# Patient Record
Sex: FEMALE | Race: WHITE | NOT HISPANIC OR LATINO | Employment: FULL TIME | URBAN - METROPOLITAN AREA
[De-identification: names, ages, dates, MRNs, and addresses within clinical notes are randomized per-mention and may not be internally consistent; named-entity substitution may affect disease eponyms.]

---

## 2017-01-03 ENCOUNTER — OFFICE VISIT - HEALTHEAST (OUTPATIENT)
Dept: BEHAVIORAL HEALTH | Facility: CLINIC | Age: 52
End: 2017-01-03

## 2017-01-03 DIAGNOSIS — F41.0 PANIC DISORDER WITHOUT AGORAPHOBIA: ICD-10-CM

## 2017-01-03 DIAGNOSIS — F33.1 MAJOR DEPRESSIVE DISORDER, RECURRENT EPISODE, MODERATE (H): ICD-10-CM

## 2017-01-03 DIAGNOSIS — F43.10 PTSD (POST-TRAUMATIC STRESS DISORDER): ICD-10-CM

## 2017-01-09 ENCOUNTER — OFFICE VISIT - HEALTHEAST (OUTPATIENT)
Dept: BEHAVIORAL HEALTH | Facility: CLINIC | Age: 52
End: 2017-01-09

## 2017-01-09 DIAGNOSIS — F33.0 MILD EPISODE OF RECURRENT MAJOR DEPRESSIVE DISORDER (H): ICD-10-CM

## 2017-01-09 DIAGNOSIS — F41.9 ANXIETY: ICD-10-CM

## 2017-01-09 DIAGNOSIS — F33.1 MAJOR DEPRESSIVE DISORDER, RECURRENT EPISODE, MODERATE (H): ICD-10-CM

## 2017-01-09 DIAGNOSIS — F43.10 PTSD (POST-TRAUMATIC STRESS DISORDER): ICD-10-CM

## 2017-01-09 DIAGNOSIS — F41.0 PANIC DISORDER WITHOUT AGORAPHOBIA: ICD-10-CM

## 2017-01-09 DIAGNOSIS — G47.9 SLEEP DISORDER: ICD-10-CM

## 2017-01-09 ASSESSMENT — MIFFLIN-ST. JEOR: SCORE: 1840.33

## 2017-01-19 ENCOUNTER — HOSPITAL ENCOUNTER (OUTPATIENT)
Dept: MAMMOGRAPHY | Facility: HOSPITAL | Age: 52
Discharge: HOME | End: 2017-01-19
Attending: INTERNAL MEDICINE

## 2017-01-19 DIAGNOSIS — Z12.31 VISIT FOR SCREENING MAMMOGRAM: ICD-10-CM

## 2017-01-31 ENCOUNTER — OFFICE VISIT - HEALTHEAST (OUTPATIENT)
Dept: BEHAVIORAL HEALTH | Facility: CLINIC | Age: 52
End: 2017-01-31

## 2017-01-31 DIAGNOSIS — F33.1 MAJOR DEPRESSIVE DISORDER, RECURRENT EPISODE, MODERATE (H): ICD-10-CM

## 2017-01-31 DIAGNOSIS — F41.0 PANIC DISORDER WITHOUT AGORAPHOBIA: ICD-10-CM

## 2017-01-31 DIAGNOSIS — F43.10 PTSD (POST-TRAUMATIC STRESS DISORDER): ICD-10-CM

## 2017-02-14 ENCOUNTER — OFFICE VISIT - HEALTHEAST (OUTPATIENT)
Dept: BEHAVIORAL HEALTH | Facility: CLINIC | Age: 52
End: 2017-02-14

## 2017-02-14 DIAGNOSIS — F41.0 PANIC DISORDER WITHOUT AGORAPHOBIA: ICD-10-CM

## 2017-02-14 DIAGNOSIS — F33.1 MAJOR DEPRESSIVE DISORDER, RECURRENT EPISODE, MODERATE (H): ICD-10-CM

## 2017-02-14 DIAGNOSIS — F43.10 PTSD (POST-TRAUMATIC STRESS DISORDER): ICD-10-CM

## 2017-03-07 ENCOUNTER — OFFICE VISIT - HEALTHEAST (OUTPATIENT)
Dept: BEHAVIORAL HEALTH | Facility: CLINIC | Age: 52
End: 2017-03-07

## 2017-03-07 DIAGNOSIS — F33.1 MAJOR DEPRESSIVE DISORDER, RECURRENT EPISODE, MODERATE (H): ICD-10-CM

## 2017-03-07 DIAGNOSIS — F41.0 PANIC DISORDER WITHOUT AGORAPHOBIA: ICD-10-CM

## 2017-03-07 DIAGNOSIS — F43.10 PTSD (POST-TRAUMATIC STRESS DISORDER): ICD-10-CM

## 2017-03-21 ENCOUNTER — AMBULATORY - HEALTHEAST (OUTPATIENT)
Dept: BEHAVIORAL HEALTH | Facility: CLINIC | Age: 52
End: 2017-03-21

## 2017-03-21 ENCOUNTER — OFFICE VISIT - HEALTHEAST (OUTPATIENT)
Dept: BEHAVIORAL HEALTH | Facility: CLINIC | Age: 52
End: 2017-03-21

## 2017-03-21 DIAGNOSIS — F33.1 MAJOR DEPRESSIVE DISORDER, RECURRENT EPISODE, MODERATE (H): ICD-10-CM

## 2017-03-21 DIAGNOSIS — F41.0 PANIC DISORDER WITHOUT AGORAPHOBIA: ICD-10-CM

## 2017-03-21 DIAGNOSIS — F43.10 PTSD (POST-TRAUMATIC STRESS DISORDER): ICD-10-CM

## 2017-03-22 ENCOUNTER — AMBULATORY - HEALTHEAST (OUTPATIENT)
Dept: INTERNAL MEDICINE | Facility: CLINIC | Age: 52
End: 2017-03-22

## 2017-03-22 DIAGNOSIS — Z13.9 SCREENING: ICD-10-CM

## 2017-03-28 ENCOUNTER — COMMUNICATION - HEALTHEAST (OUTPATIENT)
Dept: RHEUMATOLOGY | Facility: CLINIC | Age: 52
End: 2017-03-28

## 2017-03-28 DIAGNOSIS — M06.9 RHEUMATOID ARTHRITIS (H): ICD-10-CM

## 2017-04-04 ENCOUNTER — OFFICE VISIT - HEALTHEAST (OUTPATIENT)
Dept: BEHAVIORAL HEALTH | Facility: CLINIC | Age: 52
End: 2017-04-04

## 2017-04-04 ENCOUNTER — COMMUNICATION - HEALTHEAST (OUTPATIENT)
Dept: INTERNAL MEDICINE | Facility: CLINIC | Age: 52
End: 2017-04-04

## 2017-04-04 DIAGNOSIS — F41.0 PANIC DISORDER WITHOUT AGORAPHOBIA: ICD-10-CM

## 2017-04-04 DIAGNOSIS — F43.10 PTSD (POST-TRAUMATIC STRESS DISORDER): ICD-10-CM

## 2017-04-04 DIAGNOSIS — F33.1 MAJOR DEPRESSIVE DISORDER, RECURRENT EPISODE, MODERATE (H): ICD-10-CM

## 2017-04-09 ENCOUNTER — COMMUNICATION - HEALTHEAST (OUTPATIENT)
Dept: INTERNAL MEDICINE | Facility: CLINIC | Age: 52
End: 2017-04-09

## 2017-04-10 ENCOUNTER — OFFICE VISIT - HEALTHEAST (OUTPATIENT)
Dept: BEHAVIORAL HEALTH | Facility: CLINIC | Age: 52
End: 2017-04-10

## 2017-04-10 DIAGNOSIS — F33.0 MILD EPISODE OF RECURRENT MAJOR DEPRESSIVE DISORDER (H): ICD-10-CM

## 2017-04-10 DIAGNOSIS — F41.9 ANXIETY: ICD-10-CM

## 2017-04-10 DIAGNOSIS — G89.29 OTHER CHRONIC PAIN: ICD-10-CM

## 2017-04-10 DIAGNOSIS — F43.10 PTSD (POST-TRAUMATIC STRESS DISORDER): ICD-10-CM

## 2017-04-10 ASSESSMENT — MIFFLIN-ST. JEOR: SCORE: 1831.26

## 2017-04-18 ENCOUNTER — OFFICE VISIT - HEALTHEAST (OUTPATIENT)
Dept: BEHAVIORAL HEALTH | Facility: CLINIC | Age: 52
End: 2017-04-18

## 2017-04-18 DIAGNOSIS — F33.1 MAJOR DEPRESSIVE DISORDER, RECURRENT EPISODE, MODERATE (H): ICD-10-CM

## 2017-04-18 DIAGNOSIS — F43.10 PTSD (POST-TRAUMATIC STRESS DISORDER): ICD-10-CM

## 2017-04-18 DIAGNOSIS — F41.9 ANXIETY: ICD-10-CM

## 2017-05-02 ENCOUNTER — OFFICE VISIT - HEALTHEAST (OUTPATIENT)
Dept: BEHAVIORAL HEALTH | Facility: CLINIC | Age: 52
End: 2017-05-02

## 2017-05-02 DIAGNOSIS — F33.1 MAJOR DEPRESSIVE DISORDER, RECURRENT EPISODE, MODERATE (H): ICD-10-CM

## 2017-05-02 DIAGNOSIS — F43.10 PTSD (POST-TRAUMATIC STRESS DISORDER): ICD-10-CM

## 2017-05-02 DIAGNOSIS — F41.9 ANXIETY: ICD-10-CM

## 2017-05-11 ENCOUNTER — COMMUNICATION - HEALTHEAST (OUTPATIENT)
Dept: ADMINISTRATIVE | Facility: CLINIC | Age: 52
End: 2017-05-11

## 2017-05-11 ENCOUNTER — AMBULATORY - HEALTHEAST (OUTPATIENT)
Dept: LAB | Facility: CLINIC | Age: 52
End: 2017-05-11

## 2017-05-11 DIAGNOSIS — M06.9 RHEUMATOID ARTHRITIS INVOLVING MULTIPLE SITES, UNSPECIFIED RHEUMATOID FACTOR PRESENCE: ICD-10-CM

## 2017-05-11 DIAGNOSIS — M06.9 RHEUMATOID ARTHRITIS (H): ICD-10-CM

## 2017-05-11 LAB
CREAT SERPL-MCNC: 0.72 MG/DL (ref 0.6–1.1)
GFR SERPL CREATININE-BSD FRML MDRD: >60 ML/MIN/1.73M2

## 2017-05-16 ENCOUNTER — OFFICE VISIT - HEALTHEAST (OUTPATIENT)
Dept: BEHAVIORAL HEALTH | Facility: CLINIC | Age: 52
End: 2017-05-16

## 2017-05-16 DIAGNOSIS — F43.10 PTSD (POST-TRAUMATIC STRESS DISORDER): ICD-10-CM

## 2017-05-16 DIAGNOSIS — F33.1 MAJOR DEPRESSIVE DISORDER, RECURRENT EPISODE, MODERATE (H): ICD-10-CM

## 2017-05-16 DIAGNOSIS — F41.9 ANXIETY: ICD-10-CM

## 2017-05-30 ENCOUNTER — OFFICE VISIT - HEALTHEAST (OUTPATIENT)
Dept: BEHAVIORAL HEALTH | Facility: CLINIC | Age: 52
End: 2017-05-30

## 2017-05-30 DIAGNOSIS — F33.1 MAJOR DEPRESSIVE DISORDER, RECURRENT EPISODE, MODERATE (H): ICD-10-CM

## 2017-05-30 DIAGNOSIS — F43.10 PTSD (POST-TRAUMATIC STRESS DISORDER): ICD-10-CM

## 2017-05-30 DIAGNOSIS — F41.0 PANIC DISORDER WITHOUT AGORAPHOBIA: ICD-10-CM

## 2017-06-09 ENCOUNTER — OFFICE VISIT - HEALTHEAST (OUTPATIENT)
Dept: RHEUMATOLOGY | Facility: CLINIC | Age: 52
End: 2017-06-09

## 2017-06-09 DIAGNOSIS — M05.79 SEROPOSITIVE RHEUMATOID ARTHRITIS OF MULTIPLE SITES (H): ICD-10-CM

## 2017-06-09 DIAGNOSIS — M06.9 RHEUMATOID ARTHRITIS INVOLVING MULTIPLE SITES, UNSPECIFIED RHEUMATOID FACTOR PRESENCE: ICD-10-CM

## 2017-06-09 DIAGNOSIS — M77.8 TENDONITIS OF BOTH SHOULDERS: ICD-10-CM

## 2017-06-09 LAB
ALT SERPL W P-5'-P-CCNC: 12 U/L (ref 0–45)
CREAT SERPL-MCNC: 0.69 MG/DL (ref 0.6–1.1)
GFR SERPL CREATININE-BSD FRML MDRD: >60 ML/MIN/1.73M2

## 2017-06-09 ASSESSMENT — MIFFLIN-ST. JEOR: SCORE: 1831.26

## 2017-06-13 ENCOUNTER — OFFICE VISIT - HEALTHEAST (OUTPATIENT)
Dept: BEHAVIORAL HEALTH | Facility: CLINIC | Age: 52
End: 2017-06-13

## 2017-06-13 DIAGNOSIS — F33.1 MAJOR DEPRESSIVE DISORDER, RECURRENT EPISODE, MODERATE (H): ICD-10-CM

## 2017-06-13 DIAGNOSIS — F41.0 PANIC DISORDER WITHOUT AGORAPHOBIA: ICD-10-CM

## 2017-06-13 DIAGNOSIS — F43.10 PTSD (POST-TRAUMATIC STRESS DISORDER): ICD-10-CM

## 2017-06-30 ENCOUNTER — AMBULATORY - HEALTHEAST (OUTPATIENT)
Dept: LAB | Facility: CLINIC | Age: 52
End: 2017-06-30

## 2017-06-30 DIAGNOSIS — M06.9 RHEUMATOID ARTHRITIS INVOLVING MULTIPLE SITES, UNSPECIFIED RHEUMATOID FACTOR PRESENCE: ICD-10-CM

## 2017-06-30 LAB
ALT SERPL W P-5'-P-CCNC: 13 U/L (ref 0–45)
CREAT SERPL-MCNC: 0.75 MG/DL (ref 0.6–1.1)
GFR SERPL CREATININE-BSD FRML MDRD: >60 ML/MIN/1.73M2

## 2017-07-11 ENCOUNTER — OFFICE VISIT - HEALTHEAST (OUTPATIENT)
Dept: BEHAVIORAL HEALTH | Facility: CLINIC | Age: 52
End: 2017-07-11

## 2017-07-11 DIAGNOSIS — F43.10 PTSD (POST-TRAUMATIC STRESS DISORDER): ICD-10-CM

## 2017-07-11 DIAGNOSIS — F33.1 MAJOR DEPRESSIVE DISORDER, RECURRENT EPISODE, MODERATE (H): ICD-10-CM

## 2017-07-11 DIAGNOSIS — F41.0 PANIC DISORDER WITHOUT AGORAPHOBIA: ICD-10-CM

## 2017-08-01 ENCOUNTER — OFFICE VISIT - HEALTHEAST (OUTPATIENT)
Dept: RHEUMATOLOGY | Facility: CLINIC | Age: 52
End: 2017-08-01

## 2017-08-01 DIAGNOSIS — Z79.899 HIGH RISK MEDICATION USE: ICD-10-CM

## 2017-08-01 DIAGNOSIS — M77.8 TENDONITIS OF BOTH SHOULDERS: ICD-10-CM

## 2017-08-01 DIAGNOSIS — M05.79 SEROPOSITIVE RHEUMATOID ARTHRITIS OF MULTIPLE SITES (H): ICD-10-CM

## 2017-08-01 LAB
ALT SERPL W P-5'-P-CCNC: 11 U/L (ref 0–45)
CREAT SERPL-MCNC: 0.7 MG/DL (ref 0.6–1.1)
GFR SERPL CREATININE-BSD FRML MDRD: >60 ML/MIN/1.73M2

## 2017-08-01 ASSESSMENT — MIFFLIN-ST. JEOR: SCORE: 1831.26

## 2017-08-10 ENCOUNTER — OFFICE VISIT - HEALTHEAST (OUTPATIENT)
Dept: BEHAVIORAL HEALTH | Facility: CLINIC | Age: 52
End: 2017-08-10

## 2017-08-10 DIAGNOSIS — F41.0 PANIC DISORDER WITHOUT AGORAPHOBIA: ICD-10-CM

## 2017-08-10 DIAGNOSIS — F33.1 MAJOR DEPRESSIVE DISORDER, RECURRENT EPISODE, MODERATE (H): ICD-10-CM

## 2017-08-10 DIAGNOSIS — F43.10 PTSD (POST-TRAUMATIC STRESS DISORDER): ICD-10-CM

## 2017-09-01 ENCOUNTER — AMBULATORY - HEALTHEAST (OUTPATIENT)
Dept: LAB | Facility: CLINIC | Age: 52
End: 2017-09-01

## 2017-09-01 DIAGNOSIS — M06.9 RHEUMATOID ARTHRITIS INVOLVING MULTIPLE SITES, UNSPECIFIED RHEUMATOID FACTOR PRESENCE: ICD-10-CM

## 2017-09-07 ENCOUNTER — OFFICE VISIT - HEALTHEAST (OUTPATIENT)
Dept: BEHAVIORAL HEALTH | Facility: CLINIC | Age: 52
End: 2017-09-07

## 2017-09-07 DIAGNOSIS — F43.10 PTSD (POST-TRAUMATIC STRESS DISORDER): ICD-10-CM

## 2017-09-07 DIAGNOSIS — F33.1 MAJOR DEPRESSIVE DISORDER, RECURRENT EPISODE, MODERATE (H): ICD-10-CM

## 2017-09-07 DIAGNOSIS — F41.0 PANIC DISORDER WITHOUT AGORAPHOBIA: ICD-10-CM

## 2017-10-02 ENCOUNTER — AMBULATORY - HEALTHEAST (OUTPATIENT)
Dept: LAB | Facility: CLINIC | Age: 52
End: 2017-10-02

## 2017-10-02 DIAGNOSIS — M06.9 RHEUMATOID ARTHRITIS INVOLVING MULTIPLE SITES, UNSPECIFIED RHEUMATOID FACTOR PRESENCE: ICD-10-CM

## 2017-10-03 ENCOUNTER — OFFICE VISIT - HEALTHEAST (OUTPATIENT)
Dept: RHEUMATOLOGY | Facility: CLINIC | Age: 52
End: 2017-10-03

## 2017-10-03 DIAGNOSIS — Z79.899 HIGH RISK MEDICATION USE: ICD-10-CM

## 2017-10-03 DIAGNOSIS — M05.79 SEROPOSITIVE RHEUMATOID ARTHRITIS OF MULTIPLE SITES (H): ICD-10-CM

## 2017-10-03 DIAGNOSIS — R76.8 CYCLIC CITRULLINATED PEPTIDE (CCP) ANTIBODY POSITIVE: ICD-10-CM

## 2017-10-05 ENCOUNTER — OFFICE VISIT - HEALTHEAST (OUTPATIENT)
Dept: BEHAVIORAL HEALTH | Facility: CLINIC | Age: 52
End: 2017-10-05

## 2017-10-05 DIAGNOSIS — F43.10 PTSD (POST-TRAUMATIC STRESS DISORDER): ICD-10-CM

## 2017-10-05 DIAGNOSIS — F33.1 MAJOR DEPRESSIVE DISORDER, RECURRENT EPISODE, MODERATE (H): ICD-10-CM

## 2017-10-05 DIAGNOSIS — F41.0 PANIC DISORDER WITHOUT AGORAPHOBIA: ICD-10-CM

## 2017-10-18 ENCOUNTER — OFFICE VISIT - HEALTHEAST (OUTPATIENT)
Dept: BEHAVIORAL HEALTH | Facility: CLINIC | Age: 52
End: 2017-10-18

## 2017-10-18 DIAGNOSIS — F33.1 MAJOR DEPRESSIVE DISORDER, RECURRENT EPISODE, MODERATE (H): ICD-10-CM

## 2017-10-18 DIAGNOSIS — F43.10 PTSD (POST-TRAUMATIC STRESS DISORDER): ICD-10-CM

## 2017-10-18 DIAGNOSIS — F41.0 PANIC DISORDER WITHOUT AGORAPHOBIA: ICD-10-CM

## 2017-10-26 ENCOUNTER — AMBULATORY - HEALTHEAST (OUTPATIENT)
Dept: BEHAVIORAL HEALTH | Facility: CLINIC | Age: 52
End: 2017-10-26

## 2017-11-01 ENCOUNTER — OFFICE VISIT - HEALTHEAST (OUTPATIENT)
Dept: BEHAVIORAL HEALTH | Facility: CLINIC | Age: 52
End: 2017-11-01

## 2017-11-01 DIAGNOSIS — F43.10 PTSD (POST-TRAUMATIC STRESS DISORDER): ICD-10-CM

## 2017-11-01 DIAGNOSIS — F33.1 MAJOR DEPRESSIVE DISORDER, RECURRENT EPISODE, MODERATE (H): ICD-10-CM

## 2017-11-01 DIAGNOSIS — F41.0 PANIC DISORDER WITHOUT AGORAPHOBIA: ICD-10-CM

## 2017-11-15 ENCOUNTER — OFFICE VISIT - HEALTHEAST (OUTPATIENT)
Dept: BEHAVIORAL HEALTH | Facility: CLINIC | Age: 52
End: 2017-11-15

## 2017-11-15 DIAGNOSIS — F43.10 PTSD (POST-TRAUMATIC STRESS DISORDER): ICD-10-CM

## 2017-11-15 DIAGNOSIS — F33.1 MAJOR DEPRESSIVE DISORDER, RECURRENT EPISODE, MODERATE (H): ICD-10-CM

## 2017-11-15 DIAGNOSIS — F41.0 PANIC DISORDER WITHOUT AGORAPHOBIA: ICD-10-CM

## 2017-11-29 ENCOUNTER — OFFICE VISIT - HEALTHEAST (OUTPATIENT)
Dept: BEHAVIORAL HEALTH | Facility: CLINIC | Age: 52
End: 2017-11-29

## 2017-11-29 DIAGNOSIS — F41.0 PANIC DISORDER WITHOUT AGORAPHOBIA: ICD-10-CM

## 2017-11-29 DIAGNOSIS — F33.1 MAJOR DEPRESSIVE DISORDER, RECURRENT EPISODE, MODERATE (H): ICD-10-CM

## 2017-11-29 DIAGNOSIS — F43.10 PTSD (POST-TRAUMATIC STRESS DISORDER): ICD-10-CM

## 2017-12-20 ENCOUNTER — OFFICE VISIT - HEALTHEAST (OUTPATIENT)
Dept: BEHAVIORAL HEALTH | Facility: CLINIC | Age: 52
End: 2017-12-20

## 2017-12-20 DIAGNOSIS — F43.10 PTSD (POST-TRAUMATIC STRESS DISORDER): ICD-10-CM

## 2017-12-20 DIAGNOSIS — F41.0 PANIC DISORDER WITHOUT AGORAPHOBIA: ICD-10-CM

## 2017-12-20 DIAGNOSIS — F33.1 MAJOR DEPRESSIVE DISORDER, RECURRENT EPISODE, MODERATE (H): ICD-10-CM

## 2018-01-04 ENCOUNTER — OFFICE VISIT - HEALTHEAST (OUTPATIENT)
Dept: BEHAVIORAL HEALTH | Facility: CLINIC | Age: 53
End: 2018-01-04

## 2018-01-04 DIAGNOSIS — F43.10 PTSD (POST-TRAUMATIC STRESS DISORDER): ICD-10-CM

## 2018-01-04 DIAGNOSIS — F41.0 PANIC DISORDER WITHOUT AGORAPHOBIA: ICD-10-CM

## 2018-01-04 DIAGNOSIS — F33.1 MAJOR DEPRESSIVE DISORDER, RECURRENT EPISODE, MODERATE (H): ICD-10-CM

## 2018-01-11 ENCOUNTER — COMMUNICATION - HEALTHEAST (OUTPATIENT)
Dept: RHEUMATOLOGY | Facility: CLINIC | Age: 53
End: 2018-01-11

## 2018-01-11 DIAGNOSIS — M05.79 SEROPOSITIVE RHEUMATOID ARTHRITIS OF MULTIPLE SITES (H): ICD-10-CM

## 2018-01-18 ENCOUNTER — OFFICE VISIT - HEALTHEAST (OUTPATIENT)
Dept: BEHAVIORAL HEALTH | Facility: CLINIC | Age: 53
End: 2018-01-18

## 2018-01-18 DIAGNOSIS — F43.10 PTSD (POST-TRAUMATIC STRESS DISORDER): ICD-10-CM

## 2018-01-18 DIAGNOSIS — F33.1 MAJOR DEPRESSIVE DISORDER, RECURRENT EPISODE, MODERATE (H): ICD-10-CM

## 2018-01-18 DIAGNOSIS — F41.0 PANIC DISORDER WITHOUT AGORAPHOBIA: ICD-10-CM

## 2018-01-19 ENCOUNTER — OFFICE VISIT - HEALTHEAST (OUTPATIENT)
Dept: FAMILY MEDICINE | Facility: CLINIC | Age: 53
End: 2018-01-19

## 2018-01-19 DIAGNOSIS — R06.09 OTHER FORMS OF DYSPNEA: ICD-10-CM

## 2018-01-19 DIAGNOSIS — R06.09 DOE (DYSPNEA ON EXERTION): ICD-10-CM

## 2018-01-19 DIAGNOSIS — J11.1 INFLUENZA: ICD-10-CM

## 2018-01-19 DIAGNOSIS — R05.9 COUGH: ICD-10-CM

## 2018-01-19 DIAGNOSIS — R68.89 FLU-LIKE SYMPTOMS: ICD-10-CM

## 2018-01-19 DIAGNOSIS — J02.9 SORE THROAT: ICD-10-CM

## 2018-01-19 LAB
DEPRECATED S PYO AG THROAT QL EIA: NORMAL
FLUAV AG SPEC QL IA: NORMAL
FLUBV AG SPEC QL IA: NORMAL

## 2018-01-20 LAB — GROUP A STREP BY PCR: NORMAL

## 2018-02-02 ENCOUNTER — AMBULATORY - HEALTHEAST (OUTPATIENT)
Dept: LAB | Facility: CLINIC | Age: 53
End: 2018-02-02

## 2018-02-02 DIAGNOSIS — M06.9 RHEUMATOID ARTHRITIS INVOLVING MULTIPLE SITES, UNSPECIFIED RHEUMATOID FACTOR PRESENCE: ICD-10-CM

## 2018-02-02 LAB
ALBUMIN SERPL-MCNC: 3.3 G/DL (ref 3.5–5)
ALT SERPL W P-5'-P-CCNC: 18 U/L (ref 0–45)
CREAT SERPL-MCNC: 0.68 MG/DL (ref 0.6–1.1)
ERYTHROCYTE [DISTWIDTH] IN BLOOD BY AUTOMATED COUNT: 13.9 % (ref 11–14.5)
GFR SERPL CREATININE-BSD FRML MDRD: >60 ML/MIN/1.73M2
HCT VFR BLD AUTO: 36.1 % (ref 35–47)
HGB BLD-MCNC: 11.7 G/DL (ref 12–16)
MCH RBC QN AUTO: 24.6 PG (ref 27–34)
MCHC RBC AUTO-ENTMCNC: 32.4 G/DL (ref 32–36)
MCV RBC AUTO: 76 FL (ref 80–100)
PLATELET # BLD AUTO: 355 THOU/UL (ref 140–440)
PMV BLD AUTO: 8.1 FL (ref 7–10)
RBC # BLD AUTO: 4.77 MILL/UL (ref 3.8–5.4)
WBC: 10.1 THOU/UL (ref 4–11)

## 2018-02-06 ENCOUNTER — OFFICE VISIT - HEALTHEAST (OUTPATIENT)
Dept: RHEUMATOLOGY | Facility: CLINIC | Age: 53
End: 2018-02-06

## 2018-02-06 DIAGNOSIS — M05.79 SEROPOSITIVE RHEUMATOID ARTHRITIS OF MULTIPLE SITES (H): ICD-10-CM

## 2018-02-06 DIAGNOSIS — Z79.899 HIGH RISK MEDICATION USE: ICD-10-CM

## 2018-02-06 DIAGNOSIS — R76.8 CYCLIC CITRULLINATED PEPTIDE (CCP) ANTIBODY POSITIVE: ICD-10-CM

## 2018-02-06 ASSESSMENT — MIFFLIN-ST. JEOR: SCORE: 1831.26

## 2018-02-07 ENCOUNTER — OFFICE VISIT - HEALTHEAST (OUTPATIENT)
Dept: BEHAVIORAL HEALTH | Facility: CLINIC | Age: 53
End: 2018-02-07

## 2018-02-07 DIAGNOSIS — F43.10 PTSD (POST-TRAUMATIC STRESS DISORDER): ICD-10-CM

## 2018-02-07 DIAGNOSIS — F33.1 MAJOR DEPRESSIVE DISORDER, RECURRENT EPISODE, MODERATE (H): ICD-10-CM

## 2018-02-07 DIAGNOSIS — F41.0 PANIC DISORDER WITHOUT AGORAPHOBIA: ICD-10-CM

## 2018-02-09 ENCOUNTER — AMBULATORY - HEALTHEAST (OUTPATIENT)
Dept: BEHAVIORAL HEALTH | Facility: CLINIC | Age: 53
End: 2018-02-09

## 2018-03-01 ENCOUNTER — OFFICE VISIT - HEALTHEAST (OUTPATIENT)
Dept: BEHAVIORAL HEALTH | Facility: CLINIC | Age: 53
End: 2018-03-01

## 2018-03-01 DIAGNOSIS — F33.1 MAJOR DEPRESSIVE DISORDER, RECURRENT EPISODE, MODERATE (H): ICD-10-CM

## 2018-03-01 DIAGNOSIS — F43.10 PTSD (POST-TRAUMATIC STRESS DISORDER): ICD-10-CM

## 2018-03-01 DIAGNOSIS — F41.0 PANIC DISORDER WITHOUT AGORAPHOBIA: ICD-10-CM

## 2018-03-13 ENCOUNTER — COMMUNICATION - HEALTHEAST (OUTPATIENT)
Dept: BEHAVIORAL HEALTH | Facility: CLINIC | Age: 53
End: 2018-03-13

## 2018-03-14 ENCOUNTER — OFFICE VISIT - HEALTHEAST (OUTPATIENT)
Dept: BEHAVIORAL HEALTH | Facility: CLINIC | Age: 53
End: 2018-03-14

## 2018-03-14 DIAGNOSIS — F41.0 PANIC DISORDER WITHOUT AGORAPHOBIA: ICD-10-CM

## 2018-03-14 DIAGNOSIS — F33.1 MAJOR DEPRESSIVE DISORDER, RECURRENT EPISODE, MODERATE (H): ICD-10-CM

## 2018-03-14 DIAGNOSIS — F43.10 PTSD (POST-TRAUMATIC STRESS DISORDER): ICD-10-CM

## 2018-03-21 ENCOUNTER — OFFICE VISIT - HEALTHEAST (OUTPATIENT)
Dept: BEHAVIORAL HEALTH | Facility: CLINIC | Age: 53
End: 2018-03-21

## 2018-03-21 DIAGNOSIS — F41.0 PANIC DISORDER WITHOUT AGORAPHOBIA: ICD-10-CM

## 2018-03-21 DIAGNOSIS — F43.10 PTSD (POST-TRAUMATIC STRESS DISORDER): ICD-10-CM

## 2018-03-21 DIAGNOSIS — F33.1 MAJOR DEPRESSIVE DISORDER, RECURRENT EPISODE, MODERATE (H): ICD-10-CM

## 2018-03-29 ENCOUNTER — OFFICE VISIT - HEALTHEAST (OUTPATIENT)
Dept: BEHAVIORAL HEALTH | Facility: CLINIC | Age: 53
End: 2018-03-29

## 2018-03-29 DIAGNOSIS — F41.0 PANIC DISORDER WITHOUT AGORAPHOBIA: ICD-10-CM

## 2018-03-29 DIAGNOSIS — F43.10 PTSD (POST-TRAUMATIC STRESS DISORDER): ICD-10-CM

## 2018-03-29 DIAGNOSIS — F33.1 MAJOR DEPRESSIVE DISORDER, RECURRENT EPISODE, MODERATE (H): ICD-10-CM

## 2018-04-05 ENCOUNTER — OFFICE VISIT - HEALTHEAST (OUTPATIENT)
Dept: BEHAVIORAL HEALTH | Facility: CLINIC | Age: 53
End: 2018-04-05

## 2018-04-05 DIAGNOSIS — F41.0 PANIC DISORDER WITHOUT AGORAPHOBIA: ICD-10-CM

## 2018-04-05 DIAGNOSIS — F33.1 MAJOR DEPRESSIVE DISORDER, RECURRENT EPISODE, MODERATE (H): ICD-10-CM

## 2018-04-05 DIAGNOSIS — F43.10 PTSD (POST-TRAUMATIC STRESS DISORDER): ICD-10-CM

## 2018-04-11 ENCOUNTER — OFFICE VISIT - HEALTHEAST (OUTPATIENT)
Dept: BEHAVIORAL HEALTH | Facility: CLINIC | Age: 53
End: 2018-04-11

## 2018-04-11 DIAGNOSIS — F43.10 PTSD (POST-TRAUMATIC STRESS DISORDER): ICD-10-CM

## 2018-04-11 DIAGNOSIS — F33.1 MAJOR DEPRESSIVE DISORDER, RECURRENT EPISODE, MODERATE (H): ICD-10-CM

## 2018-04-11 DIAGNOSIS — F41.0 PANIC DISORDER WITHOUT AGORAPHOBIA: ICD-10-CM

## 2018-04-19 ENCOUNTER — OFFICE VISIT - HEALTHEAST (OUTPATIENT)
Dept: BEHAVIORAL HEALTH | Facility: CLINIC | Age: 53
End: 2018-04-19

## 2018-04-19 DIAGNOSIS — F43.10 PTSD (POST-TRAUMATIC STRESS DISORDER): ICD-10-CM

## 2018-04-19 DIAGNOSIS — F33.1 MAJOR DEPRESSIVE DISORDER, RECURRENT EPISODE, MODERATE (H): ICD-10-CM

## 2018-04-19 DIAGNOSIS — F41.0 PANIC DISORDER WITHOUT AGORAPHOBIA: ICD-10-CM

## 2018-04-26 ENCOUNTER — OFFICE VISIT - HEALTHEAST (OUTPATIENT)
Dept: BEHAVIORAL HEALTH | Facility: CLINIC | Age: 53
End: 2018-04-26

## 2018-04-26 DIAGNOSIS — F41.0 PANIC DISORDER WITHOUT AGORAPHOBIA: ICD-10-CM

## 2018-04-26 DIAGNOSIS — F43.10 PTSD (POST-TRAUMATIC STRESS DISORDER): ICD-10-CM

## 2018-04-26 DIAGNOSIS — F33.1 MAJOR DEPRESSIVE DISORDER, RECURRENT EPISODE, MODERATE (H): ICD-10-CM

## 2018-05-02 ENCOUNTER — OFFICE VISIT - HEALTHEAST (OUTPATIENT)
Dept: BEHAVIORAL HEALTH | Facility: CLINIC | Age: 53
End: 2018-05-02

## 2018-05-02 DIAGNOSIS — F41.0 PANIC DISORDER WITHOUT AGORAPHOBIA: ICD-10-CM

## 2018-05-02 DIAGNOSIS — F43.10 PTSD (POST-TRAUMATIC STRESS DISORDER): ICD-10-CM

## 2018-05-02 DIAGNOSIS — F33.1 MAJOR DEPRESSIVE DISORDER, RECURRENT EPISODE, MODERATE (H): ICD-10-CM

## 2018-05-03 ENCOUNTER — AMBULATORY - HEALTHEAST (OUTPATIENT)
Dept: BEHAVIORAL HEALTH | Facility: CLINIC | Age: 53
End: 2018-05-03

## 2018-05-16 ENCOUNTER — OFFICE VISIT - HEALTHEAST (OUTPATIENT)
Dept: BEHAVIORAL HEALTH | Facility: CLINIC | Age: 53
End: 2018-05-16

## 2018-05-16 DIAGNOSIS — F41.0 PANIC DISORDER WITHOUT AGORAPHOBIA: ICD-10-CM

## 2018-05-16 DIAGNOSIS — F33.1 MAJOR DEPRESSIVE DISORDER, RECURRENT EPISODE, MODERATE (H): ICD-10-CM

## 2018-05-16 DIAGNOSIS — F43.10 PTSD (POST-TRAUMATIC STRESS DISORDER): ICD-10-CM

## 2018-05-21 ENCOUNTER — OFFICE VISIT - HEALTHEAST (OUTPATIENT)
Dept: FAMILY MEDICINE | Facility: CLINIC | Age: 53
End: 2018-05-21

## 2018-05-21 DIAGNOSIS — L98.9 SKIN LESION: ICD-10-CM

## 2018-05-21 DIAGNOSIS — Z00.00 ROUTINE GENERAL MEDICAL EXAMINATION AT A HEALTH CARE FACILITY: ICD-10-CM

## 2018-05-21 DIAGNOSIS — Z12.11 SCREEN FOR COLON CANCER: ICD-10-CM

## 2018-05-21 DIAGNOSIS — Z12.31 VISIT FOR SCREENING MAMMOGRAM: ICD-10-CM

## 2018-05-21 LAB
CHOLEST SERPL-MCNC: 166 MG/DL
FASTING STATUS PATIENT QL REPORTED: YES
HDLC SERPL-MCNC: 41 MG/DL
LDLC SERPL CALC-MCNC: 112 MG/DL
TRIGL SERPL-MCNC: 66 MG/DL

## 2018-05-21 ASSESSMENT — MIFFLIN-ST. JEOR: SCORE: 1813.11

## 2018-05-22 ENCOUNTER — COMMUNICATION - HEALTHEAST (OUTPATIENT)
Dept: FAMILY MEDICINE | Facility: CLINIC | Age: 53
End: 2018-05-22

## 2018-05-22 ENCOUNTER — OFFICE VISIT - HEALTHEAST (OUTPATIENT)
Dept: BEHAVIORAL HEALTH | Facility: HOSPITAL | Age: 53
End: 2018-05-22

## 2018-05-22 DIAGNOSIS — F33.1 MAJOR DEPRESSIVE DISORDER, RECURRENT EPISODE, MODERATE (H): ICD-10-CM

## 2018-05-22 DIAGNOSIS — F43.10 PTSD (POST-TRAUMATIC STRESS DISORDER): ICD-10-CM

## 2018-05-22 DIAGNOSIS — F41.0 PANIC DISORDER WITHOUT AGORAPHOBIA: ICD-10-CM

## 2018-05-22 LAB
C TRACH DNA SPEC QL PROBE+SIG AMP: NEGATIVE
HPV SOURCE: NORMAL
HUMAN PAPILLOMA VIRUS 16 DNA: NEGATIVE
HUMAN PAPILLOMA VIRUS 18 DNA: NEGATIVE
HUMAN PAPILLOMA VIRUS FINAL DIAGNOSIS: NORMAL
HUMAN PAPILLOMA VIRUS OTHER HR: NEGATIVE
N GONORRHOEA DNA SPEC QL NAA+PROBE: NEGATIVE
SPECIMEN DESCRIPTION: NORMAL

## 2018-05-29 ENCOUNTER — COMMUNICATION - HEALTHEAST (OUTPATIENT)
Dept: FAMILY MEDICINE | Facility: CLINIC | Age: 53
End: 2018-05-29

## 2018-05-29 LAB
BKR LAB AP ABNORMAL BLEEDING: NO
BKR LAB AP BIRTH CONTROL/HORMONES: NORMAL
BKR LAB AP CERVICAL APPEARANCE: NORMAL
BKR LAB AP GYN ADEQUACY: NORMAL
BKR LAB AP GYN INTERPRETATION: NORMAL
BKR LAB AP HPV REFLEX: NORMAL
BKR LAB AP LMP: NORMAL
BKR LAB AP PATIENT STATUS: NORMAL
BKR LAB AP PREVIOUS ABNORMAL: NORMAL
BKR LAB AP PREVIOUS NORMAL: 2013
HIGH RISK?: NO
PATH REPORT.COMMENTS IMP SPEC: NORMAL
RESULT FLAG (HE HISTORICAL CONVERSION): NORMAL

## 2018-05-30 ENCOUNTER — RECORDS - HEALTHEAST (OUTPATIENT)
Dept: ADMINISTRATIVE | Facility: OTHER | Age: 53
End: 2018-05-30

## 2018-06-01 ENCOUNTER — OFFICE VISIT - HEALTHEAST (OUTPATIENT)
Dept: BEHAVIORAL HEALTH | Facility: CLINIC | Age: 53
End: 2018-06-01

## 2018-06-01 DIAGNOSIS — F43.10 PTSD (POST-TRAUMATIC STRESS DISORDER): ICD-10-CM

## 2018-06-01 DIAGNOSIS — F33.1 MAJOR DEPRESSIVE DISORDER, RECURRENT EPISODE, MODERATE (H): ICD-10-CM

## 2018-06-01 DIAGNOSIS — F41.0 PANIC DISORDER WITHOUT AGORAPHOBIA: ICD-10-CM

## 2018-06-07 ENCOUNTER — OFFICE VISIT - HEALTHEAST (OUTPATIENT)
Dept: BEHAVIORAL HEALTH | Facility: CLINIC | Age: 53
End: 2018-06-07

## 2018-06-07 DIAGNOSIS — F33.1 MAJOR DEPRESSIVE DISORDER, RECURRENT EPISODE, MODERATE (H): ICD-10-CM

## 2018-06-07 DIAGNOSIS — F41.0 PANIC DISORDER WITHOUT AGORAPHOBIA: ICD-10-CM

## 2018-06-07 DIAGNOSIS — F43.10 PTSD (POST-TRAUMATIC STRESS DISORDER): ICD-10-CM

## 2018-06-13 ENCOUNTER — OFFICE VISIT - HEALTHEAST (OUTPATIENT)
Dept: BEHAVIORAL HEALTH | Facility: CLINIC | Age: 53
End: 2018-06-13

## 2018-06-13 DIAGNOSIS — F33.1 MAJOR DEPRESSIVE DISORDER, RECURRENT EPISODE, MODERATE (H): ICD-10-CM

## 2018-06-13 DIAGNOSIS — F41.0 PANIC DISORDER WITHOUT AGORAPHOBIA: ICD-10-CM

## 2018-06-13 DIAGNOSIS — F43.10 PTSD (POST-TRAUMATIC STRESS DISORDER): ICD-10-CM

## 2018-06-20 ENCOUNTER — OFFICE VISIT - HEALTHEAST (OUTPATIENT)
Dept: BEHAVIORAL HEALTH | Facility: CLINIC | Age: 53
End: 2018-06-20

## 2018-06-20 DIAGNOSIS — F43.10 PTSD (POST-TRAUMATIC STRESS DISORDER): ICD-10-CM

## 2018-06-20 DIAGNOSIS — F41.0 PANIC DISORDER WITHOUT AGORAPHOBIA: ICD-10-CM

## 2018-06-20 DIAGNOSIS — F33.1 MAJOR DEPRESSIVE DISORDER, RECURRENT EPISODE, MODERATE (H): ICD-10-CM

## 2018-06-27 ENCOUNTER — OFFICE VISIT - HEALTHEAST (OUTPATIENT)
Dept: BEHAVIORAL HEALTH | Facility: CLINIC | Age: 53
End: 2018-06-27

## 2018-06-27 DIAGNOSIS — F33.1 MAJOR DEPRESSIVE DISORDER, RECURRENT EPISODE, MODERATE (H): ICD-10-CM

## 2018-06-27 DIAGNOSIS — F41.0 PANIC DISORDER WITHOUT AGORAPHOBIA: ICD-10-CM

## 2018-06-27 DIAGNOSIS — F43.10 PTSD (POST-TRAUMATIC STRESS DISORDER): ICD-10-CM

## 2018-06-28 ENCOUNTER — COMMUNICATION - HEALTHEAST (OUTPATIENT)
Dept: FAMILY MEDICINE | Facility: CLINIC | Age: 53
End: 2018-06-28

## 2018-06-28 ENCOUNTER — AMBULATORY - HEALTHEAST (OUTPATIENT)
Dept: RHEUMATOLOGY | Facility: CLINIC | Age: 53
End: 2018-06-28

## 2018-06-28 ENCOUNTER — COMMUNICATION - HEALTHEAST (OUTPATIENT)
Dept: RHEUMATOLOGY | Facility: CLINIC | Age: 53
End: 2018-06-28

## 2018-06-28 DIAGNOSIS — M05.79 SEROPOSITIVE RHEUMATOID ARTHRITIS OF MULTIPLE SITES (H): ICD-10-CM

## 2018-06-28 DIAGNOSIS — G89.29 OTHER CHRONIC PAIN: ICD-10-CM

## 2018-06-28 DIAGNOSIS — F43.10 PTSD (POST-TRAUMATIC STRESS DISORDER): ICD-10-CM

## 2018-06-28 DIAGNOSIS — G47.9 SLEEP DISORDER: ICD-10-CM

## 2018-06-28 DIAGNOSIS — F41.9 ANXIETY: ICD-10-CM

## 2018-07-11 ENCOUNTER — OFFICE VISIT - HEALTHEAST (OUTPATIENT)
Dept: BEHAVIORAL HEALTH | Facility: CLINIC | Age: 53
End: 2018-07-11

## 2018-07-11 DIAGNOSIS — F43.10 PTSD (POST-TRAUMATIC STRESS DISORDER): ICD-10-CM

## 2018-07-11 DIAGNOSIS — F41.0 PANIC DISORDER WITHOUT AGORAPHOBIA: ICD-10-CM

## 2018-07-11 DIAGNOSIS — F33.1 MAJOR DEPRESSIVE DISORDER, RECURRENT EPISODE, MODERATE (H): ICD-10-CM

## 2018-07-18 ENCOUNTER — OFFICE VISIT - HEALTHEAST (OUTPATIENT)
Dept: BEHAVIORAL HEALTH | Facility: CLINIC | Age: 53
End: 2018-07-18

## 2018-07-18 DIAGNOSIS — F41.0 PANIC DISORDER WITHOUT AGORAPHOBIA: ICD-10-CM

## 2018-07-18 DIAGNOSIS — F33.1 MAJOR DEPRESSIVE DISORDER, RECURRENT EPISODE, MODERATE (H): ICD-10-CM

## 2018-07-18 DIAGNOSIS — F43.10 PTSD (POST-TRAUMATIC STRESS DISORDER): ICD-10-CM

## 2018-07-25 ENCOUNTER — OFFICE VISIT - HEALTHEAST (OUTPATIENT)
Dept: BEHAVIORAL HEALTH | Facility: CLINIC | Age: 53
End: 2018-07-25

## 2018-07-25 DIAGNOSIS — F43.10 PTSD (POST-TRAUMATIC STRESS DISORDER): ICD-10-CM

## 2018-07-25 DIAGNOSIS — F33.1 MAJOR DEPRESSIVE DISORDER, RECURRENT EPISODE, MODERATE (H): ICD-10-CM

## 2018-07-25 DIAGNOSIS — F41.0 PANIC DISORDER WITHOUT AGORAPHOBIA: ICD-10-CM

## 2018-08-08 ENCOUNTER — AMBULATORY - HEALTHEAST (OUTPATIENT)
Dept: BEHAVIORAL HEALTH | Facility: CLINIC | Age: 53
End: 2018-08-08

## 2018-08-08 ENCOUNTER — OFFICE VISIT - HEALTHEAST (OUTPATIENT)
Dept: BEHAVIORAL HEALTH | Facility: CLINIC | Age: 53
End: 2018-08-08

## 2018-08-08 DIAGNOSIS — F43.10 PTSD (POST-TRAUMATIC STRESS DISORDER): ICD-10-CM

## 2018-08-08 DIAGNOSIS — F33.1 MAJOR DEPRESSIVE DISORDER, RECURRENT EPISODE, MODERATE (H): ICD-10-CM

## 2018-08-08 DIAGNOSIS — F41.0 PANIC DISORDER WITHOUT AGORAPHOBIA: ICD-10-CM

## 2018-08-15 ENCOUNTER — OFFICE VISIT - HEALTHEAST (OUTPATIENT)
Dept: BEHAVIORAL HEALTH | Facility: CLINIC | Age: 53
End: 2018-08-15

## 2018-08-15 DIAGNOSIS — F33.1 MAJOR DEPRESSIVE DISORDER, RECURRENT EPISODE, MODERATE (H): ICD-10-CM

## 2018-08-15 DIAGNOSIS — F41.0 PANIC DISORDER WITHOUT AGORAPHOBIA: ICD-10-CM

## 2018-08-15 DIAGNOSIS — F43.10 PTSD (POST-TRAUMATIC STRESS DISORDER): ICD-10-CM

## 2018-08-21 ENCOUNTER — COMMUNICATION - HEALTHEAST (OUTPATIENT)
Dept: INTERNAL MEDICINE | Facility: CLINIC | Age: 53
End: 2018-08-21

## 2018-08-21 ENCOUNTER — HOSPITAL ENCOUNTER (OUTPATIENT)
Dept: MAMMOGRAPHY | Facility: CLINIC | Age: 53
Discharge: HOME | End: 2018-08-21
Attending: FAMILY MEDICINE

## 2018-08-21 DIAGNOSIS — Z12.31 VISIT FOR SCREENING MAMMOGRAM: ICD-10-CM

## 2018-08-21 DIAGNOSIS — R59.9 ENLARGED LYMPH NODES: ICD-10-CM

## 2018-08-22 ENCOUNTER — OFFICE VISIT - HEALTHEAST (OUTPATIENT)
Dept: BEHAVIORAL HEALTH | Facility: CLINIC | Age: 53
End: 2018-08-22

## 2018-08-22 DIAGNOSIS — F43.10 PTSD (POST-TRAUMATIC STRESS DISORDER): ICD-10-CM

## 2018-08-22 DIAGNOSIS — F41.0 PANIC DISORDER WITHOUT AGORAPHOBIA: ICD-10-CM

## 2018-08-22 DIAGNOSIS — F33.1 MAJOR DEPRESSIVE DISORDER, RECURRENT EPISODE, MODERATE (H): ICD-10-CM

## 2018-08-24 ENCOUNTER — HOSPITAL ENCOUNTER (OUTPATIENT)
Dept: ULTRASOUND IMAGING | Facility: HOSPITAL | Age: 53
Discharge: HOME | End: 2018-08-24
Attending: FAMILY MEDICINE

## 2018-08-24 ENCOUNTER — AMBULATORY - HEALTHEAST (OUTPATIENT)
Dept: LAB | Facility: CLINIC | Age: 53
End: 2018-08-24

## 2018-08-24 DIAGNOSIS — M05.79 SEROPOSITIVE RHEUMATOID ARTHRITIS OF MULTIPLE SITES (H): ICD-10-CM

## 2018-08-24 LAB
ALBUMIN SERPL-MCNC: 3.4 G/DL (ref 3.5–5)
ALT SERPL W P-5'-P-CCNC: 10 U/L (ref 0–45)
CREAT SERPL-MCNC: 0.68 MG/DL (ref 0.6–1.1)
ERYTHROCYTE [DISTWIDTH] IN BLOOD BY AUTOMATED COUNT: 14.1 % (ref 11–14.5)
GFR SERPL CREATININE-BSD FRML MDRD: >60 ML/MIN/1.73M2
HCT VFR BLD AUTO: 34.9 % (ref 35–47)
HGB BLD-MCNC: 11.6 G/DL (ref 12–16)
MCH RBC QN AUTO: 25.4 PG (ref 27–34)
MCHC RBC AUTO-ENTMCNC: 33.2 G/DL (ref 32–36)
MCV RBC AUTO: 77 FL (ref 80–100)
PLATELET # BLD AUTO: 308 THOU/UL (ref 140–440)
PMV BLD AUTO: 8.1 FL (ref 7–10)
RBC # BLD AUTO: 4.55 MILL/UL (ref 3.8–5.4)
WBC: 5.6 THOU/UL (ref 4–11)

## 2018-08-27 ENCOUNTER — COMMUNICATION - HEALTHEAST (OUTPATIENT)
Dept: SCHEDULING | Facility: CLINIC | Age: 53
End: 2018-08-27

## 2018-08-27 ENCOUNTER — COMMUNICATION - HEALTHEAST (OUTPATIENT)
Dept: FAMILY MEDICINE | Facility: CLINIC | Age: 53
End: 2018-08-27

## 2018-08-27 DIAGNOSIS — R59.9 ENLARGED LYMPH NODES: ICD-10-CM

## 2018-08-28 ENCOUNTER — HOSPITAL ENCOUNTER (OUTPATIENT)
Dept: CT IMAGING | Facility: HOSPITAL | Age: 53
Discharge: HOME | End: 2018-08-28
Attending: FAMILY MEDICINE

## 2018-08-28 DIAGNOSIS — R59.9 ENLARGED LYMPH NODES: ICD-10-CM

## 2018-08-29 ENCOUNTER — COMMUNICATION - HEALTHEAST (OUTPATIENT)
Dept: FAMILY MEDICINE | Facility: CLINIC | Age: 53
End: 2018-08-29

## 2018-08-29 ENCOUNTER — COMMUNICATION - HEALTHEAST (OUTPATIENT)
Dept: PHARMACY | Facility: HOSPITAL | Age: 53
End: 2018-08-29

## 2018-08-29 ENCOUNTER — OFFICE VISIT - HEALTHEAST (OUTPATIENT)
Dept: BEHAVIORAL HEALTH | Facility: CLINIC | Age: 53
End: 2018-08-29

## 2018-08-29 DIAGNOSIS — M05.79 SEROPOSITIVE RHEUMATOID ARTHRITIS OF MULTIPLE SITES (H): ICD-10-CM

## 2018-08-29 DIAGNOSIS — F43.10 PTSD (POST-TRAUMATIC STRESS DISORDER): ICD-10-CM

## 2018-08-29 DIAGNOSIS — F33.1 MAJOR DEPRESSIVE DISORDER, RECURRENT EPISODE, MODERATE (H): ICD-10-CM

## 2018-08-29 DIAGNOSIS — F41.0 PANIC DISORDER WITHOUT AGORAPHOBIA: ICD-10-CM

## 2018-09-05 ENCOUNTER — OFFICE VISIT - HEALTHEAST (OUTPATIENT)
Dept: BEHAVIORAL HEALTH | Facility: CLINIC | Age: 53
End: 2018-09-05

## 2018-09-05 DIAGNOSIS — F43.10 PTSD (POST-TRAUMATIC STRESS DISORDER): ICD-10-CM

## 2018-09-05 DIAGNOSIS — F41.0 PANIC DISORDER WITHOUT AGORAPHOBIA: ICD-10-CM

## 2018-09-05 DIAGNOSIS — F33.1 MAJOR DEPRESSIVE DISORDER, RECURRENT EPISODE, MODERATE (H): ICD-10-CM

## 2018-09-12 ENCOUNTER — OFFICE VISIT - HEALTHEAST (OUTPATIENT)
Dept: BEHAVIORAL HEALTH | Facility: CLINIC | Age: 53
End: 2018-09-12

## 2018-09-12 DIAGNOSIS — F43.10 PTSD (POST-TRAUMATIC STRESS DISORDER): ICD-10-CM

## 2018-09-12 DIAGNOSIS — F33.1 MAJOR DEPRESSIVE DISORDER, RECURRENT EPISODE, MODERATE (H): ICD-10-CM

## 2018-09-12 DIAGNOSIS — F41.0 PANIC DISORDER WITHOUT AGORAPHOBIA: ICD-10-CM

## 2018-09-19 ENCOUNTER — OFFICE VISIT - HEALTHEAST (OUTPATIENT)
Dept: BEHAVIORAL HEALTH | Facility: CLINIC | Age: 53
End: 2018-09-19

## 2018-09-19 DIAGNOSIS — F33.1 MAJOR DEPRESSIVE DISORDER, RECURRENT EPISODE, MODERATE (H): ICD-10-CM

## 2018-09-19 DIAGNOSIS — F43.10 PTSD (POST-TRAUMATIC STRESS DISORDER): ICD-10-CM

## 2018-09-19 DIAGNOSIS — F41.0 PANIC DISORDER WITHOUT AGORAPHOBIA: ICD-10-CM

## 2018-09-26 ENCOUNTER — OFFICE VISIT - HEALTHEAST (OUTPATIENT)
Dept: BEHAVIORAL HEALTH | Facility: CLINIC | Age: 53
End: 2018-09-26

## 2018-09-26 DIAGNOSIS — F41.0 PANIC DISORDER WITHOUT AGORAPHOBIA: ICD-10-CM

## 2018-09-26 DIAGNOSIS — F43.10 PTSD (POST-TRAUMATIC STRESS DISORDER): ICD-10-CM

## 2018-09-26 DIAGNOSIS — F33.1 MAJOR DEPRESSIVE DISORDER, RECURRENT EPISODE, MODERATE (H): ICD-10-CM

## 2018-10-03 ENCOUNTER — OFFICE VISIT - HEALTHEAST (OUTPATIENT)
Dept: BEHAVIORAL HEALTH | Facility: CLINIC | Age: 53
End: 2018-10-03

## 2018-10-03 DIAGNOSIS — F33.1 MAJOR DEPRESSIVE DISORDER, RECURRENT EPISODE, MODERATE (H): ICD-10-CM

## 2018-10-03 DIAGNOSIS — F41.0 PANIC DISORDER WITHOUT AGORAPHOBIA: ICD-10-CM

## 2018-10-03 DIAGNOSIS — F43.10 PTSD (POST-TRAUMATIC STRESS DISORDER): ICD-10-CM

## 2018-10-10 ENCOUNTER — OFFICE VISIT - HEALTHEAST (OUTPATIENT)
Dept: BEHAVIORAL HEALTH | Facility: CLINIC | Age: 53
End: 2018-10-10

## 2018-10-10 DIAGNOSIS — F43.10 PTSD (POST-TRAUMATIC STRESS DISORDER): ICD-10-CM

## 2018-10-10 DIAGNOSIS — F33.1 MAJOR DEPRESSIVE DISORDER, RECURRENT EPISODE, MODERATE (H): ICD-10-CM

## 2018-10-10 DIAGNOSIS — F41.0 PANIC DISORDER WITHOUT AGORAPHOBIA: ICD-10-CM

## 2018-10-12 ENCOUNTER — COMMUNICATION - HEALTHEAST (OUTPATIENT)
Dept: FAMILY MEDICINE | Facility: CLINIC | Age: 53
End: 2018-10-12

## 2018-10-17 ENCOUNTER — OFFICE VISIT - HEALTHEAST (OUTPATIENT)
Dept: BEHAVIORAL HEALTH | Facility: CLINIC | Age: 53
End: 2018-10-17

## 2018-10-17 DIAGNOSIS — F43.10 PTSD (POST-TRAUMATIC STRESS DISORDER): ICD-10-CM

## 2018-10-17 DIAGNOSIS — F33.1 MAJOR DEPRESSIVE DISORDER, RECURRENT EPISODE, MODERATE (H): ICD-10-CM

## 2018-10-17 DIAGNOSIS — F41.0 PANIC DISORDER WITHOUT AGORAPHOBIA: ICD-10-CM

## 2018-10-24 ENCOUNTER — OFFICE VISIT - HEALTHEAST (OUTPATIENT)
Dept: BEHAVIORAL HEALTH | Facility: CLINIC | Age: 53
End: 2018-10-24

## 2018-10-24 DIAGNOSIS — F43.10 PTSD (POST-TRAUMATIC STRESS DISORDER): ICD-10-CM

## 2018-10-24 DIAGNOSIS — F33.1 MAJOR DEPRESSIVE DISORDER, RECURRENT EPISODE, MODERATE (H): ICD-10-CM

## 2018-10-24 DIAGNOSIS — F41.0 PANIC DISORDER WITHOUT AGORAPHOBIA: ICD-10-CM

## 2018-11-01 ENCOUNTER — OFFICE VISIT - HEALTHEAST (OUTPATIENT)
Dept: BEHAVIORAL HEALTH | Facility: CLINIC | Age: 53
End: 2018-11-01

## 2018-11-01 DIAGNOSIS — F41.0 PANIC DISORDER WITHOUT AGORAPHOBIA: ICD-10-CM

## 2018-11-01 DIAGNOSIS — F43.10 PTSD (POST-TRAUMATIC STRESS DISORDER): ICD-10-CM

## 2018-11-01 DIAGNOSIS — F33.1 MAJOR DEPRESSIVE DISORDER, RECURRENT EPISODE, MODERATE (H): ICD-10-CM

## 2018-11-07 ENCOUNTER — OFFICE VISIT - HEALTHEAST (OUTPATIENT)
Dept: BEHAVIORAL HEALTH | Facility: CLINIC | Age: 53
End: 2018-11-07

## 2018-11-07 DIAGNOSIS — F43.10 PTSD (POST-TRAUMATIC STRESS DISORDER): ICD-10-CM

## 2018-11-07 DIAGNOSIS — F33.1 MAJOR DEPRESSIVE DISORDER, RECURRENT EPISODE, MODERATE (H): ICD-10-CM

## 2018-11-07 DIAGNOSIS — F41.0 PANIC DISORDER WITHOUT AGORAPHOBIA: ICD-10-CM

## 2018-11-08 ENCOUNTER — COMMUNICATION - HEALTHEAST (OUTPATIENT)
Dept: BEHAVIORAL HEALTH | Facility: CLINIC | Age: 53
End: 2018-11-08

## 2018-11-13 ENCOUNTER — HOSPITAL ENCOUNTER (EMERGENCY)
Facility: CLINIC | Age: 53
Discharge: STILL ADMITTED OR ADMINISTRATIVE | DRG: 882 | End: 2018-11-14
Attending: FAMILY MEDICINE | Admitting: FAMILY MEDICINE
Payer: COMMERCIAL

## 2018-11-13 DIAGNOSIS — R45.851 SUICIDAL IDEATION: ICD-10-CM

## 2018-11-13 LAB
ALBUMIN UR-MCNC: 30 MG/DL
AMPHETAMINES UR QL SCN: NEGATIVE
APPEARANCE UR: CLEAR
BARBITURATES UR QL: NEGATIVE
BENZODIAZ UR QL: NEGATIVE
BILIRUB UR QL STRIP: NEGATIVE
CANNABINOIDS UR QL SCN: POSITIVE
COCAINE UR QL: NEGATIVE
COLOR UR AUTO: YELLOW
ETHANOL UR QL SCN: NEGATIVE
GLUCOSE UR STRIP-MCNC: NEGATIVE MG/DL
HGB UR QL STRIP: ABNORMAL
KETONES UR STRIP-MCNC: 5 MG/DL
LEUKOCYTE ESTERASE UR QL STRIP: NEGATIVE
MUCOUS THREADS #/AREA URNS LPF: PRESENT /LPF
NITRATE UR QL: NEGATIVE
OPIATES UR QL SCN: NEGATIVE
PH UR STRIP: 6.5 PH (ref 5–7)
RBC #/AREA URNS AUTO: 10 /HPF (ref 0–2)
SOURCE: ABNORMAL
SP GR UR STRIP: 1.03 (ref 1–1.03)
SQUAMOUS #/AREA URNS AUTO: 1 /HPF (ref 0–1)
TRANS CELLS #/AREA URNS HPF: <1 /HPF (ref 0–1)
UROBILINOGEN UR STRIP-MCNC: 2 MG/DL (ref 0–2)
WBC #/AREA URNS AUTO: <1 /HPF (ref 0–5)

## 2018-11-13 PROCEDURE — 90791 PSYCH DIAGNOSTIC EVALUATION: CPT

## 2018-11-13 PROCEDURE — 80307 DRUG TEST PRSMV CHEM ANLYZR: CPT | Performed by: FAMILY MEDICINE

## 2018-11-13 PROCEDURE — 81001 URINALYSIS AUTO W/SCOPE: CPT | Performed by: FAMILY MEDICINE

## 2018-11-13 PROCEDURE — 80320 DRUG SCREEN QUANTALCOHOLS: CPT | Performed by: FAMILY MEDICINE

## 2018-11-13 PROCEDURE — 99285 EMERGENCY DEPT VISIT HI MDM: CPT | Mod: 25 | Performed by: FAMILY MEDICINE

## 2018-11-13 PROCEDURE — 99285 EMERGENCY DEPT VISIT HI MDM: CPT | Mod: Z6 | Performed by: FAMILY MEDICINE

## 2018-11-13 RX ORDER — HYDROXYCHLOROQUINE SULFATE 200 MG/1
200 TABLET, FILM COATED ORAL DAILY
COMMUNITY
End: 2021-07-29

## 2018-11-13 ASSESSMENT — ENCOUNTER SYMPTOMS
ABDOMINAL PAIN: 0
HALLUCINATIONS: 0
SHORTNESS OF BREATH: 0
NECK STIFFNESS: 0
FEVER: 0
CONFUSION: 0
COLOR CHANGE: 0
ARTHRALGIAS: 0
HEADACHES: 0
DIFFICULTY URINATING: 0
EYE REDNESS: 0

## 2018-11-14 ENCOUNTER — HOSPITAL ENCOUNTER (INPATIENT)
Facility: CLINIC | Age: 53
LOS: 7 days | Discharge: HOME | DRG: 882 | End: 2018-11-21
Attending: PSYCHIATRY & NEUROLOGY | Admitting: PSYCHIATRY & NEUROLOGY
Payer: COMMERCIAL

## 2018-11-14 VITALS
HEART RATE: 92 BPM | TEMPERATURE: 97.8 F | HEIGHT: 68 IN | OXYGEN SATURATION: 97 % | WEIGHT: 273.5 LBS | BODY MASS INDEX: 41.45 KG/M2 | SYSTOLIC BLOOD PRESSURE: 158 MMHG | DIASTOLIC BLOOD PRESSURE: 113 MMHG | RESPIRATION RATE: 18 BRPM

## 2018-11-14 PROBLEM — F32.A DEPRESSED: Status: ACTIVE | Noted: 2018-11-14

## 2018-11-14 LAB
ALBUMIN SERPL-MCNC: 2.8 G/DL (ref 3.4–5)
ALP SERPL-CCNC: 83 U/L (ref 40–150)
ALT SERPL W P-5'-P-CCNC: 13 U/L (ref 0–50)
ANION GAP SERPL CALCULATED.3IONS-SCNC: 6 MMOL/L (ref 3–14)
AST SERPL W P-5'-P-CCNC: 12 U/L (ref 0–45)
BASOPHILS # BLD AUTO: 0 10E9/L (ref 0–0.2)
BASOPHILS NFR BLD AUTO: 0.2 %
BILIRUB SERPL-MCNC: 0.3 MG/DL (ref 0.2–1.3)
BUN SERPL-MCNC: 12 MG/DL (ref 7–30)
CALCIUM SERPL-MCNC: 8.9 MG/DL (ref 8.5–10.1)
CHLORIDE SERPL-SCNC: 106 MMOL/L (ref 94–109)
CHOLEST SERPL-MCNC: 138 MG/DL
CO2 SERPL-SCNC: 30 MMOL/L (ref 20–32)
CREAT SERPL-MCNC: 0.62 MG/DL (ref 0.52–1.04)
DIFFERENTIAL METHOD BLD: ABNORMAL
EOSINOPHIL # BLD AUTO: 0.1 10E9/L (ref 0–0.7)
EOSINOPHIL NFR BLD AUTO: 2 %
ERYTHROCYTE [DISTWIDTH] IN BLOOD BY AUTOMATED COUNT: 14.4 % (ref 10–15)
GFR SERPL CREATININE-BSD FRML MDRD: >90 ML/MIN/1.7M2
GLUCOSE SERPL-MCNC: 91 MG/DL (ref 70–99)
HCT VFR BLD AUTO: 35.6 % (ref 35–47)
HDLC SERPL-MCNC: 49 MG/DL
HGB BLD-MCNC: 10.8 G/DL (ref 11.7–15.7)
IMM GRANULOCYTES # BLD: 0 10E9/L (ref 0–0.4)
IMM GRANULOCYTES NFR BLD: 0.2 %
LDLC SERPL CALC-MCNC: 78 MG/DL
LYMPHOCYTES # BLD AUTO: 1.6 10E9/L (ref 0.8–5.3)
LYMPHOCYTES NFR BLD AUTO: 24.6 %
MCH RBC QN AUTO: 25.1 PG (ref 26.5–33)
MCHC RBC AUTO-ENTMCNC: 30.3 G/DL (ref 31.5–36.5)
MCV RBC AUTO: 83 FL (ref 78–100)
MONOCYTES # BLD AUTO: 0.4 10E9/L (ref 0–1.3)
MONOCYTES NFR BLD AUTO: 6.2 %
NEUTROPHILS # BLD AUTO: 4.3 10E9/L (ref 1.6–8.3)
NEUTROPHILS NFR BLD AUTO: 66.8 %
NONHDLC SERPL-MCNC: 89 MG/DL
NRBC # BLD AUTO: 0 10*3/UL
NRBC BLD AUTO-RTO: 0 /100
PLATELET # BLD AUTO: 286 10E9/L (ref 150–450)
POTASSIUM SERPL-SCNC: 4 MMOL/L (ref 3.4–5.3)
PROT SERPL-MCNC: 7 G/DL (ref 6.8–8.8)
RBC # BLD AUTO: 4.31 10E12/L (ref 3.8–5.2)
SODIUM SERPL-SCNC: 142 MMOL/L (ref 133–144)
TRIGL SERPL-MCNC: 54 MG/DL
TSH SERPL DL<=0.005 MIU/L-ACNC: 2.66 MU/L (ref 0.4–4)
WBC # BLD AUTO: 6.5 10E9/L (ref 4–11)

## 2018-11-14 PROCEDURE — 80061 LIPID PANEL: CPT | Performed by: PSYCHIATRY & NEUROLOGY

## 2018-11-14 PROCEDURE — 85025 COMPLETE CBC W/AUTO DIFF WBC: CPT | Performed by: PSYCHIATRY & NEUROLOGY

## 2018-11-14 PROCEDURE — 99223 1ST HOSP IP/OBS HIGH 75: CPT | Mod: AI | Performed by: PSYCHIATRY & NEUROLOGY

## 2018-11-14 PROCEDURE — 84443 ASSAY THYROID STIM HORMONE: CPT | Performed by: PSYCHIATRY & NEUROLOGY

## 2018-11-14 PROCEDURE — 80053 COMPREHEN METABOLIC PANEL: CPT | Performed by: PSYCHIATRY & NEUROLOGY

## 2018-11-14 PROCEDURE — 36415 COLL VENOUS BLD VENIPUNCTURE: CPT | Performed by: PSYCHIATRY & NEUROLOGY

## 2018-11-14 PROCEDURE — G0177 OPPS/PHP; TRAIN & EDUC SERV: HCPCS

## 2018-11-14 PROCEDURE — 25000132 ZZH RX MED GY IP 250 OP 250 PS 637: Performed by: PSYCHIATRY & NEUROLOGY

## 2018-11-14 PROCEDURE — 12400001 ZZH R&B MH UMMC

## 2018-11-14 RX ORDER — HYDROXYCHLOROQUINE SULFATE 200 MG/1
200 TABLET, FILM COATED ORAL 2 TIMES DAILY
Status: DISCONTINUED | OUTPATIENT
Start: 2018-11-14 | End: 2018-11-21 | Stop reason: HOSPADM

## 2018-11-14 RX ORDER — HYDROXYZINE HYDROCHLORIDE 25 MG/1
25 TABLET, FILM COATED ORAL EVERY 4 HOURS PRN
Status: DISCONTINUED | OUTPATIENT
Start: 2018-11-14 | End: 2018-11-14

## 2018-11-14 RX ORDER — OLANZAPINE 10 MG/2ML
10 INJECTION, POWDER, FOR SOLUTION INTRAMUSCULAR
Status: DISCONTINUED | OUTPATIENT
Start: 2018-11-14 | End: 2018-11-21 | Stop reason: HOSPADM

## 2018-11-14 RX ORDER — PAROXETINE 20 MG/1
20 TABLET, FILM COATED ORAL DAILY
Status: DISCONTINUED | OUTPATIENT
Start: 2018-11-14 | End: 2018-11-16

## 2018-11-14 RX ORDER — BISACODYL 10 MG
10 SUPPOSITORY, RECTAL RECTAL DAILY PRN
Status: DISCONTINUED | OUTPATIENT
Start: 2018-11-14 | End: 2018-11-21 | Stop reason: HOSPADM

## 2018-11-14 RX ORDER — ALUMINA, MAGNESIA, AND SIMETHICONE 2400; 2400; 240 MG/30ML; MG/30ML; MG/30ML
30 SUSPENSION ORAL EVERY 4 HOURS PRN
Status: CANCELLED | OUTPATIENT
Start: 2018-11-14

## 2018-11-14 RX ORDER — EPINEPHRINE 0.3 MG/.3ML
0.3 INJECTION SUBCUTANEOUS ONCE
Status: DISCONTINUED | OUTPATIENT
Start: 2018-11-14 | End: 2018-11-14

## 2018-11-14 RX ORDER — ALUMINA, MAGNESIA, AND SIMETHICONE 2400; 2400; 240 MG/30ML; MG/30ML; MG/30ML
30 SUSPENSION ORAL EVERY 4 HOURS PRN
Status: DISCONTINUED | OUTPATIENT
Start: 2018-11-14 | End: 2018-11-21 | Stop reason: HOSPADM

## 2018-11-14 RX ORDER — IBUPROFEN 600 MG/1
600 TABLET, FILM COATED ORAL EVERY 6 HOURS PRN
Status: DISCONTINUED | OUTPATIENT
Start: 2018-11-14 | End: 2018-11-21 | Stop reason: HOSPADM

## 2018-11-14 RX ORDER — GABAPENTIN 300 MG/1
300 CAPSULE ORAL DAILY PRN
Status: DISCONTINUED | OUTPATIENT
Start: 2018-11-14 | End: 2018-11-21 | Stop reason: HOSPADM

## 2018-11-14 RX ORDER — HYDROXYZINE HYDROCHLORIDE 25 MG/1
25 TABLET, FILM COATED ORAL 3 TIMES DAILY PRN
Status: DISCONTINUED | OUTPATIENT
Start: 2018-11-14 | End: 2018-11-21 | Stop reason: HOSPADM

## 2018-11-14 RX ORDER — TRAZODONE HYDROCHLORIDE 50 MG/1
50 TABLET, FILM COATED ORAL
Status: DISCONTINUED | OUTPATIENT
Start: 2018-11-14 | End: 2018-11-21 | Stop reason: HOSPADM

## 2018-11-14 RX ORDER — ACETAMINOPHEN 325 MG/1
650 TABLET ORAL EVERY 4 HOURS PRN
Status: DISCONTINUED | OUTPATIENT
Start: 2018-11-14 | End: 2018-11-21 | Stop reason: HOSPADM

## 2018-11-14 RX ORDER — OLANZAPINE 10 MG/1
10 TABLET ORAL
Status: DISCONTINUED | OUTPATIENT
Start: 2018-11-14 | End: 2018-11-21 | Stop reason: HOSPADM

## 2018-11-14 RX ORDER — PRAZOSIN HYDROCHLORIDE 1 MG/1
1 CAPSULE ORAL AT BEDTIME
Status: DISCONTINUED | OUTPATIENT
Start: 2018-11-14 | End: 2018-11-21 | Stop reason: HOSPADM

## 2018-11-14 RX ORDER — HYDROXYZINE HYDROCHLORIDE 25 MG/1
25 TABLET, FILM COATED ORAL EVERY 4 HOURS PRN
Status: CANCELLED | OUTPATIENT
Start: 2018-11-14

## 2018-11-14 RX ORDER — ACETAMINOPHEN 325 MG/1
650 TABLET ORAL EVERY 4 HOURS PRN
Status: CANCELLED | OUTPATIENT
Start: 2018-11-14

## 2018-11-14 RX ADMIN — HYDROXYCHLOROQUINE SULFATE 200 MG: 200 TABLET, FILM COATED ENTERAL at 22:31

## 2018-11-14 RX ADMIN — HYDROXYCHLOROQUINE SULFATE 200 MG: 200 TABLET, FILM COATED ENTERAL at 08:57

## 2018-11-14 RX ADMIN — HYDROXYZINE HYDROCHLORIDE 25 MG: 25 TABLET ORAL at 14:17

## 2018-11-14 RX ADMIN — PAROXETINE 20 MG: 20 TABLET, FILM COATED ORAL at 14:14

## 2018-11-14 RX ADMIN — ACETAMINOPHEN 650 MG: 325 TABLET, FILM COATED ORAL at 17:12

## 2018-11-14 RX ADMIN — PRAZOSIN HYDROCHLORIDE 1 MG: 1 CAPSULE ORAL at 22:31

## 2018-11-14 RX ADMIN — HYDROXYZINE HYDROCHLORIDE 25 MG: 25 TABLET ORAL at 03:29

## 2018-11-14 ASSESSMENT — ENCOUNTER SYMPTOMS
HYPERACTIVE: 0
APPETITE CHANGE: 0
NERVOUS/ANXIOUS: 1
BRUISES/BLEEDS EASILY: 0
SLEEP DISTURBANCE: 1
DYSPHORIC MOOD: 1
DECREASED CONCENTRATION: 1
AGITATION: 0
ACTIVITY CHANGE: 0
WEAKNESS: 0
SEIZURES: 0

## 2018-11-14 ASSESSMENT — ACTIVITIES OF DAILY LIVING (ADL)
COMMUNICATION: 0 - UNDERSTANDS/COMMUNICATES WITHOUT DIFFICULTY
LAUNDRY: WITH SUPERVISION
RETIRED_EATING: 0-->INDEPENDENT
ORAL_HYGIENE: INDEPENDENT
DRESS: INDEPENDENT
CHANGE_IN_FUNCTIONAL_STATUS_SINCE_ONSET_OF_CURRENT_ILLNESS/INJURY: NO
LAUNDRY: WITH SUPERVISION
TRANSFERRING: 0-->INDEPENDENT
TOILETING: 0 - INDEPENDENT
EATING: 0 - INDEPENDENT
DRESS: INDEPENDENT
COGNITION: 0 - NO COGNITION ISSUES REPORTED
TOILETING: 0-->INDEPENDENT
DRESS: 0-->INDEPENDENT
RETIRED_COMMUNICATION: 0-->UNDERSTANDS/COMMUNICATES WITHOUT DIFFICULTY
SWALLOWING: 0-->SWALLOWS FOODS/LIQUIDS WITHOUT DIFFICULTY
SWALLOWING: 0 - SWALLOWS FOODS/LIQUIDS WITHOUT DIFFICULTY
AMBULATION: 0-->INDEPENDENT
DRESS: 0 - INDEPENDENT
CURRENT_FUNCTIONAL_LEVEL_COMMENT: INDEPENDENT
BATHING: 0-->INDEPENDENT
AMBULATION: 0 - INDEPENDENT
GROOMING: INDEPENDENT
BATHING: 0 - INDEPENDENT
ORAL_HYGIENE: INDEPENDENT
FALL_HISTORY_WITHIN_LAST_SIX_MONTHS: NO
TRANSFERRING: 0 - INDEPENDENT
GROOMING: INDEPENDENT
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
## 2018-11-14 NOTE — IP AVS SNAPSHOT
"          [facility]: [phone]                                              INTERAGENCY TRANSFER FORM - PHYSICIAN ORDERS   2018                    Hospital Admission Date: 2018  DAVID CARREON   : 1965  Sex: Female        Attending Provider: (none)    Allergies:  Shellfish-derived Products, Sulfa Drugs    Infection:  None   Service:  MENTAL HEALT    Ht:  1.727 m (5' 8\")   Wt:  119.7 kg (264 lb)   Admission Wt:  99.8 kg (220 lb)    BMI:  40.14 kg/m 2   BSA:  2.4 m 2            Patient PCP Information     Provider PCP Type    Estelle Bansal MD General      Hospital Problems as of 2018              Priority Class Noted POA    Depressed Medium  2018 Yes      Non-Hospital Problems as of 2018     None      Code Status History     Date Active Date Inactive Code Status Order ID Comments User Context    2018  2:40 AM 2018  2:04 PM Full Code During Procedure [id number]  Gaby Reyes RN Inpatient         Medication Review      START taking        Dose / Directions Comments    hydrALAZINE 10 MG tablet   Commonly known as:  APRESOLINE        Dose:  10 mg   Take 1 tablet (10 mg) by mouth every 6 hours as needed (HTN)   Quantity:  60 tablet   Refills:  1        PARoxetine 40 MG tablet   Commonly known as:  PAXIL        Dose:  40 mg   Take 1 tablet (40 mg) by mouth daily   Quantity:  30 tablet   Refills:  1        prazosin 1 MG capsule   Commonly known as:  MINIPRESS        Dose:  1 mg   Take 1 capsule (1 mg) by mouth At Bedtime   Quantity:  30 capsule   Refills:  1        traZODone 50 MG tablet   Commonly known as:  DESYREL        Dose:  50 mg   Take 1 tablet (50 mg) by mouth nightly as needed for sleep   Quantity:  90 tablet   Refills:  1          CONTINUE these medications which have NOT CHANGED        Dose / Directions Comments    GABAPENTIN PO        Dose:  300 mg   Take 300 mg by mouth as needed   Refills:  0        hydroxychloroquine 200 MG tablet "   Commonly known as:  PLAQUENIL        Dose:  200 mg   Take 200 mg by mouth daily   Refills:  0        LEFLUNOMIDE PO        Refills:  0        VISTARIL PO        Refills:  0                  Further instructions from your care team        Behavioral Discharge Planning and Instructions      Summary:  You were admitted on 11/14/2018  due to Suicidal Ideation and Worsening Depressive Symptoms. You were treated by Dr. Navdeep Navas MD and discharged on 11/21/2018 from [facility] to Home.    Principal Diagnosis:   Posttraumatic Stress Disorder  Possible Major Depressive Disorder, recurrent, moderate severity    Health Care Follow-up Appointments:   Medication Management   Wednesday. December 5th at 10:00am with Estelle Bansal  [address] [id number]  Phone: [phone]    Monday, February 11th  at 11:40am with Kiet Lei, CNP  [address] [id number]   Phone: [phone]    Outpatient Therapy  Wednesday, November 21st  at 3:00pm with Carmen Backer   [address] [id number]  Phone: [phone]    Attend all scheduled appointments with your outpatient providers. Call at least 24 hours in advance if you need to reschedule an appointment to ensure continued access to your outpatient providers.   Major Treatments, Procedures and Findings:  You were provided with: a psychiatric assessment, medication evaluation and/or management, group therapy and milieu management    Symptoms to Report: feeling more aggressive, increased confusion, losing more sleep, mood getting worse or thoughts of suicide    Early warning signs can include: increased depression or anxiety sleep disturbances increased thoughts or behaviors of suicide or self-harm  increased unusual thinking, such as paranoia or hearing voices    Safety and Wellness: Take all medicines as directed.  Make no changes unless your doctor suggests them. Follow treatment recommendations. Refrain from  "alcohol and non-prescribed drugs. Items could include:    - Firearms  - Medicines (both prescribed and over-the-counter)  - Knives and other sharp objects  - Ropes and like materials  - Car keys  If there is a concern for safety, call 911. If there is a concern for safety, call 911.    Resources:   [facility] Crisis Response - Adult 539-006-1953  Crisis Intervention: 945.119.7981 or 795-464-3305 (TTY: 208.671.9364).  Call anytime for help.  National [place] on Mental Illness (www.[place].tamiko.org): 606.863.8834 or 525-793-6842.  Suicide Awareness Voices of Education (SAVE) (www.save.org): 139-744-PJHC (4387)  National Suicide Prevention Line (www.mentalhealthmn.org): 761-495-IBZR (7435)  Mental Health Consumer/Survivor Network of [place] (www.mhcsn.net): 550.399.4854 or 316-129-9643  Mental Health Association of [place] (www.mentalhealth.org): 233.962.9951 or 645-549-9607  Self- Management and Recovery Training., [employer]IP-- Toll free: 756.704.8194  www.Ch[employer].LiquidTalk  Text 4 Life: txt \"LIFE\" to 17466 for immediate support and crisis intervention  Crisis text line: Text \"[place]\" to 776471. Free, confidential, 24/7.  Crisis Intervention: 192.521.6724 or 022-648-3042. Call anytime for help.     The treatment team has appreciated the opportunity to work with you.     If you have any questions or concerns our unit number is [phone].   You may be receiving a follow-up phone call within the next three days from a representative from behavioral health.     Statement of Approval     Ordered          11/21/18 1016  I have reviewed and agree with all the recommendations and orders detailed in this document.  EFFECTIVE NOW     Approved and electronically signed by:  Navdeep Navas MD               "

## 2018-11-14 NOTE — PROGRESS NOTES
Pt was in the milieu social with peers, went to all groups and participated. Pt reported that she is feeling anxious and very depressed. Anxiety was rated 8/10 and depression 8/10. Denies SI and SIB.        11/14/18 1457   Behavioral Health   Hallucinations denies / not responding to hallucinations   Thinking poor concentration   Orientation person: oriented;place: oriented;date: oriented;time: oriented   Memory baseline memory   Insight poor   Judgement impaired   Eye Contact at examiner   Affect full range affect   Mood mood is calm   Physical Appearance/Attire neat   Hygiene well groomed   1. Wish to be Dead No   2. Non-Specific Active Suicidal Thoughts  No   Self Injury (denies)   Elopement (none stated)   Speech clear;coherent   Psychomotor / Gait balanced;steady   Activities of Daily Living   Hygiene/Grooming independent   Oral Hygiene independent   Dress independent   Laundry with supervision   Room Organization independent

## 2018-11-14 NOTE — PLAN OF CARE
Problem: General Plan of Care (Inpatient Behavioral)  Goal: Team Discussion  Team Plan:   BEHAVIORAL TEAM DISCUSSION    Participants: Chris Turcios (CTC), Margi (RN), Gini (OT)  Progress: Continuing to assess.   Continued Stay Criteria/Rationale: Assessment, evaluation, and recommendations.   Medical/Physical: Please see medical notes.   Precautions:   Behavioral Orders   Procedures     Code 1 - Restrict to Unit     Routine Programming     As clinically indicated     Status 15     Every 15 minutes.     Suicide precautions     Patients on Suicide Precautions should have a Combination Diet ordered that includes a Diet selection(s) AND a Behavioral Tray selection for Safe Tray - with utensils, or Safe Tray - NO utensils       Plan: CTC will complete the initial psychosocial assessment. Upon stabilization, the patient will be assessed for discharge.   Rationale for change in precautions or plan: None.

## 2018-11-14 NOTE — IP AVS SNAPSHOT
"   STOP!!! DO NOT PRINT OR REFERENCE THIS AVS!!!  AVS displayed here is NOT the version that was given to the patient at discharge.  GO TO CHART REVIEW to print or review a copy of the AVS that was frozen/printed at time of discharge.                           MRN:[id number]                      After Visit Summary   11/14/2018    Alina Martell    MRN: [id number]           Thank you!     Thank you for choosing [facility] for your care. Our goal is always to provide you with excellent care.        Patient Information     Date Of Birth          1965        Designated Caregiver       Most Recent Value    Caregiver    Will someone help with your care after discharge? yes    Name of designated caregiver \"I could probably ask someone\"    Phone number of caregiver N/A    Caregiver address N/A      About your hospital stay     You were admitted on:  November 14, 2018 You last received care in the:  [facility]    You were discharged on:  November 21, 2018       Who to Call     For medical emergencies, please call 911.  For non-urgent questions about your medical care, please call your primary care provider or clinic, [phone]          Attending Provider     Provider Specialty    Navdeep Navas MD Psychiatry       Primary Care Provider Office Phone # Fax #    Estelle Bansal [phone]      Further instructions from your care team        Behavioral Discharge Planning and Instructions      Summary:  You were admitted on 11/14/2018  due to Suicidal Ideation and Worsening Depressive Symptoms. You were treated by Dr. Navdeep Navas MD and discharged on 11/21/2018 from [facility].    Principal Diagnosis:   Posttraumatic Stress Disorder  Possible Major Depressive Disorder, recurrent, moderate severity    Health Care Follow-up Appointments:   Medication Management   Wednesday. December 5th at 10:00am with Estelle Bansal  [address] [place] [address]  Phone: " [phone]    Monday, February 11th  at 11:40am with Kiet Lei CNP  [address]   [place] [address]   Phone: [phone]    Outpatient Therapy  Wednesday, November 21st  at 3:00pm with Carmen Backer   [address]   [place] [address]  Phone: [phone]    Attend all scheduled appointments with your outpatient providers. Call at least 24 hours in advance if you need to reschedule an appointment to ensure continued access to your outpatient providers.   Major Treatments, Procedures and Findings:  You were provided with: a psychiatric assessment, medication evaluation and/or management, group therapy and milieu management    Symptoms to Report: feeling more aggressive, increased confusion, losing more sleep, mood getting worse or thoughts of suicide    Early warning signs can include: increased depression or anxiety sleep disturbances increased thoughts or behaviors of suicide or self-harm  increased unusual thinking, such as paranoia or hearing voices    Safety and Wellness: Take all medicines as directed.  Make no changes unless your doctor suggests them. Follow treatment recommendations. Refrain from alcohol and non-prescribed drugs. Items could include:    - Firearms  - Medicines (both prescribed and over-the-counter)  - Knives and other sharp objects  - Ropes and like materials  - Car keys  If there is a concern for safety, call 911. If there is a concern for safety, call 911.    Resources:   [facility] Crisis Response - Adult [phone]  Crisis Intervention: 200.530.4637 or 745-428-5867 (TTY: 228.530.5683).  Call anytime for help.  National [place] on Mental Illness (www.[place].tamiko.org): 484.188.8759 or 204-663-2047.  Suicide Awareness Voices of Education (SAVE) (www.save.org): 240-490-BAKI (1437)  National Suicide Prevention Line (www.mentalhealthmn.org): 244-585-FPVJ (4600)  Mental Health Consumer/Survivor Network of [place] (www.mhcsn.net): 240.830.4231 or  "596-727-2668  Mental Health Association of [place] (www.mentalhealth.org): 746.129.3265 or 270-311-1064  Self- Management and Recovery Training., SMART-- Toll free: 202.729.8210  [employer]BIG Launcher  Text 4 Life: txt \"LIFE\" to 90795 for immediate support and crisis intervention  Crisis text line: Text \"[place]\" to 354067. Free, confidential, 24/7.  Crisis Intervention: 610.995.2395 or 503-153-9021. Call anytime for help.     The treatment team has appreciated the opportunity to work with you.     If you have any questions or concerns our unit number is [phone].   You may be receiving a follow-up phone call within the next three days from a representative from behavioral [employer].     Pending Results     No orders found from 11/12/2018 to 11/15/2018.            Statement of Approval     Ordered          11/21/18 1016  I have reviewed and agree with all the recommendations and orders detailed in this document.  EFFECTIVE NOW     Approved and electronically signed by:  Navdeep Navas MD             Admission Information     Date & Time Department Dept. Phone    11/14/2018 [facility] [phone]      Your Vitals Were     Blood Pressure Pulse Temperature Respirations Height Weight    163/83 (BP Location: Left arm) 95 98.9  F (37.2  C) (Oral) 16 1.727 m (5' 8\") 119.7 kg (264 lb)    Last Period Pulse Oximetry BMI (Body Mass Index)             11/03/2018 (Exact Date) 94% 40.14 kg/m2         Eight19 Information     Eight19 gives you secure access to your electronic health record. If you see a primary care provider, you can also send messages to your care team and make appointments. If you have questions, please call your primary care clinic.  If you do not have a primary care provider, please call [phone] and they will assist you.        Care EveryWhere ID     This is your Care EveryWhere ID. This could be used by other organizations to access your [facility] medical records  [id number]        Equal Access to Services  "    [facility]: Hadii aad ku nenita Sopiperali, waaxda luqadaha, qaybta kaalmada adeegyada, waxbeba jyoti arashlindsay colbert katiewilda solomon . So [facility] [phone].    ATENCIÓN: Si habla reinaldo, tiene a khanna disposición servicios gratuitos de asistencia lingüística. Llame al [phone].    We comply with applicable federal civil rights laws and [place] laws. We do not discriminate on the basis of race, color, national origin, age, disability, sex, sexual orientation, or gender identity.               Review of your medicines      START taking        Dose / Directions    hydrALAZINE 10 MG tablet   Commonly known as:  APRESOLINE        Dose:  10 mg   Take 1 tablet (10 mg) by mouth every 6 hours as needed (HTN)   Quantity:  60 tablet   Refills:  1       PARoxetine 40 MG tablet   Commonly known as:  PAXIL        Dose:  40 mg   Take 1 tablet (40 mg) by mouth daily   Quantity:  30 tablet   Refills:  1       prazosin 1 MG capsule   Commonly known as:  MINIPRESS        Dose:  1 mg   Take 1 capsule (1 mg) by mouth At Bedtime   Quantity:  30 capsule   Refills:  1       traZODone 50 MG tablet   Commonly known as:  DESYREL        Dose:  50 mg   Take 1 tablet (50 mg) by mouth nightly as needed for sleep   Quantity:  90 tablet   Refills:  1         CONTINUE these medicines which have NOT CHANGED        Dose / Directions    GABAPENTIN PO        Dose:  300 mg   Take 300 mg by mouth as needed   Refills:  0       hydroxychloroquine 200 MG tablet   Commonly known as:  PLAQUENIL        Dose:  200 mg   Take 200 mg by mouth daily   Refills:  0       LEFLUNOMIDE PO        Refills:  0       VISTARIL PO        Refills:  0            Where to get your medicines      These medications were sent to [facility] [place], [place] - [address]  [address] [address]     Phone:  [phone]     hydrALAZINE 10 MG tablet    PARoxetine 40 MG tablet    prazosin 1 MG capsule    traZODone 50 MG tablet                 Protect others around you: Learn how to safely use, store and throw away your medicines at www.disposemymeds.org.             Medication List: This is a list of all your medications and when to take them. Check marks below indicate your daily home schedule. Keep this list as a reference.      Medications           Morning Afternoon Evening Bedtime As Needed    GABAPENTIN PO   Take 300 mg by mouth as needed                                   hydrALAZINE 10 MG tablet   Commonly known as:  APRESOLINE   Take 1 tablet (10 mg) by mouth every 6 hours as needed (HTN)   Last time this was given:  10 mg on 11/16/2018  8:27 PM                                   hydroxychloroquine 200 MG tablet   Commonly known as:  PLAQUENIL   Take 200 mg by mouth daily   Last time this was given:  200 mg on 11/21/2018  9:09 AM                                   LEFLUNOMIDE PO   Last time this was given:  20 mg on 11/21/2018  9:09 AM                                   PARoxetine 40 MG tablet   Commonly known as:  PAXIL   Take 1 tablet (40 mg) by mouth daily   Last time this was given:  40 mg on 11/21/2018  9:09 AM                                   prazosin 1 MG capsule   Commonly known as:  MINIPRESS   Take 1 capsule (1 mg) by mouth At Bedtime   Last time this was given:  1 mg on 11/20/2018  9:54 PM                                   traZODone 50 MG tablet   Commonly known as:  DESYREL   Take 1 tablet (50 mg) by mouth nightly as needed for sleep   Last time this was given:  50 mg on 11/20/2018  9:54 PM                                   VISTARIL PO

## 2018-11-14 NOTE — ED TRIAGE NOTES
Pt comes in with suicidal ideation and plan x 1 week. She states she came in today because she got scared she would act on it. She had 1 previous suicide attempt about 10 years ago, overdosing on pills. She would use pills this time as well. Pt has hx of PTSD and states she had significant life changes this past weekend that caused her ideation to increase.

## 2018-11-14 NOTE — PROGRESS NOTES
11/14/18 0320   Patient Belongings   Did you bring any home meds/supplements to the hospital?  Yes   Disposition of meds  Sent to security/pharmacy per site process   Patient Belongings cell phone/electronics;clothing;contacts;earings;keys;money (see comment);plastic bag;shoes;wallet   Disposition of Belongings Kept with patient;Locker;Sent to security per site process   Belongings Search Yes   Clothing Search Yes   Second Staff Gaby SCHMITT       Belongings sent to safe:  - 6 bank deposit tickets  - $ 2 dollars  - 1 canidce millions ticket  - 2 powerball tickets  - [employer] Bank Visa 5383  - Netspend  Debit 3688  - CBIZ Visa 2533  - [employer]  5471  - [employer] Gift Card      Belongings in pt's locked locker:    - 1 Messenger bag  - 1 Black winter parka  - 1 Samsung tablet  - 2 LG tablet  - 1 black Donovan Kors wallet  - 1 device charging cord  - 1 pair slide on brown shoes  - 1 pair silver loop style earrings  - 1 aqua sweater  - 1 bra  - 1 pair brown socks  - 1 pair blue jeans  - 5 packets of drink mix  - 1 plastic food storage bag with two pieces of food  - 1 HID Prox Card II  - Tea packets  - 1 computer mouse  - 1 pair earbuds  - 2 hannah  - 1 brush  - 1 bottle of perfume  - 1 bottle lotion  - 6 packages of AAA batteries (four batteries in each package)  - 1 bottle of eye Yeny  - 1 wall   - 1 car   - 4 keys  - 1 car FOB  - 1 bottle   - 1 plastic peace necklace with string  - 1 round chapstick  - 1 portable   - Miscellaneous individual pieces of candy  - [employer] medical insurance card  - [employer] dental insurance cards (2)  - [place] drivers license  - Several rewards cards       Belongings with pt:    - Pt is wearing contacts          A               Admission:  I am responsible for any personal items that are not sent to the safe or pharmacy.  [facility] is not responsible for loss, theft or damage of any property in my possession.    Signature:   _________________________________ Date: _______  Time: _____                                              Staff Signature:  ____________________________ Date: ________  Time: _____      2nd Staff person, if patient is unable/unwilling to sign:    Signature: ________________________________ Date: ________  Time: _____     Discharge:  [facility] has returned all of my personal belongings:    Signature: _________________________________ Date: ________  Time: _____                                          Staff Signature:  ____________________________ Date: ________  Time: _____

## 2018-11-14 NOTE — IP AVS SNAPSHOT
[address]    [facility] [address]    Phone:  [phone]                                       After Visit Summary   11/14/2018    Alina Martell    MRN: [id number]           After Visit Summary Signature Page     I have received my discharge instructions, and my questions have been answered. I have discussed any challenges I see with this plan with the nurse or doctor.    ..........................................................................................................................................  Patient/Patient Representative Signature      ..........................................................................................................................................  Patient Representative Print Name and Relationship to Patient    ..................................................               ................................................  Date                                   Time    ..........................................................................................................................................  Reviewed by Signature/Title    ...................................................              ..............................................  Date                                               Time          22EPIC Rev 08/18

## 2018-11-14 NOTE — PROGRESS NOTES
spoke with the patient who requested that writer call her employer and inform them that she has been hospitalized.     Sandrar called the patient's employer and left a voicemail informing them that the patient is currently hospitalized. Sandrar provided her contact information for a return call if necessary. At the patient's request, sandrar did not indicate reasoning for the hospitalization.      received a voicemail from Linda Tang, the lead  at the patient's employer, who stated that she received 's voicemail and requested a fax number for the patient to complete FMLA forms. Linda stated that she does not need details about the patient's hospitalization but stated that if the patient will be out of work for more than three days, she will need to complete the forms.  will follow up with Linda tomorrow.

## 2018-11-15 ENCOUNTER — RECORDS - HEALTHEAST (OUTPATIENT)
Dept: ADMINISTRATIVE | Facility: OTHER | Age: 53
End: 2018-11-15

## 2018-11-15 ENCOUNTER — AMBULATORY - HEALTHEAST (OUTPATIENT)
Dept: BEHAVIORAL HEALTH | Facility: CLINIC | Age: 53
End: 2018-11-15

## 2018-11-15 PROCEDURE — 12400007 ZZH R&B MH INTERMEDIATE UMMC

## 2018-11-15 PROCEDURE — 99222 1ST HOSP IP/OBS MODERATE 55: CPT | Performed by: PHYSICIAN ASSISTANT

## 2018-11-15 PROCEDURE — 25000132 ZZH RX MED GY IP 250 OP 250 PS 637: Performed by: PSYCHIATRY & NEUROLOGY

## 2018-11-15 PROCEDURE — G0177 OPPS/PHP; TRAIN & EDUC SERV: HCPCS

## 2018-11-15 PROCEDURE — 25000132 ZZH RX MED GY IP 250 OP 250 PS 637: Performed by: PHYSICIAN ASSISTANT

## 2018-11-15 PROCEDURE — 99232 SBSQ HOSP IP/OBS MODERATE 35: CPT | Performed by: PSYCHIATRY & NEUROLOGY

## 2018-11-15 PROCEDURE — 99207 ZZC CONSULT E&M CHANGED TO INITIAL LEVEL: CPT | Performed by: PHYSICIAN ASSISTANT

## 2018-11-15 RX ORDER — LEFLUNOMIDE 20 MG/1
20 TABLET ORAL DAILY
Status: DISCONTINUED | OUTPATIENT
Start: 2018-11-15 | End: 2018-11-21 | Stop reason: HOSPADM

## 2018-11-15 RX ORDER — HYDRALAZINE HYDROCHLORIDE 10 MG/1
10 TABLET, FILM COATED ORAL EVERY 6 HOURS PRN
Status: DISCONTINUED | OUTPATIENT
Start: 2018-11-15 | End: 2018-11-21 | Stop reason: HOSPADM

## 2018-11-15 RX ADMIN — HYDROXYZINE HYDROCHLORIDE 25 MG: 25 TABLET ORAL at 12:23

## 2018-11-15 RX ADMIN — LEFLUNOMIDE 20 MG: 20 TABLET ORAL at 13:20

## 2018-11-15 RX ADMIN — PRAZOSIN HYDROCHLORIDE 1 MG: 1 CAPSULE ORAL at 21:29

## 2018-11-15 RX ADMIN — TRAZODONE HYDROCHLORIDE 50 MG: 50 TABLET ORAL at 21:30

## 2018-11-15 RX ADMIN — HYDROXYCHLOROQUINE SULFATE 200 MG: 200 TABLET, FILM COATED ENTERAL at 08:24

## 2018-11-15 RX ADMIN — HYDRALAZINE HYDROCHLORIDE 10 MG: 10 TABLET, FILM COATED ORAL at 13:21

## 2018-11-15 RX ADMIN — HYDROXYCHLOROQUINE SULFATE 200 MG: 200 TABLET, FILM COATED ENTERAL at 21:29

## 2018-11-15 RX ADMIN — IBUPROFEN 600 MG: 600 TABLET ORAL at 12:23

## 2018-11-15 RX ADMIN — PAROXETINE 20 MG: 20 TABLET, FILM COATED ORAL at 08:24

## 2018-11-15 ASSESSMENT — ACTIVITIES OF DAILY LIVING (ADL)
GROOMING: INDEPENDENT
DRESS: INDEPENDENT
ORAL_HYGIENE: INDEPENDENT
LAUNDRY: WITH SUPERVISION

## 2018-11-15 NOTE — PROGRESS NOTES
returned a call to the patient's lead administarter, Linda Tang [phone].  provided the fax number for Linda to send the necessary paperwork for the patient. Linda also requested a call from the patient. Sandrar stated that she will let the patient know.      provided the patient with the contact information for Linda Tang.
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
## 2018-11-15 NOTE — PROGRESS NOTES
11/15/18 1527   Behavioral Health   Hallucinations denies / not responding to hallucinations   Thinking intact   Orientation person: oriented;place: oriented;date: oriented;time: oriented   Memory baseline memory   Insight admits / accepts   Judgement impaired   Eye Contact at examiner   Affect full range affect   Mood mood is calm   Physical Appearance/Attire neat   Hygiene well groomed   Suicidality other (see comments)  (Denied)   1. Wish to be Dead No   Self Injury other (see comment)  (Denied)   Elopement (None observed)   Activity other (see comment)  (Social and active in the milieu)   Speech clear;coherent   Medication Sensitivity no stated side effects   Psychomotor / Gait balanced;steady   Psycho Education   Type of Intervention 1:1 intervention   Response participates, initiates socially appropriate   Hours 0.5   Treatment Detail Check-In   Activities of Daily Living   Hygiene/Grooming independent   Oral Hygiene independent   Dress independent   Laundry with supervision   Room Organization independent   Behavioral Health Interventions   Depression assist with developing and utilizing healthy coping strategies;build upon strengths;establish therapeutic relationship;provide emotional support;maintain safe secure environment   Social and Therapeutic Interventions (Depression) encourage participation in therapeutic groups and milieu activities;encourage socialization with peers     Pt was visible in the milieu for the majority of the shift. While in the milieu, pt was very social and active. Pt attended and engaged in all therapeutic groups held today. Pt stated that she feels stressed about events happening in her life, but overall feels good.

## 2018-11-15 NOTE — PROGRESS NOTES
"[facility], [place]   Psychiatric Progress Note        Interim History:   The patient's care was discussed with the treatment team during the daily team meeting and/or staff's chart notes were reviewed.  Staff report patient spent more time outside of her room, socialized with some peers and went to groups. She was seen by IM due to elevated blood pressure. IM recommended to use Hydralazine prn and consider using long acting antihypertensive agent if BP remains elevated with improvement of anxiety and depression. For more details, please, see Internal Medicine note. Appreciate medical team's input. She reported during today's visit with me still experiencing Suicidal ideation, said she felt safe here, but would not be safe outside of this hospital, agreed to stay here for few more days and be referred to PHP after discharge. She was upset about elevated blood pressure, said that she had never had this problem before.          Medications:       hydroxychloroquine  200 mg Oral BID     leflunomide  20 mg Oral Daily     PARoxetine  20 mg Oral Daily     prazosin  1 mg Oral At Bedtime          Allergies:     Allergies   Allergen Reactions     Shellfish-Derived Products Anaphylaxis     Sulfa Drugs Hives          Labs:   No results found for this or any previous visit (from the past 24 hour(s)).       Psychiatric Examination:     BP (!) 186/98  Pulse (P) 82  Temp 98.5  F (36.9  C) (Oral)  Resp 16  Ht 1.727 m (5' 8\")  Wt 122 kg (269 lb)  LMP 11/03/2018 (Exact Date)  SpO2 98%  BMI 40.9 kg/m2  Weight is 269 lbs 0 oz  Body mass index is 40.9 kg/(m^2).  Orthostatic Vitals       Most Recent      Sitting Orthostatic /97 11/15 0901    Sitting Orthostatic Pulse (bpm) 90 11/15 0901    Standing Orthostatic /96 11/15 0901    Standing Orthostatic Pulse (bpm) 95 11/15 0901            Appearance: awake, alert and dressed in hospital scrubs  Attitude:  cooperative  Eye Contact:  good  Mood: "  anxious and sad   Affect:  constricted mobility  Speech:  clear, coherent  Psychomotor Behavior:  no evidence of tardive dyskinesia, dystonia, or tics and intact station, gait and muscle tone  Throught Process:  logical and linear  Associations:  no loose associations  Thought Content:  passive suicidal ideation present  Insight:  fair  Judgement:  fair  Oriented to:  time, person, and place  Attention Span and Concentration:  fair  Recent and Remote Memory:  intact    Clinical Global Impressions  First:  Considering your total clinical experience with this particular patient population, how severe are the patient's symptoms at this time?: 6 (11/14/18 1650)  Compared to the patient's condition at the START of treatment, this patient's condition is:: 4 (11/14/18 1650)  Most recent:  Considering your total clinical experience with this particular patient population, how severe are the patient's symptoms at this time?: 6 (11/14/18 1650)  Compared to the patient's condition at the START of treatment, this patient's condition is:: 4 (11/14/18 1650)         Precautions:     Behavioral Orders   Procedures     Code 1 - Restrict to Unit     Routine Programming     As clinically indicated     Status 15     Every 15 minutes.     Suicide precautions     Patients on Suicide Precautions should have a Combination Diet ordered that includes a Diet selection(s) AND a Behavioral Tray selection for Safe Tray - with utensils, or Safe Tray - NO utensils            DIagnoses:     Posttraumatic stress disorder, possible major depressive disorder, recurrent, moderate severity. Based on the patient's description, she does not meet criteria for cannabis use disorder.        Plan:     No medication changes today. Will continue to provide support and structure, will refer to PHP after discharge.

## 2018-11-15 NOTE — PLAN OF CARE
Problem: Occupational Therapy Goals (Adult)  Goal: Occupational Therapy Goals  Pt will practice using >2 coping strategies to manage stress and reduce symptoms to demonstrate increased readiness for discharge.      Alina attended a poetry group this afternoon. Discussed select poetry with the group. Talkative, engaged. Very thoughtful.

## 2018-11-15 NOTE — CONSULTS
Internal Medicine Initial Visit    Alina Martell MRN# [id number]   Age: 53 year old YOB: 1965   Date of Admission: 11/14/2018     Reason for consult: Elevated BP's, RA       Requesting physician Dr. Navas      SUBJECTIVE   HPI:   Alina Martell is a 53 year old female with history of RA, carpal tunnel syndrome, PTSD, and anxiety admitted to [facility] for suicidal ideation. Medicine was consulted to co-manage patient's elevated BP's.    Patient initially presented to the ED on 11/13 with suicidal ideation due to life stressors. Not on psychiatric medications PTA, although has been on anti-anxiety medications in the past. Occasionally smokes marijuana, no other drug use.    Currently, patient is feeling okay. Concerned about her BP and notes she has never had elevated BP's in the past. Endorses significantly increased anxiety which is partly what prompted her admission to psychiatry. Denies headaches, vision changes. Endorses several-year history of RA for which she takes hydroxychloroquine and leflunomide. Complains of some worsened left hand stiffness/pain and mild lower extremity swelling. Was not restarted on her leflunomide on admission which could be contributing. Typically takes ibuprofen when having worsened joint pain, does not like the side effects of prednisone. Follows with rheumatology through [facility]. Reports long-standing history of anemia dating back to the age of 17 with Hgb previously in the 8-9 range. Denies acute s/s of bleeding. No other medical concerns at this time.   Past Medical History:     Past Medical History:   Diagnosis Date     Carpal tunnel syndrome      PTSD (post-traumatic stress disorder)      RA (rheumatoid arthritis) (H)       Reviewed & updated in [facility].     Past Surgical History:      Past Surgical History:   Procedure Laterality Date     CHOLECYSTECTOMY       LAPAROSCOPIC TUBAL LIGATION       RELEASE CARPAL TUNNEL BILATERAL       "  Reviewed & updated in Epic.     Medications prior to admission:     Prior to Admission Medications   Prescriptions Last Dose Informant Patient Reported? Taking?   GABAPENTIN PO Unknown at Unknown time  Yes No   Sig: Take 300 mg by mouth as needed   HydrOXYzine Pamoate (VISTARIL PO) Unknown at Unknown time  Yes No   LEFLUNOMIDE PO 11/13/2018 at Unknown time  Yes Yes   hydroxychloroquine (PLAQUENIL) 200 MG tablet 11/13/2018 at Unknown time  Yes Yes   Sig: Take 200 mg by mouth daily      Facility-Administered Medications: None         Allergies:     Allergies   Allergen Reactions     Shellfish-Derived Products Anaphylaxis     Sulfa Drugs Hives         Social History:   Tobacco Use: Denies  Alcohol Use: Rare  Illicit Drug Use: Denies     Family History:     Family History   Problem Relation Age of Onset     Crohn Disease Mother      Psoriasis Brother       Reviewed & updated in Epic.     Review of Systems:   Ten point review of systems negative except as stated above in History of Present Illness.    OBJECTIVE   Physical Exam:   Blood pressure (!) 161/95, pulse 92, temperature 98.5  F (36.9  C), temperature source Oral, resp. rate 16, height 1.727 m (5' 8\"), weight 122 kg (269 lb), last menstrual period 11/03/2018, SpO2 98 %, not currently breastfeeding.  General: Well-appearing female sitting upright in chair, NAD.  HEENT: NC/AT. Anicteric sclera. Mucous membranes moist.  Neck: Supple  Cardiovascular: RRR. S1/S2. No murmurs appreciated.  Lungs: Normal respiratory effort on RA. Lungs CTAB without wheezes, rales, or rhonchi.  Abdomen: Soft, non-tender, and nondistended with normoactive bowel sounds.  Extremities: Warm & well perfused. Trace peripheral edema.  Skin: No rashes, jaundice, or lesions on exposed areas of skin.  Neurologic: A&O X 3. Answers questions appropriately. Moves all extremities symmetrically.     Laboratory Data:   CMP    Recent Labs  Lab 11/14/18  0810      POTASSIUM 4.0   CHLORIDE 106   CO2 " "30   ANIONGAP 6   GLC 91   BUN 12   CR 0.62   GFRESTIMATED >90   GFRESTBLACK >90   JOSSELIN 8.9   PROTTOTAL 7.0   ALBUMIN 2.8*   BILITOTAL 0.3   ALKPHOS 83   AST 12   ALT 13       CBC    Recent Labs  Lab 11/14/18  0810   WBC 6.5   RBC 4.31   HGB 10.8*   HCT 35.6   MCV 83   MCH 25.1*   MCHC 30.3*   RDW 14.4          TSH    Recent Labs  Lab 11/14/18  0810   TSH 2.66          Assessment and Plan/Recommendations:   Alina Martell is a 53 year old female with history of RA, carpal tunnel syndrome, PTSD, and anxiety admitted to [facility] for suicidal ideation.    Anxiety, PTSD, SI. In setting of increased life stressors. U tox + for cannabinoids. CBC, CMP overall unremarkable.  - Management per Psychiatry    RA. Maintained on hydroxychloroquine and leflunomide PTA. Disease typically well-controlled, experiences flares during periods of stress. Reports some left hand stiffness/pain and lower extremity swelling. Does not have PTA leflunomide ordered, will restart this. Patient does not like taking prednisone for flares as it makes her \"cranky and hungry\". Typically treats with ibuprofen.  - Continue hydroxychloroquine 200 mg BID  - Restart leflunomide 20 mg daily  - PRN tylenol, ibuprofen    Normocytic Anemia. Hgb 10.8 on admission, MCV 83. Recent BL ~ 11-12. Reports ongoing anemia since the age of 17. No active s/s of bleeding on exam.  - Follow up with PCP IN 1-2 weeks for repeat CBC    Elevated BP's. BP's consistently elevated since admission, ranging 150-170's/'s. No personal or family history of HTN. Typically very well-controlled at PCP visits. Likely component of acute anxiety contributing, hesitant to start long-acting antihypertensive at this time as baseline BP's appear normotensive.  - PRN hydralazine  - Monitor VS q4h over next 24 hours (order placed)  - May consider starting a long-acting if continues to be hypertensive despite psychiatric improvement    Medicine will continue to follow for " BP management. Please page the Internal Medicine job code pager for any intercurrent medical issues which arise. Thank you for the opportunity to be a part of this patient's care.    Geraldine Richey PA-C  Hospitalist Service  [phone]

## 2018-11-15 NOTE — PROGRESS NOTES
Behavioral Health  Note   Behavioral Health  Spirituality Group Note     Unit 20    Name: Alina Martell    YOB: 1965   MRN: [id number]    Age: 53 year old     Patient attended -led group, which included discussion of spirituality, coping with illness and building resilience.   Patient attended group for 1.0 hrs.   The patient actively participated in group discussion     [facility]  Staff    Page [phone]

## 2018-11-15 NOTE — PLAN OF CARE
Problem: Occupational Therapy Goals (Adult)  Goal: Occupational Therapy Goals  Pt will practice using >2 coping strategies to manage stress and reduce symptoms to demonstrate increased readiness for discharge.     Initially attended OT this date.     Subjective  Overall was pleasant, distractible, and talkative.     Objective  Attended 3 hour(s) of occupational therapy this date.     Leisure exploration and participation group for increased intrinsic motivation to engage in social, non-obligatory occupations: [employer] Game. Structured group was used to promote sharing and openness with peers. Full participation noted.      Pt was engaged in a journaling group for coping and processing emotions for healthy recovery. Education was provided on various uses of a journal, journaling tools, and exploration of personal reasons to journal. Pt participated throughout education and created personal contract to practice 1 journaling tool in the next week. Following education, Pt demonstrated good focus while engaged in 10 minute journal practice.    Pt participated in a multi-step hands-on meal preparation group: baking cookies and leisure game. Education was provided on cooking/baking as a significant occupation for role and routine fulfillment, delayed gratification, socialization, and nutrition for increased mental health. Demonstrated fair process and performance skill as Pt was significantly distractible by conversation.     Assessment  Pt would benefit from continued engagement in OT groups that support healthy recovery, specifically exploration of positive coping skills for symptom management/relapse prevention.     Plan  Provide personally meaningful graded occupation-based activities and ongoing assessment.         Comments: As group attendance is established, Pt will be given self-assessment form and OT will explain the purpose of including them in their treatment plan and offer options for meeting their  needs.

## 2018-11-15 NOTE — PROGRESS NOTES
11/15/18 0901   Sitting Orthostatic BP   Sitting Orthostatic /97   Sitting Orthostatic Pulse 90 bpm   Standing Orthostatic BP   Standing Orthostatic /96   Standing Orthostatic Pulse 95 bpm     Pt's blood pressure elevated.  Paged provider.

## 2018-11-16 PROCEDURE — 12400007 ZZH R&B MH INTERMEDIATE UMMC

## 2018-11-16 PROCEDURE — 25000132 ZZH RX MED GY IP 250 OP 250 PS 637: Performed by: PHYSICIAN ASSISTANT

## 2018-11-16 PROCEDURE — 25000132 ZZH RX MED GY IP 250 OP 250 PS 637: Performed by: PSYCHIATRY & NEUROLOGY

## 2018-11-16 PROCEDURE — 99232 SBSQ HOSP IP/OBS MODERATE 35: CPT | Performed by: PSYCHIATRY & NEUROLOGY

## 2018-11-16 PROCEDURE — G0177 OPPS/PHP; TRAIN & EDUC SERV: HCPCS

## 2018-11-16 RX ORDER — PAROXETINE 30 MG/1
30 TABLET, FILM COATED ORAL DAILY
Status: DISCONTINUED | OUTPATIENT
Start: 2018-11-17 | End: 2018-11-19

## 2018-11-16 RX ADMIN — LEFLUNOMIDE 20 MG: 20 TABLET ORAL at 09:22

## 2018-11-16 RX ADMIN — HYDRALAZINE HYDROCHLORIDE 10 MG: 10 TABLET, FILM COATED ORAL at 20:27

## 2018-11-16 RX ADMIN — TRAZODONE HYDROCHLORIDE 50 MG: 50 TABLET ORAL at 01:10

## 2018-11-16 RX ADMIN — PAROXETINE 20 MG: 20 TABLET, FILM COATED ORAL at 09:22

## 2018-11-16 RX ADMIN — HYDROXYCHLOROQUINE SULFATE 200 MG: 200 TABLET, FILM COATED ENTERAL at 09:21

## 2018-11-16 RX ADMIN — HYDROXYZINE HYDROCHLORIDE 25 MG: 25 TABLET ORAL at 13:46

## 2018-11-16 RX ADMIN — TRAZODONE HYDROCHLORIDE 50 MG: 50 TABLET ORAL at 21:59

## 2018-11-16 RX ADMIN — PRAZOSIN HYDROCHLORIDE 1 MG: 1 CAPSULE ORAL at 21:59

## 2018-11-16 RX ADMIN — HYDROXYCHLOROQUINE SULFATE 200 MG: 200 TABLET, FILM COATED ENTERAL at 20:26

## 2018-11-16 ASSESSMENT — ACTIVITIES OF DAILY LIVING (ADL)
DRESS: SCRUBS (BEHAVIORAL HEALTH);INDEPENDENT
DRESS: INDEPENDENT
GROOMING: INDEPENDENT
LAUNDRY: WITH SUPERVISION
ORAL_HYGIENE: INDEPENDENT
ORAL_HYGIENE: INDEPENDENT
GROOMING: INDEPENDENT
LAUNDRY: WITH SUPERVISION

## 2018-11-16 NOTE — PROGRESS NOTES
Writer called the patient's therapist, Carmen Backer [phone]. Writer was informed that she is out of the office today but will return on Monday.

## 2018-11-16 NOTE — PLAN OF CARE
Problem: Occupational Therapy Goals (Adult)  Goal: Occupational Therapy Goals  Pt will practice using >2 coping strategies to manage stress and reduce symptoms to demonstrate increased readiness for discharge.      Subjective  Overall was pleasant, talkative and engaged.     Objective  Attended 2 hour(s) of occupational therapy this date.   Leisure exploration and participation group for increased intrinsic motivation to engage in social, non-obligatory occupations. Structured group was used to promote sharing and openness with peers. Full participation noted.     Pt was cooperative throughout mental health management group focusing on coping skill exploration. Pt identified several daily/weekly coping skills utilized to reduce stress and manage symptoms: grounding techniques, nature, and driving. Shared with peers, elaborated on application of skills to daily life, and accepted additional suggestions within conversation with peers and staff.     Assessment  Pt would benefit from continued engagement in OT groups that support healthy recovery, specifically exploration of positive coping skills for symptom management/relapse prevention.     Plan  Provide personally meaningful graded occupation-based activities and ongoing assessment.

## 2018-11-16 NOTE — PROGRESS NOTES
"[facility], [place]   Psychiatric Progress Note        Interim History:   The patient's care was discussed with the treatment team during the daily team meeting and/or staff's chart notes were reviewed.  Staff report patient spent more time outside of her room, socialized with some peers and went to groups. She reported poor sleep last night, had to take Trazodone and felt tired today am. Stated that her mood was slightly better, but she was still feeling stressed out and had Suicidal ideation off and on. Stated that she could contract for safety at this hospital , but not in community and asked for few more days of hospitalization. She gave somewhat mixed messages about willing to go to [facility], initially stating that she would go there, then, saying she was not sure: \"I need to talk about this to my son\".          Medications:       hydroxychloroquine  200 mg Oral BID     leflunomide  20 mg Oral Daily     [START ON 11/17/2018] PARoxetine  30 mg Oral Daily     prazosin  1 mg Oral At Bedtime          Allergies:     Allergies   Allergen Reactions     Shellfish-Derived Products Anaphylaxis     Sulfa Drugs Hives          Labs:   No results found for this or any previous visit (from the past 24 hour(s)).       Psychiatric Examination:     /71  Pulse 91  Temp 98.6  F (37  C) (Oral)  Resp 16  Ht 1.727 m (5' 8\")  Wt 122 kg (269 lb)  LMP 11/03/2018 (Exact Date)  SpO2 96%  BMI 40.9 kg/m2  Weight is 269 lbs 0 oz  Body mass index is 40.9 kg/(m^2).    Orthostatic Vitals       Most Recent      Lying Orthostatic /71 11/16 0400    Lying Orthostatic Pulse (bpm) 91 11/16 0400    Sitting Orthostatic /92 11/16 0846    Sitting Orthostatic Pulse (bpm) 96 11/16 0846    Standing Orthostatic /101 11/16 0846    Standing Orthostatic Pulse (bpm) 115 11/16 0846          Appearance: awake, alert and dressed in hospital scrubs  Attitude:  cooperative  Eye Contact:  good  Mood: less anxious " and sad   Affect:  constricted mobility  Speech:  clear, coherent  Psychomotor Behavior:  no evidence of tardive dyskinesia, dystonia, or tics and intact station, gait and muscle tone  Throught Process:  logical and linear  Associations:  no loose associations  Thought Content:  passive suicidal ideation present  Insight:  fair  Judgement:  fair  Oriented to:  time, person, and place  Attention Span and Concentration:  fair  Recent and Remote Memory:  intact    Clinical Global Impressions  First:  Considering your total clinical experience with this particular patient population, how severe are the patient's symptoms at this time?: 6 (11/14/18 1650)  Compared to the patient's condition at the START of treatment, this patient's condition is:: 4 (11/14/18 1650)  Most recent:  Considering your total clinical experience with this particular patient population, how severe are the patient's symptoms at this time?: 6 (11/14/18 1650)  Compared to the patient's condition at the START of treatment, this patient's condition is:: 4 (11/14/18 1650)         Precautions:     Behavioral Orders   Procedures     Code 1 - Restrict to Unit     Routine Programming     As clinically indicated     Status 15     Every 15 minutes.     Suicide precautions     Patients on Suicide Precautions should have a Combination Diet ordered that includes a Diet selection(s) AND a Behavioral Tray selection for Safe Tray - with utensils, or Safe Tray - NO utensils            DIagnoses:     Posttraumatic stress disorder, possible major depressive disorder, recurrent, moderate severity. Based on the patient's description, she does not meet criteria for cannabis use disorder.        Plan:     Will increase Paxil to 30 mg daily. Will continue to provide support and structure, will refer to PHP after discharge if patient makes decision to go there.

## 2018-11-17 PROCEDURE — 25000132 ZZH RX MED GY IP 250 OP 250 PS 637: Performed by: PHYSICIAN ASSISTANT

## 2018-11-17 PROCEDURE — 25000132 ZZH RX MED GY IP 250 OP 250 PS 637: Performed by: PSYCHIATRY & NEUROLOGY

## 2018-11-17 PROCEDURE — 12400007 ZZH R&B MH INTERMEDIATE UMMC

## 2018-11-17 RX ADMIN — PRAZOSIN HYDROCHLORIDE 1 MG: 1 CAPSULE ORAL at 21:48

## 2018-11-17 RX ADMIN — LEFLUNOMIDE 20 MG: 20 TABLET ORAL at 09:04

## 2018-11-17 RX ADMIN — PAROXETINE HYDROCHLORIDE 30 MG: 30 TABLET, FILM COATED ORAL at 08:44

## 2018-11-17 RX ADMIN — TRAZODONE HYDROCHLORIDE 50 MG: 50 TABLET ORAL at 21:48

## 2018-11-17 RX ADMIN — HYDROXYCHLOROQUINE SULFATE 200 MG: 200 TABLET, FILM COATED ENTERAL at 08:44

## 2018-11-17 RX ADMIN — HYDROXYZINE HYDROCHLORIDE 25 MG: 25 TABLET ORAL at 20:00

## 2018-11-17 RX ADMIN — HYDROXYZINE HYDROCHLORIDE 25 MG: 25 TABLET ORAL at 14:06

## 2018-11-17 RX ADMIN — HYDROXYCHLOROQUINE SULFATE 200 MG: 200 TABLET, FILM COATED ENTERAL at 19:58

## 2018-11-17 ASSESSMENT — ACTIVITIES OF DAILY LIVING (ADL)
DRESS: SCRUBS (BEHAVIORAL HEALTH);INDEPENDENT
GROOMING: INDEPENDENT
ORAL_HYGIENE: INDEPENDENT
DRESS: INDEPENDENT
ORAL_HYGIENE: INDEPENDENT
LAUNDRY: WITH SUPERVISION
GROOMING: INDEPENDENT
LAUNDRY: WITH SUPERVISION

## 2018-11-17 NOTE — CONSULTS
Brief Medicine Note    Medicine consult order acknowledged, already following patient for elevated BP's.    Reviewed vitals flowsheet over the past 48 hours. Patient has intermittent periods of high BP's but overall trend is improving. Pressures are normotensive early in the morning and tend to increase throughout the day. At this time, recommend continuing wiht PRN hydralazine. Will refrain from long-acting antihypertensive given fluctuating BP and likely large component of anxiety driving periods of HTN. If BP's remain consistently elevated over the next several days, will likely need to start a scheduled antihypertensive.    Medicine will continue to follow along peripherally for BP management.    Geraldine Richey PA-C  Hospitalist Service  [phone]

## 2018-11-17 NOTE — PROGRESS NOTES
Pt BP elevated this evenin/88 at  (prn hydralazine given). Retaken at 2200: 167/90 on left arm, 182/97 on right arm. Scheduled minipress given. On-call psychiatrist paged, and advised to place order for internal medicine consult for 18, and to re-check BP in 30 minutes. Will re-check BP. All other vitals WNL (respirations: 20; temp: 98.7).    Pt denies all other symptoms, including shortness of breath and chest pain. No perspiration present.     Addendum: BP re-checked at 10:40 PM (with pt lying in bed sleeping): 135/57; pulse: 87. Will continue to monitor.

## 2018-11-17 NOTE — PROGRESS NOTES
Pt denies SI/SIB and hallucinations. Pt reports feeling calm.. Pt was visible in the milieu throughout the night socializing with peers and staff. Pt states she feels hopeful for the future of her family.       11/16/18 2202   Behavioral Health   Hallucinations denies / not responding to hallucinations   Thinking intact   Orientation person: oriented;place: oriented;date: oriented;time: oriented   Memory baseline memory   Insight insight appropriate to situation;insight appropriate to events   Judgement impaired   Eye Contact at examiner   Affect full range affect   Mood mood is calm   Physical Appearance/Attire attire appropriate to age and situation   Hygiene well groomed   Suicidality other (see comments)  (denies)   1. Wish to be Dead No   2. Non-Specific Active Suicidal Thoughts  No   Self Injury other (see comment)  (denies)   Elopement (none observed)   Activity other (see comment)  (visible in milieu)   Speech clear;coherent   Medication Sensitivity no stated side effects   Psychomotor / Gait balanced;steady   Activities of Daily Living   Hygiene/Grooming independent   Oral Hygiene independent   Dress scrubs (behavioral health);independent   Laundry with supervision   Room Organization independent

## 2018-11-17 NOTE — PROGRESS NOTES
"Pt was out in the milieu playing and card and board games with peers. Pt shared that she has been doing her best to keep a positive mindset. Pt denies SIB, but stated the she has suicidal thoughts only. \"I won't lie but I really feel suicidal but I have no plan to act on it. I am trying my best to not feel that way.\" pt rated anxiety a 8 or 9 and depression 3 or 4. Over al pt appears stable and well regulated.        11/17/18 1343   Behavioral Health   Hallucinations denies / not responding to hallucinations   Thinking poor concentration   Orientation person: oriented;place: oriented;date: oriented;time: oriented   Memory baseline memory   Insight poor   Judgement impaired   Eye Contact at examiner   Affect full range affect   Mood mood is calm   Physical Appearance/Attire neat   Hygiene well groomed   1. Wish to be Dead No   2. Non-Specific Active Suicidal Thoughts  No   Self Injury (denies)   Elopement (none observed)   Speech clear;coherent   Psychomotor / Gait balanced;steady   Activities of Daily Living   Hygiene/Grooming independent   Oral Hygiene independent   Dress independent   Laundry with supervision   Room Organization independent     "

## 2018-11-17 NOTE — PROGRESS NOTES
Alina has some suicidal thoughts but no intent and no plan.  She says she can be safe and can come to staff if things change.

## 2018-11-17 NOTE — PROGRESS NOTES
Pt slept well this shift. BP at 2.40am was 124/66 to the Rt arm and 124/64 to the Lt arm while lying down; pulse 96. Pt denies shortness of breath. Other vital signs stable. Currently sleeping, will continue to monitor.

## 2018-11-17 NOTE — PROGRESS NOTES
Alina's morning BP of 144/89, and pulse of 94 sitting and 163/95 and pulse of 105 standing does not meed the parameters for prn hydralazine.

## 2018-11-18 PROCEDURE — G0177 OPPS/PHP; TRAIN & EDUC SERV: HCPCS

## 2018-11-18 PROCEDURE — 12400007 ZZH R&B MH INTERMEDIATE UMMC

## 2018-11-18 PROCEDURE — 25000132 ZZH RX MED GY IP 250 OP 250 PS 637: Performed by: PSYCHIATRY & NEUROLOGY

## 2018-11-18 PROCEDURE — 25000132 ZZH RX MED GY IP 250 OP 250 PS 637: Performed by: PHYSICIAN ASSISTANT

## 2018-11-18 RX ADMIN — HYDROXYZINE HYDROCHLORIDE 25 MG: 25 TABLET ORAL at 16:41

## 2018-11-18 RX ADMIN — TRAZODONE HYDROCHLORIDE 50 MG: 50 TABLET ORAL at 21:48

## 2018-11-18 RX ADMIN — PAROXETINE HYDROCHLORIDE 30 MG: 30 TABLET, FILM COATED ORAL at 08:55

## 2018-11-18 RX ADMIN — PRAZOSIN HYDROCHLORIDE 1 MG: 1 CAPSULE ORAL at 21:48

## 2018-11-18 RX ADMIN — HYDROXYCHLOROQUINE SULFATE 200 MG: 200 TABLET, FILM COATED ENTERAL at 20:42

## 2018-11-18 RX ADMIN — HYDROXYZINE HYDROCHLORIDE 25 MG: 25 TABLET ORAL at 16:32

## 2018-11-18 RX ADMIN — LEFLUNOMIDE 20 MG: 20 TABLET ORAL at 08:55

## 2018-11-18 RX ADMIN — HYDROXYCHLOROQUINE SULFATE 200 MG: 200 TABLET, FILM COATED ENTERAL at 08:55

## 2018-11-18 ASSESSMENT — ACTIVITIES OF DAILY LIVING (ADL)
LAUNDRY: WITH SUPERVISION
LAUNDRY: WITH SUPERVISION
GROOMING: INDEPENDENT
DRESS: SCRUBS (BEHAVIORAL HEALTH)
ORAL_HYGIENE: INDEPENDENT
HYGIENE/GROOMING: INDEPENDENT
DRESS: SCRUBS (BEHAVIORAL HEALTH);INDEPENDENT
ORAL_HYGIENE: INDEPENDENT

## 2018-11-18 NOTE — PROGRESS NOTES
Brief Medicine Note    Medicine following for elevated BP's.    Overall BP's have improved since initial presentation. Patient has not required any doses of PRN hydralazine. Most recent /84 this AM. Suspect transient hypertension driven by acute anxiety.    Please contact medicine if SBP consistently >160 or DBP >100 (patient care order placed). Medicine will sign off at this time.     Geraldine Richey PA-C  Hospitalist Service  [phone]

## 2018-11-18 NOTE — PLAN OF CARE
"Problem: Occupational Therapy Goals (Adult)  Goal: Occupational Therapy Goals  Pt will practice using >2 coping strategies to manage stress and reduce symptoms to demonstrate increased readiness for discharge.      Pt participated in mental health focused discussion via group [employer] game. Overall was congruent and engaged. Able to apply prompted affirmations to personal life. Reported having supportive friends who she referred to as her \"ride of dies\".         "
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
## 2018-11-19 PROCEDURE — 12400007 ZZH R&B MH INTERMEDIATE UMMC

## 2018-11-19 PROCEDURE — 25000132 ZZH RX MED GY IP 250 OP 250 PS 637: Performed by: PHYSICIAN ASSISTANT

## 2018-11-19 PROCEDURE — G0177 OPPS/PHP; TRAIN & EDUC SERV: HCPCS

## 2018-11-19 PROCEDURE — 99232 SBSQ HOSP IP/OBS MODERATE 35: CPT | Performed by: PSYCHIATRY & NEUROLOGY

## 2018-11-19 PROCEDURE — 25000132 ZZH RX MED GY IP 250 OP 250 PS 637: Performed by: PSYCHIATRY & NEUROLOGY

## 2018-11-19 RX ORDER — PAROXETINE 20 MG/1
40 TABLET, FILM COATED ORAL DAILY
Status: DISCONTINUED | OUTPATIENT
Start: 2018-11-20 | End: 2018-11-21 | Stop reason: HOSPADM

## 2018-11-19 RX ADMIN — TRAZODONE HYDROCHLORIDE 50 MG: 50 TABLET ORAL at 22:20

## 2018-11-19 RX ADMIN — HYDROXYZINE HYDROCHLORIDE 25 MG: 25 TABLET ORAL at 12:08

## 2018-11-19 RX ADMIN — HYDROXYCHLOROQUINE SULFATE 200 MG: 200 TABLET, FILM COATED ENTERAL at 20:04

## 2018-11-19 RX ADMIN — PAROXETINE HYDROCHLORIDE 30 MG: 30 TABLET, FILM COATED ORAL at 08:35

## 2018-11-19 RX ADMIN — PRAZOSIN HYDROCHLORIDE 1 MG: 1 CAPSULE ORAL at 22:20

## 2018-11-19 RX ADMIN — HYDROXYZINE HYDROCHLORIDE 25 MG: 25 TABLET ORAL at 20:55

## 2018-11-19 RX ADMIN — HYDROXYCHLOROQUINE SULFATE 200 MG: 200 TABLET, FILM COATED ENTERAL at 08:35

## 2018-11-19 RX ADMIN — LEFLUNOMIDE 20 MG: 20 TABLET ORAL at 08:35

## 2018-11-19 ASSESSMENT — ACTIVITIES OF DAILY LIVING (ADL)
LAUNDRY: WITH SUPERVISION
GROOMING: INDEPENDENT
ORAL_HYGIENE: INDEPENDENT
DRESS: INDEPENDENT
DRESS: INDEPENDENT
GROOMING: INDEPENDENT
LAUNDRY: WITH SUPERVISION
ORAL_HYGIENE: INDEPENDENT

## 2018-11-19 NOTE — PROGRESS NOTES
Pt attended and participated in community meeting and was actively social in the milieu. Pt had a visit from their son this evening, which went well. Pt denied SI/SIB and AH/VH, however they are experiencing anxiety (rated 6/10). Pt states that a recent trauma may have induced their anxiety, however they did not elaborate on the event. Pt reported no other concerns.        11/18/18 2200   Behavioral Health   Hallucinations denies / not responding to hallucinations   Thinking intact   Orientation person: oriented;place: oriented;time: oriented;date: oriented   Memory baseline memory   Insight admits / accepts   Judgement (SULY)   Eye Contact at examiner   Affect full range affect   Mood mood is calm   Physical Appearance/Attire neat   Hygiene well groomed   Suicidality (denies)   1. Wish to be Dead No   2. Non-Specific Active Suicidal Thoughts  No   Activities of Daily Living   Hygiene/Grooming independent   Oral Hygiene independent   Dress scrubs (behavioral health)   Laundry with supervision   Room Organization independent

## 2018-11-19 NOTE — PLAN OF CARE
Problem: Occupational Therapy Goals (Adult)  Goal: Occupational Therapy Goals  Pt will practice using >2 coping strategies to manage stress and reduce symptoms to demonstrate increased readiness for discharge.      Subjective  Overall was pleasant and engaged.     Objective  Attended 3 hour(s) of occupational therapy this date.     Pt actively participated in occupational therapy clinic this afternoon. Able to independently gather materials, initiate, sequence and adjust to workspace demands as needed. Demonstrated fair focus, planning, and problem solving. Able to ask for assistance as needed and appeared comfortable interacting with peers and staff.       Assessment  Pt would benefit from continued engagement in OT groups that support healthy recovery, specifically exploration of positive coping skills for symptom management/relapse prevention.     Plan  Provide personally meaningful graded occupation-based activities and ongoing assessment.

## 2018-11-19 NOTE — PROGRESS NOTES
Pt BP elevated at 1616: 176/92. Prn hydralazine given.     BP at 2013: 164/81    Will continue to monitor.

## 2018-11-19 NOTE — PLAN OF CARE
Problem: Occupational Therapy Goals (Adult)  Goal: Occupational Therapy Goals  Pt will practice using >2 coping strategies to manage stress and reduce symptoms to demonstrate increased readiness for discharge.      This morning Pt attended physical wellness sampler for mental health management and coping. Stated previously engaging in weight lifting, however is unable to participate in this d/t RA. However, has initiated routine walking and hopes to start yoga to stay active.    Education was provided on benefits of movement for depression and anxiety, and low-tech materials for use post d/c. Facilitated resistance bands and bodyweight exercises, stretching, and self-massage. Pt reported positive response to intervention.

## 2018-11-19 NOTE — PROGRESS NOTES
"[facility], [place]   Psychiatric Progress Note        Interim History:     The patient's care was discussed with the treatment team during the daily team meeting and/or staff's chart notes were reviewed.  Staff report patient spent more time outside of her room, socialized with some peers and went to groups. She reports, overall, feeling better, although, still having Suicidal ideation off and on. Openly told me that she didn't feel comfortable with discharge today and would not be able to contract for safety. Agreed to set discharge day this Wednesday. Said that after discharge she will, probably, move in with her son who is a . Denied med side effects.         Medications:       hydroxychloroquine  200 mg Oral BID     leflunomide  20 mg Oral Daily     [START ON 11/20/2018] PARoxetine  40 mg Oral Daily     prazosin  1 mg Oral At Bedtime          Allergies:     Allergies   Allergen Reactions     Shellfish-Derived Products Anaphylaxis     Sulfa Drugs Hives          Labs:   No results found for this or any previous visit (from the past 24 hour(s)).       Psychiatric Examination:     /81  Pulse 86  Temp 98.8  F (37.1  C) (Oral)  Resp 16  Ht 1.727 m (5' 8\")  Wt 119.3 kg (263 lb)  LMP 11/03/2018 (Exact Date)  SpO2 94%  BMI 39.99 kg/m2  Weight is 263 lbs 0 oz  Body mass index is 39.99 kg/(m^2).    Orthostatic Vitals       Most Recent      Sitting Orthostatic /79 11/19 0838    Sitting Orthostatic Pulse (bpm) 89 11/19 0838    Standing Orthostatic /70 11/19 0838    Standing Orthostatic Pulse (bpm) 103 11/19 0838          Appearance: awake, alert and dressed in hospital scrubs  Attitude:  cooperative  Eye Contact:  good  Mood: less anxious and sad   Affect:  constricted mobility  Speech:  clear, coherent  Psychomotor Behavior:  no evidence of tardive dyskinesia, dystonia, or tics and intact station, gait and muscle tone  Throught Process:  logical and " linear  Associations:  no loose associations  Thought Content:  passive suicidal ideation present  Insight:  fair  Judgement:  fair  Oriented to:  time, person, and place  Attention Span and Concentration:  fair  Recent and Remote Memory:  intact    Clinical Global Impressions  First:  Considering your total clinical experience with this particular patient population, how severe are the patient's symptoms at this time?: 6 (11/14/18 1650)  Compared to the patient's condition at the START of treatment, this patient's condition is:: 4 (11/14/18 1650)  Most recent:  Considering your total clinical experience with this particular patient population, how severe are the patient's symptoms at this time?: 6 (11/14/18 1650)  Compared to the patient's condition at the START of treatment, this patient's condition is:: 4 (11/14/18 1650)         Precautions:     Behavioral Orders   Procedures     Code 1 - Restrict to Unit     Routine Programming     As clinically indicated     Status 15     Every 15 minutes.     Suicide precautions     Patients on Suicide Precautions should have a Combination Diet ordered that includes a Diet selection(s) AND a Behavioral Tray selection for Safe Tray - with utensils, or Safe Tray - NO utensils            DIagnoses:     Posttraumatic stress disorder, possible major depressive disorder, recurrent, moderate severity. Based on the patient's description, she does not meet criteria for cannabis use disorder.        Plan:     Will increase Paxil to 40 mg daily. Will continue to provide support and structure, will plan to discharge on Wednesday.

## 2018-11-19 NOTE — PROGRESS NOTES
Writer spoke with the patient about her thoughts on going to the [facility]. The patient stated that she spoke with her son over the weekend and has decided to move in with him and increase her weekly therapy sessions. The patient also stated that she is still currently having suicidal thoughts without a plan.

## 2018-11-20 ENCOUNTER — AMBULATORY - HEALTHEAST (OUTPATIENT)
Dept: BEHAVIORAL HEALTH | Facility: CLINIC | Age: 53
End: 2018-11-20

## 2018-11-20 PROCEDURE — 25000132 ZZH RX MED GY IP 250 OP 250 PS 637: Performed by: PSYCHIATRY & NEUROLOGY

## 2018-11-20 PROCEDURE — G0177 OPPS/PHP; TRAIN & EDUC SERV: HCPCS

## 2018-11-20 PROCEDURE — 12400007 ZZH R&B MH INTERMEDIATE UMMC

## 2018-11-20 PROCEDURE — 25000132 ZZH RX MED GY IP 250 OP 250 PS 637: Performed by: PHYSICIAN ASSISTANT

## 2018-11-20 RX ADMIN — HYDROXYCHLOROQUINE SULFATE 200 MG: 200 TABLET, FILM COATED ENTERAL at 21:55

## 2018-11-20 RX ADMIN — LEFLUNOMIDE 20 MG: 20 TABLET ORAL at 09:19

## 2018-11-20 RX ADMIN — TRAZODONE HYDROCHLORIDE 50 MG: 50 TABLET ORAL at 21:54

## 2018-11-20 RX ADMIN — HYDROXYCHLOROQUINE SULFATE 200 MG: 200 TABLET, FILM COATED ENTERAL at 09:19

## 2018-11-20 RX ADMIN — HYDROXYZINE HYDROCHLORIDE 25 MG: 25 TABLET ORAL at 13:17

## 2018-11-20 RX ADMIN — HYDROXYZINE HYDROCHLORIDE 25 MG: 25 TABLET ORAL at 17:39

## 2018-11-20 RX ADMIN — PAROXETINE 40 MG: 20 TABLET, FILM COATED ORAL at 09:19

## 2018-11-20 RX ADMIN — PRAZOSIN HYDROCHLORIDE 1 MG: 1 CAPSULE ORAL at 21:54

## 2018-11-20 ASSESSMENT — ACTIVITIES OF DAILY LIVING (ADL)
ORAL_HYGIENE: INDEPENDENT
DRESS: INDEPENDENT
ORAL_HYGIENE: INDEPENDENT
GROOMING: INDEPENDENT
DRESS: INDEPENDENT
GROOMING: INDEPENDENT

## 2018-11-20 NOTE — PLAN OF CARE
"Problem: Depressive Symptoms  Goal: Depressive Symptoms  Signs and symptoms of listed problems will be absent or manageable.   Outcome: Improving  48 Hour Assessment:  Alert and orientated. Up in milieu all morning.  Social with peers. Verbalized excitement regarding being discharged tomorrow.  Reports will be staying with son for about 1 month.  States her mood is \"better that it has been for for some time\"  States has some passive thoughts of not wanting to live but they also are better and less than she has had for a long time.  Denies being suicidal and denies plan.  Feels she can maintain safety outside of the hospital.       "

## 2018-11-20 NOTE — PROGRESS NOTES
Pt slept well this shift. /56 Pulse 76 - to the Rt arm while  Laying down. Denies SOB; other vital signs stable. Currently sleeping . Will continue to monitor.

## 2018-11-20 NOTE — PROGRESS NOTES
11/19/18 2300   Behavioral Health   Hallucinations denies / not responding to hallucinations   Thinking intact   Orientation person: oriented;place: oriented   Memory baseline memory   Insight insight appropriate to situation   Judgement impaired   Eye Contact at examiner   Affect full range affect   Mood mood is calm   Physical Appearance/Attire neat   Hygiene well groomed   Suicidality other (see comments)  (denies )   1. Wish to be Dead No   2. Non-Specific Active Suicidal Thoughts  No   Self Injury other (see comment)  (denies )   Activity other (see comment)  (visible in the milieu and socialized with others )   Speech clear;coherent   Activities of Daily Living   Hygiene/Grooming independent   Oral Hygiene independent   Dress independent   Laundry with supervision   Room Organization independent       Pt denied SI and SIB.  Pt seems calm, ate supper, visible in the milieu and socialized with others.

## 2018-11-21 VITALS
SYSTOLIC BLOOD PRESSURE: 163 MMHG | OXYGEN SATURATION: 94 % | BODY MASS INDEX: 40.01 KG/M2 | DIASTOLIC BLOOD PRESSURE: 83 MMHG | WEIGHT: 264 LBS | TEMPERATURE: 98.9 F | HEART RATE: 95 BPM | RESPIRATION RATE: 16 BRPM | HEIGHT: 68 IN

## 2018-11-21 PROCEDURE — 99207 ZZC CDG-CODE CATEGORY CHANGED: CPT | Performed by: PSYCHIATRY & NEUROLOGY

## 2018-11-21 PROCEDURE — 99239 HOSP IP/OBS DSCHRG MGMT >30: CPT | Performed by: PSYCHIATRY & NEUROLOGY

## 2018-11-21 PROCEDURE — 25000132 ZZH RX MED GY IP 250 OP 250 PS 637: Performed by: PSYCHIATRY & NEUROLOGY

## 2018-11-21 PROCEDURE — 25000132 ZZH RX MED GY IP 250 OP 250 PS 637: Performed by: PHYSICIAN ASSISTANT

## 2018-11-21 PROCEDURE — G0177 OPPS/PHP; TRAIN & EDUC SERV: HCPCS

## 2018-11-21 RX ORDER — TRAZODONE HYDROCHLORIDE 50 MG/1
50 TABLET, FILM COATED ORAL
Qty: 90 TABLET | Refills: 1 | Status: SHIPPED | OUTPATIENT
Start: 2018-11-21 | End: 2021-02-09

## 2018-11-21 RX ORDER — PRAZOSIN HYDROCHLORIDE 1 MG/1
1 CAPSULE ORAL AT BEDTIME
Qty: 30 CAPSULE | Refills: 1 | Status: SHIPPED | OUTPATIENT
Start: 2018-11-21 | End: 2020-10-19

## 2018-11-21 RX ORDER — PAROXETINE 40 MG/1
40 TABLET, FILM COATED ORAL DAILY
Qty: 30 TABLET | Refills: 1 | Status: SHIPPED | OUTPATIENT
Start: 2018-11-22 | End: 2021-02-09

## 2018-11-21 RX ORDER — HYDRALAZINE HYDROCHLORIDE 10 MG/1
10 TABLET, FILM COATED ORAL EVERY 6 HOURS PRN
Qty: 60 TABLET | Refills: 1 | Status: SHIPPED | OUTPATIENT
Start: 2018-11-21 | End: 2020-10-19

## 2018-11-21 RX ADMIN — LEFLUNOMIDE 20 MG: 20 TABLET ORAL at 09:09

## 2018-11-21 RX ADMIN — HYDROXYCHLOROQUINE SULFATE 200 MG: 200 TABLET, FILM COATED ENTERAL at 09:09

## 2018-11-21 RX ADMIN — PAROXETINE 40 MG: 20 TABLET, FILM COATED ORAL at 09:09

## 2018-11-21 NOTE — PROGRESS NOTES
Writer spoke with the patient who stated that she would like to be discharged by noon today. The patient stated that her son will be picking her up. The patient also stated that her medications can be went to the [facility] in [place] at [address] (Phone:[phone] & Fax: [phone]). The patient's AVS is complete.

## 2018-11-21 NOTE — PLAN OF CARE
"Pt has been discharged from [facility]: [facility] to go back home.  Pt's son picked her up.  Pt reports good mood and denies having thoughts to hurt herself.  Pt has received all her belongings.  Pt states \"Am looking forward to thanksgiving dinner with family.\"    "

## 2018-11-21 NOTE — PLAN OF CARE
"Problem: Depressive Symptoms  Goal: Depressive Symptoms  Signs and symptoms of listed problems will be absent or manageable.     Pt was visible in the lounge watching TV with her peers for most of the shift. Pt reports coming to the hospital due to \" suicidal thoughts.\"   Pt denies SI/ SIB and AH. Her goal is \" to get my anxiety down.\"  Attended and participated in the unit activities. Her affect is full range and mood \" chill.\". Pt rates her depression 5/10 and anxiety 5/10. Reports feeling safe and hopeful. Feels that her medications are somewhat working. Reports her concentration as \" ok.\"  Reports her appetite and sleep as adequate. Reports having a \" slight pain from rheumatoid arthritis.\"  Declined prn analgesics. Pt reports doing yoga as her coping skill. No medication side effects.   Plan: Status 15s; Build trust with pt. Continue to build on strengths. Encourage healthy coping.                   "

## 2018-11-21 NOTE — DISCHARGE INSTRUCTIONS
Behavioral Discharge Planning and Instructions      Summary:  You were admitted on 11/14/2018  due to Suicidal Ideation and Worsening Depressive Symptoms. You were treated by Dr. Navdeep Navas MD and discharged on 11/21/2018 from [facility] Home.    Principal Diagnosis:   Posttraumatic Stress Disorder  Possible Major Depressive Disorder, recurrent, moderate severity    Health Care Follow-up Appointments:   Medication Management   Wednesday. December 5th at 10:00am with Estelle Bansal  [address] [id number]  Phone: [phone]    Monday, February 11th  at 11:40am with Kiet Lei, CNP  [address] [id number]   Phone: [phone]    Outpatient Therapy  Wednesday, November 21st  at 3:00pm with Carmen Backer   [address] [id number]  Phone: [phone]    Attend all scheduled appointments with your outpatient providers. Call at least 24 hours in advance if you need to reschedule an appointment to ensure continued access to your outpatient providers.   Major Treatments, Procedures and Findings:  You were provided with: a psychiatric assessment, medication evaluation and/or management, group therapy and milieu management    Symptoms to Report: feeling more aggressive, increased confusion, losing more sleep, mood getting worse or thoughts of suicide    Early warning signs can include: increased depression or anxiety sleep disturbances increased thoughts or behaviors of suicide or self-harm  increased unusual thinking, such as paranoia or hearing voices    Safety and Wellness: Take all medicines as directed.  Make no changes unless your doctor suggests them. Follow treatment recommendations. Refrain from alcohol and non-prescribed drugs. Items could include:    - Firearms  - Medicines (both prescribed and over-the-counter)  - Knives and other sharp objects  - Ropes and like materials  - Car keys  If there is a concern for safety, call  "911. If there is a concern for safety, call 911.    Resources:   [facility] Crisis Response - Adult 526-664-0509  Crisis Intervention: 529.831.3677 or 130-434-4311 (TTY: 537.410.8044).  Call anytime for help.  National [place] on Mental Illness (www.[place].tamiko.org): 275.223.2373 or 392-090-9732.  Suicide Awareness Voices of Education (SAVE) (www.save.org): 074-358-NUKO (8003)  National Suicide Prevention Line (www.mentalhealthmn.org): 485-009-OTYI (9408)  Mental Health Consumer/Survivor Network of [place] (www.mhcsn.net): 650.873.3483 or 792-594-3237  Mental Health Association of [place] (www.mentalhealth.org): 815.960.1503 or 089-753-2518  Self- Management and Recovery Training., [employer]-- Toll free: 781.487.3140  www.AllClear ID  Text 4 Life: txt \"LIFE\" to 63472 for immediate support and crisis intervention  Crisis text line: Text \"[place]\" to 619036. Free, confidential, 24/7.  Crisis Intervention: 727.622.3539 or 454-846-7655. Call anytime for help.     The treatment team has appreciated the opportunity to work with you.     If you have any questions or concerns our unit number is [phone].   You may be receiving a follow-up phone call within the next three days from a representative from behavioral health.   "

## 2018-11-21 NOTE — PLAN OF CARE
Problem: General Plan of Care (Inpatient Behavioral)  Goal: Team Discussion  Team Plan:    BEHAVIORAL TEAM DISCUSSION    Participants: Chris Turcios (NAWAF), Michelle (RN), Gini (OT)  Progress: Progressing; the patient reports an improved mood and stated that she is ready for discharge.   Continued Stay Criteria/Rationale: N/A  Medical/Physical: Please see medical notes.   Precautions:   Behavioral Orders   Procedures     Code 1 - Restrict to Unit     Routine Programming     As clinically indicated     Status 15     Every 15 minutes.     Suicide precautions     Patients on Suicide Precautions should have a Combination Diet ordered that includes a Diet selection(s) AND a Behavioral Tray selection for Safe Tray - with utensils, or Safe Tray - NO utensils       Plan: The patient will be discharged today (11/21) at 12pm/noon.   Rationale for change in precautions or plan: None.       Problem: Patient Care Overview  Goal: Team Discussion  Team Plan:    BEHAVIORAL TEAM DISCUSSION    Participants: Chris Turcios (NAWAF), Michelle (RN), Gini (OT)  Progress: Progressing; the patient reports an improved mood and stated that she is ready for discharge.   Continued Stay Criteria/Rationale: N/A  Medical/Physical: Please see medical notes.   Precautions:   Behavioral Orders   Procedures     Code 1 - Restrict to Unit     Routine Programming     As clinically indicated     Status 15     Every 15 minutes.     Suicide precautions     Patients on Suicide Precautions should have a Combination Diet ordered that includes a Diet selection(s) AND a Behavioral Tray selection for Safe Tray - with utensils, or Safe Tray - NO utensils       Plan: The patient will be discharged today (11/21) at 12pm/noon.   Rationale for change in precautions or plan: None.
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
## 2018-11-27 ENCOUNTER — COMMUNICATION - HEALTHEAST (OUTPATIENT)
Dept: BEHAVIORAL HEALTH | Facility: CLINIC | Age: 53
End: 2018-11-27

## 2018-11-27 ENCOUNTER — AMBULATORY - HEALTHEAST (OUTPATIENT)
Dept: BEHAVIORAL HEALTH | Facility: CLINIC | Age: 53
End: 2018-11-27

## 2018-11-27 ENCOUNTER — OFFICE VISIT - HEALTHEAST (OUTPATIENT)
Dept: BEHAVIORAL HEALTH | Facility: HOSPITAL | Age: 53
End: 2018-11-27

## 2018-11-27 DIAGNOSIS — F33.1 MAJOR DEPRESSIVE DISORDER, RECURRENT EPISODE, MODERATE (H): ICD-10-CM

## 2018-11-27 DIAGNOSIS — F41.0 PANIC DISORDER WITHOUT AGORAPHOBIA: ICD-10-CM

## 2018-11-27 DIAGNOSIS — F43.10 PTSD (POST-TRAUMATIC STRESS DISORDER): ICD-10-CM

## 2018-11-28 ENCOUNTER — HEALTH MAINTENANCE LETTER (OUTPATIENT)
Age: 53
End: 2018-11-28

## 2018-11-29 ENCOUNTER — OFFICE VISIT - HEALTHEAST (OUTPATIENT)
Dept: BEHAVIORAL HEALTH | Facility: CLINIC | Age: 53
End: 2018-11-29

## 2018-11-29 DIAGNOSIS — F33.1 MAJOR DEPRESSIVE DISORDER, RECURRENT EPISODE, MODERATE (H): ICD-10-CM

## 2018-11-29 DIAGNOSIS — F43.10 PTSD (POST-TRAUMATIC STRESS DISORDER): ICD-10-CM

## 2018-11-29 DIAGNOSIS — F41.0 PANIC DISORDER WITHOUT AGORAPHOBIA: ICD-10-CM

## 2018-12-04 ENCOUNTER — OFFICE VISIT - HEALTHEAST (OUTPATIENT)
Dept: BEHAVIORAL HEALTH | Facility: HOSPITAL | Age: 53
End: 2018-12-04

## 2018-12-04 DIAGNOSIS — F33.1 MAJOR DEPRESSIVE DISORDER, RECURRENT EPISODE, MODERATE (H): ICD-10-CM

## 2018-12-04 DIAGNOSIS — F41.0 PANIC DISORDER WITHOUT AGORAPHOBIA: ICD-10-CM

## 2018-12-04 DIAGNOSIS — F43.10 PTSD (POST-TRAUMATIC STRESS DISORDER): ICD-10-CM

## 2018-12-06 ENCOUNTER — OFFICE VISIT - HEALTHEAST (OUTPATIENT)
Dept: BEHAVIORAL HEALTH | Facility: CLINIC | Age: 53
End: 2018-12-06

## 2018-12-06 DIAGNOSIS — F43.10 PTSD (POST-TRAUMATIC STRESS DISORDER): ICD-10-CM

## 2018-12-06 DIAGNOSIS — F41.0 PANIC DISORDER WITHOUT AGORAPHOBIA: ICD-10-CM

## 2018-12-06 DIAGNOSIS — F33.1 MAJOR DEPRESSIVE DISORDER, RECURRENT EPISODE, MODERATE (H): ICD-10-CM

## 2018-12-11 ENCOUNTER — OFFICE VISIT - HEALTHEAST (OUTPATIENT)
Dept: FAMILY MEDICINE | Facility: CLINIC | Age: 53
End: 2018-12-11

## 2018-12-11 DIAGNOSIS — R21 RASH AND NONSPECIFIC SKIN ERUPTION: ICD-10-CM

## 2018-12-12 ENCOUNTER — OFFICE VISIT - HEALTHEAST (OUTPATIENT)
Dept: BEHAVIORAL HEALTH | Facility: CLINIC | Age: 53
End: 2018-12-12

## 2018-12-12 DIAGNOSIS — F33.1 MAJOR DEPRESSIVE DISORDER, RECURRENT EPISODE, MODERATE (H): ICD-10-CM

## 2018-12-12 DIAGNOSIS — F43.10 PTSD (POST-TRAUMATIC STRESS DISORDER): ICD-10-CM

## 2018-12-12 DIAGNOSIS — F41.0 PANIC DISORDER WITHOUT AGORAPHOBIA: ICD-10-CM

## 2018-12-19 ENCOUNTER — OFFICE VISIT - HEALTHEAST (OUTPATIENT)
Dept: BEHAVIORAL HEALTH | Facility: CLINIC | Age: 53
End: 2018-12-19

## 2018-12-19 DIAGNOSIS — F33.1 MAJOR DEPRESSIVE DISORDER, RECURRENT EPISODE, MODERATE (H): ICD-10-CM

## 2018-12-19 DIAGNOSIS — F43.10 PTSD (POST-TRAUMATIC STRESS DISORDER): ICD-10-CM

## 2018-12-19 DIAGNOSIS — F41.0 PANIC DISORDER WITHOUT AGORAPHOBIA: ICD-10-CM

## 2018-12-21 ENCOUNTER — OFFICE VISIT - HEALTHEAST (OUTPATIENT)
Dept: BEHAVIORAL HEALTH | Facility: CLINIC | Age: 53
End: 2018-12-21

## 2018-12-21 DIAGNOSIS — F41.0 PANIC DISORDER WITHOUT AGORAPHOBIA: ICD-10-CM

## 2018-12-21 DIAGNOSIS — F43.10 PTSD (POST-TRAUMATIC STRESS DISORDER): ICD-10-CM

## 2018-12-21 DIAGNOSIS — F33.1 MAJOR DEPRESSIVE DISORDER, RECURRENT EPISODE, MODERATE (H): ICD-10-CM

## 2019-01-03 ENCOUNTER — OFFICE VISIT - HEALTHEAST (OUTPATIENT)
Dept: BEHAVIORAL HEALTH | Facility: CLINIC | Age: 54
End: 2019-01-03

## 2019-01-03 DIAGNOSIS — F41.0 PANIC DISORDER WITHOUT AGORAPHOBIA: ICD-10-CM

## 2019-01-03 DIAGNOSIS — F43.10 PTSD (POST-TRAUMATIC STRESS DISORDER): ICD-10-CM

## 2019-01-03 DIAGNOSIS — F33.1 MAJOR DEPRESSIVE DISORDER, RECURRENT EPISODE, MODERATE (H): ICD-10-CM

## 2019-01-07 ENCOUNTER — OFFICE VISIT - HEALTHEAST (OUTPATIENT)
Dept: BEHAVIORAL HEALTH | Facility: CLINIC | Age: 54
End: 2019-01-07

## 2019-01-07 DIAGNOSIS — F33.0 MILD EPISODE OF RECURRENT MAJOR DEPRESSIVE DISORDER (H): ICD-10-CM

## 2019-01-07 DIAGNOSIS — F43.10 PTSD (POST-TRAUMATIC STRESS DISORDER): ICD-10-CM

## 2019-01-07 ASSESSMENT — MIFFLIN-ST. JEOR: SCORE: 1858.47

## 2019-01-10 ENCOUNTER — OFFICE VISIT - HEALTHEAST (OUTPATIENT)
Dept: BEHAVIORAL HEALTH | Facility: CLINIC | Age: 54
End: 2019-01-10

## 2019-01-10 DIAGNOSIS — F43.10 PTSD (POST-TRAUMATIC STRESS DISORDER): ICD-10-CM

## 2019-01-10 DIAGNOSIS — F33.1 MAJOR DEPRESSIVE DISORDER, RECURRENT EPISODE, MODERATE (H): ICD-10-CM

## 2019-01-10 DIAGNOSIS — F41.0 PANIC DISORDER WITHOUT AGORAPHOBIA: ICD-10-CM

## 2019-01-17 ENCOUNTER — OFFICE VISIT - HEALTHEAST (OUTPATIENT)
Dept: BEHAVIORAL HEALTH | Facility: CLINIC | Age: 54
End: 2019-01-17

## 2019-01-17 DIAGNOSIS — F33.1 MAJOR DEPRESSIVE DISORDER, RECURRENT EPISODE, MODERATE (H): ICD-10-CM

## 2019-01-17 DIAGNOSIS — F43.10 PTSD (POST-TRAUMATIC STRESS DISORDER): ICD-10-CM

## 2019-01-17 DIAGNOSIS — F41.0 PANIC DISORDER WITHOUT AGORAPHOBIA: ICD-10-CM

## 2019-02-21 ENCOUNTER — OFFICE VISIT - HEALTHEAST (OUTPATIENT)
Dept: BEHAVIORAL HEALTH | Facility: CLINIC | Age: 54
End: 2019-02-21

## 2019-02-21 DIAGNOSIS — F33.1 MAJOR DEPRESSIVE DISORDER, RECURRENT EPISODE, MODERATE (H): ICD-10-CM

## 2019-02-21 DIAGNOSIS — F43.10 PTSD (POST-TRAUMATIC STRESS DISORDER): ICD-10-CM

## 2019-02-21 DIAGNOSIS — F41.0 PANIC DISORDER WITHOUT AGORAPHOBIA: ICD-10-CM

## 2019-02-25 ENCOUNTER — COMMUNICATION - HEALTHEAST (OUTPATIENT)
Dept: SCHEDULING | Facility: CLINIC | Age: 54
End: 2019-02-25

## 2019-02-28 ENCOUNTER — OFFICE VISIT - HEALTHEAST (OUTPATIENT)
Dept: BEHAVIORAL HEALTH | Facility: CLINIC | Age: 54
End: 2019-02-28

## 2019-02-28 ENCOUNTER — AMBULATORY - HEALTHEAST (OUTPATIENT)
Dept: BEHAVIORAL HEALTH | Facility: CLINIC | Age: 54
End: 2019-02-28

## 2019-02-28 DIAGNOSIS — F43.10 PTSD (POST-TRAUMATIC STRESS DISORDER): ICD-10-CM

## 2019-02-28 DIAGNOSIS — F41.0 PANIC DISORDER WITHOUT AGORAPHOBIA: ICD-10-CM

## 2019-02-28 DIAGNOSIS — F33.1 MAJOR DEPRESSIVE DISORDER, RECURRENT EPISODE, MODERATE (H): ICD-10-CM

## 2019-03-07 ENCOUNTER — OFFICE VISIT - HEALTHEAST (OUTPATIENT)
Dept: BEHAVIORAL HEALTH | Facility: CLINIC | Age: 54
End: 2019-03-07

## 2019-03-07 DIAGNOSIS — F33.1 MAJOR DEPRESSIVE DISORDER, RECURRENT EPISODE, MODERATE (H): ICD-10-CM

## 2019-03-07 DIAGNOSIS — F43.10 PTSD (POST-TRAUMATIC STRESS DISORDER): ICD-10-CM

## 2019-03-07 DIAGNOSIS — F41.0 PANIC DISORDER WITHOUT AGORAPHOBIA: ICD-10-CM

## 2019-03-22 ENCOUNTER — OFFICE VISIT - HEALTHEAST (OUTPATIENT)
Dept: BEHAVIORAL HEALTH | Facility: CLINIC | Age: 54
End: 2019-03-22

## 2019-03-22 DIAGNOSIS — F33.1 MAJOR DEPRESSIVE DISORDER, RECURRENT EPISODE, MODERATE (H): ICD-10-CM

## 2019-03-22 DIAGNOSIS — F41.0 PANIC DISORDER WITHOUT AGORAPHOBIA: ICD-10-CM

## 2019-03-22 DIAGNOSIS — F43.10 PTSD (POST-TRAUMATIC STRESS DISORDER): ICD-10-CM

## 2019-03-29 ENCOUNTER — OFFICE VISIT - HEALTHEAST (OUTPATIENT)
Dept: BEHAVIORAL HEALTH | Facility: CLINIC | Age: 54
End: 2019-03-29

## 2019-03-29 DIAGNOSIS — F33.1 MAJOR DEPRESSIVE DISORDER, RECURRENT EPISODE, MODERATE (H): ICD-10-CM

## 2019-03-29 DIAGNOSIS — F43.10 PTSD (POST-TRAUMATIC STRESS DISORDER): ICD-10-CM

## 2019-03-29 DIAGNOSIS — F41.0 PANIC DISORDER WITHOUT AGORAPHOBIA: ICD-10-CM

## 2019-04-03 ENCOUNTER — OFFICE VISIT - HEALTHEAST (OUTPATIENT)
Dept: BEHAVIORAL HEALTH | Facility: CLINIC | Age: 54
End: 2019-04-03

## 2019-04-03 DIAGNOSIS — F33.1 MAJOR DEPRESSIVE DISORDER, RECURRENT EPISODE, MODERATE (H): ICD-10-CM

## 2019-04-03 DIAGNOSIS — F41.0 PANIC DISORDER WITHOUT AGORAPHOBIA: ICD-10-CM

## 2019-04-03 DIAGNOSIS — F43.10 PTSD (POST-TRAUMATIC STRESS DISORDER): ICD-10-CM

## 2019-04-07 ENCOUNTER — OFFICE VISIT - HEALTHEAST (OUTPATIENT)
Dept: FAMILY MEDICINE | Facility: CLINIC | Age: 54
End: 2019-04-07

## 2019-04-07 DIAGNOSIS — M06.9 RHEUMATOID ARTHRITIS INVOLVING MULTIPLE SITES, UNSPECIFIED RHEUMATOID FACTOR PRESENCE: ICD-10-CM

## 2019-04-07 DIAGNOSIS — M25.561 ACUTE PAIN OF RIGHT KNEE: ICD-10-CM

## 2019-04-07 DIAGNOSIS — R03.0 ELEVATED BLOOD PRESSURE READING WITHOUT DIAGNOSIS OF HYPERTENSION: ICD-10-CM

## 2019-04-10 ENCOUNTER — OFFICE VISIT - HEALTHEAST (OUTPATIENT)
Dept: BEHAVIORAL HEALTH | Facility: CLINIC | Age: 54
End: 2019-04-10

## 2019-04-10 DIAGNOSIS — F43.10 PTSD (POST-TRAUMATIC STRESS DISORDER): ICD-10-CM

## 2019-04-10 DIAGNOSIS — F41.0 PANIC DISORDER WITHOUT AGORAPHOBIA: ICD-10-CM

## 2019-04-10 DIAGNOSIS — F33.1 MAJOR DEPRESSIVE DISORDER, RECURRENT EPISODE, MODERATE (H): ICD-10-CM

## 2019-04-17 ENCOUNTER — OFFICE VISIT - HEALTHEAST (OUTPATIENT)
Dept: BEHAVIORAL HEALTH | Facility: CLINIC | Age: 54
End: 2019-04-17

## 2019-04-17 DIAGNOSIS — F41.0 PANIC DISORDER WITHOUT AGORAPHOBIA: ICD-10-CM

## 2019-04-17 DIAGNOSIS — F43.10 PTSD (POST-TRAUMATIC STRESS DISORDER): ICD-10-CM

## 2019-04-17 DIAGNOSIS — F33.1 MAJOR DEPRESSIVE DISORDER, RECURRENT EPISODE, MODERATE (H): ICD-10-CM

## 2019-04-24 ENCOUNTER — OFFICE VISIT - HEALTHEAST (OUTPATIENT)
Dept: BEHAVIORAL HEALTH | Facility: CLINIC | Age: 54
End: 2019-04-24

## 2019-04-24 DIAGNOSIS — F41.0 PANIC DISORDER WITHOUT AGORAPHOBIA: ICD-10-CM

## 2019-04-24 DIAGNOSIS — F43.10 PTSD (POST-TRAUMATIC STRESS DISORDER): ICD-10-CM

## 2019-04-24 DIAGNOSIS — F33.1 MAJOR DEPRESSIVE DISORDER, RECURRENT EPISODE, MODERATE (H): ICD-10-CM

## 2019-05-06 ENCOUNTER — OFFICE VISIT - HEALTHEAST (OUTPATIENT)
Dept: BEHAVIORAL HEALTH | Facility: CLINIC | Age: 54
End: 2019-05-06

## 2019-05-06 DIAGNOSIS — F43.10 PTSD (POST-TRAUMATIC STRESS DISORDER): ICD-10-CM

## 2019-05-06 DIAGNOSIS — F33.0 MILD EPISODE OF RECURRENT MAJOR DEPRESSIVE DISORDER (H): ICD-10-CM

## 2019-05-06 DIAGNOSIS — F90.2 ATTENTION DEFICIT HYPERACTIVITY DISORDER (ADHD), COMBINED TYPE: ICD-10-CM

## 2019-05-06 ASSESSMENT — MIFFLIN-ST. JEOR: SCORE: 1846

## 2019-05-08 ENCOUNTER — OFFICE VISIT - HEALTHEAST (OUTPATIENT)
Dept: BEHAVIORAL HEALTH | Facility: CLINIC | Age: 54
End: 2019-05-08

## 2019-05-08 DIAGNOSIS — F33.0 MILD EPISODE OF RECURRENT MAJOR DEPRESSIVE DISORDER (H): ICD-10-CM

## 2019-05-08 DIAGNOSIS — F90.2 ATTENTION DEFICIT HYPERACTIVITY DISORDER (ADHD), COMBINED TYPE: ICD-10-CM

## 2019-05-08 DIAGNOSIS — F43.10 PTSD (POST-TRAUMATIC STRESS DISORDER): ICD-10-CM

## 2019-05-22 ENCOUNTER — OFFICE VISIT - HEALTHEAST (OUTPATIENT)
Dept: BEHAVIORAL HEALTH | Facility: CLINIC | Age: 54
End: 2019-05-22

## 2019-05-22 ENCOUNTER — AMBULATORY - HEALTHEAST (OUTPATIENT)
Dept: BEHAVIORAL HEALTH | Facility: CLINIC | Age: 54
End: 2019-05-22

## 2019-05-22 DIAGNOSIS — F33.0 MILD EPISODE OF RECURRENT MAJOR DEPRESSIVE DISORDER (H): ICD-10-CM

## 2019-05-22 DIAGNOSIS — F90.2 ATTENTION DEFICIT HYPERACTIVITY DISORDER (ADHD), COMBINED TYPE: ICD-10-CM

## 2019-05-22 DIAGNOSIS — F43.10 PTSD (POST-TRAUMATIC STRESS DISORDER): ICD-10-CM

## 2019-06-02 ENCOUNTER — COMMUNICATION - HEALTHEAST (OUTPATIENT)
Dept: RHEUMATOLOGY | Facility: CLINIC | Age: 54
End: 2019-06-02

## 2019-06-02 DIAGNOSIS — M05.79 SEROPOSITIVE RHEUMATOID ARTHRITIS OF MULTIPLE SITES (H): ICD-10-CM

## 2019-06-05 ENCOUNTER — COMMUNICATION - HEALTHEAST (OUTPATIENT)
Dept: BEHAVIORAL HEALTH | Facility: CLINIC | Age: 54
End: 2019-06-05

## 2019-06-19 ENCOUNTER — OFFICE VISIT - HEALTHEAST (OUTPATIENT)
Dept: BEHAVIORAL HEALTH | Facility: CLINIC | Age: 54
End: 2019-06-19

## 2019-06-19 DIAGNOSIS — F33.0 MILD EPISODE OF RECURRENT MAJOR DEPRESSIVE DISORDER (H): ICD-10-CM

## 2019-06-19 DIAGNOSIS — F90.2 ATTENTION DEFICIT HYPERACTIVITY DISORDER (ADHD), COMBINED TYPE: ICD-10-CM

## 2019-06-19 DIAGNOSIS — F43.10 PTSD (POST-TRAUMATIC STRESS DISORDER): ICD-10-CM

## 2019-07-03 ENCOUNTER — OFFICE VISIT - HEALTHEAST (OUTPATIENT)
Dept: BEHAVIORAL HEALTH | Facility: CLINIC | Age: 54
End: 2019-07-03

## 2019-07-03 DIAGNOSIS — F33.0 MILD EPISODE OF RECURRENT MAJOR DEPRESSIVE DISORDER (H): ICD-10-CM

## 2019-07-03 DIAGNOSIS — F43.10 PTSD (POST-TRAUMATIC STRESS DISORDER): ICD-10-CM

## 2019-07-03 DIAGNOSIS — F90.2 ATTENTION DEFICIT HYPERACTIVITY DISORDER (ADHD), COMBINED TYPE: ICD-10-CM

## 2019-07-09 ENCOUNTER — COMMUNICATION - HEALTHEAST (OUTPATIENT)
Dept: RHEUMATOLOGY | Facility: CLINIC | Age: 54
End: 2019-07-09

## 2019-07-09 DIAGNOSIS — M05.79 SEROPOSITIVE RHEUMATOID ARTHRITIS OF MULTIPLE SITES (H): ICD-10-CM

## 2019-07-17 ENCOUNTER — OFFICE VISIT - HEALTHEAST (OUTPATIENT)
Dept: BEHAVIORAL HEALTH | Facility: CLINIC | Age: 54
End: 2019-07-17

## 2019-07-17 DIAGNOSIS — F43.10 PTSD (POST-TRAUMATIC STRESS DISORDER): ICD-10-CM

## 2019-07-17 DIAGNOSIS — F90.2 ATTENTION DEFICIT HYPERACTIVITY DISORDER (ADHD), COMBINED TYPE: ICD-10-CM

## 2019-07-17 DIAGNOSIS — F33.0 MILD EPISODE OF RECURRENT MAJOR DEPRESSIVE DISORDER (H): ICD-10-CM

## 2019-07-25 ENCOUNTER — OFFICE VISIT - HEALTHEAST (OUTPATIENT)
Dept: BEHAVIORAL HEALTH | Facility: CLINIC | Age: 54
End: 2019-07-25

## 2019-07-25 DIAGNOSIS — F43.10 PTSD (POST-TRAUMATIC STRESS DISORDER): ICD-10-CM

## 2019-07-25 DIAGNOSIS — F33.0 MILD EPISODE OF RECURRENT MAJOR DEPRESSIVE DISORDER (H): ICD-10-CM

## 2019-07-25 DIAGNOSIS — F90.2 ATTENTION DEFICIT HYPERACTIVITY DISORDER (ADHD), COMBINED TYPE: ICD-10-CM

## 2019-08-09 ENCOUNTER — OFFICE VISIT - HEALTHEAST (OUTPATIENT)
Dept: BEHAVIORAL HEALTH | Facility: CLINIC | Age: 54
End: 2019-08-09

## 2019-08-09 DIAGNOSIS — F33.0 MILD EPISODE OF RECURRENT MAJOR DEPRESSIVE DISORDER (H): ICD-10-CM

## 2019-08-09 DIAGNOSIS — F43.10 PTSD (POST-TRAUMATIC STRESS DISORDER): ICD-10-CM

## 2019-08-09 DIAGNOSIS — F90.2 ATTENTION DEFICIT HYPERACTIVITY DISORDER (ADHD), COMBINED TYPE: ICD-10-CM

## 2019-08-23 ENCOUNTER — AMBULATORY - HEALTHEAST (OUTPATIENT)
Dept: BEHAVIORAL HEALTH | Facility: CLINIC | Age: 54
End: 2019-08-23

## 2019-08-23 ENCOUNTER — OFFICE VISIT - HEALTHEAST (OUTPATIENT)
Dept: BEHAVIORAL HEALTH | Facility: CLINIC | Age: 54
End: 2019-08-23

## 2019-08-23 DIAGNOSIS — F43.10 PTSD (POST-TRAUMATIC STRESS DISORDER): ICD-10-CM

## 2019-08-23 DIAGNOSIS — F33.0 MILD EPISODE OF RECURRENT MAJOR DEPRESSIVE DISORDER (H): ICD-10-CM

## 2019-08-23 DIAGNOSIS — F90.2 ATTENTION DEFICIT HYPERACTIVITY DISORDER (ADHD), COMBINED TYPE: ICD-10-CM

## 2019-09-04 ENCOUNTER — OFFICE VISIT - HEALTHEAST (OUTPATIENT)
Dept: BEHAVIORAL HEALTH | Facility: CLINIC | Age: 54
End: 2019-09-04

## 2019-09-04 DIAGNOSIS — F90.2 ATTENTION DEFICIT HYPERACTIVITY DISORDER (ADHD), COMBINED TYPE: ICD-10-CM

## 2019-09-04 DIAGNOSIS — F43.10 PTSD (POST-TRAUMATIC STRESS DISORDER): ICD-10-CM

## 2019-09-04 DIAGNOSIS — F33.0 MILD EPISODE OF RECURRENT MAJOR DEPRESSIVE DISORDER (H): ICD-10-CM

## 2019-09-16 ENCOUNTER — OFFICE VISIT - HEALTHEAST (OUTPATIENT)
Dept: FAMILY MEDICINE | Facility: CLINIC | Age: 54
End: 2019-09-16

## 2019-09-16 DIAGNOSIS — L50.9 HIVES: ICD-10-CM

## 2019-09-16 DIAGNOSIS — Z12.11 SCREEN FOR COLON CANCER: ICD-10-CM

## 2019-09-16 DIAGNOSIS — E66.01 CLASS 2 SEVERE OBESITY DUE TO EXCESS CALORIES WITH SERIOUS COMORBIDITY AND BODY MASS INDEX (BMI) OF 39.0 TO 39.9 IN ADULT (H): ICD-10-CM

## 2019-09-16 DIAGNOSIS — Z12.31 VISIT FOR SCREENING MAMMOGRAM: ICD-10-CM

## 2019-09-16 DIAGNOSIS — Z00.00 ENCOUNTER FOR GENERAL ADULT MEDICAL EXAMINATION WITHOUT ABNORMAL FINDINGS: ICD-10-CM

## 2019-09-16 DIAGNOSIS — M05.79 SEROPOSITIVE RHEUMATOID ARTHRITIS OF MULTIPLE SITES (H): ICD-10-CM

## 2019-09-16 DIAGNOSIS — Z13.220 ENCOUNTER FOR SCREENING FOR LIPOID DISORDERS: ICD-10-CM

## 2019-09-16 DIAGNOSIS — E66.812 CLASS 2 SEVERE OBESITY DUE TO EXCESS CALORIES WITH SERIOUS COMORBIDITY AND BODY MASS INDEX (BMI) OF 39.0 TO 39.9 IN ADULT (H): ICD-10-CM

## 2019-09-16 LAB
ALBUMIN SERPL-MCNC: 3.5 G/DL (ref 3.5–5)
ALP SERPL-CCNC: 104 U/L (ref 45–120)
ALT SERPL W P-5'-P-CCNC: 10 U/L (ref 0–45)
ANION GAP SERPL CALCULATED.3IONS-SCNC: 8 MMOL/L (ref 5–18)
AST SERPL W P-5'-P-CCNC: 13 U/L (ref 0–40)
BASOPHILS # BLD AUTO: 0 THOU/UL (ref 0–0.2)
BASOPHILS NFR BLD AUTO: 0 % (ref 0–2)
BILIRUB SERPL-MCNC: 0.3 MG/DL (ref 0–1)
BUN SERPL-MCNC: 12 MG/DL (ref 8–22)
CALCIUM SERPL-MCNC: 9.8 MG/DL (ref 8.5–10.5)
CHLORIDE BLD-SCNC: 105 MMOL/L (ref 98–107)
CHOLEST SERPL-MCNC: 175 MG/DL
CO2 SERPL-SCNC: 28 MMOL/L (ref 22–31)
CREAT SERPL-MCNC: 0.67 MG/DL (ref 0.6–1.1)
EOSINOPHIL # BLD AUTO: 0.1 THOU/UL (ref 0–0.4)
EOSINOPHIL NFR BLD AUTO: 1 % (ref 0–6)
ERYTHROCYTE [DISTWIDTH] IN BLOOD BY AUTOMATED COUNT: 14.6 % (ref 11–14.5)
FASTING STATUS PATIENT QL REPORTED: YES
GFR SERPL CREATININE-BSD FRML MDRD: >60 ML/MIN/1.73M2
GLUCOSE BLD-MCNC: 100 MG/DL (ref 70–125)
HCT VFR BLD AUTO: 40.7 % (ref 35–47)
HDLC SERPL-MCNC: 41 MG/DL
HGB BLD-MCNC: 12.1 G/DL (ref 12–16)
LDLC SERPL CALC-MCNC: 115 MG/DL
LYMPHOCYTES # BLD AUTO: 2 THOU/UL (ref 0.8–4.4)
LYMPHOCYTES NFR BLD AUTO: 22 % (ref 20–40)
MCH RBC QN AUTO: 25.7 PG (ref 27–34)
MCHC RBC AUTO-ENTMCNC: 29.7 G/DL (ref 32–36)
MCV RBC AUTO: 86 FL (ref 80–100)
MONOCYTES # BLD AUTO: 0.6 THOU/UL (ref 0–0.9)
MONOCYTES NFR BLD AUTO: 7 % (ref 2–10)
NEUTROPHILS # BLD AUTO: 6.5 THOU/UL (ref 2–7.7)
NEUTROPHILS NFR BLD AUTO: 70 % (ref 50–70)
PLATELET # BLD AUTO: 327 THOU/UL (ref 140–440)
PMV BLD AUTO: 11.6 FL (ref 8.5–12.5)
POTASSIUM BLD-SCNC: 4.7 MMOL/L (ref 3.5–5)
PROT SERPL-MCNC: 7.2 G/DL (ref 6–8)
RBC # BLD AUTO: 4.71 MILL/UL (ref 3.8–5.4)
SODIUM SERPL-SCNC: 141 MMOL/L (ref 136–145)
TRIGL SERPL-MCNC: 94 MG/DL
WBC: 9.3 THOU/UL (ref 4–11)

## 2019-09-16 ASSESSMENT — PATIENT HEALTH QUESTIONNAIRE - PHQ9: SUM OF ALL RESPONSES TO PHQ QUESTIONS 1-9: 5

## 2019-09-16 ASSESSMENT — MIFFLIN-ST. JEOR: SCORE: 1852.01

## 2019-09-17 LAB
25(OH)D3 SERPL-MCNC: 24.2 NG/ML (ref 30–80)
25(OH)D3 SERPL-MCNC: 24.2 NG/ML (ref 30–80)

## 2019-09-18 ENCOUNTER — AMBULATORY - HEALTHEAST (OUTPATIENT)
Dept: FAMILY MEDICINE | Facility: CLINIC | Age: 54
End: 2019-09-18

## 2019-09-18 ENCOUNTER — OFFICE VISIT - HEALTHEAST (OUTPATIENT)
Dept: BEHAVIORAL HEALTH | Facility: CLINIC | Age: 54
End: 2019-09-18

## 2019-09-18 DIAGNOSIS — F90.2 ATTENTION DEFICIT HYPERACTIVITY DISORDER (ADHD), COMBINED TYPE: ICD-10-CM

## 2019-09-18 DIAGNOSIS — E55.9 VITAMIN D DEFICIENCY: ICD-10-CM

## 2019-09-18 DIAGNOSIS — F33.0 MILD EPISODE OF RECURRENT MAJOR DEPRESSIVE DISORDER (H): ICD-10-CM

## 2019-09-18 DIAGNOSIS — F43.10 PTSD (POST-TRAUMATIC STRESS DISORDER): ICD-10-CM

## 2019-09-19 ENCOUNTER — COMMUNICATION - HEALTHEAST (OUTPATIENT)
Dept: FAMILY MEDICINE | Facility: CLINIC | Age: 54
End: 2019-09-19

## 2019-09-23 ENCOUNTER — HOSPITAL ENCOUNTER (OUTPATIENT)
Dept: MAMMOGRAPHY | Facility: CLINIC | Age: 54
Discharge: HOME | End: 2019-09-23
Attending: FAMILY MEDICINE

## 2019-09-23 DIAGNOSIS — Z12.31 VISIT FOR SCREENING MAMMOGRAM: ICD-10-CM

## 2019-10-02 ENCOUNTER — OFFICE VISIT - HEALTHEAST (OUTPATIENT)
Dept: BEHAVIORAL HEALTH | Facility: CLINIC | Age: 54
End: 2019-10-02

## 2019-10-02 DIAGNOSIS — F90.2 ATTENTION DEFICIT HYPERACTIVITY DISORDER (ADHD), COMBINED TYPE: ICD-10-CM

## 2019-10-02 DIAGNOSIS — F33.0 MILD EPISODE OF RECURRENT MAJOR DEPRESSIVE DISORDER (H): ICD-10-CM

## 2019-10-02 DIAGNOSIS — F43.10 PTSD (POST-TRAUMATIC STRESS DISORDER): ICD-10-CM

## 2019-10-16 ENCOUNTER — AMBULATORY - HEALTHEAST (OUTPATIENT)
Dept: BEHAVIORAL HEALTH | Facility: CLINIC | Age: 54
End: 2019-10-16

## 2019-10-16 ENCOUNTER — OFFICE VISIT - HEALTHEAST (OUTPATIENT)
Dept: FAMILY MEDICINE | Facility: CLINIC | Age: 54
End: 2019-10-16

## 2019-10-16 ENCOUNTER — OFFICE VISIT - HEALTHEAST (OUTPATIENT)
Dept: BEHAVIORAL HEALTH | Facility: CLINIC | Age: 54
End: 2019-10-16

## 2019-10-16 DIAGNOSIS — F90.2 ATTENTION DEFICIT HYPERACTIVITY DISORDER (ADHD), COMBINED TYPE: ICD-10-CM

## 2019-10-16 DIAGNOSIS — M54.42 ACUTE LEFT-SIDED LOW BACK PAIN WITH LEFT-SIDED SCIATICA: ICD-10-CM

## 2019-10-16 DIAGNOSIS — M25.462 EFFUSION OF LEFT KNEE: ICD-10-CM

## 2019-10-16 DIAGNOSIS — M25.562 KNEE MENISCUS PAIN, LEFT: ICD-10-CM

## 2019-10-16 DIAGNOSIS — M05.79 SEROPOSITIVE RHEUMATOID ARTHRITIS OF MULTIPLE SITES (H): ICD-10-CM

## 2019-10-16 DIAGNOSIS — F33.0 MILD EPISODE OF RECURRENT MAJOR DEPRESSIVE DISORDER (H): ICD-10-CM

## 2019-10-16 DIAGNOSIS — F43.10 PTSD (POST-TRAUMATIC STRESS DISORDER): ICD-10-CM

## 2019-10-22 ENCOUNTER — DOCUMENTATION ONLY (OUTPATIENT)
Dept: CARE COORDINATION | Facility: CLINIC | Age: 54
End: 2019-10-22

## 2019-10-30 ENCOUNTER — OFFICE VISIT - HEALTHEAST (OUTPATIENT)
Dept: BEHAVIORAL HEALTH | Facility: CLINIC | Age: 54
End: 2019-10-30

## 2019-10-30 DIAGNOSIS — F33.0 MILD EPISODE OF RECURRENT MAJOR DEPRESSIVE DISORDER (H): ICD-10-CM

## 2019-10-30 DIAGNOSIS — F90.2 ATTENTION DEFICIT HYPERACTIVITY DISORDER (ADHD), COMBINED TYPE: ICD-10-CM

## 2019-10-30 DIAGNOSIS — F43.10 PTSD (POST-TRAUMATIC STRESS DISORDER): ICD-10-CM

## 2019-10-31 ENCOUNTER — OFFICE VISIT (OUTPATIENT)
Dept: ORTHOPEDICS | Facility: CLINIC | Age: 54
End: 2019-10-31
Payer: COMMERCIAL

## 2019-10-31 ENCOUNTER — RECORDS - HEALTHEAST (OUTPATIENT)
Dept: ADMINISTRATIVE | Facility: OTHER | Age: 54
End: 2019-10-31

## 2019-10-31 ENCOUNTER — PRE VISIT (OUTPATIENT)
Dept: ORTHOPEDICS | Facility: CLINIC | Age: 54
End: 2019-10-31

## 2019-10-31 VITALS
HEIGHT: 68 IN | WEIGHT: 263.3 LBS | BODY MASS INDEX: 39.9 KG/M2 | HEART RATE: 81 BPM | DIASTOLIC BLOOD PRESSURE: 109 MMHG | SYSTOLIC BLOOD PRESSURE: 155 MMHG

## 2019-10-31 DIAGNOSIS — M05.762 RHEUMATOID ARTHRITIS INVOLVING LEFT KNEE WITH POSITIVE RHEUMATOID FACTOR (H): Primary | ICD-10-CM

## 2019-10-31 RX ORDER — NAPROXEN 500 MG/1
TABLET ORAL
Refills: 0 | COMMUNITY
Start: 2019-10-16 | End: 2021-07-29

## 2019-10-31 RX ORDER — PREDNISONE 20 MG/1
TABLET ORAL
Qty: 14 TABLET | Refills: 0 | Status: SHIPPED | OUTPATIENT
Start: 2019-10-31 | End: 2021-08-11

## 2019-10-31 RX ORDER — GUANFACINE 1 MG/1
TABLET, EXTENDED RELEASE ORAL
Refills: 0 | COMMUNITY
Start: 2019-05-06 | End: 2021-02-09

## 2019-10-31 ASSESSMENT — MIFFLIN-ST. JEOR: SCORE: 1852.82

## 2019-10-31 NOTE — PROGRESS NOTES
"Sports Medicine Clinic Visit    PCP: Estelle Bansal Belinda Martell is a 54 year old female who is seen  in consultation at the request of Ken Hitchcock PA-C presenting with left knee pain and swelling.    Injury: None    Location of Pain: left posterior knee  Duration of Pain: 3 week(s)  Rating of Pain: 9/10. 6/10 recently.  Pain is better with: Aspiration and tramadol.  Pain is worse with: Sitting down and lying down  Additional Features: Playing tennis and walking for activity. Swelling. Clicking.   Treatment so far consists of: Aspiration and tramadol at night.  Prior History of related problems: RA, left knee ACLR, and partial tear of ACL second time.    BP (!) 155/109 (BP Location: Right arm, Patient Position: Sitting, Cuff Size: Adult Large)   Pulse 81   Ht 1.727 m (5' 8\")   Wt 119.4 kg (263 lb 4.8 oz)   BMI 40.03 kg/m           PMH:  Past Medical History:   Diagnosis Date     Carpal tunnel syndrome      PTSD (post-traumatic stress disorder)      RA (rheumatoid arthritis) (H)        Active problem list:  Patient Active Problem List   Diagnosis     Depressed       FH:  Family History   Problem Relation Age of Onset     Crohn's Disease Mother      Psoriasis Brother        SH:  Social History     Socioeconomic History     Marital status: Single     Spouse name: Not on file     Number of children: Not on file     Years of education: Not on file     Highest education level: Not on file   Occupational History     Not on file   Social Needs     Financial resource strain: Not on file     Food insecurity:     Worry: Not on file     Inability: Not on file     Transportation needs:     Medical: Not on file     Non-medical: Not on file   Tobacco Use     Smoking status: Never Smoker     Smokeless tobacco: Never Used   Substance and Sexual Activity     Alcohol use: Yes     Comment: rare/once a month     Drug use: Yes     Types: Marijuana     Comment: Oils     Sexual activity: Not on file   Lifestyle     " Physical activity:     Days per week: Not on file     Minutes per session: Not on file     Stress: Not on file   Relationships     Social connections:     Talks on phone: Not on file     Gets together: Not on file     Attends Holiness service: Not on file     Active member of club or organization: Not on file     Attends meetings of clubs or organizations: Not on file     Relationship status: Not on file     Intimate partner violence:     Fear of current or ex partner: Not on file     Emotionally abused: Not on file     Physically abused: Not on file     Forced sexual activity: Not on file   Other Topics Concern     Not on file   Social History Narrative     Not on file       MEDS:  See EMR, reviewed  ALL:  See EMR, reviewed    REVIEW OF SYSTEMS:  CONSTITUTIONAL:NEGATIVE for fever, chills, change in weight  INTEGUMENTARY/SKIN: NEGATIVE for worrisome rashes, moles or lesions  EYES: NEGATIVE for vision changes or irritation  ENT/MOUTH: NEGATIVE for ear, mouth and throat problems  RESP:NEGATIVE for significant cough or SOB  BREAST: NEGATIVE for masses, tenderness or discharge  CV: NEGATIVE for chest pain, palpitations or peripheral edema  GI: NEGATIVE for nausea, abdominal pain, heartburn, or change in bowel habits  :NEGATIVE for frequency, dysuria, or hematuria  :NEGATIVE for frequency, dysuria, or hematuria  NEURO: NEGATIVE for weakness, dizziness or paresthesias  ENDOCRINE: NEGATIVE for temperature intolerance, skin/hair changes  HEME/ALLERGY/IMMUNE: NEGATIVE for bleeding problems  PSYCHIATRIC: NEGATIVE for changes in mood or affect      Subjective: This 54-year-old female has history of rheumatoid arthritis and sees a rheumatologist in [facility].  She has sero positive rheumatoid arthritis with a history of significant episodes of peripheral joint synovitis according to the rheumatologist notes.  Her last visit with the rheumatologist was January 2018.  At that time she was on leflunomide and  "hydroxychloroquine.  It was more effective for her peripheral episodes of arthritis flare than methotrexate was.  She has not been on these medicines in the last year.  Her last flareup of arthritis involving her elbows and shoulders.  They were bilaterally involved.  It was difficult to move her shoulders.  That was about a year ago.  In the last month she is had an effusion involving the left knee for which she is seen emergency room and an urgent care.  I do not have records available but she did have an aspiration of the knee at one point.  She was told she needed to return to see a rheumatologist.  She denies a history that would seem consistent with  Meniscal tear involving the knee currently.  She can think of no recent injury and she denies locking or catching episodes.  She simply \"woke up\" one morning in the last month and the knee was swollen.  In addition to aspirating the knee she was given some naproxen but is on no other anterior arthritis medicines at this time.  She denies fevers or chills.  She has been very involved athletically in her past.  It sounds like she played a number of college sports.  These include things like basketball and track and other sports.  She was told at one point that she had a partial ACL tear although she is not aware of any MRI being done of her knee or being told that she had issues with her meniscus.  She had an x-ray recently that showed no significant degenerative change to the knee.    Past medical history includes posttraumatic stress disorder, panic disorder, allergies include intolerance to sulfa, Effexor, Abilify and methotrexate     Objective the left knee does reveal a mild effusion.  It is not warm to the touch.  She seems to also have an effusion involving her right knee although she does deny discomfort involving the right knee.  She can do an active straight leg raise on the left.  I can flex the knee to 90 degrees.  She seems nontender over the medial " lateral joint line.  Anterior posterior drawer is negative.  Nontender over the patellar tendon or pes anserine bursa.  No tenderness in the calf no palpable cords.  Appropriate in conversation affect.    Assessment seropositive rheumatoid arthritis    Plan: I agree with her previous providers that she is seen within the last 3 weeks she needs to see her rheumatologist again.  She agrees to set at the visit.  In the meantime prednisone 40 mg once daily for the next 7 days.  She is tolerated the prednisone in the past and is helped with episodes of joint flareup.  I do not see an indication for an MRI to look for meniscal tear based on her current clinical presentation.  She will follow-up as needed.    This note was created with the use of Dragon software and unintentional spelling or errors may have occurred.

## 2019-10-31 NOTE — LETTER
"  10/31/2019      RE: Alina Martell  [address] [facility] [address]       Sports Medicine Clinic Visit    PCP: Estelle Bansal    Alina Martell is a 54 year old female who is seen  in consultation at the request of Ken Hitchcock PA-C presenting with left knee pain and swelling.    Injury: None    Location of Pain: left posterior knee  Duration of Pain: 3 week(s)  Rating of Pain: 9/10. 6/10 recently.  Pain is better with: Aspiration and tramadol.  Pain is worse with: Sitting down and lying down  Additional Features: Playing tennis and walking for activity. Swelling. Clicking.   Treatment so far consists of: Aspiration and tramadol at night.  Prior History of related problems: RA, left knee ACLR, and partial tear of ACL second time.    BP (!) 155/109 (BP Location: Right arm, Patient Position: Sitting, Cuff Size: Adult Large)   Pulse 81   Ht 1.727 m (5' 8\")   Wt 119.4 kg (263 lb 4.8 oz)   BMI 40.03 kg/m            PMH:  Past Medical History:   Diagnosis Date     Carpal tunnel syndrome      PTSD (post-traumatic stress disorder)      RA (rheumatoid arthritis) (H)        Active problem list:  Patient Active Problem List   Diagnosis     Depressed       FH:  Family History   Problem Relation Age of Onset     Crohn's Disease Mother      Psoriasis Brother        SH:  Social History     Socioeconomic History     Marital status: Single     Spouse name: Not on file     Number of children: Not on file     Years of education: Not on file     Highest education level: Not on file   Occupational History     Not on file   Social Needs     Financial resource strain: Not on file     Food insecurity:     Worry: Not on file     Inability: Not on file     Transportation needs:     Medical: Not on file     Non-medical: Not on file   Tobacco Use     Smoking status: Never Smoker     Smokeless tobacco: Never Used   Substance and Sexual Activity     Alcohol use: Yes     Comment: rare/once a month     Drug use: Yes    "  Types: Marijuana     Comment: Oils     Sexual activity: Not on file   Lifestyle     Physical activity:     Days per week: Not on file     Minutes per session: Not on file     Stress: Not on file   Relationships     Social connections:     Talks on phone: Not on file     Gets together: Not on file     Attends Jehovah's witness service: Not on file     Active member of club or organization: Not on file     Attends meetings of clubs or organizations: Not on file     Relationship status: Not on file     Intimate partner violence:     Fear of current or ex partner: Not on file     Emotionally abused: Not on file     Physically abused: Not on file     Forced sexual activity: Not on file   Other Topics Concern     Not on file   Social History Narrative     Not on file       MEDS:  See EMR, reviewed  ALL:  See EMR, reviewed    REVIEW OF SYSTEMS:  CONSTITUTIONAL:NEGATIVE for fever, chills, change in weight  INTEGUMENTARY/SKIN: NEGATIVE for worrisome rashes, moles or lesions  EYES: NEGATIVE for vision changes or irritation  ENT/MOUTH: NEGATIVE for ear, mouth and throat problems  RESP:NEGATIVE for significant cough or SOB  BREAST: NEGATIVE for masses, tenderness or discharge  CV: NEGATIVE for chest pain, palpitations or peripheral edema  GI: NEGATIVE for nausea, abdominal pain, heartburn, or change in bowel habits  :NEGATIVE for frequency, dysuria, or hematuria  :NEGATIVE for frequency, dysuria, or hematuria  NEURO: NEGATIVE for weakness, dizziness or paresthesias  ENDOCRINE: NEGATIVE for temperature intolerance, skin/hair changes  HEME/ALLERGY/IMMUNE: NEGATIVE for bleeding problems  PSYCHIATRIC: NEGATIVE for changes in mood or affect      Subjective: This 54-year-old female has history of rheumatoid arthritis and sees a rheumatologist in [facility].  She has sero positive rheumatoid arthritis with a history of significant episodes of peripheral joint synovitis according to the rheumatologist notes.  Her last visit with  "the rheumatologist was January 2018.  At that time she was on leflunomide and hydroxychloroquine.  It was more effective for her peripheral episodes of arthritis flare than methotrexate was.  She has not been on these medicines in the last year.  Her last flareup of arthritis involving her elbows and shoulders.  They were bilaterally involved.  It was difficult to move her shoulders.  That was about a year ago.  In the last month she is had an effusion involving the left knee for which she is seen emergency room and an urgent care.  I do not have records available but she did have an aspiration of the knee at one point.  She was told she needed to return to see a rheumatologist.  She denies a history that would seem consistent with  Meniscal tear involving the knee currently.  She can think of no recent injury and she denies locking or catching episodes.  She simply \"woke up\" one morning in the last month and the knee was swollen.  In addition to aspirating the knee she was given some naproxen but is on no other anterior arthritis medicines at this time.  She denies fevers or chills.  She has been very involved athletically in her past.  It sounds like she played a number of college sports.  These include things like basketball and track and other sports.  She was told at one point that she had a partial ACL tear although she is not aware of any MRI being done of her knee or being told that she had issues with her meniscus.  She had an x-ray recently that showed no significant degenerative change to the knee.    Past medical history includes posttraumatic stress disorder, panic disorder, allergies include intolerance to sulfa, Effexor, Abilify and methotrexate     Objective the left knee does reveal a mild effusion.  It is not warm to the touch.  She seems to also have an effusion involving her right knee although she does deny discomfort involving the right knee.  She can do an active straight leg raise on the " left.  I can flex the knee to 90 degrees.  She seems nontender over the medial lateral joint line.  Anterior posterior drawer is negative.  Nontender over the patellar tendon or pes anserine bursa.  No tenderness in the calf no palpable cords.  Appropriate in conversation affect.    Assessment seropositive rheumatoid arthritis    Plan: I agree with her previous providers that she is seen within the last 3 weeks she needs to see her rheumatologist again.  She agrees to set at the visit.  In the meantime prednisone 40 mg once daily for the next 7 days.  She is tolerated the prednisone in the past and is helped with episodes of joint flareup.  I do not see an indication for an MRI to look for meniscal tear based on her current clinical presentation.  She will follow-up as needed.    This note was created with the use of Dragon software and unintentional spelling or errors may have occurred.                      Heath Marcano MD

## 2019-11-07 ENCOUNTER — COMMUNICATION - HEALTHEAST (OUTPATIENT)
Dept: BEHAVIORAL HEALTH | Facility: CLINIC | Age: 54
End: 2019-11-07

## 2019-11-07 DIAGNOSIS — F90.2 ATTENTION DEFICIT HYPERACTIVITY DISORDER (ADHD), COMBINED TYPE: ICD-10-CM

## 2019-11-07 DIAGNOSIS — F43.10 PTSD (POST-TRAUMATIC STRESS DISORDER): ICD-10-CM

## 2019-11-12 ENCOUNTER — AMBULATORY - HEALTHEAST (OUTPATIENT)
Dept: BEHAVIORAL HEALTH | Facility: CLINIC | Age: 54
End: 2019-11-12

## 2019-11-15 ENCOUNTER — OFFICE VISIT - HEALTHEAST (OUTPATIENT)
Dept: BEHAVIORAL HEALTH | Facility: CLINIC | Age: 54
End: 2019-11-15

## 2019-11-15 DIAGNOSIS — F90.2 ATTENTION DEFICIT HYPERACTIVITY DISORDER (ADHD), COMBINED TYPE: ICD-10-CM

## 2019-11-15 DIAGNOSIS — F33.0 MILD EPISODE OF RECURRENT MAJOR DEPRESSIVE DISORDER (H): ICD-10-CM

## 2019-11-15 DIAGNOSIS — F43.10 PTSD (POST-TRAUMATIC STRESS DISORDER): ICD-10-CM

## 2019-12-06 ENCOUNTER — COMMUNICATION - HEALTHEAST (OUTPATIENT)
Dept: FAMILY MEDICINE | Facility: CLINIC | Age: 54
End: 2019-12-06

## 2019-12-06 DIAGNOSIS — M06.9 RHEUMATOID ARTHRITIS, RHEUMATOID FACTOR STATUS UNKNOWN (H): ICD-10-CM

## 2019-12-11 ENCOUNTER — OFFICE VISIT - HEALTHEAST (OUTPATIENT)
Dept: RHEUMATOLOGY | Facility: CLINIC | Age: 54
End: 2019-12-11

## 2019-12-11 DIAGNOSIS — R76.8 CYCLIC CITRULLINATED PEPTIDE (CCP) ANTIBODY POSITIVE: ICD-10-CM

## 2019-12-11 DIAGNOSIS — M05.79 SEROPOSITIVE RHEUMATOID ARTHRITIS OF MULTIPLE SITES (H): ICD-10-CM

## 2019-12-11 DIAGNOSIS — Z79.899 HIGH RISK MEDICATION USE: ICD-10-CM

## 2019-12-11 DIAGNOSIS — M25.50 POLYARTHRALGIA: ICD-10-CM

## 2019-12-11 LAB
ALBUMIN SERPL-MCNC: 3.5 G/DL (ref 3.5–5)
ALT SERPL W P-5'-P-CCNC: 10 U/L (ref 0–45)
CREAT SERPL-MCNC: 0.71 MG/DL (ref 0.6–1.1)
ERYTHROCYTE [DISTWIDTH] IN BLOOD BY AUTOMATED COUNT: 13.1 % (ref 11–14.5)
GFR SERPL CREATININE-BSD FRML MDRD: >60 ML/MIN/1.73M2
HCT VFR BLD AUTO: 40.4 % (ref 35–47)
HGB BLD-MCNC: 12.9 G/DL (ref 12–16)
MCH RBC QN AUTO: 25.6 PG (ref 27–34)
MCHC RBC AUTO-ENTMCNC: 32.1 G/DL (ref 32–36)
MCV RBC AUTO: 80 FL (ref 80–100)
PLATELET # BLD AUTO: 414 THOU/UL (ref 140–440)
PMV BLD AUTO: 8.2 FL (ref 7–10)
RBC # BLD AUTO: 5.06 MILL/UL (ref 3.8–5.4)
WBC: 13.7 THOU/UL (ref 4–11)

## 2020-02-25 ENCOUNTER — COMMUNICATION - HEALTHEAST (OUTPATIENT)
Dept: FAMILY MEDICINE | Facility: CLINIC | Age: 55
End: 2020-02-25

## 2020-02-25 DIAGNOSIS — E55.9 VITAMIN D DEFICIENCY: ICD-10-CM

## 2020-03-06 ENCOUNTER — OFFICE VISIT - HEALTHEAST (OUTPATIENT)
Dept: BEHAVIORAL HEALTH | Facility: CLINIC | Age: 55
End: 2020-03-06

## 2020-03-06 ENCOUNTER — AMBULATORY - HEALTHEAST (OUTPATIENT)
Dept: LAB | Facility: CLINIC | Age: 55
End: 2020-03-06

## 2020-03-06 DIAGNOSIS — F90.2 ATTENTION DEFICIT HYPERACTIVITY DISORDER (ADHD), COMBINED TYPE: ICD-10-CM

## 2020-03-06 DIAGNOSIS — Z79.899 HIGH RISK MEDICATION USE: ICD-10-CM

## 2020-03-06 DIAGNOSIS — M05.79 SEROPOSITIVE RHEUMATOID ARTHRITIS OF MULTIPLE SITES (H): ICD-10-CM

## 2020-03-06 DIAGNOSIS — F33.0 MILD EPISODE OF RECURRENT MAJOR DEPRESSIVE DISORDER (H): ICD-10-CM

## 2020-03-06 DIAGNOSIS — M25.50 POLYARTHRALGIA: ICD-10-CM

## 2020-03-06 DIAGNOSIS — F43.10 PTSD (POST-TRAUMATIC STRESS DISORDER): ICD-10-CM

## 2020-03-06 LAB
ALBUMIN SERPL-MCNC: 3.4 G/DL (ref 3.5–5)
ALT SERPL W P-5'-P-CCNC: 9 U/L (ref 0–45)
CREAT SERPL-MCNC: 0.69 MG/DL (ref 0.6–1.1)
ERYTHROCYTE [DISTWIDTH] IN BLOOD BY AUTOMATED COUNT: 13 % (ref 11–14.5)
GFR SERPL CREATININE-BSD FRML MDRD: >60 ML/MIN/1.73M2
HCT VFR BLD AUTO: 36.9 % (ref 35–47)
HGB BLD-MCNC: 12.2 G/DL (ref 12–16)
MCH RBC QN AUTO: 26.5 PG (ref 27–34)
MCHC RBC AUTO-ENTMCNC: 33 G/DL (ref 32–36)
MCV RBC AUTO: 80 FL (ref 80–100)
PLATELET # BLD AUTO: 321 THOU/UL (ref 140–440)
PMV BLD AUTO: 8.3 FL (ref 7–10)
RBC # BLD AUTO: 4.59 MILL/UL (ref 3.8–5.4)
WBC: 7.8 THOU/UL (ref 4–11)

## 2020-03-11 ENCOUNTER — OFFICE VISIT - HEALTHEAST (OUTPATIENT)
Dept: RHEUMATOLOGY | Facility: CLINIC | Age: 55
End: 2020-03-11

## 2020-03-11 ENCOUNTER — HEALTH MAINTENANCE LETTER (OUTPATIENT)
Age: 55
End: 2020-03-11

## 2020-03-11 DIAGNOSIS — M05.79 SEROPOSITIVE RHEUMATOID ARTHRITIS OF MULTIPLE SITES (H): ICD-10-CM

## 2020-03-11 DIAGNOSIS — M25.461 EFFUSION OF RIGHT KNEE: ICD-10-CM

## 2020-03-11 DIAGNOSIS — R76.8 CYCLIC CITRULLINATED PEPTIDE (CCP) ANTIBODY POSITIVE: ICD-10-CM

## 2020-03-11 LAB
APPEARANCE FLD: ABNORMAL
C REACTIVE PROTEIN LHE: 3.3 MG/DL (ref 0–0.8)
COLOR FLD: YELLOW
CRYSTALS SNV MICRO: NORMAL
EOSINOPHIL NFR FLD MANUAL: ABNORMAL %
ERYTHROCYTE [SEDIMENTATION RATE] IN BLOOD BY WESTERGREN METHOD: 64 MM/HR (ref 0–20)
LYMPHOCYTES NFR FLD MANUAL: 14 %
MACROPHAGE % - HISTORICAL: ABNORMAL
MESOTHELIALS, FLUID: ABNORMAL
MONOCYTE % - HISTORICAL: 5 %
NEUTS BAND NFR FLD MANUAL: 82 %
OTHER CELLS FLD MANUAL: ABNORMAL
RBC FLUID - HISTORICAL: ABNORMAL /UL
WBC # FLD AUTO: ABNORMAL /UL (ref 0–99)

## 2020-03-11 ASSESSMENT — MIFFLIN-ST. JEOR: SCORE: 1848.27

## 2020-03-12 ENCOUNTER — COMMUNICATION - HEALTHEAST (OUTPATIENT)
Dept: RHEUMATOLOGY | Facility: CLINIC | Age: 55
End: 2020-03-12

## 2020-03-12 LAB
HBV SURFACE AG SERPL QL IA: NEGATIVE
HCV AB SERPL QL IA: NEGATIVE

## 2020-03-13 LAB
GAMMA INTERFERON BACKGROUND BLD IA-ACNC: 0.03 IU/ML
M TB IFN-G BLD-IMP: ABNORMAL
MITOGEN IGNF BCKGRD COR BLD-ACNC: -0.01 IU/ML
MITOGEN IGNF BCKGRD COR BLD-ACNC: 0 IU/ML
QTF INTERPRETATION: ABNORMAL
QTF MITOGEN - NIL: 0.09 IU/ML

## 2020-03-14 LAB
BACTERIA SPEC CULT: NO GROWTH
GRAM STAIN RESULT: NORMAL
GRAM STAIN RESULT: NORMAL

## 2020-03-16 ENCOUNTER — COMMUNICATION - HEALTHEAST (OUTPATIENT)
Dept: ENDOCRINOLOGY | Facility: CLINIC | Age: 55
End: 2020-03-16

## 2020-03-17 ENCOUNTER — AMBULATORY - HEALTHEAST (OUTPATIENT)
Dept: BEHAVIORAL HEALTH | Facility: CLINIC | Age: 55
End: 2020-03-17

## 2020-03-20 ENCOUNTER — OFFICE VISIT - HEALTHEAST (OUTPATIENT)
Dept: BEHAVIORAL HEALTH | Facility: CLINIC | Age: 55
End: 2020-03-20

## 2020-03-20 DIAGNOSIS — F33.1 MAJOR DEPRESSIVE DISORDER, RECURRENT EPISODE, MODERATE (H): ICD-10-CM

## 2020-03-20 DIAGNOSIS — F43.10 PTSD (POST-TRAUMATIC STRESS DISORDER): ICD-10-CM

## 2020-03-26 ENCOUNTER — OFFICE VISIT - HEALTHEAST (OUTPATIENT)
Dept: BEHAVIORAL HEALTH | Facility: CLINIC | Age: 55
End: 2020-03-26

## 2020-03-26 DIAGNOSIS — F43.10 POSTTRAUMATIC STRESS DISORDER: ICD-10-CM

## 2020-03-26 DIAGNOSIS — F41.1 GAD (GENERALIZED ANXIETY DISORDER): ICD-10-CM

## 2020-03-26 DIAGNOSIS — F33.1 MAJOR DEPRESSIVE DISORDER, RECURRENT EPISODE, MODERATE (H): ICD-10-CM

## 2020-04-03 ENCOUNTER — OFFICE VISIT - HEALTHEAST (OUTPATIENT)
Dept: BEHAVIORAL HEALTH | Facility: CLINIC | Age: 55
End: 2020-04-03

## 2020-04-03 DIAGNOSIS — F41.1 GAD (GENERALIZED ANXIETY DISORDER): ICD-10-CM

## 2020-04-03 DIAGNOSIS — F43.10 POSTTRAUMATIC STRESS DISORDER: ICD-10-CM

## 2020-04-03 DIAGNOSIS — F33.1 MAJOR DEPRESSIVE DISORDER, RECURRENT EPISODE, MODERATE (H): ICD-10-CM

## 2020-04-06 ENCOUNTER — RECORDS - HEALTHEAST (OUTPATIENT)
Dept: ADMINISTRATIVE | Facility: OTHER | Age: 55
End: 2020-04-06

## 2020-04-10 ENCOUNTER — OFFICE VISIT - HEALTHEAST (OUTPATIENT)
Dept: BEHAVIORAL HEALTH | Facility: CLINIC | Age: 55
End: 2020-04-10

## 2020-04-10 ENCOUNTER — AMBULATORY - HEALTHEAST (OUTPATIENT)
Dept: BEHAVIORAL HEALTH | Facility: CLINIC | Age: 55
End: 2020-04-10

## 2020-04-10 DIAGNOSIS — F43.10 PTSD (POST-TRAUMATIC STRESS DISORDER): ICD-10-CM

## 2020-04-10 DIAGNOSIS — F33.1 MAJOR DEPRESSIVE DISORDER, RECURRENT EPISODE, MODERATE (H): ICD-10-CM

## 2020-04-10 DIAGNOSIS — F41.1 GAD (GENERALIZED ANXIETY DISORDER): ICD-10-CM

## 2020-04-17 ENCOUNTER — OFFICE VISIT - HEALTHEAST (OUTPATIENT)
Dept: BEHAVIORAL HEALTH | Facility: CLINIC | Age: 55
End: 2020-04-17

## 2020-04-17 DIAGNOSIS — F33.1 MAJOR DEPRESSIVE DISORDER, RECURRENT EPISODE, MODERATE (H): ICD-10-CM

## 2020-04-17 DIAGNOSIS — F43.10 PTSD (POST-TRAUMATIC STRESS DISORDER): ICD-10-CM

## 2020-04-17 DIAGNOSIS — F41.1 GAD (GENERALIZED ANXIETY DISORDER): ICD-10-CM

## 2020-04-21 ENCOUNTER — COMMUNICATION - HEALTHEAST (OUTPATIENT)
Dept: FAMILY MEDICINE | Facility: CLINIC | Age: 55
End: 2020-04-21

## 2020-04-21 DIAGNOSIS — L50.9 HIVES: ICD-10-CM

## 2020-04-22 ENCOUNTER — COMMUNICATION - HEALTHEAST (OUTPATIENT)
Dept: SCHEDULING | Facility: CLINIC | Age: 55
End: 2020-04-22

## 2020-04-22 ENCOUNTER — COMMUNICATION - HEALTHEAST (OUTPATIENT)
Dept: RHEUMATOLOGY | Facility: CLINIC | Age: 55
End: 2020-04-22

## 2020-04-22 DIAGNOSIS — M05.79 SEROPOSITIVE RHEUMATOID ARTHRITIS OF MULTIPLE SITES (H): ICD-10-CM

## 2020-04-24 ENCOUNTER — OFFICE VISIT - HEALTHEAST (OUTPATIENT)
Dept: BEHAVIORAL HEALTH | Facility: CLINIC | Age: 55
End: 2020-04-24

## 2020-04-24 ENCOUNTER — AMBULATORY - HEALTHEAST (OUTPATIENT)
Dept: BEHAVIORAL HEALTH | Facility: CLINIC | Age: 55
End: 2020-04-24

## 2020-04-24 DIAGNOSIS — F41.1 GAD (GENERALIZED ANXIETY DISORDER): ICD-10-CM

## 2020-04-24 DIAGNOSIS — F33.1 MAJOR DEPRESSIVE DISORDER, RECURRENT EPISODE, MODERATE (H): ICD-10-CM

## 2020-04-24 DIAGNOSIS — F43.10 PTSD (POST-TRAUMATIC STRESS DISORDER): ICD-10-CM

## 2020-04-30 ENCOUNTER — OFFICE VISIT - HEALTHEAST (OUTPATIENT)
Dept: BEHAVIORAL HEALTH | Facility: CLINIC | Age: 55
End: 2020-04-30

## 2020-04-30 DIAGNOSIS — F43.10 PTSD (POST-TRAUMATIC STRESS DISORDER): ICD-10-CM

## 2020-04-30 DIAGNOSIS — F33.1 MAJOR DEPRESSIVE DISORDER, RECURRENT EPISODE, MODERATE (H): ICD-10-CM

## 2020-04-30 DIAGNOSIS — F41.1 GAD (GENERALIZED ANXIETY DISORDER): ICD-10-CM

## 2020-05-08 ENCOUNTER — AMBULATORY - HEALTHEAST (OUTPATIENT)
Dept: BEHAVIORAL HEALTH | Facility: CLINIC | Age: 55
End: 2020-05-08

## 2020-05-27 ENCOUNTER — COMMUNICATION - HEALTHEAST (OUTPATIENT)
Dept: BEHAVIORAL HEALTH | Facility: CLINIC | Age: 55
End: 2020-05-27

## 2020-06-02 ENCOUNTER — COMMUNICATION - HEALTHEAST (OUTPATIENT)
Dept: RHEUMATOLOGY | Facility: CLINIC | Age: 55
End: 2020-06-02

## 2020-06-11 ENCOUNTER — OFFICE VISIT - HEALTHEAST (OUTPATIENT)
Dept: BEHAVIORAL HEALTH | Facility: CLINIC | Age: 55
End: 2020-06-11

## 2020-06-11 DIAGNOSIS — F43.10 PTSD (POST-TRAUMATIC STRESS DISORDER): ICD-10-CM

## 2020-06-11 DIAGNOSIS — F33.1 MAJOR DEPRESSIVE DISORDER, RECURRENT EPISODE, MODERATE (H): ICD-10-CM

## 2020-06-11 DIAGNOSIS — F41.1 GAD (GENERALIZED ANXIETY DISORDER): ICD-10-CM

## 2020-06-18 ENCOUNTER — OFFICE VISIT - HEALTHEAST (OUTPATIENT)
Dept: BEHAVIORAL HEALTH | Facility: CLINIC | Age: 55
End: 2020-06-18

## 2020-06-18 DIAGNOSIS — F41.1 GAD (GENERALIZED ANXIETY DISORDER): ICD-10-CM

## 2020-06-18 DIAGNOSIS — F43.10 PTSD (POST-TRAUMATIC STRESS DISORDER): ICD-10-CM

## 2020-06-18 DIAGNOSIS — F33.1 MAJOR DEPRESSIVE DISORDER, RECURRENT EPISODE, MODERATE (H): ICD-10-CM

## 2020-06-25 ENCOUNTER — AMBULATORY - HEALTHEAST (OUTPATIENT)
Dept: LAB | Facility: CLINIC | Age: 55
End: 2020-06-25

## 2020-06-25 ENCOUNTER — OFFICE VISIT - HEALTHEAST (OUTPATIENT)
Dept: BEHAVIORAL HEALTH | Facility: CLINIC | Age: 55
End: 2020-06-25

## 2020-06-25 DIAGNOSIS — F33.1 MAJOR DEPRESSIVE DISORDER, RECURRENT EPISODE, MODERATE (H): ICD-10-CM

## 2020-06-25 DIAGNOSIS — M05.79 SEROPOSITIVE RHEUMATOID ARTHRITIS OF MULTIPLE SITES (H): ICD-10-CM

## 2020-06-25 DIAGNOSIS — M25.50 POLYARTHRALGIA: ICD-10-CM

## 2020-06-25 DIAGNOSIS — Z79.899 HIGH RISK MEDICATION USE: ICD-10-CM

## 2020-06-25 DIAGNOSIS — F43.10 PTSD (POST-TRAUMATIC STRESS DISORDER): ICD-10-CM

## 2020-06-25 DIAGNOSIS — F41.1 GAD (GENERALIZED ANXIETY DISORDER): ICD-10-CM

## 2020-06-25 LAB
ALBUMIN SERPL-MCNC: 3.5 G/DL (ref 3.5–5)
ALT SERPL W P-5'-P-CCNC: 20 U/L (ref 0–45)
CREAT SERPL-MCNC: 0.76 MG/DL (ref 0.6–1.1)
ERYTHROCYTE [DISTWIDTH] IN BLOOD BY AUTOMATED COUNT: 13.8 % (ref 11–14.5)
GFR SERPL CREATININE-BSD FRML MDRD: >60 ML/MIN/1.73M2
HCT VFR BLD AUTO: 39.9 % (ref 35–47)
HGB BLD-MCNC: 13.4 G/DL (ref 12–16)
MCH RBC QN AUTO: 27.7 PG (ref 27–34)
MCHC RBC AUTO-ENTMCNC: 33.6 G/DL (ref 32–36)
MCV RBC AUTO: 82 FL (ref 80–100)
PLATELET # BLD AUTO: 312 THOU/UL (ref 140–440)
PMV BLD AUTO: 8.3 FL (ref 7–10)
RBC # BLD AUTO: 4.84 MILL/UL (ref 3.8–5.4)
WBC: 8.8 THOU/UL (ref 4–11)

## 2020-07-01 ENCOUNTER — OFFICE VISIT - HEALTHEAST (OUTPATIENT)
Dept: BEHAVIORAL HEALTH | Facility: CLINIC | Age: 55
End: 2020-07-01

## 2020-07-01 DIAGNOSIS — F33.1 MAJOR DEPRESSIVE DISORDER, RECURRENT EPISODE, MODERATE (H): ICD-10-CM

## 2020-07-02 ENCOUNTER — OFFICE VISIT - HEALTHEAST (OUTPATIENT)
Dept: RHEUMATOLOGY | Facility: CLINIC | Age: 55
End: 2020-07-02

## 2020-07-02 DIAGNOSIS — R76.8 CYCLIC CITRULLINATED PEPTIDE (CCP) ANTIBODY POSITIVE: ICD-10-CM

## 2020-07-02 DIAGNOSIS — M05.79 SEROPOSITIVE RHEUMATOID ARTHRITIS OF MULTIPLE SITES (H): ICD-10-CM

## 2020-07-08 ENCOUNTER — COMMUNICATION - HEALTHEAST (OUTPATIENT)
Dept: FAMILY MEDICINE | Facility: CLINIC | Age: 55
End: 2020-07-08

## 2020-07-08 DIAGNOSIS — E55.9 VITAMIN D DEFICIENCY: ICD-10-CM

## 2020-07-16 ENCOUNTER — OFFICE VISIT - HEALTHEAST (OUTPATIENT)
Dept: BEHAVIORAL HEALTH | Facility: CLINIC | Age: 55
End: 2020-07-16

## 2020-07-16 ENCOUNTER — COMMUNICATION - HEALTHEAST (OUTPATIENT)
Dept: BEHAVIORAL HEALTH | Facility: HOSPITAL | Age: 55
End: 2020-07-16

## 2020-07-16 ENCOUNTER — AMBULATORY - HEALTHEAST (OUTPATIENT)
Dept: BEHAVIORAL HEALTH | Facility: CLINIC | Age: 55
End: 2020-07-16

## 2020-07-16 DIAGNOSIS — F43.10 PTSD (POST-TRAUMATIC STRESS DISORDER): ICD-10-CM

## 2020-07-16 DIAGNOSIS — F41.1 GAD (GENERALIZED ANXIETY DISORDER): ICD-10-CM

## 2020-07-16 DIAGNOSIS — F33.1 MAJOR DEPRESSIVE DISORDER, RECURRENT EPISODE, MODERATE (H): ICD-10-CM

## 2020-07-23 ENCOUNTER — OFFICE VISIT - HEALTHEAST (OUTPATIENT)
Dept: BEHAVIORAL HEALTH | Facility: CLINIC | Age: 55
End: 2020-07-23

## 2020-07-23 DIAGNOSIS — F43.10 PTSD (POST-TRAUMATIC STRESS DISORDER): ICD-10-CM

## 2020-07-23 DIAGNOSIS — F33.1 MAJOR DEPRESSIVE DISORDER, RECURRENT EPISODE, MODERATE (H): ICD-10-CM

## 2020-07-23 DIAGNOSIS — F41.1 GAD (GENERALIZED ANXIETY DISORDER): ICD-10-CM

## 2020-08-04 ENCOUNTER — OFFICE VISIT - HEALTHEAST (OUTPATIENT)
Dept: BEHAVIORAL HEALTH | Facility: CLINIC | Age: 55
End: 2020-08-04

## 2020-08-04 DIAGNOSIS — F41.1 GAD (GENERALIZED ANXIETY DISORDER): ICD-10-CM

## 2020-08-04 DIAGNOSIS — F43.10 PTSD (POST-TRAUMATIC STRESS DISORDER): ICD-10-CM

## 2020-08-04 DIAGNOSIS — F33.1 MAJOR DEPRESSIVE DISORDER, RECURRENT EPISODE, MODERATE (H): ICD-10-CM

## 2020-08-14 ENCOUNTER — OFFICE VISIT - HEALTHEAST (OUTPATIENT)
Dept: BEHAVIORAL HEALTH | Facility: CLINIC | Age: 55
End: 2020-08-14

## 2020-08-14 DIAGNOSIS — F41.1 GAD (GENERALIZED ANXIETY DISORDER): ICD-10-CM

## 2020-08-14 DIAGNOSIS — F43.10 PTSD (POST-TRAUMATIC STRESS DISORDER): ICD-10-CM

## 2020-08-14 DIAGNOSIS — F33.1 MAJOR DEPRESSIVE DISORDER, RECURRENT EPISODE, MODERATE (H): ICD-10-CM

## 2020-08-21 ENCOUNTER — OFFICE VISIT - HEALTHEAST (OUTPATIENT)
Dept: BEHAVIORAL HEALTH | Facility: CLINIC | Age: 55
End: 2020-08-21

## 2020-08-21 DIAGNOSIS — F33.1 MAJOR DEPRESSIVE DISORDER, RECURRENT EPISODE, MODERATE (H): ICD-10-CM

## 2020-08-21 DIAGNOSIS — F43.10 PTSD (POST-TRAUMATIC STRESS DISORDER): ICD-10-CM

## 2020-08-21 DIAGNOSIS — F41.1 GAD (GENERALIZED ANXIETY DISORDER): ICD-10-CM

## 2020-08-28 ENCOUNTER — OFFICE VISIT - HEALTHEAST (OUTPATIENT)
Dept: BEHAVIORAL HEALTH | Facility: CLINIC | Age: 55
End: 2020-08-28

## 2020-08-28 DIAGNOSIS — F33.1 MAJOR DEPRESSIVE DISORDER, RECURRENT EPISODE, MODERATE (H): ICD-10-CM

## 2020-08-28 DIAGNOSIS — F43.10 PTSD (POST-TRAUMATIC STRESS DISORDER): ICD-10-CM

## 2020-08-28 DIAGNOSIS — F41.1 GAD (GENERALIZED ANXIETY DISORDER): ICD-10-CM

## 2020-09-04 ENCOUNTER — OFFICE VISIT - HEALTHEAST (OUTPATIENT)
Dept: BEHAVIORAL HEALTH | Facility: CLINIC | Age: 55
End: 2020-09-04

## 2020-09-04 DIAGNOSIS — F33.1 MAJOR DEPRESSIVE DISORDER, RECURRENT EPISODE, MODERATE (H): ICD-10-CM

## 2020-09-04 DIAGNOSIS — F41.1 GAD (GENERALIZED ANXIETY DISORDER): ICD-10-CM

## 2020-09-04 DIAGNOSIS — F43.10 PTSD (POST-TRAUMATIC STRESS DISORDER): ICD-10-CM

## 2020-09-11 ENCOUNTER — COMMUNICATION - HEALTHEAST (OUTPATIENT)
Dept: BEHAVIORAL HEALTH | Facility: CLINIC | Age: 55
End: 2020-09-11

## 2020-09-11 ENCOUNTER — OFFICE VISIT - HEALTHEAST (OUTPATIENT)
Dept: BEHAVIORAL HEALTH | Facility: CLINIC | Age: 55
End: 2020-09-11

## 2020-09-11 DIAGNOSIS — F43.10 PTSD (POST-TRAUMATIC STRESS DISORDER): ICD-10-CM

## 2020-09-11 DIAGNOSIS — F33.1 MAJOR DEPRESSIVE DISORDER, RECURRENT EPISODE, MODERATE (H): ICD-10-CM

## 2020-09-11 DIAGNOSIS — F41.1 GAD (GENERALIZED ANXIETY DISORDER): ICD-10-CM

## 2020-09-15 ENCOUNTER — COMMUNICATION - HEALTHEAST (OUTPATIENT)
Dept: CARDIOLOGY | Facility: CLINIC | Age: 55
End: 2020-09-15

## 2020-09-16 ENCOUNTER — OFFICE VISIT - HEALTHEAST (OUTPATIENT)
Dept: CARDIOLOGY | Facility: CLINIC | Age: 55
End: 2020-09-16

## 2020-09-16 DIAGNOSIS — G47.9 SLEEP DISORDER: ICD-10-CM

## 2020-09-16 DIAGNOSIS — I48.0 PAROXYSMAL ATRIAL FIBRILLATION (H): ICD-10-CM

## 2020-09-16 DIAGNOSIS — E66.9 OBESITY: ICD-10-CM

## 2020-09-16 ASSESSMENT — MIFFLIN-ST. JEOR: SCORE: 1912.9

## 2020-09-18 ENCOUNTER — OFFICE VISIT - HEALTHEAST (OUTPATIENT)
Dept: BEHAVIORAL HEALTH | Facility: CLINIC | Age: 55
End: 2020-09-18

## 2020-09-18 ENCOUNTER — COMMUNICATION - HEALTHEAST (OUTPATIENT)
Dept: BEHAVIORAL HEALTH | Facility: CLINIC | Age: 55
End: 2020-09-18

## 2020-09-18 DIAGNOSIS — F33.1 MAJOR DEPRESSIVE DISORDER, RECURRENT EPISODE, MODERATE (H): ICD-10-CM

## 2020-09-18 DIAGNOSIS — F43.10 PTSD (POST-TRAUMATIC STRESS DISORDER): ICD-10-CM

## 2020-09-18 DIAGNOSIS — F41.1 GAD (GENERALIZED ANXIETY DISORDER): ICD-10-CM

## 2020-09-25 ENCOUNTER — OFFICE VISIT - HEALTHEAST (OUTPATIENT)
Dept: BEHAVIORAL HEALTH | Facility: CLINIC | Age: 55
End: 2020-09-25

## 2020-09-25 DIAGNOSIS — F41.1 GAD (GENERALIZED ANXIETY DISORDER): ICD-10-CM

## 2020-09-25 DIAGNOSIS — F43.10 PTSD (POST-TRAUMATIC STRESS DISORDER): ICD-10-CM

## 2020-09-25 DIAGNOSIS — F33.1 MAJOR DEPRESSIVE DISORDER, RECURRENT EPISODE, MODERATE (H): ICD-10-CM

## 2020-10-02 ENCOUNTER — OFFICE VISIT - HEALTHEAST (OUTPATIENT)
Dept: BEHAVIORAL HEALTH | Facility: CLINIC | Age: 55
End: 2020-10-02

## 2020-10-02 DIAGNOSIS — F33.1 MAJOR DEPRESSIVE DISORDER, RECURRENT EPISODE, MODERATE (H): ICD-10-CM

## 2020-10-02 DIAGNOSIS — F43.10 PTSD (POST-TRAUMATIC STRESS DISORDER): ICD-10-CM

## 2020-10-02 DIAGNOSIS — F41.1 GAD (GENERALIZED ANXIETY DISORDER): ICD-10-CM

## 2020-10-05 ENCOUNTER — OFFICE VISIT - HEALTHEAST (OUTPATIENT)
Dept: RHEUMATOLOGY | Facility: CLINIC | Age: 55
End: 2020-10-05

## 2020-10-05 DIAGNOSIS — R76.8 CYCLIC CITRULLINATED PEPTIDE (CCP) ANTIBODY POSITIVE: ICD-10-CM

## 2020-10-05 DIAGNOSIS — M05.79 SEROPOSITIVE RHEUMATOID ARTHRITIS OF MULTIPLE SITES (H): ICD-10-CM

## 2020-10-05 DIAGNOSIS — E66.01 MORBID OBESITY (H): ICD-10-CM

## 2020-10-05 DIAGNOSIS — M35.00 SICCA SYNDROME (H): ICD-10-CM

## 2020-10-07 ENCOUNTER — HOSPITAL ENCOUNTER (OUTPATIENT)
Dept: CARDIOLOGY | Facility: HOSPITAL | Age: 55
Discharge: HOME | End: 2020-10-07
Attending: INTERNAL MEDICINE

## 2020-10-07 DIAGNOSIS — I48.0 PAROXYSMAL ATRIAL FIBRILLATION (H): ICD-10-CM

## 2020-10-07 LAB
AORTIC ROOT: 3.3 CM
AORTIC VALVE MEAN VELOCITY: 121 CM/S
AR DECEL SLOPE: 483 MM/S2
AR PEAK VELOCITY: 304 CM/S
ASCENDING AORTA: 3.2 CM
AV DIMENSIONLESS INDEX VTI: 0.8
AV MEAN GRADIENT: 7 MMHG
AV PEAK GRADIENT: 12.4 MMHG
AV REGURGITANT PEAK GRADIENT: 37 MMHG
AV REGURGITATION PRESSURE HALF TIME: 1845 MS
AV VALVE AREA: 2.3 CM2
BSA FOR ECHO PROCEDURE: 2.46 M2
CV BLOOD PRESSURE: ABNORMAL MMHG
CV ECHO HEIGHT: 68.5 IN
CV ECHO WEIGHT: 277 LBS
DOP CALC AO PEAK VEL: 176 CM/S
DOP CALC AO VTI: 28.5 CM
DOP CALC LVOT AREA: 2.83 CM2
DOP CALC LVOT DIAMETER: 1.9 CM
DOP CALC LVOT STROKE VOLUME: 64.6 CM3
DOP CALCLVOT PEAK VEL VTI: 22.8 CM
EJECTION FRACTION: 67 % (ref 55–75)
FRACTIONAL SHORTENING: 55.3 % (ref 28–44)
INTERVENTRICULAR SEPTUM IN END DIASTOLE: 1.1 CM (ref 0.6–0.9)
IVS/PW RATIO: 1
LA AREA 1: 21.2 CM2
LA AREA 2: 20.7 CM2
LEFT ATRIUM LENGTH: 6.12 CM
LEFT ATRIUM SIZE: 4 CM
LEFT ATRIUM VOLUME INDEX: 24.8 ML/M2
LEFT ATRIUM VOLUME: 61 ML
LEFT VENTRICLE CARDIAC INDEX: 2.2 L/MIN/M2
LEFT VENTRICLE CARDIAC OUTPUT: 5.4 L/MIN
LEFT VENTRICLE DIASTOLIC VOLUME INDEX: 57.3 CM3/M2 (ref 29–61)
LEFT VENTRICLE DIASTOLIC VOLUME: 141 CM3 (ref 46–106)
LEFT VENTRICLE HEART RATE: 83 BPM
LEFT VENTRICLE MASS INDEX: 76.2 G/M2
LEFT VENTRICLE SYSTOLIC VOLUME INDEX: 19.1 CM3/M2 (ref 8–24)
LEFT VENTRICLE SYSTOLIC VOLUME: 47 CM3 (ref 14–42)
LEFT VENTRICULAR INTERNAL DIMENSION IN DIASTOLE: 4.7 CM (ref 3.8–5.2)
LEFT VENTRICULAR INTERNAL DIMENSION IN SYSTOLE: 2.1 CM (ref 2.2–3.5)
LEFT VENTRICULAR MASS: 187.5 G
LEFT VENTRICULAR OUTFLOW TRACT MEAN GRADIENT: 4 MMHG
LEFT VENTRICULAR OUTFLOW TRACT MEAN VELOCITY: 90.2 CM/S
LEFT VENTRICULAR POSTERIOR WALL IN END DIASTOLE: 1.1 CM (ref 0.6–0.9)
LV STROKE VOLUME INDEX: 26.3 ML/M2
MITRAL VALVE E/A RATIO: 0.8
MV AVERAGE E/E' RATIO: 5.7 CM/S
MV DECELERATION TIME: 236 MS
MV E'TISSUE VEL-LAT: 11.1 CM/S
MV E'TISSUE VEL-MED: 7.12 CM/S
MV LATERAL E/E' RATIO: 4.7
MV MEDIAL E/E' RATIO: 7.3
MV PEAK A VELOCITY: 65.8 CM/S
MV PEAK E VELOCITY: 51.8 CM/S
NUC REST DIASTOLIC VOLUME INDEX: 4432 LBS
NUC REST SYSTOLIC VOLUME INDEX: 68.5 IN
TRICUSPID REGURGITATION PEAK PRESSURE GRADIENT: 36.2 MMHG
TRICUSPID VALVE PEAK REGURGITANT VELOCITY: 301 CM/S

## 2020-10-07 ASSESSMENT — MIFFLIN-ST. JEOR: SCORE: 1912.9

## 2020-10-09 ENCOUNTER — COMMUNICATION - HEALTHEAST (OUTPATIENT)
Dept: RHEUMATOLOGY | Facility: CLINIC | Age: 55
End: 2020-10-09

## 2020-10-09 ENCOUNTER — COMMUNICATION - HEALTHEAST (OUTPATIENT)
Dept: FAMILY MEDICINE | Facility: CLINIC | Age: 55
End: 2020-10-09

## 2020-10-09 ENCOUNTER — OFFICE VISIT - HEALTHEAST (OUTPATIENT)
Dept: BEHAVIORAL HEALTH | Facility: CLINIC | Age: 55
End: 2020-10-09

## 2020-10-09 DIAGNOSIS — F43.10 PTSD (POST-TRAUMATIC STRESS DISORDER): ICD-10-CM

## 2020-10-09 DIAGNOSIS — F33.1 MAJOR DEPRESSIVE DISORDER, RECURRENT EPISODE, MODERATE (H): ICD-10-CM

## 2020-10-09 DIAGNOSIS — L50.9 HIVES: ICD-10-CM

## 2020-10-09 DIAGNOSIS — F41.1 GAD (GENERALIZED ANXIETY DISORDER): ICD-10-CM

## 2020-10-14 ENCOUNTER — COMMUNICATION - HEALTHEAST (OUTPATIENT)
Dept: FAMILY MEDICINE | Facility: CLINIC | Age: 55
End: 2020-10-14

## 2020-10-14 DIAGNOSIS — Z91.030 BEE ALLERGY STATUS: ICD-10-CM

## 2020-10-16 ENCOUNTER — OFFICE VISIT - HEALTHEAST (OUTPATIENT)
Dept: BEHAVIORAL HEALTH | Facility: CLINIC | Age: 55
End: 2020-10-16

## 2020-10-16 ENCOUNTER — AMBULATORY - HEALTHEAST (OUTPATIENT)
Dept: BEHAVIORAL HEALTH | Facility: CLINIC | Age: 55
End: 2020-10-16

## 2020-10-16 DIAGNOSIS — F33.1 MAJOR DEPRESSIVE DISORDER, RECURRENT EPISODE, MODERATE (H): ICD-10-CM

## 2020-10-16 DIAGNOSIS — F43.10 PTSD (POST-TRAUMATIC STRESS DISORDER): ICD-10-CM

## 2020-10-16 DIAGNOSIS — F41.1 GAD (GENERALIZED ANXIETY DISORDER): ICD-10-CM

## 2020-10-19 RX ORDER — DILTIAZEM HYDROCHLORIDE 120 MG/1
240 TABLET, FILM COATED ORAL DAILY
COMMUNITY
End: 2022-01-20

## 2020-10-19 RX ORDER — LORATADINE 10 MG/1
10 TABLET ORAL DAILY
COMMUNITY
End: 2022-08-23

## 2020-10-19 RX ORDER — EPINEPHRINE 0.3 MG/.3ML
0.3 INJECTION SUBCUTANEOUS PRN
COMMUNITY
End: 2024-07-24

## 2020-10-21 ENCOUNTER — VIRTUAL VISIT (OUTPATIENT)
Dept: SLEEP MEDICINE | Facility: CLINIC | Age: 55
End: 2020-10-21
Payer: COMMERCIAL

## 2020-10-21 VITALS — BODY MASS INDEX: 35.55 KG/M2 | WEIGHT: 240 LBS | HEIGHT: 69 IN

## 2020-10-21 DIAGNOSIS — I48.0 PAROXYSMAL ATRIAL FIBRILLATION (H): ICD-10-CM

## 2020-10-21 DIAGNOSIS — G47.00 FREQUENT NOCTURNAL AWAKENING: ICD-10-CM

## 2020-10-21 DIAGNOSIS — R06.83 SNORING: ICD-10-CM

## 2020-10-21 DIAGNOSIS — G47.10 HYPERSOMNIA: Primary | ICD-10-CM

## 2020-10-21 PROCEDURE — 99202 OFFICE O/P NEW SF 15 MIN: CPT | Mod: 95 | Performed by: INTERNAL MEDICINE

## 2020-10-21 ASSESSMENT — MIFFLIN-ST. JEOR: SCORE: 1758.01

## 2020-10-21 NOTE — PATIENT INSTRUCTIONS
Your BMI is Body mass index is 35.44 kg/m .  Weight management is a personal decision.  If you are interested in exploring weight loss strategies, the following discussion covers the approaches that may be successful. Body mass index (BMI) is one way to tell whether you are at a healthy weight, overweight, or obese. It measures your weight in relation to your height.  A BMI of 18.5 to 24.9 is in the healthy range. A person with a BMI of 25 to 29.9 is considered overweight, and someone with a BMI of 30 or greater is considered obese. More than two-thirds of American adults are considered overweight or obese.  Being overweight or obese increases the risk for further weight gain. Excess weight may lead to heart disease and diabetes.  Creating and following plans for healthy eating and physical activity may help you improve your health.  Weight control is part of healthy lifestyle and includes exercise, emotional health, and healthy eating habits. Careful eating habits lifelong are the mainstay of weight control. Though there are significant health benefits from weight loss, long-term weight loss with diet alone may be very difficult to achieve- studies show long-term success with dietary management in less than 10% of people. Attaining a healthy weight may be especially difficult to achieve in those with severe obesity. In some cases, medications, devices and surgical management might be considered.  What can you do?  If you are overweight or obese and are interested in methods for weight loss, you should discuss this with your provider.     Consider reducing daily calorie intake by 500 calories.     Keep a food journal.     Avoiding skipping meals, consider cutting portions instead.    Diet combined with exercise helps maintain muscle while optimizing fat loss. Strength training is particularly important for building and maintaining muscle mass. Exercise helps reduce stress, increase energy, and improves fitness.  Increasing exercise without diet control, however, may not burn enough calories to loose weight.       Start walking three days a week 10-20 minutes at a time    Work towards walking thirty minutes five days a week     Eventually, increase the speed of your walking for 1-2 minutes at time    In addition, we recommend that you review healthy lifestyles and methods for weight loss available through the National Institutes of Health patient information sites:  http://win.niddk.nih.gov/publications/index.htm    And look into health and wellness programs that may be available through your health insurance provider, employer, local community center, or [employer] club.    Weight management plan: Patient was referred to their PCP to discuss a diet and exercise plan.    Patient education: What is a sleep study?     What is a sleep study? -- A sleep study is a test that measures how well you sleep and checks for sleep problems. For some sleep studies, you stay overnight in a sleep lab at a hospital or sleep center.     What happens during a sleep study? -- Before you go to sleep, a technician attaches small, sticky patches called  electrodes  to your head, chest, and legs. He or she will also place a small tube beneath your nose and might wrap 1 or 2 belts around your chest.   Each of these items has wires that connect to monitors. The monitors record your movement, brain activity, breathing, and other body functions while you sleep.  If you have a history of trouble falling asleep, your doctor might prescribe a medicine to help you fall asleep in the lab. If you have never taken the medicine before, your doctor might ask you take it on a night before your sleep study to see how it affects you.   Why might my doctor order a sleep study? -- Your doctor will order a sleep study if he or she thinks you have sleep apnea or a different condition that makes you:   ?Have sudden jerking leg movements while you sleep, called  periodic  limb movements.    ?Feel very sleepy during the day and fall asleep all of a sudden, called  narcolepsy.    ?Have trouble falling asleep or staying asleep over a long period of time, called  chronic insomnia.    ?Do odd things while you sleep, such as walking.  How should I prepare for a sleep study? -- On the day of your sleep study, you should:   ?Avoid alcohol   ?Avoid drinking coffee, tea, sodas, and other drinks that have caffeine in the afternoon and evening   ?Take all of your regular medicines     The cost of care estimate line is [phone]. They are able to give the patient an estimate of the charges and also an estimate of their insurance coverage/patient responsibility.   After your sleep study is performed, please call us at [phone] or [phone]  to schedule for a follow up to review the results of the sleep study.    Please bring one tab of low dose melatonin 3 mg or less to the night of the study.    Melatonin intake is completely voluntary.    You may take own melatonin after arrival to sleep center. Do not drive or operate machinery after intake of melatonin.

## 2020-10-21 NOTE — PROGRESS NOTES
"Alina Martell is a 55 year old female who is being evaluated via a billable video visit.      The patient has been notified of following:     \"This video visit will be conducted via a call between you and your physician/provider. We have found that certain health care needs can be provided without the need for an in-person physical exam.  This service lets us provide the care you need with a video conversation.  If a prescription is necessary we can send it directly to your pharmacy.  If lab work is needed we can place an order for that and you can then stop by our lab to have the test done at a later time.    Video visits are billed at different rates depending on your insurance coverage.  Please reach out to your insurance provider with any questions.    If during the course of the call the physician/provider feels a video visit is not appropriate, you will not be charged for this service.\"    Patient has given verbal consent for Video visit? Yes  How would you like to obtain your AVS? MyChart  If you are dropped from the video visit, the video invite should be resent to: Text to cell phone: [phone]  Will anyone else be joining your video visit? No      Video-Visit Details    Type of service:  Video Visit    Originating Location (pt. Location): Home    Distant Location (provider location):  [facility]     Platform used for Video Visit: Deena    I spent a total of 26 minutes of face-to-face encounter and in preparation for this clinic visit.    Thank you for the opportunity to participate in the care of  Alina Martell.    Assessment and Plan:    In summary Alina Martell is a 55 year old year old female here for sleep disturbance.  1. Hypersomnia/Snoring/Frequent nocturnal awakening/History of atrial fibrillation   Alina Martell has high risk for obstructive sleep apnea based on the history of hypersomnia, snoring and a crowded airway. I " educated the patient on the underlying pathophysiology of obstructive sleep apnea. We reviewed the risks associated with sleep apnea, including increased cardiovascular risk and overall death. We talked about treatments briefly. I recommend getting  an baseline nocturnal polysomnography. The patient should return to the clinic to discuss results and treatment option in a patient-centered approach. Will also add TCM.    History of present illness:    She is a 55 year old female who comes to the [facility] clinic with a chief complaint of excessive daytime sleepiness that has been going on for approximately 10 years.  While the patient denies any episodes of witnessed apnea, she has been told that she does have loud snoring during sleep.  She also complains of frequent nocturnal awakening and sometimes violent awakening if aroused from sleep.  In September of this year, she was evaluated by cardiology secondary to paroxysmal atrial fibrillation with rapid ventricular response.  She had to undergo cardioversion.  She was strongly advised by cardiology to get ruled out for obstructive sleep apnea.  The patient's review of systems is otherwise negative.     Ideal Sleep-Wake Cycle(devoid of societal pressure):    Patient would try to initiate sleep at around 11 PM with a sleep latency of 15 minutes. The patient would have 3-5 awakenings. Final wake up time is around 7 AM.    Patient told to return in one week after the sleep study is interpreted.    Past Medical History  Past Medical History:   Diagnosis Date     Carpal tunnel syndrome      Paroxysmal atrial fibrillation (H)      PTSD (post-traumatic stress disorder)      RA (rheumatoid arthritis) (H)         Past Surgical History  Past Surgical History:   Procedure Laterality Date     CHOLECYSTECTOMY       LAPAROSCOPIC TUBAL LIGATION       RELEASE CARPAL TUNNEL BILATERAL          Meds  Current Outpatient Medications   Medication Sig Dispense Refill     adalimumab (HUMIRA)  40 MG/0.8ML prefilled syringe kit Inject 40 mg Subcutaneous every 14 days       diltiazem (CARDIZEM) 120 MG tablet Take 120 mg by mouth 4 times daily       EPINEPHrine (ANY BX GENERIC EQUIV) 0.3 MG/0.3ML injection 2-pack Inject 0.3 mg into the muscle as needed for anaphylaxis       loratadine (CLARITIN) 10 MG tablet Take 10 mg by mouth daily       guanFACINE (INTUNIV) 1 MG TB24 24 hr tablet TAKE 1 TABLET (1 MG TOTAL) BY MOUTH EVERY MORNING.  0     hydroxychloroquine (PLAQUENIL) 200 MG tablet Take 200 mg by mouth daily       HydrOXYzine Pamoate (VISTARIL PO)        LEFLUNOMIDE PO        naproxen (NAPROSYN) 500 MG tablet TAKE 1 TABLET BY MOUTH TWICE A DAY WITH MEALS  0     PARoxetine (PAXIL) 40 MG tablet Take 1 tablet (40 mg) by mouth daily (Patient not taking: Reported on 10/31/2019) 30 tablet 1     predniSONE (DELTASONE) 20 MG tablet 20 mg, two pills orally every AM for seven days (Patient not taking: Reported on 10/19/2020) 14 tablet 0     traZODone (DESYREL) 50 MG tablet Take 1 tablet (50 mg) by mouth nightly as needed for sleep (Patient not taking: Reported on 10/31/2019) 90 tablet 1        Allergies  Shellfish-derived products and Sulfa drugs     Social History  Social History     Socioeconomic History     Marital status: Single     Spouse name: Not on file     Number of children: Not on file     Years of education: Not on file     Highest education level: Not on file   Occupational History     Not on file   Social Needs     Financial resource strain: Not on file     Food insecurity     Worry: Not on file     Inability: Not on file     Transportation needs     Medical: Not on file     Non-medical: Not on file   Tobacco Use     Smoking status: Never Smoker     Smokeless tobacco: Never Used   Substance and Sexual Activity     Alcohol use: Yes     Comment: rare/once a month     Drug use: Yes     Types: Marijuana     Comment: Oils     Sexual activity: Not on file   Lifestyle     Physical activity     Days per week:  Not on file     Minutes per session: Not on file     Stress: Not on file   Relationships     Social connections     Talks on phone: Not on file     Gets together: Not on file     Attends [employer] service: Not on file     Active member of club or organization: Not on file     Attends meetings of clubs or organizations: Not on file     Relationship status: Not on file     Intimate partner violence     Fear of current or ex partner: Not on file     Emotionally abused: Not on file     Physically abused: Not on file     Forced sexual activity: Not on file   Other Topics Concern     Parent/sibling w/ CABG, MI or angioplasty before 65F 55M? Not Asked   Social History Narrative     Not on file        Family History  Family History   Problem Relation Age of Onset     Crohn's Disease Mother      Psoriasis Brother      Snoring Brother      Snoring Father        Review of Systems:  Constitutional: Negative except as noted in HPI.   Eyes: Negative except as noted in HPI.   ENT: Negative except as noted in HPI.   Cardiovascular: Negative except as noted in HPI.   Respiratory: Negative except as noted in HPI.   Gastrointestinal: Negative except as noted in HPI.   Genitourinary: Negative except as noted in HPI.   Musculoskeletal: Negative except as noted in HPI.   Integumentary: Negative except as noted in HPI.   Neurological: Negative except as noted in HPI.   Psychiatric: Negative except as noted in HPI.   Endocrine: Negative except as noted in HPI.   Hematologic/Lymphatic: Negative except as noted in HPI.      Electrophysiology Latest Ref Rng & Units 11/14/2018   Sodium 133 - 144 mmol/L 142   Potassium 3.4 - 5.3 mmol/L 4.0   Chloride 94 - 109 mmol/L 106   CO2 20 - 32 mmol/L 30   Anion Gap 3 - 14 mmol/L 6   Glucose 70 - 99 mg/dL 91   BUN 7 - 30 mg/dL 12   Creatinine 0.52 - 1.04 mg/dL 0.62   Calcium 8.5 - 10.1 mg/dL 8.9   Cholesterol <200 mg/dL 138   LDL <100 mg/dL 78   HDL >49 mg/dL 49(L)   NHDL <130 mg/dL 89   Triglycerides  "<150 mg/dL 54   WBC 4.0 - 11.0 10e9/L 6.5   RBC 3.8 - 5.2 10e12/L 4.31   Hemoglobin 11.7 - 15.7 g/dL 10.8(L)   Hematocrit 35.0 - 47.0 % 35.6   MCV 78 - 100 fl 83   MCHC 31.5 - 36.5 g/dL 30.3(L)   RDW 10.0 - 15.0 % 14.4   Platelets 150 - 450 10e9/L 286   Albumin 3.4 - 5.0 g/dL 2.8(L)   ALT 0 - 50 U/L 13   AST 0 - 45 U/L 12   Total Bilirubin 0.2 - 1.3 mg/dL 0.3   Alkaline Phosphatase 40 - 150 U/L 83   Total Protein 6.8 - 8.8 g/dL 7.0   TSH 0.40 - 4.00 mU/L 2.66     No flowsheet data found.  No flowsheet data found.    Physical Exam:  Ht 1.753 m (5' 9\")   Wt 108.9 kg (240 lb)   BMI 35.44 kg/m    BMI:Body mass index is 35.44 kg/m .   GEN: NAD,   Head: Normocephalic.  EYES: EOMI  ENT: Oropharynx is clear, Krishnan class 4+ airway.   Neurological: Alert, oriented to time, place, and person.  Psych: normal mood, normal affect     Labs/Studies:     No results found for: PH, PHARTERIAL, PO2, [id number], SAT, PCO2, HCO3, BASEEXCESS, BRONSON, BEB  Lab Results   Component Value Date    TSH 2.66 11/14/2018     Lab Results   Component Value Date    GLC 91 11/14/2018     Lab Results   Component Value Date    HGB 10.8 (L) 11/14/2018     Lab Results   Component Value Date    BUN 12 11/14/2018    CR 0.62 11/14/2018     Lab Results   Component Value Date    AST 12 11/14/2018    ALT 13 11/14/2018    ALKPHOS 83 11/14/2018    BILITOTAL 0.3 11/14/2018     Lab Results   Component Value Date    UAMP Negative 11/13/2018    UBARB Negative 11/13/2018    BENZODIAZEUR Negative 11/13/2018    UCANN Positive 11/13/2018    UCOC Negative 11/13/2018    OPIT Negative 11/13/2018       No components found for: FERRITIN      Patient verbalized understanding of these issues, agrees with the plan and all questions were answered today. Patient was given an opportuntity to voice any other symptoms or concerns not listed above. Patient did not have any other symptoms or concerns.        Gucci Swann DO  Board Certified in Internal Medicine and Sleep " Medicine    (Note created with Dragon voice recognition and unintended spelling errors and word substitutions may occur)

## 2020-10-23 ENCOUNTER — OFFICE VISIT - HEALTHEAST (OUTPATIENT)
Dept: BEHAVIORAL HEALTH | Facility: CLINIC | Age: 55
End: 2020-10-23

## 2020-10-23 DIAGNOSIS — F33.1 MAJOR DEPRESSIVE DISORDER, RECURRENT EPISODE, MODERATE (H): ICD-10-CM

## 2020-10-23 DIAGNOSIS — F41.1 GAD (GENERALIZED ANXIETY DISORDER): ICD-10-CM

## 2020-10-23 DIAGNOSIS — F43.10 PTSD (POST-TRAUMATIC STRESS DISORDER): ICD-10-CM

## 2020-10-30 ENCOUNTER — OFFICE VISIT - HEALTHEAST (OUTPATIENT)
Dept: BEHAVIORAL HEALTH | Facility: CLINIC | Age: 55
End: 2020-10-30

## 2020-10-30 DIAGNOSIS — F41.1 GAD (GENERALIZED ANXIETY DISORDER): ICD-10-CM

## 2020-10-30 DIAGNOSIS — F33.1 MAJOR DEPRESSIVE DISORDER, RECURRENT EPISODE, MODERATE (H): ICD-10-CM

## 2020-10-30 DIAGNOSIS — F43.10 PTSD (POST-TRAUMATIC STRESS DISORDER): ICD-10-CM

## 2020-11-05 ENCOUNTER — TELEPHONE (OUTPATIENT)
Dept: SLEEP MEDICINE | Facility: CLINIC | Age: 55
End: 2020-11-05

## 2020-11-05 ENCOUNTER — THERAPY VISIT (OUTPATIENT)
Dept: SLEEP MEDICINE | Facility: CLINIC | Age: 55
End: 2020-11-05
Payer: COMMERCIAL

## 2020-11-05 DIAGNOSIS — I48.0 PAROXYSMAL ATRIAL FIBRILLATION (H): ICD-10-CM

## 2020-11-05 DIAGNOSIS — G47.10 HYPERSOMNIA: ICD-10-CM

## 2020-11-05 DIAGNOSIS — G47.00 FREQUENT NOCTURNAL AWAKENING: ICD-10-CM

## 2020-11-05 DIAGNOSIS — R06.83 SNORING: ICD-10-CM

## 2020-11-05 PROCEDURE — 95810 POLYSOM 6/> YRS 4/> PARAM: CPT | Performed by: INTERNAL MEDICINE

## 2020-11-05 NOTE — TELEPHONE ENCOUNTER
Pt is scheduled for PSG on 11/8/2020.  PSG is ordered with TCM.  Voicemail was left for pt to let her know that we do not have that equipment available for that night and offer to reschedule her to our [place] location for 11/5/2020.

## 2020-11-06 ENCOUNTER — TELEPHONE (OUTPATIENT)
Dept: SLEEP MEDICINE | Facility: CLINIC | Age: 55
End: 2020-11-06

## 2020-11-06 ENCOUNTER — OFFICE VISIT - HEALTHEAST (OUTPATIENT)
Dept: BEHAVIORAL HEALTH | Facility: CLINIC | Age: 55
End: 2020-11-06

## 2020-11-06 DIAGNOSIS — F41.1 GAD (GENERALIZED ANXIETY DISORDER): ICD-10-CM

## 2020-11-06 DIAGNOSIS — F43.10 PTSD (POST-TRAUMATIC STRESS DISORDER): ICD-10-CM

## 2020-11-06 DIAGNOSIS — G47.33 OBSTRUCTIVE SLEEP APNEA: Primary | ICD-10-CM

## 2020-11-06 DIAGNOSIS — G47.34 SLEEP RELATED HYPOXIA: ICD-10-CM

## 2020-11-06 DIAGNOSIS — F33.1 MAJOR DEPRESSIVE DISORDER, RECURRENT EPISODE, MODERATE (H): ICD-10-CM

## 2020-11-06 LAB — SLPCOMP: NORMAL

## 2020-11-06 NOTE — TELEPHONE ENCOUNTER
The overnight polysomnography was reviewed.   The patient had severe obstructive sleep apnea.    I have called the patient and informed her of the results. I offered the patient the option of getting and in lab titration study or getting started on pressure therapy and the patient wanted to proceed with the latter. The patient would like to use the DME below.    The prescription is in the chart.    Contact information for [employer] company:    [employer] Tel: [phone]      Thank you.

## 2020-11-13 ENCOUNTER — OFFICE VISIT - HEALTHEAST (OUTPATIENT)
Dept: BEHAVIORAL HEALTH | Facility: CLINIC | Age: 55
End: 2020-11-13

## 2020-11-13 DIAGNOSIS — F43.10 PTSD (POST-TRAUMATIC STRESS DISORDER): ICD-10-CM

## 2020-11-13 DIAGNOSIS — F41.1 GAD (GENERALIZED ANXIETY DISORDER): ICD-10-CM

## 2020-11-13 DIAGNOSIS — F33.1 MAJOR DEPRESSIVE DISORDER, RECURRENT EPISODE, MODERATE (H): ICD-10-CM

## 2020-11-17 ENCOUNTER — VIRTUAL VISIT (OUTPATIENT)
Dept: FAMILY MEDICINE | Facility: OTHER | Age: 55
End: 2020-11-17

## 2020-11-17 NOTE — PROGRESS NOTES
"Date: 2020 14:35:54  Clinician: Debbie King  Clinician NPI: [id number]  Patient: Alina Martell  Patient : 1965  Patient Address: [address] [place] [address]  Patient Phone: [phone]  Visit Protocol: URI  Patient Summary:  Alina is a 55 year old ( : 1965 ) female who initiated a [facility] Visit for COVID-19 (Coronavirus) evaluation and screening. When asked the question \"Please sign me up to receive news, health information and promotions. \", Alina responded \"No\".    Alina states her symptoms started 1-2 days ago.   Her symptoms consist of facial pain or pressure, myalgia, malaise, a sore throat, tooth pain, diarrhea, ear pain, a headache, wheezing, a cough, and nausea. She is experiencing mild difficulty breathing with activities but can speak normally in full sentences.   Symptom details     Cough: Alina coughs a few times an hour and her cough is not more bothersome at night. Phlegm comes into her throat when she coughs. She does not believe her cough is caused by post-nasal drip. The color of the phlegm is yellow.     Sore throat: Alina reports having mild throat pain (1-3 on a 10 point pain scale), does not have exudate on her tonsils, and can swallow liquids. The lymph nodes in her neck are not enlarged. A rash has not appeared on the skin since the sore throat started.     Wheezing: Alina has not ever been diagnosed with asthma. Additional wheezing details as reported by the patient (free text): When I breathe I have been wheezing even when I sit down.  When I cough it stops       Facial pain or pressure: The facial pain or pressure feels worse when bending over or leaning forward.     Headache: She states the headache is moderate (4-6 on a 10 point pain scale).     Tooth pain: The tooth pain is not caused by a cavity, recent dental work, or other mouth problems.      Alina denies having vomiting, rhinitis, chills, ageusia, fever, enlarged lymph nodes, " nasal congestion, and anosmia. She also denies taking antibiotic medication in the past month and having recent facial or sinus surgery in the past 60 days.   Precipitating events  Within the past week, Alina has not been exposed to someone with strep throat. She has not recently been exposed to someone with influenza. Alina has been in close contact with the following high risk individuals: adults 65 or older.   Pertinent COVID-19 (Coronavirus) information  Alina does not work or volunteer as healthcare worker or a . In the past 14 days, Alina has not worked or volunteered at a healthcare facility or group living setting.   In the past 14 days, she also has not lived in a congregate living setting.   Alina has not had a close contact with a laboratory-confirmed COVID-19 patient within 14 days of symptom onset.    Since December 2019, Alina has been tested for COVID-19 and has not had upper respiratory infection or influenza-like illness.      Result of COVID-19 test: Negative     Date of her COVID-19 test: 06/06/2020      Pertinent medical history  lAina does not get yeast infections when she takes antibiotics.   Alina does not need a return to work/school note.   Weight: 250 lbs   Alina does not smoke or use smokeless tobacco.   Weight: 250 lbs    MEDICATIONS: Humira Pen subcutaneous, diltiazem oral, ALLERGIES: Humira  Clinician Response:  Dear Alina,   Your symptoms show that you may have coronavirus (COVID-19). This illness can cause fever, cough and trouble breathing. Many people get a mild case and get better on their own. Some people can get very sick.  Based on the symptoms you have shared, I would like you to be re-checked in 2 to 3 days. Please call your family clinic to set up a video or phone visit.  Will I be tested for COVID-19?  We would like to test you for this virus.   Please call [phone] to schedule your visit. Explain that you were referred by  "[facility] to have a COVID-19 test. Be ready to share your [facility] visit ID number.   * If you need to schedule in [place] or [facility] please call [phone] or for [employer] employees please call [phone].    The following will serve as your written order for this COVID Test, ordered by me, for the indication of suspected COVID [Z20.828]: The test will be ordered in [employer], our electronic health record, after you are scheduled. It will show as ordered and authorized by Vernon Ramirez MD.  Order: COVID-19 (Coronavirus) PCR for SYMPTOMATIC testing from [facility].   1.When it's time for your COVID test:   Stay at least 6 feet away from others. (If someone will drive you to your test, stay in the backseat, as far away from the  as you can.)   Cover your mouth and nose with a mask, tissue or washcloth.  Go straight to the testing site. Don't make any stops on the way there or back.      2.Starting now: Stay home and away from others (self-isolate) until:   You've had no fever---and no medicine that reduces fever---for one full day (24 hours). And...   Your other symptoms have gotten better. For example, your cough or breathing has improved. And...   At least 10 days have passed since your symptoms started.       During this time, don't leave the house except for testing or medical care.   Stay in your own room, even for meals. Use your own bathroom if you can.   Stay away from others in your home. No hugging, kissing or shaking hands. No visitors.  Don't go to work, school or anywhere else.    Clean \"high touch\" surfaces often (doorknobs, counters, handles, etc.). Use a household cleaning spray or wipes. You'll find a full list of  on the EPA website: www.epa.gov/pesticide-registration/list-n-disinfectants-use-against-sars-cov-2.   Cover your mouth and nose with a mask, tissue or washcloth to avoid spreading germs.  Wash your hands and face often. Use soap and water.  Caregivers in these groups are at risk " for severe illness due to COVID-19:  o People 65 years and older  o People who live in a nursing home or long-term care facility  o People with chronic disease (lung, heart, cancer, diabetes, kidney, liver, immunologic)   o People who have a weakened immune system, including those who:   Are in cancer treatment  Take medicine that weakens the immune system, such as corticosteroids  Had a bone marrow or organ transplant  Have an immune deficiency  Have poorly controlled HIV or AIDS  Are obese (body mass index of 40 or higher)  Smoke regularly   o Caregivers should wear gloves while washing dishes, handling laundry and cleaning bedrooms and bathrooms.  o Use caution when washing and drying laundry: Don't shake dirty laundry, and use the warmest water setting that you can.  o For more tips, go to www.cdc.gov/coronavirus/2019-ncov/downloads/10Things.pdf.      How can I take care of myself?   Get lots of rest. Drink extra fluids (unless a doctor has told you not to)   Take Tylenol (acetaminophen) for fever or pain. If you have liver or kidney problems, ask your family doctor if it's okay to take Tylenol.   Adults can take either:    650 mg (two 325 mg pills) every 4 to 6 hours, or...   1,000 mg (two 500 mg pills) every 8 hours as needed.    Note: Don't take more than 3,000 mg in one day. Acetaminophen is found in many medicines (both prescribed and over-the-counter medicines). Read all labels to be sure you don't take too much.   For children, check the Tylenol bottle for the right dose. The dose is based on the child's age or weight.    If you have other health problems (like cancer, heart failure, an organ transplant or severe kidney disease): Call your specialty clinic if you don't feel better in the next 2 days.       Know when to call 911. Emergency warning signs include:    Trouble breathing or shortness of breath Pain or pressure in the chest that doesn't go away Feeling confused like you haven't felt before, or  not being able to wake up Bluish-colored lips or face  Where can I get more information?   [facility] -- About COVID-19: [email or web address]   CDC -- What to Do If You're Sick: www.cdc.gov/coronavirus/2019-ncov/about/steps-when-sick.html   [facility] -- Ending Home Isolation: www.cdc.gov/coronavirus/2019-ncov/hcp/disposition-in-home-patients.html   [facility] -- Caring for Someone: www.cdc.gov/coronavirus/2019-ncov/if-you-are-sick/care-for-someone.html   [facility] -- Interim Guidance for Hospital Discharge to Home: www.[facility].[place]./diseases/coronavirus/hcp/hospdischarge.pdf   [facility] clinical trials (COVID-19 research studies): clinicalaffairs.[facility]-clinical-trials    Below are the COVID-19 hotlines at the [place] Department of Health ([facility]). Interpreters are available.    For health questions: Call 636-043-8557 or 1-641.425.9155 (7 a.m. to 7 p.m.) For questions about schools and childcare: Call 260-819-9519 or 1-775.813.3230 (7 a.m. to 7 p.m.)       Diagnosis: Contact with and (suspected) exposure to other viral communicable diseases  Diagnosis ICD: Z20.828

## 2020-11-18 ENCOUNTER — TELEPHONE (OUTPATIENT)
Dept: SLEEP MEDICINE | Facility: CLINIC | Age: 55
End: 2020-11-18

## 2020-11-18 ENCOUNTER — AMBULATORY - HEALTHEAST (OUTPATIENT)
Dept: FAMILY MEDICINE | Facility: CLINIC | Age: 55
End: 2020-11-18

## 2020-11-18 DIAGNOSIS — Z20.822 SUSPECTED COVID-19 VIRUS INFECTION: ICD-10-CM

## 2020-11-18 NOTE — TELEPHONE ENCOUNTER
The patient was found to have severe obstructive sleep apnea with borderline hypoventilation/hypercarbia.    I was recently informed that we would not be able to get in lab titration study due to worsening pandemic. I suspect that she may do better with BPAP due to borderline hypercarbia found on the night of the sleep study.

## 2020-11-19 ENCOUNTER — AMBULATORY - HEALTHEAST (OUTPATIENT)
Dept: FAMILY MEDICINE | Facility: CLINIC | Age: 55
End: 2020-11-19

## 2020-11-19 DIAGNOSIS — Z20.822 SUSPECTED COVID-19 VIRUS INFECTION: ICD-10-CM

## 2020-11-20 ENCOUNTER — OFFICE VISIT - HEALTHEAST (OUTPATIENT)
Dept: BEHAVIORAL HEALTH | Facility: CLINIC | Age: 55
End: 2020-11-20

## 2020-11-20 ENCOUNTER — COMMUNICATION - HEALTHEAST (OUTPATIENT)
Dept: SCHEDULING | Facility: CLINIC | Age: 55
End: 2020-11-20

## 2020-11-20 DIAGNOSIS — F43.10 PTSD (POST-TRAUMATIC STRESS DISORDER): ICD-10-CM

## 2020-11-20 DIAGNOSIS — F33.1 MAJOR DEPRESSIVE DISORDER, RECURRENT EPISODE, MODERATE (H): ICD-10-CM

## 2020-11-20 DIAGNOSIS — F41.1 GAD (GENERALIZED ANXIETY DISORDER): ICD-10-CM

## 2020-11-27 ENCOUNTER — OFFICE VISIT - HEALTHEAST (OUTPATIENT)
Dept: BEHAVIORAL HEALTH | Facility: CLINIC | Age: 55
End: 2020-11-27

## 2020-11-27 DIAGNOSIS — F43.10 PTSD (POST-TRAUMATIC STRESS DISORDER): ICD-10-CM

## 2020-11-27 DIAGNOSIS — F41.1 GAD (GENERALIZED ANXIETY DISORDER): ICD-10-CM

## 2020-11-27 DIAGNOSIS — F33.1 MAJOR DEPRESSIVE DISORDER, RECURRENT EPISODE, MODERATE (H): ICD-10-CM

## 2020-12-03 ENCOUNTER — TELEPHONE (OUTPATIENT)
Dept: SLEEP MEDICINE | Facility: CLINIC | Age: 55
End: 2020-12-03

## 2020-12-03 NOTE — TELEPHONE ENCOUNTER
CALLED PT TO HAVE A VI INSTRUCT APPT OVER THE PHONE REGARDING THE BI-PAP MACHINE. REQUESTED THAT THE PT CALL [facility] TO COMPLETE [phone]

## 2020-12-04 ENCOUNTER — DOCUMENTATION ONLY (OUTPATIENT)
Dept: SLEEP MEDICINE | Facility: CLINIC | Age: 55
End: 2020-12-04
Payer: COMMERCIAL

## 2020-12-04 ENCOUNTER — OFFICE VISIT - HEALTHEAST (OUTPATIENT)
Dept: BEHAVIORAL HEALTH | Facility: CLINIC | Age: 55
End: 2020-12-04

## 2020-12-04 DIAGNOSIS — F43.10 PTSD (POST-TRAUMATIC STRESS DISORDER): ICD-10-CM

## 2020-12-04 DIAGNOSIS — F41.1 GAD (GENERALIZED ANXIETY DISORDER): ICD-10-CM

## 2020-12-04 DIAGNOSIS — F33.1 MAJOR DEPRESSIVE DISORDER, RECURRENT EPISODE, MODERATE (H): ICD-10-CM

## 2020-12-04 NOTE — PROGRESS NOTES
Patient was offered choice of vendor and chose [facility].  Patient Alina Martell was set up at virtual set up via telephone visit on December 4, 2020. Patient received a [employer]. Pressures were set at EPAP:5 IPAP:20 PS:MIN 4 PS MAX: 6 cm H2O.   Patient s ramp is 5 cm H2O for 45 min and FLEX/EPR is BI- Flex, 2.  Patient received a Resmed Mask name: [id number]  Nasal mask size Small, heated tubing and heated humidifier.  Patient does need to meet compliance. Patient has a follow up on tbd with Dr. Swann.    Dimple Ma

## 2020-12-07 ENCOUNTER — DOCUMENTATION ONLY (OUTPATIENT)
Dept: SLEEP MEDICINE | Facility: CLINIC | Age: 55
End: 2020-12-07
Payer: COMMERCIAL

## 2020-12-07 NOTE — PROGRESS NOTES
3 DAY STM VISIT    Diagnostic AHI: 102.4  PSG    Patient contacted for 3 day STM visit.    Confirmed with patient at time of call- No Patient is still interested in STM service     Message left for patient to call back.     Replacement device: No  STM ordered by provider: Yes     Device type: Bilevel  PAP settings from order::   EPAP:5 IPAP:20 PS:MIN 4 PS MAX: 6 cm H2O  Mask type:    Nasal Mask     Device settings from machine        Assessment: Nightly usage over four hours.  Action plan: Patient to have 14 day STM visit. Patient has a follow up visit scheduled:   no.     Total time spent on accessing and  interpreting remote patient PAP therapy data  10 minutes  Total time spent counseling, coaching  and reviewing PAP therapy data with patient  1 minutes  [id number] no

## 2020-12-17 ENCOUNTER — OFFICE VISIT - HEALTHEAST (OUTPATIENT)
Dept: BEHAVIORAL HEALTH | Facility: CLINIC | Age: 55
End: 2020-12-17

## 2020-12-17 DIAGNOSIS — F33.1 MAJOR DEPRESSIVE DISORDER, RECURRENT EPISODE, MODERATE (H): ICD-10-CM

## 2020-12-17 DIAGNOSIS — F41.1 GAD (GENERALIZED ANXIETY DISORDER): ICD-10-CM

## 2020-12-17 DIAGNOSIS — F43.10 PTSD (POST-TRAUMATIC STRESS DISORDER): ICD-10-CM

## 2020-12-22 ENCOUNTER — DOCUMENTATION ONLY (OUTPATIENT)
Dept: SLEEP MEDICINE | Facility: CLINIC | Age: 55
End: 2020-12-22
Payer: COMMERCIAL

## 2020-12-22 NOTE — PROGRESS NOTES
14 DAY STM VISIT    Diagnostic AHI: 102.4  PSG      Message left for patient to return call.    Assessment: Pt meeting objective benchmarks.       Action plan: waiting for patient to return call.  and pt to have 30 day STM visit.    Device type: Bilevel    PAP settings: EPAP:5 IPAP:20 PS:MIN 4 PS MAX: 6 cm H2O    Mask type:  Nasal Mask    Objective measures: 14 day rolling measures         Objective measure goal  Compliance   Goal >70%  Leak   Goal < 10%  AHI  Goal < 5  Usage  Goal >240      Total time spent on accessing and interpreting remote patient PAP therapy data  10 minutes      Total time spent counseling, coaching  and reviewing PAP therapy data with patient  1 minutes      [id number]  [id number] no (3 day STM)

## 2020-12-29 ENCOUNTER — OFFICE VISIT - HEALTHEAST (OUTPATIENT)
Dept: BEHAVIORAL HEALTH | Facility: CLINIC | Age: 55
End: 2020-12-29

## 2020-12-29 DIAGNOSIS — F33.1 MAJOR DEPRESSIVE DISORDER, RECURRENT EPISODE, MODERATE (H): ICD-10-CM

## 2020-12-29 DIAGNOSIS — F41.1 GAD (GENERALIZED ANXIETY DISORDER): ICD-10-CM

## 2020-12-29 DIAGNOSIS — F43.10 PTSD (POST-TRAUMATIC STRESS DISORDER): ICD-10-CM

## 2021-01-03 ENCOUNTER — HEALTH MAINTENANCE LETTER (OUTPATIENT)
Age: 56
End: 2021-01-03

## 2021-01-06 ENCOUNTER — OFFICE VISIT - HEALTHEAST (OUTPATIENT)
Dept: BEHAVIORAL HEALTH | Facility: CLINIC | Age: 56
End: 2021-01-06

## 2021-01-06 ENCOUNTER — DOCUMENTATION ONLY (OUTPATIENT)
Dept: SLEEP MEDICINE | Facility: CLINIC | Age: 56
End: 2021-01-06

## 2021-01-06 DIAGNOSIS — F90.9 ATTENTION DEFICIT HYPERACTIVITY DISORDER (ADHD), UNSPECIFIED ADHD TYPE: ICD-10-CM

## 2021-01-06 DIAGNOSIS — F41.1 GAD (GENERALIZED ANXIETY DISORDER): ICD-10-CM

## 2021-01-06 ASSESSMENT — ANXIETY QUESTIONNAIRES
2. NOT BEING ABLE TO STOP OR CONTROL WORRYING: SEVERAL DAYS
4. TROUBLE RELAXING: SEVERAL DAYS
GAD7 TOTAL SCORE: 5
7. FEELING AFRAID AS IF SOMETHING AWFUL MIGHT HAPPEN: NOT AT ALL
5. BEING SO RESTLESS THAT IT IS HARD TO SIT STILL: SEVERAL DAYS
6. BECOMING EASILY ANNOYED OR IRRITABLE: NOT AT ALL
1. FEELING NERVOUS, ANXIOUS, OR ON EDGE: SEVERAL DAYS
3. WORRYING TOO MUCH ABOUT DIFFERENT THINGS: SEVERAL DAYS
IF YOU CHECKED OFF ANY PROBLEMS ON THIS QUESTIONNAIRE, HOW DIFFICULT HAVE THESE PROBLEMS MADE IT FOR YOU TO DO YOUR WORK, TAKE CARE OF THINGS AT HOME, OR GET ALONG WITH OTHER PEOPLE: NOT DIFFICULT AT ALL

## 2021-01-06 ASSESSMENT — PATIENT HEALTH QUESTIONNAIRE - PHQ9: SUM OF ALL RESPONSES TO PHQ QUESTIONS 1-9: 6

## 2021-01-06 NOTE — PROGRESS NOTES
30 DAY STM VISIT    Diagnostic AHI: 102.4    PSG    Subjective measures:   Pt states things are going well and has no issues or complaints.  Pt is benefiting from therapy.      Assessment: Pt meeting objective benchmarks.  Patient meeting subjective benchmarks.   Action plan:   Patient has scheduled a follow up visit with Dr. Swann Device type: Bilevel  PAP settings: EPAP:5 IPAP:20 PS:MIN 4 PS MAX: 6 cm H2O     Mask type:  Nasal Mask  Objective measures: 14 day rolling measures      Compliance  85 %      Leak  6.0 lpm  last  upload      AHI 2.1   last  upload           Objective measure goal  Compliance   Goal >70%  Leak   Goal < 24 lpm  AHI  Goal < 5  Usage  Goal >240        Total time spent on accessing and interpreting remote patient PAP therapy data  10 minutes    Total time spent counseling, coaching  and reviewing PAP therapy data with patient  3 minutes     [id number] this call  [id number] no  at 3 or 14 day [facility]

## 2021-01-12 ENCOUNTER — OFFICE VISIT - HEALTHEAST (OUTPATIENT)
Dept: BEHAVIORAL HEALTH | Facility: CLINIC | Age: 56
End: 2021-01-12

## 2021-01-12 DIAGNOSIS — F41.1 GAD (GENERALIZED ANXIETY DISORDER): ICD-10-CM

## 2021-01-12 DIAGNOSIS — F33.1 MAJOR DEPRESSIVE DISORDER, RECURRENT EPISODE, MODERATE (H): ICD-10-CM

## 2021-01-12 DIAGNOSIS — F43.10 PTSD (POST-TRAUMATIC STRESS DISORDER): ICD-10-CM

## 2021-01-21 ENCOUNTER — OFFICE VISIT - HEALTHEAST (OUTPATIENT)
Dept: FAMILY MEDICINE | Facility: CLINIC | Age: 56
End: 2021-01-21

## 2021-01-21 DIAGNOSIS — E66.01 MORBID OBESITY (H): ICD-10-CM

## 2021-01-21 DIAGNOSIS — Z12.31 VISIT FOR SCREENING MAMMOGRAM: ICD-10-CM

## 2021-01-21 DIAGNOSIS — I48.0 PAROXYSMAL ATRIAL FIBRILLATION (H): ICD-10-CM

## 2021-01-21 DIAGNOSIS — M05.79 SEROPOSITIVE RHEUMATOID ARTHRITIS OF MULTIPLE SITES (H): ICD-10-CM

## 2021-01-21 DIAGNOSIS — Z00.00 ROUTINE GENERAL MEDICAL EXAMINATION AT A HEALTH CARE FACILITY: ICD-10-CM

## 2021-01-21 DIAGNOSIS — F90.9 ATTENTION DEFICIT HYPERACTIVITY DISORDER (ADHD), UNSPECIFIED ADHD TYPE: ICD-10-CM

## 2021-01-21 DIAGNOSIS — Z12.11 SCREEN FOR COLON CANCER: ICD-10-CM

## 2021-01-21 DIAGNOSIS — L50.9 HIVES: ICD-10-CM

## 2021-01-21 LAB
ALBUMIN SERPL-MCNC: 3.7 G/DL (ref 3.5–5)
ALP SERPL-CCNC: 93 U/L (ref 45–120)
ALT SERPL W P-5'-P-CCNC: 19 U/L (ref 0–45)
ANION GAP SERPL CALCULATED.3IONS-SCNC: 10 MMOL/L (ref 5–18)
AST SERPL W P-5'-P-CCNC: 18 U/L (ref 0–40)
BASOPHILS # BLD AUTO: 0 THOU/UL (ref 0–0.2)
BASOPHILS NFR BLD AUTO: 1 % (ref 0–2)
BILIRUB SERPL-MCNC: 0.4 MG/DL (ref 0–1)
BUN SERPL-MCNC: 14 MG/DL (ref 8–22)
CALCIUM SERPL-MCNC: 9.1 MG/DL (ref 8.5–10.5)
CHLORIDE BLD-SCNC: 103 MMOL/L (ref 98–107)
CHOLEST SERPL-MCNC: 169 MG/DL
CO2 SERPL-SCNC: 26 MMOL/L (ref 22–31)
CREAT SERPL-MCNC: 0.66 MG/DL (ref 0.6–1.1)
EOSINOPHIL # BLD AUTO: 0.2 THOU/UL (ref 0–0.4)
EOSINOPHIL NFR BLD AUTO: 2 % (ref 0–6)
ERYTHROCYTE [DISTWIDTH] IN BLOOD BY AUTOMATED COUNT: 13.3 % (ref 11–14.5)
FASTING STATUS PATIENT QL REPORTED: YES
GFR SERPL CREATININE-BSD FRML MDRD: >60 ML/MIN/1.73M2
GLUCOSE BLD-MCNC: 111 MG/DL (ref 70–125)
HCT VFR BLD AUTO: 40.1 % (ref 35–47)
HDLC SERPL-MCNC: 46 MG/DL
HGB BLD-MCNC: 12.7 G/DL (ref 12–16)
IMM GRANULOCYTES # BLD: 0 THOU/UL
IMM GRANULOCYTES NFR BLD: 0 %
LDLC SERPL CALC-MCNC: 107 MG/DL
LYMPHOCYTES # BLD AUTO: 2.3 THOU/UL (ref 0.8–4.4)
LYMPHOCYTES NFR BLD AUTO: 27 % (ref 20–40)
MCH RBC QN AUTO: 27.5 PG (ref 27–34)
MCHC RBC AUTO-ENTMCNC: 31.7 G/DL (ref 32–36)
MCV RBC AUTO: 87 FL (ref 80–100)
MONOCYTES # BLD AUTO: 0.6 THOU/UL (ref 0–0.9)
MONOCYTES NFR BLD AUTO: 7 % (ref 2–10)
NEUTROPHILS # BLD AUTO: 5.4 THOU/UL (ref 2–7.7)
NEUTROPHILS NFR BLD AUTO: 63 % (ref 50–70)
PLATELET # BLD AUTO: 340 THOU/UL (ref 140–440)
PMV BLD AUTO: 10.8 FL (ref 8.5–12.5)
POTASSIUM BLD-SCNC: 4.7 MMOL/L (ref 3.5–5)
PROT SERPL-MCNC: 7.6 G/DL (ref 6–8)
RBC # BLD AUTO: 4.61 MILL/UL (ref 3.8–5.4)
SODIUM SERPL-SCNC: 139 MMOL/L (ref 136–145)
TRIGL SERPL-MCNC: 79 MG/DL
WBC: 8.5 THOU/UL (ref 4–11)

## 2021-01-21 ASSESSMENT — MIFFLIN-ST. JEOR: SCORE: 1938.99

## 2021-01-22 ENCOUNTER — COMMUNICATION - HEALTHEAST (OUTPATIENT)
Dept: FAMILY MEDICINE | Facility: CLINIC | Age: 56
End: 2021-01-22

## 2021-01-22 LAB — 25(OH)D3 SERPL-MCNC: 48 NG/ML (ref 30–80)

## 2021-01-26 ENCOUNTER — OFFICE VISIT - HEALTHEAST (OUTPATIENT)
Dept: BEHAVIORAL HEALTH | Facility: CLINIC | Age: 56
End: 2021-01-26

## 2021-01-26 DIAGNOSIS — F41.1 GAD (GENERALIZED ANXIETY DISORDER): ICD-10-CM

## 2021-01-26 DIAGNOSIS — F33.1 MAJOR DEPRESSIVE DISORDER, RECURRENT EPISODE, MODERATE (H): ICD-10-CM

## 2021-01-26 DIAGNOSIS — F43.10 PTSD (POST-TRAUMATIC STRESS DISORDER): ICD-10-CM

## 2021-02-03 ENCOUNTER — OFFICE VISIT - HEALTHEAST (OUTPATIENT)
Dept: BEHAVIORAL HEALTH | Facility: CLINIC | Age: 56
End: 2021-02-03

## 2021-02-03 DIAGNOSIS — F90.9 ATTENTION DEFICIT HYPERACTIVITY DISORDER (ADHD), UNSPECIFIED ADHD TYPE: ICD-10-CM

## 2021-02-03 DIAGNOSIS — F51.5 NIGHTMARES ASSOCIATED WITH CHRONIC POST-TRAUMATIC STRESS DISORDER: ICD-10-CM

## 2021-02-03 DIAGNOSIS — F43.10 PTSD (POST-TRAUMATIC STRESS DISORDER): ICD-10-CM

## 2021-02-03 DIAGNOSIS — F41.1 GAD (GENERALIZED ANXIETY DISORDER): ICD-10-CM

## 2021-02-03 DIAGNOSIS — F33.42 RECURRENT MAJOR DEPRESSIVE DISORDER, IN FULL REMISSION (H): ICD-10-CM

## 2021-02-03 DIAGNOSIS — F43.12 NIGHTMARES ASSOCIATED WITH CHRONIC POST-TRAUMATIC STRESS DISORDER: ICD-10-CM

## 2021-02-03 ASSESSMENT — ANXIETY QUESTIONNAIRES
5. BEING SO RESTLESS THAT IT IS HARD TO SIT STILL: NOT AT ALL
3. WORRYING TOO MUCH ABOUT DIFFERENT THINGS: NOT AT ALL
4. TROUBLE RELAXING: SEVERAL DAYS
7. FEELING AFRAID AS IF SOMETHING AWFUL MIGHT HAPPEN: NOT AT ALL
1. FEELING NERVOUS, ANXIOUS, OR ON EDGE: SEVERAL DAYS
2. NOT BEING ABLE TO STOP OR CONTROL WORRYING: NOT AT ALL
6. BECOMING EASILY ANNOYED OR IRRITABLE: SEVERAL DAYS
GAD7 TOTAL SCORE: 3
IF YOU CHECKED OFF ANY PROBLEMS ON THIS QUESTIONNAIRE, HOW DIFFICULT HAVE THESE PROBLEMS MADE IT FOR YOU TO DO YOUR WORK, TAKE CARE OF THINGS AT HOME, OR GET ALONG WITH OTHER PEOPLE: NOT DIFFICULT AT ALL

## 2021-02-03 ASSESSMENT — PATIENT HEALTH QUESTIONNAIRE - PHQ9: SUM OF ALL RESPONSES TO PHQ QUESTIONS 1-9: 5

## 2021-02-08 ENCOUNTER — RECORDS - HEALTHEAST (OUTPATIENT)
Dept: ADMINISTRATIVE | Facility: OTHER | Age: 56
End: 2021-02-08

## 2021-02-08 ENCOUNTER — TRANSFERRED RECORDS (OUTPATIENT)
Dept: HEALTH INFORMATION MANAGEMENT | Facility: CLINIC | Age: 56
End: 2021-02-08
Payer: COMMERCIAL

## 2021-02-09 ENCOUNTER — VIRTUAL VISIT (OUTPATIENT)
Dept: SLEEP MEDICINE | Facility: CLINIC | Age: 56
End: 2021-02-09
Payer: COMMERCIAL

## 2021-02-09 ENCOUNTER — OFFICE VISIT - HEALTHEAST (OUTPATIENT)
Dept: BEHAVIORAL HEALTH | Facility: CLINIC | Age: 56
End: 2021-02-09

## 2021-02-09 VITALS — HEIGHT: 69 IN | BODY MASS INDEX: 35.55 KG/M2 | WEIGHT: 240 LBS

## 2021-02-09 DIAGNOSIS — F33.1 MAJOR DEPRESSIVE DISORDER, RECURRENT EPISODE, MODERATE (H): ICD-10-CM

## 2021-02-09 DIAGNOSIS — F43.10 PTSD (POST-TRAUMATIC STRESS DISORDER): ICD-10-CM

## 2021-02-09 DIAGNOSIS — G47.33 OBSTRUCTIVE SLEEP APNEA: Primary | ICD-10-CM

## 2021-02-09 DIAGNOSIS — G47.10 HYPERSOMNIA: ICD-10-CM

## 2021-02-09 PROCEDURE — 99213 OFFICE O/P EST LOW 20 MIN: CPT | Mod: 95 | Performed by: INTERNAL MEDICINE

## 2021-02-09 ASSESSMENT — MIFFLIN-ST. JEOR: SCORE: 1758.01

## 2021-02-09 NOTE — PROGRESS NOTES
Alina is a 55 year old who is being evaluated via a billable video visit.      How would you like to obtain your AVS? MyChart  If the video visit is dropped, the invitation should be resent by: Send to e-mail at: [email or web address]  Will anyone else be joining your video visit? No    Video-Visit Details    Type of service:  Video Visit    Originating Location (pt. Location): Home    Distant Location (provider location):  [facility]     Platform used for Video Visit: Rhonda ANTONY CMA, [facility], 2/9/2021 10:31 AM    Thank you for the opportunity to participate in the care of Alina Martell.     She is a 55 year old y/o female patient who comes to the sleep medicine clinic for follow up.  The patient was diagnosed with severe obstructive sleep apnea 11/5/2020 with an apnea-hypopnea index of 102.4 events per hour with the lowest O2 sat of 59%.  The patient was also found to have sleep related hypoxia.  The patient was informed of the results and offered the option to get an in lab titration study versus initiating therapy.  The patient preferred the latter.  Since starting BPAP therapy the patient states that her sleep quality and energy level have improved.  She is able to get continuous 6 hours of sleep on PAP therapy compared to before where she was only able to get sporadic 3 hours of sleep.  She grateful to have started this therapy option.     Assessment and Plan:  In summary Alina Martell is a 55 year old year old female who is here for follow-up.    1. Obstructive sleep apnea/Sleep related Hypoxia  I congratulations on her excellent usage of pressure therapy.  I propose that we narrow pressure range to a level of 13/9 CWP.  I also recommend that we get a nocturnal oximetry and possible capnography/transcutaneous CO2 monitoring while on therapy.  I may need to speak to the DME to see if the CO2 monitoring is possible.  - Overnight oximetry  study; Future  - COMPREHENSIVE DME    2. Hypersomnia  Improving     Compliance Download data for 30 Days:  Pressure setting:Auto BPAP 5-20 cwp with PS of 4-6 cwp  95% pressure: IPAP: 12.5 cwp, CPAP 9 cwp  Residual AHI:4.2 events per hour  Leak:Minimal  Compliance:83%  Mask Tolerance:Good  Skin irritation:None  DME:[facility]    Patient Active Problem List   Diagnosis     Depressed       Past Medical History:   Diagnosis Date     Carpal tunnel syndrome      Paroxysmal atrial fibrillation (H)      PTSD (post-traumatic stress disorder)      RA (rheumatoid arthritis) (H)        Past Surgical History:   Procedure Laterality Date     CHOLECYSTECTOMY       LAPAROSCOPIC TUBAL LIGATION       RELEASE CARPAL TUNNEL BILATERAL         Social History     Socioeconomic History     Marital status: Single     Spouse name: Not on file     Number of children: Not on file     Years of education: Not on file     Highest education level: Not on file   Occupational History     Not on file   Social Needs     Financial resource strain: Not on file     Food insecurity     Worry: Not on file     Inability: Not on file     Transportation needs     Medical: Not on file     Non-medical: Not on file   Tobacco Use     Smoking status: Never Smoker     Smokeless tobacco: Never Used   Substance and Sexual Activity     Alcohol use: Yes     Comment: rare/once a month     Drug use: Yes     Types: Marijuana     Comment: Oils     Sexual activity: Not on file   Lifestyle     Physical activity     Days per week: Not on file     Minutes per session: Not on file     Stress: Not on file   Relationships     Social connections     Talks on phone: Not on file     Gets together: Not on file     Attends Hindu service: Not on file     Active member of club or organization: Not on file     Attends meetings of clubs or organizations: Not on file     Relationship status: Not on file     Intimate partner violence     Fear of current or ex partner: Not on file      Emotionally abused: Not on file     Physically abused: Not on file     Forced sexual activity: Not on file   Other Topics Concern     Parent/sibling w/ CABG, MI or angioplasty before 65F 55M? Not Asked   Social History Narrative     Not on file       Current Outpatient Medications   Medication Sig Dispense Refill     adalimumab (HUMIRA) 40 MG/0.8ML prefilled syringe kit Inject 40 mg Subcutaneous every 14 days       diltiazem (CARDIZEM) 120 MG tablet Take 120 mg by mouth 4 times daily       EPINEPHrine (ANY BX GENERIC EQUIV) 0.3 MG/0.3ML injection 2-pack Inject 0.3 mg into the muscle as needed for anaphylaxis       hydroxychloroquine (PLAQUENIL) 200 MG tablet Take 200 mg by mouth daily       loratadine (CLARITIN) 10 MG tablet Take 10 mg by mouth daily       naproxen (NAPROSYN) 500 MG tablet TAKE 1 TABLET BY MOUTH TWICE A DAY WITH MEALS  0     predniSONE (DELTASONE) 20 MG tablet 20 mg, two pills orally every AM for seven days 14 tablet 0       Allergies   Allergen Reactions     Shellfish-Derived Products Anaphylaxis     Sulfa Drugs Hives       Physical Exam:  GEN: NAD,  Psych: normal mood, normal affect    Labs/Studies:    I reviewed the efficacy and compliance report from her device. Data summarized on the HPI and the PAP compliance flow sheet.     Patient verbalized understanding of these issues, agrees with the plan and all questions were answered today. Patient was given an opportuntity to voice any other symptoms or concerns not listed above. Patient did not have any other symptoms or concerns.      Gucci Swann DO  Board Certified in Internal Medicine and Sleep Medicine    (Note created with Dragon voice recognition and unintended spelling errors and word substitutions may occur)     I spent a total of 20 minutes of face-to-face encounter and in preparation for this clinic visit.    Audio and visual devices were used for this virtual clinic visit with permission from patient.

## 2021-02-09 NOTE — PATIENT INSTRUCTIONS
Your BMI is Body mass index is 35.44 kg/m .  Weight management is a personal decision.  If you are interested in exploring weight loss strategies, the following discussion covers the approaches that may be successful. Body mass index (BMI) is one way to tell whether you are at a healthy weight, overweight, or obese. It measures your weight in relation to your height.  A BMI of 18.5 to 24.9 is in the healthy range. A person with a BMI of 25 to 29.9 is considered overweight, and someone with a BMI of 30 or greater is considered obese. More than two-thirds of American adults are considered overweight or obese.  Being overweight or obese increases the risk for further weight gain. Excess weight may lead to heart disease and diabetes.  Creating and following plans for healthy eating and physical activity may help you improve your health.  Weight control is part of healthy lifestyle and includes exercise, emotional health, and healthy eating habits. Careful eating habits lifelong are the mainstay of weight control. Though there are significant health benefits from weight loss, long-term weight loss with diet alone may be very difficult to achieve- studies show long-term success with dietary management in less than 10% of people. Attaining a healthy weight may be especially difficult to achieve in those with severe obesity. In some cases, medications, devices and surgical management might be considered.  What can you do?  If you are overweight or obese and are interested in methods for weight loss, you should discuss this with your provider.     Consider reducing daily calorie intake by 500 calories.     Keep a food journal.     Avoiding skipping meals, consider cutting portions instead.    Diet combined with exercise helps maintain muscle while optimizing fat loss. Strength training is particularly important for building and maintaining muscle mass. Exercise helps reduce stress, increase energy, and improves fitness.  Increasing exercise without diet control, however, may not burn enough calories to loose weight.       Start walking three days a week 10-20 minutes at a time    Work towards walking thirty minutes five days a week     Eventually, increase the speed of your walking for 1-2 minutes at time    In addition, we recommend that you review healthy lifestyles and methods for weight loss available through the National Institutes of Health patient information sites:  http://win.niddk.nih.gov/publications/index.htm    And look into health and wellness programs that may be available through your health insurance provider, employer, local community center, or [employer]ores[employer] club.    Weight management plan: Discussed healthy diet and exercise guidelines    Equipment Instructions    We will process your PAP order and send it to a Durable Medical Equipment (DME) provider.    The medical equipment company should call you within 7 days.  If you have not heard from the company, please contact them to see if they received your order and are planning to call you.    Please call us at [phone] if you are unable to contact the medical equipment company or if they do not have the order.    If you are starting a new PAP machine, please call us after you use it the first night to let us know how it went. This call also helps us know that you received your equipment and that everything is ready. Please use our central phone number [phone]    Contact information for [employer] company:    [employer] Tel: [phone]

## 2021-02-23 ENCOUNTER — OFFICE VISIT - HEALTHEAST (OUTPATIENT)
Dept: BEHAVIORAL HEALTH | Facility: CLINIC | Age: 56
End: 2021-02-23

## 2021-02-23 DIAGNOSIS — F41.1 GAD (GENERALIZED ANXIETY DISORDER): ICD-10-CM

## 2021-02-23 DIAGNOSIS — F33.1 MAJOR DEPRESSIVE DISORDER, RECURRENT EPISODE, MODERATE (H): ICD-10-CM

## 2021-02-24 ENCOUNTER — OFFICE VISIT - HEALTHEAST (OUTPATIENT)
Dept: CARDIOLOGY | Facility: CLINIC | Age: 56
End: 2021-02-24

## 2021-02-24 DIAGNOSIS — I48.0 PAROXYSMAL ATRIAL FIBRILLATION (H): ICD-10-CM

## 2021-03-03 ENCOUNTER — COMMUNICATION - HEALTHEAST (OUTPATIENT)
Dept: BEHAVIORAL HEALTH | Facility: CLINIC | Age: 56
End: 2021-03-03

## 2021-03-03 ENCOUNTER — HOSPITAL ENCOUNTER (OUTPATIENT)
Dept: MAMMOGRAPHY | Facility: CLINIC | Age: 56
Discharge: HOME | End: 2021-03-03
Attending: FAMILY MEDICINE

## 2021-03-03 DIAGNOSIS — F43.12 NIGHTMARES ASSOCIATED WITH CHRONIC POST-TRAUMATIC STRESS DISORDER: ICD-10-CM

## 2021-03-03 DIAGNOSIS — F43.10 PTSD (POST-TRAUMATIC STRESS DISORDER): ICD-10-CM

## 2021-03-03 DIAGNOSIS — Z12.31 VISIT FOR SCREENING MAMMOGRAM: ICD-10-CM

## 2021-03-03 DIAGNOSIS — F51.5 NIGHTMARES ASSOCIATED WITH CHRONIC POST-TRAUMATIC STRESS DISORDER: ICD-10-CM

## 2021-03-04 ENCOUNTER — OFFICE VISIT - HEALTHEAST (OUTPATIENT)
Dept: CARDIOLOGY | Facility: CLINIC | Age: 56
End: 2021-03-04

## 2021-03-04 ENCOUNTER — COMMUNICATION - HEALTHEAST (OUTPATIENT)
Dept: PEDIATRICS | Facility: CLINIC | Age: 56
End: 2021-03-04

## 2021-03-04 DIAGNOSIS — I48.0 PAROXYSMAL ATRIAL FIBRILLATION (H): ICD-10-CM

## 2021-03-07 ENCOUNTER — HEALTH MAINTENANCE LETTER (OUTPATIENT)
Age: 56
End: 2021-03-07

## 2021-03-08 ENCOUNTER — VIRTUAL VISIT (OUTPATIENT)
Dept: PSYCHOLOGY | Facility: CLINIC | Age: 56
End: 2021-03-08
Payer: COMMERCIAL

## 2021-03-08 DIAGNOSIS — F32.A DEPRESSION, UNSPECIFIED DEPRESSION TYPE: ICD-10-CM

## 2021-03-08 DIAGNOSIS — F43.10 POSTTRAUMATIC STRESS DISORDER: Primary | ICD-10-CM

## 2021-03-08 DIAGNOSIS — Z13.39 ATTENTION DEFICIT HYPERACTIVITY DISORDER (ADHD) EVALUATION: ICD-10-CM

## 2021-03-08 PROCEDURE — 90837 PSYTX W PT 60 MINUTES: CPT | Mod: 95 | Performed by: PSYCHOLOGIST

## 2021-03-08 NOTE — PROGRESS NOTES
Progress Note - Initial Visit    Client Name:  Alina Martell Date: 2021         Service Type: Individual     Visit Start Time: 1203  Visit End Time: 1300    Visit #: 1    Attendees: Client    Service Modality:  Video Visit:      Provider verified identity through the following two step process.  Patient provided:  Patient     Telemedicine Visit: The patient's condition can be safely assessed and treated via synchronous audio and visual telemedicine encounter.      Reason for Telemedicine Visit: Services only offered telehealth    Originating Site (Patient Location): Patient's home    Distant Site (Provider Location): Provider Remote Setting    Consent:  The patient/guardian has verbally consented to: the potential risks and benefits of telemedicine (video visit) versus in person care; bill my insurance or make self-payment for services provided; and responsibility for payment of non-covered services.     Patient would like the video invitation sent by:  My Chart    Mode of Communication:  Video Conference via [employer]    As the provider I attest to compliance with applicable laws and regulations related to telemedicine.       DATA:   Interactive Complexity: No   Crisis: No     Presenting Concerns/  Current Stressors:   ADHD Evaluation    ASSESSMENT:  Mental Status Assessment:  Appearance:   Appropriate   Eye Contact:   Good   Psychomotor Behavior: Normal   Attitude:   Cooperative   Orientation:   All  Speech   Rate / Production: Normal/ Responsive   Volume:  Normal   Mood:    Anxious   Affect:    Appropriate   Thought Content:  Clear   Thought Form:  Goal Directed   Insight:    Fair       Safety Issues and Plan for Safety and Risk Management:     [place] Suicide Severity Rating Scale (Short Version)  [place] Suicide Severity Rating (Short Version) 3/8/2021   Over the past 2 weeks have you felt down, depressed, or hopeless? no   Over the past 2 weeks have you had thoughts of  killing yourself? no   Have you ever attempted to kill yourself? yes   When did this last happen? more than 6 months ago     Patient denies current fears or concerns for personal safety.  Patient denies current or recent suicidal ideation or behaviors.  Patient denies current or recent homicidal ideation or behaviors.  Patient denies current or recent self injurious behavior or ideation.  Patient denies other safety concerns.  Recommended that patient call 911 or go to the local ED should there be a change in any of these risk factors.     Diagnostic Criteria:  PTDS  Hx of depression  Gambling  Rule Out ADHD    WHODAS 2.0 (12 item):   WHODAS 2.0 Total Score 3/7/2021   Total Score MyChart 21   Some encounter information is confidential and restricted. Go to Review Flowsheets activity to see all data.     Intervention:   During today's session, this writer outlined the purpose and expectations for the ADHD Evaluation including Notice of Billing. Ms. Martell dicussed her reason for referral and symptoms. Her PHQ-9, ADA-7 and WHODAS scores were reviewed as well as assessed her risk. This writer used the Adult ADHD Evaluation Intake Form to guide the clinical interview; the form was completed. Ms. Martell was scheduled for a 2nd session, a 3rd may be needed.   Collateral Reports Completed:  Routed note to PCP      PLAN: (Homework, other):  1. Provider will continue Diagnostic Assessment.    2. Attend therapy sessions.  3. Take medications      Florence Richardson, PhD LP  March 8, 2021

## 2021-03-09 ENCOUNTER — OFFICE VISIT - HEALTHEAST (OUTPATIENT)
Dept: BEHAVIORAL HEALTH | Facility: CLINIC | Age: 56
End: 2021-03-09

## 2021-03-09 DIAGNOSIS — F43.10 PTSD (POST-TRAUMATIC STRESS DISORDER): ICD-10-CM

## 2021-03-09 DIAGNOSIS — F41.1 GAD (GENERALIZED ANXIETY DISORDER): ICD-10-CM

## 2021-03-09 DIAGNOSIS — F33.1 MAJOR DEPRESSIVE DISORDER, RECURRENT EPISODE, MODERATE (H): ICD-10-CM

## 2021-03-10 ENCOUNTER — OFFICE VISIT - HEALTHEAST (OUTPATIENT)
Dept: CARDIOLOGY | Facility: CLINIC | Age: 56
End: 2021-03-10

## 2021-03-10 DIAGNOSIS — I48.0 PAROXYSMAL ATRIAL FIBRILLATION (H): ICD-10-CM

## 2021-03-18 ENCOUNTER — OFFICE VISIT - HEALTHEAST (OUTPATIENT)
Dept: CARDIOLOGY | Facility: CLINIC | Age: 56
End: 2021-03-18

## 2021-03-18 DIAGNOSIS — I48.0 PAROXYSMAL ATRIAL FIBRILLATION (H): ICD-10-CM

## 2021-03-19 ENCOUNTER — IMMUNIZATION (OUTPATIENT)
Dept: NURSING | Facility: CLINIC | Age: 56
End: 2021-03-19
Payer: COMMERCIAL

## 2021-03-19 PROCEDURE — 0001A PR COVID VAC PFIZER DIL RECON 30 MCG/0.3 ML IM: CPT

## 2021-03-19 PROCEDURE — 91300 PR COVID VAC PFIZER DIL RECON 30 MCG/0.3 ML IM: CPT

## 2021-03-23 ENCOUNTER — OFFICE VISIT - HEALTHEAST (OUTPATIENT)
Dept: BEHAVIORAL HEALTH | Facility: CLINIC | Age: 56
End: 2021-03-23

## 2021-03-23 DIAGNOSIS — F33.1 MAJOR DEPRESSIVE DISORDER, RECURRENT EPISODE, MODERATE (H): ICD-10-CM

## 2021-03-23 DIAGNOSIS — F43.10 PTSD (POST-TRAUMATIC STRESS DISORDER): ICD-10-CM

## 2021-03-31 ENCOUNTER — OFFICE VISIT - HEALTHEAST (OUTPATIENT)
Dept: BEHAVIORAL HEALTH | Facility: CLINIC | Age: 56
End: 2021-03-31

## 2021-03-31 DIAGNOSIS — F43.12 NIGHTMARES ASSOCIATED WITH CHRONIC POST-TRAUMATIC STRESS DISORDER: ICD-10-CM

## 2021-03-31 DIAGNOSIS — F63.0 COMPULSIVE GAMBLING IN ADULT: ICD-10-CM

## 2021-03-31 DIAGNOSIS — F90.9 ATTENTION DEFICIT HYPERACTIVITY DISORDER (ADHD), UNSPECIFIED ADHD TYPE: ICD-10-CM

## 2021-03-31 DIAGNOSIS — F43.10 PTSD (POST-TRAUMATIC STRESS DISORDER): ICD-10-CM

## 2021-03-31 DIAGNOSIS — F41.1 GAD (GENERALIZED ANXIETY DISORDER): ICD-10-CM

## 2021-03-31 DIAGNOSIS — F51.5 NIGHTMARES ASSOCIATED WITH CHRONIC POST-TRAUMATIC STRESS DISORDER: ICD-10-CM

## 2021-03-31 ASSESSMENT — ANXIETY QUESTIONNAIRES
1. FEELING NERVOUS, ANXIOUS, OR ON EDGE: SEVERAL DAYS
3. WORRYING TOO MUCH ABOUT DIFFERENT THINGS: SEVERAL DAYS
GAD7 TOTAL SCORE: 6
5. BEING SO RESTLESS THAT IT IS HARD TO SIT STILL: SEVERAL DAYS
7. FEELING AFRAID AS IF SOMETHING AWFUL MIGHT HAPPEN: NOT AT ALL
6. BECOMING EASILY ANNOYED OR IRRITABLE: SEVERAL DAYS
IF YOU CHECKED OFF ANY PROBLEMS ON THIS QUESTIONNAIRE, HOW DIFFICULT HAVE THESE PROBLEMS MADE IT FOR YOU TO DO YOUR WORK, TAKE CARE OF THINGS AT HOME, OR GET ALONG WITH OTHER PEOPLE: SOMEWHAT DIFFICULT
2. NOT BEING ABLE TO STOP OR CONTROL WORRYING: SEVERAL DAYS
4. TROUBLE RELAXING: SEVERAL DAYS

## 2021-03-31 ASSESSMENT — PATIENT HEALTH QUESTIONNAIRE - PHQ9: SUM OF ALL RESPONSES TO PHQ QUESTIONS 1-9: 5

## 2021-04-06 ENCOUNTER — OFFICE VISIT - HEALTHEAST (OUTPATIENT)
Dept: BEHAVIORAL HEALTH | Facility: CLINIC | Age: 56
End: 2021-04-06

## 2021-04-06 DIAGNOSIS — F41.0 PANIC DISORDER WITHOUT AGORAPHOBIA: ICD-10-CM

## 2021-04-06 DIAGNOSIS — F43.10 PTSD (POST-TRAUMATIC STRESS DISORDER): ICD-10-CM

## 2021-04-06 DIAGNOSIS — F33.1 MAJOR DEPRESSIVE DISORDER, RECURRENT EPISODE, MODERATE (H): ICD-10-CM

## 2021-04-07 ENCOUNTER — AMBULATORY - HEALTHEAST (OUTPATIENT)
Dept: BEHAVIORAL HEALTH | Facility: CLINIC | Age: 56
End: 2021-04-07

## 2021-04-07 ENCOUNTER — COMMUNICATION - HEALTHEAST (OUTPATIENT)
Dept: BEHAVIORAL HEALTH | Facility: CLINIC | Age: 56
End: 2021-04-07

## 2021-04-07 ASSESSMENT — ANXIETY QUESTIONNAIRES
1. FEELING NERVOUS, ANXIOUS, OR ON EDGE: SEVERAL DAYS
4. TROUBLE RELAXING: SEVERAL DAYS
7. FEELING AFRAID AS IF SOMETHING AWFUL MIGHT HAPPEN: NOT AT ALL
3. WORRYING TOO MUCH ABOUT DIFFERENT THINGS: SEVERAL DAYS
GAD7 TOTAL SCORE: 5
5. BEING SO RESTLESS THAT IT IS HARD TO SIT STILL: SEVERAL DAYS
6. BECOMING EASILY ANNOYED OR IRRITABLE: SEVERAL DAYS
2. NOT BEING ABLE TO STOP OR CONTROL WORRYING: NOT AT ALL

## 2021-04-07 ASSESSMENT — PATIENT HEALTH QUESTIONNAIRE - PHQ9: SUM OF ALL RESPONSES TO PHQ QUESTIONS 1-9: 4

## 2021-04-09 ENCOUNTER — IMMUNIZATION (OUTPATIENT)
Dept: NURSING | Facility: CLINIC | Age: 56
End: 2021-04-09
Attending: INTERNAL MEDICINE
Payer: COMMERCIAL

## 2021-04-09 PROCEDURE — 0002A PR COVID VAC PFIZER DIL RECON 30 MCG/0.3 ML IM: CPT

## 2021-04-09 PROCEDURE — 91300 PR COVID VAC PFIZER DIL RECON 30 MCG/0.3 ML IM: CPT

## 2021-04-10 ENCOUNTER — COMMUNICATION - HEALTHEAST (OUTPATIENT)
Dept: RHEUMATOLOGY | Facility: CLINIC | Age: 56
End: 2021-04-10

## 2021-04-10 DIAGNOSIS — R76.8 CYCLIC CITRULLINATED PEPTIDE (CCP) ANTIBODY POSITIVE: ICD-10-CM

## 2021-04-10 DIAGNOSIS — M05.79 SEROPOSITIVE RHEUMATOID ARTHRITIS OF MULTIPLE SITES (H): ICD-10-CM

## 2021-04-12 ENCOUNTER — COMMUNICATION - HEALTHEAST (OUTPATIENT)
Dept: ADMINISTRATIVE | Facility: CLINIC | Age: 56
End: 2021-04-12

## 2021-04-12 ENCOUNTER — COMMUNICATION - HEALTHEAST (OUTPATIENT)
Dept: RHEUMATOLOGY | Facility: CLINIC | Age: 56
End: 2021-04-12

## 2021-04-13 ENCOUNTER — COMMUNICATION - HEALTHEAST (OUTPATIENT)
Dept: RHEUMATOLOGY | Facility: CLINIC | Age: 56
End: 2021-04-13

## 2021-04-14 ENCOUNTER — COMMUNICATION - HEALTHEAST (OUTPATIENT)
Dept: ENDOCRINOLOGY | Facility: CLINIC | Age: 56
End: 2021-04-14

## 2021-04-20 ENCOUNTER — OFFICE VISIT - HEALTHEAST (OUTPATIENT)
Dept: BEHAVIORAL HEALTH | Facility: CLINIC | Age: 56
End: 2021-04-20

## 2021-04-20 DIAGNOSIS — F33.1 MAJOR DEPRESSIVE DISORDER, RECURRENT EPISODE, MODERATE (H): ICD-10-CM

## 2021-04-20 DIAGNOSIS — F43.10 PTSD (POST-TRAUMATIC STRESS DISORDER): ICD-10-CM

## 2021-04-25 ENCOUNTER — HEALTH MAINTENANCE LETTER (OUTPATIENT)
Age: 56
End: 2021-04-25

## 2021-04-28 ENCOUNTER — OFFICE VISIT - HEALTHEAST (OUTPATIENT)
Dept: BEHAVIORAL HEALTH | Facility: CLINIC | Age: 56
End: 2021-04-28

## 2021-04-28 DIAGNOSIS — F43.10 PTSD (POST-TRAUMATIC STRESS DISORDER): ICD-10-CM

## 2021-04-28 DIAGNOSIS — F41.1 GAD (GENERALIZED ANXIETY DISORDER): ICD-10-CM

## 2021-04-28 DIAGNOSIS — F90.9 ATTENTION DEFICIT HYPERACTIVITY DISORDER (ADHD), UNSPECIFIED ADHD TYPE: ICD-10-CM

## 2021-04-28 ASSESSMENT — ANXIETY QUESTIONNAIRES
2. NOT BEING ABLE TO STOP OR CONTROL WORRYING: NOT AT ALL
1. FEELING NERVOUS, ANXIOUS, OR ON EDGE: SEVERAL DAYS
IF YOU CHECKED OFF ANY PROBLEMS ON THIS QUESTIONNAIRE, HOW DIFFICULT HAVE THESE PROBLEMS MADE IT FOR YOU TO DO YOUR WORK, TAKE CARE OF THINGS AT HOME, OR GET ALONG WITH OTHER PEOPLE: SOMEWHAT DIFFICULT
6. BECOMING EASILY ANNOYED OR IRRITABLE: SEVERAL DAYS
3. WORRYING TOO MUCH ABOUT DIFFERENT THINGS: SEVERAL DAYS
7. FEELING AFRAID AS IF SOMETHING AWFUL MIGHT HAPPEN: NOT AT ALL
5. BEING SO RESTLESS THAT IT IS HARD TO SIT STILL: NOT AT ALL
GAD7 TOTAL SCORE: 4
4. TROUBLE RELAXING: SEVERAL DAYS

## 2021-04-28 ASSESSMENT — PATIENT HEALTH QUESTIONNAIRE - PHQ9: SUM OF ALL RESPONSES TO PHQ QUESTIONS 1-9: 4

## 2021-05-04 ENCOUNTER — OFFICE VISIT - HEALTHEAST (OUTPATIENT)
Dept: BEHAVIORAL HEALTH | Facility: CLINIC | Age: 56
End: 2021-05-04

## 2021-05-04 DIAGNOSIS — F41.1 GAD (GENERALIZED ANXIETY DISORDER): ICD-10-CM

## 2021-05-04 DIAGNOSIS — F43.10 PTSD (POST-TRAUMATIC STRESS DISORDER): ICD-10-CM

## 2021-05-18 ENCOUNTER — OFFICE VISIT - HEALTHEAST (OUTPATIENT)
Dept: BEHAVIORAL HEALTH | Facility: CLINIC | Age: 56
End: 2021-05-18

## 2021-05-18 DIAGNOSIS — F43.10 PTSD (POST-TRAUMATIC STRESS DISORDER): ICD-10-CM

## 2021-05-18 DIAGNOSIS — F33.1 MAJOR DEPRESSIVE DISORDER, RECURRENT EPISODE, MODERATE (H): ICD-10-CM

## 2021-05-24 ENCOUNTER — RECORDS - HEALTHEAST (OUTPATIENT)
Dept: ADMINISTRATIVE | Facility: CLINIC | Age: 56
End: 2021-05-24

## 2021-05-26 ASSESSMENT — PATIENT HEALTH QUESTIONNAIRE - PHQ9: SUM OF ALL RESPONSES TO PHQ QUESTIONS 1-9: 5

## 2021-05-27 ASSESSMENT — PATIENT HEALTH QUESTIONNAIRE - PHQ9
SUM OF ALL RESPONSES TO PHQ QUESTIONS 1-9: 5
SUM OF ALL RESPONSES TO PHQ QUESTIONS 1-9: 6
SUM OF ALL RESPONSES TO PHQ QUESTIONS 1-9: 5
SUM OF ALL RESPONSES TO PHQ QUESTIONS 1-9: 4
SUM OF ALL RESPONSES TO PHQ QUESTIONS 1-9: 4

## 2021-05-27 NOTE — PROGRESS NOTES
Mental Health Visit Note    03/29/2019   Start time: 659    Stop Time: 753 Session # 7    Session Type: Patient is presenting for an Individual session.    Alina Martell is a 54 y.o. female is being seen today for    Chief Complaint   Patient presents with      Follow Up     Sleep Problems     New symptoms or complaints: None    Functional Impairment:   Personal: 3  Family: 3  Work: 1  Social:2    Clinical assessment of mental status: Alina Martell was on time for scheduled session today. She was open and cooperative, and dressed appropriately for this session and weather. Her memory was good for short and long term.  Her speech and language was soft and monotone and appropriate for session.  Concentration and focus is fair.  Psychosis is not noted or reported. She reports her mood is tired.  Affect is congruent with speech.  Fund of knowledge is adequate. Insight is adequate for therapy. Changes made as needed.     Suicidal/Homicidal Ideation present: None Reported This Session    Patient's impression of their current status: Patient reported feeling tired. Patient indicated she is not sleeping well. Patient reported she is struggling with nightmares related [place]. Patient indicated she is trying to not wake herself but and allow the memories to occur. Patient reported knowing her mind needs to process what happened. Patient indicated work continues to be stressful for her. Patient reported she has expressed her concerns and continues to look for a new job.    Therapist impression of patients current state: Patient appears to have fair insight into his mental health. This therapist processed with patient sleep hygiene and going over the PTSD and nightmares handout. 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 This therapist processed with patient the importance of continuing to stand up for herself at her job.      Type of  psychotherapeutic technique provided: Insight oriented, Client centered and CBT    Progress toward short term goals:Progress as expected with patient coming to scheduled session, using coping skills, and continuing medication management.     Review of long term goals: Treatment Plan updated on 02/28/2019    Diagnosis:   1. PTSD (post-traumatic stress disorder)    2. Panic disorder without agoraphobia    3. Major depressive disorder, recurrent episode, moderate (H)      Plan and Follow up: Patient will return in one week for scheduled session.  Patient should continue to work on her own self-care.  Patient would benefit form starting to identify ways she can forgive herself. Patient should work on slowly starting to confront her anxiety. Patient should identify her fears relate to returning [place] the [place] to [place] what happened. Patient would benefit from continuing to talk about her struggles with impulse. Patient should continue to process her emotions related to losing her car. Patient should work on sleep hygiene.     Discharge Criteria/Planning: Patient will continue with follow-up until therapy can be discontinued without return of signs and symptoms.    Mariana Love

## 2021-05-27 NOTE — PROGRESS NOTES
Mental Health Visit Note    04/10/2019   Start time: 656    Stop Time: 754 Session # 9    Session Type: Patient is presenting for an Individual session.    Alina Martell is a 54 y.o. female is being seen today for    Chief Complaint   Patient presents with     MH Follow Up     Anxiety     New symptoms or complaints: None    Functional Impairment:   Personal: 3  Family: 3  Work: 1  Social:2    Clinical assessment of mental status: Alina Martell was on time for scheduled session today. She was open and cooperative, and dressed appropriately for this session and weather. Her memory was good for short and long term.  Her speech and language was soft and monotone and appropriate for session.  Concentration and focus is fair.  Psychosis is not noted or reported. She reports her mood is anxious.  Affect is congruent with speech.  Fund of knowledge is adequate. Insight is adequate for therapy. Reviewed and changes made as needed.     Suicidal/Homicidal Ideation present: None Reported This Session    Patient's impression of their current status: Patient reported feeling anxious. Patient indicated she is not proud of her decisions she made this weekend. Patient reported it did make her realize that she is lonely intimately. Patient indicated she feels ready to take the next step with her boyfriend. Patient reported there is a lot of fears at this time. Patient indicated she tends to always go to the negative. Patient reported she needs to focus on the positive of their relationship. Patient indicated knowing she needs to start discussing this with her boyfriend.      Therapist impression of patients current state: Patient appears to have fair insight into his mental health. This therapist processed with patient what she wants in her relationship. This therapist challenged patient on ways she tries to avoid her emotions specifically in her relationship. This therapist processed with patient ways she can focus on  the positive.     Type of psychotherapeutic technique provided: Insight oriented, Client centered and CBT    Progress toward short term goals:Progress as expected with patient coming to scheduled session, using coping skills, and continuing medication management.     Review of long term goals: Treatment Plan updated on 02/28/2019    Diagnosis:   1. PTSD (post-traumatic stress disorder)    2. Panic disorder without agoraphobia    3. Major depressive disorder, recurrent episode, moderate (H)      Plan and Follow up: Patient will return in one week for scheduled session.  Patient should continue to work on her own self-care.  Patient would benefit form starting to identify ways she can forgive herself. Patient should work on slowly starting to confront her anxiety. Patient should identify her fears relate to returning [place] the [place] to [place] what happened. Patient would benefit from continuing to talk about her struggles with impulse. Patient should continue to process her emotions related to losing her car. Patient should work on sleep hygiene.     Discharge Criteria/Planning: Patient will continue with follow-up until therapy can be discontinued without return of signs and symptoms.    Mariana Love

## 2021-05-27 NOTE — PATIENT INSTRUCTIONS - HE
Please see Rheumatology as soon as possible.    Prednisone taper, start today.  I did prescribe pain meds to help with breakthrough pain.    Narcotics Discharge Instructions: (Roxicodone, Dilaudid, Ultram)  You have been prescribed a narcotic pain medication that has risk for addiction with prolonged use, so please use sparingly.  Additionally, narcotics are medications that are sedating (will make you sleepy), so do not drive or operate machinery while taking this medication.  Avoid alcohol or other sedating medicines such as benzodiazepines while taking narcotics due to risk for increased sedation and difficulty breathing.      Narcotics will cause constipation.  If you need to take this medication please consider taking an over-the-counter stool softener and laxative, such as Senna Plus, to prevent constipation from developing.  Nausea is a side effect of narcotic use.  Possible additional side effects include vomiting, itching, and dizziness/lightheadedness.    Patient to follow up with Primary Care provider regarding elevated blood pressure.

## 2021-05-27 NOTE — PROGRESS NOTES
Mental Health Visit Note    04/17/2019   Start time: 701   Stop Time: 756 Session # 10    Session Type: Patient is presenting for an Individual session.    Alina Martell is a 54 y.o. female is being seen today for    Chief Complaint   Patient presents with      Follow Up     Depression     New symptoms or complaints: None    Functional Impairment:   Personal: 3  Family: 3  Work: 1  Social:2    Clinical assessment of mental status: Alina Martell was on time for scheduled session today. She was open and cooperative, and dressed appropriately for this session and weather. Her memory was good for short and long term.  Her speech and language was soft, concise and appropriate for session.  Concentration and focus is fair.  Psychosis is not noted or reported. She reports her mood is depressed.  Affect is congruent with speech.  Fund of knowledge is adequate. Insight is adequate for therapy. Changes made as needed.     Suicidal/Homicidal Ideation present: None Reported This Session    Patient's impression of their current status: Patient indicated struggling with some depression. Patient reported it is not severe and she is recognizing it. Patient indicated she feels she is better in tune with her emotions and ways to work on changing them. Patient reported she has started to allow herself to cry which is a challenge for her. Patient indicated she knows it is good for her and she does feel better. Patient reported she continues to work on her relationship. Patient indicated she is continuing to give while setting healthy boundaries. Patient reported being intimate continues to be the worry for her.     Therapist impression of patients current state: Patient appears to have fair insight into his mental health. This therapist challenged patient on ways she is able to better understand her depression and from it becoming severe. This therapist processed with patient the importance of not avoiding her feelings.  This therapist challenged patient on what she wants from the relationship.     Type of psychotherapeutic technique provided: Insight oriented, Client centered and CBT    Progress toward short term goals:Progress as expected with patient coming to scheduled session, using coping skills, and continuing medication management.     Review of long term goals: Treatment Plan updated on 02/28/2019    Diagnosis:   1. PTSD (post-traumatic stress disorder)    2. Panic disorder without agoraphobia    3. Major depressive disorder, recurrent episode, moderate (H)      Plan and Follow up: Patient will return in one week for scheduled session.  Patient should continue to work on her own self-care.  Patient would benefit form starting to identify ways she can forgive herself. Patient should work on slowly starting to confront her anxiety. Patient should identify her fears relate to [place]. Patient would benefit from continuing to talk about her struggles with impulse. Patient should continue to process her emotions related to losing her car. Patient should work on sleep hygiene.     Discharge Criteria/Planning: Patient will continue with follow-up until therapy can be discontinued without return of signs and symptoms.    Mariana Love

## 2021-05-27 NOTE — PROGRESS NOTES
Mental Health Visit Note    03/22/2019   Start time: 703    Stop Time: 756 Session # 6    Session Type: Patient is presenting for an Individual session.    Alina Martell is a 54 y.o. female is being seen today for    Chief Complaint   Patient presents with     MH Follow Up     Anxious     New symptoms or complaints: None    Functional Impairment:   Personal: 3  Family: 3  Work: 1  Social:2    Clinical assessment of mental status: Alina Martell was on time for scheduled session today. She was open and cooperative, and dressed appropriately for this session and weather. Her memory was good for short and long term.  Her speech and language was soft and monotone and appropriate for session.  Concentration and focus is fair.  Psychosis is not noted or reported. She reports her mood is anxious.  Affect is congruent with speech.  Fund of knowledge is adequate. Insight is adequate for therapy. Reviewed and changes made as needed.     Suicidal/Homicidal Ideation present: None Reported This Session    Patient's impression of their current status: Patient indicated feeling anxious. Patient reported that she has been noticing more anxiety then depression lately. Patient indicated that she had a tough situation with her boyfriend. Patient reported she has not seen him since and needs to decide what steps she wants to take in order to move forward. Patient indicated living with her son is continuing to go well. Patient reported she does a lot for them and she knows they are thankful. Patient indicated when she thinks of Easter it causes her a little anxiety. Patient reported for the most part she is getting along with the family and needs to remind herself that she can leave at anytime.     Therapist impression of patients current state: Patient appears to have fair insight into his mental health. This therapist processed with patient different situations that she has noticed her anxiety. This therapist challenged  patient on ways she continues to work on confronting her anxiety and moving forward.     Type of psychotherapeutic technique provided: Insight oriented, Client centered and CBT    Progress toward short term goals:Progress as expected with patient coming to scheduled session, using coping skills, and continuing medication management.     Review of long term goals: Treatment Plan updated on 02/28/2019    Diagnosis:   1. PTSD (post-traumatic stress disorder)    2. Panic disorder without agoraphobia    3. Major depressive disorder, recurrent episode, moderate (H)      Plan and Follow up: Patient will return in one week for scheduled session.  Patient should continue to work on her own self-care.  Patient would benefit form starting to identify ways she can forgive herself. Patient should work on slowly starting to confront her anxiety. Patient should identify her fears relate [place]. Patient would benefit from continuing to talk about her struggles with impulse. Patient should continue to process her emotions related to losing her car.     Discharge Criteria/Planning: Patient will continue with follow-up until therapy can be discontinued without return of signs and symptoms.    Mariana Love

## 2021-05-27 NOTE — PROGRESS NOTES
ASSESSMENT:   1. Acute pain of right knee  oxyCODONE (ROXICODONE) 5 MG immediate release tablet    predniSONE (DELTASONE) 10 mg tablet   2. Rheumatoid arthritis involving multiple sites, unspecified rheumatoid factor presence (H)  Ambulatory referral to Rheumatology   3. Elevated blood pressure reading without diagnosis of hypertension         PLAN:  Symptoms consistent with previous RA flares.  Patient has not seen her rheumatologist in quite some time, states that she cannot get into see him.  Patient referred to [facility] rheumatology for further evaluation.  Placed on a prednisone taper, will follow up with primary care should further pain management be necessitated.  Patient with elevated blood pressure readings while in clinic today, encouraged her to monitor, if no improvement over the course of the next 2 weeks will follow up with primary care to discuss management.    I discussed red flag symptoms, return precautions to clinic/ER and follow up care with patient/parent.  Expected clinical course, symptomatic cares advised. Questions answered. Patient/parent amenable with plan.    Patient Instructions:  Patient Instructions   Please see Rheumatology as soon as possible.    Prednisone taper, start today.  I did prescribe pain meds to help with breakthrough pain.    Narcotics Discharge Instructions: (Roxicodone, Dilaudid, Ultram)  You have been prescribed a narcotic pain medication that has risk for addiction with prolonged use, so please use sparingly.  Additionally, narcotics are medications that are sedating (will make you sleepy), so do not drive or operate machinery while taking this medication.  Avoid alcohol or other sedating medicines such as benzodiazepines while taking narcotics due to risk for increased sedation and difficulty breathing.      Narcotics will cause constipation.  If you need to take this medication please consider taking an over-the-counter stool softener and laxative, such as Senna  Plus, to prevent constipation from developing.  Nausea is a side effect of narcotic use.  Possible additional side effects include vomiting, itching, and dizziness/lightheadedness.    Patient to follow up with Primary Care provider regarding elevated blood pressure.        SUBJECTIVE:   Alina Martell is a 54 y.o. female who presents today with increasing left knee pain. She has a history of RA, and this pain is very consistent with her previous RA flares.  It is interfering with her ADLs and sleep.  Was seen in ED on 3/31, given a 5 day course of prednisone which helped while she was on it but now pain has returned.  Has not been able to get in to see her rheumatologist.  No fevers, injury to explain pain, paresthesias, weakness.          ROS:  Comprehensive 12 pt ROS completed, positives noted in HPI, otherwise negative.      Past Medical History:  Patient Active Problem List   Diagnosis     PTSD (post-traumatic stress disorder)     Sleep disorder     Anxiety     Panic attack     Major depressive disorder, recurrent episode, unspecified     Anemia - microcytic anemia     Obesity     Sicca syndrome (H)     Grief     Olecranon bursitis, right - bland, non-inflamed, diagnosed on 7/8/16     Carpal tunnel syndrome     Chronic pain     Seropositive rheumatoid arthritis of multiple sites (H)     Tendonitis of both shoulders     High risk medication use     Cyclic citrullinated peptide (CCP) antibody positive       Surgical History:  Past Surgical History:   Procedure Laterality Date     OK REMOVAL GALLBLADDER      Description: Cholecystectomy;  Recorded: 10/15/2013;     TUBAL LIGATION  1995           Family History:  Family History   Problem Relation Age of Onset     Diabetes Father      Prostate cancer Father      Chronic Kidney Disease Father         Chose against dialysis.     Drug abuse Brother      Depression Brother      Crohn's disease Mother         Total colectomy with ileostomy.     No Medical Problems  Daughter      No Medical Problems Son      No Medical Problems Brother      Lupus Cousin      Breast cancer Neg Hx        Reviewed; Non-contributory    Social History     Tobacco Use   Smoking Status Never Smoker   Smokeless Tobacco Never Used   Tobacco Comment    smokes marijuana     Current Medications:  Current Outpatient Medications on File Prior to Visit   Medication Sig Dispense Refill     EPINEPHrine (EPIPEN 2-RADHA) 0.3 mg/0.3 mL injection Inject 0.3 mL (0.3 mg total) into the shoulder, thigh, or buttocks as needed. 2 Pre-filled Pen Syringe 0     gabapentin (NEURONTIN) 300 MG capsule 1-2 up to 4 times daily 240 capsule 5     gabapentin, bulk, 100 % Powd Take 300 mg by mouth.       hydroxychloroquine (PLAQUENIL) 200 mg tablet Take by mouth 2 (two) times a day.       hydrOXYzine pamoate (VISTARIL) 25 MG capsule 1 up to four times daily prn anxiety, 1-4 at first wakening prn 150 capsule 5     ibuprofen (ADVIL,MOTRIN) 600 MG tablet Take 600 mg by mouth.       leflunomide (ARAVA) 20 MG tablet Take 1 tablet (20 mg total) by mouth daily. 30 tablet 1     leflunomide (ARAVA) 20 MG tablet Take by mouth.       multivitamin therapeutic (THERAGRAN) tablet Take 1 tablet by mouth daily.       oxyCODONE (ROXICODONE) 5 MG immediate release tablet Take 1 tablet (5 mg total) by mouth every 8 (eight) hours as needed for pain. 5 tablet 0     PARoxetine (PAXIL) 40 MG tablet Take 40 mg by mouth.       prazosin (MINIPRESS) 1 MG capsule Take 1 capsule (1 mg total) by mouth at bedtime. 30 capsule 5     prazosin (MINIPRESS) 2 MG capsule Take 1 capsule (2 mg total) by mouth at bedtime. 30 capsule 5     predniSONE (DELTASONE) 10 mg tablet Take 10 mg by mouth.       traZODone (DESYREL) 50 MG tablet Take 50 mg by mouth.       DULoxetine (CYMBALTA) 30 MG capsule Take 1 capsule (30 mg total) by mouth daily. 7 capsule 0     DULoxetine (CYMBALTA) 60 MG capsule Take 1 capsule (60 mg total) by mouth daily. 30 capsule 0     No current  facility-administered medications on file prior to visit.        Allergies:   Allergies   Allergen Reactions     Glucosamine Anaphylaxis     Shellfish Containing Products Anaphylaxis     Sulfa (Sulfonamide Antibiotics) Itching     Rapid heart rate, itching, shortness of breath     Effexor [Venlafaxine] Palpitations     Pt B/P was elevated      Abilify [Aripiprazole] Hives     Propoxyphene N-Acetaminophen      Sulfasalazine Hives       OBJECTIVE:   Vitals:    04/07/19 1108 04/07/19 1139   BP: (!) 174/98 (!) 136/98   Patient Site: Left Arm Right Arm   Patient Position: Sitting Sitting   Cuff Size: Adult Regular Adult Large   Pulse: 89    Resp: 18    Temp: 98.2  F (36.8  C)    TempSrc: Oral    SpO2: 96%    Weight: (!) 262 lb 9 oz (119.1 kg)      Physical exam reveals a pleasant 54 y.o. female.   General: Appears healthy, alert and cooperative. Non-toxic appearance.  Pulmonary/Chest: even, unlabored resp.  Heart: regular rate  Abdomen: soft, nontender. No masses or organomegaly  Left Leg: No swelling, erythema. CMS intact.  Neuro: Alert, oriented. Non-focal.  Skin: pink, warm, dry, and without lesions on limited skin exam. No rashes.  Psychiatric: Normal mood and affect.  Normal judgement and thought content. Normal behavior.       RADIOLOGY    none  LABORATORY STUDIES    none      Lidia Molina, ERAN

## 2021-05-27 NOTE — PROGRESS NOTES
Mental Health Visit Note    04/03/2019   Start time: 658    Stop Time: 755 Session # 8    Session Type: Patient is presenting for an Individual session.    Alina Martell is a 54 y.o. female is being seen today for    Chief Complaint   Patient presents with      Follow Up     Depression     New symptoms or complaints: None    Functional Impairment:   Personal: 3  Family: 3  Work: 1  Social:2    Clinical assessment of mental status: Alina Martell was on time for scheduled session today. She was open and cooperative, and dressed appropriately for this session and weather. Her memory was good for short and long term.  Her speech and language was soft and monotone and appropriate for session.  Concentration and focus is fair.  Psychosis is not noted or reported. She reports her mood is down.  Affect is congruent with speech.  Fund of knowledge is adequate. Insight is adequate for therapy. Mental status reviewed and changes made as needed.     Suicidal/Homicidal Ideation present: None Reported This Session    Patient's impression of their current status: Patient indicated feeling down. Patient reported she has had a lot on mind lately. Patient indicated she continues to think about the trauma that occurred at the [facility]. Patient reported at this point she feels that 50% is her fault and 50% the guys fault. Patient indicated if the roles were reversed she would give herself 100% of the responsibility. Patient reported knowing this is not fair or right but how she is feeling emotionally at this time.     Therapist impression of patients current state: Patient appears to have fair insight into his mental health. This therapist challenged patient on ways she has taken on more then her responsibility when something goes wrong. This therapist processed with patient ways her expectations for herself are much different then other people.        Type of psychotherapeutic technique provided: Insight oriented, Client  centered and CBT    Progress toward short term goals:Progress as expected with patient coming to scheduled session, using coping skills, and continuing medication management.     Review of long term goals: Treatment Plan updated on 02/28/2019    Diagnosis:   1. PTSD (post-traumatic stress disorder)    2. Panic disorder without agoraphobia    3. Major depressive disorder, recurrent episode, moderate (H)      Plan and Follow up: Patient will return in one week for scheduled session.  Patient should continue to work on her own self-care.  Patient would benefit form starting to identify ways she can forgive herself. Patient should work on slowly starting to confront her anxiety. Patient should identify her fears relate [place]. Patient would benefit from continuing to talk about her struggles with impulse. Patient should continue to process her emotions related to losing her car. Patient should work on sleep hygiene.     Discharge Criteria/Planning: Patient will continue with follow-up until therapy can be discontinued without return of signs and symptoms.    Mariana Love

## 2021-05-28 ENCOUNTER — COMMUNICATION - HEALTHEAST (OUTPATIENT)
Dept: FAMILY MEDICINE | Facility: CLINIC | Age: 56
End: 2021-05-28

## 2021-05-28 DIAGNOSIS — L50.9 HIVES: ICD-10-CM

## 2021-05-28 ASSESSMENT — ANXIETY QUESTIONNAIRES
GAD7 TOTAL SCORE: 3
GAD7 TOTAL SCORE: 5
GAD7 TOTAL SCORE: 4
GAD7 TOTAL SCORE: 5
GAD7 TOTAL SCORE: 6

## 2021-05-28 NOTE — PROGRESS NOTES
Mental Health Visit Note    04/24/2019   Start time: 655   Stop Time: 752 Session # 11    Session Type: Patient is presenting for an Individual session.    Alina Martell is a 54 y.o. female is being seen today for    Chief Complaint   Patient presents with      Follow Up     Anxiety     New symptoms or complaints: None    Functional Impairment:   Personal: 3  Family: 3  Work: 1  Social:2    Clinical assessment of mental status: Alina Martell was on time for scheduled session today. She was open and cooperative, and dressed appropriately for this session and weather. Her memory was good for short and long term.  Her speech and language was soft, concise and appropriate for session.  Concentration and focus is fair.  Psychosis is not noted or reported. She reports her mood is anxious.  Affect is congruent with speech.  Fund of knowledge is adequate. Insight is adequate for therapy. Reviewed and changes made as needed.     Suicidal/Homicidal Ideation present: None Reported This Session    Patient's impression of their current status: Patient indicated feeling anxious. Patient reported she has a lot of new things that she is trying including volunteering at new places. Patient indicated volunteering always makes her feel better and knowing this is good for her. Patient reported she is doing better with accepting that she does not have a car anymore. Patient indicated she has been saving and will have one by fall. Patient reported she is able to be around her family and not feel the shame. Patient indicated she is going away with her boyfriend next week which causes anxiety but she is not going to back out.     Therapist impression of patients current state: Patient appears to have fair insight into his mental health. This therapist processed with patient ways she has continued to work to reduce her depression symptoms. This therapist processed with patient her anxiety and skills to reduce her anxiety. This  therapist processed with patient the importance of trying new things even if they cause some anxiety and staying there until the anxiety decreases.     Type of psychotherapeutic technique provided: Insight oriented, Client centered and CBT    Progress toward short term goals:Progress as expected with patient coming to scheduled session, using coping skills, and continuing medication management.     Review of long term goals: Treatment Plan updated on 02/28/2019    Diagnosis:   1. PTSD (post-traumatic stress disorder)    2. Panic disorder without agoraphobia    3. Major depressive disorder, recurrent episode, moderate (H)      Plan and Follow up: Patient will return in one week for scheduled session.  Patient should continue to work on her own self-care.  Patient would benefit form starting to identify ways she can forgive herself. Patient should work on slowly starting to confront her anxiety. Patient should identify her fears relate [place]. Patient would benefit from continuing to talk about her struggles with impulse. Patient should continue to process her emotions related to losing her car. Patient should work on sleep hygiene.     Discharge Criteria/Planning: Patient will continue with follow-up until therapy can be discontinued without return of signs and symptoms.    Mariana Love

## 2021-05-28 NOTE — PROGRESS NOTES
Mental Health Visit Note    05/08/2019   Start time: 655   Stop Time: 754 Session # 12    Session Type: Patient is presenting for an Individual session.    Alina Martell is a 54 y.o. female is being seen today for    Chief Complaint   Patient presents with      Follow Up     Trauma     New symptoms or complaints: None    Present for session: Patient and therapist     Functional Impairment:   Personal: 3  Family: 3  Work: 2  Social:2    Clinical assessment of mental status: Alina Martell was on time for scheduled session today. She was open and cooperative, and dressed appropriately for this session and weather. Her memory was good for short and long term.  Her speech and language was soft, concise and appropriate for session.  Concentration and focus is fair.  Psychosis is not noted or reported. She reports her mood is anxious.  Affect is congruent with speech.  Fund of knowledge is adequate. Insight is adequate for therapy. No changes for this session.     Suicidal/Homicidal Ideation present: None Reported This Session    Patient's impression of their current status: Patient reported having some anxiety. Patient indicated it is related to her trauma's. Patient reported she will wake up at night in a panic thinking her grandpa is still alive. Patient indicated she tends to have thoughts about the different trauma's throughout the day as well. Patient reported she has been working hard to not allow her trauma's to affect her relationship. Patient indicated she has been working to remind herself that she is safe and that her boyfriend will not hurt her. Patient reported knowing she needs to continue to use a lot of self-talk. Patient indicated she continues to struggle with her self-esteem.     Therapist impression of patients current state: Patient appears to have fair insight into his mental health. This therapist challenged patient on ways the trauma's continue to affect her life in different way.  This therapist encouraged patient to go back over the PTSD and Nightmare training worksheet. This therapist processed with patient ways to work on being in the moment. This therapist processed with patient being able to recognize the compliments she receives and saying thank you.     Type of psychotherapeutic technique provided: Insight oriented, Client centered and CBT    Progress toward short term goals:Progress as expected with patient coming to scheduled session, using coping skills,noticing symptoms and continuing medication management.     Review of long term goals: Treatment Plan updated on 02/28/2019    Diagnosis:   1. PTSD (post-traumatic stress disorder)    2. Mild episode of recurrent major depressive disorder (H)    3. Attention deficit hyperactivity disorder (ADHD), combined type      Plan and Follow up: Patient will return in two weeks for scheduled session.  Patient should continue to work on her own self-care. Patient should work on sleep hygiene. Patient would benefit from using the PTSD and nightmare worksheet to help with nightmares. Patient should continue to work on accepting compliments from people.     Discharge Criteria/Planning: Patient will continue with follow-up until therapy can be discontinued without return of signs and symptoms.    Mariana Love

## 2021-05-28 NOTE — PROGRESS NOTES
Correct pharmacy verified with patient and confirmed in snapshot? [x] yes []no    Charge captured ? [x] yes  [] no    Medications Phoned  to Pharmacy [] yes [x]no  Name of Pharmacist:  List Medications, including dose, quantity and instructions      Medication Prescriptions given to patient   [] yes  [x] no   List the name of the drug the prescription was written for.       Medications ordered this visit were e-scribed.  Verified by order class [x] yes  [] no  Guanfacine 1 mg and 2 mg    Medication changes or discontinuations were communicated to patient's pharmacy: [x] yes  [] no  Minipress 1 mg and 2 mg  Paxil 40 mg  Cymbalta 30 mg    UA collected [] yes  [x] no    [place] Prescription Monitoring Program Reviewed? [x] yes  [] no    Referrals were made to: none     Future appointment was made: [x] yes  [] no    Dictation completed at time of chart check: [x] yes  [] no    I have checked the documentation for today s encounters and the above information has been reviewed and completed.

## 2021-05-29 NOTE — PROGRESS NOTES
Outpatient Mental Health Treatment Plan  Name: Alina Martell  : 1965  MRN: [id number]    Treatment Plan: Updated Treatment Plan    Benefit and risks and alternatives have been discussed: Yes  Is this treatment appropriate with minimal intrusion/restrictions: Yes  Estimated duration of treatment: Ongoing therapy at this time  Anticipated frequency of services: Weekly  Necessity for frequency: This frequency is needed to establish therapeutic goals and for continuity of care in order to monitor progress.  Necessity for treatment: To address cognitive, behavioral, and/or emotional barriers in order to work toward goals and to improve quality of life.    Plan:   ? Depression    Goal:  Decrease average depression level from 4 to 1.  Strategies:   ?[x] Decrease social isolation  [x] Increase involvement in meaningful activities  ?[x] Discuss sleep hygiene  ?[x] Explore thoughts and expectations about self and others  ?[x] Assess for suicide risk  ?[x] Implement physical activity routine (with physician approval)  [x] Consider introduction of bibliotherapy and/or videos  [x] Continue compliance with medical treatment plan (or explore  barriers)  ?   Degree to which this is a problem (1-4): 4  Degree to which goal is met (1-4): 3    Date of next review: 2019     ? Anxiety    Goal:  Decrease average anxiety level from 4 to 1.  Strategies:   ? [x]Learn and practice relaxation techniques and other coping strategies    ? [x] Increase involvement in meaningful activities  ? [x] Discuss sleep hygiene  ? [x] Explore thoughts and expectations about self and others  ? [x] Identify and monitor triggers for panic/anxiety symptoms  ? [x] Implement physical activity routine (with physician approval)  ? [x] Consider introduction of bibliotherapy and/or videos  ? [x] Continue compliance with medical treatment plan    Degree to which this is a problem (1-4): 4  Degree to which goal is met (1-4): 2    Date of next review:  "08/22/2019    PTSD    Goal: Identify triggers, separate the traumatic memory from the debilitating emotion associated with it.    Strategies:    [X]Learn and practice relaxation techniques and other coping strategies (e.g., thought stopping, reframing, meditation)    ? [X] Cognitive restructuring    ? [X] Discuss sleep hygiene    ? [X] Identify and change maladaptive coping behaviors    ? [X] Prolonged exposure therapy    ? [X] Identify cues and symptoms of PTSD      Degree to which this is a problem (1-4): 4  Degree to which goal is met (1-4): 2    Date of next review: 08/22/2019    Functional Impairment: 1=Not at all/Rarely 2=Some days 3=Most Days 4=Every Day    Personal: 4  Family: 4  Social: 3  Work: 3    Diagnosis:  Major depressive disorder, recurrent, severe without psychotic behaviors   Panic disorder  PTSD    Clinical assessments completed: ADA-7, PHQ-9, Mood Questionnaire current, CAGE-AID, PANSI.    STRENGTHS: Hard worker, dedication and support network    LIMITATIONS: Avoidance behaviors    Cultural Identification: Patient reported:    Race: Bi-racial    Experience of cultural bias: Patient reported experiencing cultural bias. Patient gave an example of when she was living in [place] and being told \"we don't serve Nig[place].\" Patient reported being called the inward on multiple occasions.    Immigration/history of status: Born [place] raised in [place].   Level of acculturation: acculturated   Time orientation: middle    Social orientation/class: middle    Verbal Communication Style/languages: English    Locus of Control: inward     Persons responsible for this plan:  ? [x] Patient ? [x] Provider ?     Provider:Performed and documented by SHERRIE Pantoja    Patient Signature:____________________________________ Date: ______________       Therapist Signature: __________________________________ Date: ______________  "

## 2021-05-29 NOTE — PROGRESS NOTES
Mental Health Visit Note    05/22/2019   Start time: 700   Stop Time: 755 Session # 13    Session Type: Patient is presenting for an Individual session.    Alina Martell is a 54 y.o. female is being seen today for    Chief Complaint   Patient presents with      Follow Up     Self-esteem     New symptoms or complaints: None    Present for session: Patient and therapist     Functional Impairment:   Personal: 3  Family: 3  Work: 2  Social:2    Clinical assessment of mental status: Alina Martell was on time for scheduled session today. She was open and cooperative, and dressed appropriately for this session and weather. Her memory was good for short and long term.  Her speech and language was soft, concise and appropriate for session.  Concentration and focus is fair.  Psychosis is not noted or reported. She reports her mood is a little down.  Affect is congruent with speech.  Fund of knowledge is adequate. Insight is adequate for therapy. No changes for this session.     Suicidal/Homicidal Ideation present: None Reported This Session    Patient's impression of their current status: Patient indicated feeling a little down. Patient reported she is continuing to struggle with self-esteem. Patient indicated part of her is confused as to where she belongs. Patient reported being bi-sexual has been very challenging for her. Patient indicated not feeling understood by a lot of people. Patient reported she also is unsure what she wants at this point in terms of relationship. Patient indicated she is trying to look at what is best for her and not pressure from other people.     Therapist impression of patients current state: Patient appears to have fair insight into his mental health. This therapist processed with patient her challenges of being comfortable with herself. This therapist challenged patient on what parts of her self-esteem she feels are going well and what parts she continues to struggle with  ongoing.     Type of psychotherapeutic technique provided: Insight oriented, Client centered and CBT    Progress toward short term goals:Progress as expected with patient coming to scheduled session, using coping skills,noticing symptoms and continuing medication management.     Review of long term goals: Treatment Plan updated on 02/28/2019    Diagnosis:   1. PTSD (post-traumatic stress disorder)    2. Mild episode of recurrent major depressive disorder (H)    3. Attention deficit hyperactivity disorder (ADHD), combined type      Plan and Follow up: Patient will return in two weeks for scheduled session.  Patient should continue to work on her own self-care. Patient should work on sleep hygiene. Patient would benefit from using the PTSD and nightmare worksheet to help with nightmares. Patient should continue to work on accepting compliments from people.     Discharge Criteria/Planning: Patient will continue with follow-up until therapy can be discontinued without return of signs and symptoms.    Mariana Love

## 2021-05-29 NOTE — PROGRESS NOTES
Mental Health Visit Note    06/19/2019   Start time: 655   Stop Time: 755 Session # 14    Session Type: Patient is presenting for an Individual session.    Alina Martell is a 54 y.o. female is being seen today for    Chief Complaint   Patient presents with      Follow Up     Relationship Stress     New symptoms or complaints: None    Present for session: Patient and therapist     Functional Impairment:   Personal: 3  Family: 3  Work: 2  Social:2    Clinical assessment of mental status: Alina Martell was on time for scheduled session today. She was open and cooperative, and dressed appropriately for this session and weather. Her memory was good for short and long term.  Her speech and language was soft, concise and appropriate for session.  Concentration and focus is fair.  Psychosis is not noted or reported. She reports her mood is stressed.  Affect is congruent with speech.  Fund of knowledge is adequate. Insight is adequate for therapy. Reviewed and changes made as needed.     Suicidal/Homicidal Ideation present: None Reported This Session    Patient's impression of their current status: Patient reported feeling stressed. Patient indicated her significant other is wanting her to commit. Patient reported this means him spending more time with her family. Patient indicated it also means spending more time together. Patient reported she is worried about having her heart broken. Patient indicated it would hurt if they ended what they had now but it would be worse if she committed herself to the relationship. Patient reported she has emotional scares from two relationships and being unsure she wants to risk that again.     Therapist impression of patients current state: Patient appears to have fair insight into his mental health. This therapist challenged patient on what she does want from a relationship. This therapist processed with patient ways to look at this relationship different and ways that she  is different from the past. This therapist challenged patient on ways she is not allowing herself to move forward.    Type of psychotherapeutic technique provided: Insight oriented, Client centered and CBT    Progress toward short term goals:Progress as expected with patient coming to scheduled session, using coping skills,noticing symptoms and continuing medication management.     Review of long term goals: Treatment Plan updated on 05/22/2019    Diagnosis:   1. PTSD (post-traumatic stress disorder)    2. Mild episode of recurrent major depressive disorder (H)    3. Attention deficit hyperactivity disorder (ADHD), combined type      Plan and Follow up: Patient will return in two weeks for scheduled session.  Patient should continue to work on her own self-care. Patient would benefit from using the PTSD and nightmare worksheet to help with nightmares. Patient should identify her fears of commitment.     Discharge Criteria/Planning: Patient will continue with follow-up until therapy can be discontinued without return of signs and symptoms.    Mariana Love

## 2021-05-30 VITALS — WEIGHT: 259 LBS | HEIGHT: 69 IN | BODY MASS INDEX: 38.36 KG/M2

## 2021-05-30 VITALS — BODY MASS INDEX: 38.66 KG/M2 | WEIGHT: 261 LBS | HEIGHT: 69 IN

## 2021-05-30 NOTE — PROGRESS NOTES
Mental Health Visit Note    07/25/2019   Start time: 704  Stop Time: 750 Session # 16    Session Type: Patient is presenting for an Individual session.    Alina Martell is a 54 y.o. female is being seen today for    Chief Complaint   Patient presents with      Follow Up     Relationship and sleep problems     New symptoms or complaints: None    Present for session: Patient and therapist     Functional Impairment:   Personal: 3  Family: 3  Work: 2  Social:2    Clinical assessment of mental status: Alina Martell was on time for scheduled session today. She was open and cooperative, and dressed appropriately for this session and weather. Her memory was good for short and long term.  Her speech and language was soft, concise and appropriate for session.  Concentration and focus is fair.  Psychosis is not noted or reported. She reports her mood is anxious and tired.  Affect is congruent with speech.  Fund of knowledge is adequate. Insight is adequate for therapy. Changes made as needed.     Suicidal/Homicidal Ideation present: None Reported This Session    Patient's impression of their current status: Patient indicated feeling anxious and tired. Patient reported she has not been sleeping well. Patient indicated waking up but not being able to remember her dreams/nightmare. Patient reported part of her being relieved but another part knowing it is a sign that she needs to process something. Patient indicated she has been working on getting back to sleep in a healthy way. Patient reported she continues to try and not admit she is in a relationship. Patient indicated the thought of saying she has a boyfriend scared her. Patient reported worried that he will then want to move in together.     Therapist impression of patients current state: Patient appears to have fair insight into his mental health. This therapist went over coping skills to use that can help her to get back to sleep. This therapist processed  with patient the importance of not playing games on her phone of other things that would stimulate her mind. This therapist challenged patient on her fears of being a relationship. This therapist processed with patient ways to be in a relationship but set boundaires.     Type of psychotherapeutic technique provided: Insight oriented, Client centered and CBT    Progress toward short term goals:Progress as expected with patient coming to scheduled session, using coping skills,noticing symptoms and continuing medication management.     Review of long term goals: Treatment Plan updated on 05/22/2019    Diagnosis:   1. PTSD (post-traumatic stress disorder)    2. Mild episode of recurrent major depressive disorder (H)    3. Attention deficit hyperactivity disorder (ADHD), combined type      Plan and Follow up: Patient will return in two weeks for scheduled session.  Patient should continue to work on her own self-care. Patient would benefit from using the PTSD and nightmare worksheet to help with nightmares. Patient should identify her fears of commitment. Patient should work on identifying what she wants for her friendship.     Discharge Criteria/Planning: Patient will continue with follow-up until therapy can be discontinued without return of signs and symptoms.    Mariana Love

## 2021-05-30 NOTE — PROGRESS NOTES
Mental Health Visit Note    07/17/2019   Start time: 705  Stop Time: 750 Session # 15    Session Type: Patient is presenting for an Individual session.    Alina Martell is a 54 y.o. female is being seen today for    Chief Complaint   Patient presents with      Follow Up     Relationship Stress     New symptoms or complaints: None    Present for session: Patient and therapist     Functional Impairment:   Personal: 3  Family: 3  Work: 2  Social:2    Clinical assessment of mental status: Alina Martell was on time for scheduled session today. She was open and cooperative, and dressed appropriately for this session and weather. Her memory was good for short and long term.  Her speech and language was soft, concise and appropriate for session.  Concentration and focus is fair.  Psychosis is not noted or reported. She reports her mood is stressed.  Affect is congruent with speech.  Fund of knowledge is adequate. Insight is adequate for therapy. Reviewed and changes made as needed.     Suicidal/Homicidal Ideation present: None Reported This Session    Patient's impression of their current status: Patient reported feeling stressed. Patient indicated most of her stress is related to her relationship and a friendship. Patient reported with her relationship she continues to have times when she feels uncomfortable. Patient indicated knowing she has came a long ways but feeling at times it is not fast enough for him. Patient reported she still notices triggers to her past trauma which causes her to take a step back. Patient indicated an old friend reaching out to her which she knows is unhealthy. Patient reported knowing she should not communicate with her but it not being that easy. Patient indicated needing to decide what type of friendship she wants with her and setting boundaries.     Therapist impression of patients current state: Patient appears to have fair insight into his mental health. This therapist  processed with patient the steps forward she has taken in the relationship. This therapist processed with patient skills to continue to use to help with her trauma. This therapist challenged patient on what she wants from her friendship and being able to set healthy boundaries.     Type of psychotherapeutic technique provided: Insight oriented, Client centered and CBT    Progress toward short term goals:Progress as expected with patient coming to scheduled session, using coping skills,noticing symptoms and continuing medication management.     Review of long term goals: Treatment Plan updated on 05/22/2019    Diagnosis:   1. PTSD (post-traumatic stress disorder)    2. Mild episode of recurrent major depressive disorder (H)    3. Attention deficit hyperactivity disorder (ADHD), combined type      Plan and Follow up: Patient will return in two weeks for scheduled session.  Patient should continue to work on her own self-care. Patient would benefit from using the PTSD and nightmare worksheet to help with nightmares. Patient should identify her fears of commitment. Patient should work on identifying what she wants for her friendship.     Discharge Criteria/Planning: Patient will continue with follow-up until therapy can be discontinued without return of signs and symptoms.    Mariana Love

## 2021-05-30 NOTE — PROGRESS NOTES
Mental Health Visit Note    07/03/2019   Start time: 655   Stop Time: 755 Session # 14    Session Type: Patient is presenting for an Individual session.    Alina Martell is a 54 y.o. female is being seen today for    Chief Complaint   Patient presents with      Follow Up     Social Anxiety     New symptoms or complaints: None    Present for session: Patient and therapist     Functional Impairment:   Personal: 3  Family: 3  Work: 2  Social:2    Clinical assessment of mental status: Alina Martell was on time for scheduled session today. She was open and cooperative, and dressed appropriately for this session and weather. Her memory was good for short and long term.  Her speech and language was soft, concise and appropriate for session.  Concentration and focus is fair.  Psychosis is not noted or reported. She reports her mood is anxious.  Affect is congruent with speech.  Fund of knowledge is adequate. Insight is adequate for therapy. Changes made as needed.     Suicidal/Homicidal Ideation present: None Reported This Session    Patient's impression of their current status: Patient indicated feeling anxious. Patient reported her anxiety is more social anxiety. Patient indicated she has a wedding this week that is causing her social anxiety. Patient reported it is a fancy wedding so she is wearing a dress which is very uncomfortable for her. Patient indicated she does know a lot of people which will help. Patient reported she is continuing to struggle with her relationship. Patient indicated he is wanting more and she is unsure what she wants at this time.     Therapist impression of patients current state: Patient appears to have fair insight into his mental health. This therapist processed with patient skills to work on reducing her anxiety. This therapist processed with patient who she feels comfortable with and being around them at the wedding. This therapist challenged patient on her fears of the  relationship and taking a step forward.     Type of psychotherapeutic technique provided: Insight oriented, Client centered and CBT    Progress toward short term goals:Progress as expected with patient coming to scheduled session, using coping skills,noticing symptoms and continuing medication management.     Review of long term goals: Treatment Plan updated on 05/22/2019    Diagnosis:   1. PTSD (post-traumatic stress disorder)    2. Mild episode of recurrent major depressive disorder (H)    3. Attention deficit hyperactivity disorder (ADHD), combined type      Plan and Follow up: Patient will return in two weeks for scheduled session.  Patient should continue to work on her own self-care. Patient would benefit from using the PTSD and nightmare worksheet to help with nightmares. Patient should identify her fears of commitment.     Discharge Criteria/Planning: Patient will continue with follow-up until therapy can be discontinued without return of signs and symptoms.    Mariana Love

## 2021-05-31 VITALS — WEIGHT: 259 LBS | HEIGHT: 69 IN | BODY MASS INDEX: 38.36 KG/M2

## 2021-05-31 VITALS — WEIGHT: 259 LBS | BODY MASS INDEX: 38.36 KG/M2 | HEIGHT: 69 IN

## 2021-05-31 VITALS — BODY MASS INDEX: 38.36 KG/M2 | WEIGHT: 259 LBS | HEIGHT: 69 IN

## 2021-05-31 VITALS — BODY MASS INDEX: 38.81 KG/M2 | WEIGHT: 259 LBS
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
## 2021-05-31 NOTE — PROGRESS NOTES
Mental Health Visit Note    08/09/2019   Start time: 701  Stop Time: 749 Session # 17    Session Type: Patient is presenting for an Individual session.    Alina Martell is a 54 y.o. female is being seen today for    Chief Complaint   Patient presents with      Follow Up     Anxiety and nightmares     New symptoms or complaints: None    Present for session: Patient and therapist     Functional Impairment:   Personal: 3  Family: 3  Work: 2  Social:2    Clinical assessment of mental status: Alina Martell was on time for scheduled session today. She was open and cooperative, and dressed appropriately for this session and weather. Her memory was good for short and long term.  Her speech and language was soft, concise and appropriate for session.  Concentration and focus is fair.  Psychosis is not noted or reported. She reports her mood is anxious.  Affect is congruent with speech.  Fund of knowledge is adequate. Insight is adequate for therapy. Reviewed and changes made as needed.     Suicidal/Homicidal Ideation present: None Reported This Session    Patient's impression of their current status: Patient reported feeling anxious. Patient indicated she has noticed an increase in her anxiety. Patient reported she also has been struggling with nightmares. Patient indicated the nightmares go between the trauma's that she has experienced. Patient reported she has been able to use mindfulness to help her get back to sleep. Patient indicated still having fear of her daughter's father. Patient reported with her grandfather feeling like she lost her childhood.     Therapist impression of patients current state: Patient appears to have fair insight into his mental health. This therapist processed with patient skills to continue working on decreasing her anxiety. This therapist processed with patient nightmare retraining and using this to help reduce nightmares.     Type of psychotherapeutic technique provided: Insight  oriented, Client centered and CBT    Progress toward short term goals:Progress as expected with patient coming to scheduled session, using coping skills,noticing symptoms and continuing medication management.     Review of long term goals: Treatment Plan updated on 05/22/2019    Diagnosis:   1. PTSD (post-traumatic stress disorder)    2. Mild episode of recurrent major depressive disorder (H)    3. Attention deficit hyperactivity disorder (ADHD), combined type      Plan and Follow up: Patient will return in two weeks for scheduled session.  Patient should continue to work on her own self-care. Patient would benefit from using the PTSD and nightmare worksheet to help with nightmares. Patient should identify her fears of commitment. Patient should work on identifying what she wants for her friendship.     Discharge Criteria/Planning: Patient will continue with follow-up until therapy can be discontinued without return of signs and symptoms.    Mariana Love

## 2021-05-31 NOTE — PROGRESS NOTES
Outpatient Mental Health Treatment Plan  Name: Alina Martell  : 1965  MRN: [id number]    Treatment Plan: Updated Treatment Plan    Benefit and risks and alternatives have been discussed: Yes  Is this treatment appropriate with minimal intrusion/restrictions: Yes  Estimated duration of treatment: Ongoing therapy at this time  Anticipated frequency of services: Bi-Weekly  Necessity for frequency: This frequency is needed to establish therapeutic goals and for continuity of care in order to monitor progress.  Necessity for treatment: To address cognitive, behavioral, and/or emotional barriers in order to work toward goals and to improve quality of life.    Plan:   ? Depression    Goal:  Decrease average depression level from 4 to 1.  Strategies:   ?[x] Decrease social isolation  [x] Increase involvement in meaningful activities  ?[x] Discuss sleep hygiene  ?[x] Explore thoughts and expectations about self and others  ?[x] Assess for suicide risk  ?[x] Implement physical activity routine (with physician approval)  [x] Consider introduction of bibliotherapy and/or videos  [x] Continue compliance with medical treatment plan (or explore  barriers)  ?   Degree to which this is a problem (1-4): 4  Degree to which goal is met (1-4): 3    Date of next review: 2019     ? Anxiety    Goal:  Decrease average anxiety level from 4 to 1.  Strategies:   ? [x]Learn and practice relaxation techniques and other coping strategies    ? [x] Increase involvement in meaningful activities  ? [x] Discuss sleep hygiene  ? [x] Explore thoughts and expectations about self and others  ? [x] Identify and monitor triggers for panic/anxiety symptoms  ? [x] Implement physical activity routine (with physician approval)  ? [x] Consider introduction of bibliotherapy and/or videos  ? [x] Continue compliance with medical treatment plan    Degree to which this is a problem (1-4): 4  Degree to which goal is met (1-4): 2    Date of next  "review: 11/22/2019    PTSD    Goal: Identify triggers, separate the traumatic memory from the debilitating emotion associated with it.    Strategies:    [X]Learn and practice relaxation techniques and other coping strategies (e.g., thought stopping, reframing, meditation)    ? [X] Cognitive restructuring    ? [X] Discuss sleep hygiene    ? [X] Identify and change maladaptive coping behaviors    ? [X] Prolonged exposure therapy    ? [X] Identify cues and symptoms of PTSD      Degree to which this is a problem (1-4): 4  Degree to which goal is met (1-4): 2    Date of next review: 11/22/2019    Functional Impairment: 1=Not at all/Rarely 2=Some days 3=Most Days 4=Every Day    Personal: 3  Family: 3  Social: 2  Work: 3    Diagnosis:  Major depressive disorder, recurrent, severe without psychotic behaviors   Panic disorder  PTSD    Clinical assessments completed: ADA-7, PHQ-9, Mood Questionnaire current, CAGE-AID, PANSI.    STRENGTHS: Hard worker, dedication and support network    LIMITATIONS: Avoidance behaviors    Cultural Identification: Patient reported:    Race: Bi-racial    Experience of cultural bias: Patient reported experiencing cultural bias. Patient gave an example of when she was living in [place] and being told \"we don't serve Nig[place].\" Patient reported being called the inward on multiple occasions.    Immigration/history of status: Born [place] raised in [place].   Level of acculturation: acculturated   Time orientation: middle    Social orientation/class: middle    Verbal Communication Style/languages: English    Locus of Control: inward     Persons responsible for this plan:  ? [x] Patient ? [x] Provider ?     Provider:Performed and documented by SHERRIE Pantoja    Patient Signature:____________________________________ Date: ______________       Therapist Signature: __________________________________ Date: ______________  "

## 2021-06-01 ENCOUNTER — COMMUNICATION - HEALTHEAST (OUTPATIENT)
Dept: BEHAVIORAL HEALTH | Facility: CLINIC | Age: 56
End: 2021-06-01

## 2021-06-01 ENCOUNTER — OFFICE VISIT - HEALTHEAST (OUTPATIENT)
Dept: BEHAVIORAL HEALTH | Facility: CLINIC | Age: 56
End: 2021-06-01

## 2021-06-01 VITALS — HEIGHT: 69 IN | BODY MASS INDEX: 37.77 KG/M2 | WEIGHT: 255 LBS

## 2021-06-01 DIAGNOSIS — F43.10 PTSD (POST-TRAUMATIC STRESS DISORDER): ICD-10-CM

## 2021-06-01 DIAGNOSIS — F41.1 GAD (GENERALIZED ANXIETY DISORDER): ICD-10-CM

## 2021-06-01 DIAGNOSIS — F33.1 MAJOR DEPRESSIVE DISORDER, RECURRENT EPISODE, MODERATE (H): ICD-10-CM

## 2021-06-01 DIAGNOSIS — F90.9 ATTENTION DEFICIT HYPERACTIVITY DISORDER (ADHD), UNSPECIFIED ADHD TYPE: ICD-10-CM

## 2021-06-01 NOTE — PROGRESS NOTES
Mental Health Visit Note    9/18/2019   Start time: 658  Stop Time: 748 Session # 20    Session Type: Patient is presenting for an Individual session.    Alina Martell is a 54 y.o. female is being seen today for    Chief Complaint   Patient presents with     MH Follow Up     Anxiety     New symptoms or complaints: None    Present for session: Patient and therapist     Functional Impairment:   Personal: 3  Family: 3  Work: 2  Social:2    Clinical assessment of mental status: Alina Martell presented on time for scheduled session today. She was open and cooperative, and dressed appropriately for this session and weather. Her memory was good for short and long term.  Her speech and language was soft, concise and appropriate for session.  Concentration and focus is fair.  Psychosis is not noted or reported. She reports her mood is anxious.  Affect is congruent with speech.  Fund of knowledge is adequate. Insight is adequate for therapy. Changes made as needed.     Suicidal/Homicidal Ideation present: None Reported This Session    Patient's impression of their current status: Patient indicated feeling anxious. Patient reported sometimes she is able to tell what causes her anxiety to rise and other time it comes out of no where. Patient indicated she is continuing to struggle with sleep and getting only 4 hours. Patient reported she is waking up startled but not remembering anything. Patient indicated once she wakes up she has a lot of negative thoughts. Patient reported a lot of the thoughts are related to her grandfather. Patient indicated she continues to struggle with how someone can do what he did to family. Patient reported this is something she will never understand and in a lot of ways keeps her stuck.     Therapist impression of patients current state: Patient appears to have fair insight into his mental health. This therapist challenged patient on ways she continues to hold onto emotions related to  her grandfather and is not talking about them. This therapist processed with patient ways that avoidance works in the short run but not the long run. This therapist processed with patient steps to take to start moving forward.     Type of psychotherapeutic technique provided: Insight oriented, Client centered and CBT    Progress toward short term goals:Progress as expected with patient coming to scheduled session, using coping skills,noticing symptoms and continuing medication management.     Review of long term goals: Treatment Plan updated on 08/23/2019    Diagnosis:   1. PTSD (post-traumatic stress disorder)    2. Mild episode of recurrent major depressive disorder (H)    3. Attention deficit hyperactivity disorder (ADHD), combined type      Plan and Follow up: Patient will return in two weeks for scheduled session.  Patient should continue to work on her own self-care. Patient would benefit from using the PTSD and nightmare worksheet to help with nightmares. Patient should identify her fears of commitment. Patient should work on identifying what she wants for her friendship. Patient would benefit from talking about her avoiding thoughts at this time.     Discharge Criteria/Planning: Patient will continue with follow-up until therapy can be discontinued without return of signs and symptoms.    Mariana Love

## 2021-06-01 NOTE — PROGRESS NOTES
Mental Health Visit Note    9/4/2019   Start time: 705  Stop Time: 750 Session # 19    Session Type: Patient is presenting for an Individual session.    Alina Martell is a 54 y.o. female is being seen today for    Chief Complaint   Patient presents with      Follow Up     Sleep Problems     New symptoms or complaints: None    Present for session: Patient and therapist     Functional Impairment:   Personal: 3  Family: 3  Work: 2  Social:2    Clinical assessment of mental status: Alina Martell presented a few minutes late  for scheduled session today. She was open and cooperative, and dressed appropriately for this session and weather. Her memory was good for short and long term.  Her speech and language was soft, concise and appropriate for session.  Concentration and focus is fair.  Psychosis is not noted or reported. She reports her mood is irritable.  Affect is congruent with speech.  Fund of knowledge is adequate. Insight is adequate for therapy. Reviewed and changes made as needed.     Suicidal/Homicidal Ideation present: None Reported This Session    Patient's impression of their current status: Patient reported continuing to feel irritable. Patient indicated over the last two weeks her sleep has not been any better. Patient reported she does not feel stress is causing her to wake up at night. Patient indicated she continues to struggle with the trauma related to her grandfather. Patient reported viewing herself differently due to incest. Patient indicated feeling that her family views her as different as well. Patient reported noticing there are times she avoids her family due to these thoughts.     Therapist impression of patients current state: Patient appears to have fair insight into his mental health. This therapist processed with patient her continued struggles with sleep. This therapist challenged patient on how she views herself. This therapist processed with patient ways to work on  challenging her own negative thoughts. This therapist went over sleep techniques to help when she wakes up in the night.     Type of psychotherapeutic technique provided: Insight oriented, Client centered and CBT    Progress toward short term goals:Progress as expected with patient coming to scheduled session, using coping skills,noticing symptoms and continuing medication management.     Review of long term goals: Treatment Plan updated on 08/23/2019    Diagnosis:   1. PTSD (post-traumatic stress disorder)    2. Mild episode of recurrent major depressive disorder (H)    3. Attention deficit hyperactivity disorder (ADHD), combined type      Plan and Follow up: Patient will return in two weeks for scheduled session.  Patient should continue to work on her own self-care. Patient would benefit from using the PTSD and nightmare worksheet to help with nightmares. Patient should identify her fears of commitment. Patient should work on identifying what she wants for her friendship. Patient would benefit from talking about her avoiding thoughts at this time.     Discharge Criteria/Planning: Patient will continue with follow-up until therapy can be discontinued without return of signs and symptoms.    Mariana Love

## 2021-06-01 NOTE — PROGRESS NOTES
Mental Health Visit Note    10/2/2019   Start time: 702  Stop Time: 750 Session # 21    Session Type: Patient is presenting for an Individual session.    Alina Martell is a 54 y.o. female is being seen today for    Chief Complaint   Patient presents with     MH Follow Up     Anxiety     New symptoms or complaints: None    Present for session: Patient and therapist     Functional Impairment:   Personal: 3  Family: 3  Work: 2  Social:2    Clinical assessment of mental status: Alina Martell presented on time for scheduled session today. She was open and cooperative, and dressed appropriately for this session and weather. Her memory was good for short and long term.  Her speech and language was soft, concise and appropriate for session.  Concentration and focus is fair.  Psychosis is not noted or reported. She reports her mood is anxious.  Affect is congruent with speech.  Fund of knowledge is adequate. Insight is adequate for therapy. Reviewed but no changes for this session.      Suicidal/Homicidal Ideation present: None Reported This Session    Patient's impression of their current status: Patient reported feeling anxious. Patient indicated she does not want to be at her house right now. Patient reported being unsure what it is about her house due to feeling safe there. Patient indicated she has been spending a lot of time trying to find things to do. Patient reported she also has been busy with school which has helped. Patient reported sleep continues to be an ongoing challenge and now struggling with pain as well. Patient indicated she is waking up anxious.     Therapist impression of patients current state: Patient appears to have fair insight into his mental health. This therapist processed with patient coping skills to use right away when she wakes up in the morning to help relax her. This therapist processed with patient looking at any patterns she notices with her anxiety. This therapist went over  coping skills she can use throughout the day to help reduce anxiety.     Type of psychotherapeutic technique provided: Insight oriented, Client centered and CBT    Progress toward short term goals:Progress as expected with patient coming to scheduled session, using coping skills,noticing symptoms and continuing medication management.     Review of long term goals: Treatment Plan updated on 08/23/2019    Diagnosis:   1. PTSD (post-traumatic stress disorder)    2. Mild episode of recurrent major depressive disorder (H)    3. Attention deficit hyperactivity disorder (ADHD), combined type      Plan and Follow up: Patient will return in two weeks for scheduled session.  Patient should continue to work on her own self-care. Patient would benefit from using the PTSD and nightmare worksheet to help with nightmares. Patient should identify her fears of commitment. Patient should work on identifying what she wants for her friendship. Patient would benefit from talking about her avoiding thoughts at this time. Patient should use coping skills taught in session to work on reducing anxiety.     Discharge Criteria/Planning: Patient will continue with follow-up until therapy can be discontinued without return of signs and symptoms.    Mariana Love

## 2021-06-02 VITALS — HEIGHT: 69 IN | BODY MASS INDEX: 39.25 KG/M2 | WEIGHT: 265 LBS

## 2021-06-02 VITALS — BODY MASS INDEX: 39.92 KG/M2 | WEIGHT: 262.56 LBS

## 2021-06-02 VITALS — WEIGHT: 269 LBS | BODY MASS INDEX: 40.9 KG/M2

## 2021-06-02 NOTE — PROGRESS NOTES
This therapist left a message asking patient to call the clinic back after she no showed for her appointment. This is patient's first no show.

## 2021-06-02 NOTE — PROGRESS NOTES
Mental Health Visit Note    10/30/2019   Start time: 702  Stop Time: 748 Session # 23    Session Type: Patient is presenting for an Individual session.    Alina Martell is a 54 y.o. female is being seen today for    Chief Complaint   Patient presents with      Follow Up     Avoidance of emotions     New symptoms or complaints: None    Present for session: Patient and therapist     Functional Impairment:   Personal: 3  Family: 3  Work: 2  Social:2    Clinical assessment of mental status: Alina Martell presented on time for scheduled session today. She was open and cooperative, and dressed appropriately for this session and weather. Her memory was good for short and long term.  Her speech and language was soft, concise and appropriate for session.  Concentration and focus is fair.  Psychosis is not noted or reported. She reports her mood is numb.  Affect is congruent with speech.  Fund of knowledge is adequate. Insight is adequate for therapy. Reviewed and changes made as needed.     Suicidal/Homicidal Ideation present: None Reported This Session    Patient's impression of their current status: Patient reported feeling numb. Patient indicated she has noticed a lot of avoidance with her emotions. Patient reported this has then resulted in not feeling good or bad emotions. Patient indicated she feels she has shut them on and off for many years. Patient indicated she has been working hard to get out and not avoiding being around people. Patient reported knowing it is good for her to be active and get out of the house. Patient indicated she continues to struggle with forgiveness for herself.     Therapist impression of patients current state: Patient appears to have fair insight into his mental health. This therapist processed with patient ways avoiding her negative emotions has resulted in not experiencing positive emotions. This therapist processed with patient her fear of her emotions at this time. This  therapist processed with patient ways she continues to struggle with forgiveness and how this is keeping her stuck.      Type of psychotherapeutic technique provided: Insight oriented, Client centered and CBT    Progress toward short term goals:Progress as expected with patient coming to scheduled session, using coping skills,noticing symptoms and continuing medication management.     Review of long term goals: Treatment Plan updated on 08/23/2019    Diagnosis:   1. PTSD (post-traumatic stress disorder)    2. Mild episode of recurrent major depressive disorder (H)    3. Attention deficit hyperactivity disorder (ADHD), combined type      Plan and Follow up: Patient will return in two weeks for scheduled session.  Patient should continue to work on her own self-care. Patient would benefit from using the PTSD and nightmare worksheet to help with nightmares. Patient should identify her fears of commitment. Patient should work on identifying what she wants for her friendship. Patient would benefit from talking about her avoiding thoughts at this time. Patient should use coping skills taught in session to work on reducing anxiety. Patient would benefit from looking at ways not forgiving herself is keeping her stuck.     Discharge Criteria/Planning: Patient will continue with follow-up until therapy can be discontinued without return of signs and symptoms.    Mariana Love

## 2021-06-02 NOTE — PROGRESS NOTES
Mental Health Visit Note    10/16/2019   Start time: 720  Stop Time: 800 Session # 22    Session Type: Patient is presenting for an Individual session.    Alina Martell is a 54 y.o. female is being seen today for    Chief Complaint   Patient presents with      Follow Up     Holiday Stress     New symptoms or complaints: None    Present for session: Patient and therapist     Functional Impairment:   Personal: 3  Family: 3  Work: 2  Social:2    Clinical assessment of mental status: Alina Martell presented late for scheduled session today. She was open and cooperative, and dressed appropriately for this session and weather. Her memory was good for short and long term.  Her speech and language was soft, concise and appropriate for session.  Concentration and focus is fair.  Psychosis is not noted or reported. She reports her mood is stressed.  Affect is congruent with speech.  Fund of knowledge is adequate. Insight is adequate for therapy. Changes made as needed.     Suicidal/Homicidal Ideation present: None Reported This Session    Patient's impression of their current status: Patient indicated feeling stressed. Patient reported she is already starting to think about the holiday's. Patient talked about the negative image she has of the holiday's related to her childhood. Patient indicated when her children were younger she viewed the holiday's as positive but not has a hard time again. Patient reported she is planning to volunteer as a way to stay busy. Patient indicated being unsure now what makes the holiday's so tough. Patient reported she spent a week at her boyfriend's house and it went really well. Patient indicated she is not ready to move in but this being a big step forward for her.     Therapist impression of patients current state: Patient appears to have fair insight into his mental health. This therapist challenged patient on what she thinks and feels when she thinks of the holiday's. This  therapist processed with patient ways she can work to make the holiday's special to her and different from the past. This therapist processed with patient continuing to work on her anxiety with the relationship and moving forward.     Type of psychotherapeutic technique provided: Insight oriented, Client centered and CBT    Progress toward short term goals:Progress as expected with patient coming to scheduled session, using coping skills,noticing symptoms and continuing medication management.     Review of long term goals: Treatment Plan updated on 08/23/2019    Diagnosis:   1. PTSD (post-traumatic stress disorder)    2. Mild episode of recurrent major depressive disorder (H)    3. Attention deficit hyperactivity disorder (ADHD), combined type      Plan and Follow up: Patient will return in two weeks for scheduled session.  Patient should continue to work on her own self-care. Patient would benefit from using the PTSD and nightmare worksheet to help with nightmares. Patient should identify her fears of commitment. Patient should work on identifying what she wants for her friendship. Patient would benefit from talking about her avoiding thoughts at this time. Patient should use coping skills taught in session to work on reducing anxiety.     Discharge Criteria/Planning: Patient will continue with follow-up until therapy can be discontinued without return of signs and symptoms.    Mariana Love

## 2021-06-03 ENCOUNTER — COMMUNICATION - HEALTHEAST (OUTPATIENT)
Dept: RHEUMATOLOGY | Facility: CLINIC | Age: 56
End: 2021-06-03

## 2021-06-03 VITALS
TEMPERATURE: 98.2 F | SYSTOLIC BLOOD PRESSURE: 120 MMHG | HEART RATE: 80 BPM | BODY MASS INDEX: 38.91 KG/M2 | HEIGHT: 69 IN | DIASTOLIC BLOOD PRESSURE: 72 MMHG | WEIGHT: 262.7 LBS | OXYGEN SATURATION: 96 %

## 2021-06-03 VITALS
HEART RATE: 85 BPM | SYSTOLIC BLOOD PRESSURE: 139 MMHG | OXYGEN SATURATION: 91 % | WEIGHT: 265.8 LBS | TEMPERATURE: 98 F | DIASTOLIC BLOOD PRESSURE: 89 MMHG | RESPIRATION RATE: 16 BRPM | BODY MASS INDEX: 39.25 KG/M2

## 2021-06-03 VITALS — BODY MASS INDEX: 40.01 KG/M2 | WEIGHT: 264 LBS | HEIGHT: 68 IN

## 2021-06-03 DIAGNOSIS — M05.79 SEROPOSITIVE RHEUMATOID ARTHRITIS OF MULTIPLE SITES (H): ICD-10-CM

## 2021-06-03 DIAGNOSIS — R76.8 CYCLIC CITRULLINATED PEPTIDE (CCP) ANTIBODY POSITIVE: ICD-10-CM

## 2021-06-03 NOTE — TELEPHONE ENCOUNTER
Scheduling: Please contact patient to schedule an follow up with Kiet Lei CNP     Date of Last Office Visit: 5/06/2019  Date of Next Office Visit: None Scheduled   No shows since last visit: 0   Cancellations since last visit: 1 7/01/2019  ED visits since last visit:  None  Medication guanfacine 2 mg Tb24 date last ordered: 5/13/2019 Qty: 30  Refills: 3  Lapse in therapy greater than 7 days: Yes  Medication refill request verified as identical to current order: Yes  Result of Last DAM, VPA, Li+ Level, CBC, or Carbamazepine Level (at or since last visit): NA     [] Medication refilled per [facility] M-1.   [] Medication unable to be refilled by RN due to criteria not met as indicated below:     []Eligibility - not seen in last year    [x]Supervision - no future appointment    [x]Compliance     []Verification - order discrepancy    []Controlled Medication    []Medication not included in RN Protocol    []90 - day supply request    [x]Other CMA pending medication refills     Current Medication list:    cholecalciferol, vitamin D3, 50,000 unit capsule Take 50,000 Units by mouth once a week.   DULoxetine (CYMBALTA) 60 MG capsule Take 1 capsule (60 mg total) by mouth daily.   EPINEPHrine (EPIPEN 2-RADHA) 0.3 mg/0.3 mL injection Inject 0.3 mL (0.3 mg total) into the shoulder, thigh, or buttocks as needed.   gabapentin (NEURONTIN) 300 MG capsule 1-2 up to 4 times daily   guanFACINE 2 mg Tb24 Take 2 mg by mouth every morning.   hydrOXYzine pamoate (VISTARIL) 25 MG capsule 1 up to four times daily prn anxiety, 1-4 at first wakening prn   ibuprofen (ADVIL,MOTRIN) 600 MG tablet Take 600 mg by mouth.   loratadine (CLARITIN) 10 mg tablet Take 1 tablet (10 mg total) by mouth daily.   multivitamin therapeutic (THERAGRAN) tablet Take 1 tablet by mouth daily.   naproxen (NAPROSYN) 500 MG tablet Take 1 tablet (500 mg total) by mouth 2 (two) times a day with meals.   traZODone (DESYREL) 50 MG tablet Take 50 mg by mouth at bedtime as  needed.        Medication Plan of Care at last office visit with MD/CNP:   Assessment / Impression     1. Chronic PTSD:  Improved.    Continue duloxetine for anxiety.      Stop prazosin - she says she is no longer having nightmares and she does not want to continue it as she thinks the nightmare won't come back.     Continue individual therapy.  2. Major Depressive disorder: continue individual therapy. Continue duloxetine. Doing well at this time  3. ADHD combined type: Trial guanfacine er and titrate to 2mg. Follow up in two months.

## 2021-06-03 NOTE — PROGRESS NOTES
Mental Health Visit Note    11/15/2019   Start time: 659  Stop Time: 747 Session # 24    Session Type: Patient is presenting for an Individual session.    Alina Martell is a 54 y.o. female is being seen today for    Chief Complaint   Patient presents with      Follow Up     Hurt     New symptoms or complaints: None    Present for session: Patient and therapist     Functional Impairment:   Personal: 3  Family: 3  Work: 2  Social:2    Clinical assessment of mental status: Alina Martell presented on time for scheduled session today. She was open and cooperative, and dressed appropriately for this session and weather. Her memory was good for short and long term.  Her speech and language was soft, concise and appropriate for session.  Concentration and focus is fair.  Psychosis is not noted or reported. She reports her mood is hurt.  Affect is congruent with speech.  Fund of knowledge is adequate. Insight is adequate for therapy. Changes made as needed.     Suicidal/Homicidal Ideation present: None Reported This Session    Patient's impression of their current status: Patient indicated feeling hurt. Patient reported her ex-girlfriend who she has had on and off again relationships for many years texting her that she was . Patient indicated this brought back a lot of emotions of their relationship. Patient reported knowing they were not meant to be together but still hurt. Patient indicated she did not tell her she was serious or that she was thinking of getting . Patient reported knowing this is her personality and why they would not have ever worked out.     Therapist impression of patients current state: Patient appears to have fair insight into his mental health. This therapist challenged patient on ways she can work on moving forward due to her ex-girlfriend now being . This therapist processed with patient the pain she is experiencing at this time. This therapist processed with  patient the importance of getting distance from her ex-girlfriend and allowing herself to feel her emotions.       Type of psychotherapeutic technique provided: Insight oriented, Client centered and CBT    Progress toward short term goals:Progress as expected with patient coming to scheduled session, using coping skills,noticing symptoms and continuing medication management.     Review of long term goals: Treatment Plan updated on 08/23/2019    Diagnosis:   1. PTSD (post-traumatic stress disorder)    2. Mild episode of recurrent major depressive disorder (H)    3. Attention deficit hyperactivity disorder (ADHD), combined type      Plan and Follow up: Patient will return in two weeks for scheduled session.  Patient should continue to work on her own self-care. Patient would benefit from using the PTSD and nightmare worksheet to help with nightmares. Patient should identify her fears of commitment. Patient should work on identifying what she wants for her friendship. Patient would benefit from talking about her avoiding thoughts at this time. Patient should use coping skills taught in session to work on reducing anxiety. Patient would benefit from looking at ways not forgiving herself is keeping her stuck.     Discharge Criteria/Planning: Patient will continue with follow-up until therapy can be discontinued without return of signs and symptoms.    Mariana Love

## 2021-06-03 NOTE — PROGRESS NOTES
Patient indicated not wanting to see a different therapist while this therapist is on leave. Patient was informed to call mid-February to schedule again with this therapist when she returns. Patient was informed if deciding to want to see a therapist during leave to contact central scheduling and was given phone number.

## 2021-06-04 VITALS
BODY MASS INDEX: 38.66 KG/M2 | DIASTOLIC BLOOD PRESSURE: 82 MMHG | SYSTOLIC BLOOD PRESSURE: 138 MMHG | HEIGHT: 69 IN | WEIGHT: 261 LBS

## 2021-06-04 VITALS
WEIGHT: 266 LBS | BODY MASS INDEX: 40.45 KG/M2 | SYSTOLIC BLOOD PRESSURE: 140 MMHG | HEART RATE: 92 BPM | DIASTOLIC BLOOD PRESSURE: 98 MMHG

## 2021-06-04 NOTE — PROGRESS NOTES
ASSESSMENT AND PLAN:  Alina Martell 54 y.o. female is seen here on 12/11/19 for reestablishment of care for seropositive rheumatoid arthritis, most recently seen here nearly 2 years ago, subsequent to that she had remained remarkably asymptomatic without any treatment for RA, only during the past few weeks she has flared up again and has responded nicely to prednisone.  She was prescribed 40 mg of prednisone from her family physician.  She is still taking that.  That may be reason why she does not have overt synovitis on today's examination.  We will get back on hydroxychloroquine to begin with.  May not be a suitable candidate to go back on leflunomide at this point, given absence of reliable contraception.  Check labs as noted.  We will meet here in 3 months or sooner.  Diagnoses and all orders for this visit:    Seropositive rheumatoid arthritis of multiple sites (H)  -     Albumin; Standing  -     ALT (SGPT); Standing  -     Creatinine; Standing  -     HM2(CBC w/o Differential); Standing  -     hydroxychloroquine (PLAQUENIL) 200 mg tablet; Take 1 tablet (200 mg total) by mouth 2 (two) times a day.  Dispense: 60 tablet; Refill: 6  -     Albumin  -     ALT (SGPT)  -     Creatinine  -     HM2(CBC w/o Differential)    Cyclic citrullinated peptide (CCP) antibody positive    Polyarthralgia  -     Albumin; Standing  -     ALT (SGPT); Standing  -     Creatinine; Standing  -     HM2(CBC w/o Differential); Standing  -     Albumin  -     ALT (SGPT)  -     Creatinine  -     HM2(CBC w/o Differential)    High risk medication use  -     Albumin; Standing  -     ALT (SGPT); Standing  -     Creatinine; Standing  -     HM2(CBC w/o Differential); Standing  -     Albumin  -     ALT (SGPT)  -     Creatinine  -     HM2(CBC w/o Differential)      HISTORY OF PRESENTING ILLNESS:  Alina Martell, 54 y.o., female is here for reestablishment of care.  She has seropositive rheumatoid arthritis.  This is double positive for high  titers of anti-CCP and rheumatoid factor.  She used to be on hydroxychloroquine and leflunomide and at that well controlled RA.  Then she stopped taking her medication and did well until recently.  Few weeks ago she started experiencing flareups.  Her knee swelled up at one point, then her wrist and then elbows.  Her stiffness was prolonged.  She was seen at her primary physician's office.  She was started on prednisone 40 mg daily.  As before it worked very quickly and has brought her symptoms down quite significantly.  She noted the pain level recently to moderately severe in her wrists and elbows.  Difficulty performing a variety of day-to-day activities.  Her stiffness in the morning used to be more than 2 hours now down to 30 minutes.  Fortunately prior to this past few weeks and nearly 2 years prior to that she had not been any DMARDs and continue to do well.  She wonders if some of her stressors may have triggered a relapse of he  Mother has Crohn's disease her brother psoriasis.  She recalls her dad used to have autoimmune condition.  She works in [employer] in [employer].  She is not a smoker.  She is not on birth control still has her periods.  FUrther historical information and ADL limitations as noted in the multidimensional health assessment questionnaire attached in the EMR. Rest of the 13 system ROS is negative.     ALLERGIES:Glucosamine; Shellfish containing products; Sulfa (sulfonamide antibiotics); Effexor [venlafaxine]; Abilify [aripiprazole]; Propoxyphene n-acetaminophen; and Sulfasalazine    PAST MEDICAL/ACTIVE PROBLEMS/MEDICATION/ FAMILY HISTORY/SOCIAL DATA:  The patient has a family history of  Past Medical History:   Diagnosis Date     Cannabis use without complication 12/8/2014     Obesity      Rheumatoid arthritis (H) 7/8/2016     Sicca syndrome (H)      Social History     Tobacco Use   Smoking Status Never Smoker   Smokeless Tobacco Never Used   Tobacco Comment    smokes marijuana      Patient Active Problem List   Diagnosis     PTSD (post-traumatic stress disorder)     Sleep disorder     Anxiety     Panic attack     Anemia - microcytic anemia     Obesity     Sicca syndrome (H)     Grief     Olecranon bursitis, right - bland, non-inflamed, diagnosed on 7/8/16     Chronic pain     Seropositive rheumatoid arthritis of multiple sites (H)     Tendonitis of both shoulders     Cyclic citrullinated peptide (CCP) antibody positive     Current Outpatient Medications   Medication Sig Dispense Refill     cholecalciferol, vitamin D3, 50,000 unit capsule Take 50,000 Units by mouth once a week. 12 capsule 1     EPINEPHrine (EPIPEN 2-RADHA) 0.3 mg/0.3 mL injection Inject 0.3 mL (0.3 mg total) into the shoulder, thigh, or buttocks as needed. 2 Pre-filled Pen Syringe 0     guanFACINE 2 mg Tb24 TAKE 1 TAB (2 MG) BY MOUTH EVERY MORNING. 30 tablet 3     hydrOXYzine pamoate (VISTARIL) 25 MG capsule 1 up to four times daily prn anxiety, 1-4 at first wakening prn 150 capsule 5     ibuprofen (ADVIL,MOTRIN) 600 MG tablet Take 600 mg by mouth.       loratadine (CLARITIN) 10 mg tablet Take 1 tablet (10 mg total) by mouth daily. 30 tablet 2     multivitamin therapeutic (THERAGRAN) tablet Take 1 tablet by mouth daily.       predniSONE (DELTASONE) 20 MG tablet 2 tablets daily for the next 7 days. 14 tablet 0     hydroxychloroquine (PLAQUENIL) 200 mg tablet Take 1 tablet (200 mg total) by mouth 2 (two) times a day. 60 tablet 6     traZODone (DESYREL) 50 MG tablet Take 50 mg by mouth at bedtime as needed.              No current facility-administered medications for this visit.        COMPREHENSIVE EXAMINATION:  Vitals:    12/11/19 1208   BP: (!) 142/100   Patient Site: Right Arm   Patient Position: Sitting   Cuff Size: Adult Large   Pulse: 92   Weight: (!) 266 lb (120.7 kg)     A well appearing alert oriented female. Vital data as noted above. Her eyes without inflammation/scleromalacia. ENTwithout oral mucositis, thrush,  nasal deformity, external ear redness, deformity. Her neck is without lymphadenopathy and supple. Lungs normal sounds, no pleural rub. Heart auscultation normal rate, rhythm; no pericardial rub and murmurs. Abdomen soft, non tender, no organomegaly. Skin examined for heliotrope, malar area eruption, lupus pernio, periungual erythema, sclerodactyly, papules, erythema nodosum, purpura, nail pitting, onycholysis, and obvious psoriasis lesion. Neurological examination shows normal alertness, speech, facial symmetry, tone and power in upper and lower extremities. The joint examination is performed for swelling, tenderness, warmth, erythema, and range of motion in the following joints: DIPs, PIPs, MCPs, wrists, first CMC's, elbows, shoulders, hips, knees, ankles, feet; spine for range of motion and paraspinal muscles for tenderness. The salient  findings are: She does not have synovitis in any of the palpable joints of upper and lower extremities, this is very likely thanks to 40 mg of prednisone daily from her family physician.  She does not have dactylitis of digits.     LAB / IMAGING DATA:  ALT   Date Value Ref Range Status   09/16/2019 10 0 - 45 U/L Final   03/31/2019 <9 0 - 45 U/L Final   08/24/2018 10 0 - 45 U/L Final     Albumin   Date Value Ref Range Status   09/16/2019 3.5 3.5 - 5.0 g/dL Final   03/31/2019 3.2 (L) 3.5 - 5.0 g/dL Final   08/24/2018 3.4 (L) 3.5 - 5.0 g/dL Final     Creatinine   Date Value Ref Range Status   09/16/2019 0.67 0.60 - 1.10 mg/dL Final   03/31/2019 0.68 0.60 - 1.10 mg/dL Final   08/24/2018 0.68 0.60 - 1.10 mg/dL Final       WBC   Date Value Ref Range Status   09/16/2019 9.3 4.0 - 11.0 thou/uL Final   08/24/2018 5.6 4.0 - 11.0 thou/uL Final     Hemoglobin   Date Value Ref Range Status   09/16/2019 12.1 12.0 - 16.0 g/dL Final   08/24/2018 11.6 (L) 12.0 - 16.0 g/dL Final   02/02/2018 11.7 (L) 12.0 - 16.0 g/dL Final     Platelets   Date Value Ref Range Status   09/16/2019 443 545 - 288  thou/uL Final   08/24/2018 308 140 - 440 thou/uL Final   02/02/2018 355 140 - 440 thou/uL Final       Lab Results   Component Value Date    .0 (H) 10/20/2015    SEDRATE 60 (H) 05/11/2017

## 2021-06-04 NOTE — TELEPHONE ENCOUNTER
Last office visit: 9/16/19    FEMALE PREVENTATIVE EXAM     Assessment and Plan:         1. Encounter for general adult medical examination without abnormal findings  Routine labs and will call with the results  - Comprehensive Metabolic Panel  - HM1(CBC and Differential)  - Vitamin D, Total (25-Hydroxy)  - HM1 (CBC with Diff)     2. Visit for screening mammogram  Routine screening  - Mammo Screening Bilateral; Future     3. Screen for colon cancer  - Cologuard     4. Encounter for screening for lipoid disorders  Discussed diet and exercise.  - Lipid Cascade- FASTING     5. Hives  Trial of claritin and offered referral to dermatology or allergy and pt would like to wait at this time.  - loratadine (CLARITIN) 10 mg tablet; Take 1 tablet (10 mg total) by mouth daily.  Dispense: 30 tablet; Refill: 2     7.  Obesity  Discussed diet and exercise     8. RA of multiple sites  Seeing rheumatology and doing well currently wihtout flair ups.  Follow up as directed.        Next follow up:  Return in 1 year (on 9/16/2020).     Immunization Review  Adult Imm Review: Declines immunizations today    I discussed the following with the patient:   Adult Healthy Living: Importance of regular exercise  Healthy nutrition  Weight loss referral options  Getting adequate sleep  Stress management  Use of seat belts  Distracted driving     I have had an Advance Directives discussion with the patient.      Last refill: 4/7/19    predniSONE (DELTASONE) 10 mg tablet (Discontinued) 63 tablet 0 4/7/2019 9/16/2019 --   Sig - Route: Take 10 mg by mouth daily. - Oral   Sent to pharmacy as: predniSONE (DELTASONE) 10 mg tablet   Notes to Pharmacy: Take 6 tabs for 3 days, 5 tabs for 3  Days, 4 tabs for 3 days, 3 tabs for 3 days, 2 tabs for 3 days, 1 tab for 3 days then stop   E-Prescribing Status: Receipt confirmed by pharmacy (4/7/2019 11:33 AM CDT)

## 2021-06-05 VITALS — BODY MASS INDEX: 41.03 KG/M2 | HEIGHT: 69 IN | WEIGHT: 277 LBS

## 2021-06-05 VITALS
DIASTOLIC BLOOD PRESSURE: 102 MMHG | HEIGHT: 69 IN | SYSTOLIC BLOOD PRESSURE: 160 MMHG | BODY MASS INDEX: 41.03 KG/M2 | HEART RATE: 68 BPM | RESPIRATION RATE: 14 BRPM | WEIGHT: 277 LBS

## 2021-06-05 VITALS
HEART RATE: 82 BPM | WEIGHT: 281 LBS | HEIGHT: 69 IN | DIASTOLIC BLOOD PRESSURE: 80 MMHG | OXYGEN SATURATION: 96 % | BODY MASS INDEX: 41.62 KG/M2 | SYSTOLIC BLOOD PRESSURE: 140 MMHG

## 2021-06-06 NOTE — TELEPHONE ENCOUNTER
RN cannot approve Refill Request    RN can NOT refill this medication med is not covered by policy/route to provider. Last office visit: Visit date not found Last Physical: 9/16/2019 Last MTM visit: Visit date not found Last visit same specialty: Visit date not found.  Next visit within 3 mo: Visit date not found  Next physical within 3 mo: Visit date not found      Sonia Holder, [facility] Triage/Med Refill 2/25/2020    Requested Prescriptions   Pending Prescriptions Disp Refills     cholecalciferol, vitamin D3, 1,250 mcg (50,000 unit) capsule [Pharmacy Med Name: VITAMIN D3-50 50,000 UNIT CAP] 12 capsule 1     Sig: TAKE 1 CAPSULE BY MOUTH ONCE A WEEK       There is no refill protocol information for this order

## 2021-06-06 NOTE — PROGRESS NOTES
ASSESSMENT AND PLAN:  Alina Martell 55 y.o. female is seen here on 03/11/20 for follow-up of seropositive rheumatoid arthritis, family history of brother has psoriasis mom's Crohn's disease, on hydroxychloroquine,, she has acute exacerbation pain in her joints especially the right knee, which she has warmth and effusion, after informed consent was obtained this was aspirated under ultrasound guidance, 80 mL of inflamed appearing effusion was obtained, 40 mg of Kenalog injected through the same portal.  I have asked him to take 15 mg of prednisone daily.  She is allergic to sulfa, she has still has her menstrual cycle going, they use condoms therefore methotrexate leflunomide not an option.  Recommended tumor necrosis factor inhibitors.  Literature on this is provided no contraindications.  Labs as noted.  We will meet here in a month.        Diagnoses and all orders for this visit:    Seropositive rheumatoid arthritis of multiple sites (H)  -     predniSONE (DELTASONE) 10 mg tablet; Take 15 mg by mouth daily.  Dispense: 45 tablet; Refill: 0  -     Hepatitis B Surface Antigen (HBsAG)  -     Hepatitis C Antibody (Anti-HCV)  -     C-Reactive Protein  -     Erythrocyte Sedimentation Rate  -     QTF-Mycobacterium tuberculosis by QuantiFERON-TB Gold Plus  -     triamcinolone acetonide 40 mg/mL injection 40 mg (KENALOG-40)    Cyclic citrullinated peptide (CCP) antibody positive    Effusion of right knee  -     triamcinolone acetonide 40 mg/mL injection 40 mg (KENALOG-40)  -     Cell Ct/Diff, Body Fluid  -     Culture/Gram Stain: Body Fluid  -     Crystals, Body Fluid      HISTORY OF PRESENTING ILLNESS:  Alina Martell, 55 y.o., female is here for follow-up of seropositive rheumatoid arthritis.  She is complaining of increasing pain.  She is on hydroxychloroquine.  She was seen here recently for reestablishment of care.  She has seropositive rheumatoid arthritis.  This is double positive for high titers of  anti-CCP and rheumatoid factor.  She used to be on hydroxychloroquine and leflunomide and at that well controlled RA.  Then she stopped taking her medication and did well until just prior to her previous visit here.  She has noted especially pain is in her knees especially on the right side, and the right wrist.  She rates the pain as moderately severe.  There is swelling.  She feels tight.  Unable to do some of the day-to-day activities.  Stiffness in the morning for about an hour..        Few weeks ago she started experiencing flareups.  Her knee swelled up at one point, then her wrist and then elbows.  Her stiffness was prolonged.  She was seen at her primary physician's office.  She was started on prednisone 40 mg daily.  As before it worked very quickly and has brought her symptoms down quite significantly.  She noted the pain level recently to moderately severe in her wrists and elbows.  Difficulty performing a variety of day-to-day activities.  Her stiffness in the morning used to be more than 2 hours now down to 30 minutes.  Fortunately prior to this past few weeks and nearly 2 years prior to that she had not been any DMARDs and continue to do well.  She wonders if some of her stressors may have triggered a relapse of he  Mother has Crohn's disease her brother psoriasis.  She recalls her dad used to have autoimmune condition.  She works in [employer] company in [place].  She is not a smoker.  She is not on birth control still has her periods.  FUrther historical information and ADL limitations as noted in the multidimensional health assessment questionnaire attached in the EMR.               ALLERGIES:Glucosamine; Shellfish containing products; Sulfa (sulfonamide antibiotics); Effexor [venlafaxine]; Abilify [aripiprazole]; Propoxyphene n-acetaminophen; and Sulfasalazine    PAST MEDICAL/ACTIVE PROBLEMS/MEDICATION/ FAMILY HISTORY/SOCIAL DATA:  The patient has a family history of  Past Medical History:    Diagnosis Date     Cannabis use without complication 12/8/2014     Obesity      Rheumatoid arthritis (H) 7/8/2016     Sicca syndrome (H)      Social History     Tobacco Use   Smoking Status Never Smoker   Smokeless Tobacco Never Used   Tobacco Comment    smokes marijuana     Patient Active Problem List   Diagnosis     PTSD (post-traumatic stress disorder)     Sleep disorder     Anxiety     Panic attack     Anemia - microcytic anemia     Obesity     Sicca syndrome (H)     Grief     Olecranon bursitis, right - bland, non-inflamed, diagnosed on 7/8/16     Chronic pain     Seropositive rheumatoid arthritis of multiple sites (H)     Tendonitis of both shoulders     Cyclic citrullinated peptide (CCP) antibody positive     Current Outpatient Medications   Medication Sig Dispense Refill     cholecalciferol, vitamin D3, 1,250 mcg (50,000 unit) capsule TAKE 1 CAPSULE BY MOUTH ONCE A WEEK 12 capsule 1     EPINEPHrine (EPIPEN 2-RADHA) 0.3 mg/0.3 mL injection Inject 0.3 mL (0.3 mg total) into the shoulder, thigh, or buttocks as needed. 2 Pre-filled Pen Syringe 0     guanFACINE 2 mg Tb24 TAKE 1 TAB (2 MG) BY MOUTH EVERY MORNING. 30 tablet 3     hydrOXYzine pamoate (VISTARIL) 25 MG capsule 1 up to four times daily prn anxiety, 1-4 at first wakening prn 150 capsule 5     ibuprofen (ADVIL,MOTRIN) 600 MG tablet Take 600 mg by mouth.       loratadine (CLARITIN) 10 mg tablet Take 1 tablet (10 mg total) by mouth daily. 30 tablet 2     multivitamin therapeutic (THERAGRAN) tablet Take 1 tablet by mouth daily.       predniSONE (DELTASONE) 20 MG tablet 2 tablets daily for the next 7 days. 14 tablet 0     traZODone (DESYREL) 50 MG tablet Take 50 mg by mouth at bedtime as needed.              No current facility-administered medications for this visit.      DETAILED EXAMINATION  03/11/20  :  Vitals:    03/11/20 0733   BP: 138/82   Patient Site: Right Arm   Patient Position: Sitting   Cuff Size: Adult Large   Weight: (!) 261 lb (118.4 kg)  "  Height: 5' 9\" (1.753 m)     Alert oriented. Head including the face is examined for malar rash, heliotropes, scarring, lupus pernio. Eyes examined for redness such as in episcleritis/scleritis, periorbital lesions.   Neck/ Face examined for parotid gland swelling, range of motion of neck.  Left upper and lower and right upper and lower extremities examined for tenderness, swelling, warmth of the appendicular joints, range of motion, edema, rash.  Some of the important findings included: she does not have evidence of synovitis in the palpable joints of the upper extremities.  No significant deformities of the digits.  NO Heberden nodes.  Range of motion of the shoulders show full abduction.  No JLT effusion or warmth of the knees.  She has warmth swelling and effusion of the right knee.  Right wrist is tender.  Scattered PIP tenderness.        LAB / IMAGING DATA:  ALT   Date Value Ref Range Status   03/06/2020 9 0 - 45 U/L Final   12/11/2019 10 0 - 45 U/L Final   09/16/2019 10 0 - 45 U/L Final     Albumin   Date Value Ref Range Status   03/06/2020 3.4 (L) 3.5 - 5.0 g/dL Final   12/11/2019 3.5 3.5 - 5.0 g/dL Final   09/16/2019 3.5 3.5 - 5.0 g/dL Final     Creatinine   Date Value Ref Range Status   03/06/2020 0.69 0.60 - 1.10 mg/dL Final   12/11/2019 0.71 0.60 - 1.10 mg/dL Final   09/16/2019 0.67 0.60 - 1.10 mg/dL Final       WBC   Date Value Ref Range Status   03/06/2020 7.8 4.0 - 11.0 thou/uL Final   12/11/2019 13.7 (H) 4.0 - 11.0 thou/uL Final     Hemoglobin   Date Value Ref Range Status   03/06/2020 12.2 12.0 - 16.0 g/dL Final   12/11/2019 12.9 12.0 - 16.0 g/dL Final   09/16/2019 12.1 12.0 - 16.0 g/dL Final     Platelets   Date Value Ref Range Status   03/06/2020 321 140 - 440 thou/uL Final   12/11/2019 414 140 - 440 thou/uL Final   09/16/2019 327 140 - 440 thou/uL Final       Lab Results   Component Value Date    .0 (H) 10/20/2015    SEDRATE 60 (H) 05/11/2017          "

## 2021-06-06 NOTE — PROGRESS NOTES
This therapist spoke with patient and informed patient that teletherapy will be used for next session.

## 2021-06-06 NOTE — PROGRESS NOTES
Mental Health Visit Note    3/6/2020  Start time: 701  Stop Time: 751 Session # 1    Session Type: Patient is presenting for an Individual session.    Alina Martell is a 55 y.o. female is being seen today for    Chief Complaint   Patient presents with     MH Follow Up     Anxiety     New symptoms or complaints: None    Present for session: Patient and therapist     Functional Impairment:   Personal: 3  Family: 3  Work: 2  Social:2    Clinical assessment of mental status: Alina Martell presented on time for scheduled session today. She was open and cooperative, and dressed appropriately for this session and weather. Her memory was good for short and long term.  Her speech and language was soft, concise and appropriate for session.  Concentration and focus is fair.  Psychosis is not noted or reported. She reports her mood is anxious.  Affect is congruent with speech.  Fund of knowledge is adequate. Insight is adequate for therapy. Reviewed and changes made as needed.     Suicidal/Homicidal Ideation present: None Reported This Session    Patient's impression of their current status: Patient reported feeling anxious. Patient indicated her relationship is on a break right now due to him going through some stuff right now. Patient reported he was starting not to trust her and she knew this was a sign they needed a break. Patient indicated she has been working to stay busy and not isolate which has been challenging at times. Patient reported trying to not take on too much but keep her mind busy.     Therapist impression of patients current state: Patient appears to have fair insight into his mental health. This therapist processed with patient the importance of continuing to identify her needs and getting her needs met. This therapist praised patient for using skills taught during session. This therapist processed with patient the importance of continuing to set boundaries.     Type of psychotherapeutic  technique provided: Insight oriented, Client centered and CBT    Progress toward short term goals:Progress as expected with patient coming to scheduled session    Review of long term goals: Will be reviewed at next session    Diagnosis:   1. PTSD (post-traumatic stress disorder)    2. Mild episode of recurrent major depressive disorder (H)    3. Attention deficit hyperactivity disorder (ADHD), combined type      Plan and Follow up: Patient will return in two weeks for scheduled session.  Patient would benefit from continuing to work on setting boundaries and using self-care. Patient should continue to use skills taught in session.     Discharge Criteria/Planning: Patient will continue with follow-up until therapy can be discontinued without return of signs and symptoms.    Mariana Love

## 2021-06-07 NOTE — PROGRESS NOTES
This therapist returned patient phone call. Patient indicated she was laid off today due to COVID-19. Patient reported her insurance will be done on April 30th so wanting to switch her appointment from Friday to Thursday. Patient indicated she did apply for insurance.

## 2021-06-07 NOTE — PROGRESS NOTES
"Telephone Visit Note    Patient Name: Alina Martell                   Date:   3/20/2020                                          Service Type:            Telephone Visit                The patient has been notified of following:     \"This telephone visit will be conducted via a call between you and your provider. We have found that certain health care needs can be provided without the need for an office visit.  This service lets us provide the care you need with a short phone conversation.    If during the course of the call the provider feels a telephone visit is not appropriate, you will not be charged for this service.\"                 Session Start Time:  704                    Session End Time:   734                Session Length:  30 minutes    Session #:      2    Attendees:  Patient and therapist                 DATA                            Progress Since Last Session (Related to Symptoms / Goals / Homework):              Symptoms: Patient indicated struggling with anxiety related to the pandemic that is occurring at this time. Patient reported she did complete her homework assignment of working on getting out of the house and socializing with friends.                             Episode of Care Goals: Treatment Plan Updated on 03/20/2020                Current / Ongoing Stressors and Concerns:              Patient reported feeling anxious. Patient indicated she is worried about having to be stuck at home. Patient reported in the past there were times she just wanted to stay home and never leave but now worried about it. Patient indicated her she is worried about her negative thoughts causing her to go into a deep depression. Patient reported at this time she has plans to be around people but worried the govern will put us on shut down. Patient indicated over the holiday's she started gambling again and it getting worse. Patient reported now that the casino's are shut down that has helped but " worried when they reopen.     This therapist processed with patient ways she can ensure she is in contact with people even if she has to be on shut down. This therapist challenged patient on ways she is able to work on activities at home that will help to reduce depression symptoms. This therapist processed with patient behaviors and patterns related to her gambling.                 Intervention:              CBT and MI                              ASSESSMENT: Current Emotional / Mental Status (status of significant symptoms):              Patient denied personal safety concerns.               Patient denies current or recent suicidal ideation or behaviors.              Patient denies current or recent homicidal ideation or behaviors.              Patient denies current or recent self injurious behavior or ideation.              Patient denies other safety concerns.              Patient denied changes in risk factors              Patient denied changes in protective factors              Recommended that patient call 911 or go to the local ED should there be a change in any of these risk factors.                Attitude:                                   Cooperative               Orientation:                             Person, place, time, situation              Speech                          Rate / Production:       Normal                           Volume:                       Normal               Mood:                                      Anxious               Thought Content:                    Clear               Thought Form:                        Coherent  Logical               Insight:                                     Good                 Medication Compliance:              Patient indicated taking medications as prescribed.                 Changes in Health Issues:             Patient did not identify any changes in her health                Chemical Use Review:              Substance Use: No                 Tobacco Use: No    Diagnosis:  1. PTSD (post-traumatic stress disorder)    2. Major depressive disorder, recurrent episode, moderate (H)          PLAN: (Patient Tasks / Therapist Tasks / Other)  Patient will return continue with bi-weekly therapy. Patient should work on identifying ways daily that she can connect with people if she has to stay home. Patient would benefit from starting to identify patterns related to her gambling.       I have reviewed the note as documented above.  This accurately captures the substance of my conversation with the patient.  Mariana Love [facility]

## 2021-06-07 NOTE — PROGRESS NOTES
Mental Health Visit Note    04/24/2020   Start time: 701    Stop Time: 753   Session # 6    Session Type: Patient is presenting for an Individual session.    Alina Martell is a 55 y.o. female is being seen today for    Chief Complaint   Patient presents with     MH Follow Up     Video Visit Mental Health     Telemedicine Visit: The patient's condition can be safely assessed and treated via synchronous audio and visual telemedicine encounter.      Reason for Telemedicine Visit: Services only offered telehealth    Originating Site (Patient Location): Patient's home    Distant Site (Provider Location): Provider Remote Setting    Consent:  The patient/guardian has verbally consented to: the potential risks and benefits of telemedicine (video visit) versus in person care; bill my insurance or make self-payment for services provided; and responsibility for payment of non-covered services.     Mode of Communication:  Video Conference via [employer]    As the provider I attest to compliance with applicable laws and regulations related to telemedicine.    Those present for this visit patient and therapist     Follow up in regards to ongoing symptom management of depression and anxiety.    New symptoms or complaints: None    Functional Impairment:   Personal: 3  Family: 2  Social: 3  Work: 3    Clinical assessment of mental status:   Alina Martell presented on time.   She was oriented x3, open and cooperative, and dressed appropriately for this session and weather. Her memory was Normal cognitive functioning .  Her speech was  Within normal.  Language was intact.  Concentration and focus is Within normal. Psychosis is not noted or reported. She reports her mood is Anxious.  Affect is congruent with speech and is Congruent w/content of speech.  Fund of knowledge is adequate. Insight is adequate for therapy. Reviewed and changes made as needed for this session.     Suicidal/Homicidal Ideation present: Patient  denies suicidal and homicidal ideations/means or plans.     Patient's impression of their current status: Patient reported still feeling some anxiety. Patient indicated more people have been laid off at her work and she is having less work to do. Patient reported she is starting to accept that it is out of her control and that she has no way of changing the outcome. Patient indicated she has been reaching out to her support network when she does feel the anxiety start to appear. Patient reported she has to remind herself that this is happening to a lot of people and it has nothing to do with her work performance.     Therapist impression of patients current state: This 55 y.o. Black or  female presents with anxious. This therapist processed with patient ways this situation is out of her control. This therapist went over coping skills to help work on looking at the facts and not jumping to conclusions.     Assessment tools used today include: None    Type of psychotherapeutic technique provided: Insight oriented, Client centered and CBT    Progress toward short term goals: Progress as expected with patient continuing therapy, noticing mental health symptoms and reaching out to her support network.    Review of long term goals: Treatment Plan updated on 4/10/2020    Diagnosis:   1. Major depressive disorder, recurrent episode, moderate (H)    2. ADA (generalized anxiety disorder)    3. PTSD (post-traumatic stress disorder)    no change    Plan and Follow-up: Patient will continue with weekly therapy at this time. Patient would benefit from working on getting into a schedule that includes self-care. Patient should work on reaching out to family and friends during this stay in order. Patient should work on CBT skills with identifying what is facts and what is out of her control.     Discharge Criteria/Planning: Patient will continue with follow-up until therapy can be discontinued without return of signs  and symptoms.    Performed and documented by SHERRIE Pantoja

## 2021-06-07 NOTE — PROGRESS NOTES
Mental Health Visit Note    04/30/2020   Start time: 302    Stop Time: 352   Session # 7    Session Type: Patient is presenting for an Individual session.    Alina Martell is a 55 y.o. female is being seen today for    Chief Complaint   Patient presents with     MH Follow Up     Video Visit Mental Health     Telemedicine Visit: The patient's condition can be safely assessed and treated via synchronous audio and visual telemedicine encounter.      Reason for Telemedicine Visit: Services only offered telehealth    Originating Site (Patient Location): Patient's home    Distant Site (Provider Location): Provider Remote Setting    Consent:  The patient/guardian has verbally consented to: the potential risks and benefits of telemedicine (video visit) versus in person care; bill my insurance or make self-payment for services provided; and responsibility for payment of non-covered services.     Mode of Communication:  Video Conference via [employer]    As the provider I attest to compliance with applicable laws and regulations related to telemedicine.    Those present for this visit patient and therapist     Follow up in regards to ongoing symptom management of depression and anxiety.    New symptoms or complaints: None    Functional Impairment:   Personal: 3  Family: 2  Social: 3  Work: 3    Clinical assessment of mental status:   Alina Martell presented on time.   She was oriented x3, open and cooperative, and dressed appropriately for this session and weather. Her memory was Normal cognitive functioning .  Her speech was  Within normal.  Language was intact.  Concentration and focus is Within normal. Psychosis is not noted or reported. She reports her mood is Anxious.  Affect is congruent with speech and is Congruent w/content of speech.  Fund of knowledge is adequate. Insight is adequate for therapy.Changes made as needed.     Suicidal/Homicidal Ideation present: Patient denies suicidal and homicidal  ideations/means or plans.     Patient's impression of their current status: Patient indicated feeling anxious. Patient reported that she was laid off last Friday from work. Patient indicated she is concerned over her mental and physical health. Patient reported there is certain medication for her RA that she can only get two at a time and unsure when she will get insurance. Patient indicated she also worries about not being able to do therapy until she is able to get insurance. Patient reported she is working to find activities to keep her busy knowing a schedule is important while she is not working.     Therapist impression of patients current state: This 55 y.o. Black or  female presents with anxious. This therapist challenged patient on ways she has been able to manage her mental health in the past without therapy specifically while this therapist was on leave. This therapist provided patient with resources she can use that provide free therapy until she is able to get insurance. This therapist processed with patient her coping skills that she can use to help with her mental health and went over her support network to reach out to during this time.     Assessment tools used today include: None    Type of psychotherapeutic technique provided: Insight oriented, Client centered and CBT    Progress toward short term goals: Progress as expected with patient continuing therapy, noticing mental health symptoms and reaching out to her support network.    Review of long term goals: Treatment Plan updated on 4/10/2020    Diagnosis:   1. Major depressive disorder, recurrent episode, moderate (H)    2. ADA (generalized anxiety disorder)    3. PTSD (post-traumatic stress disorder)    no change    Plan and Follow-up: Patient will restart therapy once she is able to get insurance. Patient can use resources provided if she feeling she needs therapy during this time. Patient should reach out to her support  network for help. Patient would benefit from using skills taught to her throughout therapy to help her during this time.     Discharge Criteria/Planning: Patient will continue with follow-up until therapy can be discontinued without return of signs and symptoms.    Performed and documented by SHERRIE Pantoja

## 2021-06-07 NOTE — PROGRESS NOTES
"Mental Health Visit Note    3/26/2020  Start time: 701  Stop Time: 753 Session # 3    Session Type: Patient is presenting for an Individual session.    The patient has been notified of the following:   \"We have found that certain health care needs can be provided without the need for a face to face visit. This service lets us provide the care you need with a phone conversation.   I will have full access to your [facility] medical record ([employer]) during this entire phone call. I will be taking notes for your medical record.   Since this is like an office visit, we will bill your insurance company for this service.   There are potential benefits and risks of telephone visits (e.g. limits to patient confidentiality) that differ from in-person visits.? Confidentiality still applies for telephone services, and nobody will record the visit. It is important to be in a quiet, private space that is free of distractions (including cell phone or other devices) during the visit.??   If during the course of the call I believe a telephone visit is not appropriate, you will not be charged for this service\"   Consent has been obtained for this service by care team member: Yes     Alina Martell is a 55 y.o. female is being seen today for    Chief Complaint   Patient presents with     Telephone Visit     MH F/U; Anxiety     New symptoms or complaints: None    Present for session: Patient and therapist     Functional Impairment:   Personal: 3  Family: 3  Work: 2  Social:2    Clinical assessment of mental status: Alina Martell  was open and cooperative, and dressed appropriately for this session and weather. Her memory was good for short and long term.  Her speech and language was soft, concise and appropriate for session.  Concentration and focus is fair.  Psychosis is not noted or reported. She reports her mood is anxious.  Affect is congruent with speech.  Fund of knowledge is adequate. Insight is adequate for therapy. " Changes made for session on 3/26/2020    Suicidal/Homicidal Ideation present: None Reported This Session    Patient's impression of their current status: Patient indicated feeling anxious. Patient reported believing a lot of her anxiety is related to the pandemic that is occurring. Patient indicated she is worried about being alone and her thoughts going negative. Patient reported in the past when she has self-isolated she has had negative thoughts occur and she knew she needed to reach out to her support network. Patient indicated knowing she can still reach out to her support network but worried about physically not being able to see them.     Therapist impression of patients current state: Patient appears to have fair insight into his mental health. This therapist challenged patient on ways she can stayed connected with her support network including walking to her son's house. This therapist processed with patient the importance of checking in with her support network daily and using social media as ways to connect.    Type of psychotherapeutic technique provided: Insight oriented, Client centered and CBT    Progress toward short term goals: Progress as expected with patient continuing therapy, recognizing mental health symptoms and asking for help.     Review of long term goals: Treatment plan not updated due to not being able to complete in phone visit    Diagnosis:   1. Major depressive disorder, recurrent episode, moderate (H)    2. ADA (generalized anxiety disorder)    3. Posttraumatic stress disorder      Plan and Follow up: Patient will continue with weekly therapy. Patient will work on continuing to reach out to her support network daily. Patient should spend time with family as able.     Discharge Criteria/Planning: Patient will continue with follow-up until therapy can be discontinued without return of signs and symptoms.    Mariana Love

## 2021-06-07 NOTE — TELEPHONE ENCOUNTER
Patient calling, states she received a call from [employer] but when she answered it was disconnected.  Pt was unsure who had called or the reason.      RN did not see anywhere in chart that patient had been called.  RN advised it may have been Rheumatology regarding a prednisone prescription but RN does not see where patient was contacted.     Pt stated understanding, had no further questions and was advised if call was disconnected they will most likely try her back again. Pt stated understanding.    Judy Zelaya, RN   [facility] RN Triage    Reason for Disposition    [1] Follow-up call to recent contact AND [2] information only call, no triage required    Protocols used: INFORMATION ONLY CALL-A-

## 2021-06-07 NOTE — PROGRESS NOTES
Mental Health Visit Note  04/10/2020   Start time: 701    Stop Time: 751   Session # 5    Session Type: Patient is presenting for an Individual session.    Alina Martell is a 55 y.o. female is being seen today for    Chief Complaint   Patient presents with     MH Follow Up     Video Visit Mental Health     Telemedicine Visit: The patient's condition can be safely assessed and treated via synchronous audio and visual telemedicine encounter.      Reason for Telemedicine Visit: Services only offered telehealth    Originating Site (Patient Location): Patient's home    Distant Site (Provider Location): Provider Remote Setting    Consent:  The patient/guardian has verbally consented to: the potential risks and benefits of telemedicine (video visit) versus in person care; bill my insurance or make self-payment for services provided; and responsibility for payment of non-covered services.     Mode of Communication:  Video Conference via [employer]    As the provider I attest to compliance with applicable laws and regulations related to telemedicine.    Those present for this visit patient and therapist     Follow up in regards to ongoing symptom management of depression and anxiety    New symptoms or complaints: None    Functional Impairment:   Personal: 3  Family: 2  Social: 3  Work: 3    Clinical assessment of mental status:   Alina Martell presented on time.   She was oriented x3, open and cooperative, and dressed appropriately for this session and weather. Her memory was Normal cognitive functioning .  Her speech was  Within normal.  Language was intact.  Concentration and focus is Within normal. Psychosis is not noted or reported. She reports her mood is Anxious.  Affect is congruent with speech and is Congruent w/content of speech.  Fund of knowledge is adequate. Insight is adequate for therapy.    Suicidal/Homicidal Ideation present: Patient denies suicidal and homicidal ideations/means or plans.      Patient's impression of their current status: Patient indicated feeling anxious. Patient reported her work has caused her a lot of stress. Patient indicated her job not being a meant as a work from home job so a lot of challenges have occurred. Patient reported she has not been has not been connecting socially as much as she was last week. Patient indicated knowing she needs to get back into a routine of reaching out to family and friends.     Therapist impression of patients current state: This 55 y.o. Black or  female presents with anxious. This therapist challenged patient on ways she is struggling with isolated and loneliness at this time. This therapist processed with patient ways she can work on a healthy schedule that would include reaching out to family and friends to help her feel connected during the stay at home order.     Assessment tools used today include: CGI    Type of psychotherapeutic technique provided: Insight oriented, Client centered and CBT    Progress toward short term goals: Progress as expected with patient continuing therapy, noticing mental health symptoms and working to reduce symptoms.     Review of long term goals: Treatment Plan updated .     Diagnosis:   1. Major depressive disorder, recurrent episode, moderate (H)    2. ADA (generalized anxiety disorder)    3. PTSD (post-traumatic stress disorder)    no change    Plan and Follow-up: Patient will continue with weekly therapy at this time. Patient would benefit from working on getting into a schedule that includes self-care. Patient should work on reaching out to family and friends during this stay in order.     Discharge Criteria/Planning: Patient will continue with follow-up until therapy can be discontinued without return of signs and symptoms.    Performed and documented by 4/10/2020

## 2021-06-07 NOTE — PROGRESS NOTES
"Mental Health tele Visit Note    Patient: Alina Martell    : 1965 MRN: [id number]    Date: 2020   Start time: 703   Stop Time: 753   Session # 5    [employer] john was not working for patient so telephone had to be used for this session    The patient has been notified of the following:   \"We have found that certain health care needs can be provided without the need for a face to face visit.  This service lets us provide the care you need with a phone conversation.  I will have full access to your [facility] medical record during this entire phone call.   I will be taking notes for your medical record. Since this is like an office visit, we will bill your insurance company for this service.  There are potential benefits and risks of telephone visits (e.g. limits to patient confidentiality) that differ from in-person visits.?  Confidentiality still applies for telephone services, and nobody will record the visit.  It is important to be in a quiet, private space that is free of distractions (including cell phone or other devices) during the visit.?? If during the course of the call I believe a telephone visit is not appropriate, you will not be charged for this service\"  Consent has been obtained for this service by care team member: Yes, per verbal agreement     Session Type: Patient is participating in a telephone visit    Chief Complaint   Patient presents with     MH Follow Up     Telephone Visit Mental Health       New symptoms or complaints: None    Functional Impairment:   Personal: 3  Family: 3  Work: 3  Social: 2          ASSESSMENT: Current Emotional / Mental Status (status of significant symptoms):               Patient denies personal safety concerns.               Patient denies current or recent suicidal ideation or behaviors.              Patient denies current or recent homicidal ideation or behaviors.              Patient denies current or recent self injurious behavior or ideation.         "      Patient denies other safety concerns.              Patient denies change in risk factors.               Patient denies change in protective factors.              Recommended that patient call 911 or go to the local ED should there be a change in any of these risk factors.                Attitude:                                   Cooperative               Orientation:                             Person, place, time, situation               Speech                          Rate / Production:       Normal                           Volume:                       Normal               Mood:                                      Anxious               Thought Content:                    Clear               Thought Form:                        Coherent  Goal Directed  Logical               Insight:                                     Good       Patient's impression of their current status: Patient reported continuing to feeling anxious. Patient indicated her anxiety now is related to work. Patient reported a lot of people have been laid off and worried that she is going to be next. Patient indicated the people that they have laid off have not made sense to her. Patient reported she believes she would be able to get a job. Patient indicated she is more worried about the insurance. Patient reported she has been doing good with reaching out to family and friends for support.     Therapist impression of patients current state: Patient appears to have good insight into her mental health. This therapist challenged patient on what is in her control at this time. This therapist processed with patient the importance of using skills to reduce anxiety. This therapist processed with patient the importance of continuing to work on reaching out to her support network.     Type of psychotherapeutic technique provided: Insight oriented, Client centered and CBT    Progress toward short term goals: Progress as expected with patient  continuing therapy, noticing triggers to her mental health and reaching out to her support network.     Review of long term goals: Treatment plan updated on 04/10/2020       Diagnosis:  1. Major depressive disorder, recurrent episode, moderate (H)    2. ADA (generalized anxiety disorder)    3. PTSD (post-traumatic stress disorder)      Plan and Follow up: Patient will continue with weekly therapy  video next week. Patient would benefit from identifying what is in her control. Patient should continue to use skills taught in session to work on reducing anxiety symptoms.     Discharge Criteria/Planning: Patient will continue with follow-up until therapy can be discontinued without return of signs and symptoms.    I have reviewed the note as documented above.  This accurately captures the substance of my conversation with the patient.  Mariana Love LPCC

## 2021-06-07 NOTE — TELEPHONE ENCOUNTER
Refill Approved    Rx renewed per Medication Renewal Policy. Medication was last renewed on 9/16/19.    Ayesha Oseguera, [facility] Triage/Med Refill 4/22/2020     Requested Prescriptions   Pending Prescriptions Disp Refills     loratadine (CLARITIN) 10 mg tablet [Pharmacy Med Name: LORATADINE 10 MG TABLET] 30 tablet 2     Sig: TAKE 1 TABLET BY MOUTH EVERY DAY       Antihistamine Refill Protocol Passed - 4/21/2020  2:34 PM        Passed - Patient has had office visit/physical in last year     Last office visit with prescriber/PCP: Visit date not found OR same dept: Visit date not found OR same specialty: Visit date not found  Last physical: 9/16/2019 Last MTM visit: Visit date not found   Next visit within 3 mo: Visit date not found  Next physical within 3 mo: Visit date not found  Prescriber OR PCP: Esetlle Bansal MD  Last diagnosis associated with med order: 1. Hives  - loratadine (CLARITIN) 10 mg tablet [Pharmacy Med Name: LORATADINE 10 MG TABLET]; TAKE 1 TABLET BY MOUTH EVERY DAY  Dispense: 30 tablet; Refill: 2    If protocol passes may refill for 12 months if within 3 months of last provider visit (or a total of 15 months).

## 2021-06-07 NOTE — PROGRESS NOTES
"Mental Health tele Visit Note    Patient: Alina Martell    : 1965 MRN: [id number]    Date: 4/3/2020   Start time: 704   Stop Time: 754   Session # 4    The patient has been notified of the following:   \"We have found that certain health care needs can be provided without the need for a face to face visit.  This service lets us provide the care you need with a phone conversation.  I will have full access to your [facility] medical record during this entire phone call.   I will be taking notes for your medical record. Since this is like an office visit, we will bill your insurance company for this service.  There are potential benefits and risks of telephone visits (e.g. limits to patient confidentiality) that differ from in-person visits.?  Confidentiality still applies for telephone services, and nobody will record the visit.  It is important to be in a quiet, private space that is free of distractions (including cell phone or other devices) during the visit.?? If during the course of the call I believe a telephone visit is not appropriate, you will not be charged for this service\"  Consent has been obtained for this service by care team member: Yes, per verbal agreement     Session Type: Patient is participating in a telephone visit    Chief Complaint   Patient presents with     MH Follow Up     Telephone Visit Mental Health       New symptoms or complaints: None    Functional Impairment:   Personal: 3  Family: 3  Work: 3  Social: 2          ASSESSMENT: Current Emotional / Mental Status (status of significant symptoms):               Patient denies personal safety concerns.               Patient denies current or recent suicidal ideation or behaviors.              Patient denies current or recent homicidal ideation or behaviors.              Patient denies current or recent self injurious behavior or ideation.              Patient denies other safety concerns.              Patient denies change in risk " factors.               Patient denies change in protective factors.              Recommended that patient call 911 or go to the local ED should there be a change in any of these risk factors.                Attitude:                                   Cooperative               Orientation:                             Person, place, time, situation               Speech                          Rate / Production:       Normal                           Volume:                       Normal               Mood:                                      Anxious               Thought Content:                    Clear               Thought Form:                        Coherent  Goal Directed  Logical               Insight:                                     Good       Patient's impression of their current status: Patient indicated having a little anxiety. Patient reported her mother informing her that she has been talking to her daughter's father. Patient indicated her mother knowing what happened and not being able to understand why she would talk to him. Patient reported setting boundaries with her mom that she does not want to know anything about him. Patient indicated since [place] in  [place] she is not noticing a rise in her PTSD symptoms. Patient reported she feels she has been doing well with the stay at home order by spending time with her family and talking to her friends through zoom.     Therapist impression of patients current state: Patient appears to have good insight into her mental health. This therapist processed with patient her emotions related to her mom talking to her daughter's dad. This therapist processed with patient the importance of setting boundaries with her mom and distancing herself from her mom at this time. This therapist processed with patient ways to work on continuing to reduce her anxiety.     Type of psychotherapeutic technique provided: Insight oriented, Client centered and  CBT    Progress toward short term goals: Progress as expected with patient continuing therapy, patient discussing concerns and coping strategies during tele-sessions and patient working on homework assignments and skills learned in therapy between sessions    Review of long term goals: Treatment plan will be done at next visit which will be tele-health via video       Diagnosis:  1. Major depressive disorder, recurrent episode, moderate (H)    2. ADA (generalized anxiety disorder)    3. Posttraumatic stress disorder      Plan and Follow up: Patient will continue with weekly therapy starting with video next week. Patient would benefit from working on setting boundaries with her mom. Patient should continue to use skills taught in session to work on reducing anxiety symptoms.     Discharge Criteria/Planning: Patient will continue with follow-up until therapy can be discontinued without return of signs and symptoms.    I have reviewed the note as documented above.  This accurately captures the substance of my conversation with the patient.  Mariana Love [facility]

## 2021-06-07 NOTE — PROGRESS NOTES
Outpatient Mental Health Treatment Plan  Name: Alina Martell  : 1965  MRN: [id number]    Treatment Plan: Updated Treatment Plan    Benefit and risks and alternatives have been discussed: Yes  Is this treatment appropriate with minimal intrusion/restrictions: Yes  Estimated duration of treatment: Ongoing therapy at this time  Anticipated frequency of services: Weekly  Necessity for frequency: This frequency is needed to establish therapeutic goals and for continuity of care in order to monitor progress.  Necessity for treatment: To address cognitive, behavioral, and/or emotional barriers in order to work toward goals and to improve quality of life.    Plan:   ? Depression    Goal:  Decrease average depression level from 4 to 1.  Strategies:   ?[x] Decrease social isolation  [x] Increase involvement in meaningful activities  ?[x] Discuss sleep hygiene  ?[x] Explore thoughts and expectations about self and others  ?[x] Assess for suicide risk  ?[x] Implement physical activity routine (with physician approval)  [x] Consider introduction of bibliotherapy and/or videos  [x] Continue compliance with medical treatment plan (or explore  barriers)  ?   Degree to which this is a problem (1-4): 4  Degree to which goal is met (1-4): 3    Date of next review: 7/10/2020     ? Anxiety    Goal:  Decrease average anxiety level from 4 to 1.  Strategies:   ? [x]Learn and practice relaxation techniques and other coping strategies    ? [x] Increase involvement in meaningful activities  ? [x] Discuss sleep hygiene  ? [x] Explore thoughts and expectations about self and others  ? [x] Identify and monitor triggers for panic/anxiety symptoms  ? [x] Implement physical activity routine (with physician approval)  ? [x] Consider introduction of bibliotherapy and/or videos  ? [x] Continue compliance with medical treatment plan    Degree to which this is a problem (1-4): 4  Degree to which goal is met (1-4): 2    Date of next review:  "7/10/2020    PTSD    Goal: Identify triggers, separate the traumatic memory from the debilitating emotion associated with it.    Strategies:    [X]Learn and practice relaxation techniques and other coping strategies (e.g., thought stopping, reframing, meditation)    ? [X] Cognitive restructuring    ? [X] Discuss sleep hygiene    ? [X] Identify and change maladaptive coping behaviors    ? [X] Prolonged exposure therapy    ? [X] Identify cues and symptoms of PTSD      Degree to which this is a problem (1-4): 4  Degree to which goal is met (1-4): 2    Date of next review: 7/10/2020    Functional Impairment: 1=Not at all/Rarely 2=Some days 3=Most Days 4=Every Day    Personal: 3  Family: 3  Social: 2  Work: 3    Diagnosis:  Major depressive disorder, recurrent, moderate  Generalized anxiety disorder  PTSD    Clinical assessments completed: ADA-7, PHQ-9, Mood Questionnaire current, CAGE-AID, PANSI.    STRENGTHS: Hard worker, dedication and support network    LIMITATIONS: Avoidance behaviors    Cultural Identification: Patient reported:    Race: Bi-racial    Experience of cultural bias: Patient reported experiencing cultural bias. Patient gave an example of when she was living in [place] and being told \"we don't serve [place].\" Patient reported being called the inward on multiple occasions.    Immigration/history of status: Born [place] raised in [place].   Level of acculturation: acculturated   Time orientation: middle    Social orientation/class: middle    Verbal Communication Style/languages: English    Locus of Control: inward     Persons responsible for this plan:  ? [x] Patient ? [x] Provider ?     Provider:Performed and documented by SHERRIE Pantoja    Patient Signature:____________________________________ Date: ______________       Therapist Signature: __________________________________ Date: ______________  "

## 2021-06-08 NOTE — PROGRESS NOTES
Mental Health and Addiction Medicine  Outpatient Subsequent Visit  By Yasmin Mclean M.D.    1/9/2017    Alina Martell, 1965.    Chief Complaint:  Patient was seen today in follow-up for  Chief Complaint   Patient presents with     PTSD     Depression     Anxiety and rumination, insomnia     Medication Management       Impression/Medical Decision-Making:  Alina Martell Presents for her second visit with me. Alina has worked with Carmen Bentley previously Dr. Mclain for prolonged exposure related to PTSD. She continues to work with Carmen often focused on tools to manage anxiety and possibly panic. These episodes happen less often and she's less frightened by them. At our first visit we focused on her insomnia I gave her amitriptyline to use 1 to 510 mg doses. Expected her to see if she could taper her trazodone if it with useful. May not have understood the instructions. Is using 30 mg along with trazodone and sleeping much better. I've asked her to gradually increase the amitriptyline to 50 mg to get the most documented benefit for pain and to attempt to taper off the trazodone. She prefer to be on fewer medications. Unclear how much the amitriptyline is contributing to the improvement in her pain. Certainly a warm dry climate tends to be better for any type of arthritis. At the end of our visit he mentioned that she wishes she could find the right combination of medications to really treat her issues of anxiety and rumination with depression being secondary. We had a significant discussion about the use of beta blockers for anxiety how they work in decreasing the cardiovascular systems ability to escalate quickly when adrenaline among other things pours out as one begins an anxiety attack. It's also used for performance anxiety. Explained that if it's used only a couple times a week one feels a fairly immediate effect. Some find it useful to take daily but then an added dose will not make  a noticeable difference. Prescribed 20 mg of Inderal up to three times daily for anxiety and she can experiment with how she'd like to dose it. Her blood pressure should tolerate this without difficulty. Rumination continues particularly at night she's used an SSRI and an S and RI. She's never used an atypical antipsychotic. She would night not like anything that leads to weight gain. Decided to use Abilify half of the to milligram dose with a possible increase to 2 mg. I did review the potential side effects of antipsychotic medications.  I  described general issues of metabolic dysfunction with risk for increased blood sugars, weight gain and diabetes, increased cholesterol and the movement disorder, tardive dyskinesia, which can be permanent.  The side effects are more common when the medications are used in large doses or long periods, usually for persistent psychotic disorders. Each member of this medication group can cause these effects , though amount of risk varies by the individual drug. Yearly monitoring of fasting blood, sugar fasting lipid panel and screening for movement disorder should be completed by your medication prescriber and/ or your primary care provider and you should keep track of the results. Any questions from the patient were answered and they were able to express understanding and agreed trial of this medication.  Has seen the ads on TV.  Abilify is less likely to cause weight gain.  One can ususally tell if there will be a benefit quickly. Can be useful for anxiety and depression as well as rumination.  Dicussed akithesia as a side effect.  Follow-up in three months. Can call anytime sooner for problems. Can contact me through my chart or through her therapist Carmen. If she's unable to tolerate the Abilify had like her to let me know      Diagnoses/Plan:  Alina was seen today for ptsd, depression, mh follow up and medication management.    Diagnoses and all orders for this  visit:    Mild episode of recurrent major depressive disorder  -     ARIPiprazole (ABILIFY) 2 MG tablet; Start with 1/2, 1mg, can increase    Sleep disorder  -     amitriptyline (ELAVIL) 10 MG tablet; 1-5 at bedtime  -     hydrOXYzine (VISTARIL) 25 MG capsule; 1 up to four times daily prn anxiety, 1-4 at first wakening prn  -     traZODone (DESYREL) 100 MG tablet; Take 1 tablet (100 mg total) by mouth bedtime. PRN    PTSD (post-traumatic stress disorder)  -     hydrOXYzine (VISTARIL) 25 MG capsule; 1 up to four times daily prn anxiety, 1-4 at first wakening prn    Anxiety  -     hydrOXYzine (VISTARIL) 25 MG capsule; 1 up to four times daily prn anxiety, 1-4 at first wakening prn  -     propranolol (INDERAL) 20 MG tablet; One up to three times a day    Significant Interval History:   Alina Martell Presenter follow-up. She has been using amitriptyline 30 mg along with trazodone and sleeping much better getting at least five hours rather than the to she experienced before adding that medication. She continues to work closely with Carmen Bentley and learned skills for managing anxiety. She also describes episode of panic that sounds actually like triggered anxiety. She tried someone else's Valium once and really liked it so touched it againRumination particularly at night is one of the things that keeps her from sleeping. She is trying some distraction techniques playing game on her phone or listening to relaxation CD.  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 Surprised at how expensive it is. She's given some thought to living in the dryer warmer climate [facility]. Her joint pain has improved significantly combination of methotrexate Plaquenil and perhaps amitriptyline. She had x-rays done of her hands and feet which did not show arthritic changes. Ligia had mentioned at the end of our visit that she was looking for  some really effective combination of medications for her anxiety more so than depression. Failed Effexor and sertraline. She is using one or two doses of hydroxyzine when needed during the day for anxiety and finds it helpful without making her to tired. Her daughter still living with her son. She's been walking away when they are about to get into some kind of dispute and that the new behavior for her.    Current Stressors: economic problems and other psychosocial or environmental problems    Addiction History:denies    Active Problem List:  Patient Active Problem List   Diagnosis     PTSD (post-traumatic stress disorder)     Sleep disorder     Anxiety     Panic attack     Major depressive disorder, recurrent episode, unspecified     Anemia - microcytic anemia     Obesity     Sicca syndrome     Grief     Olecranon bursitis, right - bland, non-inflamed, diagnosed on 7/8/16     Rheumatoid arthritis     Carpal tunnel syndrome     Chronic pain       Allergies as of 01/09/2017 - Minh as Reviewed 01/09/2017   Allergen Reaction Noted     Oxycodone-acetaminophen Itching and Other (See Comments) 09/25/2013     Shellfish containing products Anaphylaxis 09/25/2013     Sulfa (sulfonamide antibiotics) Itching 09/25/2013     Effexor [venlafaxine] Palpitations 04/08/2016     Propoxyphene n-acetaminophen  09/25/2013       Current Outpatient Prescriptions   Medication Sig Dispense Refill     amitriptyline (ELAVIL) 10 MG tablet 1-5 at bedtime 150 tablet 5     EPIPEN 2-RADHA 0.3 mg/0.3 mL (1:1,000) atIn Inject 0.3 mg into the shoulder, thigh, or buttocks as needed.        ferrous sulfate 325 (65 FE) MG tablet Take 1 tablet by mouth daily. 30 tablet 3     folic acid (FOLVITE) 1 MG tablet Take 1 tablet (1 mg total) by mouth daily. 90 tablet 1     HYDROcodone-acetaminophen 5-325 mg per tablet Take 1 at hs prn carpal tunnel pain. 30 tablet 0     hydroxychloroquine (PLAQUENIL) 200 mg tablet Take 2 tablets (400 mg total) by mouth daily. 60  "tablet 2     hydrOXYzine (VISTARIL) 25 MG capsule 1 up to four times daily prn anxiety, 1-4 at first wakening prn 150 capsule 5     ibuprofen (ADVIL,MOTRIN) 600 MG tablet TK 1 T PO  QID PRN. MAXIMUM OF 3200 MG IN 24 HOURS. 120 tablet 1     methotrexate 2.5 MG tablet Take 6 tablets (15 mg total) by mouth once a week. 24 tablet 2     multivitamin therapeutic (THERAGRAN) tablet Take 1 tablet by mouth daily.       traZODone (DESYREL) 100 MG tablet Take 1 tablet (100 mg total) by mouth bedtime. PRN 30 tablet 5     ARIPiprazole (ABILIFY) 2 MG tablet Start with 1/2, 1mg, can increase 30 tablet 5     propranolol (INDERAL) 20 MG tablet One up to three times a day 90 tablet 5     No current facility-administered medications for this visit.          Medication Side Effects:  None    Clinincal Outcome Measures:  PHQ-9   10  ADA-7   7  Both a bit reduced    Review of Systems: Positive findings are noted below or elsewhere in this record, otherwise a 10 point review of systems is negative.  This is substantially documented elsewhere.       [place] Prescription Monitoring Program:  No worrisome pharmacy activity.  Recorded in the chart    Objective:     Vitals:    01/09/17 1708   BP: 141/85   Pulse: 91   Weight: (!) 261 lb (118.4 kg)   Height: 5' 8.5\" (1.74 m)   PainSc:   2   PainLoc: Generalized     Body mass index is 39.11 kg/(m^2).    General appearance: normal   Level of consciousness: alert   Gait/Station: Normal   Muscle Strength/Tone: No gross abnormalities   Postural tremor in the upper extremities: None   Cogwheel rigidity at the elbow or wrists:absent   Point-to-point maneuvers: normal   Abnormal motor movements:None noted       Psychiatric Mental Status Examination   Appearance/Grooming, dress and hygiene:Normal   Consciousness/Orientation: Normal, A+O to name, date, place and situation   Behavior/Attitude:Cooperative   Mood: Euthymic:  Friendly and Pleasant, Dysphoric:  Overwhelmed and Sad and Apprehensive:   " Anxious, Fearful and Tense  Affect:   Range -> Full   Intensity -> normal   Eye contact: within normal limits   Speech: Normal   Thought processes: Normal  Thought content:Normal, Preoccupations and Ruminations, denies suicidal homicidal ideation, denies auditory and visual hallucinations  Perceptions:Normal  Insight and judgement: Recognition of illness, Taking steps to manage illness and Readiness to change, slow but present   Memory-Recent: Normal   Memory-Remote: Normal   Attention/Concentration: Normal   Language: Normal   Fund of Knowledge: average for current situation    Summary of Diagnostic Studies:    Review indicates:  Urine toxicology is not indicated for this patient.    Discharge Planning:    Reviewed risks/benefits of medication and Patient able to verbalize understanding of side effects   Consider increasing the amitryptiline to 50mg and taper the Trazodone  Helps with sleep and chronic pain    Propranolol 20 mg use for panic or escalating anxiety  If you use every day then it will  Not give additional benefit when anxious    Trial of Abilify for racing mind also helps for anxiety, and depression    Total Time:    30  minutes                 Coordination of Care:     30   minutes spent on this visit with more than 50% time spent face to face in education about diagnosis and treatment options, counseling and support as well as coordination of care with staff.  Time also spent on entering orders and preparing documentation for the visit.       Yasmin Mclean M.D.  Certified in Addiction and Family Medicine  [facility]

## 2021-06-08 NOTE — PROGRESS NOTES
MENTAL HEALTH VISIT NOTE    01/31/2017 Start time: 400   Stop Time: 445   Session # 3    Alina Martell is a 51 y.o. female is being seen today for follow up.    New symptoms or complaints: None    Functional Impairment:   Personal: 3  Family: 4  Work: 2  Social:2    Clinical assessment of mental status: Alina Martell was late for session due to weather. She was open and cooperative, and dressed appropriately for this session and weather. Her memory was intact .  Her speech was  intact.  Language was intact.  Concentration and focus is adequate. Psychosis is not noted or reported. She reports her mood as sad.  Affect is congruent with speech.  Fund of knowledge is adequate. Insight is adequate for therapy.     Suicidal/Homicidal Ideation present: None Reported This Session    Patient's impression of their current status: Patient indicated doing okay. Patient reported having a bust couple of weeks. Patient reported the anniversary of her father's death and his birthday occurring. Patient indicated this bringing up unexpected emotions. Patient reported getting through these emotions by staying busy at times and isolating at other times.     Therapist impression of patients current state: Patient continues to have good insight into her mental health. This therapist processed with patient ways grief can occur on special dates. This therapist challenged patient on the importance of talking about her emotions and not avoiding them.     Type of psychotherapeutic technique provided: Insight oriented, Client centered and CBT    Progress toward short term goals: Medication management with Dr. Wei, using coping skills taught in session,  reaching out to people when needed and returning to scheduled session.     Review of long term goals: Treatment plan updated on 12/27/2016    Diagnosis:   1. PTSD (post-traumatic stress disorder)    2. Major depressive disorder, recurrent episode, moderate    3. Panic disorder  without agoraphobia      Plan and Follow up: Patient will return in 2 week for scheduled session to continue working the grief related to the loss of her father. Patient will work on confronting her feeling instead of avoiding them. Patient should work on caring for herself and putting her needs first.  Patient should use coping skills taught throughout sessions to help reduce PTSD symptoms.  Patient will continue to take medication as prescribed. Patient should work on forgiving herself and talk with her brother about the guilt she is experiencing.  Patient would benefit from continuing to identify ways she has been a good mom.    Discharge Criteria/Planning: Patient will continue with follow-up until therapy can be discontinued without return of signs and symptoms.    Encounter performed and documented by SHERRIE Pantoja

## 2021-06-08 NOTE — PROGRESS NOTES
This therapist talked with patient via telephone call due to patient's increase anxiety about being laid off. This therapist went over coping skills with patient and patient indicating feeling better.

## 2021-06-08 NOTE — PROGRESS NOTES
Patient is here for follow up and medication management.    Patient states she has no new concerns today.    [place]  was reviewed prior to seeing patient today. See note below for embedded report.  [employer] [place] Date: 17  [employer] Query Report Page#: 1  Patient Rx History Report  LAURI HRENANDEZ  Search Criteria: Last Name 'lauri' and First Name 'jenn' and  =  and Request Period =   to ' - 2 out of 2 Recipients Selected.  Fill Date Product, Str, Form Qty Days Pt ID Prescriber Written RX# N/R* Pharm MED+  ---------- -------------------------------- ------ ---- --------- ---------- ---------- ------------ ----- --------- ------  2016 HYDROCODON-ACETAMINOPHEN 5-325 15.00 2 [id number] 2016 [id number] N [id number] 37.5  2016 HYDROCODON-ACETAMINOPHEN 5-325 10.00 1 [id number] 2016 [id number] N [id number] 50.0  *N/R N=New R=Refill  +MED Daily  Prescribers for prescriptions listed  ----------------------------------------------------------------------------------------------------------------------------------  [id number] TACHO JIMENEZ DO; [facility], [address] [place] [address]  [id number] VINICIO JIMENEZ MD; [facility], [address], [place] [facility] [address]  Pharmacies that dispensed prescriptions listed  ----------------------------------------------------------------------------------------------------------------------------------  [id number] [employer]; : [id number], [address],[facility] [address],    Correct pharmacy verified with patient and confirmed in snapshot? [x] yes []no    Medications Phoned  to Pharmacy [] yes [x]no  Name of Pharmacist:  List Medications, including dose, quantity and instructions      Medication Prescriptions given to patient   [] yes  [x] no   List the name of the drug the prescription was written for.        Medications ordered this visit were e-scribed.  Verified by order class [x] yes  [] no    Medication changes or discontinuations were communicated to patient's pharmacy: [] yes  [x] no    UA collected [] yes    [x] no    [place] Prescription Monitoring Program Reviewed? [x] yes  [] no    Referrals were made to:  none    Future appointment was made: [x] yes  [] no    Dictation completed at time of chart check: [] yes  [x] no    I have checked the documentation for today s encounters and the above information has been reviewed and completed.

## 2021-06-08 NOTE — PROGRESS NOTES
MENTAL HEALTH VISIT NOTE    01/9/2017 Start time: 425   Stop Time: 505   Session # 2    Alina Martell is a 51 y.o. female is being seen today for follow up.    New symptoms or complaints: Patient indicated working to find her purpose.     Functional Impairment:   Personal: 3  Family: 4  Work: 2  Social:2    Clinical assessment of mental status: Alina Martell was late for session due to weather. She was open and cooperative, and dressed appropriately for this session and weather. Her memory was intact .  Her speech was  intact.  Language was intact.  Concentration and focus is adequate. Psychosis is not noted or reported. She reports her mood as depressed.  Affect is congruent with speech.  Fund of knowledge is adequate. Insight is adequate for therapy.     Suicidal/Homicidal Ideation present: None Reported This Session    Patient's impression of their current status: Patient reported doing okay. Patient indicated she has been working non stop. Patient talked about needing a break to do self-care. Patient indicated she continues to look at different areas of her relationships and what she wants in each of them.     Therapist impression of patients current state: Patient continues to have good insight into her mental health. This therapist processed with patient the importance of self-care. This therapist challenged patient on ways she can continue to work on self-care despite working extra hours. This therapist processed with patient the importance of setting healthy boundaries.     Type of psychotherapeutic technique provided: Insight oriented, Client centered and CBT    Progress toward short term goals: Medication management, using coping skills taught in session,  reaching out to people when needed and returning to scheduled session.     Review of long term goals: Treatment plan updated on 12/27/2016    Diagnosis:   1. PTSD (post-traumatic stress disorder)    2. Major depressive disorder, recurrent  episode, moderate    3. Panic disorder without agoraphobia      Plan and Follow up: Patient will return in 1 week for scheduled session to continue working the grief related to the loss of her father. Patient will work on confronting her feeling instead of avoiding them. Patient should work on caring for herself and putting her needs first.  Patient should use coping skills taught throughout sessions to help reduce PTSD symptoms.  Patient will continue to take medication as prescribed. Patient should work on forgiving herself and talk with her brother about the guilt she is experiencing.  Patient would benefit from continuing to identify ways she has been a good mom.    Discharge Criteria/Planning: Patient will continue with follow-up until therapy can be discontinued without return of signs and symptoms.    Encounter performed and documented by SHERRIE Pantoja

## 2021-06-08 NOTE — PROGRESS NOTES
"Mental Health tele Visit Note    Patient: Alina Martell    : 1965 MRN: [id number]    Date: 2020   Start time: 1205   Stop Time: 1250   Session # 8    AmLiveData jonh was not working for patient so telephone had to be used for this session    The patient has been notified of the following:   \"We have found that certain health care needs can be provided without the need for a face to face visit.  This service lets us provide the care you need with a phone conversation.  I will have full access to your [facility] medical record during this entire phone call.   I will be taking notes for your medical record. Since this is like an office visit, we will bill your insurance company for this service.  There are potential benefits and risks of telephone visits (e.g. limits to patient confidentiality) that differ from in-person visits.?  Confidentiality still applies for telephone services, and nobody will record the visit.  It is important to be in a quiet, private space that is free of distractions (including cell phone or other devices) during the visit.?? If during the course of the call I believe a telephone visit is not appropriate, you will not be charged for this service\"  Consent has been obtained for this service by care team member: Yes, per verbal agreement     Session Type: Patient is participating in a telephone visit    Chief Complaint   Patient presents with     MH Follow Up     Telephone Visit Mental Health       New symptoms or complaints: None    Functional Impairment:   Personal: 3  Family: 3  Work: 3  Social: 2          ASSESSMENT: Current Emotional / Mental Status (status of significant symptoms):               Patient denies personal safety concerns.               Patient denies current or recent suicidal ideation or behaviors.              Patient denies current or recent homicidal ideation or behaviors.              Patient denies current or recent self injurious behavior or ideation.       "        Patient denies other safety concerns.              Patient denies change in risk factors.               Patient denies change in protective factors.              Recommended that patient call 911 or go to the local ED should there be a change in any of these risk factors.                Attitude:                                   Cooperative               Orientation:                             Person, place, time, situation               Speech                          Rate / Production:       Normal                           Volume:                       Normal               Mood:                                      Anxious               Thought Content:                    Clear               Thought Form:                        Coherent  Goal Directed  Logical               Insight:                                     Good       Patient's impression of their current status: Patient reported continuing to feeling anxious. Patient indicated going to visit a friend in [place] due to needing to get away from [place]. Patient reported shew as doing good being active and helping out during the day but at night noticing changes in her mood. Patient indicated she was starting to notice a lot of racing thoughts and depression. Patient reported knowing she needed to reach out to her support network. Patient indicated she continues to think about the different trauma happening in [place] and how it will affect everyone. Patient reported trying to focus on one day at a time.     Therapist impression of patients current state: Patient appears to have good insight into her mental health. This therapist processed with patient her struggles with what is happening in [place]. This therapist processed with patient the importance of continuing to reach out to her support network.      Type of psychotherapeutic technique provided: Insight oriented, Client centered and CBT    Progress toward short term goals: Progress as  expected with patient continuing therapy, noticing triggers to her mental health and reaching out to her support network and getting out of the state.     Review of long term goals: Treatment plan updated on 04/10/2020       Diagnosis:  1. Major depressive disorder, recurrent episode, moderate (H)    2. ADA (generalized anxiety disorder)    3. PTSD (post-traumatic stress disorder)      Plan and Follow up: Patient will continue with weekly therapy video next week. Patient would benefit from identifying what is in her control. Patient should continue to use skills taught in session to work on reducing anxiety symptoms. Patient should continue to reach out to her support network.    Discharge Criteria/Planning: Patient will continue with follow-up until therapy can be discontinued without return of signs and symptoms.    I have reviewed the note as documented above.  This accurately captures the substance of my conversation with the patient.  Mariana Love [facility]

## 2021-06-08 NOTE — PROGRESS NOTES
MENTAL HEALTH VISIT NOTE    01/03/2017 Start time: 400    Stop Time: 452   Session # 1    Alina Martell is a 51 y.o. female is being seen today for follow up.    New symptoms or complaints: Patient indicated working to find her purpose.     Functional Impairment:   Personal: 3  Family: 4  Work: 2  Social:2    Clinical assessment of mental status: Alina Martell was on time her session today.  She was open and cooperative, and dressed appropriately for this session and weather. Her memory was intact .  Her speech was  intact.  Language was intact.  Concentration and focus is adequate. Psychosis is not noted or reported. She reports her mood as depressed.  Affect is congruent with speech.  Fund of knowledge is adequate. Insight is adequate for therapy.     Suicidal/Homicidal Ideation present: None Reported This Session    Patient's impression of their current status: Patient indicated being confused. Patient talked about trying to figure out her own needs. Patient indicated always putting other people's needs first. Patient talked about ways it is challenging to think of herself and what is best for herself. Patient indicated believing this has played a role in her mental health by not caring for herself.     Therapist impression of patients current state: Patient continues to have good insight into her mental health. Patient recognizes ways she neglects herself. This therapist challenged patient on reasons she avoids her emotions. This therapist processed with patient ways to start self-care.      Type of psychotherapeutic technique provided: Insight oriented, Client centered and CBT    Progress toward short term goals: Medication management, using coping skills taught in session,  reaching out to people when needed and returning to scheduled session.     Review of long term goals: Treatment plan updated on 12/27/2016    Diagnosis:   1. PTSD (post-traumatic stress disorder)    2. Major depressive disorder,  recurrent episode, moderate    3. Panic disorder without agoraphobia      Plan and Follow up: Patient will return in 1 week for scheduled session to continue working the grief related to the loss of her father. Patient will work on confronting her feeling instead of avoiding them. Patient should work on caring for herself and putting her needs first.  Patient should use coping skills taught throughout sessions to help reduce PTSD symptoms.  Patient will continue to take medication as prescribed. Patient should work on forgiving herself and talk with her brother about the guilt she is experiencing.  Patient would benefit from continuing to identify ways she has been a good mom.    Discharge Criteria/Planning: Patient will continue with follow-up until therapy can be discontinued without return of signs and symptoms.    Encounter performed and documented by SHERRIE Pantoja

## 2021-06-09 ENCOUNTER — OFFICE VISIT - HEALTHEAST (OUTPATIENT)
Dept: BEHAVIORAL HEALTH | Facility: CLINIC | Age: 56
End: 2021-06-09

## 2021-06-09 DIAGNOSIS — F43.12 NIGHTMARES ASSOCIATED WITH CHRONIC POST-TRAUMATIC STRESS DISORDER: ICD-10-CM

## 2021-06-09 DIAGNOSIS — F43.10 PTSD (POST-TRAUMATIC STRESS DISORDER): ICD-10-CM

## 2021-06-09 DIAGNOSIS — F90.9 ATTENTION DEFICIT HYPERACTIVITY DISORDER (ADHD), UNSPECIFIED ADHD TYPE: ICD-10-CM

## 2021-06-09 DIAGNOSIS — F51.5 NIGHTMARES ASSOCIATED WITH CHRONIC POST-TRAUMATIC STRESS DISORDER: ICD-10-CM

## 2021-06-09 DIAGNOSIS — F41.1 GAD (GENERALIZED ANXIETY DISORDER): ICD-10-CM

## 2021-06-09 NOTE — PROGRESS NOTES
MENTAL HEALTH VISIT NOTE    03/07/2017 Start time: 400   Stop Time: 445   Session # 5    Alina Martell is a 51 y.o. female is being seen today for follow up.    New symptoms or complaints: None    Functional Impairment:   Personal: 3  Family: 4  Work: 2  Social:2    Clinical assessment of mental status: Alina Martell was late for session due to weather. She was open and cooperative, and dressed appropriately for this session and weather. Her memory was intact .  Her speech was  intact.  Language was intact.  Concentration and focus is adequate. Psychosis is not noted or reported. She reports her mood as content  Affect is congruent with speech.  Fund of knowledge is adequate. Insight is adequate for therapy.     Suicidal/Homicidal Ideation present: None Reported This Session    Patient's impression of their current status: Patient indicated doing well. Patient reported overall her vacation being fun. Patient talked about times when she had to confront certain situations. Patient indicated turning to her support network to help her through the tough situations. Patient talked about continuing to struggle with low self-esteem.     Therapist impression of patients current state: Patient continues to have good insight into her mental health. This therapist processed with patient ways she struggles with self-worth This therapist challenged patient to acknowledge things she likes about herself.     Type of psychotherapeutic technique provided: Insight oriented, Client centered and CBT    Progress toward short term goals: Medication management with Dr. Wei, using coping skills taught in session,  reaching out to people when needed and returning to scheduled session.     Review of long term goals: Treatment plan updated on 12/27/2016    Diagnosis:   1. PTSD (post-traumatic stress disorder)    2. Major depressive disorder, recurrent episode, moderate    3. Panic disorder without agoraphobia      Plan and  Follow up: Patient will return in 2 week for scheduled session to continue working the grief related to the loss of her father. Patient will work on confronting her feeling instead of avoiding them. Patient should work on caring for herself and putting her needs first.  Patient should use coping skills taught throughout sessions to help reduce PTSD symptoms.  Patient will continue to take medication as prescribed. Patient should work on forgiving herself and talk with her brother about the guilt she is experiencing.  Patient would benefit from continuing to identify ways she has been a good mom.    Discharge Criteria/Planning: Patient will continue with follow-up until therapy can be discontinued without return of signs and symptoms.    Encounter performed and documented by SHERRIE Pantoja

## 2021-06-09 NOTE — PROGRESS NOTES
This therapist connected with patient for scheduled session. Patient indicated she was in [place]. This therapist informed patient due not having a professional license in [place] unable to do the session today. Patient reported understanding, that she is doing and and confirmed next scheduled appoitment.

## 2021-06-09 NOTE — PROGRESS NOTES
"Mental Health tele Visit Note    Patient: Alina Martell    : 1965 MRN: [id number]    Date: 2020   Start time: 905   Stop Time: 930   Session # 9    AmPulmOne john was not working for patient so telephone had to be used for this session    The patient has been notified of the following:   \"We have found that certain health care needs can be provided without the need for a face to face visit.  This service lets us provide the care you need with a phone conversation.  I will have full access to your [facility] medical record during this entire phone call.   I will be taking notes for your medical record. Since this is like an office visit, we will bill your insurance company for this service.  There are potential benefits and risks of telephone visits (e.g. limits to patient confidentiality) that differ from in-person visits.?  Confidentiality still applies for telephone services, and nobody will record the visit.  It is important to be in a quiet, private space that is free of distractions (including cell phone or other devices) during the visit.?? If during the course of the call I believe a telephone visit is not appropriate, you will not be charged for this service\"  Consent has been obtained for this service by care team member: Yes, per verbal agreement     Session Type: Patient is participating in a telephone visit    Chief Complaint   Patient presents with     MH Follow Up     Video Visit Mental Health       New symptoms or complaints: None    Functional Impairment:   Personal: 3  Family: 3  Work: 3  Social: 2          ASSESSMENT: Current Emotional / Mental Status (status of significant symptoms):               Patient denies personal safety concerns.               Patient denies current or recent suicidal ideation or behaviors.              Patient denies current or recent homicidal ideation or behaviors.              Patient denies current or recent self injurious behavior or ideation.             "  Patient denies other safety concerns.              Patient denies change in risk factors.               Patient denies change in protective factors.              Recommended that patient call 911 or go to the local ED should there be a change in any of these risk factors.                Attitude:                                   Cooperative               Orientation:                             Person, place, time, situation               Speech                          Rate / Production:       Normal                           Volume:                       Normal               Mood:                                      Anxious               Thought Content:                    Clear               Thought Form:                        Coherent  Goal Directed  Logical               Insight:                                     Good       Patient's impression of their current status: Patient indicated struggling with anxiety. Patient reported she forgot about the appointment  today and needs to end early. Patient indicated she has been offering a lot of people support related to what is happening in the world. Patient reported she knows this is a way that she is allowing herself to avoid. Patient indicated knowing she has a lot of emotions that she is trying to avoid at this time.     Therapist impression of patients current state: Patient appears to have good insight into her mental health. This therapist challenged patient on the importance of taking care of herself and ways avoiding has led to negative things for her.     Type of psychotherapeutic technique provided: Insight oriented, Client centered and CBT    Progress toward short term goals: Progress as expected with patient continuing therapy, getting out of the house and connecting with support groups.     Review of long term goals: Treatment plan updated on 04/10/2020       Diagnosis:  1. Major depressive disorder, recurrent episode, moderate (H)    2. ADA  (generalized anxiety disorder)    3. PTSD (post-traumatic stress disorder)      Plan and Follow up: Patient will continue with weekly therapy video next week. Patient would benefit from identifying what is in her control and ways she is avoiding her emotions. Patient should continue to use skills taught in session to work on reducing anxiety symptoms. Patient should continue to reach out to her support network.    Discharge Criteria/Planning: Patient will continue with follow-up until therapy can be discontinued without return of signs and symptoms.    I have reviewed the note as documented above.  This accurately captures the substance of my conversation with the patient.  Mariana Love [facility]

## 2021-06-09 NOTE — TELEPHONE ENCOUNTER
RN cannot approve Refill Request    RN can NOT refill this medication med is not covered by policy/route to provider. Last office visit: Visit date not found Last Physical: 9/16/2019 Last MTM visit: Visit date not found Last visit same specialty: Visit date not found.  Next visit within 3 mo: Visit date not found  Next physical within 3 mo: Visit date not found      Sonia Holder, [facility] Triage/Med Refill 7/8/2020    Requested Prescriptions   Pending Prescriptions Disp Refills     cholecalciferol, vitamin D3, 1,250 mcg (50,000 unit) capsule [Pharmacy Med Name: VITAMIN D3 50,000 IU (ENID) CAP] 12 capsule 1     Sig: TAKE 1 CAPSULE BY MOUTH WEEKLY       There is no refill protocol information for this order

## 2021-06-09 NOTE — PROGRESS NOTES
Mental Health Visit Note    06/25/2020   Start time: 1002    Stop Time: 1052   Session # 8    Session Type: Patient is presenting for an Individual session.    Alina Martell is a 55 y.o. female is being seen today for    Chief Complaint   Patient presents with     MH Follow Up     Video Visit Mental Health     Telemedicine Visit: The patient's condition can be safely assessed and treated via synchronous audio and visual telemedicine encounter.      Reason for Telemedicine Visit: Services only offered telehealth    Originating Site (Patient Location): Patient's home    Distant Site (Provider Location): Provider Remote Setting    Consent:  The patient/guardian has verbally consented to: the potential risks and benefits of telemedicine (video visit) versus in person care; bill my insurance or make self-payment for services provided; and responsibility for payment of non-covered services.     Mode of Communication:  Video Conference via MD[employer]ME    As the provider I attest to compliance with applicable laws and regulations related to telemedicine.    Those present for this visit patient and therapist     Follow up in regards to ongoing symptom management of depression and anxiety.    New symptoms or complaints: None    Functional Impairment:   Personal: 3  Family: 2  Social: 3  Work: 3    Clinical assessment of mental status:   Alina Martell presented on time.   She was oriented x3, open and cooperative, and dressed appropriately for this session and weather. Her memory was Normal cognitive functioning .  Her speech was  Within normal.  Language was intact.  Concentration and focus is Within normal. Psychosis is not noted or reported. She reports her mood is depressed.  Affect is congruent with speech and is Congruent w/content of speech.  Fund of knowledge is adequate. Insight is adequate for therapy.Changes made as needed for session on 6/25/2020.    Suicidal/Homicidal Ideation present: Patient denies  suicidal and homicidal ideations/means or plans.     Patient's impression of their current status: Patient reported feeling depressed. Patient indicated she has struggled with isolation the last few days. Patient reported knowing this is causing her depression as well as what is happening in the world. Patient indicated she is trying to not watch the news but feels it is important for her to be educated. Patient reported she was keeping herself busy and then out of town so all the emotions are finally hitting her at once.     Therapist impression of patients current state: This 55 y.o. Black or  female presents with depression. This therapist processed with patient her emotions related to everything happening in the last month. This therapist processed with patient the importance of talking with her support network. This therapist processed with patient the importance of a schedule.     Assessment tools used today include: None    Type of psychotherapeutic technique provided: Insight oriented, Client centered and CBT    Progress toward short term goals: Progress as expected with patient continuing therapy, noticing mental health symptoms and reaching out to her support network.    Review of long term goals: Treatment Plan updated on 4/10/2020    Diagnosis:   1. Major depressive disorder, recurrent episode, moderate (H)    2. ADA (generalized anxiety disorder)    3. PTSD (post-traumatic stress disorder)    no change    Plan and Follow-up: Patient will restart therapy once she is able to get insurance. Patient can use resources provided if she feeling she needs therapy during this time. Patient should reach out to her support network for help. Patient would benefit from using skills taught to her throughout therapy to help her during this time.     Discharge Criteria/Planning: Patient will continue with follow-up until therapy can be discontinued without return of signs and symptoms.    Performed and  documented by Mariana Love [facility]

## 2021-06-09 NOTE — PROGRESS NOTES
MENTAL HEALTH VISIT NOTE    03/21/2017 Start time: 400   Stop Time: 500   Session # 6    Alina Martell is a 51 y.o. female is being seen today for follow up.    New symptoms or complaints: None    Functional Impairment:   Personal: 3  Family: 4  Work: 2  Social:2    Clinical assessment of mental status: Alina Martell was late for session due to weather. She was open and cooperative, and dressed appropriately for this session and weather. Her memory was intact .  Her speech was  intact.  Language was intact.  Concentration and focus is adequate. Psychosis is not noted or reported. She reports her mood as anxious. Affect is congruent with speech.  Fund of knowledge is adequate. Insight is adequate for therapy.     Suicidal/Homicidal Ideation present: None Reported This Session    Patient's impression of their current status: Patient reported feeling okay. Patient indicated she continues to avoid talking about tough topics. Patient reported she plans to journal about the topics to being in at the next session. Patient indicated she continues to have her ups and downs with her relationship. Patient reported she is currently working on impulsivity within herself.     Therapist impression of patients current state: Patient continues to have good insight into her mental health. This therapist challenged patient on ways avoidance can keep her stuck. This therapist processed with patient ways to continue to work on noticing and reducing impulsivity.     Type of psychotherapeutic technique provided: Insight oriented, Client centered and CBT    Progress toward short term goals: Medication management with Dr. Wei, using coping skills taught in session,  reaching out to people when needed and returning to scheduled session.     Review of long term goals: Treatment plan updated on 03/21/2017    Diagnosis:   1. PTSD (post-traumatic stress disorder)    2. Major depressive disorder, recurrent episode, moderate    3.  Panic disorder without agoraphobia      Plan and Follow up: Patient will return in 2 week for scheduled session to continue working the grief related to the loss of her father. Patient will work on confronting her feeling instead of avoiding them. Patient should work on caring for herself and putting her needs first.  Patient should use coping skills taught throughout sessions to help reduce PTSD symptoms.  Patient will continue to take medication as prescribed. Patient should work on forgiving herself and talk with her brother about the guilt she is experiencing.  Patient would benefit from continuing to identify ways she has been a good mom.    Discharge Criteria/Planning: Patient will continue with follow-up until therapy can be discontinued without return of signs and symptoms.    Encounter performed and documented by SHERRIE Pantoja

## 2021-06-09 NOTE — PROGRESS NOTES
MENTAL HEALTH VISIT NOTE    02/14/2017 Start time: 400   Stop Time: 445   Session # 4    Alina Martell is a 51 y.o. female is being seen today for follow up.    New symptoms or complaints: None    Functional Impairment:   Personal: 3  Family: 4  Work: 2  Social:2    Clinical assessment of mental status: Alina Martell was late for session due to weather. She was open and cooperative, and dressed appropriately for this session and weather. Her memory was intact .  Her speech was  intact.  Language was intact.  Concentration and focus is adequate. Psychosis is not noted or reported. She reports her mood as content  Affect is congruent with speech.  Fund of knowledge is adequate. Insight is adequate for therapy.     Suicidal/Homicidal Ideation present: None Reported This Session    Patient's impression of their current status: Patient reported doing good. Patient indicated having a lot of positive occur in her life. Patient reported she is continuing to monitor her emotions and notice when she is getting to happy. Patient indicated continuing to work on finding dariusz in her life and putting her needs first at this time.     Therapist impression of patients current state: Patient continues to have good insight into her mental health. This therapist challenged patient on being able to accept happiness and dariusz. This therapist processed with patient the importance of self-care and putting her own needs before others.     Type of psychotherapeutic technique provided: Insight oriented, Client centered and CBT    Progress toward short term goals: Medication management with Dr. Wei, using coping skills taught in session,  reaching out to people when needed and returning to scheduled session.     Review of long term goals: Treatment plan updated on 12/27/2016    Diagnosis:   1. PTSD (post-traumatic stress disorder)    2. Major depressive disorder, recurrent episode, moderate    3. Panic disorder without  agoraphobia      Plan and Follow up: Patient will return in 2 week for scheduled session to continue working the grief related to the loss of her father. Patient will work on confronting her feeling instead of avoiding them. Patient should work on caring for herself and putting her needs first.  Patient should use coping skills taught throughout sessions to help reduce PTSD symptoms.  Patient will continue to take medication as prescribed. Patient should work on forgiving herself and talk with her brother about the guilt she is experiencing.  Patient would benefit from continuing to identify ways she has been a good mom.    Discharge Criteria/Planning: Patient will continue with follow-up until therapy can be discontinued without return of signs and symptoms.    Encounter performed and documented by SHERRIE Pantoja

## 2021-06-09 NOTE — PROGRESS NOTES
Mental Health Visit Note    7/16/2020   Start time: 1004    Stop Time: 1054   Session # 9    Session Type: Patient is presenting for an Individual session.    Alina Martell is a 55 y.o. female is being seen today for    Chief Complaint   Patient presents with     MH Follow Up     Video Visit Mental HEalth     Telemedicine Visit: The patient's condition can be safely assessed and treated via synchronous audio and visual telemedicine encounter.      Reason for Telemedicine Visit: Services only offered telehealth    Originating Site (Patient Location): Patient's vehicle in [place]    Distant Site (Provider Location): Provider Remote Setting    Consent:  The patient/guardian has verbally consented to: the potential risks and benefits of telemedicine (video visit) versus in person care; bill my insurance or make self-payment for services provided; and responsibility for payment of non-covered services.     Mode of Communication:  Video Conference via Daj[employer]    As the provider I attest to compliance with applicable laws and regulations related to telemedicine.    Those present for this visit patient and therapist     Follow up in regards to ongoing symptom management of depression and anxiety.    New symptoms or complaints: None    Functional Impairment:   Personal: 3  Family: 2  Social: 3  Work: 3    Clinical assessment of mental status:   Alina Martell presented on time.   She was oriented x3, open and cooperative, and dressed appropriately for this session and weather. Her memory was Normal cognitive functioning .  Her speech was  Within normal.  Language was intact.  Concentration and focus is Within normal. Psychosis is not noted or reported. She reports her mood is anxious.  Affect is congruent with speech and is Congruent w/content of speech.  Fund of knowledge is adequate. Insight is adequate for therapy.Changes made as needed for session on 7/16/2020.    Suicidal/Homicidal Ideation present:  Patient denies suicidal and homicidal ideations/means or plans.     Patient's impression of their current status: Patient indicated feeling anxious. Patient reported she is continuing to struggle with everything going on in the world. Patient indicated part of her feeling she needs to educate people about race and another part being exhausted. Patient reported feeling that she is always having to explain thing to people including her family. Patient indicated she has recognized the need to be around people and not be alone. Patient reported being alone leads to depression especially with not working.     Therapist impression of patients current state: This 55 y.o. Black or  female presents with anxiety. This therapist challenged patient on ways it would be beneficial to work on setting boundaries with people. This therapist processed with patient warning signs that she is starting to feel burnt out and needing to take breaks. This therapist processed with patient her positive support network and spending time with them.     Assessment tools used today include: CGI and CSSRS    Type of psychotherapeutic technique provided: Insight oriented, Client centered and CBT    Progress toward short term goals: Progress as expected with patient continuing therapy, spending time with support network and identifying needed change.     Review of long term goals: Treatment Plan updated on 7/16/2020    Diagnosis:   1. Major depressive disorder, recurrent episode, moderate (H)    2. ADA (generalized anxiety disorder)    3. PTSD (post-traumatic stress disorder)    no change    Plan and Follow-up: Patient will continue with weekly session. Patient would benefit from continuing to work on setting boundaries with people in her life. Patient should continue to spend time with her support network and identify her needs.     Discharge Criteria/Planning: Patient will continue with follow-up until therapy can be discontinued  without return of signs and symptoms.    Performed and documented by SHERRIE Pantoja

## 2021-06-09 NOTE — PROGRESS NOTES
Outpatient Mental Health Treatment Plan  Name: Alina Martell  : 1965  MRN: [id number]    Treatment Plan: Updated Treatment Plan    Benefit and risks and alternatives have been discussed: Yes  Is this treatment appropriate with minimal intrusion/restrictions: Yes  Estimated duration of treatment: Approximately 52 sessions.  Anticipated frequency of services: Every  week  Necessity for frequency: This frequency is needed to establish therapeutic goals and for continuity of care in order to monitor progress.  Necessity for treatment: To address cognitive, behavioral, and/or emotional barriers in order to work toward goals and to improve quality of life.    Plan:   ? Depression    Goal:  Decrease average depression level from 4 to 2.  Strategies:   ?[x] Decrease social isolation  [x] Increase involvement in meaningful activities  ?[x] Discuss sleep hygiene  ?[x] Explore thoughts and expectations about self and others  ?[x] Assess for suicide risk  ?[x] Implement physical activity routine (with physician approval)  [x] Consider introduction of bibliotherapy and/or videos  [x] Continue compliance with medical treatment plan (or explore  barriers)  ?   Degree to which this is a problem (1-4): 4  Degree to which goal is met (1-4): 3    Date of Review: Date of review: 2017     ? Anxiety    Goal:  Decrease average anxiety level from 4 to 2.  Strategies:   ? [x]Learn and practice relaxation techniques and other coping strategies    ? [x] Increase involvement in meaningful activities  ? [x] Discuss sleep hygiene  ? [x] Explore thoughts and expectations about self and others  ? [x] Identify and monitor triggers for panic/anxiety symptoms  ? [x] Implement physical activity routine (with physician approval)  ? [x] Consider introduction of bibliotherapy and/or videos  ? [x] Continue compliance with medical treatment plan    Degree to which this is a problem (1-4): 4  Degree to which goal is met (1-4): 2    Date  "of Review: Date of review: 03/21/2017    PTSD    Goal: Identify triggers, separate the traumatic memory from the debilitating emotion associated with it.    Strategies:    [X]Learn and practice relaxation techniques and other coping strategies (e.g., thought stopping, reframing, meditation)    ? [X] Cognitive restructuring    ? [X] Discuss sleep hygiene    ? [X] Identify and change maladaptive coping behaviors    ? [X] Prolonged exposure therapy    ? [X] Identify cues and symptoms of PTSD      Degree to which this is a problem (1-4): 4  Degree to which goal is met (1-4): 2    Date of review: 03/21/2017    Functional Impairment: 1=Not at all/Rarely 2=Some days 3=Most Days 4=Every Day    Personal: 3  Family: 3  Social: 3  Work: 3    Diagnosis:  Major depressive disorder, recurrent, moderate  Panic disorder  PTSD    Clinical assessments completed: ADA-7, PHQ-9, Mood Questionnaire current, CAGE-AID, PANSI.    STRENGTHS: Hard worker, dedication and support network    LIMITATIONS: Avoidance behaviors    Cultural Identification: Patient reported:    Race: Bi-racial    Experience of cultural bias: Patient reported experiencing cultural bias. Patient gave an example of when she was living in [place] and being told \"we don't serve [place].\" Patient reported being called the inward on multiple occasions.    Immigration/history of status: [place] raised in [place].   Level of acculturation: acculturated   Time orientation: middle    Social orientation/class: middle    Verbal Communication Style/languages: English    Locus of Control: inward     Persons responsible for this plan:  ? [x] Patient ? [x] Provider ?     Provider:Performed and documented by SHERRIE Pantoja    Patient Signature:____________________________________ Date: ______________       Therapist Signature: __________________________________ Date: ______________  "

## 2021-06-09 NOTE — PROGRESS NOTES
Outpatient Mental Health Treatment Plan  Name: Alina Martell  : 1965  MRN: [id number]    Treatment Plan: Updated Treatment Plan    Benefit and risks and alternatives have been discussed: Yes  Is this treatment appropriate with minimal intrusion/restrictions: Yes  Estimated duration of treatment: Ongoing therapy at this time  Anticipated frequency of services: Weekly  Necessity for frequency: This frequency is needed to establish therapeutic goals and for continuity of care in order to monitor progress.  Necessity for treatment: To address cognitive, behavioral, and/or emotional barriers in order to work toward goals and to improve quality of life.    Plan:   ? Depression    Goal:  Decrease average depression level from 4 to 1.  Strategies:   ?[x] Decrease social isolation  [x] Increase involvement in meaningful activities  ?[x] Discuss sleep hygiene  ?[x] Explore thoughts and expectations about self and others  ?[x] Assess for suicide risk  ?[x] Implement physical activity routine (with physician approval)  [x] Consider introduction of bibliotherapy and/or videos  [x] Continue compliance with medical treatment plan (or explore  barriers)  ?   Degree to which this is a problem (1-4): 4  Degree to which goal is met (1-4): 3    Date of next review: 10/16/2020     ? Anxiety    Goal:  Decrease average anxiety level from 4 to 1.  Strategies:   ? [x]Learn and practice relaxation techniques and other coping strategies    ? [x] Increase involvement in meaningful activities  ? [x] Discuss sleep hygiene  ? [x] Explore thoughts and expectations about self and others  ? [x] Identify and monitor triggers for panic/anxiety symptoms  ? [x] Implement physical activity routine (with physician approval)  ? [x] Consider introduction of bibliotherapy and/or videos  ? [x] Continue compliance with medical treatment plan    Degree to which this is a problem (1-4): 4  Degree to which goal is met (1-4): 2    Date of next review:  "10/16/2020    PTSD    Goal: Identify triggers, separate the traumatic memory from the debilitating emotion associated with it.    Strategies:    [X]Learn and practice relaxation techniques and other coping strategies (e.g., thought stopping, reframing, meditation)    ? [X] Cognitive restructuring    ? [X] Discuss sleep hygiene    ? [X] Identify and change maladaptive coping behaviors    ? [X] Prolonged exposure therapy    ? [X] Identify cues and symptoms of PTSD      Degree to which this is a problem (1-4): 4  Degree to which goal is met (1-4): 3    Date of next review: 10/16/2020    Functional Impairment: 1=Not at all/Rarely 2=Some days 3=Most Days 4=Every Day    Personal: 3  Family: 3  Social: 2  Work: 3    Diagnosis:  Major depressive disorder, recurrent, moderate  Generalized anxiety disorder  PTSD    Clinical assessments completed: ADA-7, PHQ-9, Mood Questionnaire current, CAGE-AID, PANSI.    STRENGTHS: Hard worker, dedication and support network    LIMITATIONS: Avoidance behaviors    Cultural Identification: Patient reported:    Race: Bi-racial    Experience of cultural bias: Patient reported experiencing cultural bias. Patient gave an example of when she was living in [place] and being told \"we don't serve Nig[place].\" Patient reported being called the inward on multiple occasions.    Immigration/history of status: [place] raised in [place].   Level of acculturation: acculturated   Time orientation: middle    Social orientation/class: middle    Verbal Communication Style/languages: English    Locus of Control: inward     Persons responsible for this plan:  ? [x] Patient ? [x] Provider ?     Provider:Performed and documented by SHERRIE Pantoja    Patient Signature:____________________________________ Date: ______________       Therapist Signature: __________________________________ Date: ______________  "

## 2021-06-10 NOTE — PROGRESS NOTES
Mental Health Visit Note    8/21/2020   Start time: 702    Stop Time: 752   Session # 13    Session Type: Patient is presenting for an Individual session.    Alina Martell is a 55 y.o. female is being seen today for    Chief Complaint   Patient presents with     MH Follow Up     Video Visit Mental Health     Telemedicine Visit: The patient's condition can be safely assessed and treated via synchronous audio and visual telemedicine encounter.      Reason for Telemedicine Visit: Services only offered telehealth    Originating Site (Patient Location): Patient's vehicle     Distant Site (Provider Location): Provider Remote Setting    Consent:  The patient/guardian has verbally consented to: the potential risks and benefits of telemedicine (video visit) versus in person care; bill my insurance or make self-payment for services provided; and responsibility for payment of non-covered services.     Mode of Communication:  Video Conference via Proenza Schouer    As the provider I attest to compliance with applicable laws and regulations related to telemedicine.    Those present for this visit patient and therapist     Follow up in regards to ongoing symptom management of depression and anxiety.    New symptoms or complaints: None    Functional Impairment:   Personal: 3  Family: 2  Social: 3  Work: 3    Clinical assessment of mental status:   Alina Martell presented on time.   She was oriented x3, open and cooperative, and dressed appropriately for this session and weather. Her memory was Normal cognitive functioning .  Her speech was  Within normal.  Language was intact.  Concentration and focus is Within normal. Psychosis is not noted or reported. She reports her mood is anxious.  Affect is congruent with speech and is Congruent w/content of speech.  Fund of knowledge is adequate. Insight is adequate for therapy.Changes made as needed for session on 8/21/2020.    Suicidal/Homicidal Ideation present: Patient denies  suicidal and homicidal ideations/means or plans.     Patient's impression of their current status: Patient indicated feeling anxious. Patient reported feeling she is still trying to find herself. Patient indicated so many years feeling that she was hiding out of fear and now wanting to be herself. Patient reported she has a hard time talking with people about it due to feeling they do not understand. Patient indicated she also has struggled with feeling lonely. Patient reported not wanting to tell people that due to not wanting them to try and take on her burden. Patient indicated knowing this is her way of trying to protect other people and not put her needs first.     Therapist impression of patients current state: This 55 y.o. Black or  female presents with anxious. This therapist challenged patient on ways she has tried to hide who she is in the past. This therapist processed with patient the importance of talking with her support network over her struggles. This therapist processed with patient the importance of having a schedule.     Assessment tools used today include: None    Type of psychotherapeutic technique provided: Insight oriented, Client centered and CBT    Progress toward short term goals: Progress as expected with patient continuing therapy, getting out of the house and spending time with support network    Review of long term goals: Treatment Plan updated on 7/16/2020    Diagnosis:   1. Major depressive disorder, recurrent episode, moderate (H)    2. ADA (generalized anxiety disorder)    3. PTSD (post-traumatic stress disorder)    no change    Plan and Follow-up: Patient will continue with weekly session. Patient should continue to evaluate what she wants for herself in her life. Patient would benefit from reaching out to her support network. Patient should continue to work on a schedule.     Discharge Criteria/Planning: Patient will continue with follow-up until therapy can be  discontinued without return of signs and symptoms.    Performed and documented by SHERRIE Pantoja

## 2021-06-10 NOTE — PROGRESS NOTES
MENTAL HEALTH VISIT NOTE    05/02/2017 Start time: 400   Stop Time: 500   Session # 8    Alina Martell is a 51 y.o. female is being seen today for follow up.    New symptoms or complaints: None    Functional Impairment:   Personal: 3  Family: 4  Work: 2  Social:2    Clinical assessment of mental status: Alina Martell was on time for her scheduled session. She was open and cooperative, and dressed appropriately for this session and weather. Her memory was intact .  Her speech was  intact.  Language was intact.  Concentration and focus is adequate. Psychosis is not noted or reported. She reports her mood as depressed.  Affect is congruent with speech.  Fund of knowledge is adequate. Insight is adequate for therapy.     Suicidal/Homicidal Ideation present: None Reported This Session    Patient's impression of their current status: Patient reported continuing to struggle with on and off depression. Patient indicated right now nightmares being her biggest struggle. Patient talked about the nightmares that are occurring and how they are affecting her. Patient indicated not using the coping skills taught that she knows will help reduce her nightmares and thoughts about the trauma.     Therapist impression of patients current state: Patient continues to have good insight into her mental health. This therapist processed with patient reasons why nightmares occur. This therapist challenged patient on ways avoidance has not worked in her life.     Type of psychotherapeutic technique provided: Insight oriented, Client centered and CBT    Progress toward short term goals: Medication management with Dr. Wei, using coping skills taught in session,  reaching out to people when needed and returning to scheduled session.     Review of long term goals: Treatment plan updated on 03/21/2017  Diagnosis:   1. PTSD (post-traumatic stress disorder)    2. Major depressive disorder, recurrent episode, moderate    3. Panic  disorder without agoraphobia      Plan and Follow up: Patient will return in 2 week for scheduled session to continue working the grief related to the loss of her father. Patient will work on confronting her feeling instead of avoiding them. Patient should work on caring for herself and putting her needs first.  Patient should use coping skills taught throughout sessions to help reduce PTSD symptoms.  Patient will continue to take medication as prescribed. Patient should work on forgiving herself and talk with her brother about the guilt she is experiencing.  Patient would benefit from continuing to identify ways she has been a good mom.    Discharge Criteria/Planning: Patient will continue with follow-up until therapy can be discontinued without return of signs and symptoms.    Encounter performed and documented by SHERRIE Pantoja

## 2021-06-10 NOTE — PROGRESS NOTES
MENTAL HEALTH VISIT NOTE    05/16/2017 Start time: 400   Stop Time: 500   Session # 9    Alina Martell is a 51 y.o. female is being seen today for follow up.    New symptoms or complaints: None    Functional Impairment:   Personal: 3  Family: 4  Work: 2  Social:2    Clinical assessment of mental status: Alina Martell was on time for her scheduled session. She was open and cooperative, and dressed appropriately for this session and weather. Her memory was intact .  Her speech was  intact.  Language was intact.  Concentration and focus is adequate. Psychosis is not noted or reported. She reports her mood as tired.  Affect is congruent with speech.  Fund of knowledge is adequate. Insight is adequate for therapy.     Suicidal/Homicidal Ideation present: None Reported This Session    Patient's impression of their current status: Patient indicated overall being tired. Patient talked about her physical health not good at this time. Patient reported noticing that it is taking a toll on her mental health. Patient indicated feeling more down and not having energy. Patient talked about how this results in isolation which is not good for her. Patient indicated she does have an appointment to continue to work on her physical pain.     Therapist impression of patients current state: Patient continues to have good insight into her mental health. This therapist challenged patient on ways she is neglecting self-care at this time. This therapist processed with patient ways to work on reducing depression symptoms despite having physical pain.     Type of psychotherapeutic technique provided: Insight oriented, Client centered and CBT    Progress toward short term goals: Medication management with Dr. Wei, using coping skills taught in session,  reaching out to people when needed and returning to scheduled session.     Review of long term goals: Treatment plan updated on 03/21/2017  Diagnosis:   1. PTSD (post-traumatic  stress disorder)    2. Major depressive disorder, recurrent episode, moderate    3. Panic disorder without agoraphobia      Plan and Follow up: Patient will return in 2 week for scheduled session to continue working the grief related to the loss of her father. Patient will work on confronting her feeling instead of avoiding them. Patient should work on caring for herself and putting her needs first.  Patient should use coping skills taught throughout sessions to help reduce PTSD symptoms.  Patient will continue to take medication as prescribed. Patient should work on forgiving herself and talk with her brother about the guilt she is experiencing.  Patient would benefit from continuing to identify ways she has been a good mom.    Discharge Criteria/Planning: Patient will continue with follow-up until therapy can be discontinued without return of signs and symptoms.    Encounter performed and documented by SHERRIE Pantoja
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
## 2021-06-10 NOTE — PROGRESS NOTES
Mental Health Visit Note    8/28/2020   Start time: 702    Stop Time: 750   Session # 14    Session Type: Patient is presenting for an Individual session.    Alina Martell is a 55 y.o. female is being seen today for    Chief Complaint   Patient presents with     MH Follow Up     Video Visit Mental Health     Telemedicine Visit: The patient's condition can be safely assessed and treated via synchronous audio and visual telemedicine encounter.      Reason for Telemedicine Visit: Services only offered telehealth    Originating Site (Patient Location): Patient's home    Distant Site (Provider Location): Provider Remote Setting    Consent:  The patient/guardian has verbally consented to: the potential risks and benefits of telemedicine (video visit) versus in person care; bill my insurance or make self-payment for services provided; and responsibility for payment of non-covered services.     Mode of Communication:  Video Conference via [employer]    As the provider I attest to compliance with applicable laws and regulations related to telemedicine.    Those present for this visit patient and therapist     Follow up in regards to ongoing symptom management of depression and anxiety.    New symptoms or complaints: None    Functional Impairment:   Personal: 3  Family: 2  Social: 3  Work: 3    Clinical assessment of mental status:   Alina Martell presented on time.   She was oriented x3, open and cooperative, and dressed appropriately for this session and weather. Her memory was Normal cognitive functioning .  Her speech was  Within normal.  Language was intact.  Concentration and focus is Within normal. Psychosis is not noted or reported. She reports her mood is depressed.  Affect is congruent with speech and is Congruent w/content of speech.  Fund of knowledge is adequate. Insight is adequate for therapy.Changes made as needed for session on 8/28/2020.    Suicidal/Homicidal Ideation present: Patient denies  suicidal and homicidal ideations/means or plans.     Patient's impression of their current status: Patient reported feeling depressed. Patient indicated she has been home a lot and knowing that is playing into her mood. Patient reported she finds when she is alone she has a lot of negative thoughts. Patient indicated she starts to think about the way she views herself. Patient reported realizing the trauma continues to impact her view in many ways. Patient indicated she also realizes she needs to reach out to more people and get onto a schedule.    Therapist impression of patients current state: This 55 y.o. Black or  female presents with depression. This therapist challenged patient on ways she can work to take back her power over her trauma. This therapist processed with patient the importance of having a schedule daily. This therapist challenged patient on ways she is struggling with avoidance.     Assessment tools used today include: None    Type of psychotherapeutic technique provided: Insight oriented, Client centered and CBT    Progress toward short term goals: Progress as expected with patient continuing therapy, identifying negative thoughts and working to reduce symptoms.     Review of long term goals: Treatment Plan updated on 7/16/2020    Diagnosis:   1. Major depressive disorder, recurrent episode, moderate (H)    2. ADA (generalized anxiety disorder)    3. PTSD (post-traumatic stress disorder)    no change    Plan and Follow-up: Patient will continue with weekly session. Patient would benefit from continuing to work on challenging her negative thoughts. Patient should identify what she is proud of about herself. Patient would benefit from working on getting a daily schedule that involved getting out of the house and being around people.     Discharge Criteria/Planning: Patient will continue with follow-up until therapy can be discontinued without return of signs and  symptoms.    Performed and documented by Mariana Love LPC
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
## 2021-06-10 NOTE — PROGRESS NOTES
Mental Health Visit Note    8/14/2020   Start time: 705    Stop Time: 755   Session # 12    Session Type: Patient is presenting for an Individual session.    Alina Martell is a 55 y.o. female is being seen today for    Chief Complaint   Patient presents with     MH Follow Up     Video Visit Mental Health     Telemedicine Visit: The patient's condition can be safely assessed and treated via synchronous audio and visual telemedicine encounter.      Reason for Telemedicine Visit: Services only offered telehealth    Originating Site (Patient Location): Patient's home    Distant Site (Provider Location): Provider Remote Setting    Consent:  The patient/guardian has verbally consented to: the potential risks and benefits of telemedicine (video visit) versus in person care; bill my insurance or make self-payment for services provided; and responsibility for payment of non-covered services.     Mode of Communication:  Video Conference via [employer]    As the provider I attest to compliance with applicable laws and regulations related to telemedicine.    Those present for this visit patient and therapist     Follow up in regards to ongoing symptom management of depression and anxiety.    New symptoms or complaints: None    Functional Impairment:   Personal: 3  Family: 2  Social: 3  Work: 3    Clinical assessment of mental status:   Alina Martell presented on time.   She was oriented x3, open and cooperative, and dressed appropriately for this session and weather. Her memory was Normal cognitive functioning .  Her speech was  Within normal.  Language was intact.  Concentration and focus is Within normal. Psychosis is not noted or reported. She reports her mood is down.  Affect is congruent with speech and is Congruent w/content of speech.  Fund of knowledge is adequate. Insight is adequate for therapy.Changes made as needed for session on 8/14/2020.    Suicidal/Homicidal Ideation present: Patient denies  suicidal and homicidal ideations/means or plans.     Patient's impression of their current status: Patient reported feeling down. Patient indicated she has been home now and it has been challenging for her. Patient reported she is having a lot of negative thoughts right now. Patient indicated she is having a lot of thoughts about wanting to go to the casino. Patient reported she knows this has a lot to do with having too much time alone. Patient indicated she has also been thinking a lot about what she wants in a relationship. Patient reported she also had to go to the ER due to chest pain. Patient indicated now needing to see a cardiologist.     Therapist impression of patients current state: This 55 y.o. Black or  female presents with down. This therapist processed with patient the importance of a schedule that involved being around healthy people. This therapist challenged patient on what she needs from her support network at this time. This therapist processed with patient what brings her happiness.     Assessment tools used today include: None    Type of psychotherapeutic technique provided: Insight oriented, Client centered and CBT    Progress toward short term goals: Progress as expected with patient continuing therapy, noticing struggles and spending time with grandchildren.     Review of long term goals: Treatment Plan updated on 7/16/2020    Diagnosis:   1. Major depressive disorder, recurrent episode, moderate (H)    2. ADA (generalized anxiety disorder)    3. PTSD (post-traumatic stress disorder)    no change    Plan and Follow-up: Patient will continue with weekly session. Patient would benefit from making a schedule that involves being around people. Patient should continue to work on reaching out to her support network. Patient would benefit from identifying what she wants from people to work on her own happiness.     Discharge Criteria/Planning: Patient will continue with follow-up  until therapy can be discontinued without return of signs and symptoms.    Performed and documented by SHERRIE Pantoja

## 2021-06-10 NOTE — PROGRESS NOTES
Patient is here for follow up and medication management.    Patient stopped the Abilify because she heard about the side effects.    Correct pharmacy verified with patient and confirmed in snapshot? [x] yes []no    Medications Phoned  to Pharmacy [] yes [x]no  Name of Pharmacist:  List Medications, including dose, quantity and instructions      Medication Prescriptions given to patient   [] yes  [x] no   List the name of the drug the prescription was written for.       Medications ordered this visit were e-scribed.  Verified by order class [x] yes  [] no    Medication changes or discontinuations were communicated to patient's pharmacy: [] yes  [x] no    UA collected [] yes    [x] no    [place] Prescription Monitoring Program Reviewed?   No activity     Referrals were made to:  none    Future appointment was made: [] yes  [x] no    Dictation completed at time of chart check: [] yes  [x] no    I have checked the documentation for today s encounters and the above information has been reviewed and completed.

## 2021-06-10 NOTE — PROGRESS NOTES
Mental Health Visit Note    8/4/2020   Start time: 801    Stop Time: 851   Session # 11    Session Type: Patient is presenting for an Individual session.    Alina Martell is a 55 y.o. female is being seen today for    Chief Complaint   Patient presents with     MH Follow Up     Video Visit Mental Health     Telemedicine Visit: The patient's condition can be safely assessed and treated via synchronous audio and visual telemedicine encounter.      Reason for Telemedicine Visit: Services only offered telehealth    Originating Site (Patient Location): Patient's vehicle in [place]    Distant Site (Provider Location): Provider Remote Setting    Consent:  The patient/guardian has verbally consented to: the potential risks and benefits of telemedicine (video visit) versus in person care; bill my insurance or make self-payment for services provided; and responsibility for payment of non-covered services.     Mode of Communication:  Video Conference via [employer]    As the provider I attest to compliance with applicable laws and regulations related to telemedicine.    Those present for this visit patient and therapist     Follow up in regards to ongoing symptom management of depression and anxiety.    New symptoms or complaints: None    Functional Impairment:   Personal: 3  Family: 2  Social: 3  Work: 3    Clinical assessment of mental status:   Alina Martell presented on time.   She was oriented x3, open and cooperative, and dressed appropriately for this session and weather. Her memory was Normal cognitive functioning .  Her speech was  Within normal.  Language was intact.  Concentration and focus is Within normal. Psychosis is not noted or reported. She reports her mood is anxious.  Affect is congruent with speech and is Congruent w/content of speech.  Fund of knowledge is adequate. Insight is adequate for therapy.Changes made as needed for session on 8/4/2020.    Suicidal/Homicidal Ideation present:  Patient denies suicidal and homicidal ideations/means or plans.     Patient's impression of their current status: Patient reported continuing to feel anxious. Patient indicated she has spent the last 3 weeks at a cabin with her friend and it has been great. Patient reported she has drank too much and knowing she needs to stop drinking. Patient indicated drinking has never been a problem for her but not wanting it to ever get to that point. Patient reported she is concerned about going back to her house and being alone. Patient indicated knowing part of her has been avoiding going home. Patient reported knowing how fast isolation can lead to depression and needing to be aware of the warning signs.     Therapist impression of patients current state: This 55 y.o. Black or  female presents with anxiety. This therapist processed with patient the importance of having a schedule that includes being around people. This therapist processed with patient the importance of self-care. This therapist challenged patient on what she is trying to avoid at this time. This therapist processed with patient the importance of not drinking and spending time sober.     Assessment tools used today include: None    Type of psychotherapeutic technique provided: Insight oriented, Client centered and CBT    Progress toward short term goals: Progress as expected with patient continuing therapy, spending time with friend and being honest about worries.     Review of long term goals: Treatment Plan updated on 7/16/2020    Diagnosis:   1. Major depressive disorder, recurrent episode, moderate (H)    2. ADA (generalized anxiety disorder)    3. PTSD (post-traumatic stress disorder)    no change    Plan and Follow-up: Patient will continue with weekly session. Patient should continue to work on identifying ways she is avoiding. Patient would benefit from getting into a healthy routine. Patient should continue to work on reaching out to  her support network.     Discharge Criteria/Planning: Patient will continue with follow-up until therapy can be discontinued without return of signs and symptoms.    Performed and documented by SHERRIE Pantoja

## 2021-06-10 NOTE — PROGRESS NOTES
Mental Health and Addiction Medicine  Outpatient Subsequent Visit  By Yasmin Mclean M.D.    4/10/2017    Alina Martell, 1965.    This note was created by undersigned using a dictation system. All typing errors or contextual distortion is unintentional and software inherent.    Chief Complaint:  Patient was seen today in follow-up for  Chief Complaint   Patient presents with     Depression     Medication Management     Follow-up     As well as anxiety, chronic pain from RA, PTSD    Impression/Medical Decision-Making:  Alina Martell has chronic anxiety related to PTSD from childhood trauma.  Depression is not so prominent.  She has found some success with propranolol and hydroxyzine.  Will add 1-2 300 mg capsules up to 4 times daily.  Quantity at each dose can be varied but that would be a total for the day m ay find that if she uses more at night she wakes with less pain.  I did bring up the possibility of looking into an anti inflammatory diet, states  she knows about this as her mother is on a gluten free no nightshade diet.  Thinks her's is not too bad seems to think it would make no difference.  Has regular follow-up follow-up with her rheumatologist brings up the fact that her markers of inflammation have stayed the same even after treatment with methotrexate and Plaquenil and she worries a bit about apparently congestive heart failure as a likely complication.  Does wonder about moving to a warm dry climate.  Follow up in 3 months can call anytime for problems can certainly come in sooner.      Diagnoses/Plan:  Alina was seen today for depression, medication management and follow-up.    Diagnoses and all orders for this visit:    PTSD (post-traumatic stress disorder)    Mild episode of recurrent major depressive disorder    Anxiety  -     gabapentin (NEURONTIN) 300 MG capsule; 1-2 up to 4 times daily    Other chronic pain  -     gabapentin (NEURONTIN) 300 MG capsule; 1-2 up to 4 times  daily    Significant Interval History:   Alina Martell presents for follow-up.  I did review notes from Carmen Bentley who she sees for therapy before the visit.  Alina stopped  never started Abilify because she saw things on television that frightened her.  Has intermittent anxiety but has found propranolol and hydroxyzine somewhat helpful.  Reminded her her of the fact that working on childhood trauma takes more than 6 months of therapy.  She does sleep with amitriptyline and is comfortable with discontinuing the trazodone.  Admits that she has a hard time letting down her walls in therapy and elsewhere.  She is seeing a friend in a romantic way after a year of platonic friendship, sometimes it is difficult and she tries to explain to him how the past has affected her.  He travels a lot which is helpful.  Feels that her arthritis is not particularly well controlled but is afraid of immune modulators or biologic as they are called.  I brought up the use of gabapentin as apparently she has used in the past and would be interested it may be helpful for pain as well as anxiety or insomnia.    Current Stressors: other psychosocial or environmental problems and problems related to social environment    Addiction History:denies      Active Problem List:  Patient Active Problem List   Diagnosis     PTSD (post-traumatic stress disorder)     Sleep disorder     Anxiety     Panic attack     Major depressive disorder, recurrent episode, unspecified     Anemia - microcytic anemia     Obesity     Sicca syndrome     Grief     Olecranon bursitis, right - bland, non-inflamed, diagnosed on 7/8/16     Rheumatoid arthritis     Carpal tunnel syndrome     Chronic pain       Allergies as of 04/10/2017 - Minh as Reviewed 04/10/2017   Allergen Reaction Noted     Oxycodone-acetaminophen Itching and Other (See Comments) 09/25/2013     Shellfish containing products Anaphylaxis 09/25/2013     Sulfa (sulfonamide antibiotics) Itching  "09/25/2013     Effexor [venlafaxine] Palpitations 04/08/2016     Propoxyphene n-acetaminophen  09/25/2013       Current Outpatient Prescriptions   Medication Sig Dispense Refill     amitriptyline (ELAVIL) 10 MG tablet 1-5 at bedtime 150 tablet 5     EPIPEN 2-RADHA 0.3 mg/0.3 mL (1:1,000) atIn Inject 0.3 mg into the shoulder, thigh, or buttocks as needed.        ferrous sulfate 325 (65 FE) MG tablet Take 1 tablet by mouth daily. 30 tablet 3     folic acid (FOLVITE) 1 MG tablet Take 1 tablet (1 mg total) by mouth daily. 90 tablet 1     hydroxychloroquine (PLAQUENIL) 200 mg tablet Take 2 tablets (400 mg total) by mouth daily. 60 tablet 2     hydrOXYzine (VISTARIL) 25 MG capsule 1 up to four times daily prn anxiety, 1-4 at first wakening prn 150 capsule 5     ibuprofen (ADVIL,MOTRIN) 600 MG tablet TK 1 T PO  QID PRN. MAXIMUM OF 3200 MG IN 24 HOURS. 120 tablet 1     methotrexate 2.5 MG tablet Take 6 tablets (15 mg total) by mouth once a week. 24 tablet 2     multivitamin therapeutic (THERAGRAN) tablet Take 1 tablet by mouth daily.       propranolol (INDERAL) 20 MG tablet One up to three times a day 90 tablet 5     gabapentin (NEURONTIN) 300 MG capsule 1-2 up to 4 times daily 240 capsule 5     No current facility-administered medications for this visit.          Medication Side Effects:  Dry mouth    Clinincal Outcome Measures:  PHQ-9   5  ADA-7   4  Both improved    [place] Prescription Monitoring Program:  No worrisome pharmacy activity.    Objective:     Vitals:    04/10/17 1725   BP: 141/88   Pulse: 87   Weight: (!) 259 lb (117.5 kg)   Height: 5' 8.5\" (1.74 m)   PainSc:   6   PainLoc: Generalized     Body mass index is 38.81 kg/(m^2).    General appearance: normal, increased BMI   Level of consciousness: alert   Gait/Station: Normal   Muscle Strength/Tone: No gross abnormalities   Postural tremor in the upper extremities: None   Cogwheel rigidity at the elbow or wrists:absent   Point-to-point maneuvers: normal "   Abnormal motor movements:None noted       Psychiatric Mental Status Examination   Appearance/Grooming, dress and hygiene:Normal   Consciousness/Orientation: Normal, A+O to name, date, place and situation   Behavior/Attitude:Cooperative   Mood: Euthymic:  Friendly and Pleasant, Dysphoric:  Overwhelmed and Sad, Apathetic:   motivation difficult at times and Apprehensive:   Anxious, Fearful and Nervous  Affect:   Range -> Full   Intensity -> normal   Eye contact: within normal limits   Speech: Normal   Thought processes: Normal, ruminates some  Thought content:Normal, denies suicidal homicidal ideation, denies auditory and visual hallucinations  Perceptions:Normal  Insight and judgement: Recognition of illness, Taking steps to manage illness and Readiness to change mixed  Memory-Recent: Normal   Memory-Remote: Normal   Attention/Concentration: Normal   Language: Normal   Fund of Knowledge: average for current situation    Summary of Diagnostic Studies:    Review indicates:  Urine toxicology is not indicated for this patient.    Discharge Planning:    Reviewed risks/benefits of medication and Patient able to verbalize understanding of side effects   Trial of gabapentin    D/c trazodone    Look at anti-inflammation diet    Total Time:    25  minutes                 Coordination of Care:     25   minutes spent on this visit with more than 50% time spent face to face in education about diagnosis and treatment options, counseling and support as well as coordination of care with staff.    Time also spent on entering orders and preparing documentation for the visit.       Yasmin Mclean M.D.  Certified in Addiction and Family Medicine  [facility]

## 2021-06-11 NOTE — PROGRESS NOTES
MENTAL HEALTH VISIT NOTE    06/13/2017 Start time: 400   Stop Time: 500   Session # 11    Alina Martell is a 51 y.o. female is being seen today for follow up.    New symptoms or complaints: None    Functional Impairment:   Personal: 3  Family: 4  Work: 2  Social:2    Clinical assessment of mental status: Alina Martell was on time for her scheduled session. She was open and cooperative, and dressed appropriately for this session and weather. Her memory was intact .  Her speech was  intact.  Language was intact.  Concentration and focus is adequate. Psychosis is not noted or reported. She reports her mood as anxious.  Affect is congruent with speech.  Fund of knowledge is adequate. Insight is adequate for therapy.     Suicidal/Homicidal Ideation present: None Reported This Session    Patient's impression of their current status: Patient indicated not sleeping the last few days. Patient reported she received new medication that has been keeping her awake. Patient indicated knowing that she will crash soon yet struggling now. Patient talked about how she feels she is just in a fast pace mode at this time. Patient indicated trying to slow herself down and get some sleep.     Therapist impression of patients current state: Patient continues to have good insight into her mental health. This therapist challenged patient on ways she can work on slowing down doing certain activities. This therapist processed with patient sleep hygiene and trying to use these techniques to help her sleep.      Type of psychotherapeutic technique provided: Insight oriented, Client centered and CBT    Progress toward short term goals: Medication management with Dr. Wei, using coping skills taught in session,  reaching out to people when needed and returning to scheduled session.     Review of long term goals: Treatment plan updated on 03/21/2017  Diagnosis:   1. PTSD (post-traumatic stress disorder)    2. Major depressive  disorder, recurrent episode, moderate    3. Panic disorder without agoraphobia      Plan and Follow up: Patient will return in 2 week for scheduled session to continue working the grief related to the loss of her father. Patient will work on confronting her feeling instead of avoiding them. Patient should work on caring for herself and putting her needs first.  Patient should use coping skills taught throughout sessions to help reduce PTSD symptoms.  Patient will continue to take medication as prescribed. Patient should work on forgiving herself and talk with her brother about the guilt she is experiencing.  Patient would benefit from continuing to identify ways she has been a good mom.    Discharge Criteria/Planning: Patient will continue with follow-up until therapy can be discontinued without return of signs and symptoms.    Encounter performed and documented by SHERRIE Pantoja

## 2021-06-11 NOTE — PROGRESS NOTES
ASSESSMENT AND PLAN:  Alina Martell 52 y.o. female is seen here on 06/09/17 for establishment of care for rheumatoid arthritis, seropositive.  In addition she has marked tenderness of her shoulders in keeping with the tendinopathy of rotator cuff, she has exuberant synovitis of her ankles and small joints of the hands.  I have asked her to start taking prednisone 15 mg daily for 2 weeks and then down to 10 mg per day.  Given the severity of her pain in the shoulder was offered her local injection she wants to proceed 40 mg of methylprednisolone injected subacromially on each side she had a brisk Marcaine effect.  I have asked her to return for follow-up here in 4  weeks.  Diagnoses and all orders for this visit:    Seropositive rheumatoid arthritis of multiple sites  -     predniSONE (DELTASONE) 10 mg tablet; Take 1.5 tablets (15 mg total) by mouth daily for 15 days.  Dispense: 23 tablet; Refill: 0  -     predniSONE (DELTASONE) 10 mg tablet; Take 1 tablet (10 mg total) by mouth daily.  Dispense: 30 tablet; Refill: 0  -     leflunomide (ARAVA) 10 MG tablet; Take 1 tablet (10 mg total) by mouth daily.  Dispense: 30 tablet; Refill: 1  -     methylPREDNISolone acetate injection 40 mg (DEPO-MEDROL); Inject 1 mL (40 mg total) into the joint once.  -     methylPREDNISolone acetate injection 40 mg (DEPO-MEDROL); Inject 1 mL (40 mg total) into the joint once.    Tendonitis of both shoulders  -     methylPREDNISolone acetate injection 40 mg (DEPO-MEDROL); Inject 1 mL (40 mg total) into the joint once.  -     methylPREDNISolone acetate injection 40 mg (DEPO-MEDROL); Inject 1 mL (40 mg total) into the joint once.      HISTORY OF PRESENTING ILLNESS:  Alina Martell, 52 y.o., female is here for establishment of care for seropositive severe rheumatoid arthritis.  She is of methotrexate and hydroxychloroquine for the past several weeks.  She also had persistent diarrhea while she was on methotrexate.  She started hurting  in her joints 18 months or so ago starting off in the shoulders first.  She rates the pain is severe such as in her ankles, and shoulders.  She has moderately severe pain in her hands and wrists the worst of the pains are in the shoulders and ankles.  She has difficult time walking she has difficult time sleeping. .  She has difficulty performing a variety of day-to-day activities.  She has more than 1 hour of stiffness in the morning.  In the past she has been documented to have anti-CCP antibody as well as rheumatoid factor positivity.  She has family history of psoriasis and Crohn's brothers and respectively.  Further historical information and ADL limitations as noted in the multidimensional health assessment questionnaire attached in the EMR. Rest of the 13 system ROS is negative.     ALLERGIES:Oxycodone-acetaminophen; Shellfish containing products; Sulfa (sulfonamide antibiotics); Effexor [venlafaxine]; and Propoxyphene n-acetaminophen    PAST MEDICAL/ACTIVE PROBLEMS/MEDICATION/ FAMILY HISTORY/SOCIAL DATA:  The patient has a family history of  Past Medical History:   Diagnosis Date     Cannabis use without complication 12/8/2014     Obesity      Rheumatoid arthritis 7/8/2016     Sicca syndrome      History   Smoking Status     Never Smoker   Smokeless Tobacco     Never Used     Comment: smokes marijuana     Patient Active Problem List   Diagnosis     PTSD (post-traumatic stress disorder)     Sleep disorder     Anxiety     Panic attack     Major depressive disorder, recurrent episode, unspecified     Anemia - microcytic anemia     Obesity     Sicca syndrome     Grief     Olecranon bursitis, right - bland, non-inflamed, diagnosed on 7/8/16     Carpal tunnel syndrome     Chronic pain     Seropositive rheumatoid arthritis of multiple sites     Current Outpatient Prescriptions   Medication Sig Dispense Refill     amitriptyline (ELAVIL) 10 MG tablet 1-5 at bedtime 150 tablet 5     EPIPEN 2-RADHA 0.3 mg/0.3 mL  "(1:1,000) atIn Inject 0.3 mg into the shoulder, thigh, or buttocks as needed.        folic acid (FOLVITE) 1 MG tablet Take 1 tablet (1 mg total) by mouth daily. 90 tablet 1     gabapentin (NEURONTIN) 300 MG capsule 1-2 up to 4 times daily 240 capsule 5     hydrOXYzine (VISTARIL) 25 MG capsule 1 up to four times daily prn anxiety, 1-4 at first wakening prn 150 capsule 5     ibuprofen (ADVIL,MOTRIN) 600 MG tablet TK 1 T PO  QID PRN. MAXIMUM OF 3200 MG IN 24 HOURS. 120 tablet 1     multivitamin therapeutic (THERAGRAN) tablet Take 1 tablet by mouth daily.       ferrous sulfate 325 (65 FE) MG tablet Take 1 tablet by mouth daily. 30 tablet 3     predniSONE (DELTASONE) 10 mg tablet Take 1.5 tablets (15 mg total) by mouth daily for 15 days. 23 tablet 0     No current facility-administered medications for this visit.        COMPREHENSIVE EXAMINATION:  Vitals:    06/09/17 1233   BP: 134/82   Patient Site: Left Arm   Patient Position: Sitting   Cuff Size: Adult Large   Pulse: 80   Weight: (!) 259 lb (117.5 kg)   Height: 5' 8.5\" (1.74 m)     A well appearing alert oriented female. Vital data as noted above. Her eyes without inflammation/scleromalacia. ENTwithout oral mucositis, thrush, nasal deformity, external ear redness, deformity. Her neck is without lymphadenopathy and supple. Lungs normal sounds, no pleural rub. Heart auscultation normal rate, rhythm; no pericardial rub and murmurs. Abdomen soft, non tender, no organomegaly. Skin examined for heliotrope, malar area eruption, lupus pernio, periungual erythema, sclerodactyly, papules, erythema nodosum, purpura, nail pitting, onycholysis, and obvious psoriasis lesion. Neurological examination shows normal alertness, speech, facial symmetry, tone and power in upper and lower extremities, Tinel's and Phalen's at wrist and gait. The joint examination is performed for swelling, tenderness, warmth, erythema, and range of motion in the following joints: DIPs, PIPs, MCPs, wrists, " first CMC's, elbows, shoulders, hips, knees, ankles, feet; spine for range of motion and paraspinal muscles for tenderness. The salient normal / abnormal findings are appended.  She has synovitis in various areas including her PIPs MCPs of both ankles.  She has impingement in both her shoulders only able to go to about 90  actively.  Passively she could go all the way 180 .    LAB / IMAGING DATA:  ALT   Date Value Ref Range Status   05/11/2017 12 0 - 45 U/L Final   11/21/2016 12 0 - 45 U/L Final   07/08/2016 12 0 - 45 U/L Final     Albumin   Date Value Ref Range Status   05/11/2017 3.3 (L) 3.5 - 5.0 g/dL Final   11/21/2016 3.3 (L) 3.5 - 5.0 g/dL Final   07/08/2016 3.3 (L) 3.5 - 5.0 g/dL Final     Creatinine   Date Value Ref Range Status   05/11/2017 0.72 0.60 - 1.10 mg/dL Final   11/21/2016 0.66 0.60 - 1.10 mg/dL Final   07/08/2016 0.67 0.60 - 1.10 mg/dL Final       WBC   Date Value Ref Range Status   11/21/2016 10.8 4.0 - 11.0 thou/uL Final   07/08/2016 10.3 4.0 - 11.0 thou/uL Final     Hemoglobin   Date Value Ref Range Status   11/21/2016 12.1 12.0 - 16.0 g/dL Final   07/08/2016 11.1 (L) 12.0 - 16.0 g/dL Final   04/01/2016 10.6 (L) 12.0 - 16.0 g/dL Final     Platelets   Date Value Ref Range Status   11/21/2016 397 140 - 440 thou/uL Final   07/08/2016 393 140 - 440 thou/uL Final   04/01/2016 405 140 - 440 thou/uL Final       Lab Results   Component Value Date    .0 (H) 10/20/2015    SEDRATE 60 (H) 05/11/2017

## 2021-06-11 NOTE — TELEPHONE ENCOUNTER
Wellness Screening Tool  Symptom Screening:  Do you have one of the following NEW symptoms:    Fever (subjective or >100.0)?  No    A new cough?  No    Shortness of breath?  No     Chills? No     New loss of taste or smell? No     Generalized body aches? No     New persistent headache? No     New sore throat? No     Nausea, vomiting, or diarrhea?  No    Within the past 2 weeks, have you been exposed to someone with a known positive illness below:    COVID-19 (known or suspected)?  No    Chicken pox?  No    Mealses?  No    Pertussis?  No    Patient notified of visitor policy- They may have one person accompany them to their appointment, but they will need to wear a mask and will be screened upon arrival for symptoms: Yes  Pt informed to wear a mask: Yes  Pt notified if they develop any symptoms listed above, prior to their appointment, they are to call the clinic directly at [phone] for further instructions.  Yes  Patient's appointment status: Patient will be seen in clinic as scheduled on 9/16

## 2021-06-11 NOTE — PROGRESS NOTES
Mental Health Visit Note    9/25/2020   Start time: 702    Stop Time: 752   Session # 18    Session Type: Patient is presenting for an Individual session.    Alina Martell is a 55 y.o. female is being seen today for    Chief Complaint   Patient presents with     MH Follow Up     Video Visit Mental Health     Telemedicine Visit: The patient's condition can be safely assessed and treated via synchronous audio and visual telemedicine encounter.      Reason for Telemedicine Visit: Services only offered telehealth    Originating Site (Patient Location): Patient's home    Distant Site (Provider Location): Provider Remote Setting    Consent:  The patient/guardian has verbally consented to: the potential risks and benefits of telemedicine (video visit) versus in person care; bill my insurance or make self-payment for services provided; and responsibility for payment of non-covered services.     Mode of Communication:  Video Conference via [facility]    As the provider I attest to compliance with applicable laws and regulations related to telemedicine.    Those present for this visit patient and therapist     Follow up in regards to ongoing symptom management of depression and anxiety.    New symptoms or complaints: None    Functional Impairment:   Personal: 3  Family: 2  Social: 3  Work: 3    Clinical assessment of mental status:   Alina Martell presented on time.   She was oriented x3, open and cooperative, and dressed appropriately for this session and weather. Her memory was Normal cognitive functioning .  Her speech was  Within normal.  Language was intact.  Concentration and focus is Within normal. Psychosis is not noted or reported. She reports her mood is down.  Affect is congruent with speech and is Congruent w/content of speech.  Fund of knowledge is adequate. Insight is adequate for therapy.Changes made as needed for session on 9/25/2020.    Suicidal/Homicidal Ideation present: Patient denies  suicidal and homicidal ideations/means or plans.     Patient's impression of their current status: Patient indicated feeling down. Patient reported having a hard week. Patient indicated she has being trying to work on her shame. Patient reported seeing her shame as holding her back in life related to her mental health. Patient indicated she continues to struggle with forgiveness as well. Patient reported feeling she needs to forgive her daughter's dad but realizing instead she needs to forgive herself.     Therapist impression of patients current state: This 55 y.o. Black or  female presents with feeling down. This therapist processed with patient ways to work on forgiveness of self. This therapist challenged patient on ways her shame and forgiveness are related.    Assessment tools used today include: None    Type of psychotherapeutic technique provided: Insight oriented, Client centered and CBT    Progress toward short term goals: Progress as expected with patient continuing therapy, identifying struggles with emotions and looking at ways she continues to struggle with the past    Review of long term goals: Treatment Plan updated on 7/16/2020    Diagnosis:   1. Major depressive disorder, recurrent episode, moderate (H)    2. ADA (generalized anxiety disorder)    3. PTSD (post-traumatic stress disorder)    no change    Plan and Follow-up: Patient will continue with weekly session. Patient would benefit from working on forgiveness of self. Patient should continue to work on shame.    Discharge Criteria/Planning: Patient will continue with follow-up until therapy can be discontinued without return of signs and symptoms.    Performed and documented by SHERRIE Pantoja

## 2021-06-11 NOTE — PATIENT INSTRUCTIONS - HE
1. Get a blood pressure cuff.  Keep a record of your blood pressures checked at various times and bring that into your primary care provider for follow-up  2. We will start you on diltiazem 120 mg daily to help with blood pressure control.  This may also help you keep your rate slow if you develop recurrent atrial fibrillation.  3. We will also check an echocardiogram to look for evidence of structural heart abnormalities that may bring on atrial fibrillation  4. Continue her regular exercise regimen.  There is no substitute!  5. Losing weight with diet restrictions may help with blood pressure control  6. We will schedule you for a sleep consultation and sleep study to look for evidence of sleep apnea with that may be precipitating your hypertension, headaches, daytime sleepiness, and even atrial fibrillation.

## 2021-06-11 NOTE — PROGRESS NOTES
MENTAL HEALTH VISIT NOTE    07/11/2017 Start time: 400   Stop Time: 500   Session # 12    Alina Martell is a 51 y.o. female is being seen today for follow up.    New symptoms or complaints: None    Functional Impairment:   Personal: 3  Family: 4  Work: 2  Social:2    Clinical assessment of mental status: Alina Martell was on time for her scheduled session. She was open and cooperative, and dressed appropriately for this session and weather. Her memory was intact .  Her speech was  intact.  Language was intact.  Concentration and focus is adequate. Psychosis is not noted or reported. She reports her mood as depressed at times. Affect is congruent with speech.  Fund of knowledge is adequate. Insight is adequate for therapy.     Suicidal/Homicidal Ideation present: None Reported This Session    Patient's impression of their current status: Patient reported overall doing well. Patient indicated she still struggles with depression at times. Patient indicated there have been times where her depression has become severe and she knows the importance of using coping skills. Patient talked about ways she has worked hard to confront her depression and not allow it to take over her life. Patient indicated knowing it will come back at times. Patient reported she has been working to confront her anxiety as well. Patient indicated she has been in large crowds and stayed which has helped her to be more comfortable.     Therapist impression of patients current state: Patient continues to have good insight into her mental health. This therapist challenged patient on ways she can work on slowing down doing certain activities. This therapist processed with patient sleep hygiene and trying to use these techniques to help her sleep.      Type of psychotherapeutic technique provided: Insight oriented, Client centered and CBT    Progress toward short term goals: Medication management with Dr. Wei, using coping skills  taught in session,  reaching out to people when needed, noticing mental health symptoms, and returning to scheduled session.     Review of long term goals: Treatment plan updated on 03/21/2017  Diagnosis:   1. PTSD (post-traumatic stress disorder)    2. Major depressive disorder, recurrent episode, moderate    3. Panic disorder without agoraphobia      Plan and Follow up: Patient will return in 4 weeks for scheduled session to continue working the grief related to the loss of her father. Patient will work on confronting her feeling instead of avoiding them. Patient should work on caring for herself and putting her needs first.  Patient should use coping skills taught throughout sessions to help reduce PTSD symptoms.  Patient will continue to take medication as prescribed. Patient should work on forgiving herself and talk with her brother about the guilt she is experiencing.  Patient would benefit from continuing to identify ways she has been a good mom.    Discharge Criteria/Planning: Patient will continue with follow-up until therapy can be discontinued without return of signs and symptoms.    Encounter performed and documented by SHERRIE Pantoja

## 2021-06-11 NOTE — PROGRESS NOTES
MENTAL HEALTH VISIT NOTE    05/30/2017 Start time: 400   Stop Time: 500   Session # 10    Alina Martell is a 51 y.o. female is being seen today for follow up.    New symptoms or complaints: None    Functional Impairment:   Personal: 3  Family: 4  Work: 2  Social:2    Clinical assessment of mental status: Alina Martell was on time for her scheduled session. She was open and cooperative, and dressed appropriately for this session and weather. Her memory was intact .  Her speech was  intact.  Language was intact.  Concentration and focus is adequate. Psychosis is not noted or reported. She reports her mood as tired.  Affect is congruent with speech.  Fund of knowledge is adequate. Insight is adequate for therapy.     Suicidal/Homicidal Ideation present: None Reported This Session    Patient's impression of their current status: Patient reported continuing to be tired and in pain. Patient indicated the pain is taking over her life. Patient talked about how she is taking the medication and doing what has been recommended by the doctors. Patient indicated her whole life being affected by the pain and this causing her to feel down. Patient reported summer being the time when she usually feels the best yet not feeling that way at this time.     Therapist impression of patients current state: Patient continues to have good insight into her mental health. This therapist processed with patient coping skills for chronic pain. This therapist challenged patient on ways she can work on getting out and being active in ways that would not increase her pain levels.     Type of psychotherapeutic technique provided: Insight oriented, Client centered and CBT    Progress toward short term goals: Medication management with Dr. Wei, using coping skills taught in session,  reaching out to people when needed and returning to scheduled session.     Review of long term goals: Treatment plan updated on 03/21/2017  Diagnosis:    1. PTSD (post-traumatic stress disorder)    2. Major depressive disorder, recurrent episode, moderate    3. Panic disorder without agoraphobia      Plan and Follow up: Patient will return in 2 week for scheduled session to continue working the grief related to the loss of her father. Patient will work on confronting her feeling instead of avoiding them. Patient should work on caring for herself and putting her needs first.  Patient should use coping skills taught throughout sessions to help reduce PTSD symptoms.  Patient will continue to take medication as prescribed. Patient should work on forgiving herself and talk with her brother about the guilt she is experiencing.  Patient would benefit from continuing to identify ways she has been a good mom.    Discharge Criteria/Planning: Patient will continue with follow-up until therapy can be discontinued without return of signs and symptoms.    Encounter performed and documented by SHERRIE Pantoja

## 2021-06-11 NOTE — PROGRESS NOTES
Mental Health Visit Note    9/18/2020   Start time: 701    Stop Time: 751   Session # 17    Session Type: Patient is presenting for an Individual session.    Alina Martell is a 55 y.o. female is being seen today for    Chief Complaint   Patient presents with     MH Follow Up     Video Visit Mental Health     Telemedicine Visit: The patient's condition can be safely assessed and treated via synchronous audio and visual telemedicine encounter.      Reason for Telemedicine Visit: Services only offered telehealth    Originating Site (Patient Location): Patient's home    Distant Site (Provider Location): Provider Remote Setting    Consent:  The patient/guardian has verbally consented to: the potential risks and benefits of telemedicine (video visit) versus in person care; bill my insurance or make self-payment for services provided; and responsibility for payment of non-covered services.     Mode of Communication:  Video Conference via [employer]    As the provider I attest to compliance with applicable laws and regulations related to telemedicine.    Those present for this visit patient and therapist     Follow up in regards to ongoing symptom management of depression and anxiety.    New symptoms or complaints: None    Functional Impairment:   Personal: 3  Family: 2  Social: 3  Work: 3    Clinical assessment of mental status:   Alina Martell presented on time.   She was oriented x3, open and cooperative, and dressed appropriately for this session and weather. Her memory was Normal cognitive functioning .  Her speech was  Within normal.  Language was intact.  Concentration and focus is Within normal. Psychosis is not noted or reported. She reports her mood is anxious and worried.  Affect is congruent with speech and is Congruent w/content of speech.  Fund of knowledge is adequate. Insight is adequate for therapy.Changes made as needed for session on 9/18/2020.    Suicidal/Homicidal Ideation present: Patient  denies suicidal and homicidal ideations/means or plans.     Patient's impression of their current status: Patient indicated feeling anxious and worried. Patient reported she is worried about the future and money. Patient indicated wanting to start looking for jobs but unsure exactly what she wants to do. Patient reported she was doing well financially until her car started to have problems. Patient indicated her depression has been a lot better this week with crying a lot less.     Therapist impression of patients current state: This 55 y.o. Black or  female presents with anxiety and worry. This therapist processed with patient starting to look at jobs and seeing what interests her. This therapist processed with patient continuing to work on a schedule and having time around people.     Assessment tools used today include: None    Type of psychotherapeutic technique provided: Insight oriented, Client centered and CBT    Progress toward short term goals: Progress as expected with patient continuing therapy, using skills to reduce depression and reaching out to her support network.      Review of long term goals: Treatment Plan updated on 7/16/2020    Diagnosis:   1. Major depressive disorder, recurrent episode, moderate (H)    2. ADA (generalized anxiety disorder)    3. PTSD (post-traumatic stress disorder)    no change    Plan and Follow-up: Patient will continue with weekly session. Patient should continue to work on reaching out to her support network and staying active. Patient would benefit from looking at areas of interest for jobs.     Discharge Criteria/Planning: Patient will continue with follow-up until therapy can be discontinued without return of signs and symptoms.    Performed and documented by SHERRIE Pantoja

## 2021-06-11 NOTE — PROGRESS NOTES
Mental Health Visit Note    9/4/2020   Start time: 710    Stop Time: 751   Session # 15    Session Type: Patient is presenting for an Individual session.    Alina Martell is a 55 y.o. female is being seen today for    Chief Complaint   Patient presents with     MH Follow Up     Video Visit Mental Health     Telemedicine Visit: The patient's condition can be safely assessed and treated via synchronous audio and visual telemedicine encounter.      Reason for Telemedicine Visit: Services only offered telehealth    Originating Site (Patient Location): Patient's home    Distant Site (Provider Location): Provider Remote Setting    Consent:  The patient/guardian has verbally consented to: the potential risks and benefits of telemedicine (video visit) versus in person care; bill my insurance or make self-payment for services provided; and responsibility for payment of non-covered services.     Mode of Communication:  Video Conference via [employer]    As the provider I attest to compliance with applicable laws and regulations related to telemedicine.    Those present for this visit patient and therapist     Follow up in regards to ongoing symptom management of depression and anxiety.    New symptoms or complaints: None    Functional Impairment:   Personal: 3  Family: 2  Social: 3  Work: 3    Clinical assessment of mental status:   Alina Martell presented on time.   She was oriented x3, open and cooperative, and dressed appropriately for this session and weather. Her memory was Normal cognitive functioning .  Her speech was  Within normal.  Language was intact.  Concentration and focus is Within normal. Psychosis is not noted or reported. She reports her mood is depressed.  Affect is congruent with speech and is Congruent w/content of speech.  Fund of knowledge is adequate. Insight is adequate for therapy.Changes made as needed for session on 9/4/2020.    Suicidal/Homicidal Ideation present: Patient denies  suicidal and homicidal ideations/means or plans.     Patient's impression of their current status: Patient indicated struggling with depression. Patient reported she has been thinking a lot about her past and herself. Patient indicated when she thinks of her past she feels gross. Patient reported feeling that is how other people view her. Patient indicated when she truly thinks of the people important in her life she knows none of them view her as gross. Patient reported realizing that she continues to beat herself up for situations that were out of her control. Patient indicated now that she has been less busy reflecting on her emotions related to herself.     Therapist impression of patients current state: This 55 y.o. Black or  female presents with depression. This therapist challenged patient on how her support network would describe her. This therapist processed with patient ways she is being unfair to herself for situations out of her control. This therapist processed with patient starting to challenge negative thoughts.     Assessment tools used today include: None    Type of psychotherapeutic technique provided: Insight oriented, Client centered and CBT    Progress toward short term goals: Progress as expected with patient continuing therapy, identifying her struggles and working on change.     Review of long term goals: Treatment Plan updated on 7/16/2020    Diagnosis:   1. Major depressive disorder, recurrent episode, moderate (H)    2. ADA (generalized anxiety disorder)    3. PTSD (post-traumatic stress disorder)    no change    Plan and Follow-up: Patient will continue with weekly session. Patient should continue to work on identifying negative thoughts and challenging the negative thoughts. Patient would benefit from reaching out to her support network and identifying how they view her. Patient should continue to work on self-care.      Discharge Criteria/Planning: Patient will continue  with follow-up until therapy can be discontinued without return of signs and symptoms.    Performed and documented by SHERRIE Pantoja

## 2021-06-11 NOTE — PROGRESS NOTES
Mental Health Visit Note    9/11/2020   Start time: 704    Stop Time: 754   Session # 16    Session Type: Patient is presenting for an Individual session.    Alina Martell is a 55 y.o. female is being seen today for    Chief Complaint   Patient presents with     MH Follow Up     Video Visit Mental Health     Telemedicine Visit: The patient's condition can be safely assessed and treated via synchronous audio and visual telemedicine encounter.      Reason for Telemedicine Visit: Services only offered telehealth    Originating Site (Patient Location): Patient's home    Distant Site (Provider Location): Provider Remote Setting    Consent:  The patient/guardian has verbally consented to: the potential risks and benefits of telemedicine (video visit) versus in person care; bill my insurance or make self-payment for services provided; and responsibility for payment of non-covered services.     Mode of Communication:  Video Conference via [employer]    As the provider I attest to compliance with applicable laws and regulations related to telemedicine.    Those present for this visit patient and therapist     Follow up in regards to ongoing symptom management of depression and anxiety.    New symptoms or complaints: None    Functional Impairment:   Personal: 3  Family: 2  Social: 3  Work: 3    Clinical assessment of mental status:   Alina Martell presented on time.   She was oriented x3, open and cooperative, and dressed appropriately for this session and weather. Her memory was Normal cognitive functioning .  Her speech was  Within normal.  Language was intact.  Concentration and focus is Within normal. Psychosis is not noted or reported. She reports her mood is depressed.  Affect is congruent with speech and is Congruent w/content of speech.  Fund of knowledge is adequate. Insight is adequate for therapy.Changes made as needed for session on 9/11/2020.    Suicidal/Homicidal Ideation present: Patient denies  suicidal and homicidal ideations/means or plans.     Patient's impression of their current status: Patient reported continuing to feel depressed. Patient indicated she continues to reflect on her life and what she has been through. Patient reported at times like now she is very hard on herself. Patient indicated believing this has to do with isolation and being in her head. Patient reported she misses work and knowing this is playing into her mood as well. Patient indicated feeling she has been doing good self-care just struggling with things she has avoided.     Therapist impression of patients current state: This 55 y.o. Black or  female presents with depression. This therapist processed with patient what she is proud of in her life. This therapist challenged patient on ways she is only focusing on the negative and not the positive at this time. This therapist processed with patient ways she is allowing unhealthy people to affect her.     Assessment tools used today include: None    Type of psychotherapeutic technique provided: Insight oriented, Client centered and CBT    Progress toward short term goals: Progress as expected with patient continuing therapy, recognizing depression and using skills to reduce depression symptoms.      Review of long term goals: Treatment Plan updated on 7/16/2020    Diagnosis:   1. Major depressive disorder, recurrent episode, moderate (H)    2. ADA (generalized anxiety disorder)    3. PTSD (post-traumatic stress disorder)    no change    Plan and Follow-up: Patient will continue with weekly session. Patient would benefit from continuing to identify patterns with negative thoughts and use skills to challenge these thoughts. Patient should continue to work on self-care and having a daily routine.     Discharge Criteria/Planning: Patient will continue with follow-up until therapy can be discontinued without return of signs and symptoms.    Performed and documented by  Mariana Love, [facility]

## 2021-06-12 NOTE — PROGRESS NOTES
Mental Health Visit Note    10/23/2020   Start time: 702    Stop Time: 752   Session # 22    Two point identification confirmed     Session Type: Patient is presenting for an Individual session.    Alina Martell is a 55 y.o. female is being seen today for    Chief Complaint   Patient presents with     MH Follow Up     Video Visit Mental Health     Telemedicine Visit: The patient's condition can be safely assessed and treated via synchronous audio and visual telemedicine encounter.      Reason for Telemedicine Visit: Services only offered telehealth    Originating Site (Patient Location): Patient's home    Distant Site (Provider Location): Provider Remote Setting    Consent:  The patient/guardian has verbally consented to: the potential risks and benefits of telemedicine (video visit) versus in person care; bill my insurance or make self-payment for services provided; and responsibility for payment of non-covered services.     Mode of Communication:  Video Conference via [employer]    As the provider I attest to compliance with applicable laws and regulations related to telemedicine.    Those present for this visit patient and therapist     Follow up in regards to ongoing symptom management of depression and anxiety.    New symptoms or complaints: None    Functional Impairment:   Personal: 3  Family: 2  Social: 3  Work: 3    Clinical assessment of mental status:   Alina Martell presented on time.   She was oriented x3, open and cooperative, and dressed appropriately for this session and weather. Her memory was Normal cognitive functioning .  Her speech was  Within normal.  Language was intact.  Concentration and focus is Within normal. Psychosis is not noted or reported. She reports her mood is depressed.  Affect is congruent with speech and is Congruent w/content of speech.  Fund of knowledge is adequate. Insight is adequate for therapy.Changes made as needed for session on  10/23/2020.    Suicidal/Homicidal Ideation present: Patient denies suicidal and homicidal ideations/means or plans.     Patient's impression of their current status: Patient indicated continuing to feel down. Patient reported she has not heard from her daughter this week. Patient indicated believing she will not hear from her for a while. Patient reported she is sad when she thinks of the holiday's and not being there. Patient indicated she has volunteered on Thanksgiving. Patient reported continuing to be in a hard spot due to her daughter not telling the family she does not want her mom around.     Therapist impression of patients current state: This 55 y.o. Black or  female presents with feeling depressed. This therapist processed with patient people she can spend the holiday's with that will bring her dariusz. This therapist processed with patient the importance of continuing her relationship with her family members and not isolating.     Assessment tools used today include: None    Type of psychotherapeutic technique provided: Insight oriented, Client centered and CBT    Progress toward short term goals: Progress as expected with patient continuing therapy, getting out of the house and identifying emotions.     Review of long term goals: Treatment Plan updated on 10/16/2020    Diagnosis:   1. Major depressive disorder, recurrent episode, moderate (H)    2. ADA (generalized anxiety disorder)    3. PTSD (post-traumatic stress disorder)        Plan and Follow-up: Patient will continue with weekly session. Patient should continue to work on getting out of the house. Patient would benefit from continuing to spend time with her family and her support network.     Discharge Criteria/Planning: Patient will continue with follow-up until therapy can be discontinued without return of signs and symptoms.    Performed and documented by SHERRIE Pnatoja

## 2021-06-12 NOTE — PROGRESS NOTES
Mental HealthmPsychotherapy Note     11/6/2020   Start time: 703    Stop Time: 753   Session # 24    Two point identification confirmed     Session Type: Patient is presenting for an Individual session.    Alina Martell is a 55 y.o. female is being seen today for    Chief Complaint   Patient presents with     MH Follow Up     Video Visit Mental Health     Telemedicine Visit: The patient's condition can be safely assessed and treated via synchronous audio and visual telemedicine encounter.      Reason for Telemedicine Visit: Services only offered telehealth    Originating Site (Patient Location): Patient's home    Distant Site (Provider Location): Provider Remote Setting    Consent:  The patient/guardian has verbally consented to: the potential risks and benefits of telemedicine (video visit) versus in person care; bill my insurance or make self-payment for services provided; and responsibility for payment of non-covered services.     Mode of Communication:  Video Conference via [employer]    As the provider I attest to compliance with applicable laws and regulations related to telemedicine.    Those present for this visit patient and therapist     Follow up in regards to ongoing symptom management of depression and anxiety.    New symptoms or complaints: None    Functional Impairment:   Personal: 3  Family: 2  Social: 3  Work: 3    Clinical assessment of mental status:   Alina Martell presented on time.   She was oriented x3, open and cooperative, and dressed appropriately for this session and weather. Her memory was Normal cognitive functioning .  Her speech was  Within normal.  Language was intact.  Concentration and focus is Within normal. Psychosis is not noted or reported. She reports her mood is anxious.  Affect is congruent with speech and is Congruent w/content of speech.  Fund of knowledge is adequate. Insight is adequate for therapy.Changes made as needed for session on  11/6/2020.    Suicidal/Homicidal Ideation present: Patient denies suicidal and homicidal ideations/means or plans.     Patient's impression of their current status: Patient indicated feeling anxious. Patient reported waiting for the results of the presidential elections. Patient indicated overall she is handling it well. Patient reported realizing how raciest this [place] still is and having a hard time accepting that information. Patient indicated her daughter reached out to her after she broke her ankle. Patient reported continuing to find her balance.     Therapist impression of patients current state: This 55 y.o. Black or  female presents with feeling anxious. This therapist processed with patient her emotions related to what is happening in the world. This therapist processed with patient her relationship with her daughter.     Assessment tools used today include: None    Type of psychotherapeutic technique provided: Insight oriented, Client centered and CBT    Progress toward short term goals: Progress as expected with patient continuing therapy, identifying stressors and working on self-care    Review of long term goals: Treatment Plan updated on 10/16/2020    Diagnosis:   1. Major depressive disorder, recurrent episode, moderate (H)    2. ADA (generalized anxiety disorder)    3. PTSD (post-traumatic stress disorder)        Plan and Follow-up: Patient will continue with weekly session. Patient should continue to talk about her emotions related to the election. Patient would benefit from continuing to work on self-care and getting on a schedule.     Discharge Criteria/Planning: Patient will continue with follow-up until therapy can be discontinued without return of signs and symptoms.    Performed and documented by Mariana Love LPC

## 2021-06-12 NOTE — PROGRESS NOTES
Mental Health Visit Note    10/2/2020   Start time: 708    Stop Time: 758   Session # 19    Session Type: Patient is presenting for an Individual session.    Alina Martell is a 55 y.o. female is being seen today for    Chief Complaint   Patient presents with     MH Follow Up     Telephone Visit Mental Health     Telemedicine Visit: The patient's condition can be safely assessed and treated via synchronous audio and visual telemedicine encounter.      Reason for Telemedicine Visit: Services only offered telehealth    Originating Site (Patient Location): Patient's home    Distant Site (Provider Location): Provider Remote Setting    Consent:  The patient/guardian has verbally consented to: the potential risks and benefits of telemedicine (video visit) versus in person care; bill my insurance or make self-payment for services provided; and responsibility for payment of non-covered services.     Mode of Communication:  Video Conference via [employer]    As the provider I attest to compliance with applicable laws and regulations related to telemedicine.    Those present for this visit patient and therapist     Follow up in regards to ongoing symptom management of depression and anxiety.    New symptoms or complaints: None    Functional Impairment:   Personal: 3  Family: 2  Social: 3  Work: 3    Clinical assessment of mental status:   Alina Martell presented on time.   She was oriented x3, open and cooperative, and dressed appropriately for this session and weather. Her memory was Normal cognitive functioning .  Her speech was  Within normal.  Language was intact.  Concentration and focus is Within normal. Psychosis is not noted or reported. She reports her mood is anxious.  Affect is congruent with speech and is Congruent w/content of speech.  Fund of knowledge is adequate. Insight is adequate for therapy.Changes made as needed for session on 10/2/2020.    Suicidal/Homicidal Ideation present: Patient denies  suicidal and homicidal ideations/means or plans.     Patient's impression of their current status: Patient reported feeling anxious. Patient indicated she has noticed an increase in her anxiety symptoms. Patient reported being unsure specifically what is causing her anxiety to increase but feels it is everything going on in the world. Patient indicated believing a lot of it may have to do with having so much free time.     Therapist impression of patients current state: This 55 y.o. Black or  female presents with feeling down. This therapist challenged patient on ways she can work on confronting her anxiety. This therapist processed with patient being being in the moment and not jumping ton conclusions.      Assessment tools used today include: None    Type of psychotherapeutic technique provided: Insight oriented, Client centered and CBT    Progress toward short term goals: Progress as expected with patient continuing therapy, identifying struggles with anxiety and reaching out to support network.    Review of long term goals: Treatment Plan updated on 7/16/2020    Diagnosis:   1. Major depressive disorder, recurrent episode, moderate (H)    2. ADA (generalized anxiety disorder)    3. PTSD (post-traumatic stress disorder)    improving     Plan and Follow-up: Patient will continue with weekly session. Patient would benefit from continuing to use skills to work on reducing anxiety. Patient should work on staying in the moment and identifying what is in her control.     Discharge Criteria/Planning: Patient will continue with follow-up until therapy can be discontinued without return of signs and symptoms.    Performed and documented by SHERRIE Pantoja

## 2021-06-12 NOTE — PROGRESS NOTES
MENTAL HEALTH VISIT NOTE    08/10/2017 Start time: 700   Stop Time: 755   Session # 13    Alina Martell is a 51 y.o. female is being seen today for follow up.    New symptoms or complaints: None    Functional Impairment:   Personal: 3  Family: 4  Work: 2  Social:2    Clinical assessment of mental status: Alina Martell was on time for her scheduled session. She was open and cooperative, and dressed appropriately for this session and weather. Her memory was intact .  Her speech was  intact.  Language was intact.  Concentration and focus is adequate. Psychosis is not noted or reported. She reports her mood as frustrated. Affect is congruent with speech.  Fund of knowledge is adequate. Insight is adequate for therapy.     Suicidal/Homicidal Ideation present: None Reported This Session    Patient's impression of their current status: Patient indicated being frustrated with her brother. Patient talked about getting home from [place] and her house being a mess. Patient reported not knowing how she wants to deal with this situation. Patient talked about having a cousin pass away which brought up emotions. Patient indicated her depression continues to come and go. Patient talked about having better coping skills to help manage her depression.    Therapist impression of patients current state: Patient continues to have good insight into her mental health. This therapist processed with patient assertive communication and ways to work on getting her needs met. This therapist challenged patient on ways she she is able to handle her mental health yet not giving herself credit.      Type of psychotherapeutic technique provided: Insight oriented, Client centered and CBT    Progress toward short term goals: Medication management with Dr. Wei, using coping skills taught in session,  reaching out to people when needed, noticing mental health symptoms, and returning to scheduled session.     Review of long term goals:  Treatment plan updated on 03/21/2017  Diagnosis:   1. PTSD (post-traumatic stress disorder)    2. Major depressive disorder, recurrent episode, moderate    3. Panic disorder without agoraphobia      Plan and Follow up: Patient will return in 4 weeks for scheduled session to continue working the grief related to the loss of her father. Patient will work on confronting her feeling instead of avoiding them. Patient should work on caring for herself and putting her needs first.  Patient should use coping skills taught throughout sessions to help reduce PTSD symptoms.  Patient will continue to take medication as prescribed. Patient should work on forgiving herself and talk with her brother about the guilt she is experiencing.  Patient would benefit from continuing to identify ways she has been a good mom.    Discharge Criteria/Planning: Patient will continue with follow-up until therapy can be discontinued without return of signs and symptoms.    Encounter performed and documented by SHERRIE Pantoja

## 2021-06-12 NOTE — PROGRESS NOTES
Outpatient Mental Health Treatment Plan  Name: Alina Martell  : 1965  MRN: [id number]    Treatment Plan: Updated Treatment Plan    Benefit and risks and alternatives have been discussed: Yes  Is this treatment appropriate with minimal intrusion/restrictions: Yes  Estimated duration of treatment: Ongoing therapy at this time  Anticipated frequency of services: Weekly  Necessity for frequency: This frequency is needed to establish therapeutic goals and for continuity of care in order to monitor progress.  Necessity for treatment: To address cognitive, behavioral, and/or emotional barriers in order to work toward goals and to improve quality of life.    Plan:   ? Depression    Goal:  Decrease average depression level from 4 to 1.  Strategies:   ?[x] Decrease social isolation  [x] Increase involvement in meaningful activities  ?[x] Discuss sleep hygiene  ?[x] Explore thoughts and expectations about self and others  ?[x] Assess for suicide risk  ?[x] Implement physical activity routine (with physician approval)  [x] Consider introduction of bibliotherapy and/or videos  [x] Continue compliance with medical treatment plan (or explore  barriers)  ?   Degree to which this is a problem (1-4): 4  Degree to which goal is met (1-4): 3    Date of next review: 2021     ? Anxiety    Goal:  Decrease average anxiety level from 4 to 1.  Strategies:   ? [x]Learn and practice relaxation techniques and other coping strategies    ? [x] Increase involvement in meaningful activities  ? [x] Discuss sleep hygiene  ? [x] Explore thoughts and expectations about self and others  ? [x] Identify and monitor triggers for panic/anxiety symptoms  ? [x] Implement physical activity routine (with physician approval)  ? [x] Consider introduction of bibliotherapy and/or videos  ? [x] Continue compliance with medical treatment plan    Degree to which this is a problem (1-4): 4  Degree to which goal is met (1-4): 2    Date of next review:  "01/16/2021    PTSD    Goal: Identify triggers, separate the traumatic memory from the debilitating emotion associated with it.    Strategies:    [X]Learn and practice relaxation techniques and other coping strategies (e.g., thought stopping, reframing, meditation)    ? [X] Cognitive restructuring    ? [X] Discuss sleep hygiene    ? [X] Identify and change maladaptive coping behaviors    ? [X] Prolonged exposure therapy    ? [X] Identify cues and symptoms of PTSD      Degree to which this is a problem (1-4): 4  Degree to which goal is met (1-4): 3    Date of next review: 01/16/2021    Functional Impairment: 1=Not at all/Rarely 2=Some days 3=Most Days 4=Every Day    Personal: 3  Family: 3  Social: 2  Work: 3    Diagnosis:  Major depressive disorder, recurrent, moderate  Generalized anxiety disorder  PTSD    Clinical assessments completed: ADA-7, PHQ-9, Mood Questionnaire current, CAGE-AID, PANSI.    STRENGTHS: Hard worker, dedication and support network    LIMITATIONS: Avoidance behaviors    Cultural Identification: Patient reported:    Race: Bi-racial    Experience of cultural bias: Patient reported experiencing cultural bias. Patient gave an example of when she was living in [place] and being told \"we don't serve [place].\" Patient reported being called the inward on multiple occasions.    Immigration/history of status: Born [place] raised in [place].   Level of acculturation: acculturated   Time orientation: middle    Social orientation/class: middle    Verbal Communication Style/languages: English    Locus of Control: inward     Persons responsible for this plan:  ? [x] Patient ? [x] Provider ?     Provider:Performed and documented by SHERRIE Pantoja    Patient Signature:____________________________________ Date: ______________       Therapist Signature: __________________________________ Date: ______________  "

## 2021-06-12 NOTE — PROGRESS NOTES
Mental Health Visit Note    10/16/2020   Start time: 701    Stop Time: 751   Session # 21    Session Type: Patient is presenting for an Individual session.    Alina Martell is a 55 y.o. female is being seen today for    Chief Complaint   Patient presents with     MH Follow Up     Video Visit Mental Health     Telemedicine Visit: The patient's condition can be safely assessed and treated via synchronous audio and visual telemedicine encounter.      Reason for Telemedicine Visit: Services only offered telehealth    Originating Site (Patient Location): Patient's home    Distant Site (Provider Location): Provider Remote Setting    Consent:  The patient/guardian has verbally consented to: the potential risks and benefits of telemedicine (video visit) versus in person care; bill my insurance or make self-payment for services provided; and responsibility for payment of non-covered services.     Mode of Communication:  Video Conference via [employer]    As the provider I attest to compliance with applicable laws and regulations related to telemedicine.    Those present for this visit patient and therapist     Follow up in regards to ongoing symptom management of depression and anxiety.    New symptoms or complaints: None    Functional Impairment:   Personal: 3  Family: 2  Social: 3  Work: 3    Clinical assessment of mental status:   Alina Martell presented on time.   She was oriented x3, open and cooperative, and dressed appropriately for this session and weather. Her memory was Normal cognitive functioning .  Her speech was  Within normal.  Language was intact.  Concentration and focus is Within normal. Psychosis is not noted or reported. She reports her mood is depressed.  Affect is congruent with speech and is Congruent w/content of speech.  Fund of knowledge is adequate. Insight is adequate for therapy.Changes made as needed for session on 10/16/2020.    Suicidal/Homicidal Ideation present: Patient denies  suicidal and homicidal ideations/means or plans.     Patient's impression of their current status: Patient reported feeling depressed. Patient indicated meeting with her daughter and her daughter informing her she was not there for her. Patient reported her daughter telling her a lot of information that was really hard to hear. Patient indicated at this point her daughter not wanting patient to be around. Patient reported at this point she is planning to not attend holiday's and take a step back with family events.     Therapist impression of patients current state: This 55 y.o. Black or  female presents with feeling depressed. This therapist processed with patient ways she has been a good mom. This therapist processed with patient the importance of self-care right now and not making any big decisions.     Assessment tools used today include: None    Type of psychotherapeutic technique provided: Insight oriented, Client centered and CBT    Progress toward short term goals: Progress as expected with patient continuing therapy, talking about hurt she is experiencing.     Review of long term goals: Treatment Plan updated on 10/16/2020    Diagnosis:   1. Major depressive disorder, recurrent episode, moderate (H)    2. ADA (generalized anxiety disorder)    3. PTSD (post-traumatic stress disorder)        Plan and Follow-up: Patient will continue with weekly session. Patient would benefit from working on self-care. Patient should work on spending time with her support network.     Discharge Criteria/Planning: Patient will continue with follow-up until therapy can be discontinued without return of signs and symptoms.    Performed and documented by SHERRIE Pantoja

## 2021-06-12 NOTE — PROGRESS NOTES
Mental HealthmPsychotherapy Note     10/30/2020   Start time: 702    Stop Time: 750   Session # 23    Two point identification confirmed     Session Type: Patient is presenting for an Individual session.    Alina Martell is a 55 y.o. female is being seen today for    Chief Complaint   Patient presents with     MH Follow Up     Video Visit Mental Health     Telemedicine Visit: The patient's condition can be safely assessed and treated via synchronous audio and visual telemedicine encounter.      Reason for Telemedicine Visit: Services only offered telehealth    Originating Site (Patient Location): Patient's home    Distant Site (Provider Location): Provider Remote Setting    Consent:  The patient/guardian has verbally consented to: the potential risks and benefits of telemedicine (video visit) versus in person care; bill my insurance or make self-payment for services provided; and responsibility for payment of non-covered services.     Mode of Communication:  Video Conference via [employer]    As the provider I attest to compliance with applicable laws and regulations related to telemedicine.    Those present for this visit patient and therapist     Follow up in regards to ongoing symptom management of depression and anxiety.    New symptoms or complaints: None    Functional Impairment:   Personal: 3  Family: 2  Social: 3  Work: 3    Clinical assessment of mental status:   Alina Martell presented on time.   She was oriented x3, open and cooperative, and dressed appropriately for this session and weather. Her memory was Normal cognitive functioning .  Her speech was  Within normal.  Language was intact.  Concentration and focus is Within normal. Psychosis is not noted or reported. She reports her mood is depressed.  Affect is congruent with speech and is Congruent w/content of speech.  Fund of knowledge is adequate. Insight is adequate for therapy.Changes made as needed for session on  10/30/2020.    Suicidal/Homicidal Ideation present: Patient denies suicidal and homicidal ideations/means or plans.     Patient's impression of their current status: Patient reported continuing to feeling depressed. Patient indicated she has not been sleeping well. Patient reported knowing she has a lot on her mind right now. Patient indicated continuing to worry about her relationship with her daughter. Patient reported she did see her daughter and felt that it went okay but tense. Patient indicated she is continuing to worry about work and what are the next steps for her.     Therapist impression of patients current state: This 55 y.o. Black or  female presents with feeling depressed. This therapist processed with patient the importance of a schedule. This therapist processed with patient the importance of working on self-care.     Assessment tools used today include: None    Type of psychotherapeutic technique provided: Insight oriented, Client centered and CBT    Progress toward short term goals: Progress as expected with patient continuing therapy, spending time with family    Review of long term goals: Treatment Plan updated on 10/16/2020    Diagnosis:   1. Major depressive disorder, recurrent episode, moderate (H)    2. ADA (generalized anxiety disorder)    3. PTSD (post-traumatic stress disorder)        Plan and Follow-up: Patient will continue with weekly session. Patient would benefit from working on getting into a healthy schedule that includes self-care. Patient should continue to work on identifying next steps for her.     Discharge Criteria/Planning: Patient will continue with follow-up until therapy can be discontinued without return of signs and symptoms.    Performed and documented by SHERRIE Pantoja

## 2021-06-12 NOTE — PROGRESS NOTES
ASSESSMENT AND PLAN:  Alina Martell 52 y.o. female is here for follow-up of severe seropositive rheumatoid arthritis, shoulder tendinopathy, inability to tolerate methotrexate.  She has made some progress with leflunomide.  The short course of prednisone and steroid injections into her shoulders were very helpful.  She has noted residual pain moderately severe in her hands, elbows.  Check labs today, increase leflunomide to 20 mg a day, and hydroxychloroquine.  I have reiterated to her that we need to keep following up at shorter interval still her rheumatoid arthritis is at or close to remission.  If with the current approach she is not controlled that she need to consider going on [facility].  We will meet here in 2 months.  I will ask her to check her labs every 4 weeks from now.     Diagnoses and all orders for this visit:    Seropositive rheumatoid arthritis of multiple sites  -     leflunomide (ARAVA) 20 MG tablet; Take 1 tablet (20 mg total) by mouth daily.  Dispense: 30 tablet; Refill: 1  -     hydroxychloroquine (PLAQUENIL) 200 mg tablet; Take 1 tablet (200 mg total) by mouth 2 (two) times a day.  Dispense: 60 tablet; Refill: 6  -     ALT (SGPT)  -     Albumin  -     Creatinine  -     HM2(CBC w/o Differential)    Tendonitis of both shoulders    High risk medication use  -     ALT (SGPT)  -     Albumin  -     Creatinine  -     HM2(CBC w/o Differential)      HISTORY OF PRESENTING ILLNESS:  Alina Martell, 52 y.o., female is here for follow-up of seropositive severe rheumatoid arthritis.  On her previous visit she has severe pain in her shoulders, small joints of the hands and ankles where she had exuberant synovitis.  Prednisone and corticosteroid injections had helped.  She has tolerated leflunomide much better than she could methotrexate which can cause significant diarrhea.  Her most recent labs reviewed within acceptable range.  Time to check them again today.  She noted residual pain in her  elbows.  She also has painful hands.  She gets stiff in the morning for 2 hours.  She wondered if that is because of her work as an .  She has difficulty performing some of the day-to-day activities.  There is no residual pain in the shoulders however.  She noted fatigue, weakness, dryness of mouth, chronic anemia.  She does not smoke she takes only a few drinks of alcohol per month.  She is status post tubal ligation in 1997.   In the past she has been documented to have anti-CCP antibody as well as rheumatoid factor positivity.  She has family history of psoriasis and Crohn's brothers and respectively.  Further historical information and ADL limitations as noted in the multidimensional health assessment questionnaire attached in the EMR.     ALLERGIES:Oxycodone-acetaminophen; Shellfish containing products; Sulfa (sulfonamide antibiotics); Effexor [venlafaxine]; and Propoxyphene n-acetaminophen    PAST MEDICAL/ACTIVE PROBLEMS/MEDICATION/ FAMILY HISTORY/SOCIAL DATA:  The patient has a family history of  Past Medical History:   Diagnosis Date     Cannabis use without complication 12/8/2014     Obesity      Rheumatoid arthritis 7/8/2016     Sicca syndrome      History   Smoking Status     Never Smoker   Smokeless Tobacco     Never Used     Comment: smokes marijuana     Patient Active Problem List   Diagnosis     PTSD (post-traumatic stress disorder)     Sleep disorder     Anxiety     Panic attack     Major depressive disorder, recurrent episode, unspecified     Anemia - microcytic anemia     Obesity     Sicca syndrome     Grief     Olecranon bursitis, right - bland, non-inflamed, diagnosed on 7/8/16     Carpal tunnel syndrome     Chronic pain     Seropositive rheumatoid arthritis of multiple sites     Tendonitis of both shoulders     Current Outpatient Prescriptions   Medication Sig Dispense Refill     amitriptyline (ELAVIL) 10 MG tablet 1-5 at bedtime 150 tablet 5     EPIPEN 2-RADHA 0.3 mg/0.3 mL (1:1,000)  "atIn Inject 0.3 mg into the shoulder, thigh, or buttocks as needed.        folic acid (FOLVITE) 1 MG tablet Take 1 tablet (1 mg total) by mouth daily. 90 tablet 1     gabapentin (NEURONTIN) 300 MG capsule 1-2 up to 4 times daily 240 capsule 5     hydrOXYzine (VISTARIL) 25 MG capsule 1 up to four times daily prn anxiety, 1-4 at first wakening prn 150 capsule 5     ibuprofen (ADVIL,MOTRIN) 600 MG tablet TK 1 T PO  QID PRN. MAXIMUM OF 3200 MG IN 24 HOURS. 120 tablet 1     multivitamin therapeutic (THERAGRAN) tablet Take 1 tablet by mouth daily.       No current facility-administered medications for this visit.      DETAILED EXAMINATION  08/01/17  :  Vitals:    08/01/17 0911   BP: 136/78   Patient Site: Right Arm   Patient Position: Sitting   Cuff Size: Adult Regular   Pulse: 72   Weight: (!) 259 lb (117.5 kg)   Height: 5' 8.5\" (1.74 m)     Alert oriented. Head including the face is examined for malar rash, heliotropes, scarring, lupus pernio. Eyes examined for redness such as in episcleritis/scleritis, periorbital lesions.   Neck examined  for lymph nodes, range of motion Both upper and lower extremities (all four) examined for swollen, warm &/or  tender joints, range of motion, rash, muscle weakness, edema. The salient normal / abnormal findings are appended.  She has residual synovitis in her MCPs, elbows flexion deformity around 10  bilaterally however full range of motion the shoulders now, ankles knees without synovitis.  LAB / IMAGING DATA:  ALT   Date Value Ref Range Status   06/30/2017 13 0 - 45 U/L Final   06/09/2017 12 0 - 45 U/L Final   05/11/2017 12 0 - 45 U/L Final     Albumin   Date Value Ref Range Status   06/30/2017 3.2 (L) 3.5 - 5.0 g/dL Final   06/09/2017 3.4 (L) 3.5 - 5.0 g/dL Final   05/11/2017 3.3 (L) 3.5 - 5.0 g/dL Final     Creatinine   Date Value Ref Range Status   06/30/2017 0.75 0.60 - 1.10 mg/dL Final   06/09/2017 0.69 0.60 - 1.10 mg/dL Final   05/11/2017 0.72 0.60 - 1.10 mg/dL Final "       WBC   Date Value Ref Range Status   06/30/2017 12.7 (H) 4.0 - 11.0 thou/uL Final   06/09/2017 8.8 4.0 - 11.0 thou/uL Final     Hemoglobin   Date Value Ref Range Status   06/30/2017 12.1 12.0 - 16.0 g/dL Final   06/09/2017 11.8 (L) 12.0 - 16.0 g/dL Final   11/21/2016 12.1 12.0 - 16.0 g/dL Final     Platelets   Date Value Ref Range Status   06/30/2017 351 140 - 440 thou/uL Final   06/09/2017 387 140 - 440 thou/uL Final   11/21/2016 397 140 - 440 thou/uL Final       Lab Results   Component Value Date    .0 (H) 10/20/2015    SEDRATE 60 (H) 05/11/2017

## 2021-06-12 NOTE — PROGRESS NOTES
Mental Health Visit Note    10/9/2020   Start time: 803    Stop Time: 853   Session # 20    Session Type: Patient is presenting for an Individual session.    Alina Martell is a 55 y.o. female is being seen today for    Chief Complaint   Patient presents with     MH Follow Up     Video Visit Mental Health     Telemedicine Visit: The patient's condition can be safely assessed and treated via synchronous audio and visual telemedicine encounter.      Reason for Telemedicine Visit: Services only offered telehealth    Originating Site (Patient Location): Patient's home    Distant Site (Provider Location): Provider Remote Setting    Consent:  The patient/guardian has verbally consented to: the potential risks and benefits of telemedicine (video visit) versus in person care; bill my insurance or make self-payment for services provided; and responsibility for payment of non-covered services.     Mode of Communication:  Video Conference via [employer]    As the provider I attest to compliance with applicable laws and regulations related to telemedicine.    Those present for this visit patient and therapist     Follow up in regards to ongoing symptom management of depression and anxiety.    New symptoms or complaints: None    Functional Impairment:   Personal: 3  Family: 2  Social: 3  Work: 3    Clinical assessment of mental status:   Alina Martell presented on time.   She was oriented x3, open and cooperative, and dressed appropriately for this session and weather. Her memory was Normal cognitive functioning .  Her speech was  Within normal.  Language was intact.  Concentration and focus is Within normal. Psychosis is not noted or reported. She reports her mood is anxious.  Affect is congruent with speech and is Congruent w/content of speech.  Fund of knowledge is adequate. Insight is adequate for therapy.Changes made as needed for session on 10/9/2020.    Suicidal/Homicidal Ideation present: Patient denies  suicidal and homicidal ideations/means or plans.     Patient's impression of their current status: Patient indicated feeling anxious. Patient reported knowing her routine is not healthy right now. Patient indicated she will go to bed late and then stay in bed all day. Patient reported she is not doing self-care such as eating 3 meals. Patient indicated she is very hard on herself for not working. Patient reported knowing it is a pandemic but still being hard on herself.     Therapist impression of patients current state: This 55 y.o. Black or  female presents with feeling anxious. This therapist processed with patient the importance of self-care. This therapist processed with patient having a schedule that includes getting out of the house and being around people.     Assessment tools used today include: None    Type of psychotherapeutic technique provided: Insight oriented, Client centered and CBT    Progress toward short term goals: Progress as expected with patient continuing therapy, identifying concerns with anxiety and attending internship.    Review of long term goals: Treatment Plan updated on 7/16/2020    Diagnosis:   1. Major depressive disorder, recurrent episode, moderate (H)    2. ADA (generalized anxiety disorder)    3. PTSD (post-traumatic stress disorder)    improving     Plan and Follow-up: Patient will continue with weekly session. Patient should continue to work on self-care including eating. Patient would benefit from having a daily schedule that helps her to get out of the house.     Discharge Criteria/Planning: Patient will continue with follow-up until therapy can be discontinued without return of signs and symptoms.    Performed and documented by SHERRIE Pantoja

## 2021-06-12 NOTE — PROGRESS NOTES
"Alina Martell is a 55 y.o. female who is being evaluated via a billable video visit.      The patient has been notified of following:     \"This video visit will be conducted via a call between you and your physician/provider. We have found that certain health care needs can be provided without the need for an in-person physical exam.  This service lets us provide the care you need with a video conversation.  If a prescription is necessary we can send it directly to your pharmacy.  If lab work is needed we can place an order for that and you can then stop by our lab to have the test done at a later time.    Video visits are billed at different rates depending on your insurance coverage. Please reach out to your insurance provider with any questions.    If during the course of the call the physician/provider feels a video visit is not appropriate, you will not be charged for this service.\"    Patient has given verbal consent to a Video visit? Yes  How would you like to obtain your AVS? AVS Preference: MyChart.  If dropped by the video visit, the video invitation should be sent to: Text to cell phone: [phone]  Will anyone else be joining your video visit? No          Video-Visit Details    Type of service:  Video Visit    Originating Location (pt. Location): Home    Distant Location (provider location):  [facility]     Platform used for Video Visit: [employer]        ASSESSMENT AND PLAN:    Diagnoses and all orders for this visit:    Seropositive rheumatoid arthritis of multiple sites (H)  -     adalimumab (HUMIRA,CF, PEN) 40 mg/0.4 mL PnKt; Inject 40 mg under the skin every 14 (fourteen) days.  Dispense: 1 kit; Refill: 5    Morbid obesity (H)    Sicca syndrome (H)    Cyclic citrullinated peptide (CCP) antibody positive  -     adalimumab (HUMIRA,CF, PEN) 40 mg/0.4 mL PnKt; Inject 40 mg under the skin every 14 (fourteen) days.  Dispense: 1 kit; Refill: 5          HISTORY OF PRESENTING " ILLNESS:  Alina Martell 55 y.o. is evaluated here via video link.  She is here for follow-up.  This is for rheumatoid arthritis.  This is severe.  Seropositive.  Family history of psoriasis and brother, mom's history of Crohn's.  She has done great.  She is on Humira that she inject every 2 weeks.  She is also on hydroxychloroquine.  She is no longer on prednisone.  Given how well she has been doing and some of the side effects becoming more apparent recently for hydroxychloroquine I have asked her to discontinue that.  And stay just on the Humira.  Will meet here in 3 months sooner if needed.  She is not on prednisone.  Her most recent labs are from June for Humira purpose.   She is allergic to sulfa, she has still has her menstrual cycle going, they use condoms therefore methotrexate leflunomide not an option.   ROS enquiry held for fever, ocular symptoms, rash, headache,  GI issues.  Today we also discussed the issues related to the current pandemic, the pros and cons of the current treatment plan, the CDC guidelines such as social distancing washing the hands covering the cough.  ALLERGIES:Glucosamine, Shellfish containing products, Sulfa (sulfonamide antibiotics), Effexor [venlafaxine], Abilify [aripiprazole], Propoxyphene n-acetaminophen, and Sulfasalazine    PAST MEDICAL/ACTIVE PROBLEMS/MEDICATION/SOCIAL DATA  Past Medical History:   Diagnosis Date     Cannabis use without complication 12/8/2014     Obesity      Rheumatoid arthritis (H) 7/8/2016     Sicca syndrome (H)      Social History     Tobacco Use   Smoking Status Never Smoker   Smokeless Tobacco Never Used   Tobacco Comment    smokes marijuana     Patient Active Problem List   Diagnosis     PTSD (post-traumatic stress disorder)     Sleep disorder     Anxiety     Panic attack     Anemia - microcytic anemia     Morbid obesity (H)     Sicca syndrome (H)     Grief     Olecranon bursitis, right - bland, non-inflamed, diagnosed on 7/8/16     Chronic  pain     Seropositive rheumatoid arthritis of multiple sites (H)     Tendonitis of both shoulders     Cyclic citrullinated peptide (CCP) antibody positive     Paroxysmal atrial fibrillation (H)     Current Outpatient Medications   Medication Sig Dispense Refill     adalimumab (HUMIRA,CF, PEN) 40 mg/0.4 mL PnKt Inject 40 mg under the skin every 14 (fourteen) days. 1 kit 5     cholecalciferol, vitamin D3, 1,250 mcg (50,000 unit) capsule TAKE 1 CAPSULE BY MOUTH WEEKLY 12 capsule 1     diltiazem (CARDIZEM CD) 120 MG 24 hr capsule Take 1 capsule (120 mg total) by mouth daily. 90 capsule 3     EPINEPHrine (EPIPEN 2-RADHA) 0.3 mg/0.3 mL injection Inject 0.3 mL (0.3 mg total) into the shoulder, thigh, or buttocks as needed. 2 Pre-filled Pen Syringe 0     guanFACINE 2 mg Tb24 TAKE 1 TAB (2 MG) BY MOUTH EVERY MORNING. 30 tablet 3     hydrOXYzine pamoate (VISTARIL) 25 MG capsule 1 up to four times daily prn anxiety, 1-4 at first wakening prn 150 capsule 5     ibuprofen (ADVIL,MOTRIN) 600 MG tablet Take 600 mg by mouth.       loratadine (CLARITIN) 10 mg tablet TAKE 1 TABLET BY MOUTH EVERY DAY 30 tablet 3     multivitamin therapeutic (THERAGRAN) tablet Take 1 tablet by mouth daily.       hydrOXYchloroQUINE (PLAQUENIL) 200 mg tablet Take 1 tablet (200 mg total) by mouth 2 (two) times a day. 180 tablet 0     predniSONE (DELTASONE) 20 MG tablet 2 tablets daily for the next 7 days. 14 tablet 0     traZODone (DESYREL) 50 MG tablet Take 50 mg by mouth at bedtime as needed.              No current facility-administered medications for this visit.          EXAMINATION:    Using the audio and video link as best as possible the constitutional, neck, neurologic, psych, skin, both upper extremities areas/organ system were evaluated during this assessment.  Some of the important findings: And hand examination without swelling, she is able to make a full fist bilaterally, she is able to abduct at the shoulders without difficulty.      LAB /  IMAGING DATA:  ALT   Date Value Ref Range Status   06/25/2020 20 0 - 45 U/L Final   03/06/2020 9 0 - 45 U/L Final   12/11/2019 10 0 - 45 U/L Final     Albumin   Date Value Ref Range Status   06/25/2020 3.5 3.5 - 5.0 g/dL Final   03/06/2020 3.4 (L) 3.5 - 5.0 g/dL Final   12/11/2019 3.5 3.5 - 5.0 g/dL Final     Creatinine   Date Value Ref Range Status   08/12/2020 0.73 0.60 - 1.10 mg/dL Final   06/25/2020 0.76 0.60 - 1.10 mg/dL Final   03/06/2020 0.69 0.60 - 1.10 mg/dL Final       WBC   Date Value Ref Range Status   08/12/2020 9.6 4.0 - 11.0 thou/uL Final   06/25/2020 8.8 4.0 - 11.0 thou/uL Final     Hemoglobin   Date Value Ref Range Status   08/12/2020 13.5 12.0 - 16.0 g/dL Final   06/25/2020 13.4 12.0 - 16.0 g/dL Final   03/06/2020 12.2 12.0 - 16.0 g/dL Final     Platelets   Date Value Ref Range Status   08/12/2020 342 140 - 440 thou/uL Final   06/25/2020 312 140 - 440 thou/uL Final   03/06/2020 321 140 - 440 thou/uL Final       Lab Results   Component Value Date    .0 (H) 10/20/2015    SEDRATE 64 (H) 03/11/2020     Duration of the call:6  Minutes  Start: 10/05/2020 03:33 pm   Stop: 10/05/2020 03:39 pm

## 2021-06-12 NOTE — TELEPHONE ENCOUNTER
RN cannot approve Refill Request    RN can NOT refill this medication Protocol failed and NO refill given. Last office visit: Visit date not found Last Physical: 9/16/2019 Last MTM visit: Visit date not found Last visit same specialty: Visit date not found.  Next visit within 3 mo: Visit date not found  Next physical within 3 mo: Visit date not found      Ayesha Oseguera, [facility] Triage/Med Refill 10/10/2020    Requested Prescriptions   Pending Prescriptions Disp Refills     loratadine (CLARITIN) 10 mg tablet [Pharmacy Med Name: LORATADINE 10MG TABLETS] 30 tablet 3     Sig: TAKE 1 TABLET BY MOUTH DAILY       Antihistamine Refill Protocol Failed - 10/9/2020  3:51 PM        Failed - Patient has had office visit/physical in last year     Last office visit with prescriber/PCP: Visit date not found OR same dept: Visit date not found OR same specialty: Visit date not found  Last physical: 9/16/2019 Last MTM visit: Visit date not found   Next visit within 3 mo: Visit date not found  Next physical within 3 mo: Visit date not found  Prescriber OR PCP: Estelle Bansal MD  Last diagnosis associated with med order: 1. Hives  - loratadine (CLARITIN) 10 mg tablet [Pharmacy Med Name: LORATADINE 10MG TABLETS]; TAKE 1 TABLET BY MOUTH DAILY  Dispense: 30 tablet; Refill: 3    If protocol passes may refill for 12 months if within 3 months of last provider visit (or a total of 15 months).

## 2021-06-12 NOTE — PROGRESS NOTES
MENTAL HEALTH VISIT NOTE    09/07/2017 Start time: 700   Stop Time: 755   Session # 14    Alina Martell is a 51 y.o. female is being seen today for follow up.    New symptoms or complaints: None    Functional Impairment:   Personal: 3  Family: 4  Work: 2  Social:2    Clinical assessment of mental status: Alina Martell was on time for her scheduled session. She was open and cooperative, and dressed appropriately for this session and weather. Her memory was intact .  Her speech was  intact.  Language was intact.  Concentration and focus is adequate. Psychosis is not noted or reported. She reports her mood as anxious. Affect is congruent with speech.  Fund of knowledge is adequate. Insight is adequate for therapy.     Suicidal/Homicidal Ideation present: None Reported This Session    Patient's impression of their current status: Patient reported feeling anxious. Patient indicated having to kick her nephew out of the house and now him breaking in. Patient reported due to this she has not been staying at her house. Patient indicated knowing she needs to go back but her anxiety being high at night. Patient reported she continues to struggle on and off with depression. Patient indicated she is able to notice now and knows the coping skills that will be beneficial to her.     Therapist impression of patients current state: Patient continues to have good insight into her mental health. This therapist challenged patient on steps she can take to feel comfortable in her home. This therapist processed with patient the longer she stays away from her home the harder it will be to return. This therapist processed with patient her depressions symptoms and coping skills that can help reduce the symptoms as they occur.    Type of psychotherapeutic technique provided: Insight oriented, Client centered and CBT    Progress toward short term goals: Medication management with Dr. Wei, using coping skills taught in session,   reaching out to people when needed, noticing mental health symptoms, and returning to scheduled session.     Review of long term goals: Treatment plan updated on 03/21/2017  Diagnosis:   1. PTSD (post-traumatic stress disorder)    2. Major depressive disorder, recurrent episode, moderate    3. Panic disorder without agoraphobia      Plan and Follow up: Patient will return in 4 weeks for scheduled session to continue working the grief related to the loss of her father. Patient will work on confronting her feeling instead of avoiding them. Patient should work on caring for herself and putting her needs first.  Patient should use coping skills taught throughout sessions to help reduce PTSD symptoms.  Patient will continue to take medication as prescribed. Patient should work on forgiving herself and talk with her brother about the guilt she is experiencing.  Patient would benefit from continuing to identify ways she has been a good mom.    Discharge Criteria/Planning: Patient will continue with follow-up until therapy can be discontinued without return of signs and symptoms.    Encounter performed and documented by SHERRIE Pantoja

## 2021-06-12 NOTE — TELEPHONE ENCOUNTER
RN cannot approve Refill Request    RN can NOT refill this medication med is not covered by policy/route to provider. Last office visit: Visit date not found Last Physical: Visit date not found Last MTM visit: Visit date not found Last visit same specialty: Visit date not found.  Next visit within 3 mo: Visit date not found  Next physical within 3 mo: Visit date not found      Radha Ruano, [facility] Triage/Med Refill 10/17/2020    Requested Prescriptions   Pending Prescriptions Disp Refills     EPINEPHrine (EPIPEN 2-RADHA) 0.3 mg/0.3 mL injection 2 Pre-filled Pen Syringe 0     Sig: Inject 0.3 mL (0.3 mg total) into the shoulder, thigh, or buttocks as needed.       Epinephrine (Bee Sting) Kit Refill Protocol Failed - 10/14/2020  4:00 PM        Failed - Patient has had office visit/physical in last 1 year     Last office visit with prescriber/PCP: Visit date not found OR same dept: Visit date not found OR same specialty: Visit date not found  Last physical: Visit date not found Last MTM visit: Visit date not found   Next visit within 3 mo: Visit date not found  Next physical within 3 mo: Visit date not found  Prescriber OR PCP: Anish Mo MD  Last diagnosis associated with med order: There are no diagnoses linked to this encounter.  If protocol passes may refill for 12 months if within 3 months of last provider visit (or a total of 15 months).             Passed - Patient to get 0.3mg dose (adult kit)

## 2021-06-13 NOTE — PROGRESS NOTES
ASSESSMENT AND PLAN:  Alina Martell 52 y.o. female is here for follow-up of severe seropositive rheumatoid arthritis.  She has seems is finally turned the corner.  She is done so much better with 20 mg of leflunomide and hydroxychloroquine.  She has no residual synovitis.  I have asked her to continue the current plan of action.  With regards to shoulder tendinitis that has been nearly totally subsided as well.  Recent labs are within normal range.  She has residual deformities at the elbows bilaterally to about 10  of flexion. We will meet here now in 4 months with labs every 2 months.    Diagnoses and all orders for this visit:    Seropositive rheumatoid arthritis of multiple sites  -     leflunomide (ARAVA) 20 MG tablet; Take 1 tablet (20 mg total) by mouth daily.  Dispense: 30 tablet; Refill: 1  -     hydroxychloroquine (PLAQUENIL) 200 mg tablet; Take 1 tablet (200 mg total) by mouth 2 (two) times a day.  Dispense: 60 tablet; Refill: 6    High risk medication use    Cyclic citrullinated peptide (CCP) antibody positive      HISTORY OF PRESENTING ILLNESS:  Alina Martell, 52 y.o., female is here for follow-up of rheumatoid arthritis.  This is severe.  This is seropositive.  She has high titers of rheumatoid factor and anti-CCP antibody associated with it.  Finally she seems to have turned the corner.  As she is doing so much better so much happier.  She feels as if life is important back to nearly normal although she still noted moderately severe pain in her hands but when we talked about it this is more than she experiences.  She has tolerated the higher dose of leflunomide.  Recent labs are reviewed within normal range.    On her previous visit she has severe pain in her shoulders, small joints of the hands and ankles where she had exuberant synovitis.  Prednisone and corticosteroid injections had helped.  She has tolerated leflunomide much better than she could methotrexate which can cause  significant diarrhea.  Her most recent labs reviewed within acceptable range.  Time to check them again today.  She noted residual pain in her elbows.  She also has painful hands.  She gets stiff in the morning for 1hours.  She wondered if that is because of her work as an .  She has difficulty performing some of the day-to-day activities.  There is no residual pain in the shoulders however.  She noted fatigue, weakness, dryness of mouth, chronic anemia.  She does not smoke she takes only a few drinks of alcohol per month.  She is status post tubal ligation in 1997.  She has family history of psoriasis and Crohn's brothers and respectively.  Further historical information and ADL limitations as noted in the multidimensional health assessment questionnaire attached in the EMR.       ALLERGIES:Oxycodone-acetaminophen; Shellfish containing products; Sulfa (sulfonamide antibiotics); Effexor [venlafaxine]; and Propoxyphene n-acetaminophen    PAST MEDICAL/ACTIVE PROBLEMS/MEDICATION/ FAMILY HISTORY/SOCIAL DATA:  The patient has a family history of  Past Medical History:   Diagnosis Date     Cannabis use without complication 12/8/2014     Obesity      Rheumatoid arthritis 7/8/2016     Sicca syndrome      History   Smoking Status     Never Smoker   Smokeless Tobacco     Never Used     Comment: smokes marijuana     Patient Active Problem List   Diagnosis     PTSD (post-traumatic stress disorder)     Sleep disorder     Anxiety     Panic attack     Major depressive disorder, recurrent episode, unspecified     Anemia - microcytic anemia     Obesity     Sicca syndrome     Grief     Olecranon bursitis, right - bland, non-inflamed, diagnosed on 7/8/16     Carpal tunnel syndrome     Chronic pain     Seropositive rheumatoid arthritis of multiple sites     Tendonitis of both shoulders     High risk medication use     Current Outpatient Prescriptions   Medication Sig Dispense Refill     amitriptyline (ELAVIL) 10 MG tablet 1-5  at bedtime 150 tablet 5     EPIPEN 2-RADHA 0.3 mg/0.3 mL (1:1,000) atIn Inject 0.3 mg into the shoulder, thigh, or buttocks as needed.        gabapentin (NEURONTIN) 300 MG capsule 1-2 up to 4 times daily 240 capsule 5     hydrOXYzine (VISTARIL) 25 MG capsule 1 up to four times daily prn anxiety, 1-4 at first wakening prn 150 capsule 5     ibuprofen (ADVIL,MOTRIN) 600 MG tablet TK 1 T PO  QID PRN. MAXIMUM OF 3200 MG IN 24 HOURS. 120 tablet 1     leflunomide (ARAVA) 20 MG tablet Take 1 tablet (20 mg total) by mouth daily. 30 tablet 1     multivitamin therapeutic (THERAGRAN) tablet Take 1 tablet by mouth daily.       No current facility-administered medications for this visit.      DETAILED EXAMINATION  10/03/17  :  Vitals:    10/03/17 1624   BP: 138/84   Resp: 16   Weight: (!) 259 lb (117.5 kg)     Alert oriented. Head including the face is examined for malar rash, heliotropes, scarring, lupus pernio. Eyes examined for redness such as in episcleritis/scleritis, periorbital lesions.   Neck examined  for range of motion Both upper and lower extremities (all four) examined for swollen, warm &/or  tender joints, range of motion, rash, muscle weakness, edema. The salient normal / abnormal findings are appended.  No appreciable synovitis in any of the palpable appendicular joints.  Joint line tenderness, bilaterally, knees.   LAB / IMAGING DATA:  ALT   Date Value Ref Range Status   10/02/2017 12 0 - 45 U/L Final   09/01/2017 13 0 - 45 U/L Final   08/01/2017 11 0 - 45 U/L Final     Albumin   Date Value Ref Range Status   10/02/2017 3.1 (L) 3.5 - 5.0 g/dL Final   09/01/2017 3.2 (L) 3.5 - 5.0 g/dL Final   08/01/2017 3.1 (L) 3.5 - 5.0 g/dL Final     Creatinine   Date Value Ref Range Status   10/02/2017 0.69 0.60 - 1.10 mg/dL Final   09/01/2017 0.75 0.60 - 1.10 mg/dL Final   08/01/2017 0.70 0.60 - 1.10 mg/dL Final       WBC   Date Value Ref Range Status   10/02/2017 7.4 4.0 - 11.0 thou/uL Final   09/01/2017 8.8 4.0 - 11.0 thou/uL  Final     Hemoglobin   Date Value Ref Range Status   10/02/2017 11.6 (L) 12.0 - 16.0 g/dL Final   09/01/2017 11.6 (L) 12.0 - 16.0 g/dL Final   08/01/2017 11.9 (L) 12.0 - 16.0 g/dL Final     Platelets   Date Value Ref Range Status   10/02/2017 343 140 - 440 thou/uL Final   09/01/2017 363 140 - 440 thou/uL Final   08/01/2017 334 140 - 440 thou/uL Final       Lab Results   Component Value Date    .0 (H) 10/20/2015    SEDRATE 60 (H) 05/11/2017

## 2021-06-13 NOTE — PROGRESS NOTES
Mental Health Psychotherapy Note     11/13/2020   Start time: 802    Stop Time: 850   Session # 25    Two point identification confirmed     Session Type: Patient is presenting for an Individual session.    Alina Martell is a 55 y.o. female is being seen today for    Chief Complaint   Patient presents with     MH Follow Up     Video Visit Mental Health     Telemedicine Visit: The patient's condition can be safely assessed and treated via synchronous audio and visual telemedicine encounter.      Reason for Telemedicine Visit: Services only offered telehealth    Originating Site (Patient Location): Patient's home    Distant Site (Provider Location): Provider Remote Setting    Consent:  The patient/guardian has verbally consented to: the potential risks and benefits of telemedicine (video visit) versus in person care; bill my insurance or make self-payment for services provided; and responsibility for payment of non-covered services.     Mode of Communication:  Video Conference via [employer]    As the provider I attest to compliance with applicable laws and regulations related to telemedicine.    Those present for this visit patient and therapist     Follow up in regards to ongoing symptom management of depression and anxiety.    New symptoms or complaints: None    Functional Impairment:   Personal: 3  Family: 2  Social: 3  Work: 3    Clinical assessment of mental status:   Alina Martell presented on time.   She was oriented x3, open and cooperative, and dressed appropriately for this session and weather. Her memory was Normal cognitive functioning .  Her speech was  Within normal.  Language was intact.  Concentration and focus is Within normal. Psychosis is not noted or reported. She reports her mood is anxious.  Affect is congruent with speech and is Congruent w/content of speech.  Fund of knowledge is adequate. Insight is adequate for therapy.Changes made as needed for session on  11/13/2020.    Suicidal/Homicidal Ideation present: Patient denies suicidal and homicidal ideations/means or plans.     Patient's impression of their current status: Patient reported feeling some anxiety. Patient indicated she has done a lot of self-reflecting this week. Patient reported realizing she has been staying in the victim role and survivor. Patient indicated feeling she is ready to take that next step into survivor. Patient reported feeling groups and becoming a  would be huge steps forward for her.     Therapist impression of patients current state: This 55 y.o. presents with feeling anxious. This therapist challenged patient on ways she feels she is stayed as a victim. This therapist processed with the importance of steps to take in order to become a survivor.     Assessment tools used today include: None    Type of psychotherapeutic technique provided: Insight oriented, Client centered and CBT    Progress toward short term goals: Progress as expected with patient continuing therapy, working towards change and reaching out for help    Review of long term goals: Treatment Plan updated on 10/16/2020    Diagnosis:   1. Major depressive disorder, recurrent episode, moderate (H)    2. ADA (generalized anxiety disorder)    3. PTSD (post-traumatic stress disorder)        Plan and Follow-up: Patient will continue with weekly session. Patient would benefit from continuing to work on identifying what it will look like as a survivor. Patient should continue to work on her self-care and getting out of her house.     Discharge Criteria/Planning: Patient will continue with follow-up until therapy can be discontinued without return of signs and symptoms.    Performed and documented by SHERRIE Pantoja

## 2021-06-13 NOTE — PROGRESS NOTES
Mental Health Psychotherapy Note     11/20/2020   Start time: 805    Stop Time: 850   Session # 26    Two point identification confirmed     Session Type: Patient is presenting for an Individual session.    Alina Martell is a 55 y.o. female is being seen today for    Chief Complaint   Patient presents with     MH Follow Up     Video Visit Mental Health     Telemedicine Visit: The patient's condition can be safely assessed and treated via synchronous audio and visual telemedicine encounter.      Reason for Telemedicine Visit: Services only offered telehealth    Originating Site (Patient Location): Patient's home    Distant Site (Provider Location): Provider Remote Setting    Consent:  The patient/guardian has verbally consented to: the potential risks and benefits of telemedicine (video visit) versus in person care; bill my insurance or make self-payment for services provided; and responsibility for payment of non-covered services.     Mode of Communication:  Video Conference via [employer]    As the provider I attest to compliance with applicable laws and regulations related to telemedicine.    Those present for this visit patient and therapist     Follow up in regards to ongoing symptom management of depression and anxiety.    New symptoms or complaints: None    Functional Impairment:   Personal: 3  Family: 2  Social: 3  Work: 3    Clinical assessment of mental status:   Alina Martell presented on time.   She was oriented x3, open and cooperative, and dressed appropriately for this session and weather. Her memory was Normal cognitive functioning .  Her speech was  Within normal.  Language was intact.  Concentration and focus is Within normal. Psychosis is not noted or reported. She reports her mood is content.  Affect is congruent with speech and is Congruent w/content of speech.  Fund of knowledge is adequate. Insight is adequate for therapy.Changes made as needed for session on  11/20/2020.    Suicidal/Homicidal Ideation present: Patient denies suicidal and homicidal ideations/means or plans.     Patient's impression of their current status: Patient indicated feeling content. Patient reported she continues to spend a lot of her time thinking and processing her emotions. Patient indicated she is in a place where she is ready to deal with her emotions at this time. Patient reported she has a job interview today which she is excited about. Patient indicated her plans have changed a little for Thanksgiving with now spending it with her mom.     Therapist impression of patients current state: This 55 y.o. presents with feeling content. This therapist processed with patient continuing to identify ways she avoids. This therapist processed with patient the importance of continuing to identify her emotions.      Assessment tools used today include: None    Type of psychotherapeutic technique provided: Insight oriented, Client centered and CBT    Progress toward short term goals: Progress as expected with patient continuing therapy, working on identifying emotions and working on self-care    Review of long term goals: Treatment Plan updated on 10/16/2020    Diagnosis:   1. Major depressive disorder, recurrent episode, moderate (H)    2. ADA (generalized anxiety disorder)    3. PTSD (post-traumatic stress disorder)        Plan and Follow-up: Patient will continue with weekly session. Patient should continue to work on self-care. Patient would benefit from continuing to work on identifying her emotions.     Discharge Criteria/Planning: Patient will continue with follow-up until therapy can be discontinued without return of signs and symptoms.    Performed and documented by SHERRIE Pantoja

## 2021-06-13 NOTE — PROGRESS NOTES
Mental Health Psychotherapy Note     2020   Start time: 103    Stop Time: 153   Session # 29    Two point identification confirmed with full name and     Session Type: Patient is presenting for an Individual session.    Alina Martell is a 55 y.o. female is being seen today for    Chief Complaint   Patient presents with     MH Follow Up     Telephone Visit Mental Health     Telemedicine Visit: The patient's condition can be safely assessed and treated via synchronous audio and visual telemedicine encounter.      Reason for Telemedicine Visit: Services only offered telehealth    Originating Site (Patient Location): Patient's home    Distant Site (Provider Location): Provider Remote Setting    Consent:  The patient/guardian has verbally consented to: the potential risks and benefits of telemedicine (video visit) versus in person care; bill my insurance or make self-payment for services provided; and responsibility for payment of non-covered services.     As the provider I attest to compliance with applicable laws and regulations related to telemedicine.    Those present for this visit patient and therapist     Follow up in regards to ongoing symptom management of depression and anxiety.    New symptoms or complaints: None    Functional Impairment:   Personal: 3  Family: 2  Social: 3  Work: 3    Clinical assessment of mental status:   Alina Martell presented on time.   She was oriented x3, open and cooperative, and dressed appropriately for this session and weather. Her memory was Normal cognitive functioning .  Her speech was  Within normal.  Language was intact.  Concentration and focus is Within normal. Psychosis is not noted or reported. She reports her mood is content.  Affect is congruent with speech and is Congruent w/content of speech.  Fund of knowledge is adequate. Insight is adequate for therapy.Changes made as needed for session on 2020.    Suicidal/Homicidal Ideation present:  Patient denies suicidal and homicidal ideations/means or plans.     Patient's impression of their current status: Patient indicated feeling content. Patient reported starting her new job and it is going well. Patient indicated she is learning it fast and does not see it as a stressor. Patient reported she is worried that she will get bored at the job. Patient indicated she has been experiencing frustration with the  with her car. Patient reported they were not getting her car done and she needed it to get to work. Patient indicated she is continuing to give her daughter space and accepting this is out of her control.     Therapist impression of patients current state: This 55 y.o. presents with feeling calm. This therapist challenged patient on ways she is using skills to help with her emotions. This therapist processed with patient the importance of continuing to work on finding activities that bring her dariusz. This therapist processed with patient the importance of self-care.     Assessment tools used today include: None    Type of psychotherapeutic technique provided: Insight oriented, Client centered and CBT    Progress toward short term goals: Progress as expected with patient continuing therapy, starting job and identifying emotions    Review of long term goals: Treatment Plan updated on 10/16/2020    Diagnosis:   1. Major depressive disorder, recurrent episode, moderate (H)    2. ADA (generalized anxiety disorder)    3. PTSD (post-traumatic stress disorder)        Plan and Follow-up: Patient will continue with weekly session. Patient would benefit from continuing to identify her needs. Patient should continue to work on identifying the relationship she wants with her daughter and what is in her control.     Discharge Criteria/Planning: Patient will continue with follow-up until therapy can be discontinued without return of signs and symptoms.    Performed and documented by SHERRIE Pantoja

## 2021-06-13 NOTE — PROGRESS NOTES
Outpatient Mental Health Treatment Plan  Name: Alina Martell  : 1965  MRN: [id number]    Treatment Plan: Updated Treatment Plan    Benefit and risks and alternatives have been discussed: Yes  Is this treatment appropriate with minimal intrusion/restrictions: Yes  Estimated duration of treatment: Ongoing therapy at this time  Anticipated frequency of services: Every 2 weeks  Necessity for frequency: This frequency is needed to establish therapeutic goals and for continuity of care in order to monitor progress.  Necessity for treatment: To address cognitive, behavioral, and/or emotional barriers in order to work toward goals and to improve quality of life.    Plan:   ? Depression    Goal:  Decrease average depression level from 4 to 2.  Strategies:   ?[x] Decrease social isolation  [x] Increase involvement in meaningful activities  ?[x] Discuss sleep hygiene  ?[x] Explore thoughts and expectations about self and others  ?[x] Assess for suicide risk  ?[x] Implement physical activity routine (with physician approval)  [x] Consider introduction of bibliotherapy and/or videos  [x] Continue compliance with medical treatment plan (or explore  barriers)  ?   Degree to which this is a problem (1-4): 4  Degree to which goal is met (1-4): 3    Date of next review:  2018     ? Anxiety    Goal:  Decrease average anxiety level from 4 to 2.  Strategies:   ? [x]Learn and practice relaxation techniques and other coping strategies    ? [x] Increase involvement in meaningful activities  ? [x] Discuss sleep hygiene  ? [x] Explore thoughts and expectations about self and others  ? [x] Identify and monitor triggers for panic/anxiety symptoms  ? [x] Implement physical activity routine (with physician approval)  ? [x] Consider introduction of bibliotherapy and/or videos  ? [x] Continue compliance with medical treatment plan    Degree to which this is a problem (1-4): 4  Degree to which goal is met (1-4): 2    Date of next  "review:  01/28/2018    PTSD    Goal: Identify triggers, separate the traumatic memory from the debilitating emotion associated with it.    Strategies:    [X]Learn and practice relaxation techniques and other coping strategies (e.g., thought stopping, reframing, meditation)    ? [X] Cognitive restructuring    ? [X] Discuss sleep hygiene    ? [X] Identify and change maladaptive coping behaviors    ? [X] Prolonged exposure therapy    ? [X] Identify cues and symptoms of PTSD      Degree to which this is a problem (1-4): 4  Degree to which goal is met (1-4): 2      Date of next review:  01/28/2018    Functional Impairment: 1=Not at all/Rarely 2=Some days 3=Most Days 4=Every Day    Personal: 3  Family: 3  Social: 3  Work: 3    Diagnosis:  Major depressive disorder, recurrent, moderate  Panic disorder  PTSD    Clinical assessments completed: ADA-7, PHQ-9, Mood Questionnaire current, CAGE-AID, PANSI.    STRENGTHS: Hard worker, dedication and support network    LIMITATIONS: Avoidance behaviors    Cultural Identification: Patient reported:    Race: Bi-racial    Experience of cultural bias: Patient reported experiencing cultural bias. Patient gave an example of when she was living in [place] and being told \"we don't serve [place].\" Patient reported being called the inward on multiple occasions.    Immigration/history of status: Born [place] raised in [place].   Level of acculturation: acculturated   Time orientation: middle    Social orientation/class: middle    Verbal Communication Style/languages: English    Locus of Control: inward     Persons responsible for this plan:  ? [x] Patient ? [x] Provider ?     Provider:Performed and documented by SHERRIE Pantoja    Patient Signature:____________________________________ Date: ______________       Therapist Signature: __________________________________ Date: ______________  "

## 2021-06-13 NOTE — PROGRESS NOTES
Mental Health Psychotherapy Note     12/4/2020   Start time: 802    Stop Time: 852   Session # 28    Two point identification confirmed     Session Type: Patient is presenting for an Individual session.    Alina Martell is a 55 y.o. female is being seen today for    Chief Complaint   Patient presents with     MH Follow Up     Video Visit Mental Health     Telemedicine Visit: The patient's condition can be safely assessed and treated via synchronous audio and visual telemedicine encounter.      Reason for Telemedicine Visit: Services only offered telehealth    Originating Site (Patient Location): Patient's home    Distant Site (Provider Location): Provider Remote Setting    Consent:  The patient/guardian has verbally consented to: the potential risks and benefits of telemedicine (video visit) versus in person care; bill my insurance or make self-payment for services provided; and responsibility for payment of non-covered services.     Mode of Communication:  Video Conference via [employer]    As the provider I attest to compliance with applicable laws and regulations related to telemedicine.    Those present for this visit patient and therapist     Follow up in regards to ongoing symptom management of depression and anxiety.    New symptoms or complaints: None    Functional Impairment:   Personal: 3  Family: 2  Social: 3  Work: 3    Clinical assessment of mental status:   Alina Martell presented on time.   She was oriented x3, open and cooperative, and dressed appropriately for this session and weather. Her memory was Normal cognitive functioning .  Her speech was  Within normal.  Language was intact.  Concentration and focus is Within normal. Psychosis is not noted or reported. She reports her mood is excited.  Affect is congruent with speech and is Congruent w/content of speech.  Fund of knowledge is adequate. Insight is adequate for therapy.Changes made as needed for session on  12/4/2020.    Suicidal/Homicidal Ideation present: Patient denies suicidal and homicidal ideations/means or plans.     Patient's impression of their current status: Patient indicated feeling excited. Patient reported she is starting a new job on Monday. Patient indicated she feeling good about the job and that it will be good for her. Patient reported knowing that she needs to start having a schedule. Patient indicated she has been spending too much time at home. Patient reported knowing this has directly affected her mental health.     Therapist impression of patients current state: This 55 y.o. presents with feeling excited. This therapist challenged patient on ways she can work on getting into a healthy routine with her new job. This therapist processed with patient the importance of continuing to spend time with her support network.     Assessment tools used today include: None    Type of psychotherapeutic technique provided: Insight oriented, Client centered and CBT    Progress toward short term goals: Progress as expected with patient continuing therapy, getting a job and getting out of the house    Review of long term goals: Treatment Plan updated on 10/16/2020    Diagnosis:   1. Major depressive disorder, recurrent episode, moderate (H)    2. ADA (generalized anxiety disorder)    3. PTSD (post-traumatic stress disorder)        Plan and Follow-up: Patient will continue with weekly session. Patient should continue to work on having a health schedule. Patient would benefit from continuing to work on self-care and spending time with her support network.     Discharge Criteria/Planning: Patient will continue with follow-up until therapy can be discontinued without return of signs and symptoms.    Performed and documented by Mariana Love LPC

## 2021-06-13 NOTE — PROGRESS NOTES
MENTAL HEALTH VISIT NOTE    11/1/2017 Start time: 700   Stop Time: 755   Session # 1    Alina Martell is a 51 y.o. female is being seen today for follow up.    New symptoms or complaints: None    Functional Impairment:   Personal: 3  Family: 4  Work: 2  Social:2    Clinical assessment of mental status: Alina Martell was on time for her scheduled session. She was open and cooperative, and dressed appropriately for this session and weather. Her memory was intact .  Her speech was  intact.  Language was intact.  Concentration and focus is adequate. Psychosis is not noted or reported. She reports her mood as sad. Affect is congruent with speech.  Fund of knowledge is adequate. Insight is adequate for therapy.     Suicidal/Homicidal Ideation present: None Reported This Session    Patient's impression of their current status: Patient indicated feeling sad at times. Patient reported she continues to struggle with her self-esteem. Patient talked about there being parts of the trauma that she still has not forgave herself. Patient indicated knowing the holidays are a hard time and starting to isolate. Patient reported wanting to have a plan to get through the holiday's without going into a depressed state.     Therapist impression of patients current state: Patient continues to have good insight into her mental health. This therapist processed with patient ways she has tried to avoid certain emotions related to her trauma. This therapist challenged patient on ways she can enjoy the holiday's.    Type of psychotherapeutic technique provided: Insight oriented, Client centered and CBT    Progress toward short term goals: Medication management with Dr. Wei, using coping skills taught in session,  reaching out to people when needed, noticing mental health symptoms, and returning to scheduled session.     Review of long term goals: Treatment plan updated on 11/1/2017   Diagnosis:   1. PTSD (post-traumatic stress  disorder)    2. Major depressive disorder, recurrent episode, moderate    3. Panic disorder without agoraphobia      Plan and Follow up: Patient will return in 4 weeks for scheduled session to continue working the grief related to the loss of her father. Patient will work on confronting her feeling instead of avoiding them. Patient should work on caring for herself and putting her needs first.  Patient should use coping skills taught throughout sessions to help reduce PTSD symptoms.  Patient will continue to take medication as prescribed. Patient should work on forgiving herself and talk with her brother about the guilt she is experiencing.  Patient would benefit from continuing to identify ways she has been a good mom.    Discharge Criteria/Planning: Patient will continue with follow-up until therapy can be discontinued without return of signs and symptoms.    Encounter performed and documented by SHERRIE Pantoja

## 2021-06-13 NOTE — PROGRESS NOTES
MENTAL HEALTH VISIT NOTE    10/5/2017 Start time: 700   Stop Time: 755   Session # 15    Alina Martell is a 51 y.o. female is being seen today for follow up.    New symptoms or complaints: None    Functional Impairment:   Personal: 3  Family: 4  Work: 2  Social:2    Clinical assessment of mental status: Alina Martell was on time for her scheduled session. She was open and cooperative, and dressed appropriately for this session and weather. Her memory was intact .  Her speech was  intact.  Language was intact.  Concentration and focus is adequate. Psychosis is not noted or reported. She reports her mood as sad. Affect is congruent with speech.  Fund of knowledge is adequate. Insight is adequate for therapy.     Suicidal/Homicidal Ideation present: None Reported This Session    Patient's impression of their current status: Patient indicated feeling sad. Patient reported she continues to have struggles with her mood. Patient talked about there not being a specific event that occurred just life in general. Patient indicated knowing that trauma is a huge factor in why she continues to feel this way. Patient reported being worried that her daughter's father will be released and that she will not be safe. Patient indicated this can cause continued anxiety in her life.      Therapist impression of patients current state: Patient continues to have good insight into her mental health. This therapist processed with patient coping skills that can be beneficial to patient. This therapist challenged patient on where she finds her dariusz and making sure she does self-care.     Type of psychotherapeutic technique provided: Insight oriented, Client centered and CBT    Progress toward short term goals: Medication management with Dr. Wei, using coping skills taught in session,  reaching out to people when needed, noticing mental health symptoms, and returning to scheduled session.     Review of long term goals: Treatment  plan updated on 03/21/2017  Diagnosis:   1. PTSD (post-traumatic stress disorder)    2. Major depressive disorder, recurrent episode, moderate    3. Panic disorder without agoraphobia      Plan and Follow up: Patient will return in 4 weeks for scheduled session to continue working the grief related to the loss of her father. Patient will work on confronting her feeling instead of avoiding them. Patient should work on caring for herself and putting her needs first.  Patient should use coping skills taught throughout sessions to help reduce PTSD symptoms.  Patient will continue to take medication as prescribed. Patient should work on forgiving herself and talk with her brother about the guilt she is experiencing.  Patient would benefit from continuing to identify ways she has been a good mom.    Discharge Criteria/Planning: Patient will continue with follow-up until therapy can be discontinued without return of signs and symptoms.    Encounter performed and documented by SHERRIE Pantoja

## 2021-06-13 NOTE — PROGRESS NOTES
MENTAL HEALTH VISIT NOTE    10/18/2017 Start time: 700   Stop Time: 755   Session # 16    Alina Martell is a 51 y.o. female is being seen today for follow up.    New symptoms or complaints: None    Functional Impairment:   Personal: 3  Family: 4  Work: 2  Social:2    Clinical assessment of mental status: Alina Martell was on time for her scheduled session. She was open and cooperative, and dressed appropriately for this session and weather. Her memory was intact .  Her speech was  intact.  Language was intact.  Concentration and focus is adequate. Psychosis is not noted or reported. She reports her mood as sad. Affect is congruent with speech.  Fund of knowledge is adequate. Insight is adequate for therapy.     Suicidal/Homicidal Ideation present: None Reported This Session    Patient's impression of their current status: Patient reported having moments of feeling sad. Patient indicated she struggles a lot with self-esteem. Patient talked about this being something she has struggled with throughout her life. Patient indicated having a hard time seeing the good in herself. Patient talked about how she is able to see it in everyone else and give them compliments but struggles it within.     Therapist impression of patients current state: Patient continues to have good insight into her mental health. This therapist challenged patient on when she started to struggle with her mental health. This therapist processed with patient coping skills to help with her mental health.     Type of psychotherapeutic technique provided: Insight oriented, Client centered and CBT    Progress toward short term goals: Medication management with Dr. Wei, using coping skills taught in session,  reaching out to people when needed, noticing mental health symptoms, and returning to scheduled session.     Review of long term goals: Treatment plan updated on 03/21/2017  Diagnosis:   1. PTSD (post-traumatic stress disorder)    2.  Major depressive disorder, recurrent episode, moderate    3. Panic disorder without agoraphobia      Plan and Follow up: Patient will return in 4 weeks for scheduled session to continue working the grief related to the loss of her father. Patient will work on confronting her feeling instead of avoiding them. Patient should work on caring for herself and putting her needs first.  Patient should use coping skills taught throughout sessions to help reduce PTSD symptoms.  Patient will continue to take medication as prescribed. Patient should work on forgiving herself and talk with her brother about the guilt she is experiencing.  Patient would benefit from continuing to identify ways she has been a good mom.    Discharge Criteria/Planning: Patient will continue with follow-up until therapy can be discontinued without return of signs and symptoms.    Encounter performed and documented by SHERRIE Pantoja

## 2021-06-13 NOTE — PROGRESS NOTES
Mental Health Psychotherapy Note     11/27/2020   Start time: 1103    Stop Time: 1153   Session # 27    Two point identification confirmed     Session Type: Patient is presenting for an Individual session.    Alina Martell is a 55 y.o. female is being seen today for    Chief Complaint   Patient presents with     MH Follow Up     Video Visit Mental Health     Telemedicine Visit: The patient's condition can be safely assessed and treated via synchronous audio and visual telemedicine encounter.      Reason for Telemedicine Visit: Services only offered telehealth    Originating Site (Patient Location): Patient's home    Distant Site (Provider Location): Provider Remote Setting    Consent:  The patient/guardian has verbally consented to: the potential risks and benefits of telemedicine (video visit) versus in person care; bill my insurance or make self-payment for services provided; and responsibility for payment of non-covered services.     Mode of Communication:  Video Conference via [employer]    As the provider I attest to compliance with applicable laws and regulations related to telemedicine.    Those present for this visit patient and therapist     Follow up in regards to ongoing symptom management of depression and anxiety.    New symptoms or complaints: None    Functional Impairment:   Personal: 3  Family: 2  Social: 3  Work: 3    Clinical assessment of mental status:   Alina Martell presented on time.   She was oriented x3, open and cooperative, and dressed appropriately for this session and weather. Her memory was Normal cognitive functioning .  Her speech was  Within normal.  Language was intact.  Concentration and focus is Within normal. Psychosis is not noted or reported. She reports her mood is content.  Affect is congruent with speech and is Congruent w/content of speech.  Fund of knowledge is adequate. Insight is adequate for therapy.Changes made as needed for session on  11/27/2020.    Suicidal/Homicidal Ideation present: Patient denies suicidal and homicidal ideations/means or plans.     Patient's impression of their current status: Patient reported feeling content. 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 Patient reported on Wednesday volunteering and that felt good as well. Patient indicated realizing that it is important around the holiday's to stay busy. Patient reported being able to see everyone and enjoying the holiday. Patient indicated continuing to work on self-care daily.    Therapist impression of patients current state: This 55 y.o. presents with feeling content. This therapist challenged patient on ways she is able to find dariusz in little things in life. This therapist processed with patient the importance of continuing to have a schedule and stay busy.    Assessment tools used today include: None    Type of psychotherapeutic technique provided: Insight oriented, Client centered and CBT    Progress toward short term goals: Progress as expected with patient continuing therapy, getting out of the house and seeing family    Review of long term goals: Treatment Plan updated on 10/16/2020    Diagnosis:   1. Major depressive disorder, recurrent episode, moderate (H)    2. ADA (generalized anxiety disorder)    3. PTSD (post-traumatic stress disorder)        Plan and Follow-up: Patient will continue with weekly session. Patient would benefit from continuing to have a schedule. Patient should continue to work on spending time with her support network.     Discharge Criteria/Planning: Patient will continue with follow-up until therapy can be discontinued without return of signs and symptoms.    Performed and documented by SHERRIE Pantoja

## 2021-06-14 NOTE — PATIENT INSTRUCTIONS - HE
"1. START taking prazosin 1mg at bedtime for nightmares  2. Continue medications as prescribed  3. Have your pharmacy contact us for a refill if you are running low on medications (We may ask you to come into clinic to get a refill from the nurse)  4. No alcohol or drug use  5. No driving if sedated  6. Contact the clinic with any questions or concerns   a. Phone: [phone]  b. Fax: [phone]  7. Reach out for help if you feel like hurting yourself or others:   a. [facility]: [address] [place] [address], [address] (phone: [phone])  b. [facility] Suicide Hotline: [phone]   c. Crisis Texting Line: Text \"[place]\" to 920446  d. Call 911 or go to nearest Emergency room   8. Follow up as directed in 6-8 weeks by video for your appointment    "

## 2021-06-14 NOTE — PATIENT INSTRUCTIONS - HE
"1. Scheduled ADHD testing appointment. You will receive call separate from this clinic for scheduling.  2. START Straterra 40mg daily for inattention and anxiety  3. Continue medications as prescribed  4. Have your pharmacy contact us for a refill if you are running low on medications (We may ask you to come into clinic to get a refill from the nurse)  5. No alcohol or drug use  6. No driving if sedated  7. Contact the clinic with any questions or concerns   a. Phone: [phone]  b. Fax: [phone]  8. Reach out for help if you feel like hurting yourself or others:   a. [facility]: [address] [place] [address], [address] (phone: [phone])  b. [facility] Suicide Hotline: 283.532.5199   c. Crisis Texting Line: Text \"[place]\" to 586002  d. Call 911 or go to nearest Emergency room   9. Follow up as directed in 4-6 weeks by video for your appointment    "

## 2021-06-14 NOTE — PROGRESS NOTES
MENTAL HEALTH VISIT NOTE    12/20/2017 Start time: 700   Stop Time: 755   Session # 4    Alina Martell is a 51 y.o. female is being seen today for follow up.    New symptoms or complaints: None    Functional Impairment:   Personal: 3  Family: 4  Work: 2  Social:2    Clinical assessment of mental status: Alina Martell was on time for her scheduled session. She was open and cooperative, and dressed appropriately for this session and weather. Her memory was intact .  Her speech was  intact.  Language was intact.  Concentration and focus is adequate. Psychosis is not noted or reported. She reports her mood as low. Affect is congruent with speech.  Fund of knowledge is adequate. Insight is adequate for therapy.     Suicidal/Homicidal Ideation present: None Reported This Session    Patient's impression of their current status: Patient reported feeling low. Patient talked about the holiday's never being easy for her. Patient indicated she is planning to spend some time with her family and some alone. Patient reported being around too many people at the holiday's is not healthy. Patient talked about her continued struggles with lwo self-esteem. Patient indicated it is challenging for her to forgive herself for never saying anything to help prevent trauma occurring to other people.     Therapist impression of patients current state: Patient continues to have good insight into her mental health. This therapist challenged patient on ways she is able to recognize when she needs a break from people and when she needs to be around people. This therapist processed with patient ways holding onto shame is causing her low self-esteem.     Type of psychotherapeutic technique provided: Insight oriented, Client centered and CBT    Progress toward short term goals: Medication management with Dr. Wei, using coping skills taught in session,  reaching out to people when needed, noticing mental health symptoms, and returning  to scheduled session.     Review of long term goals: Treatment plan updated on 11/1/2017   Diagnosis:   1. PTSD (post-traumatic stress disorder)    2. Major depressive disorder, recurrent episode, moderate    3. Panic disorder without agoraphobia      Plan and Follow up: Patient will return in 4 weeks for scheduled session to continue working the grief related to the loss of her father. Patient will work on confronting her feeling instead of avoiding them. Patient should work on caring for herself and putting her needs first.  Patient should use coping skills taught throughout sessions to help reduce PTSD symptoms.  Patient will continue to take medication as prescribed. Patient should work on forgiving herself and talk with her brother about the guilt she is experiencing.  Patient would benefit from continuing to identify ways she has been a good mom.    Discharge Criteria/Planning: Patient will continue with follow-up until therapy can be discontinued without return of signs and symptoms.    Encounter performed and documented by SHERRIE Pantoja

## 2021-06-14 NOTE — PROGRESS NOTES
This video/telephone visit will be conducted via a call between you and your physician/provider. We have found that certain health care needs can be provided without the need for an in-person physical exam. This service lets us provide the care you need with a video /telephone conversation. If a prescription is necessary we can send it directly to your pharmacy. If lab work is needed we can place an order for that and you can then stop by our lab to have the test done at a later time.    Just as we bill insurance for in-person visits, we also bill insurance for video/telephone visits. If you have questions about your insurance coverage, we recommend that you speak with your insurance company.    Patient has given verbal consent for video/Telephone visit? Yes   Patient would like the video visit invitation sent by: Kavitha if dropped  [phone]  ANDREW/REHANA MCKNIGHT [facility]       Patient verified allergies, medications and pharmacy via phone. PHQ: 5 and ADA: 3 done verbally with writer. Patient states she is ready for visit.

## 2021-06-14 NOTE — PROGRESS NOTES
"Telemedicine Visit: The patient's condition can be safely assessed and treated via synchronous audio and visual telemedicine encounter.      Reason for Telemedicine Visit: Patient unable to travel    Originating Site (Patient Location): Patient's home    Distant Site (Provider Location): Provider Remote Setting- Home Office    Consent:  The patient/guardian has verbally consented to: the potential risks and benefits of telemedicine (video visit) versus in person care; bill my insurance or make self-payment for services provided; and responsibility for payment of non-covered services.     Mode of Communication:  Video Conference via gDine    As the provider I attest to compliance with applicable laws and regulations related to telemedicine    Outpatient Psychiatric Follow Up    Date of Service: 2/3/2021    --  Chief Complaint: \"it's interesting, not in a bad way\"     --  History of Present Illness/Client Impression of Mental Health Consult:    Alina Martell is a 55 y.o. female who presents for follow up appointment. Last visit occurred 1/6/21. At that time initiated Strattera and referred for ADHD testing.     Reports progressively improving drowsiness and lack of appetite from medication. Recently nociced increased \"edgyness feeling\". However, noticing improvements in ability to concentrate and that \"my mind is more alert\". Started working temporary job and offered position permanently. Upcoming coloscopy on Monday in which she is planned to be under general anesthesia. Has some trepidation regarding anesthesia since historically catalyst for increased nightmare frequency. Ongoing nightmares on sporadic basis. Use prazosin in past with positive effect. Daughter in process of being tested for ADHD. She has upcoming testing in March.    States mood is \"pretty good\". Rates depression and anxiety improving. No sleep or energy maintenance concerns. No appetite or weight concerns. No suicidal and homicidal " ideation. No overt psychosis. Denies all other psychiatric symptoms. No new physical concerns.     Medication adherence: Reviewed risk/benefits of medication , Patient able to verbalize understanding of side effects  and Patient verbally consents to taking medications  Medication side effects: tiredness and appetite reduction  The patient was given information on medications: currently prescribed    --  [place] Prescription Monitoring Program  No worrisome pharmacy activity.     --  Clinical Outcomes Measures:  PHQ-9 Total Score: 5  ADA-7: 3    --  Current Medications:  Current Outpatient Medications   Medication Sig Dispense Refill     adalimumab (HUMIRA,CF, PEN) 40 mg/0.4 mL PnKt Inject 40 mg under the skin every 14 (fourteen) days. 1 kit 5     atomoxetine (STRATTERA) 40 MG capsule Take 1 capsule (40 mg total) by mouth daily. 30 capsule 2     cetirizine (ZYRTEC) 10 MG tablet Take 1 tablet (10 mg total) by mouth daily. 30 tablet 2     cholecalciferol, vitamin D3, 1,250 mcg (50,000 unit) capsule TAKE 1 CAPSULE BY MOUTH WEEKLY 12 capsule 1     diltiazem (CARDIZEM CD) 120 MG 24 hr capsule Take 1 capsule (120 mg total) by mouth daily. 90 capsule 3     EPINEPHrine (EPIPEN 2-RADHA) 0.3 mg/0.3 mL injection Inject 0.3 mL (0.3 mg total) into the shoulder, thigh, or buttocks as needed. 2 Pre-filled Pen Syringe 0     ibuprofen (ADVIL,MOTRIN) 600 MG tablet Take 600 mg by mouth.       No current facility-administered medications for this visit.        --  Allergies  Allergies   Allergen Reactions     Glucosamine Anaphylaxis     Shellfish Containing Products Anaphylaxis     Sulfa (Sulfonamide Antibiotics) Itching     Rapid heart rate, itching, shortness of breath     Effexor [Venlafaxine] Palpitations     Pt B/P was elevated      Abilify [Aripiprazole] Hives     Propoxyphene N-Acetaminophen      Sulfasalazine Hives     --  Summary of Diagnostic Studies  Physical on 01/21/2021   Component Date Value Ref Range Status      Cholesterol 01/21/2021 169  <=199 mg/dL Final     Triglycerides 01/21/2021 79  <=149 mg/dL Final     HDL Cholesterol 01/21/2021 46* >=50 mg/dL Final     LDL Calculated 01/21/2021 107  <=129 mg/dL Final     Patient Fasting > 8hrs? 01/21/2021 Yes   Final     Sodium 01/21/2021 139  136 - 145 mmol/L Final     Potassium 01/21/2021 4.7  3.5 - 5.0 mmol/L Final     Chloride 01/21/2021 103  98 - 107 mmol/L Final     CO2 01/21/2021 26  22 - 31 mmol/L Final     Anion Gap, Calculation 01/21/2021 10  5 - 18 mmol/L Final     Glucose 01/21/2021 111  70 - 125 mg/dL Final     BUN 01/21/2021 14  8 - 22 mg/dL Final     Creatinine 01/21/2021 0.66  0.60 - 1.10 mg/dL Final     GFR MDRD Af Amer 01/21/2021 >60  >60 mL/min/1.73m2 Final     GFR MDRD Non Af Amer 01/21/2021 >60  >60 mL/min/1.73m2 Final     Bilirubin, Total 01/21/2021 0.4  0.0 - 1.0 mg/dL Final     Calcium 01/21/2021 9.1  8.5 - 10.5 mg/dL Final     Protein, Total 01/21/2021 7.6  6.0 - 8.0 g/dL Final     Albumin 01/21/2021 3.7  3.5 - 5.0 g/dL Final     Alkaline Phosphatase 01/21/2021 93  45 - 120 U/L Final     AST 01/21/2021 18  0 - 40 U/L Final     ALT 01/21/2021 19  0 - 45 U/L Final     Vitamin D, Total (25-Hydroxy) 01/21/2021 48.0  30.0 - 80.0 ng/mL Final     WBC 01/21/2021 8.5  4.0 - 11.0 thou/uL Final     RBC 01/21/2021 4.61  3.80 - 5.40 mill/uL Final     Hemoglobin 01/21/2021 12.7  12.0 - 16.0 g/dL Final     Hematocrit 01/21/2021 40.1  35.0 - 47.0 % Final     MCV 01/21/2021 87  80 - 100 fL Final     MCH 01/21/2021 27.5  27.0 - 34.0 pg Final     MCHC 01/21/2021 31.7* 32.0 - 36.0 g/dL Final     RDW 01/21/2021 13.3  11.0 - 14.5 % Final     Platelets 01/21/2021 340  140 - 440 thou/uL Final     MPV 01/21/2021 10.8  8.5 - 12.5 fL Final     Neutrophils % 01/21/2021 63  50 - 70 % Final     Lymphocytes % 01/21/2021 27  20 - 40 % Final     Monocytes % 01/21/2021 7  2 - 10 % Final     Eosinophils % 01/21/2021 2  0 - 6 % Final     Basophils % 01/21/2021 1  0 - 2 % Final     Immature  Granulocyte % 01/21/2021 0  <=0 % Final     Neutrophils Absolute 01/21/2021 5.4  2.0 - 7.7 thou/uL Final     Lymphocytes Absolute 01/21/2021 2.3  0.8 - 4.4 thou/uL Final     Monocytes Absolute 01/21/2021 0.6  0.0 - 0.9 thou/uL Final     Eosinophils Absolute 01/21/2021 0.2  0.0 - 0.4 thou/uL Final     Basophils Absolute 01/21/2021 0.0  0.0 - 0.2 thou/uL Final     Immature Granulocyte Absolute 01/21/2021 0.0  <=0.0 thou/uL Final       --  Review of Systems  Wt Readings from Last 3 Encounters:   01/21/21 (!) 281 lb (127.5 kg)   10/07/20 (!) 277 lb (125.6 kg)   09/16/20 (!) 277 lb (125.6 kg)     Temp Readings from Last 3 Encounters:   08/12/20 98.4  F (36.9  C) (Oral)   10/16/19 98  F (36.7  C) (Oral)   10/14/19 98.5  F (36.9  C) (Oral)     BP Readings from Last 3 Encounters:   01/21/21 140/80   10/07/20 142/90   09/16/20 (!) 160/102     Pulse Readings from Last 3 Encounters:   01/21/21 82   10/07/20 83   09/16/20 68      LMP 06/01/2019  unable to assess today Pain Score: n/a  Pain Location: n/a     As noted in the subjective section above, otherwise a 10 point review of systems is negative. Limited ability to assess given virtual nature of visit. Review of symptoms based entirely on patient's verbal report and what writer is able to assess via camera if used during appointment.    --  Mental Status Examination    Appearance: appears stated age, clean, well groomed, casually dressed appropriate for weather   Orientation: Patient alert and oriented to person, place, time, and situation  Reliability:  Patient appears to be an adequate historian.   Behavior: Patient makes good eye contact and engages with normal rapport in the interview.   There is no evidence of responding to hallucinations or flashbacks.  Speech: Speech is spontaneous and coherent, with a normal rate, rhythm, and tone.    Language: There are no difficulties with expressive or receptive language as observed throughout the interview.    Mood: Described  "as \"pretty good\".    Affect: Congruent and shows a normal range and level of reactivity.  Judgement: Able to make basic decision regarding safety.  Insight: Good awareness of physical and mental health conditions and aware of needs around care for these.  Gait and station: unable to assess   Thought process: Logical   Thought content: No evidence of delusions or paranoia.  No thoughts of self-harm or suicide. No thoughts of harming others.  Associations: Connected  Fund of knowledge: Average  Attention / Concentration: Able to remain focused during the interview with minimal distractibility or need for redirection.  Short Term Memory: Grossly intact as evidence by client recalling themes and ideas discussed.  Long Term Memory: Intact  Motor Status: No recent apparent change.  No current tremor.    --  Diagnostic Impression:  1. Attention deficit hyperactivity disorder (ADHD), unspecified ADHD type    2. ADA (generalized anxiety disorder)    3. Recurrent major depressive disorder, in full remission (H)    4. PTSD (post-traumatic stress disorder)  - prazosin (MINIPRESS) 1 MG capsule; Take 1 capsule (1 mg total) by mouth at bedtime.  Dispense: 30 capsule; Refill: 0    5. Nightmares associated with chronic post-traumatic stress disorder  - prazosin (MINIPRESS) 1 MG capsule; Take 1 capsule (1 mg total) by mouth at bedtime.  Dispense: 30 capsule; Refill: 0    --  Medical Decision-Making   Alina Martell is a 55 y.o. female who presents for ongoing outpatient psychiatric care. Information today obtained from patient's verbal report and review of records in EHR. Transferring from previous psychiatric nurse practitioner, Kiet Lei, to writer as he is no longer working in this clinic. Established with [facility] for outpatient individual psychotherapy visits. Established care with  on 1/6/21. Medically complex with current diagnoses of: has PTSD (post-traumatic stress disorder); Sleep disorder; Anxiety; " Panic attack; Anemia - microcytic anemia; Morbid obesity (H); Sicca syndrome (H); Grief; Olecranon bursitis, right - bland, non-inflamed, diagnosed on 7/8/16; Chronic pain; Seropositive rheumatoid arthritis of multiple sites (H); Tendonitis of both shoulders; Cyclic citrullinated peptide (CCP) antibody positive; and Paroxysmal atrial fibrillation (H) on their problem list.. Past medication trials include, but are not limited to:  Alprazolam, hydroxyzine, trazodone, Ambien, Lunesta, Guanfacine, prazosin, and Prozac.    Discussed diagnostics, symptoms, and indicated courses of treatment. Pending ADHD testing in March as no history of formalized testing and question of causation of inattention. Positive response to atomoxetine for concentration/focus but side effects that are gradually improving. Initiate prazosin for nightmares related to past trauma. No other medication changes this visit. Reviewed recent lab work. No new labs indicated today.     Moderate Risk. Patient denies suicidal and homicidal ideation. Has history of suicide attempt. Not at imminent risk this visit. Educated on need to seek emergent services should they become a risk to themselves or others. Alina Martell verbalized understanding and agreement with this safety plan.    --  Plan  1. Continue to monitor for safety  2. Current Medications  1. Continue Strattera 40mg daily for inattention and anxiety  2. Re-INITIATE prazosin 1mg at HS for nightmares  3. Non-Opioid Contract Agreement Form Signed defer unless indicated in future   4. REFILLS: n/a  1. Labs ordered this visit: n/a   2. INITIATE ADHD testing referral. Continue individual psychotherapy appointments for mood stabilization and nonpharmacologic coping. Collaborate with interdisciplinary care team as needed.  3. Patient will continue abstinence from drugs and alcohol  4. Patient to return to clinic in 6-8 weeks for evaluation of medication trials and continued assessment. Ongoing  patient psychoeducation regarding chronic illness and treatment.   1. Patient educated that they may schedule sooner appointment or contact writer for any worsening or lack of improvement in symptoms.   5. I reviewed the potential risks, side effects, and benefits of all medications with the patient. Patient verbalized understanding and was encouraged to call clinic with further questions or concerns.    --  START TIME: 1:00 PM  END TIME: 1:40 PM    Appointment duration: 40 minutes with > 75% spent on coordination of care and psycho-education regarding illness, symptoms, neurobiological basis of disease, substance use, alternative interventions, sleep hygiene, safety planning, care planning, and pharmacology.    Dr. Lisa iMranda, DNP, APRN, PMHNP-BC  Nurse Practitioner - Psychiatry    This medical report was made using Dragon Dictation. Spelling and grammatical errors with Dragon exist and are not intentional.

## 2021-06-14 NOTE — PROGRESS NOTES
MENTAL HEALTH VISIT NOTE    11/29/2017 Start time: 700   Stop Time: 755   Session # 3    Alina Martell is a 51 y.o. female is being seen today for follow up.    New symptoms or complaints: None    Functional Impairment:   Personal: 3  Family: 4  Work: 2  Social:2    Clinical assessment of mental status: Alina Martell was on time for her scheduled session. She was open and cooperative, and dressed appropriately for this session and weather. Her memory was intact .  Her speech was  intact.  Language was intact.  Concentration and focus is adequate. Psychosis is not noted or reported. She reports her mood as down. Affect is congruent with speech.  Fund of knowledge is adequate. Insight is adequate for therapy.     Suicidal/Homicidal Ideation present: None Reported This Session    Patient's impression of their current status: Patient indicated feeling down. Patient reported she continues to struggle with self-esteem. Patient talked about doing research and trying to look at ways to improve herself-esteem. Patient indicated knowing she is having a hard time letting go of her trauma. Patient talked about ways she is working to stay busy to help reduce depression symptoms.     Therapist impression of patients current state: Patient continues to have good insight into her mental health. This therapist processed with patient her fears of letting go of her trauma. This therapist challenged patient on what she would say to other people whop are struggling.     Type of psychotherapeutic technique provided: Insight oriented, Client centered and CBT    Progress toward short term goals: Medication management with Dr. Wei, using coping skills taught in session,  reaching out to people when needed, noticing mental health symptoms, and returning to scheduled session.     Review of long term goals: Treatment plan updated on 11/1/2017   Diagnosis:   1. PTSD (post-traumatic stress disorder)    2. Major depressive  disorder, recurrent episode, moderate    3. Panic disorder without agoraphobia      Plan and Follow up: Patient will return in 4 weeks for scheduled session to continue working the grief related to the loss of her father. Patient will work on confronting her feeling instead of avoiding them. Patient should work on caring for herself and putting her needs first.  Patient should use coping skills taught throughout sessions to help reduce PTSD symptoms.  Patient will continue to take medication as prescribed. Patient should work on forgiving herself and talk with her brother about the guilt she is experiencing.  Patient would benefit from continuing to identify ways she has been a good mom.    Discharge Criteria/Planning: Patient will continue with follow-up until therapy can be discontinued without return of signs and symptoms.    Encounter performed and documented by SHERRIE Pantoja

## 2021-06-14 NOTE — PROGRESS NOTES
This video/telephone visit will be conducted via a call between you and your physician/provider. We have found that certain health care needs can be provided without the need for an in-person physical exam. This service lets us provide the care you need with a video /telephone conversation. If a prescription is necessary we can send it directly to your pharmacy. If lab work is needed we can place an order for that and you can then stop by our lab to have the test done at a later time.    Just as we bill insurance for in-person visits, we also bill insurance for video/telephone visits. If you have questions about your insurance coverage, we recommend that you speak with your insurance company.    Patient has given verbal consent for video/Telephone visit? yes  Patient would like the video visit invitation sent by: Text to cell phone: FRANSISCA Send to email: [email or web address] or SIP CALL to:  FRANSISCA MORALES/REHANA : Rolo OLEARY LPN     Patient verified allergies, medications and pharmacy via phone. PHQ : 6 and ADA: 5 done verbally with writer. Patient states she is ready for visit.    :  No prescriptions found for this pt    ______________________________________  Medications Phoned  to Pharmacy [] yes [x]no  Name of Pharmacist:  List Medications, including dose, quantity and instructions    Medications ordered this visit were e-scribed.  Verified by order class [x] yes  [] no  Strattera  Medication changes or discontinuations were communicated to patient's pharmacy: [] yes  [x] no    Dictation completed at time of chart check: [x] yes  [] no    I have checked the documentation for today s encounters and the above information has been reviewed and completed.

## 2021-06-14 NOTE — PROGRESS NOTES
Prerna Martell,    Your result is/are normal.  Please continue your current treatment plan.  Continue current medications if on any.  Call if any questions or concerns.    Estelle Bansal MD

## 2021-06-14 NOTE — PROGRESS NOTES
Mental Health Psychotherapy Note     2021   Start time: 1203    Stop Time: 1253   Session # 2    Two point identification confirmed with full name and     Session Type: Patient is presenting for an Individual session.    Alina Martell is a 55 y.o. female is being seen today for    Chief Complaint   Patient presents with     MH Follow Up     Video Visit Mental Health     Telemedicine Visit: The patient's condition can be safely assessed and treated via synchronous audio and visual telemedicine encounter.      Reason for Telemedicine Visit: Services only offered telehealth    Originating Site (Patient Location): Patient's work    Distant Site (Provider Location): Provider Remote Setting    Consent:  The patient/guardian has verbally consented to: the potential risks and benefits of telemedicine (video visit) versus in person care; bill my insurance or make self-payment for services provided; and responsibility for payment of non-covered services.     As the provider I attest to compliance with applicable laws and regulations related to telemedicine.    Those present for this visit patient and therapist     Follow up in regards to ongoing symptom management of depression and anxiety.    New symptoms or complaints: None    Functional Impairment:   Personal: 3  Family: 2  Social: 3  Work: 3    Clinical assessment of mental status:   Alina Martell presented on time.   She was oriented x3, open and cooperative, and dressed appropriately for this session and weather. Her memory was Normal cognitive functioning .  Her speech was  Within normal.  Language was intact.  Concentration and focus is Within normal. Psychosis is not noted or reported. She reports her mood is worried.  Affect is congruent with speech and is Congruent w/content of speech.  Fund of knowledge is adequate. Insight is adequate for therapy.Changes made as needed for session on 2021.    Suicidal/Homicidal Ideation present: Patient  denies suicidal and homicidal ideations/means or plans.     Patient's impression of their current status: Patient reported feeling worried. Patient indicated she has to be out of her house by April. Patient reported the people who own the house have decided to sell it. Patient indicated she has known for a while but has avoided thinking about it. 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 Patient indicated knowing she is doing a lot f avoiding at this time.     Therapist impression of patients current state: This 55 y.o. presents with feeling some anxiety. This therapist processed with patient the importance of identifying the challenges she is facing. This therapist challenged patient on ways she is trying to avoid which makes it harder in the long run.    Assessment tools used today include: None    Type of psychotherapeutic technique provided: Insight oriented, Client centered and CBT    Progress toward short term goals: Progress as expected with patient continuing therapy, talking about avoidances and spending time with family     Review of long term goals: Treatment Plan updated on 1/12/2021    Diagnosis:   1. ADA (generalized anxiety disorder)    2. Major depressive disorder, recurrent episode, moderate (H)    3. PTSD (post-traumatic stress disorder)        Plan and Follow-up: Patient will continue with twice a month therapy. Patient would benefit from talking with her support network about her housing and her job. Patient should start to look at options for housing and what feels realistic to her.     Discharge Criteria/Planning: Patient will continue with follow-up until therapy can be discontinued without return of signs and symptoms.    Performed and documented by SHERRIE Pantoja

## 2021-06-14 NOTE — PROGRESS NOTES
Mental Health Psychotherapy Note     2020   Start time: 1203    Stop Time: 1252   Session # 30    Two point identification confirmed with full name and     Session Type: Patient is presenting for an Individual session.    Alina Martell is a 55 y.o. female is being seen today for    Chief Complaint   Patient presents with     MH Follow Up     Video Visit Mental Health     Telemedicine Visit: The patient's condition can be safely assessed and treated via synchronous audio and visual telemedicine encounter.      Reason for Telemedicine Visit: Services only offered telehealth    Originating Site (Patient Location): Patient's home    Distant Site (Provider Location): Provider Remote Setting    Consent:  The patient/guardian has verbally consented to: the potential risks and benefits of telemedicine (video visit) versus in person care; bill my insurance or make self-payment for services provided; and responsibility for payment of non-covered services.     As the provider I attest to compliance with applicable laws and regulations related to telemedicine.    Those present for this visit patient and therapist     Follow up in regards to ongoing symptom management of depression and anxiety.    New symptoms or complaints: None    Functional Impairment:   Personal: 3  Family: 2  Social: 3  Work: 3    Clinical assessment of mental status:   Alina Martell presented on time.   She was oriented x3, open and cooperative, and dressed appropriately for this session and weather. Her memory was Normal cognitive functioning .  Her speech was  Within normal.  Language was intact.  Concentration and focus is Within normal. Psychosis is not noted or reported. She reports her mood is anxious  Affect is congruent with speech and is Congruent w/content of speech.  Fund of knowledge is adequate. Insight is adequate for therapy.Changes made as needed for session .    Suicidal/Homicidal Ideation present: Patient  denies suicidal and homicidal ideations/means or plans.     Patient's impression of their current status: Patient reported feeling anxious. Patient indicated she is continuing to learn a lot from her new job. Patient reported feeling it has been good for her to have a job and get her out of the house. Patient indicated the schedule has helped her mood as well. Patient reported she continues to work on her relationship with her daughter. Patient indicated knowing this is something that is out of her control. Patient reported she continues to be a support to her daughter when her daughter allows her.     Therapist impression of patients current state: This 55 y.o. presents with feeling some anxiety. This therapist processed with patient ways her relationship with her daughter continues to be out of her control. This therapist challenged patient on ways she continues to struggles with her negative thoughts about being a mom. This therapist processed with patient the importance of continuing to work on her self-care.    Assessment tools used today include: None    Type of psychotherapeutic technique provided: Insight oriented, Client centered and CBT    Progress toward short term goals: Progress as expected with patient continuing therapy, identifying hurt with her daughter and working on a schedule    Review of long term goals: Treatment Plan updated on 10/16/2020    Diagnosis:   1. Major depressive disorder, recurrent episode, moderate (H)    2. ADA (generalized anxiety disorder)    3. PTSD (post-traumatic stress disorder)        Plan and Follow-up: Patient will continue with twice a month therapy. Patient would benefit from continuing to work on challenging her negative thought and identifying ways she has been a good mom. Patient should continue to work on identifying goals for herself and changes she wants to make in her life.     Discharge Criteria/Planning: Patient will continue with follow-up until therapy can  be discontinued without return of signs and symptoms.    Performed and documented by SHERRIE Pantoja
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
## 2021-06-14 NOTE — PROGRESS NOTES
Mental Health Psychotherapy Note     2021   Start time: 1105    Stop Time: 1150   Session # 1    Two point identification confirmed with full name and     Session Type: Patient is presenting for an Individual session.    Alina Martell is a 55 y.o. female is being seen today for    Chief Complaint   Patient presents with     MH Follow Up     Video Visit Mental Health     Telemedicine Visit: The patient's condition can be safely assessed and treated via synchronous audio and visual telemedicine encounter.      Reason for Telemedicine Visit: Services only offered telehealth    Originating Site (Patient Location): Patient's work    Distant Site (Provider Location): Provider Remote Setting    Consent:  The patient/guardian has verbally consented to: the potential risks and benefits of telemedicine (video visit) versus in person care; bill my insurance or make self-payment for services provided; and responsibility for payment of non-covered services.     As the provider I attest to compliance with applicable laws and regulations related to telemedicine.    Those present for this visit patient and therapist     Follow up in regards to ongoing symptom management of depression and anxiety.    New symptoms or complaints: None    Functional Impairment:   Personal: 3  Family: 2  Social: 3  Work: 3    Clinical assessment of mental status:   Alina Martell presented on time.   She was oriented x3, open and cooperative, and dressed appropriately for this session and weather. Her memory was Normal cognitive functioning .  Her speech was  Within normal.  Language was intact.  Concentration and focus is Within normal. Psychosis is not noted or reported. She reports her mood is anxious.  Affect is congruent with speech and is Congruent w/content of speech.  Fund of knowledge is adequate. Insight is adequate for therapy.Changes made as needed for session on 2021.    Suicidal/Homicidal Ideation present: Patient  denies suicidal and homicidal ideations/means or plans.     Patient's impression of their current status: Patient indicated feeling anxious. Patient reported she is upset by everything that is happening in [place]. Patient indicated at times she wonders if it is safe for her to go places. Patient reported this has made her realize she is ready to start helping people. 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    Therapist impression of patients current state: This 55 y.o. presents with feeling some anxiety. This therapist processed with patient the emotions she is experiencing. This therapist processed with patient what is in her control and what is out of her control.     Assessment tools used today include: None    Type of psychotherapeutic technique provided: Insight oriented, Client centered and CBT    Progress toward short term goals: Progress as expected with patient continuing therapy,talking about the hurt she is experiencing and reaching out to support network    Review of long term goals: Treatment Plan updated on 1/12/2021    Diagnosis:   1. ADA (generalized anxiety disorder)    2. Major depressive disorder, recurrent episode, moderate (H)    3. PTSD (post-traumatic stress disorder)        Plan and Follow-up: Patient will continue with twice a month therapy. Patient should continue to talk about her emotions related to what is happening in [place] and how they are affecting her. Patient would benefit from continuing to work on spending time with ehr support network.    Discharge Criteria/Planning: Patient will continue with follow-up until therapy can be discontinued without return of signs and symptoms.    Performed and documented by SHERRIE Pantoja
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
## 2021-06-14 NOTE — PROGRESS NOTES
________________________________________  Medications Phoned  to Pharmacy [] yes [x]no  Name of Pharmacist:  List Medications, including dose, quantity and instructions    Medications ordered this visit were e-scribed.  Verified by order class [x] yes  [] no  Prazosin 1 mg  Medication changes or discontinuations were communicated to patient's pharmacy: [] yes  [x] no    Dictation completed at time of chart check: [x] yes  [] no    I have checked the documentation for today s encounters and the above information has been reviewed and completed.

## 2021-06-14 NOTE — PATIENT INSTRUCTIONS - HE
ALLERGY  ? [facility] Allergy & Asthma [phone]  ? [facility] [phone]  ?Allergy & Asthma Specialist PA                [phone] [place]       [phone] [place]       [phone] [place]       [phone] [place]  ? [facility] [phone]  ?Allergy and Asthma Center [facility] [phone]  ? [place] [facility] [phone]  ? [place] [facility] [phone] ??  ? [facility] Allergy [phone]    AUDIOLOGY  ? [facility] Audiology [phone]  ? [facility]        [phone] [place]       [phone] [place]       [phone] [place]       [phone] [place]       [phone] [place]  ? [facility] Audiology       [phone] [place]        [phone] [place]   ? [place] -[phone]    BARIATRIC/WEIGHT LOSS  ?[facility] Bariatric [phone]  ? [facility] [phone]  ? [facility] Weight Loss       [phone]    BRAIN INJURY & CONCUSSION   ? [facility] ([place]) [phone]  ? [facility] (Brain Injury)       [phone]  ? [facility] )           (concussion care due to sports injury)      [phone] [place]       [phone] [place]     CARDIOLOGY/ CARDIAC SURGERY  ? [facility] Heart 3267  ? [facility] [phone]  ? [facility] [place]        [phone]  ? [facility] [place]        [phone]  ? [facility] [place] [phone]      ?[facility]      ? [facility]      ? [facility]    CHIROPRACTIC   ? [facility]                NEUROSURGERY  ?[facility] Neurosurgery [phone]  ? [place] [phone]  ? [facility] Neuro[facility] [phone]      OB/GYN  ? [facility]/-5327  ? [facility] [phone]  ? [facility]  [phone]   ? Mya & Champ [phone]  ? Shannon Blair [phone]      (Contraceptive and Procedures/No OB Care)  ? [facility]  Ob/GYN [phone]  ? [facility] [phone] [place]  ? U of  Ob/Gyn       [phone] [facility]       [phone]  ?[facility] PA  ?Clinic Pam [phone]--[phone]  ? Malinda [facility] PA                             [phone]  ? [facility] OB/GYN -[phone]            ?Obstetrics & Gynecology [place] PA                   [phone]  ?Obstetrics Gynecology and Infertility PA           [phone] [place]        [phone] [place]     ? Obstetrics/ Gynecology Specialist PA        [phone] [place]        [phone] [place]  ?[facility] OB/GYN  Consultants PA                [phone]  ? Women and Adolescents Gynecology [place]         [phone]  ?[place] Women's [facility] [phone]    ONCOLOGY/HEMATOLOGY  ? [facility] Cancer  Care  [phone]  ? U of  Cancer Care [phone]   ? U of  Cancer Care [facility]        [phone]  ? [place] Oncology & Hematology         [phone] [facility]        [phone] [place]             [phone] [place]                        PODIATRY  ? [facility] Podiatry [phone]  ? [facility] [phone]  ? [facility]       [phone] [place]      [phone] [place]  ? U of  Swivl [phone]  ? [facility] Ortho [phone]      POOL THERAPY  ?[place]      [facility]/[place] [phone]       [place] [facility] or [facility]      PULMONARY  r [facility] Allergy & Asthma [phone]  r [facility] Lung/ Pulmonary [phone]  r U of  Pulmonary & Lung       [phone] [facility]       [phone] [place]  ?[place] Lung [phone]      RADIATION THERAPY  ? [facility] Cancer [phone]  ? U of  Health        [phone] [place]       [phone] [place]  ? [place] Oncology        [phone] [facility] [phone] [place]    RETINA  ? U of  Eye Clinic [phone]  ?Vitreoretinal Surgery [phone]??  ? Retina Center [phone]  ? [place] Retina [phone] ??    RHEUMATOLOGY  ? [facility] Rheumatology  [phone]  ? [facility] Rheumat[facility]       [phone] [facility]       [phone] [place]  ? [facility] Rheumatology        [phone]  ? Arthritis & Rheumatology Consultants ??       [phone]   ? [facility]         [phone]              COLON RECTAL SURGERY  ? [facility] [phone]  ? [facility] Colon & Rectal Surgeons         [phone]      COLONOCSCOPY  ? [place] [facility] Surgery      [phone]  ? [facility] Endoscopy [facility] [phone]         ([phone] fax)          DERMATOLOGY  ? [facility] Dermatology        [phone]  ? [facility] Dermatology       [phone] [facility]        [phone] [place]  ? Advanced Dermatology [phone] ??  r [facility] D[facility]at[facility] [phone]  r Dermatology Consultants [phone]  r  Dermatology [phone]  ? Academic Dermatology [phone]  ? Dermatology Specialist -[phone]  ? [facility] Dermatology -[phone]  ? [facility] Dermatology       [phone] [place]      [phone] [place]   ? [place] Dermatology -[phone]  ? [facility] Dermatology [phone]  ? [facility] Dermatology [phone]  ? [facility] Dermatology [phone]    ENDOCRINOLOGY  ? [facility] Endocrine [phone]  ? [facility] Endocrine       [phone] [facility]      [phone] [place]  ? [facility] End[facility] ([phone])      [place] [phone]      [place] [phone]      [place] [phone]      [place] [phone]  ? Endocrine Clinic [facility]                  [phone]    GASTROENTEROLOGY  ? [place] Gastroenterology [phone]  ? [facility] [phone]  ? [facility] Gastroenterology Consultants           [phone]    GENETICS  ?[facility] [phone]  ? [facility] Genetic Counseling       [phone]          OPHTHALMOLOGY  ? [facility] [phone]  ? [facility] Ophthalmology       [phone] [facility]       [phone] [place]  ? [facility] [phone]  ? [place]  Eye Physicians and Surgeons PA        [phone]  ? [facility] Eye Specialist       [phone]  ? [facility] [phone]  ?[facility] [phone]  ? [facility]ecare [facility] [phone]    OPHTHALMOLOGY PLASTIC  ? [facility] [phone]  ? [facility] Ophthal[facility] Plastic Surgery       [phone]  ? [place] [facility]hthal[facility] Plastic Surgery       [phone]  ? [facility] Physicians and Surgeons PA        [phone]  ? [facility] [facility]ists [phone]    ORTHOPAEDICS (General)  ? [place] [facility] [phone]  ? [facility] Ortho [phone]  ? [facility]        [phone] [place]       [phone] [place]  ? [facility] Ortho Surgery        [phone]  ? [facility] Sports & Ortho Care        [phone]  ? [facility] Specialty Care        [phone]    ORTHOTICS  ? [place] [facility] [phone]  ? [facility] Orthotics [phone]    OSTEOPOROSIS  r [facility] Osteoporosis [phone]    OTOLARYNGOLOGY  ? [facility] ENT  [phone]     ? ENT Specialty Care [phone]  ? [facility] ENT [phone]  ? [facility] E[facility] [phone]  ? [facility] ENT [place] [phone]  ? [place] Otolaryngology PA                                  [phone]  ? [facility]ck [facility]         [phone]  ?[place] -7010  ?[facility]ose [facility] and Hearing         [phone]  ? [place]  ENT & Sleep Center [phone]          SLEEP CLINIC  ? [facility] Sleep [phone]  ? [place] [facility] [phone]  ? [place] Lung [phone]  ? [facility] Neurology [facility]       [phone] [place]       [phone] [place]  ? [facility] Neurological Clinic  [phone]      SPINE/NECK & BACK  ?[facility] Neurosurgery [phone]  ?[facility] Spine [phone]  ? [facility] Ortho Clinic        [phone] [place]       [phone] [place]  ? [place] [facility] [phone]  ? [facility] Ortho [phone]    SPORTS MEDICINE  ? Dr Ian Tang ([place])  [phone]  ? [place] [facility]o Care        [phone] [place]       [phone] [place]  ? [facility]       [phone] [place] [facility]      [phone] [place]  ? [place] Ortho [phone]  ? [facility] Ortho [phone]      SURGERY, GENERAL  ? [facility] Surgery  [phone]  ? [facility] General Surgery         [phone] [place]       [phone] [place]  ? [facility] Surgery        [phone]  ? [facility]  [phone]  ? Specialist In General Surgery [phone]  ? Surgical Consultants        [phone] [place]       [phone] [place]  ? [place] Surgical Assoc [phone]?    TRAVEL MEDICINE  ? [facility]        [phone] or [phone]  ? [facility] [phone]                  IMMUNOLOGY  ? [place] Immunology [facility] [phone]    INFECTIOUS DISEASE  ? [facility] Infectious Disease [phone]  ? [place] Infectious Disease       [phone]  ? [facility] Infectious Disease [phone]  ? [facility] Consultants [phone]    INFERTILITY  ? [place] Ob/GYN [phone]  ? [facility] Women's Health [phone]  ? [facility] Ob[facility]Gyn Infertility [phone]             ?Obstetrics Gynecology and Infertility PA           [phone] [place]              [phone] [place]       LACTATION   ? [facility] Lactation [phone]    LYMPHEDEMA  ? [facility] [place] [phone]   ? Temitope Portillo MD    MENTAL HEALTH & CHEMICAL DEP  ? [place] [facility] [phone]  ? [facility] Mental Health      Addiction Clinic [phone]  ? [facility]       [phone] [place]       [phone] [place]        MIDWIVES  ? [facility] Midwives [phone]  ? [facility] [phone]  ? [place] [facility] [place]       [phone]    NEPHROLOGY  ? [facility] [phone]    ? Kidney Specialist [phone]  ? [place] [facility] [phone]  ? [facility]ph[facility] [phone]  ? [facility]ed Consultants [phone]    NEUROLOGY  ? [facility]  Neurological Associates         [phone]          ? [facility] Neurological Clinic  [phone]  ? [facility] [place] [phone]  ? [facility] Neurology        [phone] [facility]       [phone] [place]  ? [facility] Neurology [facility]       [phone] [place]       [phone] [place]       [phone] [place]       [phone] [place]       [phone] [place]         PAIN MANAGEMENT  ? [facility] [place] [facility] ([place]) 2325358  ? [facility] Pain [facility]mt. [phone]  ? [place] [facility] [phone]  ? Ramirez [facility] ( [facility]         [phone]          ? [facility] Pain [phone]  ? Interventional Spine & Pain Physicians       [phone]  ? [place] Weinert for Pain [facility] [phone]  ? [place] [facility]        [phone]  ? [place] [facility] [phone]?      PALLIATIVE CARE/HOSPICE  ? [facility] Palliative Care  [phone]       [place]  ? [facility] Hospice [phone]  ? [place] [facility] [phone]    PARKINSON'S DISEASE  ? [facility] Parkinson's [place] [phone]      ? [facility] Maternal Fetal Medicine        [place]  [phone]  ([phone] fax)       [place] [phone]       [place] [phone]       [place] [phone]  PLASTICS  ? [facility] Plastic         [phone] [facility]        [phone] [place]  ? [place] Plastic  [place] [phone]??  ? [place] Facial Plastic [phone]  ? [facility] Plastic Surgery [phone] ??  ? [place] Plastic Surgery [phone]??  ? SP Plastic Surgery -[phone]?  ? TK Plastic Surgery -[phone]?    PMR  ? [facility] [phone]  ? [place] Weinert for Pain [facility] [phone]  ? Physicians Diagnostic Rehab [phone]  ? [place] [facility] Well[facility] [phone]  ? Sports & Ortho Advanced Rehab        [phone]    PT/OT  ? [facility] [place]  [facility] [phone]  ? [place] Rehabilitation [phone]  ? [facility] Physicians [phone]  ? We[facility] Athletic Medicine   [phone]  ? JESSICA- OSI  [place] or [facility]      [phone]          TMJ  ? [place] [facility]        [phone] ?  ? Facial Pain Center [phone] ?         for [facility] only    UROLOGY  ? [facility] Kidney [place]-  [phone]   ? [place] Urology ( [place])  [phone]  ? [facility]       [phone] [facility]      [phone] [place]      [phone] [place]      [phone] [place]  ? [facility] Urology [phone]      VASCULAR  ? [facility] Vascular/ Wound Center         [phone]   ? [place] Wound [facility] [phone]  ? [facility] V[facility] [phone]  ? [facility] Vascular      [phone] [place]      [phone] [place]  ? Surgical Consultants [facility]       [phone] [place]       [phone] [place]      ? [facility] [phone]      VESTIBULAR REHAB  ? [facility] [phone]  ? [facility]        [phone]  ? National Dizzy & Balance        [phone]            WOMEN'S CARE  ? [facility] Women's Care [phone]  ? [facility] Specialties Clinic         [phone]  ? [facility] [place]       [phone]

## 2021-06-15 ENCOUNTER — DOCUMENTATION ONLY (OUTPATIENT)
Dept: SLEEP MEDICINE | Facility: CLINIC | Age: 56
End: 2021-06-15

## 2021-06-15 ENCOUNTER — OFFICE VISIT - HEALTHEAST (OUTPATIENT)
Dept: BEHAVIORAL HEALTH | Facility: CLINIC | Age: 56
End: 2021-06-15

## 2021-06-15 DIAGNOSIS — F41.1 GAD (GENERALIZED ANXIETY DISORDER): ICD-10-CM

## 2021-06-15 DIAGNOSIS — F43.10 PTSD (POST-TRAUMATIC STRESS DISORDER): ICD-10-CM

## 2021-06-15 DIAGNOSIS — F33.1 MAJOR DEPRESSIVE DISORDER, RECURRENT EPISODE, MODERATE (H): ICD-10-CM

## 2021-06-15 NOTE — PROGRESS NOTES
Mental Health Psychotherapy Note     2021   Start time: 1201    Stop Time: 1251   Session # 3    Two point identification confirmed with full name and     Session Type: Patient is presenting for an Individual session.    Alina Martell is a 55 y.o. female is being seen today for    Chief Complaint   Patient presents with     MH Follow Up     Video Visit Mental Health     Telemedicine Visit: The patient's condition can be safely assessed and treated via synchronous audio and visual telemedicine encounter.      Reason for Telemedicine Visit: Services only offered telehealth    Originating Site (Patient Location): Patient's work    Distant Site (Provider Location): Provider Remote Setting    Consent:  The patient/guardian has verbally consented to: the potential risks and benefits of telemedicine (video visit) versus in person care; bill my insurance or make self-payment for services provided; and responsibility for payment of non-covered services.     As the provider I attest to compliance with applicable laws and regulations related to telemedicine.    Those present for this visit patient and therapist     Follow up in regards to ongoing symptom management of depression and anxiety.    New symptoms or complaints: None    Functional Impairment:   Personal: 3  Family: 2  Social: 3  Work: 3    Clinical assessment of mental status:   Alina Martell presented on time.   She was oriented x3, open and cooperative, and dressed appropriately for this session and weather. Her memory was Normal cognitive functioning .  Her speech was  Within normal.  Language was intact.  Concentration and focus is Within normal. Psychosis is not noted or reported. She reports her mood is content.  Affect is congruent with speech and is Congruent w/content of speech.  Fund of knowledge is adequate. Insight is adequate for therapy.Changes made as needed for session on 2021.    Suicidal/Homicidal Ideation present: Patient  denies suicidal and homicidal ideations/means or plans.     Patient's impression of their current status: Patient indicated feeling content. Patient reported she is planning to talk with management about the plans to hire her. Patient indicated deciding at this time it feels like the right move. Patient reported she is still going to try and get a job with the [employer]. Patient indicated she is not stressing too much about her living situation. Patient reported her son has said that he can live there and she is okay with that idea if needed,     Therapist impression of patients current state: This 55 y.o. presents with feeling content. This therapist processed with patient the many changes that she has made in her life. This therapist challenged patient on what changes she feels she needs to continue to make in her life. This therapist processed with patient goals to continue to work on moving forward in her life.     Assessment tools used today include: None    Type of psychotherapeutic technique provided: Insight oriented, Client centered and CBT    Progress toward short term goals: Progress as expected with patient continuing therapy, working on change and making a decision with her job    Review of long term goals: Treatment Plan updated on 1/12/2021    Diagnosis:   1. PTSD (post-traumatic stress disorder)    2. Major depressive disorder, recurrent episode, moderate (H)        Plan and Follow-up: Patient will continue with twice a month therapy. Patient should continue to work on challenging her negative thoughts through CBT. Patient would benefit from continuing to identify one positive thing about herself daily. Patient should continue to explore housing options.     Discharge Criteria/Planning: Patient will continue with follow-up until therapy can be discontinued without return of signs and symptoms.    Performed and documented by Mariana Love LPC

## 2021-06-15 NOTE — PROGRESS NOTES
MENTAL HEALTH VISIT NOTE    01/18/2018 Start time: 705   Stop Time: 800   Session # 2    Alina Martell is a 51 y.o. female is being seen today for follow up.    New symptoms or complaints: None    Functional Impairment:   Personal: 3  Family: 4  Work: 2  Social:2    Clinical assessment of mental status: Alina Martell was on time for her scheduled session. She was open and cooperative, and dressed appropriately for this session and weather. Her memory was good for short and long term.  Her speech and language was soft and concise. Concentration and focus is adequate. Psychosis is not noted or reported. She reports her mood as sad. Affect is congruent with speech.  Fund of knowledge is adequate. Insight is adequate for therapy.     Suicidal/Homicidal Ideation present: None Reported This Session    Patient's impression of their current status: Patient reported feeling sad. Patient indicated that she continues to struggle with isolation. Patient reported knowing she used the kittens as an excuse and now having to start making changes. Patient talked about how tomorrow would be her father's birthday and not looking forward to the day. Patient indicated she plans to work and then may to go a friends. Patient reported hoping to start feeling better once she gets through this month.     Therapist impression of patients current state: Patient continues to have good insight into her mental health. This therapist challenged patient on ways isolation makes her depression worse. This therapist processed with patient ways to grief the lose of her dad on her birthday.     Type of psychotherapeutic technique provided: Insight oriented, Client centered and CBT    Progress toward short term goals: Medication management with Dr. Wei, using coping skills taught in session,  reaching out to people when needed, noticing mental health symptoms, and returning to scheduled session.     Review of long term goals: Treatment  plan updated on 11/1/2017   Diagnosis:   1. PTSD (post-traumatic stress disorder)    2. Major depressive disorder, recurrent episode, moderate    3. Panic disorder without agoraphobia      Plan and Follow up: Patient will return in 4 weeks for scheduled session to continue working the grief related to the loss of her father. Patient will work on confronting her feeling instead of avoiding them. Patient should work on caring for herself and putting her needs first.  Patient should use coping skills taught throughout sessions to help reduce PTSD symptoms.  Patient will continue to take medication as prescribed. Patient should work on forgiving herself and talk with her brother about the guilt she is experiencing.  Patient would benefit from continuing to identify ways she has been a good mom.    Discharge Criteria/Planning: Patient will continue with follow-up until therapy can be discontinued without return of signs and symptoms.    Encounter performed and documented by SHERRIE Pantoja

## 2021-06-15 NOTE — PROGRESS NOTES
Mental Health Psychotherapy Note     2021   Start time: 1202    Stop Time: 1248   Session # 4    Two point identification confirmed with full name and     Session Type: Patient is presenting for an Individual session.    Alina Martell is a 55 y.o. female is being seen today for    Chief Complaint   Patient presents with     MH Follow Up     Video Visit Mental Health     Telemedicine Visit: The patient's condition can be safely assessed and treated via synchronous audio and visual telemedicine encounter.      Reason for Telemedicine Visit: Services only offered telehealth    Originating Site (Patient Location): Patient's work    Distant Site (Provider Location): Provider Remote Setting    Consent:  The patient/guardian has verbally consented to: the potential risks and benefits of telemedicine (video visit) versus in person care; bill my insurance or make self-payment for services provided; and responsibility for payment of non-covered services.     As the provider I attest to compliance with applicable laws and regulations related to telemedicine.    Those present for this visit patient and therapist     Follow up in regards to ongoing symptom management of depression and anxiety.    New symptoms or complaints: None    Functional Impairment:   Personal: 3  Family: 2  Social: 3  Work: 3    Clinical assessment of mental status:   Alina Martell presented on time.   She was oriented x3, open and cooperative, and dressed appropriately for this session and weather. Her memory was Normal cognitive functioning .  Her speech was  Within normal.  Language was intact.  Concentration and focus is Within normal. Psychosis is not noted or reported. She reports her mood is calm. Affect is congruent with speech and is Congruent w/content of speech.  Fund of knowledge is adequate. Insight is adequate for therapy.Changes made as needed for session on 2021.    Suicidal/Homicidal Ideation present: Patient  denies suicidal and homicidal ideations/means or plans.     Patient's impression of their current status: Patient indicated overall feeling calm.Patient reported working again has really helped her mood. Patient indicated not realizing how much being unemployed had affected her self-esteem. Patient reported she does know that she needs to continue to work on connecting with friends and seeing them when possible.     Therapist impression of patients current state: This 55 y.o. presents with feeling calm. This therapist challenged patient on ways she tried to hide her emotions when unemployed. This therapist processed with patient her fears of the way people view her. This therapist processed with patient the importance of self-love.     Assessment tools used today include: None    Type of psychotherapeutic technique provided: Insight oriented, Client centered and CBT    Progress toward short term goals: Progress as expected with patient continuing therapy, going to work and starting to reconnect with friends.     Review of long term goals: Treatment Plan updated on 1/12/2021    Diagnosis:   1. Major depressive disorder, recurrent episode, moderate (H)    2. ADA (generalized anxiety disorder)        Plan and Follow-up: Patient will continue with twice a month therapy. Patient would benefit from continuing to identify her needs specifically with her emotions. Patient should continue to work on doing a self-care activity. Patient would benefit from continuing to work on spending time with friends.     Discharge Criteria/Planning: Patient will continue with follow-up until therapy can be discontinued without return of signs and symptoms.    Performed and documented by SHERRIE Pantoja

## 2021-06-15 NOTE — PROGRESS NOTES
Mental Health Psychotherapy Note     3/9/2021   Start time: 1204    Stop Time: 1250   Session # 5    Two point identification confirmed with full name and     Session Type: Patient is presenting for an Individual session.    Alina Martell is a 56 y.o. female is being seen today for    Chief Complaint   Patient presents with     MH Follow Up     Video Visit Mental Health     Telemedicine Visit: The patient's condition can be safely assessed and treated via synchronous audio and visual telemedicine encounter.      Reason for Telemedicine Visit: Services only offered telehealth    Originating Site (Patient Location): Patient's work    Distant Site (Provider Location): Provider Remote Setting    Consent:  The patient/guardian has verbally consented to: the potential risks and benefits of telemedicine (video visit) versus in person care; bill my insurance or make self-payment for services provided; and responsibility for payment of non-covered services.     As the provider I attest to compliance with applicable laws and regulations related to telemedicine.    Those present for this visit patient and therapist     Follow up in regards to ongoing symptom management of depression and anxiety.    New symptoms or complaints: None    Functional Impairment:   Personal: 3  Family: 2  Social: 3  Work: 3    Clinical assessment of mental status:   Alina Martell presented on time.   She was oriented x3, open and cooperative, and dressed appropriately for this session and weather. Her memory was Normal cognitive functioning .  Her speech was  Within normal.  Language was intact.  Concentration and focus is Within normal. Psychosis is not noted or reported. She reports her mood is anxious . Affect is congruent with speech and is Congruent w/content of speech.  Fund of knowledge is adequate. Insight is adequate for therapy.Changes made as needed for session on 3/9/2021    Suicidal/Homicidal Ideation present: Patient  denies suicidal and homicidal ideations/means or plans.     Patient's impression of their current status: Patient reported feeling anxious. Patient indicated she has had struggles with some nightmares. Patient reported this may be due to sleeping better. Patient indicated she continues to notice irritability since starting the new medication. Patient reported this irritability is new to her. Patient indicated she is taking a lot of time to reflect on herself and self-improvement. Patient reported knowing she has come a long way and still working on change.     Therapist impression of patients current state: This 56 y.o. presents with feeling anxious. This therapist processed with patient the importance of continuing to reach out to her medication prescriber about medication concerns. This therapist processed with patient ways she may be avoiding in certain ways. This therapist processed with patient the importance of self-care.    Assessment tools used today include: None    Type of psychotherapeutic technique provided: Insight oriented, Client centered and CBT    Progress toward short term goals: Progress as expected with patient continuing therapy, medication management and working on self-care    Review of long term goals: Treatment Plan updated on 1/12/2021    Diagnosis:   1. Major depressive disorder, recurrent episode, moderate (H)    2. ADA (generalized anxiety disorder)    3. PTSD (post-traumatic stress disorder)        Plan and Follow-up: Patient will continue with twice a month therapy. Patient should continue to work with prescriber for medication concerns. Patient would benefit from identifying ways ways she wants to work on herself. Patient would benefit from reaching out to her support network and spending time with them.    Discharge Criteria/Planning: Patient will continue with follow-up until therapy can be discontinued without return of signs and symptoms.    Performed and documented by Mariana  Backer, [facility]

## 2021-06-15 NOTE — PROGRESS NOTES
6 Month STM visit    Diagnostic AHI: 102.4    PSG    Data only recheck     Assessment: Pt meeting objective benchmarks.     Action plan:   pt to follow up per provider request    Device type: Bilevel       Objective measures: 14 day rolling measures         Compliance  71 %     % of night spent in large leak  3 % last  upload      AHI 1.1   last  upload      Average number of minutes 247           Objective measure goal  Compliance   Goal >70%  Leak   Goal < 10%  AHI  Goal < 5  Usage  Goal >240    Total time spent on accessing, reviewing and interpreting remote patient PAP therapy data:   10 minutes    Total time spent with direct patient communication :   0 minutes

## 2021-06-15 NOTE — TELEPHONE ENCOUNTER
Date of Last Office Visit: 2/3/21  Date of Next Office Visit: 3/31/21  No shows since last visit: none  Cancellations since last visit: none    Medication requested: prazosin 1mg Date last ordered: 2/3/21 Qty: 30 Refills: 0       Lapse in medication adherence greater than 5 days?: no  If yes, call patient and gather details: N/A  Medication refill request verified as identical to current order?: yes  Result of Last DAM, VPA, Li+ Level, CBC, or Carbamazepine Level (at or since last visit): N/A    [x]Medication refilled per  Medication Refill in Ambulatory Care  policy.  []Medication unable to be refilled by RN due to criteria not met as indicated below:    []Eligibility - not seen in the last year   []Supervision - no future appointment   []Compliance - no shows, cancellations or lapse in therapy   []Verification - order discrepancy   []Controlled medication   []Medication not included in policy   []90-day supply request   []Other

## 2021-06-15 NOTE — PROGRESS NOTES
Outpatient Mental Health Treatment Plan  Name: Alina Martell  : 1965  MRN: [id number]    Treatment Plan: Updated Treatment Plan    Benefit and risks and alternatives have been discussed: Yes  Is this treatment appropriate with minimal intrusion/restrictions: Yes  Estimated duration of treatment: Ongoing therapy at this time  Anticipated frequency of services: Every 3 weeks  Necessity for frequency: This frequency is needed to establish therapeutic goals and for continuity of care in order to monitor progress.  Necessity for treatment: To address cognitive, behavioral, and/or emotional barriers in order to work toward goals and to improve quality of life.    Plan:   ? Depression    Goal:  Decrease average depression level from 4 to 2.  Strategies:   ?[x] Decrease social isolation  [x] Increase involvement in meaningful activities  ?[x] Discuss sleep hygiene  ?[x] Explore thoughts and expectations about self and others  ?[x] Assess for suicide risk  ?[x] Implement physical activity routine (with physician approval)  [x] Consider introduction of bibliotherapy and/or videos  [x] Continue compliance with medical treatment plan (or explore  barriers)  ?   Degree to which this is a problem (1-4): 4  Degree to which goal is met (1-4): 3    Date of next review:  2018     ? Anxiety    Goal:  Decrease average anxiety level from 4 to 2.  Strategies:   ? [x]Learn and practice relaxation techniques and other coping strategies    ? [x] Increase involvement in meaningful activities  ? [x] Discuss sleep hygiene  ? [x] Explore thoughts and expectations about self and others  ? [x] Identify and monitor triggers for panic/anxiety symptoms  ? [x] Implement physical activity routine (with physician approval)  ? [x] Consider introduction of bibliotherapy and/or videos  ? [x] Continue compliance with medical treatment plan    Degree to which this is a problem (1-4): 4  Degree to which goal is met (1-4): 3    Date of next  "review:  05/09/2018    PTSD    Goal: Identify triggers, separate the traumatic memory from the debilitating emotion associated with it.    Strategies:    [X]Learn and practice relaxation techniques and other coping strategies (e.g., thought stopping, reframing, meditation)    ? [X] Cognitive restructuring    ? [X] Discuss sleep hygiene    ? [X] Identify and change maladaptive coping behaviors    ? [X] Prolonged exposure therapy    ? [X] Identify cues and symptoms of PTSD      Degree to which this is a problem (1-4): 4  Degree to which goal is met (1-4): 3    Date of next review:  05/09/2018    Functional Impairment: 1=Not at all/Rarely 2=Some days 3=Most Days 4=Every Day    Personal: 3  Family: 3  Social: 3  Work: 3    Diagnosis:  Major depressive disorder, recurrent, moderate  Panic disorder  PTSD    Clinical assessments completed: ADA-7, PHQ-9, Mood Questionnaire current, CAGE-AID, PANSI.    STRENGTHS: Hard worker, dedication and support network    LIMITATIONS: Avoidance behaviors    Cultural Identification: Patient reported:    Race: Bi-racial    Experience of cultural bias: Patient reported experiencing cultural bias. Patient gave an example of when she was living in [place] and being told \"we don't serve Nig[place].\" Patient reported being called the inward on multiple occasions.    Immigration/history of status: Born [place] raised in [place].   Level of acculturation: acculturated   Time orientation: middle    Social orientation/class: middle    Verbal Communication Style/languages: English    Locus of Control: inward     Persons responsible for this plan:  ? [x] Patient ? [x] Provider ?     Provider:Performed and documented by SHERRIE Pantoja    Patient Signature:____________________________________ Date: ______________       Therapist Signature: __________________________________ Date: ______________  "

## 2021-06-15 NOTE — PROGRESS NOTES
MENTAL HEALTH VISIT NOTE    02/07/2018 Start time: 700   Stop Time: 800   Session # 3    Alina Martell is a 51 y.o. female is being seen today for follow up.    New symptoms or complaints: Patient indicated feeling pressured.     Functional Impairment:   Personal: 3  Family: 4  Work: 2  Social:2    Clinical assessment of mental status: Alina Martell was on time for her scheduled session. She was open and cooperative, and dressed appropriately for this session and weather. Her memory was good for short and long term.  Her speech and language was soft and concise. Concentration and focus is adequate. Psychosis is not noted or reported. She reports her mood as anxious Affect is congruent with speech.  Fund of knowledge is adequate. Insight is adequate for therapy.     Suicidal/Homicidal Ideation present: None Reported This Session    Patient's impression of their current status: Patient indicated feeling pressured at this time. Patient reported her family and friends wanting her to hurry to complete her degree so that she can begin as a . 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 Patient reported knowing people are excited for her to move forward in life but needing them to understand the pressure it puts on her. Patient indicated she has been sick over the last few weeks so she has not noticed any significant changes in her mental health.     Therapist impression of patients current state: Patient continues to have good insight into her mental health. This therapist processed with patient ways to use assertive communication to help her family understand her own needs. This therapist processed with patient her fears of letting people down.     Type of psychotherapeutic technique provided: Insight oriented, Client centered and CBT    Progress toward short term goals: Medication management with Dr. Wei, using coping skills taught  in session,  reaching out to people when needed, noticing mental health symptoms, and returning to scheduled session.     Review of long term goals: Treatment plan updated on 11/1/2017   Diagnosis:   1. PTSD (post-traumatic stress disorder)    2. Major depressive disorder, recurrent episode, moderate    3. Panic disorder without agoraphobia      Plan and Follow up: Patient will return in 3 weeks for scheduled session.  Patient will work on confronting her feeling instead of avoiding them. Patient should work on caring for herself and putting her needs first.  Patient should use coping skills taught throughout sessions to help reduce PTSD symptoms.  Patient will continue to take medication as prescribed. Patient would benefit from continuing to identify ways she has been a good mom. Patient should start identifying her fears.     Discharge Criteria/Planning: Patient will continue with follow-up until therapy can be discontinued without return of signs and symptoms.    Encounter performed and documented by SHERRIE Pantoja

## 2021-06-15 NOTE — PROGRESS NOTES
ASSESSMENT AND PLAN:  Alina Martell 52 y.o. female is here for follow-up.  She has severe seropositive rheumatoid arthritis.  She is doing great with leflunomide and hydroxychloroquine.  She has no residual synovitis.  She had flulike illness and was off work for several days.  She has recovered.  Recent labs reviewed within acceptable range.  Return for follow-up.  4 months with labs every 2 months.     Diagnoses and all orders for this visit:    Seropositive rheumatoid arthritis of multiple sites  -     hydroxychloroquine (PLAQUENIL) 200 mg tablet; Take 1 tablet (200 mg total) by mouth 2 (two) times a day.  Dispense: 60 tablet; Refill: 6    High risk medication use    Cyclic citrullinated peptide (CCP) antibody positive    HISTORY OF PRESENTING ILLNESS:  Alina Martell, 52 y.o., female is here for follow-up.  She has rheumatoid arthritis.  This is severe.  This is seropositive.  She has high titers of rheumatoid factor and anti-CCP antibody associated with it.  She seems to have turned the corner.  She noted episode of influenza recently was ill for 2 weeks.  She could not take leflunomide.  She has noted some discomfort since then.  2.0/10.  Most joints are hurting mildly.  She noted stiffness in the morning limited to 10 minutes only.  There is no more fever, weight loss she is complaining of no blurry vision or redness of the eyes mouth also she had a cough which is settled no rash.  As she is doing so much better so much happier.  She feels as if life is important back to nearly normal although she still noted moderately severe pain in her hands but when we talked about it this is more than she experiences.  She has tolerated the higher dose of leflunomide.  Recent labs are reviewed within normal range.    On her previous visit she has severe pain in her shoulders, small joints of the hands and ankles where she had exuberant synovitis.  Prednisone and corticosteroid injections had helped.  She has  tolerated leflunomide much better than she could methotrexate which can cause significant diarrhea.  Her most recent labs reviewed within acceptable range.  Time to check them again today.  She noted residual pain in her elbows.  She also has painful hands.  She gets stiff in the morning for 1hours.  She wondered if that is because of her work as an .  She has difficulty performing some of the day-to-day activities.  There is no residual pain in the shoulders however.  She noted fatigue, weakness, dryness of mouth, chronic anemia.  She does not smoke she takes only a few drinks of alcohol per month.  She is status post tubal ligation in 1997.  She has family history of psoriasis and Crohn's brothers and respectively.  Further historical information and ADL limitations as noted in the multidimensional health assessment questionnaire attached in the EMR.       ALLERGIES:Shellfish containing products; Sulfa (sulfonamide antibiotics); Effexor [venlafaxine]; and Propoxyphene n-acetaminophen    PAST MEDICAL/ACTIVE PROBLEMS/MEDICATION/ FAMILY HISTORY/SOCIAL DATA:  The patient has a family history of  Past Medical History:   Diagnosis Date     Cannabis use without complication 12/8/2014     Obesity      Rheumatoid arthritis 7/8/2016     Sicca syndrome      History   Smoking Status     Never Smoker   Smokeless Tobacco     Never Used     Comment: smokes marijuana     Patient Active Problem List   Diagnosis     PTSD (post-traumatic stress disorder)     Sleep disorder     Anxiety     Panic attack     Major depressive disorder, recurrent episode, unspecified     Anemia - microcytic anemia     Obesity     Sicca syndrome     Grief     Olecranon bursitis, right - bland, non-inflamed, diagnosed on 7/8/16     Carpal tunnel syndrome     Chronic pain     Seropositive rheumatoid arthritis of multiple sites     Tendonitis of both shoulders     High risk medication use     Cyclic citrullinated peptide (CCP) antibody positive  "    Current Outpatient Prescriptions   Medication Sig Dispense Refill     EPIPEN 2-RADHA 0.3 mg/0.3 mL (1:1,000) atIn Inject 0.3 mg into the shoulder, thigh, or buttocks as needed.        gabapentin (NEURONTIN) 300 MG capsule 1-2 up to 4 times daily 240 capsule 5     hydrOXYzine (VISTARIL) 25 MG capsule 1 up to four times daily prn anxiety, 1-4 at first wakening prn 150 capsule 5     ibuprofen (ADVIL,MOTRIN) 600 MG tablet TK 1 T PO  QID PRN. MAXIMUM OF 3200 MG IN 24 HOURS. 120 tablet 1     leflunomide (ARAVA) 20 MG tablet Take 20 mg by mouth daily.  1     multivitamin therapeutic (THERAGRAN) tablet Take 1 tablet by mouth daily.       No current facility-administered medications for this visit.      DETAILED EXAMINATION  02/06/18  :  Vitals:    02/06/18 1551   BP: 126/84   Weight: (!) 259 lb (117.5 kg)   Height: 5' 8.5\" (1.74 m)     Alert oriented. Head including the face is examined for malar rash, heliotropes, scarring, lupus pernio. Eyes examined for redness such as in episcleritis/scleritis, periorbital lesions.   Neck/ Face examined for parotid gland swelling, range of motion of neck.  Left upper and lower and right upper and lower extremities examined for tenderness, swelling, warmth of the appendicular joints, range of motion, edema, rash.  Some of the important findings included: No appreciable synovitis in any of the palpable appendicular joints.  No JLT of the knees bilaterally.  She has 10  of flexion contracture on the right side and 5  on the left.  LAB / IMAGING DATA:  ALT   Date Value Ref Range Status   02/02/2018 18 0 - 45 U/L Final   10/02/2017 12 0 - 45 U/L Final   09/01/2017 13 0 - 45 U/L Final     Albumin   Date Value Ref Range Status   02/02/2018 3.3 (L) 3.5 - 5.0 g/dL Final   10/02/2017 3.1 (L) 3.5 - 5.0 g/dL Final   09/01/2017 3.2 (L) 3.5 - 5.0 g/dL Final     Creatinine   Date Value Ref Range Status   02/02/2018 0.68 0.60 - 1.10 mg/dL Final   10/02/2017 0.69 0.60 - 1.10 mg/dL Final   09/01/2017 " 0.75 0.60 - 1.10 mg/dL Final       WBC   Date Value Ref Range Status   02/02/2018 10.1 4.0 - 11.0 thou/uL Final   10/02/2017 7.4 4.0 - 11.0 thou/uL Final     Hemoglobin   Date Value Ref Range Status   02/02/2018 11.7 (L) 12.0 - 16.0 g/dL Final   10/02/2017 11.6 (L) 12.0 - 16.0 g/dL Final   09/01/2017 11.6 (L) 12.0 - 16.0 g/dL Final     Platelets   Date Value Ref Range Status   02/02/2018 355 140 - 440 thou/uL Final   10/02/2017 343 140 - 440 thou/uL Final   09/01/2017 363 140 - 440 thou/uL Final       Lab Results   Component Value Date    .0 (H) 10/20/2015    SEDRATE 60 (H) 05/11/2017

## 2021-06-15 NOTE — PROGRESS NOTES
MENTAL HEALTH VISIT NOTE    01/04/2018 Start time: 700   Stop Time: 755   Session # 1    Alina Martell is a 51 y.o. female is being seen today for follow up.    New symptoms or complaints: None    Functional Impairment:   Personal: 3  Family: 4  Work: 2  Social:2    Clinical assessment of mental status: Alina Martell was on time for her scheduled session. She was open and cooperative, and dressed appropriately for this session and weather. Her memory was intact .  Her speech was  intact.  Language was intact.  Concentration and focus is adequate. Psychosis is not noted or reported. She reports her mood as down. Affect is congruent with speech.  Fund of knowledge is adequate. Insight is adequate for therapy.     Suicidal/Homicidal Ideation present: None Reported This Session    Patient's impression of their current status: Patient indicated feeling down. Patient reported she was able to get through the holiday's which is always tough for her. Patient talked about this month being tough due to it being the anniversary of her father passing and his birthday. Patient reported she still struggles with self-esteem and is unsure how to change the way she see's herself.     Therapist impression of patients current state: Patient continues to have good insight into her mental health. This therapist processed with patient coping skills she used to help her through the holiday season. This therapist processed with patient grief and how this can appear on anniversaries.      Type of psychotherapeutic technique provided: Insight oriented, Client centered and CBT    Progress toward short term goals: Medication management with Dr. Wei, using coping skills taught in session,  reaching out to people when needed, noticing mental health symptoms, and returning to scheduled session.     Review of long term goals: Treatment plan updated on 11/1/2017   Diagnosis:   1. PTSD (post-traumatic stress disorder)    2. Major  depressive disorder, recurrent episode, moderate    3. Panic disorder without agoraphobia      Plan and Follow up: Patient will return in 4 weeks for scheduled session to continue working the grief related to the loss of her father. Patient will work on confronting her feeling instead of avoiding them. Patient should work on caring for herself and putting her needs first.  Patient should use coping skills taught throughout sessions to help reduce PTSD symptoms.  Patient will continue to take medication as prescribed. Patient should work on forgiving herself and talk with her brother about the guilt she is experiencing.  Patient would benefit from continuing to identify ways she has been a good mom.    Discharge Criteria/Planning: Patient will continue with follow-up until therapy can be discontinued without return of signs and symptoms.    Encounter performed and documented by SHERRIE Pantoja

## 2021-06-15 NOTE — PROGRESS NOTES
Chief Complaint   Patient presents with     Cough     3xdays poss fever,headache,chill         HPI     Alina Martell is a 52 y.o. female seen today for 3 days of flulike symptoms including fevers/chills, myalgias, headache, nausea with occasional vomiting, cough productive of greenish sputum, some mild JEAN BAPTISTE.  Denies diarrhea, runny nose, ear pain, sinus pain.  She acknowledges multiple sick contacts, stating that most of her coworkers have been sick over the last few weeks, at least one with flu and another with pneumonia.     Current Outpatient Prescriptions   Medication Sig Dispense Refill     amitriptyline (ELAVIL) 10 MG tablet 1-5 at bedtime 150 tablet 5     benzonatate (TESSALON) 200 MG capsule Take 1 capsule (200 mg total) by mouth 3 (three) times a day as needed for cough. 21 capsule 0     EPIPEN 2-RADHA 0.3 mg/0.3 mL (1:1,000) atIn Inject 0.3 mg into the shoulder, thigh, or buttocks as needed.        gabapentin (NEURONTIN) 300 MG capsule 1-2 up to 4 times daily 240 capsule 5     hydrOXYzine (VISTARIL) 25 MG capsule 1 up to four times daily prn anxiety, 1-4 at first wakening prn 150 capsule 5     ibuprofen (ADVIL,MOTRIN) 600 MG tablet TK 1 T PO  QID PRN. MAXIMUM OF 3200 MG IN 24 HOURS. 120 tablet 1     leflunomide (ARAVA) 20 MG tablet Take 1 tablet (20 mg total) by mouth daily. 30 tablet 1     multivitamin therapeutic (THERAGRAN) tablet Take 1 tablet by mouth daily.       oseltamivir (TAMIFLU) 75 MG capsule Take 1 capsule (75 mg total) by mouth 2 (two) times a day for 5 days. 10 capsule 0     No current facility-administered medications for this visit.         Reviewed and updated: medical history, medications and allergies.     Review of Systems     General: Positive for fevers, chills, fatigue as noted in HPI.    Cardiovascular: Denies chest pain, palpitations.  Respiratory: Denies dyspnea, cough, wheezing.  Acknowledges mild JEAN BAPTISTE.  GI: Denies nausea, vomiting, diarrhea, constipation.  : Denies  dysuria, polyuria.     Objective     Vitals:    01/19/18 1026   BP: 132/60   Pulse: (!) 113   Temp: 99  F (37.2  C)   TempSrc: Oral   SpO2: 94%        Reviewed vital signs.  General: Appears calm, comfortable. Answers questions quickly and appropriately with clear speech. No apparent distress.  Skin: Pink, warm, dry.  HENT: Normocephalic, atraumatic. TMs clear bilaterally. No lymphadenopathy.  Neck: Supple, without lymphadenopathy or thyromegaly.  Heart: Regular rate and rhythm, clear S1/S2 without murmur, rub, or gallop.  Lungs: Clear and equal bilaterally. Normal respiratory effort.  Neuro: Memory and cognition appear normal. Normal gait.  Psych: Mood and affect appear normal.    CXR: No infiltrates noted. Cardiac silhouette normal. Personally read/reviewed by me.    Radiologist's read:  Xr Chest 2 Views    Result Date: 1/19/2018  EXAM DATE:         01/19/2018 [facility] X-RAY CHEST, 2 VIEWS, FRONTAL AND LATERAL 1/19/2018 12:15 PM INDICATION: Cough, JEAN BAPTISTE COMPARISON: None. FINDINGS: Negative chest.       Results for orders placed or performed in visit on 01/19/18   Influenza A/B Rapid Test   Result Value Ref Range    Influenza  A, Rapid Antigen No Influenza A antigen detected No Influenza A antigen detected    Influenza B, Rapid Antigen No Influenza B antigen detected No Influenza B antigen detected   Rapid Strep A Screen-Throat   Result Value Ref Range    Rapid Strep A Antigen No Group A Strep detected, presumptive negative No Group A Strep detected, presumptive negative          Medical Decision-Making     Alina is a 52-year-old female with a history of anemia and RA presents with 3 days of flulike symptoms including fevers, chills, myalgias, headache, cough.  Clinically her presentation is consistent with influenza.  Rapid influenza testing was negative, however given the poor sensitivity of this test I am comfortable with a diagnosis of influenza based on my clinical exam and history.  Given  the anemia is on the CBC list of high risk comorbidities, we will proceed with oseltamivir treatment starting today.  With her mild JEAN BAPTISTE symptoms, a CXR was also ordered which was negative.  Discussed symptomatic management of flulike symptoms, including acetaminophen and NSAIDs for fever and pain, benzonatate for cough, maintaining good hydration.  Reviewed red flags that would trigger return to the clinic or ED as noted below under patient instructions.  She expressed understanding of these directions and is in agreement with the plan.     Assessment and Plan     Alina was seen today for cough.    Diagnoses and all orders for this visit:    Influenza  -     oseltamivir (TAMIFLU) 75 MG capsule; Take 1 capsule (75 mg total) by mouth 2 (two) times a day for 5 days.  -     benzonatate (TESSALON) 200 MG capsule; Take 1 capsule (200 mg total) by mouth 3 (three) times a day as needed for cough.    Flu-like symptoms  -     Influenza A/B Rapid Test    Sore throat  -     Rapid Strep A Screen-Throat  -     Group A Strep, RNA Direct Detection, Throat    JEAN BAPTISTE (dyspnea on exertion)  -     XR Chest 2 Views    Cough        Patient Instructions   Please return to the clinic if you notice any of the following:    Difficulty breathing, either at rest or with exertion.    Unable to keep down liquids for more than 24 hours.    Symptoms are getting worse instead of better.    You are not feeling better in 7 days.          Discussed benefit vs risk of medications, dosing, side effects.  Patient was able to verbalize understanding.  After visit summary was provided for patient.     Estrada Tucker PA-C

## 2021-06-16 NOTE — PROGRESS NOTES
MENTAL HEALTH VISIT NOTE    03/14/2018 Start time: 800   Stop Time: 855   Session # 5    Alina Martell is a 53 y.o. female is being seen today for follow up.    New symptoms or complaints: Patient indicated struggling with depression.    Functional Impairment:   Personal: 3  Family: 4  Work: 2  Social:2    Clinical assessment of mental status: Alina Martell was on time for her scheduled session. She was open and cooperative, and dressed appropriately for this session and weather. Her memory was good for short and long term.  Her speech and language was soft and concise. Concentration and focus is adequate. Psychosis is not noted or reported. She reports her mood as depressed. Affect is congruent with speech.  Fund of knowledge is adequate. Insight is adequate for therapy.     Suicidal/Homicidal Ideation present: None Reported This Session    Patient's impression of their current status: Patient indicated feeling depressed. Patient reported she cannot let go of the fact that she feels damaged. Patient indicated believing this is something that she will always feel. Patient talked about spending more time lately isolating. Patient indicated her family and friends being busy which she understands. Patient reported she knows she is continuing to avoid.     Therapist impression of patients current state: Patient continues to have good insight into her mental health. This therapist challenged patient on ways she is avoiding her emotions. This therapist processed with patient writing a letter to her grandfather expressing her anger towards him. This therapist challenged patient on ways her life would be different if she did not view herself as damaged.     Type of psychotherapeutic technique provided: Insight oriented, Client centered and CBT    Progress toward short term goals: Medication management with Dr. Wei, using coping skills taught in session,  reaching out to people when needed, noticing mental  health symptoms, and returning to scheduled session.     Review of long term goals: Treatment plan updated on 02/07/2018  Diagnosis:   1. PTSD (post-traumatic stress disorder)    2. Major depressive disorder, recurrent episode, moderate    3. Panic disorder without agoraphobia      Plan and Follow up: Patient will return in 3 weeks for scheduled session.  Patient will work on confronting her feeling instead of avoiding them. Patient should work on caring for herself and putting her needs first.  Patient should use coping skills taught throughout sessions to help reduce PTSD symptoms.  Patient will continue to take medication as prescribed. Patient would benefit from continuing to identify ways she has been a good mom. Patient should start identifying her fears. Patient should write a letter to her grandfather expressing her hurt.     Discharge Criteria/Planning: Patient will continue with follow-up until therapy can be discontinued without return of signs and symptoms.    Encounter performed and documented by SHERRIE Pantoja

## 2021-06-16 NOTE — PROGRESS NOTES
MENTAL HEALTH VISIT NOTE    03/21/2018 Start time: 705   Stop Time: 800   Session # 6    Alina Martell is a 53 y.o. female is being seen today for follow up.    New symptoms or complaints: None    Functional Impairment:   Personal: 3  Family: 4  Work: 2  Social:2    Clinical assessment of mental status: Alina Martell was on time for her scheduled session. She was open and cooperative, and dressed appropriately for this session and weather. Her memory was good for short and long term.  Her speech and language was soft and concise. Concentration and focus is adequate. Psychosis is not noted or reported. She reports her mood as depressed. Affect is congruent with speech.  Fund of knowledge is adequate. Insight is adequate for therapy.     Suicidal/Homicidal Ideation present: None Reported This Session    Patient's impression of their current status: Patient reported continuing to feel depressed. Patient indicated when she thinks of writing a letter to her grandfather she feels very anxious. Patient talked about people at work noticing a change in her mood. Patient reported knowing this has been an avoidance for a long time and it affecting her in many way. Patient indicated all aspects of her life are affected by this avoidance.     Therapist impression of patients current state: Patient continues to have good insight into her mental health. This therapist challenged patient on her fears related to writing the letter. This therapist processed with patient ways she will stay stuck if she does not start confronting tough situations.     Type of psychotherapeutic technique provided: Insight oriented, Client centered and CBT    Progress toward short term goals: Medication management with Dr. Wei, using coping skills taught in session,  reaching out to people when needed, noticing mental health symptoms, and returning to scheduled session.     Review of long term goals: Treatment plan updated on  02/07/2018  Diagnosis:   1. PTSD (post-traumatic stress disorder)    2. Major depressive disorder, recurrent episode, moderate    3. Panic disorder without agoraphobia      Plan and Follow up: Patient will return in 3 weeks for scheduled session.  Patient will work on confronting her feeling instead of avoiding them. Patient should work on caring for herself and putting her needs first.  Patient should use coping skills taught throughout sessions to help reduce PTSD symptoms.  Patient will continue to take medication as prescribed. Patient would benefit from continuing to identify ways she has been a good mom. Patient should start identifying her fears. Patient should write a letter to her grandfather expressing her hurt.     Discharge Criteria/Planning: Patient will continue with follow-up until therapy can be discontinued without return of signs and symptoms.    Encounter performed and documented by SHERRIE Pantoja

## 2021-06-16 NOTE — TELEPHONE ENCOUNTER
Called [facility] back to let them know of the below and that I will call them back once the ID# has been updated.

## 2021-06-16 NOTE — TELEPHONE ENCOUNTER
Called and spoke with agent from [employer] and they are still getting a rejection that PA required. They said they will reach out to insurance and see what is going on. They are aware that pt is 2 weeks late and flaring up. While I was holding I was looking over everything trying to see figure out why this was going on. I saw on the newest PA approval letter that they had pt's old plan ID# and not the new commercial plan. I checked the PA form that I filled out and submitted and I did have her new ID# on the form. So I called [employer] and had to argue with them for a little bit that I DID submit the PA with her new ID# and they came back approving it with her old medicaid plan. After demanding them to look at the form I submitted they realized they made a mistake and they will have it updated within 24 hours. I made them aware that pt is late on her injections and flaring right now.

## 2021-06-16 NOTE — TELEPHONE ENCOUNTER
Called [facility] pharmacy to clarify and verify Humira rx with pharmacist as they received rx from 3rd party

## 2021-06-16 NOTE — TELEPHONE ENCOUNTER
Telephone Encounter by Melany Mayfield at 3/12/2020 11:44 AM     Author: Melany Mayfield Service: -- Author Type: Financial Resource Guide    Filed: 3/16/2020 11:46 AM Encounter Date: 3/12/2020 Status: Addendum    : Melany Mayfield (Financial Resource Guide)    Related Notes: Original Note by Melany Mayfield (Financial Resource Guide) filed at 3/16/2020 11:45 AM       Medication: Humira CF 40mg/0.4ml  Qty: 2 pens for 28 days  Effective Dates: 03/12/2020 - 03/12/2021  Pharmacy: [facility]  Copay Amount: $200.00  Copay Assistance: patient enrolled herself already  Patient Notified? Yes, mychart message

## 2021-06-16 NOTE — PROGRESS NOTES
MENTAL HEALTH VISIT NOTE    03/1/2018 Start time: 700   Stop Time: 800   Session # 4    Alina Martell is a 53 y.o. female is being seen today for follow up.    New symptoms or complaints: None    Functional Impairment:   Personal: 3  Family: 4  Work: 2  Social:2    Clinical assessment of mental status: Alina Martell was on time for her scheduled session. She was open and cooperative, and dressed appropriately for this session and weather. Her memory was good for short and long term.  Her speech and language was soft and concise. Concentration and focus is adequate. Psychosis is not noted or reported. She reports her mood as depressed. Affect is congruent with speech.  Fund of knowledge is adequate. Insight is adequate for therapy.     Suicidal/Homicidal Ideation present: None Reported This Session    Patient's impression of their current status: Patient reported feeling depressed. Patient indicated this is something she continues to struggle with in her life. Patient reported people close to her have a hard time understanding her mental health. Patient talked about the struggles she has with wanting to have more time with her children yet understanding they are busy. Patient indicated knowing it is important that she does not isolate. Patient reported her birthday being yesterday and it being great. Patient indicated today would be her grandfather's birthday which makes her not like this day and the reminders it brings of him.     Therapist impression of patients current state: Patient continues to have good insight into her mental health. This therapist challenged patient on ways she is hiding her emotions from family and friends. This therapist processed with patient ways she has not dealt with her emotions related to her grandfather. This therapist processed with patient the importance of getting out and not isolating.     Type of psychotherapeutic technique provided: Insight oriented, Client centered  and CBT    Progress toward short term goals: Medication management with Dr. Wei, using coping skills taught in session,  reaching out to people when needed, noticing mental health symptoms, and returning to scheduled session.     Review of long term goals: Treatment plan updated on 02/07/2018  Diagnosis:   1. PTSD (post-traumatic stress disorder)    2. Major depressive disorder, recurrent episode, moderate    3. Panic disorder without agoraphobia      Plan and Follow up: Patient will return in 3 weeks for scheduled session.  Patient will work on confronting her feeling instead of avoiding them. Patient should work on caring for herself and putting her needs first.  Patient should use coping skills taught throughout sessions to help reduce PTSD symptoms.  Patient will continue to take medication as prescribed. Patient would benefit from continuing to identify ways she has been a good mom. Patient should start identifying her fears.     Discharge Criteria/Planning: Patient will continue with follow-up until therapy can be discontinued without return of signs and symptoms.    Encounter performed and documented by SHERRIE Pantoja

## 2021-06-16 NOTE — PROGRESS NOTES
Mental Health Psychotherapy Note     2021   Start time: 1202    Stop Time: 1252   Session # 7    Two point identification confirmed with full name and     Session Type: Patient is presenting for an Individual session.    Alina Martell is a 56 y.o. female is being seen today for    Chief Complaint   Patient presents with     MH Follow Up     Video Visit Mental Health     Telemedicine Visit: The patient's condition can be safely assessed and treated via synchronous audio and visual telemedicine encounter.      Reason for Telemedicine Visit: Services only offered telehealth    Originating Site (Patient Location): Patient's work    Distant Site (Provider Location): Provider Remote Setting    Consent:  The patient/guardian has verbally consented to: the potential risks and benefits of telemedicine (video visit) versus in person care; bill my insurance or make self-payment for services provided; and responsibility for payment of non-covered services.     As the provider I attest to compliance with applicable laws and regulations related to telemedicine.    Those present for this visit patient and therapist     Follow up in regards to ongoing symptom management of depression and anxiety.    New symptoms or complaints: None    Functional Impairment:   Personal: 3  Family: 2  Social: 2  Work: 2    Clinical assessment of mental status:   Alina Martell presented on time.   She was oriented x3, open and cooperative, and dressed appropriately for this session and weather. Her memory was Normal cognitive functioning .  Her speech was  Within normal.  Language was intact.  Concentration and focus is Within normal. Psychosis is not noted or reported. She reports her mood is stressed. Affect is congruent with speech and is Congruent w/content of speech.  Fund of knowledge is adequate. Insight is adequate for therapy.Changes made as needed for session on 2021.    Suicidal/Homicidal Ideation present: Patient  denies suicidal and homicidal ideations/means or plans.     Patient's impression of their current status: Patient reported feeling stressed. Patient indicated making the decision to file for bankrupty. Patient reported this being a very hard decision for her. Patient indicated she meets with the  today which is causing her a lot of anxiety. Patient reported this is not something that she has ever done before and unsure what to expect. Patient indicated she plans to move in with her son at the end of the month. Patient reported believing that it will go well and not a lot of worry about the move.    Therapist impression of patients current state: This 56 y.o. presents with feeling stressed. This therapist processed with patient her anxiety related to filling bankrupty. This therapist challenged patient on ways she continues to struggle with self-forgiveness. This therapist processed with patient the importance of setting boundaries before moving in with her son and his family.     Assessment tools used today include: C-SSRS, CGI, PHQ-9 and ADA-7    Type of psychotherapeutic technique provided: Insight oriented, Client centered and CBT    Progress toward short term goals: Progress as expected with patient continuing therapy, identifying fears at this time and finding a place to live.     Review of long term goals: Treatment Plan updated on 4/6/2021    Diagnosis:   1. PTSD (post-traumatic stress disorder)    2. Major depressive disorder, recurrent episode, moderate (H)    3. Panic disorder without agoraphobia        Plan and Follow-up: Patient will continue with twice a month therapy. Patient should write down her questions for the bankrupty  before meeting with them. Patient should identify boundaries that need to be set with her son and his family before moving in. Patient would benefit from continuing to work on self-care.     Discharge Criteria/Planning: Patient will continue with follow-up until  therapy can be discontinued without return of signs and symptoms.    Performed and documented by SHERRIE Pantoja

## 2021-06-16 NOTE — PROGRESS NOTES
Mental Health Psychotherapy Note     2021   Start time: 1203    Stop Time: 1253   Session # 8    Two point identification confirmed with full name and     Session Type: Patient is presenting for an Individual session.    Alina Martell is a 56 y.o. female is being seen today for    Chief Complaint   Patient presents with     MH Follow Up     Video Visit Mental Health     Telemedicine Visit: The patient's condition can be safely assessed and treated via synchronous audio and visual telemedicine encounter.      Reason for Telemedicine Visit: Services only offered telehealth    Originating Site (Patient Location): Patient's home    Distant Site (Provider Location): Provider Remote Setting    Consent:  The patient/guardian has verbally consented to: the potential risks and benefits of telemedicine (video visit) versus in person care; bill my insurance or make self-payment for services provided; and responsibility for payment of non-covered services.     As the provider I attest to compliance with applicable laws and regulations related to telemedicine.    Those present for this visit patient and therapist     Follow up in regards to ongoing symptom management of depression and anxiety.    New symptoms or complaints: No new symptoms or complaints reported this session.     Functional Impairment:   Personal: 3  Family: 2  Social: 2  Work: 2    Clinical assessment of mental status:   Alina Martell presented on time.   She was oriented x3, open and cooperative, and dressed appropriately for this session and weather. Her memory was Normal cognitive functioning .  Her speech was  Within normal.  Language was intact.  Concentration and focus is Within normal. Psychosis is not noted or reported. She reports her mood is anxious. Affect is congruent with speech and is Congruent w/content of speech.  Fund of knowledge is adequate. Insight is adequate for therapy.Changes made as needed for session on  4/20/2021.    Suicidal/Homicidal Ideation present: Patient denies suicidal and homicidal ideations/means or plans.     Patient's impression of their current status: Patient indicated feeling anxious. Patient reported the trail going on has affected her a lot. Patient indicated trying to talk with her support network. Patient reported work is busy but continues to go well. Patient indicated she is continuing to build a positive relationship with her mom. Patient reported taking a lot of time to educate her mom and help her understand. Patient indicated continuing to try and reach out and not isolate.     Therapist impression of patients current state: This 56 y.o. presents with feeling anxious. This therapist processed with patient her emotions related to the trial. This therapist processed with patient the importance of talking about her personal emotions and reaching out to her support network. This therapist processed with patient the importance of not isolating.     Assessment tools used today include: None    Type of psychotherapeutic technique provided: Insight oriented, Client centered and CBT    Progress toward short term goals: Progress as expected with patient continuing therapy, talking about uncomfortable emotions and reaching out to her support network.     Review of long term goals: Treatment Plan updated on 4/6/2021    Diagnosis:   1. PTSD (post-traumatic stress disorder)    2. Major depressive disorder, recurrent episode, moderate (H)        Plan and Follow-up: Patient will continue with twice a month therapy. Patient would benefit from continuing to talk about her uncomfortable feelings and struggles with these emotions. Patient should continue to have a schedule that includes seeing her support network. Patient would benefit from reaching out to support network when feeling down.     Discharge Criteria/Planning: Patient will continue with follow-up until therapy can be discontinued without return of  signs and symptoms.    Performed and documented by SHERRIE Pantoja

## 2021-06-16 NOTE — TELEPHONE ENCOUNTER
Called and informed [facility] that PA has been corrected with correct ID#. They got a paid claim. I reiterated to expedite since pt has been without med for 2 weeks and flaring.

## 2021-06-16 NOTE — PROGRESS NOTES
This video/telephone visit will be conducted via a call between you and your physician/provider. We have found that certain health care needs can be provided without the need for an in-person physical exam. This service lets us provide the care you need with a video /telephone conversation. If a prescription is necessary we can send it directly to your pharmacy. If lab work is needed we can place an order for that and you can then stop by our lab to have the test done at a later time.    Just as we bill insurance for in-person visits, we also bill insurance for video/telephone visits. If you have questions about your insurance coverage, we recommend that you speak with your insurance company.    Patient has given verbal consent for video/Telephone visit? yes  Patient would like the video visit invitation sent by: Text to cell phone: FRANSISCA Send to email: [employer] or OrderUp CALL to:  FRANSISCA MORALES/REHANA: Rolo OLEARY LPN    Patient verified allergies, medications and pharmacy via phone. PHQ : 5 and ADA: 6  done verbally with writer. Patient states she  is ready for visit.    ________________________________________  Medications Phoned  to Pharmacy [] yes [x]no  Name of Pharmacist:  List Medications, including dose, quantity and instructions    Medications ordered this visit were e-scribed.  Verified by order class [x] yes  [] no  Atomoxetine. Buspirone, and Prazosin  Medication changes or discontinuations were communicated to patient's pharmacy: [] yes  [x] no    Dictation completed at time of chart check: [x] yes  [] no    I have checked the documentation for today s encounters and the above information has been reviewed and completed.

## 2021-06-16 NOTE — TELEPHONE ENCOUNTER
[facility] called stating they need patient's Humira prescription validated or,  if it's easier, send a new prescription for Humira so they can process the prescription.   Contact [phone]    Thank you

## 2021-06-16 NOTE — PROGRESS NOTES
Outpatient Mental Health Treatment Plan  Name: Alina Martell  : 1965  MRN: [id number]    Treatment Plan: Updated Treatment Plan    Benefit and risks and alternatives have been discussed: Yes  Is this treatment appropriate with minimal intrusion/restrictions: Yes  Estimated duration of treatment: Ongoing therapy at this time  Anticipated frequency of services: Bi-Weekly  Necessity for frequency: This frequency is needed to establish therapeutic goals and for continuity of care in order to monitor progress.  Necessity for treatment: To address cognitive, behavioral, and/or emotional barriers in order to work toward goals and to improve quality of life.    Plan:   ? Depression    Goal:  Decrease average depression level from 4 to 1.  Strategies:   ?[x] Decrease social isolation  [x] Increase involvement in meaningful activities  ?[x] Discuss sleep hygiene  ?[x] Explore thoughts and expectations about self and others  ?[x] Assess for suicide risk  ?[x] Implement physical activity routine (with physician approval)  [x] Consider introduction of bibliotherapy and/or videos  [x] Continue compliance with medical treatment plan (or explore  barriers)  ?   Degree to which this is a problem (1-4): 4  Degree to which goal is met (1-4): 3    Date of next review: 2021     ? Anxiety    Goal:  Decrease average anxiety level from 4 to 1.  Strategies:   ? [x]Learn and practice relaxation techniques and other coping strategies    ? [x] Increase involvement in meaningful activities  ? [x] Discuss sleep hygiene  ? [x] Explore thoughts and expectations about self and others  ? [x] Identify and monitor triggers for panic/anxiety symptoms  ? [x] Implement physical activity routine (with physician approval)  ? [x] Consider introduction of bibliotherapy and/or videos  ? [x] Continue compliance with medical treatment plan    Degree to which this is a problem (1-4): 4  Degree to which goal is met (1-4): 2    Date of next  "review: 7/6/2021    PTSD    Goal: Identify triggers, separate the traumatic memory from the debilitating emotion associated with it.    Strategies:    [X]Learn and practice relaxation techniques and other coping strategies (e.g., thought stopping, reframing, meditation)    ? [X] Cognitive restructuring    ? [X] Discuss sleep hygiene    ? [X] Identify and change maladaptive coping behaviors    ? [X] Prolonged exposure therapy    ? [X] Identify cues and symptoms of PTSD      Degree to which this is a problem (1-4): 4  Degree to which goal is met (1-4): 3    Date of next review: 7/6/2021    Functional Impairment: 1=Not at all/Rarely 2=Some days 3=Most Days 4=Every Day    Personal: 3  Family: 2  Social: 2  Work: 2    Diagnosis:  Major depressive disorder, recurrent, moderate  Generalized anxiety disorder  PTSD    Clinical assessments completed: ADA-7, PHQ-9,C-SSRS,     STRENGTHS: Hard worker, dedication and support network    LIMITATIONS: Avoidance behaviors    Cultural Identification: Patient reported:    Race: Bi-racial    Experience of cultural bias: Patient reported experiencing cultural bias. Patient gave an example of when she was living in [place] and being told \"we don't serve Nig[place].\" Patient reported being called the inward on multiple occasions.    Immigration/history of status: Born [place] raised in [place].   Level of acculturation: acculturated   Time orientation: middle    Social orientation/class: middle    Verbal Communication Style/languages: English    Locus of Control: inward     Persons responsible for this plan:  ? [x] Patient ? [x] Provider ?     Provider:Performed and documented by SHERRIE Pantoja    Patient Signature:____________________________________ Date: ______________       Therapist Signature: __________________________________ Date: ______________  "

## 2021-06-16 NOTE — PATIENT INSTRUCTIONS - HE
"1. START Buspar 10mg two times a day for anxiety  2. Continue other medications as prescribed  3. Have your pharmacy contact us for a refill if you are running low on medications (We may ask you to come into clinic to get a refill from the nurse)  4. No alcohol or drug use  5. No driving if sedated  6. Contact the clinic with any questions or concerns   a. Phone: [phone]  b. Fax: [phone]  7. Reach out for help if you feel like hurting yourself or others:   a. [facility]: [address] [place] [address], [address] (phone: [phone])  b. [facility] Suicide Hotline: [phone]   c. Crisis Texting Line: Text \"[place]\" to 361438  d. Call 911 or go to nearest Emergency room   8. Follow up as directed in 4-6 weeks by video for your appointment    "

## 2021-06-16 NOTE — TELEPHONE ENCOUNTER
Telephone Encounter by Melany Mayfield at 3/12/2020 11:12 AM     Author: Melany Mayfield Service: -- Author Type: Financial Resource Guide    Filed: 3/12/2020 11:25 AM Encounter Date: 3/12/2020 Status: Signed    : Melany Mayfield (Financial Resource Guide)       Medication: Humira CF 40mg/0.4ml  QTY: 2 pens for 28 days  Insurance: [employer] [place] [employer]  Additional Information: TB pending

## 2021-06-16 NOTE — PROGRESS NOTES
Mental Health Psychotherapy Note     3/23/2021   Start time: 1203    Stop Time: 1249   Session # 6    Two point identification confirmed with full name and     Session Type: Patient is presenting for an Individual session.    Alina Martell is a 56 y.o. female is being seen today for    Chief Complaint   Patient presents with     MH Follow Up     Video Visit Mental Health     Telemedicine Visit: The patient's condition can be safely assessed and treated via synchronous audio and visual telemedicine encounter.      Reason for Telemedicine Visit: Services only offered telehealth    Originating Site (Patient Location): Patient's work    Distant Site (Provider Location): Provider Remote Setting    Consent:  The patient/guardian has verbally consented to: the potential risks and benefits of telemedicine (video visit) versus in person care; bill my insurance or make self-payment for services provided; and responsibility for payment of non-covered services.     As the provider I attest to compliance with applicable laws and regulations related to telemedicine.    Those present for this visit patient and therapist     Follow up in regards to ongoing symptom management of depression and anxiety.    New symptoms or complaints: None    Functional Impairment:   Personal: 3  Family: 2  Social: 3  Work: 3    Clinical assessment of mental status:   Alina Martell presented on time.   She was oriented x3, open and cooperative, and dressed appropriately for this session and weather. Her memory was Normal cognitive functioning .  Her speech was  Within normal.  Language was intact.  Concentration and focus is Within normal. Psychosis is not noted or reported. She reports her mood is some anxiety . Affect is congruent with speech and is Congruent w/content of speech.  Fund of knowledge is adequate. Insight is adequate for therapy.Changes made as needed for session on 3/23/2021.    Suicidal/Homicidal Ideation present:  Patient denies suicidal and homicidal ideations/means or plans.     Patient's impression of their current status: Patient indicated having some anxiety. Patient reported making a decision about her financial struggles. Patient indicated knowing she is being harder on herself then she would be on anyone else. Patient reported she knows this pandemic has been hard for her and looking forward to getting out of the house more often. Patient indicated she is planning to move in with her son which will go well. Patient reported then saving to get a place of her own. Patient indicated overall feeling things are going well for her.     Therapist impression of patients current state: This 56 y.o. presents with feeling some anxiety. This therapist processed with patient ways she is being harder on herself then she would be on other people in her life. This therapist processed with patient next steps in her life to help her move forward. This therapist processed with patient the importance of continuing to have plans and connect with friends.     Assessment tools used today include: None    Type of psychotherapeutic technique provided: Insight oriented, Client centered and CBT    Progress toward short term goals: Progress as expected with patient continuing therapy, identifying struggles she is experiencing and working on connecting with her support network.     Review of long term goals: Treatment Plan updated on 1/12/2021    Diagnosis:   1. PTSD (post-traumatic stress disorder)    2. Major depressive disorder, recurrent episode, moderate (H)        Plan and Follow-up: Patient will continue with twice a month therapy. Patient would benefit from continuing to work on self-compassion. Patient should continue to work on challenging her negative thoughts. Patient would benefit from working on having plans to help reduce isolation.     Discharge Criteria/Planning: Patient will continue with follow-up until therapy can be  discontinued without return of signs and symptoms.    Performed and documented by SHERRIE Pantoja

## 2021-06-16 NOTE — PROGRESS NOTES
"Telemedicine Visit: The patient's condition can be safely assessed and treated via synchronous audio and visual telemedicine encounter.      Reason for Telemedicine Visit: Patient unable to travel    Originating Site (Patient Location): Patient's home    Distant Site (Provider Location): Provider Remote Setting- Home Office    Consent:  The patient/guardian has verbally consented to: the potential risks and benefits of telemedicine (video visit) versus in person care; bill my insurance or make self-payment for services provided; and responsibility for payment of non-covered services.     Mode of Communication:  Video Conference via [employer]    As the provider I attest to compliance with applicable laws and regulations related to telemedicine    Outpatient Psychiatric Follow Up    Date of Service: 3/31/2021    --  Chief Complaint: \"feeling a little more jumpy\"     --  History of Present Illness/Client Impression of Mental Health Consult:    Alina Martell is a 56 y.o. female who presents for follow up appointment. Last visit occurred 2/3/2021. At that time initiated prazosin for nightmares.     Reports positive effect from prazosin at bedtime for sleep. Feeling like she may be feeling more \"jumpy\" and more \"tempermental\"/\"on edge\" since last visit which she feels is related to Strattera. However experiencing decreased anxiety since starting Strattera and wonders if she may long term internalization of anger is coming out more now that her anxiety is improving. Completed first ADHD testing visit but now having to postpone scheduling of second visit due to insurance changes. States \"I need to focus on my anxiety and had a couple of slip ups this month\" in reference to gambling. Endorses worsening stress/anxiety acting as trigger for spending behaviors. Has taken sertraline and duloxetine in past but thinks she may have prematurely stopped medication trials at that time.    States mood is \"anxious\". Rates " depression nonexistent and anxiety improved. No sleep or energy maintenance concerns. No appetite or weight concerns. No suicidal and homicidal ideation. No overt psychosis. Denies all other psychiatric symptoms. No new physical concerns.     Medication adherence: Reviewed risk/benefits of medication , Patient able to verbalize understanding of side effects  and Patient verbally consents to taking medications  Medication side effects: anxiety  The patient was given information on medications: currently prescribed    --  [place] Prescription Monitoring Program  No worrisome pharmacy activity.     --  Clinical Outcomes Measures:  PHQ-9 Total Score: 5  ADA-7: 6    --  Current Medications:  Current Outpatient Medications   Medication Sig Dispense Refill     adalimumab (HUMIRA,CF, PEN) 40 mg/0.4 mL PnKt Inject 40 mg under the skin every 14 (fourteen) days. 1 kit 5     atomoxetine (STRATTERA) 40 MG capsule Take 1 capsule (40 mg total) by mouth daily. 30 capsule 2     cetirizine (ZYRTEC) 10 MG tablet Take 1 tablet (10 mg total) by mouth daily. 30 tablet 2     cholecalciferol, vitamin D3, 1,250 mcg (50,000 unit) capsule TAKE 1 CAPSULE BY MOUTH WEEKLY 12 capsule 1     diltiazem (CARDIZEM CD) 120 MG 24 hr capsule Take 1 capsule (120 mg total) by mouth daily. 90 capsule 3     EPINEPHrine (EPIPEN 2-RADHA) 0.3 mg/0.3 mL injection Inject 0.3 mL (0.3 mg total) into the shoulder, thigh, or buttocks as needed. 2 Pre-filled Pen Syringe 0     prazosin (MINIPRESS) 1 MG capsule Take 1 capsule (1 mg total) by mouth at bedtime. 30 capsule 0     No current facility-administered medications for this visit.        --  Allergies  Allergies   Allergen Reactions     Glucosamine Anaphylaxis     Shellfish Containing Products Anaphylaxis     Sulfa (Sulfonamide Antibiotics) Itching     Rapid heart rate, itching, shortness of breath     Effexor [Venlafaxine] Palpitations     Pt B/P was elevated      Abilify [Aripiprazole] Hives     Propoxyphene  N-Acetaminophen      Sulfasalazine Hives     --  Summary of Diagnostic Studies  No visits with results within 30 Day(s) from this visit.   Latest known visit with results is:   Physical on 01/21/2021   Component Date Value Ref Range Status     Cholesterol 01/21/2021 169  <=199 mg/dL Final     Triglycerides 01/21/2021 79  <=149 mg/dL Final     HDL Cholesterol 01/21/2021 46* >=50 mg/dL Final     LDL Calculated 01/21/2021 107  <=129 mg/dL Final     Patient Fasting > 8hrs? 01/21/2021 Yes   Final     Sodium 01/21/2021 139  136 - 145 mmol/L Final     Potassium 01/21/2021 4.7  3.5 - 5.0 mmol/L Final     Chloride 01/21/2021 103  98 - 107 mmol/L Final     CO2 01/21/2021 26  22 - 31 mmol/L Final     Anion Gap, Calculation 01/21/2021 10  5 - 18 mmol/L Final     Glucose 01/21/2021 111  70 - 125 mg/dL Final     BUN 01/21/2021 14  8 - 22 mg/dL Final     Creatinine 01/21/2021 0.66  0.60 - 1.10 mg/dL Final     GFR MDRD Af Amer 01/21/2021 >60  >60 mL/min/1.73m2 Final     GFR MDRD Non Af Amer 01/21/2021 >60  >60 mL/min/1.73m2 Final     Bilirubin, Total 01/21/2021 0.4  0.0 - 1.0 mg/dL Final     Calcium 01/21/2021 9.1  8.5 - 10.5 mg/dL Final     Protein, Total 01/21/2021 7.6  6.0 - 8.0 g/dL Final     Albumin 01/21/2021 3.7  3.5 - 5.0 g/dL Final     Alkaline Phosphatase 01/21/2021 93  45 - 120 U/L Final     AST 01/21/2021 18  0 - 40 U/L Final     ALT 01/21/2021 19  0 - 45 U/L Final     Vitamin D, Total (25-Hydroxy) 01/21/2021 48.0  30.0 - 80.0 ng/mL Final     WBC 01/21/2021 8.5  4.0 - 11.0 thou/uL Final     RBC 01/21/2021 4.61  3.80 - 5.40 mill/uL Final     Hemoglobin 01/21/2021 12.7  12.0 - 16.0 g/dL Final     Hematocrit 01/21/2021 40.1  35.0 - 47.0 % Final     MCV 01/21/2021 87  80 - 100 fL Final     MCH 01/21/2021 27.5  27.0 - 34.0 pg Final     MCHC 01/21/2021 31.7* 32.0 - 36.0 g/dL Final     RDW 01/21/2021 13.3  11.0 - 14.5 % Final     Platelets 01/21/2021 340  140 - 440 thou/uL Final     MPV 01/21/2021 10.8  8.5 - 12.5 fL Final      Neutrophils % 01/21/2021 63  50 - 70 % Final     Lymphocytes % 01/21/2021 27  20 - 40 % Final     Monocytes % 01/21/2021 7  2 - 10 % Final     Eosinophils % 01/21/2021 2  0 - 6 % Final     Basophils % 01/21/2021 1  0 - 2 % Final     Immature Granulocyte % 01/21/2021 0  <=0 % Final     Neutrophils Absolute 01/21/2021 5.4  2.0 - 7.7 thou/uL Final     Lymphocytes Absolute 01/21/2021 2.3  0.8 - 4.4 thou/uL Final     Monocytes Absolute 01/21/2021 0.6  0.0 - 0.9 thou/uL Final     Eosinophils Absolute 01/21/2021 0.2  0.0 - 0.4 thou/uL Final     Basophils Absolute 01/21/2021 0.0  0.0 - 0.2 thou/uL Final     Immature Granulocyte Absolute 01/21/2021 0.0  <=0.0 thou/uL Final       --  Review of Systems  Wt Readings from Last 3 Encounters:   01/21/21 (!) 281 lb (127.5 kg)   10/07/20 (!) 277 lb (125.6 kg)   09/16/20 (!) 277 lb (125.6 kg)     Temp Readings from Last 3 Encounters:   08/12/20 98.4  F (36.9  C) (Oral)   10/16/19 98  F (36.7  C) (Oral)   10/14/19 98.5  F (36.9  C) (Oral)     BP Readings from Last 3 Encounters:   01/21/21 140/80   10/07/20 142/90   09/16/20 (!) 160/102     Pulse Readings from Last 3 Encounters:   01/21/21 82   10/07/20 83   09/16/20 68      LMP 06/01/2019  unable to assess today Pain Score: n/a  Pain Location: n/a     As noted in the subjective section above, otherwise a 10 point review of systems is negative. Limited ability to assess given virtual nature of visit. Review of symptoms based entirely on patient's verbal report and what writer is able to assess via camera if used during appointment.    --  Mental Status Examination    Appearance: appears stated age, clean, well groomed, casually dressed appropriate for weather   Orientation: Patient alert and oriented to person, place, time, and situation  Reliability:  Patient appears to be an adequate historian.   Behavior: Patient makes good eye contact and engages with normal rapport in the interview.   There is no evidence of responding to  "hallucinations or flashbacks.  Speech: Speech is spontaneous and coherent, with a normal rate, rhythm, and tone.    Language: There are no difficulties with expressive or receptive language as observed throughout the interview.    Mood: Described as \"anxious\".    Affect: Congruent and shows a normal range and level of reactivity.  Judgement: Able to make basic decision regarding safety.  Insight: Good awareness of physical and mental health conditions and aware of needs around care for these.  Gait and station: unable to assess   Thought process: Logical   Thought content: No evidence of delusions or paranoia.  No thoughts of self-harm or suicide. No thoughts of harming others.  Associations: Connected  Fund of knowledge: Average  Attention / Concentration: Able to remain focused during the interview with minimal distractibility or need for redirection.  Short Term Memory: Grossly intact as evidence by client recalling themes and ideas discussed.  Long Term Memory: Intact  Motor Status: No recent apparent change.  No current tremor.    --  Diagnostic Impression:  1. PTSD (post-traumatic stress disorder)  - prazosin (MINIPRESS) 1 MG capsule; Take 1 capsule (1 mg total) by mouth at bedtime.  Dispense: 30 capsule; Refill: 2  - busPIRone (BUSPAR) 10 MG tablet; Take 1 tablet (10 mg total) by mouth 2 (two) times a day.  Dispense: 60 tablet; Refill: 0    2. Nightmares associated with chronic post-traumatic stress disorder  - prazosin (MINIPRESS) 1 MG capsule; Take 1 capsule (1 mg total) by mouth at bedtime.  Dispense: 30 capsule; Refill: 2    3. ADA (generalized anxiety disorder)  - atomoxetine (STRATTERA) 40 MG capsule; Take 1 capsule (40 mg total) by mouth daily.  Dispense: 30 capsule; Refill: 2  - busPIRone (BUSPAR) 10 MG tablet; Take 1 tablet (10 mg total) by mouth 2 (two) times a day.  Dispense: 60 tablet; Refill: 0    4. Attention deficit hyperactivity disorder (ADHD), unspecified ADHD type  - atomoxetine (STRATTERA) " 40 MG capsule; Take 1 capsule (40 mg total) by mouth daily.  Dispense: 30 capsule; Refill: 2    5. Compulsive gambling in adult    --  Medical Decision-Making   Alnia Martell is a 56 y.o. female who presents for ongoing outpatient psychiatric care. Information today obtained from patient's verbal report and review of records in EHR. Transferring from previous psychiatric nurse practitioner, Kiet Lei, to writer as he is no longer working in this clinic. Established with Mariana Love for outpatient individual psychotherapy visits. Established care with writer on 1/6/21. Medically complex with current diagnoses of: has PTSD (post-traumatic stress disorder); Sleep disorder; Anxiety; Panic attack; Anemia - microcytic anemia; Morbid obesity (H); Sicca syndrome (H); Grief; Olecranon bursitis, right - bland, non-inflamed, diagnosed on 7/8/16; Chronic pain; Seropositive rheumatoid arthritis of multiple sites (H); Tendonitis of both shoulders; Cyclic citrullinated peptide (CCP) antibody positive; and Paroxysmal atrial fibrillation (H) on their problem list. Past medication trials include, but are not limited to:  Alprazolam, hydroxyzine, trazodone, Ambien, Lunesta, Guanfacine, prazosin, sertraline, duloxetine, and Prozac.    Some improvements in mood and anxiety since last visit. Positive response to prazosin for nightmares. Increased stressors and residual anxiety symptoms problematic. Reported two occurences of gambling since last visit triggered by symptoms. Discussed diagnostics, symptoms, and indicated courses of treatment. ADHD testing in progress to rule out causation of inattention. Positive response to atomoxetine for concentration/focus. Initiate buspirone to target anxiety symptoms. No other medication changes this visit. Reviewed recent lab work. No new labs indicated today.     Moderate Risk. Patient denies suicidal and homicidal ideation. Has history of suicide attempt. Not at imminent risk this  visit. Educated on need to seek emergent services should they become a risk to themselves or others. Alina Martell verbalized understanding and agreement with this safety plan.    --  Plan  1. Continue to monitor for safety  2. Current Medications  1. Continue Strattera 40mg daily for inattention and anxiety  2. Continue prazosin 1mg at HS for nightmares  3. INITIATE Buspar 10mg two times a day for anxiety  4. Non-Opioid Contract Agreement Form Signed defer unless indicated in future   5. REFILLS: n/a  1. Labs ordered this visit: n/a   2. PENDING ADHD testing completion to rule out ADHD. Continue individual psychotherapy appointments for mood stabilization and nonpharmacologic coping. Collaborate with interdisciplinary care team as needed.  3. Patient will continue abstinence from drugs and alcohol  4. Patient to return to clinic in 4-6 weeks for evaluation of medication trials and continued assessment. Ongoing patient psychoeducation regarding chronic illness and treatment.   1. Patient educated that they may schedule sooner appointment or contact writer for any worsening or lack of improvement in symptoms.   5. I reviewed the potential risks, side effects, and benefits of all medications with the patient. Patient verbalized understanding and was encouraged to call clinic with further questions or concerns.    --  START TIME: 1:00 PM  END TIME: 1:40 PM    Appointment duration: 40 minutes with > 75% spent on coordination of care and psycho-education regarding illness, symptoms, neurobiological basis of disease, gambling, alternative interventions, sleep hygiene, safety planning, care planning, and pharmacology.    Lisa Rojas, DNP, APRN, PMHNP-BC  Nurse Practitioner - Psychiatry    This medical report was made using Dragon Dictation. Spelling and grammatical errors with Dragon exist and are not intentional.

## 2021-06-17 NOTE — PROGRESS NOTES
Mental Health Psychotherapy Note     2021   Start time: 1202    Stop Time: 1252  Session # 10    Two point identification confirmed with full name and     Session Type: Patient is presenting for an Individual session.    Alina Martell is a 56 y.o. female is being seen today for    Chief Complaint   Patient presents with     MH Follow Up     Video Visit Mental Health     Telemedicine Visit: The patient's condition can be safely assessed and treated via synchronous audio and visual telemedicine encounter.      Reason for Telemedicine Visit: Services only offered telehealth    Originating Site (Patient Location): Patient's work    Distant Site (Provider Location): Provider Remote Setting    Consent:  The patient/guardian has verbally consented to: the potential risks and benefits of telemedicine (video visit) versus in person care; bill my insurance or make self-payment for services provided; and responsibility for payment of non-covered services.     As the provider I attest to compliance with applicable laws and regulations related to telemedicine.    Those present for this visit patient and therapist     Follow up in regards to ongoing symptom management of depression and anxiety.    New symptoms or complaints: None    Functional Impairment:   Personal: 3  Family: 2  Social: 2  Work: 2    Clinical assessment of mental status:   Alina Martell presented on time.   She was oriented x3, open and cooperative, and dressed appropriately for this session and weather. Her memory was Normal cognitive functioning .  Her speech was  Within normal.  Language was intact.  Concentration and focus is Within normal. Psychosis is not noted or reported. She reports her mood is continued to be worried.  Affect is congruent with speech and is Congruent w/content of speech.  Fund of knowledge is adequate. Insight is adequate for therapy.Changes made as needed for session on 2021.    Suicidal/Homicidal Ideation  present: Patient denies suicidal and homicidal ideations/means or plans.     Patient's impression of their current status: Patient indicated continuing to be worried. Patient reported a lot of her worry is related to her trauma and sex. Patient indicated she continues to go back and forth on what she is comfortable with and what is uncomfortable. Patient reported she has a hard time explaining to other people what she wants when she is unsure. Patient indicated she continues to look for housing but will live with her son's family until then.     Therapist impression of patients current state: This 56 y.o. presents with feeling worried. This therapist challenged patient on ways sex related to trauma for her. This therapist processed with patient ways she tries to have control.     Assessment tools used today include: None    Type of psychotherapeutic technique provided: Insight oriented, Client centered and CBT    Progress toward short term goals: Progress as expected with patient continuing therapy, talking about triggers to trauma and starting to confront her trauma.     Review of long term goals: Treatment Plan updated on 4/6/2021    Diagnosis:   1. PTSD (post-traumatic stress disorder)    2. Major depressive disorder, recurrent episode, moderate (H)        Plan and Follow-up: Patient will continue with twice a month therapy. Patient would benefit from continuing to work on identifying ways sex trigger's her trauma. Patient should continue to work on identifying what she feels comfortable with in a relationship.     Discharge Criteria/Planning: Patient will continue with follow-up until therapy can be discontinued without return of signs and symptoms.    Performed and documented by Mariana Love [facility]

## 2021-06-17 NOTE — TELEPHONE ENCOUNTER
Telephone Encounter by Melany Mayfield at 4/13/2021  3:02 PM     Author: Melany Mayfield Service: -- Author Type: Financial Resource Guide    Filed: 4/13/2021  3:05 PM Encounter Date: 4/13/2021 Status: Signed    : Melany Mayfield (Financial Resource Guide)       PA Initiation  Medication: Humira 40mg/0.4ml  QTY: 2 pens for 28 days  Insurance Company: [facility] [employer] ([employer])  Pharmacy Filing Rx: this plan is restricted to [facility] (I verified with PFA sup. BONG)  Filling Pharmacy Phone: NA  Filling Pharmacy Fax: NA  Start Date: 3/12/21  Additional Information: marked urgent

## 2021-06-17 NOTE — PROGRESS NOTES
MENTAL HEALTH VISIT NOTE    04/11/2018 Start time: 700  Stop Time: 755   Session # 9    Alina Martell is a 53 y.o. female is being seen today for follow up.    New symptoms or complaints: None    Functional Impairment:   Personal: 3  Family: 4  Work: 2  Social:2    Clinical assessment of mental status: Alina Martell was on time for her scheduled session. She was open and cooperative, and dressed appropriately for this session and weather. Her memory was good for short and long term.  Her speech and language was soft and concise. Concentration and focus is adequate. Psychosis is not noted or reported. She reports her mood as depressed. Affect is congruent with speech.  Fund of knowledge is adequate. Insight is adequate for therapy.     Suicidal/Homicidal Ideation present: None Reported This Session    Patient's impression of their current status: Patient indicated feeling depressed. Patient reported she feels stuck with her emotions. Patient indicated one part of her wants to let them all out and another part of her being scared. Patient reported she does not know how she will respond. Patient indicated she isolated this weekend and did not go to work Monday. Patient reported knowing she continues to hold onto a lot of shame and anger.     Therapist impression of patients current state: Patient continues to have good insight into her mental health. This therapist introduced patient to mindfulness meditation. This therapist challenged patient on what her fears are with her emotions. This therapist processed with patient ways that avoidance is not working for her and needing to allow herself to process and feel her emotions.     Type of psychotherapeutic technique provided: Insight oriented, Client centered and CBT    Progress toward short term goals: Medication management with Dr. Wei, using coping skills taught in session,  noticing mental health symptoms, and returning to scheduled session.      Review of long term goals: Treatment plan updated on 02/07/2018  Diagnosis:   1. PTSD (post-traumatic stress disorder)    2. Major depressive disorder, recurrent episode, moderate    3. Panic disorder without agoraphobia      Plan and Follow up: Patient will return in 1 week for scheduled session.  Patient will work on confronting her feeling instead of avoiding them. Patient should work on caring for herself and putting her needs first.  Patient should use coping skills taught throughout sessions to help reduce PTSD symptoms.  Patient will continue to take medication as prescribed. Patient should start identifying her fears. Patient should write a letter to her grandfather expressing her hurt. Patient should start to look at ways anger affects many aspects of her life.     Discharge Criteria/Planning: Patient will continue with follow-up until therapy can be discontinued without return of signs and symptoms.    Encounter performed and documented by SHERRIE Pantoja

## 2021-06-17 NOTE — PATIENT INSTRUCTIONS - HE
"1. START taking Strattera 80mg daily   2. Continue other medications as prescribed  3. Have your pharmacy contact us for a refill if you are running low on medications (We may ask you to come into clinic to get a refill from the nurse)  4. No alcohol or drug use  5. No driving if sedated  6. Contact the clinic with any questions or concerns   a. Phone: [phone]  b. Fax: [phone]  7. Reach out for help if you feel like hurting yourself or others:   a. [facility]: [address] [place] [address], [address] (phone: [phone])  b. [facility] Suicide Hotline: [phone]   c. Crisis Texting Line: Text \"[place]\" to [phone]  d. Call 911 or go to nearest Emergency room   8. Follow up as directed in 5-6 weeks in clinic with provider for your appointment    "

## 2021-06-17 NOTE — PROGRESS NOTES
"Telemedicine Visit: The patient's condition can be safely assessed and treated via synchronous audio and visual telemedicine encounter.      Reason for Telemedicine Visit: Patient unable to travel    Originating Site (Patient Location): Patient's place of employment    Distant Site (Provider Location): Provider Remote Setting- Home Office    Consent:  The patient/guardian has verbally consented to: the potential risks and benefits of telemedicine (video visit) versus in person care; bill my insurance or make self-payment for services provided; and responsibility for payment of non-covered services.     Mode of Communication:  Video Conference via [employer]    As the provider I attest to compliance with applicable laws and regulations related to telemedicine    Outpatient Psychiatric Follow Up    Date of Service: 4/27/2021    --  Chief Complaint: \"it's been good but busy\"     --  History of Present Illness/Client Impression of Mental Health Consult:    Alina Martell is a 56 y.o. female who presents for follow up appointment. Last visit occurred 3/31/2021. At that time initiated Buspar for anxiety and pending ADHD testing completion.     Tells writer about stress with insurance updates and medications approval. Tells writer anxiety improved with Buspar but that she feels tired taking it at current dosing. No further improvements in inattention and focus. Planning to scheduled second ADHD testing appointment. No reported gambling. Also feeling improvements in symptoms also secondary to staying busy at work and volunteering. Describes volunteering (homeless shelter, [employer] [facility], building playground, with [employer], and others) 100 hours or more annually which she is looking forward to resume now that COVID is wrapping up. Hoping to also explore peer counseling in future which could also turn in to paid position.    States mood is \"good\". Rates depression nonexistent and anxiety improved. No " sleep or energy maintenance concerns. No appetite or weight concerns. No suicidal and homicidal ideation. No overt psychosis. Denies all other psychiatric symptoms. No new physical concerns.     Medication adherence: Reviewed risk/benefits of medication , Patient able to verbalize understanding of side effects  and Patient verbally consents to taking medications  Medication side effects: fatigue/weakness  The patient was given information on medications: currently prescribed    --  [place] Prescription Monitoring Program  No worrisome pharmacy activity.     --  Clinical Outcomes Measures:  PHQ-9 Total Score: 4  ADA-7: 4    --  Current Medications:  Current Outpatient Medications   Medication Sig Dispense Refill     adalimumab (HUMIRA,CF, PEN) 40 mg/0.4 mL PnKt Inject 40 mg under the skin every 14 (fourteen) days. 1 kit 1     atomoxetine (STRATTERA) 40 MG capsule Take 1 capsule (40 mg total) by mouth daily. 30 capsule 2     busPIRone (BUSPAR) 10 MG tablet Take 1 tablet (10 mg total) by mouth 2 (two) times a day. 60 tablet 0     cetirizine (ZYRTEC) 10 MG tablet Take 1 tablet (10 mg total) by mouth daily. 30 tablet 2     cholecalciferol, vitamin D3, 1,250 mcg (50,000 unit) capsule TAKE 1 CAPSULE BY MOUTH WEEKLY 12 capsule 1     diltiazem (CARDIZEM CD) 120 MG 24 hr capsule Take 1 capsule (120 mg total) by mouth daily. 90 capsule 3     EPINEPHrine (EPIPEN 2-RADHA) 0.3 mg/0.3 mL injection Inject 0.3 mL (0.3 mg total) into the shoulder, thigh, or buttocks as needed. 2 Pre-filled Pen Syringe 0     prazosin (MINIPRESS) 1 MG capsule Take 1 capsule (1 mg total) by mouth at bedtime. 30 capsule 2     No current facility-administered medications for this visit.        --  Allergies  Allergies   Allergen Reactions     Glucosamine Anaphylaxis     Shellfish Containing Products Anaphylaxis     Sulfa (Sulfonamide Antibiotics) Itching     Rapid heart rate, itching, shortness of breath     Effexor [Venlafaxine] Palpitations     Pt B/P  was elevated      Abilify [Aripiprazole] Hives     Propoxyphene N-Acetaminophen      Sulfasalazine Hives     --  Summary of Diagnostic Studies  No visits with results within 30 Day(s) from this visit.   Latest known visit with results is:   Physical on 01/21/2021   Component Date Value Ref Range Status     Cholesterol 01/21/2021 169  <=199 mg/dL Final     Triglycerides 01/21/2021 79  <=149 mg/dL Final     HDL Cholesterol 01/21/2021 46* >=50 mg/dL Final     LDL Calculated 01/21/2021 107  <=129 mg/dL Final     Patient Fasting > 8hrs? 01/21/2021 Yes   Final     Sodium 01/21/2021 139  136 - 145 mmol/L Final     Potassium 01/21/2021 4.7  3.5 - 5.0 mmol/L Final     Chloride 01/21/2021 103  98 - 107 mmol/L Final     CO2 01/21/2021 26  22 - 31 mmol/L Final     Anion Gap, Calculation 01/21/2021 10  5 - 18 mmol/L Final     Glucose 01/21/2021 111  70 - 125 mg/dL Final     BUN 01/21/2021 14  8 - 22 mg/dL Final     Creatinine 01/21/2021 0.66  0.60 - 1.10 mg/dL Final     GFR MDRD Af Amer 01/21/2021 >60  >60 mL/min/1.73m2 Final     GFR MDRD Non Af Amer 01/21/2021 >60  >60 mL/min/1.73m2 Final     Bilirubin, Total 01/21/2021 0.4  0.0 - 1.0 mg/dL Final     Calcium 01/21/2021 9.1  8.5 - 10.5 mg/dL Final     Protein, Total 01/21/2021 7.6  6.0 - 8.0 g/dL Final     Albumin 01/21/2021 3.7  3.5 - 5.0 g/dL Final     Alkaline Phosphatase 01/21/2021 93  45 - 120 U/L Final     AST 01/21/2021 18  0 - 40 U/L Final     ALT 01/21/2021 19  0 - 45 U/L Final     Vitamin D, Total (25-Hydroxy) 01/21/2021 48.0  30.0 - 80.0 ng/mL Final     WBC 01/21/2021 8.5  4.0 - 11.0 thou/uL Final     RBC 01/21/2021 4.61  3.80 - 5.40 mill/uL Final     Hemoglobin 01/21/2021 12.7  12.0 - 16.0 g/dL Final     Hematocrit 01/21/2021 40.1  35.0 - 47.0 % Final     MCV 01/21/2021 87  80 - 100 fL Final     MCH 01/21/2021 27.5  27.0 - 34.0 pg Final     MCHC 01/21/2021 31.7* 32.0 - 36.0 g/dL Final     RDW 01/21/2021 13.3  11.0 - 14.5 % Final     Platelets 01/21/2021 340  140 -  440 thou/uL Final     MPV 01/21/2021 10.8  8.5 - 12.5 fL Final     Neutrophils % 01/21/2021 63  50 - 70 % Final     Lymphocytes % 01/21/2021 27  20 - 40 % Final     Monocytes % 01/21/2021 7  2 - 10 % Final     Eosinophils % 01/21/2021 2  0 - 6 % Final     Basophils % 01/21/2021 1  0 - 2 % Final     Immature Granulocyte % 01/21/2021 0  <=0 % Final     Neutrophils Absolute 01/21/2021 5.4  2.0 - 7.7 thou/uL Final     Lymphocytes Absolute 01/21/2021 2.3  0.8 - 4.4 thou/uL Final     Monocytes Absolute 01/21/2021 0.6  0.0 - 0.9 thou/uL Final     Eosinophils Absolute 01/21/2021 0.2  0.0 - 0.4 thou/uL Final     Basophils Absolute 01/21/2021 0.0  0.0 - 0.2 thou/uL Final     Immature Granulocyte Absolute 01/21/2021 0.0  <=0.0 thou/uL Final       --  Review of Systems  Wt Readings from Last 3 Encounters:   01/21/21 (!) 281 lb (127.5 kg)   10/07/20 (!) 277 lb (125.6 kg)   09/16/20 (!) 277 lb (125.6 kg)     Temp Readings from Last 3 Encounters:   08/12/20 98.4  F (36.9  C) (Oral)   10/16/19 98  F (36.7  C) (Oral)   10/14/19 98.5  F (36.9  C) (Oral)     BP Readings from Last 3 Encounters:   01/21/21 140/80   10/07/20 142/90   09/16/20 (!) 160/102     Pulse Readings from Last 3 Encounters:   01/21/21 82   10/07/20 83   09/16/20 68      LMP 06/01/2019  unable to assess today Pain Score: n/a  Pain Location: n/a     As noted in the subjective section above, otherwise a 10 point review of systems is negative. Limited ability to assess given virtual nature of visit. Review of symptoms based entirely on patient's verbal report and what writer is able to assess via camera if used during appointment.    --  Mental Status Examination    Appearance: appears stated age, clean, well groomed, casually dressed appropriate for weather   Orientation: Patient alert and oriented to person, place, time, and situation  Reliability:  Patient appears to be an adequate historian.   Behavior: Patient makes good eye contact and engages with normal  "rapport in the interview.   There is no evidence of responding to hallucinations or flashbacks.  Speech: Speech is spontaneous and coherent, with a normal rate, rhythm, and tone.    Language: There are no difficulties with expressive or receptive language as observed throughout the interview.    Mood: Described as \"good\".    Affect: Congruent and shows a normal range and level of reactivity.  Judgement: Able to make basic decision regarding safety.  Insight: Good awareness of physical and mental health conditions and aware of needs around care for these.  Gait and station: unable to assess   Thought process: Logical   Thought content: No evidence of delusions or paranoia.  No thoughts of self-harm or suicide. No thoughts of harming others.  Associations: Connected  Fund of knowledge: Average  Attention / Concentration: Able to remain focused during the interview with minimal distractibility or need for redirection.  Short Term Memory: Grossly intact as evidence by client recalling themes and ideas discussed.  Long Term Memory: Intact  Motor Status: No recent apparent change.  No current tremor.    --  Diagnostic Impression:  1. PTSD (post-traumatic stress disorder)  - busPIRone (BUSPAR) 10 MG tablet; Take 1 tablet (10 mg total) by mouth 2 (two) times a day.  Dispense: 60 tablet; Refill: 2    2. ADA (generalized anxiety disorder)  - busPIRone (BUSPAR) 10 MG tablet; Take 1 tablet (10 mg total) by mouth 2 (two) times a day.  Dispense: 60 tablet; Refill: 2  - atomoxetine (STRATTERA) 80 MG capsule; Take 1 capsule (80 mg total) by mouth daily.  Dispense: 30 capsule; Refill: 0    3. Attention deficit hyperactivity disorder (ADHD), unspecified ADHD type  - atomoxetine (STRATTERA) 80 MG capsule; Take 1 capsule (80 mg total) by mouth daily.  Dispense: 30 capsule; Refill: 0    --  Medical Decision-Making   Alina Martell is a 56 y.o. female who presents for ongoing outpatient psychiatric care. Information today obtained " from patient's verbal report and review of records in EHR. Transferring from previous psychiatric nurse practitioner, Kiet Lei, to writer as he is no longer working in this clinic. Established with Mariana Love for outpatient individual psychotherapy visits. Established care with writer on 1/6/21. Medically complex with current diagnoses of: has PTSD (post-traumatic stress disorder); Sleep disorder; Anxiety; Panic attack; Anemia - microcytic anemia; Morbid obesity (H); Sicca syndrome (H); Grief; Olecranon bursitis, right - bland, non-inflamed, diagnosed on 7/8/16; Chronic pain; Seropositive rheumatoid arthritis of multiple sites (H); Tendonitis of both shoulders; Cyclic citrullinated peptide (CCP) antibody positive; Paroxysmal atrial fibrillation (H); and ADA (generalized anxiety disorder) on their problem list. Past medication trials include, but are not limited to:  Alprazolam, hydroxyzine, trazodone, Ambien, Lunesta, Guanfacine, prazosin, sertraline, duloxetine, and Prozac.    Some improvements in mood and anxiety since last visit. Positive response to prazosin for nightmares. Improvement in anxiety with Buspar but side effect of tiredness. Discussed diagnostics, symptoms, and indicated courses of treatment. ADHD testing in progress to rule out causation of inattention. Positive initial response to atomoxetine for concentration/focus but no further improvements so will titrate again today. No other medication changes this visit. Reviewed recent lab work. No new labs indicated today. Discussed need for psychiatric care transition planning as writer leaving this location on 6/17/21. Recommended patient continue with ongoing psychiatric care. May consider third party referral at patient request.    Moderate Risk. Patient denies suicidal and homicidal ideation. Has history of suicide attempt. Not at imminent risk this visit. Educated on need to seek emergent services should they become a risk to themselves  or others. Alina Martell verbalized understanding and agreement with this safety plan.    --  Plan  1. Continue to monitor for safety  2. Current Medications  1. TITRATE Strattera 40mg to 80mg daily for inattention and anxiety  2. Continue prazosin 1mg at HS for nightmares  3. Continue Buspar 10mg two times a day for anxiety  4. Non-Opioid Contract Agreement Form Signed defer unless indicated in future   5. REFILLS: see above  1. Labs ordered this visit: see above   2. PENDING ADHD testing completion to rule out ADHD. Continue individual psychotherapy appointments for mood stabilization and nonpharmacologic coping. Collaborate with interdisciplinary care team as needed.  3. Patient will continue abstinence from drugs and alcohol  4. Patient to return to clinic in 5-6 weeks for evaluation of medication trials and continued assessment. Ongoing patient psychoeducation regarding chronic illness and treatment.   1. Patient educated that they may schedule sooner appointment or contact writer for any worsening or lack of improvement in symptoms.   5. I reviewed the potential risks, side effects, and benefits of all medications with the patient. Patient verbalized understanding and was encouraged to call clinic with further questions or concerns.    --  START TIME: 1:00 PM  END TIME: 1:30 PM    Appointment duration: 30 minutes with > 75% spent on coordination of care and psycho-education regarding illness, symptoms, neurobiological basis of disease, gambling, alternative interventions, sleep hygiene, safety planning, care planning, and pharmacology.    Lisa Rojas, DNP, APRN, PMHNP-BC  Nurse Practitioner - Psychiatry    This medical report was made using Dragon Dictation. Spelling and grammatical errors with Dragon exist and are not intentional.

## 2021-06-17 NOTE — PROGRESS NOTES
Outpatient Mental Health Treatment Plan  Name: Alina Martell  : 1965  MRN: [id number]    Treatment Plan: Updated Treatment Plan    Benefit and risks and alternatives have been discussed: Yes  Is this treatment appropriate with minimal intrusion/restrictions: Yes  Estimated duration of treatment: Ongoing therapy at this time  Anticipated frequency of services: Weekly  Necessity for frequency: This frequency is needed to establish therapeutic goals and for continuity of care in order to monitor progress.  Necessity for treatment: To address cognitive, behavioral, and/or emotional barriers in order to work toward goals and to improve quality of life.    Plan:   ? Depression    Goal:  Decrease average depression level from 4 to 1.  Strategies:   ?[x] Decrease social isolation  [x] Increase involvement in meaningful activities  ?[x] Discuss sleep hygiene  ?[x] Explore thoughts and expectations about self and others  ?[x] Assess for suicide risk  ?[x] Implement physical activity routine (with physician approval)  [x] Consider introduction of bibliotherapy and/or videos  [x] Continue compliance with medical treatment plan (or explore  barriers)  ?   Degree to which this is a problem (1-4): 4  Degree to which goal is met (1-4): 1    Date of next review:  2018     ? Anxiety    Goal:  Decrease average anxiety level from 4 to 1.  Strategies:   ? [x]Learn and practice relaxation techniques and other coping strategies    ? [x] Increase involvement in meaningful activities  ? [x] Discuss sleep hygiene  ? [x] Explore thoughts and expectations about self and others  ? [x] Identify and monitor triggers for panic/anxiety symptoms  ? [x] Implement physical activity routine (with physician approval)  ? [x] Consider introduction of bibliotherapy and/or videos  ? [x] Continue compliance with medical treatment plan    Degree to which this is a problem (1-4): 4  Degree to which goal is met (1-4): 2    Date of next  "review:  08/09/2018    PTSD    Goal: Identify triggers, separate the traumatic memory from the debilitating emotion associated with it.    Strategies:    [X]Learn and practice relaxation techniques and other coping strategies (e.g., thought stopping, reframing, meditation)    ? [X] Cognitive restructuring    ? [X] Discuss sleep hygiene    ? [X] Identify and change maladaptive coping behaviors    ? [X] Prolonged exposure therapy    ? [X] Identify cues and symptoms of PTSD      Degree to which this is a problem (1-4): 4  Degree to which goal is met (1-4): 3    Date of next review:  08/09/2018    Functional Impairment: 1=Not at all/Rarely 2=Some days 3=Most Days 4=Every Day    Personal: 3  Family: 3  Social: 3  Work: 3    Diagnosis:  Major depressive disorder, recurrent, moderate  Panic disorder  PTSD    Clinical assessments completed: ADA-7, PHQ-9, Mood Questionnaire current, CAGE-AID, PANSI.    STRENGTHS: Hard worker, dedication and support network    LIMITATIONS: Avoidance behaviors    Cultural Identification: Patient reported:    Race: Bi-racial    Experience of cultural bias: Patient reported experiencing cultural bias. Patient gave an example of when she was living in [place] and being told \"we don't serve [place].\" Patient reported being called the inward on multiple occasions.    Immigration/history of status: Born [place] raised in [place].   Level of acculturation: acculturated   Time orientation: middle    Social orientation/class: middle    Verbal Communication Style/languages: English    Locus of Control: inward     Persons responsible for this plan:  ? [x] Patient ? [x] Provider ?     Provider:Performed and documented by SHERRIE Pantoja    Patient Signature:____________________________________ Date: ______________       Therapist Signature: __________________________________ Date: ______________  "

## 2021-06-17 NOTE — PATIENT INSTRUCTIONS - HE
"Patient Instructions by Ken Hitchcock PA-C at 10/16/2019  9:40 AM     Author: Ken Hitchcock PA-C Service: -- Author Type: Physician Assistant    Filed: 10/16/2019 11:02 AM Encounter Date: 10/16/2019 Status: Addendum    : Ken Hitchcock PA-C (Physician Assistant)    Related Notes: Original Note by Ken Hitchcock PA-C (Physician Assistant) filed at 10/16/2019 11:01 AM       Protected weightbearing on the left knee as much as possible.  You may use crutch ambulation if you have them at home.  Continue taking the Aleve for anti-inflammatory effect and pain relief.  Ice the knee 20 minutes on 10 minutes off to avoid damage the skin each hour while awake for the first 2 days.  Elevate the left leg above level of the heart is much as possible.  Put your \"toes above the nose\".    I do think that your back is hurting from your altered gait.  We will treat you for a sciatica of the left sciatic nerve.  You may place ice topically on the area of the sciatic nerve XX minutes on each hour while awake.  Take precautions to avoid damage the skin.  Follow-up with orthopedics for reevaluation of your knee which in turn will help with your low back pain to restore your gait to normal.      Patient Education     Water on the Knee    Water on the knee is also known as knee effusion. The knee joint normally has less than 1 ounce of fluid. Injury or inflammation of the knee joint causes extra fluid to collect there. When this happens, the knee joint looks swollen and is usually painful. It may be hard to fully bend the knee.  The most common cause of water on the knee is osteoarthritis due to wear and tear on the joint cartilage. Other causes include injury to the cartilage, inflammatory arthritis such as gout or rheumatoid arthritis, and infection of the joint.  You may need a needle aspiration, if the cause of your water on the knee is not certain. This procedure removes a sample of joint fluid from the knee for testing. This is " done with a local anesthetic. Removing excess fluid may also relieve swelling and pain.  Home care    Limit your activities. Stay off the injured leg as much as possible until pain improves.    Keep your leg elevated to reduce pain and swelling. When sleeping, place a pillow under the injured leg. When sitting, support the injured leg so it is level with your waist. This is very important during the first 48 hours.    Apply an ice pack over the injured area for 15 to 20 minutes every 3 to 6 hours. You should do this for the first 24 to 48 hours. You can make an ice pack by filling a plastic bag that seals at the top with ice cubes and then wrapping it with a thin towel. Continue to use ice packs for relief of pain and swelling as needed. As the ice melts, be careful to avoid getting your wrap, splint, or cast wet. After 48 hours, apply heat(warm shower or warm bath) for 15 to 20 minutes several times a day, or alternate ice and heat. If you have to wear a hook-and-loop knee brace, you can open it to apply the ice pack, or heat, directly to the knee. Never put ice directly on the skin. Always wrap the ice in a towel or other type of cloth.    You may use over-the-counter pain medicine to control pain, unless another pain medicine was prescribed. If you have chronic liver or kidney disease or have ever had a stomach ulcer or GI bleeding, talk with your healthcare provider before using these medicines.    If crutches or a walker have been recommended, do not put weight on the injured leg until you can do so without pain. Check with your healthcare provider before returning to sports or full work duties.    If you have a hook-and-loop knee brace, you can remove it to bathe and sleep, unless told otherwise.  Follow-up care  Follow up with your healthcare provider as advised.  If you are overweight, talk to your healthcare provider about a weight loss program. The excess weight puts extra strain on your knees.  When to  seek medical advice  Call your healthcare provider right away if any of these occur:    Increasing pain, redness, or swelling of the knee    Fever of 100.4 F (38 C) or above lasting for 24 to 48 hours  Date Last Reviewed: 11/23/2015 2000-2017 [employer]. [address] [place] [address]. All rights reserved. This information is not intended as a substitute for professional medical care. Always follow your healthcare professional's instructions.           Patient Education     Sciatica    Sciatica is a condition that causes pain in the lower back that spreads down into the buttock, hip, and leg. Sometimes the leg pain can happen without any back pain. Sciatica happens when a spinal nerve is irritated or has pressure put on it as comes out of the spinal canal in the lower back. This most often happens when a bulge or rupture of a nearby spinal disk presses on the nerve. Sciatica can also be caused by a narrowing of the spinal canal (spinal stenosis) or spasm of the muscle in the buttocks that the sciatic nerve passes through (pyriform muscle). Sciatica is also called lumbar radiculopathy.  Sciatica may begin after a sudden twisting or bending force, such as in a car accident. Or it can happen after a simple awkward movement. In either case, muscle spasm often also happens. Muscle spasm makes the pain worse.  A healthcare provider makes a diagnosis of sciatica from your symptoms and a physical exam. Unless you had an injury from a car accident or fall, you usually wont have X-rays taken at this time. This is because the nerves and disks in your back cant be seen on an X-ray. If the provider sees signs of a compressed nerve, you will need to schedule an MRI scan as an outpatient. Signs of a compressed nerve include loss of strength in a leg.  Most sciatica gets better with medicine, exercise, and physical therapy. If your symptoms continue after at least 3 months of medical treatment, you may  need surgery or injections to your lower back.  Home care  Follow these tips when caring for yourself at home:    You may need to stay in bed the first few days. But as soon as possible, begin sitting up or walking. This will help you avoid problems that come from staying in bed for long periods.    When in bed, try to find a position that is comfortable. A firm mattress is best. Try lying flat on your back with pillows under your knees. You can also try lying on your side with your knees bent up toward your chest and a pillow between your knees.    Avoid sitting for long periods. This puts more stress on your lower back than standing or walking.    Use heat from a hot shower, hot bath, or heating pad to help ease pain. Massage can also help. You can also try using an ice pack. You can make your own ice pack by putting ice cubes in a plastic bag. Wrap the bag in a thin towel. Try both heat and cold to see which works best. Use the method that feels best for 20 minutes several times a day.    You may use acetaminophen or ibuprofen to ease pain, unless another pain medicine was prescribed. Note: If you have chronic liver or kidney disease, talk with your healthcare provider before taking these medicines. Also talk with your provider if youve had a stomach ulcer or gastrointestinal bleeding.    Use safe lifting methods. Dont lift anything heavier than 15 pounds until all of the pain is gone.  Follow-up care  Follow up with your healthcare provider, or as advised. You may need physical therapy or additional tests.  If X-rays were taken, a radiologist will look at them. You will be told of any new findings that may affect your care.  When to seek medical advice  Call your healthcare provider right away if any of these occur:    Pain gets worse even after taking prescribed medicine    Weakness or numbness in 1 or both legs or hips    Numbness in your groin or genital area    You cant control your bowel or  bladder    Fever    Redness or swelling over your back or spine   Date Last Reviewed: 8/1/2016 2000-2017 [employer]. [address] [place] [address], [place] [address]. All rights reserved. This information is not intended as a substitute for professional medical care. Always follow your healthcare professional's instructions.

## 2021-06-17 NOTE — PROGRESS NOTES
________________________________________  Medications Phoned  to Pharmacy [] yes [x]no  Name of Pharmacist:  List Medications, including dose, quantity and instructions    Medications ordered this visit were e-scribed.  Verified by order class [x] yes  [] no  Buspar 10 mg  Strattera 80 mg    Medication changes or discontinuations were communicated to patient's pharmacy: [] yes  [x] no    Dictation completed at time of chart check: [x] yes  [] no    I have checked the documentation for today s encounters and the above information has been reviewed and completed.

## 2021-06-17 NOTE — TELEPHONE ENCOUNTER
Telephone Encounter by Melany Mayfield at 6/2/2020  2:59 PM     Author: Melany Mayfield Service: -- Author Type: Financial Resource Guide    Filed: 6/2/2020  3:03 PM Encounter Date: 6/2/2020 Status: Signed    : Melany Mayfield (Financial Resource Guide)       Medication: Humira CF 40mg/0.4ml  QTY: 2 pens for 28 days  [facility]  Additional Information: previously had a commercial plan with [facility]

## 2021-06-17 NOTE — PROGRESS NOTES
MENTAL HEALTH VISIT NOTE    04/05/2018 Start time: 405   Stop Time: 500   Session # 8    Alina Martell is a 53 y.o. female is being seen today for follow up.    New symptoms or complaints: None    Functional Impairment:   Personal: 3  Family: 4  Work: 2  Social:2    Clinical assessment of mental status: Alina Martell was on time for her scheduled session. She was open and cooperative, and dressed appropriately for this session and weather. Her memory was good for short and long term.  Her speech and language was soft and concise. Concentration and focus is adequate. Psychosis is not noted or reported. She reports her mood as depressed. Affect is congruent with speech.  Fund of knowledge is adequate. Insight is adequate for therapy.     Suicidal/Homicidal Ideation present: None Reported This Session    Patient's impression of their current status: Patient reported continuing to feel depressed. Patient indicated realizing this time of year is hard but being unsure why specifically. Patient indicated she has thought about writing the letter to her grandfather but crying every time. Patient reported she has so much anger towards him. Patient indicated being scared that if she lets her anger out she does not know what will happen. Patient talked about ways she has noticed anger turning into depression.     Therapist impression of patients current state: Patient continues to have good insight into her mental health. This therapist challenged patient on ways she tried to avoid any negative emotions. This therapist processed with patient ways that anger can be beneficial and harmful when avoided. This therapist provided patient with a template to help her start to goodbye letter to her grandfather.     Type of psychotherapeutic technique provided: Insight oriented, Client centered and CBT    Progress toward short term goals: Medication management with Dr. Wei, using coping skills taught in session,   noticing mental health symptoms, and returning to scheduled session.     Review of long term goals: Treatment plan updated on 02/07/2018  Diagnosis:   1. PTSD (post-traumatic stress disorder)    2. Major depressive disorder, recurrent episode, moderate    3. Panic disorder without agoraphobia      Plan and Follow up: Patient will return in 3 weeks for scheduled session.  Patient will work on confronting her feeling instead of avoiding them. Patient should work on caring for herself and putting her needs first.  Patient should use coping skills taught throughout sessions to help reduce PTSD symptoms.  Patient will continue to take medication as prescribed. Patient should start identifying her fears. Patient should write a letter to her grandfather expressing her hurt. Patient should start to look at ways anger affects many aspects of her life.     Discharge Criteria/Planning: Patient will continue with follow-up until therapy can be discontinued without return of signs and symptoms.    Encounter performed and documented by SHERRIE Pantoja

## 2021-06-17 NOTE — TELEPHONE ENCOUNTER
Telephone Encounter by Melany Mayfield at 4/19/2021 11:00 AM     Author: Melany Mayfield Service: -- Author Type: Financial Resource Guide    Filed: 4/19/2021 11:00 AM Encounter Date: 4/13/2021 Status: Signed    : Melany Mayfield (Financial Resource Guide)       CORRECTED PA APPROVAL LETTER RECEIVED

## 2021-06-17 NOTE — PROGRESS NOTES
This video/telephone visit will be conducted via a call between you and your physician/provider. We have found that certain health care needs can be provided without the need for an in-person physical exam. This service lets us provide the care you need with a video /telephone conversation. If a prescription is necessary we can send it directly to your pharmacy. If lab work is needed we can place an order for that and you can then stop by our lab to have the test done at a later time.    Just as we bill insurance for in-person visits, we also bill insurance for video/telephone visits. If you have questions about your insurance coverage, we recommend that you speak with your insurance company.    Patient has given verbal consent for video/Telephone visit? Yes  Patient would like the video visit invitation sent by: [facility], if connection issues, please call:  [phone]   ANDREW/REHANA STALLINGS CMA    Patient verified allergies, medications and pharmacy via phone. PHQ: 4 and ADA: 4 done verbally with writer. Patient states she is ready for visit.

## 2021-06-17 NOTE — PROGRESS NOTES
MENTAL HEALTH VISIT NOTE    04/19/2018 Start time: 340  Stop Time: 440   Session # 10    Alina Martell is a 53 y.o. female is being seen today for follow up.    New symptoms or complaints: None    Functional Impairment:   Personal: 3  Family: 4  Work: 2  Social:2    Clinical assessment of mental status: Alina Martell was on time for her scheduled session. She was open and cooperative, and dressed appropriately for this session and weather. Her memory was good for short and long term.  Her speech and language was soft and concise. Concentration and focus is adequate. Psychosis is not noted or reported. She reports her mood as depressed. Affect is congruent with speech.  Fund of knowledge is adequate. Insight is adequate for therapy.     Suicidal/Homicidal Ideation present: None Reported This Session    Patient's impression of their current status: Patient reported continuing to feel depressed. Patient indicated that she spent the weekend isolated. Patient talked about how she used the snow storm as an excuse to not go anywhere. Patient reported knowing she needs to be around people but not making the effort. Patient indicated she is coming up with a lot of excuses as to why she can't do things or go places. Patient talked about not being sure what is causing her to feel so down. Patient indicated believing not being in school is affecting her and her identify at this time.     Therapist impression of patients current state: Patient continues to have good insight into her mental health. This therapist processed with patient needing to start making plans to get out of the house. This therapist challenged patient on ways she feels lost at this time. This therapist processed with patient ways she is struggling with where to go in life and what path she feels is best for her. This therapist processed with patient ways avoidance works in the short run but not in the long run.     Type of psychotherapeutic  technique provided: Insight oriented, Client centered and CBT    Progress toward short term goals: Medication management with Dr. Wei, using coping skills taught in session,  noticing mental health symptoms, and returning to scheduled session.     Review of long term goals: Treatment plan updated on 02/07/2018  Diagnosis:   1. PTSD (post-traumatic stress disorder)    2. Major depressive disorder, recurrent episode, moderate    3. Panic disorder without agoraphobia      Plan and Follow up: Patient will return in 1 week for scheduled session.  Patient will work on confronting her feeling instead of avoiding them. Patient should work on caring for herself and putting her needs first.  Patient should use coping skills taught throughout sessions to help reduce PTSD symptoms.  Patient will continue to take medication as prescribed. Patient should start identifying her fears. Patient should write a letter to her grandfather expressing her hurt. Patient should start to look at ways anger affects many aspects of her life.     Discharge Criteria/Planning: Patient will continue with follow-up until therapy can be discontinued without return of signs and symptoms.    Encounter performed and documented by SHERRIE Pantoja

## 2021-06-17 NOTE — TELEPHONE ENCOUNTER
Telephone Encounter by Melany Mayfield at 6/3/2020 11:53 AM     Author: Melany Mayfield Service: -- Author Type: Financial Resource Guide    Filed: 6/3/2020 12:00 PM Encounter Date: 6/2/2020 Status: Signed    : Melany Mayfield (Financial Resource Guide)       Medication: Hummira CF 40mg/0.4ml  Qty: 2 pens for 28 days  Effective Dates: 06/01/2020 - 06/03/2021  Pharmacy: [facility]   Copay Amount: $25.00  Copay Assistance: not eligible due to state insurance plan  Patient Notified? Yes, pharmacy to notify

## 2021-06-17 NOTE — PROGRESS NOTES
Mental Health Psychotherapy Note     2021   Start time: 1204    Stop Time: 1254  Session # 9    Two point identification confirmed with full name and     Session Type: Patient is presenting for an Individual session.    Alina Martell is a 56 y.o. female is being seen today for    Chief Complaint   Patient presents with     MH Follow Up     Video Visit Mental Health     Telemedicine Visit: The patient's condition can be safely assessed and treated via synchronous audio and visual telemedicine encounter.      Reason for Telemedicine Visit: Services only offered telehealth    Originating Site (Patient Location): Patient's work    Distant Site (Provider Location): Provider Remote Setting    Consent:  The patient/guardian has verbally consented to: the potential risks and benefits of telemedicine (video visit) versus in person care; bill my insurance or make self-payment for services provided; and responsibility for payment of non-covered services.     As the provider I attest to compliance with applicable laws and regulations related to telemedicine.    Those present for this visit patient and therapist     Follow up in regards to ongoing symptom management of depression and anxiety.    New symptoms or complaints: Patient indicated struggling with relationship's.     Functional Impairment:   Personal: 3  Family: 2  Social: 2  Work: 2    Clinical assessment of mental status:   Alina Martell presented on time.   She was oriented x3, open and cooperative, and dressed appropriately for this session and weather. Her memory was Normal cognitive functioning .  Her speech was  Within normal.  Language was intact.  Concentration and focus is Within normal. Psychosis is not noted or reported. She reports her mood is concerned and worried. Affect is congruent with speech and is Congruent w/content of speech.  Fund of knowledge is adequate. Insight is adequate for therapy.Changes made as needed for session on  5/4/2021.    Suicidal/Homicidal Ideation present: Patient denies suicidal and homicidal ideations/means or plans.     Patient's impression of their current status: Patient reported feeling concerned and worried. Patient talked about connecting again with her ex. Patient indicated she has been spending a lot of time with him. Patient reported believing this may be due to talking to her other ex in [place]. Patient talked about the emotions to are occurring with both people. Patient indicated trying to understand why she is reaching out to these two specific people. Patient indicated knowing that part of it is due to loneliness. Patient reported she is continuing to work on moving in with her son.     Therapist impression of patients current state: This 56 y.o. presents with feeling concerned and worried. This therapist processed with patient ways PTSD symptoms can appear. This therapist challenged patient on ways needs may not be met at this time for her. This therapist processed with patient her support network.     Assessment tools used today include: None    Type of psychotherapeutic technique provided: Insight oriented, Client centered and CBT    Progress toward short term goals: Progress as expected with patient continuing therapy, identifying connecting with her ex and concerns about behaviors.     Review of long term goals: Treatment Plan updated on 4/6/2021    Diagnosis:   1. PTSD (post-traumatic stress disorder)    2. ADA (generalized anxiety disorder)        Plan and Follow-up: Patient will continue with twice a month therapy. Patient should start to journal again. Patient would benefit from identifying needs that are not being met for her at this time.     Discharge Criteria/Planning: Patient will continue with follow-up until therapy can be discontinued without return of signs and symptoms.    Performed and documented by SHERRIE Pantoja

## 2021-06-17 NOTE — TELEPHONE ENCOUNTER
Telephone Encounter by Melany Mayfield at 4/14/2021  2:18 PM     Author: Melany Mayfield Service: -- Author Type: Financial Resource Guide    Filed: 4/14/2021  2:25 PM Encounter Date: 4/13/2021 Status: Signed    : Melany Mayfield (Financial Resource Guide)       Prior Authorization Approval  Medication: Humira 40mg/0.4ml  Qty: 2 pens for 28 days  Effective Dates: 06/04/2021 - 06/04/2022 (which means that they are still honoring the PA that was approved with her previous [employer] plan)  Reference Number: [id number]  Insurance Company: [facility] Commercial  Pharmacy: verified restricted to [facility] with DAISY Oliveira Supervisor  Expected Copay: unknown - not covered at this location  Copay Card Available: Yes  Foundation Assistance Needed/Available: no  Patient Assistance Program Needed: no  Patient Notified? Yes  Pharmacy Notified? Yes

## 2021-06-17 NOTE — TELEPHONE ENCOUNTER
Telephone Encounter by Melany Mayfield at 4/12/2021 12:22 PM     Author: Melany Mayfield Service: -- Author Type: Financial Resource Guide    Filed: 4/12/2021 12:23 PM Encounter Date: 4/12/2021 Status: Signed    : Melany Mayfield (Financial Resource Guide)       PA not required

## 2021-06-17 NOTE — TELEPHONE ENCOUNTER
Telephone Encounter by Melany Mayfield at 4/13/2021 10:24 AM     Author: Melany Mayfield Service: -- Author Type: Financial Resource Guide    Filed: 4/13/2021 10:35 AM Encounter Date: 4/13/2021 Status: Signed    : Melany Mayfield (Financial Resource Guide)       Called [employer] to initiated PA because it was stating on CMM that eligibility was not found. They will be faxing me the PA form.
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
## 2021-06-17 NOTE — PATIENT INSTRUCTIONS - HE
Patient Instructions by Estelle Bansal MD at 9/16/2019  7:00 AM     Author: Estelle Bansal MD Service: -- Author Type: Physician    Filed: 9/16/2019  7:27 AM Encounter Date: 9/16/2019 Status: Addendum    : Estelle Bansal MD (Physician)    Related Notes: Original Note by Estelle Bansal MD (Physician) filed at 9/16/2019  7:15 AM           Preventing Skin Cancer     Use sunscreen of SPF 30 or greater. Apply liberally.   Relaxing in the sun may feel good. But it isnt good for your skin. In fact, the suns harmful rays are the major cause of skin cancer. This is a serious disease that can be life-threatening. People of all ages, races, and backgrounds are at risk.  Skin cancer is the most common cancer in the [place] But in most cases, it can be prevented.  Your role in prevention  You can act today to help prevent skin cancer. Start by avoiding the suns UV (ultraviolet) rays. And dont use tanning beds or lamps. They are no safer than the sun. Taking these steps can help keep you from getting skin cancer. It can also help prevent wrinkles and other aging effects caused by the sun. Make sure your children also follow these safeguards. Now is the time to start taking steps to prevent skin cancer.  When you are outdoors  Protect your skin when you go out during the day. Take safety steps whenever you go out to eat, run errands by car or on foot, or do any outdoor activity. There isnt just one easy way to protect your skin. Its best to follow all of these steps:    Wear tightly woven clothing that covers your skin. Put on a wide-brimmed hat to protect your face, ears, and scalp.    Watch the clock. Try to stay out of the sun between 10 a.m. and 4 p.m. That's when the sun's rays are strongest.    Head for the shade or create your own. Use an umbrella when sitting or strolling.    Know that the suns rays can reflect off sand, water, and snow. This can harm your skin. Take extra care when you are near reflective  "surfaces.    Keep in mind that even when the weather is hazy or cloudy, your skin can be exposed to strong UV rays.    Shield your skin with sunscreen. Also use sunscreen on your childrens skin. Keep babies younger than 6 months old out of the sun.  Tips for using sunscreen  To help prevent skin cancer, choose the right sunscreen and use it correctly. Try these tips:    Choose a sunscreen that has an SPF (sun protection factor) of at least 30. Also choose a sunscreen labeled \"broad spectrum. This will protect you from both UVA and UVB (ultraviolet A and B) rays.    If one brand irritates your skin, try another, such as one without fragrance.    Use a water-resistant sunscreen if you swim or sweat.    Use at least 1 ounce of sunscreen to cover exposed areas. This is enough to fill a shot glass. You might need to adjust the amount depending on your body size.    Put the sunscreen on dry skin about 15 minutes before going outdoors. This gives it time to soak in.    Reapply sunscreen every 2 hours. If youre active, do this more often.    Cover any sun-exposed skin, from your face to your feet. Dont forget your scalp, ears, and lips.    Know that while sunscreen helps protect you, it isnt enough. Sunscreens extend the length of time you can be outdoors before your skin starts to get red. But they don't give you total protection. Using sunscreen doesn't mean you can stay out in the sun for an unlimited time. Your skin cells are still being damaged. You should also wear protective clothing. And try to stay out of the sun as much as you can, especially from 10 a.m. to 4 p.m.  Date Last Reviewed: 7/1/2019 2000-2019 [employer]. [address] [place] [address], [place] [address]. All rights reserved. This information is not intended as a substitute for professional medical care. Always follow your healthcare professional's instructions.      Patient Education     Prevention Guidelines, Women Ages 50 to 64  Screening " tests and vaccines are an important part of managing your health. A screening test is done to find possible disorders or diseases in people who don't have any symptoms. The goal is to find a disease early so lifestyle changes can be made and you can be watched more closely to reduce the risk of disease, or to detect it early enough to treat it most effectively. Screening tests are not considered diagnostic, but are used to determine if more testing is needed. Health counseling is essential, too. Below are guidelines for these, for women ages 50 to 64. Talk with your healthcare provider to make sure youre up to date on what you need.  Screening Who needs it How often   Type 2 diabetes or prediabetes All women beginning at age 45 and women without symptoms at any age who are overweight or obese and have 1 or more additional risk factors for diabetes. At  least every 3 years   Type 2 diabetes or prediabetes All women diagnosed with gestational diabetes Lifelong testing every 3 years   Type 2 diabetes All women with prediabetes Every year   Alcohol misuse All women in this age group At routine exams   Blood pressure All women in this age group Yearly checkup if your blood pressure is normal  Normal blood pressure is less than 120/80 mm Hg  If your blood pressure reading is higher than normal, follow the advice of your healthcare provider   Breast cancer All women at average risk in this age group Yearly mammogram should be done until age 54. At age 55, you can switch to every other year or choose to continue yearly.  All women should know the possible benefits and risks of breast cancer screening with mammograms.   Cervical cancer All women in this age group, except women who have had a complete hysterectomy Pap test every 3 years or Pap test with human papillomavirus (HPV) test every 5 years   Chlamydia Women at increased risk for infection At routine exams   Colorectal cancer All women at average risk in this age  group Multiple tests are available and are used at different times. Possible tests include:    Flexible sigmoidoscopy every 5 years, or    Colonoscopy every 10 years, or    CT colonography (virtual colonoscopy) every 5 years, or    Yearly fecal occult blood test, or    Yearly fecal immunochemical test every year, or    Stool DNA test, every 3 years  If you choose a test other than a colonoscopy and have an abnormal test result, you will need to follow up with a colonoscopy. Screening advice varies among expert groups. Talk with your healthcare provider about which tests are best for you.  Some people should be screened using a different schedule because of their personal or family health history. Talk with your healthcare provider about your health history.   Depression All women in this age group At routine exams   Gonorrhea Sexually active women at increased risk for infection At routine exams   Hepatitis C Anyone at increased risk; 1 time for those born between 1945 and 1965 At routine exams   High cholesterol or triglycerides All women in this age group who are at risk for coronary artery disease At least every 5 years   HIV All women At routine exams   Lung cancer Adults age 55 to 80 who have smoked Yearly screening in smokers with 30 pack-year history of smoking or who quit within 15 years   Obesity All women in this age group At routine exams   Osteoporosis Women who are postmenopausal Ask your healthcare provider   Syphilis Women at increased risk for infection - talk with your healthcare provider At routine exams   Tuberculosis Women at increased risk for infection - talk with your healthcare provider Ask your healthcare provider   Vision All women in this age group Ask your healthcare provider   Vaccine Who needs it How often   Chickenpox (varicella) All women in this age group who have no record of this infection or vaccine 2 doses; the second dose should be given at least 4 weeks after the first dose    Hepatitis A Women at increased risk for infection - talk with your healthcare provider 2 doses given at least 6 months apart   Hepatitis B Women at increased risk for infection - talk with your healthcare provider 3 doses over 6 months; second dose should be given 1 month after the first dose; the third dose should be given at least 2 months after the second dose and at least 4 months after the first dose   Haemophilus influenzae Type B (HIB) Women at increased risk for infection - talk with your healthcare provider 1 to 3 doses   Influenza (flu) All women in this age group Once a year   Measles, mumps, rubella (MMR) Women in this age group through their late 50s who have no record of these infections or vaccines 1 dose   Meningococcal Women at increased risk for infection - talk with your healthcare provider 1 or more doses   Pneumococcal conjugate vaccine (PCV13) and pneumococcal polysaccharide vaccine (PPSV23) Women at increased risk for infection - talk with your healthcare provider PCV13: 1 dose ages 19 to 65 (protects against 13 types of pneumococcal bacteria)  PPSV23: 1 to 2 doses through age 64, or 1 dose at 65 or older (protects against 23 types of pneumococcal bacteria)   Tetanus/diphtheria/pertussis (Td/Tdap) booster All women in this age group Td every 10 years, or a 1-time dose of Tdap instead of a Td booster after age 18, then Td every 10 years   Zoster All women ages 60 and older 1 dose   Counseling Who needs it How often   BRCA gene mutation testing for breast and ovarian cancer susceptibility Women with increased risk for having gene mutation When your risk is known   Breast cancer and chemoprevention Women at high risk for breast cancer When your risk is known   Diet and exercise Women who are overweight or obese When diagnosed, and then at routine exams   Sexually transmitted infection prevention Women at increased risk for infection - talk with your healthcare provider At routine exams   Use of  daily aspirin Women ages 55 and up in this age group who are at risk for cardiovascular health problems such as stroke When your risk is known   Use of tobacco and the health effects it can cause All women in this age group Every exam   1 American [employer] Society  Date Last Reviewed: 1/26/2016 2000-2019 [employer]. [address] [place] [address], [place] [address]. All rights reserved. This information is not intended as a substitute for professional medical care. Always follow your healthcare professional's instructions.

## 2021-06-17 NOTE — PROGRESS NOTES
MENTAL HEALTH VISIT NOTE    05/02/2018 Start time: 400  Stop Time: 500 Session # 12    Alina Martell is a 53 y.o. female is being seen today for follow up.    New symptoms or complaints: None    Functional Impairment:   Personal: 3  Family: 4  Work: 2  Social:2    Clinical assessment of mental status: Alina Martell was on time for her scheduled session. She was open and cooperative, and dressed appropriately for this session and weather. Her memory was good for short and long term.  Her speech and language was soft and concise. Concentration and focus is fair. Psychosis is not noted or reported. She reports her mood as depressed. Affect is congruent with speech.  Fund of knowledge is adequate. Insight is adequate for therapy.     Suicidal/Homicidal Ideation present: None Reported This Session    Patient's impression of their current status: Patient reported continuing to feel depressed. Patient indicated she is not sure why the depression is so severe this year. Patient reported her children coming over on Sunday and not wanting them to stay long. Patient indicated with her depression she is experiencing a lot of anger. Patient reported knowing she is trying to avoid her emotions which is not helping.     Therapist impression of patients current state: Patient continues to have good insight into her mental health. This therapist processed with patient ways that her depression is starting to take over her life. This therapist challenged patient on ways she is struggling with negative thoughts that are leading to depression.     Type of psychotherapeutic technique provided: Insight oriented, Client centered and CBT    Progress toward short term goals: Medication management with Dr. Wei, using coping skills taught in session,  noticing mental health symptoms, and returning to scheduled session.     Review of long term goals: Treatment plan updated on 02/07/2018  Diagnosis:   1. PTSD (post-traumatic  stress disorder)    2. Major depressive disorder, recurrent episode, moderate    3. Panic disorder without agoraphobia      Plan and Follow up: Patient will return in 1 week for scheduled session.  Patient will work on confronting her feeling instead of avoiding them. Patient should work on caring for herself and putting her needs first.  Patient should use coping skills taught throughout sessions to help reduce PTSD symptoms.  Patient will continue to take medication as prescribed. Patient should start identifying her fears. Patient should write a letter to her grandfather expressing her hurt. Patient should start to look at ways anger affects many aspects of her life.     Discharge Criteria/Planning: Patient will continue with follow-up until therapy can be discontinued without return of signs and symptoms.    Encounter performed and documented by SHERRIE Pantoja

## 2021-06-17 NOTE — TELEPHONE ENCOUNTER
Telephone Encounter by Melany Mayfield at 10/9/2020  8:22 AM     Author: Melany Mayfield Service: -- Author Type: Financial Resource Guide    Filed: 10/9/2020  9:01 AM Encounter Date: 10/9/2020 Status: Signed    : Melany Mayfield (Financial Resource Guide)       Medication: Humira CF 40mg/0.4ml  QTY: 2 pens for 28 days  Insurance: BCBS [place] PM[place]  Expected Copay: $25.00  Copay Card Needed? Not eligible  John Needed/Available? Not needed  Pharmacy: [facility]  Additional Information: NA

## 2021-06-18 NOTE — PROGRESS NOTES
Assessment:      Healthy female exam.    Skin lesion  Visit for screening mammogram  Visit for screening colon cancer     Plan:       Await pap smear results.  Blood tests: Total cholesterol.  Mammogram.    cologuard ordered  Referral to dermatology    Subjective:      Alina Martell is a 53 y.o. female who presents for an annual exam. The patient is sexually active. The patient participates in regular exercise: no. The patient reports that there is not domestic violence in her life.     Healthy Habits:   Regular Exercise: No  Sunscreen Use: Yes  Healthy Diet: Yes  Dental Visits Regularly: Yes  Seat Belt: Yes  Sexually active: Yes  Self Breast Exam Monthly:Yes  Hemoccults: No  Flex Sig: No  Colonoscopy: No  Lipid Profile: No  Glucose Screen: No  Prevention of Osteoporosis: No  Last Dexa: No  Guns at Home:  No      Immunization History   Administered Date(s) Administered     Influenza, seasonal,quad inj 36+ mos 10/20/2015     Tdap 1996, 10/20/2015     Immunization status: up to date and documented.    No exam data present    Gynecologic History  No LMP recorded. Patient is not currently having periods (Reason: Perimenopausal).  Contraception: none  Last Pap: . Results were: normal  Last mammogram: n/a. Results were: n/a      OB History    Para Term  AB Living   2 2 2      SAB TAB Ectopic Multiple Live Births             # Outcome Date GA Lbr Mic/2nd Weight Sex Delivery Anes PTL Lv   2 Term            1 Term                   Current Outpatient Prescriptions   Medication Sig Dispense Refill     EPIPEN 2-RADHA 0.3 mg/0.3 mL (1:1,000) atIn Inject 0.3 mg into the shoulder, thigh, or buttocks as needed.        gabapentin (NEURONTIN) 300 MG capsule 1-2 up to 4 times daily 240 capsule 5     hydroxychloroquine (PLAQUENIL) 200 mg tablet Take by mouth 2 (two) times a day.       hydrOXYzine (VISTARIL) 25 MG capsule 1 up to four times daily prn anxiety, 1-4 at first wakening prn 150 capsule 5      "leflunomide (ARAVA) 20 MG tablet Take 20 mg by mouth daily.  1     multivitamin therapeutic (THERAGRAN) tablet Take 1 tablet by mouth daily.       No current facility-administered medications for this visit.      Past Medical History:   Diagnosis Date     Cannabis use without complication 12/8/2014     Obesity      Rheumatoid arthritis 7/8/2016     Sicca syndrome      Past Surgical History:   Procedure Laterality Date     ME REMOVAL GALLBLADDER      Description: Cholecystectomy;  Recorded: 10/15/2013;     TUBAL LIGATION  1995     Shellfish containing products; Sulfa (sulfonamide antibiotics); Effexor [venlafaxine]; and Propoxyphene n-acetaminophen  Family History   Problem Relation Age of Onset     Diabetes Father      Prostate cancer Father      Chronic Kidney Disease Father      Chose against dialysis.     Drug abuse Brother      Depression Brother      Crohn's disease Mother      Total colectomy with ileostomy.     No Medical Problems Daughter      No Medical Problems Son      No Medical Problems Brother      Lupus Cousin      Social History     Social History     Marital status: Single     Spouse name: N/A     Number of children: 2     Years of education: 4     Occupational History     grad student (major: SW)      Social History Main Topics     Smoking status: Never Smoker     Smokeless tobacco: Never Used      Comment: smokes marijuana     Alcohol use No      Comment: Never an issue, she states.  7/8/16     Drug use: No      Comment: Former marijuana use, not current. 7/8/16     Sexual activity: No      Comment: tubal ligation     Other Topics Concern     Not on file     Social History Narrative       Review of Systems  Review of Systems      A complete 10 point review of systems was obtained and is negative other than what is stated in the HPI.     Objective:         Vitals:    05/21/18 0906   BP: 132/84   Pulse: 86   Weight: (!) 255 lb (115.7 kg)   Height: 5' 8.5\" (1.74 m)     Body mass index is 38.21 " kg/(m^2).    Physical  Physical Exam     Gen:  Alert, oriented  Head:  Normocephalic  Eyes:  PERRLA, EOMI  ENT:  TM normal, no erythema, no occlusion  Neck: supple, nontender, no CAD  CV:  RRR, S1+S2, no murmurs, rubs or gallops  Lung:  CTAB, good chest expansion  Abdomen:  Soft, NT, ND, + BS  Ext:  No edema, no cyanosis, strength 5/5  Int:  Warm, dry, lesion on the back, dark coloration, normal borders  Neuro:  No sensory deficits.  :  No CMT, normal vaginal area, no ulcerations, no ovarian masses noted

## 2021-06-18 NOTE — PROGRESS NOTES
MENTAL HEALTH VISIT NOTE    05/16/2018 Start time: 400  Stop Time: 500 Session # 13    Alina Martell is a 53 y.o. female is being seen today for follow up.    New symptoms or complaints: Patient reported Mother's Day going horrible.     Functional Impairment:   Personal: 3  Family: 4  Work: 2  Social:2    Clinical assessment of mental status: Alina Martell was on time for her scheduled session today. She was open and cooperative, and dressed appropriately for this session and weather. Her memory was good for short and long term.  Her speech and language was soft and concise. Concentration and focus is fair. Psychosis is not noted or reported. She reports her mood as depressed. Affect is congruent with speech.  Fund of knowledge is adequate. Insight is adequate for therapy.     Suicidal/Homicidal Ideation present: None Reported This Session    Patient's impression of their current status: Patient indicated feeling depressed. Patient reported not hearing from either of her children on Mother's Day. Patient indicated this not being a surprise from her daughter but a big surprise from her son. Patient reported continuing to be upset about the topic. Patient talked about feeling she is getting pushed aside due to not being demanding enough. Patient reported it is not who she is to demand time with her children. Patient indicated she has thought more about moving out of state for a little while. Patient talked about how this was beneficial to her in the past.     Therapist impression of patients current state: Patient continues to have good insight into her mental health. This therapist challenged patient on the emotions she felt related to her children forgetting Mother's Day. This therapist processed with patient ways to use assertive communication to get her needs met without having to be demanding. This therapist processed with patient the pros and cons of moving out of town for a period.     Type of  psychotherapeutic technique provided: Insight oriented, Client centered and CBT    Progress toward short term goals: Medication management with Dr. Wei,   noticing mental health symptoms and ways she felt hurt, and returning to scheduled session.     Review of long term goals: Treatment plan updated on 05/03/2018    Diagnosis:   1. PTSD (post-traumatic stress disorder)    2. Major depressive disorder, recurrent episode, moderate    3. Panic disorder without agoraphobia      Plan and Follow up: Patient will return in 1 week for scheduled session.  Patient will work on confronting her feeling instead of avoiding them. Patient should work on caring for herself and putting her needs first.  Patient would benefit from using assertive communication. Patient should continue to look at the pros and cons of moving out of state.     Discharge Criteria/Planning: Patient will continue with follow-up until therapy can be discontinued without return of signs and symptoms.    Encounter performed and documented by SHERRIE Pantoja

## 2021-06-18 NOTE — PROGRESS NOTES
MENTAL HEALTH VISIT NOTE    2018 Start time: 400  Stop Time: 500 Session # 15    Alina Martell is a 53 y.o. female is being seen today for follow up.    New symptoms or complaints: None    Functional Impairment:   Personal: 3  Family: 4  Work: 2  Social:2    Clinical assessment of mental status: Alina Martell was on time.. She was open and cooperative, and dressed appropriately for this session and weather. Her memory was good for short and long term.  Her speech and language was soft and monotone.  Concentration and focus is fair. Psychosis is not noted or reported. She reports her mood as depressed. Affect is congruent with speech.  Fund of knowledge is adequate. Insight is adequate for therapy.     Suicidal/Homicidal Ideation present: None Reported This Session    Patient's impression of their current status: Patient reported feeling down. Patient indicated the  for her friends daughter being this last weekend and patient did not go. Patient reported spending money instead. Patient indicated she goes back and forth as to if she should have taken time off work and went. Patient reported she has continued to feel down. Patient indicated not being sure why it has been so hard for her mood to change. Patient reported usually summer is the time when she notices an improvement in her mood.     Therapist impression of patients current state: Patient continues to have good insight into her mental health. This therapist processed with patient her emotions related to not attending the . This therapist challenged patient on ways she is struggling to make changes that would help to improve her mood. This therapist processed with patient needing to start being around positive people and getting out of her house.     Type of psychotherapeutic technique provided: Insight oriented, Client centered and CBT    Progress toward short term goals: Progress as expected noticing mental health symptoms and  returning to scheduled session. Poor progress as evidenced by continuing to isolate.     Review of long term goals: Treatment plan updated on 05/03/2018    Diagnosis:   1. PTSD (post-traumatic stress disorder)    2. Major depressive disorder, recurrent episode, moderate    3. Panic disorder without agoraphobia      Plan and Follow up: Patient will return in 1 week for scheduled session.   Patient should work on caring for herself and putting her needs first.  Patient would benefit from using assertive communication. Patient should work on getting out to be around positive people and not isolating.     Discharge Criteria/Planning: Patient will continue with follow-up until therapy can be discontinued without return of signs and symptoms.    Encounter performed and documented by SHERRIE Pantoja

## 2021-06-18 NOTE — PROGRESS NOTES
MENTAL HEALTH VISIT NOTE    2018 Start time: 400  Stop Time: 500 Session # 13    Alina Martell is a 53 y.o. female is being seen today for follow up.    New symptoms or complaints: Patient reported Mother's Day going horrible.     Functional Impairment:   Personal: 3  Family: 4  Work: 2  Social:2    Clinical assessment of mental status: Alina Martell was on time for her scheduled session today. She was open and cooperative, and dressed appropriately for this session and weather. Her memory was good for short and long term.  Her speech and language was soft and concise. Concentration and focus is fair. Psychosis is not noted or reported. She reports her mood as depressed. Affect is congruent with speech.  Fund of knowledge is adequate. Insight is adequate for therapy.     Suicidal/Homicidal Ideation present: None Reported This Session    Patient's impression of their current status: Patient reported feeling sad. Patient indicated her daughter friend passed away. Patient reported she was very close with the girl. Patient indicated knowing she was going to pass away due to her health but it still being hard. Patient reported being unsure if she will go to the  or send money. Patient talked about the memories she has with the girl. Patient indicated knowing she is not in pain yet still finding it hard to let go.    Therapist impression of patients current state: Patient continues to have good insight into her mental health. This therapist processed with patient grief and loss. This therapist processed with patient ways her emotions are normal and what to expect in the upcoming weeks.      Type of psychotherapeutic technique provided: Insight oriented, Client centered and CBT    Progress toward short term goals: Medication management with Dr. Wei, noticing mental health symptoms, and returning to scheduled session.     Review of long term goals: Treatment plan updated on  05/03/2018    Diagnosis:   1. PTSD (post-traumatic stress disorder)    2. Major depressive disorder, recurrent episode, moderate    3. Panic disorder without agoraphobia      Plan and Follow up: Patient will return in 1 week for scheduled session.  Patient will work on confronting her feeling instead of avoiding them. Patient should work on caring for herself and putting her needs first.  Patient would benefit from using assertive communication. Patient should continue to grief the loss of her friend's daughter.     Discharge Criteria/Planning: Patient will continue with follow-up until therapy can be discontinued without return of signs and symptoms.    Encounter performed and documented by SHERRIE Pantoja

## 2021-06-18 NOTE — PROGRESS NOTES
MENTAL HEALTH VISIT NOTE    06/07/2018 Start time: 400  Stop Time: 500 Session # 16    Alina Martell is a 53 y.o. female is being seen today for follow up.    New symptoms or complaints: None    Functional Impairment:   Personal: 3  Family: 4  Work: 2  Social:2    Clinical assessment of mental status: Alina Martell was on time for scheduled session. She was open and cooperative, and dressed appropriately for this session and weather. Her memory was good for short and long term.  Her speech and language was soft and monotone.  Concentration and focus is fair. Psychosis is not noted or reported. She reports her mood as down.  Affect is congruent with speech.  Fund of knowledge is adequate. Insight is adequate for therapy.     Suicidal/Homicidal Ideation present: None Reported This Session    Patient's impression of their current status: Patient indicated continuing to feel down. Patient reported needing to get out of town and contacting a friend. Patient indicated having some impulses that occurred while with the friend. Patient talked about her struggles with sex. Patient indicated feeling that sex is unhealthy in her life and not knowing how to find that balance. Patient reported this week she has been pushing stressors aside and not allowing them to bring her down. Patient indicated she is thinking about getting involved at work with finding places for them to volunteer.     Therapist impression of patients current state: Patient continues to have good insight into her mental health. This therapist challenged patient on ways she has struggled with healthy intimacy throughout her life. This therapist processed with patient ways she view intamacy in different ways. This therapist processed with patient the importance of continuing to be involved in programs to help improve her mood and decrease isolation.     Type of psychotherapeutic technique provided: Insight oriented, Client centered and  CBT    Progress toward short term goals: Progress as expected noticing mental health symptoms, getting out of the house and returning to scheduled session.    Review of long term goals: Treatment plan updated on 05/03/2018    Diagnosis:   1. PTSD (post-traumatic stress disorder)    2. Major depressive disorder, recurrent episode, moderate    3. Panic disorder without agoraphobia      Plan and Follow up: Patient will return in 1 week for scheduled session.   Patient should work on caring for herself and putting her needs first.  Patient would benefit from using assertive communication. Patient should work on getting out to be around positive people and not isolating. Patient would benefit from starting to identify her struggles with intimacy.     Discharge Criteria/Planning: Patient will continue with follow-up until therapy can be discontinued without return of signs and symptoms.    Encounter performed and documented by SHERRIE Pantoja

## 2021-06-18 NOTE — PROGRESS NOTES
MENTAL HEALTH VISIT NOTE    06/27/2018 Start time: 400  Stop Time: 500 Session # 19    Alina Martell is a 53 y.o. female is being seen today for follow up.    New symptoms or complaints: None    Functional Impairment:   Personal: 3  Family: 4  Work: 2  Social:2    Clinical assessment of mental status: Alina Martell was on time for scheduled session. She was open and cooperative, and dressed appropriately for this session and weather. Her memory was good for short and long term.  Her speech and language was soft and monotone.  Concentration and focus is poor to fair Psychosis is not noted or reported. She reports her mood as depressed.  Affect is congruent with speech.  Fund of knowledge is adequate. Insight is adequate for therapy.     Suicidal/Homicidal Ideation present: None Reported This Session    Patient's impression of their current status: Patient reported feeling depressed. Patient indicated she did go out this weekend and noticed that helped her mood. Patient reported she knows that going out is beneficial for her and she is trying to push herself more. 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 Patient reported this means stepping away from her core group of friends. Patient indicated she continues to struggle with her mom not noticing signs when she was younger that something was wrong.     Therapist impression of patients current state: Patient continues to have good insight into her mental health. This therapist processed with patient ways pushing herself to be more active is healthy. This therapist challenged patient to start meeting new people who have similar interests as here. This therapist processed with patient way to work on finding dariusz.     Type of psychotherapeutic technique provided: Insight oriented, Client centered and CBT    Progress toward short term goals: Progress as expected noticing mental health symptoms and  returning to scheduled session. Poor progress as evidenced by patient isolating and avoiding tough topics.     Review of long term goals: Treatment plan updated on 05/03/2018    Diagnosis:   1. PTSD (post-traumatic stress disorder)    2. Major depressive disorder, recurrent episode, moderate    3. Panic disorder without agoraphobia      Plan and Follow up: Patient will return in 1 week for scheduled session.   Patient should work on caring for herself and putting her needs first.  Patient would benefit from using assertive communication. Patient should work on getting out to be around positive people and not isolating. Patient would benefit from starting to identify ways she feels stuck due to not wanting to confront tough topics. Patient benefit from starting to work on meeting new people.     Discharge Criteria/Planning: Patient will continue with follow-up until therapy can be discontinued without return of signs and symptoms.    Encounter performed and documented by SHERRIE Pantoja

## 2021-06-18 NOTE — PROGRESS NOTES
"MENTAL HEALTH VISIT NOTE    06/13/2018 Start time: 400  Stop Time: 500 Session # 17    Alina Martell is a 53 y.o. female is being seen today for follow up.    New symptoms or complaints: None    Functional Impairment:   Personal: 3  Family: 4  Work: 2  Social:2    Clinical assessment of mental status: Alina Martell was on time for scheduled session today. She was open and cooperative, and dressed appropriately for this session and weather. Her memory was good for short and long term.  Her speech and language was soft and monotone.  Concentration and focus is poor to fair Psychosis is not noted or reported. She reports her mood as depressed.  Affect is congruent with speech.  Fund of knowledge is adequate. Insight is adequate for therapy.     Suicidal/Homicidal Ideation present: None Reported This Session    Patient's impression of their current status: Patient reported feeling depressed. Patient indicated this has been another hard week for her. Patient reported she spent most of the weekend isolating and thinking. Patient talked about wanting to be \"normal\". Patient reported she views that as not having depressing thoughts all day long, being able to go out and be social and letting go of shame. Patient talked about ways she puts herself into a category where she does not feel she is good enough. Patient reported due to this she pushes people away specifically in relationships.     Therapist impression of patients current state: Patient continues to have good insight into her mental health. This therapist processed with patient ways she is struggling with her negative thoughts. This therapist challenged patient on all or none thinking. This therapist recommended the book \"The power of imperfection\". This therapist challenged patient on ways she notices isolation causing her to feel more depressed.     Type of psychotherapeutic technique provided: Insight oriented, Client centered and CBT    Progress " toward short term goals: Progress as expected noticing mental health symptoms and returning to scheduled session. Poor progress as evidenced by patient isolating.     Review of long term goals: Treatment plan updated on 05/03/2018    Diagnosis:   1. PTSD (post-traumatic stress disorder)    2. Major depressive disorder, recurrent episode, moderate    3. Panic disorder without agoraphobia      Plan and Follow up: Patient will return in 1 week for scheduled session.   Patient should work on caring for herself and putting her needs first.  Patient would benefit from using assertive communication. Patient should work on getting out to be around positive people and not isolating. Patient would benefit from starting to identify her struggles with intimacy.     Discharge Criteria/Planning: Patient will continue with follow-up until therapy can be discontinued without return of signs and symptoms.    Encounter performed and documented by SHERRIE Pantoja

## 2021-06-19 NOTE — PROGRESS NOTES
MENTAL HEALTH VISIT NOTE    07/11/2018 Start time: 400  Stop Time: 500 Session # 20    Alina Martell is a 53 y.o. female is being seen today for follow up.    New symptoms or complaints: None    Functional Impairment:   Personal: 3  Family: 4  Work: 2  Social:2    Clinical assessment of mental status: Alina Martell was on time for scheduled session today. She was open and cooperative, and dressed appropriately for this session and weather. Her memory was good for short and long term.  Her speech and language was soft and monotone.  Concentration and focus is poor to fair Psychosis is not noted or reported. She reports her mood as depressed.  Affect is congruent with speech.  Fund of knowledge is adequate. Insight is adequate for therapy.     Suicidal/Homicidal Ideation present: None Reported This Session    Patient's impression of their current status: Patient indicated feeling depressed. Patient reported noticing a slight improvement in her depression. Patient indicated the last two weeks she has been working hard to be active and realizes this is playing a role in helping her mood. Patient reported knowing deep down that she needs to stay active but tends to get stuck. Patient indicated not wanting to get back into the deep depression she has been experiencing in the [place]. Patient reported knowing shame and guilt play into her depression.     Therapist impression of patients current state: Patient continues to have good insight into her mental health. This therapist challenged patient on ways she tries to ignore signs that her depression is a problem. This therapist processed with patient the struggles of getting out and being active. This therapist challenged patient on ways she feeling shame over still being in therapy and not moving forward faster.     Type of psychotherapeutic technique provided: Insight oriented, Client centered and CBT    Progress toward short term goals: Progress as expected  noticing mental health symptoms and returning to scheduled session. Poor progress as evidenced by patient isolating and avoiding tough topics.     Review of long term goals: Treatment plan updated on 05/03/2018    Diagnosis:   1. PTSD (post-traumatic stress disorder)    2. Major depressive disorder, recurrent episode, moderate    3. Panic disorder without agoraphobia      Plan and Follow up: Patient will return in 1 week for scheduled session.   Patient should work on caring for herself and putting her needs first.  Patient would benefit from using assertive communication. Patient should work on getting out to be around positive people and not isolating. Patient would benefit from starting to identify ways she feels stuck due to not wanting to confront tough topics. Patient benefit from starting to work on meeting new people.     Discharge Criteria/Planning: Patient will continue with follow-up until therapy can be discontinued without return of signs and symptoms.    Encounter performed and documented by SHERRIE Pantoja

## 2021-06-19 NOTE — PROGRESS NOTES
Mental Health Visit Note    08/15/2018    Start time: 400    Stop Time: 500   Session # 24    Session Type: Patient is presenting for an Individual session.    Alina Martell is a 53 y.o. female is being seen today for    Chief Complaint   Patient presents with      Follow Up     New symptoms or complaints: None    Functional Impairment:   Personal: 3  Family: 3  Work: 1  Social:2    Clinical assessment of mental status: Alina Martell was on time for scheduled session today. She was open and cooperative, and dressed appropriately for this session and weather. Her memory was good for short and long term.  Her speech and language was soft and monotone.  Concentration and focus is fair.  Psychosis is not noted or reported. She reports her mood as anxious.  Affect is congruent with speech.  Fund of knowledge is adequate. Insight is adequate for therapy.     Suicidal/Homicidal Ideation present: None Reported This Session    Patient's impression of their current status: Patient reported continuing to feel anxious. Patient indicated there are things that have occurred in her life that she has never talked about with this therapist. Patient reported one of them includes self-harm. Patient indicated this was something that she did after the trauma occurred with her grandfather. Patient reported she is able to see now it was a way to not feel the pain from her grandfather. Patient indicated she was able to stop the self-harm. Patient reported she believes eventually it turned into a eating disorder.     Therapist impression of patients current state: Patient appears to have good insight into her mental health. This therapist processed with patient the self-harm and ways this continues to effect her today. This therapist challenged patient on ways this has affected her relationships due to feeling insecure.     Type of psychotherapeutic technique provided: Insight oriented, Client centered and CBT    Progress  toward short term goals:Progress as expected with patient coming to scheduled session, using coping skills and continuing medication management.     Review of long term goals: Treatment Plan updated on 08/08/2018    Diagnosis:   1. PTSD (post-traumatic stress disorder)    2. Major depressive disorder, recurrent episode, moderate (H)    3. Panic disorder without agoraphobia      Plan and Follow up: Patient will return in one week for scheduled session. Patient would benefit from starting to identify ways she can help her family and ways she needs to let go. Patient should continue to work on her own self-care. Patient would benefit from identifying ways she is uncomfortable in her own body.     Discharge Criteria/Planning: Patient will continue with follow-up until therapy can be discontinued without return of signs and symptoms.    Mariana Love

## 2021-06-19 NOTE — PROGRESS NOTES
Mental Health Visit Note    08/08/2018    Start time: 400    Stop Time: 500   Session # 23    Session Type: Patient is presenting for an Individual session.    Alina Martell is a 53 y.o. female is being seen today for    Chief Complaint   Patient presents with      Follow Up     New symptoms or complaints: Patient indicated having multiple stressors occur recently.     Functional Impairment:   Personal: 3  Family: 3  Work: 1  Social:2    Clinical assessment of mental status: Alina Martell was on time for scheduled session today. She was open and cooperative, and dressed appropriately for this session and weather. Her memory was good for short and long term.  Her speech and language was soft and monotone.  Concentration and focus is fair.  Psychosis is not noted or reported. She reports her mood as anxious.  Affect is congruent with speech.  Fund of knowledge is adequate. Insight is adequate for therapy.     Suicidal/Homicidal Ideation present: None Reported This Session    Patient's impression of their current status: Patient indicated feeling anxious. Patient reported having multiple stressors happening recently. Patient indicated her brother is continuing to use drugs which is now affecting her. Patient talked about living together and his use being a trigger to her PTSD. Patient reported her mother's  struggling with addiction as well. Patient indicated being worried about her mom and now checking in on her daily. Patient reported being very upset by her mom's  and his actions. Patient indicated feeling she is now in the middle of many situations when she has tried to remove herself.    Therapist impression of patients current state: Patient appears to have good insight into her mental health. This therapist processed with patient ways her brother's use can be triggering to her trauma and her past. This therapist challenged patient on setting boundaries with her brother. This therapist  processed with patient ways she can be there for her mom while still taking care of herself.     Type of psychotherapeutic technique provided: Insight oriented, Client centered and CBT    Progress toward short term goals:Progress as expected with patient coming to scheduled session, using coping skills and continuing medication management.     Review of long term goals: Treatment Plan updated on 08/08/2018    Diagnosis:   1. PTSD (post-traumatic stress disorder)    2. Major depressive disorder, recurrent episode, moderate (H)    3. Panic disorder without agoraphobia        Plan and Follow up: Patient will return in one week for scheduled session. Patient would benefit from starting to identify ways she can help her family and ways she needs to let go. Patient should continue to work on her own self-care.     Discharge Criteria/Planning: Patient will continue with follow-up until therapy can be discontinued without return of signs and symptoms.    Mariana Love

## 2021-06-19 NOTE — PROGRESS NOTES
MENTAL HEALTH VISIT NOTE    07/25/2018 Start time: 400  Stop Time: 500 Session # 22    Alina Martell is a 53 y.o. female is being seen today for follow up.    New symptoms or complaints: None    Functional Impairment:   Personal: 3  Family: 4  Work: 2  Social:2    Clinical assessment of mental status: Alina Martell was on time for scheduled session today. She was open and cooperative, and dressed appropriately for this session and weather. Her memory was good for short and long term.  Her speech and language was soft and monotone.  Concentration and focus is fair.  Psychosis is not noted or reported. She reports her mood as down.  Affect is congruent with speech.  Fund of knowledge is adequate. Insight is adequate for therapy.     Suicidal/Homicidal Ideation present: None Reported This Session    Patient's impression of their current status: Patient indicated feeling a little down. Patient reported she has worked on processing the forgiveness she has not allowed herself. Patient indicated this being a big step forward for her. Patient reported she has thought more about ways she struggles to let go. Patient indicated she is starting to talk more about things that she has kept personal. Patient reported she is in a good place at the moment but knowing that can change fast and needing to continue working on improving coping skills.     Therapist impression of patients current state: Patient continues to have good insight into her mental health. This therapist challenged patient on ways she has not allowed herself forgiveness due to her shame and guilt. This therapist processed what it would look like in her life to forgive herself. This therapist processed with patient moving forward.     Type of psychotherapeutic technique provided: Insight oriented, Client centered and CBT    Progress toward short term goals: Progress as expected noticing mental health symptoms, completing homework and returning to  scheduled session.     Review of long term goals: Treatment plan updated on 05/03/2018    Diagnosis:   1. PTSD (post-traumatic stress disorder)    2. Major depressive disorder, recurrent episode, moderate    3. Panic disorder without agoraphobia      Plan and Follow up: Patient will return in 1 week for scheduled session.   Patient should work on caring for herself and putting her needs first.  Patient would benefit from using assertive communication. Patient should work on getting out to be around positive people and not isolating. Patient would benefit from starting to identify ways she feels stuck due to not wanting to confront tough topics. Patient benefit from starting to work on meeting new people. Patient should continue to work on forgiveness.     Discharge Criteria/Planning: Patient will continue with follow-up until therapy can be discontinued without return of signs and symptoms.    Encounter performed and documented by SHERRIE Pantoja

## 2021-06-19 NOTE — PROGRESS NOTES
Outpatient Mental Health Treatment Plan  Name: Alina Martell  : 1965  MRN: [id number]    Treatment Plan: Updated Treatment Plan    Benefit and risks and alternatives have been discussed: Yes  Is this treatment appropriate with minimal intrusion/restrictions: Yes  Estimated duration of treatment: Ongoing therapy at this time  Anticipated frequency of services: Weekly  Necessity for frequency: This frequency is needed to establish therapeutic goals and for continuity of care in order to monitor progress.  Necessity for treatment: To address cognitive, behavioral, and/or emotional barriers in order to work toward goals and to improve quality of life.    Plan:   ? Depression    Goal:  Decrease average depression level from 4 to 1.  Strategies:   ?[x] Decrease social isolation  [x] Increase involvement in meaningful activities  ?[x] Discuss sleep hygiene  ?[x] Explore thoughts and expectations about self and others  ?[x] Assess for suicide risk  ?[x] Implement physical activity routine (with physician approval)  [x] Consider introduction of bibliotherapy and/or videos  [x] Continue compliance with medical treatment plan (or explore  barriers)  ?   Degree to which this is a problem (1-4): 4  Degree to which goal is met (1-4): 2    Date of next review:  2018     ? Anxiety    Goal:  Decrease average anxiety level from 4 to 1.  Strategies:   ? [x]Learn and practice relaxation techniques and other coping strategies    ? [x] Increase involvement in meaningful activities  ? [x] Discuss sleep hygiene  ? [x] Explore thoughts and expectations about self and others  ? [x] Identify and monitor triggers for panic/anxiety symptoms  ? [x] Implement physical activity routine (with physician approval)  ? [x] Consider introduction of bibliotherapy and/or videos  ? [x] Continue compliance with medical treatment plan    Degree to which this is a problem (1-4): 4  Degree to which goal is met (1-4): 2    Date of next  "review:  11/09/2018    PTSD    Goal: Identify triggers, separate the traumatic memory from the debilitating emotion associated with it.    Strategies:    [X]Learn and practice relaxation techniques and other coping strategies (e.g., thought stopping, reframing, meditation)    ? [X] Cognitive restructuring    ? [X] Discuss sleep hygiene    ? [X] Identify and change maladaptive coping behaviors    ? [X] Prolonged exposure therapy    ? [X] Identify cues and symptoms of PTSD      Degree to which this is a problem (1-4): 4  Degree to which goal is met (1-4): 3    Date of next review:  11/09/2018    Functional Impairment: 1=Not at all/Rarely 2=Some days 3=Most Days 4=Every Day    Personal: 3  Family: 3  Social: 3  Work: 3    Diagnosis:  Major depressive disorder, recurrent, moderate  Panic disorder  PTSD    Clinical assessments completed: ADA-7, PHQ-9, Mood Questionnaire current, CAGE-AID, PANSI.    STRENGTHS: Hard worker, dedication and support network    LIMITATIONS: Avoidance behaviors    Cultural Identification: Patient reported:    Race: Bi-racial    Experience of cultural bias: Patient reported experiencing cultural bias. Patient gave an example of when she was living in [place] and being told \"we don't serve Nig[place].\" Patient reported being called the inward on multiple occasions.    Immigration/history of status: Born [place] raised in [place].   Level of acculturation: acculturated   Time orientation: middle    Social orientation/class: middle    Verbal Communication Style/languages: English    Locus of Control: inward     Persons responsible for this plan:  ? [x] Patient ? [x] Provider ?     Provider:Performed and documented by SHERRIE Pantoja    Patient Signature:____________________________________ Date: ______________       Therapist Signature: __________________________________ Date: ______________  "

## 2021-06-19 NOTE — LETTER
Letter by Estelle Bansal MD at      Author: Estelle Bansal MD Service: -- Author Type: --    Filed:  Encounter Date: 9/19/2019 Status: (Other)         Alina Martell  [address] [place] [address]             September 19, 2019         Dear Ms. Martell,    Below are the results from your recent visit:    Resulted Orders   Lipid Cascade- FASTING   Result Value Ref Range    Cholesterol 175 <=199 mg/dL    Triglycerides 94 <=149 mg/dL    HDL Cholesterol 41 (L) >=50 mg/dL    LDL Calculated 115 <=129 mg/dL    Patient Fasting > 8hrs? Yes    Comprehensive Metabolic Panel   Result Value Ref Range    Sodium 141 136 - 145 mmol/L    Potassium 4.7 3.5 - 5.0 mmol/L    Chloride 105 98 - 107 mmol/L    CO2 28 22 - 31 mmol/L    Anion Gap, Calculation 8 5 - 18 mmol/L    Glucose 100 70 - 125 mg/dL    BUN 12 8 - 22 mg/dL    Creatinine 0.67 0.60 - 1.10 mg/dL    GFR MDRD Af Amer >60 >60 mL/min/1.73m2    GFR MDRD Non Af Amer >60 >60 mL/min/1.73m2    Bilirubin, Total 0.3 0.0 - 1.0 mg/dL    Calcium 9.8 8.5 - 10.5 mg/dL    Protein, Total 7.2 6.0 - 8.0 g/dL    Albumin 3.5 3.5 - 5.0 g/dL    Alkaline Phosphatase 104 45 - 120 U/L    AST 13 0 - 40 U/L    ALT 10 0 - 45 U/L    Narrative    Fasting Glucose reference range is 70-99 mg/dL per  American Diabetes Association (ADA) guidelines.   Vitamin D, Total (25-Hydroxy)   Result Value Ref Range    Vitamin D, Total (25-Hydroxy) 24.2 (L) 30.0 - 80.0 ng/mL    Narrative    Deficiency <10.0 ng/mL  Insufficiency 10.0-29.9 ng/mL  Sufficiency 30.0-80.0 ng/mL  Toxicity (possible) >100.0 ng/mL   HM1 (CBC with Diff)   Result Value Ref Range    WBC 9.3 4.0 - 11.0 thou/uL    RBC 4.71 3.80 - 5.40 mill/uL    Hemoglobin 12.1 12.0 - 16.0 g/dL    Hematocrit 40.7 35.0 - 47.0 %    MCV 86 80 - 100 fL    MCH 25.7 (L) 27.0 - 34.0 pg    MCHC 29.7 (L) 32.0 - 36.0 g/dL    RDW 14.6 (H) 11.0 - 14.5 %    Platelets 327 140 - 440 thou/uL    MPV 11.6 8.5 - 12.5 fL    Neutrophils % 70 50 - 70 %    Lymphocytes % 22 20  - 40 %    Monocytes % 7 2 - 10 %    Eosinophils % 1 0 - 6 %    Basophils % 0 0 - 2 %    Neutrophils Absolute 6.5 2.0 - 7.7 thou/uL    Lymphocytes Absolute 2.0 0.8 - 4.4 thou/uL    Monocytes Absolute 0.6 0.0 - 0.9 thou/uL    Eosinophils Absolute 0.1 0.0 - 0.4 thou/uL    Basophils Absolute 0.0 0.0 - 0.2 thou/uL        Vitamin d level still low and recommend supplement - will call in.  Other labs stable    Please call with questions or contact us using [email or web address].    Sincerely,        Electronically signed by Estelle Bansal MD

## 2021-06-19 NOTE — PROGRESS NOTES
MENTAL HEALTH VISIT NOTE    07/18/2018 Start time: 400  Stop Time: 500 Session # 21    Alina Martell is a 53 y.o. female is being seen today for follow up.    New symptoms or complaints: None    Functional Impairment:   Personal: 3  Family: 4  Work: 2  Social:2    Clinical assessment of mental status: Alina Martell was on time for scheduled session. She was open and cooperative, and dressed appropriately for this session and weather. Her memory was good for short and long term.  Her speech and language was soft and monotone.  Concentration and focus is poor to fair Psychosis is not noted or reported. She reports her mood as down.  Affect is congruent with speech.  Fund of knowledge is adequate. Insight is adequate for therapy.     Suicidal/Homicidal Ideation present: None Reported This Session    Patient's impression of their current status: Patient reported feeling down. Patient indicated she has continued to work on being active and getting out. Patient reported she knows one of her struggles is her self-worth. Patient indicated when is around her family noticing her self-worth is not good. Patient reported wanting them to admit that their grandpa was not a good person. Patient indicated she is also struggling with forgiving herself for the trauma that happened with her daughter's dad. Patient reported part of her believing she is waiting for her daughter to forgive her.     Therapist impression of patients current state: Patient continues to have good insight into her mental health. This therapist processed with patient ways she views her self-worth. This therapist challenged patient on situations where she does not struggle with her self-worth and places where she does. This therapist challenged patient on how she can move forward without her daughter forgiving her or her cousins admitting their grandpa was not a nice person.     Type of psychotherapeutic technique provided: Insight oriented, Client  centered and CBT    Progress toward short term goals: Progress as expected noticing mental health symptoms, completing homework and returning to scheduled session.     Review of long term goals: Treatment plan updated on 05/03/2018    Diagnosis:   1. PTSD (post-traumatic stress disorder)    2. Major depressive disorder, recurrent episode, moderate    3. Panic disorder without agoraphobia      Plan and Follow up: Patient will return in 1 week for scheduled session.   Patient should work on caring for herself and putting her needs first.  Patient would benefit from using assertive communication. Patient should work on getting out to be around positive people and not isolating. Patient would benefit from starting to identify ways she feels stuck due to not wanting to confront tough topics. Patient benefit from starting to work on meeting new people.     Discharge Criteria/Planning: Patient will continue with follow-up until therapy can be discontinued without return of signs and symptoms.    Encounter performed and documented by SHERRIE Pantoja

## 2021-06-20 NOTE — PROGRESS NOTES
Mental Health Visit Note    08/29/2018    Start time: 400    Stop Time: 500   Session # 26    Session Type: Patient is presenting for an Individual session.    Alina Martell is a 53 y.o. female is being seen today for    Chief Complaint   Patient presents with     MH Follow Up     New symptoms or complaints: None    Functional Impairment:   Personal: 3  Family: 3  Work: 1  Social:2    Clinical assessment of mental status: Alina Martell was on time for scheduled session today. She was open and cooperative, and dressed appropriately for this session and weather. Her memory was good for short and long term.  Her speech and language was soft and monotone.  Concentration and focus is fair.  Psychosis is not noted or reported. She reports her mood as down.  Affect is congruent with speech.  Fund of knowledge is adequate. Insight is adequate for therapy.     Suicidal/Homicidal Ideation present: None Reported This Session    Patient's impression of their current status: Patient reported feeling down. Patient indicated getting all the testing done for her lymph nods and them not having a clear answer as to why they are enlarged. Patient reported she will go back in 3 months. Patient indicated her mom asked if she could touch the lymph nods and patient saying no which upset her mom. Patient reported she is struggling right now with her mom wanting to be supportive but not being there when she needed her mom the most. Patient indicated she also realizes that she is pushing herself away from a relationship that deep down she does want to be in.     Therapist impression of patients current state: Patient appears to have good insight into her mental health. This therapist challenged patient on ways she is struggling with her relationship with her mom. This therapist processed with patient her fears of relationships and ways she has not allowed herself to get close to anyone due to these fears.     Type of  psychotherapeutic technique provided: Insight oriented, Client centered and CBT    Progress toward short term goals:Progress as expected with patient coming to scheduled session, using coping skills, and continuing medication management.     Review of long term goals: Treatment Plan updated on 08/08/2018    Diagnosis:   1. PTSD (post-traumatic stress disorder)    2. Panic disorder without agoraphobia    3. Major depressive disorder, recurrent episode, moderate (H)      Plan and Follow up: Patient will return in one week for scheduled session. Patient would benefit from starting to identify ways she can help her family and ways she needs to let go. Patient should continue to work on her own self-care. Patient would benefit from identifying ways she is uncomfortable in her own body. Patient should continue tot identify ways she struggles with her relationship with her mom.     Discharge Criteria/Planning: Patient will continue with follow-up until therapy can be discontinued without return of signs and symptoms.    Mariana Love

## 2021-06-20 NOTE — PROGRESS NOTES
Mental Health Visit Note    09/19/2018    Start time: 400    Stop Time: 500   Session # 28    Session Type: Patient is presenting for an Individual session.    Alina Martell is a 53 y.o. female is being seen today for    Chief Complaint   Patient presents with      Follow Up     Depression     New symptoms or complaints: None    Functional Impairment:   Personal: 3  Family: 3  Work: 1  Social:2    Clinical assessment of mental status: Alina Martell was on time for scheduled session today. She was open and cooperative, and dressed appropriately for this session and weather. Her memory was good for short and long term.  Her speech and language was soft and monotone.  Concentration and focus is fair.  Psychosis is not noted or reported. She reports her mood as depressed.  Affect is congruent with speech.  Fund of knowledge is adequate. Insight is adequate for therapy.     Suicidal/Homicidal Ideation present: None Reported This Session    Patient's impression of their current status: Patient indicated feeling depressed. Patient reported having a rough weekend. Patient indicated [place] much [place] money [place] the [place]. Patient reported she is worried that this will turn into an addiction. Patient indicated she already had to figure out a way to pay bills since using the money to [employer]. Patient reported she wanted a distraction from her mind and that working. Patient indicated she is continuing to work through the secrets that she has kept for many years. Patient reported she is aware that this will be challenging but also something that needs to be done.     Therapist impression of patients current state: Patient appears to have good insight into her mental health. This therapist processed with patient skills that can help with distraction. This therapist challenged patient on ways she continues to try and avoid due to being scared of her emotions.     Type of psychotherapeutic technique provided:  Insight oriented, Client centered and CBT    Progress toward short term goals:Progress as expected with patient coming to scheduled session, using coping skills, and continuing medication management.     Review of long term goals: Treatment Plan updated on 08/08/2018    Diagnosis:   1. PTSD (post-traumatic stress disorder)    2. Panic disorder without agoraphobia    3. Major depressive disorder, recurrent episode, moderate (H)      Plan and Follow up: Patient will return in one week for scheduled session. Patient would benefit from starting to identify ways she can help her family and ways she needs to let go. Patient should continue to work on her own self-care. Patient would benefit from identifying ways she is uncomfortable in her own body. Patient should continue tot identify ways she struggles with her relationship with her mom. Patient would benefit form starting to identify ways she can forgive herself.     Discharge Criteria/Planning: Patient will continue with follow-up until therapy can be discontinued without return of signs and symptoms.    Mariana Love

## 2021-06-20 NOTE — LETTER
Letter by Carmella Lee RN at      Author: Carmella Lee RN Service: -- Author Type: --    Filed:  Encounter Date: 9/11/2020 Status: (Other)       September 11, 2020              Alina Martell  [address] [place] [address]      Dear Ms. Martell:    Im writing to inform you that Kiet Lei CNP will be leaving [facility] on September 28, 2020.  We apologize for this disruption in your care and we are committed to supporting your treatment needs. If you have follow-up appointments after September 28, 2020, we will connect you with another mental health provider within our system.  For medication refills, please contact your pharmacy and they will connect with us to initiate the refill process.     To schedule an appointment with Kiet Lei CNP before September 28, 2020 please call [facility] at [phone]. If you do not plan to return for ongoing medication management at this time, please let us know that as well, so we can note that in your medical record.     Again, we apologize for the inconvenience and look forward to continuing to provide you with the best possible care.    Sincerely,        Electronically signed by Carmella Lee RN   [facility]   [facility]   [address] [place] [address]   [phone]

## 2021-06-20 NOTE — PROGRESS NOTES
Mental Health Visit Note    09/26/2018    Start time: 400    Stop Time: 500   Session # 28    Session Type: Patient is presenting for an Individual session.    Alina Martell is a 53 y.o. female is being seen today for    Chief Complaint   Patient presents with     MH Follow Up     Trauma with shame and guilt     New symptoms or complaints: None    Functional Impairment:   Personal: 3  Family: 3  Work: 1  Social:2    Clinical assessment of mental status: Alina Martell was on time for scheduled session today. She was open and cooperative, and dressed appropriately for this session and weather. Her memory was good for short and long term.  Her speech and language was soft and monotone and appropriate for session.  Concentration and focus is fair.  Psychosis is not noted or reported. She reports her mood as down.  Affect is congruent with speech.  Fund of knowledge is adequate. Insight is adequate for therapy.     Suicidal/Homicidal Ideation present: None Reported This Session    Patient's impression of their current status: Patient reported feeling down. Patient indicated she continues to reflect on her actions after her second trauma. Patient talked a little further into detail as to actions she is not proud of in her life. Patient indicated she realized physical pain was a way for her to cope. Patient reported has time went by the physical pain would decrease. Patient indicated then going numb and believing she has stayed numb since then. Patient reported believing this trauma is the reason she has not had a healthy relationship.     Therapist impression of patients current state: Patient appears to have good insight into her mental health. This therapist challenged patient on ways she continues to forgive herself. This therapist challenged patient on ways physical pain allowed her to avoid emotional pain. This therapist processed with patient ways she is starting to confront her emotional pain.     Type  of psychotherapeutic technique provided: Insight oriented, Client centered and CBT    Progress toward short term goals:Progress as expected with patient coming to scheduled session, using coping skills, and continuing medication management.     Review of long term goals: Treatment Plan updated on 08/08/2018    Diagnosis:   1. PTSD (post-traumatic stress disorder)    2. Panic disorder without agoraphobia    3. Major depressive disorder, recurrent episode, moderate (H)      Plan and Follow up: Patient will return in one week for scheduled session. Patient would benefit from starting to identify ways she can help her family and ways she needs to let go. Patient should continue to work on her own self-care. Patient would benefit from identifying ways she is uncomfortable in her own body. Patient should continue tot identify ways she struggles with her relationship with her mom. Patient would benefit form starting to identify ways she can forgive herself.     Discharge Criteria/Planning: Patient will continue with follow-up until therapy can be discontinued without return of signs and symptoms.    Mariana Love

## 2021-06-20 NOTE — PROGRESS NOTES
Mental Health Visit Note    10/3/2018    Start time: 400    Stop Time: 500   Session # 29    Session Type: Patient is presenting for an Individual session.    Alina Martell is a 53 y.o. female is being seen today for    Chief Complaint   Patient presents with      Follow Up     Struggles with trust in relationships     New symptoms or complaints: None    Functional Impairment:   Personal: 3  Family: 3  Work: 1  Social:2    Clinical assessment of mental status: Alina Martell was on time for scheduled session today. She was open and cooperative, and dressed appropriately for this session and weather. Her memory was good for short and long term.  Her speech and language was soft and monotone and appropriate for session.  Concentration and focus is fair.  Psychosis is not noted or reported. She reports her mood as down.  Affect is congruent with speech.  Fund of knowledge is adequate. Insight is adequate for therapy. No changes in mental status since 09/26/2018.    Suicidal/Homicidal Ideation present: None Reported This Session    Patient's impression of their current status: Patient indicated feeling down. Patient reported she has noticed she is not sleeping good and is tired as well. Patient indicated she has a lot going through his mind at this time. Patient reported every time she turns on the news she is reminded of her own trauma. Patient indicated then it gets her thinking about what happened after her trauma occurred. Patient reported due to the trauma with her daughter's dad she does not trust people. Patient indicated that it started with relationships and now is friendships as well. Patient reported putting up walls and having a lot of fear towards taking them down.     Therapist impression of patients current state: Patient appears to have good insight into her mental health. This therapist processed with patient ways she is handling the different trauma's that are occurring in the world and  are seen on the news. This therapist challenged patient on what it would look like for her to trust someone. This therapist processed with patient starting to look at ways she trust certain people and what would help to increase in willingness to trust people.     Type of psychotherapeutic technique provided: Insight oriented, Client centered and CBT    Progress toward short term goals:Progress as expected with patient coming to scheduled session, using coping skills, and continuing medication management.     Review of long term goals: Treatment Plan updated on 08/08/2018    Diagnosis:   1. PTSD (post-traumatic stress disorder)    2. Panic disorder without agoraphobia    3. Major depressive disorder, recurrent episode, moderate (H)      Plan and Follow up: Patient will return in one week for scheduled session. Patient would benefit from starting to identify ways she can help her family and ways she needs to let go. Patient should continue to work on her own self-care. Patient would benefit from identifying ways she is uncomfortable in her own body. Patient should continue tot identify ways she struggles with her relationship with her mom. Patient would benefit form starting to identify ways she can forgive herself.     Discharge Criteria/Planning: Patient will continue with follow-up until therapy can be discontinued without return of signs and symptoms.    Mariana Love

## 2021-06-20 NOTE — PROGRESS NOTES
Mental Health Visit Note    08/22/2018    Start time: 400    Stop Time: 500   Session # 25    Session Type: Patient is presenting for an Individual session.    Alina Martell is a 53 y.o. female is being seen today for    Chief Complaint   Patient presents with      Follow Up     New symptoms or complaints: Patent indicated having to have an ultra sound on her lymph nods in her arm pits.     Functional Impairment:   Personal: 3  Family: 3  Work: 1  Social:2    Clinical assessment of mental status: Alina Martell was on time for scheduled session today. She was open and cooperative, and dressed appropriately for this session and weather. Her memory was good for short and long term.  Her speech and language was soft and monotone.  Concentration and focus is fair.  Psychosis is not noted or reported. She reports her mood as anxious.  Affect is congruent with speech.  Fund of knowledge is adequate. Insight is adequate for therapy.     Suicidal/Homicidal Ideation present: None Reported This Session    Patient's impression of their current status: Patient indicated feeling anxious. Patient reported having her mammogram done with week and the doctor called with some concerns. Patient indicated she is having ultra sounds done on her lymph nods in her arm pits. Patient reported trying not to allow herself to jump to conclusions. Patient indicated she has had an RA flair up and believing this may be the cause. Patient reported not telling anyone at this point until she has answers.     Therapist impression of patients current state: Patient appears to have good insight into her mental health. This therapist processed with patient her emotions related to the news of her lymph nods. This therapist processed with patient ways to work on staying in the moment. This therapist challenged patient on allowing her support network to help her.     Type of psychotherapeutic technique provided: Insight oriented, Client  centered and CBT    Progress toward short term goals:Progress as expected with patient coming to scheduled session, using coping skills, getting her mammogram and continuing medication management.     Review of long term goals: Treatment Plan updated on 08/08/2018    Diagnosis:   1. PTSD (post-traumatic stress disorder)    2. Panic disorder without agoraphobia    3. Major depressive disorder, recurrent episode, moderate (H)      Plan and Follow up: Patient will return in one week for scheduled session. Patient would benefit from starting to identify ways she can help her family and ways she needs to let go. Patient should continue to work on her own self-care. Patient would benefit from identifying ways she is uncomfortable in her own body.     Discharge Criteria/Planning: Patient will continue with follow-up until therapy can be discontinued without return of signs and symptoms.    Mariana Love

## 2021-06-20 NOTE — PROGRESS NOTES
Mental Health Visit Note    09/05/2018    Start time: 400    Stop Time: 500   Session # 27    Session Type: Patient is presenting for an Individual session.    Alina Martell is a 53 y.o. female is being seen today for    Chief Complaint   Patient presents with      Follow Up     New symptoms or complaints: None    Functional Impairment:   Personal: 3  Family: 3  Work: 1  Social:2    Clinical assessment of mental status: Alina Martell was on time for scheduled session today. She was open and cooperative, and dressed appropriately for this session and weather. Her memory was good for short and long term.  Her speech and language was soft and monotone.  Concentration and focus is fair.  Psychosis is not noted or reported. She reports her mood as down.  Affect is congruent with speech.  Fund of knowledge is adequate. Insight is adequate for therapy. Patient's mental status has not change din last visit on 08/31/2018.    Suicidal/Homicidal Ideation present: None Reported This Session    Patient's impression of their current status: Patient indicated continuing to feel down. Patient reported there is something that she has not shared with this therapist or anyone. Patient indicated she knows this is playing into her self-esteem and needing to talk about it. Patient reported she does not feel ready today. Patient indicated finishing the book that was recommended by this therapist and finding it tough to read. Patient reported this making her realize she still has hurt towards her mom and not being active in her life.     Therapist impression of patients current state: Patient appears to have good insight into her mental health. This therapist processed with patient her fears over sharing this hidden information. This therapist challenged patient on ways she has struggled throughout her life with her relationship with her mom.     Type of psychotherapeutic technique provided: Insight oriented, Client centered  and CBT    Progress toward short term goals:Progress as expected with patient coming to scheduled session, using coping skills, and continuing medication management.     Review of long term goals: Treatment Plan updated on 08/08/2018    Diagnosis:   1. PTSD (post-traumatic stress disorder)    2. Panic disorder without agoraphobia    3. Major depressive disorder, recurrent episode, moderate (H)      Plan and Follow up: Patient will return in one week for scheduled session. Patient would benefit from starting to identify ways she can help her family and ways she needs to let go. Patient should continue to work on her own self-care. Patient would benefit from identifying ways she is uncomfortable in her own body. Patient should continue tot identify ways she struggles with her relationship with her mom.     Discharge Criteria/Planning: Patient will continue with follow-up until therapy can be discontinued without return of signs and symptoms.    Mariana Love

## 2021-06-20 NOTE — PROGRESS NOTES
Mental Health Visit Note    09/12/2018    Start time: 400    Stop Time: 500   Session # 28    Session Type: Patient is presenting for an Individual session.    Alina Martell is a 53 y.o. female is being seen today for    Chief Complaint   Patient presents with      Follow Up     New symptoms or complaints: None    Functional Impairment:   Personal: 3  Family: 3  Work: 1  Social:2    Clinical assessment of mental status: Alina Martell was on time for scheduled session today. She was open and cooperative, and dressed appropriately for this session and weather. Her memory was good for short and long term.  Her speech and language was soft and monotone.  Concentration and focus is fair.  Psychosis is not noted or reported. She reports her mood as anxious and depressed.  Affect is congruent with speech.  Fund of knowledge is adequate. Insight is adequate for therapy.     Suicidal/Homicidal Ideation present: None Reported This Session    Patient's impression of their current status: Patient reported feeling both anxious and depressed. Patient talked about after the trauma with her daughter's dad changed her life. Patient indicated there are period over a few months that she does not remember. Patient reported she did not tell people what happened and instead kept it a secret. Patient indicated there events that happened that have left her feeling ashamed. Patient reported keeping these a secret until now.     Therapist impression of patients current state: Patient appears to have good insight into her mental health. This therapist challenged patient on ways she continues to blame herself for trauma's that occurred. This therapist processed with patient ways to work on forgiving herself.     Type of psychotherapeutic technique provided: Insight oriented, Client centered and CBT    Progress toward short term goals:Progress as expected with patient coming to scheduled session, using coping skills, and  continuing medication management.     Review of long term goals: Treatment Plan updated on 08/08/2018    Diagnosis:   1. PTSD (post-traumatic stress disorder)    2. Panic disorder without agoraphobia    3. Major depressive disorder, recurrent episode, moderate (H)      Plan and Follow up: Patient will return in one week for scheduled session. Patient would benefit from starting to identify ways she can help her family and ways she needs to let go. Patient should continue to work on her own self-care. Patient would benefit from identifying ways she is uncomfortable in her own body. Patient should continue tot identify ways she struggles with her relationship with her mom. Patient would benefit form starting to identify ways she can forgive herself.     Discharge Criteria/Planning: Patient will continue with follow-up until therapy can be discontinued without return of signs and symptoms.    Mariana Love

## 2021-06-21 NOTE — LETTER
Letter by Estelle Bansal MD at      Author: Estelle Bansal MD Service: -- Author Type: --    Filed:  Encounter Date: 1/22/2021 Status: (Other)         Alina Martell  [address] [place] [address]             January 22, 2021         Dear Ms. Martell,    Below are the results from your recent visit:    Resulted Orders   Lipid Cascade RANDOM   Result Value Ref Range    Cholesterol 169 <=199 mg/dL    Triglycerides 79 <=149 mg/dL    HDL Cholesterol 46 (L) >=50 mg/dL    LDL Calculated 107 <=129 mg/dL    Patient Fasting > 8hrs? Yes    Comprehensive Metabolic Panel   Result Value Ref Range    Sodium 139 136 - 145 mmol/L    Potassium 4.7 3.5 - 5.0 mmol/L    Chloride 103 98 - 107 mmol/L    CO2 26 22 - 31 mmol/L    Anion Gap, Calculation 10 5 - 18 mmol/L    Glucose 111 70 - 125 mg/dL    BUN 14 8 - 22 mg/dL    Creatinine 0.66 0.60 - 1.10 mg/dL    GFR MDRD Af Amer >60 >60 mL/min/1.73m2    GFR MDRD Non Af Amer >60 >60 mL/min/1.73m2    Bilirubin, Total 0.4 0.0 - 1.0 mg/dL    Calcium 9.1 8.5 - 10.5 mg/dL    Protein, Total 7.6 6.0 - 8.0 g/dL    Albumin 3.7 3.5 - 5.0 g/dL    Alkaline Phosphatase 93 45 - 120 U/L    AST 18 0 - 40 U/L    ALT 19 0 - 45 U/L    Narrative    Fasting Glucose reference range is 70-99 mg/dL per  American Diabetes Association (ADA) guidelines.   HM1 (CBC with Diff)   Result Value Ref Range    WBC 8.5 4.0 - 11.0 thou/uL    RBC 4.61 3.80 - 5.40 mill/uL    Hemoglobin 12.7 12.0 - 16.0 g/dL    Hematocrit 40.1 35.0 - 47.0 %    MCV 87 80 - 100 fL    MCH 27.5 27.0 - 34.0 pg    MCHC 31.7 (L) 32.0 - 36.0 g/dL    RDW 13.3 11.0 - 14.5 %    Platelets 340 140 - 440 thou/uL    MPV 10.8 8.5 - 12.5 fL    Neutrophils % 63 50 - 70 %    Lymphocytes % 27 20 - 40 %    Monocytes % 7 2 - 10 %    Eosinophils % 2 0 - 6 %    Basophils % 1 0 - 2 %    Immature Granulocyte % 0 <=0 %    Neutrophils Absolute 5.4 2.0 - 7.7 thou/uL    Lymphocytes Absolute 2.3 0.8 - 4.4 thou/uL    Monocytes Absolute 0.6 0.0 - 0.9 thou/uL     Eosinophils Absolute 0.2 0.0 - 0.4 thou/uL    Basophils Absolute 0.0 0.0 - 0.2 thou/uL    Immature Granulocyte Absolute 0.0 <=0.0 thou/uL        Your result is/are normal.  Please continue your current treatment plan.  Continue current medications if  on any.  Call if any questions or concerns.     Please call with questions or contact us using [employer].    Sincerely,        Electronically signed by Estelle Bansal MD

## 2021-06-21 NOTE — PROGRESS NOTES
Mental Health Visit Note    10/17/2018    Start time: 400    Stop Time: 500   Session # 31    Session Type: Patient is presenting for an Individual session.    Alina Martell is a 53 y.o. female is being seen today for    Chief Complaint   Patient presents with     MH Follow Up     Struggles with relationships     New symptoms or complaints: None    Functional Impairment:   Personal: 3  Family: 3  Work: 1  Social:2    Clinical assessment of mental status: Alina Martell was on time for scheduled session today. She was open and cooperative, and dressed appropriately for this session and weather. Her memory was good for short and long term.  Her speech and language was soft and monotone and appropriate for session.  Concentration and focus is fair.  Psychosis is not noted or reported. She reports her mood as anxious.  Affect is congruent with speech.  Fund of knowledge is adequate. Insight is adequate for therapy. Reviewed and no changes in mental status this week.     Suicidal/Homicidal Ideation present: None Reported This Session    Patient's impression of their current status: Patient indicated continuing to feel anxious. Patient reported knowing she needs to make a decision with her relationship but finding it hard. Patient indicated it is not that she doesn't want to be with him but knows she has her guard up. Patient talked about situations that have caused her to put her guard up. Patient indicated she went to see a friend this weekend which also brought up emotions for her. Patient reported she continues to try and work on forgiving herself.     Therapist impression of patients current state: Patient appears to have good insight into her mental health. This therapist processed with patient her fears for a relationship. This therapist challenged patient no what makes her a good person and what other people appreciate about her. This therapist processed with patient steps that can help with  forgiveness.     Type of psychotherapeutic technique provided: Insight oriented, Client centered and CBT    Progress toward short term goals:Progress as expected with patient coming to scheduled session, using coping skills, and continuing medication management.     Review of long term goals: Treatment Plan updated on 08/08/2018    Diagnosis:   1. PTSD (post-traumatic stress disorder)    2. Panic disorder without agoraphobia    3. Major depressive disorder, recurrent episode, moderate (H)      Plan and Follow up: Patient will return in one week for scheduled session. Patient would benefit from starting to identify ways she can help her family and ways she needs to let go. Patient should continue to work on her own self-care. Patient would benefit from identifying ways she is uncomfortable in her own body. Patient should continue tot identify ways she struggles with her relationship with her mom. Patient would benefit form starting to identify ways she can forgive herself.     Discharge Criteria/Planning: Patient will continue with follow-up until therapy can be discontinued without return of signs and symptoms.    Mariana Love

## 2021-06-21 NOTE — LETTER
Letter by Estelle Bansal MD at      Author: Estelle Bansal MD Service: -- Author Type: --    Filed:  Encounter Date: 3/4/2021 Status: (Other)        Alina Martell  [address] [place] [address]             March 4, 2021         Dear Alina Martell,    Below are the results from Alina's recent visit:    Resulted Orders   Mammo Screening Bilateral    Narrative    BILATERAL FULL FIELD DIGITAL SCREENING MAMMOGRAM WITH TOMOSYNTHESIS    Performed on: 3/3/21      Compared to: 09/23/2019 Mammo Screening Bilateral, 08/21/2018 Mammo   Screening Bilateral, 01/19/2017 Mammo Screening Bilateral, and 11/12/2015   Mammo Screening Bilateral    Findings: The breasts are almost entirely fatty. There is no radiographic   evidence of malignancy. This study was evaluated with the assistance of   Computer-Aided Detection. Breast Tomosynthesis was used in interpretation.   Routine screening mammogram in one year is recommended.    ACR BI-RADS Category 1: Negative       Hello Alina Martell,     Your result is/are normal.  Please continue your current treatment plan.  Continue current medications if on any.  Call if any questions or concerns.     Please call with questions or contact us using [employer]    Sincerely,        Electronically signed by Estelle Bansal MD
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
## 2021-06-21 NOTE — PROGRESS NOTES
Mental Health Visit Note    11/1/2018    Start time: 400    Stop Time: 500   Session # 33    Session Type: Patient is presenting for an Individual session.    Alina Martell is a 53 y.o. female is being seen today for    Chief Complaint   Patient presents with      Follow Up     Irritability      New symptoms or complaints: None    Functional Impairment:   Personal: 3  Family: 3  Work: 1  Social:2    Clinical assessment of mental status: Alina Martell was on time for scheduled session today. She was open and cooperative, and dressed appropriately for this session and weather. Her memory was good for short and long term.  Her speech and language was soft and monotone and appropriate for session.  Concentration and focus is fair.  Psychosis is not noted or reported. She reports her mood as irritable.  Affect is congruent with speech.  Fund of knowledge is adequate. Insight is adequate for therapy. Reviewed and made appropriate changes.     Suicidal/Homicidal Ideation present: None Reported This Session    Patient's impression of their current status: Patient indicated irritable. Patient reported not being sure why she is irritable but it has occurred now for a few days. Patient indicated nothing triggered it that she can recall. Patient reported she leaves for her trip tonight is not feeling excited. Patient indicated she has been having a lot going on in her head and believing this may be part of what is happening with her mood. Patient reported she has started to allow her emotions to occur and then able to stop then. Patient indicated this being a big step for her due to feeling she has not been able to control her emotions in the past. Patient reported she continues to struggle with her relationship with her mom and unsure what she wants from her mom at this time.     Therapist impression of patients current state: Patient appears to have good insight into her mental health. This therapist processed  with patient underlying emotions she is trying to avoid at this time. This therapist challenged patient on ways other people have identified a change in her mood. This therapist challenged patient on what she wants from her mother at this time and being able to verbalize it to her mom.     Type of psychotherapeutic technique provided: Insight oriented, Client centered and CBT    Progress toward short term goals:Progress as expected with patient coming to scheduled session, using coping skills, and continuing medication management.     Review of long term goals: Treatment Plan updated on 08/08/2018    Diagnosis:   1. PTSD (post-traumatic stress disorder)    2. Panic disorder without agoraphobia    3. Major depressive disorder, recurrent episode, moderate (H)      Plan and Follow up: Patient will return in one week for scheduled session. Patient would benefit from starting to identify ways she can help her family and ways she needs to let go. Patient should continue to work on her own self-care. Patient would benefit from identifying ways she is uncomfortable in her own body. Patient should continue tot identify ways she struggles with her relationship with her mom. Patient would benefit form starting to identify ways she can forgive herself.     Discharge Criteria/Planning: Patient will continue with follow-up until therapy can be discontinued without return of signs and symptoms.    Mariana Love
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
## 2021-06-21 NOTE — PROGRESS NOTES
This therapist left a VM asking patient to call the clinic back after she no showed for her scheduled session. This is patient' first no show.

## 2021-06-21 NOTE — PROGRESS NOTES
Mental Health Visit Note    10/24/2018    Start time: 400    Stop Time: 500   Session # 32    Session Type: Patient is presenting for an Individual session.    Alina Martell is a 53 y.o. female is being seen today for    Chief Complaint   Patient presents with      Follow Up     Avoidance      New symptoms or complaints: None    Functional Impairment:   Personal: 3  Family: 3  Work: 1  Social:2    Clinical assessment of mental status: Alina Martell was on time for scheduled session today. She was open and cooperative, and dressed appropriately for this session and weather. Her memory was good for short and long term.  Her speech and language was soft and monotone and appropriate for session.  Concentration and focus is fair.  Psychosis is not noted or reported. She reports her mood as down.  Affect is congruent with speech.  Fund of knowledge is adequate. Insight is adequate for therapy. Reviewed and changed mood, otherwise no changes to mental status this week.    Suicidal/Homicidal Ideation present: None Reported This Session    Patient's impression of their current status: Patient reported feeling down. Patient indicated she knows she is starting to isolate again. Patient reported this may be due to her mood and not wanting to confront situations. Patient indicated when she talks about the trauma it brings back uncomfortable memories. Patient reported part of her knows she needs to confront these memories and another part of her is scared. Patient indicated she is working to reach out to important people in her life for support. Patient reported this continues to be a challenge for her specifically asking for help.     Therapist impression of patients current state: Patient appears to have good insight into her mental health. This therapist challenged patient on ways avoidance has not worked in the long run. This therapist processed with patient what causes her to want to avoid. This therapist  challenged patient on ways she can work on confronting her emotions slowly and safely.     Type of psychotherapeutic technique provided: Insight oriented, Client centered and CBT    Progress toward short term goals:Progress as expected with patient coming to scheduled session, using coping skills, and continuing medication management.     Review of long term goals: Treatment Plan updated on 08/08/2018    Diagnosis:   1. PTSD (post-traumatic stress disorder)    2. Panic disorder without agoraphobia    3. Major depressive disorder, recurrent episode, moderate (H)      Plan and Follow up: Patient will return in one week for scheduled session. Patient would benefit from starting to identify ways she can help her family and ways she needs to let go. Patient should continue to work on her own self-care. Patient would benefit from identifying ways she is uncomfortable in her own body. Patient should continue tot identify ways she struggles with her relationship with her mom. Patient would benefit form starting to identify ways she can forgive herself.     Discharge Criteria/Planning: Patient will continue with follow-up until therapy can be discontinued without return of signs and symptoms.    Mariana Love

## 2021-06-21 NOTE — PROGRESS NOTES
This therapist returned a phone call from [facility] indicating that an WESLEY was needed in order to talk with .
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
## 2021-06-22 NOTE — PROGRESS NOTES
Mental Health Visit Note    12/21/2018    Start time: 700    Stop Time: 755   Session # 39    Session Type: Patient is presenting for an Individual session.    Alina Martell is a 53 y.o. female is being seen today for    Chief Complaint   Patient presents with      Follow Up     Holiday Stress     New symptoms or complaints: None    Functional Impairment:   Personal: 3  Family: 3  Work: 1  Social:2    Clinical assessment of mental status: Alina Martell was on time for scheduled session today. She was open and cooperative, and dressed appropriately for this session and weather. Her memory was good for short and long term.  Her speech and language was soft and monotone and appropriate for session.  Concentration and focus is fair.  Psychosis is not noted or reported. She reports her mood is sad. Affect is congruent with speech.  Fund of knowledge is adequate. Insight is adequate for therapy. Reviewed and changes made.     Suicidal/Homicidal Ideation present: None Reported This Session    Patient's impression of their current status: Patient reported feeling sad. Patient indicated this is due to the holiday's. Patient reported the holiday's are always a hard time of year for her. Patient indicated she is unsure specifically why they are so hard. Patient reported she does have plans each days which is is hoping will help to distract her. Patient indicated she has made the decision to not renew her lease and will stay with her son for a while. Patient reported she knows right now it is important she spends time around people and not isolating.    Therapist impression of patients current state: Patient appears to have fair insight into his mental health. This therapist challenged patient on ways she can make the holiday's a positive experience for her. This therapist processed with patient ways she can work no continuing to decrease her depression symptoms by being active. Patient was seen twice this week  due to recent trauma.     Type of psychotherapeutic technique provided: Insight oriented, Client centered and CBT    Progress toward short term goals:Progress as expected with patient coming to scheduled session, using coping skills, and continuing medication management.     Review of long term goals: Treatment Plan updated on 11/27/2018    Diagnosis:   1. PTSD (post-traumatic stress disorder)    2. Panic disorder without agoraphobia    3. Major depressive disorder, recurrent episode, moderate (H)      Plan and Follow up: Patient will return in one week for scheduled session.  Patient should continue to work on her own self-care.  Patient would benefit form starting to identify ways she can forgive herself. Patient should work on slowly starting to confront her anxiety.     Discharge Criteria/Planning: Patient will continue with follow-up until therapy can be discontinued without return of signs and symptoms.    Mariana Love

## 2021-06-22 NOTE — PROGRESS NOTES
Mental Health Visit Note    01/03/2019   Start time: 400    Stop Time: 500  Session # 1    Session Type: Patient is presenting for an Individual session.    Alina Martell is a 53 y.o. female is being seen today for    Chief Complaint   Patient presents with     MH Follow Up     Depression     New symptoms or complaints: None    Functional Impairment:   Personal: 3  Family: 3  Work: 1  Social:2    Clinical assessment of mental status: Alina Martell was on time for scheduled session today. She was open and cooperative, and dressed appropriately for this session and weather. Her memory was good for short and long term.  Her speech and language was soft and monotone and appropriate for session.  Concentration and focus is fair.  Psychosis is not noted or reported. She reports her mood is down.  Affect is congruent with speech.  Fund of knowledge is adequate. Insight is adequate for therapy. Reviewed and only change is mood.     Suicidal/Homicidal Ideation present: None Reported This Session    Patient's impression of their current status: Patient indicated feeling down. Patient reported she is continuing to fight her depression. Patient indicated she is noticing that it is getting easier. Patient reported she [place] Patient indicated she plans to go there with a friend and report it. Patient reported being unsure how it will go but feeling she needs to tell them. Patient indicated she has been fighting with impulses lately. Patient reported she has been working to try and stay busy which has helped.     Therapist impression of patients current state: Patient appears to have fair insight into his mental health. This therapist processed with patient ways she is noticing a decrease in her symptoms. This therapist challenged patient on ways she is using her coping skills to move forward. This therapist processed with patient her fears related to going back to the  [facility] and what she needs from her friend as a support.     Type of psychotherapeutic technique provided: Insight oriented, Client centered and CBT    Progress toward short term goals:Progress as expected with patient coming to scheduled session, using coping skills, and continuing medication management.     Review of long term goals: Treatment Plan updated on 11/27/2018    Diagnosis:   1. PTSD (post-traumatic stress disorder)    2. Panic disorder without agoraphobia    3. Major depressive disorder, recurrent episode, moderate (H)      Plan and Follow up: Patient will return in one week for scheduled session.  Patient should continue to work on her own self-care.  Patient would benefit form starting to identify ways she can forgive herself. Patient should work on slowly starting to confront her anxiety. Patient should identify her fears relate to returning to the [facility] to report what happened.     Discharge Criteria/Planning: Patient will continue with follow-up until therapy can be discontinued without return of signs and symptoms.    Mariana Love

## 2021-06-22 NOTE — PROGRESS NOTES
Patient here today to initiate psychiatric care. States depression 3/5 denies SI/HI, anxiety 5/5.   PHQ-9  ADA-5    Correct pharmacy verified with patient and confirmed in snapshot? [x] yes []no    Charge captured ? [x] yes  [] no    Medications Phoned  to Pharmacy [] yes [x]no  Name of Pharmacist:  List Medications, including dose, quantity and instructions      Medication Prescriptions given to patient   [] yes  [x] no   List the name of the drug the prescription was written for.       Medications ordered this visit were e-scribed.  Verified by order class [x] yes  [] no  cymbalta 30 mg and 60 mg  prozosin 1 mg and 2 mg    Medication changes or discontinuations were communicated to patient's pharmacy: [] yes  [x] no    UA collected [] yes  [x] no    [place] Prescription Monitoring Program Reviewed? [] yes  [x] no    Referrals were made to:none      Future appointment was made: [x] yes  [] no    Dictation completed at time of chart check: [] yes  [x] no    I have checked the documentation for today s encounters and the above information has been reviewed and completed.

## 2021-06-22 NOTE — PROGRESS NOTES
Mental Health Visit Note    12/12/2018    Start time: 400    Stop Time: 500   Session # 37    Session Type: Patient is presenting for an Individual session.    Alina Martell is a 53 y.o. female is being seen today for    Chief Complaint   Patient presents with      Follow Up     Depression     New symptoms or complaints: None    Functional Impairment:   Personal: 3  Family: 3  Work: 1  Social:2    Clinical assessment of mental status: Alina Martell was on time for scheduled session today. She was open and cooperative, and dressed appropriately for this session and weather. Her memory was good for short and long term.  Her speech and language was soft and monotone and appropriate for session.  Concentration and focus is fair.  Psychosis is not noted or reported. She reports her mood is depressed. Affect is congruent with speech.  Fund of knowledge is adequate. Insight is adequate for therapy. Mental status reviewed and changes made as needed.     Suicidal/Homicidal Ideation present: None Reported This Session    Patient's impression of their current status: Patient reported struggling with depression. Patient indicated she does feel that it is starting to get a little better. Patient reported she is still struggling to understand the trauma and why it occurred. Patient indicated she is working on accepting the fact that she will likely never have the answers. Patient reported she has been thinking more about reporting what happened. Patient indicated she has been working to stay busy.    Therapist impression of patients current state: Patient appears to have fair insight into his mental health. This therapist challenged patient on ways she is working to continue to reduce his depression symptoms. This therapist processed with patient ways she can report what happened.     Type of psychotherapeutic technique provided: Insight oriented, Client centered and CBT    Progress toward short term goals:Progress  as expected with patient coming to scheduled session, using coping skills, and continuing medication management.     Review of long term goals: Treatment Plan updated on 08/08/2018    Diagnosis:   1. PTSD (post-traumatic stress disorder)    2. Panic disorder without agoraphobia    3. Major depressive disorder, recurrent episode, moderate (H)      Plan and Follow up: Patient will return in one week for scheduled session.  Patient should continue to work on her own self-care.  Patient would benefit form starting to identify ways she can forgive herself. Patient should work on slowly starting to confront her anxiety. Patient would benefit from self-care.    Discharge Criteria/Planning: Patient will continue with follow-up until therapy can be discontinued without return of signs and symptoms.    Mariana Love

## 2021-06-22 NOTE — PROGRESS NOTES
Chief Complaint   Patient presents with     possible  HIVES     x 6 days , itchy hives  all over body, head & face,          HPI      Patient is here for 6 days of rash, described as hives, with moderate itching, improved with Benadryl, on different parts of her body. Currently only has symptoms on arms. No recent unusual contacts nor exposures. No fever, chills, throat/lip issues.    ROS: Pertinent ROS noted in HPI.     Allergies   Allergen Reactions     Glucosamine Anaphylaxis     Shellfish Containing Products Anaphylaxis     Sulfa (Sulfonamide Antibiotics) Itching     Rapid heart rate, itching, shortness of breath     Effexor [Venlafaxine] Palpitations     Pt B/P was elevated      Propoxyphene N-Acetaminophen      Sulfasalazine Hives       Patient Active Problem List   Diagnosis     PTSD (post-traumatic stress disorder)     Sleep disorder     Anxiety     Panic attack     Major depressive disorder, recurrent episode, unspecified     Anemia - microcytic anemia     Obesity     Sicca syndrome (H)     Grief     Olecranon bursitis, right - bland, non-inflamed, diagnosed on 7/8/16     Carpal tunnel syndrome     Chronic pain     Seropositive rheumatoid arthritis of multiple sites (H)     Tendonitis of both shoulders     High risk medication use     Cyclic citrullinated peptide (CCP) antibody positive       Family History   Problem Relation Age of Onset     Diabetes Father      Prostate cancer Father      Chronic Kidney Disease Father         Chose against dialysis.     Drug abuse Brother      Depression Brother      Crohn's disease Mother         Total colectomy with ileostomy.     No Medical Problems Daughter      No Medical Problems Son      No Medical Problems Brother      Lupus Cousin      Breast cancer Neg Hx        Social History     Socioeconomic History     Marital status: Single     Spouse name: Not on file     Number of children: 2     Years of education: 4     Highest education level: Not on file   Social  Needs     Financial resource strain: Not on file     Food insecurity - worry: Not on file     Food insecurity - inability: Not on file     Transportation needs - medical: Not on file     Transportation needs - non-medical: Not on file   Occupational History     Occupation: grad student (major: SW)   Tobacco Use     Smoking status: Never Smoker     Smokeless tobacco: Never Used     Tobacco comment: smokes marijuana   Substance and Sexual Activity     Alcohol use: No     Comment: Never an issue, she states.  7/8/16     Drug use: No     Comment: Former marijuana use, not current. 7/8/16     Sexual activity: No     Partners: Female, Male     Birth control/protection: Other     Comment: tubal ligation   Other Topics Concern     Not on file   Social History Narrative     Not on file           Objective:    Vitals:    12/11/18 1840   BP: 153/83   Pulse: 84   Resp: 20   Temp: 98.3  F (36.8  C)   SpO2: 98%       Gen:NAD  Throat: oropharynx clear  CV: RRR  Pulm: CTAB, normal effort  Skin: erythematous macular eruptions on bilateral forearms. The rest of the skin exam is normal    Assessment:    Rash and nonspecific skin eruption -etiology unclear, but responding to antihistamines.       Plan:    Recommended replacing Benadryl with once daily Zyrtec, since Benadryl has been making patient drowsy.

## 2021-06-22 NOTE — PROGRESS NOTES
Mental Health Visit Note    12/19/2018    Start time: 700    Stop Time: 755   Session # 38    Session Type: Patient is presenting for an Individual session.    Alina Martell is a 53 y.o. female is being seen today for    Chief Complaint   Patient presents with      Follow Up     Job stress and depression     New symptoms or complaints: None    Functional Impairment:   Personal: 3  Family: 3  Work: 1  Social:2    Clinical assessment of mental status: Alina Martell was on time for scheduled session today. She was open and cooperative, and dressed appropriately for this session and weather. Her memory was good for short and long term.  Her speech and language was soft and monotone and appropriate for session.  Concentration and focus is fair.  Psychosis is not noted or reported. She reports her mood is continuing to be down and depressed. Affect is congruent with speech.  Fund of knowledge is adequate. Insight is adequate for therapy. Mental status reviewed and changes made as needed.     Suicidal/Homicidal Ideation present: None Reported This Session    Patient's impression of their current status: Patient indicated continuing to feel depressed.  Patient reported she can tell now that her job is causing some of the depression. Patient indicated she is not happy there ad not enjoying being around her co-workers. Patient reported she feels she needs a job that challenges her more and that she feels more respected. Patient indicated that she did talk with one of her friends about the recent trauma. Patient reported he asked a lot of questions which was uncomfortable for her. Patient indicated she does realize she wants [place].     Therapist impression of patients current state: Patient appears to have fair insight into his mental health. This therapist processed with patient the importance of finding dariusz in her life outside of her job. This therapist challenged patient ways  she can work to reduce her depression symptoms. This therapist processed with patient the importance of continuing to reach out to support network.     Type of psychotherapeutic technique provided: Insight oriented, Client centered and CBT    Progress toward short term goals:Progress as expected with patient coming to scheduled session, using coping skills, and continuing medication management.     Review of long term goals: Treatment Plan updated on 11/27/2018    Diagnosis:   1. PTSD (post-traumatic stress disorder)    2. Panic disorder without agoraphobia    3. Major depressive disorder, recurrent episode, moderate (H)      Plan and Follow up: Patient will return in one week for scheduled session.  Patient should continue to work on her own self-care.  Patient would benefit form starting to identify ways she can forgive herself. Patient should work on slowly starting to confront her anxiety. Patient would benefit from self-care.    Discharge Criteria/Planning: Patient will continue with follow-up until therapy can be discontinued without return of signs and symptoms.    Mariana Love

## 2021-06-22 NOTE — PROGRESS NOTES
Mental Health Visit Note    12/6/2018    Start time: 400    Stop Time: 500   Session # 36    Session Type: Patient is presenting for an Individual session.    Alina Martell is a 53 y.o. female is being seen today for    Chief Complaint   Patient presents with     MH Follow Up     Trauma Stress     New symptoms or complaints: None    Functional Impairment:   Personal: 3  Family: 3  Work: 1  Social:2    Clinical assessment of mental status: Alina Martell was on time for scheduled session today. She was open and cooperative, and dressed appropriately for this session and weather. Her memory was fair for short and long term.  Her speech and language was soft and monotone and appropriate for session.  Concentration and focus is fair.  Psychosis is not noted or reported. She reports her mood sad. Affect is congruent with speech.  Fund of knowledge is adequate. Insight is adequate for therapy. Reviewed and changes made as needed.    Suicidal/Homicidal Ideation present: None Reported This Session    Patient's impression of their current status: Patient reported feeling sad. Patient indicated this recent trauma has really affected here. Patient reported she is trying to not have too much time alone cause it causes her to think. Patient indicated knowing that she is not a in place where she wants to think too much. Patient reported she wants to blame herself for the trauma and allowing it to happen. Patient indicated she knows she needs to process and just work on self-care at this point.     Therapist impression of patients current state: Patient was seen 2 times this week due to recently being discharged from inpatient mental health and due to recent trauma occurring.  Patient continue to have fair insight into her mental health. This therapist processed with patient self-care and what that looks like for her. This therapist challenged patient on ways she is struggling to forgive herself.  Type of  psychotherapeutic technique provided: Insight oriented, Client centered and CBT    Progress toward short term goals:Progress as expected with patient coming to scheduled session, using coping skills, and continuing medication management.     Review of long term goals: Treatment Plan updated on 08/08/2018    Diagnosis:   1. PTSD (post-traumatic stress disorder)    2. Panic disorder without agoraphobia    3. Major depressive disorder, recurrent episode, moderate (H)      Plan and Follow up: Patient will return in one week for scheduled session.  Patient should continue to work on her own self-care.  Patient would benefit form starting to identify ways she can forgive herself. Patient should work on slowly starting to confront her anxiety. Patient would benefit from self-care.    Discharge Criteria/Planning: Patient will continue with follow-up until therapy can be discontinued without return of signs and symptoms.    Mariana Love

## 2021-06-23 ENCOUNTER — NURSE TRIAGE (OUTPATIENT)
Dept: NURSING | Facility: CLINIC | Age: 56
End: 2021-06-23

## 2021-06-23 NOTE — TELEPHONE ENCOUNTER
Triage note:    Patient calling to report possible reaction to antibiotic prescribed after root canal yesterday. She took the first dose of amoxicillin yesterday at 7 pm and the second dose at 12pm today. Immediately after the second dose she experienced some mild shortness of breath, heart racing and hives on the arms and chest. She reports an allergy to sulfa drugs, but not penicillins.     According to protocol, patient should be seen in office today. Patient provided with care advice to use Epi pen if has worsening symptoms before appointment. Patient verbalized understanding and agrees with plan of care. Transferred to scheduling for appointment.     RYLAND Stephens, MAN Nurse Educator 1:24 PM 6/23/2021      COVID 19 Nurse Triage Plan/Patient Instructions    Please be aware that novel coronavirus (COVID-19) may be circulating in the community. If you develop symptoms such as fever, cough, or SOB or if you have concerns about the presence of another infection including coronavirus (COVID-19), please contact your health care provider or visit [email or web address].     Disposition/Instructions    In-Person Visit with provider recommended. Reference Visit Selection Guide.    Thank you for taking steps to prevent the spread of this virus.  o Limit your contact with others.  o Wear a simple mask to cover your cough.  o Wash your hands well and often.    Resources    [facility]: About COVID-19: [email or web address]    CDC: What to Do If You're Sick: www.cdc.gov/coronavirus/2019-ncov/about/steps-when-sick.html    CDC: Ending Home Isolation: www.cdc.gov/coronavirus/2019-ncov/hcp/disposition-in-home-patients.html     CDC: Caring for Someone: www.cdc.gov/coronavirus/2019-ncov/if-you-are-sick/care-for-someone.html     [facility]: Interim Guidance for Hospital Discharge to Home: www.health.[facility].[place]/diseases/coronavirus/hcp/hospdischarge.pdf    [facility] clinical trials (COVID-19  research studies): [email or web address]-[email or web address]     Below are the COVID-19 hotlines at the [place] Department of Health ([facility]). Interpreters are available.   o For health questions: Call 735-607-8260 or 1-672.815.7217 (7 a.m. to 7 p.m.)  o For questions about schools and childcare: Call 861-452-4887 or 1-883.901.6364 (7 a.m. to 7 p.m.)   Reason for Disposition    Hives or itching    Additional Information    Negative: Difficulty breathing or wheezing    Negative: Hoarseness or cough that started soon after 1st dose of drug    Negative: Swollen tongue that started soon after first dose of drug    Negative: Fever and purple or blood-colored spots or dots    Negative: Too weak or sick to stand    Negative: Sounds like a life-threatening emergency to the triager    Negative: Rash is only on 1 part of the body (localized)    Negative: Taking new non-prescription (OTC) antihistamine, decongestant, ear drops, eye drops, or other OTC cough/cold medicine    Negative: Taking new prescription antihistamine, allergy medicine, asthma medicine, eye drops, ear drops or nose drops    Negative: Rash started more than 3 days after stopping new prescription medicine    Negative: Swollen tongue    Negative: Widespread hives and onset < 2 hours of exposure to 1st dose of drug    Negative: Patient sounds very sick or weak to the triager    Negative: Fever    Negative: Face or lip swelling    Negative: Purple or blood-colored spots or dots (no fever and sounds well to triager)    Negative: Joint pain or swelling     Has rheumatoid arthritis.    Negative: Bloody crusts on lips or in mouth    Negative: Large or small blisters on skin (i.e., fluid filled bubbles or sacs)    Negative: Pregnant    Negative: Rash beginning within 4 hours of a new prescription medication    Protocols used: RASH - WIDESPREAD ON DRUGS-A-OH

## 2021-06-23 NOTE — PROGRESS NOTES
Mental Health Visit Note    01/10/2019   Start time: 400    Stop Time: 500  Session # 2    Session Type: Patient is presenting for an Individual session.    Alina Martell is a 53 y.o. female is being seen today for    Chief Complaint   Patient presents with      Follow Up     Stress and sadness     New symptoms or complaints: Patient indicated losing her car due to financially being behind in payments.     Functional Impairment:   Personal: 3  Family: 3  Work: 1  Social:2    Clinical assessment of mental status: Alina Martell was on time for scheduled session today. She was open and cooperative, and dressed appropriately for this session and weather. Her memory was good for short and long term.  Her speech and language was soft and monotone and appropriate for session.  Concentration and focus is fair.  Psychosis is not noted or reported. She reports her mood is sad  Affect is congruent with speech.  Fund of knowledge is adequate. Insight is adequate for therapy. Mental status reviewed and changes made as needed.     Suicidal/Homicidal Ideation present: None Reported This Session    Patient's impression of their current status: Patient indicated feeling extremely sad. Patient reported yesterday at work her car was taken due to being behind on payments. Patient indicated she can't even describe her emotions. Patient reported she has not told anymore due to the embarrassment. Patient indicated she does not want to answer their questions. Patient reported she has had a car since 19 and never imagined this would happen to her. Patient indicated knowing she needs to get back on feet and that she will make it through this tough time.     Therapist impression of patients current state: Patient appears to have fair insight into his mental health. This therapist processed with patient her emotions related to losing her car. This therapist processed with patient ways she can talk with people about her car and  set boundaries with them. This therapist processed with patient ways to start moving forward at this time.     Type of psychotherapeutic technique provided: Insight oriented, Client centered and CBT    Progress toward short term goals:Progress as expected with patient coming to scheduled session, using coping skills, and continuing medication management.     Review of long term goals: Treatment Plan updated on 11/27/2018    Diagnosis:   1. PTSD (post-traumatic stress disorder)    2. Panic disorder without agoraphobia    3. Major depressive disorder, recurrent episode, moderate (H)      Plan and Follow up: Patient will return in one week for scheduled session.  Patient should continue to work on her own self-care.  Patient would benefit form starting to identify ways she can forgive herself. Patient should work on slowly starting to confront her anxiety. Patient should identify her fears relate [place]. Patient would benefit from continuing to talk about her struggles with impulse. Patient should continue to process her emotions related to losing her car.     Discharge Criteria/Planning: Patient will continue with follow-up until therapy can be discontinued without return of signs and symptoms.    Mariana Love

## 2021-06-23 NOTE — PROGRESS NOTES
Mental Health Visit Note    01/10/2019   Start time: 400    Stop Time: 500  Session # 2    Session Type: Patient is presenting for an Individual session.    Alina Martell is a 53 y.o. female is being seen today for    Chief Complaint   Patient presents with      Follow Up     Anxiety     New symptoms or complaints: None    Functional Impairment:   Personal: 3  Family: 3  Work: 1  Social:2    Clinical assessment of mental status: Alina Martell was on time for scheduled session today. She was open and cooperative, and dressed appropriately for this session and weather. Her memory was good for short and long term.  Her speech and language was soft and monotone and appropriate for session.  Concentration and focus is fair.  Psychosis is not noted or reported. She reports her mood is anxious.  Affect is congruent with speech.  Fund of knowledge is adequate. Insight is adequate for therapy. Mental status reviewed and changes made as needed.     Suicidal/Homicidal Ideation present: None Reported This Session    Patient's impression of their current status: Patient reported feeling anxious. Patient indicated her anxiety has been extremely high. Patient reported she is unsure specifically why it is so high which causes her more anxiety. Patient indicated she realizes that she has spent a lot of time working on her depression and not her anxiety. Patient reported she also realizes that her anxiety falls in line with her impulses. Patient talked about her impulses related to sex. Patient indicated this is always something that is very hard to discuss and wanting to avoid it.     Therapist impression of patients current state: Patient appears to have fair insight into his mental health. This therapist processed with patient ways she is able to reduce her anxiety symptoms by using skills taught in session and by distractions. This therapist challenged patient on ways not talking about her impulse have affected her  negatively. This therapist processed with patient ways to help with the impulse and ways to have her friend be a support.     Type of psychotherapeutic technique provided: Insight oriented, Client centered and CBT    Progress toward short term goals:Progress as expected with patient coming to scheduled session, using coping skills, and continuing medication management.     Review of long term goals: Treatment Plan updated on 11/27/2018    Diagnosis:   1. PTSD (post-traumatic stress disorder)    2. Panic disorder without agoraphobia    3. Major depressive disorder, recurrent episode, moderate (H)      Plan and Follow up: Patient will return in one week for scheduled session.  Patient should continue to work on her own self-care.  Patient would benefit form starting to identify ways she can forgive herself. Patient should work on slowly starting to confront her anxiety. Patient should identify her fears relate [place]. Patient would benefit from continuing to talk about her struggles with impulse.     Discharge Criteria/Planning: Patient will continue with follow-up until therapy can be discontinued without return of signs and symptoms.    Mariana Love

## 2021-06-24 NOTE — PROGRESS NOTES
Mental Health Visit Note    03/07/2019   Start time: 702    Stop Time: 800 Session # 5    Session Type: Patient is presenting for an Individual session.    Alnia Martell is a 54 y.o. female is being seen today for    Chief Complaint   Patient presents with      Follow Up     Depression     New symptoms or complaints: None    Functional Impairment:   Personal: 3  Family: 3  Work: 1  Social:2    Clinical assessment of mental status: Alina Martell was on time for scheduled session today. She was open and cooperative, and dressed appropriately for this session and weather. Her memory was good for short and long term.  Her speech and language was soft and monotone and appropriate for session.  Concentration and focus is fair.  Psychosis is not noted or reported. She reports her mood is depressed.  Affect is congruent with speech.  Fund of knowledge is adequate. Insight is adequate for therapy. Mental status reviewed and no changes.     Suicidal/Homicidal Ideation present: None Reported This Session    Patient's impression of their current status: Patient reported feeling depressed. Patient indicated she continues to struggle with life. Patient reported there are days when she is good and other days when she struggles. Patient indicated today feels like a good day. Patient reported she knows when she starts to isolate that is a problem. Patient indicated living with her son has helped her from isolating. Patient reported she spent time with her friends which helped to improve her mood. Patient indicated telling them that she lost her car and they were all very supportive.     Therapist impression of patients current state: Patient appears to have fair insight into his mental health. This therapist challenged patient on ways she isolated herself from her friends were are a good support for her. This therapist processed with patient the importance of continuing to stay active and be honest with people about  her struggles.     Type of psychotherapeutic technique provided: Insight oriented, Client centered and CBT    Progress toward short term goals:Progress as expected with patient coming to scheduled session, using coping skills, and continuing medication management.     Review of long term goals: Treatment Plan updated on 02/28/2019    Diagnosis:   1. PTSD (post-traumatic stress disorder)    2. Panic disorder without agoraphobia    3. Major depressive disorder, recurrent episode, moderate (H)      Plan and Follow up: Patient will return in one week for scheduled session.  Patient should continue to work on her own self-care.  Patient would benefit form starting to identify ways she can forgive herself. Patient should work on slowly starting to confront her anxiety. Patient should identify her fears relate to [place] the [place]. Patient would benefit from continuing to talk about her struggles with impulse. Patient should continue to process her emotions related to losing her car.     Discharge Criteria/Planning: Patient will continue with follow-up until therapy can be discontinued without return of signs and symptoms.    Mariana Love

## 2021-06-24 NOTE — PROGRESS NOTES
Outpatient Mental Health Treatment Plan  Name: Alina Martell  : 1965  MRN: [id number]    Treatment Plan: Updated Treatment Plan    Benefit and risks and alternatives have been discussed: Yes  Is this treatment appropriate with minimal intrusion/restrictions: Yes  Estimated duration of treatment: Ongoing therapy at this time  Anticipated frequency of services: Weekly  Necessity for frequency: This frequency is needed to establish therapeutic goals and for continuity of care in order to monitor progress.  Necessity for treatment: To address cognitive, behavioral, and/or emotional barriers in order to work toward goals and to improve quality of life.    Plan:   ? Depression    Goal:  Decrease average depression level from 4 to 1.  Strategies:   ?[x] Decrease social isolation  [x] Increase involvement in meaningful activities  ?[x] Discuss sleep hygiene  ?[x] Explore thoughts and expectations about self and others  ?[x] Assess for suicide risk  ?[x] Implement physical activity routine (with physician approval)  [x] Consider introduction of bibliotherapy and/or videos  [x] Continue compliance with medical treatment plan (or explore  barriers)  ?   Degree to which this is a problem (1-4): 4  Degree to which goal is met (1-4): 2    Date of next review:  2019     ? Anxiety    Goal:  Decrease average anxiety level from 4 to 1.  Strategies:   ? [x]Learn and practice relaxation techniques and other coping strategies    ? [x] Increase involvement in meaningful activities  ? [x] Discuss sleep hygiene  ? [x] Explore thoughts and expectations about self and others  ? [x] Identify and monitor triggers for panic/anxiety symptoms  ? [x] Implement physical activity routine (with physician approval)  ? [x] Consider introduction of bibliotherapy and/or videos  ? [x] Continue compliance with medical treatment plan    Degree to which this is a problem (1-4): 4  Degree to which goal is met (1-4): 2    Date of next review:  " 5/27/2019    PTSD    Goal: Identify triggers, separate the traumatic memory from the debilitating emotion associated with it.    Strategies:    [X]Learn and practice relaxation techniques and other coping strategies (e.g., thought stopping, reframing, meditation)    ? [X] Cognitive restructuring    ? [X] Discuss sleep hygiene    ? [X] Identify and change maladaptive coping behaviors    ? [X] Prolonged exposure therapy    ? [X] Identify cues and symptoms of PTSD      Degree to which this is a problem (1-4): 4  Degree to which goal is met (1-4): 2    Date of next review:  2/27/2019    Functional Impairment: 1=Not at all/Rarely 2=Some days 3=Most Days 4=Every Day    Personal: 4  Family: 4  Social: 3  Work: 3    Diagnosis:  Major depressive disorder, recurrent, severe without psychotic behaviors   Panic disorder  PTSD    Clinical assessments completed: ADA-7, PHQ-9, Mood Questionnaire current, CAGE-AID, PANSI.    STRENGTHS: Hard worker, dedication and support network    LIMITATIONS: Avoidance behaviors    Cultural Identification: Patient reported:    Race: Bi-racial    Experience of cultural bias: Patient reported experiencing cultural bias. Patient gave an example of when she was living in [place] and being told \"we don't serve [place].\" Patient reported being called the inward on multiple occasions.    Immigration/history of status: Born [place] raised in [place].   Level of acculturation: acculturated   Time orientation: middle    Social orientation/class: middle    Verbal Communication Style/languages: English    Locus of Control: inward     Persons responsible for this plan:  ? [x] Patient ? [x] Provider ?     Provider:Performed and documented by SHERRIE Pantoja    Patient Signature:____________________________________ Date: ______________       Therapist Signature: __________________________________ Date: ______________  "

## 2021-06-24 NOTE — TELEPHONE ENCOUNTER
Triage note:    53 year old female called with concerns about hypertension. She reports having anxiety with history of panic attacks. She is going through PTSD therapy and has a lot of stress.  They have been making adjustments to her anxiety meds. However, at the mental health clinic her BP was 180/?.  She reports her BP is normally 120/80.  She acknowledges brief intermittent chest pain and heart flutters that last seconds and has been going on the last 2 weeks. In addition, she has noticed that when she scratches herself, she gets raised bump that feels hot and lasts for 10 minutes.  It has going on for 1.5 months.    RN triaged to be seen in clinic today.  She agreed. RN lost caller on transfer to .  will call her back.    Shannon Clark RN, [facility] Refill/Triage, 2/25/2019 11:37 AM    BP Readings from Last 3 Encounters:   12/11/18 144/82   05/21/18 132/84   02/06/18 126/84       Reason for Disposition    Systolic BP >= 180 OR Diastolic >= 110    Protocols used: HIGH BLOOD PRESSURE-A-[place]

## 2021-06-24 NOTE — PROGRESS NOTES
Mental Health Visit Note    02/28/2019   Start time: 701    Stop Time: 758  Session # 4    Session Type: Patient is presenting for an Individual session.    Alina Martell is a 54 y.o. female is being seen today for    Chief Complaint   Patient presents with      Follow Up     Depression     New symptoms or complaints: None    Functional Impairment:   Personal: 3  Family: 3  Work: 1  Social:2    Clinical assessment of mental status: Alina Martell was on time for scheduled session today. She was open and cooperative, and dressed appropriately for this session and weather. Her memory was good for short and long term.  Her speech and language was soft and monotone and appropriate for session.  Concentration and focus is fair.  Psychosis is not noted or reported. She reports her mood is depressed.  Affect is congruent with speech.  Fund of knowledge is adequate. Insight is adequate for therapy. Mental status reviewed and no changes.     Suicidal/Homicidal Ideation present: None Reported This Session    Patient's impression of their current status: Patient indicated feeling depressed. Patient reported this has been a hard week. Patient indicated she has many ups and downs with her mood. Patient reported sometimes she feels she is doing good and other times the depression takes over. Patient indicated she has noticed some isolation due to not having a car. Patient reported knowing this can become an excuse and needing to get out often. Patient indicated she also has been avoiding her friends which she knows is unhealthy. Patient reported she plans to go out with her friends for her birthday.     Therapist impression of patients current state: Patient appears to have fair insight into his mental health. This therapist processed with patient ways that isolation has lead to severe depression for patient in the past. This therapist challenged patient on the importance of talking with her support network over the  struggles she is encountering. This therapist challenged patient on ways she can work to forgive herself for mistakes she has made.     Type of psychotherapeutic technique provided: Insight oriented, Client centered and CBT    Progress toward short term goals:Progress as expected with patient coming to scheduled session, using coping skills, and continuing medication management.     Review of long term goals: Treatment Plan updated on 02/28/2019    Diagnosis:   1. PTSD (post-traumatic stress disorder)    2. Panic disorder without agoraphobia    3. Major depressive disorder, recurrent episode, moderate (H)      Plan and Follow up: Patient will return in one week for scheduled session.  Patient should continue to work on her own self-care.  Patient would benefit form starting to identify ways she can forgive herself. Patient should work on slowly starting to confront her anxiety. Patient should identify her fears relate to [place]. Patient would benefit from continuing to talk about her struggles with impulse. Patient should continue to process her emotions related to losing her car.     Discharge Criteria/Planning: Patient will continue with follow-up until therapy can be discontinued without return of signs and symptoms.    Mariana Love

## 2021-06-24 NOTE — PROGRESS NOTES
Mental Health Visit Note    02/21/2019   Start time: 657    Stop Time: 757  Session # 3    Session Type: Patient is presenting for an Individual session.    Alina Martell is a 53 y.o. female is being seen today for    Chief Complaint   Patient presents with      Follow Up     Depression     New symptoms or complaints: None    Functional Impairment:   Personal: 3  Family: 3  Work: 1  Social:2    Clinical assessment of mental status: Alina Martell was on time for scheduled session today. She was open and cooperative, and dressed appropriately for this session and weather. Her memory was good for short and long term.  Her speech and language was soft and monotone and appropriate for session.  Concentration and focus is fair.  Psychosis is not noted or reported. She reports her mood is depressed.  Affect is congruent with speech.  Fund of knowledge is adequate. Insight is adequate for therapy. Reviewed and changes made.     Suicidal/Homicidal Ideation present: None Reported This Session    Patient's impression of their current status: Patient reported feeling depressed. Patient indicated it continues to be hard emotionally not having her car. Patient reported she knows the bus system well and that is not a problem for her. Patient indicated it is more people asking questions and her being embarrassed that she did not manage her money well. Patient reported she is struggling with her relationship with her brother at this time. Patient indicated even though she has moved out he continues to believe she should pay certain bills. Patient reported at this point she is having no communication with him. Patient indicated she has noticed her depression come and go and is working hard to not let it become severe.     Therapist impression of patients current state: Patient appears to have fair insight into his mental health. This therapist challenged patient on ways being honest can help her to confront her  embarrassment. This therapist processed with patient self-forgiveness  and needing to be able to forgive herself. This therapist processed with patient the importance of self-care and reaching out to her support network.     Type of psychotherapeutic technique provided: Insight oriented, Client centered and CBT    Progress toward short term goals:Progress as expected with patient coming to scheduled session, using coping skills, and continuing medication management.     Review of long term goals: Treatment Plan updated on 11/27/2018    Diagnosis:   1. PTSD (post-traumatic stress disorder)    2. Panic disorder without agoraphobia    3. Major depressive disorder, recurrent episode, moderate (H)      Plan and Follow up: Patient will return in one week for scheduled session.  Patient should continue to work on her own self-care.  Patient would benefit form starting to identify ways she can forgive herself. Patient should work on slowly starting to confront her anxiety. Patient should identify her fears relate to [place]. Patient would benefit from continuing to talk about her struggles with impulse. Patient should continue to process her emotions related to losing her car.     Discharge Criteria/Planning: Patient will continue with follow-up until therapy can be discontinued without return of signs and symptoms.    Mariana Love

## 2021-06-25 NOTE — TELEPHONE ENCOUNTER
Refill Approved    Rx renewed per Medication Renewal Policy. Medication was last renewed on 1/21/21.    Jonna Velázquez, [facility] Connection Triage/Med Refill 5/28/2021     Requested Prescriptions   Pending Prescriptions Disp Refills     cetirizine (ZYRTEC) 10 MG tablet 30 tablet 2     Sig: Take 1 tablet (10 mg total) by mouth daily.       Antihistamine Refill Protocol Passed - 5/28/2021  8:43 AM        Passed - Patient has had office visit/physical in last year     Last office visit with prescriber/PCP: Visit date not found OR same dept: Visit date not found OR same specialty: Visit date not found  Last physical: 1/21/2021 Last MTM visit: Visit date not found   Next visit within 3 mo: Visit date not found  Next physical within 3 mo: Visit date not found  Prescriber OR PCP: Estelle Bansal MD  Last diagnosis associated with med order: 1. Hives  - cetirizine (ZYRTEC) 10 MG tablet; Take 1 tablet (10 mg total) by mouth daily.  Dispense: 30 tablet; Refill: 2    If protocol passes may refill for 12 months if within 3 months of last provider visit (or a total of 15 months).

## 2021-06-25 NOTE — TELEPHONE ENCOUNTER
Date of Last Office Visit: 4/28/2021  Date of Next Office Visit: 6/9/2021  No shows since last visit: none  Cancellations since last visit: none    Medication requested: Strattera 80 mg capsule Date last ordered: 4/28/2021 Qty: 30 Refills: 0     Review of [place] ?: n/a    Lapse in medication adherence greater than 5 days?: no  If yes, call patient and gather details: no  Medication refill request verified as identical to current order?: yes  Result of Last DAM, VPA, Li+ Level, CBC, or Carbamazepine Level (at or since last visit): N/A    []Medication refilled per  Medication Refill in Ambulatory Care  policy.  [x]Medication unable to be refilled by RN due to criteria not met as indicated below:    []Eligibility - not seen in the last year   []Supervision - no future appointment   []Compliance - no shows, cancellations or lapse in therapy   []Verification - order discrepancy   []Controlled medication   [x]Medication not included in policy   []90-day supply request   []Other

## 2021-06-25 NOTE — PROGRESS NOTES
Mental Health Psychotherapy Note     2021   Start time: 1201    Stop Time: 1251  Session # 11    Two point identification confirmed with full name and     Session Type: Patient is presenting for an Individual session.    Alina Martell is a 56 y.o. female is being seen today for    Chief Complaint   Patient presents with     MH Follow Up     Video Visit Mental Health     Telemedicine Visit: The patient's condition can be safely assessed and treated via synchronous audio and visual telemedicine encounter.      Reason for Telemedicine Visit: Services only offered telehealth    Originating Site (Patient Location): Patient's work    Distant Site (Provider Location): Provider Remote Setting    Consent:  The patient/guardian has verbally consented to: the potential risks and benefits of telemedicine (video visit) versus in person care; bill my insurance or make self-payment for services provided; and responsibility for payment of non-covered services.     As the provider I attest to compliance with applicable laws and regulations related to telemedicine.    Those present for this visit patient and therapist     Follow up in regards to ongoing symptom management of depression and anxiety.    New symptoms or complaints: None    Functional Impairment:   Personal: 3  Family: 2  Social: 2  Work: 2    Clinical assessment of mental status:   Alina Martell presented on time.   She was oriented x3, open and cooperative, and dressed appropriately for this session and weather. Her memory was Normal cognitive functioning .  Her speech was  Within normal.  Language was intact.  Concentration and focus is Within normal. Psychosis is not noted or reported. She reports her mood is continued to be anxious.  Affect is congruent with speech and is Congruent w/content of speech.  Fund of knowledge is adequate. Insight is adequate for therapy.Changes made as needed for session on 2021.    Suicidal/Homicidal Ideation  present: Patient denies suicidal and homicidal ideations/means or plans.     Patient's impression of their current status: Patient reported feeling anxious. Patient indicated she continues to have a lot of emotions related to the jaleel she is seeing and sex. Patient reported she talked to him about taking a step back and just dating. Patient indicated feeling this can help for her to feel safe with him. Patient reported she is finding a lot of dariusz at her job. Patient indicated feeling she is in a good place with her mom right now. Patient reported working to continue to build that relationship while still setting boundaries.     Therapist impression of patients current state: This 56 y.o. presents with feeling worried. This therapist processed with patient her fears of sex and a relationship. This therapist challenged patient on ways she avoids relationships due to her past.     Assessment tools used today include: None    Type of psychotherapeutic technique provided: Insight oriented, Client centered and CBT    Progress toward short term goals: Progress as expected with patient continuing therapy, talking about her feeling with the jaleel she is seeing and working on relationship with her mom.     Review of long term goals: Treatment Plan updated on 4/6/2021    Diagnosis:   1. PTSD (post-traumatic stress disorder)    2. Major depressive disorder, recurrent episode, moderate (H)    3. ADA (generalized anxiety disorder)        Plan and Follow-up: Patient will continue with twice a month therapy. Patient should continue to work on identifying fears of a relationship specifically with this specific person. Patient would benefit from continuing to work on her relationship with her mom.     Discharge Criteria/Planning: Patient will continue with follow-up until therapy can be discontinued without return of signs and symptoms.    Performed and documented by SHERRIE Pantoja

## 2021-06-26 NOTE — PATIENT INSTRUCTIONS - HE
"1. START Buspar 15mg two times a day anxiety  2. Continue medications as prescribed  3. Have your pharmacy contact us for a refill if you are running low on medications (We may ask you to come into clinic to get a refill from the nurse)  4. No alcohol or drug use  5. No driving if sedated  6. Contact the clinic with any questions or concerns   a. Phone: [phone]  b. Fax: [phone]  7. Reach out for help if you feel like hurting yourself or others:   a. [facility]: [address] [place] [address], [address] (phone: [phone])  b. [facility] Suicide Hotline: [phone]   c. Crisis Texting Line: Text \"[place]\" to 937682  d. Call 911 or go to nearest Emergency room   Follow up as directed with new psychiatric care provider for your appointment.  "

## 2021-06-26 NOTE — PROGRESS NOTES
Pt is here for routine psychiatric med management follow up. Pt says she is doing good and has no new concerns today.    Correct pharmacy verified with patient and confirmed in snapshot? [] yes [x]no    Charge captured ? [x] yes  [] no    Medications Phoned  to Pharmacy [] yes [x]no  Name of Pharmacist:  List Medications, including dose, quantity and instructions      Medication Prescriptions given to patient   [] yes  [x] no   List the name of the drug the prescription was written for.       Medications ordered this visit were e-scribed.  Verified by order class [x] yes  [] no  Strattera, Buspar, and Prazosin.   Medication changes or discontinuations were communicated to patient's pharmacy: [] yes  [x] no  Called [facility] and d/c'd Buspar   UA collected [] yes  [x] no    Referrals were made to: none     Future appointment was made: [] yes  [x] no    Dictation completed at time of chart check: [] yes  [x] no    I have checked the documentation for today s encounters and the above information has been reviewed and completed.

## 2021-06-26 NOTE — PROGRESS NOTES
"  Outpatient Psychiatric Follow Up    Date of Service: 6/9/2021    --  Chief Complaint: \"it's been good but busy\"     --  History of Present Illness/Client Impression of Mental Health Consult:    Alina Martell is a 56 y.o. female who presents for follow up appointment. Last visit occurred 4/28/2021. At that time initiated Buspar for anxiety and pending ADHD testing completion.     Feeling increased anxiety and being \"on edge\" since starting higher dosing of straterra. Worries about blood pressure and pulse if considering alternative stimulant medication. Denies cardiac symptoms since last year outside of small increases with anxiety or exertion. ADHD testing not yet completed due to insurance coverage barriers. 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    States mood is \"anxious\". Rates depression nonexistent and anxiety improved. No sleep or energy maintenance concerns. No appetite or weight concerns. No suicidal and homicidal ideation. No overt psychosis. Denies all other psychiatric symptoms. No new physical concerns.     Medication adherence: Reviewed risk/benefits of medication , Patient able to verbalize understanding of side effects  and Patient verbally consents to taking medications  Medication side effects: fatigue/weakness  The patient was given information on medications: currently prescribed    --  [place] Prescription Monitoring Program  No worrisome pharmacy activity.     --  Current Medications:  Current Outpatient Medications   Medication Sig Dispense Refill     adalimumab (HUMIRA,CF, PEN) 40 mg/0.4 mL PnKt Inject 40 mg under the skin every 14 (fourteen) days. 1 kit 0     atomoxetine (STRATTERA) 80 MG capsule Take 1 capsule (80 mg total) by mouth daily. 30 capsule 0     busPIRone (BUSPAR) 10 MG tablet Take 1 tablet (10 mg total) by mouth 2 (two) times a day. 60 tablet 2     cetirizine (ZYRTEC) 10 MG tablet Take 1 tablet (10 mg total) by mouth daily. 90 tablet 1     " cholecalciferol, vitamin D3, 1,250 mcg (50,000 unit) capsule TAKE 1 CAPSULE BY MOUTH WEEKLY 12 capsule 1     diltiazem (CARDIZEM CD) 120 MG 24 hr capsule Take 1 capsule (120 mg total) by mouth daily. 90 capsule 3     EPINEPHrine (EPIPEN 2-RADHA) 0.3 mg/0.3 mL injection Inject 0.3 mL (0.3 mg total) into the shoulder, thigh, or buttocks as needed. 2 Pre-filled Pen Syringe 0     prazosin (MINIPRESS) 1 MG capsule Take 1 capsule (1 mg total) by mouth at bedtime. 30 capsule 2     No current facility-administered medications for this visit.        --  Allergies  Allergies   Allergen Reactions     Glucosamine Anaphylaxis     Shellfish Containing Products Anaphylaxis     Sulfa (Sulfonamide Antibiotics) Itching     Rapid heart rate, itching, shortness of breath     Effexor [Venlafaxine] Palpitations     Pt B/P was elevated      Abilify [Aripiprazole] Hives     Propoxyphene N-Acetaminophen      Sulfasalazine Hives     --  Summary of Diagnostic Studies  No visits with results within 30 Day(s) from this visit.   Latest known visit with results is:   Physical on 01/21/2021   Component Date Value Ref Range Status     Cholesterol 01/21/2021 169  <=199 mg/dL Final     Triglycerides 01/21/2021 79  <=149 mg/dL Final     HDL Cholesterol 01/21/2021 46* >=50 mg/dL Final     LDL Calculated 01/21/2021 107  <=129 mg/dL Final     Patient Fasting > 8hrs? 01/21/2021 Yes   Final     Sodium 01/21/2021 139  136 - 145 mmol/L Final     Potassium 01/21/2021 4.7  3.5 - 5.0 mmol/L Final     Chloride 01/21/2021 103  98 - 107 mmol/L Final     CO2 01/21/2021 26  22 - 31 mmol/L Final     Anion Gap, Calculation 01/21/2021 10  5 - 18 mmol/L Final     Glucose 01/21/2021 111  70 - 125 mg/dL Final     BUN 01/21/2021 14  8 - 22 mg/dL Final     Creatinine 01/21/2021 0.66  0.60 - 1.10 mg/dL Final     GFR MDRD Af Amer 01/21/2021 >60  >60 mL/min/1.73m2 Final     GFR MDRD Non Af Amer 01/21/2021 >60  >60 mL/min/1.73m2 Final     Bilirubin, Total 01/21/2021 0.4  0.0 -  1.0 mg/dL Final     Calcium 01/21/2021 9.1  8.5 - 10.5 mg/dL Final     Protein, Total 01/21/2021 7.6  6.0 - 8.0 g/dL Final     Albumin 01/21/2021 3.7  3.5 - 5.0 g/dL Final     Alkaline Phosphatase 01/21/2021 93  45 - 120 U/L Final     AST 01/21/2021 18  0 - 40 U/L Final     ALT 01/21/2021 19  0 - 45 U/L Final     Vitamin D, Total (25-Hydroxy) 01/21/2021 48.0  30.0 - 80.0 ng/mL Final     WBC 01/21/2021 8.5  4.0 - 11.0 thou/uL Final     RBC 01/21/2021 4.61  3.80 - 5.40 mill/uL Final     Hemoglobin 01/21/2021 12.7  12.0 - 16.0 g/dL Final     Hematocrit 01/21/2021 40.1  35.0 - 47.0 % Final     MCV 01/21/2021 87  80 - 100 fL Final     MCH 01/21/2021 27.5  27.0 - 34.0 pg Final     MCHC 01/21/2021 31.7* 32.0 - 36.0 g/dL Final     RDW 01/21/2021 13.3  11.0 - 14.5 % Final     Platelets 01/21/2021 340  140 - 440 thou/uL Final     MPV 01/21/2021 10.8  8.5 - 12.5 fL Final     Neutrophils % 01/21/2021 63  50 - 70 % Final     Lymphocytes % 01/21/2021 27  20 - 40 % Final     Monocytes % 01/21/2021 7  2 - 10 % Final     Eosinophils % 01/21/2021 2  0 - 6 % Final     Basophils % 01/21/2021 1  0 - 2 % Final     Immature Granulocyte % 01/21/2021 0  <=0 % Final     Neutrophils Absolute 01/21/2021 5.4  2.0 - 7.7 thou/uL Final     Lymphocytes Absolute 01/21/2021 2.3  0.8 - 4.4 thou/uL Final     Monocytes Absolute 01/21/2021 0.6  0.0 - 0.9 thou/uL Final     Eosinophils Absolute 01/21/2021 0.2  0.0 - 0.4 thou/uL Final     Basophils Absolute 01/21/2021 0.0  0.0 - 0.2 thou/uL Final     Immature Granulocyte Absolute 01/21/2021 0.0  <=0.0 thou/uL Final       --  Review of Systems  Wt Readings from Last 3 Encounters:   06/09/21 (!) 279 lb (126.6 kg)   01/21/21 (!) 281 lb (127.5 kg)   10/07/20 (!) 277 lb (125.6 kg)     Temp Readings from Last 3 Encounters:   08/12/20 98.4  F (36.9  C) (Oral)   10/16/19 98  F (36.7  C) (Oral)   10/14/19 98.5  F (36.9  C) (Oral)     BP Readings from Last 3 Encounters:   06/09/21 140/88   01/21/21 140/80   10/07/20  "142/90     Pulse Readings from Last 3 Encounters:   06/09/21 83   01/21/21 82   10/07/20 83      /88 (Patient Site: Right Arm, Patient Position: Sitting, Cuff Size: Adult Large)   Pulse 83   Wt (!) 279 lb (126.6 kg)   LMP 06/01/2019   BMI 41.20 kg/m    Pain Score: n/a  Pain Location: n/a     As noted in the subjective section above, otherwise a 10 point review of systems is negative.  --  Mental Status Examination    Appearance: appears stated age, clean, well groomed, casually dressed appropriate for weather   Orientation: Patient alert and oriented to person, place, time, and situation  Reliability:  Patient appears to be an adequate historian.   Behavior: Patient makes good eye contact and engages with normal rapport in the interview.   There is no evidence of responding to hallucinations or flashbacks.  Speech: Speech is spontaneous and coherent, with a normal rate, rhythm, and tone.    Language: There are no difficulties with expressive or receptive language as observed throughout the interview.    Mood: Described as \"good\".    Affect: Congruent and shows a normal range and level of reactivity.  Judgement: Able to make basic decision regarding safety.  Insight: Good awareness of physical and mental health conditions and aware of needs around care for these.  Gait and station: steady, even gait  Thought process: Logical   Thought content: No evidence of delusions or paranoia.  No thoughts of self-harm or suicide. No thoughts of harming others.  Associations: Connected  Fund of knowledge: Average  Attention / Concentration: Able to remain focused during the interview with minimal distractibility or need for redirection.  Short Term Memory: Grossly intact as evidence by client recalling themes and ideas discussed.  Long Term Memory: Intact  Motor Status: No recent apparent change.  No current tremor.    --  Diagnostic Impression:  1. PTSD (post-traumatic stress disorder)  - prazosin (MINIPRESS) 1 MG " capsule; Take 1 capsule (1 mg total) by mouth at bedtime.  Dispense: 30 capsule; Refill: 2  - busPIRone (BUSPAR) 15 MG tablet; Take 1 tablet (15 mg total) by mouth 2 (two) times a day.  Dispense: 60 tablet; Refill: 2    2. Nightmares associated with chronic post-traumatic stress disorder  - prazosin (MINIPRESS) 1 MG capsule; Take 1 capsule (1 mg total) by mouth at bedtime.  Dispense: 30 capsule; Refill: 2    3. ADA (generalized anxiety disorder)  - atomoxetine (STRATTERA) 80 MG capsule; Take 1 capsule (80 mg total) by mouth daily.  Dispense: 30 capsule; Refill: 2  - busPIRone (BUSPAR) 15 MG tablet; Take 1 tablet (15 mg total) by mouth 2 (two) times a day.  Dispense: 60 tablet; Refill: 2    4. Attention deficit hyperactivity disorder (ADHD), unspecified ADHD type  - atomoxetine (STRATTERA) 80 MG capsule; Take 1 capsule (80 mg total) by mouth daily.  Dispense: 30 capsule; Refill: 2    --  Medical Decision-Making   Alina Martell is a 56 y.o. female who presents for ongoing outpatient psychiatric care. Information today obtained from patient's verbal report and review of records in EHR. Transferring from previous psychiatric nurse practitioner, Kiet Lei, to writer as he is no longer working in this clinic. Established with Mariana Love for outpatient individual psychotherapy visits. Established care with  on 1/6/21. Medically complex with current diagnoses of: has PTSD (post-traumatic stress disorder); Sleep disorder; Anxiety; Panic attack; Anemia - microcytic anemia; Morbid obesity (H); Sicca syndrome (H); Grief; Olecranon bursitis, right - bland, non-inflamed, diagnosed on 7/8/16; Chronic pain; Seropositive rheumatoid arthritis of multiple sites (H); Tendonitis of both shoulders; Cyclic citrullinated peptide (CCP) antibody positive; Paroxysmal atrial fibrillation (H); and ADA (generalized anxiety disorder) on their problem list. Past medication trials include, but are not limited to:  Alprazolam,  hydroxyzine, trazodone, Ambien, Lunesta, Guanfacine, prazosin, sertraline, duloxetine, and Prozac.    Some improvements in mood and anxiety since last visit. Positive response to prazosin for nightmares. Improvement in anxiety with Buspar so will titrate again today. Discussed diagnostics, symptoms, and indicated courses of treatment. ADHD testing in progress to rule out causation of inattention. Positive initial response to atomoxetine for concentration/focus but no further improvements so will titrate again today. No other medication changes this visit. Reviewed recent lab work. No new labs indicated today. Discussed need for psychiatric care transition planning as writer leaving this location on 6/17/21. Recommended patient continue with ongoing psychiatric care. May consider third party referral at patient request.    Moderate Risk. Patient denies suicidal and homicidal ideation. Has history of suicide attempt. Not at imminent risk this visit. Educated on need to seek emergent services should they become a risk to themselves or others. Alina Martell verbalized understanding and agreement with this safety plan.    --  Plan  1. Continue to monitor for safety  2. Current Medications  1. Continue Strattera 80mg daily for inattention and anxiety  2. Continue prazosin 1mg at HS for nightmares  3. TITRATE Buspar 10mg to 15mg two times a day for anxiety  4. Non-Opioid Contract Agreement Form Signed defer unless indicated in future   5. REFILLS: see above  1. Labs ordered this visit: see above   2. PENDING ADHD testing completion to rule out ADHD. Continue individual psychotherapy appointments for mood stabilization and nonpharmacologic coping. Collaborate with interdisciplinary care team as needed.  3. Patient will continue abstinence from drugs and alcohol  4. Patient to return to clinic with future psychiatric care provider for evaluation of medication trials and continued assessment. Ongoing patient  psychoeducation regarding chronic illness and treatment.   1. Patient educated that they may schedule sooner appointment or contact writer for any worsening or lack of improvement in symptoms.   5. I reviewed the potential risks, side effects, and benefits of all medications with the patient. Patient verbalized understanding and was encouraged to call clinic with further questions or concerns.    --    Appointment duration: 30 minutes with > 75% spent on coordination of care and psycho-education regarding illness, symptoms, neurobiological basis of disease, gambling, alternative interventions, sleep hygiene, safety planning, care planning, and pharmacology.    Lisa Rojas, DNP, APRN, PMHNP-BC  Nurse Practitioner - Psychiatry    This medical report was made using Dragon Dictation. Spelling and grammatical errors with Dragon exist and are not intentional.

## 2021-06-26 NOTE — PROGRESS NOTES
Mental Health Psychotherapy Note     6/15/2021   Start time: 1200    Stop Time: 1252  Session # 12    Two point identification confirmed with full name and     Session Type: Patient is presenting for an Individual session.    Alina Martell is a 56 y.o. female is being seen today for    Chief Complaint   Patient presents with     MH Follow Up     Video Visit Mental Health     Telemedicine Visit: The patient's condition can be safely assessed and treated via synchronous audio and visual telemedicine encounter.      Reason for Telemedicine Visit: Services only offered telehealth    Originating Site (Patient Location): Patient's work    Distant Site (Provider Location): Provider Remote Setting    Consent:  The patient/guardian has verbally consented to: the potential risks and benefits of telemedicine (video visit) versus in person care; bill my insurance or make self-payment for services provided; and responsibility for payment of non-covered services.     As the provider I attest to compliance with applicable laws and regulations related to telemedicine.    Those present for this visit patient and therapist     Follow up in regards to ongoing symptom management of depression and anxiety.    New symptoms or complaints: None    Functional Impairment:   Personal: 3  Family: 2  Social: 2  Work: 2    Clinical assessment of mental status:   Alina Martell presented on time.   She was oriented x3, open and cooperative, and dressed appropriately for this session and weather. Her memory was Normal cognitive functioning .  Her speech was  Within normal.  Language was intact.  Concentration and focus is Within normal. Psychosis is not noted or reported. She reports her mood is continued to be anxious.  Affect is congruent with speech and is Congruent w/content of speech.  Fund of knowledge is adequate. Insight is adequate for therapy.Changes made as needed for session on 6/15/2021.    Suicidal/Homicidal Ideation  present: Patient denies suicidal and homicidal ideations/means or plans.     Patient's impression of their current status: Patient indicated continuing to feel anxious. Patient reported she notices it a lot when going place's and in larger groups. Patient indicated having her glamping this weekend and some anxiety even thought she would know everyone. Patient reported believing this may be due to the isolation during COVID. Patient indicated she also has thought a lot about relationships and ways she is continuing to avoid.     Therapist impression of patients current state: This 56 y.o. presents with feeling anxious. This therapist processed with patient ways to work on in-vivo's to start putting herself in place's again. This therapist went over coping skills to help with the anxiety that occurs. This therapist challenged patient on her fears and avoidance of a relationship.     Assessment tools used today include: None    Type of psychotherapeutic technique provided: Insight oriented, Client centered and CBT    Progress toward short term goals: Progress as expected with patient continuing therapy, spending time with a group of people and identifying fears of relationship.     Review of long term goals: Treatment Plan updated on 4/6/2021    Diagnosis:   1. PTSD (post-traumatic stress disorder)    2. ADA (generalized anxiety disorder)    3. Major depressive disorder, recurrent episode, moderate (H)        Plan and Follow-up: Patient will continue with twice a month therapy. Patient will work on continuing to get out of the house and go to place's she has been avoiding. Patient will continue to work on identifying ways that she is avoiding relationships.   Discharge Criteria/Planning: Patient will continue with follow-up until therapy can be discontinued without return of signs and symptoms.    Performed and documented by SHERRIE Pantoja

## 2021-06-27 NOTE — PROGRESS NOTES
Progress Notes by Idania Gonzalez CMA at 5/6/2019  9:00 AM     Author: Idania Gonzalez CMA Service: Addiction Care Author Type: Certified Medical Assistant    Filed: 5/6/2019  1:41 PM Encounter Date: 5/6/2019 Status: Signed    : Idania Gonzalez CMA (Certified Medical Assistant)       Patient here today for follow up of medication management. States depression 2/5 denies SI/HI, anxiety 4/5.   ADA-11  PHQ-12

## 2021-06-28 NOTE — PROGRESS NOTES
Progress Notes by Ken Hitchcock PA-C at 10/16/2019  9:40 AM     Author: Ken Hitchcock PA-C Service: -- Author Type: Physician Assistant    Filed: 11/4/2019  1:37 PM Encounter Date: 10/16/2019 Status: Signed    : Ken Hitchcock PA-C (Physician Assistant)       Subjective:      Patient ID: Alina Martell is a 54 y.o. female.    Chief Complaint:    HPI    Alina Martell is a 54 y.o. female with a past medical history of seropositive rheumatoid arthritis who presents today complaining of left-sided leg pain.  Patient was seen at 10/14/2019 at the ER and had a work-up for left-sided knee pain to include left knee x-rays and ultrasound to rule out DVT.  She was given pain medications to control her pain.  Subsequently, the patient has developed swelling  And pain on the right knee.  She has difficulty with radiation of the pain from the right lower back into the buttock and down the leg into the knee.  She does not report locking or catching however does feel that there is some giving way of the knee. No symptoms below the knee.    No reported injury or involvement of the the right lower leg.    Patient has had difficulty with sitting standing in line causing lower back pain and pain with walking.  She also has difficulty with stairs and uneven ground.  Her back pain is a 5 out of 10 and is made worse with activity and increased duration of her activity.  No noted fever chills or night sweats fecal or urinary incontinence.    Past Medical History:   Diagnosis Date   ? Cannabis use without complication 12/8/2014   ? Obesity    ? Rheumatoid arthritis (H) 7/8/2016   ? Sicca syndrome (H)        Past Surgical History:   Procedure Laterality Date   ? AK REMOVAL GALLBLADDER      Description: Cholecystectomy;  Recorded: 10/15/2013;   ? TUBAL LIGATION  1995       Family History   Problem Relation Age of Onset   ? Diabetes Father    ? Prostate cancer Father    ? Chronic Kidney Disease Father         Chose against  dialysis.   ? Drug abuse Brother    ? Depression Brother    ? Crohn's disease Mother         Total colectomy with ileostomy.   ? No Medical Problems Daughter    ? No Medical Problems Son    ? No Medical Problems Brother    ? Lupus Cousin    ? Breast cancer Neg Hx        Social History     Tobacco Use   ? Smoking status: Never Smoker   ? Smokeless tobacco: Never Used   ? Tobacco comment: smokes marijuana   Substance Use Topics   ? Alcohol use: Not Currently     Comment: Never an issue, she states.  7/8/16   ? Drug use: Not Currently     Types: Marijuana     Comment: Former marijuana use, not current. 7/8/16       Review of Systems  As above in HPI, otherwise balance of Review of Systems are negative.    Objective:     /89   Pulse 85   Temp 98  F (36.7  C) (Oral)   Resp 16   Wt (!) 265 lb 12.8 oz (120.6 kg)   LMP 06/01/2019   SpO2 91%   Breastfeeding? No   BMI 39.25 kg/m      Physical Exam  General: Patient is uncomfortable, but not in acute distress  HEENT: Head is normocephalic atraumatic   eyes are PERRL EOMI sclera anicteric   TMs are clear bilaterally  Throat is clear  Skin: Without rash non-diaphoretic  Musculoskeletal: The left knee shows that there is a effusion.  Also testing shows she has pain over the medial aspect of the knee at the joint line.  No noted locking or catching.  Shawn's testing is positive for pain but no clicking or locking.  Negative anterior drawer sign.  Medial lateral collateral ligament are intact to valgus and varus stressing respectively.  She does have pain with palpation in the left SI joint that does reproduce pain radiating into the buttock and to the lower extremity to the knee.  She is able to get from a sitting position to a standing position and get onto the examination table and ambulate.    She has had difficulty with the knee pain and has been altering her gait with antalgic gait and causing most likely the left sided back alignment and pain  "problems.      Assessment:     Procedures    The primary encounter diagnosis was Effusion of left knee. Diagnoses of Knee meniscus pain, left, Seropositive rheumatoid arthritis of multiple sites (H), and Acute left-sided low back pain with left-sided sciatica were also pertinent to this visit.    Plan:     1. Effusion of left knee  Ambulatory referral to [facility]    traMADol (ULTRAM) 50 mg tablet   2. Knee meniscus pain, left  Ambulatory referral to [facility]    traMADol (ULTRAM) 50 mg tablet   3. Seropositive rheumatoid arthritis of multiple sites (H)     4. Acute left-sided low back pain with left-sided sciatica  traMADol (ULTRAM) 50 mg tablet         Patient Instructions   Protected weightbearing on the left knee as much as possible.  You may use crutch ambulation if you have them at home.  Continue taking the Aleve for anti-inflammatory effect and pain relief.  Ice the knee 20 minutes on 10 minutes off to avoid damage the skin each hour while awake for the first 2 days.  Elevate the left leg above level of the heart is much as possible.  Put your \"toes above the nose\".    I do think that your back is hurting from your altered gait.  We will treat you for a sciatica of the left sciatic nerve.  You may place ice topically on the area of the sciatic nerve XX minutes on each hour while awake.  Take precautions to avoid damage the skin.  Follow-up with orthopedics for reevaluation of your knee which in turn will help with your low back pain to restore your gait to normal.      Patient Education     Water on the Knee    Water on the knee is also known as knee effusion. The knee joint normally has less than 1 ounce of fluid. Injury or inflammation of the knee joint causes extra fluid to collect there. When this happens, the knee joint looks swollen and is usually painful. It may be hard to fully bend the knee.  The most common cause of water on the knee is osteoarthritis due to wear and tear on the joint " cartilage. Other causes include injury to the cartilage, inflammatory arthritis such as gout or rheumatoid arthritis, and infection of the joint.  You may need a needle aspiration, if the cause of your water on the knee is not certain. This procedure removes a sample of joint fluid from the knee for testing. This is done with a local anesthetic. Removing excess fluid may also relieve swelling and pain.  Home care    Limit your activities. Stay off the injured leg as much as possible until pain improves.    Keep your leg elevated to reduce pain and swelling. When sleeping, place a pillow under the injured leg. When sitting, support the injured leg so it is level with your waist. This is very important during the first 48 hours.    Apply an ice pack over the injured area for 15 to 20 minutes every 3 to 6 hours. You should do this for the first 24 to 48 hours. You can make an ice pack by filling a plastic bag that seals at the top with ice cubes and then wrapping it with a thin towel. Continue to use ice packs for relief of pain and swelling as needed. As the ice melts, be careful to avoid getting your wrap, splint, or cast wet. After 48 hours, apply heat(warm shower or warm bath) for 15 to 20 minutes several times a day, or alternate ice and heat. If you have to wear a hook-and-loop knee brace, you can open it to apply the ice pack, or heat, directly to the knee. Never put ice directly on the skin. Always wrap the ice in a towel or other type of cloth.    You may use over-the-counter pain medicine to control pain, unless another pain medicine was prescribed. If you have chronic liver or kidney disease or have ever had a stomach ulcer or GI bleeding, talk with your healthcare provider before using these medicines.    If crutches or a walker have been recommended, do not put weight on the injured leg until you can do so without pain. Check with your healthcare provider before returning to sports or full work  duties.    If you have a hook-and-loop knee brace, you can remove it to bathe and sleep, unless told otherwise.  Follow-up care  Follow up with your healthcare provider as advised.  If you are overweight, talk to your healthcare provider about a weight loss program. The excess weight puts extra strain on your knees.  When to seek medical advice  Call your healthcare provider right away if any of these occur:    Increasing pain, redness, or swelling of the knee    Fever of 100.4 F (38 C) or above lasting for 24 to 48 hours  Date Last Reviewed: 11/23/2015 2000-2017 [employer]. [address] [place] [id number]. All rights reserved. This information is not intended as a substitute for professional medical care. Always follow your healthcare professional's instructions.           Patient Education     Sciatica    Sciatica is a condition that causes pain in the lower back that spreads down into the buttock, hip, and leg. Sometimes the leg pain can happen without any back pain. Sciatica happens when a spinal nerve is irritated or has pressure put on it as comes out of the spinal canal in the lower back. This most often happens when a bulge or rupture of a nearby spinal disk presses on the nerve. Sciatica can also be caused by a narrowing of the spinal canal (spinal stenosis) or spasm of the muscle in the buttocks that the sciatic nerve passes through (pyriform muscle). Sciatica is also called lumbar radiculopathy.  Sciatica may begin after a sudden twisting or bending force, such as in a car accident. Or it can happen after a simple awkward movement. In either case, muscle spasm often also happens. Muscle spasm makes the pain worse.  A healthcare provider makes a diagnosis of sciatica from your symptoms and a physical exam. Unless you had an injury from a car accident or fall, you usually wont have X-rays taken at this time. This is because the nerves and disks in your back cant be seen on an  X-ray. If the provider sees signs of a compressed nerve, you will need to schedule an MRI scan as an outpatient. Signs of a compressed nerve include loss of strength in a leg.  Most sciatica gets better with medicine, exercise, and physical therapy. If your symptoms continue after at least 3 months of medical treatment, you may need surgery or injections to your lower back.  Home care  Follow these tips when caring for yourself at home:    You may need to stay in bed the first few days. But as soon as possible, begin sitting up or walking. This will help you avoid problems that come from staying in bed for long periods.    When in bed, try to find a position that is comfortable. A firm mattress is best. Try lying flat on your back with pillows under your knees. You can also try lying on your side with your knees bent up toward your chest and a pillow between your knees.    Avoid sitting for long periods. This puts more stress on your lower back than standing or walking.    Use heat from a hot shower, hot bath, or heating pad to help ease pain. Massage can also help. You can also try using an ice pack. You can make your own ice pack by putting ice cubes in a plastic bag. Wrap the bag in a thin towel. Try both heat and cold to see which works best. Use the method that feels best for 20 minutes several times a day.    You may use acetaminophen or ibuprofen to ease pain, unless another pain medicine was prescribed. Note: If you have chronic liver or kidney disease, talk with your healthcare provider before taking these medicines. Also talk with your provider if youve had a stomach ulcer or gastrointestinal bleeding.    Use safe lifting methods. Dont lift anything heavier than 15 pounds until all of the pain is gone.  Follow-up care  Follow up with your healthcare provider, or as advised. You may need physical therapy or additional tests.  If X-rays were taken, a radiologist will look at them. You will be told of any new  findings that may affect your care.  When to seek medical advice  Call your healthcare provider right away if any of these occur:    Pain gets worse even after taking prescribed medicine    Weakness or numbness in 1 or both legs or hips    Numbness in your groin or genital area    You cant control your bowel or bladder    Fever    Redness or swelling over your back or spine   Date Last Reviewed: 8/1/2016 2000-2017 [employer]. [address] [place] [address], [place] [address]. All rights reserved. This information is not intended as a substitute for professional medical care. Always follow your healthcare professional's instructions.

## 2021-06-28 NOTE — PROGRESS NOTES
Progress Notes by Estelle Bansal MD at 9/16/2019  7:00 AM     Author: Estelel Bansal MD Service: -- Author Type: Physician    Filed: 9/16/2019  8:03 AM Encounter Date: 9/16/2019 Status: Signed    : Estelle Bansal MD (Physician)       FEMALE PREVENTATIVE EXAM    Assessment and Plan:       1. Encounter for general adult medical examination without abnormal findings  Routine labs and will call with the results  - Comprehensive Metabolic Panel  - HM1(CBC and Differential)  - Vitamin D, Total (25-Hydroxy)  - HM1 (CBC with Diff)    2. Visit for screening mammogram  Routine screening  - Mammo Screening Bilateral; Future    3. Screen for colon cancer  - Cologuard    4. Encounter for screening for lipoid disorders  Discussed diet and exercise.  - Lipid Cascade- FASTING    5. Hives  Trial of claritin and offered referral to dermatology or allergy and pt would like to wait at this time.  - loratadine (CLARITIN) 10 mg tablet; Take 1 tablet (10 mg total) by mouth daily.  Dispense: 30 tablet; Refill: 2    7.  Obesity  Discussed diet and exercise    8. RA of multiple sites  Seeing rheumatology and doing well currently wihtout flair ups.  Follow up as directed.      Next follow up:  Return in 1 year (on 9/16/2020).    Immunization Review  Adult Imm Review: Declines immunizations today  .    I discussed the following with the patient:   Adult Healthy Living: Importance of regular exercise  Healthy nutrition  Weight loss referral options  Getting adequate sleep  Stress management  Use of seat belts  Distracted driving    I have had an Advance Directives discussion with the patient.    Subjective:   Chief Complaint: Alina Martell is an 54 y.o. female here for a preventative health visit.     HPI:  No concerns.  Hx of RA.  Going through menopause.  LMP 6/19.  Normally very regular.  Itching and then scratching at night but occasionally at nights and having some welts and raised areas.  On prednisone for a few flair  "ups recently.  Hx of allergies.  When happening then will take benadryl but then will become drowsy.    Healthy Habits  Are you taking a daily aspirin? No  Do you typically exercising at least 40 min, 3-4 times per week?  Yes  Do you usually eat at least 4 servings of fruit and vegetables a day, include whole grains and fiber and avoid regularly eating high fat foods? Yes  Have you had an eye exam in the past two years? Yes  Do you see a dentist twice per year? NO  Do you have any concerns regarding sleep? YES    Safety Screen  If you own firearms, are they secured in a locked gun cabinet or with trigger locks? Yes  Do you feel you are safe where you are living?: Yes (4/7/2019 11:08 AM)  Do you feel you are safe in your relationship(s)?: Yes (4/7/2019 11:08 AM)      Review of Systems:  Please see above.  The rest of the review of systems are negative for all systems.     Pap History:   Last 3 PAP results:  No results found for: PAP  Cancer Screening       Status Date      COLOGUARD Overdue 2/28/2017     MAMMOGRAM Next Due 8/21/2019      Done 8/21/2018 MAMMO SCREENING BILATERAL     Patient has more history with this topic...    PAP SMEAR Next Due 5/21/2023      Done 5/21/2018 GYNECOLOGIC CYTOLOGY (PAP SMEAR)     Patient has more history with this topic...          Patient Care Team:  Estelle Bansal MD as PCP - General (Family Medicine)  Estelle Bansal MD as Assigned PCP        History     Reviewed By Date/Time Sections Reviewed    Marifer Goncalves CMA 9/16/2019  7:03 AM Tobacco    Marifer Goncalves CMA 9/16/2019  7:00 AM Medical, Surgical, Tobacco, Alcohol, Drug Use, Sexual Activity, Family            Objective:   Vital Signs:   Visit Vitals  /72 (Patient Site: Right Arm, Patient Position: Sitting, Cuff Size: Adult Regular)   Pulse 80   Temp 98.2  F (36.8  C) (Oral)   Ht 5' 8.75\" (1.746 m)   Wt (!) 262 lb 11.2 oz (119.2 kg)   LMP 06/01/2019   SpO2 96%   BMI 39.08 kg/m           PHYSICAL " EXAM    Physical Exam:  General Appearance: Alert, cooperative, no distress, appears stated age   Head: Normocephalic, without obvious abnormality, atraumatic  Eyes: PERRL, conjunctiva/corneas clear, EOM's intact   Ears: Normal TM's and external ear canals, both ears  Nose:Nares normal, septum midline,mucosa normal, no drainage    Throat:Lips, mucosa, and tongue normal; teeth and gums normal  Neck: Supple, symmetrical, trachea midline, no adenopathy;  thyroid: not enlarged, symmetric, no tenderness/mass/nodules  Back: Symmetric, no curvature, ROM normal,  Lungs: Clear to auscultation bilaterally, respirations unlabored  Heart: Regular rate and rhythm, S1 and S2 normal, no murmur, rub, or gallop  Abdomen: Soft, non-tender, bowel sounds active all four quadrants,  no masses, no organomegaly  Extremities: Extremities normal, atraumatic, no cyanosis or edema  Skin: Skin color, texture, turgor normal, no rashes or lesions  Lymph nodes: Cervical, supraclavicular, and axillary nodes normal  Neurologic: Normal  Breast:  No tenderness, firm area on the right medial aspect 4 oclock positioning, no skin changes.            The 10-year ASCVD risk score (Stephanie DC Jr., et al., 2013) is: 3.8%    Values used to calculate the score:      Age: 54 years      Sex: Female      Is Non- : Yes      Diabetic: No      Tobacco smoker: No      Systolic Blood Pressure: 120 mmHg      Is BP treated: Yes      HDL Cholesterol: 41 mg/dL      Total Cholesterol: 166 mg/dL         Medication List           Accurate as of 9/16/19  7:28 AM. If you have any questions, ask your nurse or doctor.               START taking these medications    loratadine 10 mg tablet  Also known as:  CLARITIN  INSTRUCTIONS:  Take 1 tablet (10 mg total) by mouth daily.  Started by:  Estelle Bansal MD           CHANGE how you take these medications    guanFACINE 2 mg Tb24  INSTRUCTIONS:  Take 2 mg by mouth every morning.  Doctor's comments:  Start this  after 1mg tabs complete  What changed:  Another medication with the same name was removed. Continue taking this medication, and follow the directions you see here.  Changed by:  Estelle Banasl MD           CONTINUE taking these medications    DULoxetine 60 MG capsule  Also known as:  CYMBALTA  INSTRUCTIONS:  Take 1 capsule (60 mg total) by mouth daily.        EPINEPHrine 0.3 mg/0.3 mL injection  Also known as:  EPIPEN 2-RADHA  INSTRUCTIONS:  Inject 0.3 mL (0.3 mg total) into the shoulder, thigh, or buttocks as needed.        gabapentin 300 MG capsule  Also known as:  NEURONTIN  INSTRUCTIONS:  1-2 up to 4 times daily        hydrOXYzine pamoate 25 MG capsule  Also known as:  VISTARIL  INSTRUCTIONS:  1 up to four times daily prn anxiety, 1-4 at first wakening prn        ibuprofen 600 MG tablet  Also known as:  ADVIL,MOTRIN  INSTRUCTIONS:  Take 600 mg by mouth.        multivitamin therapeutic tablet  Also known as:  THERAGRAN  INSTRUCTIONS:  Take 1 tablet by mouth daily.        traZODone 50 MG tablet  Also known as:  DESYREL  INSTRUCTIONS:  Take 50 mg by mouth at bedtime as needed.            STOP taking these medications    hydroxychloroquine 200 mg tablet  Also known as:  PLAQUENIL  Stopped by:  Estelle Bansal MD     leflunomide 20 MG tablet  Also known as:  ARAVA  Stopped by:  Estelle Bansal MD     oxyCODONE 5 MG immediate release tablet  Also known as:  ROXICODONE  Stopped by:  Estelle Bansal MD     predniSONE 10 mg tablet  Also known as:  DELTASONE  Stopped by:  Estelle Bansal MD           Where to Get Your Medications      These medications were sent to [facility]/pharmacy #[id number] - [place], [place] - [address] AT [facility] ([facility]  [address] [place] [address]    Phone:  [phone]     loratadine 10 mg tablet         Additional Screenings Completed Today:

## 2021-06-29 NOTE — PROGRESS NOTES
Progress Notes by Gil Richter MD at 9/16/2020  7:50 AM     Author: Gil Richter MD Service: -- Author Type: Physician    Filed: 9/16/2020  8:54 AM Encounter Date: 9/16/2020 Status: Signed    : Gil Richter MD (Physician)         CARDIOLOGY CLINIC CONSULT NOTE     Assessment/Plan:   1.  Paroxysmal atrial fibrillation with rapid ventricular response.  Underwent D C cardioversion in the emergency room in August for this condition with no apparent recurrence.  We discussed that her paroxysmal atrial fibrillation, morning headaches, daytime sleepiness may all relate to untreated obstructive sleep apnea.  She has a [id number] score of 1, for her sex.  At this point I would not recommend long-term anticoagulant therapy.  Given the complexity of other medications have not started her on aspirin therapy at this time either.  We will also check an echocardiogram to look for evidence of structural heart disease.  2.  Hypertension.  New diagnosis.  Will treat with diltiazem extended release 120 mg daily.  Advised to check blood pressure at home, bring readings into primary care provider  3.  Probable obstructive sleep apnea based on symptoms.  We will schedule split-night sleep study and sleep consultation    Follow-up as needed     History of Present Illness:     It is my pleasure to see Alina Martell at the [facility] for evaluation of paroxysmal atrial fibrillation.    Alina Martell is a 55 y.o. female with a past medical history of sicca syndrome with rheumatoid arthritis, morbid obesity, anxiety.  She has been laid off for several months and during this time she is begun walking regularly with stable activity tolerance.  Her joints do limit her activities, with occasional swelling.  She estimates that she has gained 25 pounds during the last few months.    1 month ago, she awakened noticing some irregularities in her pulse.  When it persisted through the course  of the day she presented to the emergency room for further evaluation.  She was found to have atrial fibrillation 157 beats a minute.  She underwent DC cardioversion and has felt well since that time.  She has noticed more frequent headaches, particularly in the mornings, since that time.  She has not had any recurrent awareness of palpitations.  She has noticed on occasion her blood pressure is elevated, though it is generally well controlled when she sees her primary care provider.  She has never been on antihypertensive treatment.  She has no history of diabetes mellitus.  She does not drink alcohol or smoke.  Does chronically sleep poorly, with daytime sleepiness and snoring.  She is never had a sleep apnea evaluation.  She has had no bruising or bleeding problems.    Past Medical History:     Patient Active Problem List   Diagnosis   ? PTSD (post-traumatic stress disorder)   ? Sleep disorder   ? Anxiety   ? Panic attack   ? Anemia - microcytic anemia   ? Obesity   ? Sicca syndrome (H)   ? Grief   ? Olecranon bursitis, right - bland, non-inflamed, diagnosed on 7/8/16   ? Chronic pain   ? Seropositive rheumatoid arthritis of multiple sites (H)   ? Tendonitis of both shoulders   ? Cyclic citrullinated peptide (CCP) antibody positive       Past Surgical History:     Past Surgical History:   Procedure Laterality Date   ? OK REMOVAL GALLBLADDER      Description: Cholecystectomy;  Recorded: 10/15/2013;   ? TUBAL LIGATION  1995       Family History:     Family History   Problem Relation Age of Onset   ? Diabetes Father    ? Prostate cancer Father    ? Chronic Kidney Disease Father         Chose against dialysis.   ? Drug abuse Brother    ? Depression Brother    ? Crohn's disease Mother         Total colectomy with ileostomy.   ? No Medical Problems Daughter    ? No Medical Problems Son    ? No Medical Problems Brother    ? Lupus Cousin    ? Breast cancer Neg Hx      Family history reviewed and is not pertinent to the  "chief complaint or presenting problem    Social History:    reports that she has never smoked. She has never used smokeless tobacco. She reports previous alcohol use. She reports previous drug use. Drug: Marijuana.    Exercise: Walking daily.  Plays tennis as her joints allow.    Sleep: \"Bad\" poorly restorative, chronically with daytime sleepiness.  Snores.  No previous apnea evaluation.    Meds:     Current Outpatient Medications   Medication Sig Dispense Refill   ? adalimumab (HUMIRA,CF, PEN) 40 mg/0.4 mL PnKt Inject 40 mg under the skin every 14 (fourteen) days. 1 kit 5   ? cholecalciferol, vitamin D3, 1,250 mcg (50,000 unit) capsule TAKE 1 CAPSULE BY MOUTH WEEKLY 12 capsule 1   ? EPINEPHrine (EPIPEN 2-RADHA) 0.3 mg/0.3 mL injection Inject 0.3 mL (0.3 mg total) into the shoulder, thigh, or buttocks as needed. 2 Pre-filled Pen Syringe 0   ? guanFACINE 2 mg Tb24 TAKE 1 TAB (2 MG) BY MOUTH EVERY MORNING. 30 tablet 3   ? hydrOXYchloroQUINE (PLAQUENIL) 200 mg tablet Take 1 tablet (200 mg total) by mouth 2 (two) times a day. 180 tablet 0   ? hydrOXYzine pamoate (VISTARIL) 25 MG capsule 1 up to four times daily prn anxiety, 1-4 at first wakening prn 150 capsule 5   ? ibuprofen (ADVIL,MOTRIN) 600 MG tablet Take 600 mg by mouth.     ? loratadine (CLARITIN) 10 mg tablet TAKE 1 TABLET BY MOUTH EVERY DAY 30 tablet 3   ? multivitamin therapeutic (THERAGRAN) tablet Take 1 tablet by mouth daily.     ? predniSONE (DELTASONE) 20 MG tablet 2 tablets daily for the next 7 days. 14 tablet 0   ? traZODone (DESYREL) 50 MG tablet Take 50 mg by mouth at bedtime as needed.              No current facility-administered medications for this visit.        Allergies:   Glucosamine; Shellfish containing products; Sulfa (sulfonamide antibiotics); Effexor [venlafaxine]; Abilify [aripiprazole]; Propoxyphene n-acetaminophen; and Sulfasalazine    Review of Systems:     General: WNL  Eyes: WNL  Ears/Nose/Throat: WNL  Lungs: Snoring, Wheezing  Heart: " "Arm Pain, Shortness of Breath with activity  Stomach: WNL  Bladder: Frequent Urination at Night  Muscle/Joints: Joint Pain, Muscle Pain  Skin: WNL  Nervous System: WNL  Mental Health: Anxiety     Blood: WNL        Objective:      Physical Exam  (!) 277 lb (125.6 kg)  5' 8.5\" (1.74 m)  Body mass index is 41.51 kg/m .  BP (!) 160/102 (Patient Site: Left Arm, Patient Position: Sitting, Cuff Size: Adult Large)   Pulse 68   Resp 14   Ht 5' 8.5\" (1.74 m)   Wt (!) 277 lb (125.6 kg)   LMP 06/01/2019   BMI 41.51 kg/m    Pressure recheck 150/96      General Appearance : Awake, Alert, No acute distress.  Pleasant, talkative  HEENT: No Scleral icterus; the mucous membranes were pink and moist.  Conjunctivae not injected  Neck:  No cervical bruits, jugular venous distention, or thyromegaly   Chest: The spine was straight. Chest wall symmetric  Lungs: Respirations unlabored; the lungs are clear to auscultation.  No wheezing   Cardiovascular:   Normal point of maximal impulse.  Auscultation reveals normal first and second heart sounds with no murmurs, rubs, or gallops.  Carotid, radial, and dorsalis pedal pulses are intact and symmetric.  Abdomen: Obese.  No organomegaly, masses, bruits, or tenderness. Bowels sounds are present  Extremities: No edema  Skin: No xanthelasma. Warm, Dry.  Musculoskeletal: No tenderness.  Neurologic: Alert and oriented ×3. Speech is fluent.      EKG (personally reviewed):  8/12/2020: Atrial fibrillation 157 bpm.  Follow-up ECG following cardioversion showed sinus rhythm at 85 bpm    Cardiac Imaging Studies:      Lab Review   Lab Results   Component Value Date     08/12/2020    K 4.1 08/12/2020     08/12/2020    CO2 26 08/12/2020    BUN 11 08/12/2020    CREATININE 0.73 08/12/2020    CALCIUM 10.2 08/12/2020     Lab Results   Component Value Date    WBC 9.6 08/12/2020    HGB 13.5 08/12/2020    HCT 42.6 08/12/2020    MCV 85 08/12/2020     08/12/2020     Lab Results   Component " Value Date    CHOL 175 09/16/2019    TRIG 94 09/16/2019    HDL 41 (L) 09/16/2019    LDLCALC 115 09/16/2019     Lab Results   Component Value Date    TROPONINI 0.01 08/12/2020     No results found for: BNP  Lab Results   Component Value Date    TSH 3.49 08/12/2020       Gil Richter MD [facility]      This note created using Dragon voice recognition software. Sound alike errors may have escaped editing.

## 2021-06-30 NOTE — PROGRESS NOTES
Progress Notes by Estelle Bansal MD at 1/21/2021  7:15 AM     Author: Estelle Bansal MD Service: -- Author Type: Physician    Filed: 1/21/2021  8:17 AM Encounter Date: 1/21/2021 Status: Signed    : Estelle Bansal MD (Physician)       FEMALE PREVENTATIVE EXAM    Assessment and Plan:     Patient has been advised of split billing requirements and indicates understanding: Yes    1. Visit for screening mammogram  Routine mammogram ordered  - Mammo Screening Bilateral; Future    2. Screen for colon cancer  Given phone number for colonoscopy  - Ambulatory referral for Colonoscopy    3. Paroxysmal atrial fibrillation (H)  Stable.  Reviewed cardiology note    4. Morbid obesity (H)  Recommend diet and exercise.  Discussed recent diet changes    5. Seropositive rheumatoid arthritis of multiple sites (H)  Stable.  Seeing rheumatology    6. Hives  Will trial zyrtec now instead of claritin and see if improvement  - cetirizine (ZYRTEC) 10 MG tablet; Take 1 tablet (10 mg total) by mouth daily.  Dispense: 30 tablet; Refill: 2    7. Routine general medical examination at a health care facility  Routine labs recommended  - Lipid Cascade RANDOM  - Comprehensive Metabolic Panel  - HM1(CBC and Differential)  - Vitamin D, Total (25-Hydroxy)    8. Attention deficit hyperactivity disorder (ADHD), unspecified ADHD type  Seen by psychiatry and on strattera.  Stable and continue to follow up as directed.        Next follow up:  Return in about 1 year (around 1/21/2022) for Annual physical.    Immunization Review  Adult Imm Review: Due today, orders placed  .    I discussed the following with the patient:   Adult Healthy Living: Importance of regular exercise  Healthy nutrition  Getting adequate sleep  Stress management  Use of seat belts    I have had an Advance Directives discussion with the patient.    Subjective:   Chief Complaint: Alina Martell is an 55 y.o. female here for a preventative health visit.    Patient has been  advised of split billing requirements and indicates understanding: Yes  HPI:    Due for mammogram  Due for colonoscopy    No period for a year.  Then now 3 in a row.  Every 30 days.  Stopped for the year - no spotting or anything.      Arrhythmia and started on the cardizem.  Seeing cardiology.  Breathing issues - feels like suddenly it will be more labored.  Occasional with high activity.  Not noticed if improvement with the claritin.  Only started in the fall.    On humira and seeing rheumatology  Healthy Habits  Are you taking a daily aspirin? No  Do you typically exercising at least 40 min, 3-4 times per week?  NO  Do you usually eat at least 4 servings of fruit and vegetables a day, include whole grains and fiber and avoid regularly eating high fat foods? Yes  Have you had an eye exam in the past two years? Yes  Do you see a dentist twice per year? NO  Do you have any concerns regarding sleep? YES    Safety Screen  If you own firearms, are they secured in a locked gun cabinet or with trigger locks? The patient does not own any firearms  Do you feel you are safe where you are living?: Yes (1/21/2021  7:07 AM)  Do you feel you are safe in your relationship(s)?: Yes (1/21/2021  7:07 AM)      Review of Systems:  Please see above.  The rest of the review of systems are negative for all systems.     Pap History:   Last 3 PAP results:  No results found for: PAP  Cancer Screening       Status Date      MAMMOGRAM Overdue 9/23/2020      Done 9/23/2019 MAMMO SCREENING BILATERAL     Patient has more history with this topic...    PAP SMEAR Next Due 5/21/2023      Done 5/21/2018 GYNECOLOGIC CYTOLOGY (PAP SMEAR)     Patient has more history with this topic...          Patient Care Team:  Estelle Bansal MD as PCP - General (Family Medicine)  Estelle Bansal MD as Assigned PCP  Gil Richter MD as Assigned Heart and Vascular Provider        History     Reviewed By Date/Time Sections Reviewed    Lizzy Rodriguez CMA  "1/21/2021  7:11 AM Tobacco            Objective:   Vital Signs:   Visit Vitals  /80 (Patient Site: Right Arm, Patient Position: Sitting, Cuff Size: Adult Large)   Pulse 82   Ht 5' 9\" (1.753 m)   Wt (!) 281 lb (127.5 kg)   LMP 06/01/2019   SpO2 96%   BMI 41.50 kg/m           PHYSICAL EXAM    Physical Exam:  General Appearance: Alert, cooperative, no distress, appears stated age   Head: Normocephalic, without obvious abnormality, atraumatic  Eyes: PERRL, conjunctiva/corneas clear, EOM's intact   Ears: Normal TM's and external ear canals, both ears  Nose:Nares normal, septum midline,mucosa normal, no drainage    Throat:Lips, mucosa, and tongue normal; teeth and gums normal  Neck: Supple, symmetrical, trachea midline, no adenopathy;  thyroid: not enlarged, symmetric, no tenderness/mass/nodules  Back: Symmetric, no curvature, ROM normal,  Lungs: Clear to auscultation bilaterally, respirations unlabored  Heart: Regular rate and rhythm, S1 and S2 normal, no murmur, rub, or gallop  Abdomen: Soft, non-tender, bowel sounds active all four quadrants,  no masses, no organomegaly  Extremities: Extremities normal, atraumatic, no cyanosis or edema  Skin: Skin color, texture, turgor normal, no rashes or lesions  Lymph nodes: Cervical, supraclavicular, and axillary nodes normal  Neurologic: Normal            The 10-year ASCVD risk score ([place] CAROLINE Jr., et al., 2013) is: 2.3%    Values used to calculate the score:      Age: 55 years      Sex: Female      Is Non- : No      Diabetic: No      Tobacco smoker: No      Systolic Blood Pressure: 140 mmHg      Is BP treated: No      HDL Cholesterol: 41 mg/dL      Total Cholesterol: 175 mg/dL         Medication List          Accurate as of January 21, 2021  8:15 AM. If you have any questions, ask your nurse or doctor.            START taking these medications    cetirizine 10 MG tablet  Also known as: ZyrTEC  INSTRUCTIONS: Take 1 tablet (10 mg total) by mouth " daily.  Started by: Estelle Bansal MD           CONTINUE taking these medications    atomoxetine 40 MG capsule  Also known as: STRATTERA  INSTRUCTIONS: Take 1 capsule (40 mg total) by mouth daily.        cholecalciferol (vitamin D3) 1,250 mcg (50,000 unit) capsule  INSTRUCTIONS: TAKE 1 CAPSULE BY MOUTH WEEKLY        diltiazem 120 MG 24 hr capsule  Also known as: CARDIZEM CD  INSTRUCTIONS: Take 1 capsule (120 mg total) by mouth daily.        EPINEPHrine 0.3 mg/0.3 mL injection  Also known as: EpiPen 2-Srinath  INSTRUCTIONS: Inject 0.3 mL (0.3 mg total) into the shoulder, thigh, or buttocks as needed.  Doctor's comments: Please dispense product with lowest cost to patient        Humira(CF) Pen 40 mg/0.4 mL Pnkt  INSTRUCTIONS: Inject 40 mg under the skin every 14 (fourteen) days.  Generic drug: adalimumab        ibuprofen 600 MG tablet  Also known as: ADVIL,MOTRIN  INSTRUCTIONS: Take 600 mg by mouth.           STOP taking these medications    loratadine 10 mg tablet  Also known as: CLARITIN  Stopped by: Estelle Bansal MD           Where to Get Your Medications      These medications were sent to [id number] - [place], [place] - [address] AT [facility] [address]  [facility] [address] [place] [address]    Phone: [phone]     cetirizine 10 MG tablet         Additional Screenings Completed Today:

## 2021-06-30 NOTE — PROGRESS NOTES
Progress Notes by Shilpa Mcclure PA-C at 2/24/2021 12:30 PM     Author: Shilpa Mcclure PA-C Service: -- Author Type: Physician Assistant    Filed: 2/24/2021  1:28 PM Encounter Date: 2/24/2021 Status: Signed    : Shilpa Mcclure PA-C (Physician Assistant)           Study Purpose: Atrial Fibrillation Algorithm Clinical Validation Study, prospective, multi-center, non significant risk       Physical Examination performed via live video encounter   General Appearance:   Alert, well appearing,no distress, normal body habitus, upright.   HENT/Mouth: Atraumatic normocephalic membranes moist, mucous membranes pink and moist  No nasal discharge or   bleeding gums.    EYES:  No scleral icterus, normal conjunctivae   Neck: No evidence of thyromegaly.  Supple.    Chest/Lungs:   No audible wheezing equal chest wall expansion. Non labored breathing.  No cough.   Cardiovascular:   No evidence of elevated jugular venous pressure.      Abdomen:  No evidence of abdominal distention. No observed jaundice.     Extremities: No cyanosis or clubbing noted.   No visible edema bilaterally   Skin:   Skin dry, no visible ecchymosis  No markings of the bilateral wrists.   Neurologic: Mood affect appropriate to place time and person   Normal arm motion bilateral, no tremors.  No evidence of focal defect.              COVID: No symptoms, chills, shortness of breath, or difficulty breathing, muscle or body aches, headache, loss of taste or smell, sore throat, runny nose, congestion, nausea, vomiting or diarrhea.according to the [place] Department of Health and Human Services based on the CARES Act.       Shilpa Mcclure PA-C

## 2021-06-30 NOTE — PROGRESS NOTES
Progress Notes by Bryan Lamas at 3/10/2021  1:00 PM     Author: Bryan Lamas Service: -- Author Type: Patient Access    Filed: 3/10/2021 12:41 PM Encounter Date: 3/10/2021 Status: Signed    : Bryan Lamas (Patient Access)       Parrot Study - Day 5 Call      Removing Patch 1    Study Purpose:   Atrial Fibrillation Algorithm Clinical Validation Study, prospective, multi-center, non significant risk     Subject was called on 3/10/21 for their Day 5 phone call to instructed subjects on removing device patch 1 and ship it back to sponsor and instruction on putting on device 2 patch.     Did the subject have a new reportable adverse events since last visit: No  If yes, please generate adverse event report for PI to cosign    Plan: Subject is schedule for a Day 13 phone call on 3/18/21 at 1PM.     Bryan Lamas

## 2021-06-30 NOTE — PROGRESS NOTES
Progress Notes by Emily Flores at 3/18/2021  1:00 PM     Author: Emily Flores Service: -- Author Type: Patient Access    Filed: 3/18/2021  1:08 PM Encounter Date: 3/18/2021 Status: Signed    : Emily Flores (Patient Access)       Parrot Study - Day 13 Call     Removing Patch 2    Study Purpose:   Atrial Fibrillation Algorithm Clinical Validation Study, prospective, multi-center, non significant risk     Subject was called on 03/18/21 for their Day 13 phone call to instructed subjects on removing device patch 2 and packing and ship ping the box back to sponsor.     Did the subject have a new reportable adverse events since last visit: No  If yes, please generate adverse event report for PI to cosign    Plan: Subject is schedule to return devices today and this will complete this study for the subject.    Emily Flores

## 2021-06-30 NOTE — PROGRESS NOTES
Progress Notes by Meaghan Fonseca at 3/4/2021  1:00 PM     Author: Meaghan Fonseca Service: -- Author Type: Patient Access    Filed: 3/4/2021  1:41 PM Encounter Date: 3/4/2021 Status: Signed    : Meaghan Fonseca (Patient Access)       Parrot Study - Day 1 Call     Putting Devices On    Study Purpose:   Atrial Fibrillation Algorithm Clinical Validation Study, prospective, multi-center, non significant risk     Subject was called on 03/04/2021 for their Day 1 phone call to instructed subjects on putting on device.     Did the subject have a new reportable adverse events since last visit: No  If yes, please generate adverse event report for PI to cosign    Plan: Subject is schedule for a Day 5 phone call on 03/10/2021 at 1:00pm.     Meaghan Fonseca

## 2021-06-30 NOTE — PROGRESS NOTES
Progress Notes by Bryan Lamas at 2/24/2021 12:30 PM     Author: Bryan Lamas Service: -- Author Type: Patient Access    Filed: 2/26/2021  7:14 AM Encounter Date: 2/24/2021 Status: Signed    : Mendez Merrill MD (Physician)    Related Notes: Original Note by Bryan Lamas (Patient Access) filed at 2/24/2021  1:28 PM         Parrot Study Inclusion / Exclusion Criteria    Study Purpose: Atrial Fibrillation Algorithm Clinical Validation Study, prospective, multi-center, non significant risk     Protocol Version V 3.0 - 10 December 2020    Subject Cohort: 3    Inclusion Criteria-all must be Yes  Yes/No Cohort Subject must meet all inclusion criteria:    Yes   All 1. Able to read, understand, and provide written informed consent.   Yes All 2. Willing and able to participate in the study procedures as described in the consent form.    Yes All 3. Be 22 years of age and older   Yes    All 4. Able to communicate effectively with and follow instructions from study staff   Yes All 5. Able to wear the wrist device for duration of study participation     NA Cohort 1 6. Have no known medical history of AF    N/A Cohort 2 7. have no known medical history of AF and active diagnosis of at least one of the following arrhythmias within the past 2 years:   A. Frequent PACs, defined as at least 1% of total beats of atrial ectopic beats by 24-48 hour Holter, ambulatory ECG monitor, or implantable loop recorder)   B. Frequent PVCs, defined as at least 1% of total beats of ventricular ectopic beats by 24-48 hour Holter, ambulatory ECG monitor, or implantable loop recorder   C. SVT, which will include atrial tachycardia, atrioventricular guzman re-entrant tachycardia, atrioventricular re-entrant tachycardia by 12-lead ECG or 24-48 hour Holter, ambulatory ECG monitor, or implantable loop recorder   D. NSVT, defined as three or more consecutive ventricular beats at a rate of at least 100 beats per minute and lasting no more  than 30 seconds, by 12-lead ECG or 24-48 hour Holter, ambulatory ECG monitor, or implantable loop recorder     Yes Cohort 3 and 4 8. have a known diagnosis of AF at the time of screening (confirmed by electronic medical record (EMR) or self-report) and have had a recent episode of AF, or confirmed AF on ECG, in the past 12 months     NA Cohort 4 9. have a known diagnosis of permanent AF at the time of screening (confirmed by EMR or self-report) and have had a recent episode of AF, or confirmed AF on ECG, in the past 12 months    Yes NA 10. Meet additional binning based on demographics             Exclusion Criteria-all must be no  Yes/No Criteria # Subject must not meet any exclusion criteria:    No All 1. Physical disability that prevents safe and adequate testing.   No All 2. Mental impairment resulting in limited ability to cooperate.   No All 3. Known uncontrolled medical conditions, Such as (but not limited to) significant anemia, important electrolyte imbalance and untreated or uncontrolled thyroid disease   No All 4. Open wound(s) on the wrist and / or forearm   No All 5. Tattoos, large moles, or scars on the wrist at the wrist device location    No All 6. Skin conditions on either wrist that would preclude subject from wearing a wristband on either wrist    No All 7. Known allergy or sensitivity to medical adhesives, isopropyl alcohol, wristbands, or ECG patch.   No All 8. Medical history or physical assessment finding that makes the subjects inappropriate for participation according to investigator(s)   No All 9. Participation in a previous study that used a wrist-worn sensor device with a simultaneous ECG reference patch    No All 10. Implantable cardiac devices such as a Pacemaker or Implantable Cardioverter defibrillator    No All 11. Clinically significant hand tremors, as judged by the investigator    No All 12. Acute illness including COVID and other respiratory illnesses    No Cohort 3 and 4 13.  Subject with known history of AF on a rhythm control medications with history of complete AF rhythm control (I.e history of zero AF burden) will be excluded from Cohort 3 and 4.  (Subjects enrolled into Cohort 2 can be on rate control medications)      Subject has  met all inclusion criteria and no exclusion criteria have been met.     Subject is ready to fully enrolled in the Parrot study.    Bryan Lamas

## 2021-06-30 NOTE — PROGRESS NOTES
Progress Notes by Bryan Lamas at 2/24/2021 12:30 PM     Author: Bryan Lamas Service: -- Author Type: Patient Access    Filed: 2/24/2021  1:28 PM Encounter Date: 2/24/2021 Status: Signed    : Bryan Lamas (Patient Access)       Parrot Study Consent Note    Study Purpose: Atrial Fibrillation Algorithm Clinical Validation Study, prospective, multi-center, non-significant risk     Alina Martell was called today, 02/24/21, to discuss participation in the Parrot Study. The consent form version 3 was reviewed with subject. Subject was provided with a virtual copy of the consent form via [employer] e-consenting software.    The consent discuss included:    Study description and purpose     Qualifications for participation    Screening visit    Study procedures and follow up visits    Participant responsibilities     Study restrictions    Length of study    Study data    Potential risks and discomforts    New information    Potential benefits of participation    Incidental findings    Alternate therapies    Compensation for participation    Cost/Compensation for research related injury    Study Funding    Withdrawal participation    Confidentiality     Study contacts    Legal right    The subject was provided time to review the consent form and consider participation her questions were answered to her satisfaction. The patient has voluntarily agreed to participate in the above noted study.     The consent form version 3 and HIPPA form version 3 was signed 02/24/21 via [employer] e-consenting software. A copy of the signed consent form is delivered to patient via email from [employer] e-consent software. A copy will be placed in subject's medical record.     Past Medical History:   Diagnosis Date   ? PTSD 2014   ? Sleep Disorder 2014   ? Rheumatoid arthritis (H) 2013    Anxiety 2014    Panic Attacks 2014    anemia 2015    Sicca Syndrome 2015    Chronic Pain 2016     [unfilled]      Current  Outpatient Medications:   ?  adalimumab (HUMIRA,CF, PEN) 40 mg/0.4 mL PnKt, Inject 40 mg under the skin every 14 (fourteen) days., Disp: 1 kit, Rfl: 5  ?  atomoxetine (STRATTERA) 40 MG capsule, Take 1 capsule (40 mg total) by mouth daily., Disp: 30 capsule, Rfl: 2  ?  diltiazem (CARDIZEM CD) 120 MG 24 hr capsule, Take 1 capsule (120 mg total) by mouth daily., Disp: 90 capsule, Rfl: 3  ?  EPINEPHrine (EPIPEN 2-RADHA) 0.3 mg/0.3 mL injection, Inject 0.3 mL (0.3 mg total) into the shoulder, thigh, or buttocks as needed., Disp: 2 Pre-filled Pen Syringe, Rfl: 0      Allergies   Allergen Reactions   ? Glucosamine Anaphylaxis   ? Shellfish Containing Products Anaphylaxis   ? Sulfa (Sulfonamide Antibiotics) Itching     Rapid heart rate, itching, shortness of breath   ? Effexor [Venlafaxine] Palpitations     Pt B/P was elevated    ? Abilify [Aripiprazole] Hives   ? Propoxyphene N-Acetaminophen    ? Sulfasalazine Hives       Bryan Lamas

## 2021-07-03 NOTE — ADDENDUM NOTE
Addendum Note by Theodora Singh RN at 3/11/2020  7:40 AM     Author: Theodora Singh RN Service: -- Author Type: Registered Nurse    Filed: 3/12/2020 10:24 AM Encounter Date: 3/11/2020 Status: Signed    : Theodora Singh RN (Registered Nurse)    Addended by: THEODORA SINGH on: 3/12/2020 10:24 AM        Modules accepted: Orders

## 2021-07-05 ENCOUNTER — COMMUNICATION - HEALTHEAST (OUTPATIENT)
Dept: RHEUMATOLOGY | Facility: CLINIC | Age: 56
End: 2021-07-05

## 2021-07-05 DIAGNOSIS — M05.79 SEROPOSITIVE RHEUMATOID ARTHRITIS OF MULTIPLE SITES (H): ICD-10-CM

## 2021-07-05 DIAGNOSIS — R76.8 CYCLIC CITRULLINATED PEPTIDE (CCP) ANTIBODY POSITIVE: ICD-10-CM

## 2021-07-05 NOTE — TELEPHONE ENCOUNTER
Telephone Encounter by Gerhardt, Judy at 7/5/2021  7:31 AM     Author: Gerhardt, Judy Service: -- Author Type: --    Filed: 7/5/2021  7:32 AM Encounter Date: 7/5/2021 Status: Signed    : Gerhardt, Judy       Refill [place] from [place]    Medication: Humira 40mg/0.4ML  Last Refill: 6/14/21  Last OV: 10/5/20  Last lab: 6/25/20  Future OV: 7/26/21  Future lab: 7/23/21

## 2021-07-06 VITALS
HEART RATE: 83 BPM | BODY MASS INDEX: 41.2 KG/M2 | DIASTOLIC BLOOD PRESSURE: 88 MMHG | WEIGHT: 279 LBS | SYSTOLIC BLOOD PRESSURE: 140 MMHG

## 2021-07-07 ENCOUNTER — COMMUNICATION - HEALTHEAST (OUTPATIENT)
Dept: FAMILY MEDICINE | Facility: CLINIC | Age: 56
End: 2021-07-07

## 2021-07-07 DIAGNOSIS — Z91.030 BEE ALLERGY STATUS: ICD-10-CM

## 2021-07-07 NOTE — TELEPHONE ENCOUNTER
Telephone Encounter by Minh Mullen, RN at 7/7/2021 12:46 PM     Author: Minh Mullen, RN Service: -- Author Type: Registered Nurse    Filed: 7/7/2021 12:47 PM Encounter Date: 7/7/2021 Status: Signed    : Minh Mullen, RN (Registered Nurse)       Refill Approved    Rx renewed per Medication Renewal Policy. Medication was last renewed on 10/19/20.    Minh Mullen, [facility] Connection Triage/Med Refill 7/7/2021     Requested Prescriptions   Pending Prescriptions Disp Refills   ? EPINEPHrine (EPIPEN 2-RADHA) 0.3 mg/0.3 mL injection 2 Pre-filled Pen Syringe 0     Sig: Inject 0.3 mL (0.3 mg total) into the shoulder, thigh, or buttocks as needed.       Epinephrine (Bee Sting) Kit Refill Protocol Passed - 7/7/2021 12:25 PM        Passed - Patient to get 0.3mg dose (adult kit)          Passed - Patient has had office visit/physical in last 1 year     Last office visit with prescriber/PCP: Visit date not found OR same dept: 6/23/2021 Anish Mo MD OR same specialty: 6/23/2021 Anish Mo MD  Last physical: 1/21/2021 Last MTM visit: Visit date not found   Next visit within 3 mo: Visit date not found  Next physical within 3 mo: Visit date not found  Prescriber OR PCP: Estelle Bansal MD  Last diagnosis associated with med order: 1. Bee allergy status  - EPINEPHrine (EPIPEN 2-RADHA) 0.3 mg/0.3 mL injection; Inject 0.3 mL (0.3 mg total) into the shoulder, thigh, or buttocks as needed.  Dispense: 2 Pre-filled Pen Syringe; Refill: 0    If protocol passes may refill for 12 months if within 3 months of last provider visit (or a total of 15 months).

## 2021-07-07 NOTE — TELEPHONE ENCOUNTER
Telephone Encounter by Marifer Goncalves CMA at 7/7/2021 12:24 PM     Author: Marifer Goncalves CMA Service: -- Author Type: Certified Medical Assistant    Filed: 7/7/2021 12:25 PM Encounter Date: 7/7/2021 Status: Signed    : Marifer Goncalves CMA (Certified Medical Assistant)       Refill Request: Epipen

## 2021-07-16 ENCOUNTER — VIRTUAL VISIT (OUTPATIENT)
Dept: BEHAVIORAL HEALTH | Facility: CLINIC | Age: 56
End: 2021-07-16
Payer: COMMERCIAL

## 2021-07-16 DIAGNOSIS — F33.1 MAJOR DEPRESSIVE DISORDER, RECURRENT EPISODE, MODERATE (H): ICD-10-CM

## 2021-07-16 DIAGNOSIS — F43.10 PTSD (POST-TRAUMATIC STRESS DISORDER): Primary | ICD-10-CM

## 2021-07-16 DIAGNOSIS — F41.1 GAD (GENERALIZED ANXIETY DISORDER): ICD-10-CM

## 2021-07-16 PROCEDURE — 90834 PSYTX W PT 45 MINUTES: CPT | Mod: GT | Performed by: COUNSELOR

## 2021-07-21 NOTE — PROGRESS NOTES
Progress Note    Patient Name: Alina Martell  Date: 7/16/2021         Service Type: Individual      Session Start Time: 1203  Session End Time: 1253     Session Length: 50 minutes    Session #: 14    Attendees: Client attended alone    Service Modality:  Video Visit:      Provider verified identity through the following two step process.  Patient provided:  Patient is known previously to provider    Telemedicine Visit: The patient's condition can be safely assessed and treated via synchronous audio and visual telemedicine encounter.      Reason for Telemedicine Visit: Services only offered telehealth    Originating Site (Patient Location): Patient's home    Distant Site (Provider Location): Provider Remote Setting- Home Office    Consent:  The patient/guardian has verbally consented to: the potential risks and benefits of telemedicine (video visit) versus in person care; bill my insurance or make self-payment for services provided; and responsibility for payment of non-covered services.     Patient would like the video invitation sent by:  My Chart    Mode of Communication:  Video Conference via [employer]    As the provider I attest to compliance with applicable laws and regulations related to telemedicine.     Treatment Plan Last Reviewed: 7/16/2021  PHQ-9 / ADA-7 :   PHQ-9 score:    PHQ 4/28/2021   PHQ-9 Total Score 4   Q9: Thoughts of better off dead/self-harm past 2 weeks Not at all     ADA-7 SCORE 3/31/2021 4/7/2021 4/28/2021   Total Score - - -   Total Score 6 5 4       DATA  Interactive Complexity: No  Crisis: No       Progress Since Last Session (Related to Symptoms / Goals / Homework):   Symptoms: No change patient continues to experience symptoms of depression and anxiety    Homework: Partially completed      Episode of Care Goals: Satisfactory progress - ACTION (Actively working towards change); Intervened by reinforcing change plan / affirming steps  taken     Current / Ongoing Stressors and Concerns:  Patient indicated feeling exhausted. Patient reported she went last week to visit her friend in [place]. Patient indicated the day she was suppose to fly back she felt dizzy and went to urgent care. Patient reported she then ended up in the hospital for multiple days. Patient indicated then having to drive back due to her medical condition. Patient reported just getting back today and being exhausted. Patient talked about the different emotions that occurred throughout this experience.      Treatment Objective(s) Addressed in This Session:   use distraction each time intrusive worry surfaces  Decrease frequency and intensity of feeling down, depressed, hopeless  Continue to work on reducing intrusive thoughts       Intervention:   Motivational Interviewing    MI Intervention: Reflections: simple and complex     Change Talk Expressed by the Patient: Desire to change    Provider Response to Change Talk: A - Affirmed patient's thoughts, decisions, or attempts at behavior change          ASSESSMENT: Current Emotional / Mental Status (status of significant symptoms):   Risk status (Self / Other harm or suicidal ideation)   Patient denies current fears or concerns for personal safety.   Patient denies current or recent suicidal ideation or behaviors.   Patient denies current or recent homicidal ideation or behaviors.   Patient denies current or recent self injurious behavior or ideation.   Patient denies other safety concerns.   Patient reports there has been no change in risk factors since their last session.     Patient reports there has been no change in protective factors since their last session.     Recommended that patient call 911 or go to the local ED should there be a change in any of these risk factors.     Appearance:   Appropriate    Eye Contact:   Good    Psychomotor Behavior: Normal    Attitude:   Cooperative    Orientation:   All   Speech    Rate /  Production: Normal/ Responsive Normal     Volume:  Normal    Mood:    Tired   Affect:    Appropriate    Thought Content:  Clear    Thought Form:  Coherent  Logical    Insight:    Good      Medication Review:   No changes to current psychiatric medication(s)     Medication Compliance:   Yes     Changes in Health Issues:   Yes: hospitalized     Chemical Use Review:   Substance Use: Chemical use reviewed, no active concerns identified      Tobacco Use: No current tobacco use.      Diagnosis:  1. PTSD (post-traumatic stress disorder)    2. ADA (generalized anxiety disorder)    3. Major depressive disorder, recurrent episode, moderate (H)        Collateral Reports Completed:   Not Applicable    PLAN: (Patient Tasks / Therapist Tasks / Other)  Patient will continue with twice a month therapy. Patient will continue to get out of the house 3 times a work besides for work. Patient will identify what she wants in a relationship.     Mariana Loev [facility]                                                         ______________________________________________________________________    Treatment Plan    Patient's Name: Alina Martell  YOB: 1965    Date: 7/16/2021    DSM5 Diagnoses: 296.32 (F33.1) Major Depressive Disorder, Recurrent Episode, Moderate _, 300.02 (F41.1) Generalized Anxiety Disorder or 309.81 (F43.10) Posttraumatic Stress Disorder (includes Posttraumatic Stress Disorder for Children 6 Years and Younger)  Without dissociative symptoms  Psychosocial / Contextual Factors: Family and health  WHODAS: Will be completed at next session    Referral / Collaboration:  Referral to another professional/service is not indicated at this time..    Anticipated number of session or this episode of care: Ongoing twice a juana therapy      MeasurableTreatment Goal(s) related to diagnosis / functional impairment(s)  Goal 1: Patient will work on reducing overall anxiety.     I will know I've met my goal when  reporting minimal anxiety symptoms.      Objective #A (Patient Action)    Patient will use distraction each time intrusive worry surfaces.  Status: Continued - Date(s): 7/16/2021    Intervention(s)  Therapist will teach emotional regulation skills. ..    Objective #B  Patient will Decrease frequency and intensity of feeling down, depressed, hopeless.  Status: Continued - Date(s):7/16/2021     Intervention(s)  Therapist will teach emotional recognition/identification. ..    Objective #C  Patient will Identify negative self-talk and behaviors: challenge core beliefs, myths, and actions.  Status: Continued - Date(s): 7/16/2021    Intervention(s)  Therapist will teach the client how to perform a behavioral chain analysis. ..    Patient has reviewed and agreed to the above plan.      Mariana Love, [facility]  July 16, 2021

## 2021-07-23 ENCOUNTER — LAB (OUTPATIENT)
Dept: LAB | Facility: CLINIC | Age: 56
End: 2021-07-23
Payer: COMMERCIAL

## 2021-07-23 DIAGNOSIS — M05.79 SEROPOSITIVE RHEUMATOID ARTHRITIS OF MULTIPLE SITES (H): ICD-10-CM

## 2021-07-23 LAB
ALBUMIN SERPL-MCNC: 3.5 G/DL (ref 3.5–5)
ALT SERPL W P-5'-P-CCNC: 21 U/L (ref 0–45)
CREAT SERPL-MCNC: 0.71 MG/DL (ref 0.6–1.1)
ERYTHROCYTE [DISTWIDTH] IN BLOOD BY AUTOMATED COUNT: 13.6 % (ref 10–15)
GFR SERPL CREATININE-BSD FRML MDRD: >90 ML/MIN/1.73M2
HCT VFR BLD AUTO: 39 % (ref 35–47)
HGB BLD-MCNC: 12.6 G/DL (ref 11.7–15.7)
MCH RBC QN AUTO: 27.3 PG (ref 26.5–33)
MCHC RBC AUTO-ENTMCNC: 32.3 G/DL (ref 31.5–36.5)
MCV RBC AUTO: 85 FL (ref 78–100)
PLATELET # BLD AUTO: 332 10E3/UL (ref 150–450)
RBC # BLD AUTO: 4.61 10E6/UL (ref 3.8–5.2)
WBC # BLD AUTO: 9.9 10E3/UL (ref 4–11)

## 2021-07-23 PROCEDURE — 85027 COMPLETE CBC AUTOMATED: CPT

## 2021-07-23 PROCEDURE — 82565 ASSAY OF CREATININE: CPT

## 2021-07-23 PROCEDURE — 84460 ALANINE AMINO (ALT) (SGPT): CPT

## 2021-07-23 PROCEDURE — 36415 COLL VENOUS BLD VENIPUNCTURE: CPT

## 2021-07-23 PROCEDURE — 82040 ASSAY OF SERUM ALBUMIN: CPT

## 2021-07-25 ENCOUNTER — MYC MEDICAL ADVICE (OUTPATIENT)
Dept: BEHAVIORAL HEALTH | Facility: CLINIC | Age: 56
End: 2021-07-25

## 2021-07-27 ENCOUNTER — MYC MEDICAL ADVICE (OUTPATIENT)
Dept: BEHAVIORAL HEALTH | Facility: CLINIC | Age: 56
End: 2021-07-27

## 2021-07-29 ENCOUNTER — OFFICE VISIT (OUTPATIENT)
Dept: ALLERGY | Facility: CLINIC | Age: 56
End: 2021-07-29
Payer: COMMERCIAL

## 2021-07-29 VITALS — WEIGHT: 287 LBS | HEART RATE: 81 BPM | HEIGHT: 69 IN | OXYGEN SATURATION: 96 % | BODY MASS INDEX: 42.51 KG/M2

## 2021-07-29 DIAGNOSIS — Z91.013 SHELLFISH ALLERGY: ICD-10-CM

## 2021-07-29 DIAGNOSIS — Z88.0 PENICILLIN ALLERGY: Primary | ICD-10-CM

## 2021-07-29 PROCEDURE — 99243 OFF/OP CNSLTJ NEW/EST LOW 30: CPT | Performed by: ALLERGY & IMMUNOLOGY

## 2021-07-29 RX ORDER — MULTIVIT WITH MINERALS/LUTEIN
1000 TABLET ORAL DAILY
COMMUNITY
Start: 2021-07-13 | End: 2023-05-02

## 2021-07-29 RX ORDER — BUSPIRONE HYDROCHLORIDE 15 MG/1
TABLET ORAL
COMMUNITY
Start: 2021-07-09 | End: 2021-11-15

## 2021-07-29 RX ORDER — ATOMOXETINE 40 MG/1
CAPSULE ORAL
COMMUNITY
Start: 2021-04-04 | End: 2021-11-15

## 2021-07-29 RX ORDER — PRAZOSIN HYDROCHLORIDE 1 MG/1
1 CAPSULE ORAL
COMMUNITY
Start: 2021-06-09 | End: 2023-02-05

## 2021-07-29 RX ORDER — MULTIVITAMIN/IRON/FOLIC ACID 18MG-0.4MG
1 TABLET ORAL DAILY
COMMUNITY
Start: 2021-07-13

## 2021-07-29 RX ORDER — ZINC SULFATE 50(220)MG
CAPSULE ORAL
COMMUNITY
Start: 2021-07-13 | End: 2022-03-16

## 2021-07-29 ASSESSMENT — MIFFLIN-ST. JEOR: SCORE: 1966.2

## 2021-07-29 NOTE — PROGRESS NOTES
"      Subjective       HPI chief complaint: Penicillin allergy    History of present illness: This is a pleasant 56-year-old woman I was asked to see for evaluation by Dr. Mo in regards to penicillin allergy.  Patient states that recently she was having some dental work done.  She took amoxicillin.  She has not had antibiotics in many years.  She states she thinks she had penicillin previously.  She states she took the first pill and did okay but in the second pill she immediately developed hives and shortness of breath.  She took some Benadryl and went to the walk-in care.  There, her symptoms have resolved.  She not require epinephrine.  She does have an epinephrine device to carry as she does have a known shellfish allergy.  She is never been tested for this but states that anytime she is exposed to shellfish if it is being cooked she will have hives, swelling or shortness of breath.  She does have a history of a sulfa antibiotic allergy as well.  No history of environmental allergies.  She does have a history of eczema for which she uses Claritin.  In talking with her, she states that her skin will become red and itchy if it is worse to begin something.  Symptoms sound more consistent with dermatographia.    Past medical history: Paroxysmal atrial fibrillation, rheumatoid arthritis, cholecystectomy    Social history: She is an , non-smoker    Family history: Noncontributory        Review of Systems   Review of Systems performed as above and the remainder is negative.      Objective    Pulse 81   Ht 1.753 m (5' 9\")   Wt 130.2 kg (287 lb)   SpO2 96%   BMI 42.38 kg/m    Body mass index is 42.38 kg/m .  Physical Exam      Gen: Pleasant female not in acute distress  HEENT: Eyes no erythema of the bulbar or palpebral conjunctiva, no edema. Ears: No deformities or lesions. Nose: No congestion, Mouth: Throat clear,   Neck: No masses lesions or swelling  Respiratory: No coughing with breathing, no " retractions  Lymph: No visible supraclavicular or cervical lymphadenopathy  Skin: No rashes or lesions  Psych: Alert and oriented times 3    Impression report and plan:  1.  Penicillin allergy    I did offer the patient testing, however, the patient had a recent reaction that does sound IgE mediated.  For this reason, she elected to wait for the next 10 years.  Stated that many patients outgrow drug allergy after 10 years.  I did annotate her chart stating that she should have testing in the year 2031 unless recommendations of change.  If she requires a penicillin antibiotic before that time, could consider drug desensitization.    2.  Shellfish allergy    She has a current epinephrine device and I went over when she would need to use this.  She declined testing today.  It sounds like she has had repeated exposures with consistent response so I am not sure if testing is necessary.

## 2021-07-29 NOTE — PATIENT INSTRUCTIONS
Retest penicillin in 10 years     Notify if needing earlier --- drug desensitization    Continue to carry epi

## 2021-07-29 NOTE — LETTER
"    7/29/2021         RE: Alina Martell  [address] [place] [address]        Dear Colleague,    Thank you for referring your patient, Alina Martell, to the [facility]. Please see a copy of my visit note below.          Subjective       HPI chief complaint: Penicillin allergy    History of present illness: This is a pleasant 56-year-old woman I was asked to see for evaluation by Dr. Mo in regards to penicillin allergy.  Patient states that recently she was having some dental work done.  She took amoxicillin.  She has not had antibiotics in many years.  She states she thinks she had penicillin previously.  She states she took the first pill and did okay but in the second pill she immediately developed hives and shortness of breath.  She took some Benadryl and went to the walk-in care.  There, her symptoms have resolved.  She not require epinephrine.  She does have an epinephrine device to carry as she does have a known shellfish allergy.  She is never been tested for this but states that anytime she is exposed to shellfish if it is being cooked she will have hives, swelling or shortness of breath.  She does have a history of a sulfa antibiotic allergy as well.  No history of environmental allergies.  She does have a history of eczema for which she uses Claritin.  In talking with her, she states that her skin will become red and itchy if it is worse to begin something.  Symptoms sound more consistent with dermatographia.    Past medical history: Paroxysmal atrial fibrillation, rheumatoid arthritis, cholecystectomy    Social history: She is an , non-smoker    Family history: Noncontributory        Review of Systems   Review of Systems performed as above and the remainder is negative.      Objective    Pulse 81   Ht 1.753 m (5' 9\")   Wt 130.2 kg (287 lb)   SpO2 96%   BMI 42.38 kg/m    Body mass index is 42.38 kg/m .  Physical Exam      Gen: Pleasant female " not in acute distress  HEENT: Eyes no erythema of the bulbar or palpebral conjunctiva, no edema. Ears: No deformities or lesions. Nose: No congestion, Mouth: Throat clear,   Neck: No masses lesions or swelling  Respiratory: No coughing with breathing, no retractions  Lymph: No visible supraclavicular or cervical lymphadenopathy  Skin: No rashes or lesions  Psych: Alert and oriented times 3    Impression report and plan:  1.  Penicillin allergy    I did offer the patient testing, however, the patient had a recent reaction that does sound IgE mediated.  For this reason, she elected to wait for the next 10 years.  Stated that many patients outgrow drug allergy after 10 years.  I did annotate her chart stating that she should have testing in the year 2031 unless recommendations of change.  If she requires a penicillin antibiotic before that time, could consider drug desensitization.    2.  Shellfish allergy    She has a current epinephrine device and I went over when she would need to use this.  She declined testing today.  It sounds like she has had repeated exposures with consistent response so I am not sure if testing is necessary.        Again, thank you for allowing me to participate in the care of your patient.        Sincerely,        Idania SEGOVIA MD

## 2021-08-04 DIAGNOSIS — M05.79 SEROPOSITIVE RHEUMATOID ARTHRITIS OF MULTIPLE SITES (H): Primary | ICD-10-CM

## 2021-08-04 NOTE — TELEPHONE ENCOUNTER
Refill request from [facility]    Medication: Humira 40mg/0.4ml  Last Refill 7/14/21  Last OV: 10/5/20  Last lab: 7/23/21

## 2021-08-10 ENCOUNTER — VIRTUAL VISIT (OUTPATIENT)
Dept: BEHAVIORAL HEALTH | Facility: CLINIC | Age: 56
End: 2021-08-10
Payer: COMMERCIAL

## 2021-08-10 DIAGNOSIS — F43.10 PTSD (POST-TRAUMATIC STRESS DISORDER): Primary | ICD-10-CM

## 2021-08-10 DIAGNOSIS — F33.1 MAJOR DEPRESSIVE DISORDER, RECURRENT EPISODE, MODERATE (H): ICD-10-CM

## 2021-08-10 DIAGNOSIS — F41.1 GAD (GENERALIZED ANXIETY DISORDER): ICD-10-CM

## 2021-08-10 PROCEDURE — 90834 PSYTX W PT 45 MINUTES: CPT | Mod: GT | Performed by: COUNSELOR

## 2021-08-10 NOTE — PROGRESS NOTES
Progress Note    Patient Name: Alina Martell  Date: 8/10/2021         Service Type: Individual      Session Start Time: 1201  Session End Time: 1251     Session Length: 50 minutes    Session #: 15    Attendees: Client attended alone    Service Modality:  Video Visit:      Provider verified identity through the following two step process.  Patient provided:  Patient is known previously to provider    Telemedicine Visit: The patient's condition can be safely assessed and treated via synchronous audio and visual telemedicine encounter.      Reason for Telemedicine Visit: Services only offered telehealth    Originating Site (Patient Location): Patient's home    Distant Site (Provider Location): Provider Remote Setting- Home Office    Consent:  The patient/guardian has verbally consented to: the potential risks and benefits of telemedicine (video visit) versus in person care; bill my insurance or make self-payment for services provided; and responsibility for payment of non-covered services.     Patient would like the video invitation sent by:  My Chart    Mode of Communication:  Video Conference via Amwell    As the provider I attest to compliance with applicable laws and regulations related to telemedicine.     Treatment Plan Last Reviewed: 7/16/2021  PHQ-9 / ADA-7 :   PHQ-9 score:    PHQ 4/28/2021   PHQ-9 Total Score 4   Q9: Thoughts of better off dead/self-harm past 2 weeks Not at all     ADA-7 SCORE 3/31/2021 4/7/2021 4/28/2021   Total Score - - -   Total Score 6 5 4       DATA  Interactive Complexity: No  Crisis: No       Progress Since Last Session (Related to Symptoms / Goals / Homework):   Symptoms: Worsening patient reporting extreme anxiety and being on edge.     Homework: Partially completed      Episode of Care Goals: Satisfactory progress - ACTION (Actively working towards change); Intervened by reinforcing change plan / affirming steps  taken     Current / Ongoing Stressors and Concerns:  Patient reported feeling extreme anxiety and being more on edge. Patient indicated believing a lot of this has to do with her family. Patient reported she continues to be there for them all the time and knowing it is too much. Patient indicated informing her family she needs a break. Patient reported despite this they continue to contact her and want to talk about their problems. Patient indicated she realizes she has not been doing a lot of self-care. Patient reported every other summer she [place] many weekends [place] cab[place] and has not gone to any yet this summer. Patient indicated this showing how much time is is spending caring for others needs.      Treatment Objective(s) Addressed in This Session:   use thought-stopping strategy daily to reduce intrusive thoughts  Increase interest, engagement, and pleasure in doing things  Decrease frequency and intensity of feeling down, depressed, hopeless     Intervention:   Motivational Interviewing    MI Intervention: Reflections: simple and complex     Change Talk Expressed by the Patient: Desire to change    Provider Response to Change Talk: A - Affirmed patient's thoughts, decisions, or attempts at behavior change          ASSESSMENT: Current Emotional / Mental Status (status of significant symptoms):   Risk status (Self / Other harm or suicidal ideation)   Patient denies current fears or concerns for personal safety.   Patient denies current or recent suicidal ideation or behaviors.   Patient denies current or recent homicidal ideation or behaviors.   Patient denies current or recent self injurious behavior or ideation.   Patient denies other safety concerns.   Patient reports there has been no change in risk factors since their last session.     Patient reports there has been no change in protective factors since their last session.     Recommended that patient call 911 or go to the local ED should there be a change  in any of these risk factors.     Appearance:   Appropriate    Eye Contact:   Good    Psychomotor Behavior: Normal    Attitude:   Cooperative    Orientation:   All   Speech    Rate / Production: Normal/ Responsive    Volume:  Normal    Mood:    Anxious    Affect:    Appropriate    Thought Content:  Clear    Thought Form:  Coherent  Logical    Insight:    Good      Medication Review:   No changes to current psychiatric medication(s)     Medication Compliance:   Yes     Changes in Health Issues:   None reported since last session.      Chemical Use Review:   Substance Use: Chemical use reviewed, no active concerns identified      Tobacco Use: No current tobacco use.      Diagnosis:  1. PTSD (post-traumatic stress disorder)    2. ADA (generalized anxiety disorder)    3. Major depressive disorder, recurrent episode, moderate (H)        Collateral Reports Completed:   Not Applicable    PLAN: (Patient Tasks / Therapist Tasks / Other)  Patient will continue with twice a month therapy. Patient will work on doing one self-care activity a day. Patient will spend time away from her phone. Patient will continue to work on saying no and setting boundaries with her family.    Mariana Love, [facility]                                                         ______________________________________________________________________    Treatment Plan    Patient's Name: Alina Martell  YOB: 1965    Date: 7/16/2021    DSM5 Diagnoses: 296.32 (F33.1) Major Depressive Disorder, Recurrent Episode, Moderate _, 300.02 (F41.1) Generalized Anxiety Disorder or 309.81 (F43.10) Posttraumatic Stress Disorder (includes Posttraumatic Stress Disorder for Children 6 Years and Younger)  Without dissociative symptoms  Psychosocial / Contextual Factors: Family and health  WHODAS: Will be completed at next session    Referral / Collaboration:  Referral to another professional/service is not indicated at this time..    Anticipated number  of session or this episode of care: Ongoing twice a juana therapy      MeasurableTreatment Goal(s) related to diagnosis / functional impairment(s)  Goal 1: Patient will work on reducing overall anxiety.     I will know I've met my goal when reporting minimal anxiety symptoms.      Objective #A (Patient Action)    Patient will use distraction each time intrusive worry surfaces.  Status: Continued - Date(s): 7/16/2021    Intervention(s)  Therapist will teach emotional regulation skills. ..    Objective #B  Patient will Decrease frequency and intensity of feeling down, depressed, hopeless.  Status: Continued - Date(s):7/16/2021     Intervention(s)  Therapist will teach emotional recognition/identification. ..    Objective #C  Patient will Identify negative self-talk and behaviors: challenge core beliefs, myths, and actions.  Status: Continued - Date(s): 7/16/2021    Intervention(s)  Therapist will teach the client how to perform a behavioral chain analysis. ..    Patient has reviewed and agreed to the above plan.      Mariana Love, [facility]  July 16, 2021

## 2021-08-11 ENCOUNTER — OFFICE VISIT (OUTPATIENT)
Dept: RHEUMATOLOGY | Facility: CLINIC | Age: 56
End: 2021-08-11
Payer: COMMERCIAL

## 2021-08-11 VITALS
HEART RATE: 78 BPM | SYSTOLIC BLOOD PRESSURE: 124 MMHG | DIASTOLIC BLOOD PRESSURE: 80 MMHG | BODY MASS INDEX: 41.16 KG/M2 | WEIGHT: 278.7 LBS

## 2021-08-11 DIAGNOSIS — M25.50 POLYARTHRALGIA: ICD-10-CM

## 2021-08-11 DIAGNOSIS — Z79.899 HIGH RISK MEDICATION USE: ICD-10-CM

## 2021-08-11 DIAGNOSIS — M05.79 SEROPOSITIVE RHEUMATOID ARTHRITIS OF MULTIPLE SITES (H): Primary | ICD-10-CM

## 2021-08-11 DIAGNOSIS — R76.8 CYCLIC CITRULLINATED PEPTIDE (CCP) ANTIBODY POSITIVE: ICD-10-CM

## 2021-08-11 PROCEDURE — 99214 OFFICE O/P EST MOD 30 MIN: CPT | Performed by: INTERNAL MEDICINE

## 2021-08-11 NOTE — PROGRESS NOTES
"      Rheumatology follow-up visit note     Alina is a 56 year old female presents today for follow-up.    Alina was seen today for consult.    Diagnoses and all orders for this visit:    Seropositive rheumatoid arthritis of multiple sites (H)  -     adalimumab (HUMIRA) 40 MG/0.8ML prefilled syringe kit; Inject 0.8 mLs (40 mg) Subcutaneous every 14 days    Cyclic citrullinated peptide (CCP) antibody positive    Polyarthralgia    High risk medication use        Well-controlled rheumatoid arthritis, seropositive, has tolerated Humira nicely, continue is no follow-up in 6 months.    Follow up in 6 months.    HPI    Alina Martell is a 56 year old female is here for follow-up of  She is here for follow-up.  This is for rheumatoid arthritis.  This is severe.  Seropositive. This is polyarticular. Family history of psoriasis and brother, mom's history of Crohn's.  She has done great.  She is on Humira that she inject every 2 weeks. No longer on hydroxychloroquine. Recently had short course of prednisone because she was out of town taking care of her brother who had fire on his refrigerated [employer]. She missed a dose of Humira and had joint symptoms then. She is back to her normal self.  She is allergic to sulfa, she has still has her menstrual cycle going, they use condoms therefore methotrexate leflunomide not an option.   She is starting to play tennis now. She walks around her leg 3 times week. She considers herself a\" flexaterian\", and is trying to add more vegetarian meals.      DETAILED EXAMINATION  08/11/21  :    Vitals:    08/11/21 1219   BP: 124/80   Pulse: 78   Weight: 126.4 kg (278 lb 11.2 oz)     Alert oriented. Head including the face is examined for malar rash, heliotropes, scarring, lupus pernio. Eyes examined for redness such as in episcleritis/scleritis, periorbital lesions.   Neck/ Face examined for parotid gland swelling, range of motion of neck.  Left upper and lower and right upper " and lower extremities examined for tenderness, swelling, warmth of the appendicular joints, range of motion, edema, rash.  Some of the important findings included: she does not have evidence of synovitis in the palpable joints of the upper extremities.  No significant deformities of the digits.  no Heberden nodes.  Range of motion of the shoulders  show full abduction.  No JLT effusion or warmth of the knees.  she does not have dactylitis of the digits.     Patient Active Problem List    Diagnosis Date Noted     Depressed 11/14/2018     Priority: Medium     Past Surgical History:   Procedure Laterality Date     CHOLECYSTECTOMY       HC REMOVAL GALLBLADDER      Description: Cholecystectomy;  Recorded: 10/15/2013;     LAPAROSCOPIC TUBAL LIGATION       RELEASE CARPAL TUNNEL BILATERAL       TUBAL LIGATION  1995      Past Medical History:   Diagnosis Date     Cannabis use without complication 12/8/2014     Carpal tunnel syndrome      Obesity      Paroxysmal atrial fibrillation (H)      PTSD (post-traumatic stress disorder)      RA (rheumatoid arthritis) (H)      Rheumatoid arthritis (H) 7/8/2016     Sicca syndrome (H)      Allergies   Allergen Reactions     Penicillins Shortness Of Breath, Palpitations, Dizziness, Anaphylaxis, Hives, Itching and Rash     Test in 2031, see allergy note on 7/29/21     Shellfish-Derived Products Anaphylaxis     Sulfa Drugs Hives and Anaphylaxis     Current Outpatient Medications   Medication Sig Dispense Refill     adalimumab (HUMIRA) 40 MG/0.8ML prefilled syringe kit Inject 0.8 mLs (40 mg) Subcutaneous every 14 days 1.6 mL 0     aspirin (ASA) 325 MG EC tablet        atomoxetine (STRATTERA) 40 MG capsule        busPIRone (BUSPAR) 15 MG tablet        diltiazem (CARDIZEM) 120 MG tablet Take 120 mg by mouth 4 times daily       EPINEPHrine (ANY BX GENERIC EQUIV) 0.3 MG/0.3ML injection 2-pack Inject 0.3 mg into the muscle as needed for anaphylaxis        loratadine (CLARITIN) 10 MG tablet Take  10 mg by mouth daily        Multiple Vitamins-Minerals (CENTROVITE) TABS TAKE 1 TABLET BY MOUTH EVERY DAY FOR 30 DAYS       prazosin (MINIPRESS) 1 MG capsule        vitamin C (ASCORBIC ACID) 1000 MG TABS        zinc sulfate (ZINCATE) 220 (50 Zn) MG capsule        predniSONE (DELTASONE) 20 MG tablet 20 mg, two pills orally every AM for seven days (Patient not taking: Reported on 7/29/2021) 14 tablet 0     family history includes Alcoholism in her brother, brother, and maternal grandfather; Attention Deficit Disorder in her brother; Chronic Kidney Disease in her father; Crohn's Disease in her mother; Depression in her brother; Diabetes in her father; Lupus in her cousin; No Known Problems in her daughter and son; Prostate Cancer in her father; Psoriasis in her brother; Snoring in her brother and father; Substance Abuse in her brother and brother.     Social Connections:      Frequency of Communication with Friends and Family:      Frequency of Social Gatherings with Friends and Family:      Attends Voodoo Services:      Active Member of Clubs or Organizations:      Attends Club or Organization Meetings:      Marital Status:           WBC   Date Value Ref Range Status   11/14/2018 6.5 4.0 - 11.0 10e9/L Final     WBC Count   Date Value Ref Range Status   07/23/2021 9.9 4.0 - 11.0 10e3/uL Final     RBC Count   Date Value Ref Range Status   07/23/2021 4.61 3.80 - 5.20 10e6/uL Final   11/14/2018 4.31 3.8 - 5.2 10e12/L Final     Hemoglobin   Date Value Ref Range Status   07/23/2021 12.6 11.7 - 15.7 g/dL Final   11/14/2018 10.8 (L) 11.7 - 15.7 g/dL Final     Hematocrit   Date Value Ref Range Status   07/23/2021 39.0 35.0 - 47.0 % Final   11/14/2018 35.6 35.0 - 47.0 % Final     MCV   Date Value Ref Range Status   07/23/2021 85 78 - 100 fL Final   11/14/2018 83 78 - 100 fl Final     MCH   Date Value Ref Range Status   07/23/2021 27.3 26.5 - 33.0 pg Final   11/14/2018 25.1 (L) 26.5 - 33.0 pg Final     Platelet Count   Date  Value Ref Range Status   07/23/2021 332 150 - 450 10e3/uL Final   11/14/2018 286 150 - 450 10e9/L Final     % Lymphocytes   Date Value Ref Range Status   01/21/2021 27 20 - 40 % Final   11/14/2018 24.6 % Final     AST   Date Value Ref Range Status   01/21/2021 18 0 - 40 U/L Final   11/14/2018 12 0 - 45 U/L Final     ALT   Date Value Ref Range Status   07/23/2021 21 0 - 45 U/L Final     Comment:     Standing Interval: 2-3 monthsStanding Interval: 2-   11/14/2018 13 0 - 50 U/L Final     Albumin   Date Value Ref Range Status   07/23/2021 3.5 3.5 - 5.0 g/dL Final     Comment:     Standing Interval: 2-3 monthsStanding Interval: 2-   11/14/2018 2.8 (L) 3.4 - 5.0 g/dL Final     Alkaline Phosphatase   Date Value Ref Range Status   01/21/2021 93 45 - 120 U/L Final   11/14/2018 83 40 - 150 U/L Final     Creatinine   Date Value Ref Range Status   07/23/2021 0.71 0.60 - 1.10 mg/dL Final     Comment:     Standing Interval: 2-3 monthsStanding Interval: 2-   11/14/2018 0.62 0.52 - 1.04 mg/dL Final     GFR Estimate   Date Value Ref Range Status   07/23/2021 >90 >60 mL/min/1.73m2 Final     Comment:     As of July 11, 2021, eGFR is calculated by the CKD-EPI creatinine equation, without race adjustment. eGFR can be influenced by muscle mass, exercise, and diet. The reported eGFR is an estimation only and is only applicable if the renal function is stable.   01/21/2021 >60 >60 mL/min/1.73m2 Final   11/14/2018 >90 >60 mL/min/1.7m2 Final     Comment:     Non  GFR Calc     GFR Estimate If Black   Date Value Ref Range Status   01/21/2021 >60 >60 mL/min/1.73m2 Final   11/14/2018 >90 >60 mL/min/1.7m2 Final     Comment:      GFR Calc     Erythrocyte Sedimentation Rate   Date Value Ref Range Status   03/11/2020 64 (H) 0 - 20 mm/hr Final     CRP   Date Value Ref Range Status   03/11/2020 3.3 (H) 0.0 - 0.8 mg/dL Final

## 2021-08-12 DIAGNOSIS — M05.79 SEROPOSITIVE RHEUMATOID ARTHRITIS OF MULTIPLE SITES (H): Primary | ICD-10-CM

## 2021-08-24 ENCOUNTER — VIRTUAL VISIT (OUTPATIENT)
Dept: BEHAVIORAL HEALTH | Facility: CLINIC | Age: 56
End: 2021-08-24
Payer: COMMERCIAL

## 2021-08-24 DIAGNOSIS — F43.10 PTSD (POST-TRAUMATIC STRESS DISORDER): Primary | ICD-10-CM

## 2021-08-24 DIAGNOSIS — F33.1 MAJOR DEPRESSIVE DISORDER, RECURRENT EPISODE, MODERATE (H): ICD-10-CM

## 2021-08-24 DIAGNOSIS — F41.1 GAD (GENERALIZED ANXIETY DISORDER): ICD-10-CM

## 2021-08-24 PROCEDURE — 90834 PSYTX W PT 45 MINUTES: CPT | Mod: 95 | Performed by: COUNSELOR

## 2021-08-24 NOTE — PROGRESS NOTES
Progress Note    Patient Name: Alina Martell  Date: 8/24/2021         Service Type: Individual      Session Start Time: 1205  Session End Time: 1255     Session Length: 55 minutes    Session #: 16    Attendees: Client attended alone    Service Modality:  Video Visit:      Provider verified identity through the following two step process.  Patient provided:  Patient is known previously to provider    Telemedicine Visit: The patient's condition can be safely assessed and treated via synchronous audio and visual telemedicine encounter.      Reason for Telemedicine Visit: Services only offered telehealth    Originating Site (Patient Location): Patient's home    Distant Site (Provider Location): Provider Remote Setting- Home Office    Consent:  The patient/guardian has verbally consented to: the potential risks and benefits of telemedicine (video visit) versus in person care; bill my insurance or make self-payment for services provided; and responsibility for payment of non-covered services.     Patient would like the video invitation sent by:  My Chart    Mode of Communication:  Video Conference via Amwell    As the provider I attest to compliance with applicable laws and regulations related to telemedicine.     Treatment Plan Last Reviewed: 7/16/2021  PHQ-9 / ADA-7 :   PHQ-9 score:    PHQ 4/28/2021   PHQ-9 Total Score 4   Q9: Thoughts of better off dead/self-harm past 2 weeks Not at all     ADA-7 SCORE 3/31/2021 4/7/2021 4/28/2021   Total Score - - -   Total Score 6 5 4       DATA  Interactive Complexity: No  Crisis: No       Progress Since Last Session (Related to Symptoms / Goals / Homework):   Symptoms: No change reported depression symptoms    Homework: Partially completed      Episode of Care Goals: Satisfactory progress - ACTION (Actively working towards change); Intervened by reinforcing change plan / affirming steps taken     Current / Ongoing Stressors and  Concerns:  Patient indicated feeling depressed. Patient reported realizing she has not been doing a lot of self-care. Patient indicated she went on a vacation and got sick and then had to take care of brother. Patient reported she has not been spending as much time as she should with her friends. Patient indicated this may be part of her avoidance. Patient reported knowing she needs to make changes to avoid going into a deep depression. Patient indicated planning to reach out to her friend in [place] and try and visit her next weekend.      Treatment Objective(s) Addressed in This Session:   identify three distraction and diversion activities and use those activities to decrease level of anxiety    Increase interest, engagement, and pleasure in doing things  Decrease frequency and intensity of feeling down, depressed, hopeless     Intervention:   Motivational Interviewing    MI Intervention: Reflections: simple and complex     Change Talk Expressed by the Patient: Desire to change    Provider Response to Change Talk: A - Affirmed patient's thoughts, decisions, or attempts at behavior change          ASSESSMENT: Current Emotional / Mental Status (status of significant symptoms):   Risk status (Self / Other harm or suicidal ideation)   Patient denies current fears or concerns for personal safety.   Patient denies current or recent suicidal ideation or behaviors.   Patient denies current or recent homicidal ideation or behaviors.   Patient denies current or recent self injurious behavior or ideation.   Patient denies other safety concerns.   Patient reports there has been no change in risk factors since their last session.     Patient reports there has been no change in protective factors since their last session.     Recommended that patient call 911 or go to the local ED should there be a change in any of these risk factors.     Appearance:   Appropriate    Eye Contact:   Good    Psychomotor Behavior: Normal     Attitude:   Cooperative    Orientation:   All   Speech    Rate / Production: Normal/ Responsive    Volume:  Normal    Mood:    Depressed    Affect:    Appropriate    Thought Content:  Clear    Thought Form:  Coherent  Logical    Insight:    Good      Medication Review:   No changes to current psychiatric medication(s)     Medication Compliance:   Yes     Changes in Health Issues:   None reported this session     Chemical Use Review:   Substance Use: Chemical use reviewed, no active concerns identified      Tobacco Use: No current tobacco use.      Diagnosis:  1. PTSD (post-traumatic stress disorder)    2. ADA (generalized anxiety disorder)    3. Major depressive disorder, recurrent episode, moderate (H)        Collateral Reports Completed:   Not Applicable    PLAN: (Patient Tasks / Therapist Tasks / Other)  Patient will continue with twice a month therapy. Patient will reach out to friend in [place] to visit next weekend. Patient will reach out to friends to spend time with this week and next week. Patient will work on sleep hygiene.     Mariana Love [facility]                                                         ______________________________________________________________________    Treatment Plan    Patient's Name: Alina Martell  YOB: 1965    Date: 7/16/2021    DSM5 Diagnoses: 296.32 (F33.1) Major Depressive Disorder, Recurrent Episode, Moderate _, 300.02 (F41.1) Generalized Anxiety Disorder or 309.81 (F43.10) Posttraumatic Stress Disorder (includes Posttraumatic Stress Disorder for Children 6 Years and Younger)  Without dissociative symptoms  Psychosocial / Contextual Factors: Family and health  WHODAS: Will be completed at next session    Referral / Collaboration:  Referral to another professional/service is not indicated at this time..    Anticipated number of session or this episode of care: Ongoing twice a juana therapy      MeasurableTreatment Goal(s) related to diagnosis /  functional impairment(s)  Goal 1: Patient will work on reducing overall anxiety.     I will know I've met my goal when reporting minimal anxiety symptoms.      Objective #A (Patient Action)    Patient will use distraction each time intrusive worry surfaces.  Status: Continued - Date(s): 7/16/2021    Intervention(s)  Therapist will teach emotional regulation skills. ..    Objective #B  Patient will Decrease frequency and intensity of feeling down, depressed, hopeless.  Status: Continued - Date(s):7/16/2021     Intervention(s)  Therapist will teach emotional recognition/identification. ..    Objective #C  Patient will Identify negative self-talk and behaviors: challenge core beliefs, myths, and actions.  Status: Continued - Date(s): 7/16/2021    Intervention(s)  Therapist will teach the client how to perform a behavioral chain analysis. ..    Patient has reviewed and agreed to the above plan.      Mariana Love, [facility]  July 16, 2021

## 2021-09-07 ENCOUNTER — VIRTUAL VISIT (OUTPATIENT)
Dept: BEHAVIORAL HEALTH | Facility: CLINIC | Age: 56
End: 2021-09-07
Payer: COMMERCIAL

## 2021-09-07 DIAGNOSIS — F33.1 MAJOR DEPRESSIVE DISORDER, RECURRENT EPISODE, MODERATE (H): ICD-10-CM

## 2021-09-07 DIAGNOSIS — F41.1 GAD (GENERALIZED ANXIETY DISORDER): ICD-10-CM

## 2021-09-07 DIAGNOSIS — F43.10 PTSD (POST-TRAUMATIC STRESS DISORDER): Primary | ICD-10-CM

## 2021-09-07 PROCEDURE — 90834 PSYTX W PT 45 MINUTES: CPT | Mod: GT | Performed by: COUNSELOR

## 2021-09-07 NOTE — PROGRESS NOTES
Progress Note    Patient Name: Alina Martell  Date: 9/7/2021         Service Type: Individual      Session Start Time: 1202  Session End Time: 1252     Session Length: 50 minutes    Session #: 17    Attendees: Client attended alone    Service Modality:  Video Visit:      Provider verified identity through the following two step process.  Patient provided:  Patient is known previously to provider    Telemedicine Visit: The patient's condition can be safely assessed and treated via synchronous audio and visual telemedicine encounter.      Reason for Telemedicine Visit: Services only offered telehealth    Originating Site (Patient Location): Patient's home    Distant Site (Provider Location): Provider Remote Setting- Home Office    Consent:  The patient/guardian has verbally consented to: the potential risks and benefits of telemedicine (video visit) versus in person care; bill my insurance or make self-payment for services provided; and responsibility for payment of non-covered services.     Patient would like the video invitation sent by:  My Chart    Mode of Communication:  Video Conference via Amwell    As the provider I attest to compliance with applicable laws and regulations related to telemedicine.     Treatment Plan Last Reviewed: 7/16/2021  PHQ-9 / ADA-7 :   PHQ-9 score:    PHQ 4/28/2021   PHQ-9 Total Score 4   Q9: Thoughts of better off dead/self-harm past 2 weeks Not at all     ADA-7 SCORE 3/31/2021 4/7/2021 4/28/2021   Total Score - - -   Total Score 6 5 4       DATA  Interactive Complexity: No  Crisis: No       Progress Since Last Session (Related to Symptoms / Goals / Homework):   Symptoms: No change anxiety and depression    Homework: Partially completed      Episode of Care Goals: Satisfactory progress - ACTION (Actively working towards change); Intervened by reinforcing change plan / affirming steps taken     Current / Ongoing Stressors and  Concerns:  Patient reported continuing to feel some depression and anxiety. Patient indicated the last two weeks she has been exteremly busy with friends. Patient reported she has started to see someone casually but is feeling guilty. Patient talked about the jaleel who is her friend and needing to talk to him. Patient indicated she knows that she tends to do a lot and then isolate so working to changing those behaviors.      Treatment Objective(s) Addressed in This Session:   use thought-stopping strategy daily to reduce intrusive thoughts  Increase interest, engagement, and pleasure in doing things  Decrease frequency and intensity of feeling down, depressed, hopeless     Intervention:   Motivational Interviewing    MI Intervention: Open-ended questions and Reflections: simple and complex     Change Talk Expressed by the Patient: Ability to change    Provider Response to Change Talk: A - Affirmed patient's thoughts, decisions, or attempts at behavior change          ASSESSMENT: Current Emotional / Mental Status (status of significant symptoms):   Risk status (Self / Other harm or suicidal ideation)   Patient denies current fears or concerns for personal safety.   Patient denies current or recent suicidal ideation or behaviors.   Patient denies current or recent homicidal ideation or behaviors.   Patient denies current or recent self injurious behavior or ideation.   Patient denies other safety concerns.   Patient reports there has been no change in risk factors since their last session.     Patient reports there has been no change in protective factors since their last session.     Recommended that patient call 911 or go to the local ED should there be a change in any of these risk factors.     Appearance:   Appropriate    Eye Contact:   Good    Psychomotor Behavior: Normal    Attitude:   Cooperative    Orientation:   All   Speech    Rate / Production: Normal/ Responsive    Volume:  Normal    Mood:    Anxious   Depressed    Affect:    Appropriate    Thought Content:  Clear    Thought Form:  Coherent  Logical    Insight:    Good      Medication Review:   No changes to current psychiatric medication(s)     Medication Compliance:   Yes     Changes in Health Issues:   None reported this session     Chemical Use Review:   Substance Use: Chemical use reviewed, no active concerns identified      Tobacco Use: No current tobacco use.      Diagnosis:  1. PTSD (post-traumatic stress disorder)    2. ADA (generalized anxiety disorder)    3. Major depressive disorder, recurrent episode, moderate (H)        Collateral Reports Completed:   Not Applicable    PLAN: (Patient Tasks / Therapist Tasks / Other)  Patient will continue with twice a month therapy. Patient should continue to work on identifying what she wants in a relationship and if she wants a relationship. Patient would benefit from identifying her guilt at this time.     Mariana Love, [facility]                                                         ______________________________________________________________________    Treatment Plan    Patient's Name: Alina Martell  YOB: 1965    Date: 7/16/2021    DSM5 Diagnoses: 296.32 (F33.1) Major Depressive Disorder, Recurrent Episode, Moderate _, 300.02 (F41.1) Generalized Anxiety Disorder or 309.81 (F43.10) Posttraumatic Stress Disorder (includes Posttraumatic Stress Disorder for Children 6 Years and Younger)  Without dissociative symptoms  Psychosocial / Contextual Factors: Family and health  WHODAS: Will be completed at next session    Referral / Collaboration:  Referral to another professional/service is not indicated at this time..    Anticipated number of session or this episode of care: Ongoing twice a juana therapy      MeasurableTreatment Goal(s) related to diagnosis / functional impairment(s)  Goal 1: Patient will work on reducing overall anxiety.     I will know I've met my goal when reporting minimal  anxiety symptoms.      Objective #A (Patient Action)    Patient will use distraction each time intrusive worry surfaces.  Status: Continued - Date(s): 7/16/2021    Intervention(s)  Therapist will teach emotional regulation skills. ..    Objective #B  Patient will Decrease frequency and intensity of feeling down, depressed, hopeless.  Status: Continued - Date(s):7/16/2021     Intervention(s)  Therapist will teach emotional recognition/identification. ..    Objective #C  Patient will Identify negative self-talk and behaviors: challenge core beliefs, myths, and actions.  Status: Continued - Date(s): 7/16/2021    Intervention(s)  Therapist will teach the client how to perform a behavioral chain analysis. ..    Patient has reviewed and agreed to the above plan.      Mariana Love, [facility]  July 16, 2021

## 2021-09-13 ENCOUNTER — IMMUNIZATION (OUTPATIENT)
Dept: NURSING | Facility: CLINIC | Age: 56
End: 2021-09-13
Payer: COMMERCIAL

## 2021-09-13 PROCEDURE — 0003A PR COVID VAC PFIZER DIL RECON 30 MCG/0.3 ML IM: CPT | Performed by: FAMILY MEDICINE

## 2021-09-13 PROCEDURE — 91300 PR COVID VAC PFIZER DIL RECON 30 MCG/0.3 ML IM: CPT | Performed by: FAMILY MEDICINE

## 2021-09-21 ENCOUNTER — VIRTUAL VISIT (OUTPATIENT)
Dept: BEHAVIORAL HEALTH | Facility: CLINIC | Age: 56
End: 2021-09-21
Payer: COMMERCIAL

## 2021-09-21 DIAGNOSIS — F33.1 MAJOR DEPRESSIVE DISORDER, RECURRENT EPISODE, MODERATE (H): ICD-10-CM

## 2021-09-21 DIAGNOSIS — F41.1 GAD (GENERALIZED ANXIETY DISORDER): ICD-10-CM

## 2021-09-21 DIAGNOSIS — F43.10 PTSD (POST-TRAUMATIC STRESS DISORDER): Primary | ICD-10-CM

## 2021-09-21 PROCEDURE — 90834 PSYTX W PT 45 MINUTES: CPT | Mod: GT | Performed by: COUNSELOR

## 2021-09-22 NOTE — PROGRESS NOTES
Progress Note    Patient Name: Alina Martell  Date: 9/21/2021         Service Type: Individual      Session Start Time: 1203  Session End Time: 1253     Session Length: 50 minutes    Session #: 18    Attendees: Client attended alone    Service Modality:  Video Visit:      Provider verified identity through the following two step process.  Patient provided:  Patient is known previously to provider    Telemedicine Visit: The patient's condition can be safely assessed and treated via synchronous audio and visual telemedicine encounter.      Reason for Telemedicine Visit: Services only offered telehealth    Originating Site (Patient Location): Patient's home    Distant Site (Provider Location): Provider Remote Setting- Home Office    Consent:  The patient/guardian has verbally consented to: the potential risks and benefits of telemedicine (video visit) versus in person care; bill my insurance or make self-payment for services provided; and responsibility for payment of non-covered services.     Patient would like the video invitation sent by:  My Chart    Mode of Communication:  Video Conference via Amwell    As the provider I attest to compliance with applicable laws and regulations related to telemedicine.     Treatment Plan Last Reviewed: 7/16/2021  PHQ-9 / ADA-7 :   PHQ-9 score:    PHQ 4/28/2021   PHQ-9 Total Score 4   Q9: Thoughts of better off dead/self-harm past 2 weeks Not at all     AAD-7 SCORE 3/31/2021 4/7/2021 4/28/2021   Total Score - - -   Total Score 6 5 4       DATA  Interactive Complexity: No  Crisis: No       Progress Since Last Session (Related to Symptoms / Goals / Homework):   Symptoms: No change anxiety     Homework: Partially completed      Episode of Care Goals: Satisfactory progress - ACTION (Actively working towards change); Intervened by reinforcing change plan / affirming steps taken     Current / Ongoing Stressors and Concerns:  Patient  indicated feeling a lot of emotions. Patient reported she is feeling anxious, stress, happy, excited and worried. Patient reported her many emotions are related to the women that she has been seeing. Patient indicated being unsure what exactly she wants at this time. Patient reported knowing that she needs to be honest with herself. Patient reported needing to talk with the jaleel that she hangs out with and be honest with him as well. Patient indicated needing to take time to process.      Treatment Objective(s) Addressed in This Session:   use distraction each time intrusive worry surfaces  Decrease frequency and intensity of feeling down, depressed, hopeless     Intervention:   Motivational Interviewing    MI Intervention: Permission to raise concern or advise     Change Talk Expressed by the Patient: Ability to change Reasons to change    Provider Response to Change Talk: E - Evoked more info from patient about behavior change          ASSESSMENT: Current Emotional / Mental Status (status of significant symptoms):   Risk status (Self / Other harm or suicidal ideation)   Patient denies current fears or concerns for personal safety.   Patient denies current or recent suicidal ideation or behaviors.   Patient denies current or recent homicidal ideation or behaviors.   Patient denies current or recent self injurious behavior or ideation.   Patient denies other safety concerns.   Patient reports there has been no change in risk factors since their last session.     Patient reports there has been no change in protective factors since their last session.     Recommended that patient call 911 or go to the local ED should there be a change in any of these risk factors.     Appearance:   Appropriate    Eye Contact:   Good    Psychomotor Behavior: Normal    Attitude:   Cooperative    Orientation:   All   Speech    Rate / Production: Normal/ Responsive    Volume:  Normal    Mood:    Anxious    Affect:    Appropriate    Thought  Content:  Clear    Thought Form:  Coherent  Goal Directed    Insight:    Good      Medication Review:   No changes to current psychiatric medication(s)     Medication Compliance:   Yes     Changes in Health Issues:   None reported     Chemical Use Review:   Substance Use: Chemical use reviewed, no active concerns identified      Tobacco Use: No current tobacco use.      Diagnosis:  1. PTSD (post-traumatic stress disorder)    2. ADA (generalized anxiety disorder)    3. Major depressive disorder, recurrent episode, moderate (H)        Collateral Reports Completed:   Not Applicable    PLAN: (Patient Tasks / Therapist Tasks / Other)  Patient will continue with twice a month therapy. Patient needs to take time to herself to identify if she is wanting a relationship. Patient should work on identifying fears related to changes occurring and her worry of what other people will think.     Mariana Love, [facility]                                                         ______________________________________________________________________    Treatment Plan    Patient's Name: Alina Martell  YOB: 1965    Date: 7/16/2021    DSM5 Diagnoses: 296.32 (F33.1) Major Depressive Disorder, Recurrent Episode, Moderate _, 300.02 (F41.1) Generalized Anxiety Disorder or 309.81 (F43.10) Posttraumatic Stress Disorder (includes Posttraumatic Stress Disorder for Children 6 Years and Younger)  Without dissociative symptoms  Psychosocial / Contextual Factors: Family and health  WHODAS: Will be completed at next session    Referral / Collaboration:  Referral to another professional/service is not indicated at this time..    Anticipated number of session or this episode of care: Ongoing twice a juana therapy      MeasurableTreatment Goal(s) related to diagnosis / functional impairment(s)  Goal 1: Patient will work on reducing overall anxiety.     I will know I've met my goal when reporting minimal anxiety symptoms.       Objective #A (Patient Action)    Patient will use distraction each time intrusive worry surfaces.  Status: Continued - Date(s): 7/16/2021    Intervention(s)  Therapist will teach emotional regulation skills. ..    Objective #B  Patient will Decrease frequency and intensity of feeling down, depressed, hopeless.  Status: Continued - Date(s):7/16/2021     Intervention(s)  Therapist will teach emotional recognition/identification. ..    Objective #C  Patient will Identify negative self-talk and behaviors: challenge core beliefs, myths, and actions.  Status: Continued - Date(s): 7/16/2021    Intervention(s)  Therapist will teach the client how to perform a behavioral chain analysis. ..    Patient has reviewed and agreed to the above plan.      Mariana Love, [facility]  July 16, 2021

## 2021-10-05 ENCOUNTER — VIRTUAL VISIT (OUTPATIENT)
Dept: BEHAVIORAL HEALTH | Facility: CLINIC | Age: 56
End: 2021-10-05
Payer: COMMERCIAL

## 2021-10-05 DIAGNOSIS — F41.1 GAD (GENERALIZED ANXIETY DISORDER): ICD-10-CM

## 2021-10-05 DIAGNOSIS — F33.1 MAJOR DEPRESSIVE DISORDER, RECURRENT EPISODE, MODERATE (H): ICD-10-CM

## 2021-10-05 DIAGNOSIS — F43.10 PTSD (POST-TRAUMATIC STRESS DISORDER): Primary | ICD-10-CM

## 2021-10-05 PROCEDURE — 90834 PSYTX W PT 45 MINUTES: CPT | Mod: GT | Performed by: COUNSELOR

## 2021-10-05 ASSESSMENT — ANXIETY QUESTIONNAIRES
GAD7 TOTAL SCORE: 3
4. TROUBLE RELAXING: SEVERAL DAYS
5. BEING SO RESTLESS THAT IT IS HARD TO SIT STILL: SEVERAL DAYS
GAD7 TOTAL SCORE: 3
7. FEELING AFRAID AS IF SOMETHING AWFUL MIGHT HAPPEN: NOT AT ALL
3. WORRYING TOO MUCH ABOUT DIFFERENT THINGS: NOT AT ALL
1. FEELING NERVOUS, ANXIOUS, OR ON EDGE: SEVERAL DAYS
2. NOT BEING ABLE TO STOP OR CONTROL WORRYING: NOT AT ALL
GAD7 TOTAL SCORE: 3
7. FEELING AFRAID AS IF SOMETHING AWFUL MIGHT HAPPEN: NOT AT ALL
8. IF YOU CHECKED OFF ANY PROBLEMS, HOW DIFFICULT HAVE THESE MADE IT FOR YOU TO DO YOUR WORK, TAKE CARE OF THINGS AT HOME, OR GET ALONG WITH OTHER PEOPLE?: SOMEWHAT DIFFICULT
6. BECOMING EASILY ANNOYED OR IRRITABLE: NOT AT ALL

## 2021-10-05 ASSESSMENT — PATIENT HEALTH QUESTIONNAIRE - PHQ9
10. IF YOU CHECKED OFF ANY PROBLEMS, HOW DIFFICULT HAVE THESE PROBLEMS MADE IT FOR YOU TO DO YOUR WORK, TAKE CARE OF THINGS AT HOME, OR GET ALONG WITH OTHER PEOPLE: SOMEWHAT DIFFICULT
SUM OF ALL RESPONSES TO PHQ QUESTIONS 1-9: 2
7. TROUBLE CONCENTRATING ON THINGS, SUCH AS READING THE NEWSPAPER OR WATCHING TELEVISION: SEVERAL DAYS
5. POOR APPETITE OR OVEREATING: NOT AT ALL
SUM OF ALL RESPONSES TO PHQ QUESTIONS 1-9: 2
2. FEELING DOWN, DEPRESSED, IRRITABLE, OR HOPELESS: NOT AT ALL
3. TROUBLE FALLING OR STAYING ASLEEP OR SLEEPING TOO MUCH: SEVERAL DAYS
9. THOUGHTS THAT YOU WOULD BE BETTER OFF DEAD, OR OF HURTING YOURSELF: NOT AT ALL
SUM OF ALL RESPONSES TO PHQ QUESTIONS 1-9: 2
4. FEELING TIRED OR HAVING LITTLE ENERGY: NOT AT ALL
1. LITTLE INTEREST OR PLEASURE IN DOING THINGS: NOT AT ALL
10. IF YOU CHECKED OFF ANY PROBLEMS, HOW DIFFICULT HAVE THESE PROBLEMS MADE IT FOR YOU TO DO YOUR WORK, TAKE CARE OF THINGS AT HOME, OR GET ALONG WITH OTHER PEOPLE: SOMEWHAT DIFFICULT

## 2021-10-05 NOTE — PROGRESS NOTES
.                                                   Progress Note    Patient Name: Alina Martell  Date: 10/5//2021         Service Type: Individual      Session Start Time: 1203  Session End Time: 1254     Session Length: 51 minutes    Session #: 19    Attendees: Client attended alone    Service Modality:  Video Visit:      Provider verified identity through the following two step process.  Patient provided:  Patient is known previously to provider    Telemedicine Visit: The patient's condition can be safely assessed and treated via synchronous audio and visual telemedicine encounter.      Reason for Telemedicine Visit: Services only offered telehealth    Originating Site (Patient Location): Patient's home    Distant Site (Provider Location): Provider Remote Setting- Home Office    Consent:  The patient/guardian has verbally consented to: the potential risks and benefits of telemedicine (video visit) versus in person care; bill my insurance or make self-payment for services provided; and responsibility for payment of non-covered services.     Patient would like the video invitation sent by:  My Chart    Mode of Communication:  Video Conference via Amwell    As the provider I attest to compliance with applicable laws and regulations related to telemedicine.     Treatment Plan Last Reviewed: 7/16/2021  PHQ-9 / ADA-7 :   PHQ-9 score:    PHQ 10/5/2021   PHQ-9 Total Score 2   Q9: Thoughts of better off dead/self-harm past 2 weeks Not at all     ADA-7 SCORE 4/7/2021 4/28/2021 10/5/2021   Total Score - - 3 (minimal anxiety)   Total Score 5 4 3       DATA  Interactive Complexity: No  Crisis: No       Progress Since Last Session (Related to Symptoms / Goals / Homework):   Symptoms: No change depression and compulsive behaviors    Homework: Partially completed      Episode of Care Goals: Satisfactory progress - ACTION (Actively working towards change); Intervened by reinforcing change plan / affirming steps  taken     Current / Ongoing Stressors and Concerns:  Patient indicated feeling some depression. Patient reported her compulsions are continuing to be a concern. Patient indicated she cut back on being intimate with the girl she is seeing but then started gambling. Patient reported the gambling is not as bad as it has been in the past but she knows she should not cruz at all. Patient indicated she is still working on a lot of self-reflection. Patient reported she has a lot of hard questions she needs to ask herself.      Treatment Objective(s) Addressed in This Session:   identify three distraction and diversion activities and use those activities to decrease level of anxiety    Decrease frequency and intensity of feeling down, depressed, hopeless     Intervention:   Motivational Interviewing    MI Intervention: Expressed Empathy/Understanding     Change Talk Expressed by the Patient: Ability to change Reasons to change Need to change    Provider Response to Change Talk: E - Evoked more info from patient about behavior change          ASSESSMENT: Current Emotional / Mental Status (status of significant symptoms):   Risk status (Self / Other harm or suicidal ideation)   Patient denies current fears or concerns for personal safety.   Patient denies current or recent suicidal ideation or behaviors.   Patient denies current or recent homicidal ideation or behaviors.   Patient denies current or recent self injurious behavior or ideation.   Patient denies other safety concerns.   Patient reports there has been no change in risk factors since their last session.     Patient reports there has been no change in protective factors since their last session.     Recommended that patient call 911 or go to the local ED should there be a change in any of these risk factors.     Appearance:   Appropriate    Eye Contact:   Good    Psychomotor Behavior: Normal    Attitude:   Cooperative    Orientation:   All   Speech    Rate /  Production: Normal/ Responsive    Volume:  Normal    Mood:    Depressed    Affect:    Appropriate    Thought Content:  Clear    Thought Form:  Coherent  Goal Directed    Insight:    Good      Medication Review:   No changes to current psychiatric medication(s)     Medication Compliance:   Yes     Changes in Health Issues:   None reported     Chemical Use Review:   Substance Use: Chemical use reviewed, no active concerns identified      Tobacco Use: No current tobacco use.      Diagnosis:  1. PTSD (post-traumatic stress disorder)    2. ADA (generalized anxiety disorder)    3. Major depressive disorder, recurrent episode, moderate (H)        Collateral Reports Completed:   Not Applicable    PLAN: (Patient Tasks / Therapist Tasks / Other)  Patient will continue with twice a month therapy. Patient will reach out to her friends when having compulsive thoughts. Patient will write down challenging questions she has been avoiding and bring to next therapy session.     Mariana Love [facility]                                                         ______________________________________________________________________    Treatment Plan    Patient's Name: Alina Martell  YOB: 1965    Date: 7/16/2021    DSM5 Diagnoses: 296.32 (F33.1) Major Depressive Disorder, Recurrent Episode, Moderate _, 300.02 (F41.1) Generalized Anxiety Disorder or 309.81 (F43.10) Posttraumatic Stress Disorder (includes Posttraumatic Stress Disorder for Children 6 Years and Younger)  Without dissociative symptoms  Psychosocial / Contextual Factors: Family and health  WHODAS: Will be completed at next session    Referral / Collaboration:  Referral to another professional/service is not indicated at this time..    Anticipated number of session or this episode of care: Ongoing twice a juana therapy      MeasurableTreatment Goal(s) related to diagnosis / functional impairment(s)  Goal 1: Patient will work on reducing overall anxiety.      I will know I've met my goal when reporting minimal anxiety symptoms.      Objective #A (Patient Action)    Patient will use distraction each time intrusive worry surfaces.  Status: Continued - Date(s): 7/16/2021    Intervention(s)  Therapist will teach emotional regulation skills. ..    Objective #B  Patient will Decrease frequency and intensity of feeling down, depressed, hopeless.  Status: Continued - Date(s):7/16/2021     Intervention(s)  Therapist will teach emotional recognition/identification. ..    Objective #C  Patient will Identify negative self-talk and behaviors: challenge core beliefs, myths, and actions.  Status: Continued - Date(s): 7/16/2021    Intervention(s)  Therapist will teach the client how to perform a behavioral chain analysis. ..    Patient has reviewed and agreed to the above plan.      Mariana Love, [facility]  July 16, 2021

## 2021-10-06 ASSESSMENT — PATIENT HEALTH QUESTIONNAIRE - PHQ9: SUM OF ALL RESPONSES TO PHQ QUESTIONS 1-9: 2

## 2021-10-06 ASSESSMENT — ANXIETY QUESTIONNAIRES: GAD7 TOTAL SCORE: 3

## 2021-10-10 ENCOUNTER — HEALTH MAINTENANCE LETTER (OUTPATIENT)
Age: 56
End: 2021-10-10

## 2021-10-13 ENCOUNTER — MYC MEDICAL ADVICE (OUTPATIENT)
Dept: BEHAVIORAL HEALTH | Facility: CLINIC | Age: 56
End: 2021-10-13

## 2021-10-19 ENCOUNTER — OFFICE VISIT (OUTPATIENT)
Dept: BEHAVIORAL HEALTH | Facility: CLINIC | Age: 56
End: 2021-10-19
Payer: COMMERCIAL

## 2021-10-19 DIAGNOSIS — F41.1 GAD (GENERALIZED ANXIETY DISORDER): ICD-10-CM

## 2021-10-19 DIAGNOSIS — F43.10 PTSD (POST-TRAUMATIC STRESS DISORDER): Primary | ICD-10-CM

## 2021-10-19 DIAGNOSIS — F33.1 MAJOR DEPRESSIVE DISORDER, RECURRENT EPISODE, MODERATE (H): ICD-10-CM

## 2021-10-19 PROCEDURE — 90834 PSYTX W PT 45 MINUTES: CPT | Performed by: COUNSELOR

## 2021-10-19 ASSESSMENT — COLUMBIA-SUICIDE SEVERITY RATING SCALE - C-SSRS
5. HAVE YOU STARTED TO WORK OUT OR WORKED OUT THE DETAILS OF HOW TO KILL YOURSELF? DO YOU INTEND TO CARRY OUT THIS PLAN?: NO
4. HAVE YOU HAD THESE THOUGHTS AND HAD SOME INTENTION OF ACTING ON THEM?: NO
2. HAVE YOU ACTUALLY HAD ANY THOUGHTS OF KILLING YOURSELF LIFETIME?: NO
2. HAVE YOU ACTUALLY HAD ANY THOUGHTS OF KILLING YOURSELF?: NO
1. IN THE PAST MONTH, HAVE YOU WISHED YOU WERE DEAD OR WISHED YOU COULD GO TO SLEEP AND NOT WAKE UP?: NO
3. HAVE YOU BEEN THINKING ABOUT HOW YOU MIGHT KILL YOURSELF?: NO
1. IN THE PAST MONTH, HAVE YOU WISHED YOU WERE DEAD OR WISHED YOU COULD GO TO SLEEP AND NOT WAKE UP?: NO
5. HAVE YOU STARTED TO WORK OUT OR WORKED OUT THE DETAILS OF HOW TO KILL YOURSELF? DO YOU INTEND TO CARRY OUT THIS PLAN?: NO

## 2021-10-19 NOTE — PROGRESS NOTES
.                                                   Progress Note    Patient Name: Alina Martell  Date: 10/19/2021         Service Type: Individual      Session Start Time: 810  Session End Time: 858     Session Length: 48 minutes    Session #: 20    Attendees: Client attended alone    Service Modality:  In Person     Treatment Plan Last Reviewed: 10/19/2021  PHQ-9 / ADA-7 :   PHQ-9 score:    PHQ 10/5/2021   PHQ-9 Total Score 2   Q9: Thoughts of better off dead/self-harm past 2 weeks Not at all     ADA-7 SCORE 4/7/2021 4/28/2021 10/5/2021   Total Score - - 3 (minimal anxiety)   Total Score 5 4 3       DATA  Interactive Complexity: No  Crisis: No       Progress Since Last Session (Related to Symptoms / Goals / Homework):   Symptoms: No change depression and anxiety     Homework: Partially completed      Episode of Care Goals: Satisfactory progress - ACTION (Actively working towards change); Intervened by reinforcing change plan / affirming steps taken     Current / Ongoing Stressors and Concerns:  Patient reported feeling depression and anxiety. Patient indicated the symptoms are managable at this time. Patient reported her car broke down on the way to [place] which is stressful. Patient indicated she is still waiting to hear how much it will cost which is stressful.  Patient reported she is continuing to identify what she wants from the person she is seeing. Patient indicated feeling that she is asking a lot of questions and a little concerned by this behavior. Patient reported she is continuing to work on her self-care and knowing she needs to take on less.      Treatment Objective(s) Addressed in This Session:   use thought-stopping strategy daily to reduce intrusive thoughts  Increase interest, engagement, and pleasure in doing things  Decrease frequency and intensity of feeling down, depressed, hopeless     Intervention:   Motivational Interviewing    MI Intervention: Open-ended questions     Change  Talk Expressed by the Patient: Ability to change Reasons to change    Provider Response to Change Talk: A - Affirmed patient's thoughts, decisions, or attempts at behavior change          ASSESSMENT: Current Emotional / Mental Status (status of significant symptoms):   Risk status (Self / Other harm or suicidal ideation)   Patient denies current fears or concerns for personal safety.   Patient denies current or recent suicidal ideation or behaviors.   Patient denies current or recent homicidal ideation or behaviors.   Patient denies current or recent self injurious behavior or ideation.   Patient denies other safety concerns.   Patient reports there has been no change in risk factors since their last session.     Patient reports there has been no change in protective factors since their last session.     Recommended that patient call 911 or go to the local ED should there be a change in any of these risk factors.     Appearance:   Appropriate    Eye Contact:   Good    Psychomotor Behavior: Normal    Attitude:   Cooperative    Orientation:   All   Speech    Rate / Production: Normal/ Responsive    Volume:  Normal    Mood:    Anxious  Depressed    Affect:    Appropriate    Thought Content:  Clear    Thought Form:  Coherent  Goal Directed    Insight:    Good      Medication Review:   No changes to current psychiatric medication(s)     Medication Compliance:   Yes     Changes in Health Issues:   None reported     Chemical Use Review:   Substance Use: Chemical use reviewed, no active concerns identified      Tobacco Use: No current tobacco use.      Diagnosis:  1. PTSD (post-traumatic stress disorder)    2. ADA (generalized anxiety disorder)    3. Major depressive disorder, recurrent episode, moderate (H)        Collateral Reports Completed:   Not Applicable    PLAN: (Patient Tasks / Therapist Tasks / Other)  Patient will continue with twice a month therapy. Patient will do one self-care activity a day. Patient will  identify if there are any red flags in her relationship. Patient will spend time with one friend this weekend.     Mariana Love, [facility]                                                         ______________________________________________________________________    Treatment Plan    Patient's Name: Alina Martell  YOB: 1965    Date: 10/19/2021    DSM5 Diagnoses: 296.32 (F33.1) Major Depressive Disorder, Recurrent Episode, Moderate _, 300.02 (F41.1) Generalized Anxiety Disorder or 309.81 (F43.10) Posttraumatic Stress Disorder (includes Posttraumatic Stress Disorder for Children 6 Years and Younger)  Without dissociative symptoms  Psychosocial / Contextual Factors: Family, financial and health  WHODAS:   WHODAS 2.0 Total Score 3/7/2021   Total Score 21   Total Score MyChart 21     Referral / Collaboration:  Referral to another professional/service is not indicated at this time..    Anticipated number of session or this episode of care: Ongoing twice a juana therapy      MeasurableTreatment Goal(s) related to diagnosis / functional impairment(s)  Goal 1: Patient will work on reducing overall anxiety.     I will know I've met my goal when reporting minimal anxiety symptoms.      Objective #A (Patient Action)    Patient will use distraction each time intrusive worry surfaces.  Status: Continued - Date(s): 10/19/2021    Intervention(s)  Therapist will teach emotional regulation skills. ..    Objective #B  Patient will Decrease frequency and intensity of feeling down, depressed, hopeless.  Status: Continued - Date(s):  10/19/2021     Intervention(s)  Therapist will teach emotional recognition/identification. ..    Objective #C  Patient will Identify negative self-talk and behaviors: challenge core beliefs, myths, and actions.  Status: Continued - Date(s):  10/19/2021    Intervention(s)  Therapist will teach the client how to perform a behavioral chain analysis. ..    Goal 2: Patient will continue  to work on reducing depression symptoms    I will know I've met my goal then reporting minimal depression symptoms     Objective #A (Patient Action)                          Patient will Increase interest, engagement, and pleasure in doing things.  Status: Completed - Date:  10/19/2021     Intervention(s)  Therapist will assign homework ..     Objective #B  Patient will Decrease frequency and intensity of feeling down, depressed, hopeless.  Status: Continued - Date(s):  10/19/2021     Intervention(s)  Therapist will assign homework at every session.       Patient has reviewed and agreed to the above plan.      Mariana oLve, [facility]   10/19/2021

## 2021-11-04 ENCOUNTER — VIRTUAL VISIT (OUTPATIENT)
Dept: BEHAVIORAL HEALTH | Facility: CLINIC | Age: 56
End: 2021-11-04
Payer: COMMERCIAL

## 2021-11-04 DIAGNOSIS — F33.1 MAJOR DEPRESSIVE DISORDER, RECURRENT EPISODE, MODERATE (H): ICD-10-CM

## 2021-11-04 DIAGNOSIS — F41.1 GAD (GENERALIZED ANXIETY DISORDER): ICD-10-CM

## 2021-11-04 DIAGNOSIS — F43.10 PTSD (POST-TRAUMATIC STRESS DISORDER): Primary | ICD-10-CM

## 2021-11-04 PROCEDURE — 90834 PSYTX W PT 45 MINUTES: CPT | Mod: GT,95 | Performed by: COUNSELOR

## 2021-11-05 NOTE — PROGRESS NOTES
Progress Note    Patient Name: Alina Martell  Date: 11/4/2021         Service Type: Individual      Session Start Time: 1205  Session End Time: 1255     Session Length: 50 minutes    Session #: 21    Attendees: Client attended alone    Service Modality:  Video Visit:      Provider verified identity through the following two step process.  Patient provided:  Patient is known previously to provider     Telemedicine Visit: The patient's condition can be safely assessed and treated via synchronous audio and visual telemedicine encounter.       Reason for Telemedicine Visit: Services only offered telehealth     Originating Site (Patient Location): Patient's home    Distant Site (Provider Location): Provider Remote Setting- Home Office     Consent:  The patient/guardian has verbally consented to: the potential risks and benefits of telemedicine (video visit) versus in person care; bill my insurance or make self-payment for services provided; and responsibility for payment of non-covered services.      Patient would like the video invitation sent by:  My Chart     Mode of Communication:  Video Conference via [employer]     As the provider I attest to compliance with applicable laws and regulations      Treatment Plan Last Reviewed: 10/19/2021  PHQ-9 / ADA-7 :   PHQ-9 score:    PHQ 10/5/2021   PHQ-9 Total Score 2   Q9: Thoughts of better off dead/self-harm past 2 weeks Not at all     ADA-7 SCORE 4/7/2021 4/28/2021 10/5/2021   Total Score - - 3 (minimal anxiety)   Total Score 5 4 3       DATA  Interactive Complexity: No  Crisis: No       Progress Since Last Session (Related to Symptoms / Goals / Homework):   Symptoms: No change stress causing increase in symptoms    Homework: Partially completed      Episode of Care Goals: Satisfactory progress - ACTION (Actively working towards change); Intervened by reinforcing change plan / affirming steps taken     Current / Ongoing  "Stressors and Concerns:  Patient indicated feeling stressed. Patient reported she is continuing to try and determine what she wants for a relationship. Patient indicated feeling that she is becoming needy which is not something she wants in a relationship. Patient reported at the same time there is a lot of positive in the relationship. Patient indicated she continues to want to [place] but has been able to not go to the casino.      Treatment Objective(s) Addressed in This Session:   use \"worry time\" each day for 15 minutes of scheduled worry and then defer obsessive or anxious thinking until the next structured worry time  Increase interest, engagement, and pleasure in doing things  Decrease frequency and intensity of feeling down, depressed, hopeless  Identify negative self-talk and behaviors: challenge core beliefs, myths, and actions     Intervention:   Motivational Interviewing    MI Intervention: Permission to raise concern or advise and Open-ended questions     Change Talk Expressed by the Patient: Desire to change Reasons to change    Provider Response to Change Talk: A - Affirmed patient's thoughts, decisions, or attempts at behavior change and R - Reflected patient's change talk          ASSESSMENT: Current Emotional / Mental Status (status of significant symptoms):   Risk status (Self / Other harm or suicidal ideation)   Patient denies current fears or concerns for personal safety.   Patient denies current or recent suicidal ideation or behaviors.   Patient denies current or recent homicidal ideation or behaviors.   Patient denies current or recent self injurious behavior or ideation.   Patient denies other safety concerns.   Patient reports there has been no change in risk factors since their last session.     Patient reports there has been no change in protective factors since their last session.     Recommended that patient call 911 or go to the local ED should there be a change in any of these risk " factors.     Appearance:   Appropriate    Eye Contact:   Good    Psychomotor Behavior: Normal    Attitude:   Cooperative    Orientation:   All   Speech    Rate / Production: Normal/ Responsive    Volume:  Normal    Mood:    Anxious  Depressed    Affect:    Appropriate    Thought Content:  Clear    Thought Form:  Coherent  Goal Directed    Insight:    Good      Medication Review:   No changes to current psychiatric medication(s)     Medication Compliance:   Yes     Changes in Health Issues:   None reported     Chemical Use Review:   Substance Use: Chemical use reviewed, no active concerns identified      Tobacco Use: No current tobacco use.      Diagnosis:  1. PTSD (post-traumatic stress disorder)    2. ADA (generalized anxiety disorder)    3. Major depressive disorder, recurrent episode, moderate (H)        Collateral Reports Completed:   Not Applicable    PLAN: (Patient Tasks / Therapist Tasks / Other)  Patient will continue with twice a month therapy. Patient will reach out to friends or family if having thoughts of wanting to cruz. Patient will identify what she needs in a relationship. Patient will continue to work on self-care.     Mariana Love MA, [facility]                                                         ______________________________________________________________________    Treatment Plan    Patient's Name: Alina Martell  YOB: 1965    Date: 10/19/2021    DSM5 Diagnoses: 296.32 (F33.1) Major Depressive Disorder, Recurrent Episode, Moderate _, 300.02 (F41.1) Generalized Anxiety Disorder or 309.81 (F43.10) Posttraumatic Stress Disorder (includes Posttraumatic Stress Disorder for Children 6 Years and Younger)  Without dissociative symptoms  Psychosocial / Contextual Factors: Family, financial and health  WHODAS:   WHODAS 2.0 Total Score 3/7/2021   Total Score 21   Total Score MyChart 21     Referral / Collaboration:  Referral to another professional/service is not indicated at  this time..    Anticipated number of session or this episode of care: Ongoing twice a juana therapy      MeasurableTreatment Goal(s) related to diagnosis / functional impairment(s)  Goal 1: Patient will work on reducing overall anxiety.     I will know I've met my goal when reporting minimal anxiety symptoms.      Objective #A (Patient Action)    Patient will use distraction each time intrusive worry surfaces.  Status: Continued - Date(s): 10/19/2021    Intervention(s)  Therapist will teach emotional regulation skills. ..    Objective #B  Patient will Decrease frequency and intensity of feeling down, depressed, hopeless.  Status: Continued - Date(s):  10/19/2021     Intervention(s)  Therapist will teach emotional recognition/identification. ..    Objective #C  Patient will Identify negative self-talk and behaviors: challenge core beliefs, myths, and actions.  Status: Continued - Date(s):  10/19/2021    Intervention(s)  Therapist will teach the client how to perform a behavioral chain analysis. ..    Goal 2: Patient will continue to work on reducing depression symptoms    I will know I've met my goal then reporting minimal depression symptoms     Objective #A (Patient Action)                          Patient will Increase interest, engagement, and pleasure in doing things.  Status: Completed - Date:  10/19/2021     Intervention(s)  Therapist will assign homework ..     Objective #B  Patient will Decrease frequency and intensity of feeling down, depressed, hopeless.  Status: Continued - Date(s):  10/19/2021     Intervention(s)  Therapist will assign homework at every session.       Patient has reviewed and agreed to the above plan.      Mariana Love, [facility]   10/19/2021

## 2021-11-15 ENCOUNTER — VIRTUAL VISIT (OUTPATIENT)
Dept: FAMILY MEDICINE | Facility: CLINIC | Age: 56
End: 2021-11-15
Payer: COMMERCIAL

## 2021-11-15 DIAGNOSIS — F41.9 ANXIETY: Primary | ICD-10-CM

## 2021-11-15 PROCEDURE — 99213 OFFICE O/P EST LOW 20 MIN: CPT | Mod: 95 | Performed by: FAMILY MEDICINE

## 2021-11-15 RX ORDER — HYDROXYZINE HYDROCHLORIDE 25 MG/1
25 TABLET, FILM COATED ORAL 3 TIMES DAILY PRN
Qty: 90 TABLET | Refills: 1 | Status: SHIPPED | OUTPATIENT
Start: 2021-11-15 | End: 2022-03-22

## 2021-11-15 RX ORDER — SERTRALINE HYDROCHLORIDE 25 MG/1
25 TABLET, FILM COATED ORAL DAILY
Qty: 90 TABLET | Refills: 0 | Status: SHIPPED | OUTPATIENT
Start: 2021-11-15 | End: 2022-06-23

## 2021-11-15 ASSESSMENT — ANXIETY QUESTIONNAIRES
GAD7 TOTAL SCORE: 6
1. FEELING NERVOUS, ANXIOUS, OR ON EDGE: MORE THAN HALF THE DAYS
7. FEELING AFRAID AS IF SOMETHING AWFUL MIGHT HAPPEN: NOT AT ALL
5. BEING SO RESTLESS THAT IT IS HARD TO SIT STILL: MORE THAN HALF THE DAYS
8. IF YOU CHECKED OFF ANY PROBLEMS, HOW DIFFICULT HAVE THESE MADE IT FOR YOU TO DO YOUR WORK, TAKE CARE OF THINGS AT HOME, OR GET ALONG WITH OTHER PEOPLE?: SOMEWHAT DIFFICULT
7. FEELING AFRAID AS IF SOMETHING AWFUL MIGHT HAPPEN: NOT AT ALL
3. WORRYING TOO MUCH ABOUT DIFFERENT THINGS: NOT AT ALL
GAD7 TOTAL SCORE: 6
2. NOT BEING ABLE TO STOP OR CONTROL WORRYING: NOT AT ALL
GAD7 TOTAL SCORE: 6
4. TROUBLE RELAXING: MORE THAN HALF THE DAYS
6. BECOMING EASILY ANNOYED OR IRRITABLE: NOT AT ALL

## 2021-11-15 NOTE — PROGRESS NOTES
"Answers for HPI/ROS submitted by the patient on 11/15/2021  ADA 7 TOTAL SCORE: 6    Maritza is a 56 year old who is being evaluated via a billable video visit.      How would you like to obtain your AVS? MyChart  If the video visit is dropped, the invitation should be resent by: Text to cell phone: [phone]  Will anyone else be joining your video visit? No      Video Start Time: 9:15 AM    Assessment & Plan     Anxiety  Will do a trial of zoloft and hydroxyzine.  Continue with therapy.  Follow up in 4 weeks and my chart if any issues  - sertraline (ZOLOFT) 25 MG tablet  Dispense: 90 tablet; Refill: 0  - hydrOXYzine (ATARAX) 25 MG tablet  Dispense: 90 tablet; Refill: 1    56}     BMI:   Estimated body mass index is 41.16 kg/m  as calculated from the following:    Height as of 7/29/21: 1.753 m (5' 9\").    Weight as of 8/11/21: 126.4 kg (278 lb 11.2 oz).           Return in about 4 weeks (around 12/13/2021) for using a video visit, using a phone visit.    Estelle Bansal MD  [facility]   Maritza is a 56 year old who presents for the following health issues     History of Present Illness       Mental Health Follow-up:  Patient presents to follow-up on Anxiety.    Patient's anxiety since last visit has been:  Medium  The patient is not having other symptoms associated with anxiety.  Any significant life events: No  Patient is feeling anxious or having panic attacks.  Patient has no concerns about alcohol or drug use.     Social History  Tobacco Use    Smoking status: Never Smoker    Smokeless tobacco: Never Used    Tobacco comment: smokes marijuana  Alcohol use: Not Currently    Comment: Alcoholic Drinks/day: Never an issue, she states.  7/8/16  Drug use: Not Currently    Types: Marijuana    Comment: Drug use: Former marijuana use, not current. 7/8/16      Today's PHQ-9         PHQ-9 Total Score:         PHQ-9 Q9 Thoughts of better off dead/self-harm past 2 weeks :       Thoughts of " suicide or self harm:      Self-harm Plan:        Self-harm Action:          Safety concerns for self or others:           She eats 2-3 servings of fruits and vegetables daily.She consumes 1 sweetened beverage(s) daily.She exercises with enough effort to increase her heart rate 20 to 29 minutes per day.  She exercises with enough effort to increase her heart rate 3 or less days per week.   She is taking medications regularly.   seeing therapy every 2 weeks. Previously seeing psychiatry and given medication.  Feels like anxiety is overwhelming  Depression doing well.  Problems with sleeping.  Panic attacks.  Hx of arrhythmia and feels like the medications make her heart race.  Hx of being on the buspar, being on paxil.  Having panic attacks about once a week.  trying to do interventions to try and not let them escalate.  Sleeping about 4-5 hours a night on a good night.  Difficulty shutting mind down.    See's therapy every 2 weeks at [facility]--was advised to see  for anxiety medications.    Anxiety Follow-Up    How are you doing with your anxiety since your last visit? Has been awhile since seen , sees a therapist- needs medications for anxiety    Are you having other symptoms that might be associated with anxiety? Yes:  sleeping, arrythmia    Have you had a significant life event? No     Are you feeling depressed? No    Do you have any concerns with your use of alcohol or other drugs? No    Social History     Tobacco Use     Smoking status: Never Smoker     Smokeless tobacco: Never Used     Tobacco comment: smokes marijuana   Substance Use Topics     Alcohol use: Not Currently     Comment: Alcoholic Drinks/day: Never an issue, she states.  7/8/16     Drug use: Not Currently     Types: Marijuana     Comment: Drug use: Former marijuana use, not current. 7/8/16     ADA-7 SCORE 4/28/2021 10/5/2021 11/15/2021   Total Score - 3 (minimal anxiety) 6 (mild anxiety)   Total Score 4 3 6      PHQ 4/7/2021 4/28/2021 10/5/2021   PHQ-9 Total Score 4 4 2   Q9: Thoughts of better off dead/self-harm past 2 weeks Not at all Not at all Not at all             Review of Systems   Constitutional, HEENT, cardiovascular, pulmonary, gi and gu systems are negative, except as otherwise noted.      Objective           Vitals:  No vitals were obtained today due to virtual visit.    Physical Exam   GENERAL: Healthy, alert and no distress  EYES: Eyes grossly normal to inspection.  No discharge or erythema, or obvious scleral/conjunctival abnormalities.  RESP: No audible wheeze, cough, or visible cyanosis.  No visible retractions or increased work of breathing.    SKIN: Visible skin clear. No significant rash, abnormal pigmentation or lesions.  NEURO: Cranial nerves grossly intact.  Mentation and speech appropriate for age.  PSYCH: Mentation appears normal, affect normal/bright, judgement and insight intact, normal speech and appearance well-groomed.                Video-Visit Details    Type of service:  Video Visit    Video End Time:9:26 AM    Originating Location (pt. Location): Home    Distant Location (provider location):  [facility]     Platform used for Video Visit: [employer]   Abdominal Pain, N/V/D

## 2021-11-16 ENCOUNTER — VIRTUAL VISIT (OUTPATIENT)
Dept: BEHAVIORAL HEALTH | Facility: CLINIC | Age: 56
End: 2021-11-16
Payer: COMMERCIAL

## 2021-11-16 DIAGNOSIS — F33.1 MAJOR DEPRESSIVE DISORDER, RECURRENT EPISODE, MODERATE (H): ICD-10-CM

## 2021-11-16 DIAGNOSIS — F43.10 PTSD (POST-TRAUMATIC STRESS DISORDER): Primary | ICD-10-CM

## 2021-11-16 DIAGNOSIS — F41.1 GAD (GENERALIZED ANXIETY DISORDER): ICD-10-CM

## 2021-11-16 PROCEDURE — 90834 PSYTX W PT 45 MINUTES: CPT | Mod: GT,95 | Performed by: COUNSELOR

## 2021-11-16 ASSESSMENT — ANXIETY QUESTIONNAIRES: GAD7 TOTAL SCORE: 6

## 2021-11-17 ENCOUNTER — IMMUNIZATION (OUTPATIENT)
Dept: FAMILY MEDICINE | Facility: CLINIC | Age: 56
End: 2021-11-17
Payer: COMMERCIAL

## 2021-11-17 PROCEDURE — 90471 IMMUNIZATION ADMIN: CPT

## 2021-11-17 PROCEDURE — 90682 RIV4 VACC RECOMBINANT DNA IM: CPT

## 2021-11-17 NOTE — PROGRESS NOTES
Progress Note    Patient Name: Alina Martell  Date: 11/16/2021         Service Type: Individual      Session Start Time: 1203  Session End Time: 1250     Session Length: 47 minutes    Session #: 22    Attendees: Client attended alone    Service Modality:  Video Visit:      Provider verified identity through the following two step process.  Patient provided:  Patient is known previously to provider     Telemedicine Visit: The patient's condition can be safely assessed and treated via synchronous audio and visual telemedicine encounter.       Reason for Telemedicine Visit: Services only offered telehealth     Originating Site (Patient Location): Patient's home    Distant Site (Provider Location): Provider Remote Setting- Home Office     Consent:  The patient/guardian has verbally consented to: the potential risks and benefits of telemedicine (video visit) versus in person care; bill my insurance or make self-payment for services provided; and responsibility for payment of non-covered services.      Patient would like the video invitation sent by:  My Chart     Mode of Communication:  Video Conference via [employer]     As the provider I attest to compliance with applicable laws and regulations      Treatment Plan Last Reviewed: 10/19/2021  PHQ-9 / ADA-7 :   PHQ-9 score:    PHQ 10/5/2021   PHQ-9 Total Score 2   Q9: Thoughts of better off dead/self-harm past 2 weeks Not at all     ADA-7 SCORE 4/28/2021 10/5/2021 11/15/2021   Total Score - 3 (minimal anxiety) 6 (mild anxiety)   Total Score 4 3 6       DATA  Interactive Complexity: No  Crisis: No       Progress Since Last Session (Related to Symptoms / Goals / Homework):   Symptoms: Improving less symptoms reported this session.    Homework: Partially completed      Episode of Care Goals: Satisfactory progress - ACTION (Actively working towards change); Intervened by reinforcing change plan / affirming steps  "taken     Current / Ongoing Stressors and Concerns:  Patient reported feeling some depression and anxiety. Patient indicated knowing she needs to make some decision's in her life. Patient reported knowing that is she avoiding. Patient indicated right now she is having a hard time saying no and taking on too much. Patient reported this being a sign that she is avoiding being alone due to her thoughts. Patient indicated knowing that she is avoiding trying to make a decision related to her relationships.      Treatment Objective(s) Addressed in This Session:   use \"worry time\" each day for 15 minutes of scheduled worry and then defer obsessive or anxious thinking until the next structured worry time  Increase interest, engagement, and pleasure in doing things  Decrease frequency and intensity of feeling down, depressed, hopeless  Identify negative self-talk and behaviors: challenge core beliefs, myths, and actions     Intervention:   Motivational Interviewing    MI Intervention: Permission to raise concern or advise and Open-ended questions     Change Talk Expressed by the Patient: Desire to change Reasons to change    Provider Response to Change Talk: A - Affirmed patient's thoughts, decisions, or attempts at behavior change          ASSESSMENT: Current Emotional / Mental Status (status of significant symptoms):   Risk status (Self / Other harm or suicidal ideation)   Patient denies current fears or concerns for personal safety.   Patient denies current or recent suicidal ideation or behaviors.   Patient denies current or recent homicidal ideation or behaviors.   Patient denies current or recent self injurious behavior or ideation.   Patient denies other safety concerns.   Patient reports there has been no change in risk factors since their last session.     Patient reports there has been no change in protective factors since their last session.     Recommended that patient call 911 or go to the local ED should there " be a change in any of these risk factors.     Appearance:   Appropriate    Eye Contact:   Good    Psychomotor Behavior: Normal    Attitude:   Cooperative    Orientation:   All   Speech    Rate / Production: Normal/ Responsive    Volume:  Normal    Mood:    Anxious  Depressed    Affect:    Appropriate    Thought Content:  Clear    Thought Form:  Coherent  Goal Directed    Insight:    Good      Medication Review:   Changes to psychiatric medications, see updated Medication List in EPIC.      Medication Compliance:   Yes     Changes in Health Issues:   None reported     Chemical Use Review:   Substance Use: Chemical use reviewed, no active concerns identified      Tobacco Use: No current tobacco use.      Diagnosis:  1. PTSD (post-traumatic stress disorder)    2. ADA (generalized anxiety disorder)    3. Major depressive disorder, recurrent episode, moderate (H)        Collateral Reports Completed:   Not Applicable    PLAN: (Patient Tasks / Therapist Tasks / Other)  Patient will continue with twice a month therapy. Patient will start taking time to journal her thoughts again. Patient will identify what she needs in a relationship. Patient will work on allowing her partner to take control.     Mariana Love MA, [facility]                                                         ______________________________________________________________________    Treatment Plan    Patient's Name: Alina Martell  YOB: 1965    Date: 10/19/2021    DSM5 Diagnoses: 296.32 (F33.1) Major Depressive Disorder, Recurrent Episode, Moderate _, 300.02 (F41.1) Generalized Anxiety Disorder or 309.81 (F43.10) Posttraumatic Stress Disorder (includes Posttraumatic Stress Disorder for Children 6 Years and Younger)  Without dissociative symptoms  Psychosocial / Contextual Factors: Family, financial and health  WHODAS:   WHODAS 2.0 Total Score 3/7/2021   Total Score 21   Total Score MyChart 21     Referral / Collaboration:  Referral  to another professional/service is not indicated at this time..    Anticipated number of session or this episode of care: Ongoing twice a juana therapy      MeasurableTreatment Goal(s) related to diagnosis / functional impairment(s)  Goal 1: Patient will work on reducing overall anxiety.     I will know I've met my goal when reporting minimal anxiety symptoms.      Objective #A (Patient Action)    Patient will use distraction each time intrusive worry surfaces.  Status: Continued - Date(s): 10/19/2021    Intervention(s)  Therapist will teach emotional regulation skills. ..    Objective #B  Patient will Decrease frequency and intensity of feeling down, depressed, hopeless.  Status: Continued - Date(s):  10/19/2021     Intervention(s)  Therapist will teach emotional recognition/identification. ..    Objective #C  Patient will Identify negative self-talk and behaviors: challenge core beliefs, myths, and actions.  Status: Continued - Date(s):  10/19/2021    Intervention(s)  Therapist will teach the client how to perform a behavioral chain analysis. ..    Goal 2: Patient will continue to work on reducing depression symptoms    I will know I've met my goal then reporting minimal depression symptoms     Objective #A (Patient Action)                          Patient will Increase interest, engagement, and pleasure in doing things.  Status: Completed - Date:  10/19/2021     Intervention(s)  Therapist will assign homework ..     Objective #B  Patient will Decrease frequency and intensity of feeling down, depressed, hopeless.  Status: Continued - Date(s):  10/19/2021     Intervention(s)  Therapist will assign homework at every session.       Patient has reviewed and agreed to the above plan.      Mariana Love, [facility]   10/19/2021

## 2021-11-30 ENCOUNTER — OFFICE VISIT (OUTPATIENT)
Dept: BEHAVIORAL HEALTH | Facility: CLINIC | Age: 56
End: 2021-11-30
Payer: COMMERCIAL

## 2021-11-30 DIAGNOSIS — F43.10 PTSD (POST-TRAUMATIC STRESS DISORDER): Primary | ICD-10-CM

## 2021-11-30 DIAGNOSIS — F33.1 MAJOR DEPRESSIVE DISORDER, RECURRENT EPISODE, MODERATE (H): ICD-10-CM

## 2021-11-30 DIAGNOSIS — F41.1 GAD (GENERALIZED ANXIETY DISORDER): ICD-10-CM

## 2021-11-30 PROCEDURE — 90834 PSYTX W PT 45 MINUTES: CPT | Performed by: COUNSELOR

## 2021-12-02 NOTE — PROGRESS NOTES
Progress Note    Patient Name: Alina Martell  Date: 11/30/2021         Service Type: Individual      Session Start Time: 801  Session End Time: 851     Session Length: 50 minutes    Session #: 23    Attendees: Client attended alone    Service Modality:  In-Person     Treatment Plan Last Reviewed: 10/19/2021  PHQ-9 / ADA-7 :   PHQ-9 score:    PHQ 10/5/2021   PHQ-9 Total Score 2   Q9: Thoughts of better off dead/self-harm past 2 weeks Not at all     ADA-7 SCORE 4/28/2021 10/5/2021 11/15/2021   Total Score - 3 (minimal anxiety) 6 (mild anxiety)   Total Score 4 3 6       DATA  Interactive Complexity: No  Crisis: No       Progress Since Last Session (Related to Symptoms / Goals / Homework):   Symptoms: No change continuing to notice symptoms    Homework: Partially completed      Episode of Care Goals: Satisfactory progress - ACTION (Actively working towards change); Intervened by reinforcing change plan / affirming steps taken     Current / Ongoing Stressors and Concerns:  Patient reported feeling mixed emotions. Patient indicated she planned to spend Thanksgiving with her family but plans changed. Patient reported at first being upset but it did not last long. Patient indicated instead she went and saw her mom, volunteered and then went to a movie. Patient reported it ended up being a great Thanksgiving. Patient indicated she is continuing to identify what she wants from the women she is seeing. Patient reported having a hard time telling if the relationship is unhealthy or it is her fear of getting close.      Treatment Objective(s) Addressed in This Session:   identify three distraction and diversion activities and use those activities to decrease level of anxiety    Increase interest, engagement, and pleasure in doing things  Decrease frequency and intensity of feeling down, depressed, hopeless  Identify negative self-talk and behaviors: challenge core beliefs,  myths, and actions     Intervention:   Motivational Interviewing    MI Intervention: Permission to raise concern or advise and Open-ended questions     Change Talk Expressed by the Patient: Desire to change Reasons to change    Provider Response to Change Talk: A - Affirmed patient's thoughts, decisions, or attempts at behavior change and R - Reflected patient's change talk          ASSESSMENT: Current Emotional / Mental Status (status of significant symptoms):   Risk status (Self / Other harm or suicidal ideation)   Patient denies current fears or concerns for personal safety.   Patient denies current or recent suicidal ideation or behaviors.   Patient denies current or recent homicidal ideation or behaviors.   Patient denies current or recent self injurious behavior or ideation.   Patient denies other safety concerns.   Patient reports there has been no change in risk factors since their last session.     Patient reports there has been no change in protective factors since their last session.     Recommended that patient call 911 or go to the local ED should there be a change in any of these risk factors.     Appearance:   Appropriate    Eye Contact:   Good    Psychomotor Behavior: Normal    Attitude:   Cooperative    Orientation:   All   Speech    Rate / Production: Normal/ Responsive    Volume:  Normal    Mood:    Anxious  Depressed    Affect:    Appropriate    Thought Content:  Clear    Thought Form:  Coherent  Goal Directed    Insight:    Good      Medication Review:   No changes to current psychiatric medication(s)     Medication Compliance:   Yes     Changes in Health Issues:   None reported     Chemical Use Review:   Substance Use: Chemical use reviewed, no active concerns identified      Tobacco Use: No current tobacco use.      Diagnosis:  1. PTSD (post-traumatic stress disorder)    2. ADA (generalized anxiety disorder)    3. Major depressive disorder, recurrent episode, moderate (H)        Collateral  Reports Completed:   Not Applicable    PLAN: (Patient Tasks / Therapist Tasks / Other)  Patient will continue with twice a month therapy. Patient will identify pros and cons of the women she is seeing. Patient will continue to work on finding balance of time for herself and time with support network.     Mariana Love MA, [facility]                                                         ______________________________________________________________________    Treatment Plan    Patient's Name: Alina Martell  YOB: 1965    Date: 10/19/2021    DSM5 Diagnoses: 296.32 (F33.1) Major Depressive Disorder, Recurrent Episode, Moderate _, 300.02 (F41.1) Generalized Anxiety Disorder or 309.81 (F43.10) Posttraumatic Stress Disorder (includes Posttraumatic Stress Disorder for Children 6 Years and Younger)  Without dissociative symptoms  Psychosocial / Contextual Factors: Family, financial and health  WHODAS:   WHODAS 2.0 Total Score 3/7/2021   Total Score 21   Total Score MyChart 21     Referral / Collaboration:  Referral to another professional/service is not indicated at this time..    Anticipated number of session or this episode of care: Ongoing twice a juana therapy      MeasurableTreatment Goal(s) related to diagnosis / functional impairment(s)  Goal 1: Patient will work on reducing overall anxiety.     I will know I've met my goal when reporting minimal anxiety symptoms.      Objective #A (Patient Action)    Patient will use distraction each time intrusive worry surfaces.  Status: Continued - Date(s): 10/19/2021    Intervention(s)  Therapist will teach emotional regulation skills. ..    Objective #B  Patient will Decrease frequency and intensity of feeling down, depressed, hopeless.  Status: Continued - Date(s):  10/19/2021     Intervention(s)  Therapist will teach emotional recognition/identification. ..    Objective #C  Patient will Identify negative self-talk and behaviors: challenge core beliefs,  myths, and actions.  Status: Continued - Date(s):  10/19/2021    Intervention(s)  Therapist will teach the client how to perform a behavioral chain analysis. ..    Goal 2: Patient will continue to work on reducing depression symptoms    I will know I've met my goal then reporting minimal depression symptoms     Objective #A (Patient Action)                          Patient will Increase interest, engagement, and pleasure in doing things.  Status: Completed - Date:  10/19/2021     Intervention(s)  Therapist will assign homework ..     Objective #B  Patient will Decrease frequency and intensity of feeling down, depressed, hopeless.  Status: Continued - Date(s):  10/19/2021     Intervention(s)  Therapist will assign homework at every session.       Patient has reviewed and agreed to the above plan.      Mariana Love, [facility]   10/19/2021

## 2021-12-13 ENCOUNTER — VIRTUAL VISIT (OUTPATIENT)
Dept: FAMILY MEDICINE | Facility: CLINIC | Age: 56
End: 2021-12-13
Payer: COMMERCIAL

## 2021-12-13 DIAGNOSIS — F51.01 PRIMARY INSOMNIA: Primary | ICD-10-CM

## 2021-12-13 DIAGNOSIS — F41.9 ANXIETY: ICD-10-CM

## 2021-12-13 PROCEDURE — 99213 OFFICE O/P EST LOW 20 MIN: CPT | Mod: 95 | Performed by: FAMILY MEDICINE

## 2021-12-13 RX ORDER — TRAZODONE HYDROCHLORIDE 50 MG/1
25 TABLET, FILM COATED ORAL AT BEDTIME
Qty: 90 TABLET | Refills: 0 | Status: SHIPPED | OUTPATIENT
Start: 2021-12-13 | End: 2023-01-25

## 2021-12-13 ASSESSMENT — ANXIETY QUESTIONNAIRES
5. BEING SO RESTLESS THAT IT IS HARD TO SIT STILL: SEVERAL DAYS
6. BECOMING EASILY ANNOYED OR IRRITABLE: NOT AT ALL
3. WORRYING TOO MUCH ABOUT DIFFERENT THINGS: SEVERAL DAYS
7. FEELING AFRAID AS IF SOMETHING AWFUL MIGHT HAPPEN: NOT AT ALL
2. NOT BEING ABLE TO STOP OR CONTROL WORRYING: NOT AT ALL
1. FEELING NERVOUS, ANXIOUS, OR ON EDGE: SEVERAL DAYS
GAD7 TOTAL SCORE: 4
IF YOU CHECKED OFF ANY PROBLEMS ON THIS QUESTIONNAIRE, HOW DIFFICULT HAVE THESE PROBLEMS MADE IT FOR YOU TO DO YOUR WORK, TAKE CARE OF THINGS AT HOME, OR GET ALONG WITH OTHER PEOPLE: SOMEWHAT DIFFICULT

## 2021-12-13 ASSESSMENT — PATIENT HEALTH QUESTIONNAIRE - PHQ9
5. POOR APPETITE OR OVEREATING: SEVERAL DAYS
SUM OF ALL RESPONSES TO PHQ QUESTIONS 1-9: 6

## 2021-12-13 NOTE — PROGRESS NOTES
"Maritza is a 56 year old who is being evaluated via a billable video visit.      How would you like to obtain your AVS? MyChart  If the video visit is dropped, the invitation should be resent by: Text to cell phone: [phone]  Will anyone else be joining your video visit? No      Video Start Time: 9:07 AM    Assessment & Plan     Primary insomnia  Will do a trial of trazodone 25mg and see if improvement in sleep  - traZODone (DESYREL) 50 MG tablet  Dispense: 90 tablet; Refill: 0    Anxiety  Much improved on the medication.  Stable.         BMI:   Estimated body mass index is 41.16 kg/m  as calculated from the following:    Height as of 7/29/21: 1.753 m (5' 9\").    Weight as of 8/11/21: 126.4 kg (278 lb 11.2 oz).           Return in about 6 months (around 6/13/2022) for Follow up.    Estelle Bansal MD  [facility]    Markus Salas is a 56 year old who presents for the following health issues     HPI     Anxiety Follow-Up    How are you doing with your anxiety since your last visit? Improved Patient states the medication has helped     Are you having other symptoms that might be associated with anxiety? Yes:  Panic attackes (less than previously)    Have you had a significant life event? No     Are you feeling depressed? No    Do you have any concerns with your use of alcohol or other drugs? No      Feels like medicine is working.  No side effects.  Panic attacks now down and not daily.  Still some but rarely-  Noticed increased stress at work will affect them.  Social anxiety with family and feels like that is better than normal.  Still problem with sleeping.  Tried ambien and lunesta due to sleep walking.  On RANDALL.  Hx of being on trazodone and felt like too drowsy in the am.  Social History     Tobacco Use     Smoking status: Never Smoker     Smokeless tobacco: Never Used     Tobacco comment: smokes marijuana   Substance Use Topics     Alcohol use: Not Currently     Comment: Alcoholic " Drinks/day: Never an issue, she states.  7/8/16     Drug use: Not Currently     Types: Marijuana     Comment: Drug use: Former marijuana use, not current. 7/8/16     ADA-7 SCORE 10/5/2021 11/15/2021 12/13/2021   Total Score 3 (minimal anxiety) 6 (mild anxiety) -   Total Score 3 6 4     PHQ 4/28/2021 10/5/2021 12/13/2021   PHQ-9 Total Score 4 2 6   Q9: Thoughts of better off dead/self-harm past 2 weeks Not at all Not at all Not at all     Last PHQ-9 12/13/2021   1.  Little interest or pleasure in doing things 0   2.  Feeling down, depressed, or hopeless 0   3.  Trouble falling or staying asleep, or sleeping too much 3   4.  Feeling tired or having little energy 0   5.  Poor appetite or overeating 0   6.  Feeling bad about yourself 1   7.  Trouble concentrating 1   8.  Moving slowly or restless 1   Q9: Thoughts of better off dead/self-harm past 2 weeks 0   PHQ-9 Total Score 6   Difficulty at work, home, or with people Somewhat difficult     ADA-7  12/13/2021   1. Feeling nervous, anxious, or on edge 1   2. Not being able to stop or control worrying 0   3. Worrying too much about different things 1   4. Trouble relaxing 1   5. Being so restless that it is hard to sit still 1   6. Becoming easily annoyed or irritable 0   7. Feeling afraid, as if something awful might happen 0   ADA-7 Total Score 4   If you checked any problems, how difficult have they made it for you to do your work, take care of things at home, or get along with other people? Somewhat difficult         How many servings of fruits and vegetables do you eat daily?  2-3    On average, how many sweetened beverages do you drink each day (Examples: soda, juice, sweet tea, etc.  Do NOT count diet or artificially sweetened beverages)?   1    How many days per week do you exercise enough to make your heart beat faster? 4    How many minutes a day do you exercise enough to make your heart beat faster? 60 or more    How many days per week do you miss taking your  medication? 0        Review of Systems   Constitutional, HEENT, cardiovascular, pulmonary, gi and gu systems are negative, except as otherwise noted.      Objective           Vitals:  No vitals were obtained today due to virtual visit.    Physical Exam   GENERAL: Healthy, alert and no distress  EYES: Eyes grossly normal to inspection.  No discharge or erythema, or obvious scleral/conjunctival abnormalities.  RESP: No audible wheeze, cough, or visible cyanosis.  No visible retractions or increased work of breathing.    SKIN: Visible skin clear. No significant rash, abnormal pigmentation or lesions.  NEURO: Cranial nerves grossly intact.  Mentation and speech appropriate for age.  PSYCH: Mentation appears normal, affect normal/bright, judgement and insight intact, normal speech and appearance well-groomed.                Video-Visit Details    Type of service:  Video Visit    Video End Time:9:13 AM    Originating Location (pt. Location): Home    Distant Location (provider location):  [facility]     Platform used for Video Visit: [employer]

## 2021-12-14 ASSESSMENT — ANXIETY QUESTIONNAIRES: GAD7 TOTAL SCORE: 4

## 2021-12-16 ENCOUNTER — VIRTUAL VISIT (OUTPATIENT)
Dept: BEHAVIORAL HEALTH | Facility: CLINIC | Age: 56
End: 2021-12-16
Payer: COMMERCIAL

## 2021-12-16 DIAGNOSIS — F43.10 PTSD (POST-TRAUMATIC STRESS DISORDER): Primary | ICD-10-CM

## 2021-12-16 DIAGNOSIS — F33.1 MAJOR DEPRESSIVE DISORDER, RECURRENT EPISODE, MODERATE (H): ICD-10-CM

## 2021-12-16 DIAGNOSIS — F41.1 GAD (GENERALIZED ANXIETY DISORDER): ICD-10-CM

## 2021-12-16 PROCEDURE — 90834 PSYTX W PT 45 MINUTES: CPT | Mod: GT,95 | Performed by: COUNSELOR

## 2021-12-17 NOTE — PROGRESS NOTES
Progress Note    Patient Name: Alina Martell  Date: 12/16/2021         Service Type: Individual      Session Start Time: 1204  Session End Time: 1255     Session Length: 51 minutes    Session #: 24    Attendees: Client attended alone    Service Modality:  Video Visit:      Provider verified identity through the following two step process.  Patient provided:  Patient is known previously to provider     Telemedicine Visit: The patient's condition can be safely assessed and treated via synchronous audio and visual telemedicine encounter.       Reason for Telemedicine Visit: Services only offered telehealth     Originating Site (Patient Location): Patient's other vehicle     Distant Site (Provider Location): Provider Remote Setting- Home Office     Consent:  The patient/guardian has verbally consented to: the potential risks and benefits of telemedicine (video visit) versus in person care; bill my insurance or make self-payment for services provided; and responsibility for payment of non-covered services.      Patient would like the video invitation sent by:  My Chart     Mode of Communication:  Video Conference via [employer]     As the provider I attest to compliance with applicable laws and regulations related to telemedicine.     Treatment Plan Last Reviewed: 10/19/2021  PHQ-9 / ADA-7 :   PHQ-9 score:    PHQ 12/13/2021   PHQ-9 Total Score 6   Q9: Thoughts of better off dead/self-harm past 2 weeks Not at all     ADA-7 SCORE 10/5/2021 11/15/2021 12/13/2021   Total Score 3 (minimal anxiety) 6 (mild anxiety) -   Total Score 3 6 4       DATA  Interactive Complexity: No  Crisis: No       Progress Since Last Session (Related to Symptoms / Goals / Homework):   Symptoms: No change panic attack's occuring    Homework: Partially completed      Episode of Care Goals: Satisfactory progress - ACTION (Actively working towards change); Intervened by reinforcing change plan /  "affirming steps taken     Current / Ongoing Stressors and Concerns:  Patient indicated feeling anxiety. Patient reported she has been having more panic attacks which is rare for her to be having so much. Patient indicated she is continuing to do too much. Patient reported she is also feeling a significant amount of stress at work. Patient indicated her family also has stress happening. Patient reported knowing stress in all area's of her life is affecting her. This therapist challenged patient on ways she needs to start setting boundaries. This therapist processed with patient the importance of self-care.      Treatment Objective(s) Addressed in This Session:   use \"worry time\" each day for 15 minutes of scheduled worry and then defer obsessive or anxious thinking until the next structured worry time  Increase interest, engagement, and pleasure in doing things  Decrease frequency and intensity of feeling down, depressed, hopeless  Identify negative self-talk and behaviors: challenge core beliefs, myths, and actions     Intervention:   Motivational Interviewing    MI Intervention: Reflections: simple and complex     Change Talk Expressed by the Patient: Committment to change    Provider Response to Change Talk: S - Summarized patient's change talk statements          ASSESSMENT: Current Emotional / Mental Status (status of significant symptoms):   Risk status (Self / Other harm or suicidal ideation)   Patient denies current fears or concerns for personal safety.   Patient denies current or recent suicidal ideation or behaviors.   Patient denies current or recent homicidal ideation or behaviors.   Patient denies current or recent self injurious behavior or ideation.   Patient denies other safety concerns.   Patient reports there has been no change in risk factors since their last session.     Patient reports there has been no change in protective factors since their last session.     Recommended that patient call 911 " or go to the local ED should there be a change in any of these risk factors.     Appearance:   Appropriate    Eye Contact:   Good    Psychomotor Behavior: Normal    Attitude:   Cooperative    Orientation:   All   Speech    Rate / Production: Normal/ Responsive    Volume:  Normal    Mood:    Anxious  Depressed    Affect:    Appropriate    Thought Content:  Clear    Thought Form:  Coherent    Insight:    Good      Medication Review:   No changes to current psychiatric medication(s)     Medication Compliance:   Yes     Changes in Health Issues:   None reported     Chemical Use Review:   Substance Use: Chemical use reviewed, no active concerns identified      Tobacco Use: No current tobacco use.      Diagnosis:  1. PTSD (post-traumatic stress disorder)    2. ADA (generalized anxiety disorder)    3. Major depressive disorder, recurrent episode, moderate (H)        Collateral Reports Completed:   Not Applicable    PLAN: (Patient Tasks / Therapist Tasks / Other)  Patient will continue with twice a month therapy. Patient will work on saying no and getting time to herself. Patient will work on self-care daily. Patient will use sleep hygiene taught in session.      Mariana Love MA, [facility]                                                         ______________________________________________________________________    Treatment Plan    Patient's Name: Alina Martell  YOB: 1965    Date: 10/19/2021    DSM5 Diagnoses: 296.32 (F33.1) Major Depressive Disorder, Recurrent Episode, Moderate _, 300.02 (F41.1) Generalized Anxiety Disorder or 309.81 (F43.10) Posttraumatic Stress Disorder (includes Posttraumatic Stress Disorder for Children 6 Years and Younger)  Without dissociative symptoms  Psychosocial / Contextual Factors: Family, financial and health  WHODAS:   WHODAS 2.0 Total Score 3/7/2021   Total Score 21   Total Score MyChart 21     Referral / Collaboration:  Referral to another professional/service is  not indicated at this time..    Anticipated number of session or this episode of care: Ongoing twice a juana therapy      MeasurableTreatment Goal(s) related to diagnosis / functional impairment(s)  Goal 1: Patient will work on reducing overall anxiety.     I will know I've met my goal when reporting minimal anxiety symptoms.      Objective #A (Patient Action)    Patient will use distraction each time intrusive worry surfaces.  Status: Continued - Date(s): 10/19/2021    Intervention(s)  Therapist will teach emotional regulation skills. ..    Objective #B  Patient will Decrease frequency and intensity of feeling down, depressed, hopeless.  Status: Continued - Date(s):  10/19/2021     Intervention(s)  Therapist will teach emotional recognition/identification. ..    Objective #C  Patient will Identify negative self-talk and behaviors: challenge core beliefs, myths, and actions.  Status: Continued - Date(s):  10/19/2021    Intervention(s)  Therapist will teach the client how to perform a behavioral chain analysis. ..    Goal 2: Patient will continue to work on reducing depression symptoms    I will know I've met my goal then reporting minimal depression symptoms     Objective #A (Patient Action)                          Patient will Increase interest, engagement, and pleasure in doing things.  Status: Completed - Date:  10/19/2021     Intervention(s)  Therapist will assign homework ..     Objective #B  Patient will Decrease frequency and intensity of feeling down, depressed, hopeless.  Status: Continued - Date(s):  10/19/2021     Intervention(s)  Therapist will assign homework at every session.       Patient has reviewed and agreed to the above plan.      Mariana Love [facility]   10/19/2021

## 2022-01-01 ENCOUNTER — HOSPITAL ENCOUNTER (EMERGENCY)
Facility: CLINIC | Age: 57
Discharge: HOME | End: 2022-01-01
Attending: EMERGENCY MEDICINE | Admitting: EMERGENCY MEDICINE
Payer: COMMERCIAL

## 2022-01-01 VITALS
HEART RATE: 74 BPM | SYSTOLIC BLOOD PRESSURE: 148 MMHG | DIASTOLIC BLOOD PRESSURE: 80 MMHG | WEIGHT: 250 LBS | TEMPERATURE: 98 F | RESPIRATION RATE: 12 BRPM | BODY MASS INDEX: 37.03 KG/M2 | OXYGEN SATURATION: 99 % | HEIGHT: 69 IN

## 2022-01-01 DIAGNOSIS — R00.2 PALPITATIONS: ICD-10-CM

## 2022-01-01 DIAGNOSIS — I48.0 PAROXYSMAL ATRIAL FIBRILLATION WITH RAPID VENTRICULAR RESPONSE (H): ICD-10-CM

## 2022-01-01 LAB
ANION GAP SERPL CALCULATED.3IONS-SCNC: 8 MMOL/L (ref 3–14)
BASOPHILS # BLD AUTO: 0.1 10E3/UL (ref 0–0.2)
BASOPHILS NFR BLD AUTO: 0 %
BUN SERPL-MCNC: 16 MG/DL (ref 7–30)
CALCIUM SERPL-MCNC: 9.2 MG/DL (ref 8.5–10.1)
CHLORIDE BLD-SCNC: 106 MMOL/L (ref 94–109)
CO2 SERPL-SCNC: 27 MMOL/L (ref 20–32)
CREAT SERPL-MCNC: 0.68 MG/DL (ref 0.52–1.04)
EOSINOPHIL # BLD AUTO: 0.2 10E3/UL (ref 0–0.7)
EOSINOPHIL NFR BLD AUTO: 2 %
ERYTHROCYTE [DISTWIDTH] IN BLOOD BY AUTOMATED COUNT: 13.8 % (ref 10–15)
GFR SERPL CREATININE-BSD FRML MDRD: >90 ML/MIN/1.73M2
GLUCOSE BLD-MCNC: 116 MG/DL (ref 70–99)
HCO3 BLDV-SCNC: 29 MMOL/L (ref 21–28)
HCT VFR BLD AUTO: 41.4 % (ref 35–47)
HGB BLD-MCNC: 13.1 G/DL (ref 11.7–15.7)
HOLD SPECIMEN: NORMAL
IMM GRANULOCYTES # BLD: 0.1 10E3/UL
IMM GRANULOCYTES NFR BLD: 0 %
LACTATE BLD-SCNC: 1.1 MMOL/L
LYMPHOCYTES # BLD AUTO: 4.3 10E3/UL (ref 0.8–5.3)
LYMPHOCYTES NFR BLD AUTO: 36 %
MCH RBC QN AUTO: 27.6 PG (ref 26.5–33)
MCHC RBC AUTO-ENTMCNC: 31.6 G/DL (ref 31.5–36.5)
MCV RBC AUTO: 87 FL (ref 78–100)
MONOCYTES # BLD AUTO: 1 10E3/UL (ref 0–1.3)
MONOCYTES NFR BLD AUTO: 9 %
NEUTROPHILS # BLD AUTO: 6.4 10E3/UL (ref 1.6–8.3)
NEUTROPHILS NFR BLD AUTO: 53 %
NRBC # BLD AUTO: 0 10E3/UL
NRBC BLD AUTO-RTO: 0 /100
PCO2 BLDV: 46 MM HG (ref 40–50)
PH BLDV: 7.4 [PH] (ref 7.32–7.43)
PLATELET # BLD AUTO: 346 10E3/UL (ref 150–450)
PO2 BLDV: 67 MM HG (ref 25–47)
POTASSIUM BLD-SCNC: 3.7 MMOL/L (ref 3.4–5.3)
RBC # BLD AUTO: 4.75 10E6/UL (ref 3.8–5.2)
SAO2 % BLDV: 93 % (ref 94–100)
SODIUM SERPL-SCNC: 141 MMOL/L (ref 133–144)
T4 FREE SERPL-MCNC: 0.94 NG/DL (ref 0.76–1.46)
TROPONIN I SERPL HS-MCNC: 8 NG/L
TSH SERPL DL<=0.005 MIU/L-ACNC: 6.71 MU/L (ref 0.4–4)
WBC # BLD AUTO: 12.1 10E3/UL (ref 4–11)

## 2022-01-01 PROCEDURE — 93308 TTE F-UP OR LMTD: CPT | Performed by: EMERGENCY MEDICINE

## 2022-01-01 PROCEDURE — 99156 MOD SED OTH PHYS/QHP 5/>YRS: CPT | Mod: 59 | Performed by: EMERGENCY MEDICINE

## 2022-01-01 PROCEDURE — 84443 ASSAY THYROID STIM HORMONE: CPT | Performed by: EMERGENCY MEDICINE

## 2022-01-01 PROCEDURE — 93005 ELECTROCARDIOGRAM TRACING: CPT | Mod: 59 | Performed by: EMERGENCY MEDICINE

## 2022-01-01 PROCEDURE — 84439 ASSAY OF FREE THYROXINE: CPT | Performed by: EMERGENCY MEDICINE

## 2022-01-01 PROCEDURE — 36415 COLL VENOUS BLD VENIPUNCTURE: CPT | Performed by: EMERGENCY MEDICINE

## 2022-01-01 PROCEDURE — 99291 CRITICAL CARE FIRST HOUR: CPT | Mod: 25 | Performed by: EMERGENCY MEDICINE

## 2022-01-01 PROCEDURE — 85025 COMPLETE CBC W/AUTO DIFF WBC: CPT | Performed by: EMERGENCY MEDICINE

## 2022-01-01 PROCEDURE — 99156 MOD SED OTH PHYS/QHP 5/>YRS: CPT | Performed by: EMERGENCY MEDICINE

## 2022-01-01 PROCEDURE — 250N000009 HC RX 250: Performed by: EMERGENCY MEDICINE

## 2022-01-01 PROCEDURE — 84484 ASSAY OF TROPONIN QUANT: CPT | Performed by: EMERGENCY MEDICINE

## 2022-01-01 PROCEDURE — 93308 TTE F-UP OR LMTD: CPT | Mod: 26 | Performed by: EMERGENCY MEDICINE

## 2022-01-01 PROCEDURE — 92960 CARDIOVERSION ELECTRIC EXT: CPT | Performed by: EMERGENCY MEDICINE

## 2022-01-01 PROCEDURE — 82803 BLOOD GASES ANY COMBINATION: CPT

## 2022-01-01 PROCEDURE — 99285 EMERGENCY DEPT VISIT HI MDM: CPT | Mod: 25 | Performed by: EMERGENCY MEDICINE

## 2022-01-01 PROCEDURE — 80048 BASIC METABOLIC PNL TOTAL CA: CPT | Performed by: EMERGENCY MEDICINE

## 2022-01-01 RX ORDER — FLUMAZENIL 0.1 MG/ML
0.2 INJECTION, SOLUTION INTRAVENOUS
Status: DISCONTINUED | OUTPATIENT
Start: 2022-01-01 | End: 2022-01-01 | Stop reason: HOSPADM

## 2022-01-01 RX ORDER — NALOXONE HYDROCHLORIDE 0.4 MG/ML
0.2 INJECTION, SOLUTION INTRAMUSCULAR; INTRAVENOUS; SUBCUTANEOUS
Status: DISCONTINUED | OUTPATIENT
Start: 2022-01-01 | End: 2022-01-01 | Stop reason: HOSPADM

## 2022-01-01 RX ORDER — NALOXONE HYDROCHLORIDE 0.4 MG/ML
0.4 INJECTION, SOLUTION INTRAMUSCULAR; INTRAVENOUS; SUBCUTANEOUS
Status: DISCONTINUED | OUTPATIENT
Start: 2022-01-01 | End: 2022-01-01 | Stop reason: HOSPADM

## 2022-01-01 RX ORDER — ETOMIDATE 2 MG/ML
10 INJECTION INTRAVENOUS ONCE
Status: COMPLETED | OUTPATIENT
Start: 2022-01-01 | End: 2022-01-01

## 2022-01-01 RX ADMIN — ETOMIDATE 10 MG: 40 INJECTION, SOLUTION INTRAVENOUS at 02:15

## 2022-01-01 ASSESSMENT — MIFFLIN-ST. JEOR: SCORE: 1798.37

## 2022-01-01 NOTE — ED NOTES
Patient was consciously sedated for cardioversion, she was given 10ng if etomidate at 0215 she was shocked with sync one time at 200J which converted her into NSR she woke up 0223 VSS, is taking a nap son at bedside

## 2022-01-01 NOTE — ED PROVIDER NOTES
History     Chief Complaint   Patient presents with     Tachycardia     HPI  Alina Martell is a 56 year old female with PMH notable for rheumatoid arthritis, paroxysmal A. fib, syndrome, PTSD, hx of cholecystectomy who presents to the ED via EMS for palpitations.  Patient reports that she felt normal when she went to bed around 10 PM, woke a little after midnight feeling palpitations and mildly short of breath.  Patient reports symptoms are very similar to a past episode of atrial fibrillation.  She denies any chest pain stating that she has a little bit of SOB.  No leg pain or swelling, no history of DVT/PE, no recent immobilization.  No recent cough or fever.  Reports that she takes diltiazem and aspirin daily. EMS notes irregular rhythm with rate in the 140s.     Past Medical History  Past Medical History:   Diagnosis Date     Cannabis use without complication 12/8/2014     Carpal tunnel syndrome      Obesity      Paroxysmal atrial fibrillation (H)      PTSD (post-traumatic stress disorder)      RA (rheumatoid arthritis) (H)      Rheumatoid arthritis (H) 7/8/2016     Sicca syndrome (H)      Past Surgical History:   Procedure Laterality Date     CHOLECYSTECTOMY       HC REMOVAL GALLBLADDER      Description: Cholecystectomy;  Recorded: 10/15/2013;     LAPAROSCOPIC TUBAL LIGATION       RELEASE CARPAL TUNNEL BILATERAL       TUBAL LIGATION  1995     adalimumab (HUMIRA *CF*) 40 MG/0.4ML pen kit  aspirin (ASA) 325 MG EC tablet  diltiazem (CARDIZEM) 120 MG tablet  EPINEPHrine (ANY BX GENERIC EQUIV) 0.3 MG/0.3ML injection 2-pack  hydrOXYzine (ATARAX) 25 MG tablet  loratadine (CLARITIN) 10 MG tablet  Multiple Vitamins-Minerals (CENTROVITE) TABS  prazosin (MINIPRESS) 1 MG capsule  sertraline (ZOLOFT) 25 MG tablet  traZODone (DESYREL) 50 MG tablet  vitamin C (ASCORBIC ACID) 1000 MG TABS  zinc sulfate (ZINCATE) 220 (50 Zn) MG capsule      Allergies   Allergen Reactions     Penicillins Shortness Of Breath,  "Palpitations, Dizziness, Anaphylaxis, Hives, Itching and Rash     Test in 2031, see allergy note on 7/29/21     Shellfish-Derived Products Anaphylaxis     Sulfa Drugs Hives and Anaphylaxis     Social History   Social History     Tobacco Use     Smoking status: Never Smoker     Smokeless tobacco: Never Used     Tobacco comment: smokes marijuana   Substance Use Topics     Alcohol use: Not Currently     Comment: Alcoholic Drinks/day: Never an issue, she states.  7/8/16     Drug use: Not Currently     Types: Marijuana     Comment: Drug use: Former marijuana use, not current. 7/8/16      Past medical history and social history were reviewed with the patient. Additional pertinent items: None     Review of Systems  A complete review of systems was performed with pertinent positives and negatives noted in the HPI, and all other systems negative.    Physical Exam   BP: (!) 127/101  Pulse: (!) 147  Temp: 98  F (36.7  C)  Resp: 16  Height: 175.3 cm (5' 9\")  Weight: 113.4 kg (250 lb)  SpO2: 93 %    Physical Exam  General: no acute distress. Appears stated age.   HENT: MMM, no oropharyngeal lesions  Eyes: PERRL, normal sclerae  Cardio: tachycardic rate. irregular rhythm. Extremities well perfused  Resp: Normal work of breathing, normal respiratory rate.  Abdomen: no tenderness, non-distended, no rebound, no guarding  Neuro: alert and fully oriented. CN II-XII grossly intact. Grossly normal strength and sensation in all extremities.   MSK: no deformities. Grossly normal ROM in extremities.   Integumentary/Skin: no rash visualized, normal color  Psych: normal affect, normal behavior    ED Course      [facility]    -Cardioversion External    Date/Time: 1/1/2022 2:25 AM  Performed by: Edward Batres MD  Authorized by: Edward Batres MD     Risks, benefits and alternatives discussed.      PRE-PROCEDURE DETAILS:     Cardioversion basis:  Elective    Rhythm:  Atrial " fibrillation    Electrode placement:  Anterior-posterior  Attempt one:     Cardioversion mode:  Synchronous    Waveform:  Biphasic    Shock (Joules):  200    Shock outcome:  Conversion to normal sinus rhythm  Post-procedure details:     Patient status:  Awake      PROCEDURE    Patient Tolerance:  Patient tolerated the procedure well with no immediate complications    Results for orders placed during the hospital encounter of 01/01/22    POC US ECHO LIMITED    Impression  Limited Bedside ED Cardiac Ultrasound  PROCEDURE: PERFORMED BY: Dr. Edward Batres MD  INDICATIONS/SYMPTOM:  Abnormal EKG  PROBE: Cardiac phased array probe  BODY LOCATION: Chest (cardiac)  FINDINGS: The ultrasound was performed utilizing the subcostal, parasternal long axis, parasternal short axis and apical 4 chamber views.  Rapid rate with irregularly irregular rhythm. Grossly mildly decreased LV contractility. No RV dilation visualized. No pericardial effusion visualized.  INTERPRETATION:   Atrial fibrillation with rapid ventricular response. Grossly mildly decreased LV contractility. No pericardial effusion. No RV dilation.  IMAGE DOCUMENTATION: Images were archived to PACs system.            EKG Interpretation:    Interpreted by Edward Batres MD  Time reviewed: 0125  Symptoms at time of EKG: palpitations   Rhythm: atrial fibrillation - rapid  Rate: 140-150  Axis: Normal  Ectopy: none  Conduction: normal  ST Segments/ T Waves: No ST-T wave changes and No acute ischemic changes  Q Waves: none  Comparison to prior: similar to 8/12/2020 at 1436, now in rapid Afib compared to 8/12/2020 at 1650    Clinical Impression: atrial fibrillation with rapid ventricular response         EKG Interpretation:    Interpreted by Edward Batres MD  Time reviewed:0224   Symptoms at time of EKG: none, post-cardioversion   Rhythm: Normal sinus   Rate: Normal  Axis: Normal  Ectopy: None  Conduction: Normal  ST Segments/ T Waves: No ST-T wave changes and No  acute ischemic changes  Q Waves: None  Comparison to prior: compared to 1/1/2022 resolved Afib now in NSR; similar morphology compared to 8/12/2020 at 1650     Clinical Impression: normal EKG        Critical Care Addendum  My initial assessment, based on my review of prehospital provider report, review of vital signs, focused history, physical exam and review of cardiac rhythm monitor, established that Alina Martell has a critical arrhythmia, which requires immediate intervention, and therefore she is critically ill.     After the initial assessment, the care team initiated multiple lab tests, initiated IV fluid administration and performed electrical cardioversion to provide stabilization care. Due to the critical nature of this patient, I reassessed nursing observations, vital signs, physical exam, review of cardiac rhythm monitor, 12 lead ECG analysis and interpretation of cardiac ultrasound and laboratory studies multiple times prior to her disposition.     Time also spent performing documentation, reviewing test results and coordination of care.     Critical care time (excluding teaching time and procedures): 44 minutes.        Labs Ordered and Resulted from Time of ED Arrival to Time of ED Departure   BASIC METABOLIC PANEL - Abnormal       Result Value    Sodium 141      Potassium 3.7      Chloride 106      Carbon Dioxide (CO2) 27      Anion Gap 8      Urea Nitrogen 16      Creatinine 0.68      Calcium 9.2      Glucose 116 (*)     GFR Estimate >90     TSH WITH FREE T4 REFLEX - Abnormal    TSH 6.71 (*)    CBC WITH PLATELETS AND DIFFERENTIAL - Abnormal    WBC Count 12.1 (*)     RBC Count 4.75      Hemoglobin 13.1      Hematocrit 41.4      MCV 87      MCH 27.6      MCHC 31.6      RDW 13.8      Platelet Count 346      % Neutrophils 53      % Lymphocytes 36      % Monocytes 9      % Eosinophils 2      % Basophils 0      % Immature Granulocytes 0      NRBCs per 100 WBC 0      Absolute Neutrophils 6.4       Absolute Lymphocytes 4.3      Absolute Monocytes 1.0      Absolute Eosinophils 0.2      Absolute Basophils 0.1      Absolute Immature Granulocytes 0.1      Absolute NRBCs 0.0     ISTAT GASES LACTATE VENOUS POCT - Abnormal    Lactic Acid POCT 1.1      Bicarbonate Venous POCT 29 (*)     O2 Sat, Venous POCT 93 (*)     pCO2V Venous POCT 46      pH Venous POCT 7.40      pO2 Venous POCT 67 (*)    TROPONIN I - Normal    Troponin I High Sensitivity 8     T4 FREE - Normal    Free T4 0.94       POC US ECHO LIMITED   Final Result   Limited Bedside ED Cardiac Ultrasound   PROCEDURE: PERFORMED BY: Dr. Edward Batres MD   INDICATIONS/SYMPTOM:  Abnormal EKG   PROBE: Cardiac phased array probe   BODY LOCATION: Chest (cardiac)   FINDINGS: The ultrasound was performed utilizing the subcostal, parasternal long axis, parasternal short axis and apical 4 chamber views.   Rapid rate with irregularly irregular rhythm. Grossly mildly decreased LV contractility. No RV dilation visualized. No pericardial effusion visualized.    INTERPRETATION:   Atrial fibrillation with rapid ventricular response. Grossly mildly decreased LV contractility. No pericardial effusion. No RV dilation.    IMAGE DOCUMENTATION: Images were archived to PACs system.         -Cardioversion External    (Results Pending)          Assessments & Plan (with Medical Decision Making)   Patient presenting with palpitations. Vitals in the ED heart rate in the 140s. Nursing notes reviewed. Initial differential diagnosis includes but not limited to A. fib with RVR, pericardial effusion, hypothyroidism, SVT.     EKG consistent with atrial fibrillation with rapid ventricular response.  Bedside cardiac ultrasound demonstrated mildly decreased LV contractility, no RV dilation, no significant effusion.  Labs with mild leukocytosis, normal high-sensitivity troponin, mildly elevated TSH but normal free T4, unremarkable electrolytes.    Patient reported prior success with a  cardioversion, has clear onset of symptoms within the last few hours, prefers cardioversion again.  Cardioversion performed as detailed above without complication, and with successful conversion to normal sinus rhythm.  Patient monitored in the ED for more than an hour after the procedure with no recurrence of atrial fibrillation.  Mental status had normalized fully.    The complete clinical picture is most consistent with paroxysmal atrial fibrillation. After counseling on the diagnosis, work-up, and treatment plan, the patient was discharged to home. The patient was advised to follow-up with cardiology in few days. The patient was advised to return to the ED if recurrent symptoms, chest pain, or if there are any urgent/life-threatening concerns.     Final diagnoses:   Paroxysmal atrial fibrillation with rapid ventricular response (H)   Palpitations     Discharge Medication List as of 1/1/2022  4:24 AM          --  Edward Batres MD   Emergency Medicine   [facility] EMERGENCY DEPARTMENT  1/1/2022     Edward Batres MD  01/01/22 0734

## 2022-01-01 NOTE — ED NOTES
[facility]    Procedure: Procedural sedation    Date/Time: 1/1/2022 2:25 AM  Performed by: Bess Arrieta MD  Authorized by: Bess Arrieta MD     Risks, benefits and alternatives discussed.    ED EVALUATION:      I have performed an Emergency Department Evaluation including taking a history and physical examination, this evaluation will be documented in the electronic medical record for this ED encounter.     Indication: Afib w/ RVR (clear onset of symptoms this evening). R/B/A discussed w/ patient by Dr. Batres for Cardioversion and sedation. Sedation discussion reiterated by myself w/ patient & son.    ASA Class: Class 1- healthy patient    Mallampati: Grade 1- soft palate, uvula, tonsillar pillars, and posterior pharyngeal wall visible    NPO Status: not NPO, emergent situation    UNIVERSAL PROTOCOL   Site Marked: NA  Prior Images Obtained and Reviewed:  NA  Required items: Required blood products, implants, devices and special equipment available    Patient identity confirmed:  Verbally with patient, arm band and hospital-assigned identification number  Patient was reevaluated immediately before administering moderate or deep sedation or anesthesia  Confirmation Checklist:  Patient's identity using two indicators, relevant allergies, procedure was appropriate and matched the consent or emergent situation and correct equipment/implants were available  Time out: Immediately prior to the procedure a time out was called    Universal Protocol: the Joint Commission Universal Protocol was followed    Preparation: Patient was prepped and draped in usual sterile fashion      SEDATION  Patient Sedated: Yes    Sedation Type:  Moderate (conscious) sedation  Sedation:  See MAR for details and etomidate  Vital signs: Vital signs monitored during sedation      PROCEDURE  Describe Procedure: Sedation was maintained until the procedure was complete. The patient was monitored  by staff until recovered.  Converted to sinus after cardioversion attempt x1 (see that procedure note by Dr. Batres for details). Patient awake, alert after procedure. Feeling well. No apparent new symptoms, no neuro symptoms. No apparent immediate complications. Pt tolerated well.   Patient Tolerance:  Patient tolerated the procedure well with no immediate complications  Length of time physician/provider present for 1:1 monitoring during sedation: 10         Bess Arrieta MD  01/01/22 0227       Bess Arrieta MD  01/01/22 [id number]

## 2022-01-01 NOTE — ED TRIAGE NOTES
BIBA SPF 23  Patient takes dilt on a daily bases 104-184 heart rate not first round of afib  Allergies to shellfish  156/101  VSS   No chest pain. Just a little short of breath

## 2022-01-01 NOTE — DISCHARGE INSTRUCTIONS
Instructions from your doctor today:  Emergency Department testing is focused on the potential causes of your symptoms that are the most dangerous possibilities, and cannot cover every possibility. Based on the evaluation, it was deemed sufficiently safe to discharge and continue management through the clinics. Thus, follow-up is very important to assess for improvement/worsening, potential further testing, and potential treatment adjustments. If you were given opioid pain medications or other medications that can make you drowsy while in the ED, you should not drive for at least several hours and not until you feel completely back to normal.     Please make an appointment to follow up with:  - Cardiology Clinic (phone: [phone]) in 3-7 days  - If you do not have a primary care provider, you can be seen in follow-up and establish care by calling any of the clinics below:     - Primary [facility] Center (phone: [phone])     - Primary Care / [facility] (phone: [phone])   - Have your clinic provider review the results from today's visit with you again, including any potential follow-up or additional testing that may be needed based on the results. Occasionally, incidental findings are found on later review by radiologists that may need follow-up.     Return to the Emergency Department immediately if you have recurrent symptoms, or any other urgent or potentially life-threatening concerns.

## 2022-01-02 ENCOUNTER — MYC MEDICAL ADVICE (OUTPATIENT)
Dept: CARDIOLOGY | Facility: CLINIC | Age: 57
End: 2022-01-02
Payer: COMMERCIAL

## 2022-01-02 LAB
ATRIAL RATE - MUSE: 147 BPM
ATRIAL RATE - MUSE: 87 BPM
DIASTOLIC BLOOD PRESSURE - MUSE: NORMAL MMHG
DIASTOLIC BLOOD PRESSURE - MUSE: NORMAL MMHG
INTERPRETATION ECG - MUSE: NORMAL
INTERPRETATION ECG - MUSE: NORMAL
P AXIS - MUSE: 51 DEGREES
P AXIS - MUSE: NORMAL DEGREES
PR INTERVAL - MUSE: 156 MS
PR INTERVAL - MUSE: NORMAL MS
QRS DURATION - MUSE: 78 MS
QRS DURATION - MUSE: 86 MS
QT - MUSE: 302 MS
QT - MUSE: 354 MS
QTC - MUSE: 425 MS
QTC - MUSE: 472 MS
R AXIS - MUSE: 2 DEGREES
R AXIS - MUSE: 2 DEGREES
SYSTOLIC BLOOD PRESSURE - MUSE: NORMAL MMHG
SYSTOLIC BLOOD PRESSURE - MUSE: NORMAL MMHG
T AXIS - MUSE: 14 DEGREES
T AXIS - MUSE: 3 DEGREES
VENTRICULAR RATE- MUSE: 147 BPM
VENTRICULAR RATE- MUSE: 87 BPM

## 2022-01-03 ENCOUNTER — MYC MEDICAL ADVICE (OUTPATIENT)
Dept: FAMILY MEDICINE | Facility: CLINIC | Age: 57
End: 2022-01-03
Payer: COMMERCIAL

## 2022-01-04 ENCOUNTER — OFFICE VISIT (OUTPATIENT)
Dept: BEHAVIORAL HEALTH | Facility: CLINIC | Age: 57
End: 2022-01-04
Payer: COMMERCIAL

## 2022-01-04 DIAGNOSIS — F41.1 GAD (GENERALIZED ANXIETY DISORDER): ICD-10-CM

## 2022-01-04 DIAGNOSIS — F43.10 PTSD (POST-TRAUMATIC STRESS DISORDER): Primary | ICD-10-CM

## 2022-01-04 DIAGNOSIS — F33.1 MAJOR DEPRESSIVE DISORDER, RECURRENT EPISODE, MODERATE (H): ICD-10-CM

## 2022-01-04 PROCEDURE — 90834 PSYTX W PT 45 MINUTES: CPT | Performed by: COUNSELOR

## 2022-01-04 NOTE — PROGRESS NOTES
Progress Note    Patient Name: Alina Martell  Date: 1/4/2022         Service Type: Individual      Session Start Time: 807  Session End Time: 850     Session Length: 43 minutes    Session #: 25    Attendees: Client attended alone    Service Modality:  Video Visit:      Provider verified identity through the following two step process.  Patient provided:  Patient is known previously to provider     Telemedicine Visit: The patient's condition can be safely assessed and treated via synchronous audio and visual telemedicine encounter.       Reason for Telemedicine Visit: Services only offered telehealth     Originating Site (Patient Location): Patient's other vehicle     Distant Site (Provider Location): Provider Remote Setting- Home Office     Consent:  The patient/guardian has verbally consented to: the potential risks and benefits of telemedicine (video visit) versus in person care; bill my insurance or make self-payment for services provided; and responsibility for payment of non-covered services.      Patient would like the video invitation sent by:  My Chart     Mode of Communication:  Video Conference via [employer]     As the provider I attest to compliance with applicable laws and regulations related to telemedicine.     Treatment Plan Last Reviewed: 10/19/2021  PHQ-9 / ADA-7 :   PHQ-9 score:    PHQ 12/13/2021   PHQ-9 Total Score 6   Q9: Thoughts of better off dead/self-harm past 2 weeks Not at all     ADA-7 SCORE 10/5/2021 11/15/2021 12/13/2021   Total Score 3 (minimal anxiety) 6 (mild anxiety) -   Total Score 3 6 4       DATA  Interactive Complexity: No  Crisis: No       Progress Since Last Session (Related to Symptoms / Goals / Homework):   Symptoms: No change anxiety continuing to occur daily    Homework: Partially completed      Episode of Care Goals: Satisfactory progress - ACTION (Actively working towards change); Intervened by reinforcing change plan /  affirming steps taken     Current / Ongoing Stressors and Concerns:  Patient reported continuing to have a significant amount of anxiety. Patient indicated she is unsure where all the anxiety is coming from at this time. Patient reported she is feeling that is never actually goes away. Patient indicated this is hard with her health due to being unsure exactly what is happening. Patient reported she is looking forward to her vacation as an opportunity to reset. This therapist processed with patient ways to work on reducing anxiety by taking time throughout the day. This therapist went over skills that can be helpful.     Treatment Objective(s) Addressed in This Session:   practice deep breathing at least 3 a day  Increase interest, engagement, and pleasure in doing things  Decrease frequency and intensity of feeling down, depressed, hopeless  Identify negative self-talk and behaviors: challenge core beliefs, myths, and actions     Intervention:   Motivational Interviewing    MI Intervention: Open-ended questions     Change Talk Expressed by the Patient: Ability to change Committment to change    Provider Response to Change Talk: R - Reflected patient's change talk          ASSESSMENT: Current Emotional / Mental Status (status of significant symptoms):   Risk status (Self / Other harm or suicidal ideation)   Patient denies current fears or concerns for personal safety.   Patient denies current or recent suicidal ideation or behaviors.   Patient denies current or recent homicidal ideation or behaviors.   Patient denies current or recent self injurious behavior or ideation.   Patient denies other safety concerns.   Patient reports there has been no change in risk factors since their last session.     Patient reports there has been no change in protective factors since their last session.     Recommended that patient call 911 or go to the local ED should there be a change in any of these risk  factors.     Appearance:   Appropriate    Eye Contact:   Good    Psychomotor Behavior: Normal    Attitude:   Cooperative    Orientation:   All   Speech    Rate / Production: Normal/ Responsive    Volume:  Normal    Mood:    Anxious    Affect:    Appropriate    Thought Content:  Clear    Thought Form:  Coherent    Insight:    Good      Medication Review:   No changes to current psychiatric medication(s)     Medication Compliance:   Yes     Changes in Health Issues:   None reported     Chemical Use Review:   Substance Use: Chemical use reviewed, no active concerns identified      Tobacco Use: No current tobacco use.      Diagnosis:  1. PTSD (post-traumatic stress disorder)    2. ADA (generalized anxiety disorder)    3. Major depressive disorder, recurrent episode, moderate (H)        Collateral Reports Completed:   Not Applicable    PLAN: (Patient Tasks / Therapist Tasks / Other)  Patient will continue with twice a month therapy. Patient will identifying times in the day to work on relaxation techniques. Patient will use grounding and progressive muscle relaxation to help reduce anxiety symptoms.     Mariana Love MA, [facility]                                                         ______________________________________________________________________    Treatment Plan    Patient's Name: Alina Martell  YOB: 1965    Date: 10/19/2021    DSM5 Diagnoses: 296.32 (F33.1) Major Depressive Disorder, Recurrent Episode, Moderate _, 300.02 (F41.1) Generalized Anxiety Disorder or 309.81 (F43.10) Posttraumatic Stress Disorder (includes Posttraumatic Stress Disorder for Children 6 Years and Younger)  Without dissociative symptoms  Psychosocial / Contextual Factors: Family, financial and health  WHODAS:   WHODAS 2.0 Total Score 3/7/2021   Total Score 21   Total Score MyChart 21     Referral / Collaboration:  Referral to another professional/service is not indicated at this time..    Anticipated number of  session or this episode of care: Ongoing twice a juana therapy      MeasurableTreatment Goal(s) related to diagnosis / functional impairment(s)  Goal 1: Patient will work on reducing overall anxiety.     I will know I've met my goal when reporting minimal anxiety symptoms.      Objective #A (Patient Action)    Patient will use distraction each time intrusive worry surfaces.  Status: Continued - Date(s): 10/19/2021    Intervention(s)  Therapist will teach emotional regulation skills. ..    Objective #B  Patient will Decrease frequency and intensity of feeling down, depressed, hopeless.  Status: Continued - Date(s):  10/19/2021     Intervention(s)  Therapist will teach emotional recognition/identification. ..    Objective #C  Patient will Identify negative self-talk and behaviors: challenge core beliefs, myths, and actions.  Status: Continued - Date(s):  10/19/2021    Intervention(s)  Therapist will teach the client how to perform a behavioral chain analysis. ..    Goal 2: Patient will continue to work on reducing depression symptoms    I will know I've met my goal then reporting minimal depression symptoms     Objective #A (Patient Action)                          Patient will Increase interest, engagement, and pleasure in doing things.  Status: Completed - Date:  10/19/2021     Intervention(s)  Therapist will assign homework ..     Objective #B  Patient will Decrease frequency and intensity of feeling down, depressed, hopeless.  Status: Continued - Date(s):  10/19/2021     Intervention(s)  Therapist will assign homework at every session.       Patient has reviewed and agreed to the above plan.      Mariana Love [facility]   10/19/2021

## 2022-01-05 ENCOUNTER — OFFICE VISIT (OUTPATIENT)
Dept: FAMILY MEDICINE | Facility: CLINIC | Age: 57
End: 2022-01-05
Payer: COMMERCIAL

## 2022-01-05 ENCOUNTER — TELEPHONE (OUTPATIENT)
Dept: FAMILY MEDICINE | Facility: CLINIC | Age: 57
End: 2022-01-05

## 2022-01-05 VITALS
RESPIRATION RATE: 16 BRPM | WEIGHT: 281.8 LBS | SYSTOLIC BLOOD PRESSURE: 124 MMHG | BODY MASS INDEX: 41.61 KG/M2 | DIASTOLIC BLOOD PRESSURE: 69 MMHG | HEART RATE: 65 BPM

## 2022-01-05 DIAGNOSIS — M05.79 SEROPOSITIVE RHEUMATOID ARTHRITIS OF MULTIPLE SITES (H): Primary | ICD-10-CM

## 2022-01-05 DIAGNOSIS — E66.01 MORBID OBESITY (H): ICD-10-CM

## 2022-01-05 DIAGNOSIS — F33.42 RECURRENT MAJOR DEPRESSIVE DISORDER, IN FULL REMISSION (H): ICD-10-CM

## 2022-01-05 DIAGNOSIS — M79.602 PAIN OF LEFT UPPER EXTREMITY: ICD-10-CM

## 2022-01-05 DIAGNOSIS — E03.9 HYPOTHYROIDISM, UNSPECIFIED TYPE: ICD-10-CM

## 2022-01-05 DIAGNOSIS — I48.0 PAROXYSMAL ATRIAL FIBRILLATION (H): ICD-10-CM

## 2022-01-05 DIAGNOSIS — M54.50 BILATERAL LOW BACK PAIN WITHOUT SCIATICA, UNSPECIFIED CHRONICITY: ICD-10-CM

## 2022-01-05 DIAGNOSIS — N95.0 POSTMENOPAUSAL VAGINAL BLEEDING: ICD-10-CM

## 2022-01-05 LAB
ALBUMIN UR-MCNC: 100 MG/DL
APPEARANCE UR: CLEAR
BACTERIA #/AREA URNS HPF: ABNORMAL /HPF
BILIRUB UR QL STRIP: NEGATIVE
COLOR UR AUTO: YELLOW
GLUCOSE UR STRIP-MCNC: NEGATIVE MG/DL
HGB UR QL STRIP: ABNORMAL
KETONES UR STRIP-MCNC: NEGATIVE MG/DL
LEUKOCYTE ESTERASE UR QL STRIP: NEGATIVE
NITRATE UR QL: NEGATIVE
PH UR STRIP: 6 [PH] (ref 5–8)
RBC #/AREA URNS AUTO: ABNORMAL /HPF
SP GR UR STRIP: 1.02 (ref 1–1.03)
SQUAMOUS #/AREA URNS AUTO: ABNORMAL /LPF
UROBILINOGEN UR STRIP-ACNC: 0.2 E.U./DL
WBC #/AREA URNS AUTO: ABNORMAL /HPF

## 2022-01-05 PROCEDURE — 99215 OFFICE O/P EST HI 40 MIN: CPT | Performed by: FAMILY MEDICINE

## 2022-01-05 PROCEDURE — 81001 URINALYSIS AUTO W/SCOPE: CPT | Performed by: FAMILY MEDICINE

## 2022-01-05 RX ORDER — LEVOTHYROXINE SODIUM 50 UG/1
50 TABLET ORAL DAILY
Qty: 90 TABLET | Refills: 0 | Status: SHIPPED | OUTPATIENT
Start: 2022-01-05 | End: 2022-02-28

## 2022-01-05 NOTE — TELEPHONE ENCOUNTER
----- Message from Estelle Bansal MD sent at 1/5/2022  1:28 PM CST -----  Dear Maritza,      Here are your recent results which are within the expected range.  There is blood still present and I think it would be good to follow up with OB.  No signs of infection. Please continue with your current plan of care and let us know if you have any questions or concerns.     Regards,  Estelle Bansal MD

## 2022-01-05 NOTE — PROGRESS NOTES
"    Assessment & Plan     Seropositive rheumatoid arthritis of multiple sites (H)  Stable and seeing rheumatology    Recurrent major depressive disorder, in full remission (H)  Stable.  Continue with current medication    Paroxysmal atrial fibrillation (H)  Stable for now.  Scheduled to see cardiology at the end of the month.  Continue with current medications    Morbid obesity (H)  Recommend diet and exercise.    Pain of left upper extremity  Will continue to monitor.  Recommend tylenol for pain and stretching exercise.  Offered PT at some point if needed    Postmenopausal vaginal bleeding  Resolved now but will do referral  - Ob/Gyn Referral    Bilateral low back pain without sciatica, unspecified chronicity  Will check kidneys to ensure no infection  - UA with Microscopic reflex to Culture - lab collect  - UA with Microscopic reflex to Culture - lab collect  - Urine Microscopic    Hypothyroidism, unspecified type  Subclinical and will start low dose and see if improvement.  Repeat labs in 6 weeks  - levothyroxine (SYNTHROID/LEVOTHROID) 50 MCG tablet  Dispense: 90 tablet; Refill: 0  - TSH with free T4 reflex      Review of external notes as documented elsewhere in note  50 minutes spent on the date of the encounter doing chart review, history and exam, documentation and further activities per the note  [id number]}     BMI:   Estimated body mass index is 41.61 kg/m  as calculated from the following:    Height as of 1/1/22: 1.753 m (5' 9\").    Weight as of this encounter: 127.8 kg (281 lb 12.8 oz).   Weight management plan: Discussed healthy diet and exercise guidelines        Return in about 2 months (around 3/5/2022).    Estelle Bansal MD  [facility] POLO Salas is a 56 year old who presents for the following health issues     HPI       Hospital Follow-up Visit:    Hospital/Nursing Home/IP Rehab Facility: [facility]  Date of Admission: " 1/1/2022  Date of Discharge: 1/1/2022  Reason(s) for Admission: Paroxysmal atrial fibrillation with rapid ventricular response (H)  Was your hospitalization related to COVID-19? No   Problems taking medications regularly:  None  Medication changes since discharge: None  Problems adhering to non-medication therapy:  None    Summary of hospitalization:  [facility] discharge summary reviewed  Diagnostic Tests/Treatments reviewed.  Follow up needed: none  Other Healthcare Providers Involved in Patient s Care:         None  Update since discharge: improved.       Post Discharge Medication Reconciliation: discharge medications reconciled, continue medications without change.  Plan of care communicated with patient              Pain History    When did you first notice your pain? - 1 to 6 weeks   Have you seen any provider previously for this issue? No  How has your pain affected your ability to work? 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 Abdominal/Flank Pain and Back Pain  Onset/Duration: 2 weeks   Description:   Character: Sharp, Dull ache and Cramping  Location: left flank Lumbar area of back   Radiation: Back and Shoulder (left shoulder)  Intensity: moderate  Progression of Symptoms:  worsening  Accompanying Signs & Symptoms:  Fever/Chills: no  Gas/Bloating: no  Nausea: YES  Vomitting: no  Diarrhea: no  Constipation: no  Dysuria or Hematuria: no  History:   Trauma: no  Previous similar pain: no  Previous tests done: none  Precipitating factors:   Does the pain change with:     Food: no    Bowel Movement: no    Urination: no   Other factors:  no  Therapies tried and outcome: Advil OTC   Patient's last menstrual period was 12/12/2021 (exact date).    Patient shocked back into rhythm and felt like palpitations worse than normal.  Having some panic attacks recently and with some dizzy  spells.    Having some vaginal bleeding for the 2 weeks prior to the arhythmia - no period for 2 years prior to this.  Some back pain associated.  Bleeding stopped abruptly.  No FH of uterine cancer.       Pain in the left arm since 1/1/22    Lower back pain and thought that was more related to the cramps but now since 1/1 havin it radiate up to the left arm. No change in bowel habits.              Review of Systems   Constitutional, HEENT, cardiovascular, pulmonary, gi and gu systems are negative, except as otherwise noted.      Objective    /69 (BP Location: Right arm, Patient Position: Sitting, Cuff Size: Adult Large)   Pulse 65   Resp 16   Wt 127.8 kg (281 lb 12.8 oz)   LMP 12/12/2021 (Exact Date)   Breastfeeding No   BMI 41.61 kg/m    Body mass index is 41.61 kg/m .  Physical Exam   GENERAL: healthy, alert and no distress  NECK: no adenopathy, no asymmetry, masses, or scars and thyroid normal to palpation  RESP: lungs clear to auscultation - no rales, rhonchi or wheezes  CV: regular rate and rhythm, normal S1 S2, no S3 or S4, no murmur, click or rub, no peripheral edema and peripheral pulses strong  ABDOMEN: soft, nontender, no hepatosplenomegaly, no masses and bowel sounds normal  MS: no gross musculoskeletal defects noted, no edema

## 2022-01-05 NOTE — TELEPHONE ENCOUNTER
Patient calling back in regards to VM below.  Message from provider relayed.  Patient expressed understanding and thanked for the information.  No further action needed at this time.

## 2022-01-09 ENCOUNTER — APPOINTMENT (OUTPATIENT)
Dept: CT IMAGING | Facility: HOSPITAL | Age: 57
End: 2022-01-09
Payer: COMMERCIAL

## 2022-01-09 ENCOUNTER — APPOINTMENT (OUTPATIENT)
Dept: MRI IMAGING | Facility: HOSPITAL | Age: 57
End: 2022-01-09
Payer: COMMERCIAL

## 2022-01-09 ENCOUNTER — HOSPITAL ENCOUNTER (EMERGENCY)
Facility: HOSPITAL | Age: 57
Discharge: HOME | End: 2022-01-09
Attending: EMERGENCY MEDICINE | Admitting: EMERGENCY MEDICINE
Payer: COMMERCIAL

## 2022-01-09 VITALS
HEART RATE: 71 BPM | SYSTOLIC BLOOD PRESSURE: 138 MMHG | DIASTOLIC BLOOD PRESSURE: 86 MMHG | RESPIRATION RATE: 24 BRPM | TEMPERATURE: 97 F | HEIGHT: 69 IN | BODY MASS INDEX: 39.99 KG/M2 | OXYGEN SATURATION: 96 % | WEIGHT: 270 LBS

## 2022-01-09 DIAGNOSIS — H81.10 BENIGN PAROXYSMAL POSITIONAL VERTIGO, UNSPECIFIED LATERALITY: ICD-10-CM

## 2022-01-09 DIAGNOSIS — R07.9 CHEST PAIN, UNSPECIFIED TYPE: ICD-10-CM

## 2022-01-09 PROBLEM — M77.8 TENDONITIS OF BOTH SHOULDERS: Status: ACTIVE | Noted: 2017-06-09

## 2022-01-09 PROBLEM — R76.8 CYCLIC CITRULLINATED PEPTIDE (CCP) ANTIBODY POSITIVE: Status: ACTIVE | Noted: 2017-10-03

## 2022-01-09 PROBLEM — F41.1 GAD (GENERALIZED ANXIETY DISORDER): Status: ACTIVE | Noted: 2021-03-31

## 2022-01-09 LAB
ALBUMIN SERPL-MCNC: 3.4 G/DL (ref 3.5–5)
ALP SERPL-CCNC: 100 U/L (ref 45–120)
ALT SERPL W P-5'-P-CCNC: 14 U/L (ref 0–45)
ANION GAP SERPL CALCULATED.3IONS-SCNC: 8 MMOL/L (ref 5–18)
AST SERPL W P-5'-P-CCNC: 17 U/L (ref 0–40)
ATRIAL RATE - MUSE: 67 BPM
BILIRUB DIRECT SERPL-MCNC: 0.1 MG/DL
BILIRUB SERPL-MCNC: 0.4 MG/DL (ref 0–1)
BUN SERPL-MCNC: 11 MG/DL (ref 8–22)
CALCIUM SERPL-MCNC: 9.7 MG/DL (ref 8.5–10.5)
CHLORIDE BLD-SCNC: 101 MMOL/L (ref 98–107)
CO2 SERPL-SCNC: 29 MMOL/L (ref 22–31)
CREAT SERPL-MCNC: 0.69 MG/DL (ref 0.6–1.1)
D DIMER PPP FEU-MCNC: 0.52 UG/ML FEU (ref 0–0.5)
DIASTOLIC BLOOD PRESSURE - MUSE: NORMAL MMHG
ERYTHROCYTE [DISTWIDTH] IN BLOOD BY AUTOMATED COUNT: 13.6 % (ref 10–15)
FLUAV RNA SPEC QL NAA+PROBE: NEGATIVE
FLUBV RNA RESP QL NAA+PROBE: NEGATIVE
GFR SERPL CREATININE-BSD FRML MDRD: >90 ML/MIN/1.73M2
GLUCOSE BLD-MCNC: 114 MG/DL (ref 70–125)
HCT VFR BLD AUTO: 41.4 % (ref 35–47)
HGB BLD-MCNC: 13.1 G/DL (ref 11.7–15.7)
INTERPRETATION ECG - MUSE: NORMAL
LIPASE SERPL-CCNC: <9 U/L (ref 0–52)
MAGNESIUM SERPL-MCNC: 1.8 MG/DL (ref 1.8–2.6)
MCH RBC QN AUTO: 27 PG (ref 26.5–33)
MCHC RBC AUTO-ENTMCNC: 31.6 G/DL (ref 31.5–36.5)
MCV RBC AUTO: 85 FL (ref 78–100)
P AXIS - MUSE: 50 DEGREES
PLATELET # BLD AUTO: 346 10E3/UL (ref 150–450)
POTASSIUM BLD-SCNC: 4.5 MMOL/L (ref 3.5–5)
PR INTERVAL - MUSE: 160 MS
PROT SERPL-MCNC: 7.8 G/DL (ref 6–8)
QRS DURATION - MUSE: 84 MS
QT - MUSE: 404 MS
QTC - MUSE: 426 MS
R AXIS - MUSE: 5 DEGREES
RBC # BLD AUTO: 4.86 10E6/UL (ref 3.8–5.2)
SARS-COV-2 RNA RESP QL NAA+PROBE: NEGATIVE
SODIUM SERPL-SCNC: 138 MMOL/L (ref 136–145)
SYSTOLIC BLOOD PRESSURE - MUSE: NORMAL MMHG
T AXIS - MUSE: 18 DEGREES
TROPONIN I SERPL-MCNC: <0.01 NG/ML (ref 0–0.29)
TROPONIN I SERPL-MCNC: <0.01 NG/ML (ref 0–0.29)
VENTRICULAR RATE- MUSE: 67 BPM
WBC # BLD AUTO: 8.4 10E3/UL (ref 4–11)

## 2022-01-09 PROCEDURE — 82248 BILIRUBIN DIRECT: CPT | Performed by: PHYSICIAN ASSISTANT

## 2022-01-09 PROCEDURE — 84484 ASSAY OF TROPONIN QUANT: CPT | Performed by: PHYSICIAN ASSISTANT

## 2022-01-09 PROCEDURE — 83735 ASSAY OF MAGNESIUM: CPT | Performed by: PHYSICIAN ASSISTANT

## 2022-01-09 PROCEDURE — 85027 COMPLETE CBC AUTOMATED: CPT | Performed by: PHYSICIAN ASSISTANT

## 2022-01-09 PROCEDURE — C9803 HOPD COVID-19 SPEC COLLECT: HCPCS

## 2022-01-09 PROCEDURE — 71275 CT ANGIOGRAPHY CHEST: CPT

## 2022-01-09 PROCEDURE — 70544 MR ANGIOGRAPHY HEAD W/O DYE: CPT

## 2022-01-09 PROCEDURE — 85379 FIBRIN DEGRADATION QUANT: CPT | Performed by: PHYSICIAN ASSISTANT

## 2022-01-09 PROCEDURE — 99285 EMERGENCY DEPT VISIT HI MDM: CPT | Mod: 25

## 2022-01-09 PROCEDURE — 83690 ASSAY OF LIPASE: CPT | Performed by: PHYSICIAN ASSISTANT

## 2022-01-09 PROCEDURE — 255N000002 HC RX 255 OP 636: Performed by: EMERGENCY MEDICINE

## 2022-01-09 PROCEDURE — 250N000013 HC RX MED GY IP 250 OP 250 PS 637: Performed by: PHYSICIAN ASSISTANT

## 2022-01-09 PROCEDURE — 36415 COLL VENOUS BLD VENIPUNCTURE: CPT | Performed by: PHYSICIAN ASSISTANT

## 2022-01-09 PROCEDURE — 93005 ELECTROCARDIOGRAM TRACING: CPT | Performed by: PHYSICIAN ASSISTANT

## 2022-01-09 PROCEDURE — A9585 GADOBUTROL INJECTION: HCPCS | Performed by: EMERGENCY MEDICINE

## 2022-01-09 PROCEDURE — 70549 MR ANGIOGRAPH NECK W/O&W/DYE: CPT

## 2022-01-09 PROCEDURE — 250N000009 HC RX 250: Performed by: PHYSICIAN ASSISTANT

## 2022-01-09 PROCEDURE — 87636 SARSCOV2 & INF A&B AMP PRB: CPT | Performed by: PHYSICIAN ASSISTANT

## 2022-01-09 PROCEDURE — 250N000011 HC RX IP 250 OP 636: Performed by: EMERGENCY MEDICINE

## 2022-01-09 PROCEDURE — 70553 MRI BRAIN STEM W/O & W/DYE: CPT

## 2022-01-09 RX ORDER — MECLIZINE HCL 12.5 MG 12.5 MG/1
12.5 TABLET ORAL 4 TIMES DAILY PRN
Qty: 30 TABLET | Refills: 0 | Status: SHIPPED | OUTPATIENT
Start: 2022-01-09 | End: 2022-03-16

## 2022-01-09 RX ORDER — LIDOCAINE 4 G/G
1 PATCH TOPICAL
Status: DISCONTINUED | OUTPATIENT
Start: 2022-01-09 | End: 2022-01-09 | Stop reason: HOSPADM

## 2022-01-09 RX ORDER — GADOBUTROL 604.72 MG/ML
12 INJECTION INTRAVENOUS ONCE
Status: COMPLETED | OUTPATIENT
Start: 2022-01-09 | End: 2022-01-09

## 2022-01-09 RX ORDER — LIDOCAINE 4 G/G
1 PATCH TOPICAL EVERY 24 HOURS
Qty: 5 PATCH | Refills: 0 | Status: SHIPPED | OUTPATIENT
Start: 2022-01-09 | End: 2022-06-23

## 2022-01-09 RX ORDER — ASPIRIN 325 MG
325 TABLET ORAL ONCE
Status: COMPLETED | OUTPATIENT
Start: 2022-01-09 | End: 2022-01-09

## 2022-01-09 RX ORDER — MECLIZINE HCL 12.5 MG 12.5 MG/1
25 TABLET ORAL ONCE
Status: COMPLETED | OUTPATIENT
Start: 2022-01-09 | End: 2022-01-09

## 2022-01-09 RX ORDER — IOPAMIDOL 755 MG/ML
100 INJECTION, SOLUTION INTRAVASCULAR ONCE
Status: COMPLETED | OUTPATIENT
Start: 2022-01-09 | End: 2022-01-09

## 2022-01-09 RX ADMIN — LIDOCAINE HYDROCHLORIDE 30 ML: 20 SOLUTION ORAL; TOPICAL at 09:48

## 2022-01-09 RX ADMIN — ASPIRIN 325 MG: 325 TABLET ORAL at 09:47

## 2022-01-09 RX ADMIN — MECLIZINE HCL 12.5 MG 25 MG: 12.5 TABLET ORAL at 09:47

## 2022-01-09 RX ADMIN — IOPAMIDOL 100 ML: 755 INJECTION, SOLUTION INTRAVENOUS at 13:46

## 2022-01-09 RX ADMIN — LIDOCAINE 1 PATCH: 246 PATCH TOPICAL at 14:22

## 2022-01-09 RX ADMIN — GADOBUTROL 12 ML: 604.72 INJECTION INTRAVENOUS at 13:13

## 2022-01-09 ASSESSMENT — ENCOUNTER SYMPTOMS
DYSURIA: 0
NERVOUS/ANXIOUS: 0
VOICE CHANGE: 0
FATIGUE: 0
ARTHRALGIAS: 0
ABDOMINAL PAIN: 1
FACIAL SWELLING: 0
FLANK PAIN: 0
FEVER: 0
HEADACHES: 0
AGITATION: 0
PALPITATIONS: 0
CHEST TIGHTNESS: 0
LIGHT-HEADEDNESS: 0
DIZZINESS: 1
WOUND: 0
WEAKNESS: 0
TREMORS: 0
COLOR CHANGE: 0
HEMATURIA: 0
WHEEZING: 0
MYALGIAS: 0
DIFFICULTY URINATING: 0
SPEECH DIFFICULTY: 0
ACTIVITY CHANGE: 0
SORE THROAT: 0
COUGH: 0
ADENOPATHY: 0
VOMITING: 0
CONFUSION: 0
NAUSEA: 0
SHORTNESS OF BREATH: 0

## 2022-01-09 ASSESSMENT — HEART SCORE
AGE: 45-64
ECG: NORMAL
AGE: 45-64
HISTORY: MODERATELY SUSPICIOUS

## 2022-01-09 ASSESSMENT — MIFFLIN-ST. JEOR: SCORE: 1889.09

## 2022-01-09 NOTE — ED PROVIDER NOTES
EMERGENCY DEPARTMENT ENCOUNTER      NAME: Alina Martell  AGE: 56 year old female  YOB: 1965  MRN: [id number]  EVALUATION DATE & TIME: No admission date for patient encounter.    PCP: Estelle Bansal    ED PROVIDER: Cathryn Tang PA-C      Chief Complaint   Patient presents with     Chest Pain       FINAL IMPRESSION:  No diagnosis found.      MEDICAL DECISION MAKING:    Pertinent Labs & Imaging studies reviewed. (See chart for details)  56 year old female with a h/o paroxysmal A. Fib not on anticoagulation, rheumatoid arthritis, presents to the Emergency Department for evaluation of room spinning dizziness, chest pain that radiates through to the back, left arm and left-sided jaw pain which has been intermittent for the last week.  On exam she is alert, nontoxic-appearing and in no acute distress.  Vital signs are WNL.  No focal neurological deficits. Lungs clear without crackle or wheeze, heart without murmur, regular rhythm.     Differential diagnosis includes ACS, CVA, vertebral dissection, posterior circulation occlusion, arrhythmia, COVID-19, electrolyte derangement, diaphragmatic irritation, pancreatitis, hepatitis, GERD, muscle spasm.  CBC WNL.  BMP WNL.  LFTs and lipase WNL.  Magnesium normal.  Troponin negative.  EKG showed normal sinus without ST elevation/depression or reciprocal changes to suggest ischemia. D dimer elevated.     Neurology paged given intermittent dizziness not clearly provoked and palliated by position in the setting of paroxysmal A. fib, they recommend MRI head and MRA head neck to eval for dissection. These show no vertebral dissection.  Additional CT PE study does not show any PE or pulmonary abnormalities.    Heart score of 3, low risk for adverse cardiac event.  Discussed findings with patient who feels comfortable discharge to home.  We did have a long discussion regarding her paroxysmal A. fib and intermittent arrhythmia, she was encouraged to return to the  emergency department for any sustained tachycardia.     She was given meclizine for her peripheral vertigo and a referral to ENT was placed.  She was also given information for the dizziness and balance center.  A lidocaine patch applied to the upper left back did provide some relief, will send home with similar. There is no evidence of acute or emergent process requiring intervention at this time. Pt is appropriate for outpatient management. Provisional nature of today's diagnosis was discussed and strict return precautions were given.  She was encouraged to follow-up with her cardiologist for discussion of ablation.  Pt expressed understanding and She was discharged to home in good condition.       CRITICAL CARE: None    ED COURSE  7:45 AM  Met and evaluated patient. Discussed ED plan.   8:15 AM Staffed the patient with Dr. Veda Shelby  8:40 AM Paged neurology   9:40 AM disucssed with Dr. Reddy of neurology who recommends MRA head and neck + MRI brain wihtout  3:30 PM Discussed MRA imaging with Dr. Reddy who is reassured, no further imaging required  4:00 PM discharged to home in good condition by RN.       MEDICATIONS GIVEN IN THE EMERGENCY:  Medications   Lidocaine (LIDOCARE) 4 % Patch 1 patch (has no administration in time range)   aspirin (ASA) tablet 325 mg (325 mg Oral Given 1/9/22 0947)   lidocaine (viscous) (XYLOCAINE) 2 % 15 mL, alum & mag hydroxide-simethicone (MAALOX) 15 mL GI Cocktail (30 mLs Oral Given 1/9/22 0948)   meclizine (ANTIVERT) tablet 25 mg (25 mg Oral Given 1/9/22 0947)       NEW PRESCRIPTIONS STARTED AT TODAY'S ER VISIT  New Prescriptions    No medications on file          =================================================================    HPI    Patient information was obtained from: Patient    Use of Intrepreter: N/A         Alina Martell is a 56 year old female who presents for 1 week of intermittent chest pain and dizziness.  She states that she had a cardioversion for A.  fib on 1/1/2022 (8 days ago), since that time she has had intermittent left-sided chest pain which occasionally radiates down the left arm and into the jaw.  This is also occasionally accompanied by upper left back pain which radiates up and over the shoulder.  She also has epigastric discomfort.  No recent medication changes.  She is not anticoagulated, she does take an aspirin daily.  No other symptoms of illness such as fever, chills, headaches, sore throat.  She has not yet tried anything for this discomfort.  States that her chest pain increased today prompting her evaluation in the emergency department.    She notes that her chest pain occurs spontaneously, lasts several minutes and then resolves spontaneously.  It often occurs while she is at rest.  Her dizziness is provoked by lying flat and turning her head quickly, however it does not completely resolve when holding still.  She does note that this is room spinning dizziness.  She does not feel unsteady.    Hx of cholecystetomy      REVIEW OF SYSTEMS   Review of Systems   Constitutional: Negative for activity change, fatigue and fever.   HENT: Negative for facial swelling, sore throat and voice change.    Eyes: Negative for visual disturbance.   Respiratory: Negative for cough, chest tightness, shortness of breath and wheezing.    Cardiovascular: Positive for chest pain. Negative for palpitations.   Gastrointestinal: Positive for abdominal pain (epigastric). Negative for nausea and vomiting.   Genitourinary: Negative for difficulty urinating, dysuria, flank pain and hematuria.   Musculoskeletal: Negative for arthralgias, gait problem and myalgias.   Skin: Negative for color change, pallor, rash and wound.   Neurological: Positive for dizziness. Negative for tremors, speech difficulty, weakness, light-headedness and headaches.   Hematological: Negative for adenopathy.   Psychiatric/Behavioral: Negative for agitation, behavioral problems and confusion. The  patient is not nervous/anxious.    All other systems reviewed and are negative.        PAST MEDICAL HISTORY:  Past Medical History:   Diagnosis Date     Cannabis use without complication 12/8/2014     Carpal tunnel syndrome      Obesity      Paroxysmal atrial fibrillation (H)      PTSD (post-traumatic stress disorder)      RA (rheumatoid arthritis) (H)      Rheumatoid arthritis (H) 7/8/2016     Sicca syndrome (H)        PAST SURGICAL HISTORY:  Past Surgical History:   Procedure Laterality Date     CHOLECYSTECTOMY       HC REMOVAL GALLBLADDER      Description: Cholecystectomy;  Recorded: 10/15/2013;     LAPAROSCOPIC TUBAL LIGATION       RELEASE CARPAL TUNNEL BILATERAL       TUBAL LIGATION  1995           CURRENT MEDICATIONS:    adalimumab (HUMIRA *CF*) 40 MG/0.4ML pen kit  aspirin (ASA) 325 MG EC tablet  diltiazem (CARDIZEM) 120 MG tablet  EPINEPHrine (ANY BX GENERIC EQUIV) 0.3 MG/0.3ML injection 2-pack  hydrOXYzine (ATARAX) 25 MG tablet  levothyroxine (SYNTHROID/LEVOTHROID) 50 MCG tablet  loratadine (CLARITIN) 10 MG tablet  Multiple Vitamins-Minerals (CENTROVITE) TABS  prazosin (MINIPRESS) 1 MG capsule  sertraline (ZOLOFT) 25 MG tablet  traZODone (DESYREL) 50 MG tablet  vitamin C (ASCORBIC ACID) 1000 MG TABS  zinc sulfate (ZINCATE) 220 (50 Zn) MG capsule        ALLERGIES:  Allergies   Allergen Reactions     Penicillins Shortness Of Breath, Palpitations, Dizziness, Anaphylaxis, Hives, Itching and Rash     Test in 2031, see allergy note on 7/29/21     Shellfish-Derived Products Anaphylaxis     Sulfa Drugs Hives and Anaphylaxis       FAMILY HISTORY:  Family History   Problem Relation Age of Onset     Crohn's Disease Mother         Total colectomy with ileostomy.     Psoriasis Brother      Snoring Brother      Snoring Father      Diabetes Father      Prostate Cancer Father      Chronic Kidney Disease Father         Chose against dialysis.     Substance Abuse Brother      Depression Brother      Attention Deficit  "Disorder Brother      Alcoholism Brother      No Known Problems Daughter      No Known Problems Son      Alcoholism Brother      Substance Abuse Brother      Lupus Cousin      Alcoholism Maternal Grandfather      Breast Cancer No family hx of        SOCIAL HISTORY:   Social History     Socioeconomic History     Marital status: Single     Spouse name: Not on file     Number of children: 2     Years of education: 4     Highest education level: Not on file   Occupational History     Not on file   Tobacco Use     Smoking status: Never Smoker     Smokeless tobacco: Never Used     Tobacco comment: smokes marijuana   Substance and Sexual Activity     Alcohol use: Not Currently     Comment: Alcoholic Drinks/day: Never an issue, she states.  7/8/16     Drug use: Not Currently     Types: Marijuana     Comment: Drug use: Former marijuana use, not current. 7/8/16     Sexual activity: Never     Partners: Male     Birth control/protection: Other     Comment: tubal ligation   Other Topics Concern     Parent/sibling w/ CABG, MI or angioplasty before 65F 55M? Not Asked   Social History Narrative     Not on file     Social Determinants of Health     Financial Resource Strain: Not on file   Food Insecurity: Not on file   Transportation Needs: Not on file   Physical Activity: Not on file   Stress: Not on file   Social Connections: Not on file   Intimate Partner Violence: Not on file   Housing Stability: Not on file         VITALS:  Patient Vitals for the past 24 hrs:   BP Temp Temp src Pulse Resp SpO2 Height Weight   01/09/22 0946 (!) 160/88 -- -- 69 -- -- -- --   01/09/22 0801 (!) 173/83 97  F (36.1  C) Temporal 71 20 96 % 1.753 m (5' 9\") 122.5 kg (270 lb)       PHYSICAL EXAM    Physical Exam  Vitals reviewed.   Constitutional:       General: She is not in acute distress.     Appearance: Normal appearance. She is obese. She is not ill-appearing, toxic-appearing or diaphoretic.   HENT:      Head: Normocephalic and atraumatic.      " Nose: No congestion.      Mouth/Throat:      Mouth: Mucous membranes are moist.      Pharynx: Oropharynx is clear.   Eyes:      General: No scleral icterus.        Right eye: No discharge.         Left eye: No discharge.   Cardiovascular:      Rate and Rhythm: Normal rate and regular rhythm.      Heart sounds: No murmur heard.      Pulmonary:      Effort: Pulmonary effort is normal. No respiratory distress.      Breath sounds: Normal breath sounds. No stridor. No decreased breath sounds, wheezing, rhonchi or rales.   Abdominal:      General: Abdomen is flat. There is no distension.      Palpations: Abdomen is soft.      Tenderness: There is abdominal tenderness (epigastric). There is no right CVA tenderness, left CVA tenderness or guarding.   Musculoskeletal:         General: No swelling or deformity. Normal range of motion.      Cervical back: Normal range of motion and neck supple.      Right lower leg: No edema.      Left lower leg: No edema.   Lymphadenopathy:      Cervical: No cervical adenopathy.   Skin:     General: Skin is warm.      Capillary Refill: Capillary refill takes less than 2 seconds.      Coloration: Skin is not jaundiced.      Findings: No bruising, erythema, lesion or rash.   Neurological:      General: No focal deficit present.      Mental Status: She is alert and oriented to person, place, and time.      Cranial Nerves: No cranial nerve deficit.      Comments: Alert & oriented to person, place and time. Normal tone. PERRL. Normal speech, no dysarthria. CN 2 full visual fields, CN 3/4/6 EOMI without nystagmus, CN 5 sensory intact, CN 7 motor intact, face symmetric, CN 8 hearingintact, CN 9/10/11 normal strength, CN 12 tongue midline.  Motor: Spontaneously moves all extremities.   strength appropriate and symmetric.  Sensory: Intact face x3, UE, LE. gait: relies on wall for support, not antalgic. Psychomotor slowing (-). Abnormal Movements (-).        Psychiatric:         Mood and Affect:  Mood normal.         Behavior: Behavior normal.        LAB:  All pertinent labs reviewed and interpreted.    Labs Ordered and Resulted from Time of ED Arrival to Time of ED Departure   HEPATIC FUNCTION PANEL - Abnormal       Result Value    Bilirubin Total 0.4      Bilirubin Direct 0.1      Protein Total 7.8      Albumin 3.4 (*)     Alkaline Phosphatase 100      AST 17      ALT 14     BASIC METABOLIC PANEL - Normal    Sodium 138      Potassium 4.5      Chloride 101      Carbon Dioxide (CO2) 29      Anion Gap 8      Urea Nitrogen 11      Creatinine 0.69      Calcium 9.7      Glucose 114      GFR Estimate >90     CBC WITH PLATELETS - Normal    WBC Count 8.4      RBC Count 4.86      Hemoglobin 13.1      Hematocrit 41.4      MCV 85      MCH 27.0      MCHC 31.6      RDW 13.6      Platelet Count 346     MAGNESIUM - Normal    Magnesium 1.8     TROPONIN I - Normal    Troponin I <0.01     INFLUENZA A/B & SARS-COV2 PCR MULTIPLEX - Normal    Influenza A PCR Negative      Influenza B PCR Negative      SARS CoV2 PCR Negative     LIPASE - Normal    Lipase <9     D DIMER QUANTITATIVE         RADIOLOGY:  Reviewed all pertinent imaging. Please see official radiology report    MR Brain w/o & w Contrast    (Results Pending)   MRA Brain ([place]) wo Contrast    (Results Pending)   MRA Neck (Carotids) wo & w Contrast    (Results Pending)         EKG:    Performed at: 7:54am  Impression: Sinus rhythm  Vent rate: 67  QT/Qtc: 404/46  There is a new T wave inversion in lead III  Dr. Veda Shelby and I have independently reviewed and interpreted the EKG(s) documented above.      Cathryn Tang PA-C  Emergency Medicine  [facility] EMERGENCY DEPARTMENT  [facility] [facility] [address] [phone]  Dept: [phone]    This note has in part been created with speech recognition technology and may create an occasional, unintended word/grammar substitution. Errors  are generally corrected in real time. Please message me via [employer] In Basket if you note any errors requiring clarification.       Cathryn Tang PA-C  01/09/22 2815

## 2022-01-09 NOTE — ED PROVIDER NOTES
Emergency Department Midlevel Supervisory Note     I personally saw the patient and performed a substantive portion of the visit including all aspects of the medical decision making.    ED Course:  8:30 AM Cathryn Tang PA-C staffed patient with me. I agree with their assessment and plan of management, and I will see the patient.  8:34 AM I rechecked the patient  I met with the patient to introduce myself, gather additional history, perform my initial exam, and discuss the plan.     3:10 PM discussed results  Feels comfortable with discharge paln    Brief HPI:     Alina Martell is a 56 year old female who presents for 1 week of intermittent chest pain and dizziness.  She states that she had a cardioversion for A. fib on 1/1/2022 (8 days ago), since that time she has had intermittent left-sided chest pain which occasionally radiates down the left arm and into the jaw.  This is also occasionally accompanied by upper left back pain which radiates up and over the shoulder.  She also has epigastric discomfort.  No recent medication changes.  She is not anticoagulated, she does take an aspirin daily.  No other symptoms of illness such as fever, chills, headaches, sore throat.  She has not yet tried anything for this discomfort.  States that her chest pain increased today prompting her evaluation in the emergency department.     She notes that her chest pain occurs spontaneously, lasts several minutes and then resolves spontaneously.  It often occurs while she is at rest.  Her dizziness is provoked by lying flat and turning her head quickly, however it does not completely resolve when holding still.  She does note that this is room spinning dizziness.  She does not feel unsteady.     PMHx: cholecystetomy     IRobert, am serving as a scribe to document services personally performed by Veda Shelby MD, based on my observations and the provider's statements to me.   I, Veda Shelby MD, attest that Robert  Kathy was acting in a scribe capacity, has observed my performance of the services and has documented them in accordance with my direction.    Brief Physical Exam:    Nontoxic-appearing female cooperative and pleasant with exam cardiopulmonary she is in sinus rhythm is regular sensation.  Gait she is steady there is no ataxia.  Neuro exam without any focal deficits.    EKG #1  Normal sinus rhythm normal anterior progression normal axis inverted T wave in lead III    Time:[id number]    Ventricular rate 67 bmp  Axis normal  CA interval 160 ms  QRS duration 84  ms  QT/BOI516/426  ms    Compared to previous EKG on January 1, 2022 normal sinus rhythm inverted T wave in lead III was seen before.  No significant changes.      MDM:  56-year-old female with history atrial fibrillation status post ablation presents complaining of multiple complaints.  She has been having chest pain, some upper abdominal pain, some dizziness with walking.    Symptoms have been present since she was cardioverted.    She is well-appearing nontoxic no focal neurological exam.    EKG reveals no ST segment elevation or depression she is in sinus rhythm.  Initial troponin after 1 week of intermittent pain but mostly constant she said she had episodes where it increases in pain is negative which is reassuring    Given the dizziness and her previous history of A. fib considered CVA spoke with neurologist Dr. Reddy who recommends MRI head neck [place] and he will also like patient evaluated for PE D-dimer mildly elevated will order CT chest.  No PE  MRI no acute cVA  Troponin x 2 negative  Stable for discharge with close follow up         1. Benign paroxysmal positional vertigo, unspecified laterality    2. Chest pain, unspecified type        Labs and Imaging:  Results for orders placed or performed during the hospital encounter of 01/09/22   MR Brain w/o & w Contrast    Impression    IMPRESSION:  HEAD MRI:   1.  No evidence for acute  intracranial process.  2.  Abnormal right ICA flow void at the skull base as detailed above. A small intramural hematoma could have this appearance; however, this is an unusual location for isolated dissection and there is no evidence for corresponding luminal narrowing or   other findings to suggest focal dissection in this location or elsewhere on MRA exams. This may represent signal within venous structures adjacent to the right internal carotid artery. CTA could be performed for additional evaluation if indicated.    HEAD MRA:   1.  No aneurysm, high flow AVM or significant stenosis identified.    NECK MRA:  1.  No hemodynamically significant stenosis in the neck vessels by NASCET criteria.  2.  No evidence for dissection.   MRA Brain ([place] [facility] [place]) wo Contrast    Impression    IMPRESSION:  HEAD MRI:   1.  No evidence for acute intracranial process.  2.  Abnormal right ICA flow void at the skull base as detailed above. A small intramural hematoma could have this appearance; however, this is an unusual location for isolated dissection and there is no evidence for corresponding luminal narrowing or   other findings to suggest focal dissection in this location or elsewhere on MRA exams. This may represent signal within venous structures adjacent to the right internal carotid artery. CTA could be performed for additional evaluation if indicated.    HEAD MRA:   1.  No aneurysm, high flow AVM or significant stenosis identified.    NECK MRA:  1.  No hemodynamically significant stenosis in the neck vessels by NASCET criteria.  2.  No evidence for dissection.   MRA Neck (Carotids) wo & w Contrast    Impression    IMPRESSION:  HEAD MRI:   1.  No evidence for acute intracranial process.  2.  Abnormal right ICA flow void at the skull base as detailed above. A small intramural hematoma could have this appearance; however, this is an unusual location for isolated dissection and there is no evidence for corresponding  luminal narrowing or   other findings to suggest focal dissection in this location or elsewhere on MRA exams. This may represent signal within venous structures adjacent to the right internal carotid artery. CTA could be performed for additional evaluation if indicated.    HEAD MRA:   1.  No aneurysm, high flow AVM or significant stenosis identified.    NECK MRA:  1.  No hemodynamically significant stenosis in the neck vessels by NASCET criteria.  2.  No evidence for dissection.   CT Chest Pulmonary Embolism w Contrast    Impression    IMPRESSION:  1.  No pulmonary embolus.  2.  Clear lungs.  3.  Hepatic steatosis.     Basic metabolic panel   Result Value Ref Range    Sodium 138 136 - 145 mmol/L    Potassium 4.5 3.5 - 5.0 mmol/L    Chloride 101 98 - 107 mmol/L    Carbon Dioxide (CO2) 29 22 - 31 mmol/L    Anion Gap 8 5 - 18 mmol/L    Urea Nitrogen 11 8 - 22 mg/dL    Creatinine 0.69 0.60 - 1.10 mg/dL    Calcium 9.7 8.5 - 10.5 mg/dL    Glucose 114 70 - 125 mg/dL    GFR Estimate >90 >60 mL/min/1.73m2   CBC with platelets   Result Value Ref Range    WBC Count 8.4 4.0 - 11.0 10e3/uL    RBC Count 4.86 3.80 - 5.20 10e6/uL    Hemoglobin 13.1 11.7 - 15.7 g/dL    Hematocrit 41.4 35.0 - 47.0 %    MCV 85 78 - 100 fL    MCH 27.0 26.5 - 33.0 pg    MCHC 31.6 31.5 - 36.5 g/dL    RDW 13.6 10.0 - 15.0 %    Platelet Count 346 150 - 450 10e3/uL   Result Value Ref Range    Magnesium 1.8 1.8 - 2.6 mg/dL   Troponin I (now)   Result Value Ref Range    Troponin I <0.01 0.00 - 0.29 ng/mL   Symptomatic; Unknown Influenza A/B & SARS-CoV2 (COVID-19) Virus PCR Multiplex Nasopharyngeal    Specimen: Nasopharyngeal; Swab   Result Value Ref Range    Influenza A PCR Negative Negative    Influenza B PCR Negative Negative    SARS CoV2 PCR Negative Negative   Hepatic function panel   Result Value Ref Range    Bilirubin Total 0.4 0.0 - 1.0 mg/dL    Bilirubin Direct 0.1 <=0.5 mg/dL    Protein Total 7.8 6.0 - 8.0 g/dL    Albumin 3.4 (L) 3.5 - 5.0 g/dL     Alkaline Phosphatase 100 45 - 120 U/L    AST 17 0 - 40 U/L    ALT 14 0 - 45 U/L   Result Value Ref Range    Lipase <9 0 - 52 U/L   D dimer quantitative   Result Value Ref Range    D-Dimer Quantitative 0.52 (H) 0.00 - 0.50 ug/mL FEU   Troponin I (now)   Result Value Ref Range    Troponin I <0.01 0.00 - 0.29 ng/mL   ECG 12-Lead with MUSE - [facility]   Result Value Ref Range    Systolic Blood Pressure  mmHg    Diastolic Blood Pressure  mmHg    Ventricular Rate 67 BPM    Atrial Rate 67 BPM    CA Interval 160 ms    QRS Duration 84 ms     ms    QTc 426 ms    P Axis 50 degrees    R AXIS 5 degrees    T Axis 18 degrees    Interpretation ECG       Sinus rhythm  Normal ECG  When compared with ECG of 01-JAN-2022 02:23,  No significant change was found  Confirmed by SEE ED PROVIDER NOTE FOR, ECG INTERPRETATION ([id number]),  JOSE ELIAS RICARDO ([id number]) on 1/9/2022 12:31:15 PM       I have reviewed the relevant laboratory and radiology studies    Procedures:  I was present for the key portions of this procedure: none         Veda Shelby MD  01/09/22 5918

## 2022-01-09 NOTE — ED TRIAGE NOTES
Pt here for mid chest pain, back pain, left arm pain.  Hx of arrythmia and recent cardioversion on 1/1.  Since shthen having these sx but CP more constant since last night

## 2022-01-09 NOTE — DISCHARGE INSTRUCTIONS
You were seen in the emergency department for dizziness and intermittent chest pain.  Thankfully, all of your studies were normal.  Your imaging of your head and neck did not show any dissection of your blood vessels, and no signs of stroke.  Your heart studies were completely normal including your EKG and heart enzyme called troponin.  Your other lab studies were also normal.  Your chest imaging did not show any blood clots in your lungs.    I am prescribing antidizzy medication called meclizine, I have also placed a referral to the ear nose and throat doctors, they will call you in 24 to 48 hours to schedule follow-up appointment for them.  Alternatively, I have given you information for the dizzy and balance center.  You can talk to your insurance about which location would be better for you to seek treatment for this dizziness.  There are often maneuvers that they can do to help get those little rocks in place to relieve the dizziness.     I also send you with a prescription for some lidocaine patches to use for the upper left shoulder discomfort.  If you develop any increased chest pain, shortness of breath, or if your heart rate    Stays above 100 while at rest for longer than 10 minutes you should return to the emergency department as you may need support for your A. fib arrhythmia.  I do recommend that you talk to your cardiologist about the ablation procedure.

## 2022-01-09 NOTE — ED NOTES
"AIDET performed.  Care assumed.  Patient reports chest pain that radiates into her shoulder and arm that started on Monday but reports was worse yesterday night.  She also reports recent cardioversion for A. Fib.  Patient reports tolerating pain at this time, but states \"just more concerned about the cause.\"  Patient also reporting dizziness.  Patient's skin is pink, warm, and dry.  Cap refill less than 2 seconds.  GCS 15.  Lungs CTA bilaterally.  RA sats WNL and no obvious signs of distress.  Patient updated on plan of care, awaiting imaging.  Changed into MRI gown.  Call light within reach, will   "

## 2022-01-11 ENCOUNTER — VIRTUAL VISIT (OUTPATIENT)
Dept: BEHAVIORAL HEALTH | Facility: CLINIC | Age: 57
End: 2022-01-11
Payer: COMMERCIAL

## 2022-01-11 DIAGNOSIS — M05.79 SEROPOSITIVE RHEUMATOID ARTHRITIS OF MULTIPLE SITES (H): ICD-10-CM

## 2022-01-11 DIAGNOSIS — F43.10 PTSD (POST-TRAUMATIC STRESS DISORDER): Primary | ICD-10-CM

## 2022-01-11 DIAGNOSIS — F33.1 MAJOR DEPRESSIVE DISORDER, RECURRENT EPISODE, MODERATE (H): ICD-10-CM

## 2022-01-11 DIAGNOSIS — F41.1 GAD (GENERALIZED ANXIETY DISORDER): ICD-10-CM

## 2022-01-11 PROCEDURE — 90834 PSYTX W PT 45 MINUTES: CPT | Mod: GT | Performed by: COUNSELOR

## 2022-01-11 NOTE — TELEPHONE ENCOUNTER
Refill request received from [facility] specialty pharmacy for humira.     Last OV 8/11/2021  Last lab 7/23/2021  Future appt ; lab-1/28/2022, f/u 2/1/2022    Rx sent for review.

## 2022-01-11 NOTE — PROGRESS NOTES
Progress Note    Patient Name: Alina Martell  Date: 1/11/2022         Service Type: Individual      Session Start Time: 1105  Session End Time: 1145     Session Length: 40 minutes    Session #: 26    Attendees: Client attended alone    Service Modality:  Video Visit:      Provider verified identity through the following two step process.  Patient provided:  Patient is known previously to provider     Telemedicine Visit: The patient's condition can be safely assessed and treated via synchronous audio and visual telemedicine encounter.       Reason for Telemedicine Visit: Services only offered telehealth     Originating Site (Patient Location): Patient's other vehicle     Distant Site (Provider Location): Provider Remote Setting- Home Office     Consent:  The patient/guardian has verbally consented to: the potential risks and benefits of telemedicine (video visit) versus in person care; bill my insurance or make self-payment for services provided; and responsibility for payment of non-covered services.      Patient would like the video invitation sent by:  My Chart     Mode of Communication:  Video Conference via [employer]     As the provider I attest to compliance with applicable laws and regulations related to telemedicine.     Treatment Plan Last Reviewed: 10/19/2021  PHQ-9 / ADA-7 :   PHQ-9 score:    PHQ 12/13/2021   PHQ-9 Total Score 6   Q9: Thoughts of better off dead/self-harm past 2 weeks Not at all     ADA-7 SCORE 10/5/2021 11/15/2021 12/13/2021   Total Score 3 (minimal anxiety) 6 (mild anxiety) -   Total Score 3 6 4       DATA  Interactive Complexity: No  Crisis: No       Progress Since Last Session (Related to Symptoms / Goals / Homework):   Symptoms: Improving less anxiety since last session    Homework: Partially completed      Episode of Care Goals: Satisfactory progress - ACTION (Actively working towards change); Intervened by reinforcing change plan /  "affirming steps taken     Current / Ongoing Stressors and Concerns:  Patient indicated feeling some anxiety. Patient reported she has a couple health issues that are causing her some anxiety. Patient indicated she is working with the doctor to determine next steps. Patient reported she leaves on Monday for her vacation which causes her a little anxiety. Patient indicated she also has a lot to get done before her vacation. This therapist went over breathing exercises to help with her anxiety.      Treatment Objective(s) Addressed in This Session:   use \"worry time\" each day for 15 minutes of scheduled worry and then defer obsessive or anxious thinking until the next structured worry time  Increase interest, engagement, and pleasure in doing things  Decrease frequency and intensity of feeling down, depressed, hopeless  Identify negative self-talk and behaviors: challenge core beliefs, myths, and actions     Intervention:   Motivational Interviewing    MI Intervention: Open-ended questions and Reflections: simple and complex     Change Talk Expressed by the Patient: Ability to change Committment to change    Provider Response to Change Talk: S - Summarized patient's change talk statements          ASSESSMENT: Current Emotional / Mental Status (status of significant symptoms):   Risk status (Self / Other harm or suicidal ideation)   Patient denies current fears or concerns for personal safety.   Patient denies current or recent suicidal ideation or behaviors.   Patient denies current or recent homicidal ideation or behaviors.   Patient denies current or recent self injurious behavior or ideation.   Patient denies other safety concerns.   Patient reports there has been no change in risk factors since their last session.     Patient reports there has been no change in protective factors since their last session.     Recommended that patient call 911 or go to the local ED should there be a change in any of these risk " factors.     Appearance:   Appropriate    Eye Contact:   Good    Psychomotor Behavior: Normal    Attitude:   Cooperative    Orientation:   All   Speech    Rate / Production: Normal/ Responsive    Volume:  Normal    Mood:    Anxious    Affect:    Appropriate    Thought Content:  Clear    Thought Form:  Coherent    Insight:    Good      Medication Review:   No changes to current psychiatric medication(s)     Medication Compliance:   Yes     Changes in Health Issues:   None reported     Chemical Use Review:   Substance Use: Chemical use reviewed, no active concerns identified      Tobacco Use: No current tobacco use.      Diagnosis:  1. PTSD (post-traumatic stress disorder)    2. ADA (generalized anxiety disorder)    3. Major depressive disorder, recurrent episode, moderate (H)        Collateral Reports Completed:   Not Applicable    PLAN: (Patient Tasks / Therapist Tasks / Other)  Patient will continue with twice a month therapy. Patient will continue to work on breathing exercises daily. Patient will continue to work on self-care.     Mariana Love MA, [facility]                                                         ______________________________________________________________________    Treatment Plan    Patient's Name: Alina Martell  YOB: 1965    Date: 10/19/2021    DSM5 Diagnoses: 296.32 (F33.1) Major Depressive Disorder, Recurrent Episode, Moderate _, 300.02 (F41.1) Generalized Anxiety Disorder or 309.81 (F43.10) Posttraumatic Stress Disorder (includes Posttraumatic Stress Disorder for Children 6 Years and Younger)  Without dissociative symptoms  Psychosocial / Contextual Factors: Family, financial and health  WHODAS:   WHODAS 2.0 Total Score 3/7/2021   Total Score 21   Total Score MyChart 21     Referral / Collaboration:  Referral to another professional/service is not indicated at this time..    Anticipated number of session or this episode of care: Ongoing twice a juana  therapy      MeasurableTreatment Goal(s) related to diagnosis / functional impairment(s)  Goal 1: Patient will work on reducing overall anxiety.     I will know I've met my goal when reporting minimal anxiety symptoms.      Objective #A (Patient Action)    Patient will use distraction each time intrusive worry surfaces.  Status: Continued - Date(s): 10/19/2021    Intervention(s)  Therapist will teach emotional regulation skills. ..    Objective #B  Patient will Decrease frequency and intensity of feeling down, depressed, hopeless.  Status: Continued - Date(s):  10/19/2021     Intervention(s)  Therapist will teach emotional recognition/identification. ..    Objective #C  Patient will Identify negative self-talk and behaviors: challenge core beliefs, myths, and actions.  Status: Continued - Date(s):  10/19/2021    Intervention(s)  Therapist will teach the client how to perform a behavioral chain analysis. ..    Goal 2: Patient will continue to work on reducing depression symptoms    I will know I've met my goal then reporting minimal depression symptoms     Objective #A (Patient Action)                          Patient will Increase interest, engagement, and pleasure in doing things.  Status: Completed - Date:  10/19/2021     Intervention(s)  Therapist will assign homework ..     Objective #B  Patient will Decrease frequency and intensity of feeling down, depressed, hopeless.  Status: Continued - Date(s):  10/19/2021     Intervention(s)  Therapist will assign homework at every session.       Patient has reviewed and agreed to the above plan.      Mariana Love, [facility]   10/19/2021

## 2022-01-13 ENCOUNTER — TELEPHONE (OUTPATIENT)
Dept: RHEUMATOLOGY | Facility: CLINIC | Age: 57
End: 2022-01-13
Payer: COMMERCIAL

## 2022-01-13 NOTE — TELEPHONE ENCOUNTER
PA Initiation    Medication: Humira (ins change)-pa initiated  Insurance Company: [employer] - Phone [phone] Fax [phone]  Pharmacy Filling the Rx[facility]  Filling Pharmacy Phone:    Filling Pharmacy Fax:    Start Date: 1/13/2022     (Key: [id number])

## 2022-01-16 LAB
ANION GAP SERPL CALCULATED.3IONS-SCNC: 10 MMOL/L (ref 5–18)
BASOPHILS # BLD AUTO: 0 10E3/UL (ref 0–0.2)
BASOPHILS NFR BLD AUTO: 0 %
BUN SERPL-MCNC: 11 MG/DL (ref 8–22)
CALCIUM SERPL-MCNC: 9.4 MG/DL (ref 8.5–10.5)
CHLORIDE BLD-SCNC: 101 MMOL/L (ref 98–107)
CO2 SERPL-SCNC: 28 MMOL/L (ref 22–31)
CREAT SERPL-MCNC: 0.85 MG/DL (ref 0.6–1.1)
EOSINOPHIL # BLD AUTO: 0.2 10E3/UL (ref 0–0.7)
EOSINOPHIL NFR BLD AUTO: 2 %
ERYTHROCYTE [DISTWIDTH] IN BLOOD BY AUTOMATED COUNT: 13.6 % (ref 10–15)
GFR SERPL CREATININE-BSD FRML MDRD: 81 ML/MIN/1.73M2
GLUCOSE BLD-MCNC: 168 MG/DL (ref 70–125)
HCT VFR BLD AUTO: 41.2 % (ref 35–47)
HGB BLD-MCNC: 13 G/DL (ref 11.7–15.7)
IMM GRANULOCYTES # BLD: 0 10E3/UL
IMM GRANULOCYTES NFR BLD: 0 %
LYMPHOCYTES # BLD AUTO: 3.3 10E3/UL (ref 0.8–5.3)
LYMPHOCYTES NFR BLD AUTO: 30 %
MAGNESIUM SERPL-MCNC: 1.9 MG/DL (ref 1.8–2.6)
MCH RBC QN AUTO: 27.1 PG (ref 26.5–33)
MCHC RBC AUTO-ENTMCNC: 31.6 G/DL (ref 31.5–36.5)
MCV RBC AUTO: 86 FL (ref 78–100)
MONOCYTES # BLD AUTO: 0.7 10E3/UL (ref 0–1.3)
MONOCYTES NFR BLD AUTO: 6 %
NEUTROPHILS # BLD AUTO: 6.8 10E3/UL (ref 1.6–8.3)
NEUTROPHILS NFR BLD AUTO: 62 %
NRBC # BLD AUTO: 0 10E3/UL
NRBC BLD AUTO-RTO: 0 /100
PLATELET # BLD AUTO: 349 10E3/UL (ref 150–450)
POTASSIUM BLD-SCNC: 3.7 MMOL/L (ref 3.5–5)
RBC # BLD AUTO: 4.8 10E6/UL (ref 3.8–5.2)
SODIUM SERPL-SCNC: 139 MMOL/L (ref 136–145)
TROPONIN I SERPL-MCNC: <0.01 NG/ML (ref 0–0.29)
WBC # BLD AUTO: 11 10E3/UL (ref 4–11)

## 2022-01-16 PROCEDURE — 85025 COMPLETE CBC W/AUTO DIFF WBC: CPT | Performed by: EMERGENCY MEDICINE

## 2022-01-16 PROCEDURE — 84484 ASSAY OF TROPONIN QUANT: CPT | Performed by: EMERGENCY MEDICINE

## 2022-01-16 PROCEDURE — 83735 ASSAY OF MAGNESIUM: CPT | Performed by: EMERGENCY MEDICINE

## 2022-01-16 PROCEDURE — 93005 ELECTROCARDIOGRAM TRACING: CPT | Performed by: EMERGENCY MEDICINE

## 2022-01-16 PROCEDURE — 99284 EMERGENCY DEPT VISIT MOD MDM: CPT

## 2022-01-16 PROCEDURE — 36415 COLL VENOUS BLD VENIPUNCTURE: CPT | Performed by: EMERGENCY MEDICINE

## 2022-01-16 PROCEDURE — 80048 BASIC METABOLIC PNL TOTAL CA: CPT | Performed by: EMERGENCY MEDICINE

## 2022-01-16 RX ORDER — METOPROLOL TARTRATE 1 MG/ML
5 INJECTION, SOLUTION INTRAVENOUS ONCE
Status: DISCONTINUED | OUTPATIENT
Start: 2022-01-16 | End: 2022-01-17 | Stop reason: HOSPADM

## 2022-01-16 RX ORDER — ASPIRIN 81 MG/1
324 TABLET, CHEWABLE ORAL ONCE
Status: DISCONTINUED | OUTPATIENT
Start: 2022-01-16 | End: 2022-01-17 | Stop reason: HOSPADM

## 2022-01-16 ASSESSMENT — ENCOUNTER SYMPTOMS
ABDOMINAL PAIN: 0
BACK PAIN: 1
NAUSEA: 0
SHORTNESS OF BREATH: 1
DIZZINESS: 1
VOMITING: 0
APPETITE CHANGE: 1

## 2022-01-16 ASSESSMENT — MIFFLIN-ST. JEOR: SCORE: 1843.73

## 2022-01-17 ENCOUNTER — OFFICE VISIT (OUTPATIENT)
Dept: FAMILY MEDICINE | Facility: CLINIC | Age: 57
End: 2022-01-17
Payer: COMMERCIAL

## 2022-01-17 ENCOUNTER — HOSPITAL ENCOUNTER (EMERGENCY)
Facility: HOSPITAL | Age: 57
Discharge: HOME | End: 2022-01-17
Attending: EMERGENCY MEDICINE | Admitting: EMERGENCY MEDICINE
Payer: COMMERCIAL

## 2022-01-17 ENCOUNTER — APPOINTMENT (OUTPATIENT)
Dept: RADIOLOGY | Facility: HOSPITAL | Age: 57
End: 2022-01-17
Attending: EMERGENCY MEDICINE
Payer: COMMERCIAL

## 2022-01-17 VITALS
RESPIRATION RATE: 16 BRPM | WEIGHT: 260 LBS | DIASTOLIC BLOOD PRESSURE: 87 MMHG | SYSTOLIC BLOOD PRESSURE: 172 MMHG | HEIGHT: 69 IN | BODY MASS INDEX: 38.51 KG/M2 | OXYGEN SATURATION: 98 % | HEART RATE: 85 BPM | TEMPERATURE: 98 F

## 2022-01-17 VITALS
DIASTOLIC BLOOD PRESSURE: 80 MMHG | RESPIRATION RATE: 16 BRPM | OXYGEN SATURATION: 96 % | BODY MASS INDEX: 38.4 KG/M2 | WEIGHT: 260 LBS | SYSTOLIC BLOOD PRESSURE: 137 MMHG | HEART RATE: 80 BPM

## 2022-01-17 VITALS
HEART RATE: 142 BPM | BODY MASS INDEX: 38.4 KG/M2 | WEIGHT: 260 LBS | RESPIRATION RATE: 20 BRPM | DIASTOLIC BLOOD PRESSURE: 78 MMHG | TEMPERATURE: 98.7 F | SYSTOLIC BLOOD PRESSURE: 122 MMHG | OXYGEN SATURATION: 98 %

## 2022-01-17 DIAGNOSIS — R00.2 PALPITATIONS: ICD-10-CM

## 2022-01-17 DIAGNOSIS — I10 HYPERTENSION, UNSPECIFIED TYPE: ICD-10-CM

## 2022-01-17 DIAGNOSIS — R06.02 SHORTNESS OF BREATH: ICD-10-CM

## 2022-01-17 DIAGNOSIS — R07.89 OTHER CHEST PAIN: ICD-10-CM

## 2022-01-17 DIAGNOSIS — I48.0 PAROXYSMAL ATRIAL FIBRILLATION (H): ICD-10-CM

## 2022-01-17 DIAGNOSIS — I48.0 PAROXYSMAL ATRIAL FIBRILLATION (H): Primary | ICD-10-CM

## 2022-01-17 DIAGNOSIS — R42 DIZZINESS: ICD-10-CM

## 2022-01-17 LAB
ALBUMIN SERPL-MCNC: 3.5 G/DL (ref 3.5–5)
ALP SERPL-CCNC: 106 U/L (ref 45–120)
ALT SERPL W P-5'-P-CCNC: 15 U/L (ref 0–45)
ANION GAP SERPL CALCULATED.3IONS-SCNC: 11 MMOL/L (ref 5–18)
AST SERPL W P-5'-P-CCNC: 15 U/L (ref 0–40)
BASOPHILS # BLD AUTO: 0.1 10E3/UL (ref 0–0.2)
BASOPHILS NFR BLD AUTO: 0 %
BILIRUB SERPL-MCNC: 0.4 MG/DL (ref 0–1)
BUN SERPL-MCNC: 12 MG/DL (ref 8–22)
CALCIUM SERPL-MCNC: 9.7 MG/DL (ref 8.5–10.5)
CHLORIDE BLD-SCNC: 105 MMOL/L (ref 98–107)
CO2 SERPL-SCNC: 24 MMOL/L (ref 22–31)
CREAT SERPL-MCNC: 0.8 MG/DL (ref 0.6–1.1)
EOSINOPHIL # BLD AUTO: 0.1 10E3/UL (ref 0–0.7)
EOSINOPHIL NFR BLD AUTO: 1 %
ERYTHROCYTE [DISTWIDTH] IN BLOOD BY AUTOMATED COUNT: 13.6 % (ref 10–15)
GFR SERPL CREATININE-BSD FRML MDRD: 87 ML/MIN/1.73M2
GLUCOSE BLD-MCNC: 103 MG/DL (ref 70–125)
HCT VFR BLD AUTO: 42 % (ref 35–47)
HGB BLD-MCNC: 13.6 G/DL (ref 11.7–15.7)
HOLD SPECIMEN: NORMAL
IMM GRANULOCYTES # BLD: 0 10E3/UL
IMM GRANULOCYTES NFR BLD: 0 %
LYMPHOCYTES # BLD AUTO: 4.1 10E3/UL (ref 0.8–5.3)
LYMPHOCYTES NFR BLD AUTO: 34 %
MAGNESIUM SERPL-MCNC: 1.8 MG/DL (ref 1.8–2.6)
MCH RBC QN AUTO: 27.3 PG (ref 26.5–33)
MCHC RBC AUTO-ENTMCNC: 32.4 G/DL (ref 31.5–36.5)
MCV RBC AUTO: 84 FL (ref 78–100)
MONOCYTES # BLD AUTO: 0.9 10E3/UL (ref 0–1.3)
MONOCYTES NFR BLD AUTO: 8 %
NEUTROPHILS # BLD AUTO: 6.8 10E3/UL (ref 1.6–8.3)
NEUTROPHILS NFR BLD AUTO: 57 %
NRBC # BLD AUTO: 0 10E3/UL
NRBC BLD AUTO-RTO: 0 /100
PLAT MORPH BLD: ABNORMAL
PLATELET # BLD AUTO: 392 10E3/UL (ref 150–450)
POTASSIUM BLD-SCNC: 3.5 MMOL/L (ref 3.5–5)
PROT SERPL-MCNC: 8 G/DL (ref 6–8)
RBC # BLD AUTO: 4.98 10E6/UL (ref 3.8–5.2)
RBC MORPH BLD: ABNORMAL
SODIUM SERPL-SCNC: 140 MMOL/L (ref 136–145)
TROPONIN I SERPL-MCNC: <0.01 NG/ML (ref 0–0.29)
TSH SERPL DL<=0.005 MIU/L-ACNC: 2.38 UIU/ML (ref 0.3–5)
VARIANT LYMPHS BLD QL SMEAR: PRESENT
WBC # BLD AUTO: 12 10E3/UL (ref 4–11)

## 2022-01-17 PROCEDURE — 80053 COMPREHEN METABOLIC PANEL: CPT | Performed by: EMERGENCY MEDICINE

## 2022-01-17 PROCEDURE — 85004 AUTOMATED DIFF WBC COUNT: CPT | Performed by: EMERGENCY MEDICINE

## 2022-01-17 PROCEDURE — 99285 EMERGENCY DEPT VISIT HI MDM: CPT | Mod: 25

## 2022-01-17 PROCEDURE — 99204 OFFICE O/P NEW MOD 45 MIN: CPT | Performed by: STUDENT IN AN ORGANIZED HEALTH CARE EDUCATION/TRAINING PROGRAM

## 2022-01-17 PROCEDURE — 96361 HYDRATE IV INFUSION ADD-ON: CPT

## 2022-01-17 PROCEDURE — 250N000009 HC RX 250: Performed by: EMERGENCY MEDICINE

## 2022-01-17 PROCEDURE — 36415 COLL VENOUS BLD VENIPUNCTURE: CPT | Performed by: EMERGENCY MEDICINE

## 2022-01-17 PROCEDURE — 93005 ELECTROCARDIOGRAM TRACING: CPT | Performed by: EMERGENCY MEDICINE

## 2022-01-17 PROCEDURE — 83735 ASSAY OF MAGNESIUM: CPT | Performed by: EMERGENCY MEDICINE

## 2022-01-17 PROCEDURE — 71046 X-RAY EXAM CHEST 2 VIEWS: CPT

## 2022-01-17 PROCEDURE — 96374 THER/PROPH/DIAG INJ IV PUSH: CPT

## 2022-01-17 PROCEDURE — 258N000003 HC RX IP 258 OP 636: Performed by: EMERGENCY MEDICINE

## 2022-01-17 PROCEDURE — 82040 ASSAY OF SERUM ALBUMIN: CPT | Performed by: EMERGENCY MEDICINE

## 2022-01-17 PROCEDURE — 84484 ASSAY OF TROPONIN QUANT: CPT | Performed by: EMERGENCY MEDICINE

## 2022-01-17 PROCEDURE — 84443 ASSAY THYROID STIM HORMONE: CPT | Performed by: EMERGENCY MEDICINE

## 2022-01-17 RX ORDER — DILTIAZEM HYDROCHLORIDE 5 MG/ML
20 INJECTION INTRAVENOUS ONCE
Status: COMPLETED | OUTPATIENT
Start: 2022-01-17 | End: 2022-01-17

## 2022-01-17 RX ADMIN — DILTIAZEM HYDROCHLORIDE 20 MG: 5 INJECTION INTRAVENOUS at 14:22

## 2022-01-17 RX ADMIN — SODIUM CHLORIDE 1000 ML: 9 INJECTION, SOLUTION INTRAVENOUS at 14:21

## 2022-01-17 ASSESSMENT — ENCOUNTER SYMPTOMS
JOINT SWELLING: 0
NAUSEA: 0
CHILLS: 0
PALPITATIONS: 1
VOMITING: 0
COUGH: 0
ABDOMINAL PAIN: 1
DIARRHEA: 0
DYSURIA: 0
APPETITE CHANGE: 1
FEVER: 0
LIGHT-HEADEDNESS: 1
CONFUSION: 0
HEMATURIA: 0
SORE THROAT: 0
DIZZINESS: 0
SHORTNESS OF BREATH: 1

## 2022-01-17 NOTE — ED TRIAGE NOTES
Pt states hx of afib and recently had to be cardioverted. Pt states last few days increased pain and dizzy. Unable to take it anymore. Pt is vaccinated and boosted for covid

## 2022-01-17 NOTE — TELEPHONE ENCOUNTER
Prior Authorization Approval    Authorization Effective Date: 1/14/2022  Authorization Expiration Date: 1/14/2023  Medication: Humira (ins change)-pa approved  Approved Dose/Quantity: 2/28ds  Reference #:  (Key: [id number])   Insurance Company: [employer] - Phone [phone] Fax [phone]  Expected CoPay:     $5  CoPay Card Available: Yes    Foundation Assistance Needed:    Which Pharmacy is filling the prescription (Not needed for infusion/clinic administered): [facility] - [facility]  Pharmacy Notified: Yes  Patient Notified: Yes

## 2022-01-17 NOTE — DISCHARGE INSTRUCTIONS
All of your electrolytes and other blood work are reassuring.  Your ECG does not show that you are in atrial fibrillation and your heart rate is regular tonight.      Please follow-up with your cardiologist tomorrow as scheduled.

## 2022-01-17 NOTE — ED TRIAGE NOTES
Patient brought in by [facility] from urgent care. Patient was seen last night for atrial fibrillation and sent home. She started feeling worse and went to urgent care and was sent to the ER from Afib with RVR. Patient is symptomatic and symptoms include SOB, dizziness, bilateral face and chest tingling that started today. Patient also eveloped left sided chest pain that started today. EKG done.

## 2022-01-17 NOTE — ED NOTES
Patient appears to have converted to a NSR. Updated Dr Michel and repeat EKG ordered.    hard copy, drawn during this pregnancy

## 2022-01-17 NOTE — ED PROVIDER NOTES
EMERGENCY DEPARTMENT ENCOUNTER      NAME: Alina Martell  AGE: 56 year old female  YOB: 1965  MRN: [id number]  EVALUATION DATE & TIME: No admission date for patient encounter.    PCP: Estelle Bansal    ED PROVIDER: Judy Villalta M.D.      Chief Complaint   Patient presents with     Irregular Heart Beat         FINAL IMPRESSION:  1. Palpitations        MEDICAL DECISION MAKING:    10:53 PM I met with the patient, obtained history, performed an initial exam, and discussed options and plan for diagnostics and treatment here in the ED.  1:01 AM We discussed the plan for discharge and the patient is agreeable. Reviewed supportive cares, symptomatic treatment, outpatient follow up, and reasons to return to the Emergency Department. Patient to be discharged by ED RN.      Pertinent Labs & Imaging studies reviewed. (See chart for details)     Alina Martell is a 56 year old female who presents with feeling of palpitation and shortness of breath.  No significant chest pain but is nervous because she has had 3 ablations for atrial fibrillation and thought her palpitations were irregular.  Her vital signs are notable for hypertension but she is not tachycardic.  My exam is normal and her heart is regular with good perfusion and her distal pulses.  I suspect anxiety related to prior issues with atrial fibrillation.  She is not on anticoagulation and symptoms have been present for 72 hours.  For this reason I would not feel comfortable cardioverting her if her ECG indeed shows A. fib.  I will get basic labs including a troponin, ECG and give her dose of metoprolol for her blood pressure.    She had a visit earlier this month in 1-9.  At that time her troponin was undetectable.  Today again this is undetectable.  This is very reassuring for no signs of ischemic disease.  Her other electrolytes are all within normal limits.  Her ECG shows normal sinus rhythm without abnormal intervals and no evidence of  atrial fibrillation at this time.  She declined the metoprolol and aspirin.  Repeat blood pressure shows 172/87 which is reassuring since she had no intervention.  She is following up in cardiology clinic tomorrow.    We discussed warning signs and indications to return to the emergency department.  She understands these warning signs and will return with any concerns.     MEDICATIONS GIVEN IN THE EMERGENCY:  Medications   metoprolol (LOPRESSOR) injection 5 mg (has no administration in time range)   aspirin (ASA) chewable tablet 324 mg (has no administration in time range)       =================================================================    HPI    Patient information was obtained from: Patient    Use of : Use of : N/A       Alina HEENA Martell is a 56 year old female with a history of paroxysmal A-fib, anemia, and ADA who presents with irregular heat beat.    The patient reports she has been in and out of A-fib since Friday (). She endorses associated shortness of breath with exertion and dizziness. She was previously in A-fib on  and underwent electric cardioversion. She has a follow up appointment with cardiology tomorrow, but presented to the ED tonight because of the shortness of breath. She has not had any chest pain, but does endorse pain in her upper back. She does not feel like she is in A-fib currently. She has had  appetite for the past week. Denies abdominal pain, nausea, vomiting, and any other complaints at this time.    She takes 2 chewable aspirin daily. She is not anticoagulated.    REVIEW OF SYSTEMS   Review of Systems   Constitutional: Positive for appetite change (decrease).   Respiratory: Positive for shortness of breath.    Cardiovascular: Negative for chest pain.   Gastrointestinal: Negative for abdominal pain, nausea and vomiting.   Musculoskeletal: Positive for back pain (upper).   Neurological: Positive for dizziness.   All other systems reviewed  and are negative.       PAST MEDICAL HISTORY:  Past Medical History:   Diagnosis Date     Cannabis use without complication 12/8/2014     Carpal tunnel syndrome      Obesity      Paroxysmal atrial fibrillation (H)      PTSD (post-traumatic stress disorder)      RA (rheumatoid arthritis) (H)      Rheumatoid arthritis (H) 7/8/2016     Sicca syndrome (H)        PAST SURGICAL HISTORY:  Past Surgical History:   Procedure Laterality Date     CHOLECYSTECTOMY       HC REMOVAL GALLBLADDER      Description: Cholecystectomy;  Recorded: 10/15/2013;     LAPAROSCOPIC TUBAL LIGATION       RELEASE CARPAL TUNNEL BILATERAL       TUBAL LIGATION  1995       CURRENT MEDICATIONS:      Current Facility-Administered Medications:      aspirin (ASA) chewable tablet 324 mg, 324 mg, Oral, Once, Judy Villalta MD     metoprolol (LOPRESSOR) injection 5 mg, 5 mg, Intravenous, Once, Judy Villalta MD    Current Outpatient Medications:      adalimumab (HUMIRA *CF*) 40 MG/0.4ML pen kit, Inject 0.4 mLs (40 mg) Subcutaneous every 14 days Hold for signs of infection, then seek medical attention., Disp: 1 each, Rfl: 1     aspirin (ASA) 325 MG EC tablet, , Disp: , Rfl:      diltiazem (CARDIZEM) 120 MG tablet, Take 120 mg by mouth daily, Disp: , Rfl:      EPINEPHrine (ANY BX GENERIC EQUIV) 0.3 MG/0.3ML injection 2-pack, Inject 0.3 mg into the muscle as needed for anaphylaxis , Disp: , Rfl:      hydrOXYzine (ATARAX) 25 MG tablet, Take 1 tablet (25 mg) by mouth 3 times daily as needed for anxiety, Disp: 90 tablet, Rfl: 1     levothyroxine (SYNTHROID/LEVOTHROID) 50 MCG tablet, Take 1 tablet (50 mcg) by mouth daily, Disp: 90 tablet, Rfl: 0     Lidocaine (LIDOCARE) 4 % Patch, Place 1 patch onto the skin every 24 hours To prevent lidocaine toxicity, patient should be patch free for 12 hrs daily., Disp: 5 patch, Rfl: 0     loratadine (CLARITIN) 10 MG tablet, Take 10 mg by mouth daily , Disp: , Rfl:      meclizine (ANTIVERT) 12.5 MG tablet, Take 1  tablet (12.5 mg) by mouth 4 times daily as needed for dizziness, Disp: 30 tablet, Rfl: 0     Multiple Vitamins-Minerals (CENTROVITE) TABS, TAKE 1 TABLET BY MOUTH EVERY DAY FOR 30 DAYS, Disp: , Rfl:      prazosin (MINIPRESS) 1 MG capsule, Take 1 capsule by mouth nightly as needed, Disp: , Rfl:      sertraline (ZOLOFT) 25 MG tablet, Take 1 tablet (25 mg) by mouth daily, Disp: 90 tablet, Rfl: 0     traZODone (DESYREL) 50 MG tablet, Take 0.5 tablets (25 mg) by mouth At Bedtime, Disp: 90 tablet, Rfl: 0     vitamin C (ASCORBIC ACID) 1000 MG TABS, , Disp: , Rfl:      zinc sulfate (ZINCATE) 220 (50 Zn) MG capsule, , Disp: , Rfl:     ALLERGIES:  Allergies   Allergen Reactions     Penicillins Shortness Of Breath, Palpitations, Dizziness, Anaphylaxis, Hives, Itching and Rash     Test in 2031, see allergy note on 7/29/21     Shellfish-Derived Products Anaphylaxis     Sulfa Drugs Hives and Anaphylaxis       FAMILY HISTORY:  Family History   Problem Relation Age of Onset     Crohn's Disease Mother         Total colectomy with ileostomy.     Psoriasis Brother      Snoring Brother      Snoring Father      Diabetes Father      Prostate Cancer Father      Chronic Kidney Disease Father         Chose against dialysis.     Substance Abuse Brother      Depression Brother      Attention Deficit Disorder Brother      Alcoholism Brother      No Known Problems Daughter      No Known Problems Son      Alcoholism Brother      Substance Abuse Brother      Lupus Cousin      Alcoholism Maternal Grandfather      Breast Cancer No family hx of        SOCIAL HISTORY:   Social History     Tobacco Use     Smoking status: Never Smoker     Smokeless tobacco: Never Used     Tobacco comment: smokes marijuana   Substance Use Topics     Alcohol use: Not Currently     Comment: Alcoholic Drinks/day: Never an issue, she states.  7/8/16     Drug use: Not Currently     Types: Marijuana     Comment: Drug use: Former marijuana use, not current. 7/8/16     "    PHYSICAL EXAM:    Vitals: BP (!) 200/105   Pulse 96   Temp 98  F (36.7  C) (Oral)   Resp 18   Ht 1.753 m (5' 9\")   Wt 117.9 kg (260 lb)   LMP 12/21/2021   SpO2 97%   BMI 38.40 kg/m     General:. Alert and interactive, comfortable appearing.  HENT: Oropharynx without erythema or exudates. MMM.  TMs clear bilaterally.  Eyes: Pupils mid-sized and equally reactive.   Neck: Full AROM.  No midline tenderness to palpation.  Cardiovascular: Regular rate and rhythm. Peripheral pulses 2+ bilaterally.  Chest/Pulmonary: Normal work of breathing. Lung sounds clear and equal throughout, no wheezes or crackles. No chest wall tenderness or deformities.  Abdomen: Soft, obese, nondistended. Nontender without guarding or rebound.  Back/Spine: No CVA or midline tenderness.  Extremities: Normal ROM of all major joints. No lower extremity edema.   Skin: Warm and dry. Normal skin color.   Neuro: Speech clear. CNs grossly intact. Moves all extremities appropriately. Strength and sensation grossly intact to all extremities.   Psych: Normal affect/mood, cooperative, memory appropriate.     LAB:  All pertinent labs reviewed and interpreted.  Labs Ordered and Resulted from Time of ED Arrival to Time of ED Departure   BASIC METABOLIC PANEL - Abnormal       Result Value    Sodium 139      Potassium 3.7      Chloride 101      Carbon Dioxide (CO2) 28      Anion Gap 10      Urea Nitrogen 11      Creatinine 0.85      Calcium 9.4      Glucose 168 (*)     GFR Estimate 81     MAGNESIUM - Normal    Magnesium 1.9     TROPONIN I - Normal    Troponin I <0.01     CBC WITH PLATELETS AND DIFFERENTIAL    WBC Count 11.0      RBC Count 4.80      Hemoglobin 13.0      Hematocrit 41.2      MCV 86      MCH 27.1      MCHC 31.6      RDW 13.6      Platelet Count 349      % Neutrophils 62      % Lymphocytes 30      % Monocytes 6      % Eosinophils 2      % Basophils 0      % Immature Granulocytes 0      NRBCs per 100 WBC 0      Absolute Neutrophils 6.8      " Absolute Lymphocytes 3.3      Absolute Monocytes 0.7      Absolute Eosinophils 0.2      Absolute Basophils 0.0      Absolute Immature Granulocytes 0.0      Absolute NRBCs 0.0         EKG:   Performed at: 22:52  Impression: Normal sinus rhythm. Minimal voltage criteria for LVH, may be normal variant. Borderline ECG.  Rate: 92 bpm  Rhythm: Sinus  QRS Interval: 82 ms  QTc Interval: 450 ms  Comparison: When compared with ECG of 1-9-2022, she is again in normal sinus rhythm and no significant changes noted.      I have independently reviewed and interpreted the EKG(s) documented above.       I, Claus Jimenez, am serving as a scribe to document services personally performed by Dr. Judy Villalta  based on my observation and the provider's statements to me. I, Judy Villalta MD attest that Claus Jimenez is acting in a scribe capacity, has observed my performance of the services and has documented them in accordance with my direction.      Judy Villalta M.D.  Emergency Medicine  [facility] EMERGENCY DEPARTMENT  [facility] [facility] [id number] [phone]  Dept: [phone]         Judy Villalta MD  01/17/22 020

## 2022-01-17 NOTE — ED PROVIDER NOTES
Emergency Department Encounter     Evaluation Date & Time:   1/17/2022  1:39 PM    CHIEF COMPLAINT:  A fib, Chest Pain, and Dizziness      Triage Note:Patient brought in by [facility] from urgent care. Patient was seen last night for atrial fibrillation and sent home. She started feeling worse and went to urgent care and was sent to the ER from Afib with RVR. Patient is symptomatic and symptoms include SOB, dizziness, bilateral face and chest tingling that started today. Patient also eveloped left sided chest pain that started today. EKG done.          ED COURSE & MEDICAL DECISION MAKING:     ED Course as of 01/17/22 1703   Mon Jan 17, 2022   1514 CXR (independent interpretation): no acute cardiopulmonary process    1535 Labs overall unremarkable.  Pt in sinus rhythm. Will plan for discharge, rapid access follow up.  Discuss with pt starting anticoagulation.   1542 Pt spontaneously converted to NSR. Will get repeat EKG.  Awaiting rest of labs.   1545 Pt remains well here, asymptomatic now that she's in NSR. Will start on Eliquis for PAF.  Pt has follow up already scheduled with cardiology on 1/20/22 (in 3 days).  She will also increase her diltiazem to 240 mg daily. Encouraged her to monitor for dizziness/lightheaded, return for recurrent symptoms/concerns.         Pt reports a history of paroxysmal Afib, had cardioversion beginning of this month and seen late last night, early this morning for palpitations, but no Afib at that time, labs unremarkable.  Pt seen at urgent care, sent in after they found her in AFib with RVR, very symptomatic with lightheadedness, chest pain on left, dyspnea.  Pt without hypoxia, nontoxic. She's quite anxious about her symptoms and uncomfortable in Afib. Will get repeat labs, monitor, treat with IVF, diltiazem and reassess.     2:27 PM I met with the patient for the initial interview and physical examination. Discussed plan for treatment and workup in the ED.  3:37 PM Patient  spontaneously converted to NSR.   4:37 PM I updated the patient.  4:48 PM Discussed with the patient and all questioned fully answered.  Discussed with pt starting Eliquis, which she is agreeable to, has appt with cardiology 1/20/22.      At the conclusion of the encounter I discussed the results of all the tests and the disposition. The questions were answered. The patient or family acknowledged understanding and was agreeable with the care plan.    PPE: Provider wore gloves, N95 mask, eye protection, and paper mask.  MEDICATIONS GIVEN IN THE EMERGENCY DEPARTMENT:  Medications   0.9% sodium chloride BOLUS (1,000 mLs Intravenous New Bag 1/17/22 1421)   diltiazem (CARDIZEM) injection 20 mg (20 mg Intravenous Given 1/17/22 1422)       NEW PRESCRIPTIONS STARTED AT TODAY'S ED VISIT:  New Prescriptions    APIXABAN ANTICOAGULANT (ELIQUIS ANTICOAGULANT) 5 MG TABLET    Take 1 tablet (5 mg) by mouth 2 times daily       HPI   HPI     Alina Martell is a 56 year old female with a pertinent history of atrial fibrillation, anxiety, panic attack, morbid obesity, anemia, and depression who presents to this ED by EMS for evaluation of atrial fibrillation and chest pain.    Per chart review (1/17/2022), the patient presented [facility] for evaluation of heart palpitations and shortness of breath. No significant chest pains, and exam is normal, with no atrial fibrillation. Suspected anxiety related to prior issues with atrial fibrillation. Not anticoagulated, symptoms present for 72 hours. Troponin is undetectable. Patient was discharged with a normal sinus rhythm.    This morning (1/17), around 0700, the patient woke up and began to feel short of breath, increased heart rate, and chest pain. The pain is localized to the left side of her left breast, and is non radiating. She is not on blood thinners, but reports taking diltiazem everyday, as well as aspirin. In addition, she reports a loss of appetite, high belly pain, tingling  "to her face and chest, and the feeling of lightheadedness as if she is going to pass out. The patient denies hitting her head or falling. She states that these symptoms feel different than previous episodes of atrial fibrillation, particularly the feeling of lightheadedness. The patient states that she has \"been in a-fib for 4 hours and it hasn't come down.\" She denies cough, fever, or any other complaints at this time.    Of note, the patient states that she received a negative COVID test last night in the ED.     REVIEW OF SYSTEMS:  Review of Systems   Constitutional: Positive for appetite change (Positive for loss of appetite). Negative for chills and fever.   HENT: Negative for sore throat.    Eyes: Negative for visual disturbance.   Respiratory: Positive for shortness of breath. Negative for cough.    Cardiovascular: Positive for chest pain and palpitations.   Gastrointestinal: Positive for abdominal pain (Positive for high belly pain). Negative for diarrhea, nausea and vomiting.   Endocrine: Negative for polyuria.   Genitourinary: Negative for dysuria and hematuria.        - urinary changes     Musculoskeletal: Negative for joint swelling.   Skin: Negative for rash.   Neurological: Positive for light-headedness. Negative for dizziness.        Positive for tingling   Psychiatric/Behavioral: Negative for confusion.   All other systems reviewed and are negative.      Medical History     Past Medical History:   Diagnosis Date     Cannabis use without complication 12/8/2014     Carpal tunnel syndrome      Obesity      Paroxysmal atrial fibrillation (H)      PTSD (post-traumatic stress disorder)      RA (rheumatoid arthritis) (H)      Rheumatoid arthritis (H) 7/8/2016     Sicca syndrome (H)        Past Surgical History:   Procedure Laterality Date     CHOLECYSTECTOMY       HC REMOVAL GALLBLADDER      Description: Cholecystectomy;  Recorded: 10/15/2013;     LAPAROSCOPIC TUBAL LIGATION       RELEASE CARPAL TUNNEL " BILATERAL       TUBAL LIGATION  1995       Family History   Problem Relation Age of Onset     Crohn's Disease Mother         Total colectomy with ileostomy.     Psoriasis Brother      Snoring Brother      Snoring Father      Diabetes Father      Prostate Cancer Father      Chronic Kidney Disease Father         Chose against dialysis.     Substance Abuse Brother      Depression Brother      Attention Deficit Disorder Brother      Alcoholism Brother      No Known Problems Daughter      No Known Problems Son      Alcoholism Brother      Substance Abuse Brother      Lupus Cousin      Alcoholism Maternal Grandfather      Breast Cancer No family hx of        Social History     Tobacco Use     Smoking status: Never Smoker     Smokeless tobacco: Never Used     Tobacco comment: smokes marijuana   Substance Use Topics     Alcohol use: Not Currently     Comment: Alcoholic Drinks/day: Never an issue, she states.  7/8/16     Drug use: Not Currently     Types: Marijuana     Comment: Drug use: Former marijuana use, not current. 7/8/16       apixaban ANTICOAGULANT (ELIQUIS ANTICOAGULANT) 5 MG tablet  adalimumab (HUMIRA *CF*) 40 MG/0.4ML pen kit  aspirin (ASA) 325 MG EC tablet  diltiazem (CARDIZEM) 120 MG tablet  EPINEPHrine (ANY BX GENERIC EQUIV) 0.3 MG/0.3ML injection 2-pack  hydrOXYzine (ATARAX) 25 MG tablet  levothyroxine (SYNTHROID/LEVOTHROID) 50 MCG tablet  Lidocaine (LIDOCARE) 4 % Patch  loratadine (CLARITIN) 10 MG tablet  meclizine (ANTIVERT) 12.5 MG tablet  Multiple Vitamins-Minerals (CENTROVITE) TABS  prazosin (MINIPRESS) 1 MG capsule  sertraline (ZOLOFT) 25 MG tablet  traZODone (DESYREL) 50 MG tablet  vitamin C (ASCORBIC ACID) 1000 MG TABS  zinc sulfate (ZINCATE) 220 (50 Zn) MG capsule        Physical Exam     Vitals:  /77   Pulse 89   Resp 30   Wt 117.9 kg (260 lb)   LMP 12/21/2021   SpO2 100%   BMI 38.40 kg/m      PHYSICAL EXAM:   Physical Exam  Vitals and nursing note reviewed.   Constitutional:        General: She is not in acute distress.     Appearance: Normal appearance.   HENT:      Head: Normocephalic and atraumatic.      Nose: Nose normal.      Mouth/Throat:      Mouth: Mucous membranes are moist.   Eyes:      Pupils: Pupils are equal, round, and reactive to light.   Neck:      Vascular: No JVD.   Cardiovascular:      Rate and Rhythm: Tachycardia present. Rhythm irregularly irregular.      Pulses: Normal pulses.           Radial pulses are 2+ on the right side and 2+ on the left side.        Dorsalis pedis pulses are 2+ on the right side and 2+ on the left side.   Pulmonary:      Effort: Tachypnea present. No respiratory distress.      Breath sounds: Normal breath sounds.   Abdominal:      Palpations: Abdomen is soft.      Tenderness: There is no abdominal tenderness.   Musculoskeletal:      Cervical back: Full passive range of motion without pain and neck supple.      Comments: No calf tenderness or swelling b/l   Skin:     General: Skin is warm.      Findings: No rash.   Neurological:      General: No focal deficit present.      Mental Status: She is alert. Mental status is at baseline.      Comments: Fluent speech, no acute lateralizing deficits   Psychiatric:         Mood and Affect: Mood is anxious.         Behavior: Behavior normal.         Results     LAB:  All pertinent labs reviewed and interpreted  Labs Ordered and Resulted from Time of ED Arrival to Time of ED Departure   CBC WITH PLATELETS AND DIFFERENTIAL - Abnormal       Result Value    WBC Count 12.0 (*)     RBC Count 4.98      Hemoglobin 13.6      Hematocrit 42.0      MCV 84      MCH 27.3      MCHC 32.4      RDW 13.6      Platelet Count 392      % Neutrophils 57      % Lymphocytes 34      % Monocytes 8      % Eosinophils 1      % Basophils 0      % Immature Granulocytes 0      NRBCs per 100 WBC 0      Absolute Neutrophils 6.8      Absolute Lymphocytes 4.1      Absolute Monocytes 0.9      Absolute Eosinophils 0.1      Absolute Basophils 0.1       Absolute Immature Granulocytes 0.0      Absolute NRBCs 0.0     RBC AND PLATELET MORPHOLOGY - Abnormal    Platelet Assessment        Value: Automated Count Confirmed. Platelet morphology is normal.    Reactive Lymphocytes Present (*)     RBC Morphology Confirmed RBC Indices     COMPREHENSIVE METABOLIC PANEL - Normal    Sodium 140      Potassium 3.5      Chloride 105      Carbon Dioxide (CO2) 24      Anion Gap 11      Urea Nitrogen 12      Creatinine 0.80      Calcium 9.7      Glucose 103      Alkaline Phosphatase 106      AST 15      ALT 15      Protein Total 8.0      Albumin 3.5      Bilirubin Total 0.4      GFR Estimate 87     MAGNESIUM - Normal    Magnesium 1.8     TROPONIN I - Normal    Troponin I <0.01     TSH WITH FREE T4 REFLEX - Normal    TSH 2.38         RADIOLOGY:  Chest XR,  PA & LAT   Final Result   IMPRESSION:    Mild cardiac enlargement and borderline pulmonary venous congestion unchanged. Lungs are clear. No pleural fluid.                 ECG:  EKG 1: Afib, rate 140, no acute ischemia, new Afib compared to EKG from yesterday  EKG 2: NSR, rate 84, normal intervals, no acute ischemia    I have independently reviewed and interpreted the EKG(s) documented above     PROCEDURES:  Procedures:  none      FINAL IMPRESSION:    ICD-10-CM    1. Paroxysmal atrial fibrillation (H)  I48.0        0 minutes of critical care time      I, Ashli Cobian, am serving as a scribe to document services personally performed by Dr. Carlos Michel, based on my observations and the provider's statements to me. I, Carlos Michel, DO attest that Ashli Cobian is acting in a scribe capacity, has observed my performance of the services and has documented them in accordance with my direction.      Carlos Michel DO  Emergency Medicine  [facility] EMERGENCY DEPARTMENT  1/17/2022  2:16 PM          Carlos Michel MD  01/17/22 8895

## 2022-01-17 NOTE — ED NOTES
Bed: JNEDP-07  Expected date: 1/17/22  Expected time:   Means of arrival: Ambulance  Comments:  Ciara RVR   To  by room 2

## 2022-01-17 NOTE — DISCHARGE INSTRUCTIONS
Start taking eliquis tonight and take as directed.  Increase your diltiazem to 240 mg daily (2 pills daily) and follow up with cardiologist as scheduled on 1/20/22.  Return for recurrent symptoms/concerns.

## 2022-01-18 DIAGNOSIS — M05.79 SEROPOSITIVE RHEUMATOID ARTHRITIS OF MULTIPLE SITES (H): ICD-10-CM

## 2022-01-18 LAB
ATRIAL RATE - MUSE: 84 BPM
DIASTOLIC BLOOD PRESSURE - MUSE: NORMAL MMHG
INTERPRETATION ECG - MUSE: NORMAL
P AXIS - MUSE: 39 DEGREES
PR INTERVAL - MUSE: 158 MS
QRS DURATION - MUSE: 80 MS
QT - MUSE: 380 MS
QTC - MUSE: 449 MS
R AXIS - MUSE: 2 DEGREES
SYSTOLIC BLOOD PRESSURE - MUSE: NORMAL MMHG
T AXIS - MUSE: 13 DEGREES
VENTRICULAR RATE- MUSE: 84 BPM

## 2022-01-18 RX ORDER — ADALIMUMAB 40MG/0.4ML
KIT SUBCUTANEOUS
Qty: 2 EACH | Refills: 4 | Status: SHIPPED | OUTPATIENT
Start: 2022-01-18 | End: 2022-01-20

## 2022-01-20 ENCOUNTER — OFFICE VISIT (OUTPATIENT)
Dept: CARDIOLOGY | Facility: CLINIC | Age: 57
End: 2022-01-20
Payer: COMMERCIAL

## 2022-01-20 VITALS
BODY MASS INDEX: 40.17 KG/M2 | SYSTOLIC BLOOD PRESSURE: 144 MMHG | DIASTOLIC BLOOD PRESSURE: 86 MMHG | HEART RATE: 92 BPM | RESPIRATION RATE: 16 BRPM | WEIGHT: 272 LBS

## 2022-01-20 DIAGNOSIS — I10 ESSENTIAL HYPERTENSION: ICD-10-CM

## 2022-01-20 DIAGNOSIS — I20.89 STABLE ANGINA PECTORIS (H): ICD-10-CM

## 2022-01-20 DIAGNOSIS — I48.0 PAROXYSMAL ATRIAL FIBRILLATION (H): ICD-10-CM

## 2022-01-20 DIAGNOSIS — R07.9 ACUTE CHEST PAIN: ICD-10-CM

## 2022-01-20 DIAGNOSIS — M05.79 SEROPOSITIVE RHEUMATOID ARTHRITIS OF MULTIPLE SITES (H): ICD-10-CM

## 2022-01-20 DIAGNOSIS — I25.118 CORONARY ARTERY DISEASE OF NATIVE ARTERY OF NATIVE HEART WITH STABLE ANGINA PECTORIS (H): ICD-10-CM

## 2022-01-20 DIAGNOSIS — G47.33 OBSTRUCTIVE SLEEP APNEA SYNDROME: ICD-10-CM

## 2022-01-20 DIAGNOSIS — E66.01 MORBID OBESITY (H): Primary | ICD-10-CM

## 2022-01-20 PROCEDURE — 99214 OFFICE O/P EST MOD 30 MIN: CPT | Performed by: INTERNAL MEDICINE

## 2022-01-20 RX ORDER — DILTIAZEM HYDROCHLORIDE 180 MG/1
360 CAPSULE, COATED, EXTENDED RELEASE ORAL DAILY
Qty: 180 CAPSULE | Refills: 3 | Status: SHIPPED | OUTPATIENT
Start: 2022-01-20 | End: 2022-10-03

## 2022-01-20 RX ORDER — ADALIMUMAB 40MG/0.4ML
KIT SUBCUTANEOUS
Qty: 2 EACH | Refills: 4 | Status: SHIPPED | OUTPATIENT
Start: 2022-01-20 | End: 2022-06-02

## 2022-01-20 NOTE — LETTER
1/20/2022    Estelle Bansal MD  [address]  [facility] [address]    RE: Alnia Martell       Dear Colleague,     I had the pleasure of seeing Alina Martell in the [facility].    Cardiology Clinic Office Note    Assessment / Plan:    1.  Paroxysmal atrial fibrillation.  Likely multifactorial in etiology, including inadequately treated obstructive sleep apnea.  Encouraged resumption of regular treatment of sleep apnea.  We will increase diltiazem and with her [id number] score of at least 2, would favor long-term anticoagulation.  The rationale for this treatment was discussed with patient at length.  We will also increase her diltiazem to 180 mg daily, hopefully to achieve adequate rate control if she has recurrences.  2.  Chest pain, consistent with stable angina pectoris.  No documentation of coronary artery disease, chest pains may also be related to anxiety.  We will schedule a coronary CT angiogram to look for obstructive coronary disease that may warrant intervention.  3.  Hypertension.  Inadequate control today.  May have improved control on increased diltiazem.    Follow-up in atrial fibrillation clinic 3 months    ______________________________________________________________________    Subjective:    I had the opportunity to see Alina Martell at the [facility]. Alina Martell is a 56 year old female with a history of sicca syndrome with rheumatoid arthritis, morbid obesity, and anxiety. I met her 9/2020 when she presented following cardioversion for new onset atrial fibrillation with rapid ventricular response. With a [id number] score of 1 at that time no anticoagulation was indicated. Obstructive sleep apnea was suggested as well as new onset hypertension, and treatment initiated with diltiazem. She returns today for follow-up.    Since I saw her last she was tested for obstructive sleep apnea and placed on a  CPAP mask.  Her weight is down 5 pounds from when I saw her last. She was seen in the emergency room January 1 for recurrent atrial fibrillation with rapid ventricular response. She underwent cardioversion, and after another recurrence 1/17 she was started on Eliquis.  She reports that since the first cardioversion 1/1, she has had persistent fatigue, dizziness with activities, worsened shortness of breath and chest pains which can be provoked with activities.  She realizes that some of these symptoms are related to anxiety which is a longstanding problem for her.  She is able to walk around [place] 3-4 times a week, generally without provoking chest discomfort.  Chest pain resolves promptly with rest.    She has stopped using her CPAP over the last week, possibly contributing to the most recent recurrence.    ______________________________________________________________________    Problem List:  Patient Active Problem List   Diagnosis     Depressed     Seropositive rheumatoid arthritis of multiple sites (H)     Recurrent major depressive disorder, in full remission (H)     Paroxysmal atrial fibrillation (H)     Morbid obesity (H)     Microcytic anemia     Anxiety     Chest pain     Chronic pain     Cyclic citrullinated peptide (CCP) antibody positive     Grief     Insomnia related to another mental disorder     Migraine headache     Olecranon bursitis of right elbow     Panic attack     ADA (generalized anxiety disorder)     Reflux     Sicca syndrome (H)     Sleep disorder     Tendonitis of both shoulders     Medical History:  Past Medical History:   Diagnosis Date     Cannabis use without complication 12/8/2014     Carpal tunnel syndrome      Obesity      Paroxysmal atrial fibrillation (H)      PTSD (post-traumatic stress disorder)      RA (rheumatoid arthritis) (H)      Rheumatoid arthritis (H) 7/8/2016     Sicca syndrome (H)      Surgical History:  Past Surgical History:   Procedure Laterality Date      CHOLECYSTECTOMY       HC REMOVAL GALLBLADDER      Description: Cholecystectomy;  Recorded: 10/15/2013;     LAPAROSCOPIC TUBAL LIGATION       RELEASE CARPAL TUNNEL BILATERAL       TUBAL LIGATION  1995     Social History:  Social History     Socioeconomic History     Marital status: Single     Spouse name: Not on file     Number of children: 2     Years of education: 4     Highest education level: Not on file   Occupational History     Not on file   Tobacco Use     Smoking status: Never Smoker     Smokeless tobacco: Never Used     Tobacco comment: smokes marijuana   Substance and Sexual Activity     Alcohol use: Not Currently     Comment: Alcoholic Drinks/day: Never an issue, she states.  7/8/16     Drug use: Not Currently     Types: Marijuana     Comment: Drug use: Former marijuana use, not current. 7/8/16     Sexual activity: Never     Partners: Male     Birth control/protection: Other     Comment: tubal ligation   Other Topics Concern     Parent/sibling w/ CABG, MI or angioplasty before 65F 55M? Not Asked   Social History Narrative     Not on file     Social Determinants of Health     Financial Resource Strain: Not on file   Food Insecurity: Not on file   Transportation Needs: Not on file   Physical Activity: Not on file   Stress: Not on file   Social Connections: Not on file   Intimate Partner Violence: Not on file   Housing Stability: Not on file     Sleep History:  Poorly restorative.  Not using CPAP over the last week because of dyspnea, chest pains  Exercise History:  Walking around [place] 3-4 times a week.  Trying to be active on a regular basis    Review of Systems:        12 point review of systems otherwise negative        Family History:  Family History   Problem Relation Age of Onset     Crohn's Disease Mother         Total colectomy with ileostomy.     Psoriasis Brother      Snoring Brother      Snoring Father      Diabetes Father      Prostate Cancer Father      Chronic Kidney Disease Father          Chose against dialysis.     Substance Abuse Brother      Depression Brother      Attention Deficit Disorder Brother      Alcoholism Brother      No Known Problems Daughter      No Known Problems Son      Alcoholism Brother      Substance Abuse Brother      Lupus Cousin      Alcoholism Maternal Grandfather      Breast Cancer No family hx of          Allergies:  Allergies   Allergen Reactions     Penicillins Shortness Of Breath, Palpitations, Dizziness, Anaphylaxis, Hives, Itching and Rash     Test in 2031, see allergy note on 7/29/21     Shellfish-Derived Products Anaphylaxis     Sulfa Drugs Hives and Anaphylaxis     Medications:  Current Outpatient Medications   Medication Sig Dispense Refill     adalimumab (HUMIRA *CF* PEN) 40 MG/0.4ML pen kit INJECT 0.4 MLS (40 MG) SUBCUTANEOUS EVERY 14 DAYS HOLD FOR SIGNS OF INFECTION, THEN SEEK MEDICAL ATTENTION. 2 each 4     aspirin (ASA) 325 MG EC tablet Take 325 mg by mouth daily        diltiazem (CARDIZEM) 120 MG tablet Take 120 mg by mouth daily       EPINEPHrine (ANY BX GENERIC EQUIV) 0.3 MG/0.3ML injection 2-pack Inject 0.3 mg into the muscle as needed for anaphylaxis        hydrOXYzine (ATARAX) 25 MG tablet Take 1 tablet (25 mg) by mouth 3 times daily as needed for anxiety 90 tablet 1     Lidocaine (LIDOCARE) 4 % Patch Place 1 patch onto the skin every 24 hours To prevent lidocaine toxicity, patient should be patch free for 12 hrs daily. 5 patch 0     loratadine (CLARITIN) 10 MG tablet Take 10 mg by mouth daily        meclizine (ANTIVERT) 12.5 MG tablet Take 1 tablet (12.5 mg) by mouth 4 times daily as needed for dizziness 30 tablet 0     Multiple Vitamins-Minerals (CENTROVITE) TABS TAKE 1 TABLET BY MOUTH EVERY DAY FOR 30 DAYS       prazosin (MINIPRESS) 1 MG capsule Take 1 capsule by mouth nightly as needed        sertraline (ZOLOFT) 25 MG tablet Take 1 tablet (25 mg) by mouth daily 90 tablet 0     traZODone (DESYREL) 50 MG tablet Take 0.5 tablets (25 mg) by mouth At  Bedtime 90 tablet 0     vitamin C (ASCORBIC ACID) 1000 MG TABS Take 1,000 mg by mouth daily        vitamin D3 (CHOLECALCIFEROL) 250 mcg (34968 units) capsule Take 1 capsule by mouth once a week       apixaban ANTICOAGULANT (ELIQUIS ANTICOAGULANT) 5 MG tablet Take 1 tablet (5 mg) by mouth 2 times daily (Patient not taking: Reported on 1/20/2022) 60 tablet 0     levothyroxine (SYNTHROID/LEVOTHROID) 50 MCG tablet Take 1 tablet (50 mcg) by mouth daily (Patient not taking: Reported on 1/20/2022) 90 tablet 0     zinc sulfate (ZINCATE) 220 (50 Zn) MG capsule  (Patient not taking: Reported on 1/20/2022)         Objective:   Wt Readings from Last 3 Encounters:   01/20/22 123.4 kg (272 lb)   01/17/22 117.9 kg (260 lb)   01/17/22 117.9 kg (260 lb)     Vital signs:  BP (!) 144/86 (BP Location: Right arm, Patient Position: Sitting, Cuff Size: Adult Large)   Pulse 92   Resp 16   Wt 123.4 kg (272 lb)   LMP 12/21/2021   BMI 40.17 kg/m        Physical Exam:    General Appearance : Awake, Alert, No acute distress.  Anxious, talkative  HEENT: No Scleral icterus; the mucous membranes were pink and moist.  Conjunctivae not injected  Neck:  No cervical bruits, jugular venous distention, or thyromegaly   Chest: The spine was straight. Chest wall symmetric  Lungs: Respirations unlabored; the lungs are clear to auscultation.  No wheezing   Cardiovascular: Nonpalpable point of maximal impulse.  Auscultation reveals normal first and second heart sounds with no murmurs, rubs, or gallops.  Carotid, radial, and dorsalis pedal pulses are intact and symmetric.  Abdomen: Obese.  No organomegaly, masses, bruits, or tenderness. Bowels sounds are present  Extremities:  No clubbing, cyanosis.  No edema  Skin: No rash, bruising  Musculoskeletal: No tenderness.  Neurologic: Alert and oriented ×3. Speech is fluent.    Lab Results:  LIPIDS:  Lab Results   Component Value Date    CHOL 169 01/21/2021    CHOL 175 09/16/2019    CHOL 138 11/14/2018     Lab  Results   Component Value Date    HDL 46 (L) 01/21/2021    HDL 41 (L) 09/16/2019    HDL 49 (L) 11/14/2018     Lab Results   Component Value Date     01/21/2021     09/16/2019    LDL 78 11/14/2018     Lab Results   Component Value Date    TRIG 79 01/21/2021    TRIG 94 09/16/2019    TRIG 54 11/14/2018       Echocardiogram 10/2020:    Normal left ventricular size with mild hypertrophy noted.    Left ventricle ejection fraction is normal. The calculated left ventricular ejection fraction is 67%.    Normal right ventricular size and systolic function.    No hemodynamically significant valvular heart abnormalities.    No previous study for comparison.         This note created using Dragon voice recognition software.  Sound alike errors may have escaped editing.

## 2022-01-20 NOTE — TELEPHONE ENCOUNTER
Refill request from [facility]    Medication: Humira 40mg/0.4ml  Last Refill: 12/26/21  Last OV: 8/11/21  Last lab: 7/23/21  Future lab: 1/28/22  Future OV: 2/1/22

## 2022-01-20 NOTE — PROGRESS NOTES
Cardiology Clinic Office Note    Assessment / Plan:    1.  Paroxysmal atrial fibrillation.  Likely multifactorial in etiology, including inadequately treated obstructive sleep apnea.  Encouraged resumption of regular treatment of sleep apnea.  We will increase diltiazem and with her [id number] score of at least 2, would favor long-term anticoagulation.  The rationale for this treatment was discussed with patient at length.  We will also increase her diltiazem to 180 mg daily, hopefully to achieve adequate rate control if she has recurrences.  2.  Chest pain, consistent with stable angina pectoris.  No documentation of coronary artery disease, chest pains may also be related to anxiety.  We will schedule a coronary CT angiogram to look for obstructive coronary disease that may warrant intervention.  3.  Hypertension.  Inadequate control today.  May have improved control on increased diltiazem.    Follow-up in atrial fibrillation clinic 3 months    ______________________________________________________________________    Subjective:    I had the opportunity to see Alina Martell at the [facility]. Alina Martell is a 56 year old female with a history of sicca syndrome with rheumatoid arthritis, morbid obesity, and anxiety. I met her 9/2020 when she presented following cardioversion for new onset atrial fibrillation with rapid ventricular response. With a [id number] score of 1 at that time no anticoagulation was indicated. Obstructive sleep apnea was suggested as well as new onset hypertension, and treatment initiated with diltiazem. She returns today for follow-up.    Since I saw her last she was tested for obstructive sleep apnea and placed on a CPAP mask.  Her weight is down 5 pounds from when I saw her last. She was seen in the emergency room January 1 for recurrent atrial fibrillation with rapid ventricular response. She underwent cardioversion, and after  another recurrence 1/17 she was started on Eliquis.  She reports that since the first cardioversion 1/1, she has had persistent fatigue, dizziness with activities, worsened shortness of breath and chest pains which can be provoked with activities.  She realizes that some of these symptoms are related to anxiety which is a longstanding problem for her.  She is able to walk around [place] 3-4 times a week, generally without provoking chest discomfort.  Chest pain resolves promptly with rest.    She has stopped using her CPAP over the last week, possibly contributing to the most recent recurrence.    ______________________________________________________________________    Problem List:  Patient Active Problem List   Diagnosis     Depressed     Seropositive rheumatoid arthritis of multiple sites (H)     Recurrent major depressive disorder, in full remission (H)     Paroxysmal atrial fibrillation (H)     Morbid obesity (H)     Microcytic anemia     Anxiety     Chest pain     Chronic pain     Cyclic citrullinated peptide (CCP) antibody positive     Grief     Insomnia related to another mental disorder     Migraine headache     Olecranon bursitis of right elbow     Panic attack     ADA (generalized anxiety disorder)     Reflux     Sicca syndrome (H)     Sleep disorder     Tendonitis of both shoulders     Medical History:  Past Medical History:   Diagnosis Date     Cannabis use without complication 12/8/2014     Carpal tunnel syndrome      Obesity      Paroxysmal atrial fibrillation (H)      PTSD (post-traumatic stress disorder)      RA (rheumatoid arthritis) (H)      Rheumatoid arthritis (H) 7/8/2016     Sicca syndrome (H)      Surgical History:  Past Surgical History:   Procedure Laterality Date     CHOLECYSTECTOMY       HC REMOVAL GALLBLADDER      Description: Cholecystectomy;  Recorded: 10/15/2013;     LAPAROSCOPIC TUBAL LIGATION       RELEASE CARPAL TUNNEL BILATERAL       TUBAL LIGATION  1995     Social  History:  Social History     Socioeconomic History     Marital status: Single     Spouse name: Not on file     Number of children: 2     Years of education: 4     Highest education level: Not on file   Occupational History     Not on file   Tobacco Use     Smoking status: Never Smoker     Smokeless tobacco: Never Used     Tobacco comment: smokes marijuana   Substance and Sexual Activity     Alcohol use: Not Currently     Comment: Alcoholic Drinks/day: Never an issue, she states.  7/8/16     Drug use: Not Currently     Types: Marijuana     Comment: Drug use: Former marijuana use, not current. 7/8/16     Sexual activity: Never     Partners: Male     Birth control/protection: Other     Comment: tubal ligation   Other Topics Concern     Parent/sibling w/ CABG, MI or angioplasty before 65F 55M? Not Asked   Social History Narrative     Not on file     Social Determinants of Health     Financial Resource Strain: Not on file   Food Insecurity: Not on file   Transportation Needs: Not on file   Physical Activity: Not on file   Stress: Not on file   Social Connections: Not on file   Intimate Partner Violence: Not on file   Housing Stability: Not on file     Sleep History:  Poorly restorative.  Not using CPAP over the last week because of dyspnea, chest pains  Exercise History:  Walking around [employer] 3-4 times a week.  Trying to be active on a regular basis    Review of Systems:        12 point review of systems otherwise negative        Family History:  Family History   Problem Relation Age of Onset     Crohn's Disease Mother         Total colectomy with ileostomy.     Psoriasis Brother      Snoring Brother      Snoring Father      Diabetes Father      Prostate Cancer Father      Chronic Kidney Disease Father         Chose against dialysis.     Substance Abuse Brother      Depression Brother      Attention Deficit Disorder Brother      Alcoholism Brother      No Known Problems Daughter      No Known Problems Son       Alcoholism Brother      Substance Abuse Brother      Lupus Cousin      Alcoholism Maternal Grandfather      Breast Cancer No family hx of          Allergies:  Allergies   Allergen Reactions     Penicillins Shortness Of Breath, Palpitations, Dizziness, Anaphylaxis, Hives, Itching and Rash     Test in 2031, see allergy note on 7/29/21     Shellfish-Derived Products Anaphylaxis     Sulfa Drugs Hives and Anaphylaxis     Medications:  Current Outpatient Medications   Medication Sig Dispense Refill     adalimumab (HUMIRA *CF* PEN) 40 MG/0.4ML pen kit INJECT 0.4 MLS (40 MG) SUBCUTANEOUS EVERY 14 DAYS HOLD FOR SIGNS OF INFECTION, THEN SEEK MEDICAL ATTENTION. 2 each 4     aspirin (ASA) 325 MG EC tablet Take 325 mg by mouth daily        diltiazem (CARDIZEM) 120 MG tablet Take 120 mg by mouth daily       EPINEPHrine (ANY BX GENERIC EQUIV) 0.3 MG/0.3ML injection 2-pack Inject 0.3 mg into the muscle as needed for anaphylaxis        hydrOXYzine (ATARAX) 25 MG tablet Take 1 tablet (25 mg) by mouth 3 times daily as needed for anxiety 90 tablet 1     Lidocaine (LIDOCARE) 4 % Patch Place 1 patch onto the skin every 24 hours To prevent lidocaine toxicity, patient should be patch free for 12 hrs daily. 5 patch 0     loratadine (CLARITIN) 10 MG tablet Take 10 mg by mouth daily        meclizine (ANTIVERT) 12.5 MG tablet Take 1 tablet (12.5 mg) by mouth 4 times daily as needed for dizziness 30 tablet 0     Multiple Vitamins-Minerals (CENTROVITE) TABS TAKE 1 TABLET BY MOUTH EVERY DAY FOR 30 DAYS       prazosin (MINIPRESS) 1 MG capsule Take 1 capsule by mouth nightly as needed        sertraline (ZOLOFT) 25 MG tablet Take 1 tablet (25 mg) by mouth daily 90 tablet 0     traZODone (DESYREL) 50 MG tablet Take 0.5 tablets (25 mg) by mouth At Bedtime 90 tablet 0     vitamin C (ASCORBIC ACID) 1000 MG TABS Take 1,000 mg by mouth daily        vitamin D3 (CHOLECALCIFEROL) 250 mcg (38366 units) capsule Take 1 capsule by mouth once a week        apixaban ANTICOAGULANT (ELIQUIS ANTICOAGULANT) 5 MG tablet Take 1 tablet (5 mg) by mouth 2 times daily (Patient not taking: Reported on 1/20/2022) 60 tablet 0     levothyroxine (SYNTHROID/LEVOTHROID) 50 MCG tablet Take 1 tablet (50 mcg) by mouth daily (Patient not taking: Reported on 1/20/2022) 90 tablet 0     zinc sulfate (ZINCATE) 220 (50 Zn) MG capsule  (Patient not taking: Reported on 1/20/2022)         Objective:   Wt Readings from Last 3 Encounters:   01/20/22 123.4 kg (272 lb)   01/17/22 117.9 kg (260 lb)   01/17/22 117.9 kg (260 lb)     Vital signs:  BP (!) 144/86 (BP Location: Right arm, Patient Position: Sitting, Cuff Size: Adult Large)   Pulse 92   Resp 16   Wt 123.4 kg (272 lb)   LMP 12/21/2021   BMI 40.17 kg/m        Physical Exam:    General Appearance : Awake, Alert, No acute distress.  Anxious, talkative  HEENT: No Scleral icterus; the mucous membranes were pink and moist.  Conjunctivae not injected  Neck:  No cervical bruits, jugular venous distention, or thyromegaly   Chest: The spine was straight. Chest wall symmetric  Lungs: Respirations unlabored; the lungs are clear to auscultation.  No wheezing   Cardiovascular: Nonpalpable point of maximal impulse.  Auscultation reveals normal first and second heart sounds with no murmurs, rubs, or gallops.  Carotid, radial, and dorsalis pedal pulses are intact and symmetric.  Abdomen: Obese.  No organomegaly, masses, bruits, or tenderness. Bowels sounds are present  Extremities:  No clubbing, cyanosis.  No edema  Skin: No rash, bruising  Musculoskeletal: No tenderness.  Neurologic: Alert and oriented ×3. Speech is fluent.    Lab Results:  LIPIDS:  Lab Results   Component Value Date    CHOL 169 01/21/2021    CHOL 175 09/16/2019    CHOL 138 11/14/2018     Lab Results   Component Value Date    HDL 46 (L) 01/21/2021    HDL 41 (L) 09/16/2019    HDL 49 (L) 11/14/2018     Lab Results   Component Value Date     01/21/2021     09/16/2019    LDL 78  11/14/2018     Lab Results   Component Value Date    TRIG 79 01/21/2021    TRIG 94 09/16/2019    TRIG 54 11/14/2018       Echocardiogram 10/2020:    Normal left ventricular size with mild hypertrophy noted.    Left ventricle ejection fraction is normal. The calculated left ventricular ejection fraction is 67%.    Normal right ventricular size and systolic function.    No hemodynamically significant valvular heart abnormalities.    No previous study for comparison.                 JUJU BLEVINS MD [facility]  [phone]    This note created using Dragon voice recognition software.  Sound alike errors may have escaped editing.

## 2022-01-20 NOTE — PATIENT INSTRUCTIONS
1. Do not take short acting diltiazem preparations  2. Take 360 mg of diltiazem long-acting each day.  3. Continue Eliquis 5 mg twice daily  4. Stop taking baby aspirin.  5. Use your CPAP mask as directed, checking in with your sleep medicine provider if it is not providing you support.  6. Continue your regular exercise regimen, targeting at least 150 minutes of moderate activity each week.  7. Continue your efforts at weight loss with diet restrictions.  Congratulations on your success thus far!  8. We will schedule you for a coronary CT angiogram to exclude coronary disease as a contributing factor to your chest pains.

## 2022-01-28 ENCOUNTER — LAB (OUTPATIENT)
Dept: LAB | Facility: CLINIC | Age: 57
End: 2022-01-28
Payer: COMMERCIAL

## 2022-01-28 DIAGNOSIS — M05.79 SEROPOSITIVE RHEUMATOID ARTHRITIS OF MULTIPLE SITES (H): ICD-10-CM

## 2022-01-28 DIAGNOSIS — Z11.4 SCREENING FOR HIV (HUMAN IMMUNODEFICIENCY VIRUS): Primary | ICD-10-CM

## 2022-01-28 LAB
ALBUMIN SERPL-MCNC: 3.4 G/DL (ref 3.5–5)
ALT SERPL W P-5'-P-CCNC: 13 U/L (ref 0–45)
CREAT SERPL-MCNC: 0.73 MG/DL (ref 0.6–1.1)
ERYTHROCYTE [DISTWIDTH] IN BLOOD BY AUTOMATED COUNT: 13.6 % (ref 10–15)
GFR SERPL CREATININE-BSD FRML MDRD: >90 ML/MIN/1.73M2
HCT VFR BLD AUTO: 40.2 % (ref 35–47)
HGB BLD-MCNC: 12.5 G/DL (ref 11.7–15.7)
HIV 1+2 AB+HIV1 P24 AG SERPL QL IA: NEGATIVE
MCH RBC QN AUTO: 26.9 PG (ref 26.5–33)
MCHC RBC AUTO-ENTMCNC: 31.1 G/DL (ref 31.5–36.5)
MCV RBC AUTO: 87 FL (ref 78–100)
PLATELET # BLD AUTO: 338 10E3/UL (ref 150–450)
RBC # BLD AUTO: 4.65 10E6/UL (ref 3.8–5.2)
WBC # BLD AUTO: 11 10E3/UL (ref 4–11)

## 2022-01-28 PROCEDURE — 36415 COLL VENOUS BLD VENIPUNCTURE: CPT

## 2022-01-28 PROCEDURE — 82040 ASSAY OF SERUM ALBUMIN: CPT

## 2022-01-28 PROCEDURE — 82565 ASSAY OF CREATININE: CPT

## 2022-01-28 PROCEDURE — 85027 COMPLETE CBC AUTOMATED: CPT

## 2022-01-28 PROCEDURE — 87389 HIV-1 AG W/HIV-1&-2 AB AG IA: CPT

## 2022-01-28 PROCEDURE — 84460 ALANINE AMINO (ALT) (SGPT): CPT

## 2022-02-03 ENCOUNTER — VIRTUAL VISIT (OUTPATIENT)
Dept: BEHAVIORAL HEALTH | Facility: CLINIC | Age: 57
End: 2022-02-03
Payer: COMMERCIAL

## 2022-02-03 DIAGNOSIS — L50.9 HIVES: Primary | ICD-10-CM

## 2022-02-03 DIAGNOSIS — F41.1 GAD (GENERALIZED ANXIETY DISORDER): ICD-10-CM

## 2022-02-03 DIAGNOSIS — F43.10 PTSD (POST-TRAUMATIC STRESS DISORDER): Primary | ICD-10-CM

## 2022-02-03 DIAGNOSIS — F33.1 MAJOR DEPRESSIVE DISORDER, RECURRENT EPISODE, MODERATE (H): ICD-10-CM

## 2022-02-03 PROCEDURE — 90834 PSYTX W PT 45 MINUTES: CPT | Mod: 95 | Performed by: COUNSELOR

## 2022-02-03 ASSESSMENT — PATIENT HEALTH QUESTIONNAIRE - PHQ9
SUM OF ALL RESPONSES TO PHQ QUESTIONS 1-9: 6
10. IF YOU CHECKED OFF ANY PROBLEMS, HOW DIFFICULT HAVE THESE PROBLEMS MADE IT FOR YOU TO DO YOUR WORK, TAKE CARE OF THINGS AT HOME, OR GET ALONG WITH OTHER PEOPLE: SOMEWHAT DIFFICULT
SUM OF ALL RESPONSES TO PHQ QUESTIONS 1-9: 6

## 2022-02-03 NOTE — PROGRESS NOTES
Progress Note    Patient Name: Alina Martell  Date: 2/03/22         Service Type: Individual      Session Start Time: 1201  Session End Time: 1251     Session Length: 50 minutes    Session #: 27    Attendees: Client attended alone    Service Modality:  Video Visit:      Provider verified identity through the following two step process.  Patient provided:  Patient is known previously to provider     Telemedicine Visit: The patient's condition can be safely assessed and treated via synchronous audio and visual telemedicine encounter.       Reason for Telemedicine Visit: Services only offered telehealth     Originating Site (Patient Location): Patient's other vehicle     Distant Site (Provider Location): Provider Remote Setting- Home Office     Consent:  The patient/guardian has verbally consented to: the potential risks and benefits of telemedicine (video visit) versus in person care; bill my insurance or make self-payment for services provided; and responsibility for payment of non-covered services.      Patient would like the video invitation sent by:  My Chart     Mode of Communication:  Video Conference via [employer]     As the provider I attest to compliance with applicable laws and regulations related to telemedicine.     Treatment Plan Last Reviewed: 02/03/2022    PHQ-9 / ADA-7 :   PHQ-9 score:    PHQ 2/3/2022   PHQ-9 Total Score 6   Q9: Thoughts of better off dead/self-harm past 2 weeks Not at all     ADA-7 SCORE 10/5/2021 11/15/2021 12/13/2021   Total Score 3 (minimal anxiety) 6 (mild anxiety) -   Total Score 3 6 4       DATA  Interactive Complexity: No  Crisis: No       Progress Since Last Session (Related to Symptoms / Goals / Homework):   Symptoms: No change anxiety continuing to cause significant problems.     Homework: Partially completed      Episode of Care Goals: Satisfactory progress - ACTION (Actively working towards change); Intervened by  "reinforcing change plan / affirming steps taken     Current / Ongoing Stressors and Concerns:  Patient indicated her anxiety and health are a big concern. Patient reported she did not go on her trip due to her health. Patient indicated the night before having to go to the ED and deciding not to go. Patient reported she is continuing to have a lot of medical appointments to help figure out what is going on. Patient indicated she has been doing the breathing which has been helping. Patient reported she is looking for new housing which ahs been stressful. This therapist processed with patient not taking on too much and using skills taught in session's.     Treatment Objective(s) Addressed in This Session:   use \"worry time\" each day for 15 minutes of scheduled worry and then defer obsessive or anxious thinking until the next structured worry time  Increase interest, engagement, and pleasure in doing things  Decrease frequency and intensity of feeling down, depressed, hopeless  Identify negative self-talk and behaviors: challenge core beliefs, myths, and actions     Intervention:   Motivational Interviewing    MI Intervention: Open-ended questions and Reflections: simple and complex     Change Talk Expressed by the Patient: Ability to change    Provider Response to Change Talk: R - Reflected patient's change talk and S - Summarized patient's change talk statements          ASSESSMENT: Current Emotional / Mental Status (status of significant symptoms):   Risk status (Self / Other harm or suicidal ideation)   Patient denies current fears or concerns for personal safety.   Patient denies current or recent suicidal ideation or behaviors.   Patient denies current or recent homicidal ideation or behaviors.   Patient denies current or recent self injurious behavior or ideation.   Patient denies other safety concerns.   Patient reports there has been no change in risk factors since their last session.     Patient reports there " has been no change in protective factors since their last session.     Recommended that patient call 911 or go to the local ED should there be a change in any of these risk factors.     Appearance:   Appropriate    Eye Contact:   Good    Psychomotor Behavior: Normal    Attitude:   Cooperative    Orientation:   All   Speech    Rate / Production: Normal/ Responsive    Volume:  Normal    Mood:    Anxious    Affect:    Appropriate    Thought Content:  Clear    Thought Form:  Coherent    Insight:    Good      Medication Review:   No changes to current psychiatric medication(s)     Medication Compliance:   Yes     Changes in Health Issues:   None reported     Chemical Use Review:   Substance Use: Chemical use reviewed, no active concerns identified      Tobacco Use: No current tobacco use.      Diagnosis:  1. PTSD (post-traumatic stress disorder)    2. ADA (generalized anxiety disorder)    3. Major depressive disorder, recurrent episode, moderate (H)        Collateral Reports Completed:   Not Applicable    PLAN: (Patient Tasks / Therapist Tasks / Other)  Patient will continue with twice a month therapy. Patient will continue to do the breathing exercise. Patient will use relaxation methods in the morning and at night.     Mariana Love MA, [facility], Provider Oversight:  Dr. Evan Webb                                                             ______________________________________________________________________    Treatment Plan    Patient's Name: Alina Martell  YOB: 1965    Date: 02/03/2022    DSM5 Diagnoses: 296.32 (F33.1) Major Depressive Disorder, Recurrent Episode, Moderate _, 300.02 (F41.1) Generalized Anxiety Disorder or 309.81 (F43.10) Posttraumatic Stress Disorder (includes Posttraumatic Stress Disorder for Children 6 Years and Younger)  Without dissociative symptoms  Psychosocial / Contextual Factors: Family, financial and health  WHODAS:   WHODAS 2.0 Total Score 3/7/2021   Total  Score 21   Total Score MyChart 21     Referral / Collaboration:  Referral to another professional/service is not indicated at this time..    Anticipated number of session or this episode of care: Ongoing twice a juana therapy      MeasurableTreatment Goal(s) related to diagnosis / functional impairment(s)  Goal 1: Patient will work on reducing overall anxiety.     I will know I've met my goal when reporting minimal anxiety symptoms.      Objective #A (Patient Action)    Patient will use distraction each time intrusive worry surfaces.  Status: Continued - Date(s): 02/03/2022    Intervention(s)  Therapist will teach emotional regulation skills. ..    Objective #B  Patient will Decrease frequency and intensity of feeling down, depressed, hopeless.  Status: Continued - Date(s):  02/03/2022    Intervention(s)  Therapist will teach emotional recognition/identification. ..    Objective #C  Patient will Identify negative self-talk and behaviors: challenge core beliefs, myths, and actions.  Status: Continued - Date(s):  02/03/2022    Intervention(s)  Therapist will teach the client how to perform a behavioral chain analysis. ..    Goal 2: Patient will continue to work on reducing depression symptoms    I will know I've met my goal then reporting minimal depression symptoms     Objective #A (Patient Action)                          Patient will Increase interest, engagement, and pleasure in doing things.  Status: Continued - Date(s):  02/03/2022     Intervention(s)  Therapist will assign homework ..     Objective #B  Patient will Decrease frequency and intensity of feeling down, depressed, hopeless.  Status: Continued - Date(s):  02/03/2022     Intervention(s)  Therapist will assign homework at every session.       Patient has reviewed and agreed to the above plan.      Mariana Love, [facility] 02/03/2033

## 2022-02-04 ENCOUNTER — TRANSFERRED RECORDS (OUTPATIENT)
Dept: HEALTH INFORMATION MANAGEMENT | Facility: CLINIC | Age: 57
End: 2022-02-04
Payer: COMMERCIAL

## 2022-02-04 ASSESSMENT — PATIENT HEALTH QUESTIONNAIRE - PHQ9: SUM OF ALL RESPONSES TO PHQ QUESTIONS 1-9: 6

## 2022-02-06 RX ORDER — CETIRIZINE HYDROCHLORIDE 10 MG/1
10 TABLET ORAL DAILY
Qty: 90 TABLET | Refills: 3 | Status: SHIPPED | OUTPATIENT
Start: 2022-02-06 | End: 2022-02-28

## 2022-02-09 DIAGNOSIS — Z11.59 ENCOUNTER FOR SCREENING FOR OTHER VIRAL DISEASES: Primary | ICD-10-CM

## 2022-02-14 DIAGNOSIS — I48.0 PAROXYSMAL ATRIAL FIBRILLATION (H): Primary | ICD-10-CM

## 2022-02-14 ASSESSMENT — PATIENT HEALTH QUESTIONNAIRE - PHQ9
SUM OF ALL RESPONSES TO PHQ QUESTIONS 1-9: 7
SUM OF ALL RESPONSES TO PHQ QUESTIONS 1-9: 7
10. IF YOU CHECKED OFF ANY PROBLEMS, HOW DIFFICULT HAVE THESE PROBLEMS MADE IT FOR YOU TO DO YOUR WORK, TAKE CARE OF THINGS AT HOME, OR GET ALONG WITH OTHER PEOPLE: SOMEWHAT DIFFICULT

## 2022-02-15 ENCOUNTER — OFFICE VISIT (OUTPATIENT)
Dept: BEHAVIORAL HEALTH | Facility: CLINIC | Age: 57
End: 2022-02-15
Payer: COMMERCIAL

## 2022-02-15 DIAGNOSIS — F41.1 GAD (GENERALIZED ANXIETY DISORDER): ICD-10-CM

## 2022-02-15 DIAGNOSIS — F33.1 MAJOR DEPRESSIVE DISORDER, RECURRENT EPISODE, MODERATE (H): ICD-10-CM

## 2022-02-15 DIAGNOSIS — F43.10 PTSD (POST-TRAUMATIC STRESS DISORDER): Primary | ICD-10-CM

## 2022-02-15 PROCEDURE — 90834 PSYTX W PT 45 MINUTES: CPT | Performed by: COUNSELOR

## 2022-02-15 ASSESSMENT — PATIENT HEALTH QUESTIONNAIRE - PHQ9: SUM OF ALL RESPONSES TO PHQ QUESTIONS 1-9: 7

## 2022-02-15 NOTE — PROGRESS NOTES
"                                           Progress Note    Patient Name: Alina Martell  Date: 2/15/22         Service Type: Individual      Session Start Time: 801  Session End Time: 851     Session Length: 50 minutes    Session #: 28    Attendees: Client attended alone    Service Modality:  In-Person     Treatment Plan Last Reviewed: 02/03/2022    PHQ-9 / ADA-7 :   PHQ-9 score:    PHQ 2/14/2022   PHQ-9 Total Score 7   Q9: Thoughts of better off dead/self-harm past 2 weeks Not at all     ADA-7 SCORE 10/5/2021 11/15/2021 12/13/2021   Total Score 3 (minimal anxiety) 6 (mild anxiety) -   Total Score 3 6 4       DATA  Interactive Complexity: No  Crisis: No       Progress Since Last Session (Related to Symptoms / Goals / Homework):   Symptoms: No change anxiety continuing to present health problems on and off.     Homework: Achieved / completed to satisfaction      Episode of Care Goals: Satisfactory progress - ACTION (Actively working towards change); Intervened by reinforcing change plan / affirming steps taken     Current / Ongoing Stressors and Concerns:  Patient reported feeling anxious. Patient indicated she is worried about her health which causes anxiety but doctor's telling her part of her health problems is her anxiety. Patient talked about her concerns over her health and upcoming appointments. Patient indicated she is doing daily breathing exercises that have helped a lot. Patient reported working on staying in the moment and trying not to jump ton conclusions. This therapist went over more breathing exercises to help control breathing.       Treatment Objective(s) Addressed in This Session:   use \"worry time\" each day for 15 minutes of scheduled worry and then defer obsessive or anxious thinking until the next structured worry time  Increase interest, engagement, and pleasure in doing things  Decrease frequency and intensity of feeling down, depressed, hopeless  Identify negative self-talk and " behaviors: challenge core beliefs, myths, and actions     Intervention:   Motivational Interviewing    MI Intervention: Open-ended questions and Reflections: simple and complex     Change Talk Expressed by the Patient: Ability to change    Provider Response to Change Talk: R - Reflected patient's change talk and S - Summarized patient's change talk statements          ASSESSMENT: Current Emotional / Mental Status (status of significant symptoms):   Risk status (Self / Other harm or suicidal ideation)   Patient denies current fears or concerns for personal safety.   Patient denies current or recent suicidal ideation or behaviors.   Patient denies current or recent homicidal ideation or behaviors.   Patient denies current or recent self injurious behavior or ideation.   Patient denies other safety concerns.   Patient reports there has been no change in risk factors since their last session.     Patient reports there has been no change in protective factors since their last session.     Recommended that patient call 911 or go to the local ED should there be a change in any of these risk factors.     Appearance:   Appropriate    Eye Contact:   Good    Psychomotor Behavior: Normal    Attitude:   Cooperative    Orientation:   All   Speech    Rate / Production: Normal/ Responsive    Volume:  Normal    Mood:    Anxious    Affect:    Appropriate    Thought Content:  Clear    Thought Form:  Coherent    Insight:    Good      Medication Review:   No changes to current psychiatric medication(s)     Medication Compliance:   Yes     Changes in Health Issues:   None reported     Chemical Use Review:   Substance Use: Chemical use reviewed, no active concerns identified      Tobacco Use: No current tobacco use.      Diagnosis:  1. PTSD (post-traumatic stress disorder)    2. ADA (generalized anxiety disorder)    3. Major depressive disorder, recurrent episode, moderate (H)        Collateral Reports Completed:   Not Applicable    PLAN:  (Patient Tasks / Therapist Tasks / Other)  Patient will continue with twice a month therapy.Patient will set an alarm to practice breathing 3 times a day. Patient will continue to talk with ehr support network over her health concerns.     Mariana Love MA, [facility], Provider Oversight:  Dr. Evan Webb                                                             ______________________________________________________________________    Treatment Plan    Patient's Name: Alina Martell  YOB: 1965    Date: 02/03/2022    DSM5 Diagnoses: 296.32 (F33.1) Major Depressive Disorder, Recurrent Episode, Moderate _, 300.02 (F41.1) Generalized Anxiety Disorder or 309.81 (F43.10) Posttraumatic Stress Disorder (includes Posttraumatic Stress Disorder for Children 6 Years and Younger)  Without dissociative symptoms  Psychosocial / Contextual Factors: Family, financial and health  WHODAS:   WHODAS 2.0 Total Score 3/7/2021   Total Score 21   Total Score MyChart 21     Referral / Collaboration:  Referral to another professional/service is not indicated at this time..    Anticipated number of session or this episode of care: Ongoing twice a juana therapy      MeasurableTreatment Goal(s) related to diagnosis / functional impairment(s)  Goal 1: Patient will work on reducing overall anxiety.     I will know I've met my goal when reporting minimal anxiety symptoms.      Objective #A (Patient Action)    Patient will use distraction each time intrusive worry surfaces.  Status: Continued - Date(s): 02/03/2022    Intervention(s)  Therapist will teach emotional regulation skills. ..    Objective #B  Patient will Decrease frequency and intensity of feeling down, depressed, hopeless.  Status: Continued - Date(s):  02/03/2022    Intervention(s)  Therapist will teach emotional recognition/identification. ..    Objective #C  Patient will Identify negative self-talk and behaviors: challenge core beliefs, myths, and  actions.  Status: Continued - Date(s):  02/03/2022    Intervention(s)  Therapist will teach the client how to perform a behavioral chain analysis. ..    Goal 2: Patient will continue to work on reducing depression symptoms    I will know I've met my goal then reporting minimal depression symptoms     Objective #A (Patient Action)                          Patient will Increase interest, engagement, and pleasure in doing things.  Status: Continued - Date(s):  02/03/2022     Intervention(s)  Therapist will assign homework ..     Objective #B  Patient will Decrease frequency and intensity of feeling down, depressed, hopeless.  Status: Continued - Date(s):  02/03/2022     Intervention(s)  Therapist will assign homework at every session.       Patient has reviewed and agreed to the above plan.      Mariana Love, [facility] 02/03/2033

## 2022-02-16 ENCOUNTER — OFFICE VISIT (OUTPATIENT)
Dept: RHEUMATOLOGY | Facility: CLINIC | Age: 57
End: 2022-02-16
Payer: COMMERCIAL

## 2022-02-16 ENCOUNTER — OFFICE VISIT (OUTPATIENT)
Dept: FAMILY MEDICINE | Facility: CLINIC | Age: 57
End: 2022-02-16
Payer: COMMERCIAL

## 2022-02-16 VITALS
SYSTOLIC BLOOD PRESSURE: 120 MMHG | HEIGHT: 69 IN | BODY MASS INDEX: 41.25 KG/M2 | OXYGEN SATURATION: 98 % | DIASTOLIC BLOOD PRESSURE: 72 MMHG | WEIGHT: 278.5 LBS | HEART RATE: 76 BPM

## 2022-02-16 VITALS
WEIGHT: 278 LBS | SYSTOLIC BLOOD PRESSURE: 120 MMHG | DIASTOLIC BLOOD PRESSURE: 72 MMHG | BODY MASS INDEX: 40.76 KG/M2 | HEART RATE: 76 BPM

## 2022-02-16 DIAGNOSIS — E66.01 MORBID OBESITY (H): ICD-10-CM

## 2022-02-16 DIAGNOSIS — I10 ESSENTIAL HYPERTENSION: ICD-10-CM

## 2022-02-16 DIAGNOSIS — G47.33 OBSTRUCTIVE SLEEP APNEA SYNDROME: ICD-10-CM

## 2022-02-16 DIAGNOSIS — M05.79 SEROPOSITIVE RHEUMATOID ARTHRITIS OF MULTIPLE SITES (H): Primary | ICD-10-CM

## 2022-02-16 DIAGNOSIS — R76.8 CYCLIC CITRULLINATED PEPTIDE (CCP) ANTIBODY POSITIVE: ICD-10-CM

## 2022-02-16 DIAGNOSIS — N95.0 POSTMENOPAUSAL BLEEDING: ICD-10-CM

## 2022-02-16 DIAGNOSIS — F41.1 GAD (GENERALIZED ANXIETY DISORDER): ICD-10-CM

## 2022-02-16 DIAGNOSIS — M25.50 POLYARTHRALGIA: ICD-10-CM

## 2022-02-16 DIAGNOSIS — Z79.899 HIGH RISK MEDICATION USE: ICD-10-CM

## 2022-02-16 DIAGNOSIS — Z01.818 PREOP GENERAL PHYSICAL EXAM: Primary | ICD-10-CM

## 2022-02-16 DIAGNOSIS — Z12.31 VISIT FOR SCREENING MAMMOGRAM: ICD-10-CM

## 2022-02-16 LAB
ALBUMIN SERPL-MCNC: 3.5 G/DL (ref 3.5–5)
ALP SERPL-CCNC: 103 U/L (ref 45–120)
ALT SERPL W P-5'-P-CCNC: 13 U/L (ref 0–45)
ANION GAP SERPL CALCULATED.3IONS-SCNC: 8 MMOL/L (ref 5–18)
AST SERPL W P-5'-P-CCNC: 12 U/L (ref 0–40)
BASOPHILS # BLD AUTO: 0 10E3/UL (ref 0–0.2)
BASOPHILS NFR BLD AUTO: 0 %
BILIRUB SERPL-MCNC: 0.2 MG/DL (ref 0–1)
BUN SERPL-MCNC: 13 MG/DL (ref 8–22)
CALCIUM SERPL-MCNC: 9.5 MG/DL (ref 8.5–10.5)
CHLORIDE BLD-SCNC: 104 MMOL/L (ref 98–107)
CO2 SERPL-SCNC: 28 MMOL/L (ref 22–31)
CREAT SERPL-MCNC: 0.74 MG/DL (ref 0.6–1.1)
EOSINOPHIL # BLD AUTO: 0.4 10E3/UL (ref 0–0.7)
EOSINOPHIL NFR BLD AUTO: 4 %
ERYTHROCYTE [DISTWIDTH] IN BLOOD BY AUTOMATED COUNT: 13.6 % (ref 10–15)
GFR SERPL CREATININE-BSD FRML MDRD: >90 ML/MIN/1.73M2
GLUCOSE BLD-MCNC: 103 MG/DL (ref 70–125)
HCT VFR BLD AUTO: 38.8 % (ref 35–47)
HGB BLD-MCNC: 12.2 G/DL (ref 11.7–15.7)
IMM GRANULOCYTES # BLD: 0 10E3/UL
IMM GRANULOCYTES NFR BLD: 0 %
LYMPHOCYTES # BLD AUTO: 2.8 10E3/UL (ref 0.8–5.3)
LYMPHOCYTES NFR BLD AUTO: 28 %
MCH RBC QN AUTO: 27.3 PG (ref 26.5–33)
MCHC RBC AUTO-ENTMCNC: 31.4 G/DL (ref 31.5–36.5)
MCV RBC AUTO: 87 FL (ref 78–100)
MONOCYTES # BLD AUTO: 0.7 10E3/UL (ref 0–1.3)
MONOCYTES NFR BLD AUTO: 7 %
NEUTROPHILS # BLD AUTO: 6.1 10E3/UL (ref 1.6–8.3)
NEUTROPHILS NFR BLD AUTO: 60 %
PLATELET # BLD AUTO: 377 10E3/UL (ref 150–450)
POTASSIUM BLD-SCNC: 4.3 MMOL/L (ref 3.5–5)
PROT SERPL-MCNC: 7.4 G/DL (ref 6–8)
RBC # BLD AUTO: 4.47 10E6/UL (ref 3.8–5.2)
SODIUM SERPL-SCNC: 140 MMOL/L (ref 136–145)
TSH SERPL DL<=0.005 MIU/L-ACNC: 2.16 UIU/ML (ref 0.3–5)
WBC # BLD AUTO: 10 10E3/UL (ref 4–11)

## 2022-02-16 PROCEDURE — 80050 GENERAL HEALTH PANEL: CPT | Performed by: INTERNAL MEDICINE

## 2022-02-16 PROCEDURE — 99214 OFFICE O/P EST MOD 30 MIN: CPT | Performed by: FAMILY MEDICINE

## 2022-02-16 PROCEDURE — 99214 OFFICE O/P EST MOD 30 MIN: CPT | Performed by: INTERNAL MEDICINE

## 2022-02-16 PROCEDURE — 36415 COLL VENOUS BLD VENIPUNCTURE: CPT | Performed by: INTERNAL MEDICINE

## 2022-02-16 NOTE — PROGRESS NOTES
[address] [address]  Phone: [phone]  Fax: [phone]  Primary Provider: Carl Bansal  Pre-op Performing Provider: CARL BANSAL      PREOPERATIVE EVALUATION:  Today's date: 2/16/2022    Alina Martell is a 56 year old female who presents for a preoperative evaluation.    Surgical Information:  Surgery/Procedure: HYSTEROSCOPY, WITH DILATION AND CURETTAGE  Surgery Location: [facility]  Surgeon: Irma Cary MD  Surgery Date: 3/3/2022  Time of Surgery: 9:00 AM  Where patient plans to recover: At home with family  Fax number for surgical facility: Note does not need to be faxed, will be available electronically in Epic.    Type of Anesthesia Anticipated: to be determined    Assessment & Plan     The proposed surgical procedure is considered LOW risk.    Postmenopausal bleeding  Will do labs closer to surgery with the covid test.  No absolute contraindication to surgery  - CBC with platelets and differential  - Comprehensive metabolic panel (BMP + Alb, Alk Phos, ALT, AST, Total. Bili, TP)    Preop general physical exam  Routine labs.  Reviewed recent EKG    Visit for screening mammogram  - MA SCREENING DIGITAL BILAT - Future  (s+30)    Morbid obesity (H)  Recommend diet and exercise    Obstructive sleep apnea syndrome  Stable.  Seeing sleep medicine    Essential hypertension  Stable and well controlled    ADA (generalized anxiety disorder)  Improving.           Risks and Recommendations:  The patient has the following additional risks and recommendations for perioperative complications:   - Morbid obesity (BMI >40)  Cardiovascular:   - stable. Now in sinus rhythm and seeing cardiology.  Sounds like plan is to stop eliquis next week if the CTA is normal s/p cardioversion.  Medication Instructions:      RECOMMENDATION:  APPROVAL GIVEN to proceed with proposed procedure, without further diagnostic evaluation.    Review  of external notes as documented above                   Subjective     HPI related to upcoming procedure: menorrhagia.    Preop Questions 2/15/2022   1. Have you ever had a heart attack or stroke? No   2. Have you ever had surgery on your heart or blood vessels, such as a stent placement, a coronary artery bypass, or surgery on an artery in your head, neck, heart, or legs? No   3. Do you have chest pain with activity? No   4. Do you have a history of  heart failure? No   5. Do you currently have a cold, bronchitis or symptoms of other infection? No   6. Do you have a cough, shortness of breath, or wheezing? No   7. Do you or anyone in your family have previous history of blood clots? No   8. Do you or does anyone in your family have a serious bleeding problem such as prolonged bleeding following surgeries or cuts? No   9. Have you ever had problems with anemia or been told to take iron pills? YES - hx of and taking iron supplements now   10. Have you had any abnormal blood loss such as black, tarry or bloody stools, or abnormal vaginal bleeding? YES    11. Have you ever had a blood transfusion? No   12. Are you willing to have a blood transfusion if it is medically needed before, during, or after your surgery? Yes   13. Have you or any of your relatives ever had problems with anesthesia? No   14. Do you have sleep apnea, excessive snoring or daytime drowsiness? YES - on CPAP   14a. Do you have a CPAP machine? Yes   15. Do you have any artifical heart valves or other implanted medical devices like a pacemaker, defibrillator, or continuous glucose monitor? No   16. Do you have artificial joints? No   17. Are you allergic to latex? No   18. Is there any chance that you may be pregnant? No       Health Care Directive:  Patient does not have a Health Care Directive or Living Will: Discussed advance care planning with patient; information given to patient to review.    Preoperative Review of :   reviewed - no record  of controlled substances prescribed.          Review of Systems  CONSTITUTIONAL: NEGATIVE for fever, chills, change in weight  ENT/MOUTH: NEGATIVE for ear, mouth and throat problems  RESP: NEGATIVE for significant cough or SOB  CV: NEGATIVE for chest pain, palpitations or peripheral edema    Patient Active Problem List    Diagnosis Date Noted     Postmenopausal bleeding 02/16/2022     Priority: Medium     Obstructive sleep apnea syndrome 01/20/2022     Priority: Medium     Essential hypertension 01/20/2022     Priority: Medium     Coronary artery disease of native artery with stable angina pectoris (H) 01/20/2022     Priority: Medium     Seropositive rheumatoid arthritis of multiple sites (H) 01/05/2022     Priority: Medium     Recurrent major depressive disorder, in full remission (H) 01/05/2022     Priority: Medium     Paroxysmal atrial fibrillation (H) 01/05/2022     Priority: Medium     Morbid obesity (H) 01/05/2022     Priority: Medium     Chest pain 07/11/2021     Priority: Medium     ADA (generalized anxiety disorder) 03/31/2021     Priority: Medium     Depressed 11/14/2018     Priority: Medium     Cyclic citrullinated peptide (CCP) antibody positive 10/03/2017     Priority: Medium     Tendonitis of both shoulders 06/09/2017     Priority: Medium     Chronic pain 07/18/2016     Priority: Medium     Olecranon bursitis of right elbow 07/08/2016     Priority: Medium     Formatting of this note might be different from the original.  Replacement Utility updated for latest IMO load       Grief 03/29/2016     Priority: Medium     Sicca syndrome (H) 10/27/2015     Priority: Medium     Anxiety 12/08/2014     Priority: Medium     Panic attack 12/08/2014     Priority: Medium     Sleep disorder 12/05/2014     Priority: Medium     Insomnia related to another mental disorder 09/03/2014     Priority: Medium     Microcytic anemia 09/02/2014     Priority: Medium     Migraine headache 09/02/2014     Priority: Medium     Reflux  09/02/2014     Priority: Medium      Past Medical History:   Diagnosis Date     Cannabis use without complication 12/8/2014     Carpal tunnel syndrome      Obesity      Paroxysmal atrial fibrillation (H)      PTSD (post-traumatic stress disorder)      RA (rheumatoid arthritis) (H)      Rheumatoid arthritis (H) 7/8/2016     Sicca syndrome (H)      Past Surgical History:   Procedure Laterality Date     CHOLECYSTECTOMY       HC REMOVAL GALLBLADDER      Description: Cholecystectomy;  Recorded: 10/15/2013;     LAPAROSCOPIC TUBAL LIGATION       RELEASE CARPAL TUNNEL BILATERAL       TUBAL LIGATION  1995     Current Outpatient Medications   Medication Sig Dispense Refill     adalimumab (HUMIRA *CF* PEN) 40 MG/0.4ML pen kit INJECT 0.4 MLS (40 MG) SUBCUTANEOUS EVERY 14 DAYS HOLD FOR SIGNS OF INFECTION, THEN SEEK MEDICAL ATTENTION. 2 each 4     apixaban ANTICOAGULANT (ELIQUIS ANTICOAGULANT) 5 MG tablet Take 1 tablet (5 mg) by mouth 2 times daily 180 tablet 1     diltiazem ER COATED BEADS (CARDIZEM CD/CARTIA XT) 180 MG 24 hr capsule Take 2 capsules (360 mg) by mouth daily 180 capsule 3     hydrOXYzine (ATARAX) 25 MG tablet Take 1 tablet (25 mg) by mouth 3 times daily as needed for anxiety 90 tablet 1     loratadine (CLARITIN) 10 MG tablet Take 10 mg by mouth daily        Multiple Vitamins-Minerals (CENTROVITE) TABS TAKE 1 TABLET BY MOUTH EVERY DAY FOR 30 DAYS       sertraline (ZOLOFT) 25 MG tablet Take 1 tablet (25 mg) by mouth daily 90 tablet 0     vitamin C (ASCORBIC ACID) 1000 MG TABS Take 1,000 mg by mouth daily        vitamin D3 (CHOLECALCIFEROL) 250 mcg (29474 units) capsule Take 1 capsule by mouth once a week       cetirizine (ZYRTEC) 10 MG tablet Take 1 tablet (10 mg) by mouth daily 90 tablet 3     EPINEPHrine (ANY BX GENERIC EQUIV) 0.3 MG/0.3ML injection 2-pack Inject 0.3 mg into the muscle as needed for anaphylaxis  (Patient not taking: Reported on 2/16/2022)       levothyroxine (SYNTHROID/LEVOTHROID) 50 MCG tablet  Take 1 tablet (50 mcg) by mouth daily (Patient not taking: Reported on 1/20/2022) 90 tablet 0     Lidocaine (LIDOCARE) 4 % Patch Place 1 patch onto the skin every 24 hours To prevent lidocaine toxicity, patient should be patch free for 12 hrs daily. (Patient not taking: Reported on 2/16/2022) 5 patch 0     meclizine (ANTIVERT) 12.5 MG tablet Take 1 tablet (12.5 mg) by mouth 4 times daily as needed for dizziness (Patient not taking: Reported on 2/16/2022) 30 tablet 0     prazosin (MINIPRESS) 1 MG capsule Take 1 capsule by mouth nightly as needed  (Patient not taking: Reported on 2/16/2022)       traZODone (DESYREL) 50 MG tablet Take 0.5 tablets (25 mg) by mouth At Bedtime (Patient not taking: Reported on 2/16/2022) 90 tablet 0     zinc sulfate (ZINCATE) 220 (50 Zn) MG capsule  (Patient not taking: Reported on 1/20/2022)         Allergies   Allergen Reactions     Penicillins Shortness Of Breath, Palpitations, Dizziness, Anaphylaxis, Hives, Itching and Rash     Test in 2031, see allergy note on 7/29/21     Shellfish-Derived Products Anaphylaxis     Sulfa Drugs Hives and Anaphylaxis        Social History     Tobacco Use     Smoking status: Never Smoker     Smokeless tobacco: Never Used     Tobacco comment: smokes marijuana   Substance Use Topics     Alcohol use: Not Currently     Comment: Alcoholic Drinks/day: Never an issue, she states.  7/8/16     Family History   Problem Relation Age of Onset     Crohn's Disease Mother         Total colectomy with ileostomy.     Psoriasis Brother      Snoring Brother      Snoring Father      Diabetes Father      Prostate Cancer Father      Chronic Kidney Disease Father         Chose against dialysis.     Substance Abuse Brother      Depression Brother      Attention Deficit Disorder Brother      Alcoholism Brother      No Known Problems Daughter      No Known Problems Son      Alcoholism Brother      Substance Abuse Brother      Lupus Cousin      Alcoholism Maternal Grandfather       "Breast Cancer No family hx of      History   Drug Use Unknown     Comment: Drug use: Former marijuana use, not current. 7/8/16         Objective     /72 (BP Location: Right arm, Patient Position: Sitting, Cuff Size: Adult Large)   Pulse 76   Ht 1.759 m (5' 9.25\")   Wt 126.3 kg (278 lb 8 oz)   LMP 12/12/2021 (Exact Date)   SpO2 98%   Breastfeeding No   BMI 40.83 kg/m      Physical Exam  GENERAL APPEARANCE: healthy, alert and no distress  HENT: ear canals and TM's normal and nose and mouth without ulcers or lesions  RESP: lungs clear to auscultation - no rales, rhonchi or wheezes  CV: regular rate and rhythm, normal S1 S2, no S3 or S4 and no murmur, click or rub   ABDOMEN: soft, nontender, no HSM or masses and bowel sounds normal  NEURO: Normal strength and tone, sensory exam grossly normal, mentation intact and speech normal    Recent Labs   Lab Test 01/28/22  1648 01/17/22  1406 01/16/22  2301   HGB 12.5 13.6 13.0    392 349   NA  --  140 139   POTASSIUM  --  3.5 3.7   CR 0.73 0.80 0.85        Diagnostics:  Labs pending at this time.  Results will be reviewed when available.   No EKG required for low risk surgery (cataract, skin procedure, breast biopsy, etc).  EKG: appears normal, NSR, normal axis, normal intervals, no acute ST/T changes c/w ischemia, no LVH by voltage criteria, unchanged from previous tracings    Revised Cardiac Risk Index (RCRI):  The patient has the following serious cardiovascular risks for perioperative complications:   - No serious cardiac risks = 0 points     RCRI Interpretation: 0 points: Class I (very low risk - 0.4% complication rate)           Signed Electronically by: Estelle Bansal MD  Copy of this evaluation report is provided to requesting physician.      "

## 2022-02-16 NOTE — H&P (VIEW-ONLY)
[address] [address]  Phone: [phone]  Fax: [phone]  Primary Provider: Carl Bansal  Pre-op Performing Provider: CARL BANSAL      PREOPERATIVE EVALUATION:  Today's date: 2/16/2022    Alina Martell is a 56 year old female who presents for a preoperative evaluation.    Surgical Information:  Surgery/Procedure: HYSTEROSCOPY, WITH DILATION AND CURETTAGE  Surgery Location: [facility]  Surgeon: Irma Cary MD  Surgery Date: 3/3/2022  Time of Surgery: 9:00 AM  Where patient plans to recover: At home with family  Fax number for surgical facility: Note does not need to be faxed, will be available electronically in Epic.    Type of Anesthesia Anticipated: to be determined    Assessment & Plan     The proposed surgical procedure is considered LOW risk.    Postmenopausal bleeding  Will do labs closer to surgery with the covid test.  No absolute contraindication to surgery  - CBC with platelets and differential  - Comprehensive metabolic panel (BMP + Alb, Alk Phos, ALT, AST, Total. Bili, TP)    Preop general physical exam  Routine labs.  Reviewed recent EKG    Visit for screening mammogram  - MA SCREENING DIGITAL BILAT - Future  (s+30)    Morbid obesity (H)  Recommend diet and exercise    Obstructive sleep apnea syndrome  Stable.  Seeing sleep medicine    Essential hypertension  Stable and well controlled    ADA (generalized anxiety disorder)  Improving.           Risks and Recommendations:  The patient has the following additional risks and recommendations for perioperative complications:   - Morbid obesity (BMI >40)  Cardiovascular:   - stable. Now in sinus rhythm and seeing cardiology.  Sounds like plan is to stop eliquis next week if the CTA is normal s/p cardioversion.  Medication Instructions:      RECOMMENDATION:  APPROVAL GIVEN to proceed with proposed procedure, without further diagnostic evaluation.    Review  of external notes as documented above                   Subjective     HPI related to upcoming procedure: menorrhagia.    Preop Questions 2/15/2022   1. Have you ever had a heart attack or stroke? No   2. Have you ever had surgery on your heart or blood vessels, such as a stent placement, a coronary artery bypass, or surgery on an artery in your head, neck, heart, or legs? No   3. Do you have chest pain with activity? No   4. Do you have a history of  heart failure? No   5. Do you currently have a cold, bronchitis or symptoms of other infection? No   6. Do you have a cough, shortness of breath, or wheezing? No   7. Do you or anyone in your family have previous history of blood clots? No   8. Do you or does anyone in your family have a serious bleeding problem such as prolonged bleeding following surgeries or cuts? No   9. Have you ever had problems with anemia or been told to take iron pills? YES - hx of and taking iron supplements now   10. Have you had any abnormal blood loss such as black, tarry or bloody stools, or abnormal vaginal bleeding? YES    11. Have you ever had a blood transfusion? No   12. Are you willing to have a blood transfusion if it is medically needed before, during, or after your surgery? Yes   13. Have you or any of your relatives ever had problems with anesthesia? No   14. Do you have sleep apnea, excessive snoring or daytime drowsiness? YES - on CPAP   14a. Do you have a CPAP machine? Yes   15. Do you have any artifical heart valves or other implanted medical devices like a pacemaker, defibrillator, or continuous glucose monitor? No   16. Do you have artificial joints? No   17. Are you allergic to latex? No   18. Is there any chance that you may be pregnant? No       Health Care Directive:  Patient does not have a Health Care Directive or Living Will: Discussed advance care planning with patient; information given to patient to review.    Preoperative Review of :   reviewed - no record  of controlled substances prescribed.          Review of Systems  CONSTITUTIONAL: NEGATIVE for fever, chills, change in weight  ENT/MOUTH: NEGATIVE for ear, mouth and throat problems  RESP: NEGATIVE for significant cough or SOB  CV: NEGATIVE for chest pain, palpitations or peripheral edema    Patient Active Problem List    Diagnosis Date Noted     Postmenopausal bleeding 02/16/2022     Priority: Medium     Obstructive sleep apnea syndrome 01/20/2022     Priority: Medium     Essential hypertension 01/20/2022     Priority: Medium     Coronary artery disease of native artery with stable angina pectoris (H) 01/20/2022     Priority: Medium     Seropositive rheumatoid arthritis of multiple sites (H) 01/05/2022     Priority: Medium     Recurrent major depressive disorder, in full remission (H) 01/05/2022     Priority: Medium     Paroxysmal atrial fibrillation (H) 01/05/2022     Priority: Medium     Morbid obesity (H) 01/05/2022     Priority: Medium     Chest pain 07/11/2021     Priority: Medium     ADA (generalized anxiety disorder) 03/31/2021     Priority: Medium     Depressed 11/14/2018     Priority: Medium     Cyclic citrullinated peptide (CCP) antibody positive 10/03/2017     Priority: Medium     Tendonitis of both shoulders 06/09/2017     Priority: Medium     Chronic pain 07/18/2016     Priority: Medium     Olecranon bursitis of right elbow 07/08/2016     Priority: Medium     Formatting of this note might be different from the original.  Replacement Utility updated for latest IMO load       Grief 03/29/2016     Priority: Medium     Sicca syndrome (H) 10/27/2015     Priority: Medium     Anxiety 12/08/2014     Priority: Medium     Panic attack 12/08/2014     Priority: Medium     Sleep disorder 12/05/2014     Priority: Medium     Insomnia related to another mental disorder 09/03/2014     Priority: Medium     Microcytic anemia 09/02/2014     Priority: Medium     Migraine headache 09/02/2014     Priority: Medium     Reflux  09/02/2014     Priority: Medium      Past Medical History:   Diagnosis Date     Cannabis use without complication 12/8/2014     Carpal tunnel syndrome      Obesity      Paroxysmal atrial fibrillation (H)      PTSD (post-traumatic stress disorder)      RA (rheumatoid arthritis) (H)      Rheumatoid arthritis (H) 7/8/2016     Sicca syndrome (H)      Past Surgical History:   Procedure Laterality Date     CHOLECYSTECTOMY       HC REMOVAL GALLBLADDER      Description: Cholecystectomy;  Recorded: 10/15/2013;     LAPAROSCOPIC TUBAL LIGATION       RELEASE CARPAL TUNNEL BILATERAL       TUBAL LIGATION  1995     Current Outpatient Medications   Medication Sig Dispense Refill     adalimumab (HUMIRA *CF* PEN) 40 MG/0.4ML pen kit INJECT 0.4 MLS (40 MG) SUBCUTANEOUS EVERY 14 DAYS HOLD FOR SIGNS OF INFECTION, THEN SEEK MEDICAL ATTENTION. 2 each 4     apixaban ANTICOAGULANT (ELIQUIS ANTICOAGULANT) 5 MG tablet Take 1 tablet (5 mg) by mouth 2 times daily 180 tablet 1     diltiazem ER COATED BEADS (CARDIZEM CD/CARTIA XT) 180 MG 24 hr capsule Take 2 capsules (360 mg) by mouth daily 180 capsule 3     hydrOXYzine (ATARAX) 25 MG tablet Take 1 tablet (25 mg) by mouth 3 times daily as needed for anxiety 90 tablet 1     loratadine (CLARITIN) 10 MG tablet Take 10 mg by mouth daily        Multiple Vitamins-Minerals (CENTROVITE) TABS TAKE 1 TABLET BY MOUTH EVERY DAY FOR 30 DAYS       sertraline (ZOLOFT) 25 MG tablet Take 1 tablet (25 mg) by mouth daily 90 tablet 0     vitamin C (ASCORBIC ACID) 1000 MG TABS Take 1,000 mg by mouth daily        vitamin D3 (CHOLECALCIFEROL) 250 mcg (92443 units) capsule Take 1 capsule by mouth once a week       cetirizine (ZYRTEC) 10 MG tablet Take 1 tablet (10 mg) by mouth daily 90 tablet 3     EPINEPHrine (ANY BX GENERIC EQUIV) 0.3 MG/0.3ML injection 2-pack Inject 0.3 mg into the muscle as needed for anaphylaxis  (Patient not taking: Reported on 2/16/2022)       levothyroxine (SYNTHROID/LEVOTHROID) 50 MCG tablet  Take 1 tablet (50 mcg) by mouth daily (Patient not taking: Reported on 1/20/2022) 90 tablet 0     Lidocaine (LIDOCARE) 4 % Patch Place 1 patch onto the skin every 24 hours To prevent lidocaine toxicity, patient should be patch free for 12 hrs daily. (Patient not taking: Reported on 2/16/2022) 5 patch 0     meclizine (ANTIVERT) 12.5 MG tablet Take 1 tablet (12.5 mg) by mouth 4 times daily as needed for dizziness (Patient not taking: Reported on 2/16/2022) 30 tablet 0     prazosin (MINIPRESS) 1 MG capsule Take 1 capsule by mouth nightly as needed  (Patient not taking: Reported on 2/16/2022)       traZODone (DESYREL) 50 MG tablet Take 0.5 tablets (25 mg) by mouth At Bedtime (Patient not taking: Reported on 2/16/2022) 90 tablet 0     zinc sulfate (ZINCATE) 220 (50 Zn) MG capsule  (Patient not taking: Reported on 1/20/2022)         Allergies   Allergen Reactions     Penicillins Shortness Of Breath, Palpitations, Dizziness, Anaphylaxis, Hives, Itching and Rash     Test in 2031, see allergy note on 7/29/21     Shellfish-Derived Products Anaphylaxis     Sulfa Drugs Hives and Anaphylaxis        Social History     Tobacco Use     Smoking status: Never Smoker     Smokeless tobacco: Never Used     Tobacco comment: smokes marijuana   Substance Use Topics     Alcohol use: Not Currently     Comment: Alcoholic Drinks/day: Never an issue, she states.  7/8/16     Family History   Problem Relation Age of Onset     Crohn's Disease Mother         Total colectomy with ileostomy.     Psoriasis Brother      Snoring Brother      Snoring Father      Diabetes Father      Prostate Cancer Father      Chronic Kidney Disease Father         Chose against dialysis.     Substance Abuse Brother      Depression Brother      Attention Deficit Disorder Brother      Alcoholism Brother      No Known Problems Daughter      No Known Problems Son      Alcoholism Brother      Substance Abuse Brother      Lupus Cousin      Alcoholism Maternal Grandfather       "Breast Cancer No family hx of      History   Drug Use Unknown     Comment: Drug use: Former marijuana use, not current. 7/8/16         Objective     /72 (BP Location: Right arm, Patient Position: Sitting, Cuff Size: Adult Large)   Pulse 76   Ht 1.759 m (5' 9.25\")   Wt 126.3 kg (278 lb 8 oz)   LMP 12/12/2021 (Exact Date)   SpO2 98%   Breastfeeding No   BMI 40.83 kg/m      Physical Exam  GENERAL APPEARANCE: healthy, alert and no distress  HENT: ear canals and TM's normal and nose and mouth without ulcers or lesions  RESP: lungs clear to auscultation - no rales, rhonchi or wheezes  CV: regular rate and rhythm, normal S1 S2, no S3 or S4 and no murmur, click or rub   ABDOMEN: soft, nontender, no HSM or masses and bowel sounds normal  NEURO: Normal strength and tone, sensory exam grossly normal, mentation intact and speech normal    Recent Labs   Lab Test 01/28/22  1648 01/17/22  1406 01/16/22  2301   HGB 12.5 13.6 13.0    392 349   NA  --  140 139   POTASSIUM  --  3.5 3.7   CR 0.73 0.80 0.85        Diagnostics:  Labs pending at this time.  Results will be reviewed when available.   No EKG required for low risk surgery (cataract, skin procedure, breast biopsy, etc).  EKG: appears normal, NSR, normal axis, normal intervals, no acute ST/T changes c/w ischemia, no LVH by voltage criteria, unchanged from previous tracings    Revised Cardiac Risk Index (RCRI):  The patient has the following serious cardiovascular risks for perioperative complications:   - No serious cardiac risks = 0 points     RCRI Interpretation: 0 points: Class I (very low risk - 0.4% complication rate)           Signed Electronically by: Estelle Bansal MD  Copy of this evaluation report is provided to requesting physician.      "

## 2022-02-16 NOTE — PROGRESS NOTES
"      Rheumatology follow-up visit note     Alina is a 56 year old female presents today for follow-up.    Alina was seen today for recheck.    Diagnoses and all orders for this visit:    Seropositive rheumatoid arthritis of multiple sites (H)    Cyclic citrullinated peptide (CCP) antibody positive    Polyarthralgia    High risk medication use    Morbid obesity (H)    Other orders  -     TSH with free T4 reflex  -     CBC with platelets and differential  -     Comprehensive metabolic panel (BMP + Alb, Alk Phos, ALT, AST, Total. Bili, TP)        Well-controlled rheumatoid arthritis continue as now, some symptoms are likely related with OA management principles were reviewed.    Follow up in 6 months.    HPI    Alina Martell is a 56 year old female is here for follow-up of follow-up.  This is for rheumatoid arthritis.  This is severe.  Seropositive. This is polyarticular. Family history of psoriasis and brother, mom's history of Crohn's.  She has done great.  She is on Humira that she inject every 2 weeks. No longer on hydroxychloroquine.  She has noted discomfort such as in her PIPs, first thing in the morning stiff for about 10minutes most of her discomfort is after she has been keyboarding.  She gets discomfort in her knees.  Overall she noted pain level to be 1.0/10 able do all her day-to-day activities without difficulty, she had her labs drawn recently within acceptable range.  She is fully vaccinated for COVID-19 and boosted.   She is allergic to sulfa, she has still has her menstrual cycle going, they use condoms therefore methotrexate leflunomide not an option.   She is starting to play tennis now. She considers herself a\" flexaterian\", and is trying to add more vegetarian meals.      DETAILED EXAMINATION  02/16/22  :    Vitals:    02/16/22 1139   BP: 120/72   Pulse: 76   Weight: 126.1 kg (278 lb)     Alert oriented. Head including the face is examined for malar rash, heliotropes, scarring, lupus " pernio. Eyes examined for redness such as in episcleritis/scleritis, periorbital lesions.   Neck/ Face examined for parotid gland swelling, range of motion of neck.  Left upper and lower and right upper and lower extremities examined for tenderness, swelling, warmth of the appendicular joints, range of motion, edema, rash.  Some of the important findings included: she does not have evidence of synovitis in the palpable joints of the upper extremities.  No significant deformities of the digits.  no Heberden nodes.  Range of motion of the shoulders  show full abduction.  No JLT effusion or warmth of the knees.  she does not have dactylitis of the digits.     Patient Active Problem List    Diagnosis Date Noted     Postmenopausal bleeding 02/16/2022     Priority: Medium     Obstructive sleep apnea syndrome 01/20/2022     Priority: Medium     Essential hypertension 01/20/2022     Priority: Medium     Coronary artery disease of native artery with stable angina pectoris (H) 01/20/2022     Priority: Medium     Seropositive rheumatoid arthritis of multiple sites (H) 01/05/2022     Priority: Medium     Recurrent major depressive disorder, in full remission (H) 01/05/2022     Priority: Medium     Paroxysmal atrial fibrillation (H) 01/05/2022     Priority: Medium     Morbid obesity (H) 01/05/2022     Priority: Medium     Chest pain 07/11/2021     Priority: Medium     ADA (generalized anxiety disorder) 03/31/2021     Priority: Medium     Depressed 11/14/2018     Priority: Medium     Cyclic citrullinated peptide (CCP) antibody positive 10/03/2017     Priority: Medium     Tendonitis of both shoulders 06/09/2017     Priority: Medium     Chronic pain 07/18/2016     Priority: Medium     Olecranon bursitis of right elbow 07/08/2016     Priority: Medium     Formatting of this note might be different from the original.  Replacement Utility updated for latest IMO load       Grief 03/29/2016     Priority: Medium     Sicca syndrome (H)  10/27/2015     Priority: Medium     Anxiety 12/08/2014     Priority: Medium     Panic attack 12/08/2014     Priority: Medium     Sleep disorder 12/05/2014     Priority: Medium     Insomnia related to another mental disorder 09/03/2014     Priority: Medium     Microcytic anemia 09/02/2014     Priority: Medium     Migraine headache 09/02/2014     Priority: Medium     Reflux 09/02/2014     Priority: Medium     Past Surgical History:   Procedure Laterality Date     CHOLECYSTECTOMY       HC REMOVAL GALLBLADDER      Description: Cholecystectomy;  Recorded: 10/15/2013;     LAPAROSCOPIC TUBAL LIGATION       RELEASE CARPAL TUNNEL BILATERAL       TUBAL LIGATION  1995      Past Medical History:   Diagnosis Date     Cannabis use without complication 12/8/2014     Carpal tunnel syndrome      Obesity      Paroxysmal atrial fibrillation (H)      PTSD (post-traumatic stress disorder)      RA (rheumatoid arthritis) (H)      Rheumatoid arthritis (H) 7/8/2016     Sicca syndrome (H)      Allergies   Allergen Reactions     Penicillins Shortness Of Breath, Palpitations, Dizziness, Anaphylaxis, Hives, Itching and Rash     Test in 2031, see allergy note on 7/29/21     Shellfish-Derived Products Anaphylaxis     Sulfa Drugs Hives and Anaphylaxis     Current Outpatient Medications   Medication Sig Dispense Refill     adalimumab (HUMIRA *CF* PEN) 40 MG/0.4ML pen kit INJECT 0.4 MLS (40 MG) SUBCUTANEOUS EVERY 14 DAYS HOLD FOR SIGNS OF INFECTION, THEN SEEK MEDICAL ATTENTION. 2 each 4     apixaban ANTICOAGULANT (ELIQUIS ANTICOAGULANT) 5 MG tablet Take 1 tablet (5 mg) by mouth 2 times daily 180 tablet 1     cetirizine (ZYRTEC) 10 MG tablet Take 1 tablet (10 mg) by mouth daily 90 tablet 3     diltiazem ER COATED BEADS (CARDIZEM CD/CARTIA XT) 180 MG 24 hr capsule Take 2 capsules (360 mg) by mouth daily 180 capsule 3     EPINEPHrine (ANY BX GENERIC EQUIV) 0.3 MG/0.3ML injection 2-pack Inject 0.3 mg into the muscle as needed for anaphylaxis  (Patient  not taking: Reported on 2/16/2022)       hydrOXYzine (ATARAX) 25 MG tablet Take 1 tablet (25 mg) by mouth 3 times daily as needed for anxiety 90 tablet 1     levothyroxine (SYNTHROID/LEVOTHROID) 50 MCG tablet Take 1 tablet (50 mcg) by mouth daily (Patient not taking: Reported on 1/20/2022) 90 tablet 0     Lidocaine (LIDOCARE) 4 % Patch Place 1 patch onto the skin every 24 hours To prevent lidocaine toxicity, patient should be patch free for 12 hrs daily. (Patient not taking: Reported on 2/16/2022) 5 patch 0     loratadine (CLARITIN) 10 MG tablet Take 10 mg by mouth daily        meclizine (ANTIVERT) 12.5 MG tablet Take 1 tablet (12.5 mg) by mouth 4 times daily as needed for dizziness (Patient not taking: Reported on 2/16/2022) 30 tablet 0     Multiple Vitamins-Minerals (CENTROVITE) TABS TAKE 1 TABLET BY MOUTH EVERY DAY FOR 30 DAYS       prazosin (MINIPRESS) 1 MG capsule Take 1 capsule by mouth nightly as needed  (Patient not taking: Reported on 2/16/2022)       sertraline (ZOLOFT) 25 MG tablet Take 1 tablet (25 mg) by mouth daily 90 tablet 0     traZODone (DESYREL) 50 MG tablet Take 0.5 tablets (25 mg) by mouth At Bedtime (Patient not taking: Reported on 2/16/2022) 90 tablet 0     vitamin C (ASCORBIC ACID) 1000 MG TABS Take 1,000 mg by mouth daily        vitamin D3 (CHOLECALCIFEROL) 250 mcg (39977 units) capsule Take 1 capsule by mouth once a week       zinc sulfate (ZINCATE) 220 (50 Zn) MG capsule  (Patient not taking: Reported on 1/20/2022)       family history includes Alcoholism in her brother, brother, and maternal grandfather; Attention Deficit Disorder in her brother; Chronic Kidney Disease in her father; Crohn's Disease in her mother; Depression in her brother; Diabetes in her father; Lupus in her cousin; No Known Problems in her daughter and son; Prostate Cancer in her father; Psoriasis in her brother; Snoring in her brother and father; Substance Abuse in her brother and brother.  Social Connections: Not on  file          WBC   Date Value Ref Range Status   11/14/2018 6.5 4.0 - 11.0 10e9/L Final     WBC Count   Date Value Ref Range Status   02/16/2022 10.0 4.0 - 11.0 10e3/uL Final     RBC Count   Date Value Ref Range Status   02/16/2022 4.47 3.80 - 5.20 10e6/uL Final   11/14/2018 4.31 3.8 - 5.2 10e12/L Final     Hemoglobin   Date Value Ref Range Status   02/16/2022 12.2 11.7 - 15.7 g/dL Final   11/14/2018 10.8 (L) 11.7 - 15.7 g/dL Final     Hematocrit   Date Value Ref Range Status   02/16/2022 38.8 35.0 - 47.0 % Final   11/14/2018 35.6 35.0 - 47.0 % Final     MCV   Date Value Ref Range Status   02/16/2022 87 78 - 100 fL Final   11/14/2018 83 78 - 100 fl Final     MCH   Date Value Ref Range Status   02/16/2022 27.3 26.5 - 33.0 pg Final   11/14/2018 25.1 (L) 26.5 - 33.0 pg Final     Platelet Count   Date Value Ref Range Status   02/16/2022 377 150 - 450 10e3/uL Final   11/14/2018 286 150 - 450 10e9/L Final     % Lymphocytes   Date Value Ref Range Status   02/16/2022 28 % Final   11/14/2018 24.6 % Final     AST   Date Value Ref Range Status   01/17/2022 15 0 - 40 U/L Final   11/14/2018 12 0 - 45 U/L Final     ALT   Date Value Ref Range Status   01/28/2022 13 0 - 45 U/L Final   11/14/2018 13 0 - 50 U/L Final     Albumin   Date Value Ref Range Status   01/28/2022 3.4 (L) 3.5 - 5.0 g/dL Final   11/14/2018 2.8 (L) 3.4 - 5.0 g/dL Final     Alkaline Phosphatase   Date Value Ref Range Status   01/17/2022 106 45 - 120 U/L Final   11/14/2018 83 40 - 150 U/L Final     Creatinine   Date Value Ref Range Status   01/28/2022 0.73 0.60 - 1.10 mg/dL Final   11/14/2018 0.62 0.52 - 1.04 mg/dL Final     GFR Estimate   Date Value Ref Range Status   01/28/2022 >90 >60 mL/min/1.73m2 Final     Comment:     Effective December 21, 2021 eGFRcr in adults is calculated using the 2021 CKD-EPI creatinine equation which includes age and gender (Allyssa fernandez al., NEJ, DOI: 10.1056/UABEov9757222)   01/21/2021 >60 >60 mL/min/1.73m2 Final   11/14/2018 >90 >60  mL/min/1.7m2 Final     Comment:     Non  GFR Calc     GFR Estimate If Black   Date Value Ref Range Status   01/21/2021 >60 >60 mL/min/1.73m2 Final   11/14/2018 >90 >60 mL/min/1.7m2 Final     Comment:      GFR Calc     Erythrocyte Sedimentation Rate   Date Value Ref Range Status   03/11/2020 64 (H) 0 - 20 mm/hr Final     CRP   Date Value Ref Range Status   03/11/2020 3.3 (H) 0.0 - 0.8 mg/dL Final

## 2022-02-22 ENCOUNTER — HOSPITAL ENCOUNTER (OUTPATIENT)
Dept: CT IMAGING | Facility: CLINIC | Age: 57
Discharge: HOME | End: 2022-02-22
Attending: INTERNAL MEDICINE | Admitting: INTERNAL MEDICINE
Payer: COMMERCIAL

## 2022-02-22 VITALS
HEIGHT: 69 IN | BODY MASS INDEX: 41.18 KG/M2 | DIASTOLIC BLOOD PRESSURE: 77 MMHG | SYSTOLIC BLOOD PRESSURE: 131 MMHG | WEIGHT: 278 LBS

## 2022-02-22 DIAGNOSIS — I48.0 PAROXYSMAL ATRIAL FIBRILLATION (H): ICD-10-CM

## 2022-02-22 DIAGNOSIS — I10 ESSENTIAL HYPERTENSION: ICD-10-CM

## 2022-02-22 DIAGNOSIS — G47.33 OBSTRUCTIVE SLEEP APNEA SYNDROME: ICD-10-CM

## 2022-02-22 DIAGNOSIS — R07.9 ACUTE CHEST PAIN: ICD-10-CM

## 2022-02-22 DIAGNOSIS — E66.01 MORBID OBESITY (H): ICD-10-CM

## 2022-02-22 DIAGNOSIS — I48.0 PAROXYSMAL ATRIAL FIBRILLATION (H): Primary | ICD-10-CM

## 2022-02-22 LAB
BSA FOR ECHO PROCEDURE: 0 M2
CV CALCIUM SCORE AGATSTON LM: 0
CV CALCIUM SCORING AGATSON LAD: 0
CV CALCIUM SCORING AGATSTON CX: 5
CV CALCIUM SCORING AGATSTON RCA: 0
CV CALCIUM SCORING AGATSTON TOTAL: 5

## 2022-02-22 PROCEDURE — 75574 CT ANGIO HRT W/3D IMAGE: CPT | Mod: 26 | Performed by: INTERNAL MEDICINE

## 2022-02-22 PROCEDURE — 250N000009 HC RX 250: Performed by: INTERNAL MEDICINE

## 2022-02-22 PROCEDURE — 250N000011 HC RX IP 250 OP 636: Performed by: INTERNAL MEDICINE

## 2022-02-22 PROCEDURE — 250N000013 HC RX MED GY IP 250 OP 250 PS 637: Performed by: INTERNAL MEDICINE

## 2022-02-22 PROCEDURE — 75574 CT ANGIO HRT W/3D IMAGE: CPT

## 2022-02-22 RX ORDER — METOPROLOL TARTRATE 1 MG/ML
5-20 INJECTION, SOLUTION INTRAVENOUS
Status: DISCONTINUED | OUTPATIENT
Start: 2022-02-22 | End: 2022-02-23 | Stop reason: HOSPADM

## 2022-02-22 RX ORDER — IOPAMIDOL 755 MG/ML
100 INJECTION, SOLUTION INTRAVASCULAR ONCE
Status: COMPLETED | OUTPATIENT
Start: 2022-02-22 | End: 2022-02-22

## 2022-02-22 RX ORDER — DILTIAZEM HYDROCHLORIDE 5 MG/ML
5-25 INJECTION INTRAVENOUS
Status: DISCONTINUED | OUTPATIENT
Start: 2022-02-22 | End: 2022-02-23 | Stop reason: HOSPADM

## 2022-02-22 RX ORDER — LIDOCAINE 40 MG/G
CREAM TOPICAL
Status: DISCONTINUED | OUTPATIENT
Start: 2022-02-22 | End: 2022-02-23 | Stop reason: HOSPADM

## 2022-02-22 RX ORDER — DILTIAZEM HYDROCHLORIDE 5 MG/ML
10 INJECTION INTRAVENOUS
Status: DISCONTINUED | OUTPATIENT
Start: 2022-02-22 | End: 2022-02-23 | Stop reason: HOSPADM

## 2022-02-22 RX ORDER — NITROGLYCERIN 0.4 MG/1
0.4 TABLET SUBLINGUAL ONCE
Status: COMPLETED | OUTPATIENT
Start: 2022-02-22 | End: 2022-02-22

## 2022-02-22 RX ADMIN — IOPAMIDOL 100 ML: 755 INJECTION, SOLUTION INTRAVENOUS at 08:19

## 2022-02-22 RX ADMIN — NITROGLYCERIN 0.4 MG: 0.4 TABLET SUBLINGUAL at 08:12

## 2022-02-22 RX ADMIN — METOPROLOL TARTRATE 5 MG: 5 INJECTION, SOLUTION INTRAVENOUS at 08:14

## 2022-02-22 RX ADMIN — METOPROLOL TARTRATE 5 MG: 5 INJECTION, SOLUTION INTRAVENOUS at 08:18

## 2022-02-22 RX ADMIN — METOPROLOL TARTRATE 5 MG: 5 INJECTION, SOLUTION INTRAVENOUS at 08:06

## 2022-02-28 ENCOUNTER — LAB (OUTPATIENT)
Dept: FAMILY MEDICINE | Facility: CLINIC | Age: 57
End: 2022-02-28
Attending: OBSTETRICS & GYNECOLOGY
Payer: COMMERCIAL

## 2022-02-28 DIAGNOSIS — Z11.59 ENCOUNTER FOR SCREENING FOR OTHER VIRAL DISEASES: ICD-10-CM

## 2022-02-28 PROCEDURE — U0005 INFEC AGEN DETEC AMPLI PROBE: HCPCS

## 2022-02-28 PROCEDURE — U0003 INFECTIOUS AGENT DETECTION BY NUCLEIC ACID (DNA OR RNA); SEVERE ACUTE RESPIRATORY SYNDROME CORONAVIRUS 2 (SARS-COV-2) (CORONAVIRUS DISEASE [COVID-19]), AMPLIFIED PROBE TECHNIQUE, MAKING USE OF HIGH THROUGHPUT TECHNOLOGIES AS DESCRIBED BY CMS-2020-01-R: HCPCS

## 2022-03-01 ENCOUNTER — OFFICE VISIT (OUTPATIENT)
Dept: CARDIOLOGY | Facility: CLINIC | Age: 57
End: 2022-03-01
Attending: INTERNAL MEDICINE
Payer: COMMERCIAL

## 2022-03-01 ENCOUNTER — VIRTUAL VISIT (OUTPATIENT)
Dept: BEHAVIORAL HEALTH | Facility: CLINIC | Age: 57
End: 2022-03-01
Payer: COMMERCIAL

## 2022-03-01 VITALS — DIASTOLIC BLOOD PRESSURE: 80 MMHG | SYSTOLIC BLOOD PRESSURE: 130 MMHG

## 2022-03-01 DIAGNOSIS — F33.1 MAJOR DEPRESSIVE DISORDER, RECURRENT EPISODE, MODERATE (H): ICD-10-CM

## 2022-03-01 DIAGNOSIS — I48.0 PAROXYSMAL ATRIAL FIBRILLATION (H): Primary | ICD-10-CM

## 2022-03-01 DIAGNOSIS — F41.1 GAD (GENERALIZED ANXIETY DISORDER): ICD-10-CM

## 2022-03-01 DIAGNOSIS — F43.10 PTSD (POST-TRAUMATIC STRESS DISORDER): Primary | ICD-10-CM

## 2022-03-01 LAB — SARS-COV-2 RNA RESP QL NAA+PROBE: NEGATIVE

## 2022-03-01 PROCEDURE — 90834 PSYTX W PT 45 MINUTES: CPT | Mod: 95 | Performed by: COUNSELOR

## 2022-03-01 PROCEDURE — 99215 OFFICE O/P EST HI 40 MIN: CPT | Performed by: NURSE PRACTITIONER

## 2022-03-01 RX ORDER — FLECAINIDE ACETATE 50 MG/1
50 TABLET ORAL 2 TIMES DAILY PRN
Qty: 60 TABLET | Refills: 1 | Status: SHIPPED | OUTPATIENT
Start: 2022-03-01 | End: 2022-08-23

## 2022-03-01 NOTE — PATIENT INSTRUCTIONS
Alina Martell,    It was a pleasure to see you today at the [facility].     My recommendations after this visit include:    Continue on the medication diltiazem daily 180mg    Start flecainide 50mg at onset of a fib, may repeat 4 hours later if sill in a fib    *Please call if atrial fibrillation episodes more frequent or stuck in atrial fibrillation.      See Dr. Joseph for ablation consult    My contact information:  Cathryn Roe CNP  After Hours or Scheduling  [phone]  My Nurses phone number [phone]- normal business hours

## 2022-03-01 NOTE — PROGRESS NOTES
"    Assessment/Recommendations     Assessment:    1.  Paroxysmal atrial fibrillation-treated in ED with cardioversion 1/1/2022 for A. fib with RVR, 1/17/2022 treated again in ED with rate control meds.  After starting daily diltiazem patient reports approximately 1 episode per week of \"fluttering,\" that does not persist long.  Mother, 2 brothers, and multiple aunts and uncles have arrhythmias and pacemakers.    -Educated patient on pathophysiology and chronic nature of atrial fibrillation.  Talked about rate versus rhythm control and her options including ongoing medication management versus procedural ablation.  She is interested in pursuing ablation and will have a consultation with Dr. Joseph  -Discussed triggers of atrial fibrillation and avoidance  -Encouraged healthy lifestyle and CPAP compliance (she has having a problem with the chip in her current CPAP)  -Continue diltiazem 180 mg oral daily  -Flecainide 50 mg oral at onset of atrial fibrillation, may repeat 4 hours later if atrial fibrillation persists  -Continue Eliquis 5 mg p.o. twice daily    2.  RANDALL with CPAP-using CPAP nightly but believes the pressures are too low and is unable to adjust them due to a chip recall, has appointment with sleep medicine    [id number] score of 1 hypertension (patient reports she does not have a history of elevated blood pressure until the last few months), 1 gender and on Eliquis.    Alina Martell will follow up with Dr. Joseph for ablation consultation.       History of Present Illness/Subjective    Ms. Alina Martell is a 57 year old female seen at [facility] today for paroxysmal atrial fibrillation management.      Alina Martell has a known history of sicca syndrome, rheumatoid arthritis, morbid obesity, RANDALL, anxiety, PTSD.    Met with Xi mckeon to discuss management of atrial fibrillation.  She reports having 1 remote episode of atrial fibrillation 9/2020 and did not have " recurrence until 1/22.  Symptoms of atrial fibrillation include rapid heartbeat, shortness of breath, anxiety and panic leading to hyperventilation and fainting.  Her symptoms have improved after starting daily diltiazem and she is experiencing mild atrial fibrillation approximately once a week.  Today she denies fatigue, lightheadedness, shortness of breath, dyspnea on exertion, orthopnea, PND, palpitations, chest pain, abdominal fullness/bloating and lower extremity edema.      Cardiographics (reviewed):    ECHO 10/7/2020  Narrative & Impression    Normal left ventricular size with mild hypertrophy noted.    Left ventricle ejection fraction is normal. The calculated left ventricular ejection fraction is 67%.    Normal right ventricular size and systolic function.    No hemodynamically significant valvular heart abnormalities.    No previous study for comparison.    Cardiac testing personally reviewed: 2/22/2022 CT coronary angio  1.  Normal left main.  2.  Normal LAD and its branches.  3.  Minimal disease in proximal LCx.  4.  Normal RCA and its branches.  Right dominant system.    EKG         Physical Examination Review of Systems   Vitals: There were no vitals taken for this visit.  BMI= There is no height or weight on file to calculate BMI.  Wt Readings from Last 3 Encounters:   02/22/22 126.1 kg (278 lb)   02/16/22 126.1 kg (278 lb)   02/16/22 126.3 kg (278 lb 8 oz)       General Appearance:   Alert, cooperative and in no acute distress.   ENT/Mouth: membranes moist, no facial drooping   EYES:  no scleral icterus, normal conjunctivae   Neck: no JVD   Chest/Lungs:   lungs are clear to auscultation, no rales or wheezing, respirations unlabored   Cardiovascular:   Regular. Normal first and second heart sounds with no murmurs, rubs, or gallops; the radial and posterior tibial pulses are intact, no edema bilateral lower extremities    Abdomen:  Soft, nontender, nondistended, bowel sounds present   Extremities: no  cyanosis or clubbing   Skin: warm, dry.    Neurologic: mood and affect are appropriate, alert and oriented x3         Please refer above for cardiac ROS details.      Medical History  Surgical History Family History Social History   Past Medical History:   Diagnosis Date     Anemia      Antiplatelet or antithrombotic long-term use      Arrhythmia      Cannabis use without complication 12/8/2014     Carpal tunnel syndrome      Coronary artery disease      Gastroesophageal reflux disease      History of angina      Hypertension      Irregular heart beat      Obesity      Paroxysmal atrial fibrillation (H)      PTSD (post-traumatic stress disorder)      RA (rheumatoid arthritis) (H)      Rheumatoid arthritis (H) 7/8/2016     Sicca syndrome (H)      Sleep apnea      Past Surgical History:   Procedure Laterality Date     CHOLECYSTECTOMY       HC REMOVAL GALLBLADDER      Description: Cholecystectomy;  Recorded: 10/15/2013;     LAPAROSCOPIC TUBAL LIGATION       RELEASE CARPAL TUNNEL BILATERAL       TUBAL LIGATION  1995     Family History   Problem Relation Age of Onset     Crohn's Disease Mother         Total colectomy with ileostomy.     Psoriasis Brother      Snoring Brother      Snoring Father      Diabetes Father      Prostate Cancer Father      Chronic Kidney Disease Father         Chose against dialysis.     Substance Abuse Brother      Depression Brother      Attention Deficit Disorder Brother      Alcoholism Brother      No Known Problems Daughter      No Known Problems Son      Alcoholism Brother      Substance Abuse Brother      Lupus Cousin      Alcoholism Maternal Grandfather      Breast Cancer No family hx of     Social History     Socioeconomic History     Marital status: Single     Spouse name: Not on file     Number of children: 2     Years of education: 4     Highest education level: Not on file   Occupational History     Not on file   Tobacco Use     Smoking status: Never Smoker     Smokeless tobacco:  Never Used     Tobacco comment: smokes marijuana   Substance and Sexual Activity     Alcohol use: Not Currently     Comment: Alcoholic Drinks/day: Never an issue, she states.  7/8/16     Drug use: Not Currently     Types: Marijuana     Comment: Drug use: Former marijuana use, not current. 7/8/16     Sexual activity: Never     Partners: Male     Birth control/protection: Other     Comment: tubal ligation   Other Topics Concern     Parent/sibling w/ CABG, MI or angioplasty before 65F 55M? Not Asked   Social History Narrative     Not on file     Social Determinants of Health     Financial Resource Strain: Not on file   Food Insecurity: Not on file   Transportation Needs: Not on file   Physical Activity: Not on file   Stress: Not on file   Social Connections: Not on file   Intimate Partner Violence: Not on file   Housing Stability: Not on file          Medications  Allergies   Current Outpatient Medications   Medication Sig Dispense Refill     adalimumab (HUMIRA *CF* PEN) 40 MG/0.4ML pen kit INJECT 0.4 MLS (40 MG) SUBCUTANEOUS EVERY 14 DAYS HOLD FOR SIGNS OF INFECTION, THEN SEEK MEDICAL ATTENTION. 2 each 4     apixaban ANTICOAGULANT (ELIQUIS ANTICOAGULANT) 5 MG tablet Take 1 tablet (5 mg) by mouth 2 times daily 180 tablet 1     diltiazem ER COATED BEADS (CARDIZEM CD/CARTIA XT) 180 MG 24 hr capsule Take 2 capsules (360 mg) by mouth daily 180 capsule 3     EPINEPHrine (ANY BX GENERIC EQUIV) 0.3 MG/0.3ML injection 2-pack Inject 0.3 mg into the muscle as needed for anaphylaxis        hydrOXYzine (ATARAX) 25 MG tablet Take 1 tablet (25 mg) by mouth 3 times daily as needed for anxiety 90 tablet 1     Lidocaine (LIDOCARE) 4 % Patch Place 1 patch onto the skin every 24 hours To prevent lidocaine toxicity, patient should be patch free for 12 hrs daily. 5 patch 0     loratadine (CLARITIN) 10 MG tablet Take 10 mg by mouth daily        meclizine (ANTIVERT) 12.5 MG tablet Take 1 tablet (12.5 mg) by mouth 4 times daily as needed for  dizziness 30 tablet 0     Multiple Vitamins-Minerals (CENTROVITE) TABS TAKE 1 TABLET BY MOUTH EVERY DAY FOR 30 DAYS       prazosin (MINIPRESS) 1 MG capsule Take 1 capsule by mouth nightly as needed        sertraline (ZOLOFT) 25 MG tablet Take 1 tablet (25 mg) by mouth daily 90 tablet 0     traZODone (DESYREL) 50 MG tablet Take 0.5 tablets (25 mg) by mouth At Bedtime 90 tablet 0     vitamin C (ASCORBIC ACID) 1000 MG TABS Take 1,000 mg by mouth daily        vitamin D3 (CHOLECALCIFEROL) 250 mcg (12869 units) capsule Take 1 capsule by mouth once a week       zinc sulfate (ZINCATE) 220 (50 Zn) MG capsule       Allergies   Allergen Reactions     Penicillins Shortness Of Breath, Palpitations, Dizziness, Anaphylaxis, Hives, Itching and Rash     Test in 2031, see allergy note on 7/29/21     Shellfish-Derived Products Anaphylaxis     Sulfa Drugs Hives and Anaphylaxis         Lab Results    Chemistry/lipid CBC Cardiac Enzymes/BNP/TSH/INR   Lab Results   Component Value Date    CHOL 169 01/21/2021    HDL 46 (L) 01/21/2021    TRIG 79 01/21/2021    BUN 13 02/16/2022     02/16/2022    CO2 28 02/16/2022    Lab Results   Component Value Date    WBC 10.0 02/16/2022    HGB 12.2 02/16/2022    HCT 38.8 02/16/2022    MCV 87 02/16/2022     02/16/2022    Lab Results   Component Value Date    TROPONINI <0.01 01/17/2022    TROPONINI <0.01 01/16/2022    TROPONINI <0.01 01/09/2022     No results found for: BNP, NTBNPI, NTBNP  Lab Results   Component Value Date    TSH 2.16 02/16/2022     No results found for: INR     Total Time- 50 minutes spent on date of encounter doing chart review, history and exam, documentation and further activities as noted above.  This note has been dictated using voice recognition software. Any grammatical, typographical, or context distortions are unintentional and inherent to the software.    Cathryn Roe [facility]

## 2022-03-01 NOTE — LETTER
"3/1/2022    Estelle Bansal MD  [address]  [place] [address]    RE: Alina Martell       Dear Colleague,     I had the pleasure of seeing Alina Martell in the [facility].      Assessment/Recommendations     Assessment:    1.  Paroxysmal atrial fibrillation-treated in ED with cardioversion 1/1/2022 for A. fib with RVR, 1/17/2022 treated again in ED with rate control meds.  After starting daily diltiazem patient reports approximately 1 episode per week of \"fluttering,\" that does not persist long.  Mother, 2 brothers, and multiple aunts and uncles have arrhythmias and pacemakers.    -Educated patient on pathophysiology and chronic nature of atrial fibrillation.  Talked about rate versus rhythm control and her options including ongoing medication management versus procedural ablation.  She is interested in pursuing ablation and will have a consultation with Dr. Joseph  -Discussed triggers of atrial fibrillation and avoidance  -Encouraged healthy lifestyle and CPAP compliance (she has having a problem with the chip in her current CPAP)  -Continue diltiazem 180 mg oral daily  -Flecainide 50 mg oral at onset of atrial fibrillation, may repeat 4 hours later if atrial fibrillation persists  -Continue Eliquis 5 mg p.o. twice daily    2.  RANDALL with CPAP-using CPAP nightly but believes the pressures are too low and is unable to adjust them due to a chip recall, has appointment with sleep medicine    [id number] score of 1 hypertension (patient reports she does not have a history of elevated blood pressure until the last few months), 1 gender and on Eliquis.    Alina Martell will follow up with Dr. Joseph for ablation consultation.       History of Present Illness/Subjective    Ms. Alina Martell is a 57 year old female seen at [facility] today for paroxysmal atrial fibrillation management.      Alina Martell has a known history of sicca " syndrome, rheumatoid arthritis, morbid obesity, RANDALL, anxiety, PTSD.    Met with Xi today to discuss management of atrial fibrillation.  She reports having 1 remote episode of atrial fibrillation 9/2020 and did not have recurrence until 1/22.  Symptoms of atrial fibrillation include rapid heartbeat, shortness of breath, anxiety and panic leading to hyperventilation and fainting.  Her symptoms have improved after starting daily diltiazem and she is experiencing mild atrial fibrillation approximately once a week.  Today she denies fatigue, lightheadedness, shortness of breath, dyspnea on exertion, orthopnea, PND, palpitations, chest pain, abdominal fullness/bloating and lower extremity edema.      Cardiographics (reviewed):    ECHO 10/7/2020  Narrative & Impression    Normal left ventricular size with mild hypertrophy noted.    Left ventricle ejection fraction is normal. The calculated left ventricular ejection fraction is 67%.    Normal right ventricular size and systolic function.    No hemodynamically significant valvular heart abnormalities.    No previous study for comparison.    Cardiac testing personally reviewed: 2/22/2022 CT coronary angio  1.  Normal left main.  2.  Normal LAD and its branches.  3.  Minimal disease in proximal LCx.  4.  Normal RCA and its branches.  Right dominant system.    EKG         Physical Examination Review of Systems   Vitals: There were no vitals taken for this visit.  BMI= There is no height or weight on file to calculate BMI.  Wt Readings from Last 3 Encounters:   02/22/22 126.1 kg (278 lb)   02/16/22 126.1 kg (278 lb)   02/16/22 126.3 kg (278 lb 8 oz)       General Appearance:   Alert, cooperative and in no acute distress.   ENT/Mouth: membranes moist, no facial drooping   EYES:  no scleral icterus, normal conjunctivae   Neck: no JVD   Chest/Lungs:   lungs are clear to auscultation, no rales or wheezing, respirations unlabored   Cardiovascular:   Regular. Normal first and  second heart sounds with no murmurs, rubs, or gallops; the radial and posterior tibial pulses are intact, no edema bilateral lower extremities    Abdomen:  Soft, nontender, nondistended, bowel sounds present   Extremities: no cyanosis or clubbing   Skin: warm, dry.    Neurologic: mood and affect are appropriate, alert and oriented x3         Please refer above for cardiac ROS details.      Medical History  Surgical History Family History Social History   Past Medical History:   Diagnosis Date     Anemia      Antiplatelet or antithrombotic long-term use      Arrhythmia      Cannabis use without complication 12/8/2014     Carpal tunnel syndrome      Coronary artery disease      Gastroesophageal reflux disease      History of angina      Hypertension      Irregular heart beat      Obesity      Paroxysmal atrial fibrillation (H)      PTSD (post-traumatic stress disorder)      RA (rheumatoid arthritis) (H)      Rheumatoid arthritis (H) 7/8/2016     Sicca syndrome (H)      Sleep apnea      Past Surgical History:   Procedure Laterality Date     CHOLECYSTECTOMY       HC REMOVAL GALLBLADDER      Description: Cholecystectomy;  Recorded: 10/15/2013;     LAPAROSCOPIC TUBAL LIGATION       RELEASE CARPAL TUNNEL BILATERAL       TUBAL LIGATION  1995     Family History   Problem Relation Age of Onset     Crohn's Disease Mother         Total colectomy with ileostomy.     Psoriasis Brother      Snoring Brother      Snoring Father      Diabetes Father      Prostate Cancer Father      Chronic Kidney Disease Father         Chose against dialysis.     Substance Abuse Brother      Depression Brother      Attention Deficit Disorder Brother      Alcoholism Brother      No Known Problems Daughter      No Known Problems Son      Alcoholism Brother      Substance Abuse Brother      Lupus Cousin      Alcoholism Maternal Grandfather      Breast Cancer No family hx of     Social History     Socioeconomic History     Marital status: Single      Spouse name: Not on file     Number of children: 2     Years of education: 4     Highest education level: Not on file   Occupational History     Not on file   Tobacco Use     Smoking status: Never Smoker     Smokeless tobacco: Never Used     Tobacco comment: smokes marijuana   Substance and Sexual Activity     Alcohol use: Not Currently     Comment: Alcoholic Drinks/day: Never an issue, she states.  7/8/16     Drug use: Not Currently     Types: Marijuana     Comment: Drug use: Former marijuana use, not current. 7/8/16     Sexual activity: Never     Partners: Male     Birth control/protection: Other     Comment: tubal ligation   Other Topics Concern     Parent/sibling w/ CABG, MI or angioplasty before 65F 55M? Not Asked   Social History Narrative     Not on file     Social Determinants of Health     Financial Resource Strain: Not on file   Food Insecurity: Not on file   Transportation Needs: Not on file   Physical Activity: Not on file   Stress: Not on file   Social Connections: Not on file   Intimate Partner Violence: Not on file   Housing Stability: Not on file          Medications  Allergies   Current Outpatient Medications   Medication Sig Dispense Refill     adalimumab (HUMIRA *CF* PEN) 40 MG/0.4ML pen kit INJECT 0.4 MLS (40 MG) SUBCUTANEOUS EVERY 14 DAYS HOLD FOR SIGNS OF INFECTION, THEN SEEK MEDICAL ATTENTION. 2 each 4     apixaban ANTICOAGULANT (ELIQUIS ANTICOAGULANT) 5 MG tablet Take 1 tablet (5 mg) by mouth 2 times daily 180 tablet 1     diltiazem ER COATED BEADS (CARDIZEM CD/CARTIA XT) 180 MG 24 hr capsule Take 2 capsules (360 mg) by mouth daily 180 capsule 3     EPINEPHrine (ANY BX GENERIC EQUIV) 0.3 MG/0.3ML injection 2-pack Inject 0.3 mg into the muscle as needed for anaphylaxis        hydrOXYzine (ATARAX) 25 MG tablet Take 1 tablet (25 mg) by mouth 3 times daily as needed for anxiety 90 tablet 1     Lidocaine (LIDOCARE) 4 % Patch Place 1 patch onto the skin every 24 hours To prevent lidocaine toxicity,  patient should be patch free for 12 hrs daily. 5 patch 0     loratadine (CLARITIN) 10 MG tablet Take 10 mg by mouth daily        meclizine (ANTIVERT) 12.5 MG tablet Take 1 tablet (12.5 mg) by mouth 4 times daily as needed for dizziness 30 tablet 0     Multiple Vitamins-Minerals (CENTROVITE) TABS TAKE 1 TABLET BY MOUTH EVERY DAY FOR 30 DAYS       prazosin (MINIPRESS) 1 MG capsule Take 1 capsule by mouth nightly as needed        sertraline (ZOLOFT) 25 MG tablet Take 1 tablet (25 mg) by mouth daily 90 tablet 0     traZODone (DESYREL) 50 MG tablet Take 0.5 tablets (25 mg) by mouth At Bedtime 90 tablet 0     vitamin C (ASCORBIC ACID) 1000 MG TABS Take 1,000 mg by mouth daily        vitamin D3 (CHOLECALCIFEROL) 250 mcg (68727 units) capsule Take 1 capsule by mouth once a week       zinc sulfate (ZINCATE) 220 (50 Zn) MG capsule       Allergies   Allergen Reactions     Penicillins Shortness Of Breath, Palpitations, Dizziness, Anaphylaxis, Hives, Itching and Rash     Test in 2031, see allergy note on 7/29/21     Shellfish-Derived Products Anaphylaxis     Sulfa Drugs Hives and Anaphylaxis         Lab Results    Chemistry/lipid CBC Cardiac Enzymes/BNP/TSH/INR   Lab Results   Component Value Date    CHOL 169 01/21/2021    HDL 46 (L) 01/21/2021    TRIG 79 01/21/2021    BUN 13 02/16/2022     02/16/2022    CO2 28 02/16/2022    Lab Results   Component Value Date    WBC 10.0 02/16/2022    HGB 12.2 02/16/2022    HCT 38.8 02/16/2022    MCV 87 02/16/2022     02/16/2022    Lab Results   Component Value Date    TROPONINI <0.01 01/17/2022    TROPONINI <0.01 01/16/2022    TROPONINI <0.01 01/09/2022     No results found for: BNP, NTBNPI, NTBNP  Lab Results   Component Value Date    TSH 2.16 02/16/2022     No results found for: INR     Total Time- 50 minutes spent on date of encounter doing chart review, history and exam, documentation and further activities as noted above.  This note has been dictated using voice recognition  software. Any grammatical, typographical, or context distortions are unintentional and inherent to the software.    ERAN Hurd [facility]    Thank you for allowing me to participate in the care of your patient.      Sincerely,     STARLA Hurd CNP [facility]  cc:   Gil Richter MD  [address]  [place] [address]

## 2022-03-02 ENCOUNTER — ANESTHESIA EVENT (OUTPATIENT)
Dept: SURGERY | Facility: AMBULATORY SURGERY CENTER | Age: 57
End: 2022-03-02
Payer: COMMERCIAL

## 2022-03-02 NOTE — PROGRESS NOTES
[facility]                                     Progress Note    Patient Name: Alina Martell  Date: 03/01/2022         Service Type: Individual      Session Start Time: 1202  Session End Time: 1252     Session Length: 50 minutes    Session #: 29    Attendees: Client    Service Modality:  Video Visit:      Provider verified identity through the following two step process.  Patient provided:  Patient is known previously to provider    Telemedicine Visit: The patient's condition can be safely assessed and treated via synchronous audio and visual telemedicine encounter.      Reason for Telemedicine Visit: Patient has requested telehealth visit    Originating Site (Patient Location): Patient's home    Distant Site (Provider Location): [facility]    Consent:  The patient/guardian has verbally consented to: the potential risks and benefits of telemedicine (video visit) versus in person care; bill my insurance or make self-payment for services provided; and responsibility for payment of non-covered services.     Patient would like the video invitation sent by:  My Chart    Mode of Communication:  Video Conference via Amwell    As the provider I attest to compliance with applicable laws and regulations related to telemedicine.    DATA  Interactive Complexity: No  Crisis: No        Progress Since Last Session (Related to Symptoms / Goals / Homework):   Symptoms: No change anxiety related to upcoming procedure    Homework: Partially completed      Episode of Care Goals: Satisfactory progress - CONTEMPLATION (Considering change and yet undecided); Intervened by assessing the negative and positive thinking (ambivalence) about behavior change     Current / Ongoing Stressors and Concerns:   Patient indicated feeling a lot of anxiety related to her upcoming procedure. Patient talked about the different worries that are occurring. Patient reported  she is planning to stay at her mom's for a couple days for support. Patient indicated she is continuing to not sleep well. Patient reported she did talk with her PCA but knows there is a lot of factors. This therapist processed with patient her fears and what is in her control. This therapist went over sleep hygiene with patient.      Treatment Objective(s) Addressed in This Session:   use relaxation strategies 4 times per day to reduce the physical symptoms of anxiety  Increase interest, engagement, and pleasure in doing things  Decrease frequency and intensity of feeling down, depressed, hopeless  Feel less tired and more energy during the day      Intervention:   Motivational Interviewing    MI Intervention: Open-ended questions     Change Talk Expressed by the Patient: Committment to change    Provider Response to Change Talk: S - Summarized patient's change talk statements      Assessments completed prior to visit:  The following assessments were completed by patient for this visit:  PHQ9:   PHQ-9 SCORE 3/31/2021 4/7/2021 4/28/2021 10/5/2021 12/13/2021 2/3/2022 2/14/2022   PHQ-9 Total Score MyChart - - - 2 (Minimal depression) - 6 (Mild depression) 7 (Mild depression)   PHQ-9 Total Score 5 4 4 2 6 6 7     GAD7:   ADA-7 SCORE 3/7/2021 3/31/2021 4/7/2021 4/28/2021 10/5/2021 11/15/2021 12/13/2021   Total Score 4 (minimal anxiety) - - - 3 (minimal anxiety) 6 (mild anxiety) -   Total Score 4 6 5 4 3 6 4     PROMIS 10-Global Health (all questions and answers displayed):   PROMIS 10 12/15/2021   In general, would you say your health is: Good   In general, would you say your quality of life is: Good   In general, how would you rate your physical health? Good   In general, how would you rate your mental health, including your mood and your ability to think? Fair   In general, how would you rate your satisfaction with your social activities and relationships? Good   In general, please rate how well you carry out your  usual social activities and roles Good   To what extent are you able to carry out your everyday physical activities such as walking, climbing stairs, carrying groceries, or moving a chair? Mostly   How often have you been bothered by emotional problems such as feeling anxious, depressed or irritable? Often   How would you rate your fatigue on average? Mild   How would you rate your pain on average?   0 = No Pain  to  10 = Worst Imaginable Pain 3   In general, would you say your health is: 3   In general, would you say your quality of life is: 3   In general, how would you rate your physical health? 3   In general, how would you rate your mental health, including your mood and your ability to think? 2   In general, how would you rate your satisfaction with your social activities and relationships? 3   In general, please rate how well you carry out your usual social activities and roles. (This includes activities at home, at work and in your community, and responsibilities as a parent, child, spouse, employee, friend, etc.) 3   To what extent are you able to carry out your everyday physical activities such as walking, climbing stairs, carrying groceries, or moving a chair? 4   In the past 7 days, how often have you been bothered by emotional problems such as feeling anxious, depressed, or irritable? 4   In the past 7 days, how would you rate your fatigue on average? 2   In the past 7 days, how would you rate your pain on average, where 0 means no pain, and 10 means worst imaginable pain? 3   Global Mental Health Score 10   Global Physical Health Score 15   PROMIS TOTAL - SUBSCORES 25   Some recent data might be hidden         ASSESSMENT: Current Emotional / Mental Status (status of significant symptoms):   Risk status (Self / Other harm or suicidal ideation)   Patient denies current fears or concerns for personal safety.   Patient denies current or recent suicidal ideation or behaviors.   Patient denies current or  recent homicidal ideation or behaviors.   Patient denies current or recent self injurious behavior or ideation.   Patient denies other safety concerns.   Patient reports there has been no change in risk factors since their last session.     Patient reports there has been no change in protective factors since their last session.     Recommended that patient call 911 or go to the local ED should there be a change in any of these risk factors.     Appearance:   Appropriate    Eye Contact:   Good    Psychomotor Behavior: Normal    Attitude:   Cooperative    Orientation:   All   Speech    Rate / Production: Normal/ Responsive    Volume:  Normal    Mood:    Anxious    Affect:    Appropriate    Thought Content:  Clear    Thought Form:  Coherent    Insight:    Good      Medication Review:   No changes to current psychiatric medication(s)     Medication Compliance:   Yes     Changes in Health Issues:   Yes: See Epic     Chemical Use Review:   Substance Use: Chemical use reviewed, no active concerns identified      Tobacco Use: No current tobacco use.      Diagnosis:  1. PTSD (post-traumatic stress disorder)    2. ADA (generalized anxiety disorder)    3. Major depressive disorder, recurrent episode, moderate (H)        Collateral Reports Completed:   Not Applicable    PLAN: (Patient Tasks / Therapist Tasks / Other)  Patient will return in two weeks for scheduled session. Patient will reach out to her family as needed after procedure. Patient will use sleep hygiene taught in session to work on sleep concerns.         Mariana Love, Psychiatric, Provider Oversight:  Dr. Evan Webb    ______________________________________________________________________    Individual Treatment Plan    Patient's Name: Alina Martell  YOB: 1965    Date of Creation:10/16/2020  Date Treatment Plan Last Reviewed/Revised: 02/3/2022    DSM5 Diagnoses: 296.32 (F33.1) Major Depressive Disorder, Recurrent Episode, Moderate _ or 300.02  (F41.1) Generalized Anxiety Disorder  Psychosocial / Contextual Factors: Health, family and financial   PROMIS (reviewed every 90 days): 25    Referral / Collaboration:  Was/were discussed and patient will pursue.    Anticipated number of session for this episode of care: 20 will reevaluate every 90 days  Anticipation frequency of session: Biweekly  Anticipated Duration of each session: 38-52 minutes  Treatment plan will be reviewed in 90 days or when goals have been changed.       MeasurableTreatment Goal(s) related to diagnosis / functional impairment(s)  Goal 1: Patient will work on reducing overall anxiety.     I will know I've met my goal when reporting minimal anxiety symptoms.       Objective #A (Patient Action)                          Patient will use distraction each time intrusive worry surfaces.  Status: Continued - Date(s): 02/03/2022     Intervention(s)  Therapist will teach emotional regulation skills. ..     Objective #B  Patient will Decrease frequency and intensity of feeling down, depressed, hopeless.  Status: Continued - Date(s):  02/03/2022     Intervention(s)  Therapist will teach emotional recognition/identification. ..     Objective #C  Patient will Identify negative self-talk and behaviors: challenge core beliefs, myths, and actions.  Status: Continued - Date(s):  02/03/2022     Intervention(s)  Therapist will teach the client how to perform a behavioral chain analysis. ..     Goal 2: Patient will continue to work on reducing depression symptoms    I will know I've met my goal then reporting minimal depression symptoms     Objective #A (Patient Action)                          Patient will Increase interest, engagement, and pleasure in doing things.  Status: Continued - Date(s):  02/03/2022     Intervention(s)  Therapist will assign homework ..     Objective #B  Patient will Decrease frequency and intensity of feeling down, depressed, hopeless.  Status: Continued - Date(s):   02/03/2022     Intervention(s)  Therapist will assign homework at every session.         Patient has reviewed and agreed to the above plan.        Mariana Love, [facility] February 3 , 2022

## 2022-03-03 ENCOUNTER — HOSPITAL ENCOUNTER (OUTPATIENT)
Facility: AMBULATORY SURGERY CENTER | Age: 57
End: 2022-03-03
Attending: OBSTETRICS & GYNECOLOGY
Payer: COMMERCIAL

## 2022-03-03 ENCOUNTER — ANESTHESIA (OUTPATIENT)
Dept: SURGERY | Facility: AMBULATORY SURGERY CENTER | Age: 57
End: 2022-03-03
Payer: COMMERCIAL

## 2022-03-03 VITALS
RESPIRATION RATE: 16 BRPM | TEMPERATURE: 98.5 F | DIASTOLIC BLOOD PRESSURE: 64 MMHG | OXYGEN SATURATION: 97 % | SYSTOLIC BLOOD PRESSURE: 117 MMHG | HEART RATE: 69 BPM

## 2022-03-03 DIAGNOSIS — N95.0 POSTMENOPAUSAL BLEEDING: Primary | ICD-10-CM

## 2022-03-03 DIAGNOSIS — N95.0 PMB (POSTMENOPAUSAL BLEEDING): ICD-10-CM

## 2022-03-03 LAB
HCG UR QL: NEGATIVE
INTERNAL QC OK POCT: NORMAL
POCT KIT EXPIRATION DATE: NORMAL
POCT KIT LOT NUMBER: NORMAL

## 2022-03-03 RX ORDER — ACETAMINOPHEN 325 MG/1
975 TABLET ORAL EVERY 6 HOURS PRN
Qty: 50 TABLET | Refills: 0 | Status: SHIPPED | OUTPATIENT
Start: 2022-03-03 | End: 2023-05-02

## 2022-03-03 RX ORDER — LIDOCAINE HYDROCHLORIDE 20 MG/ML
INJECTION, SOLUTION INFILTRATION; PERINEURAL PRN
Status: DISCONTINUED | OUTPATIENT
Start: 2022-03-03 | End: 2022-03-03

## 2022-03-03 RX ORDER — PROPOFOL 10 MG/ML
INJECTION, EMULSION INTRAVENOUS CONTINUOUS PRN
Status: DISCONTINUED | OUTPATIENT
Start: 2022-03-03 | End: 2022-03-03

## 2022-03-03 RX ORDER — ACETAMINOPHEN 325 MG/1
975 TABLET ORAL ONCE
Status: COMPLETED | OUTPATIENT
Start: 2022-03-03 | End: 2022-03-03

## 2022-03-03 RX ORDER — ONDANSETRON 4 MG/1
4-8 TABLET, ORALLY DISINTEGRATING ORAL EVERY 8 HOURS PRN
Qty: 4 TABLET | Refills: 0 | Status: SHIPPED | OUTPATIENT
Start: 2022-03-03 | End: 2022-08-23

## 2022-03-03 RX ORDER — FENTANYL CITRATE 50 UG/ML
INJECTION, SOLUTION INTRAMUSCULAR; INTRAVENOUS PRN
Status: DISCONTINUED | OUTPATIENT
Start: 2022-03-03 | End: 2022-03-03

## 2022-03-03 RX ORDER — SODIUM CHLORIDE, SODIUM LACTATE, POTASSIUM CHLORIDE, CALCIUM CHLORIDE 600; 310; 30; 20 MG/100ML; MG/100ML; MG/100ML; MG/100ML
INJECTION, SOLUTION INTRAVENOUS CONTINUOUS
Status: DISCONTINUED | OUTPATIENT
Start: 2022-03-03 | End: 2022-03-04 | Stop reason: HOSPADM

## 2022-03-03 RX ORDER — ACETAMINOPHEN 325 MG/1
975 TABLET ORAL ONCE
Status: DISCONTINUED | OUTPATIENT
Start: 2022-03-03 | End: 2022-03-04 | Stop reason: HOSPADM

## 2022-03-03 RX ORDER — HYDROMORPHONE HCL IN WATER/PF 6 MG/30 ML
0.2 PATIENT CONTROLLED ANALGESIA SYRINGE INTRAVENOUS EVERY 5 MIN PRN
Status: DISCONTINUED | OUTPATIENT
Start: 2022-03-03 | End: 2022-03-04 | Stop reason: HOSPADM

## 2022-03-03 RX ORDER — ONDANSETRON 2 MG/ML
4 INJECTION INTRAMUSCULAR; INTRAVENOUS EVERY 30 MIN PRN
Status: DISCONTINUED | OUTPATIENT
Start: 2022-03-03 | End: 2022-03-04 | Stop reason: HOSPADM

## 2022-03-03 RX ORDER — FENTANYL CITRATE 0.05 MG/ML
25 INJECTION, SOLUTION INTRAMUSCULAR; INTRAVENOUS
Status: DISCONTINUED | OUTPATIENT
Start: 2022-03-03 | End: 2022-03-04 | Stop reason: HOSPADM

## 2022-03-03 RX ORDER — FENTANYL CITRATE 0.05 MG/ML
25 INJECTION, SOLUTION INTRAMUSCULAR; INTRAVENOUS EVERY 5 MIN PRN
Status: DISCONTINUED | OUTPATIENT
Start: 2022-03-03 | End: 2022-03-04 | Stop reason: HOSPADM

## 2022-03-03 RX ORDER — DEXAMETHASONE SODIUM PHOSPHATE 10 MG/ML
INJECTION, SOLUTION INTRAMUSCULAR; INTRAVENOUS PRN
Status: DISCONTINUED | OUTPATIENT
Start: 2022-03-03 | End: 2022-03-03

## 2022-03-03 RX ORDER — OXYCODONE HYDROCHLORIDE 5 MG/1
5 TABLET ORAL
Status: COMPLETED | OUTPATIENT
Start: 2022-03-03 | End: 2022-03-03

## 2022-03-03 RX ORDER — OXYCODONE HYDROCHLORIDE 5 MG/1
5 TABLET ORAL EVERY 4 HOURS PRN
Status: DISCONTINUED | OUTPATIENT
Start: 2022-03-03 | End: 2022-03-04 | Stop reason: HOSPADM

## 2022-03-03 RX ORDER — LIDOCAINE 40 MG/G
CREAM TOPICAL
Status: DISCONTINUED | OUTPATIENT
Start: 2022-03-03 | End: 2022-03-04 | Stop reason: HOSPADM

## 2022-03-03 RX ORDER — PROPOFOL 10 MG/ML
INJECTION, EMULSION INTRAVENOUS PRN
Status: DISCONTINUED | OUTPATIENT
Start: 2022-03-03 | End: 2022-03-03

## 2022-03-03 RX ORDER — ONDANSETRON 2 MG/ML
INJECTION INTRAMUSCULAR; INTRAVENOUS PRN
Status: DISCONTINUED | OUTPATIENT
Start: 2022-03-03 | End: 2022-03-03

## 2022-03-03 RX ORDER — IBUPROFEN 800 MG/1
800 TABLET, FILM COATED ORAL ONCE
Status: DISCONTINUED | OUTPATIENT
Start: 2022-03-03 | End: 2022-03-04 | Stop reason: HOSPADM

## 2022-03-03 RX ORDER — ONDANSETRON 4 MG/1
4 TABLET, ORALLY DISINTEGRATING ORAL EVERY 30 MIN PRN
Status: DISCONTINUED | OUTPATIENT
Start: 2022-03-03 | End: 2022-03-04 | Stop reason: HOSPADM

## 2022-03-03 RX ORDER — LIDOCAINE HYDROCHLORIDE 10 MG/ML
INJECTION, SOLUTION EPIDURAL; INFILTRATION; INTRACAUDAL; PERINEURAL PRN
Status: DISCONTINUED | OUTPATIENT
Start: 2022-03-03 | End: 2022-03-03 | Stop reason: HOSPADM

## 2022-03-03 RX ADMIN — FENTANYL CITRATE 25 MCG: 50 INJECTION, SOLUTION INTRAMUSCULAR; INTRAVENOUS at 09:15

## 2022-03-03 RX ADMIN — ONDANSETRON 4 MG: 2 INJECTION INTRAMUSCULAR; INTRAVENOUS at 09:26

## 2022-03-03 RX ADMIN — PROPOFOL 50 MG: 10 INJECTION, EMULSION INTRAVENOUS at 09:15

## 2022-03-03 RX ADMIN — DEXAMETHASONE SODIUM PHOSPHATE 10 MG: 10 INJECTION, SOLUTION INTRAMUSCULAR; INTRAVENOUS at 09:26

## 2022-03-03 RX ADMIN — LIDOCAINE HYDROCHLORIDE 40 MG: 20 INJECTION, SOLUTION INFILTRATION; PERINEURAL at 09:15

## 2022-03-03 RX ADMIN — ACETAMINOPHEN 975 MG: 325 TABLET ORAL at 08:53

## 2022-03-03 RX ADMIN — OXYCODONE HYDROCHLORIDE 5 MG: 5 TABLET ORAL at 10:17

## 2022-03-03 RX ADMIN — PROPOFOL 100 MCG/KG/MIN: 10 INJECTION, EMULSION INTRAVENOUS at 09:15

## 2022-03-03 RX ADMIN — FENTANYL CITRATE 25 MCG: 50 INJECTION, SOLUTION INTRAMUSCULAR; INTRAVENOUS at 09:18

## 2022-03-03 RX ADMIN — SODIUM CHLORIDE, SODIUM LACTATE, POTASSIUM CHLORIDE, CALCIUM CHLORIDE: 600; 310; 30; 20 INJECTION, SOLUTION INTRAVENOUS at 09:08

## 2022-03-03 ASSESSMENT — LIFESTYLE VARIABLES: TOBACCO_USE: 0

## 2022-03-03 NOTE — PROGRESS NOTES
Prescriptions for tylenol and ondansetron called in to [facility] on [address] at 1100 per pt request.

## 2022-03-03 NOTE — DISCHARGE INSTRUCTIONS
You have received 975 mg of Acetaminophen (Tylenol) at 8:53 AM. Please do not take an additional dose of Tylenol until after 2:53 PM     Do not exceed 4,000 mg of acetaminophen during a 24 hour period and keep in mind that acetaminophen can also be found in many over-the-counter cold medications as well as narcotics that may be given for pain.     If you have any questions or concerns regarding your procedure, please contact ANG Martinez, her office number is [phone].

## 2022-03-03 NOTE — ANESTHESIA PREPROCEDURE EVALUATION
Anesthesia Pre-Procedure Evaluation    Patient: Alina Martell   MRN: [id number] : 1965        Procedure : Procedure(s):  HYSTEROSCOPY, WITH DILATION AND CURETTAGE          Past Medical History:   Diagnosis Date     Anemia      Antiplatelet or antithrombotic long-term use      Arrhythmia      Cannabis use without complication 2014     Carpal tunnel syndrome      Coronary artery disease      Gastroesophageal reflux disease      History of angina      Hypertension      Irregular heart beat      Obesity      Paroxysmal atrial fibrillation (H)      PTSD (post-traumatic stress disorder)      RA (rheumatoid arthritis) (H)      Rheumatoid arthritis (H) 2016     Sicca syndrome (H)      Sleep apnea       Past Surgical History:   Procedure Laterality Date     CHOLECYSTECTOMY       HC REMOVAL GALLBLADDER      Description: Cholecystectomy;  Recorded: 10/15/2013;     LAPAROSCOPIC TUBAL LIGATION       RELEASE CARPAL TUNNEL BILATERAL       TUBAL LIGATION        Allergies   Allergen Reactions     Penicillins Shortness Of Breath, Palpitations, Dizziness, Anaphylaxis, Hives, Itching and Rash     Test in , see allergy note on 21     Shellfish-Derived Products Anaphylaxis     Sulfa Drugs Hives and Anaphylaxis      Social History     Tobacco Use     Smoking status: Never Smoker     Smokeless tobacco: Never Used     Tobacco comment: smokes marijuana   Substance Use Topics     Alcohol use: Not Currently     Comment: Alcoholic Drinks/day: Never an issue, she states.  16      Wt Readings from Last 1 Encounters:   22 126.1 kg (278 lb)        Anesthesia Evaluation            ROS/MED HX  ENT/Pulmonary: Comment: Uses THC    (+) sleep apnea, uses CPAP, RANDALL risk factors, hypertension, obese,  (-) tobacco use   Neurologic:     (+) migraines,     Cardiovascular: Comment: Left Main  The vessel is large. The vessel and its major branches are widely patent without any detectable stenosis or plaque.  Left  Anterior Descending  The vessel is moderate in size. The vessel and its major branches are widely patent without any detectable stenosis or plaque.  Left Circumflex  The vessel is moderate in size.  Prox Cx lesion has minimal luminal stenosis in the range of 1-24%.  Right Coronary Artery  The vessel is large. The vessel and its major branches are widely patent without any detectable stenosis or plaque.    Intervention        (+) hypertension--CAD ---Taking blood thinners Irregular Heartbeat/Palpitations,  (-) angina and angina   METS/Exercise Tolerance:     Hematologic:       Musculoskeletal:       GI/Hepatic:     (+) GERD,  (-) hepatitis and liver disease   Renal/Genitourinary:  - neg Renal ROS     Endo: Comment: Sicca syndrome    (+) Obesity,     Psychiatric/Substance Use: Comment: PTSD    (+) psychiatric history anxiety, depression and other (comment) Recreational drug usage: Cannabis.    Infectious Disease:       Malignancy:       Other:      (+) , H/O Chronic Pain,        Physical Exam    Airway  airway exam normal      Mallampati: II   TM distance: > 3 FB   Neck ROM: full   Mouth opening: > 3 cm    Respiratory Devices and Support    ETT:      Dental  no notable dental history         Cardiovascular   cardiovascular exam normal       Rhythm and rate: regular and normal     Pulmonary   pulmonary exam normal        breath sounds clear to auscultation           OUTSIDE LABS:  CBC:   Lab Results   Component Value Date    WBC 10.0 02/16/2022    WBC 11.0 01/28/2022    HGB 12.2 02/16/2022    HGB 12.5 01/28/2022    HCT 38.8 02/16/2022    HCT 40.2 01/28/2022     02/16/2022     01/28/2022     BMP:   Lab Results   Component Value Date     02/16/2022     01/17/2022    POTASSIUM 4.3 02/16/2022    POTASSIUM 3.5 01/17/2022    CHLORIDE 104 02/16/2022    CHLORIDE 105 01/17/2022    CO2 28 02/16/2022    CO2 24 01/17/2022    BUN 13 02/16/2022    BUN 12 01/17/2022    CR 0.74 02/16/2022    CR 0.73  01/28/2022     02/16/2022     01/17/2022     COAGS: No results found for: PTT, INR, FIBR  POC: No results found for: BGM, HCG, HCGS  HEPATIC:   Lab Results   Component Value Date    ALBUMIN 3.5 02/16/2022    PROTTOTAL 7.4 02/16/2022    ALT 13 02/16/2022    AST 12 02/16/2022    ALKPHOS 103 02/16/2022    BILITOTAL 0.2 02/16/2022     OTHER:   Lab Results   Component Value Date    PH 7.40 01/01/2022    LACT 1.1 01/01/2022    JOSSELIN 9.5 02/16/2022    MAG 1.8 01/17/2022    LIPASE <9 01/09/2022    TSH 2.16 02/16/2022    T4 0.94 01/01/2022    CRP 3.3 (H) 03/11/2020    SED 64 (H) 03/11/2020       Anesthesia Plan    ASA Status:  3      Anesthesia Type: MAC.              Consents    Anesthesia Plan(s) and associated risks, benefits, and realistic alternatives discussed. Questions answered and patient/representative(s) expressed understanding.    - Discussed:     - Discussed with:  Patient      - Extended Intubation/Ventilatory Support Discussed: No.      - Patient is DNR/DNI Status: No    Use of blood products discussed: No .     Postoperative Care    Pain management: IV analgesics, Oral pain medications.   PONV prophylaxis: Ondansetron (or other 5HT-3), Dexamethasone or Solumedrol     Comments:                Jacki Galeas MD

## 2022-03-03 NOTE — INTERVAL H&P NOTE
H+P Update     Doing well. No changes in history.      BP (!) 142/83   Pulse 85   SpO2 98%       NAD, AAO x 3  Abdomen soft, non tender    A/P: 57 year old with PMB (postmenopausal bleeding) [N95.0] here for surgical management  -- Met with patient and discussed the risks of surgery including bleeding, infection, damage to pelvic organs, uterine perforation and need for further surgery.   -- Questions answered  -- Consent signed  -- Abdomen marked  -- To OR for Procedure(s):  HYSTEROSCOPY, WITH DILATION AND CURETTAGE     Irma Cary MD  (P) [phone]     I have reviewed the surgical (or preoperative) H&P that is linked to this encounter, and examined the patient. There are no significant changes

## 2022-03-03 NOTE — ANESTHESIA CARE TRANSFER NOTE
Patient: Alina Martell    Procedure: Procedure(s):  HYSTEROSCOPY, WITH DILATION AND CURETTAGE       Diagnosis: PMB (postmenopausal bleeding) [N95.0]  Diagnosis Additional Information: No value filed.    Anesthesia Type:   MAC     Note:    Oropharynx: oropharynx clear of all foreign objects  Level of Consciousness: awake  Oxygen Supplementation: room air    Independent Airway: airway patency satisfactory and stable  Dentition: dentition unchanged  Vital Signs Stable: post-procedure vital signs reviewed and stable  Report to RN Given: handoff report given  Patient transferred to: Phase II    Handoff Report: Identifed the Patient, Identified the Reponsible Provider, Reviewed the pertinent medical history, Discussed the surgical course, Reviewed Intra-OP anesthesia mangement and issues during anesthesia, Set expectations for post-procedure period and Allowed opportunity for questions and acknowledgement of understanding      Vitals:  Vitals Value Taken Time   /56 03/03/22 0940   Temp 98.5  F (36.9  C) 03/03/22 0940   Pulse 80 03/03/22 0939   Resp 20 03/03/22 0940   SpO2 93 % 03/03/22 0940   Vitals shown include unvalidated device data.    Electronically Signed By: STARLA Barrett CRNA  March 3, 2022  9:42 AM

## 2022-03-03 NOTE — OP NOTE
PROCEDURE NOTE - HYSTEROSCOPY AND DILATION AND CURETTAGE     Name: Alina Martell   : 1965   MRN: [id number]     DATE OF SERVICE:  3/3/2022     PREOPERATIVE DIAGNOSIS:   Post menopausal bleeding    POSTOPERATIVE DIAGNOSIS:   Post     PROCEDURES: Hysteroscopy, dilatation and curettage,     SURGEON: Irma Cary MD     ASSISTANT: OR Staff    ANESTHESIA:  mac    ESTIMATED BLOOD LOSS:  5 cc    HISTORY OF PRESENT ILLNESS:  This is a 57 year old female with history of dysfunctional uterine bleeding thought to be secondary to post menopausal bleeding. She had a transvaginal ultrasound prior to which showed thickened endometrium.  She was given informed consent for the above procedure. The risks, benefits and alternatives were discussed with her including but not exclusive to uterine perforation, bleeding and infection. She expressed understanding and wished to proceed.     PROCEDURE:  The patient was brought to the operating room and after induction of MAC anesthesia was prepped and draped in the dorsal lithotomy position. A time out was called and the patient and the procedure were verified.  A bimanual exam was done, indicating midplane. No other abnormalities were noted.     A sterile weighted speculum was then placed. The anterior lip of the cervix was grasped with a single tooth tenaculum. Uterine cavity was then souned to 8 cm. The cervix was gently dilated using Kaley dilators and the hysteroscope was introduced without difficulty. The cavity of the uterus appeared grossly normal. The myosure device was inserted. All 4 quadrants were sampled with the myosure device. The tissue was submitted for pathologic exam  At this point good hemostasis was noted and the hysteroscope was removed once again. Instruments were removed from vagina. No active bleeding was noted. Sponge and needle counts were correct X 2. She was taken to recovery in stable condition.    Irma Cary MD  (P)  [phone]        CC: Estelle Bansal Kathryn Burns Grande, MD

## 2022-03-03 NOTE — ANESTHESIA POSTPROCEDURE EVALUATION
Patient: Alina Martell    Procedure: Procedure(s):  HYSTEROSCOPY, WITH DILATION AND CURETTAGE       Anesthesia Type:  MAC    Note:  Disposition: Outpatient   Postop Pain Control: Uneventful            Sign Out: Well controlled pain   PONV: No   Neuro/Psych: Uneventful            Sign Out: Acceptable/Baseline neuro status   Airway/Respiratory: Uneventful            Sign Out: Acceptable/Baseline resp. status   CV/Hemodynamics: Uneventful            Sign Out: Acceptable CV status; No obvious hypovolemia; No obvious fluid overload   Other NRE: NONE   DID A NON-ROUTINE EVENT OCCUR? No           Last vitals:  Vitals Value Taken Time   /67 03/03/22 1144   Temp 98.5  F (36.9  C) 03/03/22 0940   Pulse 77 03/03/22 1144   Resp 16 03/03/22 1045   SpO2 94 % 03/03/22 1144   Vitals shown include unvalidated device data.    Electronically Signed By: Jacki Galeas MD  March 3, 2022  1:32 PM

## 2022-03-08 ENCOUNTER — OFFICE VISIT (OUTPATIENT)
Dept: BEHAVIORAL HEALTH | Facility: CLINIC | Age: 57
End: 2022-03-08
Payer: COMMERCIAL

## 2022-03-08 ENCOUNTER — OFFICE VISIT (OUTPATIENT)
Dept: FAMILY MEDICINE | Facility: CLINIC | Age: 57
End: 2022-03-08
Payer: COMMERCIAL

## 2022-03-08 ENCOUNTER — HOSPITAL ENCOUNTER (EMERGENCY)
Facility: HOSPITAL | Age: 57
Discharge: HOME | End: 2022-03-09
Attending: EMERGENCY MEDICINE | Admitting: EMERGENCY MEDICINE
Payer: COMMERCIAL

## 2022-03-08 ENCOUNTER — APPOINTMENT (OUTPATIENT)
Dept: CT IMAGING | Facility: HOSPITAL | Age: 57
End: 2022-03-08
Attending: EMERGENCY MEDICINE
Payer: COMMERCIAL

## 2022-03-08 VITALS
BODY MASS INDEX: 39.58 KG/M2 | OXYGEN SATURATION: 99 % | SYSTOLIC BLOOD PRESSURE: 145 MMHG | WEIGHT: 270 LBS | HEART RATE: 84 BPM | DIASTOLIC BLOOD PRESSURE: 87 MMHG | TEMPERATURE: 98.1 F

## 2022-03-08 DIAGNOSIS — F43.10 PTSD (POST-TRAUMATIC STRESS DISORDER): Primary | ICD-10-CM

## 2022-03-08 DIAGNOSIS — R10.13 EPIGASTRIC PAIN: ICD-10-CM

## 2022-03-08 DIAGNOSIS — R11.2 NON-INTRACTABLE VOMITING WITH NAUSEA, UNSPECIFIED VOMITING TYPE: Primary | ICD-10-CM

## 2022-03-08 DIAGNOSIS — F41.1 GAD (GENERALIZED ANXIETY DISORDER): ICD-10-CM

## 2022-03-08 DIAGNOSIS — F33.1 MAJOR DEPRESSIVE DISORDER, RECURRENT EPISODE, MODERATE (H): ICD-10-CM

## 2022-03-08 LAB
ALBUMIN SERPL-MCNC: 3.5 G/DL (ref 3.5–5)
ALP SERPL-CCNC: 105 U/L (ref 45–120)
ALT SERPL W P-5'-P-CCNC: 15 U/L (ref 0–45)
ANION GAP SERPL CALCULATED.3IONS-SCNC: 11 MMOL/L (ref 5–18)
AST SERPL W P-5'-P-CCNC: 10 U/L (ref 0–40)
BASOPHILS # BLD AUTO: 0 10E3/UL (ref 0–0.2)
BASOPHILS NFR BLD AUTO: 0 %
BILIRUB SERPL-MCNC: 0.2 MG/DL (ref 0–1)
BUN SERPL-MCNC: 12 MG/DL (ref 8–22)
CALCIUM SERPL-MCNC: 8.7 MG/DL (ref 8.5–10.5)
CHLORIDE BLD-SCNC: 101 MMOL/L (ref 98–107)
CO2 SERPL-SCNC: 28 MMOL/L (ref 22–31)
CREAT SERPL-MCNC: 0.77 MG/DL (ref 0.6–1.1)
EOSINOPHIL # BLD AUTO: 0.6 10E3/UL (ref 0–0.7)
EOSINOPHIL NFR BLD AUTO: 4 %
ERYTHROCYTE [DISTWIDTH] IN BLOOD BY AUTOMATED COUNT: 13.9 % (ref 10–15)
ERYTHROCYTE [DISTWIDTH] IN BLOOD BY AUTOMATED COUNT: 14.2 % (ref 10–15)
GFR SERPL CREATININE-BSD FRML MDRD: >90 ML/MIN/1.73M2
GLUCOSE BLD-MCNC: 104 MG/DL (ref 70–125)
HCT VFR BLD AUTO: 38.7 % (ref 35–47)
HCT VFR BLD AUTO: 39.9 % (ref 35–47)
HGB BLD-MCNC: 12.2 G/DL (ref 11.7–15.7)
HGB BLD-MCNC: 12.4 G/DL (ref 11.7–15.7)
HOLD SPECIMEN: NORMAL
IMM GRANULOCYTES # BLD: 0.1 10E3/UL
IMM GRANULOCYTES NFR BLD: 0 %
LACTATE SERPL-SCNC: 1 MMOL/L (ref 0.7–2)
LIPASE SERPL-CCNC: <9 U/L (ref 0–52)
LIPASE SERPL-CCNC: <9 U/L (ref 0–52)
LYMPHOCYTES # BLD AUTO: 3.9 10E3/UL (ref 0.8–5.3)
LYMPHOCYTES NFR BLD AUTO: 26 %
MCH RBC QN AUTO: 26.8 PG (ref 26.5–33)
MCH RBC QN AUTO: 27.1 PG (ref 26.5–33)
MCHC RBC AUTO-ENTMCNC: 31.1 G/DL (ref 31.5–36.5)
MCHC RBC AUTO-ENTMCNC: 31.5 G/DL (ref 31.5–36.5)
MCV RBC AUTO: 86 FL (ref 78–100)
MCV RBC AUTO: 86 FL (ref 78–100)
MONOCYTES # BLD AUTO: 1 10E3/UL (ref 0–1.3)
MONOCYTES NFR BLD AUTO: 7 %
NEUTROPHILS # BLD AUTO: 9.4 10E3/UL (ref 1.6–8.3)
NEUTROPHILS NFR BLD AUTO: 63 %
NRBC # BLD AUTO: 0 10E3/UL
NRBC BLD AUTO-RTO: 0 /100
PLATELET # BLD AUTO: 378 10E3/UL (ref 150–450)
PLATELET # BLD AUTO: 379 10E3/UL (ref 150–450)
POTASSIUM BLD-SCNC: 3.9 MMOL/L (ref 3.5–5)
PROT SERPL-MCNC: 7.8 G/DL (ref 6–8)
RBC # BLD AUTO: 4.51 10E6/UL (ref 3.8–5.2)
RBC # BLD AUTO: 4.62 10E6/UL (ref 3.8–5.2)
SODIUM SERPL-SCNC: 140 MMOL/L (ref 136–145)
TROPONIN I SERPL-MCNC: <0.01 NG/ML (ref 0–0.29)
WBC # BLD AUTO: 14.7 10E3/UL (ref 4–11)
WBC # BLD AUTO: 14.9 10E3/UL (ref 4–11)

## 2022-03-08 PROCEDURE — 85027 COMPLETE CBC AUTOMATED: CPT | Performed by: EMERGENCY MEDICINE

## 2022-03-08 PROCEDURE — 250N000011 HC RX IP 250 OP 636: Performed by: EMERGENCY MEDICINE

## 2022-03-08 PROCEDURE — 84484 ASSAY OF TROPONIN QUANT: CPT | Performed by: EMERGENCY MEDICINE

## 2022-03-08 PROCEDURE — 36415 COLL VENOUS BLD VENIPUNCTURE: CPT | Performed by: PHYSICIAN ASSISTANT

## 2022-03-08 PROCEDURE — 83605 ASSAY OF LACTIC ACID: CPT | Performed by: EMERGENCY MEDICINE

## 2022-03-08 PROCEDURE — 83690 ASSAY OF LIPASE: CPT | Performed by: EMERGENCY MEDICINE

## 2022-03-08 PROCEDURE — 83690 ASSAY OF LIPASE: CPT | Performed by: PHYSICIAN ASSISTANT

## 2022-03-08 PROCEDURE — 99285 EMERGENCY DEPT VISIT HI MDM: CPT | Mod: 25

## 2022-03-08 PROCEDURE — 93005 ELECTROCARDIOGRAM TRACING: CPT | Performed by: EMERGENCY MEDICINE

## 2022-03-08 PROCEDURE — 258N000003 HC RX IP 258 OP 636: Performed by: EMERGENCY MEDICINE

## 2022-03-08 PROCEDURE — 80053 COMPREHEN METABOLIC PANEL: CPT | Performed by: EMERGENCY MEDICINE

## 2022-03-08 PROCEDURE — 90834 PSYTX W PT 45 MINUTES: CPT | Performed by: COUNSELOR

## 2022-03-08 PROCEDURE — 96360 HYDRATION IV INFUSION INIT: CPT | Mod: 59

## 2022-03-08 PROCEDURE — 85025 COMPLETE CBC W/AUTO DIFF WBC: CPT | Performed by: PHYSICIAN ASSISTANT

## 2022-03-08 PROCEDURE — 74177 CT ABD & PELVIS W/CONTRAST: CPT

## 2022-03-08 PROCEDURE — 36415 COLL VENOUS BLD VENIPUNCTURE: CPT | Performed by: EMERGENCY MEDICINE

## 2022-03-08 PROCEDURE — 99214 OFFICE O/P EST MOD 30 MIN: CPT | Performed by: PHYSICIAN ASSISTANT

## 2022-03-08 RX ORDER — ONDANSETRON 4 MG/1
4 TABLET, ORALLY DISINTEGRATING ORAL EVERY 8 HOURS PRN
Qty: 15 TABLET | Refills: 0 | Status: SHIPPED | OUTPATIENT
Start: 2022-03-08 | End: 2022-03-15

## 2022-03-08 RX ORDER — IOPAMIDOL 755 MG/ML
100 INJECTION, SOLUTION INTRAVASCULAR ONCE
Status: COMPLETED | OUTPATIENT
Start: 2022-03-08 | End: 2022-03-08

## 2022-03-08 RX ADMIN — IOPAMIDOL 100 ML: 755 INJECTION, SOLUTION INTRAVENOUS at 23:27

## 2022-03-08 RX ADMIN — SODIUM CHLORIDE 1000 ML: 9 INJECTION, SOLUTION INTRAVENOUS at 20:29

## 2022-03-08 ASSESSMENT — ENCOUNTER SYMPTOMS
CHILLS: 0
NAUSEA: 1
DIARRHEA: 0
VOMITING: 1
ABDOMINAL PAIN: 1
FEVER: 0

## 2022-03-08 NOTE — PROGRESS NOTES
[facility]                                     Progress Note    Patient Name: Alina Martell  Date: 3/08/22         Service Type: Individual      Session Start Time: 805  Session End Time: 854     Session Length: 49 minutes    Session #: 30    Attendees: Client    Service Modality: In Person    DATA  Interactive Complexity: No  Crisis: No        Progress Since Last Session (Related to Symptoms / Goals / Homework):   Symptoms: No change anxiety present throughout the week    Homework: Achieved / completed to satisfaction      Episode of Care Goals: Satisfactory progress - CONTEMPLATION (Considering change and yet undecided); Intervened by assessing the negative and positive thinking (ambivalence) about behavior change     Current / Ongoing Stressors and Concerns:  Patient indicated feeling some anxiety. Patient reported her anxiety did go down with the procedure being over and went well. Patient indicated she has a great support network that helped out a lot. Patient repeorted she always struggles with accepting help. Patient indicated she is continuing to struggle with sleep. Patient reported it is a little better and continuing to use skills. This therapist processed with patient sleep hygiene.      Treatment Objective(s) Addressed in This Session:   use distraction each time intrusive worry surfaces  Increase interest, engagement, and pleasure in doing things  Decrease frequency and intensity of feeling down, depressed, hopeless  Feel less tired and more energy during the day   Identify negative self-talk and behaviors: challenge core beliefs, myths, and actions     Intervention:   Motivational Interviewing    MI Intervention: Expressed Empathy/Understanding     Change Talk Expressed by the Patient: Committment to change Activation    Provider Response to Change Talk: R - Reflected patient's change talk and S - Summarized patient's change talk statements      Assessments completed  prior to visit:  The following assessments were completed by patient for this visit:  PHQ9:   PHQ-9 SCORE 4/7/2021 4/28/2021 10/5/2021 12/13/2021 2/3/2022 2/14/2022 3/8/2022   PHQ-9 Total Score MyChart - - 2 (Minimal depression) - 6 (Mild depression) 7 (Mild depression) 6 (Mild depression)   PHQ-9 Total Score 4 4 2 6 6 7 6     GAD7:   ADA-7 SCORE 3/7/2021 3/31/2021 4/7/2021 4/28/2021 10/5/2021 11/15/2021 12/13/2021   Total Score 4 (minimal anxiety) - - - 3 (minimal anxiety) 6 (mild anxiety) -   Total Score 4 6 5 4 3 6 4     PROMIS 10-Global Health (all questions and answers displayed):   PROMIS 10 12/15/2021   In general, would you say your health is: Good   In general, would you say your quality of life is: Good   In general, how would you rate your physical health? Good   In general, how would you rate your mental health, including your mood and your ability to think? Fair   In general, how would you rate your satisfaction with your social activities and relationships? Good   In general, please rate how well you carry out your usual social activities and roles Good   To what extent are you able to carry out your everyday physical activities such as walking, climbing stairs, carrying groceries, or moving a chair? Mostly   How often have you been bothered by emotional problems such as feeling anxious, depressed or irritable? Often   How would you rate your fatigue on average? Mild   How would you rate your pain on average?   0 = No Pain  to  10 = Worst Imaginable Pain 3   In general, would you say your health is: 3   In general, would you say your quality of life is: 3   In general, how would you rate your physical health? 3   In general, how would you rate your mental health, including your mood and your ability to think? 2   In general, how would you rate your satisfaction with your social activities and relationships? 3   In general, please rate how well you carry out your usual social activities and roles. (This  includes activities at home, at work and in your community, and responsibilities as a parent, child, spouse, employee, friend, etc.) 3   To what extent are you able to carry out your everyday physical activities such as walking, climbing stairs, carrying groceries, or moving a chair? 4   In the past 7 days, how often have you been bothered by emotional problems such as feeling anxious, depressed, or irritable? 4   In the past 7 days, how would you rate your fatigue on average? 2   In the past 7 days, how would you rate your pain on average, where 0 means no pain, and 10 means worst imaginable pain? 3   Global Mental Health Score 10   Global Physical Health Score 15   PROMIS TOTAL - SUBSCORES 25   Some recent data might be hidden         ASSESSMENT: Current Emotional / Mental Status (status of significant symptoms):   Risk status (Self / Other harm or suicidal ideation)   Patient denies current fears or concerns for personal safety.   Patient denies current or recent suicidal ideation or behaviors.   Patient denies current or recent homicidal ideation or behaviors.   Patient denies current or recent self injurious behavior or ideation.   Patient denies other safety concerns.   Patient reports there has been no change in risk factors since their last session.     Patient reports there has been no change in protective factors since their last session.     Recommended that patient call 911 or go to the local ED should there be a change in any of these risk factors.     Appearance:   Appropriate    Eye Contact:   Good    Psychomotor Behavior: Normal    Attitude:   Cooperative    Orientation:   All   Speech    Rate / Production: Normal/ Responsive    Volume:  Normal    Mood:    Anxious  Depressed    Affect:    Appropriate    Thought Content:  Clear    Thought Form:  Coherent    Insight:    Good      Medication Review:   No changes to current psychiatric medication(s)     Medication Compliance:   Yes     Changes in Health  Issues:   Yes: See Epic     Chemical Use Review:   Substance Use: Chemical use reviewed, no active concerns identified      Tobacco Use: No current tobacco use.      Diagnosis:  1. PTSD (post-traumatic stress disorder)    2. ADA (generalized anxiety disorder)    3. Major depressive disorder, recurrent episode, moderate (H)        Collateral Reports Completed:   Not Applicable    PLAN: (Patient Tasks / Therapist Tasks / Other)  Patient will return in 3 weeks for scheduled session. Patient will continue to work on sleep hygiene skills taught in session. Patient will continue to work on asking for help.         Mariana Love, [facility], Provider Oversight:  Dr. Evan Webb    ______________________________________________________________________    Individual Treatment Plan    Patient's Name: Alina Martell  YOB: 1965    Date of Creation:10/16/2020  Date Treatment Plan Last Reviewed/Revised: 02/3/2022    DSM5 Diagnoses: 296.32 (F33.1) Major Depressive Disorder, Recurrent Episode, Moderate _ or 300.02 (F41.1) Generalized Anxiety Disorder  Psychosocial / Contextual Factors: Health, family and financial   PROMIS (reviewed every 90 days): 25    Referral / Collaboration:  Was/were discussed and patient will pursue.    Anticipated number of session for this episode of care: 20 will reevaluate every 90 days  Anticipation frequency of session: Biweekly  Anticipated Duration of each session: 38-52 minutes  Treatment plan will be reviewed in 90 days or when goals have been changed.       MeasurableTreatment Goal(s) related to diagnosis / functional impairment(s)  Goal 1: Patient will work on reducing overall anxiety.     I will know I've met my goal when reporting minimal anxiety symptoms.       Objective #A (Patient Action)                          Patient will use distraction each time intrusive worry surfaces.  Status: Continued - Date(s): 02/03/2022     Intervention(s)  Therapist will teach emotional  regulation skills. ..     Objective #B  Patient will Decrease frequency and intensity of feeling down, depressed, hopeless.  Status: Continued - Date(s):  02/03/2022     Intervention(s)  Therapist will teach emotional recognition/identification. ..     Objective #C  Patient will Identify negative self-talk and behaviors: challenge core beliefs, myths, and actions.  Status: Continued - Date(s):  02/03/2022     Intervention(s)  Therapist will teach the client how to perform a behavioral chain analysis. ..     Goal 2: Patient will continue to work on reducing depression symptoms    I will know I've met my goal then reporting minimal depression symptoms     Objective #A (Patient Action)                          Patient will Increase interest, engagement, and pleasure in doing things.  Status: Continued - Date(s):  02/03/2022     Intervention(s)  Therapist will assign homework ..     Objective #B  Patient will Decrease frequency and intensity of feeling down, depressed, hopeless.  Status: Continued - Date(s):  02/03/2022     Intervention(s)  Therapist will assign homework at every session.         Patient has reviewed and agreed to the above plan.        Mariana Love, [facility] February 3 , 2022

## 2022-03-09 VITALS
SYSTOLIC BLOOD PRESSURE: 140 MMHG | DIASTOLIC BLOOD PRESSURE: 80 MMHG | HEIGHT: 69 IN | OXYGEN SATURATION: 97 % | TEMPERATURE: 98.1 F | HEART RATE: 79 BPM | BODY MASS INDEX: 39.99 KG/M2 | RESPIRATION RATE: 18 BRPM | WEIGHT: 270 LBS

## 2022-03-09 RX ORDER — ONDANSETRON 4 MG/1
4-8 TABLET, ORALLY DISINTEGRATING ORAL EVERY 8 HOURS PRN
Qty: 20 TABLET | Refills: 0 | Status: SHIPPED | OUTPATIENT
Start: 2022-03-09 | End: 2022-03-12

## 2022-03-09 ASSESSMENT — PATIENT HEALTH QUESTIONNAIRE - PHQ9: SUM OF ALL RESPONSES TO PHQ QUESTIONS 1-9: 6

## 2022-03-09 NOTE — ED PROVIDER NOTES
"ED SIGNOUT  Date/Time:3/8/2022 10:17 PM    Patient signed out to me by my colleague, Dr. Rosario MD.  Please see their note for complete history and physical. Plan to follow up on CT results and disposition.     The creation of this record is based on the scribe s observations of the work being performed by Dr. Geri MD and the provider s statements to them. It was created on their behalf by Mariah Hernandez a trained medical scribe. This document has been checked and approved by the attending provider.      REMAINING ED WORKUP:    Vitals:  BP (!) 166/82   Pulse 81   Temp 98.1  F (36.7  C) (Temporal)   Resp 18   Ht 1.753 m (5' 9\")   Wt 122.5 kg (270 lb)   LMP 12/10/2021   SpO2 98%   BMI 39.87 kg/m        Pertinent labs results reviewed   Results for orders placed or performed during the hospital encounter of 03/08/22   Comprehensive metabolic panel   Result Value Ref Range    Sodium 140 136 - 145 mmol/L    Potassium 3.9 3.5 - 5.0 mmol/L    Chloride 101 98 - 107 mmol/L    Carbon Dioxide (CO2) 28 22 - 31 mmol/L    Anion Gap 11 5 - 18 mmol/L    Urea Nitrogen 12 8 - 22 mg/dL    Creatinine 0.77 0.60 - 1.10 mg/dL    Calcium 8.7 8.5 - 10.5 mg/dL    Glucose 104 70 - 125 mg/dL    Alkaline Phosphatase 105 45 - 120 U/L    AST 10 0 - 40 U/L    ALT 15 0 - 45 U/L    Protein Total 7.8 6.0 - 8.0 g/dL    Albumin 3.5 3.5 - 5.0 g/dL    Bilirubin Total 0.2 0.0 - 1.0 mg/dL    GFR Estimate >90 >60 mL/min/1.73m2   Result Value Ref Range    Lipase <9 0 - 52 U/L   Lactic acid whole blood   Result Value Ref Range    Lactic Acid 1.0 0.7 - 2.0 mmol/L   Troponin I (now)   Result Value Ref Range    Troponin I <0.01 0.00 - 0.29 ng/mL   Extra Red Top Tube   Result Value Ref Range    Hold Specimen JIC        Pertinent imaging reviewed   Please see official radiology report.  CT Abdomen Pelvis w Contrast    (Results Pending)        Interventions  Medications   0.9% sodium chloride BOLUS (0 mLs Intravenous Stopped 3/8/22 3377)        ED " Course/MDM:  10:17 PM Signout accepted from Dr. Rosario MD.  Prior records were reviewed.  Diagnostics from this visit are reviewed.  12:11 I introduced myself to the patient.    Update: CT scan .  Exact etiology unremarkable.  Exact etiology of symptoms unknown but this points toward something benign.  At this time I will discharge.      ED Course as of 03/09/22 0011   [place] Mar 08, 2022   2033 Patient is a 57-year-old female who comes in today for evaluation of epigastric abdominal pain as well as some nausea and vomiting.  She has been unable to keep any solids down for 2 days.  She has been able to keep fluid down.  She had a D&C several days ago and seemed to be recovering from that okay.  She had her gallbladder out and this feels similar to when she had gallbladder problems.  She is otherwise healthy.  She does not drink alcohol.  She is never had problems with her pancreas.  She did have epigastric tenderness on my exam.  She will need some lab work and CT imaging of her belly.  With the being epigastric, also with a negative troponin and EKG.  I discussed all this with the patient and she is in agreement with the plan.   2145 Patient's lab work is unremarkable.   2220 Patient was signed out change of shift to Dr. Nevarez pending CT imaging.  I think if CT comes back okay she can probably be discharged home with symptomatic management which may include some nausea medication.           1. Epigastric pain            [facility] Emergency Department          Jennifer Nevarez MD  03/09/22 0012

## 2022-03-09 NOTE — PROGRESS NOTES
Patient presents with:  Gastrointestinal Problem: patient states unable to keep food down after HYSTEROSCOPY surgery       Clinical Decision Making: Patient experiencing vomiting of solid foods whatever she tries to eat for the past 48 hours.  Patient is also not had a bowel movement in last 48 hours.  Patient recently had dilation curettage, but that surgery went very well.  Pain is over epigastric region.  CBC shows elevated white count.  Unable to complete imaging at this point in the evening, so the patient was referred to [facility] emergency department.   ED provider was notified prior to the patient's arrival.      ICD-10-CM    1. Non-intractable vomiting with nausea, unspecified vomiting type  R11.2 ondansetron (ZOFRAN-ODT) 4 MG ODT tab     CBC with platelets     Lipase     CBC with platelets     Lipase       Patient Instructions   1. Begin taking Zofran. Try eating a cracker. If you keep it down after an hour you can advance the diet slowly.   2. I'll call you with your CBC results.   3. If you continue to vomit please seek emergency medical attention.       HPI:  Alina Martell is a 57 year old female who presents today complaining of vomiting that started yesterday afternoon.  Patient recently had a D&C on 3/3.  She tolerated the procedure very well and had very minimal pain.  She took Zofran 3 of the 4 tablets that were prescribed to her.  She is on Eliquis.  She has not even needed Tylenol.  She denies any fevers or chills.  Each time she tries to eat something she experiences epigastric pain and pretty much immediate vomiting within 30 minutes.  She has been able to continue to drink fluids.  She has not had bowel movements in the past couple of days, but she states that she has not been eating.  She was taking stool softener right after the procedure of her instructions.  She has had her gallbladder out, but no other abdominal surgeries.    History obtained from the patient.    Problem  List:  2022-02: Postmenopausal bleeding  2022-01: Obstructive sleep apnea syndrome  2022-01: Essential hypertension  2022-01: Coronary artery disease of native artery with stable angina   pectoris (H)  2022-01: Seropositive rheumatoid arthritis of multiple sites (H)  2022-01: Recurrent major depressive disorder, in full remission (H)  2022-01: Paroxysmal atrial fibrillation (H)  2022-01: Morbid obesity (H)  2021-07: Chest pain  2021-03: ADA (generalized anxiety disorder)  2018-11: Depressed  2017-10: Cyclic citrullinated peptide (CCP) antibody positive  2017-06: Tendonitis of both shoulders  2016-07: Chronic pain  2016-07: Rheumatoid arthritis (H)  2016-07: Olecranon bursitis of right elbow  2016-03: Grief  2015-10: Sicca syndrome (H)  2014-12: Major depressive disorder, recurrent episode, unspecified  2014-12: Anxiety  2014-12: Panic attack  2014-12: Major depressive disorder, recurrent episode, severe,   without mention of psychotic behavior  2014-12: Sleep disorder  2014-09: Insomnia related to another mental disorder  2014-09: Microcytic anemia  2014-09: Migraine headache  2014-09: Reflux      Past Medical History:   Diagnosis Date     Anemia      Antiplatelet or antithrombotic long-term use      Arrhythmia      Cannabis use without complication 12/8/2014     Carpal tunnel syndrome      Coronary artery disease      Gastroesophageal reflux disease      History of angina      Hypertension      Irregular heart beat      Obesity      Paroxysmal atrial fibrillation (H)      PTSD (post-traumatic stress disorder)      RA (rheumatoid arthritis) (H)      Rheumatoid arthritis (H) 7/8/2016     Sicca syndrome (H)      Sleep apnea        Social History     Tobacco Use     Smoking status: Never Smoker     Smokeless tobacco: Never Used     Tobacco comment: smokes marijuana   Substance Use Topics     Alcohol use: Not Currently     Comment: Alcoholic Drinks/day: Never an issue, she states.  7/8/16       Review of Systems    Constitutional: Negative for chills and fever.   Gastrointestinal: Positive for abdominal pain, nausea and vomiting. Negative for diarrhea.        (+) No recent BM, but patient does not feel constipated.        Vitals:    03/08/22 1809 03/08/22 1814   BP: (!) 147/79 (!) 145/87   BP Location: Right arm    Patient Position: Sitting    Cuff Size: Adult Large    Pulse: 84    Temp: 98.1  F (36.7  C)    TempSrc: Tympanic    SpO2: 99%    Weight: 122.5 kg (270 lb)        Physical Exam  Vitals and nursing note reviewed.   Constitutional:       General: She is not in acute distress.     Appearance: She is not toxic-appearing or diaphoretic.   HENT:      Head: Normocephalic and atraumatic.      Right Ear: External ear normal.      Left Ear: External ear normal.   Eyes:      Conjunctiva/sclera: Conjunctivae normal.   Cardiovascular:      Rate and Rhythm: Normal rate and regular rhythm.      Heart sounds: No murmur heard.  Pulmonary:      Effort: Pulmonary effort is normal. No respiratory distress.   Abdominal:      General: Abdomen is flat. There is no distension.      Palpations: Abdomen is soft.      Tenderness: There is abdominal tenderness in the epigastric area. There is no guarding or rebound.      Hernia: No hernia is present.   Neurological:      Mental Status: She is alert.   Psychiatric:         Mood and Affect: Mood normal.         Behavior: Behavior normal.         Thought Content: Thought content normal.         Judgment: Judgment normal.         Results:  Results for orders placed or performed in visit on 03/08/22   CBC with platelets     Status: Abnormal   Result Value Ref Range    WBC Count 14.7 (H) 4.0 - 11.0 10e3/uL    RBC Count 4.51 3.80 - 5.20 10e6/uL    Hemoglobin 12.2 11.7 - 15.7 g/dL    Hematocrit 38.7 35.0 - 47.0 %    MCV 86 78 - 100 fL    MCH 27.1 26.5 - 33.0 pg    MCHC 31.5 31.5 - 36.5 g/dL    RDW 13.9 10.0 - 15.0 %    Platelet Count 378 150 - 450 10e3/uL         At the end of the encounter, I  discussed results, diagnosis, medications. Discussed red flags for immediate return to clinic/ER, as well as indications for follow up if no improvement. Patient understood and agreed to plan. Patient was stable for discharge.

## 2022-03-09 NOTE — PATIENT INSTRUCTIONS
1. Begin taking Zofran. Try eating a cracker. If you keep it down after an hour you can advance the diet slowly.   2. I'll call you with your CBC results.   3. If you continue to vomit please seek emergency medical attention.

## 2022-03-09 NOTE — ED PROVIDER NOTES
EMERGENCY DEPARTMENT ENCOUNTER      NAME: Alina Martell  AGE: 57 year old female  YOB: 1965  MRN: [id number]  EVALUATION DATE & TIME: No admission date for patient encounter.    PCP: Estelle Bansal    ED PROVIDER: Iman Ponce M.D.      Chief Complaint   Patient presents with     Abdominal Pain     FINAL IMPRESSION:  1. Epigastric pain      ED COURSE & MEDICAL DECISION MAKING:    Pertinent Labs & Imaging studies reviewed. (See chart for details)  ED Course as of 03/08/22 2221   Tue Mar 08, 2022   2033 Patient is a 57-year-old female who comes in today for evaluation of epigastric abdominal pain as well as some nausea and vomiting.  She has been unable to keep any solids down for 2 days.  She has been able to keep fluid down.  She had a D&C several days ago and seemed to be recovering from that okay.  She had her gallbladder out and this feels similar to when she had gallbladder problems.  She is otherwise healthy.  She does not drink alcohol.  She is never had problems with her pancreas.  She did have epigastric tenderness on my exam.  She will need some lab work and CT imaging of her belly.  With the being epigastric, also with a negative troponin and EKG.  I discussed all this with the patient and she is in agreement with the plan.   2145 Patient's lab work is unremarkable.   2220 Patient was signed out change of shift to Dr. Nevarez pending CT imaging.  I think if CT comes back okay she can probably be discharged home with symptomatic management which may include some nausea medication.       7:53 PM I met the patient and performed my initial interview and exam.      MEDICATIONS GIVEN IN THE EMERGENCY:  Medications   0.9% sodium chloride BOLUS (has no administration in time range)       NEW PRESCRIPTIONS STARTED AT TODAY'S ER VISIT  New Prescriptions    No medications on file          =================================================================    HPI    Triage Note: Underwent a D&C  on 3/3/2022. Reports worsening abdominal pain since undergoing surgery. Was seen at work in clinic and referred to ER due to leukocytosis.       Patient information was obtained from: Patient    Use of : N/A       Alina Martell is a 57 year old female with a history of cholecystectomy, hypertension, and obesity who presents to this ED for evaluation of abdominal pain.     Patient reports that she had a D&C on 3/3. Procedure was tolerated well and did not need pain medicine. On 3/6 patient developed epigastric pain and vomiting. Her pain has been worsening since. Her abdominal pain feels like getting punched. Is able to keep fluid down but not solids. Her abdominal pain radiates to her back. Patient got the D&C because she did not have her period for a year then had bleeding for four weeks straight. No alcohol use. Denies any fever, chills, or any other complaints at this time.       REVIEW OF SYSTEMS   Except as stated in the HPI all other systems reviewed and are negative.    PAST MEDICAL HISTORY:  Past Medical History:   Diagnosis Date     Anemia      Antiplatelet or antithrombotic long-term use      Arrhythmia      Cannabis use without complication 12/8/2014     Carpal tunnel syndrome      Coronary artery disease      Gastroesophageal reflux disease      History of angina      Hypertension      Irregular heart beat      Obesity      Paroxysmal atrial fibrillation (H)      PTSD (post-traumatic stress disorder)      RA (rheumatoid arthritis) (H)      Rheumatoid arthritis (H) 7/8/2016     Sicca syndrome (H)      Sleep apnea        PAST SURGICAL HISTORY:  Past Surgical History:   Procedure Laterality Date     CHOLECYSTECTOMY       DILATION AND CURETTAGE, OPERATIVE HYSTEROSCOPY, COMBINED N/A 3/3/2022    Procedure: HYSTEROSCOPY, WITH DILATION AND CURETTAGE;  Surgeon: Irma Cary MD;  Location: [facility] OR     HC REMOVAL GALLBLADDER      Description: Cholecystectomy;  Recorded:  10/15/2013;     LAPAROSCOPIC TUBAL LIGATION       RELEASE CARPAL TUNNEL BILATERAL       TUBAL LIGATION  1995       CURRENT MEDICATIONS:      Current Facility-Administered Medications:      0.9% sodium chloride BOLUS, 1,000 mL, Intravenous, Once, Iman Ponce MD    Current Outpatient Medications:      acetaminophen (TYLENOL) 325 MG tablet, Take 3 tablets (975 mg) by mouth every 6 hours as needed for mild pain, Disp: 50 tablet, Rfl: 0     adalimumab (HUMIRA *CF* PEN) 40 MG/0.4ML pen kit, INJECT 0.4 MLS (40 MG) SUBCUTANEOUS EVERY 14 DAYS HOLD FOR SIGNS OF INFECTION, THEN SEEK MEDICAL ATTENTION., Disp: 2 each, Rfl: 4     apixaban ANTICOAGULANT (ELIQUIS ANTICOAGULANT) 5 MG tablet, Take 1 tablet (5 mg) by mouth 2 times daily, Disp: 180 tablet, Rfl: 1     diltiazem ER COATED BEADS (CARDIZEM CD/CARTIA XT) 180 MG 24 hr capsule, Take 2 capsules (360 mg) by mouth daily, Disp: 180 capsule, Rfl: 3     EPINEPHrine (ANY BX GENERIC EQUIV) 0.3 MG/0.3ML injection 2-pack, Inject 0.3 mg into the muscle as needed for anaphylaxis , Disp: , Rfl:      flecainide (TAMBOCOR) 50 MG tablet, Take 1 tablet (50 mg) by mouth 2 times daily as needed (at onset of a fib, may repeat once after 4 hours), Disp: 60 tablet, Rfl: 1     hydrOXYzine (ATARAX) 25 MG tablet, Take 1 tablet (25 mg) by mouth 3 times daily as needed for anxiety, Disp: 90 tablet, Rfl: 1     Lidocaine (LIDOCARE) 4 % Patch, Place 1 patch onto the skin every 24 hours To prevent lidocaine toxicity, patient should be patch free for 12 hrs daily., Disp: 5 patch, Rfl: 0     loratadine (CLARITIN) 10 MG tablet, Take 10 mg by mouth daily , Disp: , Rfl:      meclizine (ANTIVERT) 12.5 MG tablet, Take 1 tablet (12.5 mg) by mouth 4 times daily as needed for dizziness, Disp: 30 tablet, Rfl: 0     Multiple Vitamins-Minerals (CENTROVITE) TABS, TAKE 1 TABLET BY MOUTH EVERY DAY FOR 30 DAYS, Disp: , Rfl:      ondansetron (ZOFRAN-ODT) 4 MG ODT tab, Take 1 tablet (4 mg) by mouth every 8 hours  as needed for nausea, Disp: 15 tablet, Rfl: 0     ondansetron (ZOFRAN-ODT) 4 MG ODT tab, Take 1-2 tablets (4-8 mg) by mouth every 8 hours as needed for nausea, Disp: 4 tablet, Rfl: 0     prazosin (MINIPRESS) 1 MG capsule, Take 1 capsule by mouth nightly as needed , Disp: , Rfl:      sertraline (ZOLOFT) 25 MG tablet, Take 1 tablet (25 mg) by mouth daily, Disp: 90 tablet, Rfl: 0     traZODone (DESYREL) 50 MG tablet, Take 0.5 tablets (25 mg) by mouth At Bedtime, Disp: 90 tablet, Rfl: 0     vitamin C (ASCORBIC ACID) 1000 MG TABS, Take 1,000 mg by mouth daily , Disp: , Rfl:      vitamin D3 (CHOLECALCIFEROL) 250 mcg (58275 units) capsule, Take 1 capsule by mouth once a week, Disp: , Rfl:      zinc sulfate (ZINCATE) 220 (50 Zn) MG capsule, , Disp: , Rfl:     ALLERGIES:  Allergies   Allergen Reactions     Penicillins Shortness Of Breath, Palpitations, Dizziness, Anaphylaxis, Hives, Itching and Rash     Test in 2031, see allergy note on 7/29/21     Shellfish-Derived Products Anaphylaxis     Sulfa Drugs Hives and Anaphylaxis       FAMILY HISTORY:  Family History   Problem Relation Age of Onset     Crohn's Disease Mother         Total colectomy with ileostomy.     Psoriasis Brother      Snoring Brother      Snoring Father      Diabetes Father      Prostate Cancer Father      Chronic Kidney Disease Father         Chose against dialysis.     Substance Abuse Brother      Depression Brother      Attention Deficit Disorder Brother      Alcoholism Brother      No Known Problems Daughter      No Known Problems Son      Alcoholism Brother      Substance Abuse Brother      Lupus Cousin      Alcoholism Maternal Grandfather      Breast Cancer No family hx of        SOCIAL HISTORY:   Social History     Socioeconomic History     Marital status: Single     Spouse name: Not on file     Number of children: 2     Years of education: 4     Highest education level: Not on file   Occupational History     Not on file   Tobacco Use     Smoking  "status: Never Smoker     Smokeless tobacco: Never Used     Tobacco comment: smokes marijuana   Substance and Sexual Activity     Alcohol use: Not Currently     Comment: Alcoholic Drinks/day: Never an issue, she states.  7/8/16     Drug use: Not Currently     Types: Marijuana     Comment: Drug use: Former marijuana use, not current. 7/8/16     Sexual activity: Never     Partners: Male     Birth control/protection: Other     Comment: tubal ligation   Other Topics Concern     Parent/sibling w/ CABG, MI or angioplasty before 65F 55M? Not Asked   Social History Narrative     Not on file     Social Determinants of Health     Financial Resource Strain: Not on file   Food Insecurity: Not on file   Transportation Needs: Not on file   Physical Activity: Not on file   Stress: Not on file   Social Connections: Not on file   Intimate Partner Violence: Not on file   Housing Stability: Not on file       PHYSICAL EXAM    VITAL SIGNS: BP (!) 159/79   Pulse (!) 19   Temp 98.1  F (36.7  C) (Temporal)   Resp 18   Ht 1.753 m (5' 9\")   Wt 122.5 kg (270 lb)   LMP 12/10/2021   SpO2 99%   BMI 39.87 kg/m     GENERAL: Awake, Alert, answering questions, No acute distress, Well nourished  HEENT: Normal cephalic, Atraumatic, bilateral external ears normal, No scleral icterus, mask in place  NECK: No obvious swelling or abnormality, No stridor  PULMONARY:Normal and symmetric breath sounds, No respiratory distress, Lungs clear to auscultation bilaterally. No wheezing  CARDIOVASCULAR: Regular rate and rhythm, Distal pulses present and normal.  ABDOMINAL: Soft, Nondistended, No flank tenderness, No palpable masses. Epigastric tenderness.   BACK: No bruising or tenderness.  EXTREMITIES: Moves all extremities spontaneously, warm, no edema, No major deformities  NEURO: No facial droop, normal motor function, Normal speech   PSYCH: Normal mood and affect  SKIN: No rashes on visualized skin, dry, warm     LAB:  All pertinent labs reviewed and " interpreted.  Results for orders placed or performed during the hospital encounter of 03/08/22   Comprehensive metabolic panel   Result Value Ref Range    Sodium 140 136 - 145 mmol/L    Potassium 3.9 3.5 - 5.0 mmol/L    Chloride 101 98 - 107 mmol/L    Carbon Dioxide (CO2) 28 22 - 31 mmol/L    Anion Gap 11 5 - 18 mmol/L    Urea Nitrogen 12 8 - 22 mg/dL    Creatinine 0.77 0.60 - 1.10 mg/dL    Calcium 8.7 8.5 - 10.5 mg/dL    Glucose 104 70 - 125 mg/dL    Alkaline Phosphatase 105 45 - 120 U/L    AST 10 0 - 40 U/L    ALT 15 0 - 45 U/L    Protein Total 7.8 6.0 - 8.0 g/dL    Albumin 3.5 3.5 - 5.0 g/dL    Bilirubin Total 0.2 0.0 - 1.0 mg/dL    GFR Estimate >90 >60 mL/min/1.73m2   Result Value Ref Range    Lipase <9 0 - 52 U/L   Lactic acid whole blood   Result Value Ref Range    Lactic Acid 1.0 0.7 - 2.0 mmol/L   Troponin I (now)   Result Value Ref Range    Troponin I <0.01 0.00 - 0.29 ng/mL   Extra Red Top Tube   Result Value Ref Range    Hold Specimen JIC        RADIOLOGY:  CT Abdomen Pelvis w Contrast    (Results Pending)       CT Abdomen Pelvis w Contrast    (Results Pending)     EKG:    Date and time: March 8, 2022 at 2057  Rate: 73 bpm  Rhythm: Sinus rhythm  TX interval: 156 ms  QRS interval: 88 ms  QT/QTc: 408/449 ms  ST changes or T wave changes: No acute ST or T wave abnormality  Change from prior ECG: No change from prior  I have independently reviewed and interpreted this EKG.     I, Mauricio Sanders, am serving as a scribe to document services personally performed by Dr. Ponce based on my observation and the provider's statements to me. I, Iman Ponce MD attest that Mauricio Sanders is acting in a scribe capacity, has observed my performance of the services and has documented them in accordance with my direction.    Iman Ponce M.D.  Emergency Medicine  [facility] EMERGENCY DEPARTMENT  [address] [place] [id number] [phone]  Dept:  [phone]     Iman Ponce MD  03/08/22 9929

## 2022-03-09 NOTE — ED NOTES
Expected Patient Referral to ED  7:19 PM    Referring Clinic/Provider:  Walk in clinic    Reason for referral/Clinical facts:  D&c on the 3rd. Now can't tolerate food. Epigastric pain is new. Can tolerate fluids.    Recommendations provided:  Send to ED for further evaluation    Caller was informed that this institution does possess the capabilities and/or resources to provide for patient and should be transferred to our facility.    Iman Ponce MD  Emergency Medicine  [facility] EMERGENCY DEPARTMENT  [facility] [facility] [id number] [phone]       Iman Ponce MD  03/08/22 0819

## 2022-03-09 NOTE — ED TRIAGE NOTES
Underwent a D&C on 3/3/2022. Reports worsening abdominal pain since undergoing surgery. Was seen at work in clinic and referred to ER due to leukocytosis.

## 2022-03-09 NOTE — DISCHARGE INSTRUCTIONS
The CT scan tonight is negative for any acute abnormalities.  This means we still do not know exactly what is causing the discomfort but that does not appear to be anything life-threatening.  Use Tylenol as needed for discomfort.  Keep an eye on symptoms and follow-up with your doctor for recheck later this week.

## 2022-03-10 ENCOUNTER — MYC MEDICAL ADVICE (OUTPATIENT)
Dept: FAMILY MEDICINE | Facility: CLINIC | Age: 57
End: 2022-03-10

## 2022-03-10 ENCOUNTER — OFFICE VISIT (OUTPATIENT)
Dept: FAMILY MEDICINE | Facility: CLINIC | Age: 57
End: 2022-03-10
Payer: COMMERCIAL

## 2022-03-10 ENCOUNTER — PATIENT OUTREACH (OUTPATIENT)
Dept: CARE COORDINATION | Facility: CLINIC | Age: 57
End: 2022-03-10
Payer: COMMERCIAL

## 2022-03-10 VITALS
RESPIRATION RATE: 18 BRPM | WEIGHT: 270.06 LBS | HEART RATE: 77 BPM | BODY MASS INDEX: 39.88 KG/M2 | TEMPERATURE: 97.3 F | OXYGEN SATURATION: 97 % | DIASTOLIC BLOOD PRESSURE: 73 MMHG | SYSTOLIC BLOOD PRESSURE: 124 MMHG

## 2022-03-10 DIAGNOSIS — R10.13 EPIGASTRIC PAIN: ICD-10-CM

## 2022-03-10 DIAGNOSIS — R13.10 DYSPHAGIA, UNSPECIFIED TYPE: Primary | ICD-10-CM

## 2022-03-10 DIAGNOSIS — Z71.89 OTHER SPECIFIED COUNSELING: ICD-10-CM

## 2022-03-10 PROCEDURE — 99214 OFFICE O/P EST MOD 30 MIN: CPT | Performed by: PHYSICIAN ASSISTANT

## 2022-03-10 RX ORDER — OMEPRAZOLE 40 MG/1
40 CAPSULE, DELAYED RELEASE ORAL DAILY
Qty: 30 CAPSULE | Refills: 0 | Status: SHIPPED | OUTPATIENT
Start: 2022-03-10 | End: 2022-04-11

## 2022-03-10 NOTE — TELEPHONE ENCOUNTER
Kavitha warnerg:  Hey, i had my surgery a week ago.  Everything went ok.  But for 5 days i have pretty bad stomach pain in my upper abdomen, and middle and lower back.  I came to the walk in clinic on 3/8 and they ran some tests and my white blood cell count was 14.9 and she sent me to the ER.  They did a CT and found nothing.  The pain is getting worse.  The only way i can describe it is that my food seems to be getting stuck when i eat and comes back up undigested.  I also have to urinate pretty often.  I just know what else to do.     Please advise    Ej Bull Jr., [facility] on 3/10/2022 at 12:32 PM

## 2022-03-10 NOTE — PROGRESS NOTES
Clinic Care Coordination Contact  Community Health Worker Initial Outreach    Patient accepts CC: No, Pt declined needs for extra support.       Clinic Care Coordination Contact  [facility]: Post-Discharge Note  SITUATION                                                      Admission:    Admission Date: 03/08/22   Reason for Admission: Epigastric pain  Discharge:   Discharge Date: 03/09/22  Discharge Diagnosis: Epigastric pain    BACKGROUND                                                      Per hospital discharge summary and inpatient provider notes:    Patient is a 57-year-old female who comes in today for evaluation of epigastric abdominal pain as well as some nausea and vomiting.  She has been unable to keep any solids down for 2 days.  She has been able to keep fluid down.  She had a D&C several days ago and seemed to be recovering from that okay.  She had her gallbladder out and this feels similar to when she had gallbladder problems.     ASSESSMENT      Enrollment  Primary Care Care Coordination Status: Declined    Discharge Assessment  How are you doing now that you are home?: feeling better  How are your symptoms? (Red Flag symptoms escalate to triage hotline per guidelines): Improved  Do you feel your condition is stable enough to be safe at home until your provider visit?: Yes  Does the patient have their discharge instructions? : Yes  Does the patient have questions regarding their discharge instructions? : No  Were you started on any new medications or were there changes to any of your previous medications? : Yes  Does the patient have all of their medications?: Yes  Do you have questions regarding any of your medications? : No  Do you have all of your needed medical supplies or equipment (DME)?  (i.e. oxygen tank, CPAP, cane, etc.): Yes  Discharge follow-up appointment scheduled within 14 calendar days? : Yes  Discharge Follow Up Appointment Date: 03/17/22  Discharge Follow Up Appointment  Scheduled with?: Specialty Care Provider    Post-op (W CTA Only)  If the patient had a surgery or procedure, do they have any questions for a nurse?: No         PLAN                                                      Outpatient Plan:      Keep an eye on symptoms and follow-up with your  doctor for recheck later this week.    Future Appointments   Date Time Provider Department Center   3/17/2022  2:50 PM Jose Guadalupe Joseph MD HRSJN [facility]   3/29/2022  8:00 AM BackMariana alfaro LPC MASONRHYS [facility]   4/14/2022  1:00 PM Mariana Love LPCC JORHYS [facility]   4/25/2022  7:45 AM [id number] PAOLA [facility]   4/26/2022  8:00 AM BackMariana alfaro LPCC JORHYS [facility]   8/15/2022  4:45 PM MPLW LAB OBINNA [facility]   8/18/2022  8:30 AM Medhat Lester MBBS MDRU [facility]         For any urgent concerns, please contact our 24 hour nurse triage line: [phone] ([phone])           SAMANTHA Gonzalez  [phone]  Connected Care Resource [facility]

## 2022-03-11 ENCOUNTER — TELEPHONE (OUTPATIENT)
Dept: GASTROENTEROLOGY | Facility: CLINIC | Age: 57
End: 2022-03-11
Payer: COMMERCIAL

## 2022-03-11 ENCOUNTER — MEDICAL CORRESPONDENCE (OUTPATIENT)
Dept: HEALTH INFORMATION MANAGEMENT | Facility: CLINIC | Age: 57
End: 2022-03-11
Payer: COMMERCIAL

## 2022-03-11 NOTE — PROGRESS NOTES
URGENT CARE VISIT:    SUBJECTIVE:   Alina Martell is a 57 year old female who presents with abdominal pain for 3 days. Abdominal pain is located over Epigastric and is described as sharp. Pain timing/severity is described as gradual onset and severe.  Pain is improved by nothing and worsened by eating. Associated symptoms include vomiting solid foods. She is able to keep liquids down. She denies diarrhea, constipation, bloating, fever and chills. She has tried nothing with no relief of symptoms.  Appetite is normal. Risk factors include none. Abdominal surgical history includes hysteroscopy last week and cholecystectomy. Denies hx of GERD.    PMH:   Past Medical History:   Diagnosis Date     Anemia      Antiplatelet or antithrombotic long-term use      Arrhythmia      Cannabis use without complication 12/8/2014     Carpal tunnel syndrome      Coronary artery disease      Gastroesophageal reflux disease      History of angina      Hypertension      Irregular heart beat      Obesity      Paroxysmal atrial fibrillation (H)      PTSD (post-traumatic stress disorder)      RA (rheumatoid arthritis) (H)      Rheumatoid arthritis (H) 7/8/2016     Sicca syndrome (H)      Sleep apnea      Allergies: Penicillins, Shellfish-derived products, and Sulfa drugs  Medications:   Current Outpatient Medications   Medication Sig Dispense Refill     acetaminophen (TYLENOL) 325 MG tablet Take 3 tablets (975 mg) by mouth every 6 hours as needed for mild pain 50 tablet 0     adalimumab (HUMIRA *CF* PEN) 40 MG/0.4ML pen kit INJECT 0.4 MLS (40 MG) SUBCUTANEOUS EVERY 14 DAYS HOLD FOR SIGNS OF INFECTION, THEN SEEK MEDICAL ATTENTION. 2 each 4     apixaban ANTICOAGULANT (ELIQUIS ANTICOAGULANT) 5 MG tablet Take 1 tablet (5 mg) by mouth 2 times daily 180 tablet 1     diltiazem ER COATED BEADS (CARDIZEM CD/CARTIA XT) 180 MG 24 hr capsule Take 2 capsules (360 mg) by mouth daily 180 capsule 3     hydrOXYzine (ATARAX) 25 MG tablet Take 1 tablet  (25 mg) by mouth 3 times daily as needed for anxiety 90 tablet 1     loratadine (CLARITIN) 10 MG tablet Take 10 mg by mouth daily        Multiple Vitamins-Minerals (CENTROVITE) TABS TAKE 1 TABLET BY MOUTH EVERY DAY FOR 30 DAYS       omeprazole (PRILOSEC) 40 MG DR capsule Take 1 capsule (40 mg) by mouth daily 30 capsule 0     ondansetron (ZOFRAN-ODT) 4 MG ODT tab Take 1 tablet (4 mg) by mouth every 8 hours as needed for nausea 15 tablet 0     prazosin (MINIPRESS) 1 MG capsule Take 1 capsule by mouth nightly as needed        vitamin C (ASCORBIC ACID) 1000 MG TABS Take 1,000 mg by mouth daily        vitamin D3 (CHOLECALCIFEROL) 250 mcg (52948 units) capsule Take 1 capsule by mouth once a week       EPINEPHrine (ANY BX GENERIC EQUIV) 0.3 MG/0.3ML injection 2-pack Inject 0.3 mg into the muscle as needed for anaphylaxis        flecainide (TAMBOCOR) 50 MG tablet Take 1 tablet (50 mg) by mouth 2 times daily as needed (at onset of a fib, may repeat once after 4 hours) (Patient not taking: Reported on 3/10/2022) 60 tablet 1     Lidocaine (LIDOCARE) 4 % Patch Place 1 patch onto the skin every 24 hours To prevent lidocaine toxicity, patient should be patch free for 12 hrs daily. 5 patch 0     meclizine (ANTIVERT) 12.5 MG tablet Take 1 tablet (12.5 mg) by mouth 4 times daily as needed for dizziness (Patient not taking: Reported on 3/10/2022) 30 tablet 0     ondansetron (ZOFRAN ODT) 4 MG ODT tab Take 1-2 tablets (4-8 mg) by mouth every 8 hours as needed for nausea 20 tablet 0     ondansetron (ZOFRAN-ODT) 4 MG ODT tab Take 1-2 tablets (4-8 mg) by mouth every 8 hours as needed for nausea 4 tablet 0     sertraline (ZOLOFT) 25 MG tablet Take 1 tablet (25 mg) by mouth daily (Patient not taking: Reported on 3/10/2022) 90 tablet 0     traZODone (DESYREL) 50 MG tablet Take 0.5 tablets (25 mg) by mouth At Bedtime 90 tablet 0     zinc sulfate (ZINCATE) 220 (50 Zn) MG capsule  (Patient not taking: Reported on 3/10/2022)       Social  History:   Social History     Socioeconomic History     Marital status: Single     Spouse name: Not on file     Number of children: 2     Years of education: 4     Highest education level: Not on file   Occupational History     Not on file   Tobacco Use     Smoking status: Never Smoker     Smokeless tobacco: Never Used     Tobacco comment: smokes marijuana   Substance and Sexual Activity     Alcohol use: Not Currently     Comment: Alcoholic Drinks/day: Never an issue, she states.  7/8/16     Drug use: Not Currently     Types: Marijuana     Comment: Drug use: Former marijuana use, not current. 7/8/16     Sexual activity: Never     Partners: Male     Birth control/protection: Other     Comment: tubal ligation   Other Topics Concern     Parent/sibling w/ CABG, MI or angioplasty before 65F 55M? Not Asked   Social History Narrative     Not on file     Social Determinants of Health     Financial Resource Strain: Not on file   Food Insecurity: Not on file   Transportation Needs: Not on file   Physical Activity: Not on file   Stress: Not on file   Social Connections: Not on file   Intimate Partner Violence: Not on file   Housing Stability: Not on file       ROS: ROS otherwise found to be negative except as noted above.     OBJECTIVE:  /73   Pulse 77   Temp 97.3  F (36.3  C) (Tympanic)   Resp 18   Wt 122.5 kg (270 lb 1 oz)   SpO2 97%   BMI 39.88 kg/m    GENERAL APPEARANCE: healthy, alert and no distress  EYES: EOMI,  PERRL, conjunctiva clear  RESP: lungs clear to auscultation - no rales, rhonchi or wheezes  CV: regular rates and rhythm, normal S1 S2, no murmur noted  ABDOMEN: soft, normal bowel sounds, tenderness moderate epigastric without rebound tenderness  SKIN: no suspicious lesions or rashes      ASSESSMENT:     ICD-10-CM    1. Dysphagia, unspecified type  R13.10 omeprazole (PRILOSEC) 40 MG DR capsule   2. Epigastric pain  R10.13 Adult Gastro Ref - Consult Only        PLAN:  30 minutes spent on the date of  the encounter doing chart review, review of outside records, review of test results, patient visit and documentation.   Patient Instructions   Patient was educated on pssible etiologies of epigastric pain including GERD, PUD, strictures, among others. She is about a week s/p dilatation and curettage but I think it is unrelated. Urgent referral to GI placed. She is clinically and vitally stable. She was already evaluated at ER yesterday. CT scan of abdomen was normal. Start Omeprazole as prescribed. Conservative measures discussed including drinking small amounts of fluids, soft/bland diet, and analgesics (Tylenol) for pain. Seek emergency care if you develop worsening abdominal pain, dizziness, or fever over 103.     Patient verbalized understanding and is agreeable to plan. The patient was discharged ambulatory and in stable condition.    Lulu Reyna PA-C ....................  3/10/2022   7:41 PM

## 2022-03-11 NOTE — PATIENT INSTRUCTIONS
Patient was educated on pssible etiologies of epigastric pain including GERD, PUD, strictures, among others. She is about a week s/p dilatation and curettage. Urgent referral to GI placed. She is clinically and vitally stable. She was already evaluated at ER yesterday. CT scan of abdomen was normal. Start Omeprazole as prescribed. Conservative measures discussed including drinking small amounts of fluids, soft/bland diet, and analgesics (Tylenol) for pain. Seek emergency care if you develop worsening abdominal pain, dizziness, or fever over 103.

## 2022-03-11 NOTE — TELEPHONE ENCOUNTER
[facility] Call Center    Phone Message    May a detailed message be left on voicemail: yes     Reason for Call: Other:      Pt is scheduled pa appt with Dr. Moser for 08/16, but her referral states that she needs to be seen in 3-5 days.  She is requesting a call to discuss getting an earlier appt.    Action Taken: Message routed to:  [facility] ([facility] Gastro    Travel Screening: Not Applicable

## 2022-03-15 ENCOUNTER — OFFICE VISIT (OUTPATIENT)
Dept: FAMILY MEDICINE | Facility: CLINIC | Age: 57
End: 2022-03-15
Payer: COMMERCIAL

## 2022-03-15 VITALS
WEIGHT: 281.9 LBS | BODY MASS INDEX: 41.63 KG/M2 | SYSTOLIC BLOOD PRESSURE: 128 MMHG | RESPIRATION RATE: 17 BRPM | DIASTOLIC BLOOD PRESSURE: 67 MMHG | HEART RATE: 70 BPM

## 2022-03-15 DIAGNOSIS — Z23 NEED FOR COVID-19 VACCINE: ICD-10-CM

## 2022-03-15 DIAGNOSIS — D72.829 LEUKOCYTOSIS, UNSPECIFIED TYPE: ICD-10-CM

## 2022-03-15 DIAGNOSIS — M08.00 JUVENILE RHEUMATOID ARTHRITIS (H): ICD-10-CM

## 2022-03-15 DIAGNOSIS — R11.0 NAUSEA: ICD-10-CM

## 2022-03-15 DIAGNOSIS — R10.13 EPIGASTRIC PAIN: Primary | ICD-10-CM

## 2022-03-15 DIAGNOSIS — K21.9 GASTROESOPHAGEAL REFLUX DISEASE WITHOUT ESOPHAGITIS: ICD-10-CM

## 2022-03-15 LAB
ATRIAL RATE - MUSE: 73 BPM
DIASTOLIC BLOOD PRESSURE - MUSE: NORMAL MMHG
INTERPRETATION ECG - MUSE: NORMAL
P AXIS - MUSE: 61 DEGREES
PR INTERVAL - MUSE: 156 MS
QRS DURATION - MUSE: 88 MS
QT - MUSE: 408 MS
QTC - MUSE: 449 MS
R AXIS - MUSE: 46 DEGREES
SYSTOLIC BLOOD PRESSURE - MUSE: NORMAL MMHG
T AXIS - MUSE: 55 DEGREES
VENTRICULAR RATE- MUSE: 73 BPM

## 2022-03-15 PROCEDURE — 99213 OFFICE O/P EST LOW 20 MIN: CPT | Mod: 25 | Performed by: STUDENT IN AN ORGANIZED HEALTH CARE EDUCATION/TRAINING PROGRAM

## 2022-03-15 PROCEDURE — 0054A COVID-19,PF,PFIZER (12+ YRS): CPT | Performed by: STUDENT IN AN ORGANIZED HEALTH CARE EDUCATION/TRAINING PROGRAM

## 2022-03-15 PROCEDURE — 90471 IMMUNIZATION ADMIN: CPT | Performed by: STUDENT IN AN ORGANIZED HEALTH CARE EDUCATION/TRAINING PROGRAM

## 2022-03-15 PROCEDURE — 90732 PPSV23 VACC 2 YRS+ SUBQ/IM: CPT | Performed by: STUDENT IN AN ORGANIZED HEALTH CARE EDUCATION/TRAINING PROGRAM

## 2022-03-15 PROCEDURE — 91305 COVID-19,PF,PFIZER (12+ YRS): CPT | Performed by: STUDENT IN AN ORGANIZED HEALTH CARE EDUCATION/TRAINING PROGRAM

## 2022-03-15 RX ORDER — SUCRALFATE 1 G/1
1 TABLET ORAL 4 TIMES DAILY
Qty: 120 TABLET | Refills: 0 | Status: SHIPPED | OUTPATIENT
Start: 2022-03-15 | End: 2022-04-14

## 2022-03-15 NOTE — PROGRESS NOTES
Assessment & Plan   Problem List Items Addressed This Visit     None      Visit Diagnoses     Epigastric pain    -  Primary    Relevant Medications    sucralfate (CARAFATE) 1 GM tablet    Other Relevant Orders    Helicobacter pylori Antigen Stool    Juvenile rheumatoid arthritis (H)        Relevant Orders    PPSV23, IM/SUBQ (2+ YRS) - [id number] (Completed)    Need for COVID-19 vaccine        Relevant Orders    COVID-19,PF,PFIZER (12+ Yrs GRAY LABEL) (Completed)    Leukocytosis, unspecified type        Gastroesophageal reflux disease without esophagitis        Relevant Medications    sucralfate (CARAFATE) 1 GM tablet    Nausea             Reviewed the lab work done in the ED - all of it was unremarkable except leukocytosis ( which could be stress reaction). Vitals are stabl. PPU is helping but she is still in significant pain on exam. No bloody BM or changes in bowel habits. Am suspicious for ulcer based on hx. Will check H.Pylori today, start carafate and have her follow up with GI next week ( has an appt with them next week). If H.Pylori is negative and still having a lot of breakthrough sxs despite max PPI + sucrasulfate, may need EGD. Patient is comfortable with the plan. Continue lifestyle changes with small meals and avoidance of NSAIDs.     Repeat CBC give leukocytosis on bloodwork.     Due for vaccines - provided     No follow-ups on file.    Ciarra Richardson DO  [facility]    Markus Salas is a 57 year old who presents for the following health issues: ED follow up     57-year-old female with past medical history of RA on Humira, MDD, paroxysmal A. fib, anxiety, RANDALL, migraines, essential hypertension who presents for ED follow-up    Patient was seen in the ED on 3/8 for onset of severe epigastric pain + N/V. CT abdomen was non-contributary and she was sent home. Then seen in the UC on 3/10 for continued epigastric pain - started on PPI and urgent GI referral. Sxs seemed to be  most consistent with GERD. (+) hx of cholecystectomy.     Today, is here for ED follow up.     Feels that the PPI is helping her nausea and she is now able to eat and keep her food down. However, continues to have epigastric abdominal pain that is 'really bad' if her bladder is too full or too empty. Is now eating small meals throughout the day. Has no hx of reflux or PUD. When she takes a deep breath, experience epigastric pain that radiates to the back. No NSAID use. Tylenol doesn't help . Is avoiding spicy good.   No alcohol or smoking   Marijuana - no  No new supplements   Recent D&C but doesn't think her abdominal pain is related to that.     HPI     Post Discharge Outreach 3/10/2022   Admission Date 3/8/2022   Reason for Admission Epigastric pain   Discharge Date 3/9/2022   Discharge Diagnosis Epigastric pain   How are you doing now that you are home? feeling better   How are your symptoms? (Red Flag symptoms escalate to triage hotline per guidelines) Improved   Do you feel your condition is stable enough to be safe at home until your provider visit? Yes   Does the patient have their discharge instructions?  Yes   Does the patient have questions regarding their discharge instructions?  No   Were you started on any new medications or were there changes to any of your previous medications?  Yes   Does the patient have all of their medications? Yes   Do you have questions regarding any of your medications?  No   Do you have all of your needed medical supplies or equipment (DME)?  (i.e. oxygen tank, CPAP, cane, etc.) Yes   Discharge follow-up appointment scheduled within 14 calendar days?  Yes   Discharge Follow Up Appointment Date 3/17/2022   Discharge Follow Up Appointment Scheduled with? Specialty Care Provider     Hospital Follow-up Visit:    Hospital/Nursing Home/IP Rehab Facility: [facility]  Date of Admission: 3/8/2022  Date of Discharge: 3/8/2022  Reason(s) for Admission:  epigastric pain       Was your hospitalization related to COVID-19? No   Problems taking medications regularly:  None  Medication changes since discharge: None  Problems adhering to non-medication therapy:  None    Summary of hospitalization:  [facility] discharge summary reviewed  Diagnostic Tests/Treatments reviewed.  Follow up needed: none  Other Healthcare Providers Involved in Patient s Care:         None  Update since discharge: stable.  fluctuating course.       Post Discharge Medication Reconciliation: discharge medications reconciled and changed, per note/orders.  Plan of care communicated with patient                Review of Systems   As per HPI       Objective    /67 (BP Location: Right arm, Patient Position: Sitting, Cuff Size: Adult Large)   Pulse 70   Resp 17   Wt 127.9 kg (281 lb 14.4 oz)   LMP 12/11/2021 (Within Days)   Breastfeeding No   BMI 41.63 kg/m    Body mass index is 41.63 kg/m .  Physical Exam   GENERAL: healthy, alert and no distress  NECK: no adenopathy, no asymmetry, masses, or scars and thyroid normal to palpation  RESP: lungs clear to auscultation - no rales, rhonchi or wheezes  CV: regular rate and rhythm, normal S1 S2, no S3 or S4, no murmur, click or rub, no peripheral edema and peripheral pulses strong  ABDOMEN: (+) TTP in the epigastric region, r, no hepatosplenomegaly, no masses and bowel sounds normal  MS: no gross musculoskeletal defects noted, no edema  SKIN: no suspicious lesions or rashes  NEURO: Normal strength and tone, mentation intact and speech normal  PSYCH: mentation appears normal, affect normal/bright

## 2022-03-16 ENCOUNTER — LAB (OUTPATIENT)
Dept: LAB | Facility: CLINIC | Age: 57
End: 2022-03-16

## 2022-03-16 ENCOUNTER — TELEPHONE (OUTPATIENT)
Dept: FAMILY MEDICINE | Facility: CLINIC | Age: 57
End: 2022-03-16
Payer: COMMERCIAL

## 2022-03-16 ENCOUNTER — LAB (OUTPATIENT)
Dept: LAB | Facility: CLINIC | Age: 57
End: 2022-03-16
Payer: COMMERCIAL

## 2022-03-16 DIAGNOSIS — D72.829 LEUKOCYTOSIS, UNSPECIFIED TYPE: ICD-10-CM

## 2022-03-16 DIAGNOSIS — R10.13 EPIGASTRIC PAIN: ICD-10-CM

## 2022-03-16 LAB
ERYTHROCYTE [DISTWIDTH] IN BLOOD BY AUTOMATED COUNT: 13.9 % (ref 10–15)
HCT VFR BLD AUTO: 37 % (ref 35–47)
HGB BLD-MCNC: 11.5 G/DL (ref 11.7–15.7)
MCH RBC QN AUTO: 27.1 PG (ref 26.5–33)
MCHC RBC AUTO-ENTMCNC: 31.1 G/DL (ref 31.5–36.5)
MCV RBC AUTO: 87 FL (ref 78–100)
PLATELET # BLD AUTO: 318 10E3/UL (ref 150–450)
RBC # BLD AUTO: 4.25 10E6/UL (ref 3.8–5.2)
WBC # BLD AUTO: 12.4 10E3/UL (ref 4–11)

## 2022-03-16 PROCEDURE — 36415 COLL VENOUS BLD VENIPUNCTURE: CPT

## 2022-03-16 PROCEDURE — 85027 COMPLETE CBC AUTOMATED: CPT

## 2022-03-16 PROCEDURE — 87338 HPYLORI STOOL AG IA: CPT

## 2022-03-16 NOTE — TELEPHONE ENCOUNTER
REFERRAL INFORMATION:    Referring Provider:  Lulu Reyna PA-C     Referring Clinic:  [facility]     Reason for Visit/Diagnosis: Dysphagia, Abdominal pain      FUTURE VISIT INFORMATION:    Appointment Date: 3/25/2022    Appointment Time: 3 PM      NOTES STATUS DETAILS   OFFICE NOTE from Referring Provider Internal 3/10/2022 Office visit with Lulu Reyna PA-C     OFFICE NOTE from Other Specialist Internal 3/15/2022 Office visit with Dr. Ciarra Richardson ([facility])     3/8/2022 Office visit with Bertha Ramirez PA-C ([facility])      HOSPITAL DISCHARGE SUMMARY/  ED VISITS Internal 3/8/2022 ([facility])    OPERATIVE REPORT N/A    MEDICATION LIST Internal         ENDOSCOPY  N/A    COLONOSCOPY Received 2/8/2021 ([facility])   ERCP N/A    EUS N/A    STOOL TESTING N/A    PERTINENT LABS Internal    PATHOLOGY REPORTS (RELATED) Received  2/8/2021 ([facility])    IMAGING (CT, MRI, EGD, MRCP, Small Bowel Follow Through/SBT, MR/CT Enterography) Internal CT Abdomen Pelvis: 3/8/2022     3/16/2022 8:52am Fax request sent to [facility] for med recs. -Norberto     3/16/2022 11:25am Received recs from [facility]; sent to scan. Mariella

## 2022-03-16 NOTE — TELEPHONE ENCOUNTER
[facility]    MyChart msg will also be sent to pt as she is active on there    If pt calls back, please assist is scheduling a lab only appt for a CBC at pts' convenience      Thank you     Ej Bull Jr., CMA on 3/16/2022 at 11:57 AM

## 2022-03-17 ENCOUNTER — OFFICE VISIT (OUTPATIENT)
Dept: CARDIOLOGY | Facility: CLINIC | Age: 57
End: 2022-03-17
Attending: NURSE PRACTITIONER
Payer: COMMERCIAL

## 2022-03-17 VITALS
WEIGHT: 285 LBS | RESPIRATION RATE: 16 BRPM | DIASTOLIC BLOOD PRESSURE: 78 MMHG | SYSTOLIC BLOOD PRESSURE: 138 MMHG | HEART RATE: 86 BPM | BODY MASS INDEX: 42.09 KG/M2

## 2022-03-17 DIAGNOSIS — I48.0 PAROXYSMAL ATRIAL FIBRILLATION (H): Primary | ICD-10-CM

## 2022-03-17 LAB — H PYLORI AG STL QL IA: NEGATIVE

## 2022-03-17 PROCEDURE — 99214 OFFICE O/P EST MOD 30 MIN: CPT | Performed by: INTERNAL MEDICINE

## 2022-03-17 NOTE — PROGRESS NOTES
[facility]  Cardiac Electrophysiology  [address]  [place] [address]   Office: [phone]  Fax: [phone]     Cardiac Electrophysiology Consultation    Patient: Alina Martell   : 1965     Referring Provider: Cathryn Roe CNP  Primary Care Provider: Estelle Bansal MD    CHIEF COMPLAINT/REASON FOR CONSULTATION  Paroxysmal atrial fibrillation    Assessment/Recommendations   Alina Martell is a 57 year old female with paroxymal atrial fibrillation, HTN, Sjogren syndrome, rheumatoid arthritis, RANDALL on CPAP, anxiety, morbid obesity referred by Cathryn Roe for consultation regarding atrial fibrillation.     Paroxysmal atrial fibrillation - symptomatic with palpitations  [id number]  We reviewed the pathophysiology of atrial fibrillation and management considerations including stroke risk and anticoagulation, rate control, cardioversion, antiarrhythmic drug therapy, and catheter ablation. We discussed atrial fibrillation ablation procedures, anticipated success rates, the potential need for re-do ablation vs addition of anti-arrhythmic drugs, procedural risks (including groin bleeding, tamponade, phrenic or esophageal injury, stroke, pulmonary vein stenosis) and recovery expectations.  She will take some time to consider options, and will us know with any questions or with decision as to how she would like to proceed  - if ablation elected upon, PVI, general anesthesia, continue apixaban, hold other medications day of procedure  - flecainide 50mg as needed for atrial fibrillation episodes (low dose, though has had good therapeutic response)  - continue diltiazem ER 360mg daily  - continue apixaban 5mg twice daily  - discussed the ongoing importance of lifestyle modification (maintaining a healthy weight, sleep apnea diagnosis and management, alcohol avoidance) as part of a long term strategy for atrial fibrillation management    Follow up: as above          History of Present Illness   Alina Martell is a 57 year old female with paroxymal atrial fibrillation, HTN, Sjogren syndrome, rheumatoid arthritis, RANDALL on CPAP, anxiety, morbid obesity referred by Cathryn Roe for consultation regarding atrial fibrillation.      Mrs. Martell notes an episode of palpitations lead to diagnosis of atrial fibrillation 9/2020 with DCCV performed.  She had recurrences started 1/2022 including ER evaluations, DCCV (1/1/2022) treated with apixaban, diltiazem.  She has since noted ongoing intermittent episodes.  She was prescribed flecainide 50mg as needed - she has used this a few times with good response.      She has had one episode of syncope around the time of her 1/17/2022 episode of atrial fibrillation.  She has been trying to be more active and is conscientious about her diet.  She has intermittent abdominal pain, recently noted some dark stools, dysphagia and has reflux - she is due to see a gastroenterologist in approximately 1 week.      She has a family history of atrial fibrillation and pacemakers.       Physical Examination  Review of Systems   VITALS: /78 (BP Location: Left arm, Patient Position: Sitting, Cuff Size: Adult Large)   Pulse 86   Resp 16   Wt 129.3 kg (285 lb)   LMP 12/21/2021   BMI 42.09 kg/m    Wt Readings from Last 3 Encounters:   03/15/22 127.9 kg (281 lb 14.4 oz)   03/10/22 122.5 kg (270 lb 1 oz)   03/08/22 122.5 kg (270 lb)     CONSTITUTIONAL: well nourished, comfortable, no distress  EYES:  Conjunctivae pink, sclerae clear.    E/N/T:  Oral mucosa pink  RESPIRATORY:  Respiratory effort is normal  CARDIOVASCULAR:  normal S1 and S2  GASTROINTESTINAL:  Abdomen without masses or tenderness  EXTREMITIES:  No clubbing or cyanosis.    MUSCULOSKELETAL:  Overall grossly normal muscle strength  SKIN:  Overall, skin warm and dry, no lesions.  NEURO/PSYCH:  Oriented x 3 with normal affect.   Constitutional:  No weight loss or loss of appetite     Eyes:  No difficulty with vision, no double vision, no dry eyes  ENT:  No sore throat, difficulty swallowing; changes in hearing or tinnitus  Cardiovascular: As detailed above  Respiratory:  No cough  Musculoskeletal  No joint pain, muscle aches  Neurologic:  No tremor  Hematologic: No easy bruising, excessive bleeding tendency   Gastrointestinal:  No jaundice, abdominal pain or abdominal bloating  Genitourinary: No changes in urinary habits, no trouble urinating    Psychiatric: No anxiety or depression      Medical History  Surgical History   Past Medical History:   Diagnosis Date     Anemia      Antiplatelet or antithrombotic long-term use      Arrhythmia      Cannabis use without complication 12/8/2014     Carpal tunnel syndrome      Coronary artery disease      Gastroesophageal reflux disease      History of angina      Hypertension      Irregular heart beat      Obesity      Paroxysmal atrial fibrillation (H)      PTSD (post-traumatic stress disorder)      RA (rheumatoid arthritis) (H)      Rheumatoid arthritis (H) 7/8/2016     Sicca syndrome (H)      Sleep apnea     Past Surgical History:   Procedure Laterality Date     CHOLECYSTECTOMY       DILATION AND CURETTAGE, OPERATIVE HYSTEROSCOPY, COMBINED N/A 3/3/2022    Procedure: HYSTEROSCOPY, WITH DILATION AND CURETTAGE;  Surgeon: Irma Cary MD;  Location: [facility] OR     HC REMOVAL GALLBLADDER      Description: Cholecystectomy;  Recorded: 10/15/2013;     LAPAROSCOPIC TUBAL LIGATION       RELEASE CARPAL TUNNEL BILATERAL       TUBAL LIGATION  1995         Family History Social History   Family History   Problem Relation Age of Onset     Crohn's Disease Mother         Total colectomy with ileostomy.     Psoriasis Brother      Snoring Brother      Snoring Father      Diabetes Father      Prostate Cancer Father      Chronic Kidney Disease Father         Chose against dialysis.     Substance Abuse Brother      Depression Brother      Attention  Deficit Disorder Brother      Alcoholism Brother      No Known Problems Daughter      No Known Problems Son      Alcoholism Brother      Substance Abuse Brother      Lupus Cousin      Alcoholism Maternal Grandfather      Breast Cancer No family hx of         Social History     Tobacco Use     Smoking status: Never Smoker     Smokeless tobacco: Never Used     Tobacco comment: smokes marijuana   Substance Use Topics     Alcohol use: Not Currently     Comment: Alcoholic Drinks/day: Never an issue, she states.  7/8/16     Drug use: Not Currently     Types: Marijuana     Comment: Drug use: Former marijuana use, not current. 7/8/16         Medications  Allergies     Current Outpatient Medications:      acetaminophen (TYLENOL) 325 MG tablet, Take 3 tablets (975 mg) by mouth every 6 hours as needed for mild pain, Disp: 50 tablet, Rfl: 0     adalimumab (HUMIRA *CF* PEN) 40 MG/0.4ML pen kit, INJECT 0.4 MLS (40 MG) SUBCUTANEOUS EVERY 14 DAYS HOLD FOR SIGNS OF INFECTION, THEN SEEK MEDICAL ATTENTION., Disp: 2 each, Rfl: 4     apixaban ANTICOAGULANT (ELIQUIS ANTICOAGULANT) 5 MG tablet, Take 1 tablet (5 mg) by mouth 2 times daily, Disp: 180 tablet, Rfl: 1     diltiazem ER COATED BEADS (CARDIZEM CD/CARTIA XT) 180 MG 24 hr capsule, Take 2 capsules (360 mg) by mouth daily, Disp: 180 capsule, Rfl: 3     EPINEPHrine (ANY BX GENERIC EQUIV) 0.3 MG/0.3ML injection 2-pack, Inject 0.3 mg into the muscle as needed for anaphylaxis , Disp: , Rfl:      flecainide (TAMBOCOR) 50 MG tablet, Take 1 tablet (50 mg) by mouth 2 times daily as needed (at onset of a fib, may repeat once after 4 hours), Disp: 60 tablet, Rfl: 1     hydrOXYzine (ATARAX) 25 MG tablet, Take 1 tablet (25 mg) by mouth 3 times daily as needed for anxiety, Disp: 90 tablet, Rfl: 1     Lidocaine (LIDOCARE) 4 % Patch, Place 1 patch onto the skin every 24 hours To prevent lidocaine toxicity, patient should be patch free for 12 hrs daily., Disp: 5 patch, Rfl: 0     loratadine  (CLARITIN) 10 MG tablet, Take 10 mg by mouth daily , Disp: , Rfl:      Multiple Vitamins-Minerals (CENTROVITE) TABS, TAKE 1 TABLET BY MOUTH EVERY DAY FOR 30 DAYS, Disp: , Rfl:      omeprazole (PRILOSEC) 40 MG DR capsule, Take 1 capsule (40 mg) by mouth daily, Disp: 30 capsule, Rfl: 0     ondansetron (ZOFRAN-ODT) 4 MG ODT tab, Take 1-2 tablets (4-8 mg) by mouth every 8 hours as needed for nausea, Disp: 4 tablet, Rfl: 0     prazosin (MINIPRESS) 1 MG capsule, Take 1 capsule by mouth nightly as needed , Disp: , Rfl:      sertraline (ZOLOFT) 25 MG tablet, Take 1 tablet (25 mg) by mouth daily, Disp: 90 tablet, Rfl: 0     sucralfate (CARAFATE) 1 GM tablet, Take 1 tablet (1 g) by mouth 4 times daily, Disp: 120 tablet, Rfl: 0     traZODone (DESYREL) 50 MG tablet, Take 0.5 tablets (25 mg) by mouth At Bedtime, Disp: 90 tablet, Rfl: 0     vitamin C (ASCORBIC ACID) 1000 MG TABS, Take 1,000 mg by mouth daily , Disp: , Rfl:      vitamin D3 (CHOLECALCIFEROL) 250 mcg (79481 units) capsule, Take 1 capsule by mouth once a week, Disp: , Rfl:      Allergies   Allergen Reactions     Penicillins Shortness Of Breath, Palpitations, Dizziness, Anaphylaxis, Hives, Itching and Rash     Test in 2031, see allergy note on 7/29/21     Shellfish-Derived Products Anaphylaxis     Sulfa Drugs Hives and Anaphylaxis          Lab Results    Chemistry CBC Cardiac Enzymes/BNP/TSH/INR   Recent Labs   Lab Test 03/08/22 2025      POTASSIUM 3.9   CHLORIDE 101   CO2 28      BUN 12   CR 0.77   GFRESTIMATED >90   JOSSELIN 8.7     Recent Labs   Lab Test 03/08/22 2025 02/16/22  1108 01/28/22  1648   CR 0.77 0.74 0.73          Recent Labs   Lab Test 03/16/22 1717   WBC 12.4*   HGB 11.5*   HCT 37.0   MCV 87        Recent Labs   Lab Test 03/16/22 1717 03/08/22 2025 03/08/22  1842   HGB 11.5* 12.4 12.2    Recent Labs   Lab Test 03/08/22  2025 01/17/22  1406 01/16/22  2301   TROPONINI <0.01 <0.01 <0.01     No results for input(s): BNP, NTBNPI, NTBNP  in the last 50932 hours.  Recent Labs   Lab Test 02/16/22  1108   TSH 2.16     No results for input(s): INR in the last 65434 hours.      Data Review    ECGs (tracings independently reviewed)  3/8/2022 - SR 73bpm  1/17/2022 - AF, ventricular rate 140bpm  1/1/2022 - AF, ventricular rate 147bpm    10/7/2020 TTE    Normal left ventricular size with mild hypertrophy noted.    Left ventricle ejection fraction is normal. The calculated left ventricular ejection fraction is 67%.    Normal right ventricular size and systolic function.    No hemodynamically significant valvular heart abnormalities.    No previous study for comparison.    2/22/2022 coronary CTA  1.  Normal left main.  2.  Normal LAD and its branches.  3.  Minimal disease in proximal LCx.  4.  Normal RCA and its branches.  Right dominant system.     Cc: Cathryn Roe CNP, Gil Richter MD, Estelle Bansal MD Amila Dilusha William, MD  3/17/2022  3:46 PM

## 2022-03-17 NOTE — LETTER
3/17/2022    Estelle Bansal MD  [address]  [facility] [address]    RE: Alina Sanchezoway       Dear Colleague,     I had the pleasure of seeing Alina Martell in the [facility].     [facility]  Cardiac Electrophysiology  [address]  [place] [address]   Office: [phone]  Fax: [phone]     Cardiac Electrophysiology Consultation    Patient: Alina Martell   : 1965     Referring Provider: Cathryn Roe CNP  Primary Care Provider: Estelle Bansal MD    CHIEF COMPLAINT/REASON FOR CONSULTATION  Paroxysmal atrial fibrillation    Assessment/Recommendations   Alina Martell is a 57 year old female with paroxymal atrial fibrillation, HTN, Sjogren syndrome, rheumatoid arthritis, RANDALL on CPAP, anxiety, morbid obesity referred by Cathryn Roe for consultation regarding atrial fibrillation.     Paroxysmal atrial fibrillation - symptomatic with palpitations  [id number]  We reviewed the pathophysiology of atrial fibrillation and management considerations including stroke risk and anticoagulation, rate control, cardioversion, antiarrhythmic drug therapy, and catheter ablation. We discussed atrial fibrillation ablation procedures, anticipated success rates, the potential need for re-do ablation vs addition of anti-arrhythmic drugs, procedural risks (including groin bleeding, tamponade, phrenic or esophageal injury, stroke, pulmonary vein stenosis) and recovery expectations.  She will take some time to consider options, and will us know with any questions or with decision as to how she would like to proceed  - if ablation elected upon, PVI, general anesthesia, continue apixaban, hold other medications day of procedure  - flecainide 50mg as needed for atrial fibrillation episodes (low dose, though has had good therapeutic response)  - continue diltiazem ER 360mg daily  - continue apixaban 5mg twice daily  - discussed the  ongoing importance of lifestyle modification (maintaining a healthy weight, sleep apnea diagnosis and management, alcohol avoidance) as part of a long term strategy for atrial fibrillation management    Follow up: as above         History of Present Illness   Alina Martell is a 57 year old female with paroxymal atrial fibrillation, HTN, Sjogren syndrome, rheumatoid arthritis, RANDALL on CPAP, anxiety, morbid obesity referred by Cathryn Roe for consultation regarding atrial fibrillation.      Mrs. Martell notes an episode of palpitations lead to diagnosis of atrial fibrillation 9/2020 with DCCV performed.  She had recurrences started 1/2022 including ER evaluations, DCCV (1/1/2022) treated with apixaban, diltiazem.  She has since noted ongoing intermittent episodes.  She was prescribed flecainide 50mg as needed - she has used this a few times with good response.      She has had one episode of syncope around the time of her 1/17/2022 episode of atrial fibrillation.  She has been trying to be more active and is conscientious about her diet.  She has intermittent abdominal pain, recently noted some dark stools, dysphagia and has reflux - she is due to see a gastroenterologist in approximately 1 week.      She has a family history of atrial fibrillation and pacemakers.       Physical Examination  Review of Systems   VITALS: /78 (BP Location: Left arm, Patient Position: Sitting, Cuff Size: Adult Large)   Pulse 86   Resp 16   Wt 129.3 kg (285 lb)   LMP 12/21/2021   BMI 42.09 kg/m    Wt Readings from Last 3 Encounters:   03/15/22 127.9 kg (281 lb 14.4 oz)   03/10/22 122.5 kg (270 lb 1 oz)   03/08/22 122.5 kg (270 lb)     CONSTITUTIONAL: well nourished, comfortable, no distress  EYES:  Conjunctivae pink, sclerae clear.    E/N/T:  Oral mucosa pink  RESPIRATORY:  Respiratory effort is normal  CARDIOVASCULAR:  normal S1 and S2  GASTROINTESTINAL:  Abdomen without masses or tenderness  EXTREMITIES:  No clubbing  or cyanosis.    MUSCULOSKELETAL:  Overall grossly normal muscle strength  SKIN:  Overall, skin warm and dry, no lesions.  NEURO/PSYCH:  Oriented x 3 with normal affect.   Constitutional:  No weight loss or loss of appetite    Eyes:  No difficulty with vision, no double vision, no dry eyes  ENT:  No sore throat, difficulty swallowing; changes in hearing or tinnitus  Cardiovascular: As detailed above  Respiratory:  No cough  Musculoskeletal  No joint pain, muscle aches  Neurologic:  No tremor  Hematologic: No easy bruising, excessive bleeding tendency   Gastrointestinal:  No jaundice, abdominal pain or abdominal bloating  Genitourinary: No changes in urinary habits, no trouble urinating    Psychiatric: No anxiety or depression      Medical History  Surgical History   Past Medical History:   Diagnosis Date     Anemia      Antiplatelet or antithrombotic long-term use      Arrhythmia      Cannabis use without complication 12/8/2014     Carpal tunnel syndrome      Coronary artery disease      Gastroesophageal reflux disease      History of angina      Hypertension      Irregular heart beat      Obesity      Paroxysmal atrial fibrillation (H)      PTSD (post-traumatic stress disorder)      RA (rheumatoid arthritis) (H)      Rheumatoid arthritis (H) 7/8/2016     Sicca syndrome (H)      Sleep apnea     Past Surgical History:   Procedure Laterality Date     CHOLECYSTECTOMY       DILATION AND CURETTAGE, OPERATIVE HYSTEROSCOPY, COMBINED N/A 3/3/2022    Procedure: HYSTEROSCOPY, WITH DILATION AND CURETTAGE;  Surgeon: Irma Cary MD;  Location: [facility]     HC REMOVAL GALLBLADDER      Description: Cholecystectomy;  Recorded: 10/15/2013;     LAPAROSCOPIC TUBAL LIGATION       RELEASE CARPAL TUNNEL BILATERAL       TUBAL LIGATION  1995         Family History Social History   Family History   Problem Relation Age of Onset     Crohn's Disease Mother         Total colectomy with ileostomy.     Psoriasis Brother       Snoring Brother      Snoring Father      Diabetes Father      Prostate Cancer Father      Chronic Kidney Disease Father         Chose against dialysis.     Substance Abuse Brother      Depression Brother      Attention Deficit Disorder Brother      Alcoholism Brother      No Known Problems Daughter      No Known Problems Son      Alcoholism Brother      Substance Abuse Brother      Lupus Cousin      Alcoholism Maternal Grandfather      Breast Cancer No family hx of         Social History     Tobacco Use     Smoking status: Never Smoker     Smokeless tobacco: Never Used     Tobacco comment: smokes marijuana   Substance Use Topics     Alcohol use: Not Currently     Comment: Alcoholic Drinks/day: Never an issue, she states.  7/8/16     Drug use: Not Currently     Types: Marijuana     Comment: Drug use: Former marijuana use, not current. 7/8/16         Medications  Allergies     Current Outpatient Medications:      acetaminophen (TYLENOL) 325 MG tablet, Take 3 tablets (975 mg) by mouth every 6 hours as needed for mild pain, Disp: 50 tablet, Rfl: 0     adalimumab (HUMIRA *CF* PEN) 40 MG/0.4ML pen kit, INJECT 0.4 MLS (40 MG) SUBCUTANEOUS EVERY 14 DAYS HOLD FOR SIGNS OF INFECTION, THEN SEEK MEDICAL ATTENTION., Disp: 2 each, Rfl: 4     apixaban ANTICOAGULANT (ELIQUIS ANTICOAGULANT) 5 MG tablet, Take 1 tablet (5 mg) by mouth 2 times daily, Disp: 180 tablet, Rfl: 1     diltiazem ER COATED BEADS (CARDIZEM CD/CARTIA XT) 180 MG 24 hr capsule, Take 2 capsules (360 mg) by mouth daily, Disp: 180 capsule, Rfl: 3     EPINEPHrine (ANY BX GENERIC EQUIV) 0.3 MG/0.3ML injection 2-pack, Inject 0.3 mg into the muscle as needed for anaphylaxis , Disp: , Rfl:      flecainide (TAMBOCOR) 50 MG tablet, Take 1 tablet (50 mg) by mouth 2 times daily as needed (at onset of a fib, may repeat once after 4 hours), Disp: 60 tablet, Rfl: 1     hydrOXYzine (ATARAX) 25 MG tablet, Take 1 tablet (25 mg) by mouth 3 times daily as needed for anxiety, Disp: 90  tablet, Rfl: 1     Lidocaine (LIDOCARE) 4 % Patch, Place 1 patch onto the skin every 24 hours To prevent lidocaine toxicity, patient should be patch free for 12 hrs daily., Disp: 5 patch, Rfl: 0     loratadine (CLARITIN) 10 MG tablet, Take 10 mg by mouth daily , Disp: , Rfl:      Multiple Vitamins-Minerals (CENTROVITE) TABS, TAKE 1 TABLET BY MOUTH EVERY DAY FOR 30 DAYS, Disp: , Rfl:      omeprazole (PRILOSEC) 40 MG DR capsule, Take 1 capsule (40 mg) by mouth daily, Disp: 30 capsule, Rfl: 0     ondansetron (ZOFRAN-ODT) 4 MG ODT tab, Take 1-2 tablets (4-8 mg) by mouth every 8 hours as needed for nausea, Disp: 4 tablet, Rfl: 0     prazosin (MINIPRESS) 1 MG capsule, Take 1 capsule by mouth nightly as needed , Disp: , Rfl:      sertraline (ZOLOFT) 25 MG tablet, Take 1 tablet (25 mg) by mouth daily, Disp: 90 tablet, Rfl: 0     sucralfate (CARAFATE) 1 GM tablet, Take 1 tablet (1 g) by mouth 4 times daily, Disp: 120 tablet, Rfl: 0     traZODone (DESYREL) 50 MG tablet, Take 0.5 tablets (25 mg) by mouth At Bedtime, Disp: 90 tablet, Rfl: 0     vitamin C (ASCORBIC ACID) 1000 MG TABS, Take 1,000 mg by mouth daily , Disp: , Rfl:      vitamin D3 (CHOLECALCIFEROL) 250 mcg (93738 units) capsule, Take 1 capsule by mouth once a week, Disp: , Rfl:      Allergies   Allergen Reactions     Penicillins Shortness Of Breath, Palpitations, Dizziness, Anaphylaxis, Hives, Itching and Rash     Test in 2031, see allergy note on 7/29/21     Shellfish-Derived Products Anaphylaxis     Sulfa Drugs Hives and Anaphylaxis          Lab Results    Chemistry CBC Cardiac Enzymes/BNP/TSH/INR   Recent Labs   Lab Test 03/08/22 2025      POTASSIUM 3.9   CHLORIDE 101   CO2 28      BUN 12   CR 0.77   GFRESTIMATED >90   JOSSELIN 8.7     Recent Labs   Lab Test 03/08/22 2025 02/16/22  1108 01/28/22  1648   CR 0.77 0.74 0.73    Recent Labs   Lab Test 03/16/22  1717   WBC 12.4*   HGB 11.5*   HCT 37.0   MCV 87        Recent Labs   Lab Test  03/16/22  1717 03/08/22 2025 03/08/22  1842   HGB 11.5* 12.4 12.2    Recent Labs   Lab Test 03/08/22 2025 01/17/22  1406 01/16/22  2301   TROPONINI <0.01 <0.01 <0.01     No results for input(s): BNP, NTBNPI, NTBNP in the last 90324 hours.  Recent Labs   Lab Test 02/16/22  1108   TSH 2.16     No results for input(s): INR in the last 70363 hours.      Data Review    ECGs (tracings independently reviewed)  3/8/2022 - SR 73bpm  1/17/2022 - AF, ventricular rate 140bpm  1/1/2022 - AF, ventricular rate 147bpm    10/7/2020 TTE    Normal left ventricular size with mild hypertrophy noted.    Left ventricle ejection fraction is normal. The calculated left ventricular ejection fraction is 67%.    Normal right ventricular size and systolic function.    No hemodynamically significant valvular heart abnormalities.    No previous study for comparison.    2/22/2022 coronary CTA  1.  Normal left main.  2.  Normal LAD and its branches.  3.  Minimal disease in proximal LCx.  4.  Normal RCA and its branches.  Right dominant system.     Cc: Cathryn Roe CNP, Gil Richter MD, Estelle Bansal MD Amila Dilusha William, MD  3/17/2022  3:46 PM

## 2022-03-17 NOTE — PATIENT INSTRUCTIONS
[facility]  Cardiac Electrophysiology  [address]  [place] [address]   Office: [phone]  Fax: [phone]       Thank you for seeing us in clinic today - it is a pleasure to be a part of your care team.  Below is a summary of our plan from today's visit.       You have paroxysmal atrial fibrillation, presently being managed with diltiazem ER, flecainide as needed, apixaban (Eliquis).  We reviewed physiology and management options including antiarrhythmic drug therapy, catheter ablation and lifestyle modification.  We will plan for the following:  - consider options further, and let us know with any questions or decision as to how you would like to proceed  - continue diltiazem ER for now  - continue flecainide 50mg as needed  - continue apixaban      Please do not hesitate to be in touch with our office at [phone] with any questions that may arise.       Thank you for trusting us with your care,    Jose Guadalupe Joseph MD  Clinical Cardiac Electrophysiology  [facility]  [address]  [place] [address]   Office: [phone]  Fax: [phone]            ATRIAL FIBRILLATION: Patient Information    What is atrial fibrillation?  Atrial fibrillation (AF, A-fib) is a common heart rhythm problem (arrhythmia) occurring within the upper chambers of the heart (the atria).  In normal rhythm, the upper and lower chambers of the heart are electrically driven to contract in a coordinated sequence.  In atrial fibrillation, the atria lose their ability to contract because of rapid and chaotic electrical activity.  The lower chambers of the heart (the ventricles) continue to pump blood throughout the body, though with irregular and often faster rate due to the chaotic activity within the atria.        How do I know if I have atrial fibrillation?   Some people may feel their heart beating faster, harder, or irregularly while in atrial  fibrillation.  Others may be lightheaded, fatigued, feel weak or tired or become more short of breath especially with activities.  Some patients have no symptoms at all.  Atrial fibrillation may be found due to an irregular pulse or on an electrocardiogram (ECG). Atrial fibrillation can start and stop on its own, and episodes can last from seconds to several months.      How common is atrial fibrillation?   An estimated 3-6 million people in the [place] have atrial fibrillation.  Atrial fibrillation is a common heart rhythm problem for older persons, affecting as estimated 12-15% of people over the age of 65 years of age.    What causes atrial fibrillation?   Age is the most important risk factor for atrial fibrillation.  Atrial fibrillation is more common in people with other heart disease, high blood pressure, diabetes, obesity, sleep apnea and in people who regularly consume alcohol.  Surgery, lung disease, or thyroid problems can lead to atrial fibrillation.  Atrial fibrillation has multiple possible causes, and in most cases a single cause cannot be found.  Atrial fibrillation is a progressive condition, usually starting with at an early stage with short and infrequent episodes.  In later stages of disease, more frequent and longer lasting episodes of atrial fibrillation occur, ultimately culminating in episodes which do not spontaneously terminate.  Generally, more enlargement and scarring within the upper chambers of the heart is observed as atrial fibrillation progresses from early to late-stage disease.    How is atrial fibrillation diagnosed and evaluated?    Because of its start-stop nature, atrial fibrillation can be challenging to diagnose.  Atrial fibrillation is most commonly diagnosed via cardiac rhythm recordings - either an ECG or wearable cardiac rhythm monitor.  For patients with pacemakers, defibrillators or implantable loop recorders, atrial fibrillation may be recorded via these devices.   Recently, commercially available devices (eg. Apple Watch, Peatix device, certain FitBit devices, others) can allow patients to take 30 second cardiac rhythm recordings which may document atrial fibrillation.  Once atrial fibrillation is diagnosed, additional tests include blood tests and an echocardiogram.  The echocardiogram uses ultrasound to look at your heart to assess your cardiac function and evaluate for other heart disease.  Additional evaluation may include CT or MRI studies.    Is atrial fibrillation dangerous?   Atrial fibrillation is not usually a life-threatening arrhythmia.  The most serious consequences of atrial fibrillation including stroke and worsening of overall cardiac function.  While in atrial fibrillation, the upper cardiac chambers do not contract normally, resulting in slower blood flow and increased risk of clot formation.  If this blood clot becomes detached from the heart a stroke can occur.  Unfortunately, stroke can be the first sign of atrial fibrillation for some people.  With a stroke, you may notice abnormal sensation, weakness on one side of the body or face, changes in your vision or speech.  If you have any of these signs, you should contact EMS and be evaluated in an emergency room as soon as possible.      How is atrial fibrillation treated?     Several treatment options exist for suppressing atrial fibrillation - however, it is not an easily curable arrhythmia.  The first goal in managing atrial fibrillation is to minimize stroke risk.  The second goal is to improve symptoms associated with atrial fibrillation.  Finally, in patients with reduced cardiac function, maintaining normal rhythm can help improve cardiac function.      Blood thinners are used to reduce the risk of stroke in patients with high estimated stroke risk related to atrial fibrillation.  For patients at higher risk of bleeding related to blood thinner use, implantable devices may be an option to reduce  stroke risk without the need for long term blood thinner use.      Atrial fibrillation can be managed via two strategies: rate control and rhythm control.  In a rate control strategy, continued atrial fibrillation is accepted and medications (eg. beta-blockers or calcium channel blockers) are used to control the lower chamber rate.  In a rhythm control strategy, anti-arrhythmic medications or catheter ablation are used to maintain normal cardiac rhythm and slow disease progression by suppressing atrial fibrillation.  A procedure called a cardioversion, in which an electric shock is delivered through patches placed on the chest wall while under deep sedation, can be performed to temporarily restore normal cardiac rhythm, though does not address the chance of atrial fibrillation recurrence.  Treatments are more effective for earlier rather than later stage atrial fibrillation.  Lifestyle modifications (maintaining a healthy weight, aerobic exercise, diagnosing and treating sleep apnea, and minimizing alcohol intake) are important elements of atrial fibrillation rhythm control.     What is catheter ablation for atrial fibrillation?  Cardiac catheter ablation is a commonly performed, minimally invasive procedure performed by a cardiac electrophysiologist to treat many different cardiac rhythm abnormalities.  During catheter ablation, long, thin, flexible tubes are advanced into the heart via small sheaths inserted into the femoral veins and thermal energy (either heating or cooling) is applied within the heart to disrupt abnormal electrical activity.  Atrial fibrillation ablation is performed under general anesthesia, with procedures generally taking approximately 2-3 hours.  Patients are typically observed for 3-5 hours after the ablation, and in most cases can be discharged home the same day.  Atrial fibrillation ablation is associated with better outcomes (mortality, cardiovascular hospitalizations, atrial arrhythmia  recurrences) compared to antiarrhythmic drug therapy.  However, atrial fibrillation recurrences are not uncommon, and repeat catheter ablation may be offered.  Your electrophysiology team can review atrial fibrillation ablation, anticipated success rates, risks, and recovery expectations with you.    What are anti-arrhythmic medications?  Anti-arrhythmic medications are specialized drugs which alter cardiac electrical functioning to suppress arrhythmias.  There are several anti-arrhythmic medications available, each with its own success rate and side effects.  Some anti-arrhythmic medications are less effective though safer to use, others are more effective though have serious potential toxicities.  Atrial fibrillation recurrences are common and may require dose adjustment or change in antiarrhythmic therapy.  Your electrophysiology team will carefully consider which medication would be the best and safest for your particular case.      Can I live a normal life?    The goal of atrial fibrillation management is for patients to live normal lives without being limited by symptoms related to atrial fibrillation.    Are any additional educational resources available?  There are a number of excellent atrial fibrillation education resources available to you online.  A few options you may wish to review include:  hrsonline.org/guide-atrial-fibrillation  afibmatters.org  getsmartaboutafib.com  stopaf.com    What comes next?    Consider your management options and let us know how we can help in your decision process.  Please take medications as they have been prescribed.  You should also get any tests that may have been ordered for you.      When to Call Your Doctor or seek emergency care:  Call your doctor or seek emergency care if you have any significant changes with the following:    Weakness    Dizziness    Fainting    Fatigue    Shortness of breath    Chest pain with increased activity    If you are concerned that your  heart rate is too fast or too slow    Bleeding that does not stop in 10 minutes    Coughing or throwing up blood    Bloody diarrhea or bleeding hemorrhoids    Dark-colored urine or black stool  Allergic reactions:    Rash    Itching    Swelling    Trouble breathing or swallowing      Please call the [facility] at [phone] if you have concerns about your symptoms, your medicines, or your follow-up appointments.

## 2022-03-20 ENCOUNTER — TRANSFERRED RECORDS (OUTPATIENT)
Dept: MULTI SPECIALTY CLINIC | Facility: CLINIC | Age: 57
End: 2022-03-20

## 2022-03-20 LAB — PAP SMEAR - HIM PATIENT REPORTED: NEGATIVE

## 2022-03-21 DIAGNOSIS — F41.9 ANXIETY: ICD-10-CM

## 2022-03-22 RX ORDER — HYDROXYZINE HYDROCHLORIDE 25 MG/1
25 TABLET, FILM COATED ORAL 3 TIMES DAILY PRN
Qty: 270 TABLET | Refills: 3 | Status: SHIPPED | OUTPATIENT
Start: 2022-03-22 | End: 2023-03-22

## 2022-03-22 NOTE — TELEPHONE ENCOUNTER
"Last Written Prescription Date:  11/15/21  Last Fill Quantity: 90,  # refills: 1   Last office visit provider:  3/15/22     Requested Prescriptions   Pending Prescriptions Disp Refills     hydrOXYzine (ATARAX) 25 MG tablet 90 tablet 1     Sig: Take 1 tablet (25 mg) by mouth 3 times daily as needed for anxiety       Antihistamines Protocol Passed - 3/22/2022 11:04 AM        Passed - Recent (12 mo) or future (30 days) visit within the authorizing provider's specialty     Patient has had an office visit with the authorizing provider or a provider within the authorizing providers department within the previous 12 mos or has a future within next 30 days. See \"Patient Info\" tab in inbasket, or \"Choose Columns\" in Meds & Orders section of the refill encounter.              Passed - Patient is age 3 or older     Apply age and/or weight-based dosing for peds patients age 3 and older.    Forward request to provider for patients under the age of 3.          Passed - Medication is active on med list             Minh Mullen RN 03/22/22 11:04 AM  "

## 2022-03-25 ENCOUNTER — PRE VISIT (OUTPATIENT)
Dept: GASTROENTEROLOGY | Facility: CLINIC | Age: 57
End: 2022-03-25

## 2022-03-26 ENCOUNTER — HEALTH MAINTENANCE LETTER (OUTPATIENT)
Age: 57
End: 2022-03-26

## 2022-03-29 ENCOUNTER — OFFICE VISIT (OUTPATIENT)
Dept: BEHAVIORAL HEALTH | Facility: CLINIC | Age: 57
End: 2022-03-29
Payer: COMMERCIAL

## 2022-03-29 DIAGNOSIS — F41.1 GAD (GENERALIZED ANXIETY DISORDER): ICD-10-CM

## 2022-03-29 DIAGNOSIS — F33.1 MAJOR DEPRESSIVE DISORDER, RECURRENT EPISODE, MODERATE (H): ICD-10-CM

## 2022-03-29 DIAGNOSIS — F43.10 PTSD (POST-TRAUMATIC STRESS DISORDER): Primary | ICD-10-CM

## 2022-03-29 PROCEDURE — 90834 PSYTX W PT 45 MINUTES: CPT | Performed by: COUNSELOR

## 2022-03-30 NOTE — PROGRESS NOTES
[facility]                                     Progress Note    Patient Name: Alina Martell  Date: 3/29/22         Service Type: Individual      Session Start Time: 802  Session End Time: 852     Session Length: 50 minutes    Session #: 31    Attendees: Client    Service Modality: In Person    DATA  Interactive Complexity: No  Crisis: No        Progress Since Last Session (Related to Symptoms / Goals / Homework):   Symptoms: Improving less depression and anxiety over the last two weeks.     Homework: Achieved / completed to satisfaction      Episode of Care Goals: Satisfactory progress - ACTION (Actively working towards change); Intervened by reinforcing change plan / affirming steps taken     Current / Ongoing Stressors and Concerns:  Patient reported continuing to feel exhausted. Patient indicated she continues to not sleep. Patient reported a lot of time she will sit awake thinking of different things. Patient indicated lately she has been thinking about her trauma's. Patient reported it still affected her which bothers her. Patient indicated knowing that is normal but wanting power over it. This therapist processed with patient that the memory cannot hurt her. This therapist processed with patient skills that she has been taught during PTSD Skills Group      Treatment Objective(s) Addressed in This Session:   identify three distraction and diversion activities and use those activities to decrease level of anxiety    Increase interest, engagement, and pleasure in doing things  Decrease frequency and intensity of feeling down, depressed, hopeless  Feel less tired and more energy during the day   Identify negative self-talk and behaviors: challenge core beliefs, myths, and actions     Intervention:   Motivational Interviewing    MI Intervention: Open-ended questions     Change Talk Expressed by the Patient: Committment to change Activation    Provider Response to Change Talk: R -  Reflected patient's change talk and S - Summarized patient's change talk statements      Assessments completed prior to visit:  The following assessments were completed by patient for this visit:  PHQ9:   PHQ-9 SCORE 4/7/2021 4/28/2021 10/5/2021 12/13/2021 2/3/2022 2/14/2022 3/8/2022   PHQ-9 Total Score MyChart - - 2 (Minimal depression) - 6 (Mild depression) 7 (Mild depression) 6 (Mild depression)   PHQ-9 Total Score 4 4 2 6 6 7 6     GAD7:   ADA-7 SCORE 3/7/2021 3/31/2021 4/7/2021 4/28/2021 10/5/2021 11/15/2021 12/13/2021   Total Score 4 (minimal anxiety) - - - 3 (minimal anxiety) 6 (mild anxiety) -   Total Score 4 6 5 4 3 6 4     PROMIS 10-Global Health (all questions and answers displayed):   PROMIS 10 12/15/2021 3/26/2022   In general, would you say your health is: Good Good   In general, would you say your quality of life is: Good Good   In general, how would you rate your physical health? Good Fair   In general, how would you rate your mental health, including your mood and your ability to think? Fair Fair   In general, how would you rate your satisfaction with your social activities and relationships? Good Fair   In general, please rate how well you carry out your usual social activities and roles Good Good   To what extent are you able to carry out your everyday physical activities such as walking, climbing stairs, carrying groceries, or moving a chair? Mostly Mostly   How often have you been bothered by emotional problems such as feeling anxious, depressed or irritable? Often Sometimes   How would you rate your fatigue on average? Mild Mild   How would you rate your pain on average?   0 = No Pain  to  10 = Worst Imaginable Pain 3 3   In general, would you say your health is: 3 3   In general, would you say your quality of life is: 3 3   In general, how would you rate your physical health? 3 2   In general, how would you rate your mental health, including your mood and your ability to think? 2 2   In  general, how would you rate your satisfaction with your social activities and relationships? 3 2   In general, please rate how well you carry out your usual social activities and roles. (This includes activities at home, at work and in your community, and responsibilities as a parent, child, spouse, employee, friend, etc.) 3 3   To what extent are you able to carry out your everyday physical activities such as walking, climbing stairs, carrying groceries, or moving a chair? 4 4   In the past 7 days, how often have you been bothered by emotional problems such as feeling anxious, depressed, or irritable? 4 3   In the past 7 days, how would you rate your fatigue on average? 2 2   In the past 7 days, how would you rate your pain on average, where 0 means no pain, and 10 means worst imaginable pain? 3 3   Global Mental Health Score 10 10   Global Physical Health Score 15 14   PROMIS TOTAL - SUBSCORES 25 24   Some recent data might be hidden         ASSESSMENT: Current Emotional / Mental Status (status of significant symptoms):   Risk status (Self / Other harm or suicidal ideation)   Patient denies current fears or concerns for personal safety.   Patient denies current or recent suicidal ideation or behaviors.   Patient denies current or recent homicidal ideation or behaviors.   Patient denies current or recent self injurious behavior or ideation.   Patient denies other safety concerns.   Patient reports there has been no change in risk factors since their last session.     Patient reports there has been no change in protective factors since their last session.     Recommended that patient call 911 or go to the local ED should there be a change in any of these risk factors.     Appearance:   Appropriate    Eye Contact:   Good    Psychomotor Behavior: Normal    Attitude:   Cooperative    Orientation:   All   Speech    Rate / Production: Normal/ Responsive    Volume:  Normal    Mood:    Anxious  Depressed     Affect:    Appropriate    Thought Content:  Clear    Thought Form:  Coherent  Goal Directed    Insight:    Good      Medication Review:   No changes to current psychiatric medication(s)     Medication Compliance:   Yes     Changes in Health Issues:   None reported     Chemical Use Review:   Substance Use: Chemical use reviewed, no active concerns identified      Tobacco Use: No current tobacco use.      Diagnosis:  1. PTSD (post-traumatic stress disorder)    2. ADA (generalized anxiety disorder)    3. Major depressive disorder, recurrent episode, moderate (H)        Collateral Reports Completed:   Not Applicable    PLAN: (Patient Tasks / Therapist Tasks / Other)  Patient will return in 2 weeks for scheduled session. Patient will use skills taught during PTSD skills group to work on symptoms from her trauma. Patient will talk with her support network.     There has been demonstrated improvement in functioning while patient has been engaged in psychotherapy/psychological service- if withdrawn the patient would deteriorate and/or relapse.   Mariana Love, [facility], Provider Oversight:  Dr. Evan Webb    ______________________________________________________________________    Individual Treatment Plan    Patient's Name: Alina Martell  YOB: 1965    Date of Creation:10/16/2020  Date Treatment Plan Last Reviewed/Revised: 02/3/2022    DSM5 Diagnoses: 296.32 (F33.1) Major Depressive Disorder, Recurrent Episode, Moderate _ or 300.02 (F41.1) Generalized Anxiety Disorder  Psychosocial / Contextual Factors: Health, family and financial   PROMIS (reviewed every 90 days): 25    Referral / Collaboration:  Was/were discussed and patient will pursue.    Anticipated number of session for this episode of care: 20 will reevaluate every 90 days  Anticipation frequency of session: Biweekly  Anticipated Duration of each session: 38-52 minutes  Treatment plan will be reviewed in 90 days or when goals have been  changed.       MeasurableTreatment Goal(s) related to diagnosis / functional impairment(s)  Goal 1: Patient will work on reducing overall anxiety.     I will know I've met my goal when reporting minimal anxiety symptoms.       Objective #A (Patient Action)                          Patient will use distraction each time intrusive worry surfaces.  Status: Continued - Date(s): 02/03/2022     Intervention(s)  Therapist will teach emotional regulation skills. ..     Objective #B  Patient will Decrease frequency and intensity of feeling down, depressed, hopeless.  Status: Continued - Date(s):  02/03/2022     Intervention(s)  Therapist will teach emotional recognition/identification. ..     Objective #C  Patient will Identify negative self-talk and behaviors: challenge core beliefs, myths, and actions.  Status: Continued - Date(s):  02/03/2022     Intervention(s)  Therapist will teach the client how to perform a behavioral chain analysis. ..     Goal 2: Patient will continue to work on reducing depression symptoms    I will know I've met my goal then reporting minimal depression symptoms     Objective #A (Patient Action)                          Patient will Increase interest, engagement, and pleasure in doing things.  Status: Continued - Date(s):  02/03/2022     Intervention(s)  Therapist will assign homework ..     Objective #B  Patient will Decrease frequency and intensity of feeling down, depressed, hopeless.  Status: Continued - Date(s):  02/03/2022     Intervention(s)  Therapist will assign homework at every session.         Patient has reviewed and agreed to the above plan.        Mariana Love, [facility] February 3 , 2022

## 2022-04-04 ASSESSMENT — ENCOUNTER SYMPTOMS
HEARTBURN: 1
ARTHRALGIAS: 1
SLEEP DISTURBANCES DUE TO BREATHING: 0
JOINT SWELLING: 1
HYPERTENSION: 0
LIGHT-HEADEDNESS: 0
BRUISES/BLEEDS EASILY: 1
HYPOTENSION: 0
SYNCOPE: 0
HOT FLASHES: 0
ABDOMINAL PAIN: 1
BLOATING: 0
MUSCLE CRAMPS: 0
BACK PAIN: 1
ORTHOPNEA: 0
BOWEL INCONTINENCE: 0
CONSTIPATION: 1
JAUNDICE: 0
NAUSEA: 1
DIARRHEA: 0
MYALGIAS: 0
PALPITATIONS: 1
NECK PAIN: 0
MUSCLE WEAKNESS: 0
EXERCISE INTOLERANCE: 1
DECREASED LIBIDO: 0
RECTAL PAIN: 0
SWOLLEN GLANDS: 0
BLOOD IN STOOL: 0
STIFFNESS: 1
LEG PAIN: 1

## 2022-04-05 ENCOUNTER — VIRTUAL VISIT (OUTPATIENT)
Dept: GASTROENTEROLOGY | Facility: CLINIC | Age: 57
End: 2022-04-05
Attending: PHYSICIAN ASSISTANT
Payer: COMMERCIAL

## 2022-04-05 DIAGNOSIS — R10.13 EPIGASTRIC PAIN: ICD-10-CM

## 2022-04-05 DIAGNOSIS — K59.00 CONSTIPATION, UNSPECIFIED CONSTIPATION TYPE: Primary | ICD-10-CM

## 2022-04-05 PROBLEM — Z86.0100 HISTORY OF COLONIC POLYPS: Status: ACTIVE | Noted: 2022-04-05

## 2022-04-05 PROCEDURE — 99205 OFFICE O/P NEW HI 60 MIN: CPT | Mod: GT | Performed by: PHYSICIAN ASSISTANT

## 2022-04-05 RX ORDER — POLYETHYLENE GLYCOL 3350 17 G/17G
1 POWDER, FOR SOLUTION ORAL DAILY
Qty: 578 G | Refills: 3 | Status: ON HOLD | OUTPATIENT
Start: 2022-04-05 | End: 2023-05-04

## 2022-04-05 NOTE — PROGRESS NOTES
Maritza is a 57 year old who is being evaluated via a billable video visit.      How would you like to obtain your AVS? MyChart  If the video visit is dropped, the invitation should be resent by: Send to e-mail at: [email or web address]  Will anyone else be joining your video visit? No    Video Start Time: 953a  Video-Visit Details    Type of service:  Video Visit    Video End Time:10:41 AM    Originating Location (pt. Location): Home    Distant Location (provider location):  [facility] [place]     Platform used for Video Visit: Whistle      GI CLINIC VISIT    CC/REFERRING PROVIDER: Lulu Reyna    HPI: 57 year old female with PMH of rheumatoid arthritis on Humiara, class II obesity, paroxymal atrial fibrillation on chronic anticoagulation with DOAC, who is s/p CCY presenting to GI clinic for abdominal pain and consideration of EGD.    Maritza underwent D+C March 3, 2022 for menorrhagia, and shortly after, developed epigastric pain.  She describes that initially, it felt like food was getting stuck low in the substernal area with most everything she ate, which would then regurgitate as an undigested, somewhat sour bolus. This then progressed to a non-radiating, intermittent epigastric pain that feels like getting punched in the stomach. It relably occurs with fating, and improves if she eats something. It can also occur if she eats a larger volume of food. She denies any significant GERD symptoms, though she was having some occasional episodes at start of symptoms. No odynophagia. She has nausea that has been intermittent and random in occurrence, not always related to the epigastric pain. She is using Zofran for this. She states that until these symptoms began, she had regular bowel movements 2-3 times per day without any associated concerns, but now is having bowel movements every 2-3 days, associated with hard, dry stools, incomplete evacuation, and straining. No BRBPR. She states  that at first note of epigastric pain, there were some dark stools, which have not recurred.    Work-up has included a CT AP, which was unremarkable. H pylori stool antigen negative (on PPI for several days). CBC with mild anemia and new mild leukocytosis. CMP, lipase, TSH, lactic acid unremarkable. She was started on PPI 40 daily with sucralfate. This has resulted in mild improvement in symptoms. She is still experiencing the epigastric discomfort with fasting, which can awaken her from sleep, and continues to have a sense of possible dysphagia to solid textures 2-3 times per week, which feels stuck for 10-15 minutes and then either clears or regurgitates as undigested, sour bolus.     Colonoscopy 2/2021 - repeat 3 years for 1 sessile serrated adenoma    Does not take NSAID    FHx - mom with Crohn's with ileostomy, multiple autoimmune conditions in family        ROS: 10pt ROS performed and otherwise negative.    PAST MEDICAL HISTORY:  Past Medical History:   Diagnosis Date     Anemia      Antiplatelet or antithrombotic long-term use      Arrhythmia      Cannabis use without complication 12/8/2014     Carpal tunnel syndrome      Coronary artery disease      Gastroesophageal reflux disease      History of angina      Hypertension      Irregular heart beat      Obesity      Paroxysmal atrial fibrillation (H)      PTSD (post-traumatic stress disorder)      RA (rheumatoid arthritis) (H)      Rheumatoid arthritis (H) 7/8/2016     Sicca syndrome (H)      Sleep apnea        PREVIOUS ABDOMINAL/GYNECOLOGIC SURGERIES:    Past Surgical History:   Procedure Laterality Date     CHOLECYSTECTOMY       DILATION AND CURETTAGE, OPERATIVE HYSTEROSCOPY, COMBINED N/A 3/3/2022    Procedure: HYSTEROSCOPY, WITH DILATION AND CURETTAGE;  Surgeon: Irma Cary MD;  Location: [facility] OR     HC REMOVAL GALLBLADDER      Description: Cholecystectomy;  Recorded: 10/15/2013;     LAPAROSCOPIC TUBAL LIGATION       RELEASE CARPAL  TUNNEL BILATERAL       TUBAL LIGATION  1995         PERTINENT MEDICATIONS:  Current Outpatient Medications   Medication Sig Dispense Refill     acetaminophen (TYLENOL) 325 MG tablet Take 3 tablets (975 mg) by mouth every 6 hours as needed for mild pain 50 tablet 0     adalimumab (HUMIRA *CF* PEN) 40 MG/0.4ML pen kit INJECT 0.4 MLS (40 MG) SUBCUTANEOUS EVERY 14 DAYS HOLD FOR SIGNS OF INFECTION, THEN SEEK MEDICAL ATTENTION. 2 each 4     apixaban ANTICOAGULANT (ELIQUIS ANTICOAGULANT) 5 MG tablet Take 1 tablet (5 mg) by mouth 2 times daily 180 tablet 1     diltiazem ER COATED BEADS (CARDIZEM CD/CARTIA XT) 180 MG 24 hr capsule Take 2 capsules (360 mg) by mouth daily 180 capsule 3     EPINEPHrine (ANY BX GENERIC EQUIV) 0.3 MG/0.3ML injection 2-pack Inject 0.3 mg into the muscle as needed for anaphylaxis        flecainide (TAMBOCOR) 50 MG tablet Take 1 tablet (50 mg) by mouth 2 times daily as needed (at onset of a fib, may repeat once after 4 hours) 60 tablet 1     hydrOXYzine (ATARAX) 25 MG tablet Take 1 tablet (25 mg) by mouth 3 times daily as needed for anxiety 270 tablet 3     Lidocaine (LIDOCARE) 4 % Patch Place 1 patch onto the skin every 24 hours To prevent lidocaine toxicity, patient should be patch free for 12 hrs daily. 5 patch 0     loratadine (CLARITIN) 10 MG tablet Take 10 mg by mouth daily        Multiple Vitamins-Minerals (CENTROVITE) TABS TAKE 1 TABLET BY MOUTH EVERY DAY FOR 30 DAYS       omeprazole (PRILOSEC) 40 MG DR capsule Take 1 capsule (40 mg) by mouth daily 30 capsule 0     ondansetron (ZOFRAN-ODT) 4 MG ODT tab Take 1-2 tablets (4-8 mg) by mouth every 8 hours as needed for nausea 4 tablet 0     prazosin (MINIPRESS) 1 MG capsule Take 1 capsule by mouth nightly as needed        sertraline (ZOLOFT) 25 MG tablet Take 1 tablet (25 mg) by mouth daily 90 tablet 0     sucralfate (CARAFATE) 1 GM tablet Take 1 tablet (1 g) by mouth 4 times daily 120 tablet 0     traZODone (DESYREL) 50 MG tablet Take 0.5  tablets (25 mg) by mouth At Bedtime 90 tablet 0     vitamin C (ASCORBIC ACID) 1000 MG TABS Take 1,000 mg by mouth daily        vitamin D3 (CHOLECALCIFEROL) 250 mcg (37883 units) capsule Take 1 capsule by mouth once a week         SOCIAL HISTORY:    Social History     Socioeconomic History     Marital status: Single     Spouse name: Not on file     Number of children: 2     Years of education: 4     Highest education level: Not on file   Occupational History     Not on file   Tobacco Use     Smoking status: Never Smoker     Smokeless tobacco: Never Used     Tobacco comment: smokes marijuana   Substance and Sexual Activity     Alcohol use: Not Currently     Comment: Alcoholic Drinks/day: Never an issue, she states.  7/8/16     Drug use: Not Currently     Types: Marijuana     Comment: Drug use: Former marijuana use, not current. 7/8/16     Sexual activity: Never     Partners: Male     Birth control/protection: Other     Comment: tubal ligation   Other Topics Concern     Parent/sibling w/ CABG, MI or angioplasty before 65F 55M? Not Asked   Social History Narrative     Not on file     Social Determinants of Health     Financial Resource Strain: Not on file   Food Insecurity: Not on file   Transportation Needs: Not on file   Physical Activity: Not on file   Stress: Not on file   Social Connections: Not on file   Intimate Partner Violence: Not on file   Housing Stability: Not on file       FAMILY HISTORY:    Family History   Problem Relation Age of Onset     Crohn's Disease Mother         Total colectomy with ileostomy.     Psoriasis Brother      Snoring Brother      Snoring Father      Diabetes Father      Prostate Cancer Father      Chronic Kidney Disease Father         Chose against dialysis.     Substance Abuse Brother      Depression Brother      Attention Deficit Disorder Brother      Alcoholism Brother      No Known Problems Daughter      No Known Problems Son      Alcoholism Brother      Substance Abuse Brother       Lupus Cousin      Alcoholism Maternal Grandfather      Breast Cancer No family hx of        PHYSICAL EXAMINATION:  Vitals reviewed  LMP 12/21/2021   Video physical exam  General: Patient appears well in no acute distress.   Skin: No visualized rash or lesions on visualized skin  Eyes: EOMI, no erythema, sclera icterus or discharge noted  Resp: Appears to be breathing comfortably without accessory muscle usage, speaking in full sentences, no cough  MSK: Appears to have normal range of motion based on visualized movements  Neurologic: No apparent tremors, facial movements symmetric  Psych: affect normal, alert and oriented    The rest of a comprehensive physical examination is deferred due to PHE (public health emergency) video restrictions      PERTINENT STUDIES Reviewed in EMR    ASSESSMENT/PLAN:  57 year old female with PMH of rheumatoid arthritis on Humiara, class II obesity, paroxymal atrial fibrillation on anticoagulation, who is s/p CCY presenting to GI clinic for abdominal pain and consideration of EGD.    # Epigastric discomfort  # Nausea  # Possible esophageal dysphagia  One month history of symptoms, with prominent epigastric discomfort with fasting, which improves with meals, as well as sensation of dysphagia occurring inferiorly in substernal versus epigastric area, associated with regurgitation of undigested bolus. Work-up with CT AP, H pylori (on PPI), CMP has been unremarkable. CBC notable for mild, new normocytic anemia (Hgb 11.5) and leukocytosis.  Symptoms partially improving on PPI.    We discussed moving forward with EGD, to which she was in agreement.    # Constipation  Onset of constipation after developing the above symptoms and use of Zofran. No BRBPR. Colonoscopy one year ago was unremarkable outside of sessile adenoma, with recommendation to repeat in 3 years. We discussed constipation likely 2/2 Zofran. Recommend rechecking hemoglobin. If upper endoscopy was unrevealing, and if still  having symptoms or anemia, consider repeat colonoscopy given family history of Crohn's. For now, recommend daily MiraLAX.    RTC 6-8 weeks    Thank you for this consultation. It was a pleasure to participate in the care of this patient; please contact us with any further questions.    Radha Dsouza PA-C    73 minutes spent on the date of the encounter doing chart review, review of outside records, review of test results, interpretation of tests, patient visit and documentation

## 2022-04-05 NOTE — LETTER
4/5/2022         RE: Alina Martell  [address] [place] [address]        Dear Colleague,    Thank you for referring your patient, Alina Martell, to the [facility] [place]. Please see a copy of my visit note below.      GI CLINIC VISIT    CC/REFERRING PROVIDER: Lulu Reyna    HPI: 57 year old female with PMH of rheumatoid arthritis on Humiara, class II obesity, paroxymal atrial fibrillation on chronic anticoagulation with DOAC, who is s/p CCY presenting to GI clinic for abdominal pain and consideration of EGD.    Maritza underwent D+C March 3, 2022 for menorrhagia, and shortly after, developed epigastric pain.  She describes that initially, it felt like food was getting stuck low in the substernal area with most everything she ate, which would then regurgitate as an undigested, somewhat sour bolus. This then progressed to a non-radiating, intermittent epigastric pain that feels like getting punched in the stomach. It relably occurs with fating, and improves if she eats something. It can also occur if she eats a larger volume of food. She denies any significant GERD symptoms, though she was having some occasional episodes at start of symptoms. No odynophagia. She has nausea that has been intermittent and random in occurrence, not always related to the epigastric pain. She is using Zofran for this. She states that until these symptoms began, she had regular bowel movements 2-3 times per day without any associated concerns, but now is having bowel movements every 2-3 days, associated with hard, dry stools, incomplete evacuation, and straining. No BRBPR. She states that at first note of epigastric pain, there were some dark stools, which have not recurred.    Work-up has included a CT AP, which was unremarkable. H pylori stool antigen negative (on PPI for several days). CBC with mild anemia and new mild leukocytosis. CMP, lipase, TSH, lactic acid unremarkable. She  was started on PPI 40 daily with sucralfate. This has resulted in mild improvement in symptoms. She is still experiencing the epigastric discomfort with fasting, which can awaken her from sleep, and continues to have a sense of possible dysphagia to solid textures 2-3 times per week, which feels stuck for 10-15 minutes and then either clears or regurgitates as undigested, sour bolus.     Colonoscopy 2/2021 - repeat 3 years for 1 sessile serrated adenoma    Does not take NSAID    FHx - mom with Crohn's with ileostomy, multiple autoimmune conditions in family    ROS: 10pt ROS performed and otherwise negative.    PAST MEDICAL HISTORY:  Past Medical History:   Diagnosis Date     Anemia      Antiplatelet or antithrombotic long-term use      Arrhythmia      Cannabis use without complication 12/8/2014     Carpal tunnel syndrome      Coronary artery disease      Gastroesophageal reflux disease      History of angina      Hypertension      Irregular heart beat      Obesity      Paroxysmal atrial fibrillation (H)      PTSD (post-traumatic stress disorder)      RA (rheumatoid arthritis) (H)      Rheumatoid arthritis (H) 7/8/2016     Sicca syndrome (H)      Sleep apnea        PREVIOUS ABDOMINAL/GYNECOLOGIC SURGERIES:    Past Surgical History:   Procedure Laterality Date     CHOLECYSTECTOMY       DILATION AND CURETTAGE, OPERATIVE HYSTEROSCOPY, COMBINED N/A 3/3/2022    Procedure: HYSTEROSCOPY, WITH DILATION AND CURETTAGE;  Surgeon: Irma Cary MD;  Location: [facility]     HC REMOVAL GALLBLADDER      Description: Cholecystectomy;  Recorded: 10/15/2013;     LAPAROSCOPIC TUBAL LIGATION       RELEASE CARPAL TUNNEL BILATERAL       TUBAL LIGATION  1995         PERTINENT MEDICATIONS:  Current Outpatient Medications   Medication Sig Dispense Refill     acetaminophen (TYLENOL) 325 MG tablet Take 3 tablets (975 mg) by mouth every 6 hours as needed for mild pain 50 tablet 0     adalimumab (HUMIRA *CF* PEN) 40 MG/0.4ML pen  kit INJECT 0.4 MLS (40 MG) SUBCUTANEOUS EVERY 14 DAYS HOLD FOR SIGNS OF INFECTION, THEN SEEK MEDICAL ATTENTION. 2 each 4     apixaban ANTICOAGULANT (ELIQUIS ANTICOAGULANT) 5 MG tablet Take 1 tablet (5 mg) by mouth 2 times daily 180 tablet 1     diltiazem ER COATED BEADS (CARDIZEM CD/CARTIA XT) 180 MG 24 hr capsule Take 2 capsules (360 mg) by mouth daily 180 capsule 3     EPINEPHrine (ANY BX GENERIC EQUIV) 0.3 MG/0.3ML injection 2-pack Inject 0.3 mg into the muscle as needed for anaphylaxis        flecainide (TAMBOCOR) 50 MG tablet Take 1 tablet (50 mg) by mouth 2 times daily as needed (at onset of a fib, may repeat once after 4 hours) 60 tablet 1     hydrOXYzine (ATARAX) 25 MG tablet Take 1 tablet (25 mg) by mouth 3 times daily as needed for anxiety 270 tablet 3     Lidocaine (LIDOCARE) 4 % Patch Place 1 patch onto the skin every 24 hours To prevent lidocaine toxicity, patient should be patch free for 12 hrs daily. 5 patch 0     loratadine (CLARITIN) 10 MG tablet Take 10 mg by mouth daily        Multiple Vitamins-Minerals (CENTROVITE) TABS TAKE 1 TABLET BY MOUTH EVERY DAY FOR 30 DAYS       omeprazole (PRILOSEC) 40 MG DR capsule Take 1 capsule (40 mg) by mouth daily 30 capsule 0     ondansetron (ZOFRAN-ODT) 4 MG ODT tab Take 1-2 tablets (4-8 mg) by mouth every 8 hours as needed for nausea 4 tablet 0     prazosin (MINIPRESS) 1 MG capsule Take 1 capsule by mouth nightly as needed        sertraline (ZOLOFT) 25 MG tablet Take 1 tablet (25 mg) by mouth daily 90 tablet 0     sucralfate (CARAFATE) 1 GM tablet Take 1 tablet (1 g) by mouth 4 times daily 120 tablet 0     traZODone (DESYREL) 50 MG tablet Take 0.5 tablets (25 mg) by mouth At Bedtime 90 tablet 0     vitamin C (ASCORBIC ACID) 1000 MG TABS Take 1,000 mg by mouth daily        vitamin D3 (CHOLECALCIFEROL) 250 mcg (48198 units) capsule Take 1 capsule by mouth once a week         SOCIAL HISTORY:    Social History     Socioeconomic History     Marital status: Single      Spouse name: Not on file     Number of children: 2     Years of education: 4     Highest education level: Not on file   Occupational History     Not on file   Tobacco Use     Smoking status: Never Smoker     Smokeless tobacco: Never Used     Tobacco comment: smokes marijuana   Substance and Sexual Activity     Alcohol use: Not Currently     Comment: Alcoholic Drinks/day: Never an issue, she states.  7/8/16     Drug use: Not Currently     Types: Marijuana     Comment: Drug use: Former marijuana use, not current. 7/8/16     Sexual activity: Never     Partners: Male     Birth control/protection: Other     Comment: tubal ligation   Other Topics Concern     Parent/sibling w/ CABG, MI or angioplasty before 65F 55M? Not Asked   Social History Narrative     Not on file     Social Determinants of Health     Financial Resource Strain: Not on file   Food Insecurity: Not on file   Transportation Needs: Not on file   Physical Activity: Not on file   Stress: Not on file   Social Connections: Not on file   Intimate Partner Violence: Not on file   Housing Stability: Not on file       FAMILY HISTORY:    Family History   Problem Relation Age of Onset     Crohn's Disease Mother         Total colectomy with ileostomy.     Psoriasis Brother      Snoring Brother      Snoring Father      Diabetes Father      Prostate Cancer Father      Chronic Kidney Disease Father         Chose against dialysis.     Substance Abuse Brother      Depression Brother      Attention Deficit Disorder Brother      Alcoholism Brother      No Known Problems Daughter      No Known Problems Son      Alcoholism Brother      Substance Abuse Brother      Lupus Cousin      Alcoholism Maternal Grandfather      Breast Cancer No family hx of        PHYSICAL EXAMINATION:  Vitals reviewed  LMP 12/21/2021   Video physical exam  General: Patient appears well in no acute distress.   Skin: No visualized rash or lesions on visualized skin  Eyes: EOMI, no erythema, sclera icterus  or discharge noted  Resp: Appears to be breathing comfortably without accessory muscle usage, speaking in full sentences, no cough  MSK: Appears to have normal range of motion based on visualized movements  Neurologic: No apparent tremors, facial movements symmetric  Psych: affect normal, alert and oriented    The rest of a comprehensive physical examination is deferred due to PHE (public health emergency) video restrictions      PERTINENT STUDIES Reviewed in EMR    ASSESSMENT/PLAN:  57 year old female with PMH of rheumatoid arthritis on Humiara, class II obesity, paroxymal atrial fibrillation on anticoagulation, who is s/p CCY presenting to GI clinic for abdominal pain and consideration of EGD.    # Epigastric discomfort  # Nausea  # Possible esophageal dysphagia  One month history of symptoms, with prominent epigastric discomfort with fasting, which improves with meals, as well as sensation of dysphagia occurring inferiorly in substernal versus epigastric area, associated with regurgitation of undigested bolus. Work-up with CT AP, H pylori (on PPI), CMP has been unremarkable. CBC notable for mild, new normocytic anemia (Hgb 11.5) and leukocytosis.  Symptoms partially improving on PPI.    We discussed moving forward with EGD, to which she was in agreement.    # Constipation  Onset of constipation after developing the above symptoms and use of Zofran. No BRBPR. Colonoscopy one year ago was unremarkable outside of sessile adenoma, with recommendation to repeat in 3 years. We discussed constipation likely 2/2 Zofran. Recommend rechecking hemoglobin. If upper endoscopy was unrevealing, and if still having symptoms or anemia, consider repeat colonoscopy given family history of Crohn's. For now, recommend daily MiraLAX.    RTC 6-8 weeks    Thank you for this consultation. It was a pleasure to participate in the care of this patient; please contact us with any further questions.      Radha Dsouza PA-C      73 minutes  spent on the date of the encounter doing chart review, review of outside records, review of test results, interpretation of tests, patient visit and documentation

## 2022-04-05 NOTE — PATIENT INSTRUCTIONS
It was a pleasure taking care of you today.  I've included a brief summary of our discussion and care plan from today's visit below.  Please review this information with your primary care provider.  _______________________________________________________________________    My recommendations are summarized as follows:    --  Order for upper endoscopy placed. The endoscopy scheduling team will typically call within 2-5 business days, or you can call [phone] to schedule.   -- Order for repeat CBC placed. Lab orders have been placed,which can be performed at any [place] lab at your convenience. To schedule lab appointment here, use my chart or call [phone].   -- Start daily MiraLAX, 1 capful daily. If needed, this can be decreased to 1/2 capful daily, or increased up to 3 times per day.     Return to GI Clinic in 6-8 weeks to review your progress.    ______________________________________________________________________    How do I schedule labs, imaging studies, or procedures that were ordered in clinic today?     Labs: To schedule lab appointment at the [facility], use my chart or call [phone]. If you have a [place] lab closer to home where you are regularly seen you can give them a call.     Procedures: If a colonoscopy, upper endoscopy, breath test, esophageal manometry, or pH impedence was ordered today, our endoscopy team will call you to schedule this. If you have not heard from our endoscopy team within a week, please call [phone] to schedule.     Imaging Studies: If you were scheduled for a CT scan, X-ray, MRI, ultrasound, HIDA scan or other imaging study, please call [phone] to have this scheduled.     Referral: If a referral to another specialty was ordered, expect a phone call or follow instructions above. If you have not heard from anyone regarding your referral in a week, please call our clinic to check the status.     Who do I call with any questions after my  visit?  Please be in touch if there are any further questions that arise following today's visit.  There are multiple ways to contact your gastroenterology care team.        During business hours, you may reach a Gastroenterology nurse at [phone]      To schedule or reschedule an appointment, please call [phone].       You can always send a secure message through [employer].  [employer] messages are answered by your nurse or doctor typically within 24 hours.  Please allow extra time on weekends and holidays.        For urgent/emergent questions after business hours, you may reach the on-call GI Fellow by contacting the [facility]  at [phone].     How will I get the results of any tests ordered?    You will receive all of your results.  If you have signed up for [employer], any tests ordered at your visit will be available to you after your physician reviews them.  Typically this takes 1-2 weeks.  If there are urgent results that require a change in your care plan, your physician or nurse will call you to discuss the next steps.      What is [employer]?  Ta[employer]atech is a secure way for you to access all of your healthcare records from the [facility].  It is a web based computer program, so you can sign on to it from any location.  It also allows you to send secure messages to your care team.  I recommend signing up for [employer] access if you have not already done so and are comfortable with using a computer.      How to I schedule a follow-up visit?  If you did not schedule a follow-up visit today, please call [phone] to schedule a follow-up office visit.      Sincerely,    Radha Dsouza PA-C  Division of Gastroenterology, Hepatology & Nutrition  [facility]

## 2022-04-06 ENCOUNTER — TELEPHONE (OUTPATIENT)
Dept: GASTROENTEROLOGY | Facility: CLINIC | Age: 57
End: 2022-04-06
Payer: COMMERCIAL

## 2022-04-06 DIAGNOSIS — Z11.59 ENCOUNTER FOR SCREENING FOR OTHER VIRAL DISEASES: Primary | ICD-10-CM

## 2022-04-06 DIAGNOSIS — R13.10 DYSPHAGIA, UNSPECIFIED TYPE: ICD-10-CM

## 2022-04-06 NOTE — TELEPHONE ENCOUNTER
Screening Questions  BlueKIND OF PREP RedLOCATION [review exclusion criteria] GreenSEDATION TYPE  1. Have you had a positive covid test in the last 90 days? N     2. Are you active on mychart? Y    3. What insurance is in the chart? [employer]     3.   Ordering/Referring Provider: Radha Dsouza PA-C in [facility]    4. BMI 38.4 [BMI OVER 40-EXTENDED PREP]  If greater than 40 review exclusion criteria [PAC APPT IF @ [facility]]        5.  Respiratory Screening :  [If yes to any of the following HOSPITAL setting only]     Do you use daily home oxygen? N    Do you have mod to severe Obstructive Sleep Apnea? Y  [OKAY @ [facility] SH PH RI]   Do you have Pulmonary Hypertension? N     Do you have UNCONTROLLED asthma? N        6.   Have you had a heart or lung transplant? N      7.   Are you currently on dialysis? N [ If yes, G-PREP & HOSPITAL setting only]     8.   Do you have chronic kidney disease? N [ If yes, G-PREP ]    9.   Have you had a stroke or Transient ischemic attack (TIA - aka  mini stroke ) within 6 months?  N (If yes, please review exclusion criteria)    10.   In the past 6 months, have you had any heart related issues including cardiomyopathy or heart attack? N           If yes, did it require cardiac stenting or other implantable device? N      11.   Do you have any implantable devices in your body (pacemaker, defib, LVAD)? N (If yes, please review exclusion criteria)    12.   Do you take nitroglycerin? N           If yes, how often? N  (if yes, HOSPITAL setting ONLY)    13.   Are you currently taking any blood thinners? ELIQUIS           [IF YES, INFORM PATIENT TO FOLLOW UP W/ ORDERING PROVIDER FOR BRIDGING INSTRUCTIONS]     14.   Do you have a diagnosis of diabetes? N   [ If yes, G-PREP ]    15.   [FEMALES] Are you currently pregnant? N    If yes, how many weeks? N    16.   Are you taking any prescription pain medications on a routine schedule?  N  [ If yes, EXTENDED PREP.] [If  yes, MAC]    17.   Do you have any chemical dependencies such as alcohol, street drugs, or methadone?  N [If yes, MAC]    18.   Do you have any history of post-traumatic stress syndrome, severe anxiety or history of psychosis?  N  [If yes, MAC]    19.   Do you have a significant intellectual disability?  N [If yes, MAC]    20.   Do you transfer independently?  Y    21.  On a regular basis do you go 3-5 days between bowel movements? N   [ If yes, EXTENDED PREP.]    22.   Preferred LOCAL Pharmacy for Pre Prescription        [id number] [place], [place] - [address]      Scheduling Details      Caller : Alina Martell  (Please ask for phone number if not scheduled by patient)    Type of Procedure Scheduled: EGD  Which Colonoscopy Prep was Sent?:   YOCASTA CF PATIENTS & GROEN'S PATIENTS NEEDS EXTENDED PREP  Surgeon: DR. BARRIENTOS  Date of Procedure: 05/02/2022  Location: [facility]      Sedation Type: MAC  Conscious Sedation- Needs  for 6 hours after the procedure  MAC/General-Needs  for 24 hours after procedure    Pre-op Required at [facility], [place], [place] and OR for MAC sedation: N  (advise patient they will need a pre-op prior to procedure -)      Informed patient they will need an adult  Y  Cannot take any type of public or medical transportation alone    [facility]Pro[facility] or Externally: SCHEDULED     Confirmed Nurse will call to complete assessment Y    Additional comments: N

## 2022-04-11 RX ORDER — OMEPRAZOLE 40 MG/1
40 CAPSULE, DELAYED RELEASE ORAL DAILY
Qty: 90 CAPSULE | Refills: 3 | Status: SHIPPED | OUTPATIENT
Start: 2022-04-11 | End: 2023-03-08

## 2022-04-11 NOTE — TELEPHONE ENCOUNTER
"Last Written Prescription Date:  3/10/22  Last Fill Quantity: 30,  # refills: 0   Last office visit provider:  3/15/22     Requested Prescriptions   Pending Prescriptions Disp Refills     omeprazole (PRILOSEC) 40 MG DR capsule 30 capsule 0     Sig: Take 1 capsule (40 mg) by mouth in the morning.       PPI Protocol Passed - 4/11/2022 11:35 AM        Passed - Not on Clopidogrel (unless Pantoprazole ordered)        Passed - No diagnosis of osteoporosis on record        Passed - Recent (12 mo) or future (30 days) visit within the authorizing provider's specialty     Patient has had an office visit with the authorizing provider or a provider within the authorizing providers department within the previous 12 mos or has a future within next 30 days. See \"Patient Info\" tab in inbasket, or \"Choose Columns\" in Meds & Orders section of the refill encounter.              Passed - Medication is active on med list        Passed - Patient is age 18 or older        Passed - No active pregnacy on record        Passed - No positive pregnancy test in past 12 months             Minh Mullen RN 04/11/22 11:36 AM  "

## 2022-04-14 ENCOUNTER — VIRTUAL VISIT (OUTPATIENT)
Dept: BEHAVIORAL HEALTH | Facility: CLINIC | Age: 57
End: 2022-04-14
Attending: PSYCHIATRY & NEUROLOGY
Payer: COMMERCIAL

## 2022-04-14 DIAGNOSIS — F43.10 PTSD (POST-TRAUMATIC STRESS DISORDER): Primary | ICD-10-CM

## 2022-04-14 DIAGNOSIS — F33.1 MAJOR DEPRESSIVE DISORDER, RECURRENT EPISODE, MODERATE (H): ICD-10-CM

## 2022-04-14 DIAGNOSIS — F41.1 GAD (GENERALIZED ANXIETY DISORDER): ICD-10-CM

## 2022-04-14 PROCEDURE — 90834 PSYTX W PT 45 MINUTES: CPT | Mod: 95 | Performed by: COUNSELOR

## 2022-04-14 ASSESSMENT — PATIENT HEALTH QUESTIONNAIRE - PHQ9
SUM OF ALL RESPONSES TO PHQ QUESTIONS 1-9: 4
10. IF YOU CHECKED OFF ANY PROBLEMS, HOW DIFFICULT HAVE THESE PROBLEMS MADE IT FOR YOU TO DO YOUR WORK, TAKE CARE OF THINGS AT HOME, OR GET ALONG WITH OTHER PEOPLE: SOMEWHAT DIFFICULT
SUM OF ALL RESPONSES TO PHQ QUESTIONS 1-9: 4

## 2022-04-15 ASSESSMENT — PATIENT HEALTH QUESTIONNAIRE - PHQ9: SUM OF ALL RESPONSES TO PHQ QUESTIONS 1-9: 4

## 2022-04-15 NOTE — PROGRESS NOTES
[facility]                                     Progress Note    Patient Name: Alina Martell  Date: 4/14/22         Service Type: Individual      Session Start Time: 1207  Session End Time: 1257     Session Length: 50 minutes    Session #: 32    Attendees: Client    Service Modality:  Video Visit:      Provider verified identity through the following two step process.  Patient provided:  Patient is known previously to provider     Telemedicine Visit: The patient's condition can be safely assessed and treated via synchronous audio and visual telemedicine encounter.       Reason for Telemedicine Visit: Services only offered telehealth     Originating Site (Patient Location): Patient's home     Distant Site (Provider Location): [facility]     Consent:  The patient/guardian has verbally consented to: the potential risks and benefits of telemedicine (video visit) versus in person care; bill my insurance or make self-payment for services provided; and responsibility for payment of non-covered services.      Patient would like the video invitation sent by:  Text to cell phone:       Mode of Communication:  Video Conference via Amwell     As the provider I attest to compliance with applicable laws and regulations related to telemedicine.       DATA  Interactive Complexity: No  Crisis: No        Progress Since Last Session (Related to Symptoms / Goals / Homework):   Symptoms: No change managing symptoms well    Homework: Partially completed      Episode of Care Goals: Satisfactory progress - ACTION (Actively working towards change); Intervened by reinforcing change plan / affirming steps taken     Current / Ongoing Stressors and Concerns:  Patient indicated feeling some anxiety and worry. Patient reported a lot of it is related to the people she is seeing. Patient indicated they both have a lot of time now which can be challenging. Patient  reported she is trying to identify what her needs are at this time. Patient indicated she is handling the different stress well. This therapist challenged patient on waht she wants from a relationship.      Treatment Objective(s) Addressed in This Session:   use distraction each time intrusive worry surfaces  Increase interest, engagement, and pleasure in doing things  Decrease frequency and intensity of feeling down, depressed, hopeless  Feel less tired and more energy during the day   Identify negative self-talk and behaviors: challenge core beliefs, myths, and actions     Intervention:   Motivational Interviewing    MI Intervention: Expressed Empathy/Understanding     Change Talk Expressed by the Patient: Committment to change Activation    Provider Response to Change Talk: R - Reflected patient's change talk and S - Summarized patient's change talk statements      Assessments completed prior to visit:  The following assessments were completed by patient for this visit:  PHQ9:   PHQ-9 SCORE 4/28/2021 10/5/2021 12/13/2021 2/3/2022 2/14/2022 3/8/2022 4/14/2022   PHQ-9 Total Score MyChart - 2 (Minimal depression) - 6 (Mild depression) 7 (Mild depression) 6 (Mild depression) 4 (Minimal depression)   PHQ-9 Total Score 4 2 6 6 7 6 4     GAD7:   ADA-7 SCORE 3/7/2021 3/31/2021 4/7/2021 4/28/2021 10/5/2021 11/15/2021 12/13/2021   Total Score 4 (minimal anxiety) - - - 3 (minimal anxiety) 6 (mild anxiety) -   Total Score 4 6 5 4 3 6 4     PROMIS 10-Global Health (all questions and answers displayed):   PROMIS 10 12/15/2021 3/26/2022 4/14/2022   In general, would you say your health is: Good Good Good   In general, would you say your quality of life is: Good Good Good   In general, how would you rate your physical health? Good Fair Fair   In general, how would you rate your mental health, including your mood and your ability to think? Fair Fair Good   In general, how would you rate your satisfaction with your social  activities and relationships? Good Fair Good   In general, please rate how well you carry out your usual social activities and roles Good Good Fair   To what extent are you able to carry out your everyday physical activities such as walking, climbing stairs, carrying groceries, or moving a chair? Mostly Mostly Mostly   How often have you been bothered by emotional problems such as feeling anxious, depressed or irritable? Often Sometimes Often   How would you rate your fatigue on average? Mild Mild Mild   How would you rate your pain on average?   0 = No Pain  to  10 = Worst Imaginable Pain 3 3 3   In general, would you say your health is: 3 3 3   In general, would you say your quality of life is: 3 3 3   In general, how would you rate your physical health? 3 2 2   In general, how would you rate your mental health, including your mood and your ability to think? 2 2 3   In general, how would you rate your satisfaction with your social activities and relationships? 3 2 3   In general, please rate how well you carry out your usual social activities and roles. (This includes activities at home, at work and in your community, and responsibilities as a parent, child, spouse, employee, friend, etc.) 3 3 2   To what extent are you able to carry out your everyday physical activities such as walking, climbing stairs, carrying groceries, or moving a chair? 4 4 4   In the past 7 days, how often have you been bothered by emotional problems such as feeling anxious, depressed, or irritable? 4 3 4   In the past 7 days, how would you rate your fatigue on average? 2 2 2   In the past 7 days, how would you rate your pain on average, where 0 means no pain, and 10 means worst imaginable pain? 3 3 3   Global Mental Health Score 10 10 11   Global Physical Health Score 15 14 14   PROMIS TOTAL - SUBSCORES 25 24 25   Some recent data might be hidden         ASSESSMENT: Current Emotional / Mental Status (status of significant symptoms):   Risk  status (Self / Other harm or suicidal ideation)   Patient denies current fears or concerns for personal safety.   Patient denies current or recent suicidal ideation or behaviors.   Patient denies current or recent homicidal ideation or behaviors.   Patient denies current or recent self injurious behavior or ideation.   Patient denies other safety concerns.   Patient reports there has been no change in risk factors since their last session.     Patient reports there has been no change in protective factors since their last session.     Recommended that patient call 911 or go to the local ED should there be a change in any of these risk factors.     Appearance:   Appropriate    Eye Contact:   Good    Psychomotor Behavior: Normal    Attitude:   Cooperative    Orientation:   All   Speech    Rate / Production: Normal/ Responsive    Volume:  Normal    Mood:    Anxious  Depressed    Affect:    Appropriate    Thought Content:  Clear    Thought Form:  Coherent  Goal Directed    Insight:    Good      Medication Review:   No changes to current psychiatric medication(s)     Medication Compliance:   Yes     Changes in Health Issues:   None reported     Chemical Use Review:   Substance Use: Chemical use reviewed, no active concerns identified      Tobacco Use: No current tobacco use.      Diagnosis:  1. PTSD (post-traumatic stress disorder)    2. ADA (generalized anxiety disorder)    3. Major depressive disorder, recurrent episode, moderate (H)        Collateral Reports Completed:   Not Applicable    PLAN: (Patient Tasks / Therapist Tasks / Other)  Patient will return in 2 weeks for scheduled session. Patient will identify what she wants in a relationship. Patient will continue to work on self-care.    There has been demonstrated improvement in functioning while patient has been engaged in psychotherapy/psychological service- if withdrawn the patient would deteriorate and/or relapse.     Mariana Love,  [facility],    ______________________________________________________________________    Individual Treatment Plan    Patient's Name: Alina Martell  YOB: 1965    Date of Creation:10/16/2020  Date Treatment Plan Last Reviewed/Revised: 02/3/2022    DSM5 Diagnoses: 296.32 (F33.1) Major Depressive Disorder, Recurrent Episode, Moderate _ or 300.02 (F41.1) Generalized Anxiety Disorder  Psychosocial / Contextual Factors: Health, family and financial   PROMIS (reviewed every 90 days): 25    Referral / Collaboration:  Was/were discussed and patient will pursue.    Anticipated number of session for this episode of care: 20 will reevaluate every 90 days  Anticipation frequency of session: Biweekly  Anticipated Duration of each session: 38-52 minutes  Treatment plan will be reviewed in 90 days or when goals have been changed.       MeasurableTreatment Goal(s) related to diagnosis / functional impairment(s)  Goal 1: Patient will work on reducing overall anxiety.     I will know I've met my goal when reporting minimal anxiety symptoms.       Objective #A (Patient Action)                          Patient will use distraction each time intrusive worry surfaces.  Status: Continued - Date(s): 02/03/2022     Intervention(s)  Therapist will teach emotional regulation skills. ..     Objective #B  Patient will Decrease frequency and intensity of feeling down, depressed, hopeless.  Status: Continued - Date(s):  02/03/2022     Intervention(s)  Therapist will teach emotional recognition/identification. ..     Objective #C  Patient will Identify negative self-talk and behaviors: challenge core beliefs, myths, and actions.  Status: Continued - Date(s):  02/03/2022     Intervention(s)  Therapist will teach the client how to perform a behavioral chain analysis. ..     Goal 2: Patient will continue to work on reducing depression symptoms    I will know I've met my goal then reporting minimal depression symptoms     Objective  #A (Patient Action)                          Patient will Increase interest, engagement, and pleasure in doing things.  Status: Continued - Date(s):  02/03/2022     Intervention(s)  Therapist will assign homework ..     Objective #B  Patient will Decrease frequency and intensity of feeling down, depressed, hopeless.  Status: Continued - Date(s):  02/03/2022     Intervention(s)  Therapist will assign homework at every session.         Patient has reviewed and agreed to the above plan.        Mariana oLve, [facility] February 3 , 2022

## 2022-04-21 ENCOUNTER — TELEPHONE (OUTPATIENT)
Dept: GASTROENTEROLOGY | Facility: CLINIC | Age: 57
End: 2022-04-21
Payer: COMMERCIAL

## 2022-04-21 NOTE — TELEPHONE ENCOUNTER
Attempted to contact patient regarding upcoming EGD procedure on 5.2.2022 for pre assessment questions. No answer.     Left message to return call to [phone] #2    Covid test scheduled: 4.28.2022    Arrival time: 0830    Facility location: [facility]    Sedation type: MAC    Indication for procedure: epigastric pain; possible dysphagia    Referring provider: Lulu Reyna PA-C Samantha Sewill, RN

## 2022-04-25 ENCOUNTER — ANCILLARY PROCEDURE (OUTPATIENT)
Dept: MAMMOGRAPHY | Facility: CLINIC | Age: 57
End: 2022-04-25
Attending: FAMILY MEDICINE
Payer: COMMERCIAL

## 2022-04-25 DIAGNOSIS — Z12.31 VISIT FOR SCREENING MAMMOGRAM: ICD-10-CM

## 2022-04-25 PROCEDURE — 77067 SCR MAMMO BI INCL CAD: CPT

## 2022-04-26 ENCOUNTER — DOCUMENTATION ONLY (OUTPATIENT)
Dept: CARDIOLOGY | Facility: CLINIC | Age: 57
End: 2022-04-26
Payer: COMMERCIAL

## 2022-04-26 ENCOUNTER — TELEPHONE (OUTPATIENT)
Dept: CARDIOLOGY | Facility: CLINIC | Age: 57
End: 2022-04-26
Payer: COMMERCIAL

## 2022-04-26 ENCOUNTER — OFFICE VISIT (OUTPATIENT)
Dept: BEHAVIORAL HEALTH | Facility: CLINIC | Age: 57
End: 2022-04-26
Payer: COMMERCIAL

## 2022-04-26 DIAGNOSIS — F41.1 GAD (GENERALIZED ANXIETY DISORDER): ICD-10-CM

## 2022-04-26 DIAGNOSIS — F43.10 PTSD (POST-TRAUMATIC STRESS DISORDER): Primary | ICD-10-CM

## 2022-04-26 DIAGNOSIS — I48.0 PAROXYSMAL ATRIAL FIBRILLATION (H): Primary | ICD-10-CM

## 2022-04-26 DIAGNOSIS — F33.1 MAJOR DEPRESSIVE DISORDER, RECURRENT EPISODE, MODERATE (H): ICD-10-CM

## 2022-04-26 PROCEDURE — 90834 PSYTX W PT 45 MINUTES: CPT | Performed by: COUNSELOR

## 2022-04-26 NOTE — PROGRESS NOTES
H&P []  Date: Teach []  Date: PVI  Clinic N [x]  Y [] VIVEK N [x] Y[]  Order [] CT  MRI N [x] Y[]  Order []   PVI  Order Case Req [x]  Order Set [] Lab/EKG   []  Orders Letter [] F/U RN [] Date:  Order NP follow-up [] Date:     COVID FV/EPIC []  External []  Date: AC Eliquis [x]   Xarelot []  Pradaxa []  Warfarin [] Start []  Cont [x]  Switch [] to:   INR Reminder EP [] & INR Msg to AC team []     AAD    Flecainide      Hold x3 days [x]  Continue [] PPI Protonix 40mg QD [x] 3 days, x 6wks  Pepcid 20mg BID []  Omeprazole 40mg QD []  Continue current PPI []  Increase [] :       1965  Home:[phone] (home) Cell:[phone] (mobile)  Emergency Contact: Sd Martell [phone]  PCP: Estelle Bansal, [phone]    Important patient information for CSC/Cath Lab staff : None    [facility] EP Cath Lab Procedure Order   Ablation Type:  PVI- Atrial Fibrillation  Diagnosis:  AF  Ordering Provider: Dr Joseph  Ordering Date: 4/26/2022    Scheduling Information:  Anticipated Case Duration:  Dr Joseph 2:1 Day   Scheduling Timeframe:  COVID Scheduling 30-90 days  Clinic Arrangements prior to PVI: Schedule pt directly for PVI procedure with designated MD listed below, pt to see EP NP only for H&P and EP RN for education prior  Scheduling Restrictions: None  Scheduling Contact: Please contact pt to schedule, if you are unable to schedule date within the next 24 hours please contact pt to update on scheduling process  EP RN Follow Up Apt: Schedule telephone visit for post op follow up 3-4 days after abaltion    MD Preference: Dr Joseph    Current Device: None None  Device Company/Device Rep Needed for Procedure: None    Pre-Procedural Testing needed: COVID 19 nasal/lab test, BMP, CBC with Plt, and Type and Screen AM of Case and Beta Hcg AM of case  Mapping System Required:  Carto  ICE Needed:  Yes  Special equipment/Staff needed for case: None  Anesthesia:  General-Whole Case    [facility] EP Cath Lab Prep   H&P:  Schedule apt with  EP NP to complete within 1 wk of scheduled PVI    Pre-op Labs: Done within 7 days    Medical Records Pertinent for Procedure:  Cardioversion 1-1-22 and 8-12-20 both successful SR from Afib, CT coronary artery angio 2-22-22, Echo 10-7-20 EF 67% and EKG 3-8-22 SR @ 73  Important Past Medical Hx/Diagnosis: CHADS VASC 2 and HTN   Most Recent Lab Results: BMP- Within Normal Limits Heme- Within Normal Limits    Patient Education:  Teach with Patient: Teach with pt will be completed same day as pre-op H&P-see additional clinic note    Risks Reviewed:   Pulmonary Vein/AF/Radiofrequency Ablation  In addition to standard risks for Radiofrequency Ablation, there is:    <2% for significant pulmonary vein stenosis    <2% risk for embolic events    <1% risk for esophageal fistula    <1% risk death      Pulmonary Vein Isolation / Cryoablation Risks:    1-2% risk for phrenic nerve paralysis    <1% risk for pulmonary vein stenosis    Risk of esophageal irritation with no incidence of atrial-esophageal fistula    Rare cryoballoon rupture    <1% risk death       Cardiac Catheter Ablation    <1% risk for the following: hypotension, hemorrhage, vascular injury including perforation of vein, artery or heart, thrombophlebitis, systemic or pulmonic emboli; cardiac perforation, (tamponade), infection, pneumothorax, arrhythmias, proarrhythmic effects of drugs, radiation exposure.    1-2% complete heart block (for AVNRT or septol accessory pathway).    <0.5% CVA or MI    <0.1% death    If external defibrillation is needed, 75% risk for superficial burn.    1-2% tamponade and aortic puncture with left sided transeptal approach for left side DANITA - increase risk of CVA to <2%.    Late arrhythmia recurrences depends upon the primary rhythm disturbance.      Pre-Procedure Instructions:  NPO after midnight, Remove all jewelry prior to coming in for procedure, Shower prior to arrival, Notified patient of time and date of procedure by CV ,  Transportation arrangements needed s/p procedure, Post-procedure follow up process, Sedation plan/orders and Pre-procedure letter was sent to pt    Pre-Procedure Medication Instructions:  Instructions given to pt regarding anticoagulants: Eliquis- instructed to continue anticoagulation uninterrupted through their procedure  Instructions given to pt regarding antiarrhythmic medication: Flecainide; Pt instructed to continue medication prior to procedure  Instructions given to pt regarding PPI medication:Start Protonix 40mg Daily 3 days prior, and will continue 6 wks s/p PVI  Instructions for medication, other than anticoagulants and antiarrhythmics listed above, given to pt: to hold all morning medications   Allergies: Reviewed allergies, see allergy protocol orders  Allergies   Allergen Reactions     Penicillins Shortness Of Breath, Palpitations, Dizziness, Anaphylaxis, Hives, Itching and Rash     Test in 2031, see allergy note on 7/29/21     Shellfish-Derived Products Anaphylaxis     Sulfa Drugs Hives and Anaphylaxis       Current Outpatient Medications:      acetaminophen (TYLENOL) 325 MG tablet, Take 3 tablets (975 mg) by mouth every 6 hours as needed for mild pain, Disp: 50 tablet, Rfl: 0     adalimumab (HUMIRA *CF* PEN) 40 MG/0.4ML pen kit, INJECT 0.4 MLS (40 MG) SUBCUTANEOUS EVERY 14 DAYS HOLD FOR SIGNS OF INFECTION, THEN SEEK MEDICAL ATTENTION., Disp: 2 each, Rfl: 4     apixaban ANTICOAGULANT (ELIQUIS ANTICOAGULANT) 5 MG tablet, Take 1 tablet (5 mg) by mouth 2 times daily, Disp: 180 tablet, Rfl: 1     diltiazem ER COATED BEADS (CARDIZEM CD/CARTIA XT) 180 MG 24 hr capsule, Take 2 capsules (360 mg) by mouth daily, Disp: 180 capsule, Rfl: 3     EPINEPHrine (ANY BX GENERIC EQUIV) 0.3 MG/0.3ML injection 2-pack, Inject 0.3 mg into the muscle as needed for anaphylaxis , Disp: , Rfl:      flecainide (TAMBOCOR) 50 MG tablet, Take 1 tablet (50 mg) by mouth 2 times daily as needed (at onset of a fib, may repeat once  after 4 hours), Disp: 60 tablet, Rfl: 1     hydrOXYzine (ATARAX) 25 MG tablet, Take 1 tablet (25 mg) by mouth 3 times daily as needed for anxiety, Disp: 270 tablet, Rfl: 3     Lidocaine (LIDOCARE) 4 % Patch, Place 1 patch onto the skin every 24 hours To prevent lidocaine toxicity, patient should be patch free for 12 hrs daily., Disp: 5 patch, Rfl: 0     loratadine (CLARITIN) 10 MG tablet, Take 10 mg by mouth daily , Disp: , Rfl:      Multiple Vitamins-Minerals (CENTROVITE) TABS, TAKE 1 TABLET BY MOUTH EVERY DAY FOR 30 DAYS, Disp: , Rfl:      omeprazole (PRILOSEC) 40 MG DR capsule, Take 1 capsule (40 mg) by mouth in the morning., Disp: 90 capsule, Rfl: 3     ondansetron (ZOFRAN-ODT) 4 MG ODT tab, Take 1-2 tablets (4-8 mg) by mouth every 8 hours as needed for nausea, Disp: 4 tablet, Rfl: 0     polyethylene glycol (MIRALAX) 17 GM/Dose powder, Take 17 g (1 capful) by mouth daily, Disp: 578 g, Rfl: 3     prazosin (MINIPRESS) 1 MG capsule, Take 1 capsule by mouth nightly as needed , Disp: , Rfl:      sertraline (ZOLOFT) 25 MG tablet, Take 1 tablet (25 mg) by mouth daily, Disp: 90 tablet, Rfl: 0     traZODone (DESYREL) 50 MG tablet, Take 0.5 tablets (25 mg) by mouth At Bedtime, Disp: 90 tablet, Rfl: 0     vitamin C (ASCORBIC ACID) 1000 MG TABS, Take 1,000 mg by mouth daily , Disp: , Rfl:      vitamin D3 (CHOLECALCIFEROL) 250 mcg (76314 units) capsule, Take 1 capsule by mouth once a week, Disp: , Rfl:     Documentation Date:4/26/2022 12:31 PM  Juan Jose Macdonald RN

## 2022-04-26 NOTE — TELEPHONE ENCOUNTER
pc to patient to ensure she doesn't have any questions or concerns.  Prep for PVI to scheduling.  Attempted to contact pt, voicemail message was left with contact information and instructing pt to call back.  4/26/2022 12:31 PM  Estelle Vieyra RN

## 2022-04-27 DIAGNOSIS — I48.0 PAROXYSMAL ATRIAL FIBRILLATION (H): Primary | ICD-10-CM

## 2022-04-27 NOTE — PROGRESS NOTES
[facility]                                     Progress Note    Patient Name: Alina Martell  Date: 4/26/22         Service Type: Individual      Session Start Time: 801  Session End Time: 852     Session Length: 51 minutes    Session #: 33    Attendees: Client    Service Modality:  In Person       DATA  Interactive Complexity: No  Crisis: No        Progress Since Last Session (Related to Symptoms / Goals / Homework):   Symptoms: No change stress and anxiety    Homework: Partially completed      Episode of Care Goals: Satisfactory progress - ACTION (Actively working towards change); Intervened by reinforcing change plan / affirming steps taken     Current / Ongoing Stressors and Concerns:  Patient reported feeling a lot of emotions at this time. Patient indicated a trauma happened with her family. Patient reported she is experiencing a lot of anger. Patient indicated she has been isolating due to the anger. Patient reported she is trying to keep herself busy and make herself be with her support network. Patient indicated feeling that other areas of her life are going well. This therapist processed with patient ways to work on handling the anger. This therapist processed with patient the importance of keeping herself busy.      Treatment Objective(s) Addressed in This Session:   use distraction each time intrusive worry surfaces  Increase interest, engagement, and pleasure in doing things  Decrease frequency and intensity of feeling down, depressed, hopeless  Feel less tired and more energy during the day   Identify negative self-talk and behaviors: challenge core beliefs, myths, and actions     Intervention:   Motivational Interviewing    MI Intervention: Reflections: simple and complex     Change Talk Expressed by the Patient: Activation    Provider Response to Change Talk: R - Reflected patient's change talk and S - Summarized patient's change talk statements      Assessments completed  prior to visit:  The following assessments were completed by patient for this visit:  PHQ9:   PHQ-9 SCORE 4/28/2021 10/5/2021 12/13/2021 2/3/2022 2/14/2022 3/8/2022 4/14/2022   PHQ-9 Total Score MyChart - 2 (Minimal depression) - 6 (Mild depression) 7 (Mild depression) 6 (Mild depression) 4 (Minimal depression)   PHQ-9 Total Score 4 2 6 6 7 6 4     GAD7:   ADA-7 SCORE 3/7/2021 3/31/2021 4/7/2021 4/28/2021 10/5/2021 11/15/2021 12/13/2021   Total Score 4 (minimal anxiety) - - - 3 (minimal anxiety) 6 (mild anxiety) -   Total Score 4 6 5 4 3 6 4     PROMIS 10-Global Health (all questions and answers displayed):   PROMIS 10 12/15/2021 3/26/2022 4/14/2022   In general, would you say your health is: Good Good Good   In general, would you say your quality of life is: Good Good Good   In general, how would you rate your physical health? Good Fair Fair   In general, how would you rate your mental health, including your mood and your ability to think? Fair Fair Good   In general, how would you rate your satisfaction with your social activities and relationships? Good Fair Good   In general, please rate how well you carry out your usual social activities and roles Good Good Fair   To what extent are you able to carry out your everyday physical activities such as walking, climbing stairs, carrying groceries, or moving a chair? Mostly Mostly Mostly   How often have you been bothered by emotional problems such as feeling anxious, depressed or irritable? Often Sometimes Often   How would you rate your fatigue on average? Mild Mild Mild   How would you rate your pain on average?   0 = No Pain  to  10 = Worst Imaginable Pain 3 3 3   In general, would you say your health is: 3 3 3   In general, would you say your quality of life is: 3 3 3   In general, how would you rate your physical health? 3 2 2   In general, how would you rate your mental health, including your mood and your ability to think? 2 2 3   In general, how would you rate  your satisfaction with your social activities and relationships? 3 2 3   In general, please rate how well you carry out your usual social activities and roles. (This includes activities at home, at work and in your community, and responsibilities as a parent, child, spouse, employee, friend, etc.) 3 3 2   To what extent are you able to carry out your everyday physical activities such as walking, climbing stairs, carrying groceries, or moving a chair? 4 4 4   In the past 7 days, how often have you been bothered by emotional problems such as feeling anxious, depressed, or irritable? 4 3 4   In the past 7 days, how would you rate your fatigue on average? 2 2 2   In the past 7 days, how would you rate your pain on average, where 0 means no pain, and 10 means worst imaginable pain? 3 3 3   Global Mental Health Score 10 10 11   Global Physical Health Score 15 14 14   PROMIS TOTAL - SUBSCORES 25 24 25   Some recent data might be hidden         ASSESSMENT: Current Emotional / Mental Status (status of significant symptoms):   Risk status (Self / Other harm or suicidal ideation)   Patient denies current fears or concerns for personal safety.   Patient denies current or recent suicidal ideation or behaviors.   Patient denies current or recent homicidal ideation or behaviors.   Patient denies current or recent self injurious behavior or ideation.   Patient denies other safety concerns.   Patient reports there has been no change in risk factors since their last session.     Patient reports there has been no change in protective factors since their last session.     Recommended that patient call 911 or go to the local ED should there be a change in any of these risk factors.     Appearance:   Appropriate    Eye Contact:   Good    Psychomotor Behavior: Normal    Attitude:   Cooperative    Orientation:   All   Speech    Rate / Production: Normal/ Responsive    Volume:  Normal    Mood:    Anxious  Depressed    Affect:    Appropriate     Thought Content:  Clear    Thought Form:  Coherent  Logical    Insight:    Good      Medication Review:   No changes to current psychiatric medication(s)     Medication Compliance:   Yes     Changes in Health Issues:   None reported     Chemical Use Review:   Substance Use: Chemical use reviewed, no active concerns identified      Tobacco Use: No current tobacco use.      Diagnosis:  1. PTSD (post-traumatic stress disorder)    2. ADA (generalized anxiety disorder)    3. Major depressive disorder, recurrent episode, moderate (H)        Collateral Reports Completed:   Not Applicable    PLAN: (Patient Tasks / Therapist Tasks / Other)  Patient will return in 2 weeks for scheduled session.Patient will use skills to work on handling anger that occurs. Patient will continue to scheduled activities at least 5 times a week for the next 2 weeks. Patient will continue to work on self-care.     There has been demonstrated improvement in functioning while patient has been engaged in psychotherapy/psychological service- if withdrawn the patient would deteriorate and/or relapse.     Mariana Love, [facility],    ______________________________________________________________________    Individual Treatment Plan    Patient's Name: Alina Martell  YOB: 1965    Date of Creation:10/16/2020  Date Treatment Plan Last Reviewed/Revised: 02/3/2022    DSM5 Diagnoses: 296.32 (F33.1) Major Depressive Disorder, Recurrent Episode, Moderate _ or 300.02 (F41.1) Generalized Anxiety Disorder  Psychosocial / Contextual Factors: Health, family and financial   PROMIS (reviewed every 90 days): 25    Referral / Collaboration:  Was/were discussed and patient will pursue.    Anticipated number of session for this episode of care: 20 will reevaluate every 90 days  Anticipation frequency of session: Biweekly  Anticipated Duration of each session: 38-52 minutes  Treatment plan will be reviewed in 90 days or when goals have been changed.        MeasurableTreatment Goal(s) related to diagnosis / functional impairment(s)  Goal 1: Patient will work on reducing overall anxiety.     I will know I've met my goal when reporting minimal anxiety symptoms.       Objective #A (Patient Action)                          Patient will use distraction each time intrusive worry surfaces.  Status: Continued - Date(s): 02/03/2022     Intervention(s)  Therapist will teach emotional regulation skills. ..     Objective #B  Patient will Decrease frequency and intensity of feeling down, depressed, hopeless.  Status: Continued - Date(s):  02/03/2022     Intervention(s)  Therapist will teach emotional recognition/identification. ..     Objective #C  Patient will Identify negative self-talk and behaviors: challenge core beliefs, myths, and actions.  Status: Continued - Date(s):  02/03/2022     Intervention(s)  Therapist will teach the client how to perform a behavioral chain analysis. ..     Goal 2: Patient will continue to work on reducing depression symptoms    I will know I've met my goal then reporting minimal depression symptoms     Objective #A (Patient Action)                          Patient will Increase interest, engagement, and pleasure in doing things.  Status: Continued - Date(s):  02/03/2022     Intervention(s)  Therapist will assign homework ..     Objective #B  Patient will Decrease frequency and intensity of feeling down, depressed, hopeless.  Status: Continued - Date(s):  02/03/2022     Intervention(s)  Therapist will assign homework at every session.         Patient has reviewed and agreed to the above plan.        Mariana Love, [facility] February 3 , 2022

## 2022-04-27 NOTE — TELEPHONE ENCOUNTER
Pre assessment questions completed for upcoming EGD procedure scheduled on 5.2.2022    COVID test scheduled 4.28.2022    Reviewed procedural arrival time 0830 and facility location [facility].    Designated  policy reviewed. Instructed to have someone stay 24 hours post procedure.     Anticoagulation/blood thinners? Eliquis; Pt needs to f/u with PCP re: Eliquis dosing prior to procedure.    Electronic implanted devices? no    Reviewed EGD prep instructions with patient.     Patient verbalized understanding and had no questions or concerns at this time.    Judy Melendez RN

## 2022-04-28 ENCOUNTER — LAB (OUTPATIENT)
Dept: FAMILY MEDICINE | Facility: CLINIC | Age: 57
End: 2022-04-28
Payer: COMMERCIAL

## 2022-04-28 DIAGNOSIS — Z11.59 ENCOUNTER FOR SCREENING FOR OTHER VIRAL DISEASES: ICD-10-CM

## 2022-04-28 PROCEDURE — U0003 INFECTIOUS AGENT DETECTION BY NUCLEIC ACID (DNA OR RNA); SEVERE ACUTE RESPIRATORY SYNDROME CORONAVIRUS 2 (SARS-COV-2) (CORONAVIRUS DISEASE [COVID-19]), AMPLIFIED PROBE TECHNIQUE, MAKING USE OF HIGH THROUGHPUT TECHNOLOGIES AS DESCRIBED BY CMS-2020-01-R: HCPCS

## 2022-04-28 PROCEDURE — U0005 INFEC AGEN DETEC AMPLI PROBE: HCPCS

## 2022-04-29 LAB — SARS-COV-2 RNA RESP QL NAA+PROBE: NEGATIVE

## 2022-05-02 ENCOUNTER — DOCUMENTATION ONLY (OUTPATIENT)
Dept: GASTROENTEROLOGY | Facility: OUTPATIENT CENTER | Age: 57
End: 2022-05-02
Payer: COMMERCIAL

## 2022-05-02 ENCOUNTER — TRANSFERRED RECORDS (OUTPATIENT)
Dept: HEALTH INFORMATION MANAGEMENT | Facility: CLINIC | Age: 57
End: 2022-05-02
Payer: COMMERCIAL

## 2022-05-02 DIAGNOSIS — R10.13 EPIGASTRIC PAIN: ICD-10-CM

## 2022-05-02 PROCEDURE — 88305 TISSUE EXAM BY PATHOLOGIST: CPT | Mod: TC,ORL | Performed by: INTERNAL MEDICINE

## 2022-05-03 ENCOUNTER — LAB REQUISITION (OUTPATIENT)
Dept: LAB | Facility: CLINIC | Age: 57
End: 2022-05-03
Payer: COMMERCIAL

## 2022-05-04 LAB
PATH REPORT.COMMENTS IMP SPEC: NORMAL
PATH REPORT.COMMENTS IMP SPEC: NORMAL
PATH REPORT.FINAL DX SPEC: NORMAL
PATH REPORT.GROSS SPEC: NORMAL
PATH REPORT.MICROSCOPIC SPEC OTHER STN: NORMAL
PATH REPORT.RELEVANT HX SPEC: NORMAL
PHOTO IMAGE: NORMAL

## 2022-05-04 PROCEDURE — 88305 TISSUE EXAM BY PATHOLOGIST: CPT | Mod: 26 | Performed by: PATHOLOGY

## 2022-05-12 ENCOUNTER — VIRTUAL VISIT (OUTPATIENT)
Dept: BEHAVIORAL HEALTH | Facility: CLINIC | Age: 57
End: 2022-05-12
Payer: COMMERCIAL

## 2022-05-12 DIAGNOSIS — F43.10 PTSD (POST-TRAUMATIC STRESS DISORDER): Primary | ICD-10-CM

## 2022-05-12 DIAGNOSIS — F33.1 MAJOR DEPRESSIVE DISORDER, RECURRENT EPISODE, MODERATE (H): ICD-10-CM

## 2022-05-12 DIAGNOSIS — F41.1 GAD (GENERALIZED ANXIETY DISORDER): ICD-10-CM

## 2022-05-12 PROCEDURE — 90834 PSYTX W PT 45 MINUTES: CPT | Mod: 95 | Performed by: COUNSELOR

## 2022-05-12 ASSESSMENT — PATIENT HEALTH QUESTIONNAIRE - PHQ9
10. IF YOU CHECKED OFF ANY PROBLEMS, HOW DIFFICULT HAVE THESE PROBLEMS MADE IT FOR YOU TO DO YOUR WORK, TAKE CARE OF THINGS AT HOME, OR GET ALONG WITH OTHER PEOPLE: SOMEWHAT DIFFICULT
SUM OF ALL RESPONSES TO PHQ QUESTIONS 1-9: 7
SUM OF ALL RESPONSES TO PHQ QUESTIONS 1-9: 7

## 2022-05-12 NOTE — PROGRESS NOTES
[facility]                                     Progress Note    Patient Name: Alina Martell  Date: 5/12/22         Service Type: Individual      Session Start Time: 1210  Session End Time: 1250     Session Length: 40 minutes    Session #: 34    Attendees: Client    Service Modality:  Video Visit:      Provider verified identity through the following two step process.  Patient provided:  Patient is known previously to provider     Telemedicine Visit: The patient's condition can be safely assessed and treated via synchronous audio and visual telemedicine encounter.       Reason for Telemedicine Visit: Services only offered telehealth     Originating Site (Patient Location): Patient's home     Distant Site (Provider Location): [facility]     Consent:  The patient/guardian has verbally consented to: the potential risks and benefits of telemedicine (video visit) versus in person care; bill my insurance or make self-payment for services provided; and responsibility for payment of non-covered services.      Patient would like the video invitation sent by:  Text to cell phone:       Mode of Communication:  Video Conference via [employer]     As the provider I attest to compliance with applicable laws and regulations related to telemedicine.       DATA  Interactive Complexity: No  Crisis: No        Progress Since Last Session (Related to Symptoms / Goals / Homework):   Symptoms: No change stress occuring but handling it well    Homework: Achieved / completed to satisfaction      Episode of Care Goals: Satisfactory progress - ACTION (Actively working towards change); Intervened by reinforcing change plan / affirming steps taken     Current / Ongoing Stressors and Concerns:  Patient indicated continuing to feel a lot of emotions. Patient reported feeling that overall she has handled her emotions well. Patient indicated she has been extremly busy  spending time with the family. Patient reported knowing she needs to get some time to herself. Patient indicated her anxiety has decreased significantly about her physical health. This therapist processed with patient what is in her control and what is out of her control. This therapist processed with patient the importance of self-care.      Treatment Objective(s) Addressed in This Session:   use distraction each time intrusive worry surfaces  Increase interest, engagement, and pleasure in doing things  Decrease frequency and intensity of feeling down, depressed, hopeless  Feel less tired and more energy during the day   Identify negative self-talk and behaviors: challenge core beliefs, myths, and actions     Intervention:   Motivational Interviewing    MI Intervention: Reflections: simple and complex     Change Talk Expressed by the Patient: Activation    Provider Response to Change Talk: R - Reflected patient's change talk and S - Summarized patient's change talk statements      Assessments completed prior to visit:  The following assessments were completed by patient for this visit:  PHQ9:   PHQ-9 SCORE 10/5/2021 12/13/2021 2/3/2022 2/14/2022 3/8/2022 4/14/2022 5/12/2022   PHQ-9 Total Score MyChart 2 (Minimal depression) - 6 (Mild depression) 7 (Mild depression) 6 (Mild depression) 4 (Minimal depression) 7 (Mild depression)   PHQ-9 Total Score 2 6 6 7 6 4 7     GAD7:   ADA-7 SCORE 3/7/2021 3/31/2021 4/7/2021 4/28/2021 10/5/2021 11/15/2021 12/13/2021   Total Score 4 (minimal anxiety) - - - 3 (minimal anxiety) 6 (mild anxiety) -   Total Score 4 6 5 4 3 6 4     PROMIS 10-Global Health (all questions and answers displayed):   PROMIS 10 12/15/2021 3/26/2022 4/14/2022   In general, would you say your health is: Good Good Good   In general, would you say your quality of life is: Good Good Good   In general, how would you rate your physical health? Good Fair Fair   In general, how would you rate your mental health,  including your mood and your ability to think? Fair Fair Good   In general, how would you rate your satisfaction with your social activities and relationships? Good Fair Good   In general, please rate how well you carry out your usual social activities and roles Good Good Fair   To what extent are you able to carry out your everyday physical activities such as walking, climbing stairs, carrying groceries, or moving a chair? Mostly Mostly Mostly   How often have you been bothered by emotional problems such as feeling anxious, depressed or irritable? Often Sometimes Often   How would you rate your fatigue on average? Mild Mild Mild   How would you rate your pain on average?   0 = No Pain  to  10 = Worst Imaginable Pain 3 3 3   In general, would you say your health is: 3 3 3   In general, would you say your quality of life is: 3 3 3   In general, how would you rate your physical health? 3 2 2   In general, how would you rate your mental health, including your mood and your ability to think? 2 2 3   In general, how would you rate your satisfaction with your social activities and relationships? 3 2 3   In general, please rate how well you carry out your usual social activities and roles. (This includes activities at home, at work and in your community, and responsibilities as a parent, child, spouse, employee, friend, etc.) 3 3 2   To what extent are you able to carry out your everyday physical activities such as walking, climbing stairs, carrying groceries, or moving a chair? 4 4 4   In the past 7 days, how often have you been bothered by emotional problems such as feeling anxious, depressed, or irritable? 4 3 4   In the past 7 days, how would you rate your fatigue on average? 2 2 2   In the past 7 days, how would you rate your pain on average, where 0 means no pain, and 10 means worst imaginable pain? 3 3 3   Global Mental Health Score 10 10 11   Global Physical Health Score 15 14 14   PROMIS TOTAL - SUBSCORES 25 24 25    Some recent data might be hidden         ASSESSMENT: Current Emotional / Mental Status (status of significant symptoms):   Risk status (Self / Other harm or suicidal ideation)   Patient denies current fears or concerns for personal safety.   Patient denies current or recent suicidal ideation or behaviors.   Patient denies current or recent homicidal ideation or behaviors.   Patient denies current or recent self injurious behavior or ideation.   Patient denies other safety concerns.   Patient reports there has been no change in risk factors since their last session.     Patient reports there has been no change in protective factors since their last session.     Recommended that patient call 911 or go to the local ED should there be a change in any of these risk factors.     Appearance:   Appropriate    Eye Contact:   Good    Psychomotor Behavior: Normal    Attitude:   Cooperative    Orientation:   All   Speech    Rate / Production: Normal/ Responsive    Volume:  Normal    Mood:    Anxious  Depressed    Affect:    Appropriate    Thought Content:  Clear    Thought Form:  Coherent  Goal Directed    Insight:    Good      Medication Review:   No changes to current psychiatric medication(s)     Medication Compliance:   Yes     Changes in Health Issues:   None reported     Chemical Use Review:   Substance Use: Chemical use reviewed, no active concerns identified      Tobacco Use: No current tobacco use.      Diagnosis:  1. PTSD (post-traumatic stress disorder)    2. ADA (generalized anxiety disorder)    3. Major depressive disorder, recurrent episode, moderate (H)        Collateral Reports Completed:   Not Applicable    PLAN: (Patient Tasks / Therapist Tasks / Other)  Patient will return in 2 weeks for scheduled session. Patient will continue to work on identifying her emotions and using skills to to reduce symptoms. Patient should continue to work on self-care.     There has been demonstrated improvement in functioning  while patient has been engaged in psychotherapy/psychological service- if withdrawn the patient would deteriorate and/or relapse.     Mariana Love, [facility],    ______________________________________________________________________    Individual Treatment Plan    Patient's Name: Alina Martell  YOB: 1965    Date of Creation:10/16/2020  Date Treatment Plan Last Reviewed/Revised: 05/12/2022    DSM5 Diagnoses: 296.32 (F33.1) Major Depressive Disorder, Recurrent Episode, Moderate _ or 300.02 (F41.1) Generalized Anxiety Disorder  Psychosocial / Contextual Factors: Health, family and financial   PROMIS (reviewed every 90 days): 25    Referral / Collaboration:  Was/were discussed and patient will pursue.    Anticipated number of session for this episode of care: 20 will reevaluate every 90 days  Anticipation frequency of session: Biweekly  Anticipated Duration of each session: 38-52 minutes  Treatment plan will be reviewed in 90 days or when goals have been changed.       MeasurableTreatment Goal(s) related to diagnosis / functional impairment(s)  Goal 1: Patient will work on reducing overall anxiety.     I will know I've met my goal when reporting minimal anxiety symptoms.       Objective #A (Patient Action)                          Patient will use distraction each time intrusive worry surfaces.  Status: Continued - Date(s): 05/12/2022     Intervention(s)  Therapist will teach emotional regulation skills. ..     Objective #B  Patient will Decrease frequency and intensity of feeling down, depressed, hopeless.  Status: Continued - Date(s):  05/12/2022     Intervention(s)  Therapist will teach emotional recognition/identification. ..     Objective #C  Patient will Identify negative self-talk and behaviors: challenge core beliefs, myths, and actions.  Status: Continued - Date(s): 05/12/2022     Intervention(s)  Therapist will teach the client how to perform a behavioral chain analysis. ..     Goal 2:  Patient will continue to work on reducing depression symptoms    I will know I've met my goal then reporting minimal depression symptoms     Objective #A (Patient Action)                          Patient will Increase interest, engagement, and pleasure in doing things.  Status: Continued - Date(s):  05/12/2022     Intervention(s)  Therapist will assign homework ..     Objective #B  Patient will Decrease frequency and intensity of feeling down, depressed, hopeless.  Status: Continued - Date(s):  05/12/2022     Intervention(s)  Therapist will assign homework at every session.         Patient has reviewed and agreed to the above plan.        Mariana Love, [facility] May 12, 2022

## 2022-05-13 ASSESSMENT — PATIENT HEALTH QUESTIONNAIRE - PHQ9: SUM OF ALL RESPONSES TO PHQ QUESTIONS 1-9: 7

## 2022-05-17 ENCOUNTER — MYC MEDICAL ADVICE (OUTPATIENT)
Dept: BEHAVIORAL HEALTH | Facility: CLINIC | Age: 57
End: 2022-05-17
Payer: COMMERCIAL

## 2022-05-26 ENCOUNTER — VIRTUAL VISIT (OUTPATIENT)
Dept: BEHAVIORAL HEALTH | Facility: CLINIC | Age: 57
End: 2022-05-26
Attending: PSYCHIATRY & NEUROLOGY
Payer: COMMERCIAL

## 2022-05-26 DIAGNOSIS — F41.1 GAD (GENERALIZED ANXIETY DISORDER): ICD-10-CM

## 2022-05-26 DIAGNOSIS — F43.10 PTSD (POST-TRAUMATIC STRESS DISORDER): Primary | ICD-10-CM

## 2022-05-26 DIAGNOSIS — F33.1 MAJOR DEPRESSIVE DISORDER, RECURRENT EPISODE, MODERATE (H): ICD-10-CM

## 2022-05-26 PROCEDURE — 90834 PSYTX W PT 45 MINUTES: CPT | Mod: 95 | Performed by: COUNSELOR

## 2022-05-27 NOTE — PROGRESS NOTES
[facility]                                     Progress Note    Patient Name: Alina Martell  Date: 5/26/22         Service Type: Individual      Session Start Time: 1210  Session End Time: 100     Session Length: 50 minutes    Session #: 35    Attendees: Client    Service Modality:  Video Visit:      Provider verified identity through the following two step process.  Patient provided:  Patient is known previously to provider     Telemedicine Visit: The patient's condition can be safely assessed and treated via synchronous audio and visual telemedicine encounter.       Reason for Telemedicine Visit: Services only offered telehealth     Originating Site (Patient Location): Patient's home     Distant Site (Provider Location): [facility]     Consent:  The patient/guardian has verbally consented to: the potential risks and benefits of telemedicine (video visit) versus in person care; bill my insurance or make self-payment for services provided; and responsibility for payment of non-covered services.      Patient would like the video invitation sent by:  Text to cell phone:       Mode of Communication:  Video Conference via Amwell     As the provider I attest to compliance with applicable laws and regulations related to telemedicine.       DATA  Interactive Complexity: No  Crisis: No        Progress Since Last Session (Related to Symptoms / Goals / Homework):   Symptoms: No change handling anxiety and depression.     Homework: Achieved / completed to satisfaction      Episode of Care Goals: Satisfactory progress - ACTION (Actively working towards change); Intervened by reinforcing change plan / affirming steps taken     Current / Ongoing Stressors and Concerns:  Patient reported feeling some anxiety and depression. Patient indicated there continues to be a lot going on in her life. Patient reported she is starting to feel guilt over what  happened to her granddaughter. Patient indicated believing she should have seen warning signs. Patient reported she has been going through a lot of emotions. Patient indicated she is working on finding her balance. Patient reported she has been very active but then finds herself isolating. This therapist challenged patient on what is in her control and what is out of her control. This therapist processed with patient signs of isolation within herself.      Treatment Objective(s) Addressed in This Session:   use thought-stopping strategy daily to reduce intrusive thoughts  Increase interest, engagement, and pleasure in doing things  Decrease frequency and intensity of feeling down, depressed, hopeless  Feel less tired and more energy during the day   Identify negative self-talk and behaviors: challenge core beliefs, myths, and actions     Intervention:   Motivational Interviewing    MI Intervention: Permission to raise concern or advise     Change Talk Expressed by the Patient: Taking steps    Provider Response to Change Talk: R - Reflected patient's change talk and S - Summarized patient's change talk statements      Assessments completed prior to visit:  The following assessments were completed by patient for this visit:  PHQ9:   PHQ-9 SCORE 10/5/2021 12/13/2021 2/3/2022 2/14/2022 3/8/2022 4/14/2022 5/12/2022   PHQ-9 Total Score MyChart 2 (Minimal depression) - 6 (Mild depression) 7 (Mild depression) 6 (Mild depression) 4 (Minimal depression) 7 (Mild depression)   PHQ-9 Total Score 2 6 6 7 6 4 7     GAD7:   ADA-7 SCORE 3/7/2021 3/31/2021 4/7/2021 4/28/2021 10/5/2021 11/15/2021 12/13/2021   Total Score 4 (minimal anxiety) - - - 3 (minimal anxiety) 6 (mild anxiety) -   Total Score 4 6 5 4 3 6 4     PROMIS 10-Global Health (all questions and answers displayed):   PROMIS 10 12/15/2021 3/26/2022 4/14/2022   In general, would you say your health is: Good Good Good   In general, would you say your quality of life is: Good  Good Good   In general, how would you rate your physical health? Good Fair Fair   In general, how would you rate your mental health, including your mood and your ability to think? Fair Fair Good   In general, how would you rate your satisfaction with your social activities and relationships? Good Fair Good   In general, please rate how well you carry out your usual social activities and roles Good Good Fair   To what extent are you able to carry out your everyday physical activities such as walking, climbing stairs, carrying groceries, or moving a chair? Mostly Mostly Mostly   How often have you been bothered by emotional problems such as feeling anxious, depressed or irritable? Often Sometimes Often   How would you rate your fatigue on average? Mild Mild Mild   How would you rate your pain on average?   0 = No Pain  to  10 = Worst Imaginable Pain 3 3 3   In general, would you say your health is: 3 3 3   In general, would you say your quality of life is: 3 3 3   In general, how would you rate your physical health? 3 2 2   In general, how would you rate your mental health, including your mood and your ability to think? 2 2 3   In general, how would you rate your satisfaction with your social activities and relationships? 3 2 3   In general, please rate how well you carry out your usual social activities and roles. (This includes activities at home, at work and in your community, and responsibilities as a parent, child, spouse, employee, friend, etc.) 3 3 2   To what extent are you able to carry out your everyday physical activities such as walking, climbing stairs, carrying groceries, or moving a chair? 4 4 4   In the past 7 days, how often have you been bothered by emotional problems such as feeling anxious, depressed, or irritable? 4 3 4   In the past 7 days, how would you rate your fatigue on average? 2 2 2   In the past 7 days, how would you rate your pain on average, where 0 means no pain, and 10 means worst  imaginable pain? 3 3 3   Global Mental Health Score 10 10 11   Global Physical Health Score 15 14 14   PROMIS TOTAL - SUBSCORES 25 24 25   Some recent data might be hidden         ASSESSMENT: Current Emotional / Mental Status (status of significant symptoms):   Risk status (Self / Other harm or suicidal ideation)   Patient denies current fears or concerns for personal safety.   Patient denies current or recent suicidal ideation or behaviors.   Patient denies current or recent homicidal ideation or behaviors.   Patient denies current or recent self injurious behavior or ideation.   Patient denies other safety concerns.   Patient reports there has been no change in risk factors since their last session.     Patient reports there has been no change in protective factors since their last session.     Recommended that patient call 911 or go to the local ED should there be a change in any of these risk factors.     Appearance:   Appropriate    Eye Contact:   Good    Psychomotor Behavior: Normal    Attitude:   Cooperative    Orientation:   All   Speech    Rate / Production: Normal/ Responsive    Volume:  Normal    Mood:    Anxious  Depressed    Affect:    Appropriate    Thought Content:  Clear    Thought Form:  Coherent  Logical    Insight:    Good      Medication Review:   No changes to current psychiatric medication(s)     Medication Compliance:   Yes     Changes in Health Issues:   None reported     Chemical Use Review:   Substance Use: Chemical use reviewed, no active concerns identified      Tobacco Use: No current tobacco use.      Diagnosis:  1. PTSD (post-traumatic stress disorder)    2. ADA (generalized anxiety disorder)    3. Major depressive disorder, recurrent episode, moderate (H)        Collateral Reports Completed:   Not Applicable    PLAN: (Patient Tasks / Therapist Tasks / Other)  Patient will return in 2 weeks for scheduled session. Patient will continue to challenge her thoughts using CBT skills that  including recognizing all or none thinking and jumping to conclusions. Patient will work on identifying any warning signs of isolation and then reach out to friends.     There has been demonstrated improvement in functioning while patient has been engaged in psychotherapy/psychological service- if withdrawn the patient would deteriorate and/or relapse.     Mariana Love, [facility],    ______________________________________________________________________    Individual Treatment Plan    Patient's Name: Alina Martell  YOB: 1965    Date of Creation:10/16/2020  Date Treatment Plan Last Reviewed/Revised: 05/12/2022    DSM5 Diagnoses: 296.32 (F33.1) Major Depressive Disorder, Recurrent Episode, Moderate _ or 300.02 (F41.1) Generalized Anxiety Disorder  Psychosocial / Contextual Factors: Health, family and financial   PROMIS (reviewed every 90 days): 25    Referral / Collaboration:  Was/were discussed and patient will pursue.    Anticipated number of session for this episode of care: 20 will reevaluate every 90 days  Anticipation frequency of session: Biweekly  Anticipated Duration of each session: 38-52 minutes  Treatment plan will be reviewed in 90 days or when goals have been changed.       MeasurableTreatment Goal(s) related to diagnosis / functional impairment(s)  Goal 1: Patient will work on reducing overall anxiety.     I will know I've met my goal when reporting minimal anxiety symptoms.       Objective #A (Patient Action)                          Patient will use distraction each time intrusive worry surfaces.  Status: Continued - Date(s): 05/12/2022     Intervention(s)  Therapist will teach emotional regulation skills. ..     Objective #B  Patient will Decrease frequency and intensity of feeling down, depressed, hopeless.  Status: Continued - Date(s):  05/12/2022     Intervention(s)  Therapist will teach emotional recognition/identification. ..     Objective #C  Patient will Identify negative  self-talk and behaviors: challenge core beliefs, myths, and actions.  Status: Continued - Date(s): 05/12/2022     Intervention(s)  Therapist will teach the client how to perform a behavioral chain analysis. ..     Goal 2: Patient will continue to work on reducing depression symptoms    I will know I've met my goal then reporting minimal depression symptoms     Objective #A (Patient Action)                          Patient will Increase interest, engagement, and pleasure in doing things.  Status: Continued - Date(s):  05/12/2022     Intervention(s)  Therapist will assign homework ..     Objective #B  Patient will Decrease frequency and intensity of feeling down, depressed, hopeless.  Status: Continued - Date(s):  05/12/2022     Intervention(s)  Therapist will assign homework at every session.         Patient has reviewed and agreed to the above plan.        Mariana Love, [facility] May 12, 2022

## 2022-06-02 DIAGNOSIS — M05.79 SEROPOSITIVE RHEUMATOID ARTHRITIS OF MULTIPLE SITES (H): ICD-10-CM

## 2022-06-02 RX ORDER — ADALIMUMAB 40MG/0.4ML
KIT SUBCUTANEOUS
Qty: 2 EACH | Refills: 3 | Status: SHIPPED | OUTPATIENT
Start: 2022-06-02 | End: 2022-08-17

## 2022-06-07 ENCOUNTER — VIRTUAL VISIT (OUTPATIENT)
Dept: BEHAVIORAL HEALTH | Facility: CLINIC | Age: 57
End: 2022-06-07
Attending: PSYCHIATRY & NEUROLOGY
Payer: COMMERCIAL

## 2022-06-07 DIAGNOSIS — F41.1 GAD (GENERALIZED ANXIETY DISORDER): ICD-10-CM

## 2022-06-07 DIAGNOSIS — F43.10 PTSD (POST-TRAUMATIC STRESS DISORDER): Primary | ICD-10-CM

## 2022-06-07 DIAGNOSIS — F33.1 MAJOR DEPRESSIVE DISORDER, RECURRENT EPISODE, MODERATE (H): ICD-10-CM

## 2022-06-07 PROCEDURE — 90834 PSYTX W PT 45 MINUTES: CPT | Mod: 95 | Performed by: COUNSELOR

## 2022-06-09 NOTE — PROGRESS NOTES
[facility]                                     Progress Note    Patient Name: Alina Martell  Date: 6/07/22         Service Type: Individual      Session Start Time: 1205  Session End Time: 1255     Session Length: 50 minutes    Session #: 36    Attendees: Client    Service Modality:  Video Visit:      Provider verified identity through the following two step process.  Patient provided:  Patient is known previously to provider     Telemedicine Visit: The patient's condition can be safely assessed and treated via synchronous audio and visual telemedicine encounter.       Reason for Telemedicine Visit: Services only offered telehealth     Originating Site (Patient Location): Patient's home     Distant Site (Provider Location): [facility]     Consent:  The patient/guardian has verbally consented to: the potential risks and benefits of telemedicine (video visit) versus in person care; bill my insurance or make self-payment for services provided; and responsibility for payment of non-covered services.      Patient would like the video invitation sent by:  Text to cell phone:       Mode of Communication:  Video Conference via Amwell     As the provider I attest to compliance with applicable laws and regulations related to telemedicine.       DATA  Interactive Complexity: No  Crisis: No        Progress Since Last Session (Related to Symptoms / Goals / Homework):   Symptoms: Improving identifying and handling stress.    Homework: Partially completed      Episode of Care Goals: Satisfactory progress - ACTION (Actively working towards change); Intervened by reinforcing change plan / affirming steps taken     Current / Ongoing Stressors and Concerns:  Patient indicated continuing to have depression and anxiety. Patient indicated she is recognizing the symptoms as they occur. Patient reported one stressor in her life is her relationships.  Patient indicated trying to determine next steps and what her needs are at this time. Patient reported she is continuing to work on balance in her life. Patient indicated knowing isolation is not good but also doing too much is not good. This therapist challenged patient what she wants from a relationship. This therapist processed with patient being able to talk about her needs.      Treatment Objective(s) Addressed in This Session:   identify three distraction and diversion activities and use those activities to decrease level of anxiety    Increase interest, engagement, and pleasure in doing things  Decrease frequency and intensity of feeling down, depressed, hopeless  Improve quantity and quality of night time sleep / decrease daytime naps  Feel less tired and more energy during the day   Identify negative self-talk and behaviors: challenge core beliefs, myths, and actions     Intervention:   Motivational Interviewing    MI Intervention: Open-ended questions     Change Talk Expressed by the Patient: Taking steps    Provider Response to Change Talk: R - Reflected patient's change talk and S - Summarized patient's change talk statements      Assessments completed prior to visit:  The following assessments were completed by patient for this visit:  PHQ9:   PHQ-9 SCORE 12/13/2021 2/3/2022 2/14/2022 3/8/2022 4/14/2022 5/12/2022 6/7/2022   PHQ-9 Total Score MyChart - 6 (Mild depression) 7 (Mild depression) 6 (Mild depression) 4 (Minimal depression) 7 (Mild depression) 4 (Minimal depression)   PHQ-9 Total Score 6 6 7 6 4 7 4     GAD7:   DAA-7 SCORE 3/7/2021 3/31/2021 4/7/2021 4/28/2021 10/5/2021 11/15/2021 12/13/2021   Total Score 4 (minimal anxiety) - - - 3 (minimal anxiety) 6 (mild anxiety) -   Total Score 4 6 5 4 3 6 4     PROMIS 10-Global Health (all questions and answers displayed):   PROMIS 10 12/15/2021 3/26/2022 4/14/2022   In general, would you say your health is: Good Good Good   In general, would you say your  quality of life is: Good Good Good   In general, how would you rate your physical health? Good Fair Fair   In general, how would you rate your mental health, including your mood and your ability to think? Fair Fair Good   In general, how would you rate your satisfaction with your social activities and relationships? Good Fair Good   In general, please rate how well you carry out your usual social activities and roles Good Good Fair   To what extent are you able to carry out your everyday physical activities such as walking, climbing stairs, carrying groceries, or moving a chair? Mostly Mostly Mostly   How often have you been bothered by emotional problems such as feeling anxious, depressed or irritable? Often Sometimes Often   How would you rate your fatigue on average? Mild Mild Mild   How would you rate your pain on average?   0 = No Pain  to  10 = Worst Imaginable Pain 3 3 3   In general, would you say your health is: 3 3 3   In general, would you say your quality of life is: 3 3 3   In general, how would you rate your physical health? 3 2 2   In general, how would you rate your mental health, including your mood and your ability to think? 2 2 3   In general, how would you rate your satisfaction with your social activities and relationships? 3 2 3   In general, please rate how well you carry out your usual social activities and roles. (This includes activities at home, at work and in your community, and responsibilities as a parent, child, spouse, employee, friend, etc.) 3 3 2   To what extent are you able to carry out your everyday physical activities such as walking, climbing stairs, carrying groceries, or moving a chair? 4 4 4   In the past 7 days, how often have you been bothered by emotional problems such as feeling anxious, depressed, or irritable? 4 3 4   In the past 7 days, how would you rate your fatigue on average? 2 2 2   In the past 7 days, how would you rate your pain on average, where 0 means no  pain, and 10 means worst imaginable pain? 3 3 3   Global Mental Health Score 10 10 11   Global Physical Health Score 15 14 14   PROMIS TOTAL - SUBSCORES 25 24 25   Some recent data might be hidden         ASSESSMENT: Current Emotional / Mental Status (status of significant symptoms):   Risk status (Self / Other harm or suicidal ideation)   Patient denies current fears or concerns for personal safety.   Patient denies current or recent suicidal ideation or behaviors.   Patient denies current or recent homicidal ideation or behaviors.   Patient denies current or recent self injurious behavior or ideation.   Patient denies other safety concerns.   Patient reports there has been no change in risk factors since their last session.     Patient reports there has been no change in protective factors since their last session.     Recommended that patient call 911 or go to the local ED should there be a change in any of these risk factors.     Appearance:   Appropriate    Eye Contact:   Good    Psychomotor Behavior: Normal    Attitude:   Cooperative    Orientation:   All   Speech    Rate / Production: Normal/ Responsive    Volume:  Normal    Mood:    Anxious  Depressed    Affect:    Appropriate    Thought Content:  Clear    Thought Form:  Coherent  Logical    Insight:    Good      Medication Review:   No changes to current psychiatric medication(s)     Medication Compliance:   Yes     Changes in Health Issues:   None reported     Chemical Use Review:   Substance Use: Chemical use reviewed, no active concerns identified      Tobacco Use: No current tobacco use.      Diagnosis:  1. PTSD (post-traumatic stress disorder)    2. ADA (generalized anxiety disorder)    3. Major depressive disorder, recurrent episode, moderate (H)        Collateral Reports Completed:   Not Applicable    PLAN: (Patient Tasks / Therapist Tasks / Other)  Patient will return in 2 weeks for scheduled session. Patient will identify her needs specific to a  relationship at this time. Patient would benefit from reaching out to friends to make plans for the summer.     There has been demonstrated improvement in functioning while patient has been engaged in psychotherapy/psychological service- if withdrawn the patient would deteriorate and/or relapse.     Mariana Love, [facility],    ______________________________________________________________________    Individual Treatment Plan    Patient's Name: Alina Martell  YOB: 1965    Date of Creation:10/16/2020  Date Treatment Plan Last Reviewed/Revised: 05/12/2022    DSM5 Diagnoses: 296.32 (F33.1) Major Depressive Disorder, Recurrent Episode, Moderate _ or 300.02 (F41.1) Generalized Anxiety Disorder  Psychosocial / Contextual Factors: Health, family and financial   PROMIS (reviewed every 90 days): 25    Referral / Collaboration:  Was/were discussed and patient will pursue.    Anticipated number of session for this episode of care: 20 will reevaluate every 90 days  Anticipation frequency of session: Biweekly  Anticipated Duration of each session: 38-52 minutes  Treatment plan will be reviewed in 90 days or when goals have been changed.       MeasurableTreatment Goal(s) related to diagnosis / functional impairment(s)  Goal 1: Patient will work on reducing overall anxiety.     I will know I've met my goal when reporting minimal anxiety symptoms.       Objective #A (Patient Action)                          Patient will use distraction each time intrusive worry surfaces.  Status: Continued - Date(s): 05/12/2022     Intervention(s)  Therapist will teach emotional regulation skills. ..     Objective #B  Patient will Decrease frequency and intensity of feeling down, depressed, hopeless.  Status: Continued - Date(s):  05/12/2022     Intervention(s)  Therapist will teach emotional recognition/identification. ..     Objective #C  Patient will Identify negative self-talk and behaviors: challenge core beliefs, myths,  and actions.  Status: Continued - Date(s): 05/12/2022     Intervention(s)  Therapist will teach the client how to perform a behavioral chain analysis. ..     Goal 2: Patient will continue to work on reducing depression symptoms    I will know I've met my goal then reporting minimal depression symptoms     Objective #A (Patient Action)                          Patient will Increase interest, engagement, and pleasure in doing things.  Status: Continued - Date(s):  05/12/2022     Intervention(s)  Therapist will assign homework ..     Objective #B  Patient will Decrease frequency and intensity of feeling down, depressed, hopeless.  Status: Continued - Date(s):  05/12/2022     Intervention(s)  Therapist will assign homework at every session.         Patient has reviewed and agreed to the above plan.        Mariana Love, [facility] May 12, 2022

## 2022-06-21 ENCOUNTER — OFFICE VISIT (OUTPATIENT)
Dept: BEHAVIORAL HEALTH | Facility: CLINIC | Age: 57
End: 2022-06-21
Attending: PSYCHIATRY & NEUROLOGY
Payer: COMMERCIAL

## 2022-06-21 DIAGNOSIS — F43.10 PTSD (POST-TRAUMATIC STRESS DISORDER): Primary | ICD-10-CM

## 2022-06-21 DIAGNOSIS — F33.1 MAJOR DEPRESSIVE DISORDER, RECURRENT EPISODE, MODERATE (H): ICD-10-CM

## 2022-06-21 DIAGNOSIS — F41.1 GAD (GENERALIZED ANXIETY DISORDER): ICD-10-CM

## 2022-06-21 PROCEDURE — 90834 PSYTX W PT 45 MINUTES: CPT | Performed by: COUNSELOR

## 2022-06-21 ASSESSMENT — PATIENT HEALTH QUESTIONNAIRE - PHQ9
10. IF YOU CHECKED OFF ANY PROBLEMS, HOW DIFFICULT HAVE THESE PROBLEMS MADE IT FOR YOU TO DO YOUR WORK, TAKE CARE OF THINGS AT HOME, OR GET ALONG WITH OTHER PEOPLE: SOMEWHAT DIFFICULT
SUM OF ALL RESPONSES TO PHQ QUESTIONS 1-9: 6
SUM OF ALL RESPONSES TO PHQ QUESTIONS 1-9: 6

## 2022-06-23 ENCOUNTER — OFFICE VISIT (OUTPATIENT)
Dept: CARDIOLOGY | Facility: CLINIC | Age: 57
End: 2022-06-23
Payer: COMMERCIAL

## 2022-06-23 ENCOUNTER — ALLIED HEALTH/NURSE VISIT (OUTPATIENT)
Dept: CARDIOLOGY | Facility: CLINIC | Age: 57
End: 2022-06-23

## 2022-06-23 ENCOUNTER — LAB (OUTPATIENT)
Dept: CARDIOLOGY | Facility: CLINIC | Age: 57
End: 2022-06-23
Payer: COMMERCIAL

## 2022-06-23 ENCOUNTER — PREP FOR PROCEDURE (OUTPATIENT)
Dept: CARDIOLOGY | Facility: CLINIC | Age: 57
End: 2022-06-23

## 2022-06-23 VITALS
BODY MASS INDEX: 41.79 KG/M2 | HEART RATE: 79 BPM | DIASTOLIC BLOOD PRESSURE: 70 MMHG | SYSTOLIC BLOOD PRESSURE: 130 MMHG | RESPIRATION RATE: 16 BRPM | WEIGHT: 283 LBS

## 2022-06-23 DIAGNOSIS — I48.0 PAROXYSMAL ATRIAL FIBRILLATION (H): Primary | ICD-10-CM

## 2022-06-23 DIAGNOSIS — I10 ESSENTIAL HYPERTENSION: ICD-10-CM

## 2022-06-23 DIAGNOSIS — I48.0 PAROXYSMAL ATRIAL FIBRILLATION (H): ICD-10-CM

## 2022-06-23 DIAGNOSIS — G47.33 OBSTRUCTIVE SLEEP APNEA SYNDROME: ICD-10-CM

## 2022-06-23 LAB
ANION GAP SERPL CALCULATED.3IONS-SCNC: 9 MMOL/L (ref 5–18)
ATRIAL RATE - MUSE: 79 BPM
BUN SERPL-MCNC: 11 MG/DL (ref 8–22)
CALCIUM SERPL-MCNC: 9 MG/DL (ref 8.5–10.5)
CHLORIDE BLD-SCNC: 104 MMOL/L (ref 98–107)
CO2 SERPL-SCNC: 28 MMOL/L (ref 22–31)
CREAT SERPL-MCNC: 0.73 MG/DL (ref 0.6–1.1)
DIASTOLIC BLOOD PRESSURE - MUSE: NORMAL MMHG
ERYTHROCYTE [DISTWIDTH] IN BLOOD BY AUTOMATED COUNT: 14.3 % (ref 10–15)
GFR SERPL CREATININE-BSD FRML MDRD: >90 ML/MIN/1.73M2
GLUCOSE BLD-MCNC: 107 MG/DL (ref 70–125)
HCT VFR BLD AUTO: 38.2 % (ref 35–47)
HGB BLD-MCNC: 11.9 G/DL (ref 11.7–15.7)
INTERPRETATION ECG - MUSE: NORMAL
MCH RBC QN AUTO: 26.1 PG (ref 26.5–33)
MCHC RBC AUTO-ENTMCNC: 31.2 G/DL (ref 31.5–36.5)
MCV RBC AUTO: 84 FL (ref 78–100)
P AXIS - MUSE: 31 DEGREES
PLATELET # BLD AUTO: 341 10E3/UL (ref 150–450)
POTASSIUM BLD-SCNC: 3.9 MMOL/L (ref 3.5–5)
PR INTERVAL - MUSE: 160 MS
QRS DURATION - MUSE: 86 MS
QT - MUSE: 388 MS
QTC - MUSE: 444 MS
R AXIS - MUSE: 8 DEGREES
RBC # BLD AUTO: 4.56 10E6/UL (ref 3.8–5.2)
SODIUM SERPL-SCNC: 141 MMOL/L (ref 136–145)
SYSTOLIC BLOOD PRESSURE - MUSE: NORMAL MMHG
T AXIS - MUSE: 18 DEGREES
VENTRICULAR RATE- MUSE: 79 BPM
WBC # BLD AUTO: 8.8 10E3/UL (ref 4–11)

## 2022-06-23 PROCEDURE — 36415 COLL VENOUS BLD VENIPUNCTURE: CPT

## 2022-06-23 PROCEDURE — 99207 PR NO CHARGE NURSE ONLY: CPT

## 2022-06-23 PROCEDURE — 93000 ELECTROCARDIOGRAM COMPLETE: CPT | Performed by: INTERNAL MEDICINE

## 2022-06-23 PROCEDURE — 80048 BASIC METABOLIC PNL TOTAL CA: CPT

## 2022-06-23 PROCEDURE — 99215 OFFICE O/P EST HI 40 MIN: CPT | Performed by: NURSE PRACTITIONER

## 2022-06-23 PROCEDURE — 85027 COMPLETE CBC AUTOMATED: CPT

## 2022-06-23 RX ORDER — LIDOCAINE 40 MG/G
CREAM TOPICAL
Status: CANCELLED | OUTPATIENT
Start: 2022-06-23

## 2022-06-23 NOTE — LETTER
6/23/2022    Estelle Bansal MD  [address]   [facility] [address]    RE: Alina NAIK Jayshree       Dear Colleague,     I had the pleasure of seeing Alina Martell in the [facility].    Thank you, Dr. Bansal, for asking the [facility] team to see Ms. Alina Martell to evaluate PAF.    Assessment/Recommendations     Assessment/Plan:    Diagnoses and all orders for this visit:  Paroxysmal atrial fibrillation (H) and having occasional episodes of atrial fibs which are controlled with only 1 dose of flecainide 50 mg.  She has worked on being less anxious and relaxing when she goes into atrial fib and this is helped her convert sooner.  She has done a lot of Internet reading and wants to proceed with PVI.  I reviewed what to expect with ablation, postprocedure recovery and signs and symptoms to watch for and call us with post ablation.  See AVS for more details.  -     Follow-Up with Cardiology; Future  Essential hypertension and blood pressure at goal.  Obstructive sleep apnea syndrome and on CPAP and to bring CPAP machine with the day of procedure.  She is using it consistently.      [id number] score of 2 and on Eliquis.  I stressed the importance of not missing any doses of Eliquis from now on through especially the month post ablation  Follow up in clinic with me in 7 to 9 weeks which will be a post ablation follow-up.  I anticipate referring her back to Dr. Joseph to address anticoagulation 3 months post ablation.     History of Present Illness/Subjective     Alina Martell is a very pleasant 57 year old female who comes in today for history and physical prior to pulmonary vein isolation ablation with Dr. Joseph on June 30.  Alina Martell has a known history of paroxymal atrial fibrillation, HTN, Sjogren syndrome, rheumatoid arthritis, RANDALL on CPAP, anxiety, and morbid obesity.  Alina was first diagnosed with atrial fibrillation when she went  to the emergency room with palpitations in September 2020 and cardioversion was performed for atrial fib.  She has had recurrence of starting in January 2022 with again another cardioversion in the ER.  She has had further episodes of atrial fibrillation which have been successfully treated with as needed flecainide 50 mg.  She has seen Dr. Joseph in consult and wishes to proceed with PVI.  She is continuing to have occ episodes of afib.  Outside of afib, she denies any cardiac symptoms.  This visit will serve as history and physical for PVI.  See problem list for more details.  Medical, past medical, surgical and social history reviewed and updated.  Meds and allergies reviewed.   No personal or family history of adverse reactions to anesthesia or abnormal bleeding with surgery.        Cardiographics (reviewed):  February 2022 coronary CT shows:  Interpretation Summary    1.  Normal left main.  2.  Normal LAD and its branches.  3.  Minimal disease in proximal LCx.  4.  Normal RCA and its branches.  Right dominant system.  October 2020 echo shows:    Narrative & Impression    Normal left ventricular size with mild hypertrophy noted.    Left ventricle ejection fraction is normal. The calculated left ventricular ejection fraction is 67%.    Normal right ventricular size and systolic function.    No hemodynamically significant valvular heart abnormalities.    No previous study for comparison.  Cardiac testing personally reviewed:  Twelve-lead ECG today shows normal sinus rhythm of 79 and QTC of 444 ms.         Problem List:  Patient Active Problem List   Diagnosis     Depressed     Seropositive rheumatoid arthritis of multiple sites (H)     Recurrent major depressive disorder, in full remission (H)     Paroxysmal atrial fibrillation (H)     Morbid obesity (H)     Microcytic anemia     Anxiety     Chest pain     Chronic pain     Cyclic citrullinated peptide (CCP) antibody positive     Grief     Insomnia related to  another mental disorder     Migraine headache     Olecranon bursitis of right elbow     Panic attack     ADA (generalized anxiety disorder)     Reflux     Sicca syndrome (H)     Sleep disorder     Tendonitis of both shoulders     Obstructive sleep apnea syndrome     Essential hypertension     Coronary artery disease of native artery with stable angina pectoris (H)     Postmenopausal bleeding     History of colonic polyps     Revi  e  Physical Examination Review of Systems   christian lee  LMP 12/21/2021   There is no height or weight on file to calculate BMI.  Wt Readings from Last 3 Encounters:   03/17/22 129.3 kg (285 lb)   03/15/22 127.9 kg (281 lb 14.4 oz)   03/10/22 122.5 kg (270 lb 1 oz)     General Appearance:   Alert, well-appearing and in no acute distress.   HEENT: Atraumatic, normocephalic.  No scleral icterus, normal conjunctivae; mucous membranes pink and moist.     Chest: Chest symmetric, spine straight.   Lungs:   Respirations unlabored: Lungs are clear to auscultation.   Cardiovascular:   Normal first and second heart sounds with no murmurs, rubs, or gallops.  Regular, regular.   Normal JVD, no edema.       Extremities: No cyanosis or clubbing   Musculoskeletal: Moves all extremities   Skin: Warm, dry, intact.    Neurologic: Mood and affect are appropriate, alert and oriented to person, place, time, and situation     ROS: 10 point ROS neg other than the symptoms noted above in the HPI.     Medical History  Surgical History Family History Social History     Past Medical History:   Diagnosis Date     Anemia      Antiplatelet or antithrombotic long-term use      Arrhythmia      Cannabis use without complication 12/8/2014     Carpal tunnel syndrome      Coronary artery disease      Gastroesophageal reflux disease      History of angina      Hypertension      Irregular heart beat      Obesity      Paroxysmal atrial fibrillation (H)      PTSD (post-traumatic stress disorder)      RA (rheumatoid arthritis) (H)       Rheumatoid arthritis (H) 7/8/2016     Sicca syndrome (H)      Sleep apnea     Past Surgical History:   Procedure Laterality Date     CHOLECYSTECTOMY       DILATION AND CURETTAGE, OPERATIVE HYSTEROSCOPY, COMBINED N/A 3/3/2022    Procedure: HYSTEROSCOPY, WITH DILATION AND CURETTAGE;  Surgeon: Irma Cary MD;  Location: [facility]     HC REMOVAL GALLBLADDER      Description: Cholecystectomy;  Recorded: 10/15/2013;     LAPAROSCOPIC TUBAL LIGATION       RELEASE CARPAL TUNNEL BILATERAL       TUBAL LIGATION  1995    Family History   Problem Relation Age of Onset     Crohn's Disease Mother         Total colectomy with ileostomy.     Psoriasis Brother      Snoring Brother      Snoring Father      Diabetes Father      Prostate Cancer Father      Chronic Kidney Disease Father         Chose against dialysis.     Substance Abuse Brother      Depression Brother      Attention Deficit Disorder Brother      Alcoholism Brother      No Known Problems Daughter      No Known Problems Son      Alcoholism Brother      Substance Abuse Brother      Lupus Cousin      Alcoholism Maternal Grandfather      Breast Cancer No family hx of     History   Smoking Status     Never Smoker   Smokeless Tobacco     Never Used     Comment: smokes marijuana     Social History    Substance and Sexual Activity      Alcohol use: Not Currently        Comment: Alcoholic Drinks/day: Never an issue, she states.  7/8/16       Medications  Allergies     Current Outpatient Medications   Medication Sig Dispense Refill     acetaminophen (TYLENOL) 325 MG tablet Take 3 tablets (975 mg) by mouth every 6 hours as needed for mild pain 50 tablet 0     adalimumab (HUMIRA *CF* PEN) 40 MG/0.4ML pen kit INJECT 1 PEN UNDER THE SKIN EVERY 14 DAYS. HOLD FOR SIGNS OF INFECTION, THEN SEEK MEDICAL ATTENTION. 2 each 3     apixaban ANTICOAGULANT (ELIQUIS ANTICOAGULANT) 5 MG tablet Take 1 tablet (5 mg) by mouth 2 times daily 180 tablet 1     diltiazem ER COATED  BEADS (CARDIZEM CD/CARTIA XT) 180 MG 24 hr capsule Take 2 capsules (360 mg) by mouth daily 180 capsule 3     EPINEPHrine (ANY BX GENERIC EQUIV) 0.3 MG/0.3ML injection 2-pack Inject 0.3 mg into the muscle as needed for anaphylaxis        flecainide (TAMBOCOR) 50 MG tablet Take 1 tablet (50 mg) by mouth 2 times daily as needed (at onset of a fib, may repeat once after 4 hours) 60 tablet 1     hydrOXYzine (ATARAX) 25 MG tablet Take 1 tablet (25 mg) by mouth 3 times daily as needed for anxiety 270 tablet 3     Lidocaine (LIDOCARE) 4 % Patch Place 1 patch onto the skin every 24 hours To prevent lidocaine toxicity, patient should be patch free for 12 hrs daily. 5 patch 0     loratadine (CLARITIN) 10 MG tablet Take 10 mg by mouth daily        Multiple Vitamins-Minerals (CENTROVITE) TABS TAKE 1 TABLET BY MOUTH EVERY DAY FOR 30 DAYS       omeprazole (PRILOSEC) 40 MG DR capsule Take 1 capsule (40 mg) by mouth in the morning. 90 capsule 3     ondansetron (ZOFRAN-ODT) 4 MG ODT tab Take 1-2 tablets (4-8 mg) by mouth every 8 hours as needed for nausea 4 tablet 0     polyethylene glycol (MIRALAX) 17 GM/Dose powder Take 17 g (1 capful) by mouth daily 578 g 3     prazosin (MINIPRESS) 1 MG capsule Take 1 capsule by mouth nightly as needed        sertraline (ZOLOFT) 25 MG tablet Take 1 tablet (25 mg) by mouth daily 90 tablet 0     traZODone (DESYREL) 50 MG tablet Take 0.5 tablets (25 mg) by mouth At Bedtime 90 tablet 0     vitamin C (ASCORBIC ACID) 1000 MG TABS Take 1,000 mg by mouth daily        vitamin D3 (CHOLECALCIFEROL) 250 mcg (51412 units) capsule Take 1 capsule by mouth once a week        Allergies   Allergen Reactions     Penicillins Shortness Of Breath, Palpitations, Dizziness, Anaphylaxis, Hives, Itching and Rash     Test in 2031, see allergy note on 7/29/21     Shellfish-Derived Products Anaphylaxis     Sulfa Drugs Hives and Anaphylaxis      Medical, surgical, family, social history, and medications were all reviewed and  updated as necessary.   Lab Results    Chemistry/lipid CBC Cardiac Enzymes/BNP/TSH/INR   Recent Labs   Lab Test 01/21/21  0746   CHOL 169   HDL 46*      TRIG 79     Recent Labs   Lab Test 01/21/21  0746 09/16/19  0739 11/14/18  0810    115 78     Recent Labs   Lab Test 03/08/22 2025      POTASSIUM 3.9   CHLORIDE 101   CO2 28      BUN 12   CR 0.77   GFRESTIMATED >90   JOSSELIN 8.7     Recent Labs   Lab Test 03/08/22  2025 02/16/22  1108 01/28/22  1648   CR 0.77 0.74 0.73     No results for input(s): A1C in the last 01959 hours.       Recent Labs   Lab Test 03/16/22  1717   WBC 12.4*   HGB 11.5*   HCT 37.0   MCV 87        Recent Labs   Lab Test 03/16/22  1717 03/08/22 2025 03/08/22  1842   HGB 11.5* 12.4 12.2    Recent Labs   Lab Test 03/08/22 2025 01/17/22  1406 01/16/22  2301   TROPONINI <0.01 <0.01 <0.01     No results for input(s): BNP, NTBNPI, NTBNP in the last 28296 hours.  Recent Labs   Lab Test 02/16/22  1108   TSH 2.16     No results for input(s): INR in the last 50582 hours.       Total Time-45 minutes spent on date of encounter doing chart review, history and exam, documentation and further activities as noted above.  This note has been dictated using voice recognition software. Any grammatical, typographical, or context distortions are unintentional and inherent to the software.      Thank you for allowing me to participate in the care of your patient.      Sincerely,     Yasmin Tolentino, [facility]  cc:   No referring provider defined for this encounter.

## 2022-06-23 NOTE — PROGRESS NOTES
Thank you, Dr. Bansal, for asking the [facility] team to see Ms. Alina Martell to evaluate PAF.    Assessment/Recommendations     Assessment/Plan:    Diagnoses and all orders for this visit:  Paroxysmal atrial fibrillation (H) and having occasional episodes of atrial fibs which are controlled with only 1 dose of flecainide 50 mg.  She has worked on being less anxious and relaxing when she goes into atrial fib and this is helped her convert sooner.  She has done a lot of Internet reading and wants to proceed with PVI.  I reviewed what to expect with ablation, postprocedure recovery and signs and symptoms to watch for and call us with post ablation.  See AVS for more details.  -     Follow-Up with Cardiology; Future  Essential hypertension and blood pressure at goal.  Obstructive sleep apnea syndrome and on CPAP and to bring CPAP machine with the day of procedure.  She is using it consistently.      [id number] score of 2 and on Eliquis.  I stressed the importance of not missing any doses of Eliquis from now on through especially the month post ablation  Follow up in clinic with me in 7 to 9 weeks which will be a post ablation follow-up.  I anticipate referring her back to Dr. Joseph to address anticoagulation 3 months post ablation.     History of Present Illness/Subjective     Alina Martell is a very pleasant 57 year old female who comes in today for history and physical prior to pulmonary vein isolation ablation with Dr. Joseph on June 30.  Alina Martell has a known history of paroxymal atrial fibrillation, HTN, Sjogren syndrome, rheumatoid arthritis, RANDALL on CPAP, anxiety, and morbid obesity.  Alina was first diagnosed with atrial fibrillation when she went to the emergency room with palpitations in September 2020 and cardioversion was performed for atrial fib.  She has had recurrence of starting in January 2022 with again another cardioversion in the ER.  She has had  further episodes of atrial fibrillation which have been successfully treated with as needed flecainide 50 mg.  She has seen Dr. Joseph in consult and wishes to proceed with PVI.  She is continuing to have occ episodes of afib.  Outside of afib, she denies any cardiac symptoms.  This visit will serve as history and physical for PVI.  See problem list for more details.  Medical, past medical, surgical and social history reviewed and updated.  Meds and allergies reviewed.   No personal or family history of adverse reactions to anesthesia or abnormal bleeding with surgery.        Cardiographics (reviewed):  February 2022 coronary CT shows:  Interpretation Summary    1.  Normal left main.  2.  Normal LAD and its branches.  3.  Minimal disease in proximal LCx.  4.  Normal RCA and its branches.  Right dominant system.  October 2020 echo shows:    Narrative & Impression    Normal left ventricular size with mild hypertrophy noted.    Left ventricle ejection fraction is normal. The calculated left ventricular ejection fraction is 67%.    Normal right ventricular size and systolic function.    No hemodynamically significant valvular heart abnormalities.    No previous study for comparison.  Cardiac testing personally reviewed:  Twelve-lead ECG today shows normal sinus rhythm of 79 and QTC of 444 ms.         Problem List:  Patient Active Problem List   Diagnosis     Depressed     Seropositive rheumatoid arthritis of multiple sites (H)     Recurrent major depressive disorder, in full remission (H)     Paroxysmal atrial fibrillation (H)     Morbid obesity (H)     Microcytic anemia     Anxiety     Chest pain     Chronic pain     Cyclic citrullinated peptide (CCP) antibody positive     Grief     Insomnia related to another mental disorder     Migraine headache     Olecranon bursitis of right elbow     Panic attack     ADA (generalized anxiety disorder)     Reflux     Sicca syndrome (H)     Sleep disorder     Tendonitis of both  shoulders     Obstructive sleep apnea syndrome     Essential hypertension     Coronary artery disease of native artery with stable angina pectoris (H)     Postmenopausal bleeding     History of colonic polyps     Revi  e  Physical Examination Review of Systems   w rosa  LMP 12/21/2021   There is no height or weight on file to calculate BMI.  Wt Readings from Last 3 Encounters:   03/17/22 129.3 kg (285 lb)   03/15/22 127.9 kg (281 lb 14.4 oz)   03/10/22 122.5 kg (270 lb 1 oz)     General Appearance:   Alert, well-appearing and in no acute distress.   HEENT: Atraumatic, normocephalic.  No scleral icterus, normal conjunctivae; mucous membranes pink and moist.     Chest: Chest symmetric, spine straight.   Lungs:   Respirations unlabored: Lungs are clear to auscultation.   Cardiovascular:   Normal first and second heart sounds with no murmurs, rubs, or gallops.  Regular, regular.   Normal JVD, no edema.       Extremities: No cyanosis or clubbing   Musculoskeletal: Moves all extremities   Skin: Warm, dry, intact.    Neurologic: Mood and affect are appropriate, alert and oriented to person, place, time, and situation     ROS: 10 point ROS neg other than the symptoms noted above in the HPI.     Medical History  Surgical History Family History Social History     Past Medical History:   Diagnosis Date     Anemia      Antiplatelet or antithrombotic long-term use      Arrhythmia      Cannabis use without complication 12/8/2014     Carpal tunnel syndrome      Coronary artery disease      Gastroesophageal reflux disease      History of angina      Hypertension      Irregular heart beat      Obesity      Paroxysmal atrial fibrillation (H)      PTSD (post-traumatic stress disorder)      RA (rheumatoid arthritis) (H)      Rheumatoid arthritis (H) 7/8/2016     Sicca syndrome (H)      Sleep apnea     Past Surgical History:   Procedure Laterality Date     CHOLECYSTECTOMY       DILATION AND CURETTAGE, OPERATIVE HYSTEROSCOPY, COMBINED  N/A 3/3/2022    Procedure: HYSTEROSCOPY, WITH DILATION AND CURETTAGE;  Surgeon: Irma Cary MD;  Location: [facility]     HC REMOVAL GALLBLADDER      Description: Cholecystectomy;  Recorded: 10/15/2013;     LAPAROSCOPIC TUBAL LIGATION       RELEASE CARPAL TUNNEL BILATERAL       TUBAL LIGATION  1995    Family History   Problem Relation Age of Onset     Crohn's Disease Mother         Total colectomy with ileostomy.     Psoriasis Brother      Snoring Brother      Snoring Father      Diabetes Father      Prostate Cancer Father      Chronic Kidney Disease Father         Chose against dialysis.     Substance Abuse Brother      Depression Brother      Attention Deficit Disorder Brother      Alcoholism Brother      No Known Problems Daughter      No Known Problems Son      Alcoholism Brother      Substance Abuse Brother      Lupus Cousin      Alcoholism Maternal Grandfather      Breast Cancer No family hx of     History   Smoking Status     Never Smoker   Smokeless Tobacco     Never Used     Comment: smokes marijuana     Social History    Substance and Sexual Activity      Alcohol use: Not Currently        Comment: Alcoholic Drinks/day: Never an issue, she states.  7/8/16       Medications  Allergies     Current Outpatient Medications   Medication Sig Dispense Refill     acetaminophen (TYLENOL) 325 MG tablet Take 3 tablets (975 mg) by mouth every 6 hours as needed for mild pain 50 tablet 0     adalimumab (HUMIRA *CF* PEN) 40 MG/0.4ML pen kit INJECT 1 PEN UNDER THE SKIN EVERY 14 DAYS. HOLD FOR SIGNS OF INFECTION, THEN SEEK MEDICAL ATTENTION. 2 each 3     apixaban ANTICOAGULANT (ELIQUIS ANTICOAGULANT) 5 MG tablet Take 1 tablet (5 mg) by mouth 2 times daily 180 tablet 1     diltiazem ER COATED BEADS (CARDIZEM CD/CARTIA XT) 180 MG 24 hr capsule Take 2 capsules (360 mg) by mouth daily 180 capsule 3     EPINEPHrine (ANY BX GENERIC EQUIV) 0.3 MG/0.3ML injection 2-pack Inject 0.3 mg into the muscle as needed for  anaphylaxis        flecainide (TAMBOCOR) 50 MG tablet Take 1 tablet (50 mg) by mouth 2 times daily as needed (at onset of a fib, may repeat once after 4 hours) 60 tablet 1     hydrOXYzine (ATARAX) 25 MG tablet Take 1 tablet (25 mg) by mouth 3 times daily as needed for anxiety 270 tablet 3     Lidocaine (LIDOCARE) 4 % Patch Place 1 patch onto the skin every 24 hours To prevent lidocaine toxicity, patient should be patch free for 12 hrs daily. 5 patch 0     loratadine (CLARITIN) 10 MG tablet Take 10 mg by mouth daily        Multiple Vitamins-Minerals (CENTROVITE) TABS TAKE 1 TABLET BY MOUTH EVERY DAY FOR 30 DAYS       omeprazole (PRILOSEC) 40 MG DR capsule Take 1 capsule (40 mg) by mouth in the morning. 90 capsule 3     ondansetron (ZOFRAN-ODT) 4 MG ODT tab Take 1-2 tablets (4-8 mg) by mouth every 8 hours as needed for nausea 4 tablet 0     polyethylene glycol (MIRALAX) 17 GM/Dose powder Take 17 g (1 capful) by mouth daily 578 g 3     prazosin (MINIPRESS) 1 MG capsule Take 1 capsule by mouth nightly as needed        sertraline (ZOLOFT) 25 MG tablet Take 1 tablet (25 mg) by mouth daily 90 tablet 0     traZODone (DESYREL) 50 MG tablet Take 0.5 tablets (25 mg) by mouth At Bedtime 90 tablet 0     vitamin C (ASCORBIC ACID) 1000 MG TABS Take 1,000 mg by mouth daily        vitamin D3 (CHOLECALCIFEROL) 250 mcg (82210 units) capsule Take 1 capsule by mouth once a week        Allergies   Allergen Reactions     Penicillins Shortness Of Breath, Palpitations, Dizziness, Anaphylaxis, Hives, Itching and Rash     Test in 2031, see allergy note on 7/29/21     Shellfish-Derived Products Anaphylaxis     Sulfa Drugs Hives and Anaphylaxis      Medical, surgical, family, social history, and medications were all reviewed and updated as necessary.   Lab Results    Chemistry/lipid CBC Cardiac Enzymes/BNP/TSH/INR   Recent Labs   Lab Test 01/21/21  0746   CHOL 169   HDL 46*      TRIG 79     Recent Labs   Lab Test 01/21/21  0746  09/16/19  0739 11/14/18  0810    115 78     Recent Labs   Lab Test 03/08/22 2025      POTASSIUM 3.9   CHLORIDE 101   CO2 28      BUN 12   CR 0.77   GFRESTIMATED >90   JOSSELIN 8.7     Recent Labs   Lab Test 03/08/22 2025 02/16/22  1108 01/28/22  1648   CR 0.77 0.74 0.73     No results for input(s): A1C in the last 69718 hours.       Recent Labs   Lab Test 03/16/22  1717   WBC 12.4*   HGB 11.5*   HCT 37.0   MCV 87        Recent Labs   Lab Test 03/16/22 1717 03/08/22 2025 03/08/22  1842   HGB 11.5* 12.4 12.2    Recent Labs   Lab Test 03/08/22 2025 01/17/22  1406 01/16/22  2301   TROPONINI <0.01 <0.01 <0.01     No results for input(s): BNP, NTBNPI, NTBNP in the last 62224 hours.  Recent Labs   Lab Test 02/16/22  1108   TSH 2.16     No results for input(s): INR in the last 69222 hours.       Total Time-45 minutes spent on date of encounter doing chart review, history and exam, documentation and further activities as noted above.  This note has been dictated using voice recognition software. Any grammatical, typographical, or context distortions are unintentional and inherent to the software.

## 2022-06-23 NOTE — PATIENT INSTRUCTIONS
Alina Martell,    It was a pleasure to see you today at the [facility].     My recommendations after this visit include:     Bring CPAP machine to the hospital the day of your ablation.    You will have a post op phone visit on: July 5    Make a follow-up visit with me in 7 to 9 weeks.    After PVI:  1.  It is important to continue your blood thinner of Eliquis before and after procedure and important not to miss any tablets to prevent a stroke.    2.  Please call if you are frequently out of rhythm or episodes are longer than 6 hours.  Also call if you have ankle, facial or hand swelling noted after procedure.  It is not uncommon to have some chest discomfort the first week but if this continues or reoccurs after the first week, please call.  Call if you have a fever, difficulty with heartburn, throat issues beyond the first week or trouble swallowing or tolerating food.      3.  Also call if any abnormal symptoms for you in the first 4 weeks post ablation.        My contact information:  Gage Tolentino CNP  After Hours or Scheduling  [phone]  Dr. Merrill's  and Dr. Joseph's Nurse---Iman Tang, RN etc. [phone]

## 2022-06-23 NOTE — PROGRESS NOTES
Pulmonary Vein Isolation Ablation Patient Education Visit:    Patient present for discussion.  Education completed Face to Face in the clinic on 6/23/2022 with Juan Jose Macdonald RN.  Please refer to documented H&P completed by EP provider in [facility] associated to this visit.    Procedural Discussion:  Reviewed pre and post op PVI procedure with pt, pre-procedure letter reviewed and given to pt- see letter in EPIC under documentation, see additional EP PVI prep note for details on procedure/prep and answered pt questions and concerns regarding procedure    Medication Discussion:  Anticoagulation- Reviewed importance of continuing anticoagulation uninterrupted pre and post ablation and pt denies missing any doses of their anticoagulant   Pt continues taking Eliquis 5 mg twice daily.    PPI- instructions to continue PPI that pt has been previously taking as prescribed   Pt continues taking Omeprazole 40 mg daily.    Antiarrythmic- Pt continues on Flecainide as needed, pt stated she has only had to use about 6 times since prescribed on 3-1-22.     6/23/2022 9:14 AM  Juan Jose Macdonald RN

## 2022-06-24 NOTE — PROGRESS NOTES
[facility]                                     Progress Note    Patient Name: Alina Martell  Date: 6/21/22         Service Type: Individual      Session Start Time: 0804  Session End Time: 0854     Session Length:  50 minutes    Session #: 37    Attendees: Client    Service Modality:  Video Visit:      Provider verified identity through the following two step process.  Patient provided:  Patient is known previously to provider     Telemedicine Visit: The patient's condition can be safely assessed and treated via synchronous audio and visual telemedicine encounter.       Reason for Telemedicine Visit: Services only offered telehealth     Originating Site (Patient Location): Patient's home     Distant Site (Provider Location): [facility]     Consent:  The patient/guardian has verbally consented to: the potential risks and benefits of telemedicine (video visit) versus in person care; bill my insurance or make self-payment for services provided; and responsibility for payment of non-covered services.      Patient would like the video invitation sent by:  Text to cell phone:       Mode of Communication:  Video Conference via [employer]well     As the provider I attest to compliance with applicable laws and regulations related to telemedicine.       DATA  Interactive Complexity: No  Crisis: No        Progress Since Last Session (Related to Symptoms / Goals / Homework):   Symptoms: No change symptoms continue to occur    Homework: Partially completed      Episode of Care Goals: Satisfactory progress - ACTION (Actively working towards change); Intervened by reinforcing change plan / affirming steps taken     Current / Ongoing Stressors and Concerns:  Patient reported feeling a lot of different emotions. Patient indicated at times she is stressed. Patient reported there is a lot going on causing her different to feel stressed. Patient indicated she  is continuing to try and find balance in her life. Patient reported she had a conversation with the women she is seeing and feeling better. Patient indicated she is continuing to work on her own self-care. This therapist processed with patient ways she is working on identifying her needs.      Treatment Objective(s) Addressed in This Session:   use thought-stopping strategy daily to reduce intrusive thoughts  Increase interest, engagement, and pleasure in doing things  Decrease frequency and intensity of feeling down, depressed, hopeless  Improve quantity and quality of night time sleep / decrease daytime naps  Identify negative self-talk and behaviors: challenge core beliefs, myths, and actions     Intervention:   Motivational Interviewing    MI Intervention: Permission to raise concern or advise     Change Talk Expressed by the Patient: Taking steps    Provider Response to Change Talk: S - Summarized patient's change talk statements      Assessments completed prior to visit:  The following assessments were completed by patient for this visit:  PHQ9:   PHQ-9 SCORE 2/3/2022 2/14/2022 3/8/2022 4/14/2022 5/12/2022 6/7/2022 6/21/2022   PHQ-9 Total Score MyChart 6 (Mild depression) 7 (Mild depression) 6 (Mild depression) 4 (Minimal depression) 7 (Mild depression) 4 (Minimal depression) 6 (Mild depression)   PHQ-9 Total Score 6 7 6 4 7 4 6     GAD7:   ADA-7 SCORE 3/7/2021 3/31/2021 4/7/2021 4/28/2021 10/5/2021 11/15/2021 12/13/2021   Total Score 4 (minimal anxiety) - - - 3 (minimal anxiety) 6 (mild anxiety) -   Total Score 4 6 5 4 3 6 4     PROMIS 10-Global Health (all questions and answers displayed):   PROMIS 10 12/15/2021 3/26/2022 4/14/2022   In general, would you say your health is: Good Good Good   In general, would you say your quality of life is: Good Good Good   In general, how would you rate your physical health? Good Fair Fair   In general, how would you rate your mental health, including your mood and your  ability to think? Fair Fair Good   In general, how would you rate your satisfaction with your social activities and relationships? Good Fair Good   In general, please rate how well you carry out your usual social activities and roles Good Good Fair   To what extent are you able to carry out your everyday physical activities such as walking, climbing stairs, carrying groceries, or moving a chair? Mostly Mostly Mostly   How often have you been bothered by emotional problems such as feeling anxious, depressed or irritable? Often Sometimes Often   How would you rate your fatigue on average? Mild Mild Mild   How would you rate your pain on average?   0 = No Pain  to  10 = Worst Imaginable Pain 3 3 3   In general, would you say your health is: 3 3 3   In general, would you say your quality of life is: 3 3 3   In general, how would you rate your physical health? 3 2 2   In general, how would you rate your mental health, including your mood and your ability to think? 2 2 3   In general, how would you rate your satisfaction with your social activities and relationships? 3 2 3   In general, please rate how well you carry out your usual social activities and roles. (This includes activities at home, at work and in your community, and responsibilities as a parent, child, spouse, employee, friend, etc.) 3 3 2   To what extent are you able to carry out your everyday physical activities such as walking, climbing stairs, carrying groceries, or moving a chair? 4 4 4   In the past 7 days, how often have you been bothered by emotional problems such as feeling anxious, depressed, or irritable? 4 3 4   In the past 7 days, how would you rate your fatigue on average? 2 2 2   In the past 7 days, how would you rate your pain on average, where 0 means no pain, and 10 means worst imaginable pain? 3 3 3   Global Mental Health Score 10 10 11   Global Physical Health Score 15 14 14   PROMIS TOTAL - SUBSCORES 25 24 25   Some recent data might be  hidden         ASSESSMENT: Current Emotional / Mental Status (status of significant symptoms):   Risk status (Self / Other harm or suicidal ideation)   Patient denies current fears or concerns for personal safety.   Patient denies current or recent suicidal ideation or behaviors.   Patient denies current or recent homicidal ideation or behaviors.   Patient denies current or recent self injurious behavior or ideation.   Patient denies other safety concerns.   Patient reports there has been no change in risk factors since their last session.     Patient reports there has been no change in protective factors since their last session.     Recommended that patient call 911 or go to the local ED should there be a change in any of these risk factors.     Appearance:   Appropriate    Eye Contact:   Good    Psychomotor Behavior: Normal    Attitude:   Cooperative    Orientation:   All   Speech    Rate / Production: Normal/ Responsive    Volume:  Normal    Mood:    Anxious  Depressed    Affect:    Appropriate    Thought Content:  Clear    Thought Form:  Coherent  Goal Directed    Insight:    Good      Medication Review:   No changes to current psychiatric medication(s)     Medication Compliance:   Yes     Changes in Health Issues:   None reported     Chemical Use Review:   Substance Use: Chemical use reviewed, no active concerns identified      Tobacco Use: No current tobacco use.      Diagnosis:  1. PTSD (post-traumatic stress disorder)    2. ADA (generalized anxiety disorder)    3. Major depressive disorder, recurrent episode, moderate (H)        Collateral Reports Completed:   Not Applicable    PLAN: (Patient Tasks / Therapist Tasks / Other)  Patient will return in 2 weeks for scheduled session. Patient will continue to work on identifying needs for herself. Patient should continue to set boundaries with people in her life.     There has been demonstrated improvement in functioning while patient has been engaged in  psychotherapy/psychological service- if withdrawn the patient would deteriorate and/or relapse.     Mariana Love, [facility],    ______________________________________________________________________    Individual Treatment Plan    Patient's Name: Alina Martell  YOB: 1965    Date of Creation:10/16/2020  Date Treatment Plan Last Reviewed/Revised: 05/12/2022    DSM5 Diagnoses: 296.32 (F33.1) Major Depressive Disorder, Recurrent Episode, Moderate _ or 300.02 (F41.1) Generalized Anxiety Disorder  Psychosocial / Contextual Factors: Health, family and financial   PROMIS (reviewed every 90 days): 25    Referral / Collaboration:  Was/were discussed and patient will pursue.    Anticipated number of session for this episode of care: 20 will reevaluate every 90 days  Anticipation frequency of session: Biweekly  Anticipated Duration of each session: 38-52 minutes  Treatment plan will be reviewed in 90 days or when goals have been changed.       MeasurableTreatment Goal(s) related to diagnosis / functional impairment(s)  Goal 1: Patient will work on reducing overall anxiety.     I will know I've met my goal when reporting minimal anxiety symptoms.       Objective #A (Patient Action)                          Patient will use distraction each time intrusive worry surfaces.  Status: Continued - Date(s): 05/12/2022     Intervention(s)  Therapist will teach emotional regulation skills. ..     Objective #B  Patient will Decrease frequency and intensity of feeling down, depressed, hopeless.  Status: Continued - Date(s):  05/12/2022     Intervention(s)  Therapist will teach emotional recognition/identification. ..     Objective #C  Patient will Identify negative self-talk and behaviors: challenge core beliefs, myths, and actions.  Status: Continued - Date(s): 05/12/2022     Intervention(s)  Therapist will teach the client how to perform a behavioral chain analysis. ..     Goal 2: Patient will continue to work on  reducing depression symptoms    I will know I've met my goal then reporting minimal depression symptoms     Objective #A (Patient Action)                          Patient will Increase interest, engagement, and pleasure in doing things.  Status: Continued - Date(s):  05/12/2022     Intervention(s)  Therapist will assign homework ..     Objective #B  Patient will Decrease frequency and intensity of feeling down, depressed, hopeless.  Status: Continued - Date(s):  05/12/2022     Intervention(s)  Therapist will assign homework at every session.         Patient has reviewed and agreed to the above plan.        Mariana Love, [facility] May 12, 2022

## 2022-06-27 ENCOUNTER — LAB (OUTPATIENT)
Dept: LAB | Facility: CLINIC | Age: 57
End: 2022-06-27
Payer: COMMERCIAL

## 2022-06-27 DIAGNOSIS — Z20.822 ENCOUNTER FOR LABORATORY TESTING FOR COVID-19 VIRUS: ICD-10-CM

## 2022-06-27 PROCEDURE — U0005 INFEC AGEN DETEC AMPLI PROBE: HCPCS

## 2022-06-27 PROCEDURE — U0003 INFECTIOUS AGENT DETECTION BY NUCLEIC ACID (DNA OR RNA); SEVERE ACUTE RESPIRATORY SYNDROME CORONAVIRUS 2 (SARS-COV-2) (CORONAVIRUS DISEASE [COVID-19]), AMPLIFIED PROBE TECHNIQUE, MAKING USE OF HIGH THROUGHPUT TECHNOLOGIES AS DESCRIBED BY CMS-2020-01-R: HCPCS

## 2022-06-28 LAB — SARS-COV-2 RNA RESP QL NAA+PROBE: NEGATIVE

## 2022-06-29 ENCOUNTER — ANESTHESIA EVENT (OUTPATIENT)
Dept: CARDIOLOGY | Facility: CLINIC | Age: 57
End: 2022-06-29
Payer: COMMERCIAL

## 2022-06-29 ASSESSMENT — ENCOUNTER SYMPTOMS: DYSRHYTHMIAS: 1

## 2022-06-30 ENCOUNTER — HOSPITAL ENCOUNTER (OUTPATIENT)
Facility: CLINIC | Age: 57
Setting detail: OBSERVATION
Discharge: HOME | End: 2022-06-30
Attending: INTERNAL MEDICINE | Admitting: INTERNAL MEDICINE
Payer: COMMERCIAL

## 2022-06-30 ENCOUNTER — LAB (OUTPATIENT)
Dept: LAB | Facility: CLINIC | Age: 57
End: 2022-06-30
Attending: INTERNAL MEDICINE
Payer: COMMERCIAL

## 2022-06-30 ENCOUNTER — ANESTHESIA (OUTPATIENT)
Dept: CARDIOLOGY | Facility: CLINIC | Age: 57
End: 2022-06-30
Payer: COMMERCIAL

## 2022-06-30 VITALS
TEMPERATURE: 98.3 F | HEIGHT: 69 IN | WEIGHT: 283.7 LBS | SYSTOLIC BLOOD PRESSURE: 110 MMHG | RESPIRATION RATE: 16 BRPM | BODY MASS INDEX: 42.02 KG/M2 | HEART RATE: 88 BPM | OXYGEN SATURATION: 95 % | DIASTOLIC BLOOD PRESSURE: 62 MMHG

## 2022-06-30 DIAGNOSIS — I48.0 PAROXYSMAL ATRIAL FIBRILLATION (H): ICD-10-CM

## 2022-06-30 LAB
ABO/RH(D): NORMAL
ACT BLD: 365 SECONDS (ref 74–150)
ACT BLD: 399 SECONDS (ref 74–150)
ANION GAP SERPL CALCULATED.3IONS-SCNC: 7 MMOL/L (ref 3–14)
ANTIBODY SCREEN: NEGATIVE
B-HCG SERPL-ACNC: 1 IU/L (ref 0–5)
BUN SERPL-MCNC: 14 MG/DL (ref 7–30)
CALCIUM SERPL-MCNC: 9.1 MG/DL (ref 8.5–10.1)
CHLORIDE BLD-SCNC: 104 MMOL/L (ref 94–109)
CO2 SERPL-SCNC: 28 MMOL/L (ref 20–32)
CREAT SERPL-MCNC: 0.75 MG/DL (ref 0.52–1.04)
ERYTHROCYTE [DISTWIDTH] IN BLOOD BY AUTOMATED COUNT: 14.5 % (ref 10–15)
GFR SERPL CREATININE-BSD FRML MDRD: >90 ML/MIN/1.73M2
GLUCOSE BLD-MCNC: 125 MG/DL (ref 70–99)
HCT VFR BLD AUTO: 40.6 % (ref 35–47)
HGB BLD-MCNC: 12.4 G/DL (ref 11.7–15.7)
MCH RBC QN AUTO: 25.6 PG (ref 26.5–33)
MCHC RBC AUTO-ENTMCNC: 30.5 G/DL (ref 31.5–36.5)
MCV RBC AUTO: 84 FL (ref 78–100)
PLATELET # BLD AUTO: 369 10E3/UL (ref 150–450)
POTASSIUM BLD-SCNC: 3.8 MMOL/L (ref 3.4–5.3)
RBC # BLD AUTO: 4.85 10E6/UL (ref 3.8–5.2)
SODIUM SERPL-SCNC: 139 MMOL/L (ref 133–144)
SPECIMEN EXPIRATION DATE: NORMAL
WBC # BLD AUTO: 10.6 10E3/UL (ref 4–11)

## 2022-06-30 PROCEDURE — 258N000003 HC RX IP 258 OP 636: Performed by: NURSE ANESTHETIST, CERTIFIED REGISTERED

## 2022-06-30 PROCEDURE — 82310 ASSAY OF CALCIUM: CPT | Performed by: INTERNAL MEDICINE

## 2022-06-30 PROCEDURE — 999N000054 HC STATISTIC EKG NON-CHARGEABLE

## 2022-06-30 PROCEDURE — 86850 RBC ANTIBODY SCREEN: CPT | Performed by: INTERNAL MEDICINE

## 2022-06-30 PROCEDURE — 250N000009 HC RX 250: Performed by: NURSE ANESTHETIST, CERTIFIED REGISTERED

## 2022-06-30 PROCEDURE — 258N000003 HC RX IP 258 OP 636: Performed by: ANESTHESIOLOGY

## 2022-06-30 PROCEDURE — 370N000017 HC ANESTHESIA TECHNICAL FEE, PER MIN: Performed by: INTERNAL MEDICINE

## 2022-06-30 PROCEDURE — 93656 COMPRE EP EVAL ABLTJ ATR FIB: CPT | Performed by: INTERNAL MEDICINE

## 2022-06-30 PROCEDURE — 85014 HEMATOCRIT: CPT | Performed by: INTERNAL MEDICINE

## 2022-06-30 PROCEDURE — C1732 CATH, EP, DIAG/ABL, 3D/VECT: HCPCS | Performed by: INTERNAL MEDICINE

## 2022-06-30 PROCEDURE — 250N000025 HC SEVOFLURANE, PER MIN: Performed by: INTERNAL MEDICINE

## 2022-06-30 PROCEDURE — 36415 COLL VENOUS BLD VENIPUNCTURE: CPT | Performed by: INTERNAL MEDICINE

## 2022-06-30 PROCEDURE — 250N000009 HC RX 250: Performed by: ANESTHESIOLOGY

## 2022-06-30 PROCEDURE — G0378 HOSPITAL OBSERVATION PER HR: HCPCS

## 2022-06-30 PROCEDURE — C1894 INTRO/SHEATH, NON-LASER: HCPCS | Performed by: INTERNAL MEDICINE

## 2022-06-30 PROCEDURE — 250N000011 HC RX IP 250 OP 636: Performed by: INTERNAL MEDICINE

## 2022-06-30 PROCEDURE — 999N000071 HC STATISTIC HEART CATH LAB OR EP LAB

## 2022-06-30 PROCEDURE — C1769 GUIDE WIRE: HCPCS | Performed by: INTERNAL MEDICINE

## 2022-06-30 PROCEDURE — 93010 ELECTROCARDIOGRAM REPORT: CPT | Performed by: INTERNAL MEDICINE

## 2022-06-30 PROCEDURE — C1887 CATHETER, GUIDING: HCPCS | Performed by: INTERNAL MEDICINE

## 2022-06-30 PROCEDURE — 85347 COAGULATION TIME ACTIVATED: CPT

## 2022-06-30 PROCEDURE — 250N000009 HC RX 250: Performed by: INTERNAL MEDICINE

## 2022-06-30 PROCEDURE — 93005 ELECTROCARDIOGRAM TRACING: CPT

## 2022-06-30 PROCEDURE — C1733 CATH, EP, OTHR THAN COOL-TIP: HCPCS | Performed by: INTERNAL MEDICINE

## 2022-06-30 PROCEDURE — 250N000011 HC RX IP 250 OP 636: Performed by: NURSE ANESTHETIST, CERTIFIED REGISTERED

## 2022-06-30 PROCEDURE — C1759 CATH, INTRA ECHOCARDIOGRAPHY: HCPCS | Performed by: INTERNAL MEDICINE

## 2022-06-30 PROCEDURE — 272N000001 HC OR GENERAL SUPPLY STERILE: Performed by: INTERNAL MEDICINE

## 2022-06-30 PROCEDURE — 999N000184 HC STATISTIC TELEMETRY

## 2022-06-30 PROCEDURE — 250N000011 HC RX IP 250 OP 636: Performed by: ANESTHESIOLOGY

## 2022-06-30 PROCEDURE — C1730 CATH, EP, 19 OR FEW ELECT: HCPCS | Performed by: INTERNAL MEDICINE

## 2022-06-30 PROCEDURE — 84702 CHORIONIC GONADOTROPIN TEST: CPT | Mod: GZ | Performed by: INTERNAL MEDICINE

## 2022-06-30 RX ORDER — GLYCOPYRROLATE 0.2 MG/ML
INJECTION, SOLUTION INTRAMUSCULAR; INTRAVENOUS PRN
Status: DISCONTINUED | OUTPATIENT
Start: 2022-06-30 | End: 2022-06-30

## 2022-06-30 RX ORDER — ADENOSINE 3 MG/ML
INJECTION, SOLUTION INTRAVENOUS
Status: DISCONTINUED | OUTPATIENT
Start: 2022-06-30 | End: 2022-06-30 | Stop reason: HOSPADM

## 2022-06-30 RX ORDER — FENTANYL CITRATE 50 UG/ML
INJECTION, SOLUTION INTRAMUSCULAR; INTRAVENOUS PRN
Status: DISCONTINUED | OUTPATIENT
Start: 2022-06-30 | End: 2022-06-30

## 2022-06-30 RX ORDER — ONDANSETRON 4 MG/1
4 TABLET, ORALLY DISINTEGRATING ORAL EVERY 30 MIN PRN
Status: DISCONTINUED | OUTPATIENT
Start: 2022-06-30 | End: 2022-06-30 | Stop reason: HOSPADM

## 2022-06-30 RX ORDER — SODIUM CHLORIDE, SODIUM LACTATE, POTASSIUM CHLORIDE, CALCIUM CHLORIDE 600; 310; 30; 20 MG/100ML; MG/100ML; MG/100ML; MG/100ML
INJECTION, SOLUTION INTRAVENOUS CONTINUOUS
Status: DISCONTINUED | OUTPATIENT
Start: 2022-06-30 | End: 2022-06-30 | Stop reason: HOSPADM

## 2022-06-30 RX ORDER — LIDOCAINE HYDROCHLORIDE 20 MG/ML
INJECTION, SOLUTION INFILTRATION; PERINEURAL PRN
Status: DISCONTINUED | OUTPATIENT
Start: 2022-06-30 | End: 2022-06-30

## 2022-06-30 RX ORDER — DEXAMETHASONE SODIUM PHOSPHATE 4 MG/ML
INJECTION, SOLUTION INTRA-ARTICULAR; INTRALESIONAL; INTRAMUSCULAR; INTRAVENOUS; SOFT TISSUE PRN
Status: DISCONTINUED | OUTPATIENT
Start: 2022-06-30 | End: 2022-06-30

## 2022-06-30 RX ORDER — SODIUM CHLORIDE, SODIUM LACTATE, POTASSIUM CHLORIDE, CALCIUM CHLORIDE 600; 310; 30; 20 MG/100ML; MG/100ML; MG/100ML; MG/100ML
INJECTION, SOLUTION INTRAVENOUS CONTINUOUS PRN
Status: DISCONTINUED | OUTPATIENT
Start: 2022-06-30 | End: 2022-06-30

## 2022-06-30 RX ORDER — DEXMEDETOMIDINE HYDROCHLORIDE 4 UG/ML
INJECTION, SOLUTION INTRAVENOUS PRN
Status: DISCONTINUED | OUTPATIENT
Start: 2022-06-30 | End: 2022-06-30

## 2022-06-30 RX ORDER — FENTANYL CITRATE 50 UG/ML
25 INJECTION, SOLUTION INTRAMUSCULAR; INTRAVENOUS
Status: DISCONTINUED | OUTPATIENT
Start: 2022-06-30 | End: 2022-06-30 | Stop reason: HOSPADM

## 2022-06-30 RX ORDER — ACETAMINOPHEN 325 MG/1
650 TABLET ORAL EVERY 4 HOURS PRN
Status: DISCONTINUED | OUTPATIENT
Start: 2022-06-30 | End: 2022-06-30 | Stop reason: HOSPADM

## 2022-06-30 RX ORDER — ONDANSETRON 2 MG/ML
INJECTION INTRAMUSCULAR; INTRAVENOUS PRN
Status: DISCONTINUED | OUTPATIENT
Start: 2022-06-30 | End: 2022-06-30

## 2022-06-30 RX ORDER — HEPARIN SODIUM 1000 [USP'U]/ML
INJECTION, SOLUTION INTRAVENOUS; SUBCUTANEOUS
Status: DISCONTINUED | OUTPATIENT
Start: 2022-06-30 | End: 2022-06-30 | Stop reason: HOSPADM

## 2022-06-30 RX ORDER — ONDANSETRON 4 MG/1
4 TABLET, ORALLY DISINTEGRATING ORAL EVERY 6 HOURS PRN
Status: DISCONTINUED | OUTPATIENT
Start: 2022-06-30 | End: 2022-06-30 | Stop reason: HOSPADM

## 2022-06-30 RX ORDER — HEPARIN SODIUM 10000 [USP'U]/100ML
INJECTION, SOLUTION INTRAVENOUS CONTINUOUS PRN
Status: DISCONTINUED | OUTPATIENT
Start: 2022-06-30 | End: 2022-06-30 | Stop reason: HOSPADM

## 2022-06-30 RX ORDER — ONDANSETRON 2 MG/ML
4 INJECTION INTRAMUSCULAR; INTRAVENOUS EVERY 6 HOURS PRN
Status: DISCONTINUED | OUTPATIENT
Start: 2022-06-30 | End: 2022-06-30 | Stop reason: HOSPADM

## 2022-06-30 RX ORDER — PROPOFOL 10 MG/ML
INJECTION, EMULSION INTRAVENOUS PRN
Status: DISCONTINUED | OUTPATIENT
Start: 2022-06-30 | End: 2022-06-30

## 2022-06-30 RX ORDER — HYDROMORPHONE HYDROCHLORIDE 1 MG/ML
0.2 INJECTION, SOLUTION INTRAMUSCULAR; INTRAVENOUS; SUBCUTANEOUS EVERY 5 MIN PRN
Status: DISCONTINUED | OUTPATIENT
Start: 2022-06-30 | End: 2022-06-30 | Stop reason: HOSPADM

## 2022-06-30 RX ORDER — FENTANYL CITRATE 50 UG/ML
25 INJECTION, SOLUTION INTRAMUSCULAR; INTRAVENOUS EVERY 5 MIN PRN
Status: DISCONTINUED | OUTPATIENT
Start: 2022-06-30 | End: 2022-06-30 | Stop reason: HOSPADM

## 2022-06-30 RX ORDER — LIDOCAINE 40 MG/G
CREAM TOPICAL
Status: DISCONTINUED | OUTPATIENT
Start: 2022-06-30 | End: 2022-06-30 | Stop reason: HOSPADM

## 2022-06-30 RX ORDER — PROTAMINE SULFATE 10 MG/ML
INJECTION, SOLUTION INTRAVENOUS PRN
Status: DISCONTINUED | OUTPATIENT
Start: 2022-06-30 | End: 2022-06-30

## 2022-06-30 RX ORDER — NEOSTIGMINE METHYLSULFATE 1 MG/ML
VIAL (ML) INJECTION PRN
Status: DISCONTINUED | OUTPATIENT
Start: 2022-06-30 | End: 2022-06-30

## 2022-06-30 RX ORDER — ONDANSETRON 2 MG/ML
4 INJECTION INTRAMUSCULAR; INTRAVENOUS EVERY 30 MIN PRN
Status: DISCONTINUED | OUTPATIENT
Start: 2022-06-30 | End: 2022-06-30 | Stop reason: HOSPADM

## 2022-06-30 RX ADMIN — PROTAMINE SULFATE 50 MG: 10 INJECTION, SOLUTION INTRAVENOUS at 08:58

## 2022-06-30 RX ADMIN — PROPOFOL 200 MG: 10 INJECTION, EMULSION INTRAVENOUS at 07:36

## 2022-06-30 RX ADMIN — SODIUM CHLORIDE, POTASSIUM CHLORIDE, SODIUM LACTATE AND CALCIUM CHLORIDE: 600; 310; 30; 20 INJECTION, SOLUTION INTRAVENOUS at 07:29

## 2022-06-30 RX ADMIN — Medication 5 MG: at 09:00

## 2022-06-30 RX ADMIN — DEXAMETHASONE SODIUM PHOSPHATE 4 MG: 4 INJECTION, SOLUTION INTRA-ARTICULAR; INTRALESIONAL; INTRAMUSCULAR; INTRAVENOUS; SOFT TISSUE at 07:42

## 2022-06-30 RX ADMIN — ROCURONIUM BROMIDE 40 MG: 50 INJECTION, SOLUTION INTRAVENOUS at 07:37

## 2022-06-30 RX ADMIN — MIDAZOLAM 2 MG: 1 INJECTION INTRAMUSCULAR; INTRAVENOUS at 07:30

## 2022-06-30 RX ADMIN — DEXMEDETOMIDINE 20 MCG: 100 INJECTION, SOLUTION, CONCENTRATE INTRAVENOUS at 07:42

## 2022-06-30 RX ADMIN — FENTANYL CITRATE 100 MCG: 50 INJECTION, SOLUTION INTRAMUSCULAR; INTRAVENOUS at 07:37

## 2022-06-30 RX ADMIN — PHENYLEPHRINE HYDROCHLORIDE 0.5 MCG/KG/MIN: 10 INJECTION INTRAVENOUS at 07:48

## 2022-06-30 RX ADMIN — GLYCOPYRROLATE 0.8 MG: 0.2 INJECTION, SOLUTION INTRAMUSCULAR; INTRAVENOUS at 09:00

## 2022-06-30 RX ADMIN — LIDOCAINE HYDROCHLORIDE 100 MG: 20 INJECTION, SOLUTION INFILTRATION; PERINEURAL at 07:36

## 2022-06-30 RX ADMIN — ONDANSETRON 4 MG: 2 INJECTION INTRAMUSCULAR; INTRAVENOUS at 09:00

## 2022-06-30 NOTE — ANESTHESIA PREPROCEDURE EVALUATION
Anesthesia Pre-Procedure Evaluation    Patient: Alina Martell   MRN: [id number] : 1965        Procedure : Procedure(s):  Ablation Atrial Fibrilation          Past Medical History:   Diagnosis Date     Anemia      Antiplatelet or antithrombotic long-term use      Arrhythmia      Cannabis use without complication 2014     Carpal tunnel syndrome      Coronary artery disease      Gastroesophageal reflux disease      History of angina      Hypertension      Irregular heart beat      Obesity      Paroxysmal atrial fibrillation (H)      PTSD (post-traumatic stress disorder)      RA (rheumatoid arthritis) (H)      Rheumatoid arthritis (H) 2016     Sicca syndrome (H)      Sleep apnea       Past Surgical History:   Procedure Laterality Date     CHOLECYSTECTOMY       DILATION AND CURETTAGE, OPERATIVE HYSTEROSCOPY, COMBINED N/A 3/3/2022    Procedure: HYSTEROSCOPY, WITH DILATION AND CURETTAGE;  Surgeon: Irma Cary MD;  Location: [facility]     HC REMOVAL GALLBLADDER      Description: Cholecystectomy;  Recorded: 10/15/2013;     LAPAROSCOPIC TUBAL LIGATION       RELEASE CARPAL TUNNEL BILATERAL       TUBAL LIGATION        Allergies   Allergen Reactions     Penicillins Shortness Of Breath, Palpitations, Dizziness, Anaphylaxis, Hives, Itching and Rash     Test in , see allergy note on 21     Shellfish-Derived Products Anaphylaxis     Sulfa Drugs Hives and Anaphylaxis      Social History     Tobacco Use     Smoking status: Never Smoker     Smokeless tobacco: Never Used     Tobacco comment: smokes marijuana   Substance Use Topics     Alcohol use: Not Currently     Comment: Alcoholic Drinks/day: Never an issue, she states.  16      Wt Readings from Last 1 Encounters:   22 128.4 kg (283 lb)        Anesthesia Evaluation   Pt has had prior anesthetic.     No history of anesthetic complications       ROS/MED HX  ENT/Pulmonary:     (+) sleep apnea,     Neurologic:  - neg  "neurologic ROS     Cardiovascular: Comment: \"1.  Normal left main.  2.  Normal LAD and its branches.  3.  Minimal disease in proximal LCx.  4.  Normal RCA and its branches.  Right dominant system.  October 2020 echo shows:     Narrative & Impression    Normal left ventricular size with mild hypertrophy noted.    Left ventricle ejection fraction is normal. The calculated left ventricular ejection fraction is 67%.    Normal right ventricular size and systolic function.    No hemodynamically significant valvular heart abnormalities.    No previous study for comparison.\"    (+) hypertension--CAD ---Taking blood thinners dysrhythmias, a-fib, Irregular Heartbeat/Palpitations,  (-) angina and angina   METS/Exercise Tolerance:     Hematologic:  - neg hematologic  ROS     Musculoskeletal: Comment: Rheumatoid arthritis      GI/Hepatic: Comment: Postmenopausal bleeding    (+) GERD,     Renal/Genitourinary:  - neg Renal ROS     Endo:     (+) Obesity,     Psychiatric/Substance Use:     (+) psychiatric history anxiety and depression Recreational drug usage: Cannabis.    Infectious Disease:  - neg infectious disease ROS     Malignancy:  - neg malignancy ROS     Other: Comment: Sjogren syndrome           Physical Exam    Airway  airway exam normal      Mallampati: II   TM distance: > 3 FB   Neck ROM: full   Mouth opening: > 3 cm    Respiratory Devices and Support         Dental  no notable dental history         Cardiovascular          Rhythm and rate: irregular and normal     Pulmonary   pulmonary exam normal        breath sounds clear to auscultation           OUTSIDE LABS:  CBC:   Lab Results   Component Value Date    WBC 8.8 06/23/2022    WBC 12.4 (H) 03/16/2022    HGB 11.9 06/23/2022    HGB 11.5 (L) 03/16/2022    HCT 38.2 06/23/2022    HCT 37.0 03/16/2022     06/23/2022     03/16/2022     BMP:   Lab Results   Component Value Date     06/23/2022     03/08/2022    POTASSIUM 3.9 06/23/2022    POTASSIUM " 3.9 03/08/2022    CHLORIDE 104 06/23/2022    CHLORIDE 101 03/08/2022    CO2 28 06/23/2022    CO2 28 03/08/2022    BUN 11 06/23/2022    BUN 12 03/08/2022    CR 0.73 06/23/2022    CR 0.77 03/08/2022     06/23/2022     03/08/2022     COAGS: No results found for: PTT, INR, FIBR  POC:   Lab Results   Component Value Date    HCG Negative 03/03/2022     HEPATIC:   Lab Results   Component Value Date    ALBUMIN 3.5 03/08/2022    PROTTOTAL 7.8 03/08/2022    ALT 15 03/08/2022    AST 10 03/08/2022    ALKPHOS 105 03/08/2022    BILITOTAL 0.2 03/08/2022     OTHER:   Lab Results   Component Value Date    PH 7.40 01/01/2022    LACT 1.0 03/08/2022    JOSSELIN 9.0 06/23/2022    MAG 1.8 01/17/2022    LIPASE <9 03/08/2022    TSH 2.16 02/16/2022    T4 0.94 01/01/2022    CRP 3.3 (H) 03/11/2020    SED 64 (H) 03/11/2020       Anesthesia Plan    ASA Status:  3      Anesthesia Type: General.     - Airway: ETT   Induction: Intravenous.   Maintenance: Inhalation.   Techniques and Equipment:     - Airway: Video-Laryngoscope         Consents    Anesthesia Plan(s) and associated risks, benefits, and realistic alternatives discussed. Questions answered and patient/representative(s) expressed understanding.    - Discussed:     - Discussed with:  Patient         Postoperative Care    Pain management: IV analgesics, Multi-modal analgesia.   PONV prophylaxis: Ondansetron (or other 5HT-3), Dexamethasone or Solumedrol     Comments:                Donovan Sadler MD

## 2022-06-30 NOTE — ANESTHESIA PROCEDURE NOTES
Airway       Patient location during procedure: OR       Procedure Start/Stop Times: 6/30/2022 7:39 AM  Staff -        Anesthesiologist:  Donovan Sadler MD       CRNA: Hodgkins, Christine Marie Volp, APRN CRNA       Performed By: CRNA  Consent for Airway        Urgency: elective  Indications and Patient Condition       Indications for airway management: catrachita-procedural       Induction type:intravenous       Mask difficulty assessment: 2 - vent by mask + OA or adjuvant +/- NMBA    Final Airway Details       Final airway type: endotracheal airway       Successful airway: ETT - single  Endotracheal Airway Details        ETT size (mm): 7.0       Cuffed: yes       Successful intubation technique: video laryngoscopy       VL Blade Size: Richey 3       Grade View of Cords: 1       Adjucts: stylet       Position: Right       Measured from: lips       Secured at (cm): 22       Bite block used: None    Post intubation assessment        Placement verified by: capnometry, equal breath sounds and chest rise        Number of attempts at approach: 1       Number of other approaches attempted: 0       Secured with: pink tape       Ease of procedure: easy       Dentition: Intact and Unchanged    Medication(s) Administered   Medication Administration Time: 6/30/2022 7:39 AM

## 2022-06-30 NOTE — PROGRESS NOTES
Care Suites Post Procedure Note    Patient Information  Name: Alina Martell  Age: 57 year old    Post Procedure  Time patient returned to Care Suites: 1015  Concerns/abnormal assessment: no  If abnormal assessment, provider notified: N/A  Plan/Other: ambulate at 1200    Katina Reynolds RN

## 2022-06-30 NOTE — PROGRESS NOTES
Care Suites Discharge Nursing Note    Patient Information  Name: Alina Martell  Age: 57 year old    Discharge Education:  Discharge instructions reviewed: Yes  Additional education/resources provided: no  Patient/patient representative verbalizes understanding: Yes  Patient discharging on new medications: No  Medication education completed: N/A    Discharge Plans:   Discharge location: home  Discharge ride contacted: Yes  Approximate discharge time: 1330    Discharge Criteria:  Discharge criteria met and vital signs stable: Yes    Patient Belongs:  Patient belongings returned to patient: Yes    Katina Reynolds RN

## 2022-06-30 NOTE — DISCHARGE INSTRUCTIONS
[facility]  Cardiac Electrophysiology  [address]  [place] [address]   Office: [phone]  Fax: [phone]     Cardiac Electrophysiology - Post Ablation Discharge Instructions      PROCEDURE   Atrial fibrillation ablation         MEDICATION INSTRUCTIONS   Continue taking your prior to procedure medications, including diltiazem, flecainide as needed for now (we will assess for cessation at your 6 and 12 week follow-up visits).  Continue taking your blood thinner apixaban (Eliquis).          DISCHARGE INSTRUCTIONS   General instructions  Have an adult stay with you until tomorrow.  You may resume your normal diet.    You may shower tomorrow.  Do NOT take a bath, or use a hot tub or pool for at least 1 week. Do not scrub the site. Do not use lotion or powder near the puncture site.    Groin care instructions  For the first 24 hrs - check the puncture site every 1-2 hours while awake.  You may keep a bandaid over the puncture sites for 1 or 2 days post-procedure and thereafter may keep these sites uncovered.  Change the bandaid daily.  If there is minor oozing, apply another bandaid and remove it after 12 hours.  For 2 days, when you cough, sneeze, laugh or move your bowels, hold your hand over the puncture site and press firmly.  Mild bruising at the access sites is normal.  If you notice increased swelling, external bleeding, or have other concerns regarding your access sites please consider emergency department evaluation and call your electrophysiology team's office    Activity recommendations  Do not drive for 3 days.  Avoid stooping or squatting more than 90 degrees at the hips for 7 days  Avoid repetitive motions such as loading , vacuuming, raking or shoveling  Avoid heavy lifting (greater than 25lbs) for 1 week    Post ablation instructions  You may have some irregular heartbeats following your ablation.  These may feel very strong and feel like atrial  fibrillation re-initiation is imminent - these episodes should occur less frequently over time.  Recurrent atrial fibrillation can occur within the first 3 months post ablation while your heart recovers from the procedure.  Pleuritic chest discomfort (chest pain worse with taking deep breaths, worse with laying flat on your back) can occur after ablation, usually coming about within the first 24-48hrs post ablation.  If this occurs and is severe enough to be troublesome to you, please call us and consider starting a course of ibuprofen 400mg three times daily for 5 to 7 days    Things to watch for  As with any type of procedure, please be more attentive to unusual symptoms post ablation (eg. fever, neurologic changes, pain with swallowing, loss of consciousness, etc) - we recommend ER evaluation for any such symptoms in the first few weeks post procedure.    Consider ER evaluation for the following:  Severe chest pain not relieved by Tylenol or Ibuprofen  You have chills or a fever greater than 101 F (38 C)  Neurologic changes (eg. leg, arm or face weakness or numbness, difficulties with speech or word finding, problems walking or with your balance, vision changes)  Severe difficulty swallowing and/or you are coughing up blood  Shortness of breath  Increased groin pain or a large or growing hard lump around the site  Groin is red, swollen, hot or tender  Blood or fluid is draining from the groin site  Any numbness, coolness or changes in color in your extremities  Groin pain not relieved by Tylenol or Advil  Recurrent atrial fibrillation associated with sustained rapid heart rates or associated with additional concerning symptoms.    Our office will have a follow-up visit scheduled for you in approximately 6 weeks.  Please do not hesitate to call us before that time should issues arise.        [facility]    [phone]    If you are calling after hours, please listen to the entire  voicemail,   a live  will answer at the end of the message.    [phone] to reach the EP nurses working with Dr Joseph

## 2022-06-30 NOTE — ANESTHESIA POSTPROCEDURE EVALUATION
Patient: Alina Martell    Procedure: Procedure(s):  Ablation Atrial Fibrilation       Anesthesia Type:  General    Note:  Disposition: Outpatient   Postop Pain Control: Uneventful            Sign Out: Well controlled pain   PONV: No   Neuro/Psych: Uneventful            Sign Out: Acceptable/Baseline neuro status   Airway/Respiratory: Uneventful            Sign Out: Acceptable/Baseline resp. status   CV/Hemodynamics: Uneventful            Sign Out: Acceptable CV status   Other NRE: NONE   DID A NON-ROUTINE EVENT OCCUR? No    Event details/Postop Comments:  Patient meets phase one criteria. No obstructive breathing symptoms. Further recovery will occur in Care Suites.           Last vitals:  Vitals Value Taken Time   /87 06/30/22 0950   Temp 36.8  C (98.3  F) 06/30/22 0950   Pulse 77 06/30/22 0955   Resp 11 06/30/22 0955   SpO2 99 % 06/30/22 1000   Vitals shown include unvalidated device data.    Electronically Signed By: Donovan Sadler MD  June 30, 2022  10:13 AM

## 2022-06-30 NOTE — PROGRESS NOTES
Care Suites Admission Nursing Note    Patient Information  Name: Alina Martell  Age: 57 year old  Reason for admission: atrial fibrillation   Care Suites arrival time: 0700    Visitor Information  Name: ab  Informed of visitor restrictions: Yes  1 visitor allowed per patient   Visitor must screen negative for COVID symptoms   Visitor must wear a mask  Waiting rooms closed to visitors    Patient Admission/Assessment   Pre-procedure assessment complete: Yes  If abnormal assessment/labs, provider notified: N/A  NPO: Yes  Medications held per instructions/orders: Yes  Consent: obtained  If applicable, pregnancy test status: deferred  Patient oriented to room: Yes  Education/questions answered: Yes  Plan/other: atrial fibrillation     Discharge Planning  Discharge name/phone number: ab [phone]   Overnight post sedation caregiver: ab  Discharge location: [place]    Katina Reynolds RN

## 2022-06-30 NOTE — Clinical Note
[employer] Med system 12 lead EKG, hemodynamics 5 lead, pulse oximetery, NIBP, Physiocontrol hands off defibrillator/external pacer, with 3 monitoring leads to patient. Baseline assessment done.

## 2022-06-30 NOTE — CARE PLAN
1115 - Writer found patient sitting upright in bed eating lunch. Assisted patient to 30 degree reclining position on cart, educated on importance of keeping head flat against pillow and not sitting up higher than 30 degrees. Cart tray provided for ease of eating. R groin site stable with sutures and stopcock in place, patient denies pain.

## 2022-07-01 ENCOUNTER — PATIENT OUTREACH (OUTPATIENT)
Dept: CARE COORDINATION | Facility: CLINIC | Age: 57
End: 2022-07-01

## 2022-07-01 DIAGNOSIS — Z71.89 OTHER SPECIFIED COUNSELING: ICD-10-CM

## 2022-07-01 NOTE — PROGRESS NOTES
"Clinic Care Coordination Contact  [facility]: Post-Discharge Note  SITUATION                                                      Admission:    Admission Date: 06/30/22   Reason for Admission: Paroxysmal atrial fibrillation  Discharge:   Discharge Date: 06/30/22  Discharge Diagnosis: Paroxysmal atrial fibrillation    BACKGROUND                                                      Per hospital discharge summary and inpatient provider notes:Alina Martell is a very pleasant 57 year old female who comes in today for history and physical prior to pulmonary vein isolation ablation with Dr. Joseph on June 30.  Alina Martell has a known history of paroxymal atrial fibrillation, HTN, Sjogren syndrome, rheumatoid arthritis, RANDALL on CPAP, anxiety, and morbid obesity.  Alina was first diagnosed with atrial fibrillation when she went to the emergency room with palpitations in September 2020 and cardioversion was performed for atrial fib.  She has had recurrence of starting in January 2022 with again another cardioversion in the ER.  She has had further episodes of atrial fibrillation which have been successfully treated with as needed flecainide 50 mg.  She has seen Dr. Joseph in consult and wishes to proceed with PVI.  She is continuing to have occ episodes of afib.  Outside of afib, she denies any cardiac symptoms.  This visit will serve as history and physical for PVI.  See problem list for more details.  Medical, past medical, surgical and social history reviewed and updated.  Meds and allergies reviewed.   No personal or family history of adverse reactions to anesthesia or abnormal bleeding with surgery.       ASSESSMENT      Enrollment  Primary Care Care Coordination Status: Declined    Discharge Assessment  How are you doing now that you are home?: \" Things are going well \"  How are your symptoms? (Red Flag symptoms escalate to triage hotline per guidelines): Improved  Do you feel your condition is " stable enough to be safe at home until your provider visit?: Yes  Does the patient have their discharge instructions? : Yes  Does the patient have questions regarding their discharge instructions? : No  Were you started on any new medications or were there changes to any of your previous medications? : No  Does the patient have all of their medications?: Yes  Do you have questions regarding any of your medications? : No  Do you have all of your needed medical supplies or equipment (DME)?  (i.e. oxygen tank, CPAP, cane, etc.): Yes  Discharge follow-up appointment scheduled within 14 calendar days? : Yes  Discharge Follow Up Appointment Date: 07/05/22  Discharge Follow Up Appointment Scheduled with?: Specialty Care Provider    Post-op ([facility] CTA Only)  If the patient had a surgery or procedure, do they have any questions for a nurse?: No             PLAN                                                      Outpatient Plan: Follow-up with any provider in AF clinic in 6 weeks.     Future Appointments   Date Time Provider Department [place]   7/5/2022  9:30 AM [facility]   7/7/2022  1:00 PM Mariana Love [facility]   7/19/2022  8:00 AM Mariana Love [facility]   7/26/2022  7:00 AM Radha Dsouza PA-C [facility]   8/15/2022  [facility]   8/17/2022  8:45 AM Medhat Lester MBBS [facility]HEU [facility]   8/23/2022  7:50 AM Yasmin Tolentino APRN CNP [facility]         For any urgent concerns, please contact our 24 hour nurse triage line: [phone] ([phone])         Ruslan Barros

## 2022-07-05 ENCOUNTER — VIRTUAL VISIT (OUTPATIENT)
Dept: CARDIOLOGY | Facility: CLINIC | Age: 57
End: 2022-07-05
Payer: COMMERCIAL

## 2022-07-05 DIAGNOSIS — I48.0 PAROXYSMAL ATRIAL FIBRILLATION (H): Primary | ICD-10-CM

## 2022-07-05 PROCEDURE — 99207 PR NO CHARGE LOS: CPT

## 2022-07-05 NOTE — PROGRESS NOTES
Post PVI Procedural Follow Up Call    Pt is s/p PVI from 6/30 with Dr Joseph  PC was placed to pt, spoke to pt    General Assessment:     Weight: Pt reports pt is unable to report weight, but denies s/s of fluid retention    Pain: Pt denies generalized or localized pain abnormal to healing s/p     /GI: Pt denies difficulty swallowing, denies constipation, denies urinary retention/difficulty, reports no s/s of infection, report normal appetite and reports staying hydrated.    Respiratory: Pt denies SOB, denies difficulty breathing, denies throat pain, continues to use IS as instructed, denies changes/abnormal sputum and denies any further symptoms abnormal to normal healing process s/p PVI.    Activity: Pt is tolerating advancement in activity while following physical restrictions, staying well hydrated and gradually working into baseline activity.     Rhythm Assessment:   Pt denies palpitations, denies irregularities in HR or rhythm and denies symptoms or sustained AF episodes.    Procedure Site Assessment:   Pts small pea/dime size hardening under suture sites normal to PVI recovery    Anticoagulation/Medication:  Pt remain on Eliquis without interruption  Per guidelines by Dr Joseph no ASA needed upon discharge    Education completed with pt at this visit:  Reviewed normal post-op PVI healing process, when to contact EP-RN/EP-MD, contact information was given to the pt for further concerns or questions and pt verbalized understanding    Follow up  Pts AVS was printed and mailed to pt by scheduling team, pt will be seen by EP NP in 4-6 wks, monitor will be ordered at this follow-up if indicated and 3mo follow-up and monitor will be determined at 6wk follow-up by EP NP    7/5/2022 9:38 AM  Lauren Manuel RN

## 2022-07-05 NOTE — PATIENT INSTRUCTIONS
Your anticoagulation medication Eliquis:  It is important to remain on your anticoagulation medication uninterrupted after your ablation to reduce your risk of a stroke or heart attack, do not stop this medication  Please contact me if you have any questions regarding your anticoagulation medication    Healing from your pulmonary vein ablation:  Stay well hydrated, and increase your fluid intake during this recovery period  High protein foods aide in your bodies healing process  No aggressive or aerobic activity for 7-10 days, and do not lift more than 10 pounds for 7 days   Increase your activity gradually over the next 5-10 days, working back to your normal daily activity  If you are experiencing pain at your groin sites from the procedure, we advise applying ice for 20 minute durations 3-4 times per day     Please call me if any of the following occur:  Episodes of Atrial Fibrillation lasting greater than 4 hours, or if you notice the episodes are increasing in frequency or duration  If you develop shortness of breath, dizziness, or unresolving chest pains   Changes at your groin sites including swelling, hardening, drainage, increase in bruising, or an increase in pain  If you develop a temperature greater than 100.5 degrees (especially weeks 2-5 post   Procedure)    Call 911 if you are having symptoms of a stroke; difficulty with your speech, problems walking, difficulty with balance, vision disturbances, facial drooping or numbness, and muscle weakness on one side of your body     Your follow up appointments are as follows:  You will be seen by the electrophysiologist nurse practitioner at 6 weeks after your ablation  At your 6 week appointment, it will be determined if a 3 month follow-up is needed    Sincerely,  Lauren Manuel RN [phone]    After hours please contact the on call service at # [phone]

## 2022-07-07 ENCOUNTER — VIRTUAL VISIT (OUTPATIENT)
Dept: PSYCHOLOGY | Facility: CLINIC | Age: 57
End: 2022-07-07
Attending: PSYCHIATRY & NEUROLOGY
Payer: COMMERCIAL

## 2022-07-07 DIAGNOSIS — F43.10 POSTTRAUMATIC STRESS DISORDER: Primary | ICD-10-CM

## 2022-07-07 DIAGNOSIS — F41.1 GAD (GENERALIZED ANXIETY DISORDER): ICD-10-CM

## 2022-07-07 DIAGNOSIS — F33.1 MODERATE EPISODE OF RECURRENT MAJOR DEPRESSIVE DISORDER (H): ICD-10-CM

## 2022-07-07 LAB
ATRIAL RATE - MUSE: 91 BPM
DIASTOLIC BLOOD PRESSURE - MUSE: NORMAL MMHG
INTERPRETATION ECG - MUSE: NORMAL
P AXIS - MUSE: 38 DEGREES
PR INTERVAL - MUSE: 172 MS
QRS DURATION - MUSE: 92 MS
QT - MUSE: 368 MS
QTC - MUSE: 452 MS
R AXIS - MUSE: 2 DEGREES
SYSTOLIC BLOOD PRESSURE - MUSE: NORMAL MMHG
T AXIS - MUSE: 28 DEGREES
VENTRICULAR RATE- MUSE: 91 BPM

## 2022-07-07 PROCEDURE — 90834 PSYTX W PT 45 MINUTES: CPT | Mod: 95 | Performed by: COUNSELOR

## 2022-07-08 NOTE — PROGRESS NOTES
[facility]                                     Progress Note    Patient Name: Alina Martell  Date: 7/07/22         Service Type: Individual      Session Start Time: 105  Session End Time: 154     Session Length:  49 minutes    Session #: 38    Attendees: Client    Service Modality:  Video Visit:      Provider verified identity through the following two step process.  Patient provided:  Patient is known previously to provider     Telemedicine Visit: The patient's condition can be safely assessed and treated via synchronous audio and visual telemedicine encounter.       Reason for Telemedicine Visit: Services only offered telehealth     Originating Site (Patient Location): Patient's home     Distant Site (Provider Location): [facility]     Consent:  The patient/guardian has verbally consented to: the potential risks and benefits of telemedicine (video visit) versus in person care; bill my insurance or make self-payment for services provided; and responsibility for payment of non-covered services.      Patient would like the video invitation sent by:  Text to cell phone:       Mode of Communication:  Video Conference via [employer]     As the provider I attest to compliance with applicable laws and regulations related to telemedicine.       DATA  Interactive Complexity: No  Crisis: No        Progress Since Last Session (Related to Symptoms / Goals / Homework):   Symptoms: No change depression and anxiety presenting since last session    Homework: Partially completed      Episode of Care Goals: Satisfactory progress - ACTION (Actively working towards change); Intervened by reinforcing change plan / affirming steps taken     Current / Ongoing Stressors and Concerns:  Patient indicated feeling some anxiety. Patient reported her procedure went well. Patient indicated she is still recovering and needs to remind herself to take it easy. Patient    Problem: Nutrition Impaired (Sepsis/Septic Shock)  Goal: Optimal Nutrition Intake  Outcome: Ongoing, Progressing     Problem: Infection Progression (Sepsis/Septic Shock)  Goal: Absence of Infection Signs and Symptoms  Outcome: Ongoing, Progressing     Problem: Infection  Goal: Absence of Infection Signs and Symptoms  Outcome: Ongoing, Progressing      reported she has always struggled to ask for help. Patient indicated she did go off on her mom's . Patient talked about her emotions related to her mom's  and his behaviors. This therapist challenged patient on ways he is taking on too much.      Treatment Objective(s) Addressed in This Session:   identify three distraction and diversion activities and use those activities to decrease level of anxiety    Increase interest, engagement, and pleasure in doing things  Decrease frequency and intensity of feeling down, depressed, hopeless  Improve quantity and quality of night time sleep / decrease daytime naps  Identify negative self-talk and behaviors: challenge core beliefs, myths, and actions     Intervention:   Motivational Interviewing    MI Intervention: Open-ended questions     Change Talk Expressed by the Patient: Taking steps    Provider Response to Change Talk: R - Reflected patient's change talk and S - Summarized patient's change talk statements      Assessments completed prior to visit:  The following assessments were completed by patient for this visit:  PHQ9:   PHQ-9 SCORE 2/3/2022 2/14/2022 3/8/2022 4/14/2022 5/12/2022 6/7/2022 6/21/2022   PHQ-9 Total Score MyChart 6 (Mild depression) 7 (Mild depression) 6 (Mild depression) 4 (Minimal depression) 7 (Mild depression) 4 (Minimal depression) 6 (Mild depression)   PHQ-9 Total Score 6 7 6 4 7 4 6     GAD7:   ADA-7 SCORE 3/7/2021 3/31/2021 4/7/2021 4/28/2021 10/5/2021 11/15/2021 12/13/2021   Total Score 4 (minimal anxiety) - - - 3 (minimal anxiety) 6 (mild anxiety) -   Total Score 4 6 5 4 3 6 4     PROMIS 10-Global Health (all questions and answers displayed):   PROMIS 10 12/15/2021 3/26/2022 4/14/2022   In general, would you say your health is: Good Good Good   In general, would you say your quality of life is: Good Good Good   In general, how would you rate your physical health? Good Fair Fair   In general, how would you rate your mental health,  including your mood and your ability to think? Fair Fair Good   In general, how would you rate your satisfaction with your social activities and relationships? Good Fair Good   In general, please rate how well you carry out your usual social activities and roles Good Good Fair   To what extent are you able to carry out your everyday physical activities such as walking, climbing stairs, carrying groceries, or moving a chair? Mostly Mostly Mostly   How often have you been bothered by emotional problems such as feeling anxious, depressed or irritable? Often Sometimes Often   How would you rate your fatigue on average? Mild Mild Mild   How would you rate your pain on average?   0 = No Pain  to  10 = Worst Imaginable Pain 3 3 3   In general, would you say your health is: 3 3 3   In general, would you say your quality of life is: 3 3 3   In general, how would you rate your physical health? 3 2 2   In general, how would you rate your mental health, including your mood and your ability to think? 2 2 3   In general, how would you rate your satisfaction with your social activities and relationships? 3 2 3   In general, please rate how well you carry out your usual social activities and roles. (This includes activities at home, at work and in your community, and responsibilities as a parent, child, spouse, employee, friend, etc.) 3 3 2   To what extent are you able to carry out your everyday physical activities such as walking, climbing stairs, carrying groceries, or moving a chair? 4 4 4   In the past 7 days, how often have you been bothered by emotional problems such as feeling anxious, depressed, or irritable? 4 3 4   In the past 7 days, how would you rate your fatigue on average? 2 2 2   In the past 7 days, how would you rate your pain on average, where 0 means no pain, and 10 means worst imaginable pain? 3 3 3   Global Mental Health Score 10 10 11   Global Physical Health Score 15 14 14   PROMIS TOTAL - SUBSCORES 25 24 25    Some recent data might be hidden         ASSESSMENT: Current Emotional / Mental Status (status of significant symptoms):   Risk status (Self / Other harm or suicidal ideation)   Patient denies current fears or concerns for personal safety.   Patient denies current or recent suicidal ideation or behaviors.   Patient denies current or recent homicidal ideation or behaviors.   Patient denies current or recent self injurious behavior or ideation.   Patient denies other safety concerns.   Patient reports there has been no change in risk factors since their last session.     Patient reports there has been no change in protective factors since their last session.     Recommended that patient call 911 or go to the local ED should there be a change in any of these risk factors.     Appearance:   Appropriate    Eye Contact:   Good    Psychomotor Behavior: Normal    Attitude:   Cooperative    Orientation:   All   Speech    Rate / Production: Normal/ Responsive    Volume:  Normal    Mood:    Anxious  Depressed    Affect:    Appropriate    Thought Content:  Clear    Thought Form:  Coherent  Goal Directed  Logical    Insight:    Good      Medication Review:   No changes to current psychiatric medication(s)     Medication Compliance:   Yes     Changes in Health Issues:   None reported     Chemical Use Review:   Substance Use: Chemical use reviewed, no active concerns identified      Tobacco Use: No current tobacco use.      Diagnosis:  1. Posttraumatic stress disorder    2. Moderate episode of recurrent major depressive disorder (H)        Collateral Reports Completed:   Not Applicable    PLAN: (Patient Tasks / Therapist Tasks / Other)  Patient will return in 2 weeks for scheduled session. Patient will continue to ask for help. Patient will continue to process her emotions related to her mom's .     There has been demonstrated improvement in functioning while patient has been engaged in psychotherapy/psychological  service- if withdrawn the patient would deteriorate and/or relapse.     Mariana Love, [facility],    ______________________________________________________________________    Individual Treatment Plan    Patient's Name: Alina Martell  YOB: 1965    Date of Creation:10/16/2020  Date Treatment Plan Last Reviewed/Revised: 05/12/2022    DSM5 Diagnoses: 296.32 (F33.1) Major Depressive Disorder, Recurrent Episode, Moderate _ or 300.02 (F41.1) Generalized Anxiety Disorder  Psychosocial / Contextual Factors: Health, family and financial   PROMIS (reviewed every 90 days): 25    Referral / Collaboration:  Was/were discussed and patient will pursue.    Anticipated number of session for this episode of care: 20 will reevaluate every 90 days  Anticipation frequency of session: Biweekly  Anticipated Duration of each session: 38-52 minutes  Treatment plan will be reviewed in 90 days or when goals have been changed.       MeasurableTreatment Goal(s) related to diagnosis / functional impairment(s)  Goal 1: Patient will work on reducing overall anxiety.     I will know I've met my goal when reporting minimal anxiety symptoms.       Objective #A (Patient Action)                          Patient will use distraction each time intrusive worry surfaces.  Status: Continued - Date(s): 05/12/2022     Intervention(s)  Therapist will teach emotional regulation skills. ..     Objective #B  Patient will Decrease frequency and intensity of feeling down, depressed, hopeless.  Status: Continued - Date(s):  05/12/2022     Intervention(s)  Therapist will teach emotional recognition/identification. ..     Objective #C  Patient will Identify negative self-talk and behaviors: challenge core beliefs, myths, and actions.  Status: Continued - Date(s): 05/12/2022     Intervention(s)  Therapist will teach the client how to perform a behavioral chain analysis. ..     Goal 2: Patient will continue to work on reducing depression  symptoms    I will know I've met my goal then reporting minimal depression symptoms     Objective #A (Patient Action)                          Patient will Increase interest, engagement, and pleasure in doing things.  Status: Continued - Date(s):  05/12/2022     Intervention(s)  Therapist will assign homework ..     Objective #B  Patient will Decrease frequency and intensity of feeling down, depressed, hopeless.  Status: Continued - Date(s):  05/12/2022     Intervention(s)  Therapist will assign homework at every session.         Patient has reviewed and agreed to the above plan.        Mariana Love, [facility] May 12, 2022   [facility]: I discussed with the patient the risks of risankizumab-rzaa including but not limited to immunosuppression, and serious infections.  The patient understands that monitoring is required including a PPD at baseline and must alert us or the primary physician if symptoms of infection or other concerning signs are noted.

## 2022-07-19 ENCOUNTER — VIRTUAL VISIT (OUTPATIENT)
Dept: PSYCHOLOGY | Facility: CLINIC | Age: 57
End: 2022-07-19
Attending: PSYCHIATRY & NEUROLOGY
Payer: COMMERCIAL

## 2022-07-19 DIAGNOSIS — F41.1 GAD (GENERALIZED ANXIETY DISORDER): ICD-10-CM

## 2022-07-19 DIAGNOSIS — F43.10 POSTTRAUMATIC STRESS DISORDER: Primary | ICD-10-CM

## 2022-07-19 DIAGNOSIS — F33.1 MODERATE EPISODE OF RECURRENT MAJOR DEPRESSIVE DISORDER (H): ICD-10-CM

## 2022-07-19 PROCEDURE — 90834 PSYTX W PT 45 MINUTES: CPT | Mod: 95 | Performed by: COUNSELOR

## 2022-07-20 NOTE — PROGRESS NOTES
[facility]                                     Progress Note    Patient Name: Alina Martell  Date: 7/19/22         Service Type: Individual      Session Start Time:  1205 Session End Time: 1250     Session Length:  45 minutes    Session #: 39    Attendees: Client    Service Modality:  Video Visit:      Provider verified identity through the following two step process.  Patient provided:  Patient is known previously to provider     Telemedicine Visit: The patient's condition can be safely assessed and treated via synchronous audio and visual telemedicine encounter.       Reason for Telemedicine Visit: Services only offered telehealth     Originating Site (Patient Location): Patient's home     Distant Site (Provider Location): [facility]     Consent:  The patient/guardian has verbally consented to: the potential risks and benefits of telemedicine (video visit) versus in person care; bill my insurance or make self-payment for services provided; and responsibility for payment of non-covered services.      Patient would like the video invitation sent by:  Text to cell phone:       Mode of Communication:  Video Conference via Amwell     As the provider I attest to compliance with applicable laws and regulations related to telemedicine.       DATA  Interactive Complexity: No  Crisis: No        Progress Since Last Session (Related to Symptoms / Goals / Homework):   Symptoms: No change anxiety high    Homework: Partially completed      Episode of Care Goals: Satisfactory progress - ACTION (Actively working towards change); Intervened by reinforcing change plan / affirming steps taken     Current / Ongoing Stressors and Concerns:  Patient reported her anxiety is high. Patient indicated being unsure of any certain triggers with her anxiety. Patient reported she has been thinking a lot about if she wants a relationship. Patient indicated  starting to feel some pressure to be in one. Patient reported knowing she needs to set her boundaries. Patient indicated she continues to be in tune with her emotions and taking care of herself. This therapist processed with patient the importance of continuing to use skills taught in session.      Treatment Objective(s) Addressed in This Session:   identify three distraction and diversion activities and use those activities to decrease level of anxiety    Increase interest, engagement, and pleasure in doing things  Decrease frequency and intensity of feeling down, depressed, hopeless  Improve quantity and quality of night time sleep / decrease daytime naps  Identify negative self-talk and behaviors: challenge core beliefs, myths, and actions     Intervention:   Motivational Interviewing    MI Intervention: Reflections: simple and complex     Change Talk Expressed by the Patient: Taking steps    Provider Response to Change Talk: R - Reflected patient's change talk and S - Summarized patient's change talk statements      Assessments completed prior to visit:  The following assessments were completed by patient for this visit:  PHQ9:   PHQ-9 SCORE 2/3/2022 2/14/2022 3/8/2022 4/14/2022 5/12/2022 6/7/2022 6/21/2022   PHQ-9 Total Score MyChart 6 (Mild depression) 7 (Mild depression) 6 (Mild depression) 4 (Minimal depression) 7 (Mild depression) 4 (Minimal depression) 6 (Mild depression)   PHQ-9 Total Score 6 7 6 4 7 4 6     GAD7:   ADA-7 SCORE 3/7/2021 3/31/2021 4/7/2021 4/28/2021 10/5/2021 11/15/2021 12/13/2021   Total Score 4 (minimal anxiety) - - - 3 (minimal anxiety) 6 (mild anxiety) -   Total Score 4 6 5 4 3 6 4     PROMIS 10-Global Health (all questions and answers displayed):   PROMIS 10 12/15/2021 3/26/2022 4/14/2022 7/19/2022   In general, would you say your health is: Good Good Good Good   In general, would you say your quality of life is: Good Good Good Fair   In general, how would you rate your physical  health? Good Fair Fair Good   In general, how would you rate your mental health, including your mood and your ability to think? Fair Fair Good Fair   In general, how would you rate your satisfaction with your social activities and relationships? Good Fair Good Good   In general, please rate how well you carry out your usual social activities and roles Good Good Fair Good   To what extent are you able to carry out your everyday physical activities such as walking, climbing stairs, carrying groceries, or moving a chair? Mostly Mostly Mostly Mostly   How often have you been bothered by emotional problems such as feeling anxious, depressed or irritable? Often Sometimes Often Often   How would you rate your fatigue on average? Mild Mild Mild Moderate   How would you rate your pain on average?   0 = No Pain  to  10 = Worst Imaginable Pain 3 3 3 3   In general, would you say your health is: 3 3 3 3   In general, would you say your quality of life is: 3 3 3 2   In general, how would you rate your physical health? 3 2 2 3   In general, how would you rate your mental health, including your mood and your ability to think? 2 2 3 2   In general, how would you rate your satisfaction with your social activities and relationships? 3 2 3 3   In general, please rate how well you carry out your usual social activities and roles. (This includes activities at home, at work and in your community, and responsibilities as a parent, child, spouse, employee, friend, etc.) 3 3 2 3   To what extent are you able to carry out your everyday physical activities such as walking, climbing stairs, carrying groceries, or moving a chair? 4 4 4 4   In the past 7 days, how often have you been bothered by emotional problems such as feeling anxious, depressed, or irritable? 4 3 4 4   In the past 7 days, how would you rate your fatigue on average? 2 2 2 3   In the past 7 days, how would you rate your pain on average, where 0 means no pain, and 10 means  worst imaginable pain? 3 3 3 3   Global Mental Health Score 10 10 11 9   Global Physical Health Score 15 14 14 14   PROMIS TOTAL - SUBSCORES 25 24 25 23   Some recent data might be hidden         ASSESSMENT: Current Emotional / Mental Status (status of significant symptoms):   Risk status (Self / Other harm or suicidal ideation)   Patient denies current fears or concerns for personal safety.   Patient denies current or recent suicidal ideation or behaviors.   Patient denies current or recent homicidal ideation or behaviors.   Patient denies current or recent self injurious behavior or ideation.   Patient denies other safety concerns.   Patient reports there has been no change in risk factors since their last session.     Patient reports there has been no change in protective factors since their last session.     Recommended that patient call 911 or go to the local ED should there be a change in any of these risk factors.     Appearance:   Appropriate    Eye Contact:   Good    Psychomotor Behavior: Normal    Attitude:   Cooperative    Orientation:   All   Speech    Rate / Production: Normal/ Responsive    Volume:  Normal    Mood:    Anxious  Depressed    Affect:    Appropriate    Thought Content:  Clear    Thought Form:  Coherent  Goal Directed  Logical    Insight:    Good      Medication Review:   No changes to current psychiatric medication(s)     Medication Compliance:   Yes     Changes in Health Issues:   None reported     Chemical Use Review:   Substance Use: Chemical use reviewed, no active concerns identified      Tobacco Use: No current tobacco use.      Diagnosis:  1. Posttraumatic stress disorder    2. Moderate episode of recurrent major depressive disorder (H)    3. ADA (generalized anxiety disorder)        Collateral Reports Completed:   Not Applicable    PLAN: (Patient Tasks / Therapist Tasks / Other)  Patient will return in 2 weeks for scheduled session. Patient will continue to work on self-care.  Patient should continue to identify what/if she wants in a relationship.     There has been demonstrated improvement in functioning while patient has been engaged in psychotherapy/psychological service- if withdrawn the patient would deteriorate and/or relapse.     Mariana Love, [facility],    ______________________________________________________________________    Individual Treatment Plan    Patient's Name: Alina Martell  YOB: 1965    Date of Creation:10/16/2020  Date Treatment Plan Last Reviewed/Revised: 05/12/2022    DSM5 Diagnoses: 296.32 (F33.1) Major Depressive Disorder, Recurrent Episode, Moderate _ or 300.02 (F41.1) Generalized Anxiety Disorder  Psychosocial / Contextual Factors: Health, family and financial   PROMIS (reviewed every 90 days): 25    Referral / Collaboration:  Was/were discussed and patient will pursue.    Anticipated number of session for this episode of care: 20 will reevaluate every 90 days  Anticipation frequency of session: Biweekly  Anticipated Duration of each session: 38-52 minutes  Treatment plan will be reviewed in 90 days or when goals have been changed.       MeasurableTreatment Goal(s) related to diagnosis / functional impairment(s)  Goal 1: Patient will work on reducing overall anxiety.     I will know I've met my goal when reporting minimal anxiety symptoms.       Objective #A (Patient Action)                          Patient will use distraction each time intrusive worry surfaces.  Status: Continued - Date(s): 05/12/2022     Intervention(s)  Therapist will teach emotional regulation skills. ..     Objective #B  Patient will Decrease frequency and intensity of feeling down, depressed, hopeless.  Status: Continued - Date(s):  05/12/2022     Intervention(s)  Therapist will teach emotional recognition/identification. ..     Objective #C  Patient will Identify negative self-talk and behaviors: challenge core beliefs, myths, and actions.  Status: Continued -  Date(s): 05/12/2022     Intervention(s)  Therapist will teach the client how to perform a behavioral chain analysis. ..     Goal 2: Patient will continue to work on reducing depression symptoms    I will know I've met my goal then reporting minimal depression symptoms     Objective #A (Patient Action)                          Patient will Increase interest, engagement, and pleasure in doing things.  Status: Continued - Date(s):  05/12/2022     Intervention(s)  Therapist will assign homework ..     Objective #B  Patient will Decrease frequency and intensity of feeling down, depressed, hopeless.  Status: Continued - Date(s):  05/12/2022     Intervention(s)  Therapist will assign homework at every session.         Patient has reviewed and agreed to the above plan.        Mariana Love, [facility] May 12, 2022

## 2022-07-26 ENCOUNTER — VIRTUAL VISIT (OUTPATIENT)
Dept: GASTROENTEROLOGY | Facility: CLINIC | Age: 57
End: 2022-07-26
Payer: COMMERCIAL

## 2022-07-26 DIAGNOSIS — R13.19 ESOPHAGEAL DYSPHAGIA: ICD-10-CM

## 2022-07-26 DIAGNOSIS — R11.0 NAUSEA: ICD-10-CM

## 2022-07-26 DIAGNOSIS — R10.13 EPIGASTRIC PAIN: Primary | ICD-10-CM

## 2022-07-26 PROCEDURE — 99215 OFFICE O/P EST HI 40 MIN: CPT | Mod: GT | Performed by: PHYSICIAN ASSISTANT

## 2022-07-26 NOTE — LETTER
7/26/2022         RE: Alina Martell  [address] [place] [address]        Dear Colleague,    Thank you for referring your patient, Alina Martell, to the [facility] [place]. Please see a copy of my visit note below.    GI CLINIC VISIT    CC/REFERRING PROVIDER: Lulu Reyna    HPI: 57 year old female with PMH of rheumatoid arthritis on Humiara, class II obesity, paroxymal atrial fibrillation on chronic anticoagulation with DOAC, who is s/p CCY presenting to GI clinic for abdominal pain and consideration of EGD.    Maritza underwent D+C March 3, 2022 for menorrhagia, and shortly after, developed epigastric pain.  She describes that initially, it felt like food was getting stuck low in the substernal area with most everything she ate, which would then regurgitate as an undigested, somewhat sour bolus. This then progressed to a non-radiating, intermittent epigastric pain that feels like getting punched in the stomach. It relably occurs with fating, and improves if she eats something. It can also occur if she eats a larger volume of food. She denies any significant GERD symptoms, though she was having some occasional episodes at start of symptoms. No odynophagia. She has nausea that has been intermittent and random in occurrence, not always related to the epigastric pain. She is using Zofran for this. She states that until these symptoms began, she had regular bowel movements 2-3 times per day without any associated concerns, but now is having bowel movements every 2-3 days, associated with hard, dry stools, incomplete evacuation, and straining. No BRBPR. She states that at first note of epigastric pain, there were some dark stools, which have not recurred.    Work-up has included a CT AP, which was unremarkable. H pylori stool antigen negative (on PPI for several days). CBC with mild anemia and new mild leukocytosis. CMP, lipase, TSH, lactic acid unremarkable. She was  started on PPI 40 daily with sucralfate. This has resulted in mild improvement in symptoms. She is still experiencing the epigastric discomfort with fasting, which can awaken her from sleep, and continues to have a sense of possible dysphagia to solid textures 2-3 times per week, which feels stuck for 10-15 minutes and then either clears or regurgitates as undigested, sour bolus.     Colonoscopy 2/2021 - repeat 3 years for 1 sessile serrated adenoma    Does not take NSAID    FHx - mom with Crohn's with ileostomy, multiple autoimmune conditions in family    Interval history:  She underwent EGD May 2022, which was endoscopically unremarkable. Gastric biopsies with mild reactive gastropathy with mild chronic inactive gastritis with negative biopsies for H pylori. Duodenal biopsies unremarkable. Esophageal biopsies were unremarkable.    Since last visit, she underwent ablation for A fib. She has had minimal epigastric pain since initial consult and continuation of PPI. She has occasional nausea, which she relates to the Eliquis, as it started in conjunction with initiation of that medication. She used MiraLAX for several weeks with normalization of bowel habits and has been able to successfully stop MiraLAX with ongoing satisfactory bowel habits, no BRBPR or melena. She has had no A fib episodes. Dysphagia also significantly improved, now maybe once per week to pills and clears more rapidly than previous. With resolution of A fib episodes, she has been able to be more active with exercise, and has also made some dietary changes.      ROS: 10pt ROS performed and otherwise negative.    PAST MEDICAL HISTORY:  Past Medical History:   Diagnosis Date     Anemia      Antiplatelet or antithrombotic long-term use      Arrhythmia      Cannabis use without complication 12/8/2014     Carpal tunnel syndrome      Coronary artery disease      Gastroesophageal reflux disease      History of angina      Hypertension      Irregular heart  beat      Obesity      Paroxysmal atrial fibrillation (H)      PTSD (post-traumatic stress disorder)      RA (rheumatoid arthritis) (H)      Rheumatoid arthritis (H) 7/8/2016     Sicca syndrome (H)      Sleep apnea        PREVIOUS ABDOMINAL/GYNECOLOGIC SURGERIES:    Past Surgical History:   Procedure Laterality Date     CHOLECYSTECTOMY       DILATION AND CURETTAGE, OPERATIVE HYSTEROSCOPY, COMBINED N/A 3/3/2022    Procedure: HYSTEROSCOPY, WITH DILATION AND CURETTAGE;  Surgeon: Irma Cary MD;  Location: [facility]     EP ABLATION FOCAL AFIB N/A 6/30/2022    Procedure: Ablation Atrial Fibrilation;  Surgeon: Jose Guadalupe Joseph MD;  Location:  [facility]     HC REMOVAL GALLBLADDER      Description: Cholecystectomy;  Recorded: 10/15/2013;     LAPAROSCOPIC TUBAL LIGATION       RELEASE CARPAL TUNNEL BILATERAL       TUBAL LIGATION  1995         PERTINENT MEDICATIONS:  Current Outpatient Medications   Medication Sig Dispense Refill     acetaminophen (TYLENOL) 325 MG tablet Take 3 tablets (975 mg) by mouth every 6 hours as needed for mild pain 50 tablet 0     adalimumab (HUMIRA *CF* PEN) 40 MG/0.4ML pen kit INJECT 1 PEN UNDER THE SKIN EVERY 14 DAYS. HOLD FOR SIGNS OF INFECTION, THEN SEEK MEDICAL ATTENTION. 2 each 3     apixaban ANTICOAGULANT (ELIQUIS ANTICOAGULANT) 5 MG tablet Take 1 tablet (5 mg) by mouth 2 times daily 180 tablet 1     diltiazem ER COATED BEADS (CARDIZEM CD/CARTIA XT) 180 MG 24 hr capsule Take 2 capsules (360 mg) by mouth daily 180 capsule 3     EPINEPHrine (ANY BX GENERIC EQUIV) 0.3 MG/0.3ML injection 2-pack Inject 0.3 mg into the muscle as needed for anaphylaxis        flecainide (TAMBOCOR) 50 MG tablet Take 1 tablet (50 mg) by mouth 2 times daily as needed (at onset of a fib, may repeat once after 4 hours) 60 tablet 1     hydrOXYzine (ATARAX) 25 MG tablet Take 1 tablet (25 mg) by mouth 3 times daily as needed for anxiety 270 tablet 3     loratadine (CLARITIN) 10 MG  tablet Take 10 mg by mouth daily        Multiple Vitamins-Minerals (CENTROVITE) TABS TAKE 1 TABLET BY MOUTH EVERY DAY FOR 30 DAYS       omeprazole (PRILOSEC) 40 MG DR capsule Take 1 capsule (40 mg) by mouth in the morning. 90 capsule 3     ondansetron (ZOFRAN-ODT) 4 MG ODT tab Take 1-2 tablets (4-8 mg) by mouth every 8 hours as needed for nausea 4 tablet 0     polyethylene glycol (MIRALAX) 17 GM/Dose powder Take 17 g (1 capful) by mouth daily 578 g 3     prazosin (MINIPRESS) 1 MG capsule Take 1 capsule by mouth nightly as needed        traZODone (DESYREL) 50 MG tablet Take 0.5 tablets (25 mg) by mouth At Bedtime 90 tablet 0     vitamin C (ASCORBIC ACID) 1000 MG TABS Take 1,000 mg by mouth daily        vitamin D3 (CHOLECALCIFEROL) 250 mcg (99363 units) capsule Take 1 capsule by mouth once a week         SOCIAL HISTORY:    Social History     Socioeconomic History     Marital status: Single     Spouse name: Not on file     Number of children: 2     Years of education: 4     Highest education level: Not on file   Occupational History     Not on file   Tobacco Use     Smoking status: Never Smoker     Smokeless tobacco: Never Used     Tobacco comment: smokes marijuana   Substance and Sexual Activity     Alcohol use: Not Currently     Comment: Alcoholic Drinks/day: Never an issue, she states.  7/8/16     Drug use: Not Currently     Types: Marijuana     Comment: Drug use: Former marijuana use, not current. 7/8/16     Sexual activity: Never     Partners: Male     Birth control/protection: Other     Comment: tubal ligation   Other Topics Concern     Parent/sibling w/ CABG, MI or angioplasty before 65F 55M? Not Asked   Social History Narrative     Not on file     Social Determinants of Health     Financial Resource Strain: Not on file   Food Insecurity: Not on file   Transportation Needs: Not on file   Physical Activity: Not on file   Stress: Not on file   Social Connections: Not on file   Intimate Partner Violence: Not on  file   Housing Stability: Not on file       FAMILY HISTORY:    Family History   Problem Relation Age of Onset     Crohn's Disease Mother         Total colectomy with ileostomy.     Atrial fibrillation Mother      Snoring Father      Diabetes Father      Prostate Cancer Father      Chronic Kidney Disease Father         Chose against dialysis.     Substance Abuse Brother      Depression Brother      Attention Deficit Disorder Brother      Alcoholism Brother      Arrhythmia Brother      Alcoholism Brother      Substance Abuse Brother      Arrhythmia Brother      Alcoholism Maternal Grandfather      No Known Problems Daughter      No Known Problems Son      Lupus Cousin      Breast Cancer No family hx of        PHYSICAL EXAMINATION:  Vitals reviewed  LMP 12/21/2021   Video physical exam  General: Patient appears well in no acute distress.   Skin: No visualized rash or lesions on visualized skin  Eyes: EOMI, no erythema, sclera icterus or discharge noted  Resp: Appears to be breathing comfortably without accessory muscle usage, speaking in full sentences, no cough  MSK: Appears to have normal range of motion based on visualized movements  Neurologic: No apparent tremors, facial movements symmetric  Psych: affect normal, alert and oriented    The rest of a comprehensive physical examination is deferred due to PHE (public health emergency) video restrictions      PERTINENT STUDIES Reviewed in EMR    ASSESSMENT/PLAN:  57 year old female with PMH of rheumatoid arthritis on Humiara, class II obesity, paroxymal atrial fibrillation on anticoagulation, who is s/p CCY presenting to GI clinic for abdominal pain and consideration of EGD.    # Epigastric discomfort  # Nausea  Onset of epigastric discomfort and nausea following initiation of Eliquis and after endometrial ablation, with unremarkable work-up including CT AP, H plyori stool antigen, CBC, CMP. EGD endoscopically unremarkable with mild reactive gastropathy noted on  gastric biopsies. Symptoms have further improved on PPI with minimal symptoms at this time. We discussed continuing with PPI for at least 6 months or so for possible functional dyspepsia, at which time we can attempt to discontinue.    # Esophageal dysphagia  Previously with intermittent dysphagia to solids and pills, as noted above, EGD including esophageal biopsies was unremarkable. Symptoms have significantly improved on PPI. We discussed the utility of further testing including TBE +/- HRM, which she prefers to hold off for now. Will conitnue to monitor.    # Constipation  Resolved    RTC 3-4 months    Thank you for this consultation. It was a pleasure to participate in the care of this patient; please contact us with any further questions.      Radha Dsouza PA-C      40 minutes spent on the date of the encounter doing chart review, review of outside records, review of test results, interpretation of tests, patient visit and documentation

## 2022-07-26 NOTE — PATIENT INSTRUCTIONS
It was a pleasure taking care of you today.  I've included a brief summary of our discussion and care plan from today's visit below.  Please review this information with your primary care provider.  _______________________________________________________________________    My recommendations are summarized as follows:    -- Continue the omeprazole for now, at least through October, at which point we can reconvene and decide if we are in a good place to stop this, or continue it. If you do attempt to stop it before we meet next, know that you can get some rebound heartburn, acid reflux, and stomach pain symptoms for several days. If this occurs, you can use over-the-counter antacids such as Tums, Pepcid.    -- If the feeling of pills/food getting stuck gets worse or starts to bother you, let me know and we can arrange a swallow test called an esophagram    Return to GI Clinic in 4 months to review your progress.    ______________________________________________________________________    How do I schedule labs, imaging studies, or procedures that were ordered in clinic today?     Labs: To schedule lab appointment at the [facility], use my chart or call [phone]. If you have a [place] lab closer to home where you are regularly seen you can give them a call.     Procedures: If a colonoscopy, upper endoscopy, breath test, esophageal manometry, or pH impedence was ordered today, our endoscopy team will call you to schedule this. If you have not heard from our endoscopy team within a week, please call [phone] to schedule.     Imaging Studies: If you were scheduled for a CT scan, X-ray, MRI, ultrasound, HIDA scan or other imaging study, please call [phone] to have this scheduled.     Referral: If a referral to another specialty was ordered, expect a phone call or follow instructions above. If you have not heard from anyone regarding your referral in a week, please call our clinic to check  the status.     Who do I call with any questions after my visit?  Please be in touch if there are any further questions that arise following today's visit.  There are multiple ways to contact your gastroenterology care team.      During business hours, you may reach a Gastroenterology nurse at [phone]    To schedule or reschedule an appointment, please call [phone].     You can always send a secure message through [employer].  [employer] messages are answered by your nurse or doctor typically within 24 hours.  Please allow extra time on weekends and holidays.      For urgent/emergent questions after business hours, you may reach the on-call GI Fellow by contacting the [facility]  at [phone].     How will I get the results of any tests ordered?    You will receive all of your results.  If you have signed up for [employer], any tests ordered at your visit will be available to you after your physician reviews them.  Typically this takes 1-2 weeks.  If there are urgent results that require a change in your care plan, your physician or nurse will call you to discuss the next steps.      What is [employer]?  [employer] is a secure way for you to access all of your healthcare records from the [facility].  It is a web based computer program, so you can sign on to it from any location.  It also allows you to send secure messages to your care team.  I recommend signing up for [employer] access if you have not already done so and are comfortable with using a computer.      How to I schedule a follow-up visit?  If you did not schedule a follow-up visit today, please call [phone] to schedule a follow-up office visit.      Sincerely,    Radha Dsouza PA-C  Division of Gastroenterology, Hepatology & Nutrition  [facility]

## 2022-07-26 NOTE — PROGRESS NOTES
Maritza is a 57 year old who is being evaluated via a billable video visit.      How would you like to obtain your AVS? MyChart  If the video visit is dropped, the invitation should be resent by: Text to cell phone: [phone]  Will anyone else be joining your video visit? No      Video-Visit Details    Video Start Time: 705    Type of service:  Video Visit    Video End Time:732    Originating Location (pt. Location): Home    Distant Location (provider location):  [facility] [place]     Platform used for Video Visit: [employer]       GI CLINIC VISIT    CC/REFERRING PROVIDER: Lulu Reyna    HPI: 57 year old female with PMH of rheumatoid arthritis on Humiara, class II obesity, paroxymal atrial fibrillation on chronic anticoagulation with DOAC, who is s/p CCY presenting to GI clinic for abdominal pain and consideration of EGD.    Maritza underwent D+C March 3, 2022 for menorrhagia, and shortly after, developed epigastric pain.  She describes that initially, it felt like food was getting stuck low in the substernal area with most everything she ate, which would then regurgitate as an undigested, somewhat sour bolus. This then progressed to a non-radiating, intermittent epigastric pain that feels like getting punched in the stomach. It relably occurs with fating, and improves if she eats something. It can also occur if she eats a larger volume of food. She denies any significant GERD symptoms, though she was having some occasional episodes at start of symptoms. No odynophagia. She has nausea that has been intermittent and random in occurrence, not always related to the epigastric pain. She is using Zofran for this. She states that until these symptoms began, she had regular bowel movements 2-3 times per day without any associated concerns, but now is having bowel movements every 2-3 days, associated with hard, dry stools, incomplete evacuation, and straining. No BRBPR. She states that at first  note of epigastric pain, there were some dark stools, which have not recurred.    Work-up has included a CT AP, which was unremarkable. H pylori stool antigen negative (on PPI for several days). CBC with mild anemia and new mild leukocytosis. CMP, lipase, TSH, lactic acid unremarkable. She was started on PPI 40 daily with sucralfate. This has resulted in mild improvement in symptoms. She is still experiencing the epigastric discomfort with fasting, which can awaken her from sleep, and continues to have a sense of possible dysphagia to solid textures 2-3 times per week, which feels stuck for 10-15 minutes and then either clears or regurgitates as undigested, sour bolus.     Colonoscopy 2/2021 - repeat 3 years for 1 sessile serrated adenoma    Does not take NSAID    FHx - mom with Crohn's with ileostomy, multiple autoimmune conditions in family    Interval history:  She underwent EGD May 2022, which was endoscopically unremarkable. Gastric biopsies with mild reactive gastropathy with mild chronic inactive gastritis with negative biopsies for H pylori. Duodenal biopsies unremarkable. Esophageal biopsies were unremarkable.    Since last visit, she underwent ablation for A fib. She has had minimal epigastric pain since initial consult and continuation of PPI. She has occasional nausea, which she relates to the Eliquis, as it started in conjunction with initiation of that medication. She used MiraLAX for several weeks with normalization of bowel habits and has been able to successfully stop MiraLAX with ongoing satisfactory bowel habits, no BRBPR or melena. She has had no A fib episodes. Dysphagia also significantly improved, now maybe once per week to pills and clears more rapidly than previous. With resolution of A fib episodes, she has been able to be more active with exercise, and has also made some dietary changes.      ROS: 10pt ROS performed and otherwise negative.    PAST MEDICAL HISTORY:  Past Medical History:    Diagnosis Date     Anemia      Antiplatelet or antithrombotic long-term use      Arrhythmia      Cannabis use without complication 12/8/2014     Carpal tunnel syndrome      Coronary artery disease      Gastroesophageal reflux disease      History of angina      Hypertension      Irregular heart beat      Obesity      Paroxysmal atrial fibrillation (H)      PTSD (post-traumatic stress disorder)      RA (rheumatoid arthritis) (H)      Rheumatoid arthritis (H) 7/8/2016     Sicca syndrome (H)      Sleep apnea        PREVIOUS ABDOMINAL/GYNECOLOGIC SURGERIES:    Past Surgical History:   Procedure Laterality Date     CHOLECYSTECTOMY       DILATION AND CURETTAGE, OPERATIVE HYSTEROSCOPY, COMBINED N/A 3/3/2022    Procedure: HYSTEROSCOPY, WITH DILATION AND CURETTAGE;  Surgeon: Irma Cary MD;  Location: [facility]     EP ABLATION FOCAL AFIB N/A 6/30/2022    Procedure: Ablation Atrial Fibrilation;  Surgeon: Jose Guadalupe Joseph MD;  Location:  [facility]     HC REMOVAL GALLBLADDER      Description: Cholecystectomy;  Recorded: 10/15/2013;     LAPAROSCOPIC TUBAL LIGATION       RELEASE CARPAL TUNNEL BILATERAL       TUBAL LIGATION  1995         PERTINENT MEDICATIONS:  Current Outpatient Medications   Medication Sig Dispense Refill     acetaminophen (TYLENOL) 325 MG tablet Take 3 tablets (975 mg) by mouth every 6 hours as needed for mild pain 50 tablet 0     adalimumab (HUMIRA *CF* PEN) 40 MG/0.4ML pen kit INJECT 1 PEN UNDER THE SKIN EVERY 14 DAYS. HOLD FOR SIGNS OF INFECTION, THEN SEEK MEDICAL ATTENTION. 2 each 3     apixaban ANTICOAGULANT (ELIQUIS ANTICOAGULANT) 5 MG tablet Take 1 tablet (5 mg) by mouth 2 times daily 180 tablet 1     diltiazem ER COATED BEADS (CARDIZEM CD/CARTIA XT) 180 MG 24 hr capsule Take 2 capsules (360 mg) by mouth daily 180 capsule 3     EPINEPHrine (ANY BX GENERIC EQUIV) 0.3 MG/0.3ML injection 2-pack Inject 0.3 mg into the muscle as needed for anaphylaxis         flecainide (TAMBOCOR) 50 MG tablet Take 1 tablet (50 mg) by mouth 2 times daily as needed (at onset of a fib, may repeat once after 4 hours) 60 tablet 1     hydrOXYzine (ATARAX) 25 MG tablet Take 1 tablet (25 mg) by mouth 3 times daily as needed for anxiety 270 tablet 3     loratadine (CLARITIN) 10 MG tablet Take 10 mg by mouth daily        Multiple Vitamins-Minerals (CENTROVITE) TABS TAKE 1 TABLET BY MOUTH EVERY DAY FOR 30 DAYS       omeprazole (PRILOSEC) 40 MG DR capsule Take 1 capsule (40 mg) by mouth in the morning. 90 capsule 3     ondansetron (ZOFRAN-ODT) 4 MG ODT tab Take 1-2 tablets (4-8 mg) by mouth every 8 hours as needed for nausea 4 tablet 0     polyethylene glycol (MIRALAX) 17 GM/Dose powder Take 17 g (1 capful) by mouth daily 578 g 3     prazosin (MINIPRESS) 1 MG capsule Take 1 capsule by mouth nightly as needed        traZODone (DESYREL) 50 MG tablet Take 0.5 tablets (25 mg) by mouth At Bedtime 90 tablet 0     vitamin C (ASCORBIC ACID) 1000 MG TABS Take 1,000 mg by mouth daily        vitamin D3 (CHOLECALCIFEROL) 250 mcg (49099 units) capsule Take 1 capsule by mouth once a week         SOCIAL HISTORY:    Social History     Socioeconomic History     Marital status: Single     Spouse name: Not on file     Number of children: 2     Years of education: 4     Highest education level: Not on file   Occupational History     Not on file   Tobacco Use     Smoking status: Never Smoker     Smokeless tobacco: Never Used     Tobacco comment: smokes marijuana   Substance and Sexual Activity     Alcohol use: Not Currently     Comment: Alcoholic Drinks/day: Never an issue, she states.  7/8/16     Drug use: Not Currently     Types: Marijuana     Comment: Drug use: Former marijuana use, not current. 7/8/16     Sexual activity: Never     Partners: Male     Birth control/protection: Other     Comment: tubal ligation   Other Topics Concern     Parent/sibling w/ CABG, MI or angioplasty before 65F 55M? Not Asked   Social  History Narrative     Not on file     Social Determinants of Health     Financial Resource Strain: Not on file   Food Insecurity: Not on file   Transportation Needs: Not on file   Physical Activity: Not on file   Stress: Not on file   Social Connections: Not on file   Intimate Partner Violence: Not on file   Housing Stability: Not on file       FAMILY HISTORY:    Family History   Problem Relation Age of Onset     Crohn's Disease Mother         Total colectomy with ileostomy.     Atrial fibrillation Mother      Snoring Father      Diabetes Father      Prostate Cancer Father      Chronic Kidney Disease Father         Chose against dialysis.     Substance Abuse Brother      Depression Brother      Attention Deficit Disorder Brother      Alcoholism Brother      Arrhythmia Brother      Alcoholism Brother      Substance Abuse Brother      Arrhythmia Brother      Alcoholism Maternal Grandfather      No Known Problems Daughter      No Known Problems Son      Lupus Cousin      Breast Cancer No family hx of        PHYSICAL EXAMINATION:  Vitals reviewed  LMP 12/21/2021   Video physical exam  General: Patient appears well in no acute distress.   Skin: No visualized rash or lesions on visualized skin  Eyes: EOMI, no erythema, sclera icterus or discharge noted  Resp: Appears to be breathing comfortably without accessory muscle usage, speaking in full sentences, no cough  MSK: Appears to have normal range of motion based on visualized movements  Neurologic: No apparent tremors, facial movements symmetric  Psych: affect normal, alert and oriented    The rest of a comprehensive physical examination is deferred due to PHE (public health emergency) video restrictions      PERTINENT STUDIES Reviewed in EMR    ASSESSMENT/PLAN:  57 year old female with PMH of rheumatoid arthritis on Humiara, class II obesity, paroxymal atrial fibrillation on anticoagulation, who is s/p CCY presenting to GI clinic for abdominal pain and consideration of  EGD.    # Epigastric discomfort  # Nausea  Onset of epigastric discomfort and nausea following initiation of Eliquis and after endometrial ablation, with unremarkable work-up including CT AP, H plyori stool antigen, CBC, CMP. EGD endoscopically unremarkable with mild reactive gastropathy noted on gastric biopsies. Symptoms have further improved on PPI with minimal symptoms at this time. We discussed continuing with PPI for at least 6 months or so for possible functional dyspepsia, at which time we can attempt to discontinue.    # Esophageal dysphagia  Previously with intermittent dysphagia to solids and pills, as noted above, EGD including esophageal biopsies was unremarkable. Symptoms have significantly improved on PPI. We discussed the utility of further testing including TBE +/- HRM, which she prefers to hold off for now. Will conitnue to monitor.    # Constipation  Resolved    RTC 3-4 months    Thank you for this consultation. It was a pleasure to participate in the care of this patient; please contact us with any further questions.    Radha Dsouza PA-C    40 minutes spent on the date of the encounter doing chart review, review of outside records, review of test results, interpretation of tests, patient visit and documentation

## 2022-08-10 ASSESSMENT — PATIENT HEALTH QUESTIONNAIRE - PHQ9
SUM OF ALL RESPONSES TO PHQ QUESTIONS 1-9: 11
SUM OF ALL RESPONSES TO PHQ QUESTIONS 1-9: 11

## 2022-08-10 ASSESSMENT — ANXIETY QUESTIONNAIRES
2. NOT BEING ABLE TO STOP OR CONTROL WORRYING: NOT AT ALL
1. FEELING NERVOUS, ANXIOUS, OR ON EDGE: NEARLY EVERY DAY
GAD7 TOTAL SCORE: 6
6. BECOMING EASILY ANNOYED OR IRRITABLE: NOT AT ALL
5. BEING SO RESTLESS THAT IT IS HARD TO SIT STILL: SEVERAL DAYS
GAD7 TOTAL SCORE: 6
GAD7 TOTAL SCORE: 6
4. TROUBLE RELAXING: SEVERAL DAYS
3. WORRYING TOO MUCH ABOUT DIFFERENT THINGS: SEVERAL DAYS
7. FEELING AFRAID AS IF SOMETHING AWFUL MIGHT HAPPEN: NOT AT ALL
7. FEELING AFRAID AS IF SOMETHING AWFUL MIGHT HAPPEN: NOT AT ALL

## 2022-08-11 ENCOUNTER — VIRTUAL VISIT (OUTPATIENT)
Dept: PSYCHOLOGY | Facility: CLINIC | Age: 57
End: 2022-08-11
Payer: COMMERCIAL

## 2022-08-11 DIAGNOSIS — F43.10 POSTTRAUMATIC STRESS DISORDER: Primary | ICD-10-CM

## 2022-08-11 DIAGNOSIS — F41.1 GAD (GENERALIZED ANXIETY DISORDER): ICD-10-CM

## 2022-08-11 DIAGNOSIS — F33.1 MODERATE EPISODE OF RECURRENT MAJOR DEPRESSIVE DISORDER (H): ICD-10-CM

## 2022-08-11 PROCEDURE — 90834 PSYTX W PT 45 MINUTES: CPT | Mod: 95 | Performed by: COUNSELOR

## 2022-08-11 ASSESSMENT — PATIENT HEALTH QUESTIONNAIRE - PHQ9
SUM OF ALL RESPONSES TO PHQ QUESTIONS 1-9: 11
10. IF YOU CHECKED OFF ANY PROBLEMS, HOW DIFFICULT HAVE THESE PROBLEMS MADE IT FOR YOU TO DO YOUR WORK, TAKE CARE OF THINGS AT HOME, OR GET ALONG WITH OTHER PEOPLE: SOMEWHAT DIFFICULT

## 2022-08-11 ASSESSMENT — ANXIETY QUESTIONNAIRES: GAD7 TOTAL SCORE: 6

## 2022-08-11 NOTE — PROGRESS NOTES
[facility]                                     Progress Note    Patient Name: Alina Martell  Date: 8/11/22         Service Type: Individual      Session Start Time:  103 Session End Time: 153     Session Length:  50 minutes    Session #: 40    Attendees: Client    Service Modality:  Video Visit:      Provider verified identity through the following two step process.  Patient provided:  Patient is known previously to provider     Telemedicine Visit: The patient's condition can be safely assessed and treated via synchronous audio and visual telemedicine encounter.       Reason for Telemedicine Visit: Services only offered telehealth     Originating Site (Patient Location): Patient's home     Distant Site (Provider Location): [facility]     Consent:  The patient/guardian has verbally consented to: the potential risks and benefits of telemedicine (video visit) versus in person care; bill my insurance or make self-payment for services provided; and responsibility for payment of non-covered services.      Patient would like the video invitation sent by:  Text to cell phone:       Mode of Communication:  Video Conference via Amwell     As the provider I attest to compliance with applicable laws and regulations related to telemedicine.       DATA  Interactive Complexity: No  Crisis: No        Progress Since Last Session (Related to Symptoms / Goals / Homework):   Symptoms: No change depression and anxiety continue to occur    Homework: Partially completed      Episode of Care Goals: Satisfactory progress - ACTION (Actively working towards change); Intervened by reinforcing change plan / affirming steps taken     Current / Ongoing Stressors and Concerns:  Patient indicated feeling depressed and anxious. Patient reported feeling that they are going back and forth. Patient indicated her relationship is done which is hard. Patient reported  understanding but it still being hard. Patient indicated she has been trying to stay busy and may have been doing too much. Patient reported she has her mom's birthday party next weekend which is causing anxiety. Patient indicated knowing it will go well but the build up being a lot. This therapist processed with patient her depression symptoms and triggers to her depression. This therapist went over skills to help with anxiety.      Treatment Objective(s) Addressed in This Session:   use distraction each time intrusive worry surfaces  Increase interest, engagement, and pleasure in doing things  Decrease frequency and intensity of feeling down, depressed, hopeless  Improve quantity and quality of night time sleep / decrease daytime naps  Identify negative self-talk and behaviors: challenge core beliefs, myths, and actions     Intervention:   Motivational Interviewing    MI Intervention: Reflections: simple and complex     Change Talk Expressed by the Patient: Taking steps    Provider Response to Change Talk: R - Reflected patient's change talk and S - Summarized patient's change talk statements      Assessments completed prior to visit:  The following assessments were completed by patient for this visit:  PHQ9:   PHQ-9 SCORE 2/14/2022 3/8/2022 4/14/2022 5/12/2022 6/7/2022 6/21/2022 8/10/2022   PHQ-9 Total Score MyChart 7 (Mild depression) 6 (Mild depression) 4 (Minimal depression) 7 (Mild depression) 4 (Minimal depression) 6 (Mild depression) 11 (Moderate depression)   PHQ-9 Total Score 7 6 4 7 4 6 11     GAD7:   ADA-7 SCORE 3/31/2021 4/7/2021 4/28/2021 10/5/2021 11/15/2021 12/13/2021 8/10/2022   Total Score - - - 3 (minimal anxiety) 6 (mild anxiety) - 6 (mild anxiety)   Total Score 6 5 4 3 6 4 6     PROMIS 10-Global Health (all questions and answers displayed):   PROMIS 10 12/15/2021 3/26/2022 4/14/2022 7/19/2022   In general, would you say your health is: Good Good Good Good   In general, would you say your  quality of life is: Good Good Good Fair   In general, how would you rate your physical health? Good Fair Fair Good   In general, how would you rate your mental health, including your mood and your ability to think? Fair Fair Good Fair   In general, how would you rate your satisfaction with your social activities and relationships? Good Fair Good Good   In general, please rate how well you carry out your usual social activities and roles Good Good Fair Good   To what extent are you able to carry out your everyday physical activities such as walking, climbing stairs, carrying groceries, or moving a chair? Mostly Mostly Mostly Mostly   How often have you been bothered by emotional problems such as feeling anxious, depressed or irritable? Often Sometimes Often Often   How would you rate your fatigue on average? Mild Mild Mild Moderate   How would you rate your pain on average?   0 = No Pain  to  10 = Worst Imaginable Pain 3 3 3 3   In general, would you say your health is: 3 3 3 3   In general, would you say your quality of life is: 3 3 3 2   In general, how would you rate your physical health? 3 2 2 3   In general, how would you rate your mental health, including your mood and your ability to think? 2 2 3 2   In general, how would you rate your satisfaction with your social activities and relationships? 3 2 3 3   In general, please rate how well you carry out your usual social activities and roles. (This includes activities at home, at work and in your community, and responsibilities as a parent, child, spouse, employee, friend, etc.) 3 3 2 3   To what extent are you able to carry out your everyday physical activities such as walking, climbing stairs, carrying groceries, or moving a chair? 4 4 4 4   In the past 7 days, how often have you been bothered by emotional problems such as feeling anxious, depressed, or irritable? 4 3 4 4   In the past 7 days, how would you rate your fatigue on average? 2 2 2 3   In the past 7  days, how would you rate your pain on average, where 0 means no pain, and 10 means worst imaginable pain? 3 3 3 3   Global Mental Health Score 10 10 11 9   Global Physical Health Score 15 14 14 14   PROMIS TOTAL - SUBSCORES 25 24 25 23   Some recent data might be hidden         ASSESSMENT: Current Emotional / Mental Status (status of significant symptoms):   Risk status (Self / Other harm or suicidal ideation)   Patient denies current fears or concerns for personal safety.   Patient denies current or recent suicidal ideation or behaviors.   Patient denies current or recent homicidal ideation or behaviors.   Patient denies current or recent self injurious behavior or ideation.   Patient denies other safety concerns.   Patient reports there has been no change in risk factors since their last session.     Patient reports there has been no change in protective factors since their last session.     Recommended that patient call 911 or go to the local ED should there be a change in any of these risk factors.     Appearance:   Appropriate    Eye Contact:   Good    Psychomotor Behavior: Normal    Attitude:   Cooperative    Orientation:   All   Speech    Rate / Production: Normal/ Responsive    Volume:  Normal    Mood:    Anxious  Depressed    Affect:    Appropriate    Thought Content:  Clear    Thought Form:  Coherent  Goal Directed  Logical    Insight:    Good      Medication Review:   No changes to current psychiatric medication(s)     Medication Compliance:   Yes     Changes in Health Issues:   None reported     Chemical Use Review:   Substance Use: Chemical use reviewed, no active concerns identified      Tobacco Use: No current tobacco use.      Diagnosis:  1. Posttraumatic stress disorder    2. Moderate episode of recurrent major depressive disorder (H)    3. ADA (generalized anxiety disorder)        Collateral Reports Completed:   Not Applicable    PLAN: (Patient Tasks / Therapist Tasks / Other)  Patient will return  in 2 weeks for scheduled session. Patient will reach out to support network. Patient will continue to identify what is in his control and what is out of her control.    There has been demonstrated improvement in functioning while patient has been engaged in psychotherapy/psychological service- if withdrawn the patient would deteriorate and/or relapse.     Mariana Love, [facility],    ______________________________________________________________________    Individual Treatment Plan    Patient's Name: Alina Martell  YOB: 1965    Date of Creation:10/16/2020  Date Treatment Plan Last Reviewed/Revised: 08/11/2022    DSM5 Diagnoses: 296.32 (F33.1) Major Depressive Disorder, Recurrent Episode, Moderate _ or 300.02 (F41.1) Generalized Anxiety Disorder  Psychosocial / Contextual Factors: Health, family and financial   PROMIS (reviewed every 90 days): 25    Referral / Collaboration:  Was/were discussed and patient will pursue.    Anticipated number of session for this episode of care: 20 will reevaluate every 90 days  Anticipation frequency of session: Biweekly  Anticipated Duration of each session: 38-52 minutes  Treatment plan will be reviewed in 90 days or when goals have been changed.       MeasurableTreatment Goal(s) related to diagnosis / functional impairment(s)  Goal 1: Patient will work on reducing overall anxiety.     I will know I've met my goal when reporting minimal anxiety symptoms.       Objective #A (Patient Action)                          Patient will use distraction each time intrusive worry surfaces.  Status: Continued - Date(s): 08/11/2022     Intervention(s)  Therapist will teach emotional regulation skills. ..     Objective #B  Patient will Decrease frequency and intensity of feeling down, depressed, hopeless.  Status: Continued - Date(s): 08/11/2022     Intervention(s)  Therapist will teach emotional recognition/identification. ..     Objective #C  Patient will Identify negative  self-talk and behaviors: challenge core beliefs, myths, and actions.  Status: Continued - Date(s): 08/11/2022     Intervention(s)  Therapist will teach the client how to perform a behavioral chain analysis. ..     Goal 2: Patient will continue to work on reducing depression symptoms    I will know I've met my goal then reporting minimal depression symptoms     Objective #A (Patient Action)                          Patient will Increase interest, engagement, and pleasure in doing things.  Status: Continued - Date(s):  08/11/2022     Intervention(s)  Therapist will assign homework ..     Objective #B  Patient will Decrease frequency and intensity of feeling down, depressed, hopeless.  Status: Continued - Date(s):  08/11/2022     Intervention(s)  Therapist will assign homework at every session.         Patient has reviewed and agreed to the above plan.        Mariana Love, [facility] 08/11/2022

## 2022-08-15 ENCOUNTER — LAB (OUTPATIENT)
Dept: LAB | Facility: CLINIC | Age: 57
End: 2022-08-15
Payer: COMMERCIAL

## 2022-08-15 DIAGNOSIS — R10.13 EPIGASTRIC PAIN: ICD-10-CM

## 2022-08-15 LAB
BASOPHILS # BLD AUTO: 0 10E3/UL (ref 0–0.2)
BASOPHILS NFR BLD AUTO: 0 %
EOSINOPHIL # BLD AUTO: 0.2 10E3/UL (ref 0–0.7)
EOSINOPHIL NFR BLD AUTO: 2 %
ERYTHROCYTE [DISTWIDTH] IN BLOOD BY AUTOMATED COUNT: 14.5 % (ref 10–15)
HCT VFR BLD AUTO: 38.9 % (ref 35–47)
HGB BLD-MCNC: 12.1 G/DL (ref 11.7–15.7)
IMM GRANULOCYTES # BLD: 0 10E3/UL
IMM GRANULOCYTES NFR BLD: 0 %
LYMPHOCYTES # BLD AUTO: 3.1 10E3/UL (ref 0.8–5.3)
LYMPHOCYTES NFR BLD AUTO: 26 %
MCH RBC QN AUTO: 25.8 PG (ref 26.5–33)
MCHC RBC AUTO-ENTMCNC: 31.1 G/DL (ref 31.5–36.5)
MCV RBC AUTO: 83 FL (ref 78–100)
MONOCYTES # BLD AUTO: 0.9 10E3/UL (ref 0–1.3)
MONOCYTES NFR BLD AUTO: 7 %
NEUTROPHILS # BLD AUTO: 7.8 10E3/UL (ref 1.6–8.3)
NEUTROPHILS NFR BLD AUTO: 65 %
PLATELET # BLD AUTO: 362 10E3/UL (ref 150–450)
RBC # BLD AUTO: 4.69 10E6/UL (ref 3.8–5.2)
WBC # BLD AUTO: 12 10E3/UL (ref 4–11)

## 2022-08-15 PROCEDURE — 36415 COLL VENOUS BLD VENIPUNCTURE: CPT

## 2022-08-15 PROCEDURE — 85025 COMPLETE CBC W/AUTO DIFF WBC: CPT

## 2022-08-17 ENCOUNTER — LAB (OUTPATIENT)
Dept: LAB | Facility: CLINIC | Age: 57
End: 2022-08-17
Payer: COMMERCIAL

## 2022-08-17 ENCOUNTER — OFFICE VISIT (OUTPATIENT)
Dept: RHEUMATOLOGY | Facility: CLINIC | Age: 57
End: 2022-08-17

## 2022-08-17 VITALS
WEIGHT: 284.8 LBS | HEART RATE: 88 BPM | BODY MASS INDEX: 42.06 KG/M2 | SYSTOLIC BLOOD PRESSURE: 138 MMHG | DIASTOLIC BLOOD PRESSURE: 88 MMHG

## 2022-08-17 DIAGNOSIS — M25.50 POLYARTHRALGIA: ICD-10-CM

## 2022-08-17 DIAGNOSIS — E66.01 MORBID OBESITY (H): ICD-10-CM

## 2022-08-17 DIAGNOSIS — R76.8 CYCLIC CITRULLINATED PEPTIDE (CCP) ANTIBODY POSITIVE: ICD-10-CM

## 2022-08-17 DIAGNOSIS — Z79.899 HIGH RISK MEDICATION USE: ICD-10-CM

## 2022-08-17 DIAGNOSIS — M05.79 SEROPOSITIVE RHEUMATOID ARTHRITIS OF MULTIPLE SITES (H): Primary | ICD-10-CM

## 2022-08-17 DIAGNOSIS — M05.79 SEROPOSITIVE RHEUMATOID ARTHRITIS OF MULTIPLE SITES (H): ICD-10-CM

## 2022-08-17 LAB
ALBUMIN SERPL BCG-MCNC: 4 G/DL (ref 3.5–5.2)
ALT SERPL W P-5'-P-CCNC: 17 U/L (ref 10–35)
CREAT SERPL-MCNC: 0.69 MG/DL (ref 0.51–0.95)
ERYTHROCYTE [DISTWIDTH] IN BLOOD BY AUTOMATED COUNT: 14.5 % (ref 10–15)
GFR SERPL CREATININE-BSD FRML MDRD: >90 ML/MIN/1.73M2
HCT VFR BLD AUTO: 37.8 % (ref 35–47)
HGB BLD-MCNC: 11.9 G/DL (ref 11.7–15.7)
MCH RBC QN AUTO: 26.2 PG (ref 26.5–33)
MCHC RBC AUTO-ENTMCNC: 31.5 G/DL (ref 31.5–36.5)
MCV RBC AUTO: 83 FL (ref 78–100)
PLATELET # BLD AUTO: 342 10E3/UL (ref 150–450)
RBC # BLD AUTO: 4.55 10E6/UL (ref 3.8–5.2)
WBC # BLD AUTO: 9 10E3/UL (ref 4–11)

## 2022-08-17 PROCEDURE — 82040 ASSAY OF SERUM ALBUMIN: CPT

## 2022-08-17 PROCEDURE — 85027 COMPLETE CBC AUTOMATED: CPT

## 2022-08-17 PROCEDURE — 82565 ASSAY OF CREATININE: CPT

## 2022-08-17 PROCEDURE — 36415 COLL VENOUS BLD VENIPUNCTURE: CPT

## 2022-08-17 PROCEDURE — 84460 ALANINE AMINO (ALT) (SGPT): CPT

## 2022-08-17 PROCEDURE — 99214 OFFICE O/P EST MOD 30 MIN: CPT | Performed by: INTERNAL MEDICINE

## 2022-08-17 PROCEDURE — 86481 TB AG RESPONSE T-CELL SUSP: CPT

## 2022-08-17 RX ORDER — ADALIMUMAB 40MG/0.4ML
KIT SUBCUTANEOUS
Qty: 2 EACH | Refills: 5 | Status: SHIPPED | OUTPATIENT
Start: 2022-08-17 | End: 2023-02-08

## 2022-08-17 NOTE — PROGRESS NOTES
Rheumatology follow-up visit note     Alina is a 57 year old female presents today for follow-up.    Alina was seen today for recheck.    Diagnoses and all orders for this visit:    Seropositive rheumatoid arthritis of multiple sites (H)  -     adalimumab (HUMIRA *CF* PEN) 40 MG/0.4ML pen kit; INJECT 1 PEN UNDER THE SKIN EVERY 14 DAYS. HOLD FOR SIGNS OF INFECTION, THEN SEEK MEDICAL ATTENTION.  -     Albumin level; Standing  -     ALT; Standing  -     CBC with platelets; Standing  -     Creatinine; Standing    Cyclic citrullinated peptide (CCP) antibody positive    Polyarthralgia    High risk medication use  -     Albumin level; Standing  -     ALT; Standing  -     CBC with platelets; Standing  -     Creatinine; Standing    Morbid obesity (H)        Her rheumatoid arthritis appears to be well controlled with Humira.  She has not had a flareup.  She wondered about the long-term efficacy.  We discussed the potential for diminished response, role of methotrexate and potentially in this scenario.  She will stay the course, her preference, as now.  We will meet here in 6 months.    Follow up in 6 months.    HPI    Alina Martell is a 57 year old female is here for follow-up of rheumatoid arthritis.  This was severe.  Seropositive. This is polyarticular. Family history of psoriasis and brother, mom's history of Crohn's.  She has done great.  She is on Humira that she inject every 2 weeks. No longer on hydroxychloroquine.  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  She rated pain level 2.0/10.  He is able to do most of her day-to-day activities without difficulty.  Sounds like she recently had ablation of an accessory pathway that had triggered atrial fibrillation, dysrhythmia and has done well since.  Her morning stiffness is no more than 5 minutes.   She is allergic to  "sulfa, she has still has her menstrual cycle going, they use condoms therefore methotrexate leflunomide not an option.   She is starting to play tennis now. She considers herself a\" flexaterian\", and is trying to add more vegetarian meals.      DETAILED EXAMINATION  08/17/22  :    Vitals:    08/17/22 0847   BP: 138/88   Pulse: 88   Weight: 129.2 kg (284 lb 12.8 oz)     Alert oriented. Head including the face is examined for malar rash, heliotropes, scarring, lupus pernio. Eyes examined for redness such as in episcleritis/scleritis, periorbital lesions.   Neck/ Face examined for parotid gland swelling, range of motion of neck.  Left upper and lower and right upper and lower extremities examined for tenderness, swelling, warmth of the appendicular joints, range of motion, edema, rash.  Some of the important findings included: she does not have evidence of synovitis in the palpable joints of the upper extremities.  No significant deformities of the digits.  No Heberden nodes.  Range of motion of the shoulders  show full abduction.  No JLT effusion or warmth of the knees.  she does not have dactylitis of the digits.     Patient Active Problem List    Diagnosis Date Noted     History of colonic polyps 04/05/2022     Priority: Medium     Colonscopy 3/22 - repeat in 3-5 year       Postmenopausal bleeding 02/16/2022     Priority: Medium     Obstructive sleep apnea syndrome 01/20/2022     Priority: Medium     Essential hypertension 01/20/2022     Priority: Medium     Coronary artery disease of native artery with stable angina pectoris (H) 01/20/2022     Priority: Medium     Seropositive rheumatoid arthritis of multiple sites (H) 01/05/2022     Priority: Medium     Recurrent major depressive disorder, in full remission (H) 01/05/2022     Priority: Medium     Paroxysmal atrial fibrillation (H) 01/05/2022     Priority: Medium     Morbid obesity (H) 01/05/2022     Priority: Medium     Chest pain 07/11/2021     Priority: Medium "     ADA (generalized anxiety disorder) 03/31/2021     Priority: Medium     Depressed 11/14/2018     Priority: Medium     Cyclic citrullinated peptide (CCP) antibody positive 10/03/2017     Priority: Medium     Tendonitis of both shoulders 06/09/2017     Priority: Medium     Chronic pain 07/18/2016     Priority: Medium     Olecranon bursitis of right elbow 07/08/2016     Priority: Medium     Formatting of this note might be different from the original.  Replacement Utility updated for latest IMO load       Grief 03/29/2016     Priority: Medium     Sicca syndrome (H) 10/27/2015     Priority: Medium     Anxiety 12/08/2014     Priority: Medium     Panic attack 12/08/2014     Priority: Medium     Sleep disorder 12/05/2014     Priority: Medium     Insomnia related to another mental disorder 09/03/2014     Priority: Medium     Microcytic anemia 09/02/2014     Priority: Medium     Migraine headache 09/02/2014     Priority: Medium     Reflux 09/02/2014     Priority: Medium     Past Surgical History:   Procedure Laterality Date     CHOLECYSTECTOMY       DILATION AND CURETTAGE, OPERATIVE HYSTEROSCOPY, COMBINED N/A 3/3/2022    Procedure: HYSTEROSCOPY, WITH DILATION AND CURETTAGE;  Surgeon: Irma Cary MD;  Location: [facility]     EP ABLATION FOCAL AFIB N/A 6/30/2022    Procedure: Ablation Atrial Fibrilation;  Surgeon: Jose Guadalupe Joseph MD;  Location:  [facility]     HC REMOVAL GALLBLADDER      Description: Cholecystectomy;  Recorded: 10/15/2013;     LAPAROSCOPIC TUBAL LIGATION       RELEASE CARPAL TUNNEL BILATERAL       TUBAL LIGATION  1995      Past Medical History:   Diagnosis Date     Anemia      Antiplatelet or antithrombotic long-term use      Arrhythmia      Cannabis use without complication 12/8/2014     Carpal tunnel syndrome      Coronary artery disease      Gastroesophageal reflux disease      History of angina      Hypertension      Irregular heart beat      Obesity       Paroxysmal atrial fibrillation (H)      PTSD (post-traumatic stress disorder)      RA (rheumatoid arthritis) (H)      Rheumatoid arthritis (H) 7/8/2016     Sicca syndrome (H)      Sleep apnea      Allergies   Allergen Reactions     Penicillins Shortness Of Breath, Palpitations, Dizziness, Anaphylaxis, Hives, Itching and Rash     Test in 2031, see allergy note on 7/29/21     Shellfish-Derived Products Anaphylaxis     Sulfa Drugs Hives and Anaphylaxis     Current Outpatient Medications   Medication Sig Dispense Refill     acetaminophen (TYLENOL) 325 MG tablet Take 3 tablets (975 mg) by mouth every 6 hours as needed for mild pain 50 tablet 0     adalimumab (HUMIRA *CF* PEN) 40 MG/0.4ML pen kit INJECT 1 PEN UNDER THE SKIN EVERY 14 DAYS. HOLD FOR SIGNS OF INFECTION, THEN SEEK MEDICAL ATTENTION. 2 each 3     apixaban ANTICOAGULANT (ELIQUIS ANTICOAGULANT) 5 MG tablet Take 1 tablet (5 mg) by mouth 2 times daily 180 tablet 1     diltiazem ER COATED BEADS (CARDIZEM CD/CARTIA XT) 180 MG 24 hr capsule Take 2 capsules (360 mg) by mouth daily 180 capsule 3     EPINEPHrine (ANY BX GENERIC EQUIV) 0.3 MG/0.3ML injection 2-pack Inject 0.3 mg into the muscle as needed for anaphylaxis        flecainide (TAMBOCOR) 50 MG tablet Take 1 tablet (50 mg) by mouth 2 times daily as needed (at onset of a fib, may repeat once after 4 hours) 60 tablet 1     hydrOXYzine (ATARAX) 25 MG tablet Take 1 tablet (25 mg) by mouth 3 times daily as needed for anxiety 270 tablet 3     loratadine (CLARITIN) 10 MG tablet Take 10 mg by mouth daily        Multiple Vitamins-Minerals (CENTROVITE) TABS TAKE 1 TABLET BY MOUTH EVERY DAY FOR 30 DAYS       omeprazole (PRILOSEC) 40 MG DR capsule Take 1 capsule (40 mg) by mouth in the morning. 90 capsule 3     ondansetron (ZOFRAN-ODT) 4 MG ODT tab Take 1-2 tablets (4-8 mg) by mouth every 8 hours as needed for nausea 4 tablet 0     polyethylene glycol (MIRALAX) 17 GM/Dose powder Take 17 g (1 capful) by mouth daily 578 g 3      prazosin (MINIPRESS) 1 MG capsule Take 1 capsule by mouth nightly as needed        traZODone (DESYREL) 50 MG tablet Take 0.5 tablets (25 mg) by mouth At Bedtime 90 tablet 0     vitamin C (ASCORBIC ACID) 1000 MG TABS Take 1,000 mg by mouth daily        vitamin D3 (CHOLECALCIFEROL) 250 mcg (54139 units) capsule Take 1 capsule by mouth once a week       family history includes Alcoholism in her brother, brother, and maternal grandfather; Arrhythmia in her brother and brother; Atrial fibrillation in her mother; Attention Deficit Disorder in her brother; Chronic Kidney Disease in her father; Crohn's Disease in her mother; Depression in her brother; Diabetes in her father; Lupus in her cousin; No Known Problems in her daughter and son; Prostate Cancer in her father; Snoring in her father; Substance Abuse in her brother and brother.  Social Connections: Not on file          WBC   Date Value Ref Range Status   11/14/2018 6.5 4.0 - 11.0 10e9/L Final     WBC Count   Date Value Ref Range Status   08/15/2022 12.0 (H) 4.0 - 11.0 10e3/uL Final     RBC Count   Date Value Ref Range Status   08/15/2022 4.69 3.80 - 5.20 10e6/uL Final   11/14/2018 4.31 3.8 - 5.2 10e12/L Final     Hemoglobin   Date Value Ref Range Status   08/15/2022 12.1 11.7 - 15.7 g/dL Final   11/14/2018 10.8 (L) 11.7 - 15.7 g/dL Final     Hematocrit   Date Value Ref Range Status   08/15/2022 38.9 35.0 - 47.0 % Final   11/14/2018 35.6 35.0 - 47.0 % Final     MCV   Date Value Ref Range Status   08/15/2022 83 78 - 100 fL Final   11/14/2018 83 78 - 100 fl Final     MCH   Date Value Ref Range Status   08/15/2022 25.8 (L) 26.5 - 33.0 pg Final   11/14/2018 25.1 (L) 26.5 - 33.0 pg Final     Platelet Count   Date Value Ref Range Status   08/15/2022 362 150 - 450 10e3/uL Final   11/14/2018 286 150 - 450 10e9/L Final     % Lymphocytes   Date Value Ref Range Status   08/15/2022 26 % Final   11/14/2018 24.6 % Final     AST   Date Value Ref Range Status   03/08/2022 10 0 -  40 U/L Final   11/14/2018 12 0 - 45 U/L Final     ALT   Date Value Ref Range Status   03/08/2022 15 0 - 45 U/L Final   11/14/2018 13 0 - 50 U/L Final     Albumin   Date Value Ref Range Status   03/08/2022 3.5 3.5 - 5.0 g/dL Final   11/14/2018 2.8 (L) 3.4 - 5.0 g/dL Final     Alkaline Phosphatase   Date Value Ref Range Status   03/08/2022 105 45 - 120 U/L Final   11/14/2018 83 40 - 150 U/L Final     Creatinine   Date Value Ref Range Status   06/30/2022 0.75 0.52 - 1.04 mg/dL Final   11/14/2018 0.62 0.52 - 1.04 mg/dL Final     GFR Estimate   Date Value Ref Range Status   06/30/2022 >90 >60 mL/min/1.73m2 Final     Comment:     Effective December 21, 2021 eGFRcr in adults is calculated using the 2021 CKD-EPI creatinine equation which includes age and gender (Allyssa et al., NEJM, DOI: 10.1056/DPEMfw5725236)   01/21/2021 >60 >60 mL/min/1.73m2 Final   11/14/2018 >90 >60 mL/min/1.7m2 Final     Comment:     Non  GFR Calc     GFR Estimate If Black   Date Value Ref Range Status   01/21/2021 >60 >60 mL/min/1.73m2 Final   11/14/2018 >90 >60 mL/min/1.7m2 Final     Comment:      GFR Calc     Erythrocyte Sedimentation Rate   Date Value Ref Range Status   03/11/2020 64 (H) 0 - 20 mm/hr Final     CRP   Date Value Ref Range Status   03/11/2020 3.3 (H) 0.0 - 0.8 mg/dL Final

## 2022-08-18 LAB
GAMMA INTERFERON BACKGROUND BLD IA-ACNC: 0.05 IU/ML
M TB IFN-G BLD-IMP: NEGATIVE
M TB IFN-G CD4+ BCKGRND COR BLD-ACNC: 9.95 IU/ML
MITOGEN IGNF BCKGRD COR BLD-ACNC: 0.01 IU/ML
MITOGEN IGNF BCKGRD COR BLD-ACNC: 0.01 IU/ML
QUANTIFERON MITOGEN: 10 IU/ML
QUANTIFERON NIL TUBE: 0.05 IU/ML
QUANTIFERON TB1 TUBE: 0.06 IU/ML
QUANTIFERON TB2 TUBE: 0.06

## 2022-08-23 ENCOUNTER — OFFICE VISIT (OUTPATIENT)
Dept: CARDIOLOGY | Facility: CLINIC | Age: 57
End: 2022-08-23
Payer: COMMERCIAL

## 2022-08-23 VITALS
RESPIRATION RATE: 16 BRPM | WEIGHT: 248 LBS | DIASTOLIC BLOOD PRESSURE: 73 MMHG | SYSTOLIC BLOOD PRESSURE: 137 MMHG | HEIGHT: 69 IN | BODY MASS INDEX: 36.73 KG/M2 | HEART RATE: 77 BPM

## 2022-08-23 DIAGNOSIS — I48.0 PAROXYSMAL ATRIAL FIBRILLATION (H): Primary | ICD-10-CM

## 2022-08-23 DIAGNOSIS — I10 ESSENTIAL HYPERTENSION: ICD-10-CM

## 2022-08-23 DIAGNOSIS — Z98.890 STATUS POST CATHETER ABLATION OF ATRIAL FIBRILLATION: ICD-10-CM

## 2022-08-23 DIAGNOSIS — G47.33 OBSTRUCTIVE SLEEP APNEA SYNDROME: ICD-10-CM

## 2022-08-23 PROCEDURE — 99213 OFFICE O/P EST LOW 20 MIN: CPT | Performed by: NURSE PRACTITIONER

## 2022-08-23 RX ORDER — CETIRIZINE HYDROCHLORIDE 10 MG/1
10 TABLET ORAL DAILY
COMMUNITY
Start: 2022-07-22 | End: 2022-08-23

## 2022-08-23 RX ORDER — CETIRIZINE HYDROCHLORIDE 10 MG/1
10 TABLET ORAL DAILY
COMMUNITY
End: 2023-03-08

## 2022-08-23 NOTE — PROGRESS NOTES
Thank you, Dr. Bansal, for asking the [facility] team to see Ms. Alina Martell to evaluate paroxysmal atrial fibrillation.    Assessment/Recommendations     Assessment/Plan:    Diagnoses and all orders for this visit:  Paroxysmal atrial fibrillation (H) and has had no symptoms of recurrence of atrial fibrillation since PVI.  Directly symptomatic in the past.  She was on pill in the pocket flecainide prior to ablation.  She is on diltiazem 360 mg orally every day without side effects.  No change.  Essential hypertension and blood pressure at goal.  Status post catheter ablation of atrial fibrillation and no complications post ablation.  She was back to normal activities fairly quickly.  Obstructive sleep apnea syndrome and on CPAP.  Discussed importance of continuing to use CPAP post ablation to prevent recurrence of A. fib      [id number] score of 2 and on Eliquis.  To see Dr. Joseph in 6 to 8 weeks and to discuss whether he recommends she stay on anticoagulation or not.  (Maritza has impression that he intends to discontinue it and she is okay with staying on or stopping anticoagulation.)       History of Present Illness/Subjective     Alina Martell is a very pleasant 57 year old female who comes in today for EP follow-up after having catheter ablation of paroxysmal A. fib on 6/30/2022.  Alina Martell has a known history of paroxymal atrial fibrillation, HTN, Sjogren syndrome, rheumatoid arthritis, RANDALL on CPAP, anxiety, and morbid obesity.  Alina was first diagnosed with atrial fibrillation when she went to the emergency room with palpitations in September 2020 and cardioversion was performed for atrial fib.  She has had recurrence of starting in January 2022 with again another cardioversion in the ER.  She has had further episodes of atrial fibrillation which have been successfully treated with as needed flecainide 50 mg.  On 6/30/2022 Xi had PVI with RF to pulmonary  veins.  Left atrium mildly dilated and no significant fibrosis.  Xi was back to all normal activities within a week.  She had some  left arm soreness and some joint swelling for a few days after ablation, but this could be her RA.  She has had no premature beats or symptoms of recurrence of atrial fibrillation since her ablation.      Cardiographics (reviewed):  February 2022 coronary CT shows:  Interpretation Summary     1.  Normal left main.  2.  Normal LAD and its branches.  3.  Minimal disease in proximal LCx.  4.  Normal RCA and its branches.  Right dominant system.  October 2020 echo shows:     Narrative & Impression    Normal left ventricular size with mild hypertrophy noted.    Left ventricle ejection fraction is normal. The calculated left ventricular ejection fraction is 67%.    Normal right ventricular size and systolic function.    No hemodynamically significant valvular heart abnormalities.    No previous study for comparison.     Problem List:  Patient Active Problem List   Diagnosis     Depressed     Seropositive rheumatoid arthritis of multiple sites (H)     Recurrent major depressive disorder, in full remission (H)     Paroxysmal atrial fibrillation (H)     Morbid obesity (H)     Microcytic anemia     Anxiety     Chest pain     Chronic pain     Cyclic citrullinated peptide (CCP) antibody positive     Grief     Insomnia related to another mental disorder     Migraine headache     Olecranon bursitis of right elbow     Panic attack     ADA (generalized anxiety disorder)     Reflux     Sicca syndrome (H)     Sleep disorder     Tendonitis of both shoulders     Obstructive sleep apnea syndrome     Essential hypertension     Coronary artery disease of native artery with stable angina pectoris (H)     Postmenopausal bleeding     History of colonic polyps     Status post catheter ablation of atrial fibrillation     Revi  e  Physical Examination Review of Systems   christian lee  [facility] 12/21/2021   There is no  height or weight on file to calculate BMI.  Wt Readings from Last 3 Encounters:   08/17/22 129.2 kg (284 lb 12.8 oz)   06/30/22 128.7 kg (283 lb 11.2 oz)   06/23/22 128.4 kg (283 lb)     General Appearance:   Alert, well-appearing and in no acute distress.   HEENT: Atraumatic, normocephalic.  No scleral icterus, normal conjunctivae; mucous membranes pink and moist.     Chest: Chest symmetric, spine straight.   Lungs:   Respirations unlabored.   Cardiovascular:   Normal first and second heart sounds with no murmurs, rubs, or gallops.  Regular, regular.   Normal JVD, no edema.       Extremities: No cyanosis or clubbing   Musculoskeletal: Moves all extremities   Skin: Warm, dry, intact.    Neurologic: Mood and affect are appropriate, alert and oriented to person, place, time, and situation     ROS: 10 point ROS neg other than the symptoms noted above in the HPI.     Medical History  Surgical History Family History Social History     Past Medical History:   Diagnosis Date     Anemia      Antiplatelet or antithrombotic long-term use      Arrhythmia      Cannabis use without complication 12/8/2014     Carpal tunnel syndrome      Coronary artery disease      Gastroesophageal reflux disease      History of angina      Hypertension      Irregular heart beat      Obesity      Paroxysmal atrial fibrillation (H)      PTSD (post-traumatic stress disorder)      RA (rheumatoid arthritis) (H)      Rheumatoid arthritis (H) 7/8/2016     Sicca syndrome (H)      Sleep apnea     Past Surgical History:   Procedure Laterality Date     CHOLECYSTECTOMY       DILATION AND CURETTAGE, OPERATIVE HYSTEROSCOPY, COMBINED N/A 3/3/2022    Procedure: HYSTEROSCOPY, WITH DILATION AND CURETTAGE;  Surgeon: Irma Cary MD;  Location: [facility]     EP ABLATION FOCAL AFIB N/A 6/30/2022    Procedure: Ablation Atrial Fibrilation;  Surgeon: Jose Guadalupe Joseph MD;  Location:  [facility]ATH LAB     HC REMOVAL GALLBLADDER       Description: Cholecystectomy;  Recorded: 10/15/2013;     LAPAROSCOPIC TUBAL LIGATION       RELEASE CARPAL TUNNEL BILATERAL       TUBAL LIGATION  1995    Family History   Problem Relation Age of Onset     Crohn's Disease Mother         Total colectomy with ileostomy.     Atrial fibrillation Mother      Snoring Father      Diabetes Father      Prostate Cancer Father      Chronic Kidney Disease Father         Chose against dialysis.     Substance Abuse Brother      Depression Brother      Attention Deficit Disorder Brother      Alcoholism Brother      Arrhythmia Brother      Alcoholism Brother      Substance Abuse Brother      Arrhythmia Brother      Alcoholism Maternal Grandfather      No Known Problems Daughter      No Known Problems Son      Lupus Cousin      Breast Cancer No family hx of     History   Smoking Status     Never Smoker   Smokeless Tobacco     Never Used     Comment: smokes marijuana     Social History    Substance and Sexual Activity      Alcohol use: Not Currently        Comment: Alcoholic Drinks/day: Never an issue, she states.  7/8/16       Medications  Allergies     Current Outpatient Medications   Medication Sig Dispense Refill     acetaminophen (TYLENOL) 325 MG tablet Take 3 tablets (975 mg) by mouth every 6 hours as needed for mild pain 50 tablet 0     adalimumab (HUMIRA *CF* PEN) 40 MG/0.4ML pen kit INJECT 1 PEN UNDER THE SKIN EVERY 14 DAYS. HOLD FOR SIGNS OF INFECTION, THEN SEEK MEDICAL ATTENTION. 2 each 5     apixaban ANTICOAGULANT (ELIQUIS ANTICOAGULANT) 5 MG tablet Take 1 tablet (5 mg) by mouth 2 times daily 180 tablet 1     diltiazem ER COATED BEADS (CARDIZEM CD/CARTIA XT) 180 MG 24 hr capsule Take 2 capsules (360 mg) by mouth daily 180 capsule 3     EPINEPHrine (ANY BX GENERIC EQUIV) 0.3 MG/0.3ML injection 2-pack Inject 0.3 mg into the muscle as needed for anaphylaxis        flecainide (TAMBOCOR) 50 MG tablet Take 1 tablet (50 mg) by mouth 2 times daily as needed (at onset of a fib, may  repeat once after 4 hours) 60 tablet 1     hydrOXYzine (ATARAX) 25 MG tablet Take 1 tablet (25 mg) by mouth 3 times daily as needed for anxiety 270 tablet 3     loratadine (CLARITIN) 10 MG tablet Take 10 mg by mouth daily        Multiple Vitamins-Minerals (CENTROVITE) TABS TAKE 1 TABLET BY MOUTH EVERY DAY FOR 30 DAYS       omeprazole (PRILOSEC) 40 MG DR capsule Take 1 capsule (40 mg) by mouth in the morning. 90 capsule 3     ondansetron (ZOFRAN-ODT) 4 MG ODT tab Take 1-2 tablets (4-8 mg) by mouth every 8 hours as needed for nausea 4 tablet 0     polyethylene glycol (MIRALAX) 17 GM/Dose powder Take 17 g (1 capful) by mouth daily 578 g 3     prazosin (MINIPRESS) 1 MG capsule Take 1 capsule by mouth nightly as needed        traZODone (DESYREL) 50 MG tablet Take 0.5 tablets (25 mg) by mouth At Bedtime 90 tablet 0     vitamin C (ASCORBIC ACID) 1000 MG TABS Take 1,000 mg by mouth daily        vitamin D3 (CHOLECALCIFEROL) 250 mcg (62129 units) capsule Take 1 capsule by mouth once a week        Allergies   Allergen Reactions     Penicillins Shortness Of Breath, Palpitations, Dizziness, Anaphylaxis, Hives, Itching and Rash     Test in 2031, see allergy note on 7/29/21     Shellfish-Derived Products Anaphylaxis     Sulfa Drugs Hives and Anaphylaxis      Medical, surgical, family, social history, and medications were all reviewed and updated as necessary.   Lab Results    Chemistry/lipid CBC Cardiac Enzymes/BNP/TSH/INR   Recent Labs   Lab Test 01/21/21  0746   CHOL 169   HDL 46*      TRIG 79     Recent Labs   Lab Test 01/21/21  0746 09/16/19  0739 11/14/18  0810    115 78     Recent Labs   Lab Test 08/17/22  0913 06/30/22  0556   NA  --  139   POTASSIUM  --  3.8   CHLORIDE  --  104   CO2  --  28   GLC  --  125*   BUN  --  14   CR 0.69 0.75   GFRESTIMATED >90 >90   JOSSELIN  --  9.1     Recent Labs   Lab Test 08/17/22  0913 06/30/22  0556 06/23/22  0800   CR 0.69 0.75 0.73     No results for input(s): A1C in the last  78693 hours.       Recent Labs   Lab Test 08/17/22  0913   WBC 9.0   HGB 11.9   HCT 37.8   MCV 83        Recent Labs   Lab Test 08/17/22  0913 08/15/22  1652 06/30/22  0556   HGB 11.9 12.1 12.4    Recent Labs   Lab Test 03/08/22  2025 01/17/22  1406 01/16/22  2301   TROPONINI <0.01 <0.01 <0.01     No results for input(s): BNP, NTBNPI, NTBNP in the last 69779 hours.  Recent Labs   Lab Test 02/16/22  1108   TSH 2.16     No results for input(s): INR in the last 69859 hours.       Total Time- 24 minutes spent on date of encounter doing chart review, history and exam, documentation and further activities as noted above.  This note has been dictated using voice recognition software. Any grammatical, typographical, or context distortions are unintentional and inherent to the software.

## 2022-08-23 NOTE — PATIENT INSTRUCTIONS
Alina Martell,    It was a pleasure to see you today at the [facility].     My recommendations after this visit include:    Please call if symptoms of atrial fibrillation.      To followup with Dr. Joseph in 6-8 weeks.      My contact information:  Gage Tolentino, ERAN  After Hours or Scheduling  [phone]  My Nurse---Mikala Stokes [phone]

## 2022-08-25 ENCOUNTER — VIRTUAL VISIT (OUTPATIENT)
Dept: PSYCHOLOGY | Facility: CLINIC | Age: 57
End: 2022-08-25
Payer: COMMERCIAL

## 2022-08-25 DIAGNOSIS — F43.10 POSTTRAUMATIC STRESS DISORDER: Primary | ICD-10-CM

## 2022-08-25 DIAGNOSIS — F33.1 MODERATE EPISODE OF RECURRENT MAJOR DEPRESSIVE DISORDER (H): ICD-10-CM

## 2022-08-25 DIAGNOSIS — F41.1 GAD (GENERALIZED ANXIETY DISORDER): ICD-10-CM

## 2022-08-25 PROCEDURE — 90834 PSYTX W PT 45 MINUTES: CPT | Mod: 95 | Performed by: COUNSELOR

## 2022-08-26 NOTE — PROGRESS NOTES
[facility]                                     Progress Note    Patient Name: Alina Martell  Date: 8/25/22         Service Type: Individual      Session Start Time:  1103 Session End Time: 1152     Session Length:  49 minutes    Session #: 41    Attendees: Client    Service Modality:  Video Visit:      Provider verified identity through the following two step process.  Patient provided:  Patient is known previously to provider     Telemedicine Visit: The patient's condition can be safely assessed and treated via synchronous audio and visual telemedicine encounter.       Reason for Telemedicine Visit: Services only offered telehealth     Originating Site (Patient Location): Patient's home     Distant Site (Provider Location): [facility]     Consent:  The patient/guardian has verbally consented to: the potential risks and benefits of telemedicine (video visit) versus in person care; bill my insurance or make self-payment for services provided; and responsibility for payment of non-covered services.      Patient would like the video invitation sent by:  Text to cell phone:       Mode of Communication:  Video Conference via Amwell     As the provider I attest to compliance with applicable laws and regulations related to telemedicine.       DATA  Interactive Complexity: No  Crisis: No        Progress Since Last Session (Related to Symptoms / Goals / Homework):   Symptoms: No change symptoms continue to be the same.    Homework: Partially completed      Episode of Care Goals: Satisfactory progress - ACTION (Actively working towards change); Intervened by reinforcing change plan / affirming steps taken     Current / Ongoing Stressors and Concerns:  Patient reported feeling anxious. Patient indicated she had her mom's birthday party that went great. Patient reported she was able to manage her anxiety well and enjoy the time. Patient  indicated she continues to notice the depression and anxiety occurring on and off. Patient reported trying to find her balance with staying busy but not doing too much. This therapist processed with patient skills to continue to manage and working on reducing symptoms.      Treatment Objective(s) Addressed in This Session:   use distraction each time intrusive worry surfaces  Increase interest, engagement, and pleasure in doing things  Decrease frequency and intensity of feeling down, depressed, hopeless  Improve quantity and quality of night time sleep / decrease daytime naps  Identify negative self-talk and behaviors: challenge core beliefs, myths, and actions     Intervention:   Motivational Interviewing    MI Intervention: Open-ended questions     Change Talk Expressed by the Patient: Taking steps    Provider Response to Change Talk: R - Reflected patient's change talk and S - Summarized patient's change talk statements      Assessments completed prior to visit:  The following assessments were completed by patient for this visit:  PHQ9:   PHQ-9 SCORE 2/14/2022 3/8/2022 4/14/2022 5/12/2022 6/7/2022 6/21/2022 8/10/2022   PHQ-9 Total Score MyChart 7 (Mild depression) 6 (Mild depression) 4 (Minimal depression) 7 (Mild depression) 4 (Minimal depression) 6 (Mild depression) 11 (Moderate depression)   PHQ-9 Total Score 7 6 4 7 4 6 11     GAD7:   ADA-7 SCORE 3/31/2021 4/7/2021 4/28/2021 10/5/2021 11/15/2021 12/13/2021 8/10/2022   Total Score - - - 3 (minimal anxiety) 6 (mild anxiety) - 6 (mild anxiety)   Total Score 6 5 4 3 6 4 6     PROMIS 10-Global Health (all questions and answers displayed):   PROMIS 10 12/15/2021 3/26/2022 4/14/2022 7/19/2022   In general, would you say your health is: Good Good Good Good   In general, would you say your quality of life is: Good Good Good Fair   In general, how would you rate your physical health? Good Fair Fair Good   In general, how would you rate your mental health, including  your mood and your ability to think? Fair Fair Good Fair   In general, how would you rate your satisfaction with your social activities and relationships? Good Fair Good Good   In general, please rate how well you carry out your usual social activities and roles Good Good Fair Good   To what extent are you able to carry out your everyday physical activities such as walking, climbing stairs, carrying groceries, or moving a chair? Mostly Mostly Mostly Mostly   How often have you been bothered by emotional problems such as feeling anxious, depressed or irritable? Often Sometimes Often Often   How would you rate your fatigue on average? Mild Mild Mild Moderate   How would you rate your pain on average?   0 = No Pain  to  10 = Worst Imaginable Pain 3 3 3 3   In general, would you say your health is: 3 3 3 3   In general, would you say your quality of life is: 3 3 3 2   In general, how would you rate your physical health? 3 2 2 3   In general, how would you rate your mental health, including your mood and your ability to think? 2 2 3 2   In general, how would you rate your satisfaction with your social activities and relationships? 3 2 3 3   In general, please rate how well you carry out your usual social activities and roles. (This includes activities at home, at work and in your community, and responsibilities as a parent, child, spouse, employee, friend, etc.) 3 3 2 3   To what extent are you able to carry out your everyday physical activities such as walking, climbing stairs, carrying groceries, or moving a chair? 4 4 4 4   In the past 7 days, how often have you been bothered by emotional problems such as feeling anxious, depressed, or irritable? 4 3 4 4   In the past 7 days, how would you rate your fatigue on average? 2 2 2 3   In the past 7 days, how would you rate your pain on average, where 0 means no pain, and 10 means worst imaginable pain? 3 3 3 3   Global Mental Health Score 10 10 11 9   Global Physical Health  Score 15 14 14 14   PROMIS TOTAL - SUBSCORES 25 24 25 23   Some recent data might be hidden         ASSESSMENT: Current Emotional / Mental Status (status of significant symptoms):   Risk status (Self / Other harm or suicidal ideation)   Patient denies current fears or concerns for personal safety.   Patient denies current or recent suicidal ideation or behaviors.   Patient denies current or recent homicidal ideation or behaviors.   Patient denies current or recent self injurious behavior or ideation.   Patient denies other safety concerns.   Patient reports there has been no change in risk factors since their last session.     Patient reports there has been no change in protective factors since their last session.     Recommended that patient call 911 or go to the local ED should there be a change in any of these risk factors.     Appearance:   Appropriate    Eye Contact:   Good    Psychomotor Behavior: Normal    Attitude:   Cooperative    Orientation:   All   Speech    Rate / Production: Normal/ Responsive    Volume:  Normal    Mood:    Anxious  Depressed    Affect:    Appropriate    Thought Content:  Clear    Thought Form:  Coherent  Logical    Insight:    Good      Medication Review:   No changes to current psychiatric medication(s)     Medication Compliance:   Yes     Changes in Health Issues:   None reported     Chemical Use Review:   Substance Use: Chemical use reviewed, no active concerns identified      Tobacco Use: No current tobacco use.      Diagnosis:  1. Posttraumatic stress disorder    2. Moderate episode of recurrent major depressive disorder (H)    3. ADA (generalized anxiety disorder)        Collateral Reports Completed:   Not Applicable    PLAN: (Patient Tasks / Therapist Tasks / Other)  Patient will return in 2 weeks for scheduled session. Patient will continue to work on spending time with support network. Patient should continue to get out of the house.     There has been demonstrated  improvement in functioning while patient has been engaged in psychotherapy/psychological service- if withdrawn the patient would deteriorate and/or relapse.     Mariana oLve, [facility],    ______________________________________________________________________    Individual Treatment Plan    Patient's Name: Alina Martell  YOB: 1965    Date of Creation:10/16/2020  Date Treatment Plan Last Reviewed/Revised: 08/11/2022    DSM5 Diagnoses: 296.32 (F33.1) Major Depressive Disorder, Recurrent Episode, Moderate _ or 300.02 (F41.1) Generalized Anxiety Disorder  Psychosocial / Contextual Factors: Health, family and financial   PROMIS (reviewed every 90 days): 25    Referral / Collaboration:  Was/were discussed and patient will pursue.    Anticipated number of session for this episode of care: 20 will reevaluate every 90 days  Anticipation frequency of session: Biweekly  Anticipated Duration of each session: 38-52 minutes  Treatment plan will be reviewed in 90 days or when goals have been changed.       MeasurableTreatment Goal(s) related to diagnosis / functional impairment(s)  Goal 1: Patient will work on reducing overall anxiety.     I will know I've met my goal when reporting minimal anxiety symptoms.       Objective #A (Patient Action)                          Patient will use distraction each time intrusive worry surfaces.  Status: Continued - Date(s): 08/11/2022     Intervention(s)  Therapist will teach emotional regulation skills. ..     Objective #B  Patient will Decrease frequency and intensity of feeling down, depressed, hopeless.  Status: Continued - Date(s): 08/11/2022     Intervention(s)  Therapist will teach emotional recognition/identification. ..     Objective #C  Patient will Identify negative self-talk and behaviors: challenge core beliefs, myths, and actions.  Status: Continued - Date(s): 08/11/2022     Intervention(s)  Therapist will teach the client how to perform a behavioral chain  analysis. ..     Goal 2: Patient will continue to work on reducing depression symptoms    I will know I've met my goal then reporting minimal depression symptoms     Objective #A (Patient Action)                          Patient will Increase interest, engagement, and pleasure in doing things.  Status: Continued - Date(s):  08/11/2022     Intervention(s)  Therapist will assign homework ..     Objective #B  Patient will Decrease frequency and intensity of feeling down, depressed, hopeless.  Status: Continued - Date(s):  08/11/2022     Intervention(s)  Therapist will assign homework at every session.         Patient has reviewed and agreed to the above plan.        Mariana Love, [facility] 08/11/2022

## 2022-09-18 ENCOUNTER — HEALTH MAINTENANCE LETTER (OUTPATIENT)
Age: 57
End: 2022-09-18

## 2022-09-22 ENCOUNTER — VIRTUAL VISIT (OUTPATIENT)
Dept: PSYCHOLOGY | Facility: CLINIC | Age: 57
End: 2022-09-22
Payer: COMMERCIAL

## 2022-09-22 DIAGNOSIS — F41.1 GAD (GENERALIZED ANXIETY DISORDER): ICD-10-CM

## 2022-09-22 DIAGNOSIS — F43.10 POSTTRAUMATIC STRESS DISORDER: Primary | ICD-10-CM

## 2022-09-22 DIAGNOSIS — F33.1 MODERATE EPISODE OF RECURRENT MAJOR DEPRESSIVE DISORDER (H): ICD-10-CM

## 2022-09-22 PROCEDURE — 90834 PSYTX W PT 45 MINUTES: CPT | Mod: 95 | Performed by: COUNSELOR

## 2022-09-22 ASSESSMENT — ANXIETY QUESTIONNAIRES
7. FEELING AFRAID AS IF SOMETHING AWFUL MIGHT HAPPEN: NOT AT ALL
GAD7 TOTAL SCORE: 9
4. TROUBLE RELAXING: NEARLY EVERY DAY
7. FEELING AFRAID AS IF SOMETHING AWFUL MIGHT HAPPEN: NOT AT ALL
GAD7 TOTAL SCORE: 9
IF YOU CHECKED OFF ANY PROBLEMS ON THIS QUESTIONNAIRE, HOW DIFFICULT HAVE THESE PROBLEMS MADE IT FOR YOU TO DO YOUR WORK, TAKE CARE OF THINGS AT HOME, OR GET ALONG WITH OTHER PEOPLE: SOMEWHAT DIFFICULT
1. FEELING NERVOUS, ANXIOUS, OR ON EDGE: NEARLY EVERY DAY
3. WORRYING TOO MUCH ABOUT DIFFERENT THINGS: NOT AT ALL
8. IF YOU CHECKED OFF ANY PROBLEMS, HOW DIFFICULT HAVE THESE MADE IT FOR YOU TO DO YOUR WORK, TAKE CARE OF THINGS AT HOME, OR GET ALONG WITH OTHER PEOPLE?: SOMEWHAT DIFFICULT
2. NOT BEING ABLE TO STOP OR CONTROL WORRYING: SEVERAL DAYS
5. BEING SO RESTLESS THAT IT IS HARD TO SIT STILL: MORE THAN HALF THE DAYS
GAD7 TOTAL SCORE: 9
6. BECOMING EASILY ANNOYED OR IRRITABLE: NOT AT ALL

## 2022-09-22 ASSESSMENT — PATIENT HEALTH QUESTIONNAIRE - PHQ9
SUM OF ALL RESPONSES TO PHQ QUESTIONS 1-9: 8
10. IF YOU CHECKED OFF ANY PROBLEMS, HOW DIFFICULT HAVE THESE PROBLEMS MADE IT FOR YOU TO DO YOUR WORK, TAKE CARE OF THINGS AT HOME, OR GET ALONG WITH OTHER PEOPLE: SOMEWHAT DIFFICULT
SUM OF ALL RESPONSES TO PHQ QUESTIONS 1-9: 8

## 2022-09-23 NOTE — PROGRESS NOTES
[facility]                                     Progress Note    Patient Name: Alina Martell  Date: 9/22/22         Service Type: Individual      Session Start Time:  105 Session End Time: 150     Session Length:  45 minutes    Session #: 42    Attendees: Client    Service Modality:  Video Visit:      Provider verified identity through the following two step process.  Patient provided:  Patient is known previously to provider     Telemedicine Visit: The patient's condition can be safely assessed and treated via synchronous audio and visual telemedicine encounter.       Reason for Telemedicine Visit: Services only offered telehealth     Originating Site (Patient Location): Patient's work     Distant Site (Provider Location): [facility]     Consent:  The patient/guardian has verbally consented to: the potential risks and benefits of telemedicine (video visit) versus in person care; bill my insurance or make self-payment for services provided; and responsibility for payment of non-covered services.      Patient would like the video invitation sent by:  Text to cell phone:       Mode of Communication:  Video Conference via Amwell     As the provider I attest to compliance with applicable laws and regulations related to telemedicine.       DATA  Interactive Complexity: No  Crisis: No        Progress Since Last Session (Related to Symptoms / Goals / Homework):   Symptoms: No change racing thoughts continue at night    Homework: Partially completed      Episode of Care Goals: Satisfactory progress - ACTION (Actively working towards change); Intervened by reinforcing change plan / affirming steps taken     Current / Ongoing Stressors and Concerns:  Patient indicated continuing to have anxiety. Patient reported she is having a lot of racing thoughts at night. Patient indicated it is affecting her sleep and then she is tired. Patient reported  believing part of it is related to her medical concerns and going to talk with her doctor. Patient indicated she is working on not isolating but not doing too much. Patient reported has spent time with friends which has improved her mood. This therapist went over sleep hygiene to continue to work on at this time.      Treatment Objective(s) Addressed in This Session:   use thought-stopping strategy daily to reduce intrusive thoughts  Increase interest, engagement, and pleasure in doing things  Decrease frequency and intensity of feeling down, depressed, hopeless  Improve quantity and quality of night time sleep / decrease daytime naps  Identify negative self-talk and behaviors: challenge core beliefs, myths, and actions     Intervention:   Motivational Interviewing    MI Intervention: Expressed Empathy/Understanding     Change Talk Expressed by the Patient: Desire to change    Provider Response to Change Talk: E - Evoked more info from patient about behavior change      Assessments completed prior to visit:  The following assessments were completed by patient for this visit:  PHQ9:   PHQ-9 SCORE 3/8/2022 4/14/2022 5/12/2022 6/7/2022 6/21/2022 8/10/2022 9/22/2022   PHQ-9 Total Score MyChart 6 (Mild depression) 4 (Minimal depression) 7 (Mild depression) 4 (Minimal depression) 6 (Mild depression) 11 (Moderate depression) 8 (Mild depression)   PHQ-9 Total Score 6 4 7 4 6 11 8     GAD7:   ADA-7 SCORE 4/7/2021 4/28/2021 10/5/2021 11/15/2021 12/13/2021 8/10/2022 9/22/2022   Total Score - - 3 (minimal anxiety) 6 (mild anxiety) - 6 (mild anxiety) 9 (mild anxiety)   Total Score 5 4 3 6 4 6 9     PROMIS 10-Global Health (all questions and answers displayed):   PROMIS 10 12/15/2021 3/26/2022 4/14/2022 7/19/2022   In general, would you say your health is: Good Good Good Good   In general, would you say your quality of life is: Good Good Good Fair   In general, how would you rate your physical health? Good Fair Fair Good   In  general, how would you rate your mental health, including your mood and your ability to think? Fair Fair Good Fair   In general, how would you rate your satisfaction with your social activities and relationships? Good Fair Good Good   In general, please rate how well you carry out your usual social activities and roles Good Good Fair Good   To what extent are you able to carry out your everyday physical activities such as walking, climbing stairs, carrying groceries, or moving a chair? Mostly Mostly Mostly Mostly   How often have you been bothered by emotional problems such as feeling anxious, depressed or irritable? Often Sometimes Often Often   How would you rate your fatigue on average? Mild Mild Mild Moderate   How would you rate your pain on average?   0 = No Pain  to  10 = Worst Imaginable Pain 3 3 3 3   In general, would you say your health is: 3 3 3 3   In general, would you say your quality of life is: 3 3 3 2   In general, how would you rate your physical health? 3 2 2 3   In general, how would you rate your mental health, including your mood and your ability to think? 2 2 3 2   In general, how would you rate your satisfaction with your social activities and relationships? 3 2 3 3   In general, please rate how well you carry out your usual social activities and roles. (This includes activities at home, at work and in your community, and responsibilities as a parent, child, spouse, employee, friend, etc.) 3 3 2 3   To what extent are you able to carry out your everyday physical activities such as walking, climbing stairs, carrying groceries, or moving a chair? 4 4 4 4   In the past 7 days, how often have you been bothered by emotional problems such as feeling anxious, depressed, or irritable? 4 3 4 4   In the past 7 days, how would you rate your fatigue on average? 2 2 2 3   In the past 7 days, how would you rate your pain on average, where 0 means no pain, and 10 means worst imaginable pain? 3 3 3 3    Global Mental Health Score 10 10 11 9   Global Physical Health Score 15 14 14 14   PROMIS TOTAL - SUBSCORES 25 24 25 23   Some recent data might be hidden         ASSESSMENT: Current Emotional / Mental Status (status of significant symptoms):   Risk status (Self / Other harm or suicidal ideation)   Patient denies current fears or concerns for personal safety.   Patient denies current or recent suicidal ideation or behaviors.   Patient denies current or recent homicidal ideation or behaviors.   Patient denies current or recent self injurious behavior or ideation.   Patient denies other safety concerns.   Patient reports there has been no change in risk factors since their last session.     Patient reports there has been no change in protective factors since their last session.     Recommended that patient call 911 or go to the local ED should there be a change in any of these risk factors.     Appearance:   Appropriate    Eye Contact:   Good    Psychomotor Behavior: Normal    Attitude:   Cooperative    Orientation:   All   Speech    Rate / Production: Normal/ Responsive    Volume:  Normal    Mood:    Anxious  Depressed    Affect:    Appropriate    Thought Content:  Clear    Thought Form:  Coherent  Logical    Insight:    Good      Medication Review:   No changes to current psychiatric medication(s)     Medication Compliance:   Yes     Changes in Health Issues:   None reported     Chemical Use Review:   Substance Use: Chemical use reviewed, no active concerns identified      Tobacco Use: No current tobacco use.      Diagnosis:  1. Posttraumatic stress disorder    2. Moderate episode of recurrent major depressive disorder (H)    3. ADA (generalized anxiety disorder)        Collateral Reports Completed:   Not Applicable    PLAN: (Patient Tasks / Therapist Tasks / Other)  Patient will return in 2 weeks for scheduled session. Patient will continue to work on sleep hygiene. Patient will continue to identify racing  thoughts.   There has been demonstrated improvement in functioning while patient has been engaged in psychotherapy/psychological service- if withdrawn the patient would deteriorate and/or relapse.     Mariana Love, [facility],    ______________________________________________________________________    Individual Treatment Plan    Patient's Name: Alina Martell  YOB: 1965    Date of Creation:10/16/2020  Date Treatment Plan Last Reviewed/Revised: 08/11/2022    DSM5 Diagnoses: 296.32 (F33.1) Major Depressive Disorder, Recurrent Episode, Moderate _ or 300.02 (F41.1) Generalized Anxiety Disorder  Psychosocial / Contextual Factors: Health, family and financial   PROMIS (reviewed every 90 days): 25    Referral / Collaboration:  Was/were discussed and patient will pursue.    Anticipated number of session for this episode of care: 20 will reevaluate every 90 days  Anticipation frequency of session: Biweekly  Anticipated Duration of each session: 38-52 minutes  Treatment plan will be reviewed in 90 days or when goals have been changed.       MeasurableTreatment Goal(s) related to diagnosis / functional impairment(s)  Goal 1: Patient will work on reducing overall anxiety.     I will know I've met my goal when reporting minimal anxiety symptoms.       Objective #A (Patient Action)                          Patient will use distraction each time intrusive worry surfaces.  Status: Continued - Date(s): 08/11/2022     Intervention(s)  Therapist will teach emotional regulation skills. ..     Objective #B  Patient will Decrease frequency and intensity of feeling down, depressed, hopeless.  Status: Continued - Date(s): 08/11/2022     Intervention(s)  Therapist will teach emotional recognition/identification. ..     Objective #C  Patient will Identify negative self-talk and behaviors: challenge core beliefs, myths, and actions.  Status: Continued - Date(s): 08/11/2022     Intervention(s)  Therapist will teach the  client how to perform a behavioral chain analysis. ..     Goal 2: Patient will continue to work on reducing depression symptoms    I will know I've met my goal then reporting minimal depression symptoms     Objective #A (Patient Action)                          Patient will Increase interest, engagement, and pleasure in doing things.  Status: Continued - Date(s):  08/11/2022     Intervention(s)  Therapist will assign homework ..     Objective #B  Patient will Decrease frequency and intensity of feeling down, depressed, hopeless.  Status: Continued - Date(s):  08/11/2022     Intervention(s)  Therapist will assign homework at every session.         Patient has reviewed and agreed to the above plan.        Mariana Love, [facility] 08/11/2022

## 2022-09-29 NOTE — TELEPHONE ENCOUNTER
"Outpatient Medication Detail     Disp Refills Start End YESI   cetirizine (ZYRTEC) 10 MG tablet 90 tablet 1 5/28/2021  No   Sig - Route: Take 1 tablet (10 mg total) by mouth daily. - Oral   Sent to pharmacy as: cetirizine 10 mg tablet (ZyrTEC)   E-Prescribing Status: Receipt confirmed by pharmacy (5/28/2021  3:55 PM CDT)       Last office visit provider:  1/5/22     Requested Prescriptions   Pending Prescriptions Disp Refills     cetirizine (ZYRTEC) 10 MG tablet 90 tablet 3     Sig: Take 1 tablet (10 mg) by mouth daily       Antihistamines Protocol Failed - 2/3/2022  3:52 PM        Failed - Medication is active on med list        Passed - Patient is 3-64 years of age     Apply weight-based dosing for peds patients age 3 - 12 years of age.    Forward request to provider for patients under the age of 3 or over the age of 64.          Passed - Recent (12 mo) or future (30 days) visit within the authorizing provider's specialty     Patient has had an office visit with the authorizing provider or a provider within the authorizing providers department within the previous 12 mos or has a future within next 30 days. See \"Patient Info\" tab in inbasket, or \"Choose Columns\" in Meds & Orders section of the refill encounter.                   Minh Mullen RN 02/06/22 12:26 PM  " 31

## 2022-10-02 NOTE — PROGRESS NOTES
[facility]  Cardiac Electrophysiology  [address]  [place] [address]   Office: [phone]  Fax: [phone]     Cardiac Electrophysiology Follow-up Visit    Patient: Alina Martell   : 1965     Primary Care Provider: Estelle Bansla MD    CHIEF COMPLAINT/REASON FOR VISIT  Paroxysmal atrial fibrillation, now status post PVI 2022    Assessment/Recommendations   Alina Martell is a 57 year old female with paroxymal atrial fibrillation now status post PVI 2022, HTN, Sjogren syndrome, rheumatoid arthritis, RANDALL on CPAP, anxiety, morbid obesity presenting for follow-up after atrial fibrillation ablation.    Paroxysmal atrial fibrillation - symptomatic with palpitations  [id number] 2  She underwent PVI 2022 with mild left atrial dilation without significant fibrosis noted - she notes that she has done well thus far post ablation without known atrial fibrillation recurrence.  - can continue to have access to flecainide 50mg as needed for atrial fibrillation episodes (low dose, though has had good therapeutic response)  - reduce diltiazem ER 180mg daily  - continue apixaban 5mg twice daily.  Given her [id number] 2, the long term risk-benefit balance of therapeutic anticoagulation is indeterminate, and decision regarding continuation vs cessation should take into account bleeding diatheses, stroke risk reduction and patient preference.  We discussed these elements today - she elects to continue of anticoagulation at least for now with interval re-assessment of benefit-risk balance  - discussed the ongoing importance of lifestyle modification (maintaining a healthy weight, continued sleep apnea management, alcohol avoidance) as part of a long term strategy for atrial fibrillation management  - continue to follow for atrial fibrillation recurrences, and let us know should these occur    Follow up: as above, future EP follow-up as needed    "      History of Present Illness   Alina Martell is a 57 year old female with paroxymal atrial fibrillation now status post PVI 6/30/2022, HTN, Sjogren syndrome, rheumatoid arthritis, RANDALL on CPAP, anxiety, morbid obesity presenting for follow-up after atrial fibrillation ablation.    Mrs. Martell notes an episode of palpitations lead to diagnosis of atrial fibrillation 9/2020 with DCCV performed.  She had recurrences started 1/2022 including ER evaluations, DCCV (1/1/2022) treated with apixaban, diltiazem.  She has since noted ongoing intermittent episodes.  She was prescribed flecainide 50mg as needed - she used this a few times with good response.  She underwent PVI 6/30/2022 with mild left atrial dilation without significant fibrosis noted - she notes that she has done well thus far post ablation without known atrial fibrillation recurrence.  She had noted mild-moderate intermittent chest, left shoulder and elbow discomfort intermittent for a few weeks post ablation.  She is motivated towards weight loss.    She had one episode of syncope around the time of her 1/17/2022 episode of atrial fibrillation.     She has a family history of atrial fibrillation and pacemakers.       Physical Examination  Review of Systems   VITALS: BP (!) 158/90 (BP Location: Right arm, Patient Position: Sitting, Cuff Size: Adult Large)   Pulse 81   Resp 16   Ht 1.753 m (5' 9\")   Wt 129.7 kg (286 lb)   LMP 12/21/2021   BMI 42.23 kg/m    Wt Readings from Last 3 Encounters:   08/23/22 112.5 kg (248 lb)   08/17/22 129.2 kg (284 lb 12.8 oz)   06/30/22 128.7 kg (283 lb 11.2 oz)     CONSTITUTIONAL: well nourished, comfortable, no distress  EYES:  Conjunctivae pink, sclerae clear.    E/N/T:  Oral mucosa pink  RESPIRATORY:  Respiratory effort is normal  CARDIOVASCULAR:  normal S1 and S2  GASTROINTESTINAL:  Abdomen without masses or tenderness  EXTREMITIES:  No clubbing or cyanosis.    MUSCULOSKELETAL:  Overall grossly normal muscle " strength  SKIN:  Overall, skin warm and dry, no lesions.  NEURO/PSYCH:  Oriented x 3 with normal affect.   Constitutional:  No weight loss or loss of appetite    Eyes:  No difficulty with vision, no double vision, no dry eyes  ENT:  No sore throat, difficulty swallowing; changes in hearing or tinnitus  Cardiovascular: As detailed above  Respiratory:  No cough  Musculoskeletal  No joint pain, muscle aches  Neurologic:  No tremor  Hematologic: No easy bruising, excessive bleeding tendency   Gastrointestinal:  No jaundice, abdominal pain or abdominal bloating  Genitourinary: No changes in urinary habits, no trouble urinating    Psychiatric: No anxiety or depression      Medical History  Surgical History   Past Medical History:   Diagnosis Date     Anemia      Antiplatelet or antithrombotic long-term use      Arrhythmia      Cannabis use without complication 12/8/2014     Carpal tunnel syndrome      Coronary artery disease      Gastroesophageal reflux disease      History of angina      Hypertension      Irregular heart beat      Obesity      Paroxysmal atrial fibrillation (H)      PTSD (post-traumatic stress disorder)      RA (rheumatoid arthritis) (H)      Rheumatoid arthritis (H) 7/8/2016     Sicca syndrome (H)      Sleep apnea     Past Surgical History:   Procedure Laterality Date     CHOLECYSTECTOMY       DILATION AND CURETTAGE, OPERATIVE HYSTEROSCOPY, COMBINED N/A 3/3/2022    Procedure: HYSTEROSCOPY, WITH DILATION AND CURETTAGE;  Surgeon: Irma Cary MD;  Location: [facility]     EP ABLATION FOCAL AFIB N/A 6/30/2022    Procedure: Ablation Atrial Fibrilation;  Surgeon: Jose Guadalupe Joseph MD;  Location:  [facility]ATH LAB     HC REMOVAL GALLBLADDER      Description: Cholecystectomy;  Recorded: 10/15/2013;     LAPAROSCOPIC TUBAL LIGATION       RELEASE CARPAL TUNNEL BILATERAL       TUBAL LIGATION  1995         Family History Social History   Family History   Problem Relation Age of Onset      Crohn's Disease Mother         Total colectomy with ileostomy.     Atrial fibrillation Mother      Snoring Father      Diabetes Father      Prostate Cancer Father      Chronic Kidney Disease Father         Chose against dialysis.     Substance Abuse Brother      Depression Brother      Attention Deficit Disorder Brother      Alcoholism Brother      Arrhythmia Brother      Alcoholism Brother      Substance Abuse Brother      Arrhythmia Brother      Alcoholism Maternal Grandfather      No Known Problems Daughter      No Known Problems Son      Lupus Cousin      Breast Cancer No family hx of         Social History     Tobacco Use     Smoking status: Never Smoker     Smokeless tobacco: Never Used     Tobacco comment: smokes marijuana   Substance Use Topics     Alcohol use: Not Currently     Comment: Alcoholic Drinks/day: Never an issue, she states.  7/8/16     Drug use: Not Currently     Types: Marijuana     Comment: Drug use: Former marijuana use, not current. 7/8/16         Medications  Allergies     Current Outpatient Medications:      acetaminophen (TYLENOL) 325 MG tablet, Take 3 tablets (975 mg) by mouth every 6 hours as needed for mild pain, Disp: 50 tablet, Rfl: 0     adalimumab (HUMIRA *CF* PEN) 40 MG/0.4ML pen kit, INJECT 1 PEN UNDER THE SKIN EVERY 14 DAYS. HOLD FOR SIGNS OF INFECTION, THEN SEEK MEDICAL ATTENTION., Disp: 2 each, Rfl: 5     apixaban ANTICOAGULANT (ELIQUIS ANTICOAGULANT) 5 MG tablet, Take 1 tablet (5 mg) by mouth 2 times daily, Disp: 180 tablet, Rfl: 3     cetirizine (ZYRTEC) 10 MG tablet, Take 10 mg by mouth daily, Disp: , Rfl:      diltiazem ER COATED BEADS (CARDIZEM CD/CARTIA XT) 180 MG 24 hr capsule, Take 2 capsules (360 mg) by mouth daily, Disp: 180 capsule, Rfl: 3     EPINEPHrine (ANY BX GENERIC EQUIV) 0.3 MG/0.3ML injection 2-pack, Inject 0.3 mg into the muscle as needed for anaphylaxis , Disp: , Rfl:      hydrOXYzine (ATARAX) 25 MG tablet, Take 1 tablet (25 mg) by mouth 3 times daily  as needed for anxiety, Disp: 270 tablet, Rfl: 3     Multiple Vitamins-Minerals (CENTROVITE) TABS, TAKE 1 TABLET BY MOUTH EVERY DAY FOR 30 DAYS, Disp: , Rfl:      omeprazole (PRILOSEC) 40 MG DR capsule, Take 1 capsule (40 mg) by mouth in the morning., Disp: 90 capsule, Rfl: 3     polyethylene glycol (MIRALAX) 17 GM/Dose powder, Take 17 g (1 capful) by mouth daily, Disp: 578 g, Rfl: 3     prazosin (MINIPRESS) 1 MG capsule, Take 1 capsule by mouth nightly as needed , Disp: , Rfl:      traZODone (DESYREL) 50 MG tablet, Take 0.5 tablets (25 mg) by mouth At Bedtime, Disp: 90 tablet, Rfl: 0     vitamin C (ASCORBIC ACID) 1000 MG TABS, Take 1,000 mg by mouth daily , Disp: , Rfl:      vitamin D3 (CHOLECALCIFEROL) 250 mcg (11952 units) capsule, Take 1 capsule by mouth once a week, Disp: , Rfl:      Allergies   Allergen Reactions     Penicillins Shortness Of Breath, Palpitations, Dizziness, Anaphylaxis, Hives, Itching and Rash     Test in 2031, see allergy note on 7/29/21     Shellfish-Derived Products Anaphylaxis     Sulfa Drugs Hives and Anaphylaxis          Lab Results    Chemistry CBC Cardiac Enzymes/BNP/TSH/INR   Recent Labs   Lab Test 03/08/22 2025      POTASSIUM 3.9   CHLORIDE 101   CO2 28      BUN 12   CR 0.77   GFRESTIMATED >90   JOSSELIN 8.7     Recent Labs   Lab Test 03/08/22 2025 02/16/22  1108 01/28/22  1648   CR 0.77 0.74 0.73          Recent Labs   Lab Test 03/16/22 1717   WBC 12.4*   HGB 11.5*   HCT 37.0   MCV 87        Recent Labs   Lab Test 03/16/22 1717 03/08/22 2025 03/08/22  1842   HGB 11.5* 12.4 12.2    Recent Labs   Lab Test 03/08/22 2025 01/17/22  1406 01/16/22  2301   TROPONINI <0.01 <0.01 <0.01     No results for input(s): BNP, NTBNPI, NTBNP in the last 12562 hours.  Recent Labs   Lab Test 02/16/22  1108   TSH 2.16     No results for input(s): INR in the last 38883 hours.      Data Review    ECGs (tracings independently reviewed)  10/3/2022 - SR 81bpm  3/8/2022 - SR  73bpm  1/17/2022 - AF, ventricular rate 140bpm  1/1/2022 - AF, ventricular rate 147bpm    10/7/2020 TTE    Normal left ventricular size with mild hypertrophy noted.    Left ventricle ejection fraction is normal. The calculated left ventricular ejection fraction is 67%.    Normal right ventricular size and systolic function.    No hemodynamically significant valvular heart abnormalities.    No previous study for comparison.    2/22/2022 coronary CTA  1.  Normal left main.  2.  Normal LAD and its branches.  3.  Minimal disease in proximal LCx.  4.  Normal RCA and its branches.  Right dominant system.       Cc: Gil Richter MD, Estelle Bansal MD Amila Dilusha William, MD  10/3/2022  8:13 AM

## 2022-10-03 ENCOUNTER — OFFICE VISIT (OUTPATIENT)
Dept: CARDIOLOGY | Facility: CLINIC | Age: 57
End: 2022-10-03
Payer: COMMERCIAL

## 2022-10-03 VITALS
HEIGHT: 69 IN | BODY MASS INDEX: 42.36 KG/M2 | DIASTOLIC BLOOD PRESSURE: 90 MMHG | RESPIRATION RATE: 16 BRPM | SYSTOLIC BLOOD PRESSURE: 158 MMHG | HEART RATE: 81 BPM | WEIGHT: 286 LBS

## 2022-10-03 DIAGNOSIS — E66.01 MORBID OBESITY (H): ICD-10-CM

## 2022-10-03 DIAGNOSIS — I10 ESSENTIAL HYPERTENSION: ICD-10-CM

## 2022-10-03 DIAGNOSIS — G47.33 OBSTRUCTIVE SLEEP APNEA SYNDROME: ICD-10-CM

## 2022-10-03 DIAGNOSIS — I48.0 PAROXYSMAL ATRIAL FIBRILLATION (H): Primary | ICD-10-CM

## 2022-10-03 LAB
ATRIAL RATE - MUSE: 81 BPM
DIASTOLIC BLOOD PRESSURE - MUSE: NORMAL MMHG
INTERPRETATION ECG - MUSE: NORMAL
P AXIS - MUSE: 39 DEGREES
PR INTERVAL - MUSE: 162 MS
QRS DURATION - MUSE: 82 MS
QT - MUSE: 384 MS
QTC - MUSE: 446 MS
R AXIS - MUSE: 2 DEGREES
SYSTOLIC BLOOD PRESSURE - MUSE: NORMAL MMHG
T AXIS - MUSE: 10 DEGREES
VENTRICULAR RATE- MUSE: 81 BPM

## 2022-10-03 PROCEDURE — 93000 ELECTROCARDIOGRAM COMPLETE: CPT | Performed by: GENERAL ACUTE CARE HOSPITAL

## 2022-10-03 PROCEDURE — 99214 OFFICE O/P EST MOD 30 MIN: CPT | Performed by: INTERNAL MEDICINE

## 2022-10-03 RX ORDER — DILTIAZEM HYDROCHLORIDE 180 MG/1
360 CAPSULE, COATED, EXTENDED RELEASE ORAL DAILY
Qty: 180 CAPSULE | Refills: 3 | Status: SHIPPED | OUTPATIENT
Start: 2022-10-03 | End: 2024-07-15

## 2022-10-03 RX ORDER — FLECAINIDE ACETATE 50 MG/1
50 TABLET ORAL DAILY PRN
Qty: 20 TABLET | Refills: 4 | Status: SHIPPED | OUTPATIENT
Start: 2022-10-03 | End: 2023-05-02

## 2022-10-03 NOTE — LETTER
10/3/2022    Estelle Bansal MD  [address]  [facility] [address]    RE: Alina Martell       Dear Colleague,     I had the pleasure of seeing Alina Martell in the [facility].     [facility]  Cardiac Electrophysiology  [address]  [place] [address]   Office: [phone]  Fax: [phone]     Cardiac Electrophysiology Follow-up Visit    Patient: Alina Martell   : 1965     Primary Care Provider: Estelle Bansal MD    CHIEF COMPLAINT/REASON FOR VISIT  Paroxysmal atrial fibrillation, now status post PVI 2022    Assessment/Recommendations   Alina Martell is a 57 year old female with paroxymal atrial fibrillation now status post PVI 2022, HTN, Sjogren syndrome, rheumatoid arthritis, RANDALL on CPAP, anxiety, morbid obesity presenting for follow-up after atrial fibrillation ablation.    Paroxysmal atrial fibrillation - symptomatic with palpitations  [id number] 2  She underwent PVI 2022 with mild left atrial dilation without significant fibrosis noted - she notes that she has done well thus far post ablation without known atrial fibrillation recurrence.  - can continue to have access to flecainide 50mg as needed for atrial fibrillation episodes (low dose, though has had good therapeutic response)  - reduce diltiazem ER 180mg daily  - continue apixaban 5mg twice daily.  Given her [id number] 2, the long term risk-benefit balance of therapeutic anticoagulation is indeterminate, and decision regarding continuation vs cessation should take into account bleeding diatheses, stroke risk reduction and patient preference.  We discussed these elements today - she elects to continue of anticoagulation at least for now with interval re-assessment of benefit-risk balance  - discussed the ongoing importance of lifestyle modification (maintaining a healthy weight, continued sleep apnea management, alcohol avoidance) as  "part of a long term strategy for atrial fibrillation management  - continue to follow for atrial fibrillation recurrences, and let us know should these occur    Follow up: as above, future EP follow-up as needed         History of Present Illness   Alina Martell is a 57 year old female with paroxymal atrial fibrillation now status post PVI 6/30/2022, HTN, Sjogren syndrome, rheumatoid arthritis, RANDALL on CPAP, anxiety, morbid obesity presenting for follow-up after atrial fibrillation ablation.    Mrs. Martell notes an episode of palpitations lead to diagnosis of atrial fibrillation 9/2020 with DCCV performed.  She had recurrences started 1/2022 including ER evaluations, DCCV (1/1/2022) treated with apixaban, diltiazem.  She has since noted ongoing intermittent episodes.  She was prescribed flecainide 50mg as needed - she used this a few times with good response.  She underwent PVI 6/30/2022 with mild left atrial dilation without significant fibrosis noted - she notes that she has done well thus far post ablation without known atrial fibrillation recurrence.  She had noted mild-moderate intermittent chest, left shoulder and elbow discomfort intermittent for a few weeks post ablation.  She is motivated towards weight loss.    She had one episode of syncope around the time of her 1/17/2022 episode of atrial fibrillation.     She has a family history of atrial fibrillation and pacemakers.       Physical Examination  Review of Systems   VITALS: BP (!) 158/90 (BP Location: Right arm, Patient Position: Sitting, Cuff Size: Adult Large)   Pulse 81   Resp 16   Ht 1.753 m (5' 9\")   Wt 129.7 kg (286 lb)   LMP 12/21/2021   BMI 42.23 kg/m    Wt Readings from Last 3 Encounters:   08/23/22 112.5 kg (248 lb)   08/17/22 129.2 kg (284 lb 12.8 oz)   06/30/22 128.7 kg (283 lb 11.2 oz)     CONSTITUTIONAL: well nourished, comfortable, no distress  EYES:  Conjunctivae pink, sclerae clear.    E/N/T:  Oral mucosa pink  RESPIRATORY: "  Respiratory effort is normal  CARDIOVASCULAR:  normal S1 and S2  GASTROINTESTINAL:  Abdomen without masses or tenderness  EXTREMITIES:  No clubbing or cyanosis.    MUSCULOSKELETAL:  Overall grossly normal muscle strength  SKIN:  Overall, skin warm and dry, no lesions.  NEURO/PSYCH:  Oriented x 3 with normal affect.   Constitutional:  No weight loss or loss of appetite    Eyes:  No difficulty with vision, no double vision, no dry eyes  ENT:  No sore throat, difficulty swallowing; changes in hearing or tinnitus  Cardiovascular: As detailed above  Respiratory:  No cough  Musculoskeletal  No joint pain, muscle aches  Neurologic:  No tremor  Hematologic: No easy bruising, excessive bleeding tendency   Gastrointestinal:  No jaundice, abdominal pain or abdominal bloating  Genitourinary: No changes in urinary habits, no trouble urinating    Psychiatric: No anxiety or depression      Medical History  Surgical History   Past Medical History:   Diagnosis Date     Anemia      Antiplatelet or antithrombotic long-term use      Arrhythmia      Cannabis use without complication 12/8/2014     Carpal tunnel syndrome      Coronary artery disease      Gastroesophageal reflux disease      History of angina      Hypertension      Irregular heart beat      Obesity      Paroxysmal atrial fibrillation (H)      PTSD (post-traumatic stress disorder)      RA (rheumatoid arthritis) (H)      Rheumatoid arthritis (H) 7/8/2016     Sicca syndrome (H)      Sleep apnea     Past Surgical History:   Procedure Laterality Date     CHOLECYSTECTOMY       DILATION AND CURETTAGE, OPERATIVE HYSTEROSCOPY, COMBINED N/A 3/3/2022    Procedure: HYSTEROSCOPY, WITH DILATION AND CURETTAGE;  Surgeon: Irma Cary MD;  Location: [facility]     EP ABLATION FOCAL AFIB N/A 6/30/2022    Procedure: Ablation Atrial Fibrilation;  Surgeon: Jose Guadalupe Joseph MD;  Location:  HE[facility] CARDI[facility] CATH LAB     HC REMOVAL GALLBLADDER      Description:  Cholecystectomy;  Recorded: 10/15/2013;     LAPAROSCOPIC TUBAL LIGATION       RELEASE CARPAL TUNNEL BILATERAL       TUBAL LIGATION  1995         Family History Social History   Family History   Problem Relation Age of Onset     Crohn's Disease Mother         Total colectomy with ileostomy.     Atrial fibrillation Mother      Snoring Father      Diabetes Father      Prostate Cancer Father      Chronic Kidney Disease Father         Chose against dialysis.     Substance Abuse Brother      Depression Brother      Attention Deficit Disorder Brother      Alcoholism Brother      Arrhythmia Brother      Alcoholism Brother      Substance Abuse Brother      Arrhythmia Brother      Alcoholism Maternal Grandfather      No Known Problems Daughter      No Known Problems Son      Lupus Cousin      Breast Cancer No family hx of         Social History     Tobacco Use     Smoking status: Never Smoker     Smokeless tobacco: Never Used     Tobacco comment: smokes marijuana   Substance Use Topics     Alcohol use: Not Currently     Comment: Alcoholic Drinks/day: Never an issue, she states.  7/8/16     Drug use: Not Currently     Types: Marijuana     Comment: Drug use: Former marijuana use, not current. 7/8/16         Medications  Allergies     Current Outpatient Medications:      acetaminophen (TYLENOL) 325 MG tablet, Take 3 tablets (975 mg) by mouth every 6 hours as needed for mild pain, Disp: 50 tablet, Rfl: 0     adalimumab (HUMIRA *CF* PEN) 40 MG/0.4ML pen kit, INJECT 1 PEN UNDER THE SKIN EVERY 14 DAYS. HOLD FOR SIGNS OF INFECTION, THEN SEEK MEDICAL ATTENTION., Disp: 2 each, Rfl: 5     apixaban ANTICOAGULANT (ELIQUIS ANTICOAGULANT) 5 MG tablet, Take 1 tablet (5 mg) by mouth 2 times daily, Disp: 180 tablet, Rfl: 3     cetirizine (ZYRTEC) 10 MG tablet, Take 10 mg by mouth daily, Disp: , Rfl:      diltiazem ER COATED BEADS (CARDIZEM CD/CARTIA XT) 180 MG 24 hr capsule, Take 2 capsules (360 mg) by mouth daily, Disp: 180 capsule, Rfl: 3      EPINEPHrine (ANY BX GENERIC EQUIV) 0.3 MG/0.3ML injection 2-pack, Inject 0.3 mg into the muscle as needed for anaphylaxis , Disp: , Rfl:      hydrOXYzine (ATARAX) 25 MG tablet, Take 1 tablet (25 mg) by mouth 3 times daily as needed for anxiety, Disp: 270 tablet, Rfl: 3     Multiple Vitamins-Minerals (CENTROVITE) TABS, TAKE 1 TABLET BY MOUTH EVERY DAY FOR 30 DAYS, Disp: , Rfl:      omeprazole (PRILOSEC) 40 MG DR capsule, Take 1 capsule (40 mg) by mouth in the morning., Disp: 90 capsule, Rfl: 3     polyethylene glycol (MIRALAX) 17 GM/Dose powder, Take 17 g (1 capful) by mouth daily, Disp: 578 g, Rfl: 3     prazosin (MINIPRESS) 1 MG capsule, Take 1 capsule by mouth nightly as needed , Disp: , Rfl:      traZODone (DESYREL) 50 MG tablet, Take 0.5 tablets (25 mg) by mouth At Bedtime, Disp: 90 tablet, Rfl: 0     vitamin C (ASCORBIC ACID) 1000 MG TABS, Take 1,000 mg by mouth daily , Disp: , Rfl:      vitamin D3 (CHOLECALCIFEROL) 250 mcg (82658 units) capsule, Take 1 capsule by mouth once a week, Disp: , Rfl:      Allergies   Allergen Reactions     Penicillins Shortness Of Breath, Palpitations, Dizziness, Anaphylaxis, Hives, Itching and Rash     Test in 2031, see allergy note on 7/29/21     Shellfish-Derived Products Anaphylaxis     Sulfa Drugs Hives and Anaphylaxis          Lab Results    Chemistry CBC Cardiac Enzymes/BNP/TSH/INR   Recent Labs   Lab Test 03/08/22 2025      POTASSIUM 3.9   CHLORIDE 101   CO2 28      BUN 12   CR 0.77   GFRESTIMATED >90   JOSSELIN 8.7     Recent Labs   Lab Test 03/08/22 2025 02/16/22  1108 01/28/22  1648   CR 0.77 0.74 0.73          Recent Labs   Lab Test 03/16/22 1717   WBC 12.4*   HGB 11.5*   HCT 37.0   MCV 87        Recent Labs   Lab Test 03/16/22 1717 03/08/22 2025 03/08/22  1842   HGB 11.5* 12.4 12.2    Recent Labs   Lab Test 03/08/22 2025 01/17/22  1406 01/16/22  2301   TROPONINI <0.01 <0.01 <0.01     No results for input(s): BNP, NTBNPI, NTBNP in the last 72000  hours.  Recent Labs   Lab Test 02/16/22  1108   TSH 2.16     No results for input(s): INR in the last 29896 hours.      Data Review    ECGs (tracings independently reviewed)  10/3/2022 - SR 81bpm  3/8/2022 - SR 73bpm  1/17/2022 - AF, ventricular rate 140bpm  1/1/2022 - AF, ventricular rate 147bpm    10/7/2020 TTE    Normal left ventricular size with mild hypertrophy noted.    Left ventricle ejection fraction is normal. The calculated left ventricular ejection fraction is 67%.    Normal right ventricular size and systolic function.    No hemodynamically significant valvular heart abnormalities.    No previous study for comparison.    2/22/2022 coronary CTA  1.  Normal left main.  2.  Normal LAD and its branches.  3.  Minimal disease in proximal LCx.  4.  Normal RCA and its branches.  Right dominant system.       Cc: Gil Richter MD, Estelle Bansal MD Amila Dilusha William, MD  10/3/2022  8:13 AM          Thank you for allowing me to participate in the care of your patient.      Sincerely,     Jose Guadalupe Joseph MD     [facility]  cc:   Yasmin Tolentino, STARLA CNP  [address]  [place] [address]

## 2022-10-03 NOTE — PATIENT INSTRUCTIONS
[facility]  Cardiac Electrophysiology  [address]  [place] [id number]   Office: [phone]  Fax: [phone]       Thank you for seeing us in clinic today - it is a pleasure to be a part of your care team.  Below is a summary of our plan from today's visit.       You have paroxysmal atrial fibrillation and underwent atrial fibrillation ablation 6/30/2022.  We are very happy with your progress thus far, and are hopeful that atrial fibrillation will remain suppressed long term.  - can continue to have access to flecainide 50mg as needed for atrial fibrillation  - reduce diltiazem ER to 180mg daily  - continue apixaban 5mg twice daily.  Given your current estimation of stroke risk, the long term risk-benefit balance of Eliquis is indeterminate, and decision regarding continuation vs risk should take into account bleeding risk, stroke risk reduction and patient preference.  We discussed these elements today.  - discussed the ongoing importance of lifestyle modification (maintaining a healthy weight, continued sleep apnea management, alcohol avoidance) as part of a long term strategy for atrial fibrillation management  - continue to follow for atrial fibrillation recurrences, and let us know should these occur     Please do not hesitate to be in touch with our office at [phone] with any questions that may arise.       Thank you for trusting us with your care,    Jose Guadalupe Joseph MD  Clinical Cardiac Electrophysiology  [facility]  [address]  [place] [address]   Office: [phone]  Fax: [phone]

## 2022-10-06 ENCOUNTER — VIRTUAL VISIT (OUTPATIENT)
Dept: PSYCHOLOGY | Facility: CLINIC | Age: 57
End: 2022-10-06
Payer: COMMERCIAL

## 2022-10-06 DIAGNOSIS — F43.10 POSTTRAUMATIC STRESS DISORDER: Primary | ICD-10-CM

## 2022-10-06 DIAGNOSIS — F33.1 MODERATE EPISODE OF RECURRENT MAJOR DEPRESSIVE DISORDER (H): ICD-10-CM

## 2022-10-06 DIAGNOSIS — F41.1 GAD (GENERALIZED ANXIETY DISORDER): ICD-10-CM

## 2022-10-06 PROCEDURE — 90834 PSYTX W PT 45 MINUTES: CPT | Mod: 95 | Performed by: COUNSELOR

## 2022-10-07 ENCOUNTER — OFFICE VISIT (OUTPATIENT)
Dept: FAMILY MEDICINE | Facility: CLINIC | Age: 57
End: 2022-10-07
Payer: COMMERCIAL

## 2022-10-07 ENCOUNTER — HOSPITAL ENCOUNTER (OUTPATIENT)
Dept: CT IMAGING | Facility: HOSPITAL | Age: 57
Discharge: HOME | End: 2022-10-07
Attending: PHYSICIAN ASSISTANT | Admitting: PHYSICIAN ASSISTANT
Payer: COMMERCIAL

## 2022-10-07 VITALS
BODY MASS INDEX: 42.34 KG/M2 | OXYGEN SATURATION: 94 % | WEIGHT: 286.7 LBS | DIASTOLIC BLOOD PRESSURE: 86 MMHG | TEMPERATURE: 99 F | RESPIRATION RATE: 22 BRPM | SYSTOLIC BLOOD PRESSURE: 131 MMHG | HEART RATE: 88 BPM

## 2022-10-07 DIAGNOSIS — R10.31 ABDOMINAL PAIN, RIGHT LOWER QUADRANT: ICD-10-CM

## 2022-10-07 DIAGNOSIS — R10.11 RIGHT UPPER QUADRANT PAIN: ICD-10-CM

## 2022-10-07 DIAGNOSIS — K63.89 EPIPLOIC APPENDAGITIS: Primary | ICD-10-CM

## 2022-10-07 LAB
ALBUMIN SERPL BCG-MCNC: 3.9 G/DL (ref 3.5–5.2)
ALBUMIN UR-MCNC: 30 MG/DL
ALP SERPL-CCNC: 118 U/L (ref 35–104)
ALT SERPL W P-5'-P-CCNC: 9 U/L (ref 10–35)
ANION GAP SERPL CALCULATED.3IONS-SCNC: 11 MMOL/L (ref 7–15)
APPEARANCE UR: CLEAR
AST SERPL W P-5'-P-CCNC: 16 U/L (ref 10–35)
BACTERIA #/AREA URNS HPF: ABNORMAL /HPF
BASOPHILS # BLD AUTO: 0 10E3/UL (ref 0–0.2)
BASOPHILS NFR BLD AUTO: 0 %
BILIRUB SERPL-MCNC: 0.3 MG/DL
BILIRUB UR QL STRIP: NEGATIVE
BUN SERPL-MCNC: 12.4 MG/DL (ref 6–20)
CALCIUM SERPL-MCNC: 9.2 MG/DL (ref 8.6–10)
CHLORIDE SERPL-SCNC: 101 MMOL/L (ref 98–107)
COLOR UR AUTO: YELLOW
CREAT SERPL-MCNC: 0.72 MG/DL (ref 0.51–0.95)
DEPRECATED HCO3 PLAS-SCNC: 28 MMOL/L (ref 22–29)
EOSINOPHIL # BLD AUTO: 0.2 10E3/UL (ref 0–0.7)
EOSINOPHIL NFR BLD AUTO: 2 %
ERYTHROCYTE [DISTWIDTH] IN BLOOD BY AUTOMATED COUNT: 14.4 % (ref 10–15)
GFR SERPL CREATININE-BSD FRML MDRD: >90 ML/MIN/1.73M2
GLUCOSE SERPL-MCNC: 91 MG/DL (ref 70–99)
GLUCOSE UR STRIP-MCNC: NEGATIVE MG/DL
HCG UR QL: NEGATIVE
HCT VFR BLD AUTO: 38.4 % (ref 35–47)
HGB BLD-MCNC: 11.9 G/DL (ref 11.7–15.7)
HGB UR QL STRIP: ABNORMAL
HYALINE CASTS #/AREA URNS LPF: ABNORMAL /LPF
IMM GRANULOCYTES # BLD: 0 10E3/UL
IMM GRANULOCYTES NFR BLD: 0 %
KETONES UR STRIP-MCNC: NEGATIVE MG/DL
LEUKOCYTE ESTERASE UR QL STRIP: NEGATIVE
LYMPHOCYTES # BLD AUTO: 2.8 10E3/UL (ref 0.8–5.3)
LYMPHOCYTES NFR BLD AUTO: 26 %
MCH RBC QN AUTO: 25.9 PG (ref 26.5–33)
MCHC RBC AUTO-ENTMCNC: 31 G/DL (ref 31.5–36.5)
MCV RBC AUTO: 84 FL (ref 78–100)
MONOCYTES # BLD AUTO: 0.9 10E3/UL (ref 0–1.3)
MONOCYTES NFR BLD AUTO: 9 %
NEUTROPHILS # BLD AUTO: 6.8 10E3/UL (ref 1.6–8.3)
NEUTROPHILS NFR BLD AUTO: 63 %
NITRATE UR QL: NEGATIVE
PH UR STRIP: 6.5 [PH] (ref 5–8)
PLATELET # BLD AUTO: 338 10E3/UL (ref 150–450)
POTASSIUM SERPL-SCNC: 4 MMOL/L (ref 3.4–5.3)
PROT SERPL-MCNC: 7.8 G/DL (ref 6.4–8.3)
RBC # BLD AUTO: 4.59 10E6/UL (ref 3.8–5.2)
RBC #/AREA URNS AUTO: ABNORMAL /HPF
SODIUM SERPL-SCNC: 140 MMOL/L (ref 136–145)
SP GR UR STRIP: 1.02 (ref 1–1.03)
SQUAMOUS #/AREA URNS AUTO: ABNORMAL /LPF
UROBILINOGEN UR STRIP-ACNC: 0.2 E.U./DL
WBC # BLD AUTO: 10.7 10E3/UL (ref 4–11)
WBC #/AREA URNS AUTO: ABNORMAL /HPF

## 2022-10-07 PROCEDURE — 81025 URINE PREGNANCY TEST: CPT | Performed by: PHYSICIAN ASSISTANT

## 2022-10-07 PROCEDURE — 80053 COMPREHEN METABOLIC PANEL: CPT | Performed by: PHYSICIAN ASSISTANT

## 2022-10-07 PROCEDURE — 81001 URINALYSIS AUTO W/SCOPE: CPT | Performed by: PHYSICIAN ASSISTANT

## 2022-10-07 PROCEDURE — 99214 OFFICE O/P EST MOD 30 MIN: CPT | Performed by: PHYSICIAN ASSISTANT

## 2022-10-07 PROCEDURE — 85025 COMPLETE CBC W/AUTO DIFF WBC: CPT | Performed by: PHYSICIAN ASSISTANT

## 2022-10-07 PROCEDURE — 255N000002 HC RX 255 OP 636: Performed by: PHYSICIAN ASSISTANT

## 2022-10-07 PROCEDURE — 36415 COLL VENOUS BLD VENIPUNCTURE: CPT | Performed by: PHYSICIAN ASSISTANT

## 2022-10-07 PROCEDURE — 74177 CT ABD & PELVIS W/CONTRAST: CPT

## 2022-10-07 RX ORDER — HYDROCODONE BITARTRATE AND ACETAMINOPHEN 5; 325 MG/1; MG/1
1 TABLET ORAL EVERY 4 HOURS PRN
Qty: 18 TABLET | Refills: 0 | Status: SHIPPED | OUTPATIENT
Start: 2022-10-07 | End: 2022-10-10

## 2022-10-07 RX ADMIN — IOHEXOL 100 ML: 350 INJECTION, SOLUTION INTRAVENOUS at 13:16

## 2022-10-07 NOTE — PROGRESS NOTES
[facility]                                     Progress Note    Patient Name: Alina Martell  Date: 10/06/22         Service Type: Individual      Session Start Time:  1201 Session End Time: 1252     Session Length:  51 minutes    Session #: 43    Attendees: Client    Service Modality:  Video Visit:      Provider verified identity through the following two step process.  Patient provided:  Patient is known previously to provider     Telemedicine Visit: The patient's condition can be safely assessed and treated via synchronous audio and visual telemedicine encounter.       Reason for Telemedicine Visit: Services only offered telehealth     Originating Site (Patient Location): Patient's work     Distant Site (Provider Location): Home Setting     Consent:  The patient/guardian has verbally consented to: the potential risks and benefits of telemedicine (video visit) versus in person care; bill my insurance or make self-payment for services provided; and responsibility for payment of non-covered services.      Patient would like the video invitation sent by:  Text to cell phone:       Mode of Communication:  Video Conference via Amwell     As the provider I attest to compliance with applicable laws and regulations related to telemedicine.       DATA  Interactive Complexity: No  Crisis: No        Progress Since Last Session (Related to Symptoms / Goals / Homework):   Symptoms: No change anxiety continues to be high    Homework: Partially completed      Episode of Care Goals: Satisfactory progress - ACTION (Actively working towards change); Intervened by reinforcing change plan / affirming steps taken     Current / Ongoing Stressors and Concerns:  Patient reported continuing to feel a lot of anxiety. Patient indicated she fears if the anxiety get better then she will struggle with depression. Patient reported she knows that she avoids allowing her emotions a lot of the time due to fear. Patient  talked about her past when she would cry for a long time. Patient reported she is continuing to work on finding the balance for her. This therapist processed with patient allowing her emotions and having a plan to help cope after she allows her emotions.      Treatment Objective(s) Addressed in This Session:   use distraction each time intrusive worry surfaces  Increase interest, engagement, and pleasure in doing things  Decrease frequency and intensity of feeling down, depressed, hopeless  Improve quantity and quality of night time sleep / decrease daytime naps  Identify negative self-talk and behaviors: challenge core beliefs, myths, and actions     Intervention:   Motivational Interviewing    MI Intervention: Open-ended questions     Change Talk Expressed by the Patient: Desire to change Ability to change    Provider Response to Change Talk: E - Evoked more info from patient about behavior change      Assessments completed prior to visit:  The following assessments were completed by patient for this visit:  PHQ9:   PHQ-9 SCORE 3/8/2022 4/14/2022 5/12/2022 6/7/2022 6/21/2022 8/10/2022 9/22/2022   PHQ-9 Total Score MyChart 6 (Mild depression) 4 (Minimal depression) 7 (Mild depression) 4 (Minimal depression) 6 (Mild depression) 11 (Moderate depression) 8 (Mild depression)   PHQ-9 Total Score 6 4 7 4 6 11 8     GAD7:   ADA-7 SCORE 4/7/2021 4/28/2021 10/5/2021 11/15/2021 12/13/2021 8/10/2022 9/22/2022   Total Score - - 3 (minimal anxiety) 6 (mild anxiety) - 6 (mild anxiety) 9 (mild anxiety)   Total Score 5 4 3 6 4 6 9     PROMIS 10-Global Health (all questions and answers displayed):   PROMIS 10 12/15/2021 3/26/2022 4/14/2022 7/19/2022   In general, would you say your health is: Good Good Good Good   In general, would you say your quality of life is: Good Good Good Fair   In general, how would you rate your physical health? Good Fair Fair Good   In general, how would you rate your mental health, including your mood  and your ability to think? Fair Fair Good Fair   In general, how would you rate your satisfaction with your social activities and relationships? Good Fair Good Good   In general, please rate how well you carry out your usual social activities and roles Good Good Fair Good   To what extent are you able to carry out your everyday physical activities such as walking, climbing stairs, carrying groceries, or moving a chair? Mostly Mostly Mostly Mostly   How often have you been bothered by emotional problems such as feeling anxious, depressed or irritable? Often Sometimes Often Often   How would you rate your fatigue on average? Mild Mild Mild Moderate   How would you rate your pain on average?   0 = No Pain  to  10 = Worst Imaginable Pain 3 3 3 3   In general, would you say your health is: 3 3 3 3   In general, would you say your quality of life is: 3 3 3 2   In general, how would you rate your physical health? 3 2 2 3   In general, how would you rate your mental health, including your mood and your ability to think? 2 2 3 2   In general, how would you rate your satisfaction with your social activities and relationships? 3 2 3 3   In general, please rate how well you carry out your usual social activities and roles. (This includes activities at home, at work and in your community, and responsibilities as a parent, child, spouse, employee, friend, etc.) 3 3 2 3   To what extent are you able to carry out your everyday physical activities such as walking, climbing stairs, carrying groceries, or moving a chair? 4 4 4 4   In the past 7 days, how often have you been bothered by emotional problems such as feeling anxious, depressed, or irritable? 4 3 4 4   In the past 7 days, how would you rate your fatigue on average? 2 2 2 3   In the past 7 days, how would you rate your pain on average, where 0 means no pain, and 10 means worst imaginable pain? 3 3 3 3   Global Mental Health Score 10 10 11 9   Global Physical Health Score 15  14 14 14   PROMIS TOTAL - SUBSCORES 25 24 25 23   Some recent data might be hidden         ASSESSMENT: Current Emotional / Mental Status (status of significant symptoms):   Risk status (Self / Other harm or suicidal ideation)   Patient denies current fears or concerns for personal safety.   Patient denies current or recent suicidal ideation or behaviors.   Patient denies current or recent homicidal ideation or behaviors.   Patient denies current or recent self injurious behavior or ideation.   Patient denies other safety concerns.   Patient reports there has been no change in risk factors since their last session.     Patient reports there has been no change in protective factors since their last session.     Recommended that patient call 911 or go to the local ED should there be a change in any of these risk factors.     Appearance:   Appropriate    Eye Contact:   Good    Psychomotor Behavior: Normal    Attitude:   Cooperative    Orientation:   All   Speech    Rate / Production: Normal/ Responsive    Volume:  Normal    Mood:    Anxious  Depressed    Affect:    Appropriate    Thought Content:  Clear    Thought Form:  Coherent  Logical    Insight:    Good      Medication Review:   No changes to current psychiatric medication(s)     Medication Compliance:   Yes     Changes in Health Issues:   None reported     Chemical Use Review:   Substance Use: Chemical use reviewed, no active concerns identified      Tobacco Use: No current tobacco use.      Diagnosis:  1. Posttraumatic stress disorder    2. Moderate episode of recurrent major depressive disorder (H)    3. ADA (generalized anxiety disorder)        Collateral Reports Completed:   Not Applicable    PLAN: (Patient Tasks / Therapist Tasks / Other)  Patient will return in 2 weeks for scheduled session. Patient will work on identifying a healthy plan for when she allows her emotions. Patient will continue to work on spending time with positive people.     There has been  demonstrated improvement in functioning while patient has been engaged in psychotherapy/psychological service- if withdrawn the patient would deteriorate and/or relapse.     Mariana Love, [facility],    ______________________________________________________________________    Individual Treatment Plan    Patient's Name: Alina Martell  YOB: 1965    Date of Creation:10/16/2020  Date Treatment Plan Last Reviewed/Revised: 08/11/2022    DSM5 Diagnoses: 296.32 (F33.1) Major Depressive Disorder, Recurrent Episode, Moderate _ or 300.02 (F41.1) Generalized Anxiety Disorder  Psychosocial / Contextual Factors: Health, family and financial   PROMIS (reviewed every 90 days): 25    Referral / Collaboration:  Was/were discussed and patient will pursue.    Anticipated number of session for this episode of care: 20 will reevaluate every 90 days  Anticipation frequency of session: Biweekly  Anticipated Duration of each session: 38-52 minutes  Treatment plan will be reviewed in 90 days or when goals have been changed.       MeasurableTreatment Goal(s) related to diagnosis / functional impairment(s)  Goal 1: Patient will work on reducing overall anxiety.     I will know I've met my goal when reporting minimal anxiety symptoms.       Objective #A (Patient Action)                          Patient will use distraction each time intrusive worry surfaces.  Status: Continued - Date(s): 08/11/2022     Intervention(s)  Therapist will teach emotional regulation skills. ..     Objective #B  Patient will Decrease frequency and intensity of feeling down, depressed, hopeless.  Status: Continued - Date(s): 08/11/2022     Intervention(s)  Therapist will teach emotional recognition/identification. ..     Objective #C  Patient will Identify negative self-talk and behaviors: challenge core beliefs, myths, and actions.  Status: Continued - Date(s): 08/11/2022     Intervention(s)  Therapist will teach the client how to perform a  behavioral chain analysis. ..     Goal 2: Patient will continue to work on reducing depression symptoms    I will know I've met my goal then reporting minimal depression symptoms     Objective #A (Patient Action)                          Patient will Increase interest, engagement, and pleasure in doing things.  Status: Continued - Date(s):  08/11/2022     Intervention(s)  Therapist will assign homework ..     Objective #B  Patient will Decrease frequency and intensity of feeling down, depressed, hopeless.  Status: Continued - Date(s):  08/11/2022     Intervention(s)  Therapist will assign homework at every session.         Patient has reviewed and agreed to the above plan.        Mariana Love, [facility] 08/11/2022

## 2022-10-07 NOTE — PATIENT INSTRUCTIONS
Pain medication as ordered.    Warm compresses.    Follow up for any persistent symptoms greater than 1 week.

## 2022-10-07 NOTE — PROGRESS NOTES
Assessment & Plan     Epiploic appendagitis    - HYDROcodone-acetaminophen (NORCO) 5-325 MG tablet  Dispense: 18 tablet; Refill: 0    Abdominal pain, right lower quadrant    - CBC with platelets and differential  - UA macro with reflex to Microscopic and Culture - Clinc Collect  - Comprehensive metabolic panel (BMP + Alb, Alk Phos, ALT, AST, Total. Bili, TP)  - CT Abdomen Pelvis w Contrast  - HCG Qual, Urine ([id number])  - CBC with platelets and differential  - Comprehensive metabolic panel (BMP + Alb, Alk Phos, ALT, AST, Total. Bili, TP)  - HCG Qual, Urine ([id number])  - Urine Microscopic    Right upper quadrant pain  - Comprehensive metabolic panel (BMP + Alb, Alk Phos, ALT, AST, Total. Bili, TP)  - CT Abdomen Pelvis w Contrast  - Comprehensive metabolic panel (BMP + Alb, Alk Phos, ALT, AST, Total. Bili, TP)     Pt was seen for R side abdomen pain.  Ddx includes but not limited to pylonephritis, uretral stone, appendicitis, SBO.    CBC, CMP, UpT and UA were unremarkable however CT exam did indicate epiploic appendagitis. Discussed this is a self limiting problem without expected complications.  tx is pain control.  She is on DOAC thus unable to take NSAIDs. Given this, may use tylenol for pain and any more severe pain not controlled by tylenol was given 3 day supply of Norco. Pt  May also use warm compresses, fluids, diet as tolerated.    Follow-up for persistent or woresning sx.    we did discuss consideration of holding her DOAC until her heavy uterine bleeding slows down, she has discussed with her obgyn and are monitoring closely.  She is on doac for a fib.  Risk low, has not been in afib for quite some time and has been converted to NSR but staying on anticoagulation.      [facility]    Markus Salas is a 57 year old female who presents to clinic today for the following health issues:  Chief Complaint   Patient presents with     Back Pain     Rt side on/off  "sharp pain x Wednesday. Pt states painful when \"get deep breath\", bilateral pain radiates. No swelling, some constipation. Low grade fever 99.8 yesterday     Nausea     X yesterday. Little nauseous; has not eaten today.     DAKSHA Lopez is a 57-year-old female with a history of has not evaluated probably get her blood work: Immune suppression on Humira for rheumatoid arthritis, atrial fibrillation on oral anticoagulants, coronary artery disease, obesity, hypertension, who presents with right-sided abdominal pain.  R side pain over the last 2-3 days.    No hx of uti, kidney stones.    Constant pain.  Mild initially, worse yesterday and today.  Fever low grade at home.  Negative yesterday.    Deep breath painful causing pain to radiate down the right side to her right lower quadrant.    No sob, or difficulty breathing.    Constipated, incomplete emptying, LBM yesterday but only 1, with 2-3 stools normal for pt.    No dysuria, frequency, urgency.  No hematuria.  No nausea, vomiting.    Having menses currently after having ablation x3.  She is having heavy bleeding passing clots and has contacted her OB/GYN.    Orally anticoagulated on eliquis for A fib.     humera 2 weeks ago tomorrow.    (takes every 2 weeks).          Review of Systems        Patient Active Problem List   Diagnosis     Depressed     Seropositive rheumatoid arthritis of multiple sites (H)     Recurrent major depressive disorder, in full remission (H)     Paroxysmal atrial fibrillation (H)     Morbid obesity (H)     Microcytic anemia     Anxiety     Chest pain     Chronic pain     Cyclic citrullinated peptide (CCP) antibody positive     Grief     Insomnia related to another mental disorder     Migraine headache     Olecranon bursitis of right elbow     Panic attack     ADA (generalized anxiety disorder)     Reflux     Sicca syndrome (H)     Sleep disorder     Tendonitis of both shoulders     Obstructive sleep apnea syndrome     Essential hypertension     " Coronary artery disease of native artery with stable angina pectoris (H)     Postmenopausal bleeding     History of colonic polyps     Status post catheter ablation of atrial fibrillation     Current Outpatient Medications   Medication     acetaminophen (TYLENOL) 325 MG tablet     adalimumab (HUMIRA *CF* PEN) 40 MG/0.4ML pen kit     apixaban ANTICOAGULANT (ELIQUIS ANTICOAGULANT) 5 MG tablet     cetirizine (ZYRTEC) 10 MG tablet     diltiazem ER COATED BEADS (CARDIZEM CD/CARTIA XT) 180 MG 24 hr capsule     EPINEPHrine (ANY BX GENERIC EQUIV) 0.3 MG/0.3ML injection 2-pack     flecainide (TAMBOCOR) 50 MG tablet     HYDROcodone-acetaminophen (NORCO) 5-325 MG tablet     hydrOXYzine (ATARAX) 25 MG tablet     Multiple Vitamins-Minerals (CENTROVITE) TABS     omeprazole (PRILOSEC) 40 MG DR capsule     polyethylene glycol (MIRALAX) 17 GM/Dose powder     prazosin (MINIPRESS) 1 MG capsule     traZODone (DESYREL) 50 MG tablet     vitamin C (ASCORBIC ACID) 1000 MG TABS     vitamin D3 (CHOLECALCIFEROL) 250 mcg (59044 units) capsule     No current facility-administered medications for this visit.       Objective    /86 (BP Location: Right arm, Patient Position: Sitting, Cuff Size: Adult Large)   Pulse 88   Temp 99  F (37.2  C) (Oral)   Resp 22   Wt 130 kg (286 lb 11.2 oz)   LMP 12/21/2021   SpO2 94%   BMI 42.34 kg/m    Physical Exam     Patient is in no acute distress and appears well.  Lungs CTA  Heart RRR  No costovertebral angle tenderness is present.  Abdomen is soft nontender to light palpation, moderate tenderness to  deep palpation RLQ, RUQ. no masses or hepatosplenomegaly present.  No CVAT  Mm moist, skin turger good.      Results for orders placed or performed during the hospital encounter of 10/07/22   CT Abdomen Pelvis w Contrast     Status: None    Narrative    EXAM: CT ABDOMEN PELVIS W CONTRAST  LOCATION: [facility]  DATE/TIME: 10/7/2022 1:28 PM    INDICATION: Right lower quadrant  abdominal pain.  COMPARISON: CT abdomen pelvis 03/08/2022.  TECHNIQUE: CT scan of the abdomen and pelvis was performed following injection of IV contrast. Multiplanar reformats were obtained. Dose reduction techniques were used.  CONTRAST: 100 ml Omnipaque 350    FINDINGS:   LOWER CHEST: Normal.    HEPATOBILIARY: Diffuse hepatic steatosis. Status post cholecystectomy. No biliary ductal dilation.    PANCREAS: Normal.    SPLEEN: Normal.    ADRENAL GLANDS: Normal.    KIDNEYS/BLADDER: Normal.    BOWEL: Epiploic appendigitis involving an epiploic appendage in the right upper quadrant near the inferior tip of the liver, along the margin of the ascending colon. No fluid collections. Scattered noninflamed diverticuli throughout the colon. No bowel   obstruction. Normal appendix.    LYMPH NODES: Normal.    VASCULATURE: Unremarkable.    PELVIC ORGANS: Normal.    MUSCULOSKELETAL: Facet arthropathy at the lumbosacral junction.      Impression    IMPRESSION:     1.  Epiploic appendigitis involving an epiploic appendage in the right upper quadrant near the inferior tip of the liver.    2.  Diffuse hepatic steatosis.    [Result: Epiploic appendagitis]    Finding was identified on 10/7/2022 1:32 PM.     Beata Jaramillo was contacted by me on 10/7/2022 2:44 PM and verbalized understanding of the result.   Results for orders placed or performed in visit on 10/07/22   UA macro with reflex to Microscopic and Culture - Clinc Collect     Status: Abnormal    Specimen: Urine, Midstream   Result Value Ref Range    Color Urine Yellow Colorless, Straw, Light Yellow, Yellow    Appearance Urine Clear Clear    Glucose Urine Negative Negative mg/dL    Bilirubin Urine Negative Negative    Ketones Urine Negative Negative mg/dL    Specific Gravity Urine 1.025 1.005 - 1.030    Blood Urine Trace (A) Negative    pH Urine 6.5 5.0 - 8.0    Protein Albumin Urine 30  (A) Negative mg/dL    Urobilinogen Urine 0.2 0.2, 1.0 E.U./dL    Nitrite Urine Negative  Negative    Leukocyte Esterase Urine Negative Negative   Comprehensive metabolic panel (BMP + Alb, Alk Phos, ALT, AST, Total. Bili, TP)     Status: Abnormal   Result Value Ref Range    Sodium 140 136 - 145 mmol/L    Potassium 4.0 3.4 - 5.3 mmol/L    Chloride 101 98 - 107 mmol/L    Carbon Dioxide (CO2) 28 22 - 29 mmol/L    Anion Gap 11 7 - 15 mmol/L    Urea Nitrogen 12.4 6.0 - 20.0 mg/dL    Creatinine 0.72 0.51 - 0.95 mg/dL    Calcium 9.2 8.6 - 10.0 mg/dL    Glucose 91 70 - 99 mg/dL    Alkaline Phosphatase 118 (H) 35 - 104 U/L    AST 16 10 - 35 U/L    ALT 9 (L) 10 - 35 U/L    Protein Total 7.8 6.4 - 8.3 g/dL    Albumin 3.9 3.5 - 5.2 g/dL    Bilirubin Total 0.3 <=1.2 mg/dL    GFR Estimate >90 >60 mL/min/1.73m2   HCG Qual, Urine ([id number])     Status: Normal   Result Value Ref Range    hCG Urine Qualitative Negative Negative   CBC with platelets and differential     Status: Abnormal   Result Value Ref Range    WBC Count 10.7 4.0 - 11.0 10e3/uL    RBC Count 4.59 3.80 - 5.20 10e6/uL    Hemoglobin 11.9 11.7 - 15.7 g/dL    Hematocrit 38.4 35.0 - 47.0 %    MCV 84 78 - 100 fL    MCH 25.9 (L) 26.5 - 33.0 pg    MCHC 31.0 (L) 31.5 - 36.5 g/dL    RDW 14.4 10.0 - 15.0 %    Platelet Count 338 150 - 450 10e3/uL    % Neutrophils 63 %    % Lymphocytes 26 %    % Monocytes 9 %    % Eosinophils 2 %    % Basophils 0 %    % Immature Granulocytes 0 %    Absolute Neutrophils 6.8 1.6 - 8.3 10e3/uL    Absolute Lymphocytes 2.8 0.8 - 5.3 10e3/uL    Absolute Monocytes 0.9 0.0 - 1.3 10e3/uL    Absolute Eosinophils 0.2 0.0 - 0.7 10e3/uL    Absolute Basophils 0.0 0.0 - 0.2 10e3/uL    Absolute Immature Granulocytes 0.0 <=0.4 10e3/uL   Urine Microscopic     Status: Abnormal   Result Value Ref Range    Bacteria Urine Few (A) None Seen /HPF    RBC Urine 0-2 0-2 /HPF /HPF    WBC Urine 0-5 0-5 /HPF /HPF    Squamous Epithelials Urine Moderate (A) None Seen /LPF    Hyaline Casts Urine 0-2 (A) None Seen /LPF    Narrative    Urine Culture not indicated   CBC  with platelets and differential     Status: Abnormal    Narrative    The following orders were created for panel order CBC with platelets and differential.  Procedure                               Abnormality         Status                     ---------                               -----------         ------                     CBC with platelets and d...[[id number]]  Abnormal            Final result                 Please view results for these tests on the individual orders.

## 2022-10-11 ENCOUNTER — OFFICE VISIT (OUTPATIENT)
Dept: FAMILY MEDICINE | Facility: CLINIC | Age: 57
End: 2022-10-11
Payer: COMMERCIAL

## 2022-10-11 VITALS
WEIGHT: 287.1 LBS | TEMPERATURE: 98.6 F | SYSTOLIC BLOOD PRESSURE: 133 MMHG | RESPIRATION RATE: 16 BRPM | BODY MASS INDEX: 42.4 KG/M2 | DIASTOLIC BLOOD PRESSURE: 84 MMHG | OXYGEN SATURATION: 97 % | HEART RATE: 89 BPM

## 2022-10-11 DIAGNOSIS — N61.1 ABSCESS OF LEFT BREAST: Primary | ICD-10-CM

## 2022-10-11 PROCEDURE — 10060 I&D ABSCESS SIMPLE/SINGLE: CPT | Performed by: PHYSICIAN ASSISTANT

## 2022-10-11 NOTE — PROGRESS NOTES
URGENT CARE VISIT:    SUBJECTIVE:   Alina Martell is a 57 year old female who presents to the clinic with an abscess located on left breast that started 3 day(s) ago. Associated symptoms include erythema, fluctuance and pain.  Denies fever, chills and drainage. Symptoms have been worsening since onset. Treatments tried include none with no relief of symptoms.       OBJECTIVE:  The patient appears today in alert,no apparent distress distress  VITALS: /84 (BP Location: Right arm, Patient Position: Sitting, Cuff Size: Adult Large)   Pulse 89   Temp 98.6  F (37  C) (Oral)   Resp 16   Wt 130.2 kg (287 lb 1.6 oz)   LMP 12/21/2021   SpO2 97%   BMI 42.40 kg/m    General: WDWN in NAD  Musculoskeletal: moderate ttp under left breast  Skin: skin colored, fluctuant nodule with raised head is visualized under left breast fold. It is 4 cm in size.   Neurology: Sensation to light touch intact  No images are attached to the encounter.    ASSESSMENT:    ICD-10-CM    1. Abscess of left breast  N61.1 DRAIN SKIN ABSCESS SIMPLE/SINGLE          PLAN:  PROCEDURE NOTE:  Affected area was cleaned with betadine x 3. Area was locally infiltrated with 4 cc's of Lidocaine 2% with epinephrine with good anesthesia obtained.  Number 11 scalpel was used to make a small incision. Copious purulent drainage was released. Area was irrigated with saline. Appropriate wound care dressing applied.  Patient tolerated the procedure well.     Patient Instructions   Patient was educated on the natural course of condition.  Conservative measures discussed including warm compresses, and over-the-counter analgesics (Tylenol or Ibuprofen).  Keep wound clean and dry. See your primary care provider if symptoms worsen or do not improve in 7 days. Seek emergency care if you develop fever, severe swelling, or increased redness.         Lulu Reyna PA-C ....................  10/11/2022   6:25 PM

## 2022-10-17 ENCOUNTER — IMMUNIZATION (OUTPATIENT)
Dept: NURSING | Facility: CLINIC | Age: 57
End: 2022-10-17
Payer: COMMERCIAL

## 2022-10-17 PROCEDURE — 91312 COVID-19,PF,PFIZER BOOSTER BIVALENT: CPT

## 2022-10-17 PROCEDURE — 0124A COVID-19,PF,PFIZER BOOSTER BIVALENT: CPT

## 2022-10-18 ENCOUNTER — VIRTUAL VISIT (OUTPATIENT)
Dept: PSYCHOLOGY | Facility: CLINIC | Age: 57
End: 2022-10-18
Payer: COMMERCIAL

## 2022-10-18 DIAGNOSIS — F43.10 POSTTRAUMATIC STRESS DISORDER: Primary | ICD-10-CM

## 2022-10-18 DIAGNOSIS — F33.1 MODERATE EPISODE OF RECURRENT MAJOR DEPRESSIVE DISORDER (H): ICD-10-CM

## 2022-10-18 DIAGNOSIS — F41.1 GAD (GENERALIZED ANXIETY DISORDER): ICD-10-CM

## 2022-10-18 PROCEDURE — 90834 PSYTX W PT 45 MINUTES: CPT | Mod: 95 | Performed by: COUNSELOR

## 2022-10-18 NOTE — PROGRESS NOTES
[facility]                                     Progress Note    Patient Name: Alina Martell  Date: 10/18/22         Service Type: Individual      Session Start Time:  807 Session End Time: 857     Session Length:  50 minutes    Session #: 44    Attendees: Client    Service Modality:  Video Visit:      Provider verified identity through the following two step process.  Patient provided:  Patient is known previously to provider     Telemedicine Visit: The patient's condition can be safely assessed and treated via synchronous audio and visual telemedicine encounter.       Reason for Telemedicine Visit: Services only offered telehealth     Originating Site (Patient Location): Patient's work     Distant Site (Provider Location): [facility]     Consent:  The patient/guardian has verbally consented to: the potential risks and benefits of telemedicine (video visit) versus in person care; bill my insurance or make self-payment for services provided; and responsibility for payment of non-covered services.      Patient would like the video invitation sent by:  Text to cell phone:       Mode of Communication:  Video Conference via [employer]well     As the provider I attest to compliance with applicable laws and regulations related to telemedicine.       DATA  Interactive Complexity: No  Crisis: No        Progress Since Last Session (Related to Symptoms / Goals / Homework):   Symptoms: Improving less anxiety and better sleep    Homework: Partially completed      Episode of Care Goals: Satisfactory progress - ACTION (Actively working towards change); Intervened by reinforcing change plan / affirming steps taken     Current / Ongoing Stressors and Concerns:  Patient reported feeling less anxiety. Patient indicated her car did not start today which caused an increase in her anxiety. Patient reported handling it well and not overly anxious about it.  Patient indicated she has been busy and believing she is taking on too much. Patient reported she had medical concerns and may need to have another procedure. Patient indicated feeling that she has handled it well and working on self-care. Patient reported she is on medication due to the medical concern and she has been sleeping better. This therapist processed with patient finding her balance.      Treatment Objective(s) Addressed in This Session:   use distraction each time intrusive worry surfaces  Increase interest, engagement, and pleasure in doing things  Decrease frequency and intensity of feeling down, depressed, hopeless  Improve quantity and quality of night time sleep / decrease daytime naps  Identify negative self-talk and behaviors: challenge core beliefs, myths, and actions     Intervention:   Motivational Interviewing    MI Intervention: Open-ended questions     Change Talk Expressed by the Patient: Desire to change Taking steps    Provider Response to Change Talk: E - Evoked more info from patient about behavior change      Assessments completed prior to visit:  The following assessments were completed by patient for this visit:  PHQ9:   PHQ-9 SCORE 3/8/2022 4/14/2022 5/12/2022 6/7/2022 6/21/2022 8/10/2022 9/22/2022   PHQ-9 Total Score MyChart 6 (Mild depression) 4 (Minimal depression) 7 (Mild depression) 4 (Minimal depression) 6 (Mild depression) 11 (Moderate depression) 8 (Mild depression)   PHQ-9 Total Score 6 4 7 4 6 11 8     GAD7:   ADA-7 SCORE 4/7/2021 4/28/2021 10/5/2021 11/15/2021 12/13/2021 8/10/2022 9/22/2022   Total Score - - 3 (minimal anxiety) 6 (mild anxiety) - 6 (mild anxiety) 9 (mild anxiety)   Total Score 5 4 3 6 4 6 9     PROMIS 10-Global Health (all questions and answers displayed):   PROMIS 10 12/15/2021 3/26/2022 4/14/2022 7/19/2022   In general, would you say your health is: Good Good Good Good   In general, would you say your quality of life is: Good Good Good Fair   In  general, how would you rate your physical health? Good Fair Fair Good   In general, how would you rate your mental health, including your mood and your ability to think? Fair Fair Good Fair   In general, how would you rate your satisfaction with your social activities and relationships? Good Fair Good Good   In general, please rate how well you carry out your usual social activities and roles Good Good Fair Good   To what extent are you able to carry out your everyday physical activities such as walking, climbing stairs, carrying groceries, or moving a chair? Mostly Mostly Mostly Mostly   How often have you been bothered by emotional problems such as feeling anxious, depressed or irritable? Often Sometimes Often Often   How would you rate your fatigue on average? Mild Mild Mild Moderate   How would you rate your pain on average?   0 = No Pain  to  10 = Worst Imaginable Pain 3 3 3 3   In general, would you say your health is: - 3 3 3   In general, would you say your quality of life is: - 3 3 2   In general, how would you rate your physical health? - 2 2 3   In general, how would you rate your mental health, including your mood and your ability to think? - 2 3 2   In general, how would you rate your satisfaction with your social activities and relationships? - 2 3 3   In general, please rate how well you carry out your usual social activities and roles. (This includes activities at home, at work and in your community, and responsibilities as a parent, child, spouse, employee, friend, etc.) - 3 2 3   To what extent are you able to carry out your everyday physical activities such as walking, climbing stairs, carrying groceries, or moving a chair? - 4 4 4   In the past 7 days, how often have you been bothered by emotional problems such as feeling anxious, depressed, or irritable? - 3 4 4   In the past 7 days, how would you rate your fatigue on average? - 2 2 3   In the past 7 days, how would you rate your pain on  average, where 0 means no pain, and 10 means worst imaginable pain? - 3 3 3   Global Mental Health Score - 10 11 9   Global Physical Health Score - 14 14 14   PROMIS TOTAL - SUBSCORES - 24 25 23   Some recent data might be hidden         ASSESSMENT: Current Emotional / Mental Status (status of significant symptoms):   Risk status (Self / Other harm or suicidal ideation)   Patient denies current fears or concerns for personal safety.   Patient denies current or recent suicidal ideation or behaviors.   Patient denies current or recent homicidal ideation or behaviors.   Patient denies current or recent self injurious behavior or ideation.   Patient denies other safety concerns.   Patient reports there has been no change in risk factors since their last session.     Patient reports there has been no change in protective factors since their last session.     Recommended that patient call 911 or go to the local ED should there be a change in any of these risk factors.     Appearance:   Appropriate    Eye Contact:   Good    Psychomotor Behavior: Normal    Attitude:   Cooperative    Orientation:   All   Speech    Rate / Production: Normal/ Responsive    Volume:  Normal    Mood:    Anxious  Depressed    Affect:    Appropriate    Thought Content:  Clear    Thought Form:  Coherent  Logical    Insight:    Good      Medication Review:   No changes to current psychiatric medication(s)     Medication Compliance:   Yes     Changes in Health Issues:   Yes: see epic chart     Chemical Use Review:   Substance Use: Chemical use reviewed, no active concerns identified      Tobacco Use: No current tobacco use.      Diagnosis:  1. Posttraumatic stress disorder    2. Moderate episode of recurrent major depressive disorder (H)    3. ADA (generalized anxiety disorder)        Collateral Reports Completed:   Not Applicable    PLAN: (Patient Tasks / Therapist Tasks / Other)  Patient will return in 2 weeks for scheduled session. Patient will  continue to get out of the house and still have time to herself. Patient will continue to work on not avoiding.     There has been demonstrated improvement in functioning while patient has been engaged in psychotherapy/psychological service- if withdrawn the patient would deteriorate and/or relapse.     Mariana Love, [facility],    ______________________________________________________________________    Individual Treatment Plan    Patient's Name: Alina Martell  YOB: 1965    Date of Creation:10/16/2020  Date Treatment Plan Last Reviewed/Revised: 08/11/2022    DSM5 Diagnoses: 296.32 (F33.1) Major Depressive Disorder, Recurrent Episode, Moderate _ or 300.02 (F41.1) Generalized Anxiety Disorder  Psychosocial / Contextual Factors: Health, family and financial   PROMIS (reviewed every 90 days): 25    Referral / Collaboration:  Was/were discussed and patient will pursue.    Anticipated number of session for this episode of care: 20 will reevaluate every 90 days  Anticipation frequency of session: Biweekly  Anticipated Duration of each session: 38-52 minutes  Treatment plan will be reviewed in 90 days or when goals have been changed.       MeasurableTreatment Goal(s) related to diagnosis / functional impairment(s)  Goal 1: Patient will work on reducing overall anxiety.     I will know I've met my goal when reporting minimal anxiety symptoms.       Objective #A (Patient Action)                          Patient will use distraction each time intrusive worry surfaces.  Status: Continued - Date(s): 08/11/2022     Intervention(s)  Therapist will teach emotional regulation skills. ..     Objective #B  Patient will Decrease frequency and intensity of feeling down, depressed, hopeless.  Status: Continued - Date(s): 08/11/2022     Intervention(s)  Therapist will teach emotional recognition/identification. ..     Objective #C  Patient will Identify negative self-talk and behaviors: challenge core beliefs, myths,  and actions.  Status: Continued - Date(s): 08/11/2022     Intervention(s)  Therapist will teach the client how to perform a behavioral chain analysis. ..     Goal 2: Patient will continue to work on reducing depression symptoms    I will know I've met my goal then reporting minimal depression symptoms     Objective #A (Patient Action)                          Patient will Increase interest, engagement, and pleasure in doing things.  Status: Continued - Date(s):  08/11/2022     Intervention(s)  Therapist will assign homework ..     Objective #B  Patient will Decrease frequency and intensity of feeling down, depressed, hopeless.  Status: Continued - Date(s):  08/11/2022     Intervention(s)  Therapist will assign homework at every session.         Patient has reviewed and agreed to the above plan.        Mariana Love, [facility] 08/11/2022

## 2022-11-01 NOTE — TELEPHONE ENCOUNTER
----- Message from Jose Guadalupe Joseph MD sent at 4/26/2022 11:26 AM CDT -----  Hello,    Can we please offer Mrs. Martell a PVI?  General anesthesia, continue apixaban, hold other medications day of procedure.    Thank you!  Tru     Opzelura Counseling:  I discussed with the patient the risks of Opzelura including but not limited to nasopharngitis, bronchitis, ear infection, eosinophila, hives, diarrhea, folliculitis, tonsillitis, and rhinorrhea.  Taken orally, this medication has been linked to serious infections; higher rate of mortality; malignancy and lymphoproliferative disorders; major adverse cardiovascular events; thrombosis; thrombocytopenia, anemia, and neutropenia; and lipid elevations.

## 2022-11-03 ENCOUNTER — VIRTUAL VISIT (OUTPATIENT)
Dept: PSYCHOLOGY | Facility: CLINIC | Age: 57
End: 2022-11-03
Payer: COMMERCIAL

## 2022-11-03 DIAGNOSIS — F33.1 MODERATE EPISODE OF RECURRENT MAJOR DEPRESSIVE DISORDER (H): ICD-10-CM

## 2022-11-03 DIAGNOSIS — F41.1 GAD (GENERALIZED ANXIETY DISORDER): ICD-10-CM

## 2022-11-03 DIAGNOSIS — F43.10 POSTTRAUMATIC STRESS DISORDER: Primary | ICD-10-CM

## 2022-11-03 PROCEDURE — 90834 PSYTX W PT 45 MINUTES: CPT | Mod: 95 | Performed by: COUNSELOR

## 2022-11-03 ASSESSMENT — PATIENT HEALTH QUESTIONNAIRE - PHQ9
SUM OF ALL RESPONSES TO PHQ QUESTIONS 1-9: 9
10. IF YOU CHECKED OFF ANY PROBLEMS, HOW DIFFICULT HAVE THESE PROBLEMS MADE IT FOR YOU TO DO YOUR WORK, TAKE CARE OF THINGS AT HOME, OR GET ALONG WITH OTHER PEOPLE: SOMEWHAT DIFFICULT
SUM OF ALL RESPONSES TO PHQ QUESTIONS 1-9: 9

## 2022-11-03 ASSESSMENT — ANXIETY QUESTIONNAIRES
GAD7 TOTAL SCORE: 10
4. TROUBLE RELAXING: NEARLY EVERY DAY
8. IF YOU CHECKED OFF ANY PROBLEMS, HOW DIFFICULT HAVE THESE MADE IT FOR YOU TO DO YOUR WORK, TAKE CARE OF THINGS AT HOME, OR GET ALONG WITH OTHER PEOPLE?: SOMEWHAT DIFFICULT
3. WORRYING TOO MUCH ABOUT DIFFERENT THINGS: SEVERAL DAYS
7. FEELING AFRAID AS IF SOMETHING AWFUL MIGHT HAPPEN: NOT AT ALL
1. FEELING NERVOUS, ANXIOUS, OR ON EDGE: NEARLY EVERY DAY
6. BECOMING EASILY ANNOYED OR IRRITABLE: NOT AT ALL
GAD7 TOTAL SCORE: 10
5. BEING SO RESTLESS THAT IT IS HARD TO SIT STILL: NEARLY EVERY DAY
IF YOU CHECKED OFF ANY PROBLEMS ON THIS QUESTIONNAIRE, HOW DIFFICULT HAVE THESE PROBLEMS MADE IT FOR YOU TO DO YOUR WORK, TAKE CARE OF THINGS AT HOME, OR GET ALONG WITH OTHER PEOPLE: SOMEWHAT DIFFICULT
GAD7 TOTAL SCORE: 10
2. NOT BEING ABLE TO STOP OR CONTROL WORRYING: NOT AT ALL
7. FEELING AFRAID AS IF SOMETHING AWFUL MIGHT HAPPEN: NOT AT ALL

## 2022-11-03 NOTE — PROGRESS NOTES
[facility]                                     Progress Note    Patient Name: Alian Martell  Date: 11/03/22         Service Type: Individual      Session Start Time:  1208 Session End Time: 1259     Session Length:  51 minutes    Session #: 45    Attendees: Client    Service Modality:  Video Visit:      Provider verified identity through the following two step process.  Patient provided:  Patient is known previously to provider     Telemedicine Visit: The patient's condition can be safely assessed and treated via synchronous audio and visual telemedicine encounter.       Reason for Telemedicine Visit: Services only offered telehealth     Originating Site (Patient Location): Patient's work     Distant Site (Provider Location): [facility]     Consent:  The patient/guardian has verbally consented to: the potential risks and benefits of telemedicine (video visit) versus in person care; bill my insurance or make self-payment for services provided; and responsibility for payment of non-covered services.      Patient would like the video invitation sent by:  Text to cell phone:       Mode of Communication:  Video Conference via Amwell     As the provider I attest to compliance with applicable laws and regulations related to telemedicine.       DATA  Interactive Complexity: No  Crisis: No        Progress Since Last Session (Related to Symptoms / Goals / Homework):   Symptoms: No change high anxiety     Homework: Partially completed      Episode of Care Goals: Satisfactory progress - ACTION (Actively working towards change); Intervened by reinforcing change plan / affirming steps taken     Current / Ongoing Stressors and Concerns:  Patient reported a lot of anxiety. Patient indicated she has been extremely busy going to her grandchildren's sports and then helping a friend with her grandchildren. Patient reported knowing she is taking on too  much. Patient indicated she is also feeling stress about her job. Patient reported knowing she needs to take a break and take time for herself. This therapist processed with patient the importance of identifying her needs and getting time to herself.     Treatment Objective(s) Addressed in This Session:   use thought-stopping strategy daily to reduce intrusive thoughts  Increase interest, engagement, and pleasure in doing things  Decrease frequency and intensity of feeling down, depressed, hopeless  Improve quantity and quality of night time sleep / decrease daytime naps  Identify negative self-talk and behaviors: challenge core beliefs, myths, and actions     Intervention:   Motivational Interviewing    MI Intervention: Open-ended questions and Reflections: simple and complex     Change Talk Expressed by the Patient: Desire to change Taking steps    Provider Response to Change Talk: E - Evoked more info from patient about behavior change and A - Affirmed patient's thoughts, decisions, or attempts at behavior change      Assessments completed prior to visit:  The following assessments were completed by patient for this visit:  PHQ9:   PHQ-9 SCORE 4/14/2022 5/12/2022 6/7/2022 6/21/2022 8/10/2022 9/22/2022 11/3/2022   PHQ-9 Total Score [facility] 4 (Minimal depression) 7 (Mild depression) 4 (Minimal depression) 6 (Mild depression) 11 (Moderate depression) 8 (Mild depression) 9 (Mild depression)   PHQ-9 Total Score 4 7 4 6 11 8 9     GAD7:   ADA-7 SCORE 4/28/2021 10/5/2021 11/15/2021 12/13/2021 8/10/2022 9/22/2022 11/3/2022   Total Score - 3 (minimal anxiety) 6 (mild anxiety) - 6 (mild anxiety) 9 (mild anxiety) 10 (moderate anxiety)   Total Score 4 3 6 4 6 9 10     PROMIS 10-Global Health (all questions and answers displayed):   PROMIS 10 12/15/2021 3/26/2022 4/14/2022 7/19/2022 11/3/2022   In general, would you say your health is: Good Good Good Good Good   In general, would you say your quality of life is: Good Good  Good Fair Good   In general, how would you rate your physical health? Good Fair Fair Good Good   In general, how would you rate your mental health, including your mood and your ability to think? Fair Fair Good Fair Fair   In general, how would you rate your satisfaction with your social activities and relationships? Good Fair Good Good Fair   In general, please rate how well you carry out your usual social activities and roles Good Good Fair Good Good   To what extent are you able to carry out your everyday physical activities such as walking, climbing stairs, carrying groceries, or moving a chair? Mostly Mostly Mostly Mostly Mostly   How often have you been bothered by emotional problems such as feeling anxious, depressed or irritable? Often Sometimes Often Often Often   How would you rate your fatigue on average? Mild Mild Mild Moderate Mild   How would you rate your pain on average?   0 = No Pain  to  10 = Worst Imaginable Pain 3 3 3 3 2   In general, would you say your health is: - 3 3 3 3   In general, would you say your quality of life is: - 3 3 2 3   In general, how would you rate your physical health? - 2 2 3 3   In general, how would you rate your mental health, including your mood and your ability to think? - 2 3 2 2   In general, how would you rate your satisfaction with your social activities and relationships? - 2 3 3 2   In general, please rate how well you carry out your usual social activities and roles. (This includes activities at home, at work and in your community, and responsibilities as a parent, child, spouse, employee, friend, etc.) - 3 2 3 3   To what extent are you able to carry out your everyday physical activities such as walking, climbing stairs, carrying groceries, or moving a chair? - 4 4 4 4   In the past 7 days, how often have you been bothered by emotional problems such as feeling anxious, depressed, or irritable? - 3 4 4 4   In the past 7 days, how would you rate your fatigue on  average? - 2 2 3 2   In the past 7 days, how would you rate your pain on average, where 0 means no pain, and 10 means worst imaginable pain? - 3 3 3 2   Global Mental Health Score - 10 11 9 9   Global Physical Health Score - 14 14 14 15   PROMIS TOTAL - SUBSCORES - 24 25 23 24   Some recent data might be hidden         ASSESSMENT: Current Emotional / Mental Status (status of significant symptoms):   Risk status (Self / Other harm or suicidal ideation)   Patient denies current fears or concerns for personal safety.   Patient denies current or recent suicidal ideation or behaviors.   Patient denies current or recent homicidal ideation or behaviors.   Patient denies current or recent self injurious behavior or ideation.   Patient denies other safety concerns.   Patient reports there has been no change in risk factors since their last session.     Patient reports there has been no change in protective factors since their last session.     Recommended that patient call 911 or go to the local ED should there be a change in any of these risk factors.     Appearance:   Appropriate    Eye Contact:   Good    Psychomotor Behavior: Normal    Attitude:   Cooperative    Orientation:   All   Speech    Rate / Production: Normal/ Responsive    Volume:  Normal    Mood:    Anxious    Affect:    Appropriate    Thought Content:  Clear    Thought Form:  Coherent  Goal Directed  Logical    Insight:    Good      Medication Review:   No changes to current psychiatric medication(s)     Medication Compliance:   Yes     Changes in Health Issues:   None reported     Chemical Use Review:   Substance Use: Chemical use reviewed, no active concerns identified      Tobacco Use: No current tobacco use.      Diagnosis:  1. Posttraumatic stress disorder    2. Moderate episode of recurrent major depressive disorder (H)    3. ADA (generalized anxiety disorder)        Collateral Reports Completed:   Not Applicable    PLAN: (Patient Tasks / Therapist Tasks  / Other)  Patient will return in 4 weeks for scheduled session. Patient will continue to identify her needs that are not being met. Patient should continue to work on her self-care.     There has been demonstrated improvement in functioning while patient has been engaged in psychotherapy/psychological service- if withdrawn the patient would deteriorate and/or relapse.     Mariana Love, [facility] 11/03/22    ______________________________________________________________________    Individual Treatment Plan    Patient's Name: Alina Martell  YOB: 1965    Date of Creation:10/16/2020  Date Treatment Plan Last Reviewed/Revised: 08/11/2022    DSM5 Diagnoses: 296.32 (F33.1) Major Depressive Disorder, Recurrent Episode, Moderate _ or 300.02 (F41.1) Generalized Anxiety Disorder  Psychosocial / Contextual Factors: Health, family and financial   PROMIS (reviewed every 90 days): 25    Referral / Collaboration:  Was/were discussed and patient will pursue.    Anticipated number of session for this episode of care: 20 will reevaluate every 90 days  Anticipation frequency of session: Biweekly  Anticipated Duration of each session: 38-52 minutes  Treatment plan will be reviewed in 90 days or when goals have been changed.       MeasurableTreatment Goal(s) related to diagnosis / functional impairment(s)  Goal 1: Patient will work on reducing overall anxiety.     I will know I've met my goal when reporting minimal anxiety symptoms.       Objective #A (Patient Action)                          Patient will use distraction each time intrusive worry surfaces.  Status: Continued - Date(s): 08/11/2022     Intervention(s)  Therapist will teach emotional regulation skills. ..     Objective #B  Patient will Decrease frequency and intensity of feeling down, depressed, hopeless.  Status: Continued - Date(s): 08/11/2022     Intervention(s)  Therapist will teach emotional recognition/identification. ..     Objective #C  Patient  will Identify negative self-talk and behaviors: challenge core beliefs, myths, and actions.  Status: Continued - Date(s): 08/11/2022     Intervention(s)  Therapist will teach the client how to perform a behavioral chain analysis. ..     Goal 2: Patient will continue to work on reducing depression symptoms    I will know I've met my goal then reporting minimal depression symptoms     Objective #A (Patient Action)                          Patient will Increase interest, engagement, and pleasure in doing things.  Status: Continued - Date(s):  08/11/2022     Intervention(s)  Therapist will assign homework ..     Objective #B  Patient will Decrease frequency and intensity of feeling down, depressed, hopeless.  Status: Continued - Date(s):  08/11/2022     Intervention(s)  Therapist will assign homework at every session.         Patient has reviewed and agreed to the above plan.        Mariana Love, [facility] 08/11/2022

## 2022-12-01 ENCOUNTER — VIRTUAL VISIT (OUTPATIENT)
Dept: PSYCHOLOGY | Facility: CLINIC | Age: 57
End: 2022-12-01
Payer: COMMERCIAL

## 2022-12-01 DIAGNOSIS — F43.10 POSTTRAUMATIC STRESS DISORDER: Primary | ICD-10-CM

## 2022-12-01 DIAGNOSIS — F41.1 GAD (GENERALIZED ANXIETY DISORDER): ICD-10-CM

## 2022-12-01 DIAGNOSIS — F33.1 MODERATE EPISODE OF RECURRENT MAJOR DEPRESSIVE DISORDER (H): ICD-10-CM

## 2022-12-01 PROCEDURE — 90834 PSYTX W PT 45 MINUTES: CPT | Mod: 95 | Performed by: COUNSELOR

## 2022-12-01 ASSESSMENT — ANXIETY QUESTIONNAIRES
GAD7 TOTAL SCORE: 8
GAD7 TOTAL SCORE: 8
2. NOT BEING ABLE TO STOP OR CONTROL WORRYING: NOT AT ALL
IF YOU CHECKED OFF ANY PROBLEMS ON THIS QUESTIONNAIRE, HOW DIFFICULT HAVE THESE PROBLEMS MADE IT FOR YOU TO DO YOUR WORK, TAKE CARE OF THINGS AT HOME, OR GET ALONG WITH OTHER PEOPLE: SOMEWHAT DIFFICULT
3. WORRYING TOO MUCH ABOUT DIFFERENT THINGS: SEVERAL DAYS
1. FEELING NERVOUS, ANXIOUS, OR ON EDGE: NEARLY EVERY DAY
6. BECOMING EASILY ANNOYED OR IRRITABLE: NOT AT ALL
4. TROUBLE RELAXING: MORE THAN HALF THE DAYS
GAD7 TOTAL SCORE: 8
7. FEELING AFRAID AS IF SOMETHING AWFUL MIGHT HAPPEN: NOT AT ALL
5. BEING SO RESTLESS THAT IT IS HARD TO SIT STILL: MORE THAN HALF THE DAYS
7. FEELING AFRAID AS IF SOMETHING AWFUL MIGHT HAPPEN: NOT AT ALL
8. IF YOU CHECKED OFF ANY PROBLEMS, HOW DIFFICULT HAVE THESE MADE IT FOR YOU TO DO YOUR WORK, TAKE CARE OF THINGS AT HOME, OR GET ALONG WITH OTHER PEOPLE?: SOMEWHAT DIFFICULT

## 2022-12-01 NOTE — PROGRESS NOTES
[facility]                                     Progress Note    Patient Name: Alina Martell  Date: 12/01/22         Service Type: Individual      Session Start Time:  1101 Session End Time: 1151     Session Length:  50  minutes    Session #: 46    Attendees: Client    Service Modality:  Video Visit:      Provider verified identity through the following two step process.  Patient provided:  Patient is known previously to provider     Telemedicine Visit: The patient's condition can be safely assessed and treated via synchronous audio and visual telemedicine encounter.       Reason for Telemedicine Visit: Services only offered telehealth     Originating Site (Patient Location): Patient's work     Distant Site (Provider Location): [facility]     Consent:  The patient/guardian has verbally consented to: the potential risks and benefits of telemedicine (video visit) versus in person care; bill my insurance or make self-payment for services provided; and responsibility for payment of non-covered services.      Patient would like the video invitation sent by:  Text to cell phone:       Mode of Communication:  Video Conference via Amwell     As the provider I attest to compliance with applicable laws and regulations related to telemedicine.       DATA  Interactive Complexity: No  Crisis: No        Progress Since Last Session (Related to Symptoms / Goals / Homework):   Symptoms: No change depression and anxiety related to the holiday's.    Homework: Partially completed      Episode of Care Goals: Satisfactory progress - ACTION (Actively working towards change); Intervened by reinforcing change plan / affirming steps taken     Current / Ongoing Stressors and Concerns:  Patient indicated feeling depressed and anxious. Patient reported this time of year always being hard for her. Patient indicated at this point just trying to get through the  holiday's. Patient talked about expectations being high and too much. Patient reported she is planning to volunteer to help with her mood. Patient indicated she is spending a lot of time watching her granddaughter play basketball which is bringing her dariusz.      Treatment Objective(s) Addressed in This Session:   use distraction each time intrusive worry surfaces  Increase interest, engagement, and pleasure in doing things  Decrease frequency and intensity of feeling down, depressed, hopeless  Improve quantity and quality of night time sleep / decrease daytime naps  Identify negative self-talk and behaviors: challenge core beliefs, myths, and actions  Improve concentration, focus, and mindfulness in daily activities      Intervention:   Motivational Interviewing    MI Intervention: Reflections: simple and complex     Change Talk Expressed by the Patient: Taking steps    Provider Response to Change Talk: S - Summarized patient's change talk statements      Assessments completed prior to visit:  The following assessments were completed by patient for this visit:  PHQ9:   PHQ-9 SCORE 5/12/2022 6/7/2022 6/21/2022 8/10/2022 9/22/2022 11/3/2022 12/1/2022   PHQ-9 Total Score MyChart 7 (Mild depression) 4 (Minimal depression) 6 (Mild depression) 11 (Moderate depression) 8 (Mild depression) 9 (Mild depression) 9 (Mild depression)   PHQ-9 Total Score 7 4 6 11 8 9 9     GAD7:   ADA-7 SCORE 10/5/2021 11/15/2021 12/13/2021 8/10/2022 9/22/2022 11/3/2022 12/1/2022   Total Score 3 (minimal anxiety) 6 (mild anxiety) - 6 (mild anxiety) 9 (mild anxiety) 10 (moderate anxiety) 8 (mild anxiety)   Total Score 3 6 4 6 9 10 8     PROMIS 10-Global Health (all questions and answers displayed):   PROMIS 10 12/15/2021 3/26/2022 4/14/2022 7/19/2022 11/3/2022 12/1/2022   In general, would you say your health is: Good Good Good Good Good Good   In general, would you say your quality of life is: Good Good Good Fair Good Good   In general, how would  you rate your physical health? Good Fair Fair Good Good Fair   In general, how would you rate your mental health, including your mood and your ability to think? Fair Fair Good Fair Fair Fair   In general, how would you rate your satisfaction with your social activities and relationships? Good Fair Good Good Fair Good   In general, please rate how well you carry out your usual social activities and roles Good Good Fair Good Good Good   To what extent are you able to carry out your everyday physical activities such as walking, climbing stairs, carrying groceries, or moving a chair? Mostly Mostly Mostly Mostly Mostly Mostly   How often have you been bothered by emotional problems such as feeling anxious, depressed or irritable? Often Sometimes Often Often Often Often   How would you rate your fatigue on average? Mild Mild Mild Moderate Mild Mild   How would you rate your pain on average?   0 = No Pain  to  10 = Worst Imaginable Pain 3 3 3 3 2 3   In general, would you say your health is: - 3 3 3 3 3   In general, would you say your quality of life is: - 3 3 2 3 3   In general, how would you rate your physical health? - 2 2 3 3 2   In general, how would you rate your mental health, including your mood and your ability to think? - 2 3 2 2 2   In general, how would you rate your satisfaction with your social activities and relationships? - 2 3 3 2 3   In general, please rate how well you carry out your usual social activities and roles. (This includes activities at home, at work and in your community, and responsibilities as a parent, child, spouse, employee, friend, etc.) - 3 2 3 3 3   To what extent are you able to carry out your everyday physical activities such as walking, climbing stairs, carrying groceries, or moving a chair? - 4 4 4 4 4   In the past 7 days, how often have you been bothered by emotional problems such as feeling anxious, depressed, or irritable? - 3 4 4 4 4   In the past 7 days, how would you rate  your fatigue on average? - 2 2 3 2 2   In the past 7 days, how would you rate your pain on average, where 0 means no pain, and 10 means worst imaginable pain? - 3 3 3 2 3   Global Mental Health Score - 10 11 9 9 10   Global Physical Health Score - 14 14 14 15 14   PROMIS TOTAL - SUBSCORES - 24 25 23 24 24   Some recent data might be hidden         ASSESSMENT: Current Emotional / Mental Status (status of significant symptoms):   Risk status (Self / Other harm or suicidal ideation)   Patient denies current fears or concerns for personal safety.   Patient denies current or recent suicidal ideation or behaviors.   Patient denies current or recent homicidal ideation or behaviors.   Patient denies current or recent self injurious behavior or ideation.   Patient denies other safety concerns.   Patient reports there has been no change in risk factors since their last session.     Patient reports there has been no change in protective factors since their last session.     Recommended that patient call 911 or go to the local ED should there be a change in any of these risk factors.     Appearance:   Appropriate    Eye Contact:   Good    Psychomotor Behavior: Normal    Attitude:   Cooperative    Orientation:   All   Speech    Rate / Production: Normal/ Responsive    Volume:  Normal    Mood:    Anxious  Depressed    Affect:    Appropriate    Thought Content:  Clear    Thought Form:  Coherent  Goal Directed  Logical    Insight:    Good      Medication Review:   No changes to current psychiatric medication(s)     Medication Compliance:   Yes     Changes in Health Issues:   None reported     Chemical Use Review:   Substance Use: Chemical use reviewed, no active concerns identified      Tobacco Use: No current tobacco use.      Diagnosis:  1. Posttraumatic stress disorder    2. Moderate episode of recurrent major depressive disorder (H)    3. ADA (generalized anxiety disorder)        Collateral Reports Completed:   Not  Applicable    PLAN: (Patient Tasks / Therapist Tasks / Other)  Patient will return in two weeks for scheduled session. Patient will work on finding volunteer options for herself. Patient will work on finding activities for herself to bring her dariusz.     There has been demonstrated improvement in functioning while patient has been engaged in psychotherapy/psychological service- if withdrawn the patient would deteriorate and/or relapse.     Mariana Love, [facility] 12/01/22    ______________________________________________________________________    Individual Treatment Plan    Patient's Name: Alina Martell  YOB: 1965    Date of Creation:10/16/2020  Date Treatment Plan Last Reviewed/Revised: 12/1/2022    DSM5 Diagnoses: 296.32 (F33.1) Major Depressive Disorder, Recurrent Episode, Moderate _ or 300.02 (F41.1) Generalized Anxiety Disorder  Psychosocial / Contextual Factors: Health, family and financial   PROMIS (reviewed every 90 days): 25    Referral / Collaboration:  Was/were discussed and patient will pursue.    Anticipated number of session for this episode of care: 20 will reevaluate every 90 days  Anticipation frequency of session: Biweekly  Anticipated Duration of each session: 38-52 minutes  Treatment plan will be reviewed in 90 days or when goals have been changed.       MeasurableTreatment Goal(s) related to diagnosis / functional impairment(s)  Goal 1: Patient will work on reducing overall anxiety.     I will know I've met my goal when reporting minimal anxiety symptoms.       Objective #A (Patient Action)                          Patient will use distraction each time intrusive worry surfaces.  Status: Continued - Date(s): 12/1/2022     Intervention(s)  Therapist will teach emotional regulation skills. ..     Objective #B  Patient will Decrease frequency and intensity of feeling down, depressed, hopeless.  Status: Continued - Date(s): 12/1/2022     Intervention(s)  Therapist will teach  emotional recognition/identification. ..     Objective #C  Patient will Identify negative self-talk and behaviors: challenge core beliefs, myths, and actions.  Status: Continued - Date(s): 12/1/2022     Intervention(s)  Therapist will teach the client how to perform a behavioral chain analysis. ..     Goal 2: Patient will continue to work on reducing depression symptoms    I will know I've met my goal then reporting minimal depression symptoms     Objective #A (Patient Action)                          Patient will Increase interest, engagement, and pleasure in doing things.  Status: Continued - Date(s):  12/1/2022     Intervention(s)  Therapist will assign homework ..     Objective #B  Patient will Decrease frequency and intensity of feeling down, depressed, hopeless.  Status: Continued - Date(s):  12/1/2022     Intervention(s)  Therapist will assign homework at every session.         Patient has reviewed and agreed to the above plan.        Mariana Love, [facility] 12/1/2022

## 2022-12-08 ENCOUNTER — HOSPITAL ENCOUNTER (EMERGENCY)
Facility: HOSPITAL | Age: 57
Discharge: HOME | End: 2022-12-08
Attending: EMERGENCY MEDICINE | Admitting: EMERGENCY MEDICINE
Payer: COMMERCIAL

## 2022-12-08 ENCOUNTER — APPOINTMENT (OUTPATIENT)
Dept: RADIOLOGY | Facility: HOSPITAL | Age: 57
End: 2022-12-08
Attending: EMERGENCY MEDICINE
Payer: COMMERCIAL

## 2022-12-08 ENCOUNTER — APPOINTMENT (OUTPATIENT)
Dept: CT IMAGING | Facility: HOSPITAL | Age: 57
End: 2022-12-08
Attending: EMERGENCY MEDICINE
Payer: COMMERCIAL

## 2022-12-08 VITALS
TEMPERATURE: 97.7 F | BODY MASS INDEX: 42.31 KG/M2 | HEART RATE: 90 BPM | WEIGHT: 286.5 LBS | DIASTOLIC BLOOD PRESSURE: 84 MMHG | OXYGEN SATURATION: 91 % | SYSTOLIC BLOOD PRESSURE: 165 MMHG | RESPIRATION RATE: 21 BRPM

## 2022-12-08 DIAGNOSIS — R51.9 ACUTE NONINTRACTABLE HEADACHE, UNSPECIFIED HEADACHE TYPE: ICD-10-CM

## 2022-12-08 DIAGNOSIS — R07.89 ATYPICAL CHEST PAIN: ICD-10-CM

## 2022-12-08 DIAGNOSIS — G57.11 MERALGIA PARESTHETICA OF RIGHT SIDE: ICD-10-CM

## 2022-12-08 LAB
ALBUMIN SERPL BCG-MCNC: 4 G/DL (ref 3.5–5.2)
ALP SERPL-CCNC: 114 U/L (ref 35–104)
ALT SERPL W P-5'-P-CCNC: 19 U/L (ref 10–35)
ANION GAP SERPL CALCULATED.3IONS-SCNC: 11 MMOL/L (ref 7–15)
APTT PPP: 31 SECONDS (ref 22–38)
AST SERPL W P-5'-P-CCNC: 21 U/L (ref 10–35)
BASOPHILS # BLD AUTO: 0.1 10E3/UL (ref 0–0.2)
BASOPHILS NFR BLD AUTO: 0 %
BILIRUB SERPL-MCNC: 0.2 MG/DL
BUN SERPL-MCNC: 12.1 MG/DL (ref 6–20)
CALCIUM SERPL-MCNC: 8.9 MG/DL (ref 8.6–10)
CHLORIDE SERPL-SCNC: 102 MMOL/L (ref 98–107)
CREAT SERPL-MCNC: 0.95 MG/DL (ref 0.51–0.95)
DEPRECATED HCO3 PLAS-SCNC: 28 MMOL/L (ref 22–29)
EOSINOPHIL # BLD AUTO: 0.2 10E3/UL (ref 0–0.7)
EOSINOPHIL NFR BLD AUTO: 2 %
ERYTHROCYTE [DISTWIDTH] IN BLOOD BY AUTOMATED COUNT: 14.6 % (ref 10–15)
FLUAV RNA SPEC QL NAA+PROBE: NEGATIVE
FLUBV RNA RESP QL NAA+PROBE: NEGATIVE
GFR SERPL CREATININE-BSD FRML MDRD: 70 ML/MIN/1.73M2
GLUCOSE SERPL-MCNC: 113 MG/DL (ref 70–99)
HCT VFR BLD AUTO: 38.5 % (ref 35–47)
HGB BLD-MCNC: 12 G/DL (ref 11.7–15.7)
HOLD SPECIMEN: NORMAL
IMM GRANULOCYTES # BLD: 0.1 10E3/UL
IMM GRANULOCYTES NFR BLD: 0 %
INR PPP: 1.02 (ref 0.85–1.15)
LYMPHOCYTES # BLD AUTO: 3 10E3/UL (ref 0.8–5.3)
LYMPHOCYTES NFR BLD AUTO: 23 %
MAGNESIUM SERPL-MCNC: 1.9 MG/DL (ref 1.7–2.3)
MCH RBC QN AUTO: 25.8 PG (ref 26.5–33)
MCHC RBC AUTO-ENTMCNC: 31.2 G/DL (ref 31.5–36.5)
MCV RBC AUTO: 83 FL (ref 78–100)
MONOCYTES # BLD AUTO: 0.9 10E3/UL (ref 0–1.3)
MONOCYTES NFR BLD AUTO: 7 %
NEUTROPHILS # BLD AUTO: 8.9 10E3/UL (ref 1.6–8.3)
NEUTROPHILS NFR BLD AUTO: 68 %
NRBC # BLD AUTO: 0 10E3/UL
NRBC BLD AUTO-RTO: 0 /100
PLATELET # BLD AUTO: 361 10E3/UL (ref 150–450)
POTASSIUM SERPL-SCNC: 4 MMOL/L (ref 3.4–5.3)
PROT SERPL-MCNC: 7.6 G/DL (ref 6.4–8.3)
RBC # BLD AUTO: 4.66 10E6/UL (ref 3.8–5.2)
RSV RNA SPEC NAA+PROBE: NEGATIVE
SARS-COV-2 RNA RESP QL NAA+PROBE: NEGATIVE
SODIUM SERPL-SCNC: 141 MMOL/L (ref 136–145)
TROPONIN T SERPL HS-MCNC: 6 NG/L
WBC # BLD AUTO: 13.1 10E3/UL (ref 4–11)

## 2022-12-08 PROCEDURE — 93005 ELECTROCARDIOGRAM TRACING: CPT | Performed by: EMERGENCY MEDICINE

## 2022-12-08 PROCEDURE — 85730 THROMBOPLASTIN TIME PARTIAL: CPT | Performed by: EMERGENCY MEDICINE

## 2022-12-08 PROCEDURE — 70450 CT HEAD/BRAIN W/O DYE: CPT

## 2022-12-08 PROCEDURE — 71045 X-RAY EXAM CHEST 1 VIEW: CPT

## 2022-12-08 PROCEDURE — 87637 SARSCOV2&INF A&B&RSV AMP PRB: CPT | Performed by: EMERGENCY MEDICINE

## 2022-12-08 PROCEDURE — 84484 ASSAY OF TROPONIN QUANT: CPT | Performed by: EMERGENCY MEDICINE

## 2022-12-08 PROCEDURE — 80053 COMPREHEN METABOLIC PANEL: CPT | Performed by: EMERGENCY MEDICINE

## 2022-12-08 PROCEDURE — 83735 ASSAY OF MAGNESIUM: CPT | Performed by: EMERGENCY MEDICINE

## 2022-12-08 PROCEDURE — 99285 EMERGENCY DEPT VISIT HI MDM: CPT | Mod: CS,25

## 2022-12-08 PROCEDURE — 85610 PROTHROMBIN TIME: CPT | Performed by: EMERGENCY MEDICINE

## 2022-12-08 PROCEDURE — 85025 COMPLETE CBC W/AUTO DIFF WBC: CPT | Performed by: EMERGENCY MEDICINE

## 2022-12-08 PROCEDURE — 250N000013 HC RX MED GY IP 250 OP 250 PS 637: Performed by: EMERGENCY MEDICINE

## 2022-12-08 PROCEDURE — C9803 HOPD COVID-19 SPEC COLLECT: HCPCS

## 2022-12-08 PROCEDURE — 36415 COLL VENOUS BLD VENIPUNCTURE: CPT | Performed by: STUDENT IN AN ORGANIZED HEALTH CARE EDUCATION/TRAINING PROGRAM

## 2022-12-08 RX ORDER — ACETAMINOPHEN 325 MG/1
650 TABLET ORAL ONCE
Status: COMPLETED | OUTPATIENT
Start: 2022-12-08 | End: 2022-12-08

## 2022-12-08 RX ADMIN — ACETAMINOPHEN 650 MG: 325 TABLET ORAL at 21:19

## 2022-12-08 ASSESSMENT — ENCOUNTER SYMPTOMS
COUGH: 0
NUMBNESS: 1
FEVER: 0
HEADACHES: 1
LIGHT-HEADEDNESS: 1
ABDOMINAL PAIN: 0

## 2022-12-08 ASSESSMENT — ACTIVITIES OF DAILY LIVING (ADL): ADLS_ACUITY_SCORE: 37

## 2022-12-09 NOTE — ED TRIAGE NOTES
"Pt arrives c/o right leg pain/numbness, midsternal chest pain, \"weird headaches,\" and dizziness. PT is on eliquis.      Triage Assessment     Row Name 12/08/22 1933       Triage Assessment (Adult)    Airway WDL WDL       Respiratory WDL    Respiratory WDL WDL       Skin Circulation/Temperature WDL    Skin Circulation/Temperature WDL WDL       Cardiac WDL    Cardiac WDL X;chest pain       Chest Pain Assessment    Chest Pain Location midsternal    Chest Pain Radiation arm    Character aching       Peripheral/Neurovascular WDL    Peripheral Neurovascular WDL WDL       Cognitive/Neuro/Behavioral WDL    Cognitive/Neuro/Behavioral WDL WDL              "

## 2022-12-09 NOTE — ED PROVIDER NOTES
EMERGENCY DEPARTMENT ENCOUNTER      NAME: Alina Martell  AGE: 57 year old female  YOB: 1965  MRN: [id number]  EVALUATION DATE & TIME: 2022  7:36 PM    PCP: Estelle Bansal    ED PROVIDER: Laverne Salmeron DO      Chief Complaint   Patient presents with     Chest Pain     Leg Pain     Headache     Dizziness         FINAL IMPRESSION:  1. Atypical chest pain    2. Meralgia paresthetica of right side    3. Acute nonintractable headache, unspecified headache type          ED COURSE & MEDICAL DECISION MAKIN-year-old female presented to the ED for evaluation of brief, intermittent headaches, dizziness, chest discomfort, and paresthesias involving the right anterior thigh development ongoing for the last few days.  Here in the ED the patient was hypertensive upon arrival but she was otherwise hemodynamically stable.  Patient did not appear to be in any obvious distress or discomfort at the time of her initial evaluation.  The patient's physical exam was unremarkable other than noting mild sensory discrepancies to light touch located along the anterior aspect of the right proximal thigh.  The patient had no other neurologic deficits noted.    An EKG was obtained which revealed mild sinus tachycardia without any concerning ST or T wave changes.  Patient was noted to have a slightly elevated white blood cell count of 13.1.  CBC was otherwise unremarkable.  CMP, troponin, and magnesium were all reassuring.  COVID and influenza testing were negative.  Chest x-ray shows stable cardiomegaly.  Head CT scan is nondiagnostic.    The patient was reevaluated and informed of the reassuring lab, EKG, and imaging results.  The patient was informed that her right anterior thigh paresthesias are likely due to a condition called meralgia paresthetica.  Patient was educated about meralgia paresthetica and reassured.  The exact cause of the intermittent chest discomfort and headaches remains unclear.  I do not  suspect that the patient symptoms are related to ACS, PE, or other acute cardiopulmonary etiologies.  After educating and reassure the patient she felt comfortable returning home.  The patient was instructed to continue taking her home medications as directed and to follow-up with her primary care physician for reevaluation.  The patient was instructed to return back to ED sooner for any worsening headaches, chest pain, numbness/ting/weakness in her face or extremities, or any other new or concerning symptoms    Pertinent Labs & Imaging studies reviewed. (See chart for details)  7:59 PM I met with the patient to gather history and to perform my initial exam. We discussed plans for the ED course, including diagnostic testing and treatment.       At the conclusion of the encounter I discussed the results of all of the tests and the disposition. The questions were answered. The patient or family acknowledged understanding and was agreeable with the care plan.     0 minutes of critical care time   PPE worn: n95 mask, goggles    Medical Decision Making    History:    Supplemental history from: N/A    External Record(s) reviewed: Documented in HPI, if applicable.    Work Up:    Chart documentation includes differential considered and any EKGs or imaging interpreted by provider.    Complicating factors:    Care impacted by chronic illness: Heart Disease    Care affected by social determinants of health: N/A    Disposition considerations: Discharge. No recommendations on prescription strength medication(s). N/A.         MEDICATIONS GIVEN IN THE EMERGENCY:  Medications   acetaminophen (TYLENOL) tablet 650 mg (650 mg Oral Given 12/8/22 2119)       NEW PRESCRIPTIONS STARTED AT TODAY'S ER VISIT  Discharge Medication List as of 12/8/2022 10:05 PM             =================================================================    HPI    Patient information was obtained from: Patient    Use of : N/JUNG NAIK  "Jayshree is a 57 year old female with a pertinent history of paroxysmal atrial fibrillation, hypertension, and CAD who presents to this ED by walk in for evaluation of chest pain.     Patient reports that three days ago she began getting pain described as a dull ache with numbness in her upper right thigh that has been constant. She has had tingling and feels cold in her bilateral feet as well. On and off patient has had chest pain radiating into her left arm with her heart rate increasing into the 110s and 120s. Of note she did have an ablation on June 30th by Dr. Gonzalez, but has had no complication since. Patient also for the last three days has had a on and off headache that will last for 5-10 minutes at a time in the back or front of her head that feels \"tight\". Does have a history of migraines but lately has been rarely having them. Has had intermittent bouts of lightheadedness with this. She denies any trauma. No recent medication changes. She is on Eliquis, Diltiazem, and acid reflux medication currently. Patient does not smoke or drink alcohol. She denies any cough, fever, abdominal pain, or any other complaints at this time.       REVIEW OF SYSTEMS   Review of Systems   Constitutional: Negative for fever.   Respiratory: Negative for cough.    Cardiovascular: Positive for chest pain.   Gastrointestinal: Negative for abdominal pain.   Neurological: Positive for light-headedness, numbness and headaches.   All other systems reviewed and are negative.       PAST MEDICAL HISTORY:  Past Medical History:   Diagnosis Date     Anemia      Antiplatelet or antithrombotic long-term use      Arrhythmia      Cannabis use without complication 12/8/2014     Carpal tunnel syndrome      Coronary artery disease      Gastroesophageal reflux disease      History of angina      Hypertension      Irregular heart beat      Obesity      Paroxysmal atrial fibrillation (H)      PTSD (post-traumatic stress disorder)      RA " (rheumatoid arthritis) (H)      Rheumatoid arthritis (H) 7/8/2016     Sicca syndrome (H)      Sleep apnea        PAST SURGICAL HISTORY:  Past Surgical History:   Procedure Laterality Date     CHOLECYSTECTOMY       DILATION AND CURETTAGE, OPERATIVE HYSTEROSCOPY, COMBINED N/A 3/3/2022    Procedure: HYSTEROSCOPY, WITH DILATION AND CURETTAGE;  Surgeon: Irma Cary MD;  Location: [facility]     EP ABLATION FOCAL AFIB N/A 6/30/2022    Procedure: Ablation Atrial Fibrilation;  Surgeon: Jose Guadalupe Joseph MD;  Location:  [facility]     HC REMOVAL GALLBLADDER      Description: Cholecystectomy;  Recorded: 10/15/2013;     LAPAROSCOPIC TUBAL LIGATION       RELEASE CARPAL TUNNEL BILATERAL       TUBAL LIGATION  1995           CURRENT MEDICATIONS:    acetaminophen (TYLENOL) 325 MG tablet  adalimumab (HUMIRA *CF* PEN) 40 MG/0.4ML pen kit  apixaban ANTICOAGULANT (ELIQUIS ANTICOAGULANT) 5 MG tablet  cetirizine (ZYRTEC) 10 MG tablet  diltiazem ER COATED BEADS (CARDIZEM CD/CARTIA XT) 180 MG 24 hr capsule  EPINEPHrine (ANY BX GENERIC EQUIV) 0.3 MG/0.3ML injection 2-pack  flecainide (TAMBOCOR) 50 MG tablet  hydrOXYzine (ATARAX) 25 MG tablet  Multiple Vitamins-Minerals (CENTROVITE) TABS  omeprazole (PRILOSEC) 40 MG DR capsule  polyethylene glycol (MIRALAX) 17 GM/Dose powder  prazosin (MINIPRESS) 1 MG capsule  traZODone (DESYREL) 50 MG tablet  vitamin C (ASCORBIC ACID) 1000 MG TABS  vitamin D3 (CHOLECALCIFEROL) 250 mcg (81747 units) capsule        ALLERGIES:  Allergies   Allergen Reactions     Penicillins Shortness Of Breath, Palpitations, Dizziness, Anaphylaxis, Hives, Itching and Rash     Test in 2031, see allergy note on 7/29/21     Shellfish-Derived Products Anaphylaxis     Sulfa Drugs Hives and Anaphylaxis       FAMILY HISTORY:  Family History   Problem Relation Age of Onset     Crohn's Disease Mother         Total colectomy with ileostomy.     Atrial fibrillation Mother      Snoring Father       Diabetes Father      Prostate Cancer Father      Chronic Kidney Disease Father         Chose against dialysis.     Substance Abuse Brother      Depression Brother      Attention Deficit Disorder Brother      Alcoholism Brother      Arrhythmia Brother      Alcoholism Brother      Substance Abuse Brother      Arrhythmia Brother      Alcoholism Maternal Grandfather      No Known Problems Daughter      No Known Problems Son      Lupus Cousin      Breast Cancer No family hx of        SOCIAL HISTORY:   Social History     Socioeconomic History     Marital status: Single     Spouse name: None     Number of children: 2     Years of education: 4     Highest education level: None   Tobacco Use     Smoking status: Never     Smokeless tobacco: Never     Tobacco comments:     smokes marijuana   Substance and Sexual Activity     Alcohol use: Not Currently     Comment: Alcoholic Drinks/day: Never an issue, she states.  7/8/16     Drug use: Not Currently     Comment: Drug use: Former marijuana use, not current. 7/8/16     Sexual activity: Never     Partners: Male     Birth control/protection: Other     Comment: tubal ligation       VITALS:  BP (!) 165/84   Pulse 90   Temp 97.7  F (36.5  C) (Temporal)   Resp 21   Wt 130 kg (286 lb 8 oz)   LMP 11/14/2021   SpO2 91%   BMI 42.31 kg/m      PHYSICAL EXAM    General presentation: Alert, Vital signs reviewed. NAD  HENT: ENT inspection is normal. Oropharynx is moist and clear.   Eye: Pupils are equal and reactive to light. EOMI  Neck: The neck is supple, with full ROM, with no evidence of meningismus.  Pulmonary: Currently in no acute respiratory distress. Normal, non labored respirations, the lung sounds are normal with good equal air movement. Clear to auscultation bilaterally.   Circulatory: Regular rate and rhythm. Peripheral pulses are strong and equal. No murmurs, rubs, or gallops.   Abdominal: The abdomen is soft. Nontender. No rigidity, guarding, or rebound. Bowel sounds  normal.     Neurologic: Alert, oriented to person, place, and time.  Mild discrepancies to light touch located along the anterior aspect of the right proximal thigh when compared to the left.  No other sensorimotor deficits noted in the upper or lower extremities. Cranial nerves II through XII are intact.  Musculoskeletal: No extremity tenderness. Full range of motion in all extremities. No extremity edema.  No swollen lymph nodes or abscesses noted within the right groin.  Skin: Skin color is normal. No rash. Warm. Dry to touch.      LAB:  All pertinent labs reviewed and interpreted.  Results for orders placed or performed during the hospital encounter of 12/08/22   XR Chest Port 1 View    Impression    IMPRESSION:     No focal airspace disease. No pleural effusion or pneumothorax.    Stable borderline cardiomegaly.   Head CT w/o contrast    Impression    IMPRESSION:  Unremarkable head CT.   Extra Blue Top Tube   Result Value Ref Range    Hold Specimen JIC    Extra Red Top Tube   Result Value Ref Range    Hold Specimen JIC    Extra Green Top (Lithium Heparin) Tube   Result Value Ref Range    Hold Specimen JIC    Extra Purple Top Tube   Result Value Ref Range    Hold Specimen JIC    Result Value Ref Range    INR 1.02 0.85 - 1.15   Partial thromboplastin time   Result Value Ref Range    aPTT 31 22 - 38 Seconds   Comprehensive metabolic panel   Result Value Ref Range    Sodium 141 136 - 145 mmol/L    Potassium 4.0 3.4 - 5.3 mmol/L    Chloride 102 98 - 107 mmol/L    Carbon Dioxide (CO2) 28 22 - 29 mmol/L    Anion Gap 11 7 - 15 mmol/L    Urea Nitrogen 12.1 6.0 - 20.0 mg/dL    Creatinine 0.95 0.51 - 0.95 mg/dL    Calcium 8.9 8.6 - 10.0 mg/dL    Glucose 113 (H) 70 - 99 mg/dL    Alkaline Phosphatase 114 (H) 35 - 104 U/L    AST 21 10 - 35 U/L    ALT 19 10 - 35 U/L    Protein Total 7.6 6.4 - 8.3 g/dL    Albumin 4.0 3.5 - 5.2 g/dL    Bilirubin Total 0.2 <=1.2 mg/dL    GFR Estimate 70 >60 mL/min/1.73m2   Result Value Ref Range     Magnesium 1.9 1.7 - 2.3 mg/dL   Result Value Ref Range    Troponin T, High Sensitivity 6 <=14 ng/L   CBC with platelets and differential   Result Value Ref Range    WBC Count 13.1 (H) 4.0 - 11.0 10e3/uL    RBC Count 4.66 3.80 - 5.20 10e6/uL    Hemoglobin 12.0 11.7 - 15.7 g/dL    Hematocrit 38.5 35.0 - 47.0 %    MCV 83 78 - 100 fL    MCH 25.8 (L) 26.5 - 33.0 pg    MCHC 31.2 (L) 31.5 - 36.5 g/dL    RDW 14.6 10.0 - 15.0 %    Platelet Count 361 150 - 450 10e3/uL    % Neutrophils 68 %    % Lymphocytes 23 %    % Monocytes 7 %    % Eosinophils 2 %    % Basophils 0 %    % Immature Granulocytes 0 %    NRBCs per 100 WBC 0 <1 /100    Absolute Neutrophils 8.9 (H) 1.6 - 8.3 10e3/uL    Absolute Lymphocytes 3.0 0.8 - 5.3 10e3/uL    Absolute Monocytes 0.9 0.0 - 1.3 10e3/uL    Absolute Eosinophils 0.2 0.0 - 0.7 10e3/uL    Absolute Basophils 0.1 0.0 - 0.2 10e3/uL    Absolute Immature Granulocytes 0.1 <=0.4 10e3/uL    Absolute NRBCs 0.0 10e3/uL   Symptomatic Influenza A/B & SARS-CoV2 (COVID-19) Virus PCR Multiplex Nasopharyngeal    Specimen: Nasopharyngeal; Swab   Result Value Ref Range    Influenza A PCR Negative Negative    Influenza B PCR Negative Negative    RSV PCR Negative Negative    SARS CoV2 PCR Negative Negative       RADIOLOGY:  Reviewed all pertinent imaging. Please see official radiology report.  Head CT w/o contrast   Final Result   IMPRESSION:   Unremarkable head CT.      XR Chest Port 1 View   Final Result   IMPRESSION:       No focal airspace disease. No pleural effusion or pneumothorax.      Stable borderline cardiomegaly.          EKG:    Sinus tachycardia.  Rate of 103.  Normal QRS.  Normal QT.  No ST or T wave changes.  Compared to EKG on 10/3/2022 no significant changes are noted.    I have independently reviewed and interpreted the EKG(s) documented above.    I, Mauricio Sanders, am serving as a scribe to document services personally performed by Laverne Salmeron DO based on my observation and the provider's  statements to me. I, Laverne Salmeron, attest that Mauricio Sanders is acting in a scribe capacity, has observed my performance of the services and has documented them in accordance with my direction.    Laverne Salmeron DO  Emergency Medicine  [facility] EMERGENCY DEPARTMENT  [address] [place] [id number] [phone]     Laverne Salmeron DO  12/08/22 2980

## 2022-12-09 NOTE — ED NOTES
Maritza reports feeling profoundly tired with weakness in her legs, right groin pain, persistent headache with numbness in various spots on her head and a cold feeling in her feet. Pt also c/o lightheadedness and dizziness especially when laying flat, and poor appetite all since Monday. Pt has a history of cardiac ablation and takes eliquis.

## 2022-12-09 NOTE — DISCHARGE INSTRUCTIONS
Head CT scan, chest x-ray, EKG, and laboratory tests all appear reassuring here today in the ED.  Your right leg numbness/discomfort is likely related to a condition called meralgia paresthetica.    Continue taking your home medications as directed.    Follow-up with your primary care provider for reevaluation.  Return back to ED sooner for any worsening headaches, chest pain, shortness of breath, numbness/ting/weakness in your extremities, or any other new or concerning symptoms.

## 2022-12-11 ENCOUNTER — APPOINTMENT (OUTPATIENT)
Dept: MRI IMAGING | Facility: HOSPITAL | Age: 57
End: 2022-12-11
Attending: PHYSICIAN ASSISTANT
Payer: COMMERCIAL

## 2022-12-11 ENCOUNTER — HOSPITAL ENCOUNTER (EMERGENCY)
Facility: HOSPITAL | Age: 57
Discharge: HOME | End: 2022-12-11
Attending: EMERGENCY MEDICINE | Admitting: EMERGENCY MEDICINE
Payer: COMMERCIAL

## 2022-12-11 VITALS
DIASTOLIC BLOOD PRESSURE: 79 MMHG | TEMPERATURE: 98 F | BODY MASS INDEX: 39.4 KG/M2 | WEIGHT: 260 LBS | SYSTOLIC BLOOD PRESSURE: 141 MMHG | HEART RATE: 82 BPM | RESPIRATION RATE: 17 BRPM | OXYGEN SATURATION: 95 % | HEIGHT: 68 IN

## 2022-12-11 DIAGNOSIS — H81.12 BENIGN PAROXYSMAL POSITIONAL VERTIGO, LEFT: ICD-10-CM

## 2022-12-11 DIAGNOSIS — R31.29 MICROSCOPIC HEMATURIA: ICD-10-CM

## 2022-12-11 LAB
ALBUMIN SERPL BCG-MCNC: 3.9 G/DL (ref 3.5–5.2)
ALBUMIN UR-MCNC: NEGATIVE MG/DL
ALP SERPL-CCNC: 108 U/L (ref 35–104)
ALT SERPL W P-5'-P-CCNC: 17 U/L (ref 10–35)
ANION GAP SERPL CALCULATED.3IONS-SCNC: 9 MMOL/L (ref 7–15)
APPEARANCE UR: CLEAR
AST SERPL W P-5'-P-CCNC: 16 U/L (ref 10–35)
BILIRUB DIRECT SERPL-MCNC: <0.2 MG/DL (ref 0–0.3)
BILIRUB SERPL-MCNC: <0.2 MG/DL
BILIRUB UR QL STRIP: NEGATIVE
BUN SERPL-MCNC: 13.2 MG/DL (ref 6–20)
CALCIUM SERPL-MCNC: 9.2 MG/DL (ref 8.6–10)
CHLORIDE SERPL-SCNC: 100 MMOL/L (ref 98–107)
COLOR UR AUTO: COLORLESS
CREAT SERPL-MCNC: 0.74 MG/DL (ref 0.51–0.95)
DEPRECATED HCO3 PLAS-SCNC: 29 MMOL/L (ref 22–29)
ERYTHROCYTE [DISTWIDTH] IN BLOOD BY AUTOMATED COUNT: 14.5 % (ref 10–15)
GFR SERPL CREATININE-BSD FRML MDRD: >90 ML/MIN/1.73M2
GLUCOSE SERPL-MCNC: 92 MG/DL (ref 70–99)
GLUCOSE UR STRIP-MCNC: NEGATIVE MG/DL
HCT VFR BLD AUTO: 37.7 % (ref 35–47)
HGB BLD-MCNC: 11.8 G/DL (ref 11.7–15.7)
HGB UR QL STRIP: ABNORMAL
KETONES UR STRIP-MCNC: NEGATIVE MG/DL
LEUKOCYTE ESTERASE UR QL STRIP: NEGATIVE
LIPASE SERPL-CCNC: 17 U/L (ref 13–60)
MAGNESIUM SERPL-MCNC: 2 MG/DL (ref 1.7–2.3)
MCH RBC QN AUTO: 25.9 PG (ref 26.5–33)
MCHC RBC AUTO-ENTMCNC: 31.3 G/DL (ref 31.5–36.5)
MCV RBC AUTO: 83 FL (ref 78–100)
NITRATE UR QL: NEGATIVE
PH UR STRIP: 6.5 [PH] (ref 5–7)
PLATELET # BLD AUTO: 330 10E3/UL (ref 150–450)
POTASSIUM SERPL-SCNC: 3.9 MMOL/L (ref 3.4–5.3)
PROT SERPL-MCNC: 7.4 G/DL (ref 6.4–8.3)
RBC # BLD AUTO: 4.55 10E6/UL (ref 3.8–5.2)
RBC URINE: 1 /HPF
SODIUM SERPL-SCNC: 138 MMOL/L (ref 136–145)
SP GR UR STRIP: 1.01 (ref 1–1.03)
SQUAMOUS EPITHELIAL: <1 /HPF
TROPONIN T SERPL HS-MCNC: <6 NG/L
UROBILINOGEN UR STRIP-MCNC: <2 MG/DL
WBC # BLD AUTO: 11.1 10E3/UL (ref 4–11)
WBC URINE: <1 /HPF

## 2022-12-11 PROCEDURE — 250N000011 HC RX IP 250 OP 636: Performed by: PHYSICIAN ASSISTANT

## 2022-12-11 PROCEDURE — 250N000013 HC RX MED GY IP 250 OP 250 PS 637: Performed by: PHYSICIAN ASSISTANT

## 2022-12-11 PROCEDURE — 80053 COMPREHEN METABOLIC PANEL: CPT | Performed by: PHYSICIAN ASSISTANT

## 2022-12-11 PROCEDURE — 70544 MR ANGIOGRAPHY HEAD W/O DYE: CPT

## 2022-12-11 PROCEDURE — 82248 BILIRUBIN DIRECT: CPT | Performed by: PHYSICIAN ASSISTANT

## 2022-12-11 PROCEDURE — 36415 COLL VENOUS BLD VENIPUNCTURE: CPT | Performed by: PHYSICIAN ASSISTANT

## 2022-12-11 PROCEDURE — 255N000002 HC RX 255 OP 636: Performed by: EMERGENCY MEDICINE

## 2022-12-11 PROCEDURE — 96374 THER/PROPH/DIAG INJ IV PUSH: CPT | Mod: 59

## 2022-12-11 PROCEDURE — 84484 ASSAY OF TROPONIN QUANT: CPT | Performed by: PHYSICIAN ASSISTANT

## 2022-12-11 PROCEDURE — 93005 ELECTROCARDIOGRAM TRACING: CPT | Performed by: EMERGENCY MEDICINE

## 2022-12-11 PROCEDURE — 70549 MR ANGIOGRAPH NECK W/O&W/DYE: CPT

## 2022-12-11 PROCEDURE — 83690 ASSAY OF LIPASE: CPT | Performed by: PHYSICIAN ASSISTANT

## 2022-12-11 PROCEDURE — 83735 ASSAY OF MAGNESIUM: CPT | Performed by: PHYSICIAN ASSISTANT

## 2022-12-11 PROCEDURE — 70553 MRI BRAIN STEM W/O & W/DYE: CPT

## 2022-12-11 PROCEDURE — 96375 TX/PRO/DX INJ NEW DRUG ADDON: CPT

## 2022-12-11 PROCEDURE — 85027 COMPLETE CBC AUTOMATED: CPT | Performed by: PHYSICIAN ASSISTANT

## 2022-12-11 PROCEDURE — 81001 URINALYSIS AUTO W/SCOPE: CPT | Performed by: PHYSICIAN ASSISTANT

## 2022-12-11 PROCEDURE — 99285 EMERGENCY DEPT VISIT HI MDM: CPT | Mod: 25

## 2022-12-11 PROCEDURE — A9585 GADOBUTROL INJECTION: HCPCS | Performed by: EMERGENCY MEDICINE

## 2022-12-11 PROCEDURE — 93005 ELECTROCARDIOGRAM TRACING: CPT | Performed by: STUDENT IN AN ORGANIZED HEALTH CARE EDUCATION/TRAINING PROGRAM

## 2022-12-11 RX ORDER — GADOBUTROL 604.72 MG/ML
10 INJECTION INTRAVENOUS ONCE
Status: COMPLETED | OUTPATIENT
Start: 2022-12-11 | End: 2022-12-11

## 2022-12-11 RX ORDER — MECLIZINE HCL 12.5 MG 12.5 MG/1
50 TABLET ORAL ONCE
Status: COMPLETED | OUTPATIENT
Start: 2022-12-11 | End: 2022-12-11

## 2022-12-11 RX ORDER — ONDANSETRON 4 MG/1
4 TABLET, ORALLY DISINTEGRATING ORAL EVERY 8 HOURS PRN
Qty: 10 TABLET | Refills: 0 | Status: SHIPPED | OUTPATIENT
Start: 2022-12-11 | End: 2022-12-15

## 2022-12-11 RX ORDER — MECLIZINE HYDROCHLORIDE 25 MG/1
25 TABLET ORAL 3 TIMES DAILY PRN
Qty: 30 TABLET | Refills: 0 | Status: SHIPPED | OUTPATIENT
Start: 2022-12-11 | End: 2023-02-05

## 2022-12-11 RX ORDER — LORAZEPAM 2 MG/ML
1 INJECTION INTRAMUSCULAR ONCE
Status: COMPLETED | OUTPATIENT
Start: 2022-12-11 | End: 2022-12-11

## 2022-12-11 RX ORDER — ONDANSETRON 2 MG/ML
4 INJECTION INTRAMUSCULAR; INTRAVENOUS ONCE
Status: COMPLETED | OUTPATIENT
Start: 2022-12-11 | End: 2022-12-11

## 2022-12-11 RX ADMIN — MECLIZINE 50 MG: 12.5 TABLET ORAL at 14:27

## 2022-12-11 RX ADMIN — GADOBUTROL 10 ML: 604.72 INJECTION INTRAVENOUS at 17:17

## 2022-12-11 RX ADMIN — LORAZEPAM 1 MG: 2 INJECTION INTRAMUSCULAR; INTRAVENOUS at 16:33

## 2022-12-11 RX ADMIN — ONDANSETRON 4 MG: 2 INJECTION INTRAMUSCULAR; INTRAVENOUS at 14:23

## 2022-12-11 ASSESSMENT — ENCOUNTER SYMPTOMS
NECK PAIN: 1
NAUSEA: 1
CHILLS: 0
LIGHT-HEADEDNESS: 0
WEAKNESS: 0
CONSTIPATION: 0
COUGH: 0
NUMBNESS: 1
ABDOMINAL PAIN: 0
BACK PAIN: 0
BLOOD IN STOOL: 0
HEADACHES: 1
DIZZINESS: 1
FEVER: 0
VOMITING: 0
SHORTNESS OF BREATH: 1
SPEECH DIFFICULTY: 0
FATIGUE: 1
DIARRHEA: 1

## 2022-12-11 ASSESSMENT — ACTIVITIES OF DAILY LIVING (ADL)
ADLS_ACUITY_SCORE: 37

## 2022-12-11 NOTE — ED NOTES
Pt states for the past week she has dizziness, diarrhea, increased tiredness, and SOB while ambulating. Nobody else in her home is sick. Pt denies new pain, does have pain from RA. Denies nausea and CP.

## 2022-12-11 NOTE — ED PROVIDER NOTES
[facility]   NAME: Alina Martell ; AGE: 57 year old female ; YOB: 1965 ; MRN: [id number] ; PCP: Estelle Bansal   ED PROVIDER: Roxana Rosenbaum PA-C    Evaluation Date & Time:   12/11/2022  1:04 PM    CHIEF COMPLAINT:  Nausea and Diarrhea      Impression and Plan   MDM: Alina Matrell is a 57 year old female with a pertinent history of rheumatoid arthritis, PTSD, paroxysmal atrial fibrillation, hypertension, GERD, coronary artery disease, anemia, who presents to the ED by private vehicle for evaluation of dizziness and headache.  The patient returns to the emergency department today with a multitude of symptoms including vertiginous dizziness, intermittent occipital headaches, some soreness to her left neck, and extreme fatigue and loose stools.  Here in the ER, this is a very well-appearing patient, she is pleasant and in no acute distress, sitting comfortably in the bed.  Afebrile and vital stable though mildly hypertensive upon arrival.  Considered a broad differential though she did have a reassuring cath several days ago.  Will reassess EKG and labs, and we will additionally assess her urine today.  Her abdominal exam is completely benign and she has not had any pain.  No concern for intra-abdominal emergent or surgical consults.  No recent antibiotics, travel outside of the country, bloody stools or any high risk features of her loose stools.  Performed a thorough neurologic exam on her which returned within normal limits.  Some features of her dizziness do sound consistent with a benign paroxysmal positional vertigo though symptoms have been constant.  Mariama-Hallpike maneuver performed, and patient was symptomatic to the left though without true nystagmus.  Zofran and meclizine ordered to see if this helps with her symptoms.  Given her new persistent dizziness, will further assess with MRI/MRA head and neck imaging to rule out a cerebellar infarction though my  clinical suspicion is incredibly low. EKG shows normal sinus rhythm.  Nonspecific T wave inversion in aVR V1.  When compared to previous EKG from 12/8/2022, no change.  High-sensitivity troponin today less than 6, effectively ruling out ACS and symptoms would be very atypical of such.  Mild leukocytosis of 11.1 which is down from 3 days ago and still nonspecific.  No abnormal vitals to suggest SIRS.  Despite loose stools, no concerning metabolic derangements or NAVI.  No concerns for GI bleed and blood counts within normal limits.  UA with the presence of microscopic hematuria though no signs of infection.  Patient presented to my colleague at the end of my shift for follow-up on MRI imaging.  If this returns negative and symptoms improved with supportive medications, suspect plan for discharged home with treatment for her peripheral vertigo and close follow-up in clinic.    ED COURSE:  1:27 PM I met and introduced myself to the patient. I gathered initial history and performed my physical exam. We discussed plan for initial workup.   2:15 PM Discussed the case with Dr. Salmeron, ED MD.   2:24 PM rechecked the patient and updated her on plan for MRI.  She is comfortable with this.  I did order her Ativan to give before MRI as she has had anxiety with MRI in the past.   4:10 PM Rechecked the patient, and she is feeling improved. Nausea has resolved. Patient's care signed out to my colleague, Dr. Salmeron, at the end of my shift for follow up on MRI and ultimate disposition.     At the conclusion of the encounter I discussed the results of all the tests and the disposition. The questions were answered. The patient or family acknowledged understanding and was agreeable with the care plan.    FINAL IMPRESSION:    ICD-10-CM    1. Benign paroxysmal positional vertigo, left  H81.12       2. Microscopic hematuria  R31.29             MEDICATIONS GIVEN IN THE EMERGENCY DEPARTMENT:  Medications   gadobutrol (GADAVIST) injection 10 mL  (has no administration in time range)   ondansetron (ZOFRAN) injection 4 mg (4 mg Intravenous Given 12/11/22 1423)   meclizine (ANTIVERT) tablet 50 mg (50 mg Oral Given 12/11/22 1427)   LORazepam (ATIVAN) injection 1 mg (1 mg Intravenous Given 12/11/22 1633)         NEW PRESCRIPTIONS STARTED AT TODAY'S ED VISIT:  New Prescriptions    MECLIZINE (ANTIVERT) 25 MG TABLET    Take 1 tablet (25 mg) by mouth 3 times daily as needed for dizziness    ONDANSETRON (ZOFRAN ODT) 4 MG ODT TAB    Take 1 tablet (4 mg) by mouth every 8 hours as needed for nausea         HPI   Patient information was obtained from: Patient    Use of Intrepreter: N/A    Alina Martell is a 57 year old female with a pertinent history of rheumatoid arthritis, PTSD, paroxysmal atrial fibrillation, hypertension, GERD, coronary artery disease, anemia, who presents to the ED by private vehicle for evaluation of dizziness and headache.     Per chart review, patient was evaluated in our emergency department on 12/8 (~3 days ago) with similar symptoms including chest pain, dizziness, intermittent headaches, numbness to her right thigh.  Head CT, chest x-ray, laboratory work-up along with EKG were unremarkable at that time.  She was diagnosed with meralgia paresthetica, and instructed to follow-up in her primary care clinic.    Per patient, over the past several days, she has increasingly felt more dizzy.  For the past 48 hours, the dizziness has been persistent which she describes as a room spinning/off-balance sensation.  No near syncope.  Even though she is constantly dizzy, it seems to be worse with laying down or standing up.  She has continued to have intermittent headaches which she describes as a burning and numb sensation over her occipital scalp.  She has not had any new numbness or weakness of her extremities.  She has felt extremely fatigued and persistently nauseated.  She has had decreased oral intake is anytime she eats, she feels nauseated.   She is also been having loose stools for the past several days.  No watery or bloody stools, although 5 loose stools have been present per day.  No associated abdominal pain.  She does have a history of rheumatoid arthritis and is on Humira.  She has felt somewhat more short of breath with activity though feels that this is likely due to being tired.  No chest pain since her last hospital admission.      REVIEW OF SYSTEMS:  Review of Systems   Constitutional: Positive for fatigue. Negative for chills and fever.   HENT: Negative for ear pain and hearing loss.    Eyes: Negative for visual disturbance.   Respiratory: Positive for shortness of breath. Negative for cough.    Cardiovascular: Negative for chest pain.   Gastrointestinal: Positive for diarrhea and nausea. Negative for abdominal pain, blood in stool, constipation and vomiting.   Genitourinary: Negative.    Musculoskeletal: Positive for neck pain (left sided). Negative for back pain.   Neurological: Positive for dizziness, numbness (right thigh) and headaches. Negative for syncope, speech difficulty, weakness and light-headedness.   All other systems reviewed and are negative.        Medical History     Past Medical History:   Diagnosis Date     Anemia      Antiplatelet or antithrombotic long-term use      Arrhythmia      Cannabis use without complication 12/8/2014     Carpal tunnel syndrome      Coronary artery disease      Gastroesophageal reflux disease      History of angina      Hypertension      Irregular heart beat      Obesity      Paroxysmal atrial fibrillation (H)      PTSD (post-traumatic stress disorder)      RA (rheumatoid arthritis) (H)      Rheumatoid arthritis (H) 7/8/2016     Sicca syndrome (H)      Sleep apnea        Past Surgical History:   Procedure Laterality Date     CHOLECYSTECTOMY       DILATION AND CURETTAGE, OPERATIVE HYSTEROSCOPY, COMBINED N/A 3/3/2022    Procedure: HYSTEROSCOPY, WITH DILATION AND CURETTAGE;  Surgeon: Irma Cary  MD Mary Ellen;  Location: [facility]     EP ABLATION FOCAL AFIB N/A 6/30/2022    Procedure: Ablation Atrial Fibrilation;  Surgeon: Jose Guadalupe Joseph MD;  Location:  [facility]     HC REMOVAL GALLBLADDER      Description: Cholecystectomy;  Recorded: 10/15/2013;     LAPAROSCOPIC TUBAL LIGATION       RELEASE CARPAL TUNNEL BILATERAL       TUBAL LIGATION  1995       Family History   Problem Relation Age of Onset     Crohn's Disease Mother         Total colectomy with ileostomy.     Atrial fibrillation Mother      Snoring Father      Diabetes Father      Prostate Cancer Father      Chronic Kidney Disease Father         Chose against dialysis.     Substance Abuse Brother      Depression Brother      Attention Deficit Disorder Brother      Alcoholism Brother      Arrhythmia Brother      Alcoholism Brother      Substance Abuse Brother      Arrhythmia Brother      Alcoholism Maternal Grandfather      No Known Problems Daughter      No Known Problems Son      Lupus Cousin      Breast Cancer No family hx of        Social History     Tobacco Use     Smoking status: Never     Smokeless tobacco: Never     Tobacco comments:     smokes marijuana   Substance Use Topics     Alcohol use: Not Currently     Comment: Alcoholic Drinks/day: Never an issue, she states.  7/8/16     Drug use: Not Currently     Comment: Drug use: Former marijuana use, not current. 7/8/16       meclizine (ANTIVERT) 25 MG tablet  ondansetron (ZOFRAN ODT) 4 MG ODT tab  acetaminophen (TYLENOL) 325 MG tablet  adalimumab (HUMIRA *CF* PEN) 40 MG/0.4ML pen kit  apixaban ANTICOAGULANT (ELIQUIS ANTICOAGULANT) 5 MG tablet  cetirizine (ZYRTEC) 10 MG tablet  diltiazem ER COATED BEADS (CARDIZEM CD/CARTIA XT) 180 MG 24 hr capsule  EPINEPHrine (ANY BX GENERIC EQUIV) 0.3 MG/0.3ML injection 2-pack  flecainide (TAMBOCOR) 50 MG tablet  hydrOXYzine (ATARAX) 25 MG tablet  Multiple Vitamins-Minerals (CENTROVITE) TABS  omeprazole (PRILOSEC) 40 MG   "capsule  polyethylene glycol (MIRALAX) 17 GM/Dose powder  prazosin (MINIPRESS) 1 MG capsule  traZODone (DESYREL) 50 MG tablet  vitamin C (ASCORBIC ACID) 1000 MG TABS  vitamin D3 (CHOLECALCIFEROL) 250 mcg (44827 units) capsule          Physical Exam     First Vitals:  Patient Vitals for the past 24 hrs:   BP Temp Temp src Pulse Resp SpO2 Height Weight   12/11/22 1630 137/63 -- -- 78 17 95 % -- --   12/11/22 1600 (!) 141/65 -- -- 79 16 98 % -- --   12/11/22 1530 (!) 140/65 -- -- -- -- -- -- --   12/11/22 1500 138/71 -- -- 82 21 95 % -- --   12/11/22 1430 (!) 149/77 -- -- 85 20 96 % -- --   12/11/22 1330 (!) 143/73 -- -- 85 24 98 % -- --   12/11/22 1315 (!) 148/81 -- -- -- -- -- -- --   12/11/22 1302 (!) 155/92 98  F (36.7  C) Temporal 87 18 95 % 1.727 m (5' 8\") 117.9 kg (260 lb)         PHYSICAL EXAM:   Physical Exam  Vitals reviewed.   Constitutional:       General: She is not in acute distress.     Appearance: Normal appearance. She is not ill-appearing, toxic-appearing or diaphoretic.      Comments: Elevated BMI.    HENT:      Head: Normocephalic and atraumatic.      Right Ear: Tympanic membrane, ear canal and external ear normal.      Left Ear: Tympanic membrane, ear canal and external ear normal.      Mouth/Throat:      Mouth: Mucous membranes are moist.      Pharynx: Oropharynx is clear.   Eyes:      Extraocular Movements: Extraocular movements intact.      Conjunctiva/sclera: Conjunctivae normal.      Pupils: Pupils are equal, round, and reactive to light.   Cardiovascular:      Rate and Rhythm: Normal rate and regular rhythm.      Heart sounds: Normal heart sounds. No murmur heard.  Pulmonary:      Effort: Pulmonary effort is normal.      Breath sounds: Normal breath sounds.   Abdominal:      General: Abdomen is flat. There is no distension.      Palpations: Abdomen is soft.      Tenderness: There is no abdominal tenderness. There is no right CVA tenderness, left CVA tenderness, guarding or rebound. "   Musculoskeletal:      Cervical back: Normal range of motion and neck supple. No rigidity or tenderness.   Skin:     General: Skin is warm and dry.      Capillary Refill: Capillary refill takes less than 2 seconds.   Neurological:      Mental Status: She is alert and oriented to person, place, and time. Mental status is at baseline.      Comments: Cranial nerves II through XII intact.  No facial asymmetry.  EOMs are intact.  No visual field cuts.  Negative pronator drift.  No arm or leg drift.  5-5 strength with , flexion extension at elbow joint, flexion at shoulder and hip joint.  Dorsiflexion and plantarflexion intact.  She reports sensory changes to her anterior and medial right thigh compared to her left lower remainder of her sensory exam is intact.  Finger/nose/finger intact.             Results     LAB:  All pertinent labs reviewed and interpreted  Labs Ordered and Resulted from Time of ED Arrival to Time of ED Departure   CBC WITH PLATELETS - Abnormal       Result Value    WBC Count 11.1 (*)     RBC Count 4.55      Hemoglobin 11.8      Hematocrit 37.7      MCV 83      MCH 25.9 (*)     MCHC 31.3 (*)     RDW 14.5      Platelet Count 330     HEPATIC FUNCTION PANEL - Abnormal    Protein Total 7.4      Albumin 3.9      Bilirubin Total <0.2      Alkaline Phosphatase 108 (*)     AST 16      ALT 17      Bilirubin Direct <0.20     UA MACROSCOPIC WITH REFLEX TO MICRO AND CULTURE - Abnormal    Color Urine Colorless      Appearance Urine Clear      Glucose Urine Negative      Bilirubin Urine Negative      Ketones Urine Negative      Specific Gravity Urine 1.008      Blood Urine 0.03 mg/dL (*)     pH Urine 6.5      Protein Albumin Urine Negative      Urobilinogen Urine <2.0      Nitrite Urine Negative      Leukocyte Esterase Urine Negative      RBC Urine 1      WBC Urine <1      Squamous Epithelials Urine <1     BASIC METABOLIC PANEL - Normal    Sodium 138      Potassium 3.9      Chloride 100      Carbon Dioxide  (CO2) 29      Anion Gap 9      Urea Nitrogen 13.2      Creatinine 0.74      Calcium 9.2      Glucose 92      GFR Estimate >90     LIPASE - Normal    Lipase 17     TROPONIN T, HIGH SENSITIVITY - Normal    Troponin T, High Sensitivity <6     MAGNESIUM - Normal    Magnesium 2.0         RADIOLOGY:  MR Brain w/o & w Contrast    (Results Pending)   MRA Brain ([place] [facility] [place]) wo Contrast    (Results Pending)   MRA Neck (Carotids) wo & w Contrast    (Results Pending)         ECG:  Performed at: 13:13:26    Impression: Normal sinus rhythm with nonspecific T wave inversions in V1 and aVR that are unchanged from previous EKG on 12/8/2022.    Rate: 86 bpm   Rhythm: sinus   Axis: 50 1 25  FL Interval: 158  QRS Interval: 88  QTc Interval: 435  ST Changes: None  Comparison: 12/8/2022    EKG results reviewed and interpreted by Dr. Salmeron, ED MD.       Roxana Rosenbaum PA-C   Emergency Medicine   [facility] EMERGENCY DEPARTMENT       Roxana Rosenbaum PA-C  12/11/22 [id number]

## 2022-12-11 NOTE — ED TRIAGE NOTES
"     Triage Assessment     Row Name 12/11/22 1714       Triage Assessment (Adult)    Airway WDL WDL       Respiratory WDL    Respiratory WDL WDL       Skin Circulation/Temperature WDL    Skin Circulation/Temperature WDL WDL       Cardiac WDL    Cardiac WDL WDL       Peripheral/Neurovascular WDL    Peripheral Neurovascular WDL WDL       Cognitive/Neuro/Behavioral WDL    Cognitive/Neuro/Behavioral WDL WDL            Patient reports for last week she has had N/D, dizziness andneck tightness.  Trying to eat/drink, \"but goes right through me\".  "

## 2022-12-11 NOTE — ED PROVIDER NOTES
Emergency Department Midlevel Supervisory Note     I personally saw the patient and performed a substantive portion of the visit including all aspects of the medical decision making.    Impression:  1. Benign paroxysmal positional vertigo, left    2. Microscopic hematuria            MDM / ED Course:  57-year-old female presented to the ED for evaluation of room spinning dizziness with associated nausea and vomiting.  She was also complaining of mild headaches.  Of note I evaluated patient here in the ED 2 days ago for headaches, chest pain, and right upper leg pain.  Work-up at that time including head CT scan was unremarkable.  Here in the ED the patient was slightly hypertensive upon arrival but she was otherwise hemodynamic stable.  The patient was given meclizine and Zofran to treat suspected peripheral vertigo following her initial evaluation.    CBC, BMP, hepatic panel, lipase, magnesium, and urinalysis were all reassuring.    MRI/MRA of the head and neck were nondiagnostic.    The patient was reevaluated and informed of the reassuring lab and MRI results.  The patient stated that her dizziness and nausea had essentially resolved with the medications that have been given to her here in the ED.  Patient was informed that her symptoms are likely due to a benign paroxysmal positional vertigo.  Patient was educated about BPPV and reassured.  The patient felt comfortable returning home.  The patient was sent home with meclizine and Zofran to use as needed for any further symptoms of vertigo and/or nausea and vomiting.  The patient was instructed to follow-up with her primary care provider for reevaluation or to return back to ED sooner for any worsening dizziness or any other new or concerning symptoms      2:15 PM Jessie Rosenbaum PA-C staffed patient with me. I agree with their assessment and plan of management, and I will see the patient.  4:36 PM Spoke with Jessie Rosenbaum PA-C, regarding patient. Patient signed out to  me for follow up MRI and disposition decision.    PPE: Provider wore gloves, N95 mask, eye protection.    Brief HPI:     Alina Martell is a 57 year old female who presents for evaluation of diarrhea and nausea.    For the past few days, the patient has felt increasingly more dizzy. For the past two days, the dizziness has been persistent. She denies any near syncope. This dizziness is exacerbated by laying down or standing up. She also endorses intermittent headaches in her occipital region. Lastly, she endorses associated nausea, fatigue, increased frequency of bowel movements, and shortness of breath with activity. Patient has a history of rheumatoid arthritis.    I, Evy Sargent, am serving as a scribe to document services personally performed by Dr. Laverne Salmeron, based on my observations and the provider's statements to me.   I, Dr. Laverne Salmeron, attest that Evy Sargent was acting in a scribe capacity, has observed my performance of the services and has documented them in accordance with my direction.      Brief Physical Exam:  Constitutional:  Alert, in no acute distress  EYES: Conjunctivae clear  HENT:  Atraumatic, normocephalic  Respiratory:  Respirations even, unlabored, in no acute respiratory distress  Cardiovascular:  Regular rate and rhythm, good peripheral perfusion  GI: Soft, nondistended, nontender, no palpable masses, no rebound, no guarding   Musculoskeletal:  No edema. No cyanosis. Range of motion major extremities intact.    Integument: Warm, Dry, No erythema, No rash.   Neurologic:  Alert & oriented, no focal deficits noted  Psych: Normal mood and affect         Labs and Imaging:  Results for orders placed or performed during the hospital encounter of 12/11/22   MR Brain w/o & w Contrast    Impression    IMPRESSION:  HEAD MRI:   1.  Normal head MRI.    HEAD MRA:   1.  Normal MRA [place] [facility] [place].    NECK MRA:  1.  Normal neck MRA.   MRA Brain ([place] [facility] [place]) wo Contrast    Impression     IMPRESSION:  HEAD MRI:   1.  Normal head MRI.    HEAD MRA:   1.  Normal MRA [place] [facility] [place].    NECK MRA:  1.  Normal neck MRA.   MRA Neck (Carotids) wo & w Contrast    Impression    IMPRESSION:  HEAD MRI:   1.  Normal head MRI.    HEAD MRA:   1.  Normal MRA [place] [facility] [place].    NECK MRA:  1.  Normal neck MRA.   CBC (+ platelets, no diff)   Result Value Ref Range    WBC Count 11.1 (H) 4.0 - 11.0 10e3/uL    RBC Count 4.55 3.80 - 5.20 10e6/uL    Hemoglobin 11.8 11.7 - 15.7 g/dL    Hematocrit 37.7 35.0 - 47.0 %    MCV 83 78 - 100 fL    MCH 25.9 (L) 26.5 - 33.0 pg    MCHC 31.3 (L) 31.5 - 36.5 g/dL    RDW 14.5 10.0 - 15.0 %    Platelet Count 330 150 - 450 10e3/uL   Basic metabolic panel   Result Value Ref Range    Sodium 138 136 - 145 mmol/L    Potassium 3.9 3.4 - 5.3 mmol/L    Chloride 100 98 - 107 mmol/L    Carbon Dioxide (CO2) 29 22 - 29 mmol/L    Anion Gap 9 7 - 15 mmol/L    Urea Nitrogen 13.2 6.0 - 20.0 mg/dL    Creatinine 0.74 0.51 - 0.95 mg/dL    Calcium 9.2 8.6 - 10.0 mg/dL    Glucose 92 70 - 99 mg/dL    GFR Estimate >90 >60 mL/min/1.73m2   Hepatic function panel   Result Value Ref Range    Protein Total 7.4 6.4 - 8.3 g/dL    Albumin 3.9 3.5 - 5.2 g/dL    Bilirubin Total <0.2 <=1.2 mg/dL    Alkaline Phosphatase 108 (H) 35 - 104 U/L    AST 16 10 - 35 U/L    ALT 17 10 - 35 U/L    Bilirubin Direct <0.20 0.00 - 0.30 mg/dL   Result Value Ref Range    Lipase 17 13 - 60 U/L   Troponin T, High Sensitivity (now)   Result Value Ref Range    Troponin T, High Sensitivity <6 <=14 ng/L   UA reflex to Microscopic and Culture    Specimen: Urine, Midstream   Result Value Ref Range    Color Urine Colorless Colorless, Straw, Light Yellow, Yellow    Appearance Urine Clear Clear    Glucose Urine Negative Negative mg/dL    Bilirubin Urine Negative Negative    Ketones Urine Negative Negative mg/dL    Specific Gravity Urine 1.008 1.001 - 1.030    Blood Urine 0.03 mg/dL (A) Negative    pH Urine 6.5 5.0 - 7.0    Protein Albumin Urine  Negative Negative mg/dL    Urobilinogen Urine <2.0 <2.0 mg/dL    Nitrite Urine Negative Negative    Leukocyte Esterase Urine Negative Negative    RBC Urine 1 <=2 /HPF    WBC Urine <1 <=5 /HPF    Squamous Epithelials Urine <1 <=1 /HPF   Result Value Ref Range    Magnesium 2.0 1.7 - 2.3 mg/dL     I have reviewed the relevant laboratory and radiology studies          Dr. Laverne Salmeron  [facility] EMERGENCY DEPARTMENT  [facility] [facility] [id number] [phone]         Laverne Salmeron DO  12/11/22 1922

## 2022-12-11 NOTE — DISCHARGE INSTRUCTIONS
As we discussed, I suspect that your dizziness is due to vertigo caused by an inner ear problem.  We will send you home with a prescription for Zofran for the nausea as well as meclizine for the dizziness.  Meclizine may make you drowsy.  Please do not take this or working, driving, or operating heavy machinery.  Make sure you are drinking plenty of fluids, especially fluids with electrolytes such as Pedialyte or Pedia sure given your loose stools.  I would like you to have close recheck in your primary care clinic this week -please call them to schedule this.  If at anytime you develop worsening headache, dizziness, falls, arm or leg weakness or numbness, speech or visual changes, chest pain or shortness of breath, or any new or concerning symptoms please return to the ER for further evaluation.    Your blood pressure was elevated in the emergencydepartment today and requires recheck and close follow-up in your primary care clinic. Untreated blood pressure can cause serious complications including, but not limited to stroke, heart attack/failure, and kidney disease.  Please make a close follow-up appointment to have this recheck performed. Please return to the emergency department immediately if you develop a severe headache, vision changes, chest pain, shortness of breath, orabdominal pain.    Your urine did show a small amount of blood in it.  Please have this followed up in your primary care clinic.

## 2022-12-12 ENCOUNTER — MYC MEDICAL ADVICE (OUTPATIENT)
Dept: FAMILY MEDICINE | Facility: CLINIC | Age: 57
End: 2022-12-12

## 2022-12-12 DIAGNOSIS — S16.1XXA STRAIN OF NECK MUSCLE, INITIAL ENCOUNTER: Primary | ICD-10-CM

## 2022-12-12 NOTE — ED NOTES
Orthostatic BP negative. Pt became dizzy and lightheaded from the laying to sitting position and the dizziness continued while standing but did not increase.

## 2022-12-13 ENCOUNTER — OFFICE VISIT (OUTPATIENT)
Dept: PSYCHOLOGY | Facility: CLINIC | Age: 57
End: 2022-12-13
Payer: COMMERCIAL

## 2022-12-13 DIAGNOSIS — F41.1 GAD (GENERALIZED ANXIETY DISORDER): ICD-10-CM

## 2022-12-13 DIAGNOSIS — F33.1 MODERATE EPISODE OF RECURRENT MAJOR DEPRESSIVE DISORDER (H): ICD-10-CM

## 2022-12-13 DIAGNOSIS — F43.10 POSTTRAUMATIC STRESS DISORDER: Primary | ICD-10-CM

## 2022-12-13 PROCEDURE — 90834 PSYTX W PT 45 MINUTES: CPT | Performed by: COUNSELOR

## 2022-12-14 ENCOUNTER — OFFICE VISIT (OUTPATIENT)
Dept: FAMILY MEDICINE | Facility: CLINIC | Age: 57
End: 2022-12-14
Payer: COMMERCIAL

## 2022-12-14 VITALS
DIASTOLIC BLOOD PRESSURE: 86 MMHG | WEIGHT: 278.7 LBS | SYSTOLIC BLOOD PRESSURE: 141 MMHG | OXYGEN SATURATION: 96 % | RESPIRATION RATE: 20 BRPM | TEMPERATURE: 98.6 F | HEART RATE: 80 BPM | BODY MASS INDEX: 42.38 KG/M2

## 2022-12-14 DIAGNOSIS — H81.10 BENIGN PAROXYSMAL POSITIONAL VERTIGO, UNSPECIFIED LATERALITY: Primary | ICD-10-CM

## 2022-12-14 PROCEDURE — 99214 OFFICE O/P EST MOD 30 MIN: CPT | Performed by: FAMILY MEDICINE

## 2022-12-14 NOTE — PATIENT INSTRUCTIONS
Diet as tolerated  Good fluid intake.    Tylenol as needed.    May try metamucil.    May use the meclizine and ondansetron if they help.

## 2022-12-14 NOTE — PROGRESS NOTES
OUTPATIENT VISIT NOTE                                                   Date of Visit: 12/14/2022     Chief Complaint   Patient presents with:  Dizziness: X 1 week. Nausea. Vertigo. Not eating much. Headache.  Abdominal Pain: X 2-3 day. Rt abdomin radiating Rt side pelvic pain.            History of Present Illness   Alina Martell is a 57 year old female c/o nausea and dizziness for the last week.  Did have diarrhea that has resolved.  Also pain on side of right side abdomen.  Has been seen twice in the ER for these slymptoms         MEDICATIONS   Current Outpatient Medications   Medication     acetaminophen (TYLENOL) 325 MG tablet     adalimumab (HUMIRA *CF* PEN) 40 MG/0.4ML pen kit     apixaban ANTICOAGULANT (ELIQUIS ANTICOAGULANT) 5 MG tablet     cetirizine (ZYRTEC) 10 MG tablet     diltiazem ER COATED BEADS (CARDIZEM CD/CARTIA XT) 180 MG 24 hr capsule     EPINEPHrine (ANY BX GENERIC EQUIV) 0.3 MG/0.3ML injection 2-pack     flecainide (TAMBOCOR) 50 MG tablet     hydrOXYzine (ATARAX) 25 MG tablet     meclizine (ANTIVERT) 25 MG tablet     Multiple Vitamins-Minerals (CENTROVITE) TABS     omeprazole (PRILOSEC) 40 MG DR capsule     ondansetron (ZOFRAN ODT) 4 MG ODT tab     polyethylene glycol (MIRALAX) 17 GM/Dose powder     prazosin (MINIPRESS) 1 MG capsule     traZODone (DESYREL) 50 MG tablet     vitamin C (ASCORBIC ACID) 1000 MG TABS     vitamin D3 (CHOLECALCIFEROL) 250 mcg (98475 units) capsule     No current facility-administered medications for this visit.         SOCIAL HISTORY   Social History     Tobacco Use     Smoking status: Never     Smokeless tobacco: Never     Tobacco comments:     smokes marijuana   Substance Use Topics     Alcohol use: Not Currently     Comment: Alcoholic Drinks/day: Never an issue, she states.  7/8/16           Physical Exam   Vitals:    12/14/22 1441   BP: (!) 141/86   BP Location: Right arm   Patient Position: Sitting   Cuff Size: Adult Large   Pulse: 80   Resp: 20   Temp:  98.6  F (37  C)   TempSrc: Oral   SpO2: 96%   Weight: 126.4 kg (278 lb 11.2 oz)        GENERAL:   Alert. Oriented.  EYES: Clear  HENT:  Ears: R TM pearly gray. Normal landmarks. L TM pearly gray.  Normal landmarks  Nose: Clear.  Sinuses: Nontender.  Oropharynx:  No erythema. No exudate.  NECK: Supple. No adenopathy.  LUNGS: Clear to ascultation.  No crackles.  No wheezing  HEART: RRR  SKIN:  No rash.   BACK: mildly tender just below ribcage.  ABDOMEN:  +BS. No tenderness. Soft, no guarding, rebound, rigidity,mass, or organomegaly. No CVA tenderness           Assessment and Plan     Benign paroxysmal positional vertigo, unspecified laterality  Had MRI imaging of head and CT imaging of head at last visit that were normal.  Vertigo with movement of head.  Exam is essentially normal.  No signs of surgical abdomen on exam  May continue meclizine.  Referred to ENT.  - Adult ENT  Referral                   Discussed signs / symptoms that warrant urgent / emergent medical attention.     Recheck if worsening or not improving.       Madeline Sin MD          Pertinent History     The following portions of the patient's history were reviewed and updated as appropriate: allergies, current medications, past family history, past medical history, past social history, past surgical history and problem list.

## 2022-12-15 ENCOUNTER — APPOINTMENT (OUTPATIENT)
Dept: NUCLEAR MEDICINE | Facility: CLINIC | Age: 57
End: 2022-12-15
Attending: PHYSICIAN ASSISTANT
Payer: COMMERCIAL

## 2022-12-15 ENCOUNTER — APPOINTMENT (OUTPATIENT)
Dept: CT IMAGING | Facility: CLINIC | Age: 57
End: 2022-12-15
Attending: EMERGENCY MEDICINE
Payer: COMMERCIAL

## 2022-12-15 ENCOUNTER — APPOINTMENT (OUTPATIENT)
Dept: PHYSICAL THERAPY | Facility: CLINIC | Age: 57
End: 2022-12-15
Attending: PHYSICIAN ASSISTANT
Payer: COMMERCIAL

## 2022-12-15 ENCOUNTER — HOSPITAL ENCOUNTER (OUTPATIENT)
Facility: CLINIC | Age: 57
Setting detail: OBSERVATION
Discharge: HOME | End: 2022-12-15
Attending: EMERGENCY MEDICINE | Admitting: PHYSICIAN ASSISTANT
Payer: COMMERCIAL

## 2022-12-15 ENCOUNTER — APPOINTMENT (OUTPATIENT)
Dept: CT IMAGING | Facility: CLINIC | Age: 57
End: 2022-12-15
Attending: PHYSICIAN ASSISTANT
Payer: COMMERCIAL

## 2022-12-15 ENCOUNTER — APPOINTMENT (OUTPATIENT)
Dept: CARDIOLOGY | Facility: CLINIC | Age: 57
End: 2022-12-15
Attending: PHYSICIAN ASSISTANT
Payer: COMMERCIAL

## 2022-12-15 ENCOUNTER — APPOINTMENT (OUTPATIENT)
Dept: GENERAL RADIOLOGY | Facility: CLINIC | Age: 57
End: 2022-12-15
Attending: EMERGENCY MEDICINE
Payer: COMMERCIAL

## 2022-12-15 VITALS
BODY MASS INDEX: 42.23 KG/M2 | DIASTOLIC BLOOD PRESSURE: 89 MMHG | HEART RATE: 73 BPM | HEIGHT: 68 IN | SYSTOLIC BLOOD PRESSURE: 151 MMHG | WEIGHT: 278.66 LBS | OXYGEN SATURATION: 95 % | RESPIRATION RATE: 19 BRPM | TEMPERATURE: 98.1 F

## 2022-12-15 DIAGNOSIS — R42 VERTIGO: Primary | ICD-10-CM

## 2022-12-15 DIAGNOSIS — R07.9 CHEST PAIN, UNSPECIFIED TYPE: ICD-10-CM

## 2022-12-15 DIAGNOSIS — S09.90XA INJURY OF HEAD, INITIAL ENCOUNTER: ICD-10-CM

## 2022-12-15 DIAGNOSIS — R42 DIZZINESS: ICD-10-CM

## 2022-12-15 LAB
ALBUMIN SERPL BCG-MCNC: 3.8 G/DL (ref 3.5–5.2)
ALBUMIN UR-MCNC: 20 MG/DL
ALP SERPL-CCNC: 112 U/L (ref 35–104)
ALT SERPL W P-5'-P-CCNC: 19 U/L (ref 10–35)
ANION GAP SERPL CALCULATED.3IONS-SCNC: 8 MMOL/L (ref 7–15)
APPEARANCE UR: CLEAR
APTT PPP: 31 SECONDS (ref 22–38)
AST SERPL W P-5'-P-CCNC: 17 U/L (ref 10–35)
ATRIAL RATE - MUSE: 75 BPM
BASOPHILS # BLD AUTO: 0 10E3/UL (ref 0–0.2)
BASOPHILS NFR BLD AUTO: 0 %
BILIRUB SERPL-MCNC: 0.3 MG/DL
BILIRUB UR QL STRIP: NEGATIVE
BUN SERPL-MCNC: 11.5 MG/DL (ref 6–20)
CALCIUM SERPL-MCNC: 9.1 MG/DL (ref 8.6–10)
CHLORIDE SERPL-SCNC: 103 MMOL/L (ref 98–107)
COLOR UR AUTO: ABNORMAL
CREAT SERPL-MCNC: 0.68 MG/DL (ref 0.51–0.95)
CV STRESS MAX HR HE: 123
DEPRECATED HCO3 PLAS-SCNC: 28 MMOL/L (ref 22–29)
DIASTOLIC BLOOD PRESSURE - MUSE: NORMAL MMHG
EOSINOPHIL # BLD AUTO: 0.2 10E3/UL (ref 0–0.7)
EOSINOPHIL NFR BLD AUTO: 2 %
ERYTHROCYTE [DISTWIDTH] IN BLOOD BY AUTOMATED COUNT: 14.6 % (ref 10–15)
GFR SERPL CREATININE-BSD FRML MDRD: >90 ML/MIN/1.73M2
GLUCOSE SERPL-MCNC: 119 MG/DL (ref 70–99)
GLUCOSE UR STRIP-MCNC: NEGATIVE MG/DL
GRANULAR CAST: 1 /LPF
HCT VFR BLD AUTO: 38.6 % (ref 35–47)
HGB BLD-MCNC: 12 G/DL (ref 11.7–15.7)
HGB UR QL STRIP: ABNORMAL
IMM GRANULOCYTES # BLD: 0 10E3/UL
IMM GRANULOCYTES NFR BLD: 0 %
INR PPP: 1.13 (ref 0.85–1.15)
INTERPRETATION ECG - MUSE: NORMAL
KETONES UR STRIP-MCNC: NEGATIVE MG/DL
LEUKOCYTE ESTERASE UR QL STRIP: NEGATIVE
LVEF ECHO: NORMAL
LYMPHOCYTES # BLD AUTO: 2 10E3/UL (ref 0.8–5.3)
LYMPHOCYTES NFR BLD AUTO: 22 %
MCH RBC QN AUTO: 25.6 PG (ref 26.5–33)
MCHC RBC AUTO-ENTMCNC: 31.1 G/DL (ref 31.5–36.5)
MCV RBC AUTO: 83 FL (ref 78–100)
MONOCYTES # BLD AUTO: 0.6 10E3/UL (ref 0–1.3)
MONOCYTES NFR BLD AUTO: 7 %
MUCOUS THREADS #/AREA URNS LPF: PRESENT /LPF
NEUTROPHILS # BLD AUTO: 6.1 10E3/UL (ref 1.6–8.3)
NEUTROPHILS NFR BLD AUTO: 69 %
NITRATE UR QL: NEGATIVE
NRBC # BLD AUTO: 0 10E3/UL
NRBC BLD AUTO-RTO: 0 /100
P AXIS - MUSE: 56 DEGREES
PH UR STRIP: 6.5 [PH] (ref 5–7)
PLATELET # BLD AUTO: 331 10E3/UL (ref 150–450)
POTASSIUM SERPL-SCNC: 3.9 MMOL/L (ref 3.4–5.3)
PR INTERVAL - MUSE: 160 MS
PROT SERPL-MCNC: 7.5 G/DL (ref 6.4–8.3)
QRS DURATION - MUSE: 88 MS
QT - MUSE: 394 MS
QTC - MUSE: 439 MS
R AXIS - MUSE: 22 DEGREES
RATE PRESSURE PRODUCT: NORMAL
RBC # BLD AUTO: 4.68 10E6/UL (ref 3.8–5.2)
RBC URINE: 2 /HPF
SODIUM SERPL-SCNC: 139 MMOL/L (ref 136–145)
SP GR UR STRIP: 1.01 (ref 1–1.03)
SQUAMOUS EPITHELIAL: 1 /HPF
STRESS ECHO BASELINE DIASTOLIC HE: 79
STRESS ECHO BASELINE HR: 77 BPM
STRESS ECHO BASELINE SYSTOLIC BP: 119
STRESS ECHO CALCULATED PERCENT HR: 75 %
STRESS ECHO LAST STRESS DIASTOLIC BP: 56
STRESS ECHO LAST STRESS SYSTOLIC BP: 118
STRESS ECHO TARGET HR: 165
SYSTOLIC BLOOD PRESSURE - MUSE: NORMAL MMHG
T AXIS - MUSE: 29 DEGREES
TROPONIN T SERPL HS-MCNC: <6 NG/L
TROPONIN T SERPL HS-MCNC: <6 NG/L
UROBILINOGEN UR STRIP-MCNC: NORMAL MG/DL
VENTRICULAR RATE- MUSE: 75 BPM
WBC # BLD AUTO: 9 10E3/UL (ref 4–11)
WBC URINE: 1 /HPF

## 2022-12-15 PROCEDURE — 84484 ASSAY OF TROPONIN QUANT: CPT | Performed by: EMERGENCY MEDICINE

## 2022-12-15 PROCEDURE — 71046 X-RAY EXAM CHEST 2 VIEWS: CPT | Mod: 26 | Performed by: RADIOLOGY

## 2022-12-15 PROCEDURE — 93010 ELECTROCARDIOGRAM REPORT: CPT | Performed by: EMERGENCY MEDICINE

## 2022-12-15 PROCEDURE — 250N000013 HC RX MED GY IP 250 OP 250 PS 637: Performed by: PHYSICIAN ASSISTANT

## 2022-12-15 PROCEDURE — 78452 HT MUSCLE IMAGE SPECT MULT: CPT | Mod: 26

## 2022-12-15 PROCEDURE — G0378 HOSPITAL OBSERVATION PER HR: HCPCS

## 2022-12-15 PROCEDURE — 71046 X-RAY EXAM CHEST 2 VIEWS: CPT

## 2022-12-15 PROCEDURE — A9502 TC99M TETROFOSMIN: HCPCS | Performed by: FAMILY MEDICINE

## 2022-12-15 PROCEDURE — 93005 ELECTROCARDIOGRAM TRACING: CPT

## 2022-12-15 PROCEDURE — 93306 TTE W/DOPPLER COMPLETE: CPT | Mod: 26 | Performed by: INTERNAL MEDICINE

## 2022-12-15 PROCEDURE — 78452 HT MUSCLE IMAGE SPECT MULT: CPT

## 2022-12-15 PROCEDURE — 85610 PROTHROMBIN TIME: CPT | Performed by: EMERGENCY MEDICINE

## 2022-12-15 PROCEDURE — 97161 PT EVAL LOW COMPLEX 20 MIN: CPT | Mod: GP

## 2022-12-15 PROCEDURE — 70450 CT HEAD/BRAIN W/O DYE: CPT

## 2022-12-15 PROCEDURE — 93016 CV STRESS TEST SUPVJ ONLY: CPT | Performed by: INTERNAL MEDICINE

## 2022-12-15 PROCEDURE — 343N000001 HC RX 343: Performed by: FAMILY MEDICINE

## 2022-12-15 PROCEDURE — 85004 AUTOMATED DIFF WBC COUNT: CPT | Performed by: EMERGENCY MEDICINE

## 2022-12-15 PROCEDURE — 99285 EMERGENCY DEPT VISIT HI MDM: CPT | Mod: 25 | Performed by: EMERGENCY MEDICINE

## 2022-12-15 PROCEDURE — 93018 CV STRESS TEST I&R ONLY: CPT | Performed by: INTERNAL MEDICINE

## 2022-12-15 PROCEDURE — 255N000002 HC RX 255 OP 636: Performed by: INTERNAL MEDICINE

## 2022-12-15 PROCEDURE — 70450 CT HEAD/BRAIN W/O DYE: CPT | Mod: 77

## 2022-12-15 PROCEDURE — 85730 THROMBOPLASTIN TIME PARTIAL: CPT | Performed by: EMERGENCY MEDICINE

## 2022-12-15 PROCEDURE — 97530 THERAPEUTIC ACTIVITIES: CPT | Mod: GP

## 2022-12-15 PROCEDURE — 36415 COLL VENOUS BLD VENIPUNCTURE: CPT | Performed by: EMERGENCY MEDICINE

## 2022-12-15 PROCEDURE — 81001 URINALYSIS AUTO W/SCOPE: CPT | Performed by: EMERGENCY MEDICINE

## 2022-12-15 PROCEDURE — 70450 CT HEAD/BRAIN W/O DYE: CPT | Mod: 26 | Performed by: RADIOLOGY

## 2022-12-15 PROCEDURE — 84484 ASSAY OF TROPONIN QUANT: CPT | Performed by: PHYSICIAN ASSISTANT

## 2022-12-15 PROCEDURE — 99234 HOSP IP/OBS SM DT SF/LOW 45: CPT | Performed by: PHYSICIAN ASSISTANT

## 2022-12-15 PROCEDURE — 250N000011 HC RX IP 250 OP 636: Performed by: INTERNAL MEDICINE

## 2022-12-15 PROCEDURE — 999N000208 ECHOCARDIOGRAM COMPLETE

## 2022-12-15 PROCEDURE — 80053 COMPREHEN METABOLIC PANEL: CPT | Performed by: EMERGENCY MEDICINE

## 2022-12-15 PROCEDURE — 93017 CV STRESS TEST TRACING ONLY: CPT

## 2022-12-15 PROCEDURE — 36415 COLL VENOUS BLD VENIPUNCTURE: CPT | Performed by: PHYSICIAN ASSISTANT

## 2022-12-15 PROCEDURE — 99285 EMERGENCY DEPT VISIT HI MDM: CPT | Mod: 25

## 2022-12-15 PROCEDURE — 70450 CT HEAD/BRAIN W/O DYE: CPT | Mod: 26 | Performed by: STUDENT IN AN ORGANIZED HEALTH CARE EDUCATION/TRAINING PROGRAM

## 2022-12-15 RX ORDER — DILTIAZEM HYDROCHLORIDE 180 MG/1
360 CAPSULE, COATED, EXTENDED RELEASE ORAL DAILY
Status: DISCONTINUED | OUTPATIENT
Start: 2022-12-15 | End: 2022-12-15 | Stop reason: HOSPADM

## 2022-12-15 RX ORDER — PRAZOSIN HYDROCHLORIDE 1 MG/1
1 CAPSULE ORAL
Status: DISCONTINUED | OUTPATIENT
Start: 2022-12-15 | End: 2022-12-15 | Stop reason: HOSPADM

## 2022-12-15 RX ORDER — SODIUM CHLORIDE 9 MG/ML
INJECTION, SOLUTION INTRAVENOUS CONTINUOUS
Status: DISCONTINUED | OUTPATIENT
Start: 2022-12-15 | End: 2022-12-15 | Stop reason: HOSPADM

## 2022-12-15 RX ORDER — ONDANSETRON 2 MG/ML
4 INJECTION INTRAMUSCULAR; INTRAVENOUS EVERY 6 HOURS PRN
Status: DISCONTINUED | OUTPATIENT
Start: 2022-12-15 | End: 2022-12-15 | Stop reason: HOSPADM

## 2022-12-15 RX ORDER — HYDROXYZINE HYDROCHLORIDE 25 MG/1
25 TABLET, FILM COATED ORAL 3 TIMES DAILY PRN
Status: DISCONTINUED | OUTPATIENT
Start: 2022-12-15 | End: 2022-12-15 | Stop reason: HOSPADM

## 2022-12-15 RX ORDER — PANTOPRAZOLE SODIUM 40 MG/1
40 TABLET, DELAYED RELEASE ORAL DAILY
Status: DISCONTINUED | OUTPATIENT
Start: 2022-12-15 | End: 2022-12-15 | Stop reason: HOSPADM

## 2022-12-15 RX ORDER — ACYCLOVIR 200 MG/1
0-1 CAPSULE ORAL
Status: DISCONTINUED | OUTPATIENT
Start: 2022-12-15 | End: 2022-12-15 | Stop reason: HOSPADM

## 2022-12-15 RX ORDER — ONDANSETRON 4 MG/1
4 TABLET, ORALLY DISINTEGRATING ORAL EVERY 6 HOURS PRN
Status: DISCONTINUED | OUTPATIENT
Start: 2022-12-15 | End: 2022-12-15 | Stop reason: HOSPADM

## 2022-12-15 RX ORDER — NITROGLYCERIN 0.4 MG/1
0.4 TABLET SUBLINGUAL EVERY 5 MIN PRN
Status: DISCONTINUED | OUTPATIENT
Start: 2022-12-15 | End: 2022-12-15 | Stop reason: HOSPADM

## 2022-12-15 RX ORDER — CAFFEINE CITRATE 20 MG/ML
60 SOLUTION INTRAVENOUS
Status: DISCONTINUED | OUTPATIENT
Start: 2022-12-15 | End: 2022-12-15 | Stop reason: HOSPADM

## 2022-12-15 RX ORDER — POLYETHYLENE GLYCOL 3350 17 G/17G
17 POWDER, FOR SOLUTION ORAL DAILY
Status: DISCONTINUED | OUTPATIENT
Start: 2022-12-15 | End: 2022-12-15 | Stop reason: HOSPADM

## 2022-12-15 RX ORDER — ACETAMINOPHEN 325 MG/1
650 TABLET ORAL EVERY 6 HOURS PRN
Status: DISCONTINUED | OUTPATIENT
Start: 2022-12-15 | End: 2022-12-15 | Stop reason: HOSPADM

## 2022-12-15 RX ORDER — MECLIZINE HYDROCHLORIDE 25 MG/1
25 TABLET ORAL 3 TIMES DAILY PRN
Status: DISCONTINUED | OUTPATIENT
Start: 2022-12-15 | End: 2022-12-15 | Stop reason: HOSPADM

## 2022-12-15 RX ORDER — ONDANSETRON 4 MG/1
4 TABLET, ORALLY DISINTEGRATING ORAL EVERY 6 HOURS PRN
Qty: 20 TABLET | Refills: 0 | Status: SHIPPED | OUTPATIENT
Start: 2022-12-15 | End: 2023-05-24

## 2022-12-15 RX ORDER — REGADENOSON 0.08 MG/ML
0.4 INJECTION, SOLUTION INTRAVENOUS ONCE
Status: COMPLETED | OUTPATIENT
Start: 2022-12-15 | End: 2022-12-15

## 2022-12-15 RX ORDER — PROCHLORPERAZINE 25 MG
25 SUPPOSITORY, RECTAL RECTAL EVERY 12 HOURS PRN
Status: DISCONTINUED | OUTPATIENT
Start: 2022-12-15 | End: 2022-12-15 | Stop reason: HOSPADM

## 2022-12-15 RX ORDER — FLECAINIDE ACETATE 50 MG/1
50 TABLET ORAL DAILY PRN
Status: DISCONTINUED | OUTPATIENT
Start: 2022-12-15 | End: 2022-12-15 | Stop reason: HOSPADM

## 2022-12-15 RX ORDER — MAGNESIUM HYDROXIDE/ALUMINUM HYDROXICE/SIMETHICONE 120; 1200; 1200 MG/30ML; MG/30ML; MG/30ML
30 SUSPENSION ORAL EVERY 4 HOURS PRN
Status: DISCONTINUED | OUTPATIENT
Start: 2022-12-15 | End: 2022-12-15 | Stop reason: HOSPADM

## 2022-12-15 RX ORDER — ALBUTEROL SULFATE 90 UG/1
2 AEROSOL, METERED RESPIRATORY (INHALATION) EVERY 5 MIN PRN
Status: DISCONTINUED | OUTPATIENT
Start: 2022-12-15 | End: 2022-12-15 | Stop reason: HOSPADM

## 2022-12-15 RX ORDER — PROCHLORPERAZINE MALEATE 10 MG
10 TABLET ORAL EVERY 6 HOURS PRN
Status: DISCONTINUED | OUTPATIENT
Start: 2022-12-15 | End: 2022-12-15 | Stop reason: HOSPADM

## 2022-12-15 RX ORDER — AMINOPHYLLINE 25 MG/ML
50-100 INJECTION, SOLUTION INTRAVENOUS
Status: DISCONTINUED | OUTPATIENT
Start: 2022-12-15 | End: 2022-12-15 | Stop reason: HOSPADM

## 2022-12-15 RX ADMIN — MECLIZINE HYDROCHLORIDE 25 MG: 25 TABLET ORAL at 16:56

## 2022-12-15 RX ADMIN — HUMAN ALBUMIN MICROSPHERES AND PERFLUTREN 5 ML: 10; .22 INJECTION, SOLUTION INTRAVENOUS at 14:44

## 2022-12-15 RX ADMIN — PANTOPRAZOLE SODIUM 40 MG: 40 TABLET, DELAYED RELEASE ORAL at 14:57

## 2022-12-15 RX ADMIN — REGADENOSON 0.4 MG: 0.08 INJECTION, SOLUTION INTRAVENOUS at 11:25

## 2022-12-15 RX ADMIN — DILTIAZEM HYDROCHLORIDE 360 MG: 180 CAPSULE, COATED, EXTENDED RELEASE ORAL at 14:57

## 2022-12-15 RX ADMIN — APIXABAN 5 MG: 5 TABLET, FILM COATED ORAL at 14:57

## 2022-12-15 RX ADMIN — TETROFOSMIN 10 MCI.: 1.38 INJECTION, POWDER, LYOPHILIZED, FOR SOLUTION INTRAVENOUS at 09:55

## 2022-12-15 RX ADMIN — TETROFOSMIN 38 MCI.: 1.38 INJECTION, POWDER, LYOPHILIZED, FOR SOLUTION INTRAVENOUS at 11:10

## 2022-12-15 ASSESSMENT — ACTIVITIES OF DAILY LIVING (ADL)
ADLS_ACUITY_SCORE: 37

## 2022-12-15 NOTE — ED PROVIDER NOTES
ED Provider Note  [facility]      History     Chief Complaint   Patient presents with     Fall     Chest Pain     HPI  Alina Martell is a 57 year old female who presents to us with a chief complaint of fall as well as intermittent dizziness and left-sided arm and chest pain.  the dizziness has been intermittently present over her last week.  She was seen at [facility].  They did not find an obvious cause for the symptoms at that point.  Today she has had some left-sided chest pressure and left arm pain.  She had an episode of dizziness when getting up to the bathroom this evening.  She fell somewhat backwards and hit the back of her head.  She does report she is on Eliquis for A. fib.  No nausea or vomiting.  No fevers or chills.  She denies any neck pain.  The pressure in her chest has not been exertional.  No previous cardiac history with exception of an ablation for A. fib a few months ago.  No cough or fevers.  No shortness of breath today.    Past Medical History  Past Medical History:   Diagnosis Date     Anemia      Antiplatelet or antithrombotic long-term use      Arrhythmia      Cannabis use without complication 12/8/2014     Carpal tunnel syndrome      Coronary artery disease      Gastroesophageal reflux disease      History of angina      Hypertension      Irregular heart beat      Obesity      Paroxysmal atrial fibrillation (H)      PTSD (post-traumatic stress disorder)      RA (rheumatoid arthritis) (H)      Rheumatoid arthritis (H) 7/8/2016     Sicca syndrome (H)      Sleep apnea      Past Surgical History:   Procedure Laterality Date     CHOLECYSTECTOMY       DILATION AND CURETTAGE, OPERATIVE HYSTEROSCOPY, COMBINED N/A 3/3/2022    Procedure: HYSTEROSCOPY, WITH DILATION AND CURETTAGE;  Surgeon: Irma Cary MD;  Location: [facility]     EP ABLATION FOCAL AFIB N/A 6/30/2022    Procedure: Ablation Atrial Fibrilation;  Surgeon: Jose Guadalupe Joseph MD;   Location:  [facility]     HC REMOVAL GALLBLADDER      Description: Cholecystectomy;  Recorded: 10/15/2013;     LAPAROSCOPIC TUBAL LIGATION       RELEASE CARPAL TUNNEL BILATERAL       TUBAL LIGATION  1995     ondansetron (ZOFRAN ODT) 4 MG ODT tab  acetaminophen (TYLENOL) 325 MG tablet  adalimumab (HUMIRA *CF* PEN) 40 MG/0.4ML pen kit  apixaban ANTICOAGULANT (ELIQUIS ANTICOAGULANT) 5 MG tablet  cetirizine (ZYRTEC) 10 MG tablet  diltiazem ER COATED BEADS (CARDIZEM CD/CARTIA XT) 180 MG 24 hr capsule  EPINEPHrine (ANY BX GENERIC EQUIV) 0.3 MG/0.3ML injection 2-pack  flecainide (TAMBOCOR) 50 MG tablet  hydrOXYzine (ATARAX) 25 MG tablet  meclizine (ANTIVERT) 25 MG tablet  Multiple Vitamins-Minerals (CENTROVITE) TABS  omeprazole (PRILOSEC) 40 MG DR capsule  polyethylene glycol (MIRALAX) 17 GM/Dose powder  prazosin (MINIPRESS) 1 MG capsule  traZODone (DESYREL) 50 MG tablet  vitamin C (ASCORBIC ACID) 1000 MG TABS  vitamin D3 (CHOLECALCIFEROL) 250 mcg (96038 units) capsule      Allergies   Allergen Reactions     Penicillins Shortness Of Breath, Palpitations, Dizziness, Anaphylaxis, Hives, Itching and Rash     Test in 2031, see allergy note on 7/29/21     Shellfish-Derived Products Anaphylaxis     Sulfa Drugs Hives and Anaphylaxis     Family History  Family History   Problem Relation Age of Onset     Crohn's Disease Mother         Total colectomy with ileostomy.     Atrial fibrillation Mother      Snoring Father      Diabetes Father      Prostate Cancer Father      Chronic Kidney Disease Father         Chose against dialysis.     Substance Abuse Brother      Depression Brother      Attention Deficit Disorder Brother      Alcoholism Brother      Arrhythmia Brother      Alcoholism Brother      Substance Abuse Brother      Arrhythmia Brother      Alcoholism Maternal Grandfather      No Known Problems Daughter      No Known Problems Son      Lupus Cousin      Breast Cancer No family hx of      Social History   Social  "History     Tobacco Use     Smoking status: Never     Smokeless tobacco: Never     Tobacco comments:     smokes marijuana   Substance Use Topics     Alcohol use: Not Currently     Comment: Alcoholic Drinks/day: Never an issue, she states.  7/8/16     Drug use: Not Currently     Comment: Drug use: Former marijuana use, not current. 7/8/16      Past medical history, past surgical history, medications, allergies, family history, and social history were reviewed with the patient. No additional pertinent items.       Review of Systems  A complete review of systems was performed with pertinent positives and negatives noted in the HPI, and all other systems negative.    Physical Exam   BP: (!) 186/100  Pulse: 91  Temp: 98.5  F (36.9  C)  Resp: 20  Height: 172.7 cm (5' 8\")  Weight: 126.4 kg (278 lb 10.6 oz)  SpO2: 97 %  Physical Exam  Constitutional:       General: She is not in acute distress.     Appearance: She is not diaphoretic.   HENT:      Head: Normocephalic.      Comments: Mild occipital tenderness     Right Ear: External ear normal.      Left Ear: External ear normal.      Nose: Nose normal.   Eyes:      Extraocular Movements: Extraocular movements intact.      Conjunctiva/sclera: Conjunctivae normal.   Cardiovascular:      Rate and Rhythm: Normal rate and regular rhythm.      Heart sounds: Normal heart sounds.   Pulmonary:      Effort: Pulmonary effort is normal. No respiratory distress.      Breath sounds: Normal breath sounds.   Abdominal:      General: There is no distension.      Palpations: Abdomen is soft.      Tenderness: There is no abdominal tenderness.   Musculoskeletal:         General: No swelling or deformity.      Cervical back: Normal range of motion and neck supple.   Skin:     General: Skin is warm and dry.   Neurological:      General: No focal deficit present.      Mental Status: She is oriented to person, place, and time. Mental status is at baseline.      Cranial Nerves: No cranial nerve " deficit.      Motor: No weakness.      Comments: Alert, oriented   Psychiatric:         Mood and Affect: Mood normal.         Behavior: Behavior normal.         ED Course      Procedures                     Results for orders placed or performed during the hospital encounter of 12/15/22   XR Chest 2 Views     Status: None    Narrative    Exam: XR CHEST 2 VIEWS, 12/15/2022 8:30 AM    Indication: Chest pain    Comparison: Outside x-ray 12/8/2022    Findings:   PA and lateral views of the chest. Heart size is within normal limits.  No pneumothorax or pleural effusions. No focal airspace disease.      Impression    Impression: No acute cardiopulmonary findings.    I have personally reviewed the examination and initial interpretation  and I agree with the findings.    NICOL HERNANDEZ MD         SYSTEM ID:  [id number]   CT Head w/o Contrast     Status: None    Narrative    EXAM: CT HEAD W/O CONTRAST  12/15/2022 7:59 AM     HISTORY:  Fall backwards with head injury on Eliquis       COMPARISON:  CT 12/8/2022, MRI 12/11/2022    TECHNIQUE: Using multidetector thin collimation helical acquisition  technique, axial, coronal and sagittal CT images from the skull base  to the vertex were obtained without intravenous contrast.   (topogram) image(s) also obtained and reviewed.    FINDINGS:  No acute intracranial hemorrhage, mass effect, or midline shift. No  acute loss of gray-white matter differentiation in the cerebral  hemispheres. Ventricles are proportionate to the cerebral sulci. Clear  basal cisterns.    The bony calvaria and the bones of the skull base are normal. The  visualized portions of the paranasal sinuses and mastoid air cells are  clear. Grossly normal orbits.       Impression    IMPRESSION: No acute intracranial pathology.     I have personally reviewed the examination and initial interpretation  and I agree with the findings.    TACOS BARCLAY MD         SYSTEM ID:  [id number]   CT Head w/o Contrast      Status: None    Narrative    EXAM: CT HEAD W/O CONTRAST  12/15/2022 4:15 PM     HISTORY:  Fall and hit head with LOC, on Eliquis, re-eval for any  evidence of bleeding       COMPARISON:  Same day head CT, MR 12/11/2022    TECHNIQUE: Using multidetector thin collimation helical acquisition  technique, axial, coronal and sagittal CT images from the skull base  to the vertex were obtained without intravenous contrast.   (topogram) image(s) also obtained and reviewed.    FINDINGS:  No acute intracranial hemorrhage, mass effect, or midline shift. No  acute loss of gray-white matter differentiation in the cerebral  hemispheres. Ventricles are proportionate to the cerebral sulci. Clear  basal cisterns.    The bony calvaria and the bones of the skull base are normal. The  visualized portions of the paranasal sinuses and mastoid air cells are  clear. Grossly normal orbits.       Impression    IMPRESSION: No acute intracranial pathology.     I have personally reviewed the examination and initial interpretation  and I agree with the findings.    DEVIKA FLOWERS MD         SYSTEM ID:  [id number]   NM MPI Radiologist Consult For Cardiology     Status: None    Narrative    EXAMINATION: NM MPI RADIOLOGIST CONSULT FOR CARDIOLOGY, 12/15/2022  12:48 PM     COMPARISON: CT 2/22/2022    HISTORY: Chest pain    TECHNIQUE: Limited field of view 5 mm CT images were acquired for  attenuation correction purposes for nuclear medicine cardiac study.  Radiology consulted to review the CT images. Please refer to separate  dictation for the nuclear medicine portion of the study.    FINDINGS:     Heart size is within normal limits. Normal caliber of the visualized  thoracic aorta and main pulmonary artery. No significant coronary  artery atherosclerotic calcifications. No pericardial effusion. The  visualized mediastinal and hilar lymph nodes are not enlarged.    No pleural effusion or pneumothorax at this level. The visualized  lungs are  clear.    Diffuse hypoattenuation of the hepatic parenchyma. Cholecystectomy  clips. No acute osseous lesions of the visualized thorax. Degenerative  changes in the visualized spine.      Impression    IMPRESSION:  1. No acute abnormality on the CT images of the lower chest and upper  abdomen.  2. Hepatic steatosis.  3. Please see separate report for the nuclear medicine portion of the  study.    I have personally reviewed the examination and initial interpretation  and I agree with the findings.    VINCENT GUSMAN MD         SYSTEM ID:  [id number]   Comprehensive metabolic panel     Status: Abnormal   Result Value Ref Range    Sodium 139 136 - 145 mmol/L    Potassium 3.9 3.4 - 5.3 mmol/L    Chloride 103 98 - 107 mmol/L    Carbon Dioxide (CO2) 28 22 - 29 mmol/L    Anion Gap 8 7 - 15 mmol/L    Urea Nitrogen 11.5 6.0 - 20.0 mg/dL    Creatinine 0.68 0.51 - 0.95 mg/dL    Calcium 9.1 8.6 - 10.0 mg/dL    Glucose 119 (H) 70 - 99 mg/dL    Alkaline Phosphatase 112 (H) 35 - 104 U/L    AST 17 10 - 35 U/L    ALT 19 10 - 35 U/L    Protein Total 7.5 6.4 - 8.3 g/dL    Albumin 3.8 3.5 - 5.2 g/dL    Bilirubin Total 0.3 <=1.2 mg/dL    GFR Estimate >90 >60 mL/min/1.73m2   INR     Status: Normal   Result Value Ref Range    INR 1.13 0.85 - 1.15   Partial thromboplastin time     Status: Normal   Result Value Ref Range    aPTT 31 22 - 38 Seconds   Troponin T, High Sensitivity     Status: Normal   Result Value Ref Range    Troponin T, High Sensitivity <6 <=14 ng/L   UA with Microscopic reflex to Culture     Status: Abnormal    Specimen: Urine, Clean Catch   Result Value Ref Range    Color Urine Light Yellow Colorless, Straw, Light Yellow, Yellow    Appearance Urine Clear Clear    Glucose Urine Negative Negative mg/dL    Bilirubin Urine Negative Negative    Ketones Urine Negative Negative mg/dL    Specific Gravity Urine 1.015 1.003 - 1.035    Blood Urine Trace (A) Negative    pH Urine 6.5 5.0 - 7.0    Protein Albumin Urine 20 (A) Negative  mg/dL    Urobilinogen Urine Normal Normal, 2.0 mg/dL    Nitrite Urine Negative Negative    Leukocyte Esterase Urine Negative Negative    Mucus Urine Present (A) None Seen /LPF    RBC Urine 2 <=2 /HPF    WBC Urine 1 <=5 /HPF    Squamous Epithelials Urine 1 <=1 /HPF    Granular Casts Urine 1 (H) None Seen /LPF    Narrative    Urine Culture not indicated   CBC with platelets and differential     Status: Abnormal   Result Value Ref Range    WBC Count 9.0 4.0 - 11.0 10e3/uL    RBC Count 4.68 3.80 - 5.20 10e6/uL    Hemoglobin 12.0 11.7 - 15.7 g/dL    Hematocrit 38.6 35.0 - 47.0 %    MCV 83 78 - 100 fL    MCH 25.6 (L) 26.5 - 33.0 pg    MCHC 31.1 (L) 31.5 - 36.5 g/dL    RDW 14.6 10.0 - 15.0 %    Platelet Count 331 150 - 450 10e3/uL    % Neutrophils 69 %    % Lymphocytes 22 %    % Monocytes 7 %    % Eosinophils 2 %    % Basophils 0 %    % Immature Granulocytes 0 %    NRBCs per 100 WBC 0 <1 /100    Absolute Neutrophils 6.1 1.6 - 8.3 10e3/uL    Absolute Lymphocytes 2.0 0.8 - 5.3 10e3/uL    Absolute Monocytes 0.6 0.0 - 1.3 10e3/uL    Absolute Eosinophils 0.2 0.0 - 0.7 10e3/uL    Absolute Basophils 0.0 0.0 - 0.2 10e3/uL    Absolute Immature Granulocytes 0.0 <=0.4 10e3/uL    Absolute NRBCs 0.0 10e3/uL   Troponin T, High Sensitivity     Status: Normal   Result Value Ref Range    Troponin T, High Sensitivity <6 <=14 ng/L   EKG 12-lead, tracing only     Status: None   Result Value Ref Range    Systolic Blood Pressure  mmHg    Diastolic Blood Pressure  mmHg    Ventricular Rate 75 BPM    Atrial Rate 75 BPM    NH Interval 160 ms    QRS Duration 88 ms     ms    QTc 439 ms    P Axis 56 degrees    R AXIS 22 degrees    T Axis 29 degrees    Interpretation ECG       Sinus rhythm  Normal ECG  Unconfirmed report - interpretation of this ECG is computer generated - see medical record for final interpretation  Confirmed by - EMERGENCY ROOM, PHYSICIAN ([id number]),  DOMINICK HINES ([id number]) on 12/15/2022 2:14:16 PM     Echocardiogram  Complete     Status: None   Result Value Ref Range    LVEF  60-65%     [facility]    [id number]  [id number]  [id number]  [id number]^NILO^FAMILIA     [facility],[place]  Echocardiography Laboratory  [address] [place] [address]     Name: DAVID CARREON  MRN: [id number]  : 1965  Study Date: 12/15/2022 01:41 PM  Age: 57 yrs  Gender: Female  Patient Location: [facility]  Reason For Study: Chest Pain, Dizziness  Ordering Physician: NILO COLON  Performed By: [facility]     BSA: 2.4 m2  Height: 68 in  Weight: 278 lb  HR: 76  BP: 141/86 mmHg  ______________________________________________________________________________  Procedure  Complete Portable Echo Adult. Contrast Optison. Echocardiogram with two-  dimensional, color and spectral Doppler performed. Technically difficult  study.Extremely difficult acoustic windows despite the use of contrast for  endcardial border definition. Patient was given 4 ml mixture of 3 ml Optison  and 6 ml saline. 2 ml wasted. Optison (NDC #1799-7020-46) given intravenously.  ______________________________________________________________________________  Interpretation Summary  Global and regional left ventricular function is normal with an EF of 60-65%.  Global right ventricular function is normal.  No significant valvular abnormalities present.  Pulmonary artery systolic pressure is normal.  The inferior vena cava is normal.  No pericardial effusion is present.  ______________________________________________________________________________  Left Ventricle  Left ventricular size is normal. Left ventricular wall thickness is normal.  Global and regional left ventricular function is normal with an EF of 60-65%.  Left ventricular diastolic function is normal. No regional wall motion  abnormalities are seen.     Right Ventricle  The right ventricle is normal size. Global right ventricular function is  normal.     Atria  Both atria appear  normal.     Mitral Valve  The mitral valve is normal.     Aortic Valve  Aortic valve is normal in structure and function.     Tricuspid Valve  The tricuspid valve is normal. Trace to mild tricuspid insufficiency is  present. The right ventricular systolic pressure is approximated at 19.7 mmHg  plus the right atrial pressure. Pulmonary artery systolic pressure is normal.     Pulmonic Valve  The pulmonic valve is normal.     Vessels  The aorta root is normal. The inferior vena cava is normal. Estimated mean  right atrial pressure is normal.     Pericardium  No pericardial effusion is present.     Miscellaneous  No significant valvular abnormalities present.     Compared to Previous Study  Previous study not available for comparison.  ______________________________________________________________________________  MMode/2D Measurements & Calculations  IVSd: 1.0 cm  LVIDd: 4.6 cm  LVIDs: 3.2 cm  LVPWd: 0.98 cm  FS: 31.6 %  LV mass(C)d: 159.9 grams  LV mass(C)dI: 68.0 grams/m2  Ao root diam: 3.2 cm  asc Aorta Diam: 2.7 cm  LVOT diam: 2.0 cm  LVOT area: 3.1 cm2  RWT: 0.42     Doppler Measurements & Calculations  MV E max miguel angel: 68.7 cm/sec  MV A max miguel angel: 49.3 cm/sec  MV E/A: 1.4  MV dec slope: 309.5 cm/sec2  MV dec time: 0.22 sec  PA acc time: 0.12 sec     TR max miguel angel: 222.0 cm/sec  TR max P.7 mmHg  E/E' av.7  Lateral E/e': 5.5  Medial E/e': 11.9     ______________________________________________________________________________  Report approved by: Leonela Cheung 12/15/2022 03:02 PM         NM Lexiscan stress test (nuc card)     Status: None   Result Value Ref Range    Target      Baseline Systolic      Baseline Diastolic BP 79     Last Stress Systolic      Last Stress Diastolic BP 56     Baseline HR 77 bpm    Max HR  123     Max Predicted HR  75 %    Rate Pressure Product 14,514.0     Narrative       The nuclear stress test is negative for inducible myocardial ischemia   or infarction.     Left  ventricular function is normal.     There is no prior study for comparison.     Partially visualized neck uptake. Ultrasound is recommended.     CBC with platelets differential     Status: Abnormal    Narrative    The following orders were created for panel order CBC with platelets differential.  Procedure                               Abnormality         Status                     ---------                               -----------         ------                     CBC with platelets and d...[[id number]]  Abnormal            Final result                 Please view results for these tests on the individual orders.     Medications   technetium Tc 99m tetrofosmin 2UD study (MYOVIEW) radioisotope injection 3-42 mCi (38 mCi Intravenous Given 12/15/22 1110)   regadenoson (LEXISCAN) injection 0.4 mg (0.4 mg Intravenous Given 12/15/22 1125)   perflutren diluted 1mL to 2mL with saline (OPTISON) diluted injection 5 mL (5 mLs Intravenous Given 12/15/22 1444)        Assessments & Plan (with Medical Decision Making)   57-year-old female presents to us with a few different complaints.  She has been having episodes of dizziness which caused her to have a fall today.  Differential on this includes not limited to coronary artery disease, dysrhythmia, head injury, intercranial bleeding.  This chest and arm pressure also may be cardiac in nature.  EKG labs and CT head were ordered.  Labs were ordered previous records and vital signs were reviewed.  Patient care will be signed out to the oncoming physician  I have reviewed the nursing notes. I have reviewed the findings, diagnosis, plan and need for follow up with the patient.    Discharge Medication List as of 12/15/2022  4:48 PM          Final diagnoses:   Injury of head, initial encounter   Dizziness   Chest pain, unspecified type   Vertigo       --  Chao Orozco  [facility] EMERGENCY DEPARTMENT  12/15/2022     Chao Orozco,   12/16/22 0005

## 2022-12-15 NOTE — PROGRESS NOTES
"ED PT Eval     12/15/22 1700   Appointment Info   Signing Clinician's Name / Credentials (PT) Estrada Olea, PT, DPT   Quick Adds   Quick Adds Certification;Vestibular Eval   Living Environment   People in Home child(sharlene), adult;grandchild(sharlene)   Current Living Arrangements house   Home Accessibility stairs within home   Stair Railings, Within Home, Primary railings safe and in good condition   Self-Care   Usual Activity Tolerance excellent   Current Activity Tolerance good   Regular Exercise No   Fall history within last six months yes   Number of times patient has fallen within last six months 1   Activity/Exercise/Self-Care Comment 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   General Information   Onset of Illness/Injury or Date of Surgery 12/15/22   Referring Physician Mayra Hilario, WILL   Patient/Family Therapy Goals Statement (PT) To better understand dizziness   Pertinent History of Current Problem (include personal factors and/or comorbidities that impact the POC) Per EMR \"Alina Martell is a 57 year old female with PMH of CAD, GERD, HTN, paroxysmal atrial fibrillation, PTSD, RA, sicca syndrome, RANDALL who presented to the ED with dizziness, chest pain\"   Existing Precautions/Restrictions fall   General Observations activity: up ad jarad   Cognition   Affect/Mental Status (Cognition) WFL;anxious   Range of Motion (ROM)   Range of Motion ROM is WFL   Strength (Manual Muscle Testing)   Strength (Manual Muscle Testing) strength is WFL   Bed Mobility   Bed Mobility supine-sit;sit-supine   Supine-Sit [place] (Bed Mobility) independent   Sit-Supine [place] (Bed Mobility) independent   Transfers   Transfers sit-stand transfer   Sit-Stand Transfer   Sit-Stand [place] (Transfers) independent   Gait/Stairs (Locomotion)   [place] Level (Gait) independent   Balance   Balance Comments IND sitting, IND standing, narrow JAMILAH   Muscle Tone   Muscle Tone no deficits were identified   Oculomotor " Exam   Smooth Pursuit Other   Smooth Pursuit Comment slight nystagmus with R gaze   VOR Comments impaired with quick rotation   Rapid Head Thrust Corrective Saccade L head thrust   Clinical Impression   Criteria for Skilled Therapeutic Intervention Yes, treatment indicated   PT Diagnosis (PT) impaired mobility   Influenced by the following impairments vertigo, limited activity tolerance   Functional limitations due to impairments gait   Clinical Presentation (PT Evaluation Complexity) Stable/Uncomplicated   Clinical Presentation Rationale clinical judgement   Clinical Decision Making (Complexity) low complexity   Planned Therapy Interventions (PT) balance training;gait training;strengthening;stretching;transfer training   Risk & Benefits of therapy have been explained evaluation/treatment results reviewed;care plan/treatment goals reviewed;risks/benefits reviewed;current/potential barriers reviewed;participants voiced agreement with care plan;participants included;patient   Clinical Impression Comments symptoms/eval consistent with L vestibular hypofunction   PT Total Evaluation Time   PT Eval, Low Complexity Minutes (27197) 15   Therapy Certification   Start of care date 12/15/22   Certification date from 12/15/22   Certification date to 12/29/22   Medical Diagnosis vertigo   Physical Therapy Goals   PT Frequency 5x/week   PT Predicted Duration/Target Date for Goal Attainment 12/29/22   PT Goals Bed Mobility;Transfers;Gait   PT: Bed Mobility Independent;Goal Met   PT: Transfers Independent;Sit to/from stand;Goal Met   PT: Gait Independent;Greater than 200 feet   Interventions   Interventions Quick Adds Gait Training;Therapeutic Activity   PT Discharge Planning   PT Plan gait, VOR   PT Discharge Recommendation (DC Rec) home with outpatient physical therapy   PT Rationale for DC Rec Patient mobilizing sufficienlty for d/c home and will benefit from OP PT at vestibular clinic if symptoms persist.   PT Brief overview  of current status IND   Total Session Time   Total Session Time (sum of timed and untimed services) 15       Estrada Olea, PT, DPT  Pager #[phone]      [facility]  OUTPATIENT PHYSICAL THERAPY EVALUATION  PLAN OF TREATMENT FOR OUTPATIENT REHABILITATION  (COMPLETE FOR INITIAL CLAIMS ONLY)  Patient's Last Name, First Name, M.I.  YOB: 1965  MartellAlina  HEENA                        Provider's Name  [facility] Medical Record No.  [id number]                             Onset Date:  12/15/22   Start of Care Date:  (P) 12/15/22   Type:     _X_PT   ___OT   ___SLP Medical Diagnosis:  (P) vertigo              PT Diagnosis:  (P) impaired mobility Visits from SOC:  1     See note for plan of treatment, functional goals and certification details    I CERTIFY THE NEED FOR THESE SERVICES FURNISHED UNDER        THIS PLAN OF TREATMENT AND WHILE UNDER MY CARE     (Physician co-signature of this document indicates review and certification of the therapy plan).

## 2022-12-15 NOTE — ED TRIAGE NOTES
"58 y/o female c/o fall this am at approx 0415. Pt reports striking her head against the wall while walking to the restroom. Pt is on anticoag therapy [Eliquis]. Patient also describes \"..a weird chest pain in my left chest and arm that scares me.\"      "

## 2022-12-15 NOTE — H&P
[facility]    History and Physical - ED Observation Service       Date of Admission:  12/15/2022    Assessment & Plan      Alina Martell is a 57 year old female with PMH of CAD, GERD, HTN, paroxysmal atrial fibrillation, PTSD, RA, sicca syndrome, RANDALL who presented to the ED with dizziness, chest pain.    #Chest pain  Patient presents with left sided chest pressure radiating to her left shoulder. She notices it more when she is exerting herself, such as climbing stairs. The chest pain has been intermittent over the last week. No dyspnea or diaphoresis. She has some nausea associated with dizziness (room spinning). At times she feels so dizzy she feels as though she may pass out. In the ED, VSS. CBC and CMP unremarkable. Troponin <6. UA bland. EKG NSR with no acute ischemic changes. CXR NAD.  -Repeat troponin  -Lexiscan stress test  -Resting echocardiogram  -Continuous telemetry  -Nitroglycerin as needed    #Dizziness  #Nausea  Patient also endorses dizziness, describes room spinning which has been going on for 1 week. She notices it with position change and when she tries to ambulate. She has nausea with this, but no significant vomiting. No severe headache. Denies any recent URI symptoms but states she did have diarrhea for a few days, now constipated x 4 days. CT head in the ED was NAD. No history of vertigo. Recently had outpatient MRI brain, MRA brain, MRA neck which were all normal.  -IV NS @ 100 mL/hr x 1 L  -Resting echocardiogram pending  -Check orthostatics  -PT evaluation  -Continue PTA Meclizine  -Antiemetics as needed    #Ground level fall  Patient reports she fell backwards last night 2/2 dizziness after standing up quickly to use the restroom and struck the back of her head. She had very brief LOC. CT head in the ED was NAD. Neuro exam unremarkable.  -Repeat head CT in 8 hours  -Neuro checks    #Paroxsymal atrial fibrillation  #HTN  #Long term current  "use of anticoagulation  -Continue PTA Flecainide, Cardizem  -Continue PTA Apixaban      Diet: NPO for Medical/Clinical Reasons Except for: Meds, Ice Chips    DVT Prophylaxis: DOAC  Allen Catheter: Not present  Central Lines: None  Cardiac Monitoring: None  Code Status:  Full    Disposition Plan      Expected Discharge Date: 12/16/2022                The patient's care was discussed with the Bedside Nurse, Care Coordinator/, Patient and ED provider.    Mayra Hilario PA-C  ED Observation Service  [facility]  Securely message with the Vocera Web Console (learn more here)  Text page via [facility] Paging/Directory   ____________________________________________________________________    Chief Complaint   Chest pain, Dizziness    History is obtained from the patient    History of Present Illness   Per ED: \"Alina Martell is a 57 year old female who presents to us with a chief complaint of fall as well as intermittent dizziness and left-sided arm and chest pain.  the dizziness has been intermittently present over her last week.  She was seen at [facility].  They did not find an obvious cause for the symptoms at that point.  Today she has had some left-sided chest pressure and left arm pain.  She had an episode of dizziness when getting up to the bathroom this evening.  She fell somewhat backwards and hit the back of her head.  She does report she is on Eliquis for A. fib.  No nausea or vomiting.  No fevers or chills.  She denies any neck pain.  The pressure in her chest has not been exertional.  No previous cardiac history with exception of an ablation for A. fib a few months ago.  No cough or fevers.  No shortness of breath today.\"    Review of Systems    The 10 point Review of Systems is negative other than noted in the HPI or here.     Past Medical History    I have reviewed this patient's medical history and updated it with pertinent information if " needed.   Past Medical History:   Diagnosis Date     Anemia      Antiplatelet or antithrombotic long-term use      Arrhythmia      Cannabis use without complication 12/8/2014     Carpal tunnel syndrome      Coronary artery disease      Gastroesophageal reflux disease      History of angina      Hypertension      Irregular heart beat      Obesity      Paroxysmal atrial fibrillation (H)      PTSD (post-traumatic stress disorder)      RA (rheumatoid arthritis) (H)      Rheumatoid arthritis (H) 7/8/2016     Sicca syndrome (H)      Sleep apnea        Past Surgical History   I have reviewed this patient's surgical history and updated it with pertinent information if needed.  Past Surgical History:   Procedure Laterality Date     CHOLECYSTECTOMY       DILATION AND CURETTAGE, OPERATIVE HYSTEROSCOPY, COMBINED N/A 3/3/2022    Procedure: HYSTEROSCOPY, WITH DILATION AND CURETTAGE;  Surgeon: Irma Cary MD;  Location: [facility]     EP ABLATION FOCAL AFIB N/A 6/30/2022    Procedure: Ablation Atrial Fibrilation;  Surgeon: Jose Guadalupe Joseph MD;  Location:  [facility]     HC REMOVAL GALLBLADDER      Description: Cholecystectomy;  Recorded: 10/15/2013;     LAPAROSCOPIC TUBAL LIGATION       RELEASE CARPAL TUNNEL BILATERAL       TUBAL LIGATION  1995       Social History   I have reviewed this patient's social history and updated it with pertinent information if needed.  Social History     Tobacco Use     Smoking status: Never     Smokeless tobacco: Never     Tobacco comments:     smokes marijuana   Substance Use Topics     Alcohol use: Not Currently     Comment: Alcoholic Drinks/day: Never an issue, she states.  7/8/16     Drug use: Not Currently     Comment: Drug use: Former marijuana use, not current. 7/8/16       Family History   I have reviewed this patient's family history and updated it with pertinent information if needed.  Family History   Problem Relation Age of Onset     Crohn's Disease  Mother         Total colectomy with ileostomy.     Atrial fibrillation Mother      Snoring Father      Diabetes Father      Prostate Cancer Father      Chronic Kidney Disease Father         Chose against dialysis.     Substance Abuse Brother      Depression Brother      Attention Deficit Disorder Brother      Alcoholism Brother      Arrhythmia Brother      Alcoholism Brother      Substance Abuse Brother      Arrhythmia Brother      Alcoholism Maternal Grandfather      No Known Problems Daughter      No Known Problems Son      Lupus Cousin      Breast Cancer No family hx of        Prior to Admission Medications   Prior to Admission Medications   Prescriptions Last Dose Informant Patient Reported? Taking?   EPINEPHrine (ANY BX GENERIC EQUIV) 0.3 MG/0.3ML injection 2-pack   Yes No   Sig: Inject 0.3 mg into the muscle as needed for anaphylaxis    Multiple Vitamins-Minerals (CENTROVITE) TABS   Yes No   Sig: TAKE 1 TABLET BY MOUTH EVERY DAY FOR 30 DAYS   acetaminophen (TYLENOL) 325 MG tablet   No No   Sig: Take 3 tablets (975 mg) by mouth every 6 hours as needed for mild pain   adalimumab (HUMIRA *CF* PEN) 40 MG/0.4ML pen kit   No No   Sig: INJECT 1 PEN UNDER THE SKIN EVERY 14 DAYS. HOLD FOR SIGNS OF INFECTION, THEN SEEK MEDICAL ATTENTION.   apixaban ANTICOAGULANT (ELIQUIS ANTICOAGULANT) 5 MG tablet   No No   Sig: Take 1 tablet (5 mg) by mouth 2 times daily   cetirizine (ZYRTEC) 10 MG tablet   Yes No   Sig: Take 10 mg by mouth daily   diltiazem ER COATED BEADS (CARDIZEM CD/CARTIA XT) 180 MG 24 hr capsule   No No   Sig: Take 2 capsules (360 mg) by mouth daily   flecainide (TAMBOCOR) 50 MG tablet   No No   Sig: Take 1 tablet (50 mg) by mouth daily as needed (atrial fibrillation)   hydrOXYzine (ATARAX) 25 MG tablet   No No   Sig: Take 1 tablet (25 mg) by mouth 3 times daily as needed for anxiety   meclizine (ANTIVERT) 25 MG tablet   No No   Sig: Take 1 tablet (25 mg) by mouth 3 times daily as needed for dizziness    omeprazole (PRILOSEC) 40 MG DR capsule   No No   Sig: Take 1 capsule (40 mg) by mouth in the morning.   ondansetron (ZOFRAN ODT) 4 MG ODT tab   No No   Sig: Take 1 tablet (4 mg) by mouth every 8 hours as needed for nausea   polyethylene glycol (MIRALAX) 17 GM/Dose powder   No No   Sig: Take 17 g (1 capful) by mouth daily   Patient taking differently: Take 1 capful by mouth daily as needed for constipation   prazosin (MINIPRESS) 1 MG capsule   Yes No   Sig: Take 1 capsule by mouth nightly as needed    traZODone (DESYREL) 50 MG tablet   No No   Sig: Take 0.5 tablets (25 mg) by mouth At Bedtime   Patient taking differently: Take 25 mg by mouth nightly as needed for sleep   vitamin C (ASCORBIC ACID) 1000 MG TABS   Yes No   Sig: Take 1,000 mg by mouth daily    vitamin D3 (CHOLECALCIFEROL) 250 mcg (18034 units) capsule   Yes No   Sig: Take 1 capsule by mouth once a week      Facility-Administered Medications: None     Allergies   Allergies   Allergen Reactions     Penicillins Shortness Of Breath, Palpitations, Dizziness, Anaphylaxis, Hives, Itching and Rash     Test in 2031, see allergy note on 7/29/21     Shellfish-Derived Products Anaphylaxis     Sulfa Drugs Hives and Anaphylaxis       Physical Exam   Vital Signs: Temp: 98.1  F (36.7  C) Temp src: Oral BP: 135/83 Pulse: 75   Resp: 19 SpO2: 95 % O2 Device: None (Room air)    Weight: 278 lbs 10.58 oz  Exam:  Constitutional: alert, no distress, and cooperative  Head: normocephalic, atraumatic, no nystagmus noted  Neck: no asymmetry, masses, or scars  ENT: throat normal without erythema or exudate  Cardiovascular: RRR  Respiratory: diminished, respirations unlabored  Gastrointestinal: (+) BS, non tender, soft  Musculoskeletal: normal muscle tone, no pitting edema  Skin: no suspicious lesions or rashes  Neurologic: oriented x 3, moves all extremities, no slurred speech, strength 5/5 bilateral upper and lower extremities, no facial droop  Psychiatric: normal affect and  mood    Data   Data reviewed today: I reviewed all medications, new labs and imaging results over the last 24 hours.    Recent Labs   Lab 12/15/22  0702 12/11/22  1420 12/08/22  1951   WBC 9.0 11.1* 13.1*   HGB 12.0 11.8 12.0   MCV 83 83 83    330 361   INR 1.13  --  1.02    138 141   POTASSIUM 3.9 3.9 4.0   CHLORIDE 103 100 102   CO2 28 29 28   BUN 11.5 13.2 12.1   CR 0.68 0.74 0.95   ANIONGAP 8 9 11   JOSSELIN 9.1 9.2 8.9   * 92 113*   ALBUMIN 3.8 3.9 4.0   PROTTOTAL 7.5 7.4 7.6   BILITOTAL 0.3 <0.2 0.2   ALKPHOS 112* 108* 114*   ALT 19 17 19   AST 17 16 21   LIPASE  --  17  --      Recent Results (from the past 24 hour(s))   CT Head w/o Contrast    Narrative    EXAM: CT HEAD W/O CONTRAST  12/15/2022 7:59 AM     HISTORY:  Fall backwards with head injury on Eliquis       COMPARISON:  CT 12/8/2022, MRI 12/11/2022    TECHNIQUE: Using multidetector thin collimation helical acquisition  technique, axial, coronal and sagittal CT images from the skull base  to the vertex were obtained without intravenous contrast.   (topogram) image(s) also obtained and reviewed.    FINDINGS:  No acute intracranial hemorrhage, mass effect, or midline shift. No  acute loss of gray-white matter differentiation in the cerebral  hemispheres. Ventricles are proportionate to the cerebral sulci. Clear  basal cisterns.    The bony calvaria and the bones of the skull base are normal. The  visualized portions of the paranasal sinuses and mastoid air cells are  clear. Grossly normal orbits.       Impression    IMPRESSION: No acute intracranial pathology.     I have personally reviewed the examination and initial interpretation  and I agree with the findings.    TACOS BARCLAY MD         SYSTEM ID:  [id number]   XR Chest 2 Views    Narrative    Exam: XR CHEST 2 VIEWS, 12/15/2022 8:30 AM    Indication: Chest pain    Comparison: Outside x-ray 12/8/2022    Findings:   PA and lateral views of the chest. Heart size is within normal  limits.  No pneumothorax or pleural effusions. No focal airspace disease.      Impression    Impression: No acute cardiopulmonary findings.    I have personally reviewed the examination and initial interpretation  and I agree with the findings.    NICOL HERNANDEZ MD         SYSTEM ID:  [id number]

## 2022-12-15 NOTE — PROGRESS NOTES
[facility]                                     Progress Note    Patient Name: Alina Martell  Date: 12/13/22         Service Type: Individual      Session Start Time:  801 Session End Time: 853     Session Length:  52  minutes    Session #: 47    Attendees: Client    Service Modality:  In-Person     DATA  Interactive Complexity: No  Crisis: No        Progress Since Last Session (Related to Symptoms / Goals / Homework):   Symptoms: No change symptoms continue to stay the same    Homework: Partially completed      Episode of Care Goals: Satisfactory progress - ACTION (Actively working towards change); Intervened by reinforcing change plan / affirming steps taken     Current / Ongoing Stressors and Concerns:  Patient reported feeling depressed and anxious due to the holiday's. Patient indicated she is not stressed about shopping or anything that she needs to do. Patient reported realizing it is related to other people's expectations. Patient indicated she is continuing to think about pending Ronnie Natalie as a self-care day. This therapist processed with patient what is in her control and out of control.      Treatment Objective(s) Addressed in This Session:   use thought-stopping strategy daily to reduce intrusive thoughts  Increase interest, engagement, and pleasure in doing things  Decrease frequency and intensity of feeling down, depressed, hopeless  Improve quantity and quality of night time sleep / decrease daytime naps  Identify negative self-talk and behaviors: challenge core beliefs, myths, and actions  Improve concentration, focus, and mindfulness in daily activities      Intervention:   Motivational Interviewing    MI Intervention: Open-ended questions     Change Talk Expressed by the Patient: Activation Taking steps    Provider Response to Change Talk: R - Reflected patient's change talk and S - Summarized patient's change talk statements      Assessments completed prior to  visit:  The following assessments were completed by patient for this visit:  PHQ9:   PHQ-9 SCORE 5/12/2022 6/7/2022 6/21/2022 8/10/2022 9/22/2022 11/3/2022 12/1/2022   PHQ-9 Total Score MyChart 7 (Mild depression) 4 (Minimal depression) 6 (Mild depression) 11 (Moderate depression) 8 (Mild depression) 9 (Mild depression) 9 (Mild depression)   PHQ-9 Total Score 7 4 6 11 8 9 9     GAD7:   ADA-7 SCORE 10/5/2021 11/15/2021 12/13/2021 8/10/2022 9/22/2022 11/3/2022 12/1/2022   Total Score 3 (minimal anxiety) 6 (mild anxiety) - 6 (mild anxiety) 9 (mild anxiety) 10 (moderate anxiety) 8 (mild anxiety)   Total Score 3 6 4 6 9 10 8     PROMIS 10-Global Health (all questions and answers displayed):   PROMIS 10 12/15/2021 3/26/2022 4/14/2022 7/19/2022 11/3/2022 12/1/2022   In general, would you say your health is: Good Good Good Good Good Good   In general, would you say your quality of life is: Good Good Good Fair Good Good   In general, how would you rate your physical health? Good Fair Fair Good Good Fair   In general, how would you rate your mental health, including your mood and your ability to think? Fair Fair Good Fair Fair Fair   In general, how would you rate your satisfaction with your social activities and relationships? Good Fair Good Good Fair Good   In general, please rate how well you carry out your usual social activities and roles Good Good Fair Good Good Good   To what extent are you able to carry out your everyday physical activities such as walking, climbing stairs, carrying groceries, or moving a chair? Mostly Mostly Mostly Mostly Mostly Mostly   How often have you been bothered by emotional problems such as feeling anxious, depressed or irritable? Often Sometimes Often Often Often Often   How would you rate your fatigue on average? Mild Mild Mild Moderate Mild Mild   How would you rate your pain on average?   0 = No Pain  to  10 = Worst Imaginable Pain 3 3 3 3 2 3   In general, would you say your health is:  - 3 3 3 3 3   In general, would you say your quality of life is: - 3 3 2 3 3   In general, how would you rate your physical health? - 2 2 3 3 2   In general, how would you rate your mental health, including your mood and your ability to think? - 2 3 2 2 2   In general, how would you rate your satisfaction with your social activities and relationships? - 2 3 3 2 3   In general, please rate how well you carry out your usual social activities and roles. (This includes activities at home, at work and in your community, and responsibilities as a parent, child, spouse, employee, friend, etc.) - 3 2 3 3 3   To what extent are you able to carry out your everyday physical activities such as walking, climbing stairs, carrying groceries, or moving a chair? - 4 4 4 4 4   In the past 7 days, how often have you been bothered by emotional problems such as feeling anxious, depressed, or irritable? - 3 4 4 4 4   In the past 7 days, how would you rate your fatigue on average? - 2 2 3 2 2   In the past 7 days, how would you rate your pain on average, where 0 means no pain, and 10 means worst imaginable pain? - 3 3 3 2 3   Global Mental Health Score - 10 11 9 9 10   Global Physical Health Score - 14 14 14 15 14   PROMIS TOTAL - SUBSCORES - 24 25 23 24 24   Some recent data might be hidden         ASSESSMENT: Current Emotional / Mental Status (status of significant symptoms):   Risk status (Self / Other harm or suicidal ideation)   Patient denies current fears or concerns for personal safety.   Patient denies current or recent suicidal ideation or behaviors.   Patient denies current or recent homicidal ideation or behaviors.   Patient denies current or recent self injurious behavior or ideation.   Patient denies other safety concerns.   Patient reports there has been no change in risk factors since their last session.     Patient reports there has been no change in protective factors since their last session.     Recommended that  patient call 911 or go to the local ED should there be a change in any of these risk factors.     Appearance:   Appropriate    Eye Contact:   Good    Psychomotor Behavior: Normal    Attitude:   Cooperative    Orientation:   All   Speech    Rate / Production: Normal/ Responsive    Volume:  Normal    Mood:    Anxious  Depressed    Affect:    Appropriate    Thought Content:  Clear    Thought Form:  Coherent  Goal Directed  Logical    Insight:    Good      Medication Review:   No changes to current psychiatric medication(s)     Medication Compliance:   Yes     Changes in Health Issues:   None reported     Chemical Use Review:   Substance Use: Chemical use reviewed, no active concerns identified      Tobacco Use: No current tobacco use.      Diagnosis:  1. Posttraumatic stress disorder    2. Moderate episode of recurrent major depressive disorder (H)    3. ADA (generalized anxiety disorder)        Collateral Reports Completed:   Not Applicable    PLAN: (Patient Tasks / Therapist Tasks / Other)  Patient will return in two weeks for scheduled session. Patient will identify things to do for herself on [employer]. Patient will continue to reach out to her friends for support.     There has been demonstrated improvement in functioning while patient has been engaged in psychotherapy/psychological service- if withdrawn the patient would deteriorate and/or relapse.     Mariana Love, [facility] 12/13/22    ______________________________________________________________________    Individual Treatment Plan    Patient's Name: Alina Martell  YOB: 1965    Date of Creation:10/16/2020  Date Treatment Plan Last Reviewed/Revised: 12/1/2022    DSM5 Diagnoses: 296.32 (F33.1) Major Depressive Disorder, Recurrent Episode, Moderate _ or 300.02 (F41.1) Generalized Anxiety Disorder  Psychosocial / Contextual Factors: Health, family and financial   PROMIS (reviewed every 90 days): 25    Referral / Collaboration:  Was/were  discussed and patient will pursue.    Anticipated number of session for this episode of care: 20 will reevaluate every 90 days  Anticipation frequency of session: Biweekly  Anticipated Duration of each session: 38-52 minutes  Treatment plan will be reviewed in 90 days or when goals have been changed.       MeasurableTreatment Goal(s) related to diagnosis / functional impairment(s)  Goal 1: Patient will work on reducing overall anxiety.     I will know I've met my goal when reporting minimal anxiety symptoms.       Objective #A (Patient Action)                          Patient will use distraction each time intrusive worry surfaces.  Status: Continued - Date(s): 12/1/2022     Intervention(s)  Therapist will teach emotional regulation skills. ..     Objective #B  Patient will Decrease frequency and intensity of feeling down, depressed, hopeless.  Status: Continued - Date(s): 12/1/2022     Intervention(s)  Therapist will teach emotional recognition/identification. ..     Objective #C  Patient will Identify negative self-talk and behaviors: challenge core beliefs, myths, and actions.  Status: Continued - Date(s): 12/1/2022     Intervention(s)  Therapist will teach the client how to perform a behavioral chain analysis. ..     Goal 2: Patient will continue to work on reducing depression symptoms    I will know I've met my goal then reporting minimal depression symptoms     Objective #A (Patient Action)                          Patient will Increase interest, engagement, and pleasure in doing things.  Status: Continued - Date(s):  12/1/2022     Intervention(s)  Therapist will assign homework ..     Objective #B  Patient will Decrease frequency and intensity of feeling down, depressed, hopeless.  Status: Continued - Date(s):  12/1/2022     Intervention(s)  Therapist will assign homework at every session.         Patient has reviewed and agreed to the above plan.        Mariana Love, [facility] 12/1/2022

## 2022-12-15 NOTE — DISCHARGE SUMMARY
[facility]  ED Observation Discharge Summary      Date of Admission:  12/15/2022  Date of Discharge:  12/15/2022  Discharging Provider: Mayra Hilario PA-C  Discharge Service: ED Observation Service    Discharge Diagnoses   Chest pain  Dizziness  Nausea  Ground level fall  Closed head injury  Paroxysmal atrial fibrillation  HTN  Long term current use of anticoagulation    Follow-ups Needed After Discharge   Follow up with PCP in 1 week  Follow up with vestibular PT as outpatient    Unresulted Labs Ordered in the Past 30 Days of this Admission     No orders found from 11/15/2022 to 12/16/2022.        Discharge Disposition   Discharged to home  Condition at discharge: Stable    Hospital Course   Alina Martell is a 57 year old female with PMH of CAD, GERD, HTN, paroxysmal atrial fibrillation, PTSD, RA, sicca syndrome, RANDALL who presented to the ED with dizziness, chest pain.     #Chest pain  Patient presents with left sided chest pressure radiating to her left shoulder. She notices it more when she is exerting herself, such as climbing stairs. The chest pain has been intermittent over the last week. No dyspnea or diaphoresis. She has some nausea associated with dizziness (room spinning). At times she feels so dizzy she feels as though she may pass out. In the ED, VSS. CBC and CMP unremarkable. Troponin <6. UA bland. EKG NSR with no acute ischemic changes. CXR NAD.    Repeat troponin negative. No events on telemetry. Lexiscan stress test was normal. Echo was a difficult study but EF 60-65% with no significant valvular abnormalities.     #Dizziness  #Nausea  Patient also endorses dizziness, describes room spinning which has been going on for 1 week. She notices it with position change and when she tries to ambulate. She has nausea with this, but no significant vomiting. No severe headache. Denies any recent URI symptoms but states she did have diarrhea for a few  days, now constipated x 4 days. CT head in the ED was NAD. No history of vertigo. Recently had outpatient MRI brain, MRA brain, MRA neck which were all normal.    PT evaluated the patient and recommend discharge home, refer to outpatient vestibular PT. Patient agreeable. Received IV fluids in the ED. Has Meclizine at home to use as needed. Provided script for Zofran. She does have ENT referral in place by PCP.     #Ground level fall  #Closed head injury  Patient reports she fell backwards last night 2/2 dizziness after standing up quickly to use the restroom and struck the back of her head. She had very brief LOC. CT head in the ED was NAD. Neuro exam unremarkable. Neuro checks unchanged. Repeat head CT after 8 hours NAD.     #Paroxsymal atrial fibrillation  #HTN  #Long term current use of anticoagulation  -Continue PTA Flecainide, Cardizem  -Continue PTA Apixaban     Consultations This Hospital Stay   PHYSICAL THERAPY ADULT IP CONSULT    Code Status   Full Code    Time Spent on this Encounter   I, Mayra Hilario PA-C, personally saw the patient today and spent greater than 30 minutes discharging this patient.     Mayra Hilario PA-C  [facility] EMERGENCY DEPARTMENT  [address] [address]  Phone: [phone]  ______________________________________________________________________    Physical Exam   Vital Signs: Temp: 98.1  F (36.7  C) Temp src: Oral BP: (!) 151/89 Pulse: 73   Resp: 19 SpO2: 95 % O2 Device: None (Room air)    Weight: 278 lbs 10.58 oz  Exam:  Constitutional: alert, no distress, and cooperative  Head: normocephalic, atraumatic  Neck: no asymmetry, masses, or scars  ENT: throat normal without erythema or exudate  Cardiovascular: RRR  Respiratory: diminished, respirations unlabored  Gastrointestinal: (+) BS, non tender, soft  Musculoskeletal: normal muscle tone, no pitting edema  Skin: no suspicious lesions or rashes  Neurologic: oriented x 3, moves all extremities, no  slurred speech  Psychiatric: normal affect and mood       Primary Care Physician   Estelle Bansal    Discharge Orders       Significant Results and Procedures   Results for orders placed or performed during the hospital encounter of 12/15/22   XR Chest 2 Views    Narrative    Exam: XR CHEST 2 VIEWS, 12/15/2022 8:30 AM    Indication: Chest pain    Comparison: Outside x-ray 12/8/2022    Findings:   PA and lateral views of the chest. Heart size is within normal limits.  No pneumothorax or pleural effusions. No focal airspace disease.      Impression    Impression: No acute cardiopulmonary findings.    I have personally reviewed the examination and initial interpretation  and I agree with the findings.    NICOL HERNANDEZ MD         SYSTEM ID:  [id number]   CT Head w/o Contrast    Narrative    EXAM: CT HEAD W/O CONTRAST  12/15/2022 7:59 AM     HISTORY:  Fall backwards with head injury on Eliquis       COMPARISON:  CT 12/8/2022, MRI 12/11/2022    TECHNIQUE: Using multidetector thin collimation helical acquisition  technique, axial, coronal and sagittal CT images from the skull base  to the vertex were obtained without intravenous contrast.   (topogram) image(s) also obtained and reviewed.    FINDINGS:  No acute intracranial hemorrhage, mass effect, or midline shift. No  acute loss of gray-white matter differentiation in the cerebral  hemispheres. Ventricles are proportionate to the cerebral sulci. Clear  basal cisterns.    The bony calvaria and the bones of the skull base are normal. The  visualized portions of the paranasal sinuses and mastoid air cells are  clear. Grossly normal orbits.       Impression    IMPRESSION: No acute intracranial pathology.     I have personally reviewed the examination and initial interpretation  and I agree with the findings.    TACOS BARCLAY MD         SYSTEM ID:  [id number]   CT Head w/o Contrast    Narrative    EXAM: CT HEAD W/O CONTRAST  12/15/2022 4:15 PM     HISTORY:  Fall and  hit head with LOC, on Eliquis, re-eval for any  evidence of bleeding       COMPARISON:  Same day head CT, MR 12/11/2022    TECHNIQUE: Using multidetector thin collimation helical acquisition  technique, axial, coronal and sagittal CT images from the skull base  to the vertex were obtained without intravenous contrast.   (topogram) image(s) also obtained and reviewed.    FINDINGS:  No acute intracranial hemorrhage, mass effect, or midline shift. No  acute loss of gray-white matter differentiation in the cerebral  hemispheres. Ventricles are proportionate to the cerebral sulci. Clear  basal cisterns.    The bony calvaria and the bones of the skull base are normal. The  visualized portions of the paranasal sinuses and mastoid air cells are  clear. Grossly normal orbits.       Impression    IMPRESSION: No acute intracranial pathology.     I have personally reviewed the examination and initial interpretation  and I agree with the findings.    DEVKIA FLOWERS MD         SYSTEM ID:  [id number]   NM MPI Radiologist Consult For Cardiology    Narrative    EXAMINATION: [facility] RADIOLOGIST CONSULT FOR CARDIOLOGY, 12/15/2022  12:48 PM     COMPARISON: CT 2/22/2022    HISTORY: Chest pain    TECHNIQUE: Limited field of view 5 mm CT images were acquired for  attenuation correction purposes for nuclear medicine cardiac study.  Radiology consulted to review the CT images. Please refer to separate  dictation for the nuclear medicine portion of the study.    FINDINGS:     Heart size is within normal limits. Normal caliber of the visualized  thoracic aorta and main pulmonary artery. No significant coronary  artery atherosclerotic calcifications. No pericardial effusion. The  visualized mediastinal and hilar lymph nodes are not enlarged.    No pleural effusion or pneumothorax at this level. The visualized  lungs are clear.    Diffuse hypoattenuation of the hepatic parenchyma. Cholecystectomy  clips. No acute osseous lesions of the  visualized thorax. Degenerative  changes in the visualized spine.      Impression    IMPRESSION:  1. No acute abnormality on the CT images of the lower chest and upper  abdomen.  2. Hepatic steatosis.  3. Please see separate report for the nuclear medicine portion of the  study.    I have personally reviewed the examination and initial interpretation  and I agree with the findings.    VINCENT GUSMAN MD         SYSTEM ID:  [id number]   Echocardiogram Complete     Value    LVEF  60-65%    Narrative    [id number]  [id number]  [id number]  [id number]^ISAIAH^NILO^FAMILIA     [facility],[place]  Echocardiography Laboratory  [address] [place] [id number]     Name: DAVID CARREON  MRN: [id number]  : 1965  Study Date: 12/15/2022 01:41 PM  Age: 57 yrs  Gender: Female  Patient Location: [facility]  Reason For Study: Chest Pain, Dizziness  Ordering Physician: NILO COLON  Performed By: Denice Riddle RDCS     BSA: 2.4 m2  Height: 68 in  Weight: 278 lb  HR: 76  BP: 141/86 mmHg  ______________________________________________________________________________  Procedure  Complete Portable Echo Adult. Contrast Optison. Echocardiogram with two-  dimensional, color and spectral Doppler performed. Technically difficult  study.Extremely difficult acoustic windows despite the use of contrast for  endcardial border definition. Patient was given 4 ml mixture of 3 ml Optison  and 6 ml saline. 2 ml wasted. Optison (NDC #4915-2390-98) given intravenously.  ______________________________________________________________________________  Interpretation Summary  Global and regional left ventricular function is normal with an EF of 60-65%.  Global right ventricular function is normal.  No significant valvular abnormalities present.  Pulmonary artery systolic pressure is normal.  The inferior vena cava is normal.  No pericardial effusion is  present.  ______________________________________________________________________________  Left Ventricle  Left ventricular size is normal. Left ventricular wall thickness is normal.  Global and regional left ventricular function is normal with an EF of 60-65%.  Left ventricular diastolic function is normal. No regional wall motion  abnormalities are seen.     Right Ventricle  The right ventricle is normal size. Global right ventricular function is  normal.     Atria  Both atria appear normal.     Mitral Valve  The mitral valve is normal.     Aortic Valve  Aortic valve is normal in structure and function.     Tricuspid Valve  The tricuspid valve is normal. Trace to mild tricuspid insufficiency is  present. The right ventricular systolic pressure is approximated at 19.7 mmHg  plus the right atrial pressure. Pulmonary artery systolic pressure is normal.     Pulmonic Valve  The pulmonic valve is normal.     Vessels  The aorta root is normal. The inferior vena cava is normal. Estimated mean  right atrial pressure is normal.     Pericardium  No pericardial effusion is present.     Miscellaneous  No significant valvular abnormalities present.     Compared to Previous Study  Previous study not available for comparison.  ______________________________________________________________________________  MMode/2D Measurements & Calculations  IVSd: 1.0 cm  LVIDd: 4.6 cm  LVIDs: 3.2 cm  LVPWd: 0.98 cm  FS: 31.6 %  LV mass(C)d: 159.9 grams  LV mass(C)dI: 68.0 grams/m2  Ao root diam: 3.2 cm  asc Aorta Diam: 2.7 cm  LVOT diam: 2.0 cm  LVOT area: 3.1 cm2  RWT: 0.42     Doppler Measurements & Calculations  MV E max miguel angel: 68.7 cm/sec  MV A max miguel angel: 49.3 cm/sec  MV E/A: 1.4  MV dec slope: 309.5 cm/sec2  MV dec time: 0.22 sec  PA acc time: 0.12 sec     TR max miguel angel: 222.0 cm/sec  TR max P.7 mmHg  E/E' av.7  Lateral E/e': 5.5  Medial E/e': 11.9     ______________________________________________________________________________  Report  approved by: Leonela Cheung 12/15/2022 03:02 PM         NM Lexiscan stress test (nuc card)     Value    Target     Baseline Systolic     Baseline Diastolic BP 79    Last Stress Systolic     Last Stress Diastolic BP 56    Baseline HR 77    Max HR  123    Max Predicted HR  75    Rate Pressure Product 14,514.0    Narrative       The nuclear stress test is negative for inducible myocardial ischemia   or infarction.     Left ventricular function is normal.     There is no prior study for comparison.     Partially visualized neck uptake. Ultrasound is recommended.           Discharge Medications   Current Discharge Medication List      CONTINUE these medications which have CHANGED    Details   ondansetron (ZOFRAN ODT) 4 MG ODT tab Take 1 tablet (4 mg) by mouth every 6 hours as needed for nausea or vomiting  Qty: 20 tablet, Refills: 0    Associated Diagnoses: Vertigo         CONTINUE these medications which have NOT CHANGED    Details   acetaminophen (TYLENOL) 325 MG tablet Take 3 tablets (975 mg) by mouth every 6 hours as needed for mild pain  Qty: 50 tablet, Refills: 0    Associated Diagnoses: Postmenopausal bleeding      adalimumab (HUMIRA *CF* PEN) 40 MG/0.4ML pen kit INJECT 1 PEN UNDER THE SKIN EVERY 14 DAYS. HOLD FOR SIGNS OF INFECTION, THEN SEEK MEDICAL ATTENTION.  Qty: 2 each, Refills: 5    Associated Diagnoses: Seropositive rheumatoid arthritis of multiple sites (H)      apixaban ANTICOAGULANT (ELIQUIS ANTICOAGULANT) 5 MG tablet Take 1 tablet (5 mg) by mouth 2 times daily  Qty: 180 tablet, Refills: 3    Associated Diagnoses: Paroxysmal atrial fibrillation (H)      cetirizine (ZYRTEC) 10 MG tablet Take 10 mg by mouth daily      diltiazem ER COATED BEADS (CARDIZEM CD/CARTIA XT) 180 MG 24 hr capsule Take 2 capsules (360 mg) by mouth daily  Qty: 180 capsule, Refills: 3    Associated Diagnoses: Paroxysmal atrial fibrillation (H); Morbid obesity (H); Obstructive sleep apnea syndrome; Essential  hypertension      EPINEPHrine (ANY BX GENERIC EQUIV) 0.3 MG/0.3ML injection 2-pack Inject 0.3 mg into the muscle as needed for anaphylaxis       flecainide (TAMBOCOR) 50 MG tablet Take 1 tablet (50 mg) by mouth daily as needed (atrial fibrillation)  Qty: 20 tablet, Refills: 4    Associated Diagnoses: Paroxysmal atrial fibrillation (H)      hydrOXYzine (ATARAX) 25 MG tablet Take 1 tablet (25 mg) by mouth 3 times daily as needed for anxiety  Qty: 270 tablet, Refills: 3    Associated Diagnoses: Anxiety      meclizine (ANTIVERT) 25 MG tablet Take 1 tablet (25 mg) by mouth 3 times daily as needed for dizziness  Qty: 30 tablet, Refills: 0      Multiple Vitamins-Minerals (CENTROVITE) TABS TAKE 1 TABLET BY MOUTH EVERY DAY FOR 30 DAYS      omeprazole (PRILOSEC) 40 MG DR capsule Take 1 capsule (40 mg) by mouth in the morning.  Qty: 90 capsule, Refills: 3    Associated Diagnoses: Dysphagia, unspecified type      polyethylene glycol (MIRALAX) 17 GM/Dose powder Take 17 g (1 capful) by mouth daily  Qty: 578 g, Refills: 3    Associated Diagnoses: Constipation, unspecified constipation type      prazosin (MINIPRESS) 1 MG capsule Take 1 capsule by mouth nightly as needed       traZODone (DESYREL) 50 MG tablet Take 0.5 tablets (25 mg) by mouth At Bedtime  Qty: 90 tablet, Refills: 0    Associated Diagnoses: Primary insomnia      vitamin C (ASCORBIC ACID) 1000 MG TABS Take 1,000 mg by mouth daily       vitamin D3 (CHOLECALCIFEROL) 250 mcg (86144 units) capsule Take 1 capsule by mouth once a week           Allergies   Allergies   Allergen Reactions     Penicillins Shortness Of Breath, Palpitations, Dizziness, Anaphylaxis, Hives, Itching and Rash     Test in 2031, see allergy note on 7/29/21     Shellfish-Derived Products Anaphylaxis     Sulfa Drugs Hives and Anaphylaxis

## 2022-12-15 NOTE — ED NOTES
PT A&Ox3. Breathing regular and unlabroed. Circulation warm and dry. PT states falling around 0400 and hitting back of head on wall. PT states c-spine tenderness with movement and a HA she states she has had the past week. PT states feeling more fatigued than normal the past few days. Full strength and movement bilateral upper and lower extremities. PT pupils round and reactive. PT stating pain 5/10 in head. On monitor with call light in reach.

## 2022-12-15 NOTE — ED NOTES
"     Emergency Department Patient Sign-out       Brief HPI and ED course:  Patient is a 57 year old female signed out to me by Dr. Orozco.  See initial ED Provider note for details of the presentation.     Vitals:   Patient Vitals for the past 24 hrs:   BP Temp Temp src Pulse Resp SpO2 Height Weight   12/15/22 0844 135/83 -- -- 75 19 95 % -- --   12/15/22 0732 138/84 98.1  F (36.7  C) Oral 74 16 94 % -- --   12/15/22 0618 (!) 146/95 -- -- 87 -- 98 % -- --   12/15/22 0555 (!) 186/100 98.5  F (36.9  C) Oral 91 20 97 % 1.727 m (5' 8\") 126.4 kg (278 lb 10.6 oz)       Received Sign-out Plan:    Pending studies include: CT scan and labs    Plan:   -Reassessed patient after CT and labs are back, if negative, consider ops admission for cardiac work-up and PT/vestibular follow-up if cardiac work-up negative    Events after assuming care:  After care was assumed, a focused history and physical was performed. Agree with findings relayed by previous provider.     Results for orders placed or performed during the hospital encounter of 12/15/22   XR Chest 2 Views     Status: None (Preliminary result)    Impression    RESIDENT PRELIMINARY INTERPRETATION  Impression: No acute cardiopulmonary findings.   CT Head w/o Contrast     Status: None    Narrative    EXAM: CT HEAD W/O CONTRAST  12/15/2022 7:59 AM     HISTORY:  Fall backwards with head injury on Eliquis       COMPARISON:  CT 12/8/2022, MRI 12/11/2022    TECHNIQUE: Using multidetector thin collimation helical acquisition  technique, axial, coronal and sagittal CT images from the skull base  to the vertex were obtained without intravenous contrast.   (topogram) image(s) also obtained and reviewed.    FINDINGS:  No acute intracranial hemorrhage, mass effect, or midline shift. No  acute loss of gray-white matter differentiation in the cerebral  hemispheres. Ventricles are proportionate to the cerebral sulci. Clear  basal cisterns.    The bony calvaria and the bones of the " skull base are normal. The  visualized portions of the paranasal sinuses and mastoid air cells are  clear. Grossly normal orbits.       Impression    IMPRESSION: No acute intracranial pathology.     I have personally reviewed the examination and initial interpretation  and I agree with the findings.    TACOS BARCLAY MD         SYSTEM ID:  [id number]   Comprehensive metabolic panel     Status: Abnormal   Result Value Ref Range    Sodium 139 136 - 145 mmol/L    Potassium 3.9 3.4 - 5.3 mmol/L    Chloride 103 98 - 107 mmol/L    Carbon Dioxide (CO2) 28 22 - 29 mmol/L    Anion Gap 8 7 - 15 mmol/L    Urea Nitrogen 11.5 6.0 - 20.0 mg/dL    Creatinine 0.68 0.51 - 0.95 mg/dL    Calcium 9.1 8.6 - 10.0 mg/dL    Glucose 119 (H) 70 - 99 mg/dL    Alkaline Phosphatase 112 (H) 35 - 104 U/L    AST 17 10 - 35 U/L    ALT 19 10 - 35 U/L    Protein Total 7.5 6.4 - 8.3 g/dL    Albumin 3.8 3.5 - 5.2 g/dL    Bilirubin Total 0.3 <=1.2 mg/dL    GFR Estimate >90 >60 mL/min/1.73m2   INR     Status: Normal   Result Value Ref Range    INR 1.13 0.85 - 1.15   Partial thromboplastin time     Status: Normal   Result Value Ref Range    aPTT 31 22 - 38 Seconds   Troponin T, High Sensitivity     Status: Normal   Result Value Ref Range    Troponin T, High Sensitivity <6 <=14 ng/L   UA with Microscopic reflex to Culture     Status: Abnormal    Specimen: Urine, Clean Catch   Result Value Ref Range    Color Urine Light Yellow Colorless, Straw, Light Yellow, Yellow    Appearance Urine Clear Clear    Glucose Urine Negative Negative mg/dL    Bilirubin Urine Negative Negative    Ketones Urine Negative Negative mg/dL    Specific Gravity Urine 1.015 1.003 - 1.035    Blood Urine Trace (A) Negative    pH Urine 6.5 5.0 - 7.0    Protein Albumin Urine 20 (A) Negative mg/dL    Urobilinogen Urine Normal Normal, 2.0 mg/dL    Nitrite Urine Negative Negative    Leukocyte Esterase Urine Negative Negative    Mucus Urine Present (A) None Seen /LPF    RBC Urine 2 <=2 /HPF     WBC Urine 1 <=5 /HPF    Squamous Epithelials Urine 1 <=1 /HPF    Granular Casts Urine 1 (H) None Seen /LPF    Narrative    Urine Culture not indicated   CBC with platelets and differential     Status: Abnormal   Result Value Ref Range    WBC Count 9.0 4.0 - 11.0 10e3/uL    RBC Count 4.68 3.80 - 5.20 10e6/uL    Hemoglobin 12.0 11.7 - 15.7 g/dL    Hematocrit 38.6 35.0 - 47.0 %    MCV 83 78 - 100 fL    MCH 25.6 (L) 26.5 - 33.0 pg    MCHC 31.1 (L) 31.5 - 36.5 g/dL    RDW 14.6 10.0 - 15.0 %    Platelet Count 331 150 - 450 10e3/uL    % Neutrophils 69 %    % Lymphocytes 22 %    % Monocytes 7 %    % Eosinophils 2 %    % Basophils 0 %    % Immature Granulocytes 0 %    NRBCs per 100 WBC 0 <1 /100    Absolute Neutrophils 6.1 1.6 - 8.3 10e3/uL    Absolute Lymphocytes 2.0 0.8 - 5.3 10e3/uL    Absolute Monocytes 0.6 0.0 - 1.3 10e3/uL    Absolute Eosinophils 0.2 0.0 - 0.7 10e3/uL    Absolute Basophils 0.0 0.0 - 0.2 10e3/uL    Absolute Immature Granulocytes 0.0 <=0.4 10e3/uL    Absolute NRBCs 0.0 10e3/uL   EKG 12-lead, tracing only     Status: None (Preliminary result)   Result Value Ref Range    Systolic Blood Pressure  mmHg    Diastolic Blood Pressure  mmHg    Ventricular Rate 75 BPM    Atrial Rate 75 BPM    WV Interval 160 ms    QRS Duration 88 ms     ms    QTc 439 ms    P Axis 56 degrees    R AXIS 22 degrees    T Axis 29 degrees    Interpretation ECG Sinus rhythm  Normal ECG      CBC with platelets differential     Status: Abnormal    Narrative    The following orders were created for panel order CBC with platelets differential.  Procedure                               Abnormality         Status                     ---------                               -----------         ------                     CBC with platelets and d...[[id number]]  Abnormal            Final result                 Please view results for these tests on the individual orders.       CT is negative for bleed or other acute process.  I reviewed MRI  results from a few days ago and this demonstrated no evidence of stroke or other acute cerebral pathology.  Initial troponin is negative.  Chest x-ray without signs of acute cardiopulmonary pathology.  I discussed these results with patient and she is comfortable with observation admission for cardiac work-up and referral to vestibular therapy if cardiac work-up is negative.  I discussed this with the JAKY in the observation unit also.     --  Ignacio Card MD   Emergency Medicine       Ignacio Card MD  12/15/22 [id number]

## 2022-12-15 NOTE — PROGRESS NOTES
Pt here for Lexiscan nuclear stress test.  Medication and side effects reviewed with patient. Lung sounds clear to auscultation bilaterally.  Denied caffeine use.  Patient tolerated Lexiscan dose without any adverse reactions.  VSS.  Monitored post injection and then taken to nuclear medicine for follow up imaging.    Barbara Rojas RN

## 2022-12-15 NOTE — PROGRESS NOTES
Reviewed AVS with Pt. Discharge medications sent to home pharmacy. PIV removed. Pt discharging via [employer].

## 2022-12-16 NOTE — PLAN OF CARE
Physical Therapy Discharge Summary    Reason for therapy discharge:    Discharged to home with outpatient therapy.    Progress towards therapy goal(s). See goals on Care Plan in [facility] electronic health record for goal details.  Goals partially met.  Barriers to achieving goals:   discharge from facility.    Therapy recommendation(s):    Continued therapy is recommended.  Rationale/Recommendations:  OP PT.

## 2022-12-17 ENCOUNTER — PATIENT OUTREACH (OUTPATIENT)
Dept: CARE COORDINATION | Facility: CLINIC | Age: 57
End: 2022-12-17

## 2022-12-17 NOTE — PROGRESS NOTES
Clinic Care Coordination Contact  [facility]: Post-Discharge Note  SITUATION                                                      Admission:    Admission Date: 12/15/22   Reason for Admission: Injury of head, initial encounter    Dizziness    Chest pain, unspecified type    Vertigo  Discharge:   Discharge Date: 12/15/22  Discharge Diagnosis: Injury of head, initial encounter    Dizziness    Chest pain, unspecified type    Vertigo    BACKGROUND                                                      Per hospital discharge summary and inpatient provider notes:  57-year-old female presents to us with a few different complaints.  She has been having episodes of dizziness which caused her to have a fall today.  Differential on this includes not limited to coronary artery disease, dysrhythmia, head injury, intercranial bleeding.  This chest and arm pressure also may be cardiac in nature.  EKG labs and CT head were ordered.  Labs were ordered previous records and vital signs were reviewed.  Patient care will be signed out to the oncoming physician  I have reviewed the nursing notes. I have reviewed the findings, diagnosis, plan and need for follow up with the patient.    ASSESSMENT           Discharge Assessment  How are you doing now that you are home?: Pt reports she is well. No questions about discharge instructions.  How are your symptoms? (Red Flag symptoms escalate to triage hotline per guidelines): Improved  Do you feel your condition is stable enough to be safe at home until your provider visit?: Yes  Does the patient have their discharge instructions? : Yes  Does the patient have questions regarding their discharge instructions? : No  Were you started on any new medications or were there changes to any of your previous medications? : Yes  Does the patient have all of their medications?: Yes  Do you have questions regarding any of your medications? : No  Do you have all of your needed medical supplies or  equipment (DME)?  (i.e. oxygen tank, CPAP, cane, etc.): Yes  Discharge follow-up appointment scheduled within 14 calendar days? : Yes  Discharge Follow Up Appointment Date: 12/21/22  Discharge Follow Up Appointment Scheduled with?: Specialty Care Provider              PLAN                                                      Outpatient Plan:  DEC  27  2022  Adult Psychotherapy with SHERRIE Andrade  Tuesday December 27 10:45 AM    Future Appointments   Date Time Provider Department Center   12/21/2022  7:00 AM Arely Jimenez OT [facility]   12/27/2022 11:00 AM BackMariana alfaro LP[facility]   1/10/2023  8:00 AM BackMariana alfaro [facility]   1/18/2023  7:00 AM Arely Jimenez OT [facility]   1/25/2023  7:00 AM Arely Jimenez OT [facility]   1/26/2023 11:00 AM Mariana Love [facility]   2/14/2023  2:30 PM Medhat Lester MBBS MDRHEU [facility]         For any urgent concerns, please contact our 24 hour nurse triage line: [phone] ([phone])         MANUEL Wilhelm  Connected Care Resource Center  [facility]     *Connected Care Resource Team does NOT follow patient ongoing. Referrals are identified based on internal discharge reports and the outreach is to ensure patient has an understanding of their discharge instructions.

## 2022-12-19 NOTE — TELEPHONE ENCOUNTER
Pt is scheduled. [employer] msg sent to pt regarding this.       Ej Bull Jr., CMA on 12/19/2022 at 11:54 AM

## 2022-12-20 ENCOUNTER — TELEPHONE (OUTPATIENT)
Dept: RHEUMATOLOGY | Facility: CLINIC | Age: 57
End: 2022-12-20

## 2022-12-20 ENCOUNTER — APPOINTMENT (OUTPATIENT)
Dept: MRI IMAGING | Facility: HOSPITAL | Age: 57
End: 2022-12-20
Attending: EMERGENCY MEDICINE
Payer: COMMERCIAL

## 2022-12-20 ENCOUNTER — HOSPITAL ENCOUNTER (EMERGENCY)
Facility: HOSPITAL | Age: 57
Discharge: HOME | End: 2022-12-20
Attending: EMERGENCY MEDICINE | Admitting: EMERGENCY MEDICINE
Payer: COMMERCIAL

## 2022-12-20 VITALS
RESPIRATION RATE: 16 BRPM | HEIGHT: 68 IN | TEMPERATURE: 98.3 F | HEART RATE: 79 BPM | BODY MASS INDEX: 42.71 KG/M2 | DIASTOLIC BLOOD PRESSURE: 75 MMHG | SYSTOLIC BLOOD PRESSURE: 150 MMHG | WEIGHT: 281.8 LBS | OXYGEN SATURATION: 98 %

## 2022-12-20 DIAGNOSIS — R20.2 PARESTHESIAS: ICD-10-CM

## 2022-12-20 DIAGNOSIS — M54.2 NECK PAIN: ICD-10-CM

## 2022-12-20 PROCEDURE — 250N000013 HC RX MED GY IP 250 OP 250 PS 637: Performed by: EMERGENCY MEDICINE

## 2022-12-20 PROCEDURE — 99284 EMERGENCY DEPT VISIT MOD MDM: CPT | Mod: 25

## 2022-12-20 PROCEDURE — 72141 MRI NECK SPINE W/O DYE: CPT

## 2022-12-20 PROCEDURE — 70551 MRI BRAIN STEM W/O DYE: CPT

## 2022-12-20 RX ORDER — LORAZEPAM 0.5 MG/1
0.5 TABLET ORAL EVERY 6 HOURS PRN
Qty: 6 TABLET | Refills: 0 | Status: SHIPPED | OUTPATIENT
Start: 2022-12-20 | End: 2023-02-05

## 2022-12-20 RX ORDER — DIAZEPAM 5 MG
5 TABLET ORAL ONCE
Status: COMPLETED | OUTPATIENT
Start: 2022-12-20 | End: 2022-12-20

## 2022-12-20 RX ADMIN — DIAZEPAM 5 MG: 5 TABLET ORAL at 15:37

## 2022-12-20 ASSESSMENT — ENCOUNTER SYMPTOMS
FACIAL ASYMMETRY: 0
CHILLS: 0
NUMBNESS: 1
CONFUSION: 0
DIZZINESS: 1
SPEECH DIFFICULTY: 0
VOMITING: 0
SHORTNESS OF BREATH: 0
WEAKNESS: 1
NECK PAIN: 1
FEVER: 0
NAUSEA: 0

## 2022-12-20 ASSESSMENT — ACTIVITIES OF DAILY LIVING (ADL): ADLS_ACUITY_SCORE: 35

## 2022-12-20 NOTE — DISCHARGE INSTRUCTIONS
You were seen here today for evaluation of neck pain and arm weakness.  Your MRIs today are normal with no evidence of stroke, bleeding in your brain, or significant nerve impingement.  You have some mild arthritic changes from C5-C7 but nothing significant.    Try taking the Ativan for your vertigo.  This is a sedating medication so do not drink alcohol, drive, or operate machinery while taking this medicine.    Follow-up with your primary care provider and physical therapy tomorrow as planned.  Return here for any new or worsening symptoms including severe pain, fevers, confusion, vomiting, worsening weakness, difficulty speaking, facial droop, chest pain, or shortness of breath.

## 2022-12-20 NOTE — ED NOTES
"ED Triage Provider Note  [facility] EMERGENCY DEPARTMENT  Encounter Date: Dec 20, 2022    History:  Chief Complaint   Patient presents with     Right Arm Numbness     Neck Pain     Alina Martell is a 57 year old female who presents to the ED for evaluation of postconcussive headache and new right upper extremity loss of sensation and weakness.  She is on a NOAC for atrial fibrillation.  She had a recent fall with head injury.  Imaging negative at that time    Review of Systems:  No nausea    Exam:  BP (!) 188/98   Pulse 95   Temp 98.3  F (36.8  C) (Oral)   Resp 20   Ht 1.727 m (5' 8\")   Wt 127.8 kg (281 lb 12.8 oz)   LMP 11/14/2022   SpO2 97%   BMI 42.85 kg/m    General: No acute distress. Appears stated age.   Cardio: normal rate, extremities well perfused  Resp: Normal work of breathing, grossly normal respiratory rate  Neuro: Alert. Facial movement grossly symmetric. Grossly intact strength.   Weak right  strength and some subjective decrease sensation    Medical Decision Making:  Patient arriving to the ED with problem as above. A medical screening exam was performed.     Postconcussive headache  Cervical radiculopathy     orders initiated from Triage. The patient is most appropriate to return to the waiting room.       Delvis Rodrigez MD  12/20/2022 at 3:03 PM     Delvis Rodrigez MD  12/20/22 1505    "

## 2022-12-20 NOTE — ED PROVIDER NOTES
EMERGENCY DEPARTMENT ENCOUNTER      NAME: Alina Martell  AGE: 57 year old female  YOB: 1965  MRN: [id number]  EVALUATION DATE & TIME: 12/20/2022  4:13 PM    PCP: Estelle Bansal    ED PROVIDER: Nieves James PA-C      Chief Complaint   Patient presents with     Right Arm Numbness     Neck Pain         FINAL IMPRESSION:  1. Neck pain    2. Paresthesias          ED COURSE & MEDICAL DECISION MAKING:    Pertinent Labs & Imaging studies reviewed. (See chart for details)    57 year old female presents to the Emergency Department for evaluation of neck pain and paresthesias in the arm.    Physical exam is remarkable for a well-appearing female who is in no acute distress.  Heart and lung sounds are clear diffusely throughout.  No midline spinal tenderness or step-offs.  No focal neurologic deficits, cranial nerves III through XII appear grossly intact.  Strength is 5 out of 5 in the upper and lower extremities bilaterally.  Vital signs remarkable for mild hypertension but otherwise stable and she is afebrile.    MRI of the brain without contrast is normal.  MRI of the cervical spine shows some mild arthritic changes at C5-C7 but no significant stenosis or nerve impingement is noted.    I do not think any further emergent labs or imaging are indicated at this time.  The patient does not have any focal neurologic deficits on my exam and her work-up is overall very reassuring.  She has had an MRA of the brain and neck within the last 10 days which was unremarkable and there is no evidence of stroke on her MRI today.  She has had an extensive cardiac work-up within the last 10 days as well including stress testing with no abnormal findings.  The cause of her symptoms is not entirely clear at this time but I think she is safe for discharge home.  Patient is still very bothered by her vertigo symptoms, her symptoms do sound consistent with BPPV and she has been taking meclizine at home without  improvement.  I will prescribe her Ativan to try at home, she has PT appointment for vertigo tomorrow which I encouraged her to keep.  Advised her to follow-up with her primary care provider for recheck and return here for any new or worsening symptoms.  The patient is agreeable with this treatment plan and verbalized her understanding.    Medical Decision Making    Supplemental history from: Documented in HPI, if applicable    External Record(s) Reviewed: Outpatient Record: Multiple ED visits here and at [facility].    Differential Diagnosis: See MDM charting for differential considered.     I performed an independent interpretation of the: N/A    Discussed with radiology regarding test interpretation: N/A    Discussion of management with another provider: See chart documentation, if applicable    The following testing was considered but ultimately not selected: Labs: screening labs but deferred to recent lab workups.    I considered prescription management with: Symptomatic Management    The patient's care impacted: None    Consideration of Admission/Observation: No    Care significantly affected by Social Determinants of Health including: N/A    ED Course   4:36 PM I went to evaluate the patient, she was at MRI. Will attempt again later.  5:21 PM Rechecked patient and gathered history. I discussed the plan for discharge with the patient or family and they are agreeable.. We discussed supportive cares at home and reasons for return to the ER including new or worsening symptoms - all questions and concerns addressed. Patient to be discharged by RN.    At the conclusion of the encounter I discussed the results of all of the tests and the disposition. The questions were answered. The patient or family acknowledged understanding and was agreeable with the care plan.     Voice recognition software was used in the creation of this note. Any grammatical or nonsensical errors are due to inherent errors with the software and are  not the intention of the writer.     MEDICATIONS GIVEN IN THE EMERGENCY:  Medications   diazepam (VALIUM) tablet 5 mg (5 mg Oral Given 12/20/22 1537)       NEW PRESCRIPTIONS STARTED AT TODAY'S ER VISIT  New Prescriptions    LORAZEPAM (ATIVAN) 0.5 MG TABLET    Take 1 tablet (0.5 mg) by mouth every 6 hours as needed (vertigo)            =================================================================    HPI    Patient information was obtained from: Patient    Use of : N/A         Alina Martell is a 57 year old female with PMH of afib on Eliquis, RA on [facility] [[email or web address]] who presents to the ED via walk-in for evaluation of arm pain/numbness.    Per chart review, the patient was seen here at [facility] ER on 12/08/22 for evaluation of chest pain and thigh numbness. Evaluation was unremarkable with reassuring CMP, troponin, and magnesium. CT head was unremarkable. Patient diagnosed with atypical chest pain and meralgia paresthetica and discharged home in stable condition. The patient was then seen again at [facility] ER on 12/11/22 for evaluation of room spinning dizziness and headache. Lab work was reassuring; MRI of the brain with and without contrast and MRA of the brain/COW was unremarkable. Patient was discharged with diagnosis of BPPV in stable condition. The patient then presented to [facility] ED on 12/15/22 for left sided arm pain, chest pain, and a fall. CT of the head and labwork including troponin largely unremarkable. Patient had a NM cardiac stress test and CT scan which were both unremarkable, no evidence of inducible ischemia. The patient was given meclizine and discharged home in stable condition.     The patient reports that yesterday into today, she noticed intermittent generalized weakness in her right arm and tingling in the arm.  She has ongoing symptoms of vertigo which is worse when she lays flat or sits back.  It is better when she lays on her right side or sits straight up.  She has also had some tingling in  "her right leg and states that when this occurs, it first starts by \"getting very cold then hot when it is over.\"  She also has some neck pain on the left side of her neck which she attributes to her fall 5 days ago.  She notes a history of heart problems and is concerned her symptoms could represent a heart problem or stroke.  She has been taking meclizine and Tylenol without improvement in her symptoms.  She notes she has a PT appointment tomorrow for her vertigo.    She denies any visual disturbance, facial droop, difficulty sleeping, syncope, shortness of breath, fevers, or chills.    REVIEW OF SYSTEMS   Review of Systems   Constitutional: Negative for chills and fever.   Eyes: Negative for visual disturbance.   Respiratory: Negative for shortness of breath.    Cardiovascular: Positive for chest pain (Chronic).   Gastrointestinal: Negative for nausea and vomiting.   Musculoskeletal: Positive for neck pain.   Neurological: Positive for dizziness, weakness (Right arm) and numbness (Tingling in right arm and leg). Negative for syncope, facial asymmetry and speech difficulty.   Psychiatric/Behavioral: Negative for confusion.       All other systems reviewed and are negative unless noted in HPI.      PAST MEDICAL HISTORY:  Past Medical History:   Diagnosis Date     Anemia      Antiplatelet or antithrombotic long-term use      Arrhythmia      Cannabis use without complication 12/8/2014     Carpal tunnel syndrome      Coronary artery disease      Gastroesophageal reflux disease      History of angina      Hypertension      Irregular heart beat      Obesity      Paroxysmal atrial fibrillation (H)      PTSD (post-traumatic stress disorder)      RA (rheumatoid arthritis) (H)      Rheumatoid arthritis (H) 7/8/2016     Sicca syndrome (H)      Sleep apnea        PAST SURGICAL HISTORY:  Past Surgical History:   Procedure Laterality Date     CHOLECYSTECTOMY       DILATION AND CURETTAGE, OPERATIVE HYSTEROSCOPY, COMBINED N/A " 3/3/2022    Procedure: HYSTEROSCOPY, WITH DILATION AND CURETTAGE;  Surgeon: Irma Cary MD;  Location: [place] [facility] OR     EP ABLATION FOCAL AFIB N/A 6/30/2022    Procedure: Ablation Atrial Fibrilation;  Surgeon: Jose Guadalupe Jospeh MD;  Location:  [facility]ATH LAB     HC REMOVAL GALLBLADDER      Description: Cholecystectomy;  Recorded: 10/15/2013;     LAPAROSCOPIC TUBAL LIGATION       RELEASE CARPAL TUNNEL BILATERAL       TUBAL LIGATION  1995       CURRENT MEDICATIONS:    LORazepam (ATIVAN) 0.5 MG tablet  acetaminophen (TYLENOL) 325 MG tablet  adalimumab (HUMIRA *CF* PEN) 40 MG/0.4ML pen kit  apixaban ANTICOAGULANT (ELIQUIS ANTICOAGULANT) 5 MG tablet  cetirizine (ZYRTEC) 10 MG tablet  diltiazem ER COATED BEADS (CARDIZEM CD/CARTIA XT) 180 MG 24 hr capsule  EPINEPHrine (ANY BX GENERIC EQUIV) 0.3 MG/0.3ML injection 2-pack  flecainide (TAMBOCOR) 50 MG tablet  hydrOXYzine (ATARAX) 25 MG tablet  meclizine (ANTIVERT) 25 MG tablet  Multiple Vitamins-Minerals (CENTROVITE) TABS  omeprazole (PRILOSEC) 40 MG DR capsule  ondansetron (ZOFRAN ODT) 4 MG ODT tab  polyethylene glycol (MIRALAX) 17 GM/Dose powder  prazosin (MINIPRESS) 1 MG capsule  traZODone (DESYREL) 50 MG tablet  vitamin C (ASCORBIC ACID) 1000 MG TABS  vitamin D3 (CHOLECALCIFEROL) 250 mcg (66132 units) capsule        ALLERGIES:  Allergies   Allergen Reactions     Penicillins Shortness Of Breath, Palpitations, Dizziness, Anaphylaxis, Hives, Itching and Rash     Test in 2031, see allergy note on 7/29/21     Shellfish-Derived Products Anaphylaxis     Sulfa Drugs Hives and Anaphylaxis       FAMILY HISTORY:  Family History   Problem Relation Age of Onset     Crohn's Disease Mother         Total colectomy with ileostomy.     Atrial fibrillation Mother      Snoring Father      Diabetes Father      Prostate Cancer Father      Chronic Kidney Disease Father         Chose against dialysis.     Substance Abuse Brother      Depression Brother       "Attention Deficit Disorder Brother      Alcoholism Brother      Arrhythmia Brother      Alcoholism Brother      Substance Abuse Brother      Arrhythmia Brother      Alcoholism Maternal Grandfather      No Known Problems Daughter      No Known Problems Son      Lupus Cousin      Breast Cancer No family hx of        SOCIAL HISTORY:   Social History     Socioeconomic History     Marital status: Single     Number of children: 2     Years of education: 4   Tobacco Use     Smoking status: Never     Smokeless tobacco: Never     Tobacco comments:     smokes marijuana   Substance and Sexual Activity     Alcohol use: Not Currently     Comment: Alcoholic Drinks/day: Never an issue, she states.  7/8/16     Drug use: Not Currently     Comment: Drug use: Former marijuana use, not current. 7/8/16     Sexual activity: Never     Partners: Male     Birth control/protection: Other     Comment: tubal ligation       VITALS:  Patient Vitals for the past 24 hrs:   BP Temp Temp src Pulse Resp SpO2 Height Weight   12/20/22 1745 (!) 150/75 -- -- 79 -- 98 % -- --   12/20/22 1738 (!) 152/78 -- -- 81 -- 99 % -- --   12/20/22 1540 (!) 156/86 -- -- 84 16 97 % -- --   12/20/22 1457 (!) 188/98 98.3  F (36.8  C) Oral 95 20 97 % 1.727 m (5' 8\") 127.8 kg (281 lb 12.8 oz)       PHYSICAL EXAM    VITAL SIGNS: BP (!) 150/75   Pulse 79   Temp 98.3  F (36.8  C) (Oral)   Resp 16   Ht 1.727 m (5' 8\")   Wt 127.8 kg (281 lb 12.8 oz)   LMP 11/14/2022   SpO2 98%   BMI 42.85 kg/m    General Appearance: Alert, cooperative, normal speech and facial symmetry, appears stated age, the patient does not appear in distress  Head:  Normocephalic, without obvious abnormality, atraumatic  Eyes: Conjunctiva/corneas clear, EOM's intact, no nystagmus, PERRL  ENT:  Lips, mucosa, and tongue normal; teeth and gums normal, no pharyngeal inflammation, no dysphonia or difficulty swallowing, membranes are moist without pallor  Cardio:  Regular rate and rhythm, S1 and S2 " normal, no murmur, rub    or gallop, 2+ pulses symmetric in all extremities  Pulm:  Clear to auscultation bilaterally, respirations unlabored with no accessory muscle use  Abdomen:  Abdomen is soft, non-distended with no tenderness to palpation, rebound tenderness, or guarding.   Back: No midline tenderness or step-offs, no CVA tenderness  Extremities:  Extremities normal, there is no tenderness to palpation, atraumatic, no cyanosis or edema, full function and range of motion, pulses equal in all extremities, normal cap refill, no joint swelling; strength is 5/5 in the upper and lower extremities bilaterally  Skin: Skin is warm and dry, no rashes or wounds  Neuro: Patient is awake, alert, and responsive to voice. No gross motor weaknesses or sensory loss; moves all extremities. Cranial Nerves:  CN2: No funduscopic exam performed. CN3,4 & 6: Pupillary light response, lateral and vertical gaze normal.  No nystagmus.  CN7: No facial weakness, smile, facial symmetry intact. CN8: Intact to spoken voice. CN9&10: Gag reflex, uvula midline, palate rises with phonation. CN11: Shoulder shrug 5/5 intact bilaterally. CN12: Tongue midline and moves freely from side to side.      LAB:  All pertinent labs reviewed and interpreted.  Labs Ordered and Resulted from Time of ED Arrival to Time of ED Departure - No data to display    RADIOLOGY:  Reviewed all pertinent imaging. Please see official radiology report.  Cervical spine MRI w/o contrast   Final Result   IMPRESSION:   1.  At C4-C5 there is mild to moderate central stenosis. At C6-C7 there is mild central stenosis.   2.  No high-grade foraminal stenosis.      MR Brain w/o Contrast   Final Result   IMPRESSION:   1.  Normal head MRI.            Nieves James PA-C  Emergency Medicine  [facility] EMERGENCY DEPARTMENT  [address] [address] [phone]  Dept: [phone]       Nieves James,  WILL  12/20/22 1805

## 2022-12-20 NOTE — ED TRIAGE NOTES
Patient arrives to triage from home with chief complaint of left sided neck pain and recent right arm numbness.  Patient reports neck pain started a couple of days ago, but suffered a fall on Thursday where she hit her head.  Patient was seen at the [facility] after fall, is on thinners.  Patient reports she had been dizzy x2 weeks and was the cause of her fall.  Reports right arm numbness starting today around 1000, with still ongoing dizziness.  Writer paged for neuro exam, Dr. Rodrigez in triage.  Patient is alert and oriented x4.

## 2022-12-21 ENCOUNTER — HOSPITAL ENCOUNTER (OUTPATIENT)
Dept: OCCUPATIONAL THERAPY | Facility: REHABILITATION | Age: 57
Discharge: HOME | End: 2022-12-21
Attending: PHYSICIAN ASSISTANT
Payer: COMMERCIAL

## 2022-12-21 DIAGNOSIS — R42 VERTIGO: ICD-10-CM

## 2022-12-21 DIAGNOSIS — Z78.9 DECREASED ACTIVITIES OF DAILY LIVING (ADL): ICD-10-CM

## 2022-12-21 DIAGNOSIS — R26.81 UNSTEADINESS: Primary | ICD-10-CM

## 2022-12-21 DIAGNOSIS — R42 DIZZINESS: ICD-10-CM

## 2022-12-21 PROCEDURE — 97165 OT EVAL LOW COMPLEX 30 MIN: CPT | Mod: GO | Performed by: OCCUPATIONAL THERAPIST

## 2022-12-21 PROCEDURE — 97112 NEUROMUSCULAR REEDUCATION: CPT | Mod: GO | Performed by: OCCUPATIONAL THERAPIST

## 2022-12-21 NOTE — PROGRESS NOTES
"   12/21/22 0700   Quick Adds   Quick Adds Vestibular Eval   Type of Visit Initial Outpatient Occupational Therapy Evaluation   General Information   Start Of Care Date 12/21/22   Referring Physician Dr. Estelle Bansal   Orders Evaluate and treat as indicated   Orders Date 12/15/22   Medical Diagnosis dizziness   Onset of Illness/Injury or Date of Surgery 12/15/22   Precautions/Limitations Fall precautions   Surgical/Medical History Reviewed Yes   Additional Occupational Profile Info/Pertinent History of Current Problem Per EMR ED note on 12-21-22, \"patient does not have any focal neurologic deficits on my exam and her work-up is overall very reassuring.  She has had an MRA of the brain and neck within the last 10 days which was unremarkable and there is no evidence of stroke on her MRI today.  She has had an extensive cardiac work-up within the last 10 days as well including stress testing with no abnormal findings.  The cause of her symptoms is not entirely clear at this time but I think she is safe for discharge home.  Patient is still very bothered by her vertigo symptoms, her symptoms do sound consistent with BPPV and she has been taking meclizine at home without improvement.\" Patient reports that she continues to have vertigo, nausea, unsteadiness, left UE and general weakness, numbness and tingling on right UE. Patient denies hearing changes.   Pain   Patient currently in pain Yes   Pain location headache   Pain rating 5/10   Fall Risk Screen   Fall screen completed by OT   Have you fallen 2 or more times in the past year? No   Have you fallen and had an injury in the past year? Yes   Is patient a fall risk? Yes   Fall screen comments Patient has dizziness and is being evaluated for this today   Abuse Screen (yes response referral indicated)   Feels Unsafe at Home or Work/School no   Feels Threatened by Someone no   Does Anyone Try to Keep You From Having Contact with Others or Doing Things Outside Your Home? " no   Physical Signs of Abuse Present no   Patient needs abuse support services and resources No   Visual Perception   Visual Perception Comments Patient reports no deficits   Oculomotor Exam   Smooth Pursuit Normal   Saccades Comments Normal   Infrared Goggle Exam or Frenzel Lense Exam   Vestibular Suppressant in Last 24 Hours? No   Exam completed with Infrared Goggles   Spontaneous Nystagmus Negative   Gaze Evoked Nystagmus Negative   Head Shake Horizontal Nystagmus Negative   Head Shake Horizontal Nystagmus Comments patient reports dizziness   Mariama-Hallpike (right) Negative   Darwin-Hallpike (Left) Negative   HSCC Supine Roll Test (Right) Negative   HSCC Supine Roll Test (Left) Negative   Planned Therapy Interventions   Planned Therapy Interventions Neuromuscular re-education   Intervention Comments canalith maneuvers    OT Goal 1   Goal Description Patient will be able to bend to get dressed without dizziness   Target Date 02/19/23    OT Goal 2   Goal Description Patient will be able to turn head for conversation without dizziness   Target Date 02/19/23    OT Goal 3   Goal Description Patient will be able to roll in bed without vertigo   Target Date 03/21/23   Clinical Impression   Criteria for Skilled Therapeutic Interventions Met Yes, treatment indicated   OT Diagnosis dizziness, unsteadiness, decreased ADL and IADL function   Clinical Decision Making (Complexity) Low complexity   Therapy Frequency once a week   Predicted Duration of Therapy Intervention (days/wks) 12 weeks   Risks and Benefits of Treatment have been explained. Yes   Patient, Family & other staff in agreement with plan of care Yes   Clinical Impression Comments Patient has symptoms consistent with vestibular dysfunction and would benefit form OT for stated goals and plan of care   Education Assessment   Barriers To Learning No Barriers   Total Evaluation Time   OT Eval, Low Complexity Minutes (42320) 30

## 2022-12-21 NOTE — ED NOTES
ER DISCHARGE NOTE:   Alina Martell MRN: [id number]      Alina Martell is discharged from the emergency room.    (M54.2) Neck pain    (R20.2) Paresthesias        Alina Martell discharged via on foot, alone.   Verbalized understanding of discharge instructions.   Prescriptions: sent with pt.    AVS in hand.

## 2022-12-26 RX ORDER — LORAZEPAM 0.5 MG/1
0.5 TABLET ORAL EVERY 6 HOURS PRN
Qty: 16 TABLET | Refills: 1 | Status: SHIPPED | OUTPATIENT
Start: 2022-12-26 | End: 2023-03-22

## 2022-12-27 ENCOUNTER — VIRTUAL VISIT (OUTPATIENT)
Dept: PSYCHOLOGY | Facility: CLINIC | Age: 57
End: 2022-12-27
Payer: COMMERCIAL

## 2022-12-27 DIAGNOSIS — F33.1 MODERATE EPISODE OF RECURRENT MAJOR DEPRESSIVE DISORDER (H): ICD-10-CM

## 2022-12-27 DIAGNOSIS — F43.10 POSTTRAUMATIC STRESS DISORDER: Primary | ICD-10-CM

## 2022-12-27 DIAGNOSIS — F41.1 GAD (GENERALIZED ANXIETY DISORDER): ICD-10-CM

## 2022-12-27 PROCEDURE — 90834 PSYTX W PT 45 MINUTES: CPT | Mod: 95 | Performed by: COUNSELOR

## 2022-12-29 NOTE — PROGRESS NOTES
[facility]                                     Progress Note    Patient Name: Alina Martell  Date: 12/27/22         Service Type: Individual      Session Start Time: 1103 Session End Time: 1153     Session Length:  50  minutes    Session #: 48    Attendees: Client    Service Modality:  Video Visit:      Telemedicine Visit: The patient's condition can be safely assessed and treated via synchronous audio and visual telemedicine encounter.       Reason for Telemedicine Visit: Services only offered telehealth     Originating Site (Patient Location): Patient's home     Distant Location (provider location):  On-site     Consent:  The patient/guardian has verbally consented to: the potential risks and benefits of telemedicine (video visit) versus in person care; bill my insurance or make self-payment for services provided; and responsibility for payment of non-covered services.      Mode of Communication:  Video Conference via [employer]     As the provider I attest to compliance with applicable laws and regulations related to telemedicine.     DATA  Interactive Complexity: No  Crisis: No        Progress Since Last Session (Related to Symptoms / Goals / Homework):   Symptoms: Worsening anxiety due to health concerns    Homework: Partially completed      Episode of Care Goals: Satisfactory progress - ACTION (Actively working towards change); Intervened by reinforcing change plan / affirming steps taken     Current / Ongoing Stressors and Concerns:  Patient indicated feeling a lot of anxiety related to her health. Patient reported having to go to the emergency room with vertigo. Patient indicated she has been staying at her mom's house until last night. Patient reported she is having a lot of anxiety with sleeping. Patient indicated knowing it is related to her health. Patient reported [place] being fine. This therapist processed with patient having people check in on her. This therapist  processed with patient her emotions related to not seeing her children on [place].      Treatment Objective(s) Addressed in This Session:   identify three distraction and diversion activities and use those activities to decrease level of anxiety    Increase interest, engagement, and pleasure in doing things  Decrease frequency and intensity of feeling down, depressed, hopeless  Improve quantity and quality of night time sleep / decrease daytime naps  Identify negative self-talk and behaviors: challenge core beliefs, myths, and actions  Improve concentration, focus, and mindfulness in daily activities      Intervention:   Motivational Interviewing    MI Intervention: Permission to raise concern or advise     Change Talk Expressed by the Patient: Activation Taking steps    Provider Response to Change Talk: S - Summarized patient's change talk statements      Assessments completed prior to visit:  The following assessments were completed by patient for this visit:  PHQ9:   PHQ-9 SCORE 5/12/2022 6/7/2022 6/21/2022 8/10/2022 9/22/2022 11/3/2022 12/1/2022   PHQ-9 Total Score MyChart 7 (Mild depression) 4 (Minimal depression) 6 (Mild depression) 11 (Moderate depression) 8 (Mild depression) 9 (Mild depression) 9 (Mild depression)   PHQ-9 Total Score 7 4 6 11 8 9 9     GAD7:   ADA-7 SCORE 10/5/2021 11/15/2021 12/13/2021 8/10/2022 9/22/2022 11/3/2022 12/1/2022   Total Score 3 (minimal anxiety) 6 (mild anxiety) - 6 (mild anxiety) 9 (mild anxiety) 10 (moderate anxiety) 8 (mild anxiety)   Total Score 3 6 4 6 9 10 8     PROMIS 10-Global Health (all questions and answers displayed):   PROMIS 10 12/15/2021 3/26/2022 4/14/2022 7/19/2022 11/3/2022 12/1/2022   In general, would you say your health is: Good Good Good Good Good Good   In general, would you say your quality of life is: Good Good Good Fair Good Good   In general, how would you rate your physical health? Good Fair Fair Good Good Fair   In general, how would you rate your  mental health, including your mood and your ability to think? Fair Fair Good Fair Fair Fair   In general, how would you rate your satisfaction with your social activities and relationships? Good Fair Good Good Fair Good   In general, please rate how well you carry out your usual social activities and roles Good Good Fair Good Good Good   To what extent are you able to carry out your everyday physical activities such as walking, climbing stairs, carrying groceries, or moving a chair? Mostly Mostly Mostly Mostly Mostly Mostly   How often have you been bothered by emotional problems such as feeling anxious, depressed or irritable? Often Sometimes Often Often Often Often   How would you rate your fatigue on average? Mild Mild Mild Moderate Mild Mild   How would you rate your pain on average?   0 = No Pain  to  10 = Worst Imaginable Pain 3 3 3 3 2 3   In general, would you say your health is: - 3 3 3 3 3   In general, would you say your quality of life is: - 3 3 2 3 3   In general, how would you rate your physical health? - 2 2 3 3 2   In general, how would you rate your mental health, including your mood and your ability to think? - 2 3 2 2 2   In general, how would you rate your satisfaction with your social activities and relationships? - 2 3 3 2 3   In general, please rate how well you carry out your usual social activities and roles. (This includes activities at home, at work and in your community, and responsibilities as a parent, child, spouse, employee, friend, etc.) - 3 2 3 3 3   To what extent are you able to carry out your everyday physical activities such as walking, climbing stairs, carrying groceries, or moving a chair? - 4 4 4 4 4   In the past 7 days, how often have you been bothered by emotional problems such as feeling anxious, depressed, or irritable? - 3 4 4 4 4   In the past 7 days, how would you rate your fatigue on average? - 2 2 3 2 2   In the past 7 days, how would you rate your pain on average,  where 0 means no pain, and 10 means worst imaginable pain? - 3 3 3 2 3   Global Mental Health Score - 10 11 9 9 10   Global Physical Health Score - 14 14 14 15 14   PROMIS TOTAL - SUBSCORES - 24 25 23 24 24   Some recent data might be hidden         ASSESSMENT: Current Emotional / Mental Status (status of significant symptoms):   Risk status (Self / Other harm or suicidal ideation)   Patient denies current fears or concerns for personal safety.   Patient denies current or recent suicidal ideation or behaviors.   Patient denies current or recent homicidal ideation or behaviors.   Patient denies current or recent self injurious behavior or ideation.   Patient denies other safety concerns.   Patient reports there has been no change in risk factors since their last session.     Patient reports there has been no change in protective factors since their last session.     Recommended that patient call 911 or go to the local ED should there be a change in any of these risk factors.     Appearance:   Appropriate    Eye Contact:   Good    Psychomotor Behavior: Normal    Attitude:   Cooperative    Orientation:   All   Speech    Rate / Production: Normal/ Responsive    Volume:  Normal    Mood:    Anxious    Affect:    Appropriate    Thought Content:  Clear    Thought Form:  Coherent  Goal Directed  Logical    Insight:    Good      Medication Review:   No changes to current psychiatric medication(s)     Medication Compliance:   Yes     Changes in Health Issues:   Yes: See epic     Chemical Use Review:   Substance Use: Chemical use reviewed, no active concerns identified      Tobacco Use: No current tobacco use.      Diagnosis:  1. Posttraumatic stress disorder    2. Moderate episode of recurrent major depressive disorder (H)    3. ADA (generalized anxiety disorder)        Collateral Reports Completed:   Not Applicable    PLAN: (Patient Tasks / Therapist Tasks / Other)  Patient will return in two weeks for scheduled session.  Patient will reach out to friends to spend time with this week. Patient will continue to process information related to her health.     There has been demonstrated improvement in functioning while patient has been engaged in psychotherapy/psychological service- if withdrawn the patient would deteriorate and/or relapse.     Mariana Love, [facility] 12/27/22    ______________________________________________________________________    Individual Treatment Plan    Patient's Name: Alina Martell  YOB: 1965    Date of Creation:10/16/2020  Date Treatment Plan Last Reviewed/Revised: 12/1/2022    DSM5 Diagnoses: 296.32 (F33.1) Major Depressive Disorder, Recurrent Episode, Moderate _ or 300.02 (F41.1) Generalized Anxiety Disorder  Psychosocial / Contextual Factors: Health, family and financial   PROMIS (reviewed every 90 days): 25    Referral / Collaboration:  Was/were discussed and patient will pursue.    Anticipated number of session for this episode of care: 20 will reevaluate every 90 days  Anticipation frequency of session: Biweekly  Anticipated Duration of each session: 38-52 minutes  Treatment plan will be reviewed in 90 days or when goals have been changed.       MeasurableTreatment Goal(s) related to diagnosis / functional impairment(s)  Goal 1: Patient will work on reducing overall anxiety.     I will know I've met my goal when reporting minimal anxiety symptoms.       Objective #A (Patient Action)                          Patient will use distraction each time intrusive worry surfaces.  Status: Continued - Date(s): 12/1/2022     Intervention(s)  Therapist will teach emotional regulation skills. ..     Objective #B  Patient will Decrease frequency and intensity of feeling down, depressed, hopeless.  Status: Continued - Date(s): 12/1/2022     Intervention(s)  Therapist will teach emotional recognition/identification. ..     Objective #C  Patient will Identify negative self-talk and behaviors:  challenge core beliefs, myths, and actions.  Status: Continued - Date(s): 12/1/2022     Intervention(s)  Therapist will teach the client how to perform a behavioral chain analysis. ..     Goal 2: Patient will continue to work on reducing depression symptoms    I will know I've met my goal then reporting minimal depression symptoms     Objective #A (Patient Action)                          Patient will Increase interest, engagement, and pleasure in doing things.  Status: Continued - Date(s):  12/1/2022     Intervention(s)  Therapist will assign homework ..     Objective #B  Patient will Decrease frequency and intensity of feeling down, depressed, hopeless.  Status: Continued - Date(s):  12/1/2022     Intervention(s)  Therapist will assign homework at every session.         Patient has reviewed and agreed to the above plan.        Mariana Love, [facility] 12/1/2022

## 2022-12-30 ENCOUNTER — OFFICE VISIT (OUTPATIENT)
Dept: OTOLARYNGOLOGY | Facility: CLINIC | Age: 57
End: 2022-12-30
Payer: COMMERCIAL

## 2022-12-30 ENCOUNTER — OFFICE VISIT (OUTPATIENT)
Dept: AUDIOLOGY | Facility: CLINIC | Age: 57
End: 2022-12-30
Payer: COMMERCIAL

## 2022-12-30 DIAGNOSIS — R42 DIZZINESS: Primary | ICD-10-CM

## 2022-12-30 DIAGNOSIS — H93.13 TINNITUS OF BOTH EARS: Primary | ICD-10-CM

## 2022-12-30 PROCEDURE — 99243 OFF/OP CNSLTJ NEW/EST LOW 30: CPT | Performed by: OTOLARYNGOLOGY

## 2022-12-30 PROCEDURE — 92557 COMPREHENSIVE HEARING TEST: CPT | Performed by: AUDIOLOGIST

## 2022-12-30 PROCEDURE — 92550 TYMPANOMETRY & REFLEX THRESH: CPT | Performed by: AUDIOLOGIST

## 2022-12-30 NOTE — LETTER
12/30/2022         RE: Alina Martell  [address] [place] [address]        Dear Colleague,    Thank you for referring your patient, Alina Martell, to the [facility]. Please see a copy of my visit note below.    HPI: This patient is a 58yo F who presents for evaluation of dizziness at the request of Dr. Sin. She has been experiencing feelings of dizziness for a few months. She gets lightheaded when she lays back or gets up from being dependent. Had syncope x 1 and hit the back of her head not long ago; has not been referred to neurology. Also has a feeling of not being able to focus her eyes at work. This comes in waves. No ear symptoms.     Past medical history, surgical history, social history, family history, medications, and allergies have been reviewed with the patient and are documented above.    Review of Systems: a 10-system review was performed. Pertinent positives are noted in the HPI and on a separate scanned document in the chart.    PHYSICAL EXAMINATION:  GEN: no acute distress, normocephalic  EYES: extraocular movements are intact, pupils are equal and round. Sclera clear.   EARS: auricles are normally formed. The external auditory canals are clear with minimal to no cerumen. Tympanic membranes are intact bilaterally with no signs of infection, effusion, retractions, or perforations.  NOSE: anterior nares are patent. There are no masses or lesions. The septum is non-obstructing.  OC/OP: clear, dentition is in good repair. The tongue and palate are fully mobile and symmetric. No masses or lesions.  NECK: soft and supple. No lymphadenopathy or masses. Airway is midline.  NEURO: CN VII and XII symmetric. alert and oriented. Gait is normal. No spontaneous or gaze-evoked nystagmus.   PULM: breathing comfortably on room air, normal chest expansion with respiration  CARDS: no cyanosis or clubbing, normal carotid pulses    AUDIOGRAM: normal hearing, type  a tymps, normal WRS    MEDICAL DECISION-MAKING: Maritza is a 58yo F with non-specific dizziness and presyncope/syncope. Based on her symptom description and basically no findings on vestibular eval last week, her issues are not likely to be otologic. She has a history of migraines, so vestibular migraine is not out of the question. Will send for a VNG in addition to making a Neurology referral.         Again, thank you for allowing me to participate in the care of your patient.        Sincerely,        Catia Rolon MD

## 2022-12-30 NOTE — PROGRESS NOTES
AUDIOLOGY REPORT    SUMMARY: Audiology visit completed. See audiogram for results.      RECOMMENDATIONS: Follow-up with ENT.    Chely Castro, CCC-A  Clinical Audiologist  [place] #[id number]

## 2023-01-03 ENCOUNTER — OFFICE VISIT (OUTPATIENT)
Dept: NEUROLOGY | Facility: CLINIC | Age: 58
End: 2023-01-03
Attending: OTOLARYNGOLOGY
Payer: COMMERCIAL

## 2023-01-03 ENCOUNTER — TELEPHONE (OUTPATIENT)
Dept: RHEUMATOLOGY | Facility: CLINIC | Age: 58
End: 2023-01-03

## 2023-01-03 VITALS
SYSTOLIC BLOOD PRESSURE: 150 MMHG | BODY MASS INDEX: 42.57 KG/M2 | HEART RATE: 96 BPM | WEIGHT: 280 LBS | DIASTOLIC BLOOD PRESSURE: 96 MMHG

## 2023-01-03 DIAGNOSIS — R42 DIZZINESS: ICD-10-CM

## 2023-01-03 DIAGNOSIS — H81.90 VESTIBULAR DYSFUNCTION, UNSPECIFIED LATERALITY: Primary | ICD-10-CM

## 2023-01-03 PROCEDURE — 99204 OFFICE O/P NEW MOD 45 MIN: CPT | Performed by: PSYCHIATRY & NEUROLOGY

## 2023-01-03 RX ORDER — TOPIRAMATE 25 MG/1
TABLET, FILM COATED ORAL
Qty: 120 TABLET | Refills: 5 | Status: SHIPPED | OUTPATIENT
Start: 2023-01-03 | End: 2023-10-09

## 2023-01-03 NOTE — TELEPHONE ENCOUNTER
PA Initiation    Medication: Humira - PA Pending  Insurance Company: [employer] - Phone [phone] Fax [phone]  Pharmacy Filling the Rx: [facility] - [place], [place] - [address] [facility]  Filling Pharmacy Phone:    Filling Pharmacy Fax:    Start Date: 1/3/2023    Key: [id number] - CMM stated PA could not be processed electronically, faxed PA request w/chart notes and labs.

## 2023-01-03 NOTE — NURSING NOTE
"Alina Martell is a 57 year old female who presents for:  Chief Complaint   Patient presents with     Neurologic Problem     Dizziness/lightheaded   Numbness/weakness on CLAUDIA arms/legs   Fell 2 wks ago; patient is currently on blood thinner and went to [facility] ED         Initial Vitals:  BP (!) 150/96   Pulse 96   Wt 127 kg (280 lb)   LMP 11/14/2022   BMI 42.57 kg/m   Estimated body mass index is 42.57 kg/m  as calculated from the following:    Height as of 12/20/22: 1.727 m (5' 8\").    Weight as of this encounter: 127 kg (280 lb).. Body surface area is 2.47 meters squared. BP completed using cuff size: wrist cuff    Nursing Comments: Maritza to follow up with Primary Care provider regarding elevated blood pressure.    Severo Pagan  "

## 2023-01-03 NOTE — PROGRESS NOTES
INITIAL NEUROLOGY CONSULTATION    DATE OF VISIT: 1/3/2023  MRN: [id number]  PATIENT NAME: Alina Martell  YOB: 1965    REFERRING PROVIDER: Catia Davis*    Chief Complaint   Patient presents with     Neurologic Problem     Dizziness/lightheaded   Numbness/weakness on LCAUDIA arms/legs   Fell 2 wks ago; patient is currently on blood thinner and went to [facility] ED        SUBJECTIVE:                                                      HPI:  Alina Martell is a 57 year old female whom I have been asked by Dr. Rolon to see in consultation for dizziness.  The patient was seen by Dr. Rolon in ENT just a few days ago for evaluation of the same.  She reported a few months history of dizziness, described as lightheadedness when laying back.  1 syncopal episode.  She had a brain MRI a couple of weeks ago which was normal.  I do not see any PT notes in the chart.  She does have history of migraines so there was some mention of perhaps vestibular migraine playing a role.  She was also referred for VNG.  It does not look like this has yet been done.  She did have a PT evaluation and this did show evidence of vestibular dysfunction, recommended OT.    Maritza does have some additional OT appointments schedu and plans to keep these.  She says she has good insurance and would like to do whatever she can to take care of herself.  She has also been working on losing weight, eating healthier.  She tries to stay away from sugars other than natural sugars are improved.  She does have pressure in her ears, ringing. She has some occasional nasal congestion. Audiogram was completed on 12.30.22, and this was normal.    She says she fell and caused her alarm in the setting of dizziness. She spins if laying back in bed. Occasional lightheadedness. She says she also has some eye twitching. She says she has some pain behind her eyes, and some dull occipital pain. SHe has not had a migraine in 13 years. Recent  calcium was 9.1.  Echocardiogram was technically difficult, stress test unremarkable.    She says she was on a medication for her migraines in the past, a combination pill.  This was many years ago and worked quite well for her but it ended up being too expensive.     No history of kidney stones.  Does have history of gallstones.    She tries to eat healthy. Lots of berries, avoids other sugars.  She does drink juice.      Past Medical History:   Diagnosis Date     Anemia      Antiplatelet or antithrombotic long-term use      Arrhythmia      Cannabis use without complication 12/8/2014     Carpal tunnel syndrome      Coronary artery disease      Gastroesophageal reflux disease      History of angina      Hypertension      Irregular heart beat      Obesity      Paroxysmal atrial fibrillation (H)      PTSD (post-traumatic stress disorder)      RA (rheumatoid arthritis) (H)      Rheumatoid arthritis (H) 7/8/2016     Sicca syndrome (H)      Sleep apnea      Past Surgical History:   Procedure Laterality Date     CHOLECYSTECTOMY       DILATION AND CURETTAGE, OPERATIVE HYSTEROSCOPY, COMBINED N/A 3/3/2022    Procedure: HYSTEROSCOPY, WITH DILATION AND CURETTAGE;  Surgeon: Irma Cary MD;  Location: [facility]     EP ABLATION FOCAL AFIB N/A 6/30/2022    Procedure: Ablation Atrial Fibrilation;  Surgeon: Jose Guadalupe Joseph MD;  Location:  [facility] LAB     HC REMOVAL GALLBLADDER      Description: Cholecystectomy;  Recorded: 10/15/2013;     LAPAROSCOPIC TUBAL LIGATION       RELEASE CARPAL TUNNEL BILATERAL       TUBAL LIGATION  1995       acetaminophen (TYLENOL) 325 MG tablet, Take 3 tablets (975 mg) by mouth every 6 hours as needed for mild pain  adalimumab (HUMIRA *CF* PEN) 40 MG/0.4ML pen kit, INJECT 1 PEN UNDER THE SKIN EVERY 14 DAYS. HOLD FOR SIGNS OF INFECTION, THEN SEEK MEDICAL ATTENTION.  apixaban ANTICOAGULANT (ELIQUIS ANTICOAGULANT) 5 MG tablet, Take 1 tablet (5 mg) by mouth 2 times  daily  cetirizine (ZYRTEC) 10 MG tablet, Take 10 mg by mouth daily  diltiazem ER COATED BEADS (CARDIZEM CD/CARTIA XT) 180 MG 24 hr capsule, Take 2 capsules (360 mg) by mouth daily  EPINEPHrine (ANY BX GENERIC EQUIV) 0.3 MG/0.3ML injection 2-pack, Inject 0.3 mg into the muscle as needed for anaphylaxis   flecainide (TAMBOCOR) 50 MG tablet, Take 1 tablet (50 mg) by mouth daily as needed (atrial fibrillation)  hydrOXYzine (ATARAX) 25 MG tablet, Take 1 tablet (25 mg) by mouth 3 times daily as needed for anxiety  LORazepam (ATIVAN) 0.5 MG tablet, Take 1 tablet (0.5 mg) by mouth every 6 hours as needed for anxiety  LORazepam (ATIVAN) 0.5 MG tablet, Take 1 tablet (0.5 mg) by mouth every 6 hours as needed (vertigo)  meclizine (ANTIVERT) 25 MG tablet, Take 1 tablet (25 mg) by mouth 3 times daily as needed for dizziness  Multiple Vitamins-Minerals (CENTROVITE) TABS, TAKE 1 TABLET BY MOUTH EVERY DAY FOR 30 DAYS  omeprazole (PRILOSEC) 40 MG DR capsule, Take 1 capsule (40 mg) by mouth in the morning.  ondansetron (ZOFRAN ODT) 4 MG ODT tab, Take 1 tablet (4 mg) by mouth every 6 hours as needed for nausea or vomiting  polyethylene glycol (MIRALAX) 17 GM/Dose powder, Take 17 g (1 capful) by mouth daily (Patient taking differently: Take 1 capful by mouth daily as needed for constipation)  prazosin (MINIPRESS) 1 MG capsule, Take 1 capsule by mouth nightly as needed   traZODone (DESYREL) 50 MG tablet, Take 0.5 tablets (25 mg) by mouth At Bedtime (Patient taking differently: Take 25 mg by mouth nightly as needed for sleep)  vitamin C (ASCORBIC ACID) 1000 MG TABS, Take 1,000 mg by mouth daily   vitamin D3 (CHOLECALCIFEROL) 250 mcg (12568 units) capsule, Take 1 capsule by mouth once a week    No current facility-administered medications on file prior to visit.    Allergies   Allergen Reactions     Penicillins Shortness Of Breath, Palpitations, Dizziness, Anaphylaxis, Hives, Itching and Rash     Test in 2031, see allergy note on 7/29/21      Shellfish-Derived Products Anaphylaxis     Sulfa Drugs Hives and Anaphylaxis        Problem (# of Occurrences) Relation (Name,Age of Onset)    Substance Abuse (2) Brother (Dario), Brother (John Paul)    Arrhythmia (2) Brother (Dario), Brother (John Paul)    Depression (1) Brother (Dario)    Diabetes (1) Father    Prostate Cancer (1) Father    Atrial fibrillation (1) Mother    Chronic Kidney Disease (1) Father: Chose against dialysis.    Lupus (1) Cousin    Crohn's Disease (1) Mother: Total colectomy with ileostomy.    Attention Deficit Disorder (1) Brother (Dario)    Snoring (1) Father    Alcoholism (3) Brother (Dario), Brother (John Paul), Maternal Grandfather    No Known Problems (2) Daughter, Son       Negative family history of: Breast Cancer        Social History     Tobacco Use     Smoking status: Never     Smokeless tobacco: Never     Tobacco comments:     smokes marijuana   Substance Use Topics     Alcohol use: Not Currently     Comment: Alcoholic Drinks/day: Never an issue, she states.  7/8/16     Drug use: Not Currently     Comment: Drug use: Former marijuana use, not current. 7/8/16       REVIEW OF SYSTEMS:                                                      10-point review of systems is negative except as mentioned above in HPI.     EXAM:                                                      Physical Exam:   Vitals: BP (!) 150/96   Pulse 96   Wt 127 kg (280 lb)   LMP 11/14/2022   BMI 42.57 kg/m    BMI= Body mass index is 42.57 kg/m .  GENERAL: NAD.  HEENT: NC/AT.   CV: RRR. S1, S2.   NECK: No bruits.  PULM: Non-labored breathing.   Neurologic:  MENTAL STATUS: Alert, attentive. Speech is fluent. Normal comprehension. Normal concentration. Adequate fund of knowledge.   CRANIAL NERVES: Discs flat. Visual fields intact to confrontation. Pupils equally, round and reactive to light. Facial sensation and movement normal. EOM full. Hearing intact to conversation. Sternocleidomastoids and trapezius strength intact.  Palate moves symmetrically. Tongue midline.  MOTOR: 5/5 in proximal and distal muscle groups of upper and lower extremities. Tone and bulk normal.   DTRs: Intact and symmetric in biceps, BR, patellae.  Babinski down-going bilaterally.   SENSATION: Normal light touch and pinprick.   COORDINATION: Normal finger nose finger. Finger tapping normal. Knee heel shin normal.  STATION AND GAIT: Romberg Negative. Good postural reflexes. Casual gait normal.   Relevant Data:  MRI Brain and Cervical Spine (12.20.22):  FINDINGS:  INTRACRANIAL CONTENTS: No acute or subacute infarct. No mass, acute hemorrhage, or extra-axial fluid collections. Normal brain parenchymal signal. Normal ventricles and sulci. Normal position of the cerebellar tonsils.      SELLA: No abnormality accounting for technique.     OSSEOUS STRUCTURES/SOFT TISSUES: Normal marrow signal. The major intracranial vascular flow voids are maintained.      ORBITS: No abnormality accounting for technique.      SINUSES/MASTOIDS: No paranasal sinus mucosal disease. No middle ear or mastoid effusion.                                                   IMPRESSION:  1.  Normal head MRI.    IMPRESSION:  1.  At C4-C5 there is mild to moderate central stenosis. At C6-C7 there is mild central stenosis.  2.  No high-grade foraminal stenosis.    Imaging reviewed independently by me. Agree with Radiology read.     ASSESSMENT and PLAN:                                                      Assessment:     ICD-10-CM    1. Vestibular dysfunction, unspecified laterality  H83.2X9 topiramate (TOPAMAX) 25 MG tablet      2. Dizziness  R42 Adult Neurology  Referral     topiramate (TOPAMAX) 25 MG tablet           Dizziness/Headaches. Possible vestibular migraines.    Plan:  -- Let's do a trial of Topamax to see if this calms your dizziness/headaches.  Initial dose is 25mg at bedtime for 1 week, then 25mg twice daily for 1 week, then 25mg in the morning and 50mg in the evening for 1 week  and finally 50mg twice daily.  -- Continue therapy for the dizziness.  -- Return to neurology clinic in 6 months.  Please let us know if any concerns arise in the meantime.    Total Time: 45 minutes were spent with the patient and in chart review/documentation (as described above in Assessment and Plan) /coordinating the care on date of service.    Lisa Costa MD  Neurology    CC: Catia Rolon MD and Estelle Bansal MD    Dragon software used in the dictation of this note.

## 2023-01-03 NOTE — PATIENT INSTRUCTIONS
Plan:  -- Keep your appointment for the balance testing as planned.  -- Let's do a trial of Topamax to see if this calms your dizziness/headaches.  Initial dose is 25mg at bedtime for 1 week, then 25mg twice daily for 1 week, then 25mg in the morning and 50mg in the evening for 1 week and finally 50mg twice daily.  -- Continue therapy for the dizziness.  -- Return to neurology clinic in 6 months.  Please let us know if any concerns arise in the meantime.

## 2023-01-03 NOTE — LETTER
1/3/2023         RE: Alina Martell  [address] [place] [address]        Dear Colleague,    Thank you for referring your patient, Alina Martell, to the [facility] [place]. Please see a copy of my visit note below.    INITIAL NEUROLOGY CONSULTATION    DATE OF VISIT: 1/3/2023  MRN: [id number]  PATIENT NAME: Alina Martell  YOB: 1965    REFERRING PROVIDER: Catia Davis*    Chief Complaint   Patient presents with     Neurologic Problem     Dizziness/lightheaded   Numbness/weakness on CLAUDIA arms/legs   Fell 2 wks ago; patient is currently on blood thinner and went to [facility] ED        SUBJECTIVE:                                                      HPI:  Alina Martell is a 57 year old female whom I have been asked by Dr. Rolon to see in consultation for dizziness.  The patient was seen by Dr. Rolon in ENT just a few days ago for evaluation of the same.  She reported a few months history of dizziness, described as lightheadedness when laying back.  1 syncopal episode.  She had a brain MRI a couple of weeks ago which was normal.  I do not see any PT notes in the chart.  She does have history of migraines so there was some mention of perhaps vestibular migraine playing a role.  She was also referred for VNG.  It does not look like this has yet been done.  She did have a PT evaluation and this did show evidence of vestibular dysfunction, recommended OT.    Maritza does have some additional OT appointments schedu and plans to keep these.  She says she has good insurance and would like to do whatever she can to take care of herself.  She has also been working on losing weight, eating healthier.  She tries to stay away from sugars other than natural sugars are improved.  She does have pressure in her ears, ringing. She has some occasional nasal congestion. Audiogram was completed on 12.30.22, and this was normal.    She says she fell and caused her  alarm in the setting of dizziness. She spins if laying back in bed. Occasional lightheadedness. She says she also has some eye twitching. She says she has some pain behind her eyes, and some dull occipital pain. SHe has not had a migraine in 13 years. Recent calcium was 9.1.  Echocardiogram was technically difficult, stress test unremarkable.    She says she was on a medication for her migraines in the past, a combination pill.  This was many years ago and worked quite well for her but it ended up being too expensive.     No history of kidney stones.  Does have history of gallstones.    She tries to eat healthy. Lots of berries, avoids other sugars.  She does drink juice.      Past Medical History:   Diagnosis Date     Anemia      Antiplatelet or antithrombotic long-term use      Arrhythmia      Cannabis use without complication 12/8/2014     Carpal tunnel syndrome      Coronary artery disease      Gastroesophageal reflux disease      History of angina      Hypertension      Irregular heart beat      Obesity      Paroxysmal atrial fibrillation (H)      PTSD (post-traumatic stress disorder)      RA (rheumatoid arthritis) (H)      Rheumatoid arthritis (H) 7/8/2016     Sicca syndrome (H)      Sleep apnea      Past Surgical History:   Procedure Laterality Date     CHOLECYSTECTOMY       DILATION AND CURETTAGE, OPERATIVE HYSTEROSCOPY, COMBINED N/A 3/3/2022    Procedure: HYSTEROSCOPY, WITH DILATION AND CURETTAGE;  Surgeon: Irma Cary MD;  Location: [facility]     EP ABLATION FOCAL AFIB N/A 6/30/2022    Procedure: Ablation Atrial Fibrilation;  Surgeon: Jose Guadalupe Joseph MD;  Location:  [facility]     HC REMOVAL GALLBLADDER      Description: Cholecystectomy;  Recorded: 10/15/2013;     LAPAROSCOPIC TUBAL LIGATION       RELEASE CARPAL TUNNEL BILATERAL       TUBAL LIGATION  1995       acetaminophen (TYLENOL) 325 MG tablet, Take 3 tablets (975 mg) by mouth every 6 hours as needed for mild  pain  adalimumab (HUMIRA *CF* PEN) 40 MG/0.4ML pen kit, INJECT 1 PEN UNDER THE SKIN EVERY 14 DAYS. HOLD FOR SIGNS OF INFECTION, THEN SEEK MEDICAL ATTENTION.  apixaban ANTICOAGULANT (ELIQUIS ANTICOAGULANT) 5 MG tablet, Take 1 tablet (5 mg) by mouth 2 times daily  cetirizine (ZYRTEC) 10 MG tablet, Take 10 mg by mouth daily  diltiazem ER COATED BEADS (CARDIZEM CD/CARTIA XT) 180 MG 24 hr capsule, Take 2 capsules (360 mg) by mouth daily  EPINEPHrine (ANY BX GENERIC EQUIV) 0.3 MG/0.3ML injection 2-pack, Inject 0.3 mg into the muscle as needed for anaphylaxis   flecainide (TAMBOCOR) 50 MG tablet, Take 1 tablet (50 mg) by mouth daily as needed (atrial fibrillation)  hydrOXYzine (ATARAX) 25 MG tablet, Take 1 tablet (25 mg) by mouth 3 times daily as needed for anxiety  LORazepam (ATIVAN) 0.5 MG tablet, Take 1 tablet (0.5 mg) by mouth every 6 hours as needed for anxiety  LORazepam (ATIVAN) 0.5 MG tablet, Take 1 tablet (0.5 mg) by mouth every 6 hours as needed (vertigo)  meclizine (ANTIVERT) 25 MG tablet, Take 1 tablet (25 mg) by mouth 3 times daily as needed for dizziness  Multiple Vitamins-Minerals (CENTROVITE) TABS, TAKE 1 TABLET BY MOUTH EVERY DAY FOR 30 DAYS  omeprazole (PRILOSEC) 40 MG DR capsule, Take 1 capsule (40 mg) by mouth in the morning.  ondansetron (ZOFRAN ODT) 4 MG ODT tab, Take 1 tablet (4 mg) by mouth every 6 hours as needed for nausea or vomiting  polyethylene glycol (MIRALAX) 17 GM/Dose powder, Take 17 g (1 capful) by mouth daily (Patient taking differently: Take 1 capful by mouth daily as needed for constipation)  prazosin (MINIPRESS) 1 MG capsule, Take 1 capsule by mouth nightly as needed   traZODone (DESYREL) 50 MG tablet, Take 0.5 tablets (25 mg) by mouth At Bedtime (Patient taking differently: Take 25 mg by mouth nightly as needed for sleep)  vitamin C (ASCORBIC ACID) 1000 MG TABS, Take 1,000 mg by mouth daily   vitamin D3 (CHOLECALCIFEROL) 250 mcg (93193 units) capsule, Take 1 capsule by mouth once a  week    No current facility-administered medications on file prior to visit.    Allergies   Allergen Reactions     Penicillins Shortness Of Breath, Palpitations, Dizziness, Anaphylaxis, Hives, Itching and Rash     Test in 2031, see allergy note on 7/29/21     Shellfish-Derived Products Anaphylaxis     Sulfa Drugs Hives and Anaphylaxis        Problem (# of Occurrences) Relation (Name,Age of Onset)    Substance Abuse (2) Brother (Dario), Brother (John Paul)    Arrhythmia (2) Brother (Dario), Brother (John Paul)    Depression (1) Brother (Dario)    Diabetes (1) Father    Prostate Cancer (1) Father    Atrial fibrillation (1) Mother    Chronic Kidney Disease (1) Father: Chose against dialysis.    Lupus (1) Cousin    Crohn's Disease (1) Mother: Total colectomy with ileostomy.    Attention Deficit Disorder (1) Brother (Dario)    Snoring (1) Father    Alcoholism (3) Brother (Dario), Brother (John Paul), Maternal Grandfather    No Known Problems (2) Daughter, Son       Negative family history of: Breast Cancer        Social History     Tobacco Use     Smoking status: Never     Smokeless tobacco: Never     Tobacco comments:     smokes marijuana   Substance Use Topics     Alcohol use: Not Currently     Comment: Alcoholic Drinks/day: Never an issue, she states.  7/8/16     Drug use: Not Currently     Comment: Drug use: Former marijuana use, not current. 7/8/16       REVIEW OF SYSTEMS:                                                      10-point review of systems is negative except as mentioned above in HPI.     EXAM:                                                      Physical Exam:   Vitals: BP (!) 150/96   Pulse 96   Wt 127 kg (280 lb)   LMP 11/14/2022   BMI 42.57 kg/m    BMI= Body mass index is 42.57 kg/m .  GENERAL: NAD.  HEENT: NC/AT.   CV: RRR. S1, S2.   NECK: No bruits.  PULM: Non-labored breathing.   Neurologic:  MENTAL STATUS: Alert, attentive. Speech is fluent. Normal comprehension. Normal concentration. Adequate fund of  knowledge.   CRANIAL NERVES: Discs flat. Visual fields intact to confrontation. Pupils equally, round and reactive to light. Facial sensation and movement normal. EOM full. Hearing intact to conversation. Sternocleidomastoids and trapezius strength intact. Palate moves symmetrically. Tongue midline.  MOTOR: 5/5 in proximal and distal muscle groups of upper and lower extremities. Tone and bulk normal.   DTRs: Intact and symmetric in biceps, BR, patellae.  Babinski down-going bilaterally.   SENSATION: Normal light touch and pinprick.   COORDINATION: Normal finger nose finger. Finger tapping normal. Knee heel shin normal.  STATION AND GAIT: Romberg Negative. Good postural reflexes. Casual gait normal.   Relevant Data:  MRI Brain and Cervical Spine (12.20.22):  FINDINGS:  INTRACRANIAL CONTENTS: No acute or subacute infarct. No mass, acute hemorrhage, or extra-axial fluid collections. Normal brain parenchymal signal. Normal ventricles and sulci. Normal position of the cerebellar tonsils.      SELLA: No abnormality accounting for technique.     OSSEOUS STRUCTURES/SOFT TISSUES: Normal marrow signal. The major intracranial vascular flow voids are maintained.      ORBITS: No abnormality accounting for technique.      SINUSES/MASTOIDS: No paranasal sinus mucosal disease. No middle ear or mastoid effusion.                                                   IMPRESSION:  1.  Normal head MRI.    IMPRESSION:  1.  At C4-C5 there is mild to moderate central stenosis. At C6-C7 there is mild central stenosis.  2.  No high-grade foraminal stenosis.    Imaging reviewed independently by me. Agree with Radiology read.     ASSESSMENT and PLAN:                                                      Assessment:     ICD-10-CM    1. Vestibular dysfunction, unspecified laterality  H83.2X9 topiramate (TOPAMAX) 25 MG tablet      2. Dizziness  R42 Adult Neurology  Referral     topiramate (TOPAMAX) 25 MG tablet           Dizziness/Headaches.  Possible vestibular migraines.    Plan:  -- Let's do a trial of Topamax to see if this calms your dizziness/headaches.  Initial dose is 25mg at bedtime for 1 week, then 25mg twice daily for 1 week, then 25mg in the morning and 50mg in the evening for 1 week and finally 50mg twice daily.  -- Continue therapy for the dizziness.  -- Return to neurology clinic in 6 months.  Please let us know if any concerns arise in the meantime.    Total Time: 45 minutes were spent with the patient and in chart review/documentation (as described above in Assessment and Plan) /coordinating the care on date of service.    Lisa Costa MD  Neurology    CC: Catia Rolon MD and Estelle Bansal MD    Dragon software used in the dictation of this note.        Again, thank you for allowing me to participate in the care of your patient.        Sincerely,        Lisa Costa MD

## 2023-01-04 ENCOUNTER — TELEPHONE (OUTPATIENT)
Dept: CARDIOLOGY | Facility: CLINIC | Age: 58
End: 2023-01-04

## 2023-01-04 NOTE — TELEPHONE ENCOUNTER
PRIOR AUTHORIZATION DENIED    Medication: Humira - Denied    Denial Date: 1/4/2023    Denial Rational: Need to resubmit when current PA expires on 01.14.    Appeal Information: N/A

## 2023-01-04 NOTE — TELEPHONE ENCOUNTER
Called patient to review recent elevated blood pressure reading.  Per [place] Community Measures guidelines, patients blood pressure is out of parameters and recheck blood pressure is recommended.    Last Blood Pressure: 141/86  Last Heart Rate:80  Date: 12/14  Location: PCP    Today's Blood Pressure: 150/96  Today's Heart Rate: 96  Date:1/3/23  Location: Other Specialty    Patient reported blood pressure updated in Epic. Blood pressure continues to fall outside of the [place] Community Measures guidelines.  Message sent to primary cardiology team for further review.     Salome Lema MA

## 2023-01-05 ENCOUNTER — MYC MEDICAL ADVICE (OUTPATIENT)
Dept: CARDIOLOGY | Facility: CLINIC | Age: 58
End: 2023-01-05

## 2023-01-05 DIAGNOSIS — I48.0 PAROXYSMAL ATRIAL FIBRILLATION (H): Primary | ICD-10-CM

## 2023-01-09 NOTE — TELEPHONE ENCOUNTER
Called patient and informed her that Dr. Bansal would like her to come in for a BP check. Appointment has been scheduled for 1.10.23

## 2023-01-10 ENCOUNTER — OFFICE VISIT (OUTPATIENT)
Dept: PSYCHOLOGY | Facility: CLINIC | Age: 58
End: 2023-01-10
Payer: COMMERCIAL

## 2023-01-10 ENCOUNTER — ALLIED HEALTH/NURSE VISIT (OUTPATIENT)
Dept: FAMILY MEDICINE | Facility: CLINIC | Age: 58
End: 2023-01-10
Payer: COMMERCIAL

## 2023-01-10 VITALS — DIASTOLIC BLOOD PRESSURE: 78 MMHG | SYSTOLIC BLOOD PRESSURE: 132 MMHG

## 2023-01-10 DIAGNOSIS — F41.1 GAD (GENERALIZED ANXIETY DISORDER): ICD-10-CM

## 2023-01-10 DIAGNOSIS — F43.10 POSTTRAUMATIC STRESS DISORDER: Primary | ICD-10-CM

## 2023-01-10 DIAGNOSIS — I10 ESSENTIAL HYPERTENSION: Primary | ICD-10-CM

## 2023-01-10 DIAGNOSIS — F33.1 MODERATE EPISODE OF RECURRENT MAJOR DEPRESSIVE DISORDER (H): ICD-10-CM

## 2023-01-10 PROCEDURE — 99207 PR NO CHARGE NURSE ONLY: CPT

## 2023-01-10 PROCEDURE — 90834 PSYTX W PT 45 MINUTES: CPT | Performed by: COUNSELOR

## 2023-01-10 NOTE — PROGRESS NOTES
[facility]                                     Progress Note    Patient Name: Alina Martell  Date: 1/10/23         Service Type: Individual      Session Start Time: 801 Session End Time: 852     Session Length:  51  minutes    Session #: 49    Attendees: Client    Service Modality: In-Person     DATA  Interactive Complexity: No  Crisis: No        Progress Since Last Session (Related to Symptoms / Goals / Homework):   Symptoms: No change due to continued worry with anxiety    Homework: Partially completed      Episode of Care Goals: Satisfactory progress - ACTION (Actively working towards change); Intervened by reinforcing change plan / affirming steps taken     Current / Ongoing Stressors and Concerns:  Patient reported continuing to struggle with anxiety related to her health. Patient indicated she is still going to a lot of appointments to try and figure out what is happening. Patient reported she notices that anxiety occur's on and off but specifically at night. Patient indicated she has found a healthy sleep routine that is reducing her anxiety. Patient reported she continues to struggle with identifying exactly what she wants in a relationship. This therapist processed with patient skills to continue to identify what is in her control and what is out of her control.      Treatment Objective(s) Addressed in This Session:   use thought-stopping strategy daily to reduce intrusive thoughts  Increase interest, engagement, and pleasure in doing things  Decrease frequency and intensity of feeling down, depressed, hopeless  Improve quantity and quality of night time sleep / decrease daytime naps  Identify negative self-talk and behaviors: challenge core beliefs, myths, and actions  Improve concentration, focus, and mindfulness in daily activities      Intervention:   Motivational Interviewing    MI Intervention: Reflections: simple and complex     Change Talk Expressed by the Patient:  Taking steps    Provider Response to Change Talk: R - Reflected patient's change talk and S - Summarized patient's change talk statements      Assessments completed prior to visit:  The following assessments were completed by patient for this visit:  PHQ9:   PHQ-9 SCORE 6/7/2022 6/21/2022 8/10/2022 9/22/2022 11/3/2022 12/1/2022 1/9/2023   PHQ-9 Total Score MyChart 4 (Minimal depression) 6 (Mild depression) 11 (Moderate depression) 8 (Mild depression) 9 (Mild depression) 9 (Mild depression) 6 (Mild depression)   PHQ-9 Total Score 4 6 11 8 9 9 6     GAD7:   ADA-7 SCORE 10/5/2021 11/15/2021 12/13/2021 8/10/2022 9/22/2022 11/3/2022 12/1/2022   Total Score 3 (minimal anxiety) 6 (mild anxiety) - 6 (mild anxiety) 9 (mild anxiety) 10 (moderate anxiety) 8 (mild anxiety)   Total Score 3 6 4 6 9 10 8     PROMIS 10-Global Health (all questions and answers displayed):   PROMIS 10 12/15/2021 3/26/2022 4/14/2022 7/19/2022 11/3/2022 12/1/2022   In general, would you say your health is: Good Good Good Good Good Good   In general, would you say your quality of life is: Good Good Good Fair Good Good   In general, how would you rate your physical health? Good Fair Fair Good Good Fair   In general, how would you rate your mental health, including your mood and your ability to think? Fair Fair Good Fair Fair Fair   In general, how would you rate your satisfaction with your social activities and relationships? Good Fair Good Good Fair Good   In general, please rate how well you carry out your usual social activities and roles Good Good Fair Good Good Good   To what extent are you able to carry out your everyday physical activities such as walking, climbing stairs, carrying groceries, or moving a chair? Mostly Mostly Mostly Mostly Mostly Mostly   How often have you been bothered by emotional problems such as feeling anxious, depressed or irritable? Often Sometimes Often Often Often Often   How would you rate your fatigue on average? Mild  Mild Mild Moderate Mild Mild   How would you rate your pain on average?   0 = No Pain  to  10 = Worst Imaginable Pain 3 3 3 3 2 3   In general, would you say your health is: - 3 3 3 3 3   In general, would you say your quality of life is: - 3 3 2 3 3   In general, how would you rate your physical health? - 2 2 3 3 2   In general, how would you rate your mental health, including your mood and your ability to think? - 2 3 2 2 2   In general, how would you rate your satisfaction with your social activities and relationships? - 2 3 3 2 3   In general, please rate how well you carry out your usual social activities and roles. (This includes activities at home, at work and in your community, and responsibilities as a parent, child, spouse, employee, friend, etc.) - 3 2 3 3 3   To what extent are you able to carry out your everyday physical activities such as walking, climbing stairs, carrying groceries, or moving a chair? - 4 4 4 4 4   In the past 7 days, how often have you been bothered by emotional problems such as feeling anxious, depressed, or irritable? - 3 4 4 4 4   In the past 7 days, how would you rate your fatigue on average? - 2 2 3 2 2   In the past 7 days, how would you rate your pain on average, where 0 means no pain, and 10 means worst imaginable pain? - 3 3 3 2 3   Global Mental Health Score - 10 11 9 9 10   Global Physical Health Score - 14 14 14 15 14   PROMIS TOTAL - SUBSCORES - 24 25 23 24 24   Some recent data might be hidden         ASSESSMENT: Current Emotional / Mental Status (status of significant symptoms):   Risk status (Self / Other harm or suicidal ideation)   Patient denies current fears or concerns for personal safety.   Patient denies current or recent suicidal ideation or behaviors.   Patient denies current or recent homicidal ideation or behaviors.   Patient denies current or recent self injurious behavior or ideation.   Patient denies other safety concerns.   Patient reports there has  been no change in risk factors since their last session.     Patient reports there has been no change in protective factors since their last session.     Recommended that patient call 911 or go to the local ED should there be a change in any of these risk factors.     Appearance:   Appropriate    Eye Contact:   Good    Psychomotor Behavior: Normal    Attitude:   Cooperative    Orientation:   All   Speech    Rate / Production: Normal/ Responsive    Volume:  Normal    Mood:    Anxious    Affect:    Appropriate    Thought Content:  Clear    Thought Form:  Coherent  Goal Directed  Logical    Insight:    Good      Medication Review:   No changes to current psychiatric medication(s)     Medication Compliance:   Yes     Changes in Health Issues:   Yes: See epic     Chemical Use Review:   Substance Use: Chemical use reviewed, no active concerns identified      Tobacco Use: No current tobacco use.      Diagnosis:  1. Posttraumatic stress disorder    2. Moderate episode of recurrent major depressive disorder (H)    3. ADA (generalized anxiety disorder)        Collateral Reports Completed:   Not Applicable    PLAN: (Patient Tasks / Therapist Tasks / Other)  Patient will return in two weeks for scheduled session. Patient will use skills to continue to reduce anxiety. Patient would benefit from continuing to identify her needs.     There has been demonstrated improvement in functioning while patient has been engaged in psychotherapy/psychological service- if withdrawn the patient would deteriorate and/or relapse.     Mariana Love, [facility] 1/10/23    ______________________________________________________________________    Individual Treatment Plan    Patient's Name: Alina Martell  YOB: 1965    Date of Creation:10/16/2020  Date Treatment Plan Last Reviewed/Revised: 12/1/2022    DSM5 Diagnoses: 296.32 (F33.1) Major Depressive Disorder, Recurrent Episode, Moderate _ or 300.02 (F41.1) Generalized Anxiety  Disorder  Psychosocial / Contextual Factors: Health, family and financial   PROMIS (reviewed every 90 days): 25    Referral / Collaboration:  Was/were discussed and patient will pursue.    Anticipated number of session for this episode of care: 20 will reevaluate every 90 days  Anticipation frequency of session: Biweekly  Anticipated Duration of each session: 38-52 minutes  Treatment plan will be reviewed in 90 days or when goals have been changed.       MeasurableTreatment Goal(s) related to diagnosis / functional impairment(s)  Goal 1: Patient will work on reducing overall anxiety.     I will know I've met my goal when reporting minimal anxiety symptoms.       Objective #A (Patient Action)                          Patient will use distraction each time intrusive worry surfaces.  Status: Continued - Date(s): 12/1/2022     Intervention(s)  Therapist will teach emotional regulation skills. ..     Objective #B  Patient will Decrease frequency and intensity of feeling down, depressed, hopeless.  Status: Continued - Date(s): 12/1/2022     Intervention(s)  Therapist will teach emotional recognition/identification. ..     Objective #C  Patient will Identify negative self-talk and behaviors: challenge core beliefs, myths, and actions.  Status: Continued - Date(s): 12/1/2022     Intervention(s)  Therapist will teach the client how to perform a behavioral chain analysis. ..     Goal 2: Patient will continue to work on reducing depression symptoms    I will know I've met my goal then reporting minimal depression symptoms     Objective #A (Patient Action)                          Patient will Increase interest, engagement, and pleasure in doing things.  Status: Continued - Date(s):  12/1/2022     Intervention(s)  Therapist will assign homework ..     Objective #B  Patient will Decrease frequency and intensity of feeling down, depressed, hopeless.  Status: Continued - Date(s):  12/1/2022     Intervention(s)  Therapist will  assign homework at every session.         Patient has reviewed and agreed to the above plan.        Mariana Love, [facility] 12/1/2022

## 2023-01-10 NOTE — PROGRESS NOTES
I met with Alina Martell at the request of Dr. Bansal  to recheck her blood pressure.  Blood pressure medications on the med list were reviewed with patient.    Patient has taken all medications as per usual regimen: NA  Patient reports tolerating them without any issues or concerns: NA    Vitals:    01/10/23 1536   BP: 132/78   BP Location: Right arm   Patient Position: Sitting   Cuff Size: Adult Large       Blood pressure was taken, previous encounter was reviewed, recorded blood pressure below 140/90.  Patient was discharged and the note will be sent to the provider for final review.       Ej Bull Jr., CMA on 1/10/2023 at 3:50 PM

## 2023-01-11 NOTE — TELEPHONE ENCOUNTER
Spoke to pt regarding recommendations, she states understanding, orders entered and message sent to scheduling to contact pt and schedule.  Follow up should be scheduled after the 30 day monitor is complete, and giving at least 1 week for it to be read.    Discussed we have $10 copay card to [facility] with the cost of eliquis, pt stated she actually got the cost of her eliquis taken care of and is only paying $30 for a 90 day supply.    There is an order for follow up that was entered back in 6/30/22 for Dr. Joseph. Will see if scheduling is able to use this order to schedule the appt.    Pt did want to point out that 132/72 is her current BP and her PMD is managing, she states every time she was in the ER it was high so she wanted to make us aware she normally does not run high and has had it checked out with her PMD.    Lauren

## 2023-01-16 NOTE — TELEPHONE ENCOUNTER
PA Initiation    Medication: Humira - PA Pending  Insurance Company: [employer] - Phone [phone] Fax [phone]  Pharmacy Filling the Rx: [facility] - [place], [place] - [address]  Filling Pharmacy Phone:    Filling Pharmacy Fax:    Start Date: 1/16/2023    Faxed PA form w/chart notes to [facility] per fax: [phone]  p: [phone]  [email or web address]

## 2023-01-19 ENCOUNTER — TELEPHONE (OUTPATIENT)
Dept: AUDIOLOGY | Facility: CLINIC | Age: 58
End: 2023-01-19
Payer: COMMERCIAL

## 2023-01-19 NOTE — PROGRESS NOTES
AUDIOLOGY REPORT-BALANCE ASSESSMENT    SUBJECTIVE: Alina Martell, 57 year old, was seen in Audiology at the [facility] on 1/25/2023, for videonystagmography (VNG) referred by Catia Rolon M.D.     Patient reports symptoms of dizziness, lightheadedness and nausea. Patient reports symptoms initially onset a couple months ago. Patient describes episodes of dizziness lasting seconds in duration which can occur both sitting or while in motion. Episodes occur daily up to multiple times a day. Symptoms can be provoked by fast head or body movements such as looking up, bending down, rolling over in bed or with fast head turns. Patient reports experiencing more symptoms when lying on her left side versus right side. Patient denies symptoms with screen use (TV, phone, computer, etc.). Patient reports history of one fall which occurred upon returning to an upright position from bending forward, resulting in her falling backwards and hitting her head. This fall occurred approximately 3 weeks ago; patient denies loss of consciousness with the incident. Patient reports drifting to either side while walking but is unsure whether she favors one side.      Patient's most recent hearing evaluation performed 12/30/2022 revealed normal hearing bilaterally. Patient denies fluctuations or changes in her hearing. She reports periodic ringing tinnitus in one or either ear; does not coincide with her dizziness. Patient reports occasional ear pain in either ear but none lately. Patient denies aural fullness, drainage and history of previous ear surgeries. She reports both ears sometimes itch.    Patient reports history of previous migraine headaches in her 30s-40s; contributed to abnormally high estrogen levels. Patient reports that she recently consulted with a neurologist and was prescribed Topamax for possible vestibular migraines. Patient reports sensitivity to lights. Patient  "denies sensitivity to loud sounds except when listening to loud music in the car. She denies dizziness provoked by loud sounds. Patient reports coughing or sneezing forcefully can make her ears \"ring\". She denies dizziness provoked by coughing, sneezing, blowing her nose, lifting heavy items or bearing down. She denies autophony (eye blinks). Patient reports sometimes experiencing persistent motion post cessation of motion notable post walking. Patient reports sometimes experiencing sensation of motion while still since initial onset of symptoms described as rocking or swaying lasting a couple minutes. Patient denies double vision, blurred vision and history of previous eye surgeries. Patient denies history of cancer or chemotherapy. Outside of her more recent fall and head injury, patient reports a couple concussions sustained while playing sports as a teenager. Additional medical history includes diagnosis of autoimmune disease (rheumatoid arthritis) in her 40s and history of heart arrhythmia. Family history is positive for migraines (mother& brother) and autoimmune disease (mother-Crohn's). Family history is negative for hearing loss and vertigo/dizziness. Patient denies consumption of caffeinated beverages, nicotine, alcoholic substances, or use of medications with known vestibular interactions within the past 48 hours. Amena Beyer, 3rd year Audiology Doctoral Student, was present during this appointment.     OBJECTIVE:  Abuse Screening:  Do you feel unsafe at home or work/school? No  Do you feel threatened by someone? No  Does anyone try to keep you from having contact with others, or doing things outside of your home? No  Physical signs of abuse present? No    Videonystagmography (VNG) testing:  Prescreening:  Tympanograms: Normal eardrum mobility bilaterally. Note: this test is completed to determine the status of the middle ear before irrigations are completed. *Patient denies dizziness with tympanometry. "   Ocular range of motion and ocular counter roll: Normal  Cross/cover: Normal  Head Thrust: Mildly saccadic bilaterally.    Nystagmus Tests:  Gaze-Horizontal with Fixation:   Center: Normal   Right: Normal   Left: Normal  Gaze-Vertical with Fixation:   Up: Normal   Down: Normal  Gaze with Fixation Denied   Center: Normal   Right: Normal   Left: Normal   Up: Normal  High Frequency Headshake:   Horizontal: Negative. No consistent nystagmus, patient reports moderate dizziness post headshake.   Vertical: Negative. No consistent nystagmus, patient reports mild dizziness and nausea post headshake.    [place]-Hallpike Head Right: Negative for PC BPPV. No nystagmus, patient reports dizziness with all positional changes.  [place]-Hallpike Head Left: Negative for PC BPPV. No nystagmus, patient reports dizziness entire time in position (deep head hanging). Patient reports mild dizziness upon rising to seated position, no nystagmus.  Roll Test Head Right: Negative for HC BPPV. No nystagmus or symptoms.  Roll Test Head Left: Negative for HC BPPV. No nystagmus or symptoms in position, patient reports mild dizziness during head turn only.    Positional Testing:  Positionals: Supine: No consistent nystagmus, patient reports mild dizziness in position.  Positionals: Body Right: No consistent nystagmus, no symptoms.  Positionals: Body Left: No consistent nystagmus, patient reports slight dizziness in position.  Positionals: Pre-Caloric: No consistent nystagmus, patient reports slight dizziness in position.    Oculomotor Tests:  Saccades: Essentially Normal  Anti-saccades: Normal, patient can complete task.  Pursuit: Normal across two trials.    Calorics:  (Tested at 44 degrees and 30 degrees Celsius for 30 seconds for warm and cool water, respectively):  Right Warm Eye Speed: 19 degrees per second right beating  Left Warm Eye Speed: 24 degrees per second left beating  Right Cool Eye Speed: 22 degrees per second left beating  Left Cool Eye  Speed: 19 degrees per second right beating  Difference between ear: 2% right hypofunction. (Greater than 25% considered clinically significant.)  Fixation Index: Normal  Overall caloric test: Normal    Post Irrigations Otoscopy: Normal    ASSESSMENT:    1. There were no significant indications of central vestibular system involvement noted on today's exam.     2. There were no significant indications of peripheral vestibular system involvement noted on today's exam.       PLAN:  Follow-up with Dr. Catia Rolon regarding today's results and for medical management. Please call this clinic at [phone] with questions regarding these results or recommendations.       Belle Hernandez. CCC-A  Vestibular Audiologist   [place] #[id number]

## 2023-01-19 NOTE — TELEPHONE ENCOUNTER
"\"I m calling from the Audiology and Balance Testing department at the . This is just a call to remind you of your upcoming Balance Testing appointment on [Date], and to see if you have any questions or concerns regarding the balance testing you'll be doing. You should have received an itinerary via mail or via [employer], if you are active, that goes over what to expect and explains the dos and don ts both 48 hours before, and the day of. There is a list of medications for you to review on the itinerary that we would like you to stop taking beforehand. If you didn t receive the itinerary or you still have questions, please give our clinic a call at [phone]. Otherwise, we will see you on [Date] starting at [Time].\"    Please send encounter if patient would like to reschedule.    "

## 2023-01-25 ENCOUNTER — TRANSFERRED RECORDS (OUTPATIENT)
Dept: HEALTH INFORMATION MANAGEMENT | Facility: CLINIC | Age: 58
End: 2023-01-25

## 2023-01-25 ENCOUNTER — TELEPHONE (OUTPATIENT)
Dept: OTOLARYNGOLOGY | Facility: CLINIC | Age: 58
End: 2023-01-25

## 2023-01-25 ENCOUNTER — OFFICE VISIT (OUTPATIENT)
Dept: FAMILY MEDICINE | Facility: CLINIC | Age: 58
End: 2023-01-25
Payer: COMMERCIAL

## 2023-01-25 ENCOUNTER — OFFICE VISIT (OUTPATIENT)
Dept: AUDIOLOGY | Facility: CLINIC | Age: 58
End: 2023-01-25
Attending: OTOLARYNGOLOGY
Payer: COMMERCIAL

## 2023-01-25 VITALS
BODY MASS INDEX: 41.62 KG/M2 | HEART RATE: 110 BPM | SYSTOLIC BLOOD PRESSURE: 130 MMHG | RESPIRATION RATE: 22 BRPM | TEMPERATURE: 101.2 F | DIASTOLIC BLOOD PRESSURE: 78 MMHG | HEIGHT: 69 IN | OXYGEN SATURATION: 97 % | WEIGHT: 281 LBS

## 2023-01-25 DIAGNOSIS — R50.9 FEVER, UNSPECIFIED FEVER CAUSE: ICD-10-CM

## 2023-01-25 DIAGNOSIS — R42 LIGHTHEADEDNESS: ICD-10-CM

## 2023-01-25 DIAGNOSIS — R42 DIZZINESS: Primary | ICD-10-CM

## 2023-01-25 DIAGNOSIS — R11.0 NAUSEA: ICD-10-CM

## 2023-01-25 DIAGNOSIS — I73.9 CLAUDICATION OF CALF MUSCLES (H): ICD-10-CM

## 2023-01-25 DIAGNOSIS — R53.83 OTHER FATIGUE: Primary | ICD-10-CM

## 2023-01-25 LAB
FLUAV AG SPEC QL IA: NEGATIVE
FLUBV AG SPEC QL IA: POSITIVE

## 2023-01-25 PROCEDURE — 87804 INFLUENZA ASSAY W/OPTIC: CPT

## 2023-01-25 PROCEDURE — 92542 POSITIONAL NYSTAGMUS TEST: CPT | Mod: 59 | Performed by: AUDIOLOGIST

## 2023-01-25 PROCEDURE — U0005 INFEC AGEN DETEC AMPLI PROBE: HCPCS

## 2023-01-25 PROCEDURE — 92567 TYMPANOMETRY: CPT | Performed by: AUDIOLOGIST

## 2023-01-25 PROCEDURE — 92545 OSCILLATING TRACKING TEST: CPT | Mod: 59 | Performed by: AUDIOLOGIST

## 2023-01-25 PROCEDURE — 99214 OFFICE O/P EST MOD 30 MIN: CPT | Mod: CS

## 2023-01-25 PROCEDURE — 92541 SPONTANEOUS NYSTAGMUS TEST: CPT | Performed by: AUDIOLOGIST

## 2023-01-25 PROCEDURE — 92537 CALORIC VSTBLR TEST W/REC: CPT | Performed by: AUDIOLOGIST

## 2023-01-25 PROCEDURE — U0003 INFECTIOUS AGENT DETECTION BY NUCLEIC ACID (DNA OR RNA); SEVERE ACUTE RESPIRATORY SYNDROME CORONAVIRUS 2 (SARS-COV-2) (CORONAVIRUS DISEASE [COVID-19]), AMPLIFIED PROBE TECHNIQUE, MAKING USE OF HIGH THROUGHPUT TECHNOLOGIES AS DESCRIBED BY CMS-2020-01-R: HCPCS

## 2023-01-25 ASSESSMENT — ENCOUNTER SYMPTOMS
SINUS PRESSURE: 1
SINUS PAIN: 0
HEADACHES: 1
DIZZINESS: 1
WHEEZING: 0
SHORTNESS OF BREATH: 0
RHINORRHEA: 0
FATIGUE: 1
NAUSEA: 0
VOMITING: 0
COUGH: 0
FEVER: 1
SORE THROAT: 0

## 2023-01-25 ASSESSMENT — PAIN SCALES - GENERAL: PAINLEVEL: NO PAIN (0)

## 2023-01-25 NOTE — TELEPHONE ENCOUNTER
Spoke with Maritza and gave results listed below per Dr. Rolon.  Can you contact Maritza and inform her that her VNG testing was completely normal? That makes sense with how she describes the feeling a lightheadedness, but now we can conclude with certainty that the ears are not involved.   Pt expressed understanding of results.    [facility]      Jessie Angela RN  [facility]  ENT  Critical access hospital5 [address] [address]  [email or web address]   Office:[phone]  Employed by [facility]

## 2023-01-25 NOTE — PROGRESS NOTES
Assessment & Plan     1. Other fatigue  2. Fever, unspecified fever cause  Will check for influenza and COVID, will treat as appropriate.   - Symptomatic COVID-19 Virus (Coronavirus) by PCR Nasopharyngeal  - Influenza A & B Antigen - Clinic Collect    3. Claudication of calf muscles (H)  No s/s of blood clot or cellulitis on exam today. Will order JEAN to rule out or in PAD.   - US JENA Doppler No Exercise; Future       MED REC REQUIRED  Post Medication Reconciliation Status: discharge medications reconciled, continue medications without change    Return if symptoms worsen or fail to improve.      Markus Salas is a 57 year old, presenting for the following health issues:  ED follow up  (Has had a headache that started yesterday has felt fatigued since Sunday. )    Patient presents for ED follow-up following a visit on December 20.  She was evaluated for neck pain and numbness and tingling in her arm. All of these symptoms have resolved, today she presents with complaints of headaches, fever, and increased fatigue since Sunday. Symptoms have been going on for 4 days. She is having some sinus pressure, no ear pain, no sore throat, no SOB, no cough. No nausea, vomiting or diarrhea. She has not been taking anything to try and help with symptoms. She has not taken a home COVID test. She is having dizziness and headaches. The dizziness is not new. She has been going to therapy for this.    She does report still having some calf pain. It does get worse with activity and better with rest. She does not it does not happen predictably when she is doing activity and it is not every time. No swelling in her calves, no redness, no warmth.     HPI     Review of Systems   Constitutional: Positive for fatigue and fever.   HENT: Positive for sinus pressure. Negative for congestion, ear pain, rhinorrhea, sinus pain, sneezing and sore throat.    Respiratory: Negative for cough, shortness of breath and wheezing.   "  Gastrointestinal: Negative for nausea and vomiting.   Neurological: Positive for dizziness and headaches.          Objective    /78 (BP Location: Right arm)   Pulse 110   Temp (!) 101.2  F (38.4  C) (Oral)   Resp 22   Ht 1.753 m (5' 9\")   Wt 127.5 kg (281 lb)   LMP 01/16/2023   SpO2 97%   BMI 41.50 kg/m    Body mass index is 41.5 kg/m .  Physical Exam  Constitutional:       General: She is not in acute distress.  HENT:      Right Ear: Tympanic membrane, ear canal and external ear normal.      Left Ear: Tympanic membrane, ear canal and external ear normal.      Mouth/Throat:      Mouth: Mucous membranes are moist.      Pharynx: No oropharyngeal exudate or posterior oropharyngeal erythema.   Eyes:      Extraocular Movements: Extraocular movements intact.      Pupils: Pupils are equal, round, and reactive to light.   Cardiovascular:      Rate and Rhythm: Normal rate and regular rhythm.      Heart sounds: Normal heart sounds.   Pulmonary:      Effort: Pulmonary effort is normal. No respiratory distress.      Breath sounds: Normal breath sounds.   Musculoskeletal:      Cervical back: Normal range of motion.      Left lower leg: No edema.   Lymphadenopathy:      Cervical: No cervical adenopathy.   Skin:     General: Skin is warm and dry.   Neurological:      General: No focal deficit present.      Mental Status: She is alert.   Psychiatric:         Mood and Affect: Mood normal.        At the end of the visit, I confirmed understanding of what was discussed. Patient has no further questions or concerns that were brought up at this time.     Shahida Morocho, MARCELA, APRN, FNP-C            "

## 2023-01-26 ENCOUNTER — MYC MEDICAL ADVICE (OUTPATIENT)
Dept: FAMILY MEDICINE | Facility: CLINIC | Age: 58
End: 2023-01-26

## 2023-01-26 ENCOUNTER — VIRTUAL VISIT (OUTPATIENT)
Dept: FAMILY MEDICINE | Facility: CLINIC | Age: 58
End: 2023-01-26
Payer: COMMERCIAL

## 2023-01-26 ENCOUNTER — TELEPHONE (OUTPATIENT)
Dept: FAMILY MEDICINE | Facility: CLINIC | Age: 58
End: 2023-01-26

## 2023-01-26 ENCOUNTER — TELEPHONE (OUTPATIENT)
Dept: NURSING | Facility: CLINIC | Age: 58
End: 2023-01-26
Payer: COMMERCIAL

## 2023-01-26 DIAGNOSIS — U07.1 INFECTION DUE TO 2019 NOVEL CORONAVIRUS: Primary | ICD-10-CM

## 2023-01-26 DIAGNOSIS — R05.1 ACUTE COUGH: Primary | ICD-10-CM

## 2023-01-26 LAB — SARS-COV-2 RNA RESP QL NAA+PROBE: POSITIVE

## 2023-01-26 PROCEDURE — 99213 OFFICE O/P EST LOW 20 MIN: CPT | Mod: CS

## 2023-01-26 NOTE — TELEPHONE ENCOUNTER
Coronavirus (COVID-19) Notification    Caller Name (Patient, parent, daughter/son, grandparent, etc)  patient    Reason for call  Notify of Positive Coronavirus (COVID-19) lab results, assess symptoms,  review [facility] recommendations    Lab Result    Lab test:  2019-nCoV rRt-PCR or SARS-CoV-2 PCR    Oropharyngeal AND/OR nasopharyngeal swabs is POSITIVE for 2019-nCoV RNA/SARS-COV-2 PCR (COVID-19 virus)      Gather patient reported symptoms   Assessment   Current Symptoms at time of phone call, reported by patient: (if no symptoms, document: No symptoms] Headache, fever   Date of symptom(s) onset (if applicable) 1/23/23     If at time of call, Patients symptoms have worsened, the Patient should contact 911 or have someone drive them to Emergency Dept promptly:      If Patient calling 911, inform 911 personal that you have tested positive for the Coronavirus (COVID-19).  Place mask on and await 911 to arrive.    If Emergency Dept, If possible, please have another adult drive you to the Emergency Dept but you need to wear mask when in contact with other people.      Treatment Options:   Patient classified as COVID treatment eligible by Epic high risk algorithm: Yes  Is the patient symptomatic at the time of result notification? Yes. Was the onset of symptoms within the last 5 days? Yes.   There are now oral medications available for the treatment of COVID-19.  Taking one of these medications within the first five days of symptoms (when people may not yet feel severely ill) has been shown to make people feel better, prevent them from getting sicker, and preventing hospitalization and death.   Does the patient agree to have a visit with a provider to discuss treatment options? Yes. Is the patient seen at [facility]?  No: Warm transfer caller to [phone] to be scheduled with a virtual urgent provider.  During transfer, instruct  on appropriate time frame for visit     Review information  with Patient    Your result was positive. This means you have COVID-19 (coronavirus).    How can I protect others?    These guidelines are for isolating before returning to work, school or .    If you DO have symptoms    Stay home and away from others     For at least 5 days after your symptoms started, AND    You are fever free for 24 hours (with no medicine that reduces fever), AND    Your other symptoms are better    Wear a mask for 10 full days anytime you are around others    If you DON'T have symptoms    Stay home and away from others for at least 5 days after your positive test    Wear a mask for 10 full days anytime you are around others    There may be different guidelines for healthcare facilities.  Please check with the specific sites before arriving.    If you have been told by a doctor that you were severely ill with COVID-19 or are immunocompromised, you should isolate for at least 10 days.    You should not go back to work until you meet the guidelines above for ending your home isolation. You don't need to be retested for COVID-19 before going back to work--studies show that you won't spread the virus if it's been at least 10 days since your symptoms started (or 20 days, if you have a weak immune system).    Employers, schools, and daycares: This is an official notice for this person's medical guidelines for returning in-person.  They must meet the above guidelines before going back to work, school or  in person.    You will receive a positive COVID-19 letter via [employer] or the mail soon with additional self-care information.    Would you like me to review some of that information with you now?  Yes    How can I take care of myself?      Get lots of rest. Drink extra fluids (unless a doctor has told you not to).      Take Tylenol (acetaminophen) for fever or pain. If you have liver or kidney problems, ask your family doctor if it's okay to take Tylenol.     Take either:     650 mg (two  325 mg pills) every 4 to 6 hours, or     1,000 mg (two 500 mg pills) every 8 hours as needed.     Note: Do not take more than 3,000 mg in one day. Acetaminophen is found in many medicines (both prescribed and over-the-counter medicines). Read all labels to be sure you don't take too much.    For children, check the Tylenol bottle for the right dose (based on their age or weight).      If you have other health problems (like cancer, heart failure, an organ transplant or severe kidney disease): Call your specialty clinic if you don't feel better in the next 2 days.      Know when to call 911: Emergency warning signs include:    Trouble breathing or shortness of breath    Pain or pressure in the chest that doesn't go away    Feeling confused like you haven't felt before, or not being able to wake up    Bluish-colored lips or face        If you were tested for an upcoming procedure, please contact your provider for next steps.    Siena Almaguer

## 2023-01-26 NOTE — PROGRESS NOTES
Maritza is a 57 year old who is being evaluated via a billable video visit.      How would you like to obtain your AVS? [employer]  If the video visit is dropped, the invitation should be resent by: Text to cell phone: [phone]  Will anyone else be joining your video visit? No      Assessment & Plan     1. Infection due to 2019 novel coronavirus  Patient tested positive for COVID-19.  Symptoms started 5 days ago.  She does not qualify for Paxil of it due to medications.  Informed her of an alternative option, Lagevrio, that is less effective, but is an oral medication.  She is agreeable and would like to try this.  I did send her through [employer] the fact sheet for patients and caregivers for emergency use authorization of Lagevrio.  She confirms she is not pregnant and there is no chance she could be pregnant.  At the end of the visit she feels she understands the medication, and verbalizes that she knows it is for EUA use.  She will reach out with any questions or concerns.  - molnupiravir (LAGEVRIO) 200 MG capsule; Take 4 capsules (800 mg) by mouth every 12 hours  Dispense: 40 each; Refill: 0    MASSBP Score 1/27/2023   Age Greater than or equal to 65 years 0   BMI greater than or equal to 35 kg/m2 2   Has Diabetes Mellitus 0   Has Chronic Kidney Disease 0   Has Cardiovascular Disease and 55 years or older 2   Has Chronic Respiratory Disease and 55 years or older 0   Has Hypertension and 55 years or older 1   Is Immunocompromised 4   Is Pregnant 0   Member of [facility] community (Black/, /, ,  or , or  or [place] Native)  2   MASSBP Score 11   Has the patient had a positive COVID test outside our system?  No   What day did symptoms start?  1/22/2023     Return if symptoms worsen or fail to improve.    Subjective   Maritza is a 57 year old, presenting for the following health issues:  Positive covid and Influenza (Discuss treatment options,  sx started Monday, headache, head congestion and fever)    HPI     Patient was seen in clinic yesterday. Tested positive for influenza B and COVID. She has been staying home from work. She has fatigue and a fever, but still no cough or shortness of breath.     Review of Systems   Constitutional: Positive for fatigue and fever.   HENT: Positive for congestion. Negative for postnasal drip and sore throat.    Respiratory: Negative for cough and shortness of breath.          Objective           Vitals:  No vitals were obtained today due to virtual visit.    Physical Exam   RESP: No audible wheeze, cough, or visible cyanosis.  No visible retractions or increased work of breathing.    PSYCH: Mentation appears normal, affect normal/bright, judgement and insight intact, normal speech and appearance well-groomed.        Video-Visit Details    Type of service:  Telephone visit    Originating Location (pt. Location): Home    Distant Location (provider location):  On-site    Telephone call duration: 5 minutes

## 2023-01-26 NOTE — TELEPHONE ENCOUNTER
RN COVID TREATMENT VISIT  01/26/23    Alina Martell  57 year old  Current weight? 281 lbs    Has the patient been seen by a primary care provider at an [facility] or [facility] within the past two years? Yes.   Have you been in close proximity to/do you have a known exposure to a person with a confirmed case of influenza? Yes. Patient informed a virtual visit with a provider will be required for treatment to determine if COVID or influenza medications are best. Patient will be transferred to a  at the end of this call.   Ofe Askew RN      Assisted in scheduling for today

## 2023-01-27 RX ORDER — BENZONATATE 100 MG/1
100 CAPSULE ORAL 3 TIMES DAILY PRN
Qty: 30 CAPSULE | Refills: 0 | Status: SHIPPED | OUTPATIENT
Start: 2023-01-27 | End: 2023-03-22

## 2023-01-27 ASSESSMENT — ENCOUNTER SYMPTOMS
SHORTNESS OF BREATH: 0
COUGH: 0
FEVER: 1
SORE THROAT: 0
FATIGUE: 1

## 2023-02-06 ENCOUNTER — HOSPITAL ENCOUNTER (OUTPATIENT)
Dept: OCCUPATIONAL THERAPY | Facility: REHABILITATION | Age: 58
Discharge: HOME | End: 2023-02-06
Payer: COMMERCIAL

## 2023-02-06 DIAGNOSIS — R42 DIZZINESS: Primary | ICD-10-CM

## 2023-02-06 DIAGNOSIS — R26.81 UNSTEADINESS: ICD-10-CM

## 2023-02-06 DIAGNOSIS — Z78.9 DECREASED ACTIVITIES OF DAILY LIVING (ADL): ICD-10-CM

## 2023-02-06 PROCEDURE — 97112 NEUROMUSCULAR REEDUCATION: CPT | Mod: GO | Performed by: OCCUPATIONAL THERAPIST

## 2023-02-06 NOTE — TELEPHONE ENCOUNTER
Jose Guadalupe Joseph MD  P [facility] Ep Support Pool - [facility]  Phone Number: [phone]     Hi,     I've reviewed Mr. Martell's evaluations to date - ECGs appear normal, no elevation in cardiac biomarkers, normal TTE and stress test.  Can we do the following:   - set her up for a 30d MCT   - ESR and CRP to evaluate for any residual pericardial inflammation post ablation (may be unlikely this far out)   - cardiology visit   - assist with apixaban refill/cost assistance program     Thank you,   Tru      Yes

## 2023-02-07 ENCOUNTER — HOSPITAL ENCOUNTER (OUTPATIENT)
Dept: ULTRASOUND IMAGING | Facility: HOSPITAL | Age: 58
Discharge: HOME | End: 2023-02-07
Payer: COMMERCIAL

## 2023-02-07 ENCOUNTER — OFFICE VISIT (OUTPATIENT)
Dept: PSYCHOLOGY | Facility: CLINIC | Age: 58
End: 2023-02-07
Payer: COMMERCIAL

## 2023-02-07 DIAGNOSIS — F41.1 GAD (GENERALIZED ANXIETY DISORDER): ICD-10-CM

## 2023-02-07 DIAGNOSIS — F43.10 POSTTRAUMATIC STRESS DISORDER: Primary | ICD-10-CM

## 2023-02-07 DIAGNOSIS — F33.1 MODERATE EPISODE OF RECURRENT MAJOR DEPRESSIVE DISORDER (H): ICD-10-CM

## 2023-02-07 DIAGNOSIS — I73.9 CLAUDICATION OF CALF MUSCLES (H): ICD-10-CM

## 2023-02-07 PROCEDURE — 90834 PSYTX W PT 45 MINUTES: CPT | Performed by: COUNSELOR

## 2023-02-07 PROCEDURE — 93924 LWR XTR VASC STDY BILAT: CPT

## 2023-02-07 NOTE — PROGRESS NOTES
"[facility]                                     Progress Note    Patient Name: Alina Martell  Date: 2/07/23         Service Type: Individual      Session Start Time: 803 Session End Time: 851     Session Length:  49  minutes    Session #: 50    Attendees: Client    Service Modality: In-Person     DATA  Interactive Complexity: No  Crisis: No        Progress Since Last Session (Related to Symptoms / Goals / Homework):   Symptoms: No change stress due to health    Homework: Partially completed      Episode of Care Goals: Satisfactory progress - ACTION (Actively working towards change); Intervened by reinforcing change plan / affirming steps taken     Current / Ongoing Stressors and Concerns:  Patient indicated she tested positive for influenza B and Covid two weeks ago. Patient reported she took a few days off of work and took care of herself. Patient indicated she has been feeling mentally good after taking time for herself. Patient reported not being happy that she got sick but happy she was able to take to herself. Patient indicated her mom got COVID so she has been dropping food off to her. This therapist challenged patient on ways she needs to schedule \"me time\".     Treatment Objective(s) Addressed in This Session:   use distraction each time intrusive worry surfaces  Increase interest, engagement, and pleasure in doing things  Decrease frequency and intensity of feeling down, depressed, hopeless  Improve quantity and quality of night time sleep / decrease daytime naps  Identify negative self-talk and behaviors: challenge core beliefs, myths, and actions  Improve concentration, focus, and mindfulness in daily activities      Intervention:   Motivational Interviewing    MI Intervention: Open-ended questions     Change Talk Expressed by the Patient: Activation Taking steps    Provider Response to Change Talk: S - Summarized patient's change talk statements      Assessments completed " prior to visit:  The following assessments were completed by patient for this visit:  PHQ9:   PHQ-9 SCORE 6/7/2022 6/21/2022 8/10/2022 9/22/2022 11/3/2022 12/1/2022 1/9/2023   PHQ-9 Total Score MyChart 4 (Minimal depression) 6 (Mild depression) 11 (Moderate depression) 8 (Mild depression) 9 (Mild depression) 9 (Mild depression) 6 (Mild depression)   PHQ-9 Total Score 4 6 11 8 9 9 6     GAD7:   ADA-7 SCORE 10/5/2021 11/15/2021 12/13/2021 8/10/2022 9/22/2022 11/3/2022 12/1/2022   Total Score 3 (minimal anxiety) 6 (mild anxiety) - 6 (mild anxiety) 9 (mild anxiety) 10 (moderate anxiety) 8 (mild anxiety)   Total Score 3 6 4 6 9 10 8     PROMIS 10-Global Health (all questions and answers displayed):   PROMIS 10 12/15/2021 3/26/2022 4/14/2022 7/19/2022 11/3/2022 12/1/2022   In general, would you say your health is: Good Good Good Good Good Good   In general, would you say your quality of life is: Good Good Good Fair Good Good   In general, how would you rate your physical health? Good Fair Fair Good Good Fair   In general, how would you rate your mental health, including your mood and your ability to think? Fair Fair Good Fair Fair Fair   In general, how would you rate your satisfaction with your social activities and relationships? Good Fair Good Good Fair Good   In general, please rate how well you carry out your usual social activities and roles Good Good Fair Good Good Good   To what extent are you able to carry out your everyday physical activities such as walking, climbing stairs, carrying groceries, or moving a chair? Mostly Mostly Mostly Mostly Mostly Mostly   How often have you been bothered by emotional problems such as feeling anxious, depressed or irritable? Often Sometimes Often Often Often Often   How would you rate your fatigue on average? Mild Mild Mild Moderate Mild Mild   How would you rate your pain on average?   0 = No Pain  to  10 = Worst Imaginable Pain 3 3 3 3 2 3   In general, would you say your  health is: - 3 3 3 3 3   In general, would you say your quality of life is: - 3 3 2 3 3   In general, how would you rate your physical health? - 2 2 3 3 2   In general, how would you rate your mental health, including your mood and your ability to think? - 2 3 2 2 2   In general, how would you rate your satisfaction with your social activities and relationships? - 2 3 3 2 3   In general, please rate how well you carry out your usual social activities and roles. (This includes activities at home, at work and in your community, and responsibilities as a parent, child, spouse, employee, friend, etc.) - 3 2 3 3 3   To what extent are you able to carry out your everyday physical activities such as walking, climbing stairs, carrying groceries, or moving a chair? - 4 4 4 4 4   In the past 7 days, how often have you been bothered by emotional problems such as feeling anxious, depressed, or irritable? - 3 4 4 4 4   In the past 7 days, how would you rate your fatigue on average? - 2 2 3 2 2   In the past 7 days, how would you rate your pain on average, where 0 means no pain, and 10 means worst imaginable pain? - 3 3 3 2 3   Global Mental Health Score - 10 11 9 9 10   Global Physical Health Score - 14 14 14 15 14   PROMIS TOTAL - SUBSCORES - 24 25 23 24 24   Some recent data might be hidden         ASSESSMENT: Current Emotional / Mental Status (status of significant symptoms):   Risk status (Self / Other harm or suicidal ideation)   Patient denies current fears or concerns for personal safety.   Patient denies current or recent suicidal ideation or behaviors.   Patient denies current or recent homicidal ideation or behaviors.   Patient denies current or recent self injurious behavior or ideation.   Patient denies other safety concerns.   Patient reports there has been no change in risk factors since their last session.     Patient reports there has been no change in protective factors since their last session.     Recommended  "that patient call 911 or go to the local ED should there be a change in any of these risk factors.     Appearance:   Appropriate    Eye Contact:   Good    Psychomotor Behavior: Normal    Attitude:   Cooperative    Orientation:   All   Speech    Rate / Production: Normal/ Responsive    Volume:  Normal    Mood:    Anxious    Affect:    Appropriate    Thought Content:  Clear    Thought Form:  Coherent  Goal Directed  Logical    Insight:    Good      Medication Review:   No changes to current psychiatric medication(s)     Medication Compliance:   Yes     Changes in Health Issues:   Yes: See epic     Chemical Use Review:   Substance Use: Chemical use reviewed, no active concerns identified      Tobacco Use: No current tobacco use.      Diagnosis:  1. Posttraumatic stress disorder    2. Moderate episode of recurrent major depressive disorder (H)    3. ADA (generalized anxiety disorder)        Collateral Reports Completed:   Not Applicable    PLAN: (Patient Tasks / Therapist Tasks / Other)  Patient will return in three to four weeks for scheduled session. Patient will schedule \"me time\" twice a month. Patient will continue to spend time with friends.     There has been demonstrated improvement in functioning while patient has been engaged in psychotherapy/psychological service- if withdrawn the patient would deteriorate and/or relapse.     Mariana Love, [facility] 2/07/23    ______________________________________________________________________    Individual Treatment Plan    Patient's Name: Alina Martell  YOB: 1965    Date of Creation:10/16/2020  Date Treatment Plan Last Reviewed/Revised: 12/1/2022    DSM5 Diagnoses: 296.32 (F33.1) Major Depressive Disorder, Recurrent Episode, Moderate _ or 300.02 (F41.1) Generalized Anxiety Disorder  Psychosocial / Contextual Factors: Health, family and financial   PROMIS (reviewed every 90 days): 25    Referral / Collaboration:  Was/were discussed and patient will " pursue.    Anticipated number of session for this episode of care: 20 will reevaluate every 90 days  Anticipation frequency of session: Biweekly  Anticipated Duration of each session: 38-52 minutes  Treatment plan will be reviewed in 90 days or when goals have been changed.       MeasurableTreatment Goal(s) related to diagnosis / functional impairment(s)  Goal 1: Patient will work on reducing overall anxiety.     I will know I've met my goal when reporting minimal anxiety symptoms.       Objective #A (Patient Action)                          Patient will use distraction each time intrusive worry surfaces.  Status: Continued - Date(s): 12/1/2022     Intervention(s)  Therapist will teach emotional regulation skills. ..     Objective #B  Patient will Decrease frequency and intensity of feeling down, depressed, hopeless.  Status: Continued - Date(s): 12/1/2022     Intervention(s)  Therapist will teach emotional recognition/identification. ..     Objective #C  Patient will Identify negative self-talk and behaviors: challenge core beliefs, myths, and actions.  Status: Continued - Date(s): 12/1/2022     Intervention(s)  Therapist will teach the client how to perform a behavioral chain analysis. ..     Goal 2: Patient will continue to work on reducing depression symptoms    I will know I've met my goal then reporting minimal depression symptoms     Objective #A (Patient Action)                          Patient will Increase interest, engagement, and pleasure in doing things.  Status: Continued - Date(s):  12/1/2022     Intervention(s)  Therapist will assign homework ..     Objective #B  Patient will Decrease frequency and intensity of feeling down, depressed, hopeless.  Status: Continued - Date(s):  12/1/2022     Intervention(s)  Therapist will assign homework at every session.         Patient has reviewed and agreed to the above plan.        Mariana Love [facility] 12/1/2022

## 2023-02-08 DIAGNOSIS — M05.79 SEROPOSITIVE RHEUMATOID ARTHRITIS OF MULTIPLE SITES (H): ICD-10-CM

## 2023-02-08 RX ORDER — ADALIMUMAB 40MG/0.4ML
KIT SUBCUTANEOUS
Qty: 2 EACH | Refills: 0 | Status: SHIPPED | OUTPATIENT
Start: 2023-02-08 | End: 2023-02-14

## 2023-02-08 NOTE — RESULT ENCOUNTER NOTE
Dear Maritza,     Here are your recent results which are within the expected range. Please continue with your current plan of care and let us know if you have any questions or concerns.    Regards,  Tila Last, DO

## 2023-02-14 ENCOUNTER — OFFICE VISIT (OUTPATIENT)
Dept: RHEUMATOLOGY | Facility: CLINIC | Age: 58
End: 2023-02-14
Payer: COMMERCIAL

## 2023-02-14 VITALS
WEIGHT: 285.8 LBS | HEART RATE: 88 BPM | SYSTOLIC BLOOD PRESSURE: 134 MMHG | BODY MASS INDEX: 42.33 KG/M2 | HEIGHT: 69 IN | DIASTOLIC BLOOD PRESSURE: 80 MMHG

## 2023-02-14 DIAGNOSIS — E66.01 MORBID OBESITY (H): ICD-10-CM

## 2023-02-14 DIAGNOSIS — M25.50 POLYARTHRALGIA: ICD-10-CM

## 2023-02-14 DIAGNOSIS — M17.0 PRIMARY OSTEOARTHRITIS OF BOTH KNEES: ICD-10-CM

## 2023-02-14 DIAGNOSIS — M05.79 SEROPOSITIVE RHEUMATOID ARTHRITIS OF MULTIPLE SITES (H): Primary | ICD-10-CM

## 2023-02-14 DIAGNOSIS — R76.8 CYCLIC CITRULLINATED PEPTIDE (CCP) ANTIBODY POSITIVE: ICD-10-CM

## 2023-02-14 DIAGNOSIS — Z79.899 HIGH RISK MEDICATION USE: ICD-10-CM

## 2023-02-14 PROCEDURE — 99214 OFFICE O/P EST MOD 30 MIN: CPT | Performed by: INTERNAL MEDICINE

## 2023-02-14 RX ORDER — ADALIMUMAB 40MG/0.4ML
KIT SUBCUTANEOUS
Qty: 2 EACH | Refills: 5 | Status: SHIPPED | OUTPATIENT
Start: 2023-02-28 | End: 2023-08-10

## 2023-02-14 NOTE — PROGRESS NOTES
"      Rheumatology follow-up visit note     Alina is a 57 year old female presents today for follow-up.    Alina was seen today for recheck.    Diagnoses and all orders for this visit:    Seropositive rheumatoid arthritis of multiple sites (H)  -     adalimumab (HUMIRA *CF* PEN) 40 MG/0.4ML pen kit; INJECT 1 PEN UNDER THE SKIN EVERY 14 DAYS.    Cyclic citrullinated peptide (CCP) antibody positive    Polyarthralgia    Morbid obesity (H)    High risk medication use        Her seropositive severe rheumatoid arthritis is very well controlled with Humira.  Complete recovery.,  COVID infection.  She had labs drawn in December within acceptable range.  We will stay the course.  Further management of OA of the knees nonpharmacologic measures including weight, activity were reviewed.  Follow-up here in 6 months.    Follow up in 6 months.    HPI    Alina Martell is a 57 year old female is here for follow-up of rheumatoid arthritis.  This was severe.  Seropositive. This is polyarticular. Family history of psoriasis and brother, mom's history of Crohn's.  She has done great.  She is on Humira that she inject every 2 weeks. No longer on hydroxychloroquine.  She has noted mild discomfort in her knees.  Several years ago x-rays of the knees were done.  She has been by med changes of osteoarthritis.  She is able to play tennis with a 12-year-old grandson.  He is able to do most of her day-to-day activities without difficulty.  Sounds like she recently had ablation of an accessory pathway that had triggered atrial fibrillation, dysrhythmia and has done well since.  Her morning stiffness is no more than 5 minutes.   She is allergic to sulfa, she has still has her menstrual cycle going, they use condoms therefore methotrexate leflunomide not an option.   She is starting to play tennis now. She considers herself a\" flexaterian\", and is trying to add more vegetarian meals.          DETAILED EXAMINATION  02/14/23  " ":    Vitals:    02/14/23 1447   BP: 134/80   BP Location: Right arm   Patient Position: Sitting   Cuff Size: Adult Large   Pulse: 88   Weight: 129.6 kg (285 lb 12.8 oz)   Height: 1.753 m (5' 9\")     Alert oriented. Head including the face is examined for malar rash, heliotropes, scarring, lupus pernio. Eyes examined for redness such as in episcleritis/scleritis, periorbital lesions.   Neck/ Face examined for parotid gland swelling, range of motion of neck.  Left upper and lower and right upper and lower extremities examined for tenderness, swelling, warmth of the appendicular joints, range of motion, edema, rash.  Some of the important findings included: she does not have evidence of synovitis in the palpable joints of the upper extremities.  No significant deformities of the digits.  No Heberden nodes.  Range of motion of the shoulders show full abduction.  No JLT effusion or warmth of the knees.  she does not have dactylitis of the digits.     Patient Active Problem List    Diagnosis Date Noted     Status post catheter ablation of atrial fibrillation 08/23/2022     Priority: Medium     6/30/2022 with Dr. Joseph  1. Mild left atrial dilation without significant scattered fibrosis  2. Atrial fibrillation ablation via PVI         History of colonic polyps 04/05/2022     Priority: Medium     Colonscopy 3/22 - repeat in 3-5 year       Postmenopausal bleeding 02/16/2022     Priority: Medium     Obstructive sleep apnea syndrome 01/20/2022     Priority: Medium     Essential hypertension 01/20/2022     Priority: Medium     Coronary artery disease of native artery with stable angina pectoris (H) 01/20/2022     Priority: Medium     Seropositive rheumatoid arthritis of multiple sites (H) 01/05/2022     Priority: Medium     Recurrent major depressive disorder, in full remission (H) 01/05/2022     Priority: Medium     Paroxysmal atrial fibrillation (H) 01/05/2022     Priority: Medium     Morbid obesity (H) 01/05/2022     " Priority: Medium     Chest pain 07/11/2021     Priority: Medium     ADA (generalized anxiety disorder) 03/31/2021     Priority: Medium     Depressed 11/14/2018     Priority: Medium     Cyclic citrullinated peptide (CCP) antibody positive 10/03/2017     Priority: Medium     Tendonitis of both shoulders 06/09/2017     Priority: Medium     Chronic pain 07/18/2016     Priority: Medium     Olecranon bursitis of right elbow 07/08/2016     Priority: Medium     Formatting of this note might be different from the original.  Replacement Utility updated for latest IMO load       Grief 03/29/2016     Priority: Medium     Sicca syndrome (H) 10/27/2015     Priority: Medium     Anxiety 12/08/2014     Priority: Medium     Panic attack 12/08/2014     Priority: Medium     Sleep disorder 12/05/2014     Priority: Medium     Insomnia related to another mental disorder 09/03/2014     Priority: Medium     Microcytic anemia 09/02/2014     Priority: Medium     Migraine headache 09/02/2014     Priority: Medium     Reflux 09/02/2014     Priority: Medium     Past Surgical History:   Procedure Laterality Date     CHOLECYSTECTOMY       DILATION AND CURETTAGE, OPERATIVE HYSTEROSCOPY, COMBINED N/A 3/3/2022    Procedure: HYSTEROSCOPY, WITH DILATION AND CURETTAGE;  Surgeon: Irma Cary MD;  Location: [facility]     EP ABLATION FOCAL AFIB N/A 6/30/2022    Procedure: Ablation Atrial Fibrilation;  Surgeon: Jose Guadalupe Joseph MD;  Location:  [facility]     HC REMOVAL GALLBLADDER      Description: Cholecystectomy;  Recorded: 10/15/2013;     LAPAROSCOPIC TUBAL LIGATION       RELEASE CARPAL TUNNEL BILATERAL       TUBAL LIGATION  1995      Past Medical History:   Diagnosis Date     Anemia      Antiplatelet or antithrombotic long-term use      Arrhythmia      Cannabis use without complication 12/8/2014     Carpal tunnel syndrome      Coronary artery disease      Gastroesophageal reflux disease      History of angina       Hypertension      Irregular heart beat      Obesity      Paroxysmal atrial fibrillation (H)      PTSD (post-traumatic stress disorder)      RA (rheumatoid arthritis) (H)      Rheumatoid arthritis (H) 7/8/2016     Sicca syndrome (H)      Sleep apnea      Allergies   Allergen Reactions     Penicillins Shortness Of Breath, Palpitations, Dizziness, Anaphylaxis, Hives, Itching and Rash     Test in 2031, see allergy note on 7/29/21     Shellfish-Derived Products Anaphylaxis     Sulfa Drugs Hives and Anaphylaxis     Current Outpatient Medications   Medication Sig Dispense Refill     acetaminophen (TYLENOL) 325 MG tablet Take 3 tablets (975 mg) by mouth every 6 hours as needed for mild pain 50 tablet 0     adalimumab (HUMIRA *CF* PEN) 40 MG/0.4ML pen kit INJECT 1 PEN UNDER THE SKIN EVERY 14 DAYS. 2 each 0     apixaban ANTICOAGULANT (ELIQUIS ANTICOAGULANT) 5 MG tablet Take 1 tablet (5 mg) by mouth 2 times daily 180 tablet 3     benzonatate (TESSALON) 100 MG capsule Take 1 capsule (100 mg) by mouth 3 times daily as needed for cough 30 capsule 0     cetirizine (ZYRTEC) 10 MG tablet Take 10 mg by mouth daily       diltiazem ER COATED BEADS (CARDIZEM CD/CARTIA XT) 180 MG 24 hr capsule Take 2 capsules (360 mg) by mouth daily 180 capsule 3     EPINEPHrine (ANY BX GENERIC EQUIV) 0.3 MG/0.3ML injection 2-pack Inject 0.3 mg into the muscle as needed for anaphylaxis       flecainide (TAMBOCOR) 50 MG tablet Take 1 tablet (50 mg) by mouth daily as needed (atrial fibrillation) 20 tablet 4     hydrOXYzine (ATARAX) 25 MG tablet Take 1 tablet (25 mg) by mouth 3 times daily as needed for anxiety 270 tablet 3     LORazepam (ATIVAN) 0.5 MG tablet Take 1 tablet (0.5 mg) by mouth every 6 hours as needed for anxiety 16 tablet 1     molnupiravir (LAGEVRIO) 200 MG capsule Take 4 capsules (800 mg) by mouth every 12 hours 40 each 0     Multiple Vitamins-Minerals (CENTROVITE) TABS TAKE 1 TABLET BY MOUTH EVERY DAY FOR 30 DAYS       omeprazole  (PRILOSEC) 40 MG DR capsule Take 1 capsule (40 mg) by mouth in the morning. 90 capsule 3     ondansetron (ZOFRAN ODT) 4 MG ODT tab Take 1 tablet (4 mg) by mouth every 6 hours as needed for nausea or vomiting 20 tablet 0     polyethylene glycol (MIRALAX) 17 GM/Dose powder Take 17 g (1 capful) by mouth daily (Patient taking differently: Take 1 capful by mouth daily as needed for constipation) 578 g 3     topiramate (TOPAMAX) 25 MG tablet 25mg at bedtime for 1 week, then 25mg twice daily for 1 week, then 25mg in the morning and 50mg in the evening for 1 week and finally 50mg twice daily. 120 tablet 5     vitamin C (ASCORBIC ACID) 1000 MG TABS Take 1,000 mg by mouth daily        vitamin D3 (CHOLECALCIFEROL) 250 mcg (87600 units) capsule Take 1 capsule by mouth once a week       family history includes Alcoholism in her brother, brother, and maternal grandfather; Arrhythmia in her brother and brother; Atrial fibrillation in her mother; Attention Deficit Disorder in her brother; Chronic Kidney Disease in her father; Crohn's Disease in her mother; Depression in her brother; Diabetes in her father; Lupus in her cousin; No Known Problems in her daughter and son; Prostate Cancer in her father; Snoring in her father; Substance Abuse in her brother and brother.  Social Connections: Not on file          WBC   Date Value Ref Range Status   11/14/2018 6.5 4.0 - 11.0 10e9/L Final     WBC Count   Date Value Ref Range Status   12/15/2022 9.0 4.0 - 11.0 10e3/uL Final     RBC Count   Date Value Ref Range Status   12/15/2022 4.68 3.80 - 5.20 10e6/uL Final   11/14/2018 4.31 3.8 - 5.2 10e12/L Final     Hemoglobin   Date Value Ref Range Status   12/15/2022 12.0 11.7 - 15.7 g/dL Final   11/14/2018 10.8 (L) 11.7 - 15.7 g/dL Final     Hematocrit   Date Value Ref Range Status   12/15/2022 38.6 35.0 - 47.0 % Final   11/14/2018 35.6 35.0 - 47.0 % Final     MCV   Date Value Ref Range Status   12/15/2022 83 78 - 100 fL Final   11/14/2018 83 78 -  100 fl Final     MCH   Date Value Ref Range Status   12/15/2022 25.6 (L) 26.5 - 33.0 pg Final   11/14/2018 25.1 (L) 26.5 - 33.0 pg Final     Platelet Count   Date Value Ref Range Status   12/15/2022 331 150 - 450 10e3/uL Final   11/14/2018 286 150 - 450 10e9/L Final     % Lymphocytes   Date Value Ref Range Status   12/15/2022 22 % Final   11/14/2018 24.6 % Final     AST   Date Value Ref Range Status   12/15/2022 17 10 - 35 U/L Final   11/14/2018 12 0 - 45 U/L Final     ALT   Date Value Ref Range Status   12/15/2022 19 10 - 35 U/L Final   11/14/2018 13 0 - 50 U/L Final     Albumin   Date Value Ref Range Status   12/15/2022 3.8 3.5 - 5.2 g/dL Final   03/08/2022 3.5 3.5 - 5.0 g/dL Final   11/14/2018 2.8 (L) 3.4 - 5.0 g/dL Final     Alkaline Phosphatase   Date Value Ref Range Status   12/15/2022 112 (H) 35 - 104 U/L Final   11/14/2018 83 40 - 150 U/L Final     Creatinine   Date Value Ref Range Status   12/15/2022 0.68 0.51 - 0.95 mg/dL Final   11/14/2018 0.62 0.52 - 1.04 mg/dL Final     GFR Estimate   Date Value Ref Range Status   12/15/2022 >90 >60 mL/min/1.73m2 Final     Comment:     Effective December 21, 2021 eGFRcr in adults is calculated using the 2021 CKD-EPI creatinine equation which includes age and gender (Allyssa et al., NEJM, DOI: 10.1056/GISXvz3589772)   01/21/2021 >60 >60 mL/min/1.73m2 Final   11/14/2018 >90 >60 mL/min/1.7m2 Final     Comment:     Non  GFR Calc     GFR Estimate If Black   Date Value Ref Range Status   01/21/2021 >60 >60 mL/min/1.73m2 Final   11/14/2018 >90 >60 mL/min/1.7m2 Final     Comment:      GFR Calc     Erythrocyte Sedimentation Rate   Date Value Ref Range Status   03/11/2020 64 (H) 0 - 20 mm/hr Final     CRP   Date Value Ref Range Status   03/11/2020 3.3 (H) 0.0 - 0.8 mg/dL Final

## 2023-02-16 ENCOUNTER — TELEPHONE (OUTPATIENT)
Dept: RHEUMATOLOGY | Facility: CLINIC | Age: 58
End: 2023-02-16
Payer: COMMERCIAL

## 2023-02-16 NOTE — TELEPHONE ENCOUNTER
PA Initiation    Medication: Humira renewal   Insurance Company: [employer] - Phone [phone] Fax [phone]  Pharmacy Filling the Rx:    Filling Pharmacy Phone:    Filling Pharmacy Fax:    Start Date: 2/16/2023    Form  [employer] Non-Medicare Humira Form  Used for Non-Medicare members who need authorized coverage for Humira  [phone]  [phone]      TOO Olguin, [facility]  Specialty Pharmacy Clinic Liaison     [facility]    [email or web address]    Phone: [phone]  Fax: [phone]     Connect with [facility] on social media.

## 2023-02-16 NOTE — TELEPHONE ENCOUNTER
DRUG UPDATE  HUMIRA   [employer] spec PA line   [phone]  Phone      PA already in place   Approved from 01/16/2023-01/16/2024      TOO Olguin, [facility]  Specialty Pharmacy Clinic Liaison     [email or web address]    [email or web address]    Phone: [phone]  Fax: [phone]     Connect with [facility] on social media.

## 2023-03-05 ENCOUNTER — NURSE TRIAGE (OUTPATIENT)
Dept: NURSING | Facility: CLINIC | Age: 58
End: 2023-03-05
Payer: COMMERCIAL

## 2023-03-05 NOTE — TELEPHONE ENCOUNTER
"Nurse Triage SBAR     Situation: Patient calling with vaginal bleeding     Background: Heavy period x3 days. Heaviest she has seen it.     Assessment: changing \"a couple pads an hour\". Doubling up pads at night otherwise she has a mess within 20 minutes. Clots up to the size of golf balls. Now starting to feel tired and lightheaded    Recommendation: ED advised. Reviewed care advice per protocol. Patient verbalizes understanding and agrees to plan      Reason for Disposition    Vaginal bleeding is main symptom    SEVERE vaginal bleeding (e.g., soaking 2 pads or tampons per hour and present 2 or more hours; 1 menstrual cup every 2 hours)    Additional Information    Negative: Shock suspected (e.g., cold/pale/clammy skin, too weak to stand, low BP, rapid pulse)    Negative: Difficult to awaken or acting confused (e.g., disoriented, slurred speech)    Negative: Passed out (i.e., lost consciousness, collapsed and was not responding)    Negative: Sounds like a life-threatening emergency to the triager    Negative: Followed a genital area injury (e.g., vagina, vulva)    Negative: Pregnant 20 or more weeks    Negative: Pregnant < 20 weeks  (less than 5 months)    Negative: Postpartum (from 0 to 6 weeks after delivery)    Negative: Bleeding occurring > 12 months after menopause    Negative: Bleeding from sexual abuse or rape    Negative: [1] Vaginal discharge is main symptom AND [2] small amount of blood    Negative: SEVERE abdominal pain    Negative: SEVERE dizziness (e.g., unable to stand, requires support to walk, feels like passing out now)    Protocols used: VAGINAL SYMPTOMS-A-AH, VAGINAL BLEEDING - [id number]      Rose Marie Calvin RN [place] [facility] March 5, 2023 2:30 PM  "

## 2023-03-06 ENCOUNTER — LAB (OUTPATIENT)
Dept: LAB | Facility: CLINIC | Age: 58
End: 2023-03-06
Payer: COMMERCIAL

## 2023-03-06 ENCOUNTER — OFFICE VISIT (OUTPATIENT)
Dept: FAMILY MEDICINE | Facility: CLINIC | Age: 58
End: 2023-03-06
Payer: COMMERCIAL

## 2023-03-06 VITALS
DIASTOLIC BLOOD PRESSURE: 79 MMHG | SYSTOLIC BLOOD PRESSURE: 138 MMHG | HEART RATE: 89 BPM | BODY MASS INDEX: 41.87 KG/M2 | WEIGHT: 283.5 LBS | RESPIRATION RATE: 20 BRPM | TEMPERATURE: 98.3 F | OXYGEN SATURATION: 97 %

## 2023-03-06 DIAGNOSIS — N93.8 DYSFUNCTIONAL UTERINE BLEEDING: Primary | ICD-10-CM

## 2023-03-06 DIAGNOSIS — M05.79 SEROPOSITIVE RHEUMATOID ARTHRITIS OF MULTIPLE SITES (H): ICD-10-CM

## 2023-03-06 DIAGNOSIS — Z79.899 HIGH RISK MEDICATION USE: ICD-10-CM

## 2023-03-06 LAB
ALBUMIN SERPL BCG-MCNC: 3.9 G/DL (ref 3.5–5.2)
ALT SERPL W P-5'-P-CCNC: 14 U/L (ref 10–35)
CREAT SERPL-MCNC: 0.72 MG/DL (ref 0.51–0.95)
ERYTHROCYTE [DISTWIDTH] IN BLOOD BY AUTOMATED COUNT: 14.5 % (ref 10–15)
GFR SERPL CREATININE-BSD FRML MDRD: >90 ML/MIN/1.73M2
HCT VFR BLD AUTO: 36.6 % (ref 35–47)
HGB BLD-MCNC: 11.5 G/DL (ref 11.7–15.7)
MCH RBC QN AUTO: 26.4 PG (ref 26.5–33)
MCHC RBC AUTO-ENTMCNC: 31.4 G/DL (ref 31.5–36.5)
MCV RBC AUTO: 84 FL (ref 78–100)
PLATELET # BLD AUTO: 366 10E3/UL (ref 150–450)
RBC # BLD AUTO: 4.36 10E6/UL (ref 3.8–5.2)
WBC # BLD AUTO: 10.6 10E3/UL (ref 4–11)

## 2023-03-06 PROCEDURE — 84460 ALANINE AMINO (ALT) (SGPT): CPT

## 2023-03-06 PROCEDURE — 36415 COLL VENOUS BLD VENIPUNCTURE: CPT

## 2023-03-06 PROCEDURE — 85027 COMPLETE CBC AUTOMATED: CPT

## 2023-03-06 PROCEDURE — 82040 ASSAY OF SERUM ALBUMIN: CPT

## 2023-03-06 PROCEDURE — 99213 OFFICE O/P EST LOW 20 MIN: CPT | Performed by: FAMILY MEDICINE

## 2023-03-06 PROCEDURE — 82565 ASSAY OF CREATININE: CPT

## 2023-03-06 RX ORDER — MEDROXYPROGESTERONE ACETATE 10 MG
10 TABLET ORAL DAILY
Qty: 10 TABLET | Refills: 0 | Status: SHIPPED | OUTPATIENT
Start: 2023-03-06 | End: 2023-03-16

## 2023-03-06 NOTE — PROGRESS NOTES
"OUTPATIENT VISIT NOTE                                                   Date of Visit: 3/6/2023     Chief Complaint   Patient presents with:  Vaginal Bleeding: X 4 days. \"Big blood clots\", fatigue. Heavy menstrual; \"won't stop\", cramping lower abdominal.             History of Present Illness   Alina Martell is a 58 year old female Heavy vaginal bleeding for the last four days.  This period was about two weeks early.  Had been having periods regularly since last about last April when she had an ablation.       MEDICATIONS   Current Outpatient Medications   Medication     adalimumab (HUMIRA *CF* PEN) 40 MG/0.4ML pen kit     apixaban ANTICOAGULANT (ELIQUIS ANTICOAGULANT) 5 MG tablet     benzonatate (TESSALON) 100 MG capsule     cetirizine (ZYRTEC) 10 MG tablet     diltiazem ER COATED BEADS (CARDIZEM CD/CARTIA XT) 180 MG 24 hr capsule     EPINEPHrine (ANY BX GENERIC EQUIV) 0.3 MG/0.3ML injection 2-pack     flecainide (TAMBOCOR) 50 MG tablet     hydrOXYzine (ATARAX) 25 MG tablet     LORazepam (ATIVAN) 0.5 MG tablet     medroxyPROGESTERone (PROVERA) 10 MG tablet     molnupiravir (LAGEVRIO) 200 MG capsule     Multiple Vitamins-Minerals (CENTROVITE) TABS     omeprazole (PRILOSEC) 40 MG DR capsule     ondansetron (ZOFRAN ODT) 4 MG ODT tab     topiramate (TOPAMAX) 25 MG tablet     vitamin C (ASCORBIC ACID) 1000 MG TABS     vitamin D3 (CHOLECALCIFEROL) 250 mcg (33652 units) capsule     acetaminophen (TYLENOL) 325 MG tablet     polyethylene glycol (MIRALAX) 17 GM/Dose powder     No current facility-administered medications for this visit.         SOCIAL HISTORY   Social History     Tobacco Use     Smoking status: Never     Smokeless tobacco: Never     Tobacco comments:     smokes marijuana   Substance Use Topics     Alcohol use: Not Currently     Comment: Alcoholic Drinks/day: Never an issue, she states.  7/8/16           Physical Exam   Vitals:    03/06/23 1019   BP: 138/79   BP Location: Right arm   Patient Position: " Sitting   Cuff Size: Adult Large   Pulse: 89   Resp: 20   Temp: 98.3  F (36.8  C)   TempSrc: Oral   SpO2: 97%   Weight: 128.6 kg (283 lb 8 oz)        GEN:  NAD  LUNGS:  Clear to auscultation without wheezing.  Normal effort.  HEART:  RRR without murmur, rub or gallop   ABDOMEN: Normal BS,  Soft and nontender,  No masses.  No CVA tenderness.  PELVIC:    External genitalia: Normal  Vagina: Moderate blood with clots from cervix.  Cervix: No lesions. No cervical motion tenderness  Uterus:  Small, nontender.  Adnexae:  Without masses or tenderness.          Assessment and Plan     Dysfunctional uterine bleeding  Provera as prescribed.  Expect bleeding to stop over the next few days, then will have another bleed after stops the provera.  See OB  Go to ER for increased bleeding  - medroxyPROGESTERone (PROVERA) 10 MG tablet  Dispense: 10 tablet; Refill: 0                   Discussed signs / symptoms that warrant urgent / emergent medical attention.     Recheck if worsening or not improving.       Madeline Sin MD          Pertinent History     The following portions of the patient's history were reviewed and updated as appropriate: allergies, current medications, past family history, past medical history, past social history, past surgical history and problem list.

## 2023-03-06 NOTE — PATIENT INSTRUCTIONS
Medication as directed.  Your bleeding should stop-then you will have another bleeding a few days after stopping the medication.    Call [facility] for follow-up [phone]

## 2023-03-08 ENCOUNTER — HOSPITAL ENCOUNTER (EMERGENCY)
Facility: HOSPITAL | Age: 58
Discharge: HOME | End: 2023-03-08
Attending: EMERGENCY MEDICINE | Admitting: EMERGENCY MEDICINE
Payer: COMMERCIAL

## 2023-03-08 ENCOUNTER — APPOINTMENT (OUTPATIENT)
Dept: ULTRASOUND IMAGING | Facility: HOSPITAL | Age: 58
End: 2023-03-08
Attending: EMERGENCY MEDICINE
Payer: COMMERCIAL

## 2023-03-08 VITALS
BODY MASS INDEX: 41.47 KG/M2 | RESPIRATION RATE: 16 BRPM | HEIGHT: 69 IN | HEART RATE: 80 BPM | WEIGHT: 280 LBS | OXYGEN SATURATION: 99 % | DIASTOLIC BLOOD PRESSURE: 85 MMHG | TEMPERATURE: 98.1 F | SYSTOLIC BLOOD PRESSURE: 148 MMHG

## 2023-03-08 DIAGNOSIS — D62 ANEMIA DUE TO BLOOD LOSS, ACUTE: ICD-10-CM

## 2023-03-08 DIAGNOSIS — N93.8 DYSFUNCTIONAL UTERINE BLEEDING: ICD-10-CM

## 2023-03-08 LAB
ABO/RH(D): NORMAL
ANION GAP SERPL CALCULATED.3IONS-SCNC: 7 MMOL/L (ref 7–15)
ANTIBODY SCREEN: NEGATIVE
BASOPHILS # BLD AUTO: 0.1 10E3/UL (ref 0–0.2)
BASOPHILS NFR BLD AUTO: 0 %
BUN SERPL-MCNC: 11 MG/DL (ref 6–20)
CALCIUM SERPL-MCNC: 8.4 MG/DL (ref 8.6–10)
CHLORIDE SERPL-SCNC: 104 MMOL/L (ref 98–107)
CREAT SERPL-MCNC: 0.71 MG/DL (ref 0.51–0.95)
DEPRECATED HCO3 PLAS-SCNC: 28 MMOL/L (ref 22–29)
EOSINOPHIL # BLD AUTO: 0.3 10E3/UL (ref 0–0.7)
EOSINOPHIL NFR BLD AUTO: 2 %
ERYTHROCYTE [DISTWIDTH] IN BLOOD BY AUTOMATED COUNT: 14.8 % (ref 10–15)
GFR SERPL CREATININE-BSD FRML MDRD: >90 ML/MIN/1.73M2
GLUCOSE SERPL-MCNC: 103 MG/DL (ref 70–99)
HCT VFR BLD AUTO: 34 % (ref 35–47)
HGB BLD-MCNC: 10.4 G/DL (ref 11.7–15.7)
IMM GRANULOCYTES # BLD: 0.1 10E3/UL
IMM GRANULOCYTES NFR BLD: 0 %
LYMPHOCYTES # BLD AUTO: 3 10E3/UL (ref 0.8–5.3)
LYMPHOCYTES NFR BLD AUTO: 26 %
MCH RBC QN AUTO: 25.7 PG (ref 26.5–33)
MCHC RBC AUTO-ENTMCNC: 30.6 G/DL (ref 31.5–36.5)
MCV RBC AUTO: 84 FL (ref 78–100)
MONOCYTES # BLD AUTO: 0.8 10E3/UL (ref 0–1.3)
MONOCYTES NFR BLD AUTO: 7 %
NEUTROPHILS # BLD AUTO: 7.5 10E3/UL (ref 1.6–8.3)
NEUTROPHILS NFR BLD AUTO: 65 %
NRBC # BLD AUTO: 0 10E3/UL
NRBC BLD AUTO-RTO: 0 /100
PLATELET # BLD AUTO: 343 10E3/UL (ref 150–450)
POTASSIUM SERPL-SCNC: 3.7 MMOL/L (ref 3.4–5.3)
RBC # BLD AUTO: 4.04 10E6/UL (ref 3.8–5.2)
SODIUM SERPL-SCNC: 139 MMOL/L (ref 136–145)
SPECIMEN EXPIRATION DATE: NORMAL
WBC # BLD AUTO: 11.6 10E3/UL (ref 4–11)

## 2023-03-08 PROCEDURE — 85025 COMPLETE CBC W/AUTO DIFF WBC: CPT | Performed by: STUDENT IN AN ORGANIZED HEALTH CARE EDUCATION/TRAINING PROGRAM

## 2023-03-08 PROCEDURE — 99284 EMERGENCY DEPT VISIT MOD MDM: CPT | Mod: 25

## 2023-03-08 PROCEDURE — 82310 ASSAY OF CALCIUM: CPT | Performed by: EMERGENCY MEDICINE

## 2023-03-08 PROCEDURE — 36415 COLL VENOUS BLD VENIPUNCTURE: CPT | Performed by: EMERGENCY MEDICINE

## 2023-03-08 PROCEDURE — 96360 HYDRATION IV INFUSION INIT: CPT

## 2023-03-08 PROCEDURE — 258N000003 HC RX IP 258 OP 636: Performed by: EMERGENCY MEDICINE

## 2023-03-08 PROCEDURE — 76856 US EXAM PELVIC COMPLETE: CPT

## 2023-03-08 PROCEDURE — 96361 HYDRATE IV INFUSION ADD-ON: CPT

## 2023-03-08 PROCEDURE — 86850 RBC ANTIBODY SCREEN: CPT | Performed by: STUDENT IN AN ORGANIZED HEALTH CARE EDUCATION/TRAINING PROGRAM

## 2023-03-08 RX ORDER — FERROUS GLUCONATE 324(38)MG
324 TABLET ORAL
Qty: 30 TABLET | Refills: 0 | Status: SHIPPED | OUTPATIENT
Start: 2023-03-08 | End: 2024-07-24

## 2023-03-08 RX ADMIN — SODIUM CHLORIDE 1000 ML: 9 INJECTION, SOLUTION INTRAVENOUS at 17:48

## 2023-03-08 ASSESSMENT — ACTIVITIES OF DAILY LIVING (ADL): ADLS_ACUITY_SCORE: 37

## 2023-03-08 NOTE — ED TRIAGE NOTES
Pt comes in with 6 days of vaginal bleeding - menstrual period. Having to change pad 1 an hour for 6 days. Feels fatigued. PCP recommended she be seen.

## 2023-03-08 NOTE — ED PROVIDER NOTES
EMERGENCY DEPARTMENT ENCOUNTER      NAME: Alina Martell  AGE: 58 year old female  YOB: 1965  MRN: [id number]  EVALUATION DATE & TIME: No admission date for patient encounter.    PCP: Estelle Bansal    ED PROVIDER: Cedric Mcmillan M.D.      Chief Complaint   Patient presents with     Vaginal Bleeding         FINAL IMPRESSION:  1. Dysfunctional uterine bleeding    2. Anemia due to blood loss, acute          ED COURSE & MEDICAL DECISION MAKIN year old female presents to the Emergency Department for evaluation of vaginal bleeding.  Patient is vitally stable and nontoxic-appearing when she arrives to the emergency department.  Abdominal exam is benign.  She reports a consistently heavy episode of bleeding starting several days ago.  She does have a decline in her hemoglobin now to 10.5 from 11.5 few days ago at the walk-in clinic.  Her pelvic ultrasound redemonstrates a small fibroid but no other abnormality.  She is hemodynamically stable here and received a liter of IV fluid.  Other basic labs reviewed and stable.  Discussed case with OB/GYN on-call.  She is anticoagulated on Eliquis for her history of atrial fibrillation although after recent cardiology evaluations and ablation procedure she has been maintained in sinus rhythm and appears quite regular on the monitor here.  It sounds like she is fairly symptomatic when in atrial fibrillation and says this has not happened to her recently.  I think in light of the stability and with her current bleeding concerns and declining hemoglobin, the risk-benefit would favor holding her Eliquis for a few days to help encourage cessation of bleeding.  At this point I would not actively encourage clotting with any other agents.  After review with OB/GYN, her next best course is going to be follow-up with Dr. Cary who is her previous surgeon and performed a D&C on her last year so they can discuss further options for definitive management of her  perimenopausal dysfunctional uterine bleeding.  Patient was in agreement with this.  She was comfortable with the plan for discharge.  We discussed return precautions.    5:01 PM I met with the patient, obtained history, performed an initial exam, and discussed options and plan for diagnostics and treatment here in the ED.  6:55 PM Consulted with OBGYNDr. Phan  7:25 PM Checked in on and updated patient. We discussed the plan for discharge and the patient is agreeable. Reviewed supportive cares, symptomatic treatment, outpatient follow up, and reasons to return to the Emergency Department. Patient to be discharged by ED RN.       At the conclusion of the encounter I discussed the results of all of the tests and the disposition. The questions were answered. The patient or family acknowledged understanding and was agreeable with the care plan.       Medical Decision Making    History:    Supplemental history from: Documented in chart, if applicable    External Record(s) reviewed: Documented in chart, if applicable.    Work Up:    Chart documentation includes differential considered and any EKGs or imaging independently interpreted by provider, where specified.    In additional to work up documented, I considered the following work up: Documented in chart, if applicable.    External consultation:    Discussion of management with another provider: OB-Gyn    Complicating factors:    Care impacted by chronic illness: Heart Disease    Care affected by social determinants of health: N/A    Disposition considerations: Discharge. No recommendations on prescription strength medication(s). I considered admission, but discharged the patient after share decision making conversation.            MEDICATIONS GIVEN IN THE EMERGENCY:  Medications   0.9% sodium chloride BOLUS (0 mLs Intravenous Stopped 3/8/23 1946)       NEW PRESCRIPTIONS STARTED AT TODAY'S ER VISIT  Discharge Medication List as of 3/8/2023  7:46 PM      START taking  these medications    Details   ferrous gluconate (FERGON) 324 (38 Fe) MG tablet Take 1 tablet (324 mg) by mouth daily (with breakfast), Disp-30 tablet, R-0, Local Print                =================================================================    HPI    Patient information was obtained from: Patient     Use of : N/A         Alina Martell is a 58 year old female with a pertinent history of atrial fibrillation (taking Eliquis), CAD, anemia, morbid obesity, GERD, RA, sicca syndrome, s/p cholecystectomy, tubal ligation who presents to this ED by walking for evaluation of vaginal bleeding.     Patient had tubal ligation preformed in April 2022 and states that her periods had been regular since. At present, patient present to ED for 6 days of heavy vaginal bleeding. Notes large golf ball sized blood clots. Patient states that she bleeds through 1 pad every hour. Patient was concerned as she is becoming fatigued. Additionally patient endorses slight lower abdominal cramping. Denies use of hormone treatments. Patient notes a history of anemia but denies use of iron supplements.     Patient is on Eliquis for her atrial fibrillation (ablasion prefromed June 2022). Since the ablation patient has not had any problems with her atrial fibrillation. Denies history of blood clots. Otherwise patient denies any other complaints at this time.    Per chart review: Patient was seen on 03/06/2023 (2 days ago) at [facility] in [place] for dysfunctional uterine bleeding. Patient had been having regular periods since April 2022 after tubal ligation procedure but for the past 4 days, patient had been having heavy vaginal bleeding. Patient was prescribed provera and instructions to see OBGYN and ED if bleeding worsens.       REVIEW OF SYSTEMS   All systems reviewed and negative except as noted in HPI.    PAST MEDICAL HISTORY:  Past Medical History:   Diagnosis Date     Anemia      Antiplatelet or  antithrombotic long-term use      Arrhythmia      Cannabis use without complication 12/8/2014     Carpal tunnel syndrome      Coronary artery disease      Gastroesophageal reflux disease      History of angina      Hypertension      Irregular heart beat      Obesity      Paroxysmal atrial fibrillation (H)      PTSD (post-traumatic stress disorder)      RA (rheumatoid arthritis) (H)      Rheumatoid arthritis (H) 7/8/2016     Sicca syndrome (H)      Sleep apnea        PAST SURGICAL HISTORY:  Past Surgical History:   Procedure Laterality Date     CHOLECYSTECTOMY       DILATION AND CURETTAGE, OPERATIVE HYSTEROSCOPY, COMBINED N/A 3/3/2022    Procedure: HYSTEROSCOPY, WITH DILATION AND CURETTAGE;  Surgeon: Irma Cary MD;  Location: [facility]     EP ABLATION FOCAL AFIB N/A 6/30/2022    Procedure: Ablation Atrial Fibrilation;  Surgeon: Jose Guadalupe Joseph MD;  Location:  [facility]     HC REMOVAL GALLBLADDER      Description: Cholecystectomy;  Recorded: 10/15/2013;     LAPAROSCOPIC TUBAL LIGATION       RELEASE CARPAL TUNNEL BILATERAL       TUBAL LIGATION  1995           CURRENT MEDICATIONS:    No current facility-administered medications for this encounter.     Current Outpatient Medications   Medication     ferrous gluconate (FERGON) 324 (38 Fe) MG tablet     acetaminophen (TYLENOL) 325 MG tablet     adalimumab (HUMIRA *CF* PEN) 40 MG/0.4ML pen kit     apixaban ANTICOAGULANT (ELIQUIS ANTICOAGULANT) 5 MG tablet     benzonatate (TESSALON) 100 MG capsule     cetirizine (ZYRTEC) 10 MG tablet     diltiazem ER COATED BEADS (CARDIZEM CD/CARTIA XT) 180 MG 24 hr capsule     EPINEPHrine (ANY BX GENERIC EQUIV) 0.3 MG/0.3ML injection 2-pack     flecainide (TAMBOCOR) 50 MG tablet     hydrOXYzine (ATARAX) 25 MG tablet     LORazepam (ATIVAN) 0.5 MG tablet     medroxyPROGESTERone (PROVERA) 10 MG tablet     molnupiravir (LAGEVRIO) 200 MG capsule     Multiple Vitamins-Minerals (CENTROVITE) TABS      omeprazole (PRILOSEC) 40 MG DR capsule     ondansetron (ZOFRAN ODT) 4 MG ODT tab     polyethylene glycol (MIRALAX) 17 GM/Dose powder     topiramate (TOPAMAX) 25 MG tablet     vitamin C (ASCORBIC ACID) 1000 MG TABS     vitamin D3 (CHOLECALCIFEROL) 250 mcg (06252 units) capsule         ALLERGIES:  Allergies   Allergen Reactions     Penicillins Shortness Of Breath, Palpitations, Dizziness, Anaphylaxis, Hives, Itching and Rash     Test in 2031, see allergy note on 7/29/21     Shellfish-Derived Products Anaphylaxis     Sulfa Drugs Hives and Anaphylaxis       FAMILY HISTORY:  Family History   Problem Relation Age of Onset     Crohn's Disease Mother         Total colectomy with ileostomy.     Atrial fibrillation Mother      Snoring Father      Diabetes Father      Prostate Cancer Father      Chronic Kidney Disease Father         Chose against dialysis.     Substance Abuse Brother      Depression Brother      Attention Deficit Disorder Brother      Alcoholism Brother      Arrhythmia Brother      Alcoholism Brother      Substance Abuse Brother      Arrhythmia Brother      Alcoholism Maternal Grandfather      No Known Problems Daughter      No Known Problems Son      Lupus Cousin      Breast Cancer No family hx of        SOCIAL HISTORY:   Social History     Socioeconomic History     Marital status: Single     Number of children: 2     Years of education: 4   Tobacco Use     Smoking status: Never     Smokeless tobacco: Never     Tobacco comments:     smokes marijuana   Vaping Use     Vaping Use: Never used   Substance and Sexual Activity     Alcohol use: Not Currently     Comment: Alcoholic Drinks/day: Never an issue, she states.  7/8/16     Drug use: Not Currently     Comment: Drug use: Former marijuana use, not current. 7/8/16     Sexual activity: Never     Partners: Male     Birth control/protection: Other     Comment: tubal ligation       VITALS:  BP (!) 148/85   Pulse 80   Temp 98.1  F (36.7  C) (Temporal)   Resp 16    "Ht 1.753 m (5' 9\")   Wt 127 kg (280 lb)   LMP 03/02/2023 (Exact Date)   SpO2 99%   BMI 41.35 kg/m      PHYSICAL EXAM    Constitutional: Well developed, Well nourished, NAD.  HENT: Normocephalic, Atraumatic. Neck Supple.  Eyes: EOMI, Conjunctiva normal.  Respiratory: Breathing comfortably on room air. Speaks full sentences easily. Lungs clear to ascultation.  Cardiovascular: Normal heart rate, Regular rhythm. No peripheral edema.  Abdomen: Soft, nontender  Musculoskeletal: Good range of motion in all major joints. No major deformities noted.  Integument: Warm, Dry.  Neurologic: Alert & awake, Normal motor function, Normal sensory function, No focal deficits noted.   Psychiatric: Cooperative. Affect appropriate.     LAB:  All pertinent labs reviewed and interpreted.  Labs Ordered and Resulted from Time of ED Arrival to Time of ED Departure   BASIC METABOLIC PANEL - Abnormal       Result Value    Sodium 139      Potassium 3.7      Chloride 104      Carbon Dioxide (CO2) 28      Anion Gap 7      Urea Nitrogen 11.0      Creatinine 0.71      Calcium 8.4 (*)     Glucose 103 (*)     GFR Estimate >90     CBC WITH PLATELETS AND DIFFERENTIAL - Abnormal    WBC Count 11.6 (*)     RBC Count 4.04      Hemoglobin 10.4 (*)     Hematocrit 34.0 (*)     MCV 84      MCH 25.7 (*)     MCHC 30.6 (*)     RDW 14.8      Platelet Count 343      % Neutrophils 65      % Lymphocytes 26      % Monocytes 7      % Eosinophils 2      % Basophils 0      % Immature Granulocytes 0      NRBCs per 100 WBC 0      Absolute Neutrophils 7.5      Absolute Lymphocytes 3.0      Absolute Monocytes 0.8      Absolute Eosinophils 0.3      Absolute Basophils 0.1      Absolute Immature Granulocytes 0.1      Absolute NRBCs 0.0     TYPE AND SCREEN, ADULT    ABO/RH(D) O POS      Antibody Screen Negative      SPECIMEN EXPIRATION DATE [id number]     ABO/RH TYPE AND SCREEN       RADIOLOGY:  Reviewed all pertinent imaging. Please see official radiology report.  US " Pelvic Complete with Transvaginal   Final Result   IMPRESSION:   1.  Posterior intramural uterine fibroid measuring up to 2.4 cm. No additional visualized explanation for patient's symptoms.   2.  Nonvisualized ovaries.                   I, Yoselin Hammond, am serving as a scribe to document services personally performed by Dr. Cedric Mcmillan, based on my observation and the provider's statements to me. I, Cedric Mcmillan MD attest that Yoselin Hammond is acting in a scribe capacity, has observed my performance of the services and has documented them in accordance with my direction.    Cedric Mcmillan M.D.  Emergency Medicine  [facility] EMERGENCY DEPARTMENT  [address] [place] [id number] [phone]  Dept: [phone]     Cedric Mcmillan MD  03/09/23 0116

## 2023-03-09 NOTE — DISCHARGE INSTRUCTIONS
You were seen in the emergency department at [facility] for heavy uterine bleeding.  Your evaluation included a pelvic ultrasound and repeat lab testing.  Your hemoglobin has fallen slightly since your visit on Monday, now down to 10.4, still above the level where we typically recommend blood transfusions.  We discussed her case with the OB/GYN on-call.  They would like you to follow-up with Dr. Cary as soon as possible and will likely need to discuss a hysterectomy or uterine ablation at a minimum.  Right now since you appear to be maintaining sinus rhythm we think it would be reasonable to have you hold your Eliquis for the next 2 or 3 days acknowledging a slight risk-benefit trade off for blood clot risk but we do not think this is excessive in the setting of very heavy bleeding.  Please make sure you are drinking plenty of liquids.  We are also going to prescribe you some iron pills that you can take, and might be good idea to take your MiraLAX that you already have prescribed while you are using this.  If you have any other immediate concerns like severe increase in bleeding with significant increase in lightheadedness dizziness or weakness we can reevaluate you right away in the emergency department.

## 2023-03-10 ENCOUNTER — VIRTUAL VISIT (OUTPATIENT)
Dept: PSYCHOLOGY | Facility: CLINIC | Age: 58
End: 2023-03-10
Payer: COMMERCIAL

## 2023-03-10 DIAGNOSIS — F41.1 GAD (GENERALIZED ANXIETY DISORDER): ICD-10-CM

## 2023-03-10 DIAGNOSIS — F33.1 MODERATE EPISODE OF RECURRENT MAJOR DEPRESSIVE DISORDER (H): Primary | ICD-10-CM

## 2023-03-10 PROCEDURE — 90834 PSYTX W PT 45 MINUTES: CPT | Mod: VID | Performed by: COUNSELOR

## 2023-03-10 ASSESSMENT — PATIENT HEALTH QUESTIONNAIRE - PHQ9
SUM OF ALL RESPONSES TO PHQ QUESTIONS 1-9: 5
10. IF YOU CHECKED OFF ANY PROBLEMS, HOW DIFFICULT HAVE THESE PROBLEMS MADE IT FOR YOU TO DO YOUR WORK, TAKE CARE OF THINGS AT HOME, OR GET ALONG WITH OTHER PEOPLE: SOMEWHAT DIFFICULT
SUM OF ALL RESPONSES TO PHQ QUESTIONS 1-9: 5

## 2023-03-10 NOTE — PROGRESS NOTES
[facility]                                     Progress Note    Patient Name: Alina Martell  Date: 3/10/23         Service Type: Individual      Session Start Time: 810 Session End Time: 848     Session Length:  38  minutes    Session #: 51    Attendees: Client    Service Modality:  Video Visit:      Telemedicine Visit: The patient's condition can be safely assessed and treated via synchronous audio and visual telemedicine encounter.      Reason for Telemedicine Visit: Services only offered telehealth    Originating Site (Patient Location): Patient's home    Distant Location (provider location):  Off-site    Consent:  The patient/guardian has verbally consented to: the potential risks and benefits of telemedicine (video visit) versus in person care; bill my insurance or make self-payment for services provided; and responsibility for payment of non-covered services.     Mode of Communication:  Video Conference via [employer]    As the provider I attest to compliance with applicable laws and regulations related to telemedicine.    DATA  Interactive Complexity: No  Crisis: No        Progress Since Last Session (Related to Symptoms / Goals / Homework):   Symptoms: No change continued stress due to health    Homework: Partially completed      Episode of Care Goals: Satisfactory progress - ACTION (Actively working towards change); Intervened by reinforcing change plan / affirming steps taken     Current / Ongoing Stressors and Concerns:  Patient reported continued stress due to her health. Patient indicated having to go to ED and believing she will need a hysterectomy. Patient reported being worried about the surgery.Patient indicated she also has been extremely tired due to her health. Patient reported this is affecting a lot of areas her life including social and work. This therapist processed with patient doing what she is able with her physical health.      Treatment Objective(s)  Addressed in This Session:   use thought-stopping strategy daily to reduce intrusive thoughts  Increase interest, engagement, and pleasure in doing things  Decrease frequency and intensity of feeling down, depressed, hopeless  Improve quantity and quality of night time sleep / decrease daytime naps  Identify negative self-talk and behaviors: challenge core beliefs, myths, and actions  Improve concentration, focus, and mindfulness in daily activities      Intervention:   Motivational Interviewing    MI Intervention: Expressed Empathy/Understanding     Change Talk Expressed by the Patient: Activation Taking steps    Provider Response to Change Talk: R - Reflected patient's change talk and S - Summarized patient's change talk statements      Assessments completed prior to visit:  The following assessments were completed by patient for this visit:  PHQ9:   PHQ-9 SCORE 6/21/2022 8/10/2022 9/22/2022 11/3/2022 12/1/2022 1/9/2023 3/10/2023   PHQ-9 Total Score MyChart 6 (Mild depression) 11 (Moderate depression) 8 (Mild depression) 9 (Mild depression) 9 (Mild depression) 6 (Mild depression) 5 (Mild depression)   PHQ-9 Total Score 6 11 8 9 9 6 5     GAD7:   ADA-7 SCORE 10/5/2021 11/15/2021 12/13/2021 8/10/2022 9/22/2022 11/3/2022 12/1/2022   Total Score 3 (minimal anxiety) 6 (mild anxiety) - 6 (mild anxiety) 9 (mild anxiety) 10 (moderate anxiety) 8 (mild anxiety)   Total Score 3 6 4 6 9 10 8     PROMIS 10-Global Health (all questions and answers displayed):   PROMIS 10 12/15/2021 3/26/2022 4/14/2022 7/19/2022 11/3/2022 12/1/2022 3/10/2023   In general, would you say your health is: Good Good Good Good Good Good Fair   In general, would you say your quality of life is: Good Good Good Fair Good Good Good   In general, how would you rate your physical health? Good Fair Fair Good Good Fair Fair   In general, how would you rate your mental health, including your mood and your ability to think? Fair Fair Good Fair Fair Fair Fair    In general, how would you rate your satisfaction with your social activities and relationships? Good Fair Good Good Fair Good Good   In general, please rate how well you carry out your usual social activities and roles Good Good Fair Good Good Good Good   To what extent are you able to carry out your everyday physical activities such as walking, climbing stairs, carrying groceries, or moving a chair? Mostly Mostly Mostly Mostly Mostly Mostly Mostly   How often have you been bothered by emotional problems such as feeling anxious, depressed or irritable? Often Sometimes Often Often Often Often Sometimes   How would you rate your fatigue on average? Mild Mild Mild Moderate Mild Mild Moderate   How would you rate your pain on average?   0 = No Pain  to  10 = Worst Imaginable Pain 3 3 3 3 2 3 4   In general, would you say your health is: - - - 3 3 3 2   In general, would you say your quality of life is: - - - 2 3 3 3   In general, how would you rate your physical health? - - - 3 3 2 2   In general, how would you rate your mental health, including your mood and your ability to think? - - - 2 2 2 2   In general, how would you rate your satisfaction with your social activities and relationships? - - - 3 2 3 3   In general, please rate how well you carry out your usual social activities and roles. (This includes activities at home, at work and in your community, and responsibilities as a parent, child, spouse, employee, friend, etc.) - - - 3 3 3 3   To what extent are you able to carry out your everyday physical activities such as walking, climbing stairs, carrying groceries, or moving a chair? - - - 4 4 4 4   In the past 7 days, how often have you been bothered by emotional problems such as feeling anxious, depressed, or irritable? - - - 4 4 4 3   In the past 7 days, how would you rate your fatigue on average? - - - 3 2 2 3   In the past 7 days, how would you rate your pain on average, where 0 means no pain, and 10 means  worst imaginable pain? - - - 3 2 3 4   Global Mental Health Score - - - 9 9 10 11   Global Physical Health Score - - - 14 15 14 12   PROMIS TOTAL - SUBSCORES - - - 23 24 24 23   Some recent data might be hidden         ASSESSMENT: Current Emotional / Mental Status (status of significant symptoms):   Risk status (Self / Other harm or suicidal ideation)   Patient denies current fears or concerns for personal safety.   Patient denies current or recent suicidal ideation or behaviors.   Patient denies current or recent homicidal ideation or behaviors.   Patient denies current or recent self injurious behavior or ideation.   Patient denies other safety concerns.   Patient reports there has been no change in risk factors since their last session.     Patient reports there has been no change in protective factors since their last session.     Recommended that patient call 911 or go to the local ED should there be a change in any of these risk factors.     Appearance:   Appropriate    Eye Contact:   Good    Psychomotor Behavior: Normal    Attitude:   Cooperative    Orientation:   All   Speech    Rate / Production: Normal/ Responsive    Volume:  Normal    Mood:    Anxious  Stressed   Affect:    Appropriate    Thought Content:  Clear    Thought Form:  Coherent  Goal Directed  Logical    Insight:    Good      Medication Review:   No changes to current psychiatric medication(s)     Medication Compliance:   Yes     Changes in Health Issues:   Yes: See epic     Chemical Use Review:   Substance Use: Chemical use reviewed, no active concerns identified      Tobacco Use: No current tobacco use.      Diagnosis:  1. Moderate episode of recurrent major depressive disorder (H)    2. ADA (generalized anxiety disorder)        Collateral Reports Completed:   Not Applicable    PLAN: (Patient Tasks / Therapist Tasks / Other)  Patient will return in three weeks for scheduled session. Patient will continue to work on sleep hygiene. Patient will  reach out to support network and ask for help as needed.     There has been demonstrated improvement in functioning while patient has been engaged in psychotherapy/psychological service- if withdrawn the patient would deteriorate and/or relapse.     Mariana Love, [facility] 3/10/23    ______________________________________________________________________    Individual Treatment Plan    Patient's Name: Alina Martell  YOB: 1965    Date of Creation:10/16/2020  Date Treatment Plan Last Reviewed/Revised: 3/10/2023    DSM5 Diagnoses: 296.32 (F33.1) Major Depressive Disorder, Recurrent Episode, Moderate _ or 300.02 (F41.1) Generalized Anxiety Disorder  Psychosocial / Contextual Factors: Health, family and financial   PROMIS (reviewed every 90 days): 25    Referral / Collaboration:  Was/were discussed and patient will pursue.    Anticipated number of session for this episode of care: 20 will reevaluate every 90 days  Anticipation frequency of session: Biweekly  Anticipated Duration of each session: 38-52 minutes  Treatment plan will be reviewed in 90 days or when goals have been changed.       MeasurableTreatment Goal(s) related to diagnosis / functional impairment(s)  Goal 1: Patient will work on reducing overall anxiety.     I will know I've met my goal when reporting minimal anxiety symptoms.       Objective #A (Patient Action)                          Patient will use distraction each time intrusive worry surfaces.  Status: Continued - Date(s): 3/10/2023     Intervention(s)  Therapist will teach emotional regulation skills. ..     Objective #B  Patient will Decrease frequency and intensity of feeling down, depressed, hopeless.  Status: Continued - Date(s): 3/10/2023     Intervention(s)  Therapist will teach emotional recognition/identification. ..     Objective #C  Patient will Identify negative self-talk and behaviors: challenge core beliefs, myths, and actions.  Status: Continued - Date(s):  3/10/2023     Intervention(s)  Therapist will teach the client how to perform a behavioral chain analysis. ..     Goal 2: Patient will continue to work on reducing depression symptoms    I will know I've met my goal then reporting minimal depression symptoms     Objective #A (Patient Action)                          Patient will Increase interest, engagement, and pleasure in doing things.  Status: Continued - Date(s):   3/10/2023     Intervention(s)  Therapist will assign homework ..     Objective #B  Patient will Decrease frequency and intensity of feeling down, depressed, hopeless.  Status: Continued - Date(s):   3/10/2023     Intervention(s)  Therapist will assign homework at every session.         Patient has reviewed and agreed to the above plan.        Mariana Love, [facility]  3/10/2023

## 2023-03-11 ENCOUNTER — HOSPITAL ENCOUNTER (EMERGENCY)
Facility: HOSPITAL | Age: 58
Discharge: HOME | End: 2023-03-11
Attending: EMERGENCY MEDICINE | Admitting: EMERGENCY MEDICINE
Payer: COMMERCIAL

## 2023-03-11 VITALS
DIASTOLIC BLOOD PRESSURE: 84 MMHG | TEMPERATURE: 97.7 F | WEIGHT: 280 LBS | RESPIRATION RATE: 18 BRPM | BODY MASS INDEX: 41.47 KG/M2 | OXYGEN SATURATION: 97 % | HEART RATE: 90 BPM | SYSTOLIC BLOOD PRESSURE: 148 MMHG | HEIGHT: 69 IN

## 2023-03-11 DIAGNOSIS — R60.9 SWELLING: ICD-10-CM

## 2023-03-11 LAB
ALBUMIN SERPL BCG-MCNC: 3.7 G/DL (ref 3.5–5.2)
ALP SERPL-CCNC: 107 U/L (ref 35–104)
ALT SERPL W P-5'-P-CCNC: 13 U/L (ref 10–35)
ANION GAP SERPL CALCULATED.3IONS-SCNC: 10 MMOL/L (ref 7–15)
AST SERPL W P-5'-P-CCNC: 14 U/L (ref 10–35)
BASOPHILS # BLD AUTO: 0 10E3/UL (ref 0–0.2)
BASOPHILS NFR BLD AUTO: 0 %
BILIRUB SERPL-MCNC: 0.2 MG/DL
BUN SERPL-MCNC: 11.8 MG/DL (ref 6–20)
CALCIUM SERPL-MCNC: 9 MG/DL (ref 8.6–10)
CHLORIDE SERPL-SCNC: 102 MMOL/L (ref 98–107)
CREAT SERPL-MCNC: 0.69 MG/DL (ref 0.51–0.95)
DEPRECATED HCO3 PLAS-SCNC: 27 MMOL/L (ref 22–29)
EOSINOPHIL # BLD AUTO: 0.3 10E3/UL (ref 0–0.7)
EOSINOPHIL NFR BLD AUTO: 3 %
ERYTHROCYTE [DISTWIDTH] IN BLOOD BY AUTOMATED COUNT: 14.6 % (ref 10–15)
GFR SERPL CREATININE-BSD FRML MDRD: >90 ML/MIN/1.73M2
GLUCOSE SERPL-MCNC: 109 MG/DL (ref 70–99)
HCT VFR BLD AUTO: 33.1 % (ref 35–47)
HGB BLD-MCNC: 10.4 G/DL (ref 11.7–15.7)
IMM GRANULOCYTES # BLD: 0 10E3/UL
IMM GRANULOCYTES NFR BLD: 0 %
LYMPHOCYTES # BLD AUTO: 3.4 10E3/UL (ref 0.8–5.3)
LYMPHOCYTES NFR BLD AUTO: 31 %
MAGNESIUM SERPL-MCNC: 1.7 MG/DL (ref 1.7–2.3)
MCH RBC QN AUTO: 26 PG (ref 26.5–33)
MCHC RBC AUTO-ENTMCNC: 31.4 G/DL (ref 31.5–36.5)
MCV RBC AUTO: 83 FL (ref 78–100)
MONOCYTES # BLD AUTO: 0.8 10E3/UL (ref 0–1.3)
MONOCYTES NFR BLD AUTO: 7 %
NEUTROPHILS # BLD AUTO: 6.4 10E3/UL (ref 1.6–8.3)
NEUTROPHILS NFR BLD AUTO: 59 %
NRBC # BLD AUTO: 0 10E3/UL
NRBC BLD AUTO-RTO: 0 /100
NT-PROBNP SERPL-MCNC: <36 PG/ML (ref 0–900)
PLATELET # BLD AUTO: 371 10E3/UL (ref 150–450)
POTASSIUM SERPL-SCNC: 3.5 MMOL/L (ref 3.4–5.3)
PROT SERPL-MCNC: 7.4 G/DL (ref 6.4–8.3)
RBC # BLD AUTO: 4 10E6/UL (ref 3.8–5.2)
SODIUM SERPL-SCNC: 139 MMOL/L (ref 136–145)
WBC # BLD AUTO: 10.9 10E3/UL (ref 4–11)

## 2023-03-11 PROCEDURE — 80053 COMPREHEN METABOLIC PANEL: CPT | Performed by: EMERGENCY MEDICINE

## 2023-03-11 PROCEDURE — 83880 ASSAY OF NATRIURETIC PEPTIDE: CPT | Performed by: EMERGENCY MEDICINE

## 2023-03-11 PROCEDURE — 83735 ASSAY OF MAGNESIUM: CPT | Performed by: EMERGENCY MEDICINE

## 2023-03-11 PROCEDURE — 85004 AUTOMATED DIFF WBC COUNT: CPT | Performed by: EMERGENCY MEDICINE

## 2023-03-11 PROCEDURE — 99283 EMERGENCY DEPT VISIT LOW MDM: CPT

## 2023-03-11 PROCEDURE — 36415 COLL VENOUS BLD VENIPUNCTURE: CPT | Performed by: EMERGENCY MEDICINE

## 2023-03-11 ASSESSMENT — ACTIVITIES OF DAILY LIVING (ADL): ADLS_ACUITY_SCORE: 37

## 2023-03-12 NOTE — DISCHARGE INSTRUCTIONS
You were seen in the Emergency Department today for evaluation of some diffuse swelling.  Your lab work showed anemia but it stable.  That is likely causing your swelling.  Continue the iron pills.  Follow up with your primary care physician to ensure resolution of symptoms. Return if you have new or worsening symptoms.

## 2023-03-12 NOTE — ED PROVIDER NOTES
EMERGENCY DEPARTMENT ENCOUNTER      NAME: Alina Martell  AGE: 58 year old female  YOB: 1965  MRN: [id number]  EVALUATION DATE & TIME: 3/11/2023  8:34 PM    PCP: Estelle Bansal    ED PROVIDER: Iman Ponce M.D.      Chief Complaint   Patient presents with     Leg Swelling       FINAL IMPRESSION:  1. Swelling        ED COURSE & MEDICAL DECISION MAKING:    Pertinent Labs & Imaging studies reviewed. (See chart for details)  ED Course as of 03/11/23 2336   Sat Mar 11, 2023   2102 Patient is a very pleasant 58-year-old female comes in today for evaluation of of generalized swelling.  She recently had extensive vaginal bleeding and her hemoglobin was down in the 10s.  She denies any liver or kidney issues.  She has a history of rheumatoid arthritis but typically only gets swelling in her joints.  She does not know if she is up in weight right now.  She is not currently on any steroids.  She does take Humira.  She not having significant shortness of breath.  She has been working with OB on fixing this menstrual bleeding as its been quite a problem recently.  On arrival she was little tachycardic but that improved on her second set of vital signs without any intervention.  We will check basic labs but as long as she does not need a blood transfusion my suspicion is that this is just related to her anemia and should get better with time.  She does not have any pitting edema.  She does not appear toxic.  I discussed all this with her and she is in agreement with the plan.   2124 Patient's hemoglobin is 10.4 which is stable from earlier this week.   2126 I reviewed the patient's office visit with her family practice doc from 5 days ago.  She had been having vaginal bleeding for 4 days with a lot of blood clots and fatigue.  They had placed her on Provera and told her to go to the ER if worsening symptoms and to follow-up with OB as an outpatient.   2128 Patient's albumin is okay.  Electrolytes look  okay.  Still waiting on a BNP and then should be able to discharge her home as long as it looks okay.   2144 Patient's BNP is negative.  I will discuss with the patient we can get her discharged home.   2148 I discussed plan for discharge with the patient.  We discussed her results.  She is in agreement with discharge and will be sent home shortly.       8:51 PM Introduced myself to the patient, obtained history of present illness, and performed initial physical exam at this time. PPE: Provider wore gloves, N95 mask, eye protection, surgical cap.       Medical Decision Making    History:    Supplemental history from: None    External Record(s) reviewed: office visit, see details above    Work Up:    Emergent/Severe conditions considered and evaluated for: liver failure, renal failure, severe anemia, heart failure    I independently reviewed and interpreted None    In additional to work up documented, I considered the following work up: None    Medications given that require intensive monitoring for toxicity: None    External consultation:    Discussion of management with another provider: none    Complicating factors:    Care impacted by chronic illness: heavy menstrual bleeding, anemia    Care affected by social determinants of health: none    Disposition considerations: Discharge  Prescriptions considered/prescribed: None    At the conclusion of the encounter I discussed  the results of all of the tests and the disposition with patient.   All questions were answered.  The patient acknowledged understanding and was involved in the decision making regarding the overall care plan.      I discussed with patient the utility, limitations and findings of the exam/interventions/studies done during this visit as well as the list of differential diagnosis and symptoms to monitor/return to ER for.  Additional verbal discharge instructions were provided.       MEDICATIONS GIVEN IN THE EMERGENCY:  Medications - No data to  "display    NEW PRESCRIPTIONS STARTED AT TODAY'S ER VISIT  Discharge Medication List as of 3/11/2023  9:52 PM             =================================================================    HPI    Triage Note: Pt here d/t SOB, swelling in legs this morning. States she is having a flare up. Takes Humiera for RA. States she has had a heavier menstrual flow. Reports she was here recently for Anemia/low blood count. No resp distress noted. Pt endorses additional swelling \"in body\" and face. No new medicines or personal care products.       Patient information was obtained from: the patient    Use of : N/A     Alina Martell is a 58 year old female with a medical history of RA, paroxysmal atrial fibrillation, chronic pain, ADA, HTN, CAD, and s/p catheter ablation of atrial fibrillation, who presents for evaluation of leg swelling.     The patient has a history of RA which occasionally causes her to experience swelling in specific joints throughout her body. She is currently experiencing diffuse swelling throughout the entirety of her body, which she notes has never happened to her before. She is currently on Humera every two weeks for her RA. The patient notes a recent incident of 8 days of extremely heavy menstrual bleeding that caused her to become anemic. She was seen by a doctor at that time and began taking iron pills at that time. She notes that the bleeding has not stopped altogether, but it is much less severe now. She endorses associated shortness of breath. The patient denies cough, chest pain, and any other symptoms at this time.      PAST MEDICAL HISTORY:  Past Medical History:   Diagnosis Date     Anemia      Antiplatelet or antithrombotic long-term use      Arrhythmia      Cannabis use without complication 12/8/2014     Carpal tunnel syndrome      Coronary artery disease      Gastroesophageal reflux disease      History of angina      Hypertension      Irregular heart beat      Obesity      " Paroxysmal atrial fibrillation (H)      PTSD (post-traumatic stress disorder)      RA (rheumatoid arthritis) (H)      Rheumatoid arthritis (H) 7/8/2016     Sicca syndrome (H)      Sleep apnea        PAST SURGICAL HISTORY:  Past Surgical History:   Procedure Laterality Date     CHOLECYSTECTOMY       DILATION AND CURETTAGE, OPERATIVE HYSTEROSCOPY, COMBINED N/A 3/3/2022    Procedure: HYSTEROSCOPY, WITH DILATION AND CURETTAGE;  Surgeon: Irma Cary MD;  Location: [facility]     EP ABLATION FOCAL AFIB N/A 6/30/2022    Procedure: Ablation Atrial Fibrilation;  Surgeon: Jose Guadalupe Joseph MD;  Location:  [facility]     HC REMOVAL GALLBLADDER      Description: Cholecystectomy;  Recorded: 10/15/2013;     LAPAROSCOPIC TUBAL LIGATION       RELEASE CARPAL TUNNEL BILATERAL       TUBAL LIGATION  1995       CURRENT MEDICATIONS:    No current facility-administered medications for this encounter.    Current Outpatient Medications:      acetaminophen (TYLENOL) 325 MG tablet, Take 3 tablets (975 mg) by mouth every 6 hours as needed for mild pain (Patient not taking: Reported on 3/6/2023), Disp: 50 tablet, Rfl: 0     adalimumab (HUMIRA *CF* PEN) 40 MG/0.4ML pen kit, INJECT 1 PEN UNDER THE SKIN EVERY 14 DAYS., Disp: 2 each, Rfl: 5     apixaban ANTICOAGULANT (ELIQUIS ANTICOAGULANT) 5 MG tablet, Take 1 tablet (5 mg) by mouth 2 times daily, Disp: 180 tablet, Rfl: 3     benzonatate (TESSALON) 100 MG capsule, Take 1 capsule (100 mg) by mouth 3 times daily as needed for cough, Disp: 30 capsule, Rfl: 0     cetirizine (ZYRTEC) 10 MG tablet, Take 1 tablet (10 mg) by mouth daily, Disp: 90 tablet, Rfl: 3     diltiazem ER COATED BEADS (CARDIZEM CD/CARTIA XT) 180 MG 24 hr capsule, Take 2 capsules (360 mg) by mouth daily, Disp: 180 capsule, Rfl: 3     EPINEPHrine (ANY BX GENERIC EQUIV) 0.3 MG/0.3ML injection 2-pack, Inject 0.3 mg into the muscle as needed for anaphylaxis, Disp: , Rfl:      ferrous gluconate  (FERGON) 324 (38 Fe) MG tablet, Take 1 tablet (324 mg) by mouth daily (with breakfast), Disp: 30 tablet, Rfl: 0     flecainide (TAMBOCOR) 50 MG tablet, Take 1 tablet (50 mg) by mouth daily as needed (atrial fibrillation), Disp: 20 tablet, Rfl: 4     hydrOXYzine (ATARAX) 25 MG tablet, Take 1 tablet (25 mg) by mouth 3 times daily as needed for anxiety, Disp: 270 tablet, Rfl: 3     LORazepam (ATIVAN) 0.5 MG tablet, Take 1 tablet (0.5 mg) by mouth every 6 hours as needed for anxiety, Disp: 16 tablet, Rfl: 1     medroxyPROGESTERone (PROVERA) 10 MG tablet, Take 1 tablet (10 mg) by mouth daily for 10 days, Disp: 10 tablet, Rfl: 0     molnupiravir (LAGEVRIO) 200 MG capsule, Take 4 capsules (800 mg) by mouth every 12 hours, Disp: 40 each, Rfl: 0     Multiple Vitamins-Minerals (CENTROVITE) TABS, TAKE 1 TABLET BY MOUTH EVERY DAY FOR 30 DAYS, Disp: , Rfl:      omeprazole (PRILOSEC) 40 MG DR capsule, Take 1 capsule (40 mg) by mouth daily, Disp: 90 capsule, Rfl: 3     ondansetron (ZOFRAN ODT) 4 MG ODT tab, Take 1 tablet (4 mg) by mouth every 6 hours as needed for nausea or vomiting, Disp: 20 tablet, Rfl: 0     polyethylene glycol (MIRALAX) 17 GM/Dose powder, Take 17 g (1 capful) by mouth daily (Patient not taking: Reported on 3/6/2023), Disp: 578 g, Rfl: 3     topiramate (TOPAMAX) 25 MG tablet, 25mg at bedtime for 1 week, then 25mg twice daily for 1 week, then 25mg in the morning and 50mg in the evening for 1 week and finally 50mg twice daily., Disp: 120 tablet, Rfl: 5     vitamin C (ASCORBIC ACID) 1000 MG TABS, Take 1,000 mg by mouth daily , Disp: , Rfl:      vitamin D3 (CHOLECALCIFEROL) 250 mcg (18135 units) capsule, Take 1 capsule by mouth once a week, Disp: , Rfl:     ALLERGIES:  Allergies   Allergen Reactions     Penicillins Shortness Of Breath, Palpitations, Dizziness, Anaphylaxis, Hives, Itching and Rash     Test in 2031, see allergy note on 7/29/21     Shellfish-Derived Products Anaphylaxis     Sulfa Drugs Hives and  "Anaphylaxis       FAMILY HISTORY:  Family History   Problem Relation Age of Onset     Crohn's Disease Mother         Total colectomy with ileostomy.     Atrial fibrillation Mother      Snoring Father      Diabetes Father      Prostate Cancer Father      Chronic Kidney Disease Father         Chose against dialysis.     Substance Abuse Brother      Depression Brother      Attention Deficit Disorder Brother      Alcoholism Brother      Arrhythmia Brother      Alcoholism Brother      Substance Abuse Brother      Arrhythmia Brother      Alcoholism Maternal Grandfather      No Known Problems Daughter      No Known Problems Son      Lupus Cousin      Breast Cancer No family hx of        SOCIAL HISTORY:   Social History     Socioeconomic History     Marital status: Single     Number of children: 2     Years of education: 4   Tobacco Use     Smoking status: Never     Smokeless tobacco: Never     Tobacco comments:     smokes marijuana   Vaping Use     Vaping Use: Never used   Substance and Sexual Activity     Alcohol use: Not Currently     Comment: Alcoholic Drinks/day: Never an issue, she states.  7/8/16     Drug use: Not Currently     Comment: Drug use: Former marijuana use, not current. 7/8/16     Sexual activity: Never     Partners: Male     Birth control/protection: Other     Comment: tubal ligation       PHYSICAL EXAM    VITAL SIGNS: BP (!) 148/84   Pulse 90   Temp 97.7  F (36.5  C) (Temporal)   Resp 18   Ht 1.753 m (5' 9\")   Wt 127 kg (280 lb)   LMP 03/02/2023 (Exact Date)   SpO2 97%   BMI 41.35 kg/m     GENERAL: Awake, alert, answering questions appropriately, No pitting edema  SPEECH:  Easy to understand speech, Normal volume and fitz  PULMONARY: No respiratory distress, Lungs clear to auscultation bilaterally  CARDIOVASCULAR: Regular rate and rhythm, Distal pulses present and normal.  ABDOMINAL: Soft, Nondistended, Nontender, No rebound or guarding, No palpable masses  EXTREMITIES: No lower extremity " edema.  PSYCH: Normal mood and affect     LAB:  All pertinent labs reviewed and interpreted.  Results for orders placed or performed during the hospital encounter of 03/11/23   Comprehensive metabolic panel   Result Value Ref Range    Sodium 139 136 - 145 mmol/L    Potassium 3.5 3.4 - 5.3 mmol/L    Chloride 102 98 - 107 mmol/L    Carbon Dioxide (CO2) 27 22 - 29 mmol/L    Anion Gap 10 7 - 15 mmol/L    Urea Nitrogen 11.8 6.0 - 20.0 mg/dL    Creatinine 0.69 0.51 - 0.95 mg/dL    Calcium 9.0 8.6 - 10.0 mg/dL    Glucose 109 (H) 70 - 99 mg/dL    Alkaline Phosphatase 107 (H) 35 - 104 U/L    AST 14 10 - 35 U/L    ALT 13 10 - 35 U/L    Protein Total 7.4 6.4 - 8.3 g/dL    Albumin 3.7 3.5 - 5.2 g/dL    Bilirubin Total 0.2 <=1.2 mg/dL    GFR Estimate >90 >60 mL/min/1.73m2   Result Value Ref Range    Magnesium 1.7 1.7 - 2.3 mg/dL   Nt probnp inpatient (BNP)   Result Value Ref Range    N terminal Pro BNP Inpatient <36 0 - 900 pg/mL   CBC with platelets and differential   Result Value Ref Range    WBC Count 10.9 4.0 - 11.0 10e3/uL    RBC Count 4.00 3.80 - 5.20 10e6/uL    Hemoglobin 10.4 (L) 11.7 - 15.7 g/dL    Hematocrit 33.1 (L) 35.0 - 47.0 %    MCV 83 78 - 100 fL    MCH 26.0 (L) 26.5 - 33.0 pg    MCHC 31.4 (L) 31.5 - 36.5 g/dL    RDW 14.6 10.0 - 15.0 %    Platelet Count 371 150 - 450 10e3/uL    % Neutrophils 59 %    % Lymphocytes 31 %    % Monocytes 7 %    % Eosinophils 3 %    % Basophils 0 %    % Immature Granulocytes 0 %    NRBCs per 100 WBC 0 <1 /100    Absolute Neutrophils 6.4 1.6 - 8.3 10e3/uL    Absolute Lymphocytes 3.4 0.8 - 5.3 10e3/uL    Absolute Monocytes 0.8 0.0 - 1.3 10e3/uL    Absolute Eosinophils 0.3 0.0 - 0.7 10e3/uL    Absolute Basophils 0.0 0.0 - 0.2 10e3/uL    Absolute Immature Granulocytes 0.0 <=0.4 10e3/uL    Absolute NRBCs 0.0 10e3/uL       I, Evy Sargent, am serving as a scribe to document services personally performed by Dr. Ponce based on my observation and the provider's statements to me. IIman  MD Rosario attest that Evyconcha Sargent is acting in a scribe capacity, has observed my performance of the services and has documented them in accordance with my direction.    Iman Ponce M.D.  Emergency Medicine  [facility] EMERGENCY DEPARTMENT  [address] [place] [id number]  [phone]  Dept: [phone]       Iman Ponce MD  03/11/23 7362

## 2023-03-12 NOTE — ED TRIAGE NOTES
"Pt here d/t SOB, swelling in legs this morning. States she is having a flare up. Takes Humiera for RA. States she has had a heavier menstrual flow. Reports she was here recently for Anemia/low blood count. No resp distress noted. Pt endorses additional swelling \"in body\" and face. No new medicines or personal care products.       "

## 2023-03-13 ENCOUNTER — MYC MEDICAL ADVICE (OUTPATIENT)
Dept: CARDIOLOGY | Facility: CLINIC | Age: 58
End: 2023-03-13
Payer: COMMERCIAL

## 2023-03-19 ASSESSMENT — ENCOUNTER SYMPTOMS
HEMATOCHEZIA: 0
COUGH: 0
HEADACHES: 1
JOINT SWELLING: 1
MYALGIAS: 1
FREQUENCY: 0
PARESTHESIAS: 0
HEMATURIA: 0
DIZZINESS: 1
NAUSEA: 0
BREAST MASS: 0
WEAKNESS: 0
HEARTBURN: 0
CHILLS: 0
SHORTNESS OF BREATH: 1
NERVOUS/ANXIOUS: 1
SORE THROAT: 0
CONSTIPATION: 0
DIARRHEA: 0
FEVER: 0
DYSURIA: 0
EYE PAIN: 0
ARTHRALGIAS: 1
PALPITATIONS: 0
ABDOMINAL PAIN: 1

## 2023-03-20 ENCOUNTER — LAB REQUISITION (OUTPATIENT)
Dept: LAB | Facility: CLINIC | Age: 58
End: 2023-03-20
Payer: COMMERCIAL

## 2023-03-20 ENCOUNTER — TRANSFERRED RECORDS (OUTPATIENT)
Dept: HEALTH INFORMATION MANAGEMENT | Facility: CLINIC | Age: 58
End: 2023-03-20

## 2023-03-20 ENCOUNTER — MEDICAL CORRESPONDENCE (OUTPATIENT)
Dept: HEALTH INFORMATION MANAGEMENT | Facility: CLINIC | Age: 58
End: 2023-03-20

## 2023-03-20 ENCOUNTER — HOSPITAL ENCOUNTER (OUTPATIENT)
Dept: OCCUPATIONAL THERAPY | Facility: REHABILITATION | Age: 58
Discharge: HOME | End: 2023-03-20
Payer: COMMERCIAL

## 2023-03-20 DIAGNOSIS — Z78.9 DECREASED ACTIVITIES OF DAILY LIVING (ADL): ICD-10-CM

## 2023-03-20 DIAGNOSIS — R26.81 UNSTEADINESS: ICD-10-CM

## 2023-03-20 DIAGNOSIS — R42 DIZZINESS: Primary | ICD-10-CM

## 2023-03-20 DIAGNOSIS — N93.9 ABNORMAL UTERINE AND VAGINAL BLEEDING, UNSPECIFIED: ICD-10-CM

## 2023-03-20 DIAGNOSIS — R42 VERTIGO: ICD-10-CM

## 2023-03-20 PROCEDURE — 88305 TISSUE EXAM BY PATHOLOGIST: CPT | Mod: TC,ORL | Performed by: OBSTETRICS & GYNECOLOGY

## 2023-03-20 PROCEDURE — 97112 NEUROMUSCULAR REEDUCATION: CPT | Mod: GO | Performed by: OCCUPATIONAL THERAPIST

## 2023-03-20 PROCEDURE — 87624 HPV HI-RISK TYP POOLED RSLT: CPT | Mod: ORL | Performed by: OBSTETRICS & GYNECOLOGY

## 2023-03-20 PROCEDURE — G0145 SCR C/V CYTO,THINLAYER,RESCR: HCPCS | Mod: ORL | Performed by: OBSTETRICS & GYNECOLOGY

## 2023-03-20 ASSESSMENT — ENCOUNTER SYMPTOMS
CONSTIPATION: 0
WEAKNESS: 0
DIARRHEA: 0
DYSURIA: 0
SHORTNESS OF BREATH: 1
HEMATOCHEZIA: 0
PALPITATIONS: 0
EYE PAIN: 0
NERVOUS/ANXIOUS: 1
HEMATURIA: 0
FREQUENCY: 0
HEARTBURN: 0
JOINT SWELLING: 1
HEADACHES: 1
COUGH: 0
MYALGIAS: 1
BREAST MASS: 0
CHILLS: 0
NAUSEA: 0
FEVER: 0
ABDOMINAL PAIN: 1
SORE THROAT: 0
DIZZINESS: 1
ARTHRALGIAS: 1
PARESTHESIAS: 0

## 2023-03-20 NOTE — PROGRESS NOTES
[facility]    Outpatient Occupational Therapy Progress Note  Patient: Alina Martell  : 1965    Beginning/End Dates of Reporting Period:  23 to 3-20-23    Referring Provider: Mayra Hilario PA-C    Therapy Diagnosis: dizziness, unsteadiness, decreased ADL and IADL function    Client Self Report: (P) Patient has not been able to perform HEP as she has been dealing with other medical issues. The medical issues are causing anemia and she will likely be having a hysterectomy soon. She reports that her dizziness has improved overall but is still occuring daily.    Goals:   Goal Identifier     Goal Description Patient will be able to bend to get dressed without dizziness   Target Date 23   Date Met      Progress (detail required for progress note):       Goal Identifier     Goal Description Patient will be able to turn head for conversation without dizziness   Target Date 23   Date Met      Progress (detail required for progress note):       Goal Identifier     Goal Description Patient will be able to roll in bed without vertigo   Target Date 23   Date Met      Progress (detail required for progress note):       Plan:  Continue therapy per current plan of care.    Discharge:  No

## 2023-03-20 NOTE — PROGRESS NOTES
SUBJECTIVE:   CC: Maritza is an 58 year old who presents for preventive health visit.     Patient has been advised of split billing requirements and indicates understanding: Yes  Healthy Habits:     Getting at least 3 servings of Calcium per day:  Yes    Bi-annual eye exam:  Yes    Dental care twice a year:  NO    Sleep apnea or symptoms of sleep apnea:  Daytime drowsiness and Sleep apnea    Diet:  Regular (no restrictions)    Frequency of exercise:  2-3 days/week    Duration of exercise:  30-45 minutes    Taking medications regularly:  Yes    Medication side effects:  Lightheadedness    PHQ-2 Total Score: 2    Additional concerns today:  No      Pap smear done on this date: 3/20/23 (approximately), by this group: [facility], results were pending.       Having long periods - planning for hysterectomy.    S/p ablation due to AF and no episodes with that.  Occasional palpitations and not seen cardiology in a while.    Going to PT for dizziness.        Today's PHQ-2 Score:   PHQ-2 ( 1999 [employer]) 3/19/2023   Q1: Little interest or pleasure in doing things 1   Q2: Feeling down, depressed or hopeless 1   PHQ-2 Score 2   PHQ-2 Total Score (12-17 Years)- Positive if 3 or more points; Administer PHQ-A if positive -   Q1: Little interest or pleasure in doing things Several days   Q2: Feeling down, depressed or hopeless Several days   PHQ-2 Score 2       PHQ 12/1/2022 1/9/2023 3/10/2023   PHQ-9 Total Score 9 6 5   Q9: Thoughts of better off dead/self-harm past 2 weeks Not at all Not at all Not at all     ADA-7 SCORE 9/22/2022 11/3/2022 12/1/2022   Total Score 9 (mild anxiety) 10 (moderate anxiety) 8 (mild anxiety)   Total Score 9 10 8       Mood feeling good.  Hydroxyzine bothering her with the srjogrens.        Social History     Tobacco Use     Smoking status: Never     Smokeless tobacco: Never     Tobacco comments:     smokes marijuana   Substance Use Topics     Alcohol use: Not Currently     Comment: Alcoholic  Drinks/day: Never an issue, she states.  7/8/16         Alcohol Use 3/19/2023   Prescreen: >3 drinks/day or >7 drinks/week? No       Breast Cancer Screening:    Breast CA Risk Assessment (FHS-7) 2/15/2022   Do you have a family history of breast, colon, or ovarian cancer? No / Unknown       Pertinent mammograms are reviewed under the imaging tab.    History of abnormal Pap smear:   Last 3 Pap and HPV Results:   PAP / HPV Latest Ref Rng & Units 5/21/2018   PAP - Negative for squamous intraepithelial lesion or malignancy  Electronically signed by Estelle Medina CT (ASCP) on 5/29/2018 at 12:40 PM     HPV16 NEG Negative   HPV18 NEG Negative   HRHPV NEG Negative     PAP / HPV Latest Ref Rng & Units 5/21/2018   PAP - Negative for squamous intraepithelial lesion or malignancy  Electronically signed by Estelle Medina CT (ASCP) on 5/29/2018 at 12:40 PM     HPV16 NEG Negative   HPV18 NEG Negative   HRHPV NEG Negative     Reviewed and updated as needed this visit by clinical staff   Tobacco  Allergies  Meds              Reviewed and updated as needed this visit by Provider                     Review of Systems   Constitutional: Negative for chills and fever.   HENT: Negative for congestion, ear pain, hearing loss and sore throat.    Eyes: Negative for pain and visual disturbance.   Respiratory: Positive for shortness of breath. Negative for cough.    Cardiovascular: Positive for peripheral edema. Negative for chest pain and palpitations.   Gastrointestinal: Positive for abdominal pain. Negative for constipation, diarrhea, heartburn, hematochezia and nausea.   Breasts:  Negative for tenderness, breast mass and discharge.   Genitourinary: Positive for pelvic pain, vaginal bleeding and vaginal discharge. Negative for dysuria, frequency, genital sores, hematuria and urgency.   Musculoskeletal: Positive for arthralgias, joint swelling and myalgias.   Skin: Negative for rash.   Neurological: Positive for dizziness  "and headaches. Negative for weakness and paresthesias.   Psychiatric/Behavioral: Negative for mood changes. The patient is nervous/anxious.           OBJECTIVE:   /88 (BP Location: Right arm, Patient Position: Sitting, Cuff Size: Adult Regular)   Pulse 81   Temp 98.1  F (36.7  C) (Oral)   Ht 1.753 m (5' 9\")   Wt 129.8 kg (286 lb 1.6 oz)   LMP 03/02/2023 (Exact Date)   SpO2 97%   BMI 42.25 kg/m    Physical Exam  GENERAL: healthy, alert and no distress  EYES: Eyes grossly normal to inspection, PERRL and conjunctivae and sclerae normal  HENT: ear canals and TM's normal, nose and mouth without ulcers or lesions  NECK: no adenopathy, no asymmetry, masses, or scars and thyroid normal to palpation  RESP: lungs clear to auscultation - no rales, rhonchi or wheezes  BREAST: normal without masses, tenderness or nipple discharge and no palpable axillary masses or adenopathy  CV: regular rate and rhythm, normal S1 S2, no S3 or S4, no murmur, click or rub, no peripheral edema and peripheral pulses strong  ABDOMEN: soft, nontender, no hepatosplenomegaly, no masses and bowel sounds normal  MS: no gross musculoskeletal defects noted, no edema  SKIN: no suspicious lesions or rashes  NEURO: Normal strength and tone, mentation intact and speech normal  PSYCH: mentation appears normal, affect normal/bright    Diagnostic Test Results:  Labs reviewed in Epic    ASSESSMENT/PLAN:       ICD-10-CM    1. Routine general medical examination at a health care facility  Z00.00       2. Encounter for long-term (current) use of medications  Z79.899       3. Cervical cancer screening  Z12.4 jesus for pap      4. Seropositive rheumatoid arthritis of multiple sites (H)  M05.79 Albumin level     ALT     CBC with platelets     Creatinine      5. Paroxysmal atrial fibrillation (H)  I48.0 Erythrocyte sedimentation rate auto     CRP inflammation      6. Morbid obesity (H)  E66.01 Lipid Profile (Chol, Trig, HDL, LDL calc)     Vitamin D Deficiency " "    Lipid Profile (Chol, Trig, HDL, LDL calc)     Vitamin D Deficiency      7. Essential hypertension  I10 Stable and will continue to monitor      8. Sicca syndrome (H)  M35.00 Stable will monitor      9. Dysfunctional uterine bleeding  N93.8 Stable - seeing gyn      10. ADA (generalized anxiety disorder)  F41.1 busPIRone (BUSPAR) 5 MG tablet      11. Screening for colon cancer  Z12.11 Colonoscopy Screening  Referral      12. High risk medication use  Z79.899 Albumin level     ALT     CBC with platelets     Creatinine          Patient has been advised of split billing requirements and indicates understanding: Yes      COUNSELING:  Reviewed preventive health counseling, as reflected in patient instructions       Regular exercise       Healthy diet/nutrition      BMI:   Estimated body mass index is 42.25 kg/m  as calculated from the following:    Height as of this encounter: 1.753 m (5' 9\").    Weight as of this encounter: 129.8 kg (286 lb 1.6 oz).   Weight management plan: Discussed healthy diet and exercise guidelines      She reports that she has never smoked. She has never used smokeless tobacco.          Estelle Bansal MD  [facility]  "

## 2023-03-22 ENCOUNTER — OFFICE VISIT (OUTPATIENT)
Dept: FAMILY MEDICINE | Facility: CLINIC | Age: 58
End: 2023-03-22
Payer: COMMERCIAL

## 2023-03-22 VITALS
SYSTOLIC BLOOD PRESSURE: 133 MMHG | OXYGEN SATURATION: 97 % | TEMPERATURE: 98.1 F | WEIGHT: 286.1 LBS | HEIGHT: 69 IN | BODY MASS INDEX: 42.37 KG/M2 | DIASTOLIC BLOOD PRESSURE: 88 MMHG | HEART RATE: 81 BPM

## 2023-03-22 DIAGNOSIS — M05.79 SEROPOSITIVE RHEUMATOID ARTHRITIS OF MULTIPLE SITES (H): ICD-10-CM

## 2023-03-22 DIAGNOSIS — E66.01 MORBID OBESITY (H): ICD-10-CM

## 2023-03-22 DIAGNOSIS — I48.0 PAROXYSMAL ATRIAL FIBRILLATION (H): Primary | ICD-10-CM

## 2023-03-22 DIAGNOSIS — Z12.4 CERVICAL CANCER SCREENING: ICD-10-CM

## 2023-03-22 DIAGNOSIS — I10 ESSENTIAL HYPERTENSION: ICD-10-CM

## 2023-03-22 DIAGNOSIS — F41.1 GAD (GENERALIZED ANXIETY DISORDER): ICD-10-CM

## 2023-03-22 DIAGNOSIS — Z00.00 ROUTINE GENERAL MEDICAL EXAMINATION AT A HEALTH CARE FACILITY: Primary | ICD-10-CM

## 2023-03-22 DIAGNOSIS — Z79.899 ENCOUNTER FOR LONG-TERM (CURRENT) USE OF MEDICATIONS: ICD-10-CM

## 2023-03-22 DIAGNOSIS — N93.8 DYSFUNCTIONAL UTERINE BLEEDING: ICD-10-CM

## 2023-03-22 DIAGNOSIS — M35.00 SICCA SYNDROME (H): ICD-10-CM

## 2023-03-22 DIAGNOSIS — Z79.899 HIGH RISK MEDICATION USE: ICD-10-CM

## 2023-03-22 DIAGNOSIS — I48.0 PAROXYSMAL ATRIAL FIBRILLATION (H): ICD-10-CM

## 2023-03-22 DIAGNOSIS — Z12.11 SCREENING FOR COLON CANCER: ICD-10-CM

## 2023-03-22 LAB
ALBUMIN SERPL BCG-MCNC: 3.8 G/DL (ref 3.5–5.2)
ALT SERPL W P-5'-P-CCNC: 13 U/L (ref 10–35)
CHOLEST SERPL-MCNC: 166 MG/DL
CREAT SERPL-MCNC: 0.69 MG/DL (ref 0.51–0.95)
CRP SERPL-MCNC: 19.2 MG/L
DEPRECATED CALCIDIOL+CALCIFEROL SERPL-MC: 9 UG/L (ref 20–75)
ERYTHROCYTE [DISTWIDTH] IN BLOOD BY AUTOMATED COUNT: 14.5 % (ref 10–15)
ERYTHROCYTE [SEDIMENTATION RATE] IN BLOOD BY WESTERGREN METHOD: 57 MM/HR (ref 0–20)
GFR SERPL CREATININE-BSD FRML MDRD: >90 ML/MIN/1.73M2
HCT VFR BLD AUTO: 34.7 % (ref 35–47)
HDLC SERPL-MCNC: 44 MG/DL
HGB BLD-MCNC: 10.7 G/DL (ref 11.7–15.7)
LDLC SERPL CALC-MCNC: 104 MG/DL
MCH RBC QN AUTO: 25.6 PG (ref 26.5–33)
MCHC RBC AUTO-ENTMCNC: 30.8 G/DL (ref 31.5–36.5)
MCV RBC AUTO: 83 FL (ref 78–100)
NONHDLC SERPL-MCNC: 122 MG/DL
PATH REPORT.COMMENTS IMP SPEC: NORMAL
PATH REPORT.COMMENTS IMP SPEC: NORMAL
PATH REPORT.FINAL DX SPEC: NORMAL
PATH REPORT.GROSS SPEC: NORMAL
PATH REPORT.MICROSCOPIC SPEC OTHER STN: NORMAL
PATH REPORT.RELEVANT HX SPEC: NORMAL
PHOTO IMAGE: NORMAL
PLATELET # BLD AUTO: 405 10E3/UL (ref 150–450)
RBC # BLD AUTO: 4.18 10E6/UL (ref 3.8–5.2)
TRIGL SERPL-MCNC: 89 MG/DL
WBC # BLD AUTO: 8.8 10E3/UL (ref 4–11)

## 2023-03-22 PROCEDURE — 82040 ASSAY OF SERUM ALBUMIN: CPT | Performed by: FAMILY MEDICINE

## 2023-03-22 PROCEDURE — 99396 PREV VISIT EST AGE 40-64: CPT | Performed by: FAMILY MEDICINE

## 2023-03-22 PROCEDURE — 36415 COLL VENOUS BLD VENIPUNCTURE: CPT | Performed by: FAMILY MEDICINE

## 2023-03-22 PROCEDURE — 99214 OFFICE O/P EST MOD 30 MIN: CPT | Mod: 25 | Performed by: FAMILY MEDICINE

## 2023-03-22 PROCEDURE — 86140 C-REACTIVE PROTEIN: CPT | Performed by: FAMILY MEDICINE

## 2023-03-22 PROCEDURE — 88305 TISSUE EXAM BY PATHOLOGIST: CPT | Mod: 26 | Performed by: PATHOLOGY

## 2023-03-22 PROCEDURE — 85652 RBC SED RATE AUTOMATED: CPT | Performed by: FAMILY MEDICINE

## 2023-03-22 PROCEDURE — 84460 ALANINE AMINO (ALT) (SGPT): CPT | Performed by: FAMILY MEDICINE

## 2023-03-22 PROCEDURE — 82306 VITAMIN D 25 HYDROXY: CPT | Performed by: FAMILY MEDICINE

## 2023-03-22 PROCEDURE — 82565 ASSAY OF CREATININE: CPT | Performed by: FAMILY MEDICINE

## 2023-03-22 PROCEDURE — 85027 COMPLETE CBC AUTOMATED: CPT | Performed by: FAMILY MEDICINE

## 2023-03-22 PROCEDURE — 80061 LIPID PANEL: CPT | Performed by: FAMILY MEDICINE

## 2023-03-22 RX ORDER — BUSPIRONE HYDROCHLORIDE 5 MG/1
5 TABLET ORAL 3 TIMES DAILY
Qty: 90 TABLET | Refills: 0 | Status: ON HOLD | OUTPATIENT
Start: 2023-03-22 | End: 2023-05-04

## 2023-03-22 ASSESSMENT — PAIN SCALES - GENERAL: PAINLEVEL: NO PAIN (0)

## 2023-03-22 NOTE — Clinical Note
Please abstract the following data from this visit with this patient into the appropriate field in Epic:  Tests that can be patient reported without a hard copy:  Pap smear done on this date: 3/20/22 (approximately), by this group: [facility], results were normal.   Other Tests found in the patient's chart through Chart Review/Care Everywhere:  {Abstract Quality List (Optional):854649}  Note to Abstraction: If this section is blank, no results were found via Chart Review/Care Everywhere.

## 2023-03-22 NOTE — LETTER
March 23, 2023      Maritza Martell  [address] [place] [address]        Dear MsNubiaJayshree,    We are writing to inform you of your test results.    Here are your recent results which are within the expected range. Your vitamin d is low and I will call you in a supplement to take once a week.  Please continue with your current plan of care and let us know if you have any questions or concerns.    Resulted Orders   Lipid Profile (Chol, Trig, HDL, LDL calc)   Result Value Ref Range    Cholesterol 166 <200 mg/dL    Triglycerides 89 <150 mg/dL    Direct Measure HDL 44 (L) >=50 mg/dL    LDL Cholesterol Calculated 104 (H) <=100 mg/dL    Non HDL Cholesterol 122 <130 mg/dL    Narrative    Cholesterol  Desirable:  <200 mg/dL    Triglycerides  Normal:  Less than 150 mg/dL  Borderline High:  150-199 mg/dL  High:  200-499 mg/dL  Very High:  Greater than or equal to 500 mg/dL    Direct Measure HDL  Female:  Greater than or equal to 50 mg/dL   Male:  Greater than or equal to 40 mg/dL    LDL Cholesterol  Desirable:  <100mg/dL  Above Desirable:  100-129 mg/dL   Borderline High:  130-159 mg/dL   High:  160-189 mg/dL   Very High:  >= 190 mg/dL    Non HDL Cholesterol  Desirable:  130 mg/dL  Above Desirable:  130-159 mg/dL  Borderline High:  160-189 mg/dL  High:  190-219 mg/dL  Very High:  Greater than or equal to 220 mg/dL   Vitamin D Deficiency   Result Value Ref Range    Vitamin D, Total (25-Hydroxy) 9 (L) 20 - 75 ug/L    Narrative    Season, race, dietary intake, and treatment affect the concentration of 25-hydroxy-Vitamin D. Values may decrease during winter months and increase during summer months. Values 20-29 ug/L may indicate Vitamin D insufficiency and values <20 ug/L may indicate Vitamin D deficiency.    Vitamin D determination is routinely performed by an immunoassay specific for 25 hydroxyvitamin D3.  If an individual is on vitamin D2(ergocalciferol) supplementation, please specify 25 OH vitamin D2 and D3 level  determination by LCMSMS test VITD23.         If you have any questions or concerns, please call the clinic at the number listed above.       Sincerely,      Estelle Bansal MD

## 2023-03-22 NOTE — TELEPHONE ENCOUNTER
Pt was called back, last seen Dr Joseph 10/22 and Gage 8/22  Pt informs she will be seen by her PMD today for a physical and will be having a hysterectomy next month  Pt states she has been having some minor palpitations is feeling well thought maybe she should be seen before surgery to review medications   Reviewed with Gage and pt can be seen by an JAKY   notified for appt and pt agrees to plan  Pt has my direct number if needed

## 2023-03-23 ENCOUNTER — OFFICE VISIT (OUTPATIENT)
Dept: CARDIOLOGY | Facility: CLINIC | Age: 58
End: 2023-03-23
Attending: NURSE PRACTITIONER
Payer: COMMERCIAL

## 2023-03-23 VITALS
HEIGHT: 69 IN | RESPIRATION RATE: 14 BRPM | BODY MASS INDEX: 42.51 KG/M2 | DIASTOLIC BLOOD PRESSURE: 82 MMHG | HEART RATE: 86 BPM | SYSTOLIC BLOOD PRESSURE: 145 MMHG | WEIGHT: 287 LBS

## 2023-03-23 DIAGNOSIS — R00.2 PALPITATIONS: ICD-10-CM

## 2023-03-23 DIAGNOSIS — Z98.890 STATUS POST CATHETER ABLATION OF ATRIAL FIBRILLATION: ICD-10-CM

## 2023-03-23 DIAGNOSIS — I48.0 PAROXYSMAL ATRIAL FIBRILLATION (H): Primary | ICD-10-CM

## 2023-03-23 DIAGNOSIS — G47.33 OBSTRUCTIVE SLEEP APNEA SYNDROME: ICD-10-CM

## 2023-03-23 DIAGNOSIS — E55.9 VITAMIN D DEFICIENCY: Primary | ICD-10-CM

## 2023-03-23 DIAGNOSIS — I10 ESSENTIAL HYPERTENSION: ICD-10-CM

## 2023-03-23 PROCEDURE — 99215 OFFICE O/P EST HI 40 MIN: CPT | Performed by: NURSE PRACTITIONER

## 2023-03-23 NOTE — PATIENT INSTRUCTIONS
Alina Martell,    It was a pleasure to see you today at the [facility].     My recommendations after this visit include:  -30 day cardiac monitor to evaluate symptoms.   -Continue current medications as ordered.  -Regarding Eliquis prior to your hysterectomy, stop 2 days beforehand. Resume immediately following your procedure.   -Follow up with Shira at the end of May.    Please do not hesitate to call with additional questions or concerns.     Shira Sesay, CNP  Clinical Cardiac Electrophysiology  [facility]  Clinic and Scheduling [phone]  Electrophysiology Nurses [phone]

## 2023-03-23 NOTE — PROGRESS NOTES
[facility] [address], [place] [address]   [email or web address]   OFFICE: [phone]       Primary Care: Estelle Bansal    REASON FOR VISIT: EP follow up    Assessment/Recommendations     Paroxysmal atrial fibrillation: First detected 9/2020.  Symptomatic with palpitations and she has had a syncopal episode associated with atrial fibrillation episode as well.  Now status post pulmonary vein isolation ablation via RF on 6/30/2022, mild dilation of left atrium and no significant fibrosis.  She had an uneventful recovery from ablation.  She does have flecainide 50 mg as needed available to her which she has not utilized since ablation.  She also remains on diltiazem  mg daily.  Now having intermittent palpitations and dizziness lasting up to several minutes with sudden onset at either rest or with activity, and sudden resolution.  Obtain 30-day event monitor to clarify etiology of palpitations and rule out recurrence of atrial fibrillation versus alternative atrial arrhythmias.    She has a [id number] score of 2 for female gender and hypertension.  Patient preference is that she continues Eliquis for stroke prophylaxis.  She is scheduled for a hysterectomy secondary to postmenopausal bleeding with Dr. Irma Cary on 5/4/2023.  She is at low risk for cardiac complications. She was directed to hold her Eliquis starting 2 days prior to procedure and she may resume at the discretion of her surgeon soon as possible afterward.    Hypertension: Modestly elevated in clinic today.  Continue diltiazem 360 mg daily as ordered and consider addition of ACEi or ARB if persistently elevated. Will defer this to her PMD Dr. Taylor.     Obstructive sleep apnea: Consistent use of CPAP. She does report her last sleep study was 3 years ago. She reports her sleep quality has deteriorated since that time and her mask fits poorly.  Sleep medicine referral placed today for  reevaluation and potential repeat sleep study.     Follow up: in 2 months with Shira Sesay CNP or Gage Tolentino CNP     History of Present Illness/Subjective    Alina Martell is a 58 year old female, seen today for 6 month EP follow up. She has a past medical history significant for paroxysmal atrial fibrillation status post catheter ablation including PVI with Dr. Gonzalez 6/30/2022, hypertension, rheumatoid arthritis, obesity, Sicca syndrome, obstructive sleep apnea, postmenopausal bleeding, anxiety and depression.     She has a history of atrial fibrillation dating back to 9/2020 when she presented to the emergency room with palpitations.  She had a successful cardioversion at that time and did well until 1/2022 with recurrent symptoms including syncopal episode and underwent another cardioversion in ER. She utilized flecainide pill in pocket approach until ablation with Dr. Joseph via RF to pulmonary veins on 6/30/2022, with noted mild dilation of left atrium and no significant fibrosis.  She had an uneventful recovery from ablation with no known atrial fibrillation recurrence.    Since that time she has had several ER visits for dizziness and chest pain. EKGs were unremarkable for acute ischemia or arrhythmia, unremarkable echo and stress test, normal troponins and course on telemetry.  At ER 12/15/2022 she did suffer a fall where she stood up to use the bathroom and became dizzy.  She did briefly lose consciousness. CT was unremarkable for acute pathology with EKG significant for sinus rhythm at 75 bpm without acute ischemic changes. She is having a hysterectomy in April for significant postmenopausal bleeding and anemia and requires cardiac clearance.     Today she reports palpitations and dizziness ongoing for the past several months. They start suddenly and can occur both at rest and with activity. The palpitations resolve within minutes. They occur a few times a day. She describes the sensation as  "fluttering. They improve with taking deep breaths and rest. She has not taken the flecainide since ablation. She denies chest discomfort, abdominal fullness/bloating or peripheral edema, shortness of breath, paroxysmal nocturnal dyspnea, orthopnea, lightheadedness, pre-syncope, or syncope.     Data Review     EK/15/2022  Sinus rhythm at 75 bpm without acute ischemic changes  Personally reviewed.     ECHOCARDIOGRAM: 12/15/2022  Global and regional left ventricular function is normal with an EF of 60-65%.  Global right ventricular function is normal.  No significant valvular abnormalities present.  Pulmonary artery systolic pressure is normal.  The inferior vena cava is normal.  No pericardial effusion is present.    ADDITIONAL STUDIES:    NMST: 12/15/2022     The nuclear stress test is negative for inducible myocardial ischemia or infarction.     Left ventricular function is normal.     There is no prior study for comparison.     Partially visualized neck uptake. Ultrasound is recommended.    CT coronary angiogram: 2022  1.  Normal left main.  2.  Normal LAD and its branches.  3.  Minimal disease in proximal LCx.  4.  Normal RCA and its branches.  Right dominant system.      60 minutes spent on the date of the encounter doing chart review, history and exam, documentation and further activities as noted above.     I have reviewed and updated the patient's past medical history, social history, family history, and medication list.                Physical Examination Review of Systems   Vitals: BP (!) 145/82 (BP Location: Right arm, Patient Position: Sitting, Cuff Size: Adult Large)   Pulse 86   Resp 14   Ht 1.753 m (5' 9\")   Wt 130.2 kg (287 lb)   LMP 2023 (Exact Date)   BMI 42.38 kg/m      BMI= Body mass index is 42.38 kg/m .    Wt Readings from Last 3 Encounters:   23 130.2 kg (287 lb)   23 129.8 kg (286 lb 1.6 oz)   23 127 kg (280 lb)       General   Appearance:   Alert and " oriented, in no acute distress.    HEENT:  Normocephalic and atraumatic. Wearing a mask. Conjunctiva and sclera are clear. Moist oral mucosa.    Neck: No JVP, carotid bruit or obvious thyromegaly.   Lungs:   Respirations unlabored. Clear bilaterally with no rales, rhonchi, or wheezes.     Cardiovascular:   Rhythm is regular. S1 and S2 are normal. No significant murmur is present. Lower extremities demonstrate no significant edema. Posterior tibial pulses are intact bilaterally.   Extremities: No cyanosis or clubbing   Skin: Skin is warm, dry, and otherwise intact.   Neurologic: Gait is normal. Mood and affect appropriate.    A 12 point comprehensive review of systems was  negative except as noted.      Medical History  Surgical History Family History Social History   Past Medical History:   Diagnosis Date     Anemia      Antiplatelet or antithrombotic long-term use      Arrhythmia      Cannabis use without complication 12/8/2014     Carpal tunnel syndrome      Coronary artery disease      Gastroesophageal reflux disease      History of angina      Hypertension      Irregular heart beat      Obesity      Paroxysmal atrial fibrillation (H)      PTSD (post-traumatic stress disorder)      RA (rheumatoid arthritis) (H)      Rheumatoid arthritis (H) 7/8/2016     Sicca syndrome (H)      Sleep apnea     Past Surgical History:   Procedure Laterality Date     CHOLECYSTECTOMY       DILATION AND CURETTAGE, OPERATIVE HYSTEROSCOPY, COMBINED N/A 3/3/2022    Procedure: HYSTEROSCOPY, WITH DILATION AND CURETTAGE;  Surgeon: Irma Cary MD;  Location: [facility]     EP ABLATION FOCAL AFIB N/A 6/30/2022    Procedure: Ablation Atrial Fibrilation;  Surgeon: Jose Guadalupe Joseph MD;  Location:  [facility]     HC REMOVAL GALLBLADDER      Description: Cholecystectomy;  Recorded: 10/15/2013;     LAPAROSCOPIC TUBAL LIGATION       RELEASE CARPAL TUNNEL BILATERAL       TUBAL LIGATION  1995    Family History    Problem Relation Age of Onset     Crohn's Disease Mother         Total colectomy with ileostomy.     Atrial fibrillation Mother      Snoring Father      Diabetes Father      Prostate Cancer Father      Chronic Kidney Disease Father         Chose against dialysis.     Substance Abuse Brother      Depression Brother      Attention Deficit Disorder Brother      Alcoholism Brother      Arrhythmia Brother      Alcoholism Brother      Substance Abuse Brother      Arrhythmia Brother      Alcoholism Maternal Grandfather      No Known Problems Daughter      No Known Problems Son      Lupus Cousin      Breast Cancer No family hx of     Social History     Socioeconomic History     Marital status: Single     Spouse name: Not on file     Number of children: 2     Years of education: 4     Highest education level: Not on file   Occupational History     Not on file   Tobacco Use     Smoking status: Never     Smokeless tobacco: Never     Tobacco comments:     smokes marijuana   Vaping Use     Vaping Use: Never used   Substance and Sexual Activity     Alcohol use: Not Currently     Comment: Alcoholic Drinks/day: Never an issue, she states.  7/8/16     Drug use: Not Currently     Comment: Drug use: Former marijuana use, not current. 7/8/16     Sexual activity: Never     Partners: Male     Birth control/protection: Other     Comment: tubal ligation   Other Topics Concern     Parent/sibling w/ CABG, MI or angioplasty before 65F 55M? Not Asked   Social History Narrative     Not on file     Social Determinants of Health     Financial Resource Strain: Not on file   Food Insecurity: Not on file   Transportation Needs: Not on file   Physical Activity: Not on file   Stress: Not on file   Social Connections: Not on file   Intimate Partner Violence: Not on file   Housing Stability: Not on file          Medications  Allergies   Scheduled Meds:  Current Outpatient Medications   Medication Sig Dispense Refill     acetaminophen (TYLENOL) 325 MG  tablet Take 3 tablets (975 mg) by mouth every 6 hours as needed for mild pain 50 tablet 0     adalimumab (HUMIRA *CF* PEN) 40 MG/0.4ML pen kit INJECT 1 PEN UNDER THE SKIN EVERY 14 DAYS. 2 each 5     apixaban ANTICOAGULANT (ELIQUIS ANTICOAGULANT) 5 MG tablet Take 1 tablet (5 mg) by mouth 2 times daily 180 tablet 3     ASPIRIN NOT PRESCRIBED (INTENTIONAL) Please choose reason not prescribed from choices below.       busPIRone (BUSPAR) 5 MG tablet Take 1 tablet (5 mg) by mouth 3 times daily 90 tablet 0     cetirizine (ZYRTEC) 10 MG tablet Take 1 tablet (10 mg) by mouth daily 90 tablet 3     diltiazem ER COATED BEADS (CARDIZEM CD/CARTIA XT) 180 MG 24 hr capsule Take 2 capsules (360 mg) by mouth daily 180 capsule 3     EPINEPHrine (ANY BX GENERIC EQUIV) 0.3 MG/0.3ML injection 2-pack Inject 0.3 mg into the muscle as needed for anaphylaxis       ferrous gluconate (FERGON) 324 (38 Fe) MG tablet Take 1 tablet (324 mg) by mouth daily (with breakfast) 30 tablet 0     flecainide (TAMBOCOR) 50 MG tablet Take 1 tablet (50 mg) by mouth daily as needed (atrial fibrillation) 20 tablet 4     Multiple Vitamins-Minerals (CENTROVITE) TABS TAKE 1 TABLET BY MOUTH EVERY DAY FOR 30 DAYS       omeprazole (PRILOSEC) 40 MG DR capsule Take 1 capsule (40 mg) by mouth daily 90 capsule 3     ondansetron (ZOFRAN ODT) 4 MG ODT tab Take 1 tablet (4 mg) by mouth every 6 hours as needed for nausea or vomiting 20 tablet 0     polyethylene glycol (MIRALAX) 17 GM/Dose powder Take 17 g (1 capful) by mouth daily 578 g 3     STATIN NOT PRESCRIBED (INTENTIONAL) Please choose reason not prescribed from choices below.       topiramate (TOPAMAX) 25 MG tablet 25mg at bedtime for 1 week, then 25mg twice daily for 1 week, then 25mg in the morning and 50mg in the evening for 1 week and finally 50mg twice daily. 120 tablet 5     vitamin C (ASCORBIC ACID) 1000 MG TABS Take 1,000 mg by mouth daily        vitamin D3 (CHOLECALCIFEROL) 1.25 MG (44588 UT) capsule Take 1  capsule (50,000 Units) by mouth every 7 days 12 capsule 3    Allergies   Allergen Reactions     Penicillins Shortness Of Breath, Palpitations, Dizziness, Anaphylaxis, Hives, Itching and Rash     Test in 2031, see allergy note on 7/29/21     Shellfish-Derived Products Anaphylaxis     Sulfa Drugs Hives and Anaphylaxis         Lab Results    Chemistry/lipid CBC Cardiac Enzymes/BNP/TSH/INR   Lab Results   Component Value Date    CHOL 166 03/22/2023    HDL 44 (L) 03/22/2023    TRIG 89 03/22/2023    BUN 11.8 03/11/2023     03/11/2023    CO2 27 03/11/2023    Lab Results   Component Value Date    WBC 8.8 03/22/2023    HGB 10.7 (L) 03/22/2023    HCT 34.7 (L) 03/22/2023    MCV 83 03/22/2023     03/22/2023    @RESUFAST(BMP,CBC,BNP,TSH,  INR)@      This note has been dictated using voice recognition software. Any grammatical, typographical, or context distortions are unintentional and inherent to the software.    Shira Sesay, CNP  Clinical Cardiac Electrophysiology  [facility]  [address]  [place] [address]   Office: [phone]  Fax: [phone]

## 2023-03-23 NOTE — LETTER
3/23/2023    Estelle Bansal MD  [address] [facility] [facility] [address]  [facility] [address]    RE: Alina Martell       Dear Colleague,     I had the pleasure of seeing Alina Martell in the [facility].    [facility]   [address], [place] [address]   [email or web address]   OFFICE: [phone]       Primary Care: Estelle Bansal    REASON FOR VISIT: EP follow up    Assessment/Recommendations     Paroxysmal atrial fibrillation: First detected 9/2020.  Symptomatic with palpitations and she has had a syncopal episode associated with atrial fibrillation episode as well.  Now status post pulmonary vein isolation ablation via RF on 6/30/2022, mild dilation of left atrium and no significant fibrosis.  She had an uneventful recovery from ablation.  She does have flecainide 50 mg as needed available to her which she has not utilized since ablation.  She also remains on diltiazem  mg daily.  Now having intermittent palpitations and dizziness lasting up to several minutes with sudden onset at either rest or with activity, and sudden resolution.  Obtain 30-day event monitor to clarify etiology of palpitations and rule out recurrence of atrial fibrillation versus alternative atrial arrhythmias.    She has a [id number] score of 2 for female gender and hypertension.  Patient preference is that she continues Eliquis for stroke prophylaxis.  She is scheduled for a hysterectomy secondary to postmenopausal bleeding with Dr. Irma Cary on 5/4/2023.  She is at low risk for cardiac complications. She was directed to hold her Eliquis starting 2 days prior to procedure and she may resume at the discretion of her surgeon soon as possible afterward.    Hypertension: Modestly elevated in clinic today.  Continue diltiazem 360 mg daily as ordered and consider addition of ACEi or ARB if persistently elevated. Will defer this to her PMD Dr. Taylor.     Obstructive  sleep apnea: Consistent use of CPAP. She does report her last sleep study was 3 years ago. She reports her sleep quality has deteriorated since that time and her mask fits poorly.  Sleep medicine referral placed today for reevaluation and potential repeat sleep study.     Follow up: in 2 months with Shira Sesay CNP or Gage Tolentino CNP     History of Present Illness/Subjective    Alina Martell is a 58 year old female, seen today for 6 month EP follow up. She has a past medical history significant for paroxysmal atrial fibrillation status post catheter ablation including PVI with Dr. Gonzalez 6/30/2022, hypertension, rheumatoid arthritis, obesity, Sicca syndrome, obstructive sleep apnea, postmenopausal bleeding, anxiety and depression.     She has a history of atrial fibrillation dating back to 9/2020 when she presented to the emergency room with palpitations.  She had a successful cardioversion at that time and did well until 1/2022 with recurrent symptoms including syncopal episode and underwent another cardioversion in ER. She utilized flecainide pill in pocket approach until ablation with Dr. Joseph via RF to pulmonary veins on 6/30/2022, with noted mild dilation of left atrium and no significant fibrosis.  She had an uneventful recovery from ablation with no known atrial fibrillation recurrence.    Since that time she has had several ER visits for dizziness and chest pain. EKGs were unremarkable for acute ischemia or arrhythmia, unremarkable echo and stress test, normal troponins and course on telemetry.  At ER 12/15/2022 she did suffer a fall where she stood up to use the bathroom and became dizzy.  She did briefly lose consciousness. CT was unremarkable for acute pathology with EKG significant for sinus rhythm at 75 bpm without acute ischemic changes. She is having a hysterectomy in April for significant postmenopausal bleeding and anemia and requires cardiac clearance.     Today she reports  "palpitations and dizziness ongoing for the past several months. They start suddenly and can occur both at rest and with activity. The palpitations resolve within minutes. They occur a few times a day. She describes the sensation as fluttering. They improve with taking deep breaths and rest. She has not taken the flecainide since ablation. She denies chest discomfort, abdominal fullness/bloating or peripheral edema, shortness of breath, paroxysmal nocturnal dyspnea, orthopnea, lightheadedness, pre-syncope, or syncope.     Data Review     EK/15/2022  Sinus rhythm at 75 bpm without acute ischemic changes  Personally reviewed.     ECHOCARDIOGRAM: 12/15/2022  Global and regional left ventricular function is normal with an EF of 60-65%.  Global right ventricular function is normal.  No significant valvular abnormalities present.  Pulmonary artery systolic pressure is normal.  The inferior vena cava is normal.  No pericardial effusion is present.    ADDITIONAL STUDIES:    NMST: 12/15/2022     The nuclear stress test is negative for inducible myocardial ischemia or infarction.     Left ventricular function is normal.     There is no prior study for comparison.     Partially visualized neck uptake. Ultrasound is recommended.    CT coronary angiogram: 2022  1.  Normal left main.  2.  Normal LAD and its branches.  3.  Minimal disease in proximal LCx.  4.  Normal RCA and its branches.  Right dominant system.      60 minutes spent on the date of the encounter doing chart review, history and exam, documentation and further activities as noted above.     I have reviewed and updated the patient's past medical history, social history, family history, and medication list.                Physical Examination Review of Systems   Vitals: BP (!) 145/82 (BP Location: Right arm, Patient Position: Sitting, Cuff Size: Adult Large)   Pulse 86   Resp 14   Ht 1.753 m (5' 9\")   Wt 130.2 kg (287 lb)   LMP 2023 (Exact Date)   " BMI 42.38 kg/m      BMI= Body mass index is 42.38 kg/m .    Wt Readings from Last 3 Encounters:   03/23/23 130.2 kg (287 lb)   03/22/23 129.8 kg (286 lb 1.6 oz)   03/11/23 127 kg (280 lb)       General   Appearance:   Alert and oriented, in no acute distress.    HEENT:  Normocephalic and atraumatic. Wearing a mask. Conjunctiva and sclera are clear. Moist oral mucosa.    Neck: No JVP, carotid bruit or obvious thyromegaly.   Lungs:   Respirations unlabored. Clear bilaterally with no rales, rhonchi, or wheezes.     Cardiovascular:   Rhythm is regular. S1 and S2 are normal. No significant murmur is present. Lower extremities demonstrate no significant edema. Posterior tibial pulses are intact bilaterally.   Extremities: No cyanosis or clubbing   Skin: Skin is warm, dry, and otherwise intact.   Neurologic: Gait is normal. Mood and affect appropriate.    A 12 point comprehensive review of systems was  negative except as noted.      Medical History  Surgical History Family History Social History   Past Medical History:   Diagnosis Date     Anemia      Antiplatelet or antithrombotic long-term use      Arrhythmia      Cannabis use without complication 12/8/2014     Carpal tunnel syndrome      Coronary artery disease      Gastroesophageal reflux disease      History of angina      Hypertension      Irregular heart beat      Obesity      Paroxysmal atrial fibrillation (H)      PTSD (post-traumatic stress disorder)      RA (rheumatoid arthritis) (H)      Rheumatoid arthritis (H) 7/8/2016     Sicca syndrome (H)      Sleep apnea     Past Surgical History:   Procedure Laterality Date     CHOLECYSTECTOMY       DILATION AND CURETTAGE, OPERATIVE HYSTEROSCOPY, COMBINED N/A 3/3/2022    Procedure: HYSTEROSCOPY, WITH DILATION AND CURETTAGE;  Surgeon: Irma Cary MD;  Location: [facility]     EP ABLATION FOCAL AFIB N/A 6/30/2022    Procedure: Ablation Atrial Fibrilation;  Surgeon: Jos eGuadalupe Joseph MD;  Location:   HEART CARDI[facility] CATH LAB     HC REMOVAL GALLBLADDER      Description: Cholecystectomy;  Recorded: 10/15/2013;     LAPAROSCOPIC TUBAL LIGATION       RELEASE CARPAL TUNNEL BILATERAL       TUBAL LIGATION  1995    Family History   Problem Relation Age of Onset     Crohn's Disease Mother         Total colectomy with ileostomy.     Atrial fibrillation Mother      Snoring Father      Diabetes Father      Prostate Cancer Father      Chronic Kidney Disease Father         Chose against dialysis.     Substance Abuse Brother      Depression Brother      Attention Deficit Disorder Brother      Alcoholism Brother      Arrhythmia Brother      Alcoholism Brother      Substance Abuse Brother      Arrhythmia Brother      Alcoholism Maternal Grandfather      No Known Problems Daughter      No Known Problems Son      Lupus Cousin      Breast Cancer No family hx of     Social History     Socioeconomic History     Marital status: Single     Spouse name: Not on file     Number of children: 2     Years of education: 4     Highest education level: Not on file   Occupational History     Not on file   Tobacco Use     Smoking status: Never     Smokeless tobacco: Never     Tobacco comments:     smokes marijuana   Vaping Use     Vaping Use: Never used   Substance and Sexual Activity     Alcohol use: Not Currently     Comment: Alcoholic Drinks/day: Never an issue, she states.  7/8/16     Drug use: Not Currently     Comment: Drug use: Former marijuana use, not current. 7/8/16     Sexual activity: Never     Partners: Male     Birth control/protection: Other     Comment: tubal ligation   Other Topics Concern     Parent/sibling w/ CABG, MI or angioplasty before 65F 55M? Not Asked   Social History Narrative     Not on file     Social Determinants of Health     Financial Resource Strain: Not on file   Food Insecurity: Not on file   Transportation Needs: Not on file   Physical Activity: Not on file   Stress: Not on file   Social Connections: Not on  file   Intimate Partner Violence: Not on file   Housing Stability: Not on file          Medications  Allergies   Scheduled Meds:  Current Outpatient Medications   Medication Sig Dispense Refill     acetaminophen (TYLENOL) 325 MG tablet Take 3 tablets (975 mg) by mouth every 6 hours as needed for mild pain 50 tablet 0     adalimumab (HUMIRA *CF* PEN) 40 MG/0.4ML pen kit INJECT 1 PEN UNDER THE SKIN EVERY 14 DAYS. 2 each 5     apixaban ANTICOAGULANT (ELIQUIS ANTICOAGULANT) 5 MG tablet Take 1 tablet (5 mg) by mouth 2 times daily 180 tablet 3     ASPIRIN NOT PRESCRIBED (INTENTIONAL) Please choose reason not prescribed from choices below.       busPIRone (BUSPAR) 5 MG tablet Take 1 tablet (5 mg) by mouth 3 times daily 90 tablet 0     cetirizine (ZYRTEC) 10 MG tablet Take 1 tablet (10 mg) by mouth daily 90 tablet 3     diltiazem ER COATED BEADS (CARDIZEM CD/CARTIA XT) 180 MG 24 hr capsule Take 2 capsules (360 mg) by mouth daily 180 capsule 3     EPINEPHrine (ANY BX GENERIC EQUIV) 0.3 MG/0.3ML injection 2-pack Inject 0.3 mg into the muscle as needed for anaphylaxis       ferrous gluconate (FERGON) 324 (38 Fe) MG tablet Take 1 tablet (324 mg) by mouth daily (with breakfast) 30 tablet 0     flecainide (TAMBOCOR) 50 MG tablet Take 1 tablet (50 mg) by mouth daily as needed (atrial fibrillation) 20 tablet 4     Multiple Vitamins-Minerals (CENTROVITE) TABS TAKE 1 TABLET BY MOUTH EVERY DAY FOR 30 DAYS       omeprazole (PRILOSEC) 40 MG DR capsule Take 1 capsule (40 mg) by mouth daily 90 capsule 3     ondansetron (ZOFRAN ODT) 4 MG ODT tab Take 1 tablet (4 mg) by mouth every 6 hours as needed for nausea or vomiting 20 tablet 0     polyethylene glycol (MIRALAX) 17 GM/Dose powder Take 17 g (1 capful) by mouth daily 578 g 3     STATIN NOT PRESCRIBED (INTENTIONAL) Please choose reason not prescribed from choices below.       topiramate (TOPAMAX) 25 MG tablet 25mg at bedtime for 1 week, then 25mg twice daily for 1 week, then 25mg in the  morning and 50mg in the evening for 1 week and finally 50mg twice daily. 120 tablet 5     vitamin C (ASCORBIC ACID) 1000 MG TABS Take 1,000 mg by mouth daily        vitamin D3 (CHOLECALCIFEROL) 1.25 MG (98578 UT) capsule Take 1 capsule (50,000 Units) by mouth every 7 days 12 capsule 3    Allergies   Allergen Reactions     Penicillins Shortness Of Breath, Palpitations, Dizziness, Anaphylaxis, Hives, Itching and Rash     Test in 2031, see allergy note on 7/29/21     Shellfish-Derived Products Anaphylaxis     Sulfa Drugs Hives and Anaphylaxis         Lab Results    Chemistry/lipid CBC Cardiac Enzymes/BNP/TSH/INR   Lab Results   Component Value Date    CHOL 166 03/22/2023    HDL 44 (L) 03/22/2023    TRIG 89 03/22/2023    BUN 11.8 03/11/2023     03/11/2023    CO2 27 03/11/2023    Lab Results   Component Value Date    WBC 8.8 03/22/2023    HGB 10.7 (L) 03/22/2023    HCT 34.7 (L) 03/22/2023    MCV 83 03/22/2023     03/22/2023    @RESUFAST(BMP,CBC,BNP,TSH,  INR)@      This note has been dictated using voice recognition software. Any grammatical, typographical, or context distortions are unintentional and inherent to the software.    Shira Sesay CNP  Clinical Cardiac Electrophysiology  [facility]  [address]  [place] [address]   Office: [phone]  Fax: [phone]       Thank you for allowing me to participate in the care of your patient.      Sincerely,     STARLA PADGETT CNP     [facility]  cc:   STARLA Castillo CNP  [address]  [place] [id number]

## 2023-03-24 LAB
BKR LAB AP GYN ADEQUACY: NORMAL
BKR LAB AP GYN INTERPRETATION: NORMAL
BKR LAB AP HPV REFLEX: NORMAL
BKR LAB AP LMP: NORMAL
BKR LAB AP PREVIOUS ABNL DX: NORMAL
BKR LAB AP PREVIOUS ABNORMAL: NORMAL
PATH REPORT.COMMENTS IMP SPEC: NORMAL
PATH REPORT.COMMENTS IMP SPEC: NORMAL
PATH REPORT.RELEVANT HX SPEC: NORMAL

## 2023-03-28 ENCOUNTER — HOSPITAL ENCOUNTER (OUTPATIENT)
Dept: CARDIOLOGY | Facility: HOSPITAL | Age: 58
Discharge: HOME | End: 2023-03-28
Attending: NURSE PRACTITIONER
Payer: COMMERCIAL

## 2023-03-28 DIAGNOSIS — Z98.890 STATUS POST CATHETER ABLATION OF ATRIAL FIBRILLATION: ICD-10-CM

## 2023-03-28 DIAGNOSIS — I20.89 STABLE ANGINA PECTORIS (H): Primary | ICD-10-CM

## 2023-03-28 DIAGNOSIS — I48.0 PAROXYSMAL ATRIAL FIBRILLATION (H): ICD-10-CM

## 2023-03-28 DIAGNOSIS — I10 ESSENTIAL HYPERTENSION: ICD-10-CM

## 2023-03-28 LAB
HUMAN PAPILLOMA VIRUS 16 DNA: NEGATIVE
HUMAN PAPILLOMA VIRUS 18 DNA: NEGATIVE
HUMAN PAPILLOMA VIRUS FINAL DIAGNOSIS: NORMAL
HUMAN PAPILLOMA VIRUS OTHER HR: NEGATIVE

## 2023-03-28 PROCEDURE — 999N000096 CARDIAC MOBILE TELEMETRY MONITOR

## 2023-03-30 ENCOUNTER — VIRTUAL VISIT (OUTPATIENT)
Dept: PSYCHOLOGY | Facility: CLINIC | Age: 58
End: 2023-03-30
Payer: COMMERCIAL

## 2023-03-30 DIAGNOSIS — F33.1 MODERATE EPISODE OF RECURRENT MAJOR DEPRESSIVE DISORDER (H): Primary | ICD-10-CM

## 2023-03-30 DIAGNOSIS — F41.1 GAD (GENERALIZED ANXIETY DISORDER): ICD-10-CM

## 2023-03-30 PROCEDURE — 90834 PSYTX W PT 45 MINUTES: CPT | Mod: VID | Performed by: COUNSELOR

## 2023-03-31 NOTE — PROGRESS NOTES
[facility]                                     Progress Note    Patient Name: Alina Martell  Date: 3/30/23         Service Type: Individual      Session Start Time: 1202 Session End Time: 1251     Session Length:  51  minutes    Session #: 52    Attendees: Client    Service Modality:  Video Visit:      Telemedicine Visit: The patient's condition can be safely assessed and treated via synchronous audio and visual telemedicine encounter.      Reason for Telemedicine Visit: Services only offered telehealth    Originating Site (Patient Location): Patient's home    Distant Location (provider location):  Off-site    Consent:  The patient/guardian has verbally consented to: the potential risks and benefits of telemedicine (video visit) versus in person care; bill my insurance or make self-payment for services provided; and responsibility for payment of non-covered services.     Mode of Communication:  Video Conference via Marquee    As the provider I attest to compliance with applicable laws and regulations related to telemedicine.    DATA  Interactive Complexity: No  Crisis: No        Progress Since Last Session (Related to Symptoms / Goals / Homework):   Symptoms: No change handling anxiety and depression    Homework: Achieved / completed to satisfaction      Episode of Care Goals: Satisfactory progress - ACTION (Actively working towards change); Intervened by reinforcing change plan / affirming steps taken     Current / Ongoing Stressors and Concerns:  Patient indicated continuing to have symptoms of anxiety and depression. Patient reported she is going to have a hysterectomy beginning of May. Patient indicated feeling that she is handling the idea of the surgery well. Patient reported she has let her family and friends know about the surgery. Patient indicated she has been extremely busy and needing to take a step back. Patient reported it does not feel like depression just needing to  take time for herself. This therapist processed with patient steps to start focusing on her needs.      Treatment Objective(s) Addressed in This Session:   use distraction each time intrusive worry surfaces  Increase interest, engagement, and pleasure in doing things  Decrease frequency and intensity of feeling down, depressed, hopeless  Improve quantity and quality of night time sleep / decrease daytime naps  Identify negative self-talk and behaviors: challenge core beliefs, myths, and actions  Improve concentration, focus, and mindfulness in daily activities      Intervention:   Motivational Interviewing    MI Intervention: Reflections: simple and complex     Change Talk Expressed by the Patient: Activation Taking steps    Provider Response to Change Talk: R - Reflected patient's change talk and S - Summarized patient's change talk statements      Assessments completed prior to visit:  The following assessments were completed by patient for this visit:  PHQ9:   PHQ-9 SCORE 6/21/2022 8/10/2022 9/22/2022 11/3/2022 12/1/2022 1/9/2023 3/10/2023   PHQ-9 Total Score MyChart 6 (Mild depression) 11 (Moderate depression) 8 (Mild depression) 9 (Mild depression) 9 (Mild depression) 6 (Mild depression) 5 (Mild depression)   PHQ-9 Total Score 6 11 8 9 9 6 5     GAD7:   ADA-7 SCORE 10/5/2021 11/15/2021 12/13/2021 8/10/2022 9/22/2022 11/3/2022 12/1/2022   Total Score 3 (minimal anxiety) 6 (mild anxiety) - 6 (mild anxiety) 9 (mild anxiety) 10 (moderate anxiety) 8 (mild anxiety)   Total Score 3 6 4 6 9 10 8     PROMIS 10-Global Health (all questions and answers displayed):   PROMIS 10 12/15/2021 3/26/2022 4/14/2022 7/19/2022 11/3/2022 12/1/2022 3/10/2023   In general, would you say your health is: Good Good Good Good Good Good Fair   In general, would you say your quality of life is: Good Good Good Fair Good Good Good   In general, how would you rate your physical health? Good Fair Fair Good Good Fair Fair   In general, how would  you rate your mental health, including your mood and your ability to think? Fair Fair Good Fair Fair Fair Fair   In general, how would you rate your satisfaction with your social activities and relationships? Good Fair Good Good Fair Good Good   In general, please rate how well you carry out your usual social activities and roles Good Good Fair Good Good Good Good   To what extent are you able to carry out your everyday physical activities such as walking, climbing stairs, carrying groceries, or moving a chair? Mostly Mostly Mostly Mostly Mostly Mostly Mostly   How often have you been bothered by emotional problems such as feeling anxious, depressed or irritable? Often Sometimes Often Often Often Often Sometimes   How would you rate your fatigue on average? Mild Mild Mild Moderate Mild Mild Moderate   How would you rate your pain on average?   0 = No Pain  to  10 = Worst Imaginable Pain 3 3 3 3 2 3 4   In general, would you say your health is: - - - 3 3 3 2   In general, would you say your quality of life is: - - - 2 3 3 3   In general, how would you rate your physical health? - - - 3 3 2 2   In general, how would you rate your mental health, including your mood and your ability to think? - - - 2 2 2 2   In general, how would you rate your satisfaction with your social activities and relationships? - - - 3 2 3 3   In general, please rate how well you carry out your usual social activities and roles. (This includes activities at home, at work and in your community, and responsibilities as a parent, child, spouse, employee, friend, etc.) - - - 3 3 3 3   To what extent are you able to carry out your everyday physical activities such as walking, climbing stairs, carrying groceries, or moving a chair? - - - 4 4 4 4   In the past 7 days, how often have you been bothered by emotional problems such as feeling anxious, depressed, or irritable? - - - 4 4 4 3   In the past 7 days, how would you rate your fatigue on average? -  - - 3 2 2 3   In the past 7 days, how would you rate your pain on average, where 0 means no pain, and 10 means worst imaginable pain? - - - 3 2 3 4   Global Mental Health Score - - - 9 9 10 11   Global Physical Health Score - - - 14 15 14 12   PROMIS TOTAL - SUBSCORES - - - 23 24 24 23   Some recent data might be hidden         ASSESSMENT: Current Emotional / Mental Status (status of significant symptoms):   Risk status (Self / Other harm or suicidal ideation)   Patient denies current fears or concerns for personal safety.   Patient denies current or recent suicidal ideation or behaviors.   Patient denies current or recent homicidal ideation or behaviors.   Patient denies current or recent self injurious behavior or ideation.   Patient denies other safety concerns.   Patient reports there has been no change in risk factors since their last session.     Patient reports there has been no change in protective factors since their last session.     Recommended that patient call 911 or go to the local ED should there be a change in any of these risk factors.     Appearance:   Appropriate    Eye Contact:   Good    Psychomotor Behavior: Normal    Attitude:   Cooperative    Orientation:   All   Speech    Rate / Production: Normal/ Responsive    Volume:  Normal    Mood:    Anxious    Affect:    Appropriate    Thought Content:  Clear    Thought Form:  Coherent  Goal Directed  Logical    Insight:    Good      Medication Review:   No changes to current psychiatric medication(s)     Medication Compliance:   Yes     Changes in Health Issues:   Yes: See epic     Chemical Use Review:   Substance Use: Chemical use reviewed, no active concerns identified      Tobacco Use: No current tobacco use.      Diagnosis:  1. Moderate episode of recurrent major depressive disorder (H)    2. ADA (generalized anxiety disorder)        Collateral Reports Completed:   Not Applicable    PLAN: (Patient Tasks / Therapist Tasks / Other)  Patient will  return in one month for scheduled session. Patient will work on getting time to herself and identifying her needs. Patient will continue to talk with her family and friends about her surgery.     There has been demonstrated improvement in functioning while patient has been engaged in psychotherapy/psychological service- if withdrawn the patient would deteriorate and/or relapse.     Mariana Love, [facility] 3/30/23    ______________________________________________________________________    Individual Treatment Plan    Patient's Name: Alina Martell  YOB: 1965    Date of Creation:10/16/2020  Date Treatment Plan Last Reviewed/Revised: 3/10/2023    DSM5 Diagnoses: 296.32 (F33.1) Major Depressive Disorder, Recurrent Episode, Moderate _ or 300.02 (F41.1) Generalized Anxiety Disorder  Psychosocial / Contextual Factors: Health, family and financial   PROMIS (reviewed every 90 days): 25    Referral / Collaboration:  Was/were discussed and patient will pursue.    Anticipated number of session for this episode of care: 20 will reevaluate every 90 days  Anticipation frequency of session: Biweekly  Anticipated Duration of each session: 38-52 minutes  Treatment plan will be reviewed in 90 days or when goals have been changed.       MeasurableTreatment Goal(s) related to diagnosis / functional impairment(s)  Goal 1: Patient will work on reducing overall anxiety.     I will know I've met my goal when reporting minimal anxiety symptoms.       Objective #A (Patient Action)                          Patient will use distraction each time intrusive worry surfaces.  Status: Continued - Date(s): 3/10/2023     Intervention(s)  Therapist will teach emotional regulation skills. ..     Objective #B  Patient will Decrease frequency and intensity of feeling down, depressed, hopeless.  Status: Continued - Date(s): 3/10/2023     Intervention(s)  Therapist will teach emotional recognition/identification. ..     Objective  #C  Patient will Identify negative self-talk and behaviors: challenge core beliefs, myths, and actions.  Status: Continued - Date(s): 3/10/2023     Intervention(s)  Therapist will teach the client how to perform a behavioral chain analysis. ..     Goal 2: Patient will continue to work on reducing depression symptoms    I will know I've met my goal then reporting minimal depression symptoms     Objective #A (Patient Action)                          Patient will Increase interest, engagement, and pleasure in doing things.  Status: Continued - Date(s):   3/10/2023     Intervention(s)  Therapist will assign homework ..     Objective #B  Patient will Decrease frequency and intensity of feeling down, depressed, hopeless.  Status: Continued - Date(s):   3/10/2023     Intervention(s)  Therapist will assign homework at every session.         Patient has reviewed and agreed to the above plan.        Mariana Love, [facility]  3/10/2023

## 2023-04-28 PROCEDURE — 93228 REMOTE 30 DAY ECG REV/REPORT: CPT | Performed by: INTERNAL MEDICINE

## 2023-04-30 NOTE — ADDENDUM NOTE
Encounter addended by: Arely Jimenez, OT on: 4/30/2023 10:27 AM   Actions taken: Episode resolved, Clinical Note Signed, Flowsheet accepted

## 2023-04-30 NOTE — PROGRESS NOTES
[facility]    Outpatient Occupational Therapy Discharge Note  Patient: Alina Martell  : 1965    Beginning/End Dates of Reporting Period:  22 to 3-20-23    Referring Provider: Mayra Hilario PA-C    Therapy Diagnosis: dizziness, unsteadiness, decreased ADL and IADL function    Client Self Report: Patient has not been able to perform HEP as she has been dealing with other medical issues. The medical issues are causing anemia and she will likely be having a hysterectomy soon. She reports that her dizziness has improved overall but is still occuring daily.    Goals: Progressing toward goals with improvement in symptoms however patient had to address other medical issues so was unable to perform HEP.  Goal Identifier     Goal Description Patient will be able to bend to get dressed without dizziness   Target Date 23   Date Met      Progress (detail required for progress note):       Goal Identifier     Goal Description Patient will be able to turn head for conversation without dizziness   Target Date 23   Date Met      Progress (detail required for progress note):       Goal Identifier     Goal Description Patient will be able to roll in bed without vertigo   Target Date 23   Date Met      Progress (detail required for progress note):       Plan:  Discharge from therapy.

## 2023-05-01 ENCOUNTER — ANCILLARY PROCEDURE (OUTPATIENT)
Dept: MAMMOGRAPHY | Facility: CLINIC | Age: 58
End: 2023-05-01
Attending: FAMILY MEDICINE
Payer: COMMERCIAL

## 2023-05-01 DIAGNOSIS — Z12.31 VISIT FOR SCREENING MAMMOGRAM: ICD-10-CM

## 2023-05-01 PROCEDURE — 77067 SCR MAMMO BI INCL CAD: CPT

## 2023-05-02 ENCOUNTER — OFFICE VISIT (OUTPATIENT)
Dept: FAMILY MEDICINE | Facility: CLINIC | Age: 58
End: 2023-05-02
Payer: COMMERCIAL

## 2023-05-02 VITALS
WEIGHT: 289.5 LBS | BODY MASS INDEX: 42.88 KG/M2 | DIASTOLIC BLOOD PRESSURE: 80 MMHG | HEIGHT: 69 IN | OXYGEN SATURATION: 98 % | HEART RATE: 86 BPM | TEMPERATURE: 98.1 F | SYSTOLIC BLOOD PRESSURE: 134 MMHG | RESPIRATION RATE: 16 BRPM

## 2023-05-02 DIAGNOSIS — Z01.818 PREOP GENERAL PHYSICAL EXAM: Primary | ICD-10-CM

## 2023-05-02 DIAGNOSIS — I25.118 CORONARY ARTERY DISEASE OF NATIVE ARTERY OF NATIVE HEART WITH STABLE ANGINA PECTORIS (H): ICD-10-CM

## 2023-05-02 DIAGNOSIS — I48.0 PAROXYSMAL ATRIAL FIBRILLATION (H): ICD-10-CM

## 2023-05-02 DIAGNOSIS — I10 ESSENTIAL HYPERTENSION: ICD-10-CM

## 2023-05-02 PROCEDURE — 99214 OFFICE O/P EST MOD 30 MIN: CPT | Performed by: FAMILY MEDICINE

## 2023-05-02 RX ORDER — FLECAINIDE ACETATE 50 MG/1
50 TABLET ORAL DAILY PRN
Qty: 20 TABLET | Refills: 4 | COMMUNITY
Start: 2023-05-02 | End: 2023-12-14

## 2023-05-02 NOTE — PROGRESS NOTES
[address] [address]  Phone: [phone]  Fax: [phone]  Primary Provider: Estelle Bansal  Pre-op Performing Provider: JENARO PEDROZA    PREOPERATIVE EVALUATION:  Today's date: 5/2/2023    Alina Martell is a 58 year old female who presents for a preoperative evaluation.      5/2/2023     3:21 PM   Additional Questions   Roomed by Andres WOODWARD CMA     Surgical Information:  Surgery/Procedure: ROBOTIC ASSISTED TOTAL LAPAROSCOPIC HYSTERECTOMY WITH BILATERAL SALPINGO OOPHORECTOMY  Surgery Location: [facility] Main OR  Surgeon: Irma Cary MD  Surgery Date: 05/04/2023  Time of Surgery: 1:00 PM  Where patient plans to recover: At home with family  Fax number for surgical facility: Note does not need to be faxed, will be available electronically in Epic.    Assessment & Plan     The proposed surgical procedure is considered INTERMEDIATE risk.    Preop general physical exam  Recommendations as below. Discussed risks, however medically optimized.   - PRIMARY CARE FOLLOW-UP SCHEDULING    Paroxysmal atrial fibrillation (H)  Stable. In rate control table. Recommend flecainide as needed but to otherwise continue diltiazem.   - flecainide (TAMBOCOR) 50 MG tablet  Dispense: 20 tablet; Refill: 4    Essential hypertension  Stable, controlled. Recommend continuing diltiazem.     Coronary artery disease of native artery of native heart with stable angina pectoris (H)  Stable. Recent stress test completed. Holding eliquis, not on statin or aspirin therapy as she does not have a stent.       Risks and Recommendations:  The patient has the following additional risks and recommendations for perioperative complications:   - Morbid obesity (BMI >40)  Cardiovascular:   - Cardiology seen recently with stress up to date this year  Obstructive Sleep Apnea:   Controlled with CPAP  Anemia/Bleeding/Clotting:    - Anemia and does not require treatment prior  to surgery. Monitor hemoglobin postoperatively    Antiplatelet or Anticoagulation Medication Instructions:   - apixaban (Eliquis), edoxaban (Savaysa), rivaroxaban (Xarelto): Bleeding risk is moderate or high for this procedure AND CrCl  (>=) 50 mL/min. HOLD 2 days before surgery.     Additional Medication Instructions:  Patient is to take all scheduled medications on the day of surgery EXCEPT for modifications listed below:    RECOMMENDATION:  APPROVAL GIVEN to proceed with proposed procedure, without further diagnostic evaluation.    Review of the result(s) of each unique test - stress test, EKG, prior chemistries, hemoglobin  Prescription drug management  I spent a total of 21 minutes on the day of the visit.   Time spent by me doing chart review, history and exam, documentation and further activities per the note      Subjective     HPI related to upcoming procedure: Patient already failed d & c, still have regular menstruation.         5/2/2023     6:02 AM   Preop Questions   1. Have you ever had a heart attack or stroke? No   2. Have you ever had surgery on your heart or blood vessels, such as a stent placement, a coronary artery bypass, or surgery on an artery in your head, neck, heart, or legs? No   3. Do you have chest pain with activity? No   4. Do you have a history of  heart failure? No   5. Do you currently have a cold, bronchitis or symptoms of other infection? No   6. Do you have a cough, shortness of breath, or wheezing? No   7. Do you or anyone in your family have previous history of blood clots? No   8. Do you or does anyone in your family have a serious bleeding problem such as prolonged bleeding following surgeries or cuts? No   9. Have you ever had problems with anemia or been told to take iron pills? YES - part of treatment.    10. Have you had any abnormal blood loss such as black, tarry or bloody stools, or abnormal vaginal bleeding? YES - part of treatment.    11. Have you ever had a blood  transfusion? No   12. Are you willing to have a blood transfusion if it is medically needed before, during, or after your surgery? Yes   13. Have you or any of your relatives ever had problems with anesthesia? No   14. Do you have sleep apnea, excessive snoring or daytime drowsiness? YES - wears a CPAP. May need up to date sleep study.    14a. Do you have a CPAP machine? Yes   15. Do you have any artifical heart valves or other implanted medical devices like a pacemaker, defibrillator, or continuous glucose monitor? No   16. Do you have artificial joints? No   17. Are you allergic to latex? No   18. Is there any chance that you may be pregnant? No       Health Care Directive:  Patient does not have a Health Care Directive or Living Will: Patient states has Advance Directive and will bring in a copy to clinic.    Preoperative Review of :   reviewed - controlled substances reflected in medication list.    Status of Chronic Conditions:  A-FIB - Patient has a longstanding history of chronic A-fib currently on rate control. Current treatment regimen includes Apixaban for stroke prevention and denies significant symptoms of lightheadedness, palpitations or dyspnea (has been held)    ANEMIA - Patient has a recent history of moderate-severe anemia, which has been symptomatic. Work up to date has revealed 10.7. Treatment has been iron levels.     CAD - Patient has a longstanding history of moderate-severe CAD. Patient denies recent chest pain or NTG use, denies exercise induced dyspnea or PND. Last Stress test 12/2022, EKG 12/2022, holtor monitor as of 03/28/2023.     HYPERTENSION - Patient has longstanding history of HTN , currently denies any symptoms referable to elevated blood pressure. Specifically denies chest pain, palpitations, dyspnea, orthopnea, PND or peripheral edema. Blood pressure readings have been in normal range. Current medication regimen is as listed below. Patient denies any side effects of  medication.     SLEEP PROBLEM - Patient has a longstanding history of snoring.. Patient has tried OTC medications with limited success.       Review of Systems  CONSTITUTIONAL: NEGATIVE for fever, chills, change in weight  INTEGUMENTARY/SKIN: NEGATIVE for worrisome rashes, moles or lesions  EYES: NEGATIVE for vision changes or irritation  ENT/MOUTH: NEGATIVE for ear, mouth and throat problems  RESP: NEGATIVE for significant cough or SOB  CV: NEGATIVE for chest pain, palpitations or peripheral edema  GI: NEGATIVE for nausea, abdominal pain, heartburn, or change in bowel habits  : NEGATIVE for frequency, dysuria, or hematuria  MUSCULOSKELETAL:POSITIVE  for arthralgias due to RA and myalgia  NEURO: NEGATIVE for weakness, dizziness or paresthesias  ENDOCRINE: NEGATIVE for temperature intolerance, skin/hair changes  HEME: NEGATIVE for bleeding problems  PSYCHIATRIC: NEGATIVE for changes in mood or affect    Patient Active Problem List    Diagnosis Date Noted     Vitamin D deficiency 03/23/2023     Priority: Medium     Status post catheter ablation of atrial fibrillation 08/23/2022     Priority: Medium     6/30/2022 with Dr. Joseph  1. Mild left atrial dilation without significant scattered fibrosis  2. Atrial fibrillation ablation via PVI         History of colonic polyps 04/05/2022     Priority: Medium     Colonscopy 3/22 - repeat in 3-5 year       Postmenopausal bleeding 02/16/2022     Priority: Medium     Obstructive sleep apnea syndrome 01/20/2022     Priority: Medium     Essential hypertension 01/20/2022     Priority: Medium     Coronary artery disease of native artery with stable angina pectoris (H) 01/20/2022     Priority: Medium     Seropositive rheumatoid arthritis of multiple sites (H) 01/05/2022     Priority: Medium     Recurrent major depressive disorder, in full remission (H) 01/05/2022     Priority: Medium     Paroxysmal atrial fibrillation (H) 01/05/2022     Priority: Medium     Morbid obesity (H)  01/05/2022     Priority: Medium     Chest pain 07/11/2021     Priority: Medium     ADA (generalized anxiety disorder) 03/31/2021     Priority: Medium     Depressed 11/14/2018     Priority: Medium     Cyclic citrullinated peptide (CCP) antibody positive 10/03/2017     Priority: Medium     Tendonitis of both shoulders 06/09/2017     Priority: Medium     Chronic pain 07/18/2016     Priority: Medium     Olecranon bursitis of right elbow 07/08/2016     Priority: Medium     Formatting of this note might be different from the original.  Replacement Utility updated for latest IMO load       Grief 03/29/2016     Priority: Medium     Sicca syndrome (H) 10/27/2015     Priority: Medium     Anxiety 12/08/2014     Priority: Medium     Panic attack 12/08/2014     Priority: Medium     Sleep disorder 12/05/2014     Priority: Medium     Insomnia related to another mental disorder 09/03/2014     Priority: Medium     Microcytic anemia 09/02/2014     Priority: Medium     Migraine headache 09/02/2014     Priority: Medium     Reflux 09/02/2014     Priority: Medium      Past Medical History:   Diagnosis Date     Anemia      Antiplatelet or antithrombotic long-term use      Arrhythmia      Cannabis use without complication 12/8/2014     Carpal tunnel syndrome      Coronary artery disease      Gastroesophageal reflux disease      History of angina      Hypertension      Irregular heart beat      Obesity      Paroxysmal atrial fibrillation (H)      PTSD (post-traumatic stress disorder)      RA (rheumatoid arthritis) (H)      Rheumatoid arthritis (H) 7/8/2016     Sicca syndrome (H)      Sleep apnea      Past Surgical History:   Procedure Laterality Date     CHOLECYSTECTOMY       DILATION AND CURETTAGE, OPERATIVE HYSTEROSCOPY, COMBINED N/A 3/3/2022    Procedure: HYSTEROSCOPY, WITH DILATION AND CURETTAGE;  Surgeon: Irma Cary MD;  Location: [facility] OR     EP ABLATION FOCAL AFIB N/A 6/30/2022    Procedure: Ablation Atrial  Fibrilation;  Surgeon: Jose Guadalupe Joseph MD;  Location:  [facility]     HC REMOVAL GALLBLADDER      Description: Cholecystectomy;  Recorded: 10/15/2013;     LAPAROSCOPIC TUBAL LIGATION       RELEASE CARPAL TUNNEL BILATERAL       TUBAL LIGATION  1995     Current Outpatient Medications   Medication Sig Dispense Refill     adalimumab (HUMIRA *CF* PEN) 40 MG/0.4ML pen kit INJECT 1 PEN UNDER THE SKIN EVERY 14 DAYS. 2 each 5     apixaban ANTICOAGULANT (ELIQUIS ANTICOAGULANT) 5 MG tablet Take 1 tablet (5 mg) by mouth 2 times daily 180 tablet 3     busPIRone (BUSPAR) 5 MG tablet Take 1 tablet (5 mg) by mouth 3 times daily 90 tablet 0     cetirizine (ZYRTEC) 10 MG tablet Take 1 tablet (10 mg) by mouth daily 90 tablet 3     diltiazem ER COATED BEADS (CARDIZEM CD/CARTIA XT) 180 MG 24 hr capsule Take 2 capsules (360 mg) by mouth daily 180 capsule 3     EPINEPHrine (ANY BX GENERIC EQUIV) 0.3 MG/0.3ML injection 2-pack Inject 0.3 mg into the muscle as needed for anaphylaxis       ferrous gluconate (FERGON) 324 (38 Fe) MG tablet Take 1 tablet (324 mg) by mouth daily (with breakfast) 30 tablet 0     flecainide (TAMBOCOR) 50 MG tablet Take 1 tablet (50 mg) by mouth daily as needed (atrial fibrillation) 20 tablet 4     Multiple Vitamins-Minerals (CENTROVITE) TABS TAKE 1 TABLET BY MOUTH EVERY DAY FOR 30 DAYS       omeprazole (PRILOSEC) 40 MG DR capsule Take 1 capsule (40 mg) by mouth daily 90 capsule 3     ondansetron (ZOFRAN ODT) 4 MG ODT tab Take 1 tablet (4 mg) by mouth every 6 hours as needed for nausea or vomiting 20 tablet 0     polyethylene glycol (MIRALAX) 17 GM/Dose powder Take 17 g (1 capful) by mouth daily 578 g 3     topiramate (TOPAMAX) 25 MG tablet 25mg at bedtime for 1 week, then 25mg twice daily for 1 week, then 25mg in the morning and 50mg in the evening for 1 week and finally 50mg twice daily. 120 tablet 5     vitamin D3 (CHOLECALCIFEROL) 1.25 MG (20741 UT) capsule Take 1 capsule (50,000 Units) by  "mouth every 7 days 12 capsule 3     ASPIRIN NOT PRESCRIBED (INTENTIONAL) Please choose reason not prescribed from choices below.       STATIN NOT PRESCRIBED (INTENTIONAL) Please choose reason not prescribed from choices below.         Allergies   Allergen Reactions     Penicillins Shortness Of Breath, Palpitations, Dizziness, Anaphylaxis, Hives, Itching and Rash     Test in 2031, see allergy note on 7/29/21     Shellfish-Derived Products Anaphylaxis     Sulfa Antibiotics Hives and Anaphylaxis        Social History     Tobacco Use     Smoking status: Never     Smokeless tobacco: Never     Tobacco comments:     smokes marijuana   Vaping Use     Vaping status: Never Used     Passive vaping exposure: Yes   Substance Use Topics     Alcohol use: Not Currently     Comment: Alcoholic Drinks/day: Never an issue, she states.  7/8/16     Family History   Problem Relation Age of Onset     Crohn's Disease Mother         Total colectomy with ileostomy.     Atrial fibrillation Mother      Snoring Father      Diabetes Father      Prostate Cancer Father      Chronic Kidney Disease Father         Chose against dialysis.     Substance Abuse Brother      Depression Brother      Attention Deficit Disorder Brother      Alcoholism Brother      Arrhythmia Brother      Alcoholism Brother      Substance Abuse Brother      Arrhythmia Brother      Alcoholism Maternal Grandfather      No Known Problems Daughter      No Known Problems Son      Lupus Cousin      Breast Cancer No family hx of      History   Drug Use Unknown     Comment: Drug use: Former marijuana use, not current. 7/8/16         Objective     /80 (BP Location: Right arm, Patient Position: Sitting, Cuff Size: Adult Large)   Pulse 86   Temp 98.1  F (36.7  C) (Oral)   Resp 16   Ht 1.753 m (5' 9\")   Wt 131.3 kg (289 lb 8 oz)   LMP 04/06/2023 (Within Days)   SpO2 98%   BMI 42.75 kg/m      Physical Exam    GENERAL APPEARANCE: healthy, alert and no distress     EYES: EOMI, " PERRL     HENT: ear canals and TM's normal and nose and mouth without ulcers or lesions     NECK: no adenopathy, no asymmetry, masses, or scars and thyroid normal to palpation     RESP: lungs clear to auscultation - no rales, rhonchi or wheezes     CV: regular rates and rhythm, normal S1 S2, no S3 or S4 and no murmur, click or rub     ABDOMEN:  soft, nontender, no HSM or masses and bowel sounds normal     MS: extremities normal- no gross deformities noted, no evidence of inflammation in joints, FROM in all extremities.     SKIN: no suspicious lesions or rashes     NEURO: Normal strength and tone, sensory exam grossly normal, mentation intact and speech normal     PSYCH: mentation appears normal. and affect normal/bright     LYMPHATICS: No cervical adenopathy    Recent Labs   Lab Test 03/22/23  0817 03/11/23  2059 03/08/23  1834 03/06/23  0954 12/15/22  0702 12/11/22  1420 12/08/22  1951   HGB 10.7* 10.4*  --    < > 12.0   < > 12.0    371  --    < > 331   < > 361   INR  --   --   --   --  1.13  --  1.02   NA  --  139 139  --  139   < > 141   POTASSIUM  --  3.5 3.7  --  3.9   < > 4.0   CR 0.69 0.69 0.71   < > 0.68   < > 0.95    < > = values in this interval not displayed.        Diagnostics:  No labs were ordered during this visit.   No EKG this visit, completed in the last 90 days.    Revised Cardiac Risk Index (RCRI):  The patient has the following serious cardiovascular risks for perioperative complications:   - Coronary Artery Disease (MI, positive stress test, angina, Qs on EKG) = 1 point     RCRI Interpretation: 2 points: Class III (moderate risk - 6.6% complication rate)     Estimated Functional Capacity: Duke Activity Status Index (DASI) score: 50.7        Signed Electronically by: Tila Last DO  Copy of this evaluation report is provided to requesting physician.

## 2023-05-02 NOTE — LETTER
My Depression Action Plan  Name: Alina Martell   Date of Birth 1965  Date: 5/2/2023    My doctor: Estelle Bansal   My clinic: [address] [address]  [phone]            GREEN    ZONE   Good Control    What it looks like:   Things are going generally well. You have normal ups and downs. You may even feel depressed from time to time, but bad moods usually last less than a day.   What you need to do:  Continue to care for yourself (see self care plan)  Check your depression survival kit and update it as needed  Follow your physician s recommendations including any medication.  Do not stop taking medication unless you consult with your physician first.             YELLOW         ZONE Getting Worse    What it looks like:   Depression is starting to interfere with your life.   It may be hard to get out of bed; you may be starting to isolate yourself from others.  Symptoms of depression are starting to last most all day and this has happened for several days.   You may have suicidal thoughts but they are not constant.   What you need to do:     Call your care team. Your response to treatment will improve if you keep your care team informed of your progress. Yellow periods are signs an adjustment may need to be made.     Continue your self-care.  Just get dressed and ready for the day.  Don't give yourself time to talk yourself out of it.    Talk to someone in your support network.    Open up your Depression Self-Care Plan/Wellness Kit.             RED    ZONE Medical Alert - Get Help    What it looks like:   Depression is seriously interfering with your life.   You may experience these or other symptoms: You can t get out of bed most days, can t work or engage in other necessary activities, you have trouble taking care of basic hygiene, or basic responsibilities, thoughts of suicide or death that will not go away,  self-injurious behavior.     What you need to do:  Call your care team and request a same-day appointment. If they are not available (weekends or after hours) call your local crisis line, emergency room or 911.          Depression Self-Care Plan / Wellness Kit    Many people find that medication and therapy are helpful treatments for managing depression. In addition, making small changes to your everyday life can help to boost your mood and improve your wellbeing. Below are some tips for you to consider. Be sure to talk with your medical provider and/or behavioral health consultant if your symptoms are worsening or not improving.     Sleep   Sleep hygiene  means all of the habits that support good, restful sleep. It includes maintaining a consistent bedtime and wake time, using your bedroom only for sleeping or sex, and keeping the bedroom dark and free of distractions like a computer, smartphone, or television.     Develop a Healthy Routine  Maintain good hygiene. Get out of bed in the morning, make your bed, brush your teeth, take a shower, and get dressed. Don t spend too much time viewing media that makes you feel stressed. Find time to relax each day.    Exercise  Get some form of exercise every day. This will help reduce pain and release endorphins, the  feel good  chemicals in your brain. It can be as simple as just going for a walk or doing some gardening, anything that will get you moving.      Diet  Strive to eat healthy foods, including fruits and vegetables. Drink plenty of water. Avoid excessive sugar, caffeine, alcohol, and other mood-altering substances.     Stay Connected with Others  Stay in touch with friends and family members.    Manage Your Mood  Try deep breathing, massage therapy, biofeedback, or meditation. Take part in fun activities when you can. Try to find something to smile about each day.     Psychotherapy  Be open to working with a therapist if your provider recommends it.      Medication  Be sure to take your medication as prescribed. Most anti-depressants need to be taken every day. It usually takes several weeks for medications to work. Not all medicines work for all people. It is important to follow-up with your provider to make sure you have a treatment plan that is working for you. Do not stop your medication abruptly without first discussing it with your provider.    Crisis Resources   These hotlines are for both adults and children. They and are open 24 hours a day, 7 days a week unless noted otherwise.    National Suicide Prevention Lifeline   988 or 0-378-265-NWFG (0743)    Crisis Text Line    www.crisistextline.org  Text HOME to 710490 from anywhere in the [place], anytime, about any type of crisis. A live, trained crisis counselor will receive the text and respond quickly.    [place] Lifeline for LGBTQ Youth  A national crisis intervention and suicide lifeline for LGBTQ youth under 25. Provides a safe place to talk without judgement. Call 1-389.122.1594; text START to 762990 or visit www.thetrevorproject.org to talk to a trained counselor.    For [facility] crisis numbers, visit the [facility] website at:  https://[place].gov/dhs/people-we-serve/adults/health-care/mental-health/resources/crisis-contacts.jsp

## 2023-05-02 NOTE — PATIENT INSTRUCTIONS
For informational purposes only. Not to replace the advice of your health care provider. Copyright   2003,  [facility]. All rights reserved. Clinically reviewed by Lisset Wick MD. [employer] [id number] - REV .  Preparing for Your Surgery  Getting started  A nurse will call you to review your health history and instructions. They will give you an arrival time based on your scheduled surgery time. Please be ready to share:  Your doctor's clinic name and phone number  Your medical, surgical, and anesthesia history  A list of allergies and sensitivities  A list of medicines, including herbal treatments and over-the-counter drugs  Whether the patient has a legal guardian (ask how to send us the papers in advance)  Please tell us if you're pregnant--or if there's any chance you might be pregnant. Some surgeries may injure a fetus (unborn baby), so they require a pregnancy test. Surgeries that are safe for a fetus don't always need a test, and you can choose whether to have one.   If you have a child who's having surgery, please ask for a copy of Preparing for Your Child's Surgery.    Preparing for surgery  Within 10 to 30 days of surgery: Have a pre-op exam (sometimes called an H&P, or History and Physical). This can be done at a clinic or pre-operative center.  If you're having a , you may not need this exam. Talk to your care team.  At your pre-op exam, talk to your care team about all medicines you take. If you need to stop any medicines before surgery, ask when to start taking them again.  We do this for your safety. Many medicines can make you bleed too much during surgery. Some change how well surgery (anesthesia) drugs work.  Call your insurance company to let them know you're having surgery. (If you don't have insurance, call [phone].)  Call your clinic if there's any change in your health. This includes signs of a cold or flu (sore throat, runny nose, cough, rash, fever). It  also includes a scrape or scratch near the surgery site.  If you have questions on the day of surgery, call your hospital or surgery center.  Eating and drinking guidelines  For your safety: Unless your surgeon tells you otherwise, follow the guidelines below.  Eat and drink as usual until 8 hours before you arrive for surgery. After that, no food or milk.  Drink clear liquids until 2 hours before you arrive. These are liquids you can see through, like water, Gatorade, and Propel Water. They also include plain black coffee and tea (no cream or milk), candy, and breath mints. You can spit out gum when you arrive.  If you drink alcohol: Stop drinking it the night before surgery.  If your care team tells you to take medicine on the morning of surgery, it's okay to take it with a sip of water.  Preventing infection  Shower or bathe the night before and morning of your surgery. Follow the instructions your clinic gave you. (If no instructions, use regular soap.)  Don't shave or clip hair near your surgery site. We'll remove the hair if needed.  Don't smoke or vape the morning of surgery. You may chew nicotine gum up to 2 hours before surgery. A nicotine patch is okay.  Note: Some surgeries require you to completely quit smoking and nicotine. Check with your surgeon.  Your care team will make every effort to keep you safe from infection. We will:  Clean our hands often with soap and water (or an alcohol-based hand rub).  Clean the skin at your surgery site with a special soap that kills germs.  Give you a special gown to keep you warm. (Cold raises the risk of infection.)  Wear special hair covers, masks, gowns and gloves during surgery.  Give antibiotic medicine, if prescribed. Not all surgeries need antibiotics.  What to bring on the day of surgery  Photo ID and insurance card  Copy of your health care directive, if you have one  Glasses and hearing aids (bring cases)  You can't wear contacts during surgery  Inhaler and  eye drops, if you use them (tell us about these when you arrive)  CPAP machine or breathing device, if you use them  A few personal items, if spending the night  If you have . . .  A pacemaker, ICD (cardiac defibrillator) or other implant: Bring the ID card.  An implanted stimulator: Bring the remote control.  A legal guardian: Bring a copy of the certified (court-stamped) guardianship papers.  Please remove any jewelry, including body piercings. Leave jewelry and other valuables at home.  If you're going home the day of surgery  You must have a responsible adult drive you home. They should stay with you overnight as well.  If you don't have someone to stay with you, and you aren't safe to go home alone, we may keep you overnight. Insurance often won't pay for this.  After surgery  If it's hard to control your pain or you need more pain medicine, please call your surgeon's office.  Questions?   If you have any questions for your care team, list them here: _________________________________________________________________________________________________________________________________________________________________________ ____________________________________ ____________________________________ ____________________________________    How to Take Your Medication Before Surgery  - HOLD (do not take) eliquis the day of surgery and the days before. You can take it the day after.

## 2023-05-02 NOTE — H&P (VIEW-ONLY)
[address] [address]  Phone: [phone]  Fax: [phone]  Primary Provider: Estelle Bansal  Pre-op Performing Provider: JENARO PEDROZA    PREOPERATIVE EVALUATION:  Today's date: 5/2/2023    Alina Martell is a 58 year old female who presents for a preoperative evaluation.      5/2/2023     3:21 PM   Additional Questions   Roomed by Andres WOODWARD CMA     Surgical Information:  Surgery/Procedure: ROBOTIC ASSISTED TOTAL LAPAROSCOPIC HYSTERECTOMY WITH BILATERAL SALPINGO OOPHORECTOMY  Surgery Location: [facility] OR  Surgeon: Irma Cary MD  Surgery Date: 05/04/2023  Time of Surgery: 1:00 PM  Where patient plans to recover: At home with family  Fax number for surgical facility: Note does not need to be faxed, will be available electronically in Epic.    Assessment & Plan     The proposed surgical procedure is considered INTERMEDIATE risk.    Preop general physical exam  Recommendations as below. Discussed risks, however medically optimized.   - PRIMARY CARE FOLLOW-UP SCHEDULING    Paroxysmal atrial fibrillation (H)  Stable. In rate control table. Recommend flecainide as needed but to otherwise continue diltiazem.   - flecainide (TAMBOCOR) 50 MG tablet  Dispense: 20 tablet; Refill: 4    Essential hypertension  Stable, controlled. Recommend continuing diltiazem.     Coronary artery disease of native artery of native heart with stable angina pectoris (H)  Stable. Recent stress test completed. Holding eliquis, not on statin or aspirin therapy as she does not have a stent.       Risks and Recommendations:  The patient has the following additional risks and recommendations for perioperative complications:   - Morbid obesity (BMI >40)  Cardiovascular:   - Cardiology seen recently with stress up to date this year  Obstructive Sleep Apnea:   Controlled with CPAP  Anemia/Bleeding/Clotting:    - Anemia and does not require treatment prior  to surgery. Monitor hemoglobin postoperatively    Antiplatelet or Anticoagulation Medication Instructions:   - apixaban (Eliquis), edoxaban (Savaysa), rivaroxaban (Xarelto): Bleeding risk is moderate or high for this procedure AND CrCl  (>=) 50 mL/min. HOLD 2 days before surgery.     Additional Medication Instructions:  Patient is to take all scheduled medications on the day of surgery EXCEPT for modifications listed below:    RECOMMENDATION:  APPROVAL GIVEN to proceed with proposed procedure, without further diagnostic evaluation.    Review of the result(s) of each unique test - stress test, EKG, prior chemistries, hemoglobin  Prescription drug management  I spent a total of 21 minutes on the day of the visit.   Time spent by me doing chart review, history and exam, documentation and further activities per the note      Subjective     HPI related to upcoming procedure: Patient already failed d & c, still have regular menstruation.         5/2/2023     6:02 AM   Preop Questions   1. Have you ever had a heart attack or stroke? No   2. Have you ever had surgery on your heart or blood vessels, such as a stent placement, a coronary artery bypass, or surgery on an artery in your head, neck, heart, or legs? No   3. Do you have chest pain with activity? No   4. Do you have a history of  heart failure? No   5. Do you currently have a cold, bronchitis or symptoms of other infection? No   6. Do you have a cough, shortness of breath, or wheezing? No   7. Do you or anyone in your family have previous history of blood clots? No   8. Do you or does anyone in your family have a serious bleeding problem such as prolonged bleeding following surgeries or cuts? No   9. Have you ever had problems with anemia or been told to take iron pills? YES - part of treatment.    10. Have you had any abnormal blood loss such as black, tarry or bloody stools, or abnormal vaginal bleeding? YES - part of treatment.    11. Have you ever had a blood  transfusion? No   12. Are you willing to have a blood transfusion if it is medically needed before, during, or after your surgery? Yes   13. Have you or any of your relatives ever had problems with anesthesia? No   14. Do you have sleep apnea, excessive snoring or daytime drowsiness? YES - wears a CPAP. May need up to date sleep study.    14a. Do you have a CPAP machine? Yes   15. Do you have any artifical heart valves or other implanted medical devices like a pacemaker, defibrillator, or continuous glucose monitor? No   16. Do you have artificial joints? No   17. Are you allergic to latex? No   18. Is there any chance that you may be pregnant? No       Health Care Directive:  Patient does not have a Health Care Directive or Living Will: Patient states has Advance Directive and will bring in a copy to clinic.    Preoperative Review of :   reviewed - controlled substances reflected in medication list.    Status of Chronic Conditions:  A-FIB - Patient has a longstanding history of chronic A-fib currently on rate control. Current treatment regimen includes Apixaban for stroke prevention and denies significant symptoms of lightheadedness, palpitations or dyspnea (has been held)    ANEMIA - Patient has a recent history of moderate-severe anemia, which has been symptomatic. Work up to date has revealed 10.7. Treatment has been iron levels.     CAD - Patient has a longstanding history of moderate-severe CAD. Patient denies recent chest pain or NTG use, denies exercise induced dyspnea or PND. Last Stress test 12/2022, EKG 12/2022, holtor monitor as of 03/28/2023.     HYPERTENSION - Patient has longstanding history of HTN , currently denies any symptoms referable to elevated blood pressure. Specifically denies chest pain, palpitations, dyspnea, orthopnea, PND or peripheral edema. Blood pressure readings have been in normal range. Current medication regimen is as listed below. Patient denies any side effects of  medication.     SLEEP PROBLEM - Patient has a longstanding history of snoring.. Patient has tried OTC medications with limited success.       Review of Systems  CONSTITUTIONAL: NEGATIVE for fever, chills, change in weight  INTEGUMENTARY/SKIN: NEGATIVE for worrisome rashes, moles or lesions  EYES: NEGATIVE for vision changes or irritation  ENT/MOUTH: NEGATIVE for ear, mouth and throat problems  RESP: NEGATIVE for significant cough or SOB  CV: NEGATIVE for chest pain, palpitations or peripheral edema  GI: NEGATIVE for nausea, abdominal pain, heartburn, or change in bowel habits  : NEGATIVE for frequency, dysuria, or hematuria  MUSCULOSKELETAL:POSITIVE  for arthralgias due to RA and myalgia  NEURO: NEGATIVE for weakness, dizziness or paresthesias  ENDOCRINE: NEGATIVE for temperature intolerance, skin/hair changes  HEME: NEGATIVE for bleeding problems  PSYCHIATRIC: NEGATIVE for changes in mood or affect    Patient Active Problem List    Diagnosis Date Noted     Vitamin D deficiency 03/23/2023     Priority: Medium     Status post catheter ablation of atrial fibrillation 08/23/2022     Priority: Medium     6/30/2022 with Dr. Joseph  1. Mild left atrial dilation without significant scattered fibrosis  2. Atrial fibrillation ablation via PVI         History of colonic polyps 04/05/2022     Priority: Medium     Colonscopy 3/22 - repeat in 3-5 year       Postmenopausal bleeding 02/16/2022     Priority: Medium     Obstructive sleep apnea syndrome 01/20/2022     Priority: Medium     Essential hypertension 01/20/2022     Priority: Medium     Coronary artery disease of native artery with stable angina pectoris (H) 01/20/2022     Priority: Medium     Seropositive rheumatoid arthritis of multiple sites (H) 01/05/2022     Priority: Medium     Recurrent major depressive disorder, in full remission (H) 01/05/2022     Priority: Medium     Paroxysmal atrial fibrillation (H) 01/05/2022     Priority: Medium     Morbid obesity (H)  01/05/2022     Priority: Medium     Chest pain 07/11/2021     Priority: Medium     ADA (generalized anxiety disorder) 03/31/2021     Priority: Medium     Depressed 11/14/2018     Priority: Medium     Cyclic citrullinated peptide (CCP) antibody positive 10/03/2017     Priority: Medium     Tendonitis of both shoulders 06/09/2017     Priority: Medium     Chronic pain 07/18/2016     Priority: Medium     Olecranon bursitis of right elbow 07/08/2016     Priority: Medium     Formatting of this note might be different from the original.  Replacement Utility updated for latest IMO load       Grief 03/29/2016     Priority: Medium     Sicca syndrome (H) 10/27/2015     Priority: Medium     Anxiety 12/08/2014     Priority: Medium     Panic attack 12/08/2014     Priority: Medium     Sleep disorder 12/05/2014     Priority: Medium     Insomnia related to another mental disorder 09/03/2014     Priority: Medium     Microcytic anemia 09/02/2014     Priority: Medium     Migraine headache 09/02/2014     Priority: Medium     Reflux 09/02/2014     Priority: Medium      Past Medical History:   Diagnosis Date     Anemia      Antiplatelet or antithrombotic long-term use      Arrhythmia      Cannabis use without complication 12/8/2014     Carpal tunnel syndrome      Coronary artery disease      Gastroesophageal reflux disease      History of angina      Hypertension      Irregular heart beat      Obesity      Paroxysmal atrial fibrillation (H)      PTSD (post-traumatic stress disorder)      RA (rheumatoid arthritis) (H)      Rheumatoid arthritis (H) 7/8/2016     Sicca syndrome (H)      Sleep apnea      Past Surgical History:   Procedure Laterality Date     CHOLECYSTECTOMY       DILATION AND CURETTAGE, OPERATIVE HYSTEROSCOPY, COMBINED N/A 3/3/2022    Procedure: HYSTEROSCOPY, WITH DILATION AND CURETTAGE;  Surgeon: Irma Cary MD;  Location: [facility]     EP ABLATION FOCAL AFIB N/A 6/30/2022    Procedure: Ablation Atrial  Fibrilation;  Surgeon: Jose Guadalupe Joseph MD;  Location:  [facility]     HC REMOVAL GALLBLADDER      Description: Cholecystectomy;  Recorded: 10/15/2013;     LAPAROSCOPIC TUBAL LIGATION       RELEASE CARPAL TUNNEL BILATERAL       TUBAL LIGATION  1995     Current Outpatient Medications   Medication Sig Dispense Refill     adalimumab (HUMIRA *CF* PEN) 40 MG/0.4ML pen kit INJECT 1 PEN UNDER THE SKIN EVERY 14 DAYS. 2 each 5     apixaban ANTICOAGULANT (ELIQUIS ANTICOAGULANT) 5 MG tablet Take 1 tablet (5 mg) by mouth 2 times daily 180 tablet 3     busPIRone (BUSPAR) 5 MG tablet Take 1 tablet (5 mg) by mouth 3 times daily 90 tablet 0     cetirizine (ZYRTEC) 10 MG tablet Take 1 tablet (10 mg) by mouth daily 90 tablet 3     diltiazem ER COATED BEADS (CARDIZEM CD/CARTIA XT) 180 MG 24 hr capsule Take 2 capsules (360 mg) by mouth daily 180 capsule 3     EPINEPHrine (ANY BX GENERIC EQUIV) 0.3 MG/0.3ML injection 2-pack Inject 0.3 mg into the muscle as needed for anaphylaxis       ferrous gluconate (FERGON) 324 (38 Fe) MG tablet Take 1 tablet (324 mg) by mouth daily (with breakfast) 30 tablet 0     flecainide (TAMBOCOR) 50 MG tablet Take 1 tablet (50 mg) by mouth daily as needed (atrial fibrillation) 20 tablet 4     Multiple Vitamins-Minerals (CENTROVITE) TABS TAKE 1 TABLET BY MOUTH EVERY DAY FOR 30 DAYS       omeprazole (PRILOSEC) 40 MG DR capsule Take 1 capsule (40 mg) by mouth daily 90 capsule 3     ondansetron (ZOFRAN ODT) 4 MG ODT tab Take 1 tablet (4 mg) by mouth every 6 hours as needed for nausea or vomiting 20 tablet 0     polyethylene glycol (MIRALAX) 17 GM/Dose powder Take 17 g (1 capful) by mouth daily 578 g 3     topiramate (TOPAMAX) 25 MG tablet 25mg at bedtime for 1 week, then 25mg twice daily for 1 week, then 25mg in the morning and 50mg in the evening for 1 week and finally 50mg twice daily. 120 tablet 5     vitamin D3 (CHOLECALCIFEROL) 1.25 MG (87138 UT) capsule Take 1 capsule (50,000 Units) by  "mouth every 7 days 12 capsule 3     ASPIRIN NOT PRESCRIBED (INTENTIONAL) Please choose reason not prescribed from choices below.       STATIN NOT PRESCRIBED (INTENTIONAL) Please choose reason not prescribed from choices below.         Allergies   Allergen Reactions     Penicillins Shortness Of Breath, Palpitations, Dizziness, Anaphylaxis, Hives, Itching and Rash     Test in 2031, see allergy note on 7/29/21     Shellfish-Derived Products Anaphylaxis     Sulfa Antibiotics Hives and Anaphylaxis        Social History     Tobacco Use     Smoking status: Never     Smokeless tobacco: Never     Tobacco comments:     smokes marijuana   Vaping Use     Vaping status: Never Used     Passive vaping exposure: Yes   Substance Use Topics     Alcohol use: Not Currently     Comment: Alcoholic Drinks/day: Never an issue, she states.  7/8/16     Family History   Problem Relation Age of Onset     Crohn's Disease Mother         Total colectomy with ileostomy.     Atrial fibrillation Mother      Snoring Father      Diabetes Father      Prostate Cancer Father      Chronic Kidney Disease Father         Chose against dialysis.     Substance Abuse Brother      Depression Brother      Attention Deficit Disorder Brother      Alcoholism Brother      Arrhythmia Brother      Alcoholism Brother      Substance Abuse Brother      Arrhythmia Brother      Alcoholism Maternal Grandfather      No Known Problems Daughter      No Known Problems Son      Lupus Cousin      Breast Cancer No family hx of      History   Drug Use Unknown     Comment: Drug use: Former marijuana use, not current. 7/8/16         Objective     /80 (BP Location: Right arm, Patient Position: Sitting, Cuff Size: Adult Large)   Pulse 86   Temp 98.1  F (36.7  C) (Oral)   Resp 16   Ht 1.753 m (5' 9\")   Wt 131.3 kg (289 lb 8 oz)   LMP 04/06/2023 (Within Days)   SpO2 98%   BMI 42.75 kg/m      Physical Exam    GENERAL APPEARANCE: healthy, alert and no distress     EYES: EOMI, " PERRL     HENT: ear canals and TM's normal and nose and mouth without ulcers or lesions     NECK: no adenopathy, no asymmetry, masses, or scars and thyroid normal to palpation     RESP: lungs clear to auscultation - no rales, rhonchi or wheezes     CV: regular rates and rhythm, normal S1 S2, no S3 or S4 and no murmur, click or rub     ABDOMEN:  soft, nontender, no HSM or masses and bowel sounds normal     MS: extremities normal- no gross deformities noted, no evidence of inflammation in joints, FROM in all extremities.     SKIN: no suspicious lesions or rashes     NEURO: Normal strength and tone, sensory exam grossly normal, mentation intact and speech normal     PSYCH: mentation appears normal. and affect normal/bright     LYMPHATICS: No cervical adenopathy    Recent Labs   Lab Test 03/22/23  0817 03/11/23  2059 03/08/23  1834 03/06/23  0954 12/15/22  0702 12/11/22  1420 12/08/22  1951   HGB 10.7* 10.4*  --    < > 12.0   < > 12.0    371  --    < > 331   < > 361   INR  --   --   --   --  1.13  --  1.02   NA  --  139 139  --  139   < > 141   POTASSIUM  --  3.5 3.7  --  3.9   < > 4.0   CR 0.69 0.69 0.71   < > 0.68   < > 0.95    < > = values in this interval not displayed.        Diagnostics:  No labs were ordered during this visit.   No EKG this visit, completed in the last 90 days.    Revised Cardiac Risk Index (RCRI):  The patient has the following serious cardiovascular risks for perioperative complications:   - Coronary Artery Disease (MI, positive stress test, angina, Qs on EKG) = 1 point     RCRI Interpretation: 2 points: Class III (moderate risk - 6.6% complication rate)     Estimated Functional Capacity: Duke Activity Status Index (DASI) score: 50.7        Signed Electronically by: Tila Last DO  Copy of this evaluation report is provided to requesting physician.

## 2023-05-03 ENCOUNTER — OFFICE VISIT (OUTPATIENT)
Dept: PSYCHOLOGY | Facility: CLINIC | Age: 58
End: 2023-05-03
Payer: COMMERCIAL

## 2023-05-03 ENCOUNTER — ANESTHESIA EVENT (OUTPATIENT)
Dept: SURGERY | Facility: CLINIC | Age: 58
End: 2023-05-03
Payer: COMMERCIAL

## 2023-05-03 DIAGNOSIS — F41.1 GAD (GENERALIZED ANXIETY DISORDER): ICD-10-CM

## 2023-05-03 DIAGNOSIS — F33.1 MODERATE EPISODE OF RECURRENT MAJOR DEPRESSIVE DISORDER (H): Primary | ICD-10-CM

## 2023-05-03 PROCEDURE — 90834 PSYTX W PT 45 MINUTES: CPT | Performed by: COUNSELOR

## 2023-05-03 ASSESSMENT — ENCOUNTER SYMPTOMS: DYSRHYTHMIAS: 1

## 2023-05-03 NOTE — ANESTHESIA PREPROCEDURE EVALUATION
Anesthesia Pre-Procedure Evaluation    Patient: Alian Martell   MRN: [id number] : 1965        Procedure : Procedure(s):  ROBOTIC ASSISTED TOTAL LAPAROSCOPIC HYSTERECTOMY WITH BILATERAL SALPINGO OOPHORECTOMY  AND CYSTOSCOPY          Past Medical History:   Diagnosis Date     Anemia      Antiplatelet or antithrombotic long-term use      Arrhythmia      Cannabis use without complication 2014     Carpal tunnel syndrome      Coronary artery disease      Gastroesophageal reflux disease      History of angina      Hypertension      Irregular heart beat      Obesity      Paroxysmal atrial fibrillation (H)      PTSD (post-traumatic stress disorder)      RA (rheumatoid arthritis) (H)      Rheumatoid arthritis (H) 2016     Sicca syndrome (H)      Sleep apnea       Past Surgical History:   Procedure Laterality Date     CHOLECYSTECTOMY       DILATION AND CURETTAGE, OPERATIVE HYSTEROSCOPY, COMBINED N/A 3/3/2022    Procedure: HYSTEROSCOPY, WITH DILATION AND CURETTAGE;  Surgeon: Irma Cary MD;  Location: [facility]     EP ABLATION FOCAL AFIB N/A 2022    Procedure: Ablation Atrial Fibrilation;  Surgeon: Jose Guadalupe Joseph MD;  Location:  [facility]     HC REMOVAL GALLBLADDER      Description: Cholecystectomy;  Recorded: 10/15/2013;     LAPAROSCOPIC TUBAL LIGATION       RELEASE CARPAL TUNNEL BILATERAL       TUBAL LIGATION        Allergies   Allergen Reactions     Penicillins Shortness Of Breath, Palpitations, Dizziness, Anaphylaxis, Hives, Itching and Rash     Test in , see allergy note on 21     Shellfish-Derived Products Anaphylaxis     Sulfa Antibiotics Hives and Anaphylaxis      Social History     Tobacco Use     Smoking status: Never     Smokeless tobacco: Never     Tobacco comments:     smokes marijuana   Vaping Use     Vaping status: Never Used     Passive vaping exposure: Yes   Substance Use Topics     Alcohol use: Not Currently     Comment:  Alcoholic Drinks/day: Never an issue, she states.  7/8/16      Wt Readings from Last 1 Encounters:   05/02/23 131.3 kg (289 lb 8 oz)        Anesthesia Evaluation   Pt has had prior anesthetic. Type: General and MAC.    History of anesthetic complications   RANDALL.    ROS/MED HX  ENT/Pulmonary:     (+) sleep apnea, uses CPAP, RANDALL risk factors, hypertension, obese,  (-) tobacco use, asthma and COPD   Neurologic:     (+) migraines,     Cardiovascular: Comment: EF 65%, valves normal  Normal stress test 12/15/22     The nuclear stress test is negative for inducible myocardial ischemia or infarction.     Left ventricular function is normal.     There is no prior study for comparison.     Partially visualized neck uptake. Ultrasound is recommended   Left Main  The vessel is large. The vessel and its major branches are widely patent without any detectable stenosis or plaque.  Left Anterior Descending  The vessel is moderate in size. The vessel and its major branches are widely patent without any detectable stenosis or plaque.  Left Circumflex  The vessel is moderate in size.  Prox Cx lesion has minimal luminal stenosis in the range of 1-24%.  Right Coronary Artery  The vessel is large. The vessel and its major branches are widely patent without any detectable stenosis or plaque.        (+) hypertension--CAD ---Taking blood thinners Pt has received instructions: dysrhythmias, a-fib, Irregular Heartbeat/Palpitations,  (-) angina, past MI, valvular problems/murmurs, stent, angina, past MI and CABG   METS/Exercise Tolerance: 4 - Raking leaves, gardening    Hematologic:     (+) anemia,     Musculoskeletal:   (+) arthritis,     GI/Hepatic:     (+) GERD,  (-) liver disease   Renal/Genitourinary:    (-) renal disease   Endo:     (+) Obesity,     Psychiatric/Substance Use:     (+) psychiatric history depression and anxiety Recreational drug usage: Cannabis.    Infectious Disease:  - neg infectious disease ROS     Malignancy:  - neg  malignancy ROS     Other:  - neg other ROS          Physical Exam    Airway  airway exam normal      Mallampati: II   TM distance: > 3 FB   Neck ROM: full   Mouth opening: > 3 cm    Respiratory Devices and Support         Dental       (+) Completely normal teeth      Cardiovascular   cardiovascular exam normal       Rhythm and rate: regular and normal     Pulmonary   pulmonary exam normal        breath sounds clear to auscultation           OUTSIDE LABS:  CBC:   Lab Results   Component Value Date    WBC 8.8 03/22/2023    WBC 10.9 03/11/2023    HGB 10.7 (L) 03/22/2023    HGB 10.4 (L) 03/11/2023    HCT 34.7 (L) 03/22/2023    HCT 33.1 (L) 03/11/2023     03/22/2023     03/11/2023     BMP:   Lab Results   Component Value Date     03/11/2023     03/08/2023    POTASSIUM 3.5 03/11/2023    POTASSIUM 3.7 03/08/2023    CHLORIDE 102 03/11/2023    CHLORIDE 104 03/08/2023    CO2 27 03/11/2023    CO2 28 03/08/2023    BUN 11.8 03/11/2023    BUN 11.0 03/08/2023    CR 0.69 03/22/2023    CR 0.69 03/11/2023     (H) 03/11/2023     (H) 03/08/2023     COAGS:   Lab Results   Component Value Date    PTT 31 12/15/2022    INR 1.13 12/15/2022     POC:   Lab Results   Component Value Date    HCG Negative 10/07/2022     HEPATIC:   Lab Results   Component Value Date    ALBUMIN 3.8 03/22/2023    PROTTOTAL 7.4 03/11/2023    ALT 13 03/22/2023    AST 14 03/11/2023    ALKPHOS 107 (H) 03/11/2023    BILITOTAL 0.2 03/11/2023     OTHER:   Lab Results   Component Value Date    PH 7.40 01/01/2022    LACT 1.0 03/08/2022    JOSSELIN 9.0 03/11/2023    MAG 1.7 03/11/2023    LIPASE 17 12/11/2022    TSH 2.16 02/16/2022    T4 0.94 01/01/2022    CRP 3.3 (H) 03/11/2020    SED 57 (H) 03/22/2023       Anesthesia Plan    ASA Status:  3      Anesthesia Type: General.     - Airway: ETT   Induction: Intravenous.           Consents    Anesthesia Plan(s) and associated risks, benefits, and realistic alternatives discussed. Questions  answered and patient/representative(s) expressed understanding.     - Discussed: Risks, Benefits and Alternatives for BOTH SEDATION and the PROCEDURE were discussed     - Discussed with:  Patient      - Extended Intubation/Ventilatory Support Discussed: No.      - Patient is DNR/DNI Status: No    Use of blood products discussed: No .     Postoperative Care    Pain management: IV analgesics, Oral pain medications.   PONV prophylaxis: Ondansetron (or other 5HT-3), Dexamethasone or Solumedrol     Comments:                Jacki Galeas MD

## 2023-05-04 ENCOUNTER — ANESTHESIA (OUTPATIENT)
Dept: SURGERY | Facility: CLINIC | Age: 58
End: 2023-05-04
Payer: COMMERCIAL

## 2023-05-04 ENCOUNTER — HOSPITAL ENCOUNTER (OUTPATIENT)
Facility: CLINIC | Age: 58
Discharge: HOME | End: 2023-05-05
Attending: OBSTETRICS & GYNECOLOGY | Admitting: OBSTETRICS & GYNECOLOGY
Payer: COMMERCIAL

## 2023-05-04 DIAGNOSIS — Z90.710 S/P HYSTERECTOMY: Primary | ICD-10-CM

## 2023-05-04 PROBLEM — D25.9 UTERINE FIBROID: Status: ACTIVE | Noted: 2023-05-04

## 2023-05-04 LAB
ABO/RH(D): NORMAL
ANTIBODY SCREEN: NEGATIVE
BASOPHILS # BLD AUTO: 0 10E3/UL (ref 0–0.2)
BASOPHILS NFR BLD AUTO: 0 %
EOSINOPHIL # BLD AUTO: 0.2 10E3/UL (ref 0–0.7)
EOSINOPHIL NFR BLD AUTO: 2 %
ERYTHROCYTE [DISTWIDTH] IN BLOOD BY AUTOMATED COUNT: 14.8 % (ref 10–15)
HCG UR QL: NEGATIVE
HCT VFR BLD AUTO: 34.3 % (ref 35–47)
HGB BLD-MCNC: 10.5 G/DL (ref 11.7–15.7)
IMM GRANULOCYTES # BLD: 0 10E3/UL
IMM GRANULOCYTES NFR BLD: 0 %
LYMPHOCYTES # BLD AUTO: 3.1 10E3/UL (ref 0.8–5.3)
LYMPHOCYTES NFR BLD AUTO: 30 %
MCH RBC QN AUTO: 24.6 PG (ref 26.5–33)
MCHC RBC AUTO-ENTMCNC: 30.6 G/DL (ref 31.5–36.5)
MCV RBC AUTO: 81 FL (ref 78–100)
MONOCYTES # BLD AUTO: 0.8 10E3/UL (ref 0–1.3)
MONOCYTES NFR BLD AUTO: 8 %
NEUTROPHILS # BLD AUTO: 6.3 10E3/UL (ref 1.6–8.3)
NEUTROPHILS NFR BLD AUTO: 60 %
NRBC # BLD AUTO: 0 10E3/UL
NRBC BLD AUTO-RTO: 0 /100
PLATELET # BLD AUTO: 374 10E3/UL (ref 150–450)
RBC # BLD AUTO: 4.26 10E6/UL (ref 3.8–5.2)
SPECIMEN EXPIRATION DATE: NORMAL
WBC # BLD AUTO: 10.4 10E3/UL (ref 4–11)

## 2023-05-04 PROCEDURE — 250N000009 HC RX 250: Performed by: NURSE ANESTHETIST, CERTIFIED REGISTERED

## 2023-05-04 PROCEDURE — 250N000005 HC OR RX SURGIFLO HEMOSTATIC MATRIX 10ML 199102S OPNP: Performed by: OBSTETRICS & GYNECOLOGY

## 2023-05-04 PROCEDURE — 258N000003 HC RX IP 258 OP 636: Performed by: PHYSICIAN ASSISTANT

## 2023-05-04 PROCEDURE — 710N000010 HC RECOVERY PHASE 1, LEVEL 2, PER MIN: Performed by: OBSTETRICS & GYNECOLOGY

## 2023-05-04 PROCEDURE — 250N000013 HC RX MED GY IP 250 OP 250 PS 637: Performed by: OBSTETRICS & GYNECOLOGY

## 2023-05-04 PROCEDURE — 250N000009 HC RX 250: Performed by: OBSTETRICS & GYNECOLOGY

## 2023-05-04 PROCEDURE — 272N000001 HC OR GENERAL SUPPLY STERILE: Performed by: OBSTETRICS & GYNECOLOGY

## 2023-05-04 PROCEDURE — 258N000003 HC RX IP 258 OP 636: Performed by: NURSE ANESTHETIST, CERTIFIED REGISTERED

## 2023-05-04 PROCEDURE — 250N000011 HC RX IP 250 OP 636: Performed by: OBSTETRICS & GYNECOLOGY

## 2023-05-04 PROCEDURE — 250N000025 HC SEVOFLURANE, PER MIN: Performed by: OBSTETRICS & GYNECOLOGY

## 2023-05-04 PROCEDURE — 250N000011 HC RX IP 250 OP 636: Performed by: NURSE ANESTHETIST, CERTIFIED REGISTERED

## 2023-05-04 PROCEDURE — 85004 AUTOMATED DIFF WBC COUNT: CPT | Performed by: PHYSICIAN ASSISTANT

## 2023-05-04 PROCEDURE — 36415 COLL VENOUS BLD VENIPUNCTURE: CPT | Performed by: PHYSICIAN ASSISTANT

## 2023-05-04 PROCEDURE — 258N000003 HC RX IP 258 OP 636: Performed by: ANESTHESIOLOGY

## 2023-05-04 PROCEDURE — 86850 RBC ANTIBODY SCREEN: CPT | Performed by: PHYSICIAN ASSISTANT

## 2023-05-04 PROCEDURE — 250N000011 HC RX IP 250 OP 636: Performed by: PHYSICIAN ASSISTANT

## 2023-05-04 PROCEDURE — 88307 TISSUE EXAM BY PATHOLOGIST: CPT | Mod: TC | Performed by: OBSTETRICS & GYNECOLOGY

## 2023-05-04 PROCEDURE — 360N000080 HC SURGERY LEVEL 7, PER MIN: Performed by: OBSTETRICS & GYNECOLOGY

## 2023-05-04 PROCEDURE — 81025 URINE PREGNANCY TEST: CPT | Performed by: PHYSICIAN ASSISTANT

## 2023-05-04 PROCEDURE — 250N000013 HC RX MED GY IP 250 OP 250 PS 637: Performed by: PHYSICIAN ASSISTANT

## 2023-05-04 PROCEDURE — 999N000141 HC STATISTIC PRE-PROCEDURE NURSING ASSESSMENT: Performed by: OBSTETRICS & GYNECOLOGY

## 2023-05-04 PROCEDURE — 370N000017 HC ANESTHESIA TECHNICAL FEE, PER MIN: Performed by: OBSTETRICS & GYNECOLOGY

## 2023-05-04 PROCEDURE — 250N000009 HC RX 250: Performed by: ANESTHESIOLOGY

## 2023-05-04 RX ORDER — NALOXONE HYDROCHLORIDE 0.4 MG/ML
0.2 INJECTION, SOLUTION INTRAMUSCULAR; INTRAVENOUS; SUBCUTANEOUS
Status: DISCONTINUED | OUTPATIENT
Start: 2023-05-04 | End: 2023-05-05 | Stop reason: HOSPADM

## 2023-05-04 RX ORDER — HYDROMORPHONE HCL IN WATER/PF 6 MG/30 ML
0.2 PATIENT CONTROLLED ANALGESIA SYRINGE INTRAVENOUS
Status: DISCONTINUED | OUTPATIENT
Start: 2023-05-04 | End: 2023-05-05 | Stop reason: HOSPADM

## 2023-05-04 RX ORDER — OXYCODONE HYDROCHLORIDE 5 MG/1
5 TABLET ORAL EVERY 4 HOURS PRN
Status: DISCONTINUED | OUTPATIENT
Start: 2023-05-04 | End: 2023-05-05 | Stop reason: HOSPADM

## 2023-05-04 RX ORDER — FENTANYL CITRATE 50 UG/ML
25 INJECTION, SOLUTION INTRAMUSCULAR; INTRAVENOUS EVERY 5 MIN PRN
Status: DISCONTINUED | OUTPATIENT
Start: 2023-05-04 | End: 2023-05-04 | Stop reason: HOSPADM

## 2023-05-04 RX ORDER — NALOXONE HYDROCHLORIDE 0.4 MG/ML
0.4 INJECTION, SOLUTION INTRAMUSCULAR; INTRAVENOUS; SUBCUTANEOUS
Status: DISCONTINUED | OUTPATIENT
Start: 2023-05-04 | End: 2023-05-05 | Stop reason: HOSPADM

## 2023-05-04 RX ORDER — FENTANYL CITRATE 50 UG/ML
INJECTION, SOLUTION INTRAMUSCULAR; INTRAVENOUS PRN
Status: DISCONTINUED | OUTPATIENT
Start: 2023-05-04 | End: 2023-05-04

## 2023-05-04 RX ORDER — ONDANSETRON 2 MG/ML
INJECTION INTRAMUSCULAR; INTRAVENOUS PRN
Status: DISCONTINUED | OUTPATIENT
Start: 2023-05-04 | End: 2023-05-04

## 2023-05-04 RX ORDER — DEXAMETHASONE SODIUM PHOSPHATE 10 MG/ML
INJECTION, SOLUTION INTRAMUSCULAR; INTRAVENOUS PRN
Status: DISCONTINUED | OUTPATIENT
Start: 2023-05-04 | End: 2023-05-04

## 2023-05-04 RX ORDER — LIDOCAINE 40 MG/G
CREAM TOPICAL
Status: DISCONTINUED | OUTPATIENT
Start: 2023-05-04 | End: 2023-05-04 | Stop reason: HOSPADM

## 2023-05-04 RX ORDER — ACETAMINOPHEN 325 MG/1
975 TABLET ORAL EVERY 6 HOURS
Status: DISCONTINUED | OUTPATIENT
Start: 2023-05-04 | End: 2023-05-05 | Stop reason: HOSPADM

## 2023-05-04 RX ORDER — BUPIVACAINE HYDROCHLORIDE 2.5 MG/ML
INJECTION, SOLUTION INFILTRATION; PERINEURAL PRN
Status: DISCONTINUED | OUTPATIENT
Start: 2023-05-04 | End: 2023-05-04 | Stop reason: HOSPADM

## 2023-05-04 RX ORDER — AMOXICILLIN 250 MG
1 CAPSULE ORAL 2 TIMES DAILY
Status: DISCONTINUED | OUTPATIENT
Start: 2023-05-04 | End: 2023-05-05 | Stop reason: HOSPADM

## 2023-05-04 RX ORDER — FENTANYL CITRATE 50 UG/ML
50 INJECTION, SOLUTION INTRAMUSCULAR; INTRAVENOUS EVERY 5 MIN PRN
Status: DISCONTINUED | OUTPATIENT
Start: 2023-05-04 | End: 2023-05-04 | Stop reason: HOSPADM

## 2023-05-04 RX ORDER — CLINDAMYCIN PHOSPHATE 900 MG/50ML
900 INJECTION, SOLUTION INTRAVENOUS SEE ADMIN INSTRUCTIONS
Status: DISCONTINUED | OUTPATIENT
Start: 2023-05-04 | End: 2023-05-04 | Stop reason: HOSPADM

## 2023-05-04 RX ORDER — ONDANSETRON 2 MG/ML
4 INJECTION INTRAMUSCULAR; INTRAVENOUS EVERY 6 HOURS PRN
Status: DISCONTINUED | OUTPATIENT
Start: 2023-05-04 | End: 2023-05-05 | Stop reason: HOSPADM

## 2023-05-04 RX ORDER — OXYCODONE HYDROCHLORIDE 5 MG/1
10 TABLET ORAL EVERY 4 HOURS PRN
Status: DISCONTINUED | OUTPATIENT
Start: 2023-05-04 | End: 2023-05-05 | Stop reason: HOSPADM

## 2023-05-04 RX ORDER — PROPOFOL 10 MG/ML
INJECTION, EMULSION INTRAVENOUS CONTINUOUS PRN
Status: DISCONTINUED | OUTPATIENT
Start: 2023-05-04 | End: 2023-05-04

## 2023-05-04 RX ORDER — CLINDAMYCIN PHOSPHATE 900 MG/50ML
900 INJECTION, SOLUTION INTRAVENOUS
Status: DISCONTINUED | OUTPATIENT
Start: 2023-05-04 | End: 2023-05-04 | Stop reason: HOSPADM

## 2023-05-04 RX ORDER — BISACODYL 10 MG
10 SUPPOSITORY, RECTAL RECTAL DAILY PRN
Status: DISCONTINUED | OUTPATIENT
Start: 2023-05-04 | End: 2023-05-05 | Stop reason: HOSPADM

## 2023-05-04 RX ORDER — SODIUM CHLORIDE, SODIUM LACTATE, POTASSIUM CHLORIDE, CALCIUM CHLORIDE 600; 310; 30; 20 MG/100ML; MG/100ML; MG/100ML; MG/100ML
INJECTION, SOLUTION INTRAVENOUS CONTINUOUS
Status: DISCONTINUED | OUTPATIENT
Start: 2023-05-04 | End: 2023-05-04 | Stop reason: HOSPADM

## 2023-05-04 RX ORDER — LIDOCAINE 40 MG/G
CREAM TOPICAL
Status: DISCONTINUED | OUTPATIENT
Start: 2023-05-04 | End: 2023-05-05 | Stop reason: HOSPADM

## 2023-05-04 RX ORDER — HYDROMORPHONE HCL IN WATER/PF 6 MG/30 ML
0.4 PATIENT CONTROLLED ANALGESIA SYRINGE INTRAVENOUS EVERY 5 MIN PRN
Status: DISCONTINUED | OUTPATIENT
Start: 2023-05-04 | End: 2023-05-04 | Stop reason: HOSPADM

## 2023-05-04 RX ORDER — HYDROMORPHONE HCL IN WATER/PF 6 MG/30 ML
0.4 PATIENT CONTROLLED ANALGESIA SYRINGE INTRAVENOUS
Status: DISCONTINUED | OUTPATIENT
Start: 2023-05-04 | End: 2023-05-05 | Stop reason: HOSPADM

## 2023-05-04 RX ORDER — ACETAMINOPHEN 325 MG/1
975 TABLET ORAL ONCE
Status: COMPLETED | OUTPATIENT
Start: 2023-05-04 | End: 2023-05-04

## 2023-05-04 RX ORDER — KETOROLAC TROMETHAMINE 30 MG/ML
15 INJECTION, SOLUTION INTRAMUSCULAR; INTRAVENOUS EVERY 6 HOURS
Status: DISCONTINUED | OUTPATIENT
Start: 2023-05-04 | End: 2023-05-05

## 2023-05-04 RX ORDER — IBUPROFEN 800 MG/1
800 TABLET, FILM COATED ORAL EVERY 6 HOURS
Status: DISCONTINUED | OUTPATIENT
Start: 2023-05-04 | End: 2023-05-05

## 2023-05-04 RX ORDER — ACETAMINOPHEN 325 MG/1
650 TABLET ORAL EVERY 6 HOURS
Status: DISCONTINUED | OUTPATIENT
Start: 2023-05-07 | End: 2023-05-05 | Stop reason: HOSPADM

## 2023-05-04 RX ORDER — ONDANSETRON 4 MG/1
4 TABLET, ORALLY DISINTEGRATING ORAL EVERY 6 HOURS PRN
Status: DISCONTINUED | OUTPATIENT
Start: 2023-05-04 | End: 2023-05-05 | Stop reason: HOSPADM

## 2023-05-04 RX ORDER — MAGNESIUM SULFATE 4 G/50ML
4 INJECTION INTRAVENOUS ONCE
Status: DISCONTINUED | OUTPATIENT
Start: 2023-05-04 | End: 2023-05-04 | Stop reason: HOSPADM

## 2023-05-04 RX ORDER — ONDANSETRON 2 MG/ML
4 INJECTION INTRAMUSCULAR; INTRAVENOUS EVERY 30 MIN PRN
Status: DISCONTINUED | OUTPATIENT
Start: 2023-05-04 | End: 2023-05-04 | Stop reason: HOSPADM

## 2023-05-04 RX ORDER — BUSPIRONE HYDROCHLORIDE 5 MG/1
5 TABLET ORAL 3 TIMES DAILY PRN
COMMUNITY
End: 2023-10-09 | Stop reason: SINTOL

## 2023-05-04 RX ORDER — ONDANSETRON 4 MG/1
4 TABLET, ORALLY DISINTEGRATING ORAL EVERY 30 MIN PRN
Status: DISCONTINUED | OUTPATIENT
Start: 2023-05-04 | End: 2023-05-04 | Stop reason: HOSPADM

## 2023-05-04 RX ORDER — PROCHLORPERAZINE MALEATE 10 MG
10 TABLET ORAL EVERY 6 HOURS PRN
Status: DISCONTINUED | OUTPATIENT
Start: 2023-05-04 | End: 2023-05-05 | Stop reason: HOSPADM

## 2023-05-04 RX ORDER — HYDROMORPHONE HCL IN WATER/PF 6 MG/30 ML
0.2 PATIENT CONTROLLED ANALGESIA SYRINGE INTRAVENOUS EVERY 5 MIN PRN
Status: DISCONTINUED | OUTPATIENT
Start: 2023-05-04 | End: 2023-05-04 | Stop reason: HOSPADM

## 2023-05-04 RX ORDER — PROPOFOL 10 MG/ML
INJECTION, EMULSION INTRAVENOUS PRN
Status: DISCONTINUED | OUTPATIENT
Start: 2023-05-04 | End: 2023-05-04

## 2023-05-04 RX ADMIN — PHENYLEPHRINE HYDROCHLORIDE 100 MCG: 10 INJECTION INTRAVENOUS at 14:10

## 2023-05-04 RX ADMIN — HYDROMORPHONE HYDROCHLORIDE 0.2 MG: 0.2 INJECTION, SOLUTION INTRAMUSCULAR; INTRAVENOUS; SUBCUTANEOUS at 18:17

## 2023-05-04 RX ADMIN — HYDROMORPHONE HYDROCHLORIDE 0.5 MG: 1 INJECTION, SOLUTION INTRAMUSCULAR; INTRAVENOUS; SUBCUTANEOUS at 14:29

## 2023-05-04 RX ADMIN — CLINDAMYCIN PHOSPHATE 900 MG: 900 INJECTION, SOLUTION INTRAVENOUS at 12:31

## 2023-05-04 RX ADMIN — PHENYLEPHRINE HYDROCHLORIDE 200 MCG: 10 INJECTION INTRAVENOUS at 14:37

## 2023-05-04 RX ADMIN — DOCUSATE SODIUM 50MG AND SENNOSIDES 8.6MG 1 TABLET: 8.6; 5 TABLET, FILM COATED ORAL at 20:19

## 2023-05-04 RX ADMIN — FENTANYL CITRATE 100 MCG: 50 INJECTION, SOLUTION INTRAMUSCULAR; INTRAVENOUS at 13:52

## 2023-05-04 RX ADMIN — LIDOCAINE HYDROCHLORIDE 20 MG: 10 INJECTION, SOLUTION INFILTRATION; PERINEURAL at 13:52

## 2023-05-04 RX ADMIN — PHENYLEPHRINE HYDROCHLORIDE 100 MCG: 10 INJECTION INTRAVENOUS at 14:41

## 2023-05-04 RX ADMIN — MIDAZOLAM 2 MG: 1 INJECTION INTRAMUSCULAR; INTRAVENOUS at 13:41

## 2023-05-04 RX ADMIN — PHENYLEPHRINE HYDROCHLORIDE 100 MCG: 10 INJECTION INTRAVENOUS at 14:46

## 2023-05-04 RX ADMIN — PHENYLEPHRINE HYDROCHLORIDE 100 MCG: 10 INJECTION INTRAVENOUS at 15:19

## 2023-05-04 RX ADMIN — SODIUM CHLORIDE, POTASSIUM CHLORIDE, SODIUM LACTATE AND CALCIUM CHLORIDE: 600; 310; 30; 20 INJECTION, SOLUTION INTRAVENOUS at 14:51

## 2023-05-04 RX ADMIN — ACETAMINOPHEN 975 MG: 325 TABLET ORAL at 11:53

## 2023-05-04 RX ADMIN — SUGAMMADEX 200 MG: 100 INJECTION, SOLUTION INTRAVENOUS at 16:13

## 2023-05-04 RX ADMIN — HYDROMORPHONE HYDROCHLORIDE 0.5 MG: 1 INJECTION, SOLUTION INTRAMUSCULAR; INTRAVENOUS; SUBCUTANEOUS at 14:34

## 2023-05-04 RX ADMIN — GENTAMICIN SULFATE 460 MG: 40 INJECTION, SOLUTION INTRAMUSCULAR; INTRAVENOUS at 13:37

## 2023-05-04 RX ADMIN — PROPOFOL 200 MG: 10 INJECTION, EMULSION INTRAVENOUS at 13:52

## 2023-05-04 RX ADMIN — PHENYLEPHRINE HYDROCHLORIDE 100 MCG: 10 INJECTION INTRAVENOUS at 14:50

## 2023-05-04 RX ADMIN — PROPOFOL 50 MCG/KG/MIN: 10 INJECTION, EMULSION INTRAVENOUS at 14:02

## 2023-05-04 RX ADMIN — ROCURONIUM BROMIDE 30 MG: 10 INJECTION, SOLUTION INTRAVENOUS at 14:27

## 2023-05-04 RX ADMIN — ONDANSETRON 4 MG: 2 INJECTION INTRAMUSCULAR; INTRAVENOUS at 16:00

## 2023-05-04 RX ADMIN — ROCURONIUM BROMIDE 50 MG: 10 INJECTION, SOLUTION INTRAVENOUS at 13:52

## 2023-05-04 RX ADMIN — DEXAMETHASONE SODIUM PHOSPHATE 10 MG: 10 INJECTION, SOLUTION INTRAMUSCULAR; INTRAVENOUS at 13:52

## 2023-05-04 RX ADMIN — KETOROLAC TROMETHAMINE 15 MG: 30 INJECTION, SOLUTION INTRAMUSCULAR; INTRAVENOUS at 17:23

## 2023-05-04 RX ADMIN — ACETAMINOPHEN 975 MG: 325 TABLET ORAL at 20:19

## 2023-05-04 RX ADMIN — SODIUM CHLORIDE, POTASSIUM CHLORIDE, SODIUM LACTATE AND CALCIUM CHLORIDE: 600; 310; 30; 20 INJECTION, SOLUTION INTRAVENOUS at 12:32

## 2023-05-04 RX ADMIN — OXYCODONE HYDROCHLORIDE 5 MG: 5 TABLET ORAL at 17:26

## 2023-05-04 RX ADMIN — FLUORESCEIN SODIUM 1 ML: 100 INJECTION INTRAVENOUS at 15:45

## 2023-05-04 ASSESSMENT — ACTIVITIES OF DAILY LIVING (ADL)
ADLS_ACUITY_SCORE: 20
ADLS_ACUITY_SCORE: 21
ADLS_ACUITY_SCORE: 21
ADLS_ACUITY_SCORE: 20

## 2023-05-04 ASSESSMENT — LIFESTYLE VARIABLES: TOBACCO_USE: 0

## 2023-05-04 ASSESSMENT — COPD QUESTIONNAIRES: COPD: 0

## 2023-05-04 NOTE — INTERVAL H&P NOTE
"H+P Update     Doing well. No changes in history.      BP (!) 167/81   Pulse 92   Temp 99.2  F (37.3  C) (Temporal)   Resp 16   Ht 1.753 m (5' 9\")   Wt 131.1 kg (289 lb)   LMP 03/02/2023 (Exact Date)   SpO2 96%   BMI 42.68 kg/m        NAD, AAO x 3  Abdomen soft, non tender    A/P: 58 year old with Abnormal uterine bleeding [N93.9] here for surgical management  -- Met with patient and discussed the risks of surgery including bleeding, infection, damage to pelvic organs and need for further surgery.   -- Questions answered  -- Consent signed  -- Abdomen marked  -- To OR for Procedure(s):  ROBOTIC ASSISTED TOTAL LAPAROSCOPIC HYSTERECTOMY WITH BILATERAL SALPINGECTOMY  AND CYSTOSCOPY     Irma Cary MD  (P) [phone]      "

## 2023-05-04 NOTE — ANESTHESIA POSTPROCEDURE EVALUATION
Patient: Alina Martell    Procedure: Procedure(s):  ROBOTIC ASSISTED TOTAL LAPAROSCOPIC HYSTERECTOMY WITH BILATERAL SALPINGECTOMY  AND CYSTOSCOPY       Anesthesia Type:  General    Note:     Postop Pain Control: Uneventful            Sign Out: Well controlled pain   PONV: No   Neuro/Psych: Uneventful            Sign Out: Acceptable/Baseline neuro status   Airway/Respiratory: Uneventful            Sign Out: Acceptable/Baseline resp. status   CV/Hemodynamics: Uneventful            Sign Out: Acceptable CV status; No obvious hypovolemia; No obvious fluid overload   Other NRE: NONE   DID A NON-ROUTINE EVENT OCCUR? No           Last vitals:  Vitals Value Taken Time   /89 05/04/23 1740   Temp 36.6  C (97.9  F) 05/04/23 1740   Pulse 90 05/04/23 1740   Resp 10 05/04/23 1740   SpO2 97 % 05/04/23 1739   Vitals shown include unvalidated device data.    Electronically Signed By: Jailene Alvarez MD  May 4, 2023  5:55 PM

## 2023-05-04 NOTE — OP NOTE
Name:  Alina Martell  Record # [id number]   : 1965  Care Provider: Irma Cary MD   Admit Date:  2023       Obstetrics Gynecology - Operative Report    DATE OF SERVICE: 2023     Pre-operative diagnosis: Abnormal uterine bleeding [N93.9]    Post-operative diagnosis:  Abnormal uterine bleeding    Procedure:   Robotic assisted total laparoscopic hysterectomy  Bilateral salpingectomy  Cystoscopy    Surgeon: Irma Cary    Anesthesia: General     Estimated Blood Loss: 100 cc    Drains:  None    Specimens:   ID Type Source Tests Collected by Time Destination   1 : Uterus, cervix Tissue Uterus, Cervix SURGICAL PATHOLOGY EXAM Irma Cary MD 2023  2:39 PM    2 : Right Fallopian tube  Tissue Fallopian Tube, Right SURGICAL PATHOLOGY EXAM Irma aCry MD 2023  2:40 PM    3 : Left Fallopian tube Tissue Fallopian Tube, Left SURGICAL PATHOLOGY EXAM Irma Cary MD 2023  2:40 PM      Findings:     1.  Enlarged myomatous uterus  2.  Normal ovaries  3.  Bilateral tubes missing the mid segment - normal fimbriated ends  4.  Hemostasis of the vaginal cuff  5.  Hemostasis of pedicles  6.  Strong efflux of green dye from bilateral ureteral orifices    Description of operation:  Alina Martell  was met preoperatively where we discussed the procedure and the risks associated with the procedure.  She understood these to be, but not limited to injury to adjacent organs including bladder, bowel, ureter, infection and bleeding.  Patient signed consent and was brought back to the operating room in stable condition.    Patient underwent induction of a general anesthetic, she was carefully prepped and draped for the procedure. Timeout was performed. Bladder was drained with a Allen catheter, uterine manipulator placed into the uterus.     Attention was turned to the abdomen.  An incision was made above the umbilicus.  A Veress needle was introduced with a 2 pop  technique, saline drop test confirmed adequate placement. Opening pressure was 4 mm Hg.   Insufflation was now done until 15 mm of pressure were established.  An 8 nonbladed trocar was placed above the umbilicus. Two robotic assist ports were place approximately 9-10 cm lateral to this initial incision.  A right upper quadrant 8 airseal nonbladed trocar was placed. Trandelenburg assistance was used and the davinci was then brought to the patient s bedside.  The da Juan was then docked.  The PK bipolar forceps were placed at the left da Juan port, the monopolar scissors placed in right side of the body and I took my turn to the da Juan console.     The procedure began with identifying the normal anatomy.  The uterus appeared enlarged with submucosal leiomyoma in the fundus bilaterally.  The uterus was mobile.  The patient had a prior tubal ligation.  The mid segment of each tube was missing.  The fimbriated ends were normal.  The ovaries were normal bilaterally.    The round ligament on the left was cauterized and transected creating an anterior and posterior leave of the broad ligament. The anterior bladder flap was created.  The ureter was then identified.  The fimbriated end of the left tube was grasped.  It was clamped cauterized and cut.  It was removed through the trocar under direct visualization.  The utero-ovarian ligament was then clamped cauterized and cut.  I took serial bites down the uterus.    The round ligament on the right was cauterized and transected creating an anterior and posterior leave of the broad ligament. The bladder on the right side was taken down a bit more.  The ureter was then identified.  The fimbriated end of the right tube was elevated.  It was clamped cauterized and cut.  It was removed through the trocar under direct visualization.  The utero-ovarian ligament was clamped cauterized and cut.  I then took serial bites down the left right side of the uterus.     I then took the  bladder down further. The uterine vasculature on both sides near the uterocervical isthmus were cauterized and transected.  This cauterization and transection was continued along the cervix until the level of the cardinal ligament.  At this junction anterior colpotomy and posterior colpotomy were performed. The uterus, cervix and tubes were then delivered through the vagina.  There was an arterial bleeding vessel in the right angle of the vaginal cuff that was bleeding.  It was clamped cauterized and cut.  There was a small superficial arterial bleeding vessel on the left posterior aspect of the vaginal cuff.  This was clamped cauterized and cut.  The cuff was irrigated and was noted to be hemostatic.    I then closed the vaginal cuff with 0 V-Lock the suture was then run back imbricating the cuff to the suture.  The left angle of the cuff was elevated.  The cuff was closed in a running nonlocking fashion to the midline.  Corner.  The right angle of the cuff was then elevated it was closed in a running nonlocking fashion with 0 V-lock suture to the midline.  This was then imbricated back to the angle.  The cuff was irrigated and was hemostatic. The pedicles were examined and were hemostatic.  I then put Surgiflo over the vaginal cuff.    At this junction, the procedure  was complete.  Sponge, lap and needle counts were correct x 2.  I took my turn to cystoscopy, noting normal ureter and bladder anatomy. Strong green eflux was noted bilaterally from both ureteral orfices.  The trocars were removed and the gas was removed from the abdomen.  The fascia was closed with umbilical and upper right quadrant port with 0 Vicryl.  Skin was closed with 4.0 monocryl. Steri-Strips applied.  She was brought to the recovery in stable condition.    Irma Cary MD     CC: Dr. Bansal

## 2023-05-04 NOTE — ANESTHESIA PROCEDURE NOTES
Airway       Patient location during procedure: OR       Procedure Start/Stop Times: 5/4/2023 1:54 PM and 5/4/2023 1:57 PM  Staff -        CRNA: Frank Greco APRN CRNA       Performed By: CRNAIndications and Patient Condition       Indications for airway management: catrachita-procedural and airway protection       Induction type:intravenous       Mask difficulty assessment: 1 - vent by mask    Final Airway Details       Final airway type: endotracheal airway       Successful airway: ETT - single  Endotracheal Airway Details        ETT size (mm): 7.0       Cuffed: yes       Cuff volume (mL): 6       Successful intubation technique: direct laryngoscopy       DL Blade Type: Carcamo 2       Grade View of Cords: 1       Position: Right       Measured from: lips       Secured at (cm): 22       Bite block used: Oral Airway    Post intubation assessment        Placement verified by: capnometry, equal breath sounds and chest rise        Number of attempts at approach: 1       Number of other approaches attempted: 0       Secured with: cloth tape       Ease of procedure: easy       Dentition: Intact and Unchanged    Medication(s) Administered   Medication Administration Time: 5/4/2023 1:54 PM

## 2023-05-04 NOTE — ANESTHESIA CARE TRANSFER NOTE
Patient: Alina Martell    Procedure: Procedure(s):  ROBOTIC ASSISTED TOTAL LAPAROSCOPIC HYSTERECTOMY WITH BILATERAL SALPINGECTOMY  AND CYSTOSCOPY       Diagnosis: Abnormal uterine bleeding [N93.9]  Diagnosis Additional Information: No value filed.    Anesthesia Type:   General     Note:    Oropharynx: oropharynx clear of all foreign objects  Level of Consciousness: awake  Oxygen Supplementation: face mask  Level of Supplemental Oxygen (L/min / FiO2): 6  Independent Airway: airway patency satisfactory and stable  Dentition: dentition unchanged  Vital Signs Stable: post-procedure vital signs reviewed and stable  Report to RN Given: handoff report given  Patient transferred to: PACU    Handoff Report: Identifed the Patient, Identified the Reponsible Provider, Reviewed the pertinent medical history, Discussed the surgical course, Reviewed Intra-OP anesthesia mangement and issues during anesthesia, Set expectations for post-procedure period and Allowed opportunity for questions and acknowledgement of understanding      Vitals:  Vitals Value Taken Time   /78 05/04/23 1621   Temp 36.2  C (97.2  F) 05/04/23 1619   Pulse 99 05/04/23 1622   Resp 19 05/04/23 1622   SpO2 100 % 05/04/23 1622   Vitals shown include unvalidated device data.    Electronically Signed By: STARLA Romo CRNA  May 4, 2023  4:24 PM

## 2023-05-04 NOTE — BRIEF OP NOTE
[facility]    Brief Operative Note    Pre-operative diagnosis: Abnormal uterine bleeding [N93.9]    Post-operative diagnosis:  Abnormal uterine bleeding    Procedure:   Robotic assisted total laparoscopic hysterectomy  Bilateral salpingectomy  Cystoscopy    Surgeon: Irma Cary    Anesthesia: General     Estimated Blood Loss: 100 cc    Drains:  None    Specimens:   ID Type Source Tests Collected by Time Destination   1 : Uterus, cervix Tissue Uterus, Cervix SURGICAL PATHOLOGY EXAM Irma Cary MD 5/4/2023  2:39 PM    2 : Right Fallopian tube  Tissue Fallopian Tube, Right SURGICAL PATHOLOGY EXAM Irma Cary MD 5/4/2023  2:40 PM    3 : Left Fallopian tube Tissue Fallopian Tube, Left SURGICAL PATHOLOGY EXAM Irma Cary MD 5/4/2023  2:40 PM      Findings:     1.  Enlarged myomatous uterus  2.  Normal ovaries  3.  Bilateral tubes missing the mid segment - normal fimbriated ends  4.  Hemostasis of the vaginal cuff  5.  Hemostasis of pedicles  6.  Strong efflux of green dye from bilateral ureteral orifices    Complications:  None    Implants: * No implants in log *    Irma Cary MD, FACOG  (P) [phone]

## 2023-05-04 NOTE — PROGRESS NOTES
[facility]                                     Progress Note    Patient Name: Alina Martell  Date: 5/03/23         Service Type: Individual      Session Start Time: 810 Session End Time: 855     Session Length:  45 minutes    Session #: 53    Attendees: Client    Service Modality:  In-Person    DATA  Interactive Complexity: No  Crisis: No        Progress Since Last Session (Related to Symptoms / Goals / Homework):   Symptoms: No change anxiety related to surgery tomorrow    Homework: Achieved / completed to satisfaction      Episode of Care Goals: Satisfactory progress - ACTION (Actively working towards change); Intervened by reinforcing change plan / affirming steps taken     Current / Ongoing Stressors and Concerns:  Patient reported experiencing anxiety due to her surgery tomorrow. Patient indicated feeling that it is normal anxiety. Patient reported has everything ready and will go to her mom's for her recovery. Patient indicated she has not noticed depression but believes that is due to her anxiety. Patient reported knowing she needs to take it easy and ask for help which is always challenging. This therapist challenged patient on the risk of not asking for help.      Treatment Objective(s) Addressed in This Session:   identify three distraction and diversion activities and use those activities to decrease level of anxiety    Increase interest, engagement, and pleasure in doing things  Decrease frequency and intensity of feeling down, depressed, hopeless  Improve quantity and quality of night time sleep / decrease daytime naps  Identify negative self-talk and behaviors: challenge core beliefs, myths, and actions  Improve concentration, focus, and mindfulness in daily activities      Intervention:   Motivational Interviewing    MI Intervention: Permission to raise concern or advise     Change Talk Expressed by the Patient: Activation Taking steps    Provider Response to Change Talk:  A - Affirmed patient's thoughts, decisions, or attempts at behavior change, R - Reflected patient's change talk and S - Summarized patient's change talk statements      Assessments completed prior to visit:  The following assessments were completed by patient for this visit:  PHQ9:       6/21/2022     2:50 AM 8/10/2022    10:14 AM 9/22/2022    11:12 AM 11/3/2022    12:05 PM 12/1/2022     9:51 AM 1/9/2023     3:57 PM 3/10/2023     8:12 AM   PHQ-9 SCORE   PHQ-9 Total Score MyChart 6 (Mild depression) 11 (Moderate depression) 8 (Mild depression) 9 (Mild depression) 9 (Mild depression) 6 (Mild depression) 5 (Mild depression)   PHQ-9 Total Score 6 11 8 9 9 6 5     GAD7:       10/5/2021    11:35 AM 11/15/2021     7:32 AM 12/13/2021     9:02 AM 8/10/2022    10:15 AM 9/22/2022    11:13 AM 11/3/2022    12:06 PM 12/1/2022     9:52 AM   ADA-7 SCORE   Total Score 3 (minimal anxiety) 6 (mild anxiety)  6 (mild anxiety) 9 (mild anxiety) 10 (moderate anxiety) 8 (mild anxiety)   Total Score 3 6 4 6 9 10 8     PROMIS 10-Global Health (all questions and answers displayed):       12/15/2021     2:56 PM 3/26/2022     5:15 PM 4/14/2022     6:51 AM 7/19/2022    11:13 AM 11/3/2022    12:07 PM 12/1/2022     9:53 AM 3/10/2023     8:13 AM   PROMIS 10   In general, would you say your health is: Good Good Good Good Good Good Fair   In general, would you say your quality of life is: Good Good Good Fair Good Good Good   In general, how would you rate your physical health? Good Fair Fair Good Good Fair Fair   In general, how would you rate your mental health, including your mood and your ability to think? Fair Fair Good Fair Fair Fair Fair   In general, how would you rate your satisfaction with your social activities and relationships? Good Fair Good Good Fair Good Good   In general, please rate how well you carry out your usual social activities and roles Good Good Fair Good Good Good Good   To what extent are you able to carry out your everyday  physical activities such as walking, climbing stairs, carrying groceries, or moving a chair? Mostly Mostly Mostly Mostly Mostly Mostly Mostly   In the past 7 days, how often have you been bothered by emotional problems such as feeling anxious, depressed, or irritable? Often Sometimes Often Often Often Often Sometimes   In the past 7 days, how would you rate your fatigue on average? Mild Mild Mild Moderate Mild Mild Moderate   In the past 7 days, how would you rate your pain on average, where 0 means no pain, and 10 means worst imaginable pain? 3 3 3 3 2 3 4   In general, would you say your health is: 3 3 3 3 3 3 2   In general, would you say your quality of life is: 3 3 3 2 3 3 3   In general, how would you rate your physical health? 3 2 2 3 3 2 2   In general, how would you rate your mental health, including your mood and your ability to think? 2 2 3 2 2 2 2   In general, how would you rate your satisfaction with your social activities and relationships? 3 2 3 3 2 3 3   In general, please rate how well you carry out your usual social activities and roles. (This includes activities at home, at work and in your community, and responsibilities as a parent, child, spouse, employee, friend, etc.) 3 3 2 3 3 3 3   To what extent are you able to carry out your everyday physical activities such as walking, climbing stairs, carrying groceries, or moving a chair? 4 4 4 4 4 4 4   In the past 7 days, how often have you been bothered by emotional problems such as feeling anxious, depressed, or irritable? 4 3 4 4 4 4 3   In the past 7 days, how would you rate your fatigue on average? 2 2 2 3 2 2 3   In the past 7 days, how would you rate your pain on average, where 0 means no pain, and 10 means worst imaginable pain? 3 3 3 3 2 3 4   Global Mental Health Score 10 10 11 9 9 10 11   Global Physical Health Score 15 14 14 14 15 14 12   PROMIS TOTAL - SUBSCORES 25 24 25 23 24 24 23         ASSESSMENT: Current Emotional / Mental Status  (status of significant symptoms):   Risk status (Self / Other harm or suicidal ideation)   Patient denies current fears or concerns for personal safety.   Patient denies current or recent suicidal ideation or behaviors.   Patient denies current or recent homicidal ideation or behaviors.   Patient denies current or recent self injurious behavior or ideation.   Patient denies other safety concerns.   Patient reports there has been no change in risk factors since their last session.     Patient reports there has been no change in protective factors since their last session.     Recommended that patient call 911 or go to the local ED should there be a change in any of these risk factors.     Appearance:   Appropriate    Eye Contact:   Good    Psychomotor Behavior: Normal    Attitude:   Cooperative    Orientation:   All   Speech    Rate / Production: Normal/ Responsive    Volume:  Normal    Mood:    Anxious    Affect:    Appropriate    Thought Content:  Clear    Thought Form:  Coherent  Goal Directed  Logical    Insight:    Good      Medication Review:   No changes to current psychiatric medication(s)     Medication Compliance:   Yes     Changes in Health Issues:   Yes: See epic     Chemical Use Review:   Substance Use: Chemical use reviewed, no active concerns identified      Tobacco Use: No current tobacco use.      Diagnosis:  1. Moderate episode of recurrent major depressive disorder (H)    2. AAD (generalized anxiety disorder)        Collateral Reports Completed:   Not Applicable    PLAN: (Patient Tasks / Therapist Tasks / Other)  Patient will return in three weeks for scheduled session. Patient will follow set plan for help after surgery. Patient will allow people to bring her meals and drive her place's during post-surgery.    There has been demonstrated improvement in functioning while patient has been engaged in psychotherapy/psychological service- if withdrawn the patient would deteriorate and/or relapse.      Mariana Love, [facility] 5/03/23    ______________________________________________________________________    Individual Treatment Plan    Patient's Name: Alina Martell  YOB: 1965    Date of Creation:10/16/2020  Date Treatment Plan Last Reviewed/Revised: 3/10/2023    DSM5 Diagnoses: 296.32 (F33.1) Major Depressive Disorder, Recurrent Episode, Moderate _ or 300.02 (F41.1) Generalized Anxiety Disorder  Psychosocial / Contextual Factors: Health, family and financial   PROMIS (reviewed every 90 days): 25    Referral / Collaboration:  Was/were discussed and patient will pursue.    Anticipated number of session for this episode of care: 20 will reevaluate every 90 days  Anticipation frequency of session: Biweekly  Anticipated Duration of each session: 38-52 minutes  Treatment plan will be reviewed in 90 days or when goals have been changed.       MeasurableTreatment Goal(s) related to diagnosis / functional impairment(s)  Goal 1: Patient will work on reducing overall anxiety.     I will know I've met my goal when reporting minimal anxiety symptoms.       Objective #A (Patient Action)                          Patient will use distraction each time intrusive worry surfaces.  Status: Continued - Date(s): 3/10/2023     Intervention(s)  Therapist will teach emotional regulation skills. ..     Objective #B  Patient will Decrease frequency and intensity of feeling down, depressed, hopeless.  Status: Continued - Date(s): 3/10/2023     Intervention(s)  Therapist will teach emotional recognition/identification. ..     Objective #C  Patient will Identify negative self-talk and behaviors: challenge core beliefs, myths, and actions.  Status: Continued - Date(s): 3/10/2023     Intervention(s)  Therapist will teach the client how to perform a behavioral chain analysis. ..     Goal 2: Patient will continue to work on reducing depression symptoms    I will know I've met my goal then reporting minimal depression  symptoms     Objective #A (Patient Action)                          Patient will Increase interest, engagement, and pleasure in doing things.  Status: Continued - Date(s):   3/10/2023     Intervention(s)  Therapist will assign homework ..     Objective #B  Patient will Decrease frequency and intensity of feeling down, depressed, hopeless.  Status: Continued - Date(s):   3/10/2023     Intervention(s)  Therapist will assign homework at every session.         Patient has reviewed and agreed to the above plan.        Mariana Love, [facility]  3/10/2023

## 2023-05-04 NOTE — PHARMACY-ADMISSION MEDICATION HISTORY
Pharmacist completed medication history with the patient while in the [facility].  Prior to admission (PTA) med list completed and updated in the electronic medical record (EMR).  Pharmacy Note - Admission Medication History  Pertinent Provider Information: none   ______________________________________________________________________  Prior To Admission (PTA) med list completed and updated in EMR.     Medications Prior to Admission   Medication Sig Dispense Refill Last Dose     adalimumab (HUMIRA *CF* PEN) 40 MG/0.4ML pen kit INJECT 1 PEN UNDER THE SKIN EVERY 14 DAYS. 2 each 5 4/26/2023     apixaban ANTICOAGULANT (ELIQUIS ANTICOAGULANT) 5 MG tablet Take 1 tablet (5 mg) by mouth 2 times daily 180 tablet 3 4/30/2023 at pm     busPIRone (BUSPAR) 5 MG tablet Take 5 mg by mouth 3 times daily as needed   More than a month     cetirizine (ZYRTEC) 10 MG tablet Take 1 tablet (10 mg) by mouth daily 90 tablet 3 5/4/2023     diltiazem ER COATED BEADS (CARDIZEM CD/CARTIA XT) 180 MG 24 hr capsule Take 2 capsules (360 mg) by mouth daily 180 capsule 3 5/4/2023     EPINEPHrine (ANY BX GENERIC EQUIV) 0.3 MG/0.3ML injection 2-pack Inject 0.3 mg into the muscle as needed for anaphylaxis   More than a month     ferrous gluconate (FERGON) 324 (38 Fe) MG tablet Take 1 tablet (324 mg) by mouth daily (with breakfast) 30 tablet 0 5/2/2023     flecainide (TAMBOCOR) 50 MG tablet Take 1 tablet (50 mg) by mouth daily as needed (atrial fibrillation) 20 tablet 4 More than a month     Multiple Vitamins-Minerals (CENTROVITE) TABS Take 1 tablet by mouth daily   5/2/2023     omeprazole (PRILOSEC) 40 MG DR capsule Take 1 capsule (40 mg) by mouth daily 90 capsule 3 5/4/2023     ondansetron (ZOFRAN ODT) 4 MG ODT tab Take 1 tablet (4 mg) by mouth every 6 hours as needed for nausea or vomiting 20 tablet 0 More than a month     topiramate (TOPAMAX) 25 MG tablet 25mg at bedtime for 1 week, then 25mg twice daily for 1 week, then 25mg in the morning and 50mg in the  evening for 1 week and finally 50mg twice daily. 120 tablet 5 5/2/2023     vitamin D3 (CHOLECALCIFEROL) 1.25 MG (66877 UT) capsule Take 1 capsule (50,000 Units) by mouth every 7 days 12 capsule 3 4/30/2023     ASPIRIN NOT PRESCRIBED (INTENTIONAL) Please choose reason not prescribed from choices below.        STATIN NOT PRESCRIBED (INTENTIONAL) Please choose reason not prescribed from choices below.            Information source(s): Patient and CareEverywhere/SureScripts  Patient was asked about OTC/herbal products specifically.  PTA med list reflects this.  Based on the pharmacist s assessment, the PTA med list information appears reliable  Allergies were reviewed, assessed, and updated with the patient.    Medications available for use during hospital stay: none  Thank you for the opportunity to participate in the care of this patient.    The patient was asked about OTC/herbal products specifically and the PTA med list reflects the patient's response.    Allergies were reviewed and assessed with the patient, responses were updated in the EMR.    Thank you for the opportunity to participate in the care of this patient.    Femi Coffey RPH 5/4/2023 12:21 PM

## 2023-05-05 VITALS
HEART RATE: 99 BPM | SYSTOLIC BLOOD PRESSURE: 142 MMHG | HEIGHT: 69 IN | DIASTOLIC BLOOD PRESSURE: 69 MMHG | OXYGEN SATURATION: 96 % | TEMPERATURE: 98.3 F | WEIGHT: 289 LBS | BODY MASS INDEX: 42.8 KG/M2 | RESPIRATION RATE: 18 BRPM

## 2023-05-05 PROBLEM — N93.9 ABNORMAL UTERINE BLEEDING: Status: ACTIVE | Noted: 2023-05-05

## 2023-05-05 PROCEDURE — 250N000011 HC RX IP 250 OP 636: Performed by: OBSTETRICS & GYNECOLOGY

## 2023-05-05 PROCEDURE — 250N000013 HC RX MED GY IP 250 OP 250 PS 637: Performed by: OBSTETRICS & GYNECOLOGY

## 2023-05-05 RX ORDER — OXYCODONE HYDROCHLORIDE 5 MG/1
5 TABLET ORAL EVERY 6 HOURS PRN
Qty: 12 TABLET | Refills: 0 | Status: SHIPPED | OUTPATIENT
Start: 2023-05-05 | End: 2023-05-08

## 2023-05-05 RX ADMIN — DOCUSATE SODIUM 50MG AND SENNOSIDES 8.6MG 1 TABLET: 8.6; 5 TABLET, FILM COATED ORAL at 08:56

## 2023-05-05 RX ADMIN — KETOROLAC TROMETHAMINE 15 MG: 30 INJECTION, SOLUTION INTRAMUSCULAR; INTRAVENOUS at 06:02

## 2023-05-05 RX ADMIN — KETOROLAC TROMETHAMINE 15 MG: 30 INJECTION, SOLUTION INTRAMUSCULAR; INTRAVENOUS at 00:02

## 2023-05-05 RX ADMIN — ACETAMINOPHEN 975 MG: 325 TABLET ORAL at 08:56

## 2023-05-05 ASSESSMENT — ACTIVITIES OF DAILY LIVING (ADL)
ADLS_ACUITY_SCORE: 21

## 2023-05-05 NOTE — PLAN OF CARE
Problem: Hysterectomy Total or Partial  Goal: Absence of Infection Signs and Symptoms  Outcome: Progressing     Problem: Hysterectomy Total or Partial  Goal: Acceptable Pain Control  Outcome: Progressing  Intervention: Prevent or Manage Pain    Problem: Hysterectomy Total or Partial  Goal: Nausea and Vomiting Relief  Outcome: Progressing     Problem: Hysterectomy Total or Partial  Goal: Effective Urinary Elimination  Outcome: Progressing     Problem: Hysterectomy Total or Partial  Goal: Effective Oxygenation and Ventilation  Outcome: Progressing     Pt given scheduled Toradol during the night for lower abd pain.  Abd soft and tender to palpation.  Steri strips and band aids CDI.  Pt afebrile.  Pt did not c/o nausea.  Pt urinating with difficulty.  O2 sats stable on 1L O2 via NC.  Continue to monitor.

## 2023-05-05 NOTE — PROGRESS NOTES
VSS, no S& S of infection  pain well controled , discharge instructions were explained, questions answered.

## 2023-05-05 NOTE — PROGRESS NOTES
Patient arrived to [address] [place] [address]. Alert and oriented X3. Voided 700 ml. Up out of bed for first time since surgery to bathroom with assist of one/stand by assist. Denies dizziness or lightheadedness. Vital signs stable. Bowel sounds audible, normoactive. Eaten and drank since surgery. Pain rated at 4-5/10 in abdomen. Medications given per MAR. Patient reports light intermittent nausea. Continuing to monitor.

## 2023-05-05 NOTE — DISCHARGE SUMMARY
[facility] Discharge Summary    Alina Martell MRN# [id number]   Age: 58 year old YOB: 1965     Date of Admission:  5/4/2023  Date of Discharge::  5/5/2023   Admitting Physician:  5/5/2023  Discharge Physician:  Karo Verma MD     Home clinic: [facility]            Admission Diagnoses:   Abnormal uterine bleeding [N93.9]  Uterine fibroid [D25.9]          Discharge Diagnosis:   S/p hysterectomy           Procedures:   Procedure(s):  Robotic assisted total laparoscopic hysterectomy  Bilateral salpingectomy  Cystoscopy                  Medications Prior to Admission:     Medications Prior to Admission   Medication Sig Dispense Refill Last Dose     adalimumab (HUMIRA *CF* PEN) 40 MG/0.4ML pen kit INJECT 1 PEN UNDER THE SKIN EVERY 14 DAYS. 2 each 5 4/26/2023     apixaban ANTICOAGULANT (ELIQUIS ANTICOAGULANT) 5 MG tablet Take 1 tablet (5 mg) by mouth 2 times daily 180 tablet 3 4/30/2023 at pm     busPIRone (BUSPAR) 5 MG tablet Take 5 mg by mouth 3 times daily as needed   More than a month     cetirizine (ZYRTEC) 10 MG tablet Take 1 tablet (10 mg) by mouth daily 90 tablet 3 5/4/2023     diltiazem ER COATED BEADS (CARDIZEM CD/CARTIA XT) 180 MG 24 hr capsule Take 2 capsules (360 mg) by mouth daily 180 capsule 3 5/4/2023     EPINEPHrine (ANY BX GENERIC EQUIV) 0.3 MG/0.3ML injection 2-pack Inject 0.3 mg into the muscle as needed for anaphylaxis   More than a month     ferrous gluconate (FERGON) 324 (38 Fe) MG tablet Take 1 tablet (324 mg) by mouth daily (with breakfast) 30 tablet 0 5/2/2023     flecainide (TAMBOCOR) 50 MG tablet Take 1 tablet (50 mg) by mouth daily as needed (atrial fibrillation) 20 tablet 4 More than a month     Multiple Vitamins-Minerals (CENTROVITE) TABS Take 1 tablet by mouth daily   5/2/2023     omeprazole (PRILOSEC) 40 MG DR capsule Take 1 capsule (40 mg) by mouth daily 90 capsule 3 5/4/2023     ondansetron (ZOFRAN ODT) 4 MG ODT tab Take 1 tablet (4 mg)  by mouth every 6 hours as needed for nausea or vomiting 20 tablet 0 More than a month     topiramate (TOPAMAX) 25 MG tablet 25mg at bedtime for 1 week, then 25mg twice daily for 1 week, then 25mg in the morning and 50mg in the evening for 1 week and finally 50mg twice daily. 120 tablet 5 5/2/2023     vitamin D3 (CHOLECALCIFEROL) 1.25 MG (21056 UT) capsule Take 1 capsule (50,000 Units) by mouth every 7 days 12 capsule 3 4/30/2023     ASPIRIN NOT PRESCRIBED (INTENTIONAL) Please choose reason not prescribed from choices below.        STATIN NOT PRESCRIBED (INTENTIONAL) Please choose reason not prescribed from choices below.                Discharge Medications:     Current Discharge Medication List      START taking these medications    Details   oxyCODONE (ROXICODONE) 5 MG tablet Take 1 tablet (5 mg) by mouth every 6 hours as needed for pain  Qty: 12 tablet, Refills: 0    Associated Diagnoses: S/P hysterectomy         CONTINUE these medications which have NOT CHANGED    Details   adalimumab (HUMIRA *CF* PEN) 40 MG/0.4ML pen kit INJECT 1 PEN UNDER THE SKIN EVERY 14 DAYS.  Qty: 2 each, Refills: 5    Associated Diagnoses: Seropositive rheumatoid arthritis of multiple sites (H)      apixaban ANTICOAGULANT (ELIQUIS ANTICOAGULANT) 5 MG tablet Take 1 tablet (5 mg) by mouth 2 times daily  Qty: 180 tablet, Refills: 3    Associated Diagnoses: Paroxysmal atrial fibrillation (H)      busPIRone (BUSPAR) 5 MG tablet Take 5 mg by mouth 3 times daily as needed      cetirizine (ZYRTEC) 10 MG tablet Take 1 tablet (10 mg) by mouth daily  Qty: 90 tablet, Refills: 3    Associated Diagnoses: Seasonal allergic rhinitis, unspecified trigger      diltiazem ER COATED BEADS (CARDIZEM CD/CARTIA XT) 180 MG 24 hr capsule Take 2 capsules (360 mg) by mouth daily  Qty: 180 capsule, Refills: 3    Associated Diagnoses: Paroxysmal atrial fibrillation (H); Morbid obesity (H); Obstructive sleep apnea syndrome; Essential hypertension      EPINEPHrine (ANY  BX GENERIC EQUIV) 0.3 MG/0.3ML injection 2-pack Inject 0.3 mg into the muscle as needed for anaphylaxis      ferrous gluconate (FERGON) 324 (38 Fe) MG tablet Take 1 tablet (324 mg) by mouth daily (with breakfast)  Qty: 30 tablet, Refills: 0      flecainide (TAMBOCOR) 50 MG tablet Take 1 tablet (50 mg) by mouth daily as needed (atrial fibrillation)  Qty: 20 tablet, Refills: 4    Associated Diagnoses: Paroxysmal atrial fibrillation (H)      Multiple Vitamins-Minerals (CENTROVITE) TABS Take 1 tablet by mouth daily      omeprazole (PRILOSEC) 40 MG DR capsule Take 1 capsule (40 mg) by mouth daily  Qty: 90 capsule, Refills: 3    Associated Diagnoses: Dysphagia, unspecified type      ondansetron (ZOFRAN ODT) 4 MG ODT tab Take 1 tablet (4 mg) by mouth every 6 hours as needed for nausea or vomiting  Qty: 20 tablet, Refills: 0    Associated Diagnoses: Vertigo      topiramate (TOPAMAX) 25 MG tablet 25mg at bedtime for 1 week, then 25mg twice daily for 1 week, then 25mg in the morning and 50mg in the evening for 1 week and finally 50mg twice daily.  Qty: 120 tablet, Refills: 5    Associated Diagnoses: Dizziness; Vestibular dysfunction, unspecified laterality      vitamin D3 (CHOLECALCIFEROL) 1.25 MG (09019 UT) capsule Take 1 capsule (50,000 Units) by mouth every 7 days  Qty: 12 capsule, Refills: 3    Associated Diagnoses: Vitamin D deficiency      ASPIRIN NOT PRESCRIBED (INTENTIONAL) Please choose reason not prescribed from choices below.      STATIN NOT PRESCRIBED (INTENTIONAL) Please choose reason not prescribed from choices below.                   Consultations:   No consultations were requested during this admission          Brief History of Illness:   Reason for admission requiring a surgical or invasive procedure:   Menorrhagia    The patient underwent the following procedure(s):   Robotic assisted total laparoscopic hysterectomy  Bilateral salpingectomy  Cystoscopy   There were no immediate complications during this  procedure.    Please refer to the full operative summary for details.             Hospital Course:   The patient's hospital course was unremarkable.  She recovered as anticipated and experienced no post-operative complications.           Discharge Instructions and Follow-Up:   Discharge diet: Regular   Discharge activity: No lifting, driving, or strenuous exercise for 6 week(s)  No driving or operating machinery while on narcotic analgesics   Discharge follow-up: Follow up with Dr. Cary  in 1-2  weeks   Wound care Ice to area for comfort  Keep wound clean and dry  Keep Allne catheter in for may remove bandaids at any time   days             Discharge Disposition:   Discharged to home      Attestation:  I have reviewed today's vital signs, notes, medications, labs and imaging.    Karo Verma MD

## 2023-05-05 NOTE — PLAN OF CARE
Problem: Hysterectomy Total or Partial  Goal: Absence of Infection Signs and Symptoms  Outcome: Progressing     Problem: Hysterectomy Total or Partial  Goal: Acceptable Pain Control  Outcome: Progressing     Problem: Hysterectomy Total or Partial  Goal: Effective Oxygenation and Ventilation  Outcome: Progressing    Pt resting comfortably, no events. Reporting mild abdominal pain partially relieved w/ scheduled tylenol. Lap sites are CDI, no drainage. Voiding adequately. CPAP overnight. Denies any nausea at this time with fair appetite.

## 2023-05-08 LAB
PATH REPORT.COMMENTS IMP SPEC: NORMAL
PATH REPORT.FINAL DX SPEC: NORMAL
PATH REPORT.GROSS SPEC: NORMAL
PATH REPORT.MICROSCOPIC SPEC OTHER STN: NORMAL
PATH REPORT.RELEVANT HX SPEC: NORMAL
PHOTO IMAGE: NORMAL

## 2023-05-08 PROCEDURE — 88307 TISSUE EXAM BY PATHOLOGIST: CPT | Mod: 26 | Performed by: PATHOLOGY

## 2023-05-10 ENCOUNTER — TELEPHONE (OUTPATIENT)
Dept: FAMILY MEDICINE | Facility: CLINIC | Age: 58
End: 2023-05-10
Payer: COMMERCIAL

## 2023-05-10 NOTE — TELEPHONE ENCOUNTER
Called patient to review recent elevated blood pressure reading.  Per [place] Community Measures guidelines, patients blood pressure is out of parameters and recheck blood pressure is recommended.    Last Blood Pressure: 145/82  Last Heart Rate: 86  Date: 3/23/2023  Location: [facility]    Patient stated she is not currently taking blood pressure medication but her PCP is monitoring her closely and will determine if she needs to eventually be on blood pressure medication. Will forward message to Dr. Bansal.    Florence Daley, CMA

## 2023-05-23 ENCOUNTER — VIRTUAL VISIT (OUTPATIENT)
Dept: PSYCHOLOGY | Facility: CLINIC | Age: 58
End: 2023-05-23
Payer: COMMERCIAL

## 2023-05-23 DIAGNOSIS — F41.1 GAD (GENERALIZED ANXIETY DISORDER): ICD-10-CM

## 2023-05-23 DIAGNOSIS — F33.1 MODERATE EPISODE OF RECURRENT MAJOR DEPRESSIVE DISORDER (H): Primary | ICD-10-CM

## 2023-05-23 PROCEDURE — 90834 PSYTX W PT 45 MINUTES: CPT | Mod: VID | Performed by: COUNSELOR

## 2023-05-23 ASSESSMENT — PATIENT HEALTH QUESTIONNAIRE - PHQ9
SUM OF ALL RESPONSES TO PHQ QUESTIONS 1-9: 6
SUM OF ALL RESPONSES TO PHQ QUESTIONS 1-9: 6
10. IF YOU CHECKED OFF ANY PROBLEMS, HOW DIFFICULT HAVE THESE PROBLEMS MADE IT FOR YOU TO DO YOUR WORK, TAKE CARE OF THINGS AT HOME, OR GET ALONG WITH OTHER PEOPLE: SOMEWHAT DIFFICULT

## 2023-05-23 NOTE — PROGRESS NOTES
[facility] HEART [facility]ARE [address], [place] [address]   [email or web address]   OFFICE: [phone]       Primary Care: Estelle Bansal    Assessment/Recommendations     Paroxysmal atrial fibrillation: First detected 9/2020.  Symptomatic with palpitations; syncopal episode associated with atrial fibrillation episode as well.  status post pulmonary vein isolation ablation via RF on 6/30/2022, mild dilation of left atrium and no significant fibrosis.  Uneventful recovery from ablation.  20-day MCOT significant for sinus rhythm without sustained atrial arrhythmia or ectopy.    [id number] score of 2 for female gender and hypertension  -Continue Eliquis as ordered for stroke prophylaxis    Essential hypertension:  above goal on current regimen in office.  She does monitor blood pressures at home with systolic blood pressure primarily 120s  -Continue diltiazem  -Continue monitoring salt intake with goal <3000mg daily     Obstructive sleep apnea: Consistent use of CPAP. Last sleep study was 3 years ago. She reports her sleep quality has deteriorated since that time and her mask fits poorly  -Pending sleep medicine referral    Nonobstructive coronary artery disease: Minimal LCx disease with negative stress test 2022.  No anginal symptoms    Exertional dyspnea, wheezing: Symptoms not consistent with cardiac etiology; unremarkable stress test and echocardiogram 2022, no symptomatology suggestive of recurrent arrhythmia following ablation.  Advised to follow-up with PMD for further evaluation and potential PFTs given her past history of atopic dermatitis and allergic rhinitis    Follow up: in 1 year and sooner PRN      History of Present Illness/Subjective    Alina Martell is a 58 year old female, seen today for  EP follow-up 1 year after ablation. She has a past medical history significant for paroxysmal atrial fibrillation status post catheter ablation 6/30/2022,  nonobstructive CAD, hypertension, rheumatoid arthritis, obesity, Sicca syndrome, obstructive sleep apnea, anxiety and depression.      She has a history of atrial fibrillation dating back to 2020 when she presented to the emergency room with palpitations and underwent DCCV.  She did well until 2022 with recurrent symptoms including syncopal episode and again underwent DCCV.  She was started on flecainide pill in pocket therapy.  She underwent ablation with Dr. Joseph 2022 including pulmonary vein isolation, with noted mild dilation of left atrium and no significant fibrosis.  She had an uneventful recovery with no symptomatology suggestive of recurrent arrhythmia.  More recently she has been seen emergently for dizziness and chest pain 2022, 12/15/2022.  Cardiac work-up was unremarkable and symptoms thought to be vestibular related.  At our last visit she reported ongoing palpitations and dizziness for the past several months.  Subsequent MCOT noted symptom correlation with sinus rhythm without atrial arrhythmias or ectopy.  She is seen today for follow-up.    She reports palpitations have frequently decreased in frequency. She underwent hysterectomy secondary to postmenopausal bleeding 2023 with unremarkable recovery. She continues to undergo vestibular PT which has helped significantly with dizziness.  She does note wheezing during the day over the last few weeks.  At times she feels short of breath with prolonged exertion.  She denies cough, chest tightness or shortness of breath at rest.  She has mild pedal edema worse by the end of the day and resolved the following morning. She denies orthopnea, lightheadedness, presyncope or syncope.     Data Review     EK/15/2022  Sinus rhythm at 75 bpm without acute ischemic changes  Personally reviewed.      ECHOCARDIOGRAM: 12/15/2022  Global and regional left ventricular function is normal with an EF of 60-65%.  Global right ventricular function is  normal.  No significant valvular abnormalities present.  Pulmonary artery systolic pressure is normal.  The inferior vena cava is normal.  No pericardial effusion is present.    MCOT, KENDAL:    MCOT x20 days 3/28-4/26/23.  Pre-dominant underlying rhythm sinus in the 80s.  No conduction delays.  Symptoms including heart racing, skipped beat, chest pain and dizziness correlating with sinus rhythm 80-98 bpm without ectopy.  Isolated PVCs, no sustained atrial arrhythmias.    ADDITIONAL STUDIES:     NMST: 12/15/2022     The nuclear stress test is negative for inducible myocardial ischemia or infarction.     Left ventricular function is normal.     There is no prior study for comparison.     Partially visualized neck uptake. Ultrasound is recommended.     CT coronary angiogram: 2/22/2022  1.  Normal left main.  2.  Normal LAD and its branches.  3.  Minimal disease in proximal LCx.  4.  Normal RCA and its branches.  Right dominant system.    I have reviewed and updated the patient's past medical history, allergy list and medication list.          Physical Examination Review of Systems   Vitals: [facility] 04/06/2023 (Within Days)     BMI= There is no height or weight on file to calculate BMI.    Wt Readings from Last 3 Encounters:   05/04/23 131.1 kg (289 lb)   05/02/23 131.3 kg (289 lb 8 oz)   03/23/23 130.2 kg (287 lb)       General   Appearance:   Alert and oriented, in no acute distress.    HEENT:  Normocephalic and atraumatic. Conjunctiva and sclera are clear. Moist oral mucosa.    Neck: No JVP, carotid bruit or obvious thyromegaly.   Lungs:   Respirations unlabored. Clear bilaterally with no rales, rhonchi, or wheezes.     Cardiovascular:   Rhythm is regular. S1 and S2 are normal. No significant murmur is present. Lower extremities demonstrate no significant edema. Posterior tibial pulses are intact bilaterally.   Extremities: No cyanosis or clubbing   Skin: Skin is warm, dry, and otherwise intact.   Neurologic: Gait not  assessed. Mood and affect appropriate.    A 12 point comprehensive review of systems was  negative except as noted.      Medical History  Surgical History Family History Social History   Past Medical History:   Diagnosis Date     Anemia      Antiplatelet or antithrombotic long-term use      Arrhythmia      Cannabis use without complication 12/8/2014     Carpal tunnel syndrome      Coronary artery disease      Gastroesophageal reflux disease      History of angina      Hypertension      Irregular heart beat      Obesity      Paroxysmal atrial fibrillation (H)      PTSD (post-traumatic stress disorder)      RA (rheumatoid arthritis) (H)      Rheumatoid arthritis (H) 7/8/2016     Sicca syndrome (H)      Sleep apnea     Past Surgical History:   Procedure Laterality Date     CHOLECYSTECTOMY       CYSTOSCOPY N/A 5/4/2023    Procedure: AND CYSTOSCOPY;  Surgeon: Irma Cary MD;  Location: [facility] OR     DAVINCI HYSTERECTOMY TOTAL Bilateral 5/4/2023    Procedure: ROBOTIC ASSISTED TOTAL LAPAROSCOPIC HYSTERECTOMY WITH BILATERAL SALPINGECTOMY;  Surgeon: Irma Cary MD;  Location: [facility] OR     DILATION AND CURETTAGE, OPERATIVE HYSTEROSCOPY, COMBINED N/A 3/3/2022    Procedure: HYSTEROSCOPY, WITH DILATION AND CURETTAGE;  Surgeon: Irma Cary MD;  Location: [facility] OR     EP ABLATION FOCAL AFIB N/A 6/30/2022    Procedure: Ablation Atrial Fibrilation;  Surgeon: Jose Guadalupe Joseph MD;  Location:  HEART CARDIAC CATH LAB     HC REMOVAL GALLBLADDER      Description: Cholecystectomy;  Recorded: 10/15/2013;     LAPAROSCOPIC TUBAL LIGATION       RELEASE CARPAL TUNNEL BILATERAL       TUBAL LIGATION  1995    Family History   Problem Relation Age of Onset     Crohn's Disease Mother         Total colectomy with ileostomy.     Atrial fibrillation Mother      Snoring Father      Diabetes Father      Prostate Cancer Father      Chronic Kidney Disease Father         Chose against  dialysis.     Substance Abuse Brother      Depression Brother      Attention Deficit Disorder Brother      Alcoholism Brother      Arrhythmia Brother      Alcoholism Brother      Substance Abuse Brother      Arrhythmia Brother      Alcoholism Maternal Grandfather      No Known Problems Daughter      No Known Problems Son      Lupus Cousin      Breast Cancer No family hx of     Social History     Socioeconomic History     Marital status: Single     Spouse name: Not on file     Number of children: 2     Years of education: 4     Highest education level: Not on file   Occupational History     Not on file   Tobacco Use     Smoking status: Never     Smokeless tobacco: Never     Tobacco comments:     smokes marijuana   Vaping Use     Vaping status: Never Used     Passive vaping exposure: Yes   Substance and Sexual Activity     Alcohol use: Not Currently     Comment: Alcoholic Drinks/day: Never an issue, she states.  7/8/16     Drug use: Not Currently     Comment: Drug use: Former marijuana use, not current. 7/8/16     Sexual activity: Never     Partners: Male     Birth control/protection: Other     Comment: tubal ligation   Other Topics Concern     Parent/sibling w/ CABG, MI or angioplasty before 65F 55M? Not Asked   Social History Narrative     Not on file     Social Determinants of Health     Financial Resource Strain: Not on file   Food Insecurity: Not on file   Transportation Needs: Not on file   Physical Activity: Not on file   Stress: Not on file   Social Connections: Not on file   Intimate Partner Violence: Not on file   Housing Stability: Not on file          Medications  Allergies   Scheduled Meds:  Current Outpatient Medications   Medication Sig Dispense Refill     adalimumab (HUMIRA *CF* PEN) 40 MG/0.4ML pen kit INJECT 1 PEN UNDER THE SKIN EVERY 14 DAYS. 2 each 5     apixaban ANTICOAGULANT (ELIQUIS ANTICOAGULANT) 5 MG tablet Take 1 tablet (5 mg) by mouth 2 times daily 180 tablet 3     busPIRone (BUSPAR) 5 MG  tablet Take 5 mg by mouth 3 times daily as needed       cetirizine (ZYRTEC) 10 MG tablet Take 1 tablet (10 mg) by mouth daily 90 tablet 3     diltiazem ER COATED BEADS (CARDIZEM CD/CARTIA XT) 180 MG 24 hr capsule Take 2 capsules (360 mg) by mouth daily 180 capsule 3     ferrous gluconate (FERGON) 324 (38 Fe) MG tablet Take 1 tablet (324 mg) by mouth daily (with breakfast) 30 tablet 0     Multiple Vitamins-Minerals (CENTROVITE) TABS Take 1 tablet by mouth daily       omeprazole (PRILOSEC) 40 MG DR capsule Take 1 capsule (40 mg) by mouth daily 90 capsule 3     vitamin D3 (CHOLECALCIFEROL) 1.25 MG (54988 UT) capsule Take 1 capsule (50,000 Units) by mouth every 7 days 12 capsule 3     EPINEPHrine (ANY BX GENERIC EQUIV) 0.3 MG/0.3ML injection 2-pack Inject 0.3 mg into the muscle as needed for anaphylaxis (Patient not taking: Reported on 5/24/2023)       flecainide (TAMBOCOR) 50 MG tablet Take 1 tablet (50 mg) by mouth daily as needed (atrial fibrillation) (Patient not taking: Reported on 5/24/2023) 20 tablet 4     topiramate (TOPAMAX) 25 MG tablet 25mg at bedtime for 1 week, then 25mg twice daily for 1 week, then 25mg in the morning and 50mg in the evening for 1 week and finally 50mg twice daily. (Patient not taking: Reported on 5/24/2023) 120 tablet 5    Allergies   Allergen Reactions     Penicillins Shortness Of Breath, Palpitations, Dizziness, Anaphylaxis, Hives, Itching and Rash     Test in 2031, see allergy note on 7/29/21     Shellfish-Derived Products Anaphylaxis     Sulfa Antibiotics Hives and Anaphylaxis         Lab Results    Chemistry/lipid CBC Cardiac Enzymes/BNP/TSH/INR   Lab Results   Component Value Date    CHOL 166 03/22/2023    HDL 44 (L) 03/22/2023    TRIG 89 03/22/2023    BUN 11.8 03/11/2023     03/11/2023    CO2 27 03/11/2023    Lab Results   Component Value Date    WBC 10.4 05/04/2023    HGB 10.5 (L) 05/04/2023    HCT 34.3 (L) 05/04/2023    MCV 81 05/04/2023     05/04/2023     @RESUFAST(BMP,CBC,BNP,TSH,  INR)@      37 minutes spent reviewing prior records (including documentation, laboratory studies, cardiac testing/imaging), history and physical exam, planning, and subsequent documentation.     This note has been dictated using voice recognition software. Any grammatical, typographical, or context distortions are unintentional and inherent to the software.    Shira Sesay, CNP  Clinical Cardiac Electrophysiology  [address]  [place] [address]   Office: [phone]  Fax: [phone]

## 2023-05-24 ENCOUNTER — OFFICE VISIT (OUTPATIENT)
Dept: CARDIOLOGY | Facility: CLINIC | Age: 58
End: 2023-05-24
Attending: NURSE PRACTITIONER
Payer: COMMERCIAL

## 2023-05-24 VITALS
OXYGEN SATURATION: 97 % | HEART RATE: 90 BPM | RESPIRATION RATE: 16 BRPM | WEIGHT: 284 LBS | BODY MASS INDEX: 41.94 KG/M2 | SYSTOLIC BLOOD PRESSURE: 138 MMHG

## 2023-05-24 DIAGNOSIS — I48.0 PAROXYSMAL ATRIAL FIBRILLATION (H): Primary | ICD-10-CM

## 2023-05-24 DIAGNOSIS — I25.10 CORONARY ARTERY DISEASE INVOLVING NATIVE CORONARY ARTERY OF NATIVE HEART WITHOUT ANGINA PECTORIS: ICD-10-CM

## 2023-05-24 DIAGNOSIS — I10 ESSENTIAL HYPERTENSION: ICD-10-CM

## 2023-05-24 DIAGNOSIS — R06.2 WHEEZING: ICD-10-CM

## 2023-05-24 DIAGNOSIS — G47.33 OBSTRUCTIVE SLEEP APNEA SYNDROME: ICD-10-CM

## 2023-05-24 DIAGNOSIS — Z98.890 STATUS POST CATHETER ABLATION OF ATRIAL FIBRILLATION: ICD-10-CM

## 2023-05-24 PROCEDURE — 99214 OFFICE O/P EST MOD 30 MIN: CPT | Performed by: NURSE PRACTITIONER

## 2023-05-24 NOTE — LETTER
5/24/2023    Estelle Bansal MD  [address]  [facility] [address]    RE: Alina Martell       Dear Colleague,     I had the pleasure of seeing Alina Martell in the [facility].    [facility]   [address], [place] [address]   [email or web address]   OFFICE: [phone]       Primary Care: Estelle Bansal    Assessment/Recommendations     Paroxysmal atrial fibrillation: First detected 9/2020.  Symptomatic with palpitations; syncopal episode associated with atrial fibrillation episode as well.  status post pulmonary vein isolation ablation via RF on 6/30/2022, mild dilation of left atrium and no significant fibrosis.  Uneventful recovery from ablation.  20-day MCOT significant for sinus rhythm without sustained atrial arrhythmia or ectopy.    [id number] score of 2 for female gender and hypertension  -Continue Eliquis as ordered for stroke prophylaxis    Essential hypertension:  above goal on current regimen in office.  She does monitor blood pressures at home with systolic blood pressure primarily 120s  -Continue diltiazem  -Continue monitoring salt intake with goal <3000mg daily     Obstructive sleep apnea: Consistent use of CPAP. Last sleep study was 3 years ago. She reports her sleep quality has deteriorated since that time and her mask fits poorly  -Pending sleep medicine referral    Nonobstructive coronary artery disease: Minimal LCx disease with negative stress test 2022.  No anginal symptoms    Exertional dyspnea, wheezing: Symptoms not consistent with cardiac etiology; unremarkable stress test and echocardiogram 2022, no symptomatology suggestive of recurrent arrhythmia following ablation.  Advised to follow-up with PMD for further evaluation and potential PFTs given her past history of atopic dermatitis and allergic rhinitis    Follow up: in 1 year and sooner PRN      History of Present Illness/Subjective    Alina  HEENA Martell is a 58 year old female, seen today for  EP follow-up 1 year after ablation. She has a past medical history significant for paroxysmal atrial fibrillation status post catheter ablation 2022, nonobstructive CAD, hypertension, rheumatoid arthritis, obesity, Sicca syndrome, obstructive sleep apnea, anxiety and depression.      She has a history of atrial fibrillation dating back to 2020 when she presented to the emergency room with palpitations and underwent DCCV.  She did well until 2022 with recurrent symptoms including syncopal episode and again underwent DCCV.  She was started on flecainide pill in pocket therapy.  She underwent ablation with Dr. Joseph 2022 including pulmonary vein isolation, with noted mild dilation of left atrium and no significant fibrosis.  She had an uneventful recovery with no symptomatology suggestive of recurrent arrhythmia.  More recently she has been seen emergently for dizziness and chest pain 2022, 12/15/2022.  Cardiac work-up was unremarkable and symptoms thought to be vestibular related.  At our last visit she reported ongoing palpitations and dizziness for the past several months.  Subsequent MCOT noted symptom correlation with sinus rhythm without atrial arrhythmias or ectopy.  She is seen today for follow-up.    She reports palpitations have frequently decreased in frequency. She underwent hysterectomy secondary to postmenopausal bleeding 2023 with unremarkable recovery. She continues to undergo vestibular PT which has helped significantly with dizziness.  She does note wheezing during the day over the last few weeks.  At times she feels short of breath with prolonged exertion.  She denies cough, chest tightness or shortness of breath at rest.  She has mild pedal edema worse by the end of the day and resolved the following morning. She denies orthopnea, lightheadedness, presyncope or syncope.     Data Review     EK/15/2022  Sinus rhythm at  75 bpm without acute ischemic changes  Personally reviewed.      ECHOCARDIOGRAM: 12/15/2022  Global and regional left ventricular function is normal with an EF of 60-65%.  Global right ventricular function is normal.  No significant valvular abnormalities present.  Pulmonary artery systolic pressure is normal.  The inferior vena cava is normal.  No pericardial effusion is present.    MCOT, KENDAL:    MCOT x20 days 3/28-4/26/23.  Pre-dominant underlying rhythm sinus in the 80s.  No conduction delays.  Symptoms including heart racing, skipped beat, chest pain and dizziness correlating with sinus rhythm 80-98 bpm without ectopy.  Isolated PVCs, no sustained atrial arrhythmias.    ADDITIONAL STUDIES:     NMST: 12/15/2022    The nuclear stress test is negative for inducible myocardial ischemia or infarction.    Left ventricular function is normal.    There is no prior study for comparison.    Partially visualized neck uptake. Ultrasound is recommended.     CT coronary angiogram: 2/22/2022  1.  Normal left main.  2.  Normal LAD and its branches.  3.  Minimal disease in proximal LCx.  4.  Normal RCA and its branches.  Right dominant system.    I have reviewed and updated the patient's past medical history, allergy list and medication list.          Physical Examination Review of Systems   Vitals: LMP 04/06/2023 (Within Days)     BMI= There is no height or weight on file to calculate BMI.    Wt Readings from Last 3 Encounters:   05/04/23 131.1 kg (289 lb)   05/02/23 131.3 kg (289 lb 8 oz)   03/23/23 130.2 kg (287 lb)       General   Appearance:   Alert and oriented, in no acute distress.    HEENT:  Normocephalic and atraumatic. Conjunctiva and sclera are clear. Moist oral mucosa.    Neck: No JVP, carotid bruit or obvious thyromegaly.   Lungs:   Respirations unlabored. Clear bilaterally with no rales, rhonchi, or wheezes.     Cardiovascular:   Rhythm is regular. S1 and S2 are normal. No significant murmur is present. Lower  extremities demonstrate no significant edema. Posterior tibial pulses are intact bilaterally.   Extremities: No cyanosis or clubbing   Skin: Skin is warm, dry, and otherwise intact.   Neurologic: Gait not assessed. Mood and affect appropriate.    A 12 point comprehensive review of systems was  negative except as noted.      Medical History  Surgical History Family History Social History   Past Medical History:   Diagnosis Date    Anemia     Antiplatelet or antithrombotic long-term use     Arrhythmia     Cannabis use without complication 12/8/2014    Carpal tunnel syndrome     Coronary artery disease     Gastroesophageal reflux disease     History of angina     Hypertension     Irregular heart beat     Obesity     Paroxysmal atrial fibrillation (H)     PTSD (post-traumatic stress disorder)     RA (rheumatoid arthritis) (H)     Rheumatoid arthritis (H) 7/8/2016    Sicca syndrome (H)     Sleep apnea     Past Surgical History:   Procedure Laterality Date    CHOLECYSTECTOMY      CYSTOSCOPY N/A 5/4/2023    Procedure: AND CYSTOSCOPY;  Surgeon: Irma Cary MD;  Location: [facility] Main OR    DAVINCI HYSTERECTOMY TOTAL Bilateral 5/4/2023    Procedure: ROBOTIC ASSISTED TOTAL LAPAROSCOPIC HYSTERECTOMY WITH BILATERAL SALPINGECTOMY;  Surgeon: Irma Cary MD;  Location: [facility] OR    DILATION AND CURETTAGE, OPERATIVE HYSTEROSCOPY, COMBINED N/A 3/3/2022    Procedure: HYSTEROSCOPY, WITH DILATION AND CURETTAGE;  Surgeon: Irma Cary MD;  Location: [facility] OR    EP ABLATION FOCAL AFIB N/A 6/30/2022    Procedure: Ablation Atrial Fibrilation;  Surgeon: Jose Guadalupe Joseph MD;  Location:  HEART CARDIAC CATH LAB    HC REMOVAL GALLBLADDER      Description: Cholecystectomy;  Recorded: 10/15/2013;    LAPAROSCOPIC TUBAL LIGATION      RELEASE CARPAL TUNNEL BILATERAL      TUBAL LIGATION  1995    Family History   Problem Relation Age of Onset    Crohn's Disease Mother         Total colectomy  with ileostomy.    Atrial fibrillation Mother     Snoring Father     Diabetes Father     Prostate Cancer Father     Chronic Kidney Disease Father         Chose against dialysis.    Substance Abuse Brother     Depression Brother     Attention Deficit Disorder Brother     Alcoholism Brother     Arrhythmia Brother     Alcoholism Brother     Substance Abuse Brother     Arrhythmia Brother     Alcoholism Maternal Grandfather     No Known Problems Daughter     No Known Problems Son     Lupus Cousin     Breast Cancer No family hx of     Social History     Socioeconomic History    Marital status: Single     Spouse name: Not on file    Number of children: 2    Years of education: 4    Highest education level: Not on file   Occupational History    Not on file   Tobacco Use    Smoking status: Never    Smokeless tobacco: Never    Tobacco comments:     smokes marijuana   Vaping Use    Vaping status: Never Used     Passive vaping exposure: Yes   Substance and Sexual Activity    Alcohol use: Not Currently     Comment: Alcoholic Drinks/day: Never an issue, she states.  7/8/16    Drug use: Not Currently     Comment: Drug use: Former marijuana use, not current. 7/8/16    Sexual activity: Never     Partners: Male     Birth control/protection: Other     Comment: tubal ligation   Other Topics Concern    Parent/sibling w/ CABG, MI or angioplasty before 65F 55M? Not Asked   Social History Narrative    Not on file     Social Determinants of Health     Financial Resource Strain: Not on file   Food Insecurity: Not on file   Transportation Needs: Not on file   Physical Activity: Not on file   Stress: Not on file   Social Connections: Not on file   Intimate Partner Violence: Not on file   Housing Stability: Not on file          Medications  Allergies   Scheduled Meds:  Current Outpatient Medications   Medication Sig Dispense Refill    adalimumab (HUMIRA *CF* PEN) 40 MG/0.4ML pen kit INJECT 1 PEN UNDER THE SKIN EVERY 14 DAYS. 2 each 5     apixaban ANTICOAGULANT (ELIQUIS ANTICOAGULANT) 5 MG tablet Take 1 tablet (5 mg) by mouth 2 times daily 180 tablet 3    busPIRone (BUSPAR) 5 MG tablet Take 5 mg by mouth 3 times daily as needed      cetirizine (ZYRTEC) 10 MG tablet Take 1 tablet (10 mg) by mouth daily 90 tablet 3    diltiazem ER COATED BEADS (CARDIZEM CD/CARTIA XT) 180 MG 24 hr capsule Take 2 capsules (360 mg) by mouth daily 180 capsule 3    ferrous gluconate (FERGON) 324 (38 Fe) MG tablet Take 1 tablet (324 mg) by mouth daily (with breakfast) 30 tablet 0    Multiple Vitamins-Minerals (CENTROVITE) TABS Take 1 tablet by mouth daily      omeprazole (PRILOSEC) 40 MG DR capsule Take 1 capsule (40 mg) by mouth daily 90 capsule 3    vitamin D3 (CHOLECALCIFEROL) 1.25 MG (78802 UT) capsule Take 1 capsule (50,000 Units) by mouth every 7 days 12 capsule 3    EPINEPHrine (ANY BX GENERIC EQUIV) 0.3 MG/0.3ML injection 2-pack Inject 0.3 mg into the muscle as needed for anaphylaxis (Patient not taking: Reported on 5/24/2023)      flecainide (TAMBOCOR) 50 MG tablet Take 1 tablet (50 mg) by mouth daily as needed (atrial fibrillation) (Patient not taking: Reported on 5/24/2023) 20 tablet 4    topiramate (TOPAMAX) 25 MG tablet 25mg at bedtime for 1 week, then 25mg twice daily for 1 week, then 25mg in the morning and 50mg in the evening for 1 week and finally 50mg twice daily. (Patient not taking: Reported on 5/24/2023) 120 tablet 5    Allergies   Allergen Reactions    Penicillins Shortness Of Breath, Palpitations, Dizziness, Anaphylaxis, Hives, Itching and Rash     Test in 2031, see allergy note on 7/29/21    Shellfish-Derived Products Anaphylaxis    Sulfa Antibiotics Hives and Anaphylaxis         Lab Results    Chemistry/lipid CBC Cardiac Enzymes/BNP/TSH/INR   Lab Results   Component Value Date    CHOL 166 03/22/2023    HDL 44 (L) 03/22/2023    TRIG 89 03/22/2023    BUN 11.8 03/11/2023     03/11/2023    CO2 27 03/11/2023    Lab Results   Component Value Date     WBC 10.4 05/04/2023    HGB 10.5 (L) 05/04/2023    HCT 34.3 (L) 05/04/2023    MCV 81 05/04/2023     05/04/2023    @RESUFAST(BMP,CBC,BNP,TSH,  INR)@      37 minutes spent reviewing prior records (including documentation, laboratory studies, cardiac testing/imaging), history and physical exam, planning, and subsequent documentation.     This note has been dictated using voice recognition software. Any grammatical, typographical, or context distortions are unintentional and inherent to the software.    Shira Sesay CNP  Clinical Cardiac Electrophysiology  [facility]  [address]  [place] [address]   Office: [phone]  Fax: [phone]       Thank you for allowing me to participate in the care of your patient.      Sincerely,     STARLA PADGETT CNP     [facility]  cc:   STARLA Padgett CNP  [facility] [address]  [address] [place] [address]

## 2023-05-24 NOTE — PATIENT INSTRUCTIONS
Alina Martell,    It was a pleasure to see you today at the [facility].     My recommendations after this visit include:  -Continue current medications.  -Follow up with me in 1 year, sooner if things change.     Please do not hesitate to call with additional questions or concerns.     hSira Sesay, CNP  Clinical Cardiac Electrophysiology  [facility] and Scheduling [phone]  Electrophysiology Nurses [phone]

## 2023-05-25 NOTE — PROGRESS NOTES
[facility]                                     Progress Note    Patient Name: Alina Martell  Date: 5/23/23         Service Type: Individual      Session Start Time: 1205 Session End Time: 1255     Session Length:  50 minutes    Session #: 54    Attendees: Client    Service Modality:  Video Visit:     Telemedicine Visit: The patient's condition can be safely assessed and treated via synchronous audio and visual telemedicine encounter.       Reason for Telemedicine Visit: Services only offered telehealth     Originating Site (Patient Location): Patient's place of employment     Distant Location (provider location):  Off-site     Consent:  The patient/guardian has verbally consented to: the potential risks and benefits of telemedicine (video visit) versus in person care; bill my insurance or make self-payment for services provided; and responsibility for payment of non-covered services.      Mode of Communication:  Video Conference via FAD ? IO     As the provider I attest to compliance with applicable laws and regulations related to telemedicine.    DATA  Interactive Complexity: No  Crisis: No        Progress Since Last Session (Related to Symptoms / Goals / Homework):   Symptoms: Improving reducing symptoms of depression and anxiety    Homework: Achieved / completed to satisfaction      Episode of Care Goals: Satisfactory progress - ACTION (Actively working towards change); Intervened by reinforcing change plan / affirming steps taken     Current / Ongoing Stressors and Concerns:  Patient indicated she has done well with handling her anxiety throughout post-op surgery. Patient reported she had to spend the night in the hospital which was unplanned. Patient indicated recovery at her mom's went overall well. Patient reported she is now focusing on getting back into a routine. Patient indicated being extra cautious of any change in her mood. This therapist processed with patient  continuing to use skills taught throughout therapy to help with betty symptoms.      Treatment Objective(s) Addressed in This Session:   use distraction each time intrusive worry surfaces  Increase interest, engagement, and pleasure in doing things  Decrease frequency and intensity of feeling down, depressed, hopeless  Improve quantity and quality of night time sleep / decrease daytime naps  Identify negative self-talk and behaviors: challenge core beliefs, myths, and actions     Intervention:   Motivational Interviewing    MI Intervention: Open-ended questions     Change Talk Expressed by the Patient: Taking steps    Provider Response to Change Talk: S - Summarized patient's change talk statements      Assessments completed prior to visit:  The following assessments were completed by patient for this visit:  PHQ9:       8/10/2022    10:14 AM 9/22/2022    11:12 AM 11/3/2022    12:05 PM 12/1/2022     9:51 AM 1/9/2023     3:57 PM 3/10/2023     8:12 AM 5/23/2023     5:42 AM   PHQ-9 SCORE   PHQ-9 Total Score MyChart 11 (Moderate depression) 8 (Mild depression) 9 (Mild depression) 9 (Mild depression) 6 (Mild depression) 5 (Mild depression) 6 (Mild depression)   PHQ-9 Total Score 11 8 9 9 6 5 6     GAD7:       10/5/2021    11:35 AM 11/15/2021     7:32 AM 12/13/2021     9:02 AM 8/10/2022    10:15 AM 9/22/2022    11:13 AM 11/3/2022    12:06 PM 12/1/2022     9:52 AM   ADA-7 SCORE   Total Score 3 (minimal anxiety) 6 (mild anxiety)  6 (mild anxiety) 9 (mild anxiety) 10 (moderate anxiety) 8 (mild anxiety)   Total Score 3 6 4 6 9 10 8     PROMIS 10-Global Health (all questions and answers displayed):       12/15/2021     2:56 PM 3/26/2022     5:15 PM 4/14/2022     6:51 AM 7/19/2022    11:13 AM 11/3/2022    12:07 PM 12/1/2022     9:53 AM 3/10/2023     8:13 AM   PROMIS 10   In general, would you say your health is: Good Good Good Good Good Good Fair   In general, would you say your quality of life is: Good Good Good Fair Good Good  Good   In general, how would you rate your physical health? Good Fair Fair Good Good Fair Fair   In general, how would you rate your mental health, including your mood and your ability to think? Fair Fair Good Fair Fair Fair Fair   In general, how would you rate your satisfaction with your social activities and relationships? Good Fair Good Good Fair Good Good   In general, please rate how well you carry out your usual social activities and roles Good Good Fair Good Good Good Good   To what extent are you able to carry out your everyday physical activities such as walking, climbing stairs, carrying groceries, or moving a chair? Mostly Mostly Mostly Mostly Mostly Mostly Mostly   In the past 7 days, how often have you been bothered by emotional problems such as feeling anxious, depressed, or irritable? Often Sometimes Often Often Often Often Sometimes   In the past 7 days, how would you rate your fatigue on average? Mild Mild Mild Moderate Mild Mild Moderate   In the past 7 days, how would you rate your pain on average, where 0 means no pain, and 10 means worst imaginable pain? 3 3 3 3 2 3 4   In general, would you say your health is: 3 3 3 3 3 3 2   In general, would you say your quality of life is: 3 3 3 2 3 3 3   In general, how would you rate your physical health? 3 2 2 3 3 2 2   In general, how would you rate your mental health, including your mood and your ability to think? 2 2 3 2 2 2 2   In general, how would you rate your satisfaction with your social activities and relationships? 3 2 3 3 2 3 3   In general, please rate how well you carry out your usual social activities and roles. (This includes activities at home, at work and in your community, and responsibilities as a parent, child, spouse, employee, friend, etc.) 3 3 2 3 3 3 3   To what extent are you able to carry out your everyday physical activities such as walking, climbing stairs, carrying groceries, or moving a chair? 4 4 4 4 4 4 4   In the past 7  days, how often have you been bothered by emotional problems such as feeling anxious, depressed, or irritable? 4 3 4 4 4 4 3   In the past 7 days, how would you rate your fatigue on average? 2 2 2 3 2 2 3   In the past 7 days, how would you rate your pain on average, where 0 means no pain, and 10 means worst imaginable pain? 3 3 3 3 2 3 4   Global Mental Health Score 10 10 11 9 9 10 11   Global Physical Health Score 15 14 14 14 15 14 12   PROMIS TOTAL - SUBSCORES 25 24 25 23 24 24 23         ASSESSMENT: Current Emotional / Mental Status (status of significant symptoms):   Risk status (Self / Other harm or suicidal ideation)   Patient denies current fears or concerns for personal safety.   Patient denies current or recent suicidal ideation or behaviors.   Patient denies current or recent homicidal ideation or behaviors.   Patient denies current or recent self injurious behavior or ideation.   Patient denies other safety concerns.   Patient reports there has been no change in risk factors since their last session.     Patient reports there has been no change in protective factors since their last session.     Recommended that patient call 911 or go to the local ED should there be a change in any of these risk factors.     Appearance:   Appropriate    Eye Contact:   Good    Psychomotor Behavior: Normal    Attitude:   Cooperative    Orientation:   All   Speech    Rate / Production: Normal/ Responsive    Volume:  Normal    Mood:    Anxious  Depressed    Affect:    Appropriate    Thought Content:  Clear    Thought Form:  Coherent  Goal Directed  Logical    Insight:    Fair      Medication Review:   No changes to current psychiatric medication(s)     Medication Compliance:   Yes     Changes in Health Issues:   Yes: See epic due to recent procedure     Chemical Use Review:   Substance Use: Chemical use reviewed, no active concerns identified      Tobacco Use: No current tobacco use.      Diagnosis:  1. Moderate episode of  recurrent major depressive disorder (H)    2. ADA (generalized anxiety disorder)        Collateral Reports Completed:   Not Applicable    PLAN: (Patient Tasks / Therapist Tasks / Other)  Patient will return in three weeks for scheduled session. Patient will continue to work on using CBT and MI Skills that she has been taught throughout therapy. Patient will work on a sleep routine.     There has been demonstrated improvement in functioning while patient has been engaged in psychotherapy/psychological service- if withdrawn the patient would deteriorate and/or relapse.     Mariana Love, [facility] 5/23/23    ______________________________________________________________________    Individual Treatment Plan    Patient's Name: Alina Martell  YOB: 1965    Date of Creation:10/16/2020  Date Treatment Plan Last Reviewed/Revised: 3/10/2023    DSM5 Diagnoses: 296.32 (F33.1) Major Depressive Disorder, Recurrent Episode, Moderate _ or 300.02 (F41.1) Generalized Anxiety Disorder  Psychosocial / Contextual Factors: Health, family and financial   PROMIS (reviewed every 90 days): 25    Referral / Collaboration:  Was/were discussed and patient will pursue.    Anticipated number of session for this episode of care: 20 will reevaluate every 90 days  Anticipation frequency of session: Biweekly  Anticipated Duration of each session: 38-52 minutes  Treatment plan will be reviewed in 90 days or when goals have been changed.       MeasurableTreatment Goal(s) related to diagnosis / functional impairment(s)  Goal 1: Patient will work on reducing overall anxiety.     I will know I've met my goal when reporting minimal anxiety symptoms.       Objective #A (Patient Action)                          Patient will use distraction each time intrusive worry surfaces.  Status: Continued - Date(s): 3/10/2023     Intervention(s)  Therapist will teach emotional regulation skills. ..     Objective #B  Patient will Decrease frequency  and intensity of feeling down, depressed, hopeless.  Status: Continued - Date(s): 3/10/2023     Intervention(s)  Therapist will teach emotional recognition/identification. ..     Objective #C  Patient will Identify negative self-talk and behaviors: challenge core beliefs, myths, and actions.  Status: Continued - Date(s): 3/10/2023     Intervention(s)  Therapist will teach the client how to perform a behavioral chain analysis. ..     Goal 2: Patient will continue to work on reducing depression symptoms    I will know I've met my goal then reporting minimal depression symptoms     Objective #A (Patient Action)                          Patient will Increase interest, engagement, and pleasure in doing things.  Status: Continued - Date(s):   3/10/2023     Intervention(s)  Therapist will assign homework ..     Objective #B  Patient will Decrease frequency and intensity of feeling down, depressed, hopeless.  Status: Continued - Date(s):   3/10/2023     Intervention(s)  Therapist will assign homework at every session.         Patient has reviewed and agreed to the above plan.        Mariana Love, [facility]  3/10/2023

## 2023-06-13 ENCOUNTER — MYC MEDICAL ADVICE (OUTPATIENT)
Dept: FAMILY MEDICINE | Facility: CLINIC | Age: 58
End: 2023-06-13
Payer: COMMERCIAL

## 2023-06-13 ENCOUNTER — OFFICE VISIT (OUTPATIENT)
Dept: PSYCHOLOGY | Facility: CLINIC | Age: 58
End: 2023-06-13
Payer: COMMERCIAL

## 2023-06-13 DIAGNOSIS — F41.1 GAD (GENERALIZED ANXIETY DISORDER): ICD-10-CM

## 2023-06-13 DIAGNOSIS — F33.1 MODERATE EPISODE OF RECURRENT MAJOR DEPRESSIVE DISORDER (H): Primary | ICD-10-CM

## 2023-06-13 PROCEDURE — 90834 PSYTX W PT 45 MINUTES: CPT | Performed by: COUNSELOR

## 2023-06-14 NOTE — PROGRESS NOTES
[facility]                                     Progress Note    Patient Name: Alina Martell  Date: 6/13/23         Service Type: Individual      Session Start Time: 801 Session End Time: 852     Session Length:  51 minutes    Session #: 55    Attendees: Client    Service Modality:  In-Person    DATA  Interactive Complexity: No  Crisis: No        Progress Since Last Session (Related to Symptoms / Goals / Homework):   Symptoms: No change due to family stress causing her anxiety    Homework: Achieved / completed to satisfaction      Episode of Care Goals: Satisfactory progress - ACTION (Actively working towards change); Intervened by reinforcing change plan / affirming steps taken     Current / Ongoing Stressors and Concerns:  Patient reported feeling some stress and anxiety. Patient indicated she is struggling a lot with her mom's . Patient reported his memory is bad and he is becoming more verbally aggressive. Patient indicated she is going over there more often to be a support for her mom. Patient reported believing he needs to be put in a home but her mom is not ready to make that move. Patient indicated other area's of her life are going well. Patient reported she is still recovering from surgery and is tired a lot of the time. This therapist educated patient on different services available for her mom's .      Treatment Objective(s) Addressed in This Session:   use cognitive strategies identified in therapy to challenge anxious thoughts  Increase interest, engagement, and pleasure in doing things  Decrease frequency and intensity of feeling down, depressed, hopeless  Improve quantity and quality of night time sleep / decrease daytime naps  Feel less tired and more energy during the day   Identify negative self-talk and behaviors: challenge core beliefs, myths, and actions     Intervention:   Motivational Interviewing    MI Intervention: Permission to raise concern or  advise     Change Talk Expressed by the Patient: Activation Taking steps    Provider Response to Change Talk: R - Reflected patient's change talk and S - Summarized patient's change talk statements      Assessments completed prior to visit:  The following assessments were completed by patient for this visit:  PHQ9:       8/10/2022    10:14 AM 9/22/2022    11:12 AM 11/3/2022    12:05 PM 12/1/2022     9:51 AM 1/9/2023     3:57 PM 3/10/2023     8:12 AM 5/23/2023     5:42 AM   PHQ-9 SCORE   PHQ-9 Total Score MyChart 11 (Moderate depression) 8 (Mild depression) 9 (Mild depression) 9 (Mild depression) 6 (Mild depression) 5 (Mild depression) 6 (Mild depression)   PHQ-9 Total Score 11 8 9 9 6 5 6     GAD7:       10/5/2021    11:35 AM 11/15/2021     7:32 AM 12/13/2021     9:02 AM 8/10/2022    10:15 AM 9/22/2022    11:13 AM 11/3/2022    12:06 PM 12/1/2022     9:52 AM   ADA-7 SCORE   Total Score 3 (minimal anxiety) 6 (mild anxiety)  6 (mild anxiety) 9 (mild anxiety) 10 (moderate anxiety) 8 (mild anxiety)   Total Score 3 6 4 6 9 10 8     PROMIS 10-Global Health (all questions and answers displayed):       3/26/2022     5:15 PM 4/14/2022     6:51 AM 7/19/2022    11:13 AM 11/3/2022    12:07 PM 12/1/2022     9:53 AM 3/10/2023     8:13 AM 6/13/2023     6:37 AM   PROMIS 10   In general, would you say your health is: Good Good Good Good Good Fair Good   In general, would you say your quality of life is: Good Good Fair Good Good Good Good   In general, how would you rate your physical health? Fair Fair Good Good Fair Fair Fair   In general, how would you rate your mental health, including your mood and your ability to think? Fair Good Fair Fair Fair Fair Fair   In general, how would you rate your satisfaction with your social activities and relationships? Fair Good Good Fair Good Good Good   In general, please rate how well you carry out your usual social activities and roles Good Fair Good Good Good Good Good   To what extent are you  able to carry out your everyday physical activities such as walking, climbing stairs, carrying groceries, or moving a chair? Mostly Mostly Mostly Mostly Mostly Mostly Mostly   In the past 7 days, how often have you been bothered by emotional problems such as feeling anxious, depressed, or irritable? Sometimes Often Often Often Often Sometimes Often   In the past 7 days, how would you rate your fatigue on average? Mild Mild Moderate Mild Mild Moderate Mild   In the past 7 days, how would you rate your pain on average, where 0 means no pain, and 10 means worst imaginable pain? 3 3 3 2 3 4 2   In general, would you say your health is: 3 3 3 3 3 2 3   In general, would you say your quality of life is: 3 3 2 3 3 3 3   In general, how would you rate your physical health? 2 2 3 3 2 2 2   In general, how would you rate your mental health, including your mood and your ability to think? 2 3 2 2 2 2 2   In general, how would you rate your satisfaction with your social activities and relationships? 2 3 3 2 3 3 3   In general, please rate how well you carry out your usual social activities and roles. (This includes activities at home, at work and in your community, and responsibilities as a parent, child, spouse, employee, friend, etc.) 3 2 3 3 3 3 3   To what extent are you able to carry out your everyday physical activities such as walking, climbing stairs, carrying groceries, or moving a chair? 4 4 4 4 4 4 4   In the past 7 days, how often have you been bothered by emotional problems such as feeling anxious, depressed, or irritable? 3 4 4 4 4 3 4   In the past 7 days, how would you rate your fatigue on average? 2 2 3 2 2 3 2   In the past 7 days, how would you rate your pain on average, where 0 means no pain, and 10 means worst imaginable pain? 3 3 3 2 3 4 2   Global Mental Health Score 10 11 9 9 10 11 10   Global Physical Health Score 14 14 14 15 14 12 14   PROMIS TOTAL - SUBSCORES 24 25 23 24 24 23 24         ASSESSMENT:  Current Emotional / Mental Status (status of significant symptoms):   Risk status (Self / Other harm or suicidal ideation)   Patient denies current fears or concerns for personal safety.   Patient denies current or recent suicidal ideation or behaviors.   Patient denies current or recent homicidal ideation or behaviors.   Patient denies current or recent self injurious behavior or ideation.   Patient denies other safety concerns.   Patient reports there has been no change in risk factors since their last session.     Patient reports there has been no change in protective factors since their last session.     Recommended that patient call 911 or go to the local ED should there be a change in any of these risk factors.     Appearance:   Appropriate    Eye Contact:   Good    Psychomotor Behavior: Normal    Attitude:   Cooperative    Orientation:   All   Speech    Rate / Production: Normal/ Responsive    Volume:  Normal    Mood:    Anxious  Stressed   Affect:    Appropriate    Thought Content:  Clear    Thought Form:  Coherent  Goal Directed  Logical    Insight:    Good      Medication Review:   No changes to current psychiatric medication(s)     Medication Compliance:   Yes     Changes in Health Issues:   None reported     Chemical Use Review:   Substance Use: Chemical use reviewed, no active concerns identified      Tobacco Use: No current tobacco use.      Diagnosis:  1. Moderate episode of recurrent major depressive disorder (H)    2. ADA (generalized anxiety disorder)        Collateral Reports Completed:   Not Applicable    PLAN: (Patient Tasks / Therapist Tasks / Other)  Patient will return in three weeks for scheduled session. Patient will identify warning signs of when she needs to step away from her mom's house. Patient will work on continuing her recovery from surgery and not taking on too much.     There has been demonstrated improvement in functioning while patient has been engaged in  psychotherapy/psychological service- if withdrawn the patient would deteriorate and/or relapse.     Mariana Love, [facility] 6/13/23    ______________________________________________________________________    Individual Treatment Plan    Patient's Name: Alina Martell  YOB: 1965    Date of Creation:10/16/2020  Date Treatment Plan Last Reviewed/Revised: 6/13/2023    DSM5 Diagnoses: 296.32 (F33.1) Major Depressive Disorder, Recurrent Episode, Moderate _ or 300.02 (F41.1) Generalized Anxiety Disorder  Psychosocial / Contextual Factors: Health, family and financial   PROMIS (reviewed every 90 days): 25    Referral / Collaboration:  Was/were discussed and patient will pursue.    Anticipated number of session for this episode of care: 20 will reevaluate every 90 days  Anticipation frequency of session: Biweekly  Anticipated Duration of each session: 38-52 minutes  Treatment plan will be reviewed in 90 days or when goals have been changed.       MeasurableTreatment Goal(s) related to diagnosis / functional impairment(s)  Goal 1: Patient will work on reducing overall anxiety.     I will know I've met my goal when reporting minimal anxiety symptoms.       Objective #A (Patient Action)                          Patient will use distraction each time intrusive worry surfaces.  Status: Continued - Date(s): 6/13/2023     Intervention(s)  Therapist will teach emotional regulation skills. ..     Objective #B  Patient will Decrease frequency and intensity of feeling down, depressed, hopeless.  Status: Continued - Date(s):  6/13/2023     Intervention(s)  Therapist will teach emotional recognition/identification. ..     Objective #C  Patient will Identify negative self-talk and behaviors: challenge core beliefs, myths, and actions.  Status: Continued - Date(s):  6/13/2023     Intervention(s)  Therapist will teach the client how to perform a behavioral chain analysis. ..     Goal 2: Patient will continue to work on  reducing depression symptoms    I will know I've met my goal then reporting minimal depression symptoms     Objective #A (Patient Action)                          Patient will Increase interest, engagement, and pleasure in doing things.  Status: Continued - Date(s):   6/13/2023     Intervention(s)  Therapist will assign homework ..     Objective #B  Patient will Decrease frequency and intensity of feeling down, depressed, hopeless.  Status: Continued - Date(s):    6/13/2023  Intervention(s)  Therapist will assign homework at every session.         Patient has reviewed and agreed to the above plan.        Mariana Love, [facility]  6/13/2023

## 2023-06-15 ENCOUNTER — TELEPHONE (OUTPATIENT)
Dept: CARDIOLOGY | Facility: CLINIC | Age: 58
End: 2023-06-15
Payer: COMMERCIAL

## 2023-06-15 NOTE — TELEPHONE ENCOUNTER
Request to hold Eliquis      From: Chantel Bee   Sent: 6/15/2023  10:39 AM CDT   To: [facility] Ep Support Pool - [facility]   Subject: [place] PATIENT                                       General phone call:     Caller: MANE FROM [facility]     Primary cardiologist: [place]     Detailed reason for call: NEEDING A 2 DAY HOLD AND ANY BRIDGING ON  apixaban ANTICOAGULANT (ELIQUIS ANTICOAGULANT) 5 MG tablet FOR COLONOSCOPY ON 7/10/23     Best phone number: [phone]     Best time to contact: ANY     Ok to leave a detailedmessage? YES     Device? NO     Additional Info:        Request for Anticoagulation Interruption    Procedure: Colonoscopy  Requested hold time from facility: 2 days prior  Date of procedure: 07/10/2023  Anticoagulation medication: Eliquis  [id number] score: 2  CrCl, mL/min: 185.11  Previous Procedures: Pt has not had hx of recent PVI in the past 3mo, does not have anticipated PVI within the next 1 mo, DCCV in the past 4 wks, and/or LAAO- restricting interruption in AC  Guidelines: Low Risk (Eliquis, Xarelto) with CrCl ml/min: > or = to 30 discontinue > or = to  24hrs, 15-29 discontinue > or = to 36hrs, <15 No Data consider anti Xa level an/or > or = to 48 hrs. Uncertain/Intermediate/High Risk (Eliquis, Xarelto) with CrCl ml/min: > or = to 30 discontinue > or = to 48 hrs, < 30 No data consider anti Xa level and/or > or = to 72 hrs    Please advise hold orders, with or without need for bridging  Thanks you,  6/15/2023 11:24 AM  Oumou Costa MA     CrCl Calculator Cockcroft-Gault Equation- Micromedex    2017 ACC Expert ConsensusDecision Pathway for PeriproceduralManagement of Anticoagulation inPatients With Nonvalvular Atrial Fibrillation    female  58 year old  Wt Readings from Last 1 Encounters:   05/24/23 128.8 kg (284 lb)     Most Recent 3 BMP's:  Recent Labs   Lab Test 03/22/23  0817 03/11/23 2059 03/08/23  1834 03/06/23  0954 12/15/22  0702   NA  --  139 139  --  139   POTASSIUM  --  3.5 3.7  --  3.9   CHLORIDE   --  102 104  --  103   CO2  --  27 28  --  28   BUN  --  11.8 11.0  --  11.5   CR 0.69 0.69 0.71   < > 0.68   ANIONGAP  --  10 7  --  8   JOSSELIN  --  9.0 8.4*  --  9.1   GLC  --  109* 103*  --  119*    < > = values in this interval not displayed.

## 2023-06-21 NOTE — TELEPHONE ENCOUNTER
Okay to hold beginning 2 days prior to procedure and resume as soon as possible thereafter at the discretion of the performing physician.  No bridging.  Thank you

## 2023-07-05 ASSESSMENT — ANXIETY QUESTIONNAIRES
2. NOT BEING ABLE TO STOP OR CONTROL WORRYING: SEVERAL DAYS
3. WORRYING TOO MUCH ABOUT DIFFERENT THINGS: SEVERAL DAYS
GAD7 TOTAL SCORE: 6
5. BEING SO RESTLESS THAT IT IS HARD TO SIT STILL: SEVERAL DAYS
7. FEELING AFRAID AS IF SOMETHING AWFUL MIGHT HAPPEN: NOT AT ALL
4. TROUBLE RELAXING: SEVERAL DAYS
IF YOU CHECKED OFF ANY PROBLEMS ON THIS QUESTIONNAIRE, HOW DIFFICULT HAVE THESE PROBLEMS MADE IT FOR YOU TO DO YOUR WORK, TAKE CARE OF THINGS AT HOME, OR GET ALONG WITH OTHER PEOPLE: SOMEWHAT DIFFICULT
1. FEELING NERVOUS, ANXIOUS, OR ON EDGE: SEVERAL DAYS
6. BECOMING EASILY ANNOYED OR IRRITABLE: SEVERAL DAYS

## 2023-07-05 ASSESSMENT — PATIENT HEALTH QUESTIONNAIRE - PHQ9: SUM OF ALL RESPONSES TO PHQ QUESTIONS 1-9: 8

## 2023-07-06 ENCOUNTER — VIRTUAL VISIT (OUTPATIENT)
Dept: PSYCHOLOGY | Facility: CLINIC | Age: 58
End: 2023-07-06
Payer: COMMERCIAL

## 2023-07-06 DIAGNOSIS — F33.1 MODERATE EPISODE OF RECURRENT MAJOR DEPRESSIVE DISORDER (H): Primary | ICD-10-CM

## 2023-07-06 DIAGNOSIS — F41.1 GAD (GENERALIZED ANXIETY DISORDER): ICD-10-CM

## 2023-07-06 PROCEDURE — 90834 PSYTX W PT 45 MINUTES: CPT | Mod: VID | Performed by: COUNSELOR

## 2023-07-06 ASSESSMENT — ANXIETY QUESTIONNAIRES: GAD7 TOTAL SCORE: 6

## 2023-07-06 ASSESSMENT — PATIENT HEALTH QUESTIONNAIRE - PHQ9
SUM OF ALL RESPONSES TO PHQ QUESTIONS 1-9: 8
10. IF YOU CHECKED OFF ANY PROBLEMS, HOW DIFFICULT HAVE THESE PROBLEMS MADE IT FOR YOU TO DO YOUR WORK, TAKE CARE OF THINGS AT HOME, OR GET ALONG WITH OTHER PEOPLE: SOMEWHAT DIFFICULT

## 2023-07-07 NOTE — PROGRESS NOTES
[facility]                                     Progress Note    Patient Name: Alina Martell  Date: 7/06/23         Service Type: Individual      Session Start Time: 1215 Session End Time: 100     Session Length:  46minutes    Session #: 56    Attendees: Client    Service Modality:  Video Visit:     Telemedicine Visit: The patient's condition can be safely assessed and treated via synchronous audio and visual telemedicine encounter.       Reason for Telemedicine Visit: Services only offered telehealth     Originating Site (Patient Location): Patient's place of employment     Distant Location (provider location):  Off-site     Consent:  The patient/guardian has verbally consented to: the potential risks and benefits of telemedicine (video visit) versus in person care; bill my insurance or make self-payment for services provided; and responsibility for payment of non-covered services.      Mode of Communication:  Video Conference via [employer]     As the provider I attest to compliance with applicable laws and regulations related to telemedicine.    DATA  Interactive Complexity: No  Crisis: No        Progress Since Last Session (Related to Symptoms / Goals / Homework):   Symptoms: No change overwhelmed with activities in her life    Homework: Achieved / completed to satisfaction      Episode of Care Goals: Satisfactory progress - ACTION (Actively working towards change); Intervened by reinforcing change plan / affirming steps taken     Current / Ongoing Stressors and Concerns:  Patient indicated she is feeling overwhelmed. Patient reported feeling there is always somewhere that she needs to be. Patient indicated this is pushing her into making unhealthy decision's. Patient reported going to the casino once and knowing this is a behavior that needs to stop. Patient indicated her friend who she has been with on and off is now getting . Patient reported this brings a lot of  emotions for her. This therapist processed with patient ways to work on slowing down and grounding herself.      Treatment Objective(s) Addressed in This Session:   use distraction each time intrusive worry surfaces  Increase interest, engagement, and pleasure in doing things  Decrease frequency and intensity of feeling down, depressed, hopeless  Improve quantity and quality of night time sleep / decrease daytime naps  Feel less tired and more energy during the day   Identify negative self-talk and behaviors: challenge core beliefs, myths, and actions     Intervention:   Motivational Interviewing    MI Intervention: Open-ended questions     Change Talk Expressed by the Patient: Activation Taking steps    Provider Response to Change Talk: S - Summarized patient's change talk statements      Assessments completed prior to visit:  The following assessments were completed by patient for this visit:  PHQ9:       11/3/2022    12:05 PM 12/1/2022     9:51 AM 1/9/2023     3:57 PM 3/10/2023     8:12 AM 5/23/2023     5:42 AM 7/5/2023     2:17 PM 7/5/2023     2:18 PM   PHQ-9 SCORE   PHQ-9 Total Score MyChart 9 (Mild depression) 9 (Mild depression) 6 (Mild depression) 5 (Mild depression) 6 (Mild depression)  8 (Mild depression)   PHQ-9 Total Score 9 9 6 5 6 8      GAD7:       11/15/2021     7:32 AM 12/13/2021     9:02 AM 8/10/2022    10:15 AM 9/22/2022    11:13 AM 11/3/2022    12:06 PM 12/1/2022     9:52 AM 7/5/2023     2:18 PM   ADA-7 SCORE   Total Score 6 (mild anxiety)  6 (mild anxiety) 9 (mild anxiety) 10 (moderate anxiety) 8 (mild anxiety) 6 (mild anxiety)   Total Score 6 4 6 9 10 8 6     PROMIS 10-Global Health (all questions and answers displayed):       3/26/2022     5:15 PM 4/14/2022     6:51 AM 7/19/2022    11:13 AM 11/3/2022    12:07 PM 12/1/2022     9:53 AM 3/10/2023     8:13 AM 6/13/2023     6:37 AM   PROMIS 10   In general, would you say your health is: Good Good Good Good Good Fair Good   In general, would you say  your quality of life is: Good Good Fair Good Good Good Good   In general, how would you rate your physical health? Fair Fair Good Good Fair Fair Fair   In general, how would you rate your mental health, including your mood and your ability to think? Fair Good Fair Fair Fair Fair Fair   In general, how would you rate your satisfaction with your social activities and relationships? Fair Good Good Fair Good Good Good   In general, please rate how well you carry out your usual social activities and roles Good Fair Good Good Good Good Good   To what extent are you able to carry out your everyday physical activities such as walking, climbing stairs, carrying groceries, or moving a chair? Mostly Mostly Mostly Mostly Mostly Mostly Mostly   In the past 7 days, how often have you been bothered by emotional problems such as feeling anxious, depressed, or irritable? Sometimes Often Often Often Often Sometimes Often   In the past 7 days, how would you rate your fatigue on average? Mild Mild Moderate Mild Mild Moderate Mild   In the past 7 days, how would you rate your pain on average, where 0 means no pain, and 10 means worst imaginable pain? 3 3 3 2 3 4 2   In general, would you say your health is: 3 3 3 3 3 2 3   In general, would you say your quality of life is: 3 3 2 3 3 3 3   In general, how would you rate your physical health? 2 2 3 3 2 2 2   In general, how would you rate your mental health, including your mood and your ability to think? 2 3 2 2 2 2 2   In general, how would you rate your satisfaction with your social activities and relationships? 2 3 3 2 3 3 3   In general, please rate how well you carry out your usual social activities and roles. (This includes activities at home, at work and in your community, and responsibilities as a parent, child, spouse, employee, friend, etc.) 3 2 3 3 3 3 3   To what extent are you able to carry out your everyday physical activities such as walking, climbing stairs, carrying  groceries, or moving a chair? 4 4 4 4 4 4 4   In the past 7 days, how often have you been bothered by emotional problems such as feeling anxious, depressed, or irritable? 3 4 4 4 4 3 4   In the past 7 days, how would you rate your fatigue on average? 2 2 3 2 2 3 2   In the past 7 days, how would you rate your pain on average, where 0 means no pain, and 10 means worst imaginable pain? 3 3 3 2 3 4 2   Global Mental Health Score 10 11 9 9 10 11 10   Global Physical Health Score 14 14 14 15 14 12 14   PROMIS TOTAL - SUBSCORES 24 25 23 24 24 23 24         ASSESSMENT: Current Emotional / Mental Status (status of significant symptoms):   Risk status (Self / Other harm or suicidal ideation)   Patient denies current fears or concerns for personal safety.   Patient denies current or recent suicidal ideation or behaviors.   Patient denies current or recent homicidal ideation or behaviors.   Patient denies current or recent self injurious behavior or ideation.   Patient denies other safety concerns.   Patient reports there has been no change in risk factors since their last session.     Patient reports there has been no change in protective factors since their last session.     Recommended that patient call 911 or go to the local ED should there be a change in any of these risk factors.     Appearance:   Appropriate    Eye Contact:   Good    Psychomotor Behavior: Normal    Attitude:   Cooperative    Orientation:   All   Speech    Rate / Production: Normal/ Responsive    Volume:  Normal    Mood:    Anxious    Affect:    Appropriate    Thought Content:  Clear    Thought Form:  Coherent  Goal Directed  Logical    Insight:    Good      Medication Review:   No changes to current psychiatric medication(s)     Medication Compliance:   Yes     Changes in Health Issues:   None reported     Chemical Use Review:   Substance Use: Chemical use reviewed, no active concerns identified      Tobacco Use: No current tobacco use.       Diagnosis:  1. Moderate episode of recurrent major depressive disorder (H)    2. ADA (generalized anxiety disorder)        Collateral Reports Completed:   Not Applicable    PLAN: (Patient Tasks / Therapist Tasks / Other)  Patient will return in three weeks for scheduled session. Patient will identify times to herself that will help her to slow down. Patient will do a daily check-in with herself to identify if she is feeling overwhelmed and then work on self-care. Patient will identify boundaries to set with her friend.    There has been demonstrated improvement in functioning while patient has been engaged in psychotherapy/psychological service- if withdrawn the patient would deteriorate and/or relapse.     Mariana Love, [facility] 7/06/23    ______________________________________________________________________    Individual Treatment Plan    Patient's Name: Alina Martell  YOB: 1965    Date of Creation:10/16/2020  Date Treatment Plan Last Reviewed/Revised: 6/13/2023    DSM5 Diagnoses: 296.32 (F33.1) Major Depressive Disorder, Recurrent Episode, Moderate _ or 300.02 (F41.1) Generalized Anxiety Disorder  Psychosocial / Contextual Factors: Health, family and financial   PROMIS (reviewed every 90 days): 25    Referral / Collaboration:  Was/were discussed and patient will pursue.    Anticipated number of session for this episode of care: 20 will reevaluate every 90 days  Anticipation frequency of session: Biweekly  Anticipated Duration of each session: 38-52 minutes  Treatment plan will be reviewed in 90 days or when goals have been changed.       MeasurableTreatment Goal(s) related to diagnosis / functional impairment(s)  Goal 1: Patient will work on reducing overall anxiety.     I will know I've met my goal when reporting minimal anxiety symptoms.       Objective #A (Patient Action)                          Patient will use distraction each time intrusive worry surfaces.  Status: Continued -  Date(s): 6/13/2023     Intervention(s)  Therapist will teach emotional regulation skills. ..     Objective #B  Patient will Decrease frequency and intensity of feeling down, depressed, hopeless.  Status: Continued - Date(s):  6/13/2023     Intervention(s)  Therapist will teach emotional recognition/identification. ..     Objective #C  Patient will Identify negative self-talk and behaviors: challenge core beliefs, myths, and actions.  Status: Continued - Date(s):  6/13/2023     Intervention(s)  Therapist will teach the client how to perform a behavioral chain analysis. ..     Goal 2: Patient will continue to work on reducing depression symptoms    I will know I've met my goal then reporting minimal depression symptoms     Objective #A (Patient Action)                          Patient will Increase interest, engagement, and pleasure in doing things.  Status: Continued - Date(s):   6/13/2023     Intervention(s)  Therapist will assign homework ..     Objective #B  Patient will Decrease frequency and intensity of feeling down, depressed, hopeless.  Status: Continued - Date(s):    6/13/2023  Intervention(s)  Therapist will assign homework at every session.         Patient has reviewed and agreed to the above plan.        Mariana Love, [facility]  6/13/2023

## 2023-07-10 ENCOUNTER — TRANSFERRED RECORDS (OUTPATIENT)
Dept: HEALTH INFORMATION MANAGEMENT | Facility: CLINIC | Age: 58
End: 2023-07-10
Payer: COMMERCIAL

## 2023-07-11 ENCOUNTER — OFFICE VISIT (OUTPATIENT)
Dept: NEUROLOGY | Facility: CLINIC | Age: 58
End: 2023-07-11
Payer: COMMERCIAL

## 2023-07-11 VITALS
WEIGHT: 281.2 LBS | DIASTOLIC BLOOD PRESSURE: 87 MMHG | BODY MASS INDEX: 41.53 KG/M2 | HEART RATE: 85 BPM | SYSTOLIC BLOOD PRESSURE: 146 MMHG

## 2023-07-11 DIAGNOSIS — R20.2 PARESTHESIAS: ICD-10-CM

## 2023-07-11 DIAGNOSIS — R42 DIZZINESS: Primary | ICD-10-CM

## 2023-07-11 PROCEDURE — 99213 OFFICE O/P EST LOW 20 MIN: CPT | Performed by: PSYCHIATRY & NEUROLOGY

## 2023-07-11 NOTE — NURSING NOTE
"Alina Martell is a 58 year old female who presents for:  Chief Complaint   Patient presents with     Dizziness     6 mo follow-up   Still there but not as frequent   Most recent was about a week or 2 ago         Initial Vitals:  BP (!) 146/87   Pulse 85   Wt 127.6 kg (281 lb 3.2 oz)   BMI 41.53 kg/m   Estimated body mass index is 41.53 kg/m  as calculated from the following:    Height as of 5/4/23: 1.753 m (5' 9\").    Weight as of this encounter: 127.6 kg (281 lb 3.2 oz).. Body surface area is 2.49 meters squared. BP completed using cuff size: wrist cuff    Severo Pagan  "

## 2023-07-11 NOTE — PROGRESS NOTES
ESTABLISHED PATIENT NEUROLOGY NOTE    DATE OF VISIT: 7/11/2023  MRN: [id number]  PATIENT NAME: Alina Martell  YOB: 1965    Chief Complaint   Patient presents with    Dizziness     6 mo follow-up   Still there but not as frequent   Most recent was about a week or 2 ago      SUBJECTIVE:                                                      HISTORY OF PRESENT ILLNESS:  Alina is here for follow up regarding dizziness.  I met the patient for initial consultation about 6 months ago.    History as previously documented by me (1.3.23):  The patient was seen by Dr. Rolon in ENT just a few days ago for evaluation of the same.  She reported a few months history of dizziness, described as lightheadedness when laying back.  1 syncopal episode.  She had a brain MRI a couple of weeks ago which was normal.  I do not see any PT notes in the chart.  She does have history of migraines so there was some mention of perhaps vestibular migraine playing a role.  She was also referred for VNG.  It does not look like this has yet been done.  She did have a PT evaluation and this did show evidence of vestibular dysfunction, recommended OT.     Maritza does have some additional OT appointments schedu and plans to keep these.  She says she has good insurance and would like to do whatever she can to take care of herself.  She has also been working on losing weight, eating healthier.  She tries to stay away from sugars other than natural sugars are improved.  She does have pressure in her ears, ringing. She has some occasional nasal congestion. Audiogram was completed on 12.30.22, and this was normal.     She says she fell and caused her alarm in the setting of dizziness. She spins if laying back in bed. Occasional lightheadedness. She says she also has some eye twitching. She says she has some pain behind her eyes, and some dull occipital pain. SHe has not had a migraine in 13 years. Recent calcium was 9.1.  Echocardiogram  was technically difficult, stress test unremarkable.     She says she was on a medication for her migraines in the past, a combination pill.  This was many years ago and worked quite well for her but it ended up being too expensive.      No history of kidney stones.  Does have history of gallstones.     She tries to eat healthy. Lots of berries, avoids other sugars.  She does drink juice.    I suggested she continue therapy for the dizziness and that we do a trial of a titrating dose of Topamax for the headaches/dizziness.   Maritza says that the dizziness is intermittent.     She says that she recently had a hysterectomy. She was becoming anemic due to heavy menstruation. She is recovering well, was able to handle pain management mainly with Tylenol.  She is just now being cleared for more activities so she has started working out again.  She thinks that the physical therapy was really helpful from a dizziness standpoint and would like to resume this now.    The headaches went away. The Topamax made her heart race. She did take it for 2 months.  She does have tingling in the scalp that comes and goes.     No visual changes other than age-related changes.  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    CURRENT MEDICATIONS:   adalimumab (HUMIRA *CF* PEN) 40 MG/0.4ML pen kit, INJECT 1 PEN UNDER THE SKIN EVERY 14 DAYS.  apixaban ANTICOAGULANT (ELIQUIS ANTICOAGULANT) 5 MG tablet, Take 1 tablet (5 mg) by mouth 2 times daily  busPIRone (BUSPAR) 5 MG tablet, Take 5 mg by mouth 3 times daily as needed  cetirizine (ZYRTEC) 10 MG tablet, Take 1 tablet (10 mg) by mouth daily  diltiazem ER COATED BEADS (CARDIZEM CD/CARTIA XT) 180 MG 24 hr capsule, Take 2 capsules (360 mg) by mouth daily  EPINEPHrine (ANY BX GENERIC EQUIV) 0.3 MG/0.3ML injection 2-pack, Inject 0.3 mg into the muscle as needed for anaphylaxis  ferrous gluconate (FERGON) 324 (38 Fe) MG tablet, Take 1 tablet (324 mg)  by mouth daily (with breakfast)  Multiple Vitamins-Minerals (CENTROVITE) TABS, Take 1 tablet by mouth daily  omeprazole (PRILOSEC) 40 MG DR capsule, Take 1 capsule (40 mg) by mouth daily  vitamin D3 (CHOLECALCIFEROL) 1.25 MG (99539 UT) capsule, Take 1 capsule (50,000 Units) by mouth every 7 days  flecainide (TAMBOCOR) 50 MG tablet, Take 1 tablet (50 mg) by mouth daily as needed (atrial fibrillation) (Patient not taking: Reported on 5/24/2023)  topiramate (TOPAMAX) 25 MG tablet, 25mg at bedtime for 1 week, then 25mg twice daily for 1 week, then 25mg in the morning and 50mg in the evening for 1 week and finally 50mg twice daily. (Patient not taking: Reported on 5/24/2023)    No current facility-administered medications on file prior to visit.      RECENT DIAGNOSTIC STUDIES:   Labs: No results found for any visits on 07/11/23.    REVIEW OF SYSTEMS:                                                      10-point review of systems is negative except as mentioned above in HPI.    EXAM:                                                      Physical Exam:   Vitals: BP (!) 146/87   Pulse 85   Wt 127.6 kg (281 lb 3.2 oz)   BMI 41.53 kg/m    BMI= Body mass index is 41.53 kg/m .  GENERAL: NAD.  HEENT: NC/AT.  CV: RRR. S1, S2.   NECK: No bruits.  PULM: Non-labored breathing.   Focused Neurologic:  MENTAL STATUS: Alert, attentive. Speech is fluent. Normal comprehension. Normal concentration. Adequate fund of knowledge.   CRANIAL NERVES: Discs technically difficult to visualize today. Visual fields intact to confrontation. Pupils equally, round and reactive to light. Facial sensation and movement normal. EOM full. Hearing intact to conversation. Sternocleidomastoids and trapezius strength intact. Palate moves symmetrically. Tongue midline.  MOTOR: 5/5 in proximal and distal muscle groups of upper and lower extremities. Tone and bulk normal.   SENSATION: Normal light touch throughout.  COORDINATION: Normal finger nose finger.  Finger tapping normal. Knee heel shin normal.  STATION AND GAIT: Romberg negative. Good postural reflexes. Casual gait normal.     ASSESSMENT and PLAN:                                                      Assessment:    ICD-10-CM    1. Dizziness  R42 Physical Therapy Referral      2. Paresthesias  R20.2           Ms. Martell is a pleasant 58-year-old woman here for follow-up regarding dizziness and headaches.  She has had a decrease in her episodes of dizziness but is still symptomatic at times so she is motivated to do some additional physical therapy.  I have put in the referral.  She had trouble with side effects on Topamax, but fortunately her headaches have improved/resolved.  She does describe some scalp paresthesias which I do not think are worrisome.  Maritza will monitor for now and let me know if symptoms worsen or change.  We will follow-up as needed accordingly.    Plan:  -- Let's get you back in for additional physical therapy for the dizziness.    -- Continue conservative management of the headaches.  --Keep up the good work with your exercise program and healthy diet!  -- Follow-up in neurology as needed, if symptoms worsen or change.    Total Time: 20 minutes were spent with the patient and in chart review/documentation (as described above in Assessment and Plan)/coordinating the care on date of service.    Lisa Costa MD  Neurology    Dragon software used in the dictation of this note.

## 2023-07-11 NOTE — PATIENT INSTRUCTIONS
Plan:  -- Let's get you back in for additional physical therapy for the dizziness.    -- Continue conservative management of the headaches.  --Keep up the good work with your exercise program and healthy diet!  -- Follow-up in neurology as needed, if symptoms worsen or change.

## 2023-07-11 NOTE — LETTER
7/11/2023         RE: Alina Martell  [address]  [place] [address]        Dear Colleague,    Thank you for referring your patient, Alina Martell, to the [facility] [place]. Please see a copy of my visit note below.    ESTABLISHED PATIENT NEUROLOGY NOTE    DATE OF VISIT: 7/11/2023  MRN: [id number]  PATIENT NAME: Alina Martell  YOB: 1965    Chief Complaint   Patient presents with     Dizziness     6 mo follow-up   Still there but not as frequent   Most recent was about a week or 2 ago      SUBJECTIVE:                                                      HISTORY OF PRESENT ILLNESS:  Alina is here for follow up regarding dizziness.  I met the patient for initial consultation about 6 months ago.    History as previously documented by me (1.3.23):  The patient was seen by Dr. Rolon in ENT just a few days ago for evaluation of the same.  She reported a few months history of dizziness, described as lightheadedness when laying back.  1 syncopal episode.  She had a brain MRI a couple of weeks ago which was normal.  I do not see any PT notes in the chart.  She does have history of migraines so there was some mention of perhaps vestibular migraine playing a role.  She was also referred for VNG.  It does not look like this has yet been done.  She did have a PT evaluation and this did show evidence of vestibular dysfunction, recommended OT.     Maritza does have some additional OT appointments schedu and plans to keep these.  She says she has good insurance and would like to do whatever she can to take care of herself.  She has also been working on losing weight, eating healthier.  She tries to stay away from sugars other than natural sugars are improved.  She does have pressure in her ears, ringing. She has some occasional nasal congestion. Audiogram was completed on 12.30.22, and this was normal.     She says she fell and caused her alarm in the setting of  dizziness. She spins if laying back in bed. Occasional lightheadedness. She says she also has some eye twitching. She says she has some pain behind her eyes, and some dull occipital pain. SHe has not had a migraine in 13 years. Recent calcium was 9.1.  Echocardiogram was technically difficult, stress test unremarkable.     She says she was on a medication for her migraines in the past, a combination pill.  This was many years ago and worked quite well for her but it ended up being too expensive.      No history of kidney stones.  Does have history of gallstones.     She tries to eat healthy. Lots of berries, avoids other sugars.  She does drink juice.    I suggested she continue therapy for the dizziness and that we do a trial of a titrating dose of Topamax for the headaches/dizziness.   Maritza says that the dizziness is intermittent.     She says that she recently had a hysterectomy. She was becoming anemic due to heavy menstruation. She is recovering well, was able to handle pain management mainly with Tylenol.  She is just now being cleared for more activities so she has started working out again.  She thinks that the physical therapy was really helpful from a dizziness standpoint and would like to resume this now.    The headaches went away. The Topamax made her heart race. She did take it for 2 months.  She does have tingling in the scalp that comes and goes.     No visual changes other than age-related changes.  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    CURRENT MEDICATIONS:   adalimumab (HUMIRA *CF* PEN) 40 MG/0.4ML pen kit, INJECT 1 PEN UNDER THE SKIN EVERY 14 DAYS.  apixaban ANTICOAGULANT (ELIQUIS ANTICOAGULANT) 5 MG tablet, Take 1 tablet (5 mg) by mouth 2 times daily  busPIRone (BUSPAR) 5 MG tablet, Take 5 mg by mouth 3 times daily as needed  cetirizine (ZYRTEC) 10 MG tablet, Take 1 tablet (10 mg) by mouth daily  diltiazem ER COATED BEADS (CARDIZEM  CD/CARTIA XT) 180 MG 24 hr capsule, Take 2 capsules (360 mg) by mouth daily  EPINEPHrine (ANY BX GENERIC EQUIV) 0.3 MG/0.3ML injection 2-pack, Inject 0.3 mg into the muscle as needed for anaphylaxis  ferrous gluconate (FERGON) 324 (38 Fe) MG tablet, Take 1 tablet (324 mg) by mouth daily (with breakfast)  Multiple Vitamins-Minerals (CENTROVITE) TABS, Take 1 tablet by mouth daily  omeprazole (PRILOSEC) 40 MG DR capsule, Take 1 capsule (40 mg) by mouth daily  vitamin D3 (CHOLECALCIFEROL) 1.25 MG (77036 UT) capsule, Take 1 capsule (50,000 Units) by mouth every 7 days  flecainide (TAMBOCOR) 50 MG tablet, Take 1 tablet (50 mg) by mouth daily as needed (atrial fibrillation) (Patient not taking: Reported on 5/24/2023)  topiramate (TOPAMAX) 25 MG tablet, 25mg at bedtime for 1 week, then 25mg twice daily for 1 week, then 25mg in the morning and 50mg in the evening for 1 week and finally 50mg twice daily. (Patient not taking: Reported on 5/24/2023)    No current facility-administered medications on file prior to visit.      RECENT DIAGNOSTIC STUDIES:   Labs: No results found for any visits on 07/11/23.    REVIEW OF SYSTEMS:                                                      10-point review of systems is negative except as mentioned above in HPI.    EXAM:                                                      Physical Exam:   Vitals: BP (!) 146/87   Pulse 85   Wt 127.6 kg (281 lb 3.2 oz)   BMI 41.53 kg/m    BMI= Body mass index is 41.53 kg/m .  GENERAL: NAD.  HEENT: NC/AT.  CV: RRR. S1, S2.   NECK: No bruits.  PULM: Non-labored breathing.   Focused Neurologic:  MENTAL STATUS: Alert, attentive. Speech is fluent. Normal comprehension. Normal concentration. Adequate fund of knowledge.   CRANIAL NERVES: Discs technically difficult to visualize today. Visual fields intact to confrontation. Pupils equally, round and reactive to light. Facial sensation and movement normal. EOM full. Hearing intact to conversation.  Sternocleidomastoids and trapezius strength intact. Palate moves symmetrically. Tongue midline.  MOTOR: 5/5 in proximal and distal muscle groups of upper and lower extremities. Tone and bulk normal.   SENSATION: Normal light touch throughout.  COORDINATION: Normal finger nose finger. Finger tapping normal. Knee heel shin normal.  STATION AND GAIT: Romberg negative. Good postural reflexes. Casual gait normal.     ASSESSMENT and PLAN:                                                      Assessment:    ICD-10-CM    1. Dizziness  R42 Physical Therapy Referral      2. Paresthesias  R20.2           Ms. Martell is a pleasant 58-year-old woman here for follow-up regarding dizziness and headaches.  She has had a decrease in her episodes of dizziness but is still symptomatic at times so she is motivated to do some additional physical therapy.  I have put in the referral.  She had trouble with side effects on Topamax, but fortunately her headaches have improved/resolved.  She does describe some scalp paresthesias which I do not think are worrisome.  Maritza will monitor for now and let me know if symptoms worsen or change.  We will follow-up as needed accordingly.    Plan:  -- Let's get you back in for additional physical therapy for the dizziness.    -- Continue conservative management of the headaches.  --Keep up the good work with your exercise program and healthy diet!  -- Follow-up in neurology as needed, if symptoms worsen or change.    Total Time: 20 minutes were spent with the patient and in chart review/documentation (as described above in Assessment and Plan)/coordinating the care on date of service.    Lisa Costa MD  Neurology    Dragon software used in the dictation of this note.                    Again, thank you for allowing me to participate in the care of your patient.        Sincerely,        Lisa Costa MD

## 2023-07-27 ENCOUNTER — VIRTUAL VISIT (OUTPATIENT)
Dept: PSYCHOLOGY | Facility: CLINIC | Age: 58
End: 2023-07-27
Payer: COMMERCIAL

## 2023-07-27 DIAGNOSIS — F33.1 MODERATE EPISODE OF RECURRENT MAJOR DEPRESSIVE DISORDER (H): Primary | ICD-10-CM

## 2023-07-27 DIAGNOSIS — F41.1 GAD (GENERALIZED ANXIETY DISORDER): ICD-10-CM

## 2023-07-27 PROCEDURE — 90834 PSYTX W PT 45 MINUTES: CPT | Mod: VID | Performed by: COUNSELOR

## 2023-07-28 NOTE — PROGRESS NOTES
[facility]                                     Progress Note    Patient Name: Alina Martell  Date: 7/27/23         Service Type: Individual      Session Start Time: 1204 Session End Time: 1254     Session Length:  50 minutes    Session #: 57    Attendees: Client    Service Modality:  Video Visit:     Telemedicine Visit: The patient's condition can be safely assessed and treated via synchronous audio and visual telemedicine encounter.       Reason for Telemedicine Visit: Services only offered telehealth     Originating Site (Patient Location): Patient's place of employment     Distant Location (provider location):  Off-site     Consent:  The patient/guardian has verbally consented to: the potential risks and benefits of telemedicine (video visit) versus in person care; bill my insurance or make self-payment for services provided; and responsibility for payment of non-covered services.      Mode of Communication:  Video Conference via [employer]     As the provider I attest to compliance with applicable laws and regulations related to telemedicine.    DATA  Interactive Complexity: No  Crisis: No        Progress Since Last Session (Related to Symptoms / Goals / Homework):   Symptoms: No change struggling with impulse     Homework: Partially completed      Episode of Care Goals: Satisfactory progress - ACTION (Actively working towards change); Intervened by reinforcing change plan / affirming steps taken     Current / Ongoing Stressors and Concerns:  Patient reported she has continued to feel overwhelmed and struggling with impulses. Patient indicated she has been to the casino a few times. Patient reported she has been able to manage it but worried it will get bad again. Patient indicated she may want to look into attending groups. Patient reported she knows she needs to slow down with her grandchildren and focus on her needs. This therapist went over skills to work on reducing  impulsivity.      Treatment Objective(s) Addressed in This Session:   use cognitive strategies identified in therapy to challenge anxious thoughts  Increase interest, engagement, and pleasure in doing things  Decrease frequency and intensity of feeling down, depressed, hopeless  Improve quantity and quality of night time sleep / decrease daytime naps  Feel less tired and more energy during the day   Identify negative self-talk and behaviors: challenge core beliefs, myths, and actions     Intervention:   Motivational Interviewing    MI Intervention: Permission to raise concern or advise and Open-ended questions     Change Talk Expressed by the Patient: Activation Taking steps    Provider Response to Change Talk: R - Reflected patient's change talk and S - Summarized patient's change talk statements      Assessments completed prior to visit:  The following assessments were completed by patient for this visit:  PHQ9:       11/3/2022    12:05 PM 12/1/2022     9:51 AM 1/9/2023     3:57 PM 3/10/2023     8:12 AM 5/23/2023     5:42 AM 7/5/2023     2:17 PM 7/5/2023     2:18 PM   PHQ-9 SCORE   PHQ-9 Total Score MyChart 9 (Mild depression) 9 (Mild depression) 6 (Mild depression) 5 (Mild depression) 6 (Mild depression)  8 (Mild depression)   PHQ-9 Total Score 9 9 6 5 6 8      GAD7:       11/15/2021     7:32 AM 12/13/2021     9:02 AM 8/10/2022    10:15 AM 9/22/2022    11:13 AM 11/3/2022    12:06 PM 12/1/2022     9:52 AM 7/5/2023     2:18 PM   ADA-7 SCORE   Total Score 6 (mild anxiety)  6 (mild anxiety) 9 (mild anxiety) 10 (moderate anxiety) 8 (mild anxiety) 6 (mild anxiety)   Total Score 6 4 6 9 10 8 6     PROMIS 10-Global Health (all questions and answers displayed):       3/26/2022     5:15 PM 4/14/2022     6:51 AM 7/19/2022    11:13 AM 11/3/2022    12:07 PM 12/1/2022     9:53 AM 3/10/2023     8:13 AM 6/13/2023     6:37 AM   PROMIS 10   In general, would you say your health is: Good Good Good Good Good Fair Good   In general,  would you say your quality of life is: Good Good Fair Good Good Good Good   In general, how would you rate your physical health? Fair Fair Good Good Fair Fair Fair   In general, how would you rate your mental health, including your mood and your ability to think? Fair Good Fair Fair Fair Fair Fair   In general, how would you rate your satisfaction with your social activities and relationships? Fair Good Good Fair Good Good Good   In general, please rate how well you carry out your usual social activities and roles Good Fair Good Good Good Good Good   To what extent are you able to carry out your everyday physical activities such as walking, climbing stairs, carrying groceries, or moving a chair? Mostly Mostly Mostly Mostly Mostly Mostly Mostly   In the past 7 days, how often have you been bothered by emotional problems such as feeling anxious, depressed, or irritable? Sometimes Often Often Often Often Sometimes Often   In the past 7 days, how would you rate your fatigue on average? Mild Mild Moderate Mild Mild Moderate Mild   In the past 7 days, how would you rate your pain on average, where 0 means no pain, and 10 means worst imaginable pain? 3 3 3 2 3 4 2   In general, would you say your health is: 3 3 3 3 3 2 3   In general, would you say your quality of life is: 3 3 2 3 3 3 3   In general, how would you rate your physical health? 2 2 3 3 2 2 2   In general, how would you rate your mental health, including your mood and your ability to think? 2 3 2 2 2 2 2   In general, how would you rate your satisfaction with your social activities and relationships? 2 3 3 2 3 3 3   In general, please rate how well you carry out your usual social activities and roles. (This includes activities at home, at work and in your community, and responsibilities as a parent, child, spouse, employee, friend, etc.) 3 2 3 3 3 3 3   To what extent are you able to carry out your everyday physical activities such as walking, climbing stairs,  carrying groceries, or moving a chair? 4 4 4 4 4 4 4   In the past 7 days, how often have you been bothered by emotional problems such as feeling anxious, depressed, or irritable? 3 4 4 4 4 3 4   In the past 7 days, how would you rate your fatigue on average? 2 2 3 2 2 3 2   In the past 7 days, how would you rate your pain on average, where 0 means no pain, and 10 means worst imaginable pain? 3 3 3 2 3 4 2   Global Mental Health Score 10 11 9 9 10 11 10   Global Physical Health Score 14 14 14 15 14 12 14   PROMIS TOTAL - SUBSCORES 24 25 23 24 24 23 24         ASSESSMENT: Current Emotional / Mental Status (status of significant symptoms):   Risk status (Self / Other harm or suicidal ideation)   Patient denies current fears or concerns for personal safety.   Patient denies current or recent suicidal ideation or behaviors.   Patient denies current or recent homicidal ideation or behaviors.   Patient denies current or recent self injurious behavior or ideation.   Patient denies other safety concerns.   Patient reports there has been no change in risk factors since their last session.     Patient reports there has been no change in protective factors since their last session.     Recommended that patient call 911 or go to the local ED should there be a change in any of these risk factors.     Appearance:   Appropriate    Eye Contact:   Good    Psychomotor Behavior: Normal    Attitude:   Cooperative    Orientation:   All   Speech    Rate / Production: Normal/ Responsive    Volume:  Normal    Mood:    Anxious  Depressed    Affect:    Appropriate    Thought Content:  Clear    Thought Form:  Coherent  Goal Directed  Logical    Insight:    Good      Medication Review:   No changes to current psychiatric medication(s)     Medication Compliance:   Yes     Changes in Health Issues:   None reported     Chemical Use Review:   Substance Use: Chemical use reviewed, no active concerns identified      Tobacco Use: No current tobacco  use.      Diagnosis:  1. Moderate episode of recurrent major depressive disorder (H)    2. ADA (generalized anxiety disorder)        Collateral Reports Completed:   Not Applicable    PLAN: (Patient Tasks / Therapist Tasks / Other)  Patient will return in three weeks for scheduled session. Patient will write down things activities/contacting people she will do before allowing herself to go to the casino. Patient will help out her grandchildren two days a week. Patient will reach out to friends to set up ongoing plans.     There has been demonstrated improvement in functioning while patient has been engaged in psychotherapy/psychological service- if withdrawn the patient would deteriorate and/or relapse.     Mariana Love, [facility] 7/27/23    ______________________________________________________________________    Individual Treatment Plan    Patient's Name: Alina Martell  YOB: 1965    Date of Creation:10/16/2020  Date Treatment Plan Last Reviewed/Revised: 6/13/2023    DSM5 Diagnoses: 296.32 (F33.1) Major Depressive Disorder, Recurrent Episode, Moderate _ or 300.02 (F41.1) Generalized Anxiety Disorder  Psychosocial / Contextual Factors: Health, family and financial   PROMIS (reviewed every 90 days): 25    Referral / Collaboration:  Was/were discussed and patient will pursue.    Anticipated number of session for this episode of care: 20 will reevaluate every 90 days  Anticipation frequency of session: Biweekly  Anticipated Duration of each session: 38-52 minutes  Treatment plan will be reviewed in 90 days or when goals have been changed.       MeasurableTreatment Goal(s) related to diagnosis / functional impairment(s)  Goal 1: Patient will work on reducing overall anxiety.     I will know I've met my goal when reporting minimal anxiety symptoms.       Objective #A (Patient Action)                          Patient will use distraction each time intrusive worry surfaces.  Status: Continued -  Date(s): 6/13/2023     Intervention(s)  Therapist will teach emotional regulation skills. ..     Objective #B  Patient will Decrease frequency and intensity of feeling down, depressed, hopeless.  Status: Continued - Date(s):  6/13/2023     Intervention(s)  Therapist will teach emotional recognition/identification. ..     Objective #C  Patient will Identify negative self-talk and behaviors: challenge core beliefs, myths, and actions.  Status: Continued - Date(s):  6/13/2023     Intervention(s)  Therapist will teach the client how to perform a behavioral chain analysis. ..     Goal 2: Patient will continue to work on reducing depression symptoms    I will know I've met my goal then reporting minimal depression symptoms     Objective #A (Patient Action)                          Patient will Increase interest, engagement, and pleasure in doing things.  Status: Continued - Date(s):   6/13/2023     Intervention(s)  Therapist will assign homework ..     Objective #B  Patient will Decrease frequency and intensity of feeling down, depressed, hopeless.  Status: Continued - Date(s):    6/13/2023  Intervention(s)  Therapist will assign homework at every session.         Patient has reviewed and agreed to the above plan.        Mariana Love, [facility]  6/13/2023

## 2023-08-10 PROCEDURE — 99285 EMERGENCY DEPT VISIT HI MDM: CPT | Mod: 25

## 2023-08-11 ENCOUNTER — APPOINTMENT (OUTPATIENT)
Dept: CT IMAGING | Facility: HOSPITAL | Age: 58
End: 2023-08-11
Attending: EMERGENCY MEDICINE
Payer: COMMERCIAL

## 2023-08-11 ENCOUNTER — HOSPITAL ENCOUNTER (EMERGENCY)
Facility: HOSPITAL | Age: 58
Discharge: HOME | End: 2023-08-11
Attending: EMERGENCY MEDICINE | Admitting: EMERGENCY MEDICINE
Payer: COMMERCIAL

## 2023-08-11 VITALS
DIASTOLIC BLOOD PRESSURE: 71 MMHG | WEIGHT: 288.4 LBS | BODY MASS INDEX: 42.59 KG/M2 | OXYGEN SATURATION: 94 % | TEMPERATURE: 98.3 F | RESPIRATION RATE: 20 BRPM | HEART RATE: 91 BPM | SYSTOLIC BLOOD PRESSURE: 134 MMHG

## 2023-08-11 DIAGNOSIS — M54.6 ACUTE LEFT-SIDED THORACIC BACK PAIN: ICD-10-CM

## 2023-08-11 LAB
ALBUMIN SERPL BCG-MCNC: 3.8 G/DL (ref 3.5–5.2)
ALBUMIN UR-MCNC: 30 MG/DL
ALP SERPL-CCNC: 102 U/L (ref 35–104)
ALT SERPL W P-5'-P-CCNC: 19 U/L (ref 0–50)
ANION GAP SERPL CALCULATED.3IONS-SCNC: 10 MMOL/L (ref 7–15)
APPEARANCE UR: CLEAR
AST SERPL W P-5'-P-CCNC: 19 U/L (ref 0–45)
BASOPHILS # BLD AUTO: 0.1 10E3/UL (ref 0–0.2)
BASOPHILS NFR BLD AUTO: 0 %
BILIRUB SERPL-MCNC: <0.2 MG/DL
BILIRUB UR QL STRIP: NEGATIVE
BUN SERPL-MCNC: 17.3 MG/DL (ref 6–20)
CALCIUM SERPL-MCNC: 9.3 MG/DL (ref 8.6–10)
CHLORIDE SERPL-SCNC: 102 MMOL/L (ref 98–107)
COLOR UR AUTO: ABNORMAL
CREAT SERPL-MCNC: 0.68 MG/DL (ref 0.51–0.95)
D DIMER PPP FEU-MCNC: 0.3 UG/ML FEU (ref 0–0.5)
DEPRECATED HCO3 PLAS-SCNC: 28 MMOL/L (ref 22–29)
EOSINOPHIL # BLD AUTO: 0.2 10E3/UL (ref 0–0.7)
EOSINOPHIL NFR BLD AUTO: 2 %
ERYTHROCYTE [DISTWIDTH] IN BLOOD BY AUTOMATED COUNT: 16.5 % (ref 10–15)
GFR SERPL CREATININE-BSD FRML MDRD: >90 ML/MIN/1.73M2
GLUCOSE SERPL-MCNC: 144 MG/DL (ref 70–99)
GLUCOSE UR STRIP-MCNC: NEGATIVE MG/DL
HCT VFR BLD AUTO: 33.9 % (ref 35–47)
HGB BLD-MCNC: 10.3 G/DL (ref 11.7–15.7)
HGB UR QL STRIP: ABNORMAL
IMM GRANULOCYTES # BLD: 0 10E3/UL
IMM GRANULOCYTES NFR BLD: 0 %
INR PPP: 1.04 (ref 0.85–1.15)
KETONES UR STRIP-MCNC: NEGATIVE MG/DL
LEUKOCYTE ESTERASE UR QL STRIP: NEGATIVE
LIPASE SERPL-CCNC: 16 U/L (ref 13–60)
LYMPHOCYTES # BLD AUTO: 2.9 10E3/UL (ref 0.8–5.3)
LYMPHOCYTES NFR BLD AUTO: 26 %
MCH RBC QN AUTO: 23.9 PG (ref 26.5–33)
MCHC RBC AUTO-ENTMCNC: 30.4 G/DL (ref 31.5–36.5)
MCV RBC AUTO: 79 FL (ref 78–100)
MONOCYTES # BLD AUTO: 0.8 10E3/UL (ref 0–1.3)
MONOCYTES NFR BLD AUTO: 7 %
NEUTROPHILS # BLD AUTO: 7.4 10E3/UL (ref 1.6–8.3)
NEUTROPHILS NFR BLD AUTO: 65 %
NITRATE UR QL: NEGATIVE
NRBC # BLD AUTO: 0 10E3/UL
NRBC BLD AUTO-RTO: 0 /100
PH UR STRIP: 6 [PH] (ref 5–7)
PLATELET # BLD AUTO: 355 10E3/UL (ref 150–450)
POTASSIUM SERPL-SCNC: 3.5 MMOL/L (ref 3.4–5.3)
PROT SERPL-MCNC: 7.5 G/DL (ref 6.4–8.3)
RBC # BLD AUTO: 4.31 10E6/UL (ref 3.8–5.2)
RBC URINE: 3 /HPF
SODIUM SERPL-SCNC: 140 MMOL/L (ref 136–145)
SP GR UR STRIP: 1.03 (ref 1–1.03)
SQUAMOUS EPITHELIAL: <1 /HPF
TROPONIN T SERPL HS-MCNC: <6 NG/L
TROPONIN T SERPL HS-MCNC: <6 NG/L
UROBILINOGEN UR STRIP-MCNC: <2 MG/DL
WBC # BLD AUTO: 11.4 10E3/UL (ref 4–11)
WBC URINE: 1 /HPF

## 2023-08-11 PROCEDURE — 71275 CT ANGIOGRAPHY CHEST: CPT

## 2023-08-11 PROCEDURE — 85379 FIBRIN DEGRADATION QUANT: CPT | Performed by: EMERGENCY MEDICINE

## 2023-08-11 PROCEDURE — 84484 ASSAY OF TROPONIN QUANT: CPT | Performed by: EMERGENCY MEDICINE

## 2023-08-11 PROCEDURE — 96374 THER/PROPH/DIAG INJ IV PUSH: CPT | Mod: 59

## 2023-08-11 PROCEDURE — 83690 ASSAY OF LIPASE: CPT | Performed by: EMERGENCY MEDICINE

## 2023-08-11 PROCEDURE — 80053 COMPREHEN METABOLIC PANEL: CPT | Performed by: EMERGENCY MEDICINE

## 2023-08-11 PROCEDURE — 81001 URINALYSIS AUTO W/SCOPE: CPT | Performed by: EMERGENCY MEDICINE

## 2023-08-11 PROCEDURE — 250N000011 HC RX IP 250 OP 636: Mod: JZ | Performed by: EMERGENCY MEDICINE

## 2023-08-11 PROCEDURE — 85610 PROTHROMBIN TIME: CPT | Performed by: EMERGENCY MEDICINE

## 2023-08-11 PROCEDURE — 36415 COLL VENOUS BLD VENIPUNCTURE: CPT | Performed by: EMERGENCY MEDICINE

## 2023-08-11 PROCEDURE — 96375 TX/PRO/DX INJ NEW DRUG ADDON: CPT | Mod: 59

## 2023-08-11 PROCEDURE — 96376 TX/PRO/DX INJ SAME DRUG ADON: CPT | Mod: 59

## 2023-08-11 PROCEDURE — 85025 COMPLETE CBC W/AUTO DIFF WBC: CPT | Performed by: EMERGENCY MEDICINE

## 2023-08-11 PROCEDURE — 250N000011 HC RX IP 250 OP 636: Performed by: EMERGENCY MEDICINE

## 2023-08-11 PROCEDURE — 93005 ELECTROCARDIOGRAM TRACING: CPT | Performed by: EMERGENCY MEDICINE

## 2023-08-11 RX ORDER — MORPHINE SULFATE 4 MG/ML
4 INJECTION, SOLUTION INTRAMUSCULAR; INTRAVENOUS ONCE
Status: COMPLETED | OUTPATIENT
Start: 2023-08-11 | End: 2023-08-11

## 2023-08-11 RX ORDER — DOCUSATE SODIUM 100 MG/1
100 CAPSULE, LIQUID FILLED ORAL 2 TIMES DAILY PRN
Qty: 30 CAPSULE | Refills: 0 | Status: SHIPPED | OUTPATIENT
Start: 2023-08-11 | End: 2024-07-24

## 2023-08-11 RX ORDER — HYDROCODONE BITARTRATE AND ACETAMINOPHEN 5; 325 MG/1; MG/1
1-2 TABLET ORAL EVERY 4 HOURS PRN
Qty: 18 TABLET | Refills: 0 | Status: SHIPPED | OUTPATIENT
Start: 2023-08-11 | End: 2023-08-14

## 2023-08-11 RX ORDER — IOPAMIDOL 755 MG/ML
90 INJECTION, SOLUTION INTRAVASCULAR ONCE
Status: COMPLETED | OUTPATIENT
Start: 2023-08-11 | End: 2023-08-11

## 2023-08-11 RX ORDER — ONDANSETRON 2 MG/ML
4 INJECTION INTRAMUSCULAR; INTRAVENOUS ONCE
Status: COMPLETED | OUTPATIENT
Start: 2023-08-11 | End: 2023-08-11

## 2023-08-11 RX ADMIN — MORPHINE SULFATE 4 MG: 4 INJECTION, SOLUTION INTRAMUSCULAR; INTRAVENOUS at 01:41

## 2023-08-11 RX ADMIN — MORPHINE SULFATE 4 MG: 4 INJECTION, SOLUTION INTRAMUSCULAR; INTRAVENOUS at 03:49

## 2023-08-11 RX ADMIN — ONDANSETRON 4 MG: 2 INJECTION INTRAMUSCULAR; INTRAVENOUS at 01:41

## 2023-08-11 RX ADMIN — IOPAMIDOL 90 ML: 755 INJECTION, SOLUTION INTRAVENOUS at 02:49

## 2023-08-11 ASSESSMENT — ACTIVITIES OF DAILY LIVING (ADL)
ADLS_ACUITY_SCORE: 37

## 2023-08-11 NOTE — DISCHARGE INSTRUCTIONS
Ibuprofen 600 mg every 6 hours as needed for pain  Ice to your back and shoulder every 1-2 hours for the next 1 to 2 days for 10 to 15 minutes.  Follow-up with your primary care or orthopedic doctor within the next week for recheck  Can resume normal activity as tolerated

## 2023-08-11 NOTE — ED NOTES
Pt is a/o x4, c/o left upper back shoulder pain: morphine iv given, pt also states she feels some short of breath as well, VSS.

## 2023-08-11 NOTE — ED PROVIDER NOTES
EMERGENCY DEPARTMENT ENCOUnter      NAME: Alina Martell  AGE: 58 year old female  YOB: 1965  MRN: [id number]  EVALUATION DATE & TIME: 8/11/2023 12:11 AM    PCP: Estelle Bansal    ED PROVIDER: Juanis Shepard MD      Chief Complaint   Patient presents with    Back Pain    Shoulder Pain         FINAL IMPRESSION:  1. Acute left-sided thoracic back pain          ED COURSE & MEDICAL DECISION MAKING:      In summary, the patient is a 58-year-old female that presents to the emergency department for evaluation of left sided thoracic back pain that radiates to her left arm.  She has no evidence of ACS, aortic dissection, pulmonary emboli or other more serious etiologies of pain.  I think her pain is likely musculoskeletal in etiology.  We will treat symptomatically as an outpatient with close outpatient follow-up.  0019-I met with the patient, obtained history, performed an initial exam, and discussed options and plan for diagnostics and treatment here in the ED. morphine 4 mg IV was administered for pain.  Zofran 4 mg IV was administered for nausea.  0331-I updated and rechecked on the patient. The pain is coming back.  Morphine 4 mg IV was administered for pain.  0445-reevaluation of patient reveals that her pain is improved.  Updated and discharged patient.  She is comfortable with discharge to home.    Medical Decision Making    History:  Supplemental history from: Documented in chart, if applicable  External Record(s) reviewed: Documented in chart, if applicable.    Work Up:  Chart documentation includes differential considered and any EKGs or imaging independently interpreted by provider, where specified.  In additional to work up documented, I considered the following work up: Documented in chart, if applicable.    External consultation:  Discussion of management with another provider: Documented in chart, if applicable    Complicating factors:  Care impacted by chronic illness: Other:  rheumatoid arthritis, paroxysmal atrial fibrillation  Care affected by social determinants of health: Access to Medical Care    Disposition considerations: Discharge. I prescribed additional prescription strength medication(s) as charted. See documentation for any additional details.          At the conclusion of the encounter I discussed the results of all of the tests and the disposition. The questions were answered. The patient or family acknowledged understanding and was agreeable with the care plan.         MEDICATIONS GIVEN IN THE EMERGENCY:  Medications   morphine (PF) injection 4 mg (4 mg Intravenous $Given 8/11/23 0141)   ondansetron (ZOFRAN) injection 4 mg (4 mg Intravenous $Given 8/11/23 0141)   iopamidol (ISOVUE-370) solution 90 mL (90 mLs Intravenous $Given 8/11/23 0249)   morphine (PF) injection 4 mg (4 mg Intravenous $Given 8/11/23 0349)       NEW PRESCRIPTIONS STARTED AT TODAY'S ER VISIT  New Prescriptions    DOCUSATE SODIUM (COLACE) 100 MG CAPSULE    Take 1 capsule (100 mg) by mouth 2 times daily as needed for other (While taking pain pills to prevent constipation)    HYDROCODONE-ACETAMINOPHEN (NORCO) 5-325 MG TABLET    Take 1-2 tablets by mouth every 4 hours as needed for moderate to severe pain          =================================================================    HPI        Alina Martell is a 58 year old female with a pertinent history of chronic pain, paroxysmal atrial fibrillation, CAD, ADA who presents to this ED via walk-in for evaluation of back pain and shoulder pain.    The patient woke up tonight with sharp left shoulder pain that radiates over her left shoulder blade. Her pain intensifies when she moves her neck or lays down. She currently rates her pain at an 8/10. Her symptoms alleviate when she stands or sits up. She denied having these symptoms before. She felt fine when she went to bed. She does have a history of paroxysmal atrial fibrillation and is currently on  Eliquis. She has rheumatoid arthritis. No other medical history. She recently had a hysterectomy last month. She denied any history of heart attacks before, but notes having fast heart rates in the 300s a few times in the past. She is allergic to sulfa and penicillin. She does not smoke cigarettes or drink alcohol.    Otherwise the patient denied having cough, fever, shortness of breath, and any other medical complaints or concerns at this time.    REVIEW OF SYSTEMS     Constitutional:  Denies fever or chills  HENT:  Denies sore throat   Respiratory:  Denies cough or shortness of breath   Cardiovascular:  Denies chest pain or palpitations  GI:  Denies abdominal pain, nausea, or vomiting  Musculoskeletal:  Positive for left shoulder pain that radiates over the left shoulder blade.  Skin:  Denies rash   Neurologic:  Denies headache, focal weakness or sensory changes    All other systems reviewed and are negative      PAST MEDICAL HISTORY:  Past Medical History:   Diagnosis Date    Anemia     Antiplatelet or antithrombotic long-term use     Arrhythmia     Cannabis use without complication 12/8/2014    Carpal tunnel syndrome     Coronary artery disease     Gastroesophageal reflux disease     History of angina     Hypertension     Irregular heart beat     Obesity     Paroxysmal atrial fibrillation (H)     PTSD (post-traumatic stress disorder)     RA (rheumatoid arthritis) (H)     Rheumatoid arthritis (H) 7/8/2016    Sicca syndrome (H)     Sleep apnea        PAST SURGICAL HISTORY:  Past Surgical History:   Procedure Laterality Date    CHOLECYSTECTOMY      CYSTOSCOPY N/A 5/4/2023    Procedure: AND CYSTOSCOPY;  Surgeon: Irma Cary MD;  Location: [facility] OR    DAVINCI HYSTERECTOMY TOTAL Bilateral 5/4/2023    Procedure: ROBOTIC ASSISTED TOTAL LAPAROSCOPIC HYSTERECTOMY WITH BILATERAL SALPINGECTOMY;  Surgeon: Irma Cary MD;  Location: [facility] OR    DILATION AND CURETTAGE, OPERATIVE  HYSTEROSCOPY, COMBINED N/A 3/3/2022    Procedure: HYSTEROSCOPY, WITH DILATION AND CURETTAGE;  Surgeon: Irma Cary MD;  Location: [facility]    EP ABLATION FOCAL AFIB N/A 6/30/2022    Procedure: Ablation Atrial Fibrilation;  Surgeon: Jose Guadalupe Joseph MD;  Location:  [facility]    HC REMOVAL GALLBLADDER      Description: Cholecystectomy;  Recorded: 10/15/2013;    LAPAROSCOPIC TUBAL LIGATION      RELEASE CARPAL TUNNEL BILATERAL      TUBAL LIGATION  1995           CURRENT MEDICATIONS:    docusate sodium (COLACE) 100 MG capsule  HYDROcodone-acetaminophen (NORCO) 5-325 MG tablet  apixaban ANTICOAGULANT (ELIQUIS ANTICOAGULANT) 5 MG tablet  busPIRone (BUSPAR) 5 MG tablet  cetirizine (ZYRTEC) 10 MG tablet  diltiazem ER COATED BEADS (CARDIZEM CD/CARTIA XT) 180 MG 24 hr capsule  EPINEPHrine (ANY BX GENERIC EQUIV) 0.3 MG/0.3ML injection 2-pack  ferrous gluconate (FERGON) 324 (38 Fe) MG tablet  flecainide (TAMBOCOR) 50 MG tablet  HUMIRA *CF* PEN 40 MG/0.4ML pen kit  Multiple Vitamins-Minerals (CENTROVITE) TABS  omeprazole (PRILOSEC) 40 MG DR capsule  topiramate (TOPAMAX) 25 MG tablet  vitamin D3 (CHOLECALCIFEROL) 1.25 MG (62798 UT) capsule        ALLERGIES:  Allergies   Allergen Reactions    Penicillins Shortness Of Breath, Palpitations, Dizziness, Anaphylaxis, Hives, Itching and Rash     Test in 2031, see allergy note on 7/29/21    Shellfish-Derived Products Anaphylaxis    Sulfa Antibiotics Hives and Anaphylaxis       FAMILY HISTORY:  Family History   Problem Relation Age of Onset    Crohn's Disease Mother         Total colectomy with ileostomy.    Atrial fibrillation Mother     Snoring Father     Diabetes Father     Prostate Cancer Father     Chronic Kidney Disease Father         Chose against dialysis.    Substance Abuse Brother     Depression Brother     Attention Deficit Disorder Brother     Alcoholism Brother     Arrhythmia Brother     Alcoholism Brother     Substance Abuse Brother      Arrhythmia Brother     Alcoholism Maternal Grandfather     No Known Problems Daughter     No Known Problems Son     Lupus Cousin     Breast Cancer No family hx of        SOCIAL HISTORY:   Social History     Socioeconomic History    Marital status: Single     Spouse name: None    Number of children: 2    Years of education: 4    Highest education level: None   Tobacco Use    Smoking status: Never     Passive exposure: Past    Smokeless tobacco: Never    Tobacco comments:     smokes marijuana   Vaping Use    Vaping Use: Never used   Substance and Sexual Activity    Alcohol use: Not Currently     Comment: Alcoholic Drinks/day: Never an issue, she states.  7/8/16    Drug use: Not Currently     Comment: Drug use: Former marijuana use, not current. 7/8/16    Sexual activity: Never     Partners: Male     Birth control/protection: Other     Comment: tubal ligation       VITALS:  Patient Vitals for the past 24 hrs:   BP Temp Temp src Pulse Resp SpO2 Weight   08/11/23 0430 -- -- -- -- -- 91 % --   08/11/23 0400 125/64 -- -- 95 -- -- --   08/11/23 0336 138/70 -- -- 86 -- 94 % --   08/11/23 0259 113/61 -- -- 89 -- 91 % --   08/11/23 0244 115/56 -- -- 88 -- 92 % --   08/11/23 0159 135/62 -- -- 82 -- 91 % --   08/11/23 0145 (!) 143/71 -- -- 90 20 94 % --   08/11/23 0129 (!) 146/71 -- -- 85 19 97 % --   08/11/23 0114 (!) 143/68 -- -- 86 27 96 % --   08/11/23 0004 (!) 191/91 98.3  F (36.8  C) Oral 90 22 92 % 130.8 kg (288 lb 6.4 oz)       PHYSICAL EXAM    Constitutional:  Well developed, obese  HENT:  Normocephalic, Atraumatic, Bilateral external ears normal, Oropharynx moist, Nose normal.   Neck:  Normal range of motion, No meningismus, No stridor.   Eyes:  EOMI, Conjunctiva normal, No discharge.   Respiratory:  Normal breath sounds, No respiratory distress, No wheezing, No chest tenderness.   Cardiovascular:  Normal heart rate, Normal rhythm, No murmurs  GI:  Soft, No tenderness, No guarding, No CVA tenderness.    Musculoskeletal:  Neurovascularly intact distally, No edema, tenderness over left scapula, No cyanosis, Good range of motion in all major joints.   Integument:  Warm, Dry, No erythema, No rash.   Lymphatic:  No lymphadenopathy noted.   Neurologic:  Alert & oriented x 3, Normal motor function, Normal sensory function, No focal deficits noted.   Psychiatric:  Affect normal, Judgment normal, Mood normal.      LAB:  All pertinent labs reviewed and interpreted.  Results for orders placed or performed during the hospital encounter of 08/11/23   CT Chest Pulmonary Embolism w Contrast    Impression    IMPRESSION:  1.  Negative for pulmonary embolism.    2.  No evidence of pneumonia or pulmonary edema.   Result Value Ref Range    INR 1.04 0.85 - 1.15   Comprehensive metabolic panel   Result Value Ref Range    Sodium 140 136 - 145 mmol/L    Potassium 3.5 3.4 - 5.3 mmol/L    Chloride 102 98 - 107 mmol/L    Carbon Dioxide (CO2) 28 22 - 29 mmol/L    Anion Gap 10 7 - 15 mmol/L    Urea Nitrogen 17.3 6.0 - 20.0 mg/dL    Creatinine 0.68 0.51 - 0.95 mg/dL    Calcium 9.3 8.6 - 10.0 mg/dL    Glucose 144 (H) 70 - 99 mg/dL    Alkaline Phosphatase 102 35 - 104 U/L    AST 19 0 - 45 U/L    ALT 19 0 - 50 U/L    Protein Total 7.5 6.4 - 8.3 g/dL    Albumin 3.8 3.5 - 5.2 g/dL    Bilirubin Total <0.2 <=1.2 mg/dL    GFR Estimate >90 >60 mL/min/1.73m2   Result Value Ref Range    Lipase 16 13 - 60 U/L   Result Value Ref Range    Troponin T, High Sensitivity <6 <=14 ng/L   UA with Microscopic reflex to Culture    Specimen: Urine, Midstream   Result Value Ref Range    Color Urine Light Yellow Colorless, Straw, Light Yellow, Yellow    Appearance Urine Clear Clear    Glucose Urine Negative Negative mg/dL    Bilirubin Urine Negative Negative    Ketones Urine Negative Negative mg/dL    Specific Gravity Urine 1.029 1.001 - 1.030    Blood Urine 0.03 mg/dL (A) Negative    pH Urine 6.0 5.0 - 7.0    Protein Albumin Urine 30 (A) Negative mg/dL     Urobilinogen Urine <2.0 <2.0 mg/dL    Nitrite Urine Negative Negative    Leukocyte Esterase Urine Negative Negative    RBC Urine 3 (H) <=2 /HPF    WBC Urine 1 <=5 /HPF    Squamous Epithelials Urine <1 <=1 /HPF   D dimer quantitative   Result Value Ref Range    D-Dimer Quantitative 0.30 0.00 - 0.50 ug/mL FEU   CBC with platelets and differential   Result Value Ref Range    WBC Count 11.4 (H) 4.0 - 11.0 10e3/uL    RBC Count 4.31 3.80 - 5.20 10e6/uL    Hemoglobin 10.3 (L) 11.7 - 15.7 g/dL    Hematocrit 33.9 (L) 35.0 - 47.0 %    MCV 79 78 - 100 fL    MCH 23.9 (L) 26.5 - 33.0 pg    MCHC 30.4 (L) 31.5 - 36.5 g/dL    RDW 16.5 (H) 10.0 - 15.0 %    Platelet Count 355 150 - 450 10e3/uL    % Neutrophils 65 %    % Lymphocytes 26 %    % Monocytes 7 %    % Eosinophils 2 %    % Basophils 0 %    % Immature Granulocytes 0 %    NRBCs per 100 WBC 0 <1 /100    Absolute Neutrophils 7.4 1.6 - 8.3 10e3/uL    Absolute Lymphocytes 2.9 0.8 - 5.3 10e3/uL    Absolute Monocytes 0.8 0.0 - 1.3 10e3/uL    Absolute Eosinophils 0.2 0.0 - 0.7 10e3/uL    Absolute Basophils 0.1 0.0 - 0.2 10e3/uL    Absolute Immature Granulocytes 0.0 <=0.4 10e3/uL    Absolute NRBCs 0.0 10e3/uL   Troponin T, High Sensitivity (now)   Result Value Ref Range    Troponin T, High Sensitivity <6 <=14 ng/L       RADIOLOGY:  I have independently reviewed and interpreted the above imaging, pending the final radiology read.  CT Chest Pulmonary Embolism w Contrast   Final Result   IMPRESSION:   1.  Negative for pulmonary embolism.      2.  No evidence of pneumonia or pulmonary edema.          EK-86, sinus, no st segment elevation or depresssion  I have independently reviewed and interpreted this EKG          Santiago MYLES, am serving as a scribe to document services personally performed by Dr. Shepard based on my observation and the provider's statements to me. I, Juanis Shepard MD attest that Santiago Villafana is acting in a scribe capacity, has observed my  performance of the services and has documented them in accordance with my direction.    Juanis Shepard MD  Emergency Medicine  [facility] EMERGENCY DEPARTMENT  [facility] [facility] [id number] [phone]  Dept: [phone]       Juanis Shepard MD  08/11/23 0453

## 2023-08-11 NOTE — ED TRIAGE NOTES
Pt awaken with left back and shoulder pain. No injury. Pt has history of A- fib and takes Eliquis and Cardizem. No c/o chest pain, c/o shortness of breath when breathing. Pt concern related to her Afib history.     Triage Assessment       Row Name 08/11/23 0006       Triage Assessment (Adult)    Airway WDL WDL       Respiratory WDL    Respiratory WDL WDL       Skin Circulation/Temperature WDL    Skin Circulation/Temperature WDL WDL       Cardiac WDL    Cardiac WDL WDL       Peripheral/Neurovascular WDL    Peripheral Neurovascular WDL WDL       Cognitive/Neuro/Behavioral WDL    Cognitive/Neuro/Behavioral WDL WDL

## 2023-08-14 ENCOUNTER — OFFICE VISIT (OUTPATIENT)
Dept: FAMILY MEDICINE | Facility: CLINIC | Age: 58
End: 2023-08-14
Payer: COMMERCIAL

## 2023-08-14 ENCOUNTER — HOSPITAL ENCOUNTER (OUTPATIENT)
Dept: GENERAL RADIOLOGY | Facility: HOSPITAL | Age: 58
Discharge: HOME | End: 2023-08-14
Attending: NURSE PRACTITIONER | Admitting: NURSE PRACTITIONER
Payer: COMMERCIAL

## 2023-08-14 VITALS
BODY MASS INDEX: 42.09 KG/M2 | OXYGEN SATURATION: 96 % | DIASTOLIC BLOOD PRESSURE: 93 MMHG | SYSTOLIC BLOOD PRESSURE: 161 MMHG | TEMPERATURE: 98.1 F | HEART RATE: 77 BPM | RESPIRATION RATE: 16 BRPM | WEIGHT: 285 LBS

## 2023-08-14 DIAGNOSIS — S89.91XA INJURY OF RIGHT LOWER LEG, INITIAL ENCOUNTER: ICD-10-CM

## 2023-08-14 DIAGNOSIS — T14.8XXA HEMATOMA: Primary | ICD-10-CM

## 2023-08-14 DIAGNOSIS — Z79.01 LONG TERM CURRENT USE OF ANTICOAGULANT THERAPY: ICD-10-CM

## 2023-08-14 PROCEDURE — 99214 OFFICE O/P EST MOD 30 MIN: CPT | Performed by: NURSE PRACTITIONER

## 2023-08-14 PROCEDURE — 73590 X-RAY EXAM OF LOWER LEG: CPT | Mod: RT

## 2023-08-14 NOTE — PROGRESS NOTES
Assessment & Plan     Injury of right lower leg, initial encounter    - XR Tibia and Fibula Right 2 Views    Hematoma      Long term current use of anticoagulant therapy         Patient who accidentally stepped wrong on a piece of concrete slab that hit her on the anterior shin couple of days ago.  Does have a hematoma overlying the site.  Pain seems to be worsening rather than improving.  Has been using ice packs.    Negative x-rays for fracture of the tib-fib.    Advised the following and provided hematoma handout:   Keep your leg elevated is much as possible while seated.  Continue to apply ice packs today 4-6 times per day.  After that, you can try warm packs.    Tylenol 1000 mg 3 times daily as needed.    Try to avoid ibuprofen due to your blood thinner.    You can try compression Ace wrap to the area to see if that is helpful to reduce swelling. --Ace wrap was applied over the hematoma prior to departure.            Return in about 1 month (around 9/14/2023) for If no better.    Alisia Jarquin, [facility]    Markus Salas is a 58 year old female who presents to clinic today for the following health issues:  Chief Complaint   Patient presents with    Leg Injury     Rt leg calf x Saturday.     HPI    Patient on Eliquis for history of atrial fibrillation with right shin/calf injury starting 2 days ago.  She stepped on a concrete slab which [place]ter in the shin.  Pain is starting to worsen rather than improve since his happened.  Better with elevation.  Rates pain 7 out of 10.  Has been applying ice packs with some decrease in the swelling around the area.    Took a Tylenol dual action with Tylenol and ibuprofen prior to arrival.  She was told in the past not to take ibuprofen due to the thinner.  However, Tylenol was not working alone.    Works as an .    Recent evaluation in the emergency department for thoracic back pain including negative PE study on August  11.    At that time, she had normal electrolytes including potassium of 3.5.      Review of Systems    Denies other injuries         Objective    BP (!) 161/93   Pulse 77   Temp 98.1  F (36.7  C) (Oral)   Resp 16   Wt 129.3 kg (285 lb)   LMP 04/06/2023 (Within Days)   SpO2 96%   BMI 42.09 kg/m    Physical Exam  Constitutional:       Appearance: Normal appearance.   Pulmonary:      Effort: Pulmonary effort is normal.   Musculoskeletal:         General: Swelling and tenderness present.   Skin:     Findings: Bruising (Mildly elevated hematoma located on the right anterior medial shin area measured 8 cm x 6 cm.) present.   Neurological:      Mental Status: She is alert.   Psychiatric:         Mood and Affect: Mood normal.       Results for orders placed or performed during the hospital encounter of 08/14/23   XR Tibia and Fibula Right 2 Views     Status: None    Narrative    EXAM: XR TIBIA AND FIBULA RIGHT 2 VIEWS  LOCATION: [facility]  DATE: 8/14/2023    INDICATION: Trauma to the lower leg with pain more so overlying the tibia  COMPARISON: None.      Impression    IMPRESSION: Tibia and fibula negative. No acute fracture.      I independently visualized the xray: Negative for fracture.

## 2023-08-14 NOTE — PATIENT INSTRUCTIONS
Nothing is broken.    Keep your leg elevated is much as possible while seated.  Continue to apply ice packs today 4-6 times per day.  After that, you can try warm packs.    Tylenol 1000 mg 3 times daily as needed.    Try to avoid ibuprofen due to your blood thinner.    You can try compression Ace wrap to the area to see if that is helpful to reduce swelling.    See handout for more information.

## 2023-08-15 ENCOUNTER — OFFICE VISIT (OUTPATIENT)
Dept: PSYCHOLOGY | Facility: CLINIC | Age: 58
End: 2023-08-15
Payer: COMMERCIAL

## 2023-08-15 DIAGNOSIS — F41.1 GAD (GENERALIZED ANXIETY DISORDER): ICD-10-CM

## 2023-08-15 DIAGNOSIS — F33.1 MODERATE EPISODE OF RECURRENT MAJOR DEPRESSIVE DISORDER (H): Primary | ICD-10-CM

## 2023-08-15 PROCEDURE — 90834 PSYTX W PT 45 MINUTES: CPT | Performed by: COUNSELOR

## 2023-08-15 ASSESSMENT — PATIENT HEALTH QUESTIONNAIRE - PHQ9
10. IF YOU CHECKED OFF ANY PROBLEMS, HOW DIFFICULT HAVE THESE PROBLEMS MADE IT FOR YOU TO DO YOUR WORK, TAKE CARE OF THINGS AT HOME, OR GET ALONG WITH OTHER PEOPLE: SOMEWHAT DIFFICULT
SUM OF ALL RESPONSES TO PHQ QUESTIONS 1-9: 8
SUM OF ALL RESPONSES TO PHQ QUESTIONS 1-9: 8

## 2023-08-15 NOTE — PROGRESS NOTES
[facility]                                     Progress Note    Patient Name: Alina Martell  Date: 7/27/23         Service Type: Individual      Session Start Time: 805 Session End Time: 855     Session Length: 50 minutes    Session #: 58    Attendees: Client    Service Modality:  Video Visit:     Telemedicine Visit: The patient's condition can be safely assessed and treated via synchronous audio and visual telemedicine encounter.       Reason for Telemedicine Visit: Services only offered telehealth     Originating Site (Patient Location): Patient's place of employment     Distant Location (provider location):  Off-site     Consent:  The patient/guardian has verbally consented to: the potential risks and benefits of telemedicine (video visit) versus in person care; bill my insurance or make self-payment for services provided; and responsibility for payment of non-covered services.      Mode of Communication:  Video Conference via IMVU     As the provider I attest to compliance with applicable laws and regulations related to telemedicine.    DATA  Interactive Complexity: No  Crisis: No        Progress Since Last Session (Related to Symptoms / Goals / Homework):   Symptoms: No change continuing with impulse behavior's    Homework: Achieved / completed to satisfaction      Episode of Care Goals: Satisfactory progress - ACTION (Actively working towards change); Intervened by reinforcing change plan / affirming steps taken     Current / Ongoing Stressors and Concerns:  Patient indicated feeling overall good. Patient reported she continues to struggle with her impulses. Patient indicated she has gone to the [employer] a few times. Patient reported talking with a friend who suggested that she pays off her bills before going to the [employer]. Patient indicated she is working on planning more activities after work since this is when the behavior typically occurs. Patient reported she is also  struggling a lot with sleep. This therapist went over sleep hygiene.      Treatment Objective(s) Addressed in This Session:   limit amount of time spent on repetitive or obsessive thoughts to 10 minutes  Increase interest, engagement, and pleasure in doing things  Decrease frequency and intensity of feeling down, depressed, hopeless  Improve quantity and quality of night time sleep / decrease daytime naps  Feel less tired and more energy during the day   Identify negative self-talk and behaviors: challenge core beliefs, myths, and actions     Intervention:   Motivational Interviewing    MI Intervention: Expressed Empathy/Understanding     Change Talk Expressed by the Patient: Taking steps    Provider Response to Change Talk: E - Evoked more info from patient about behavior change and A - Affirmed patient's thoughts, decisions, or attempts at behavior change      Assessments completed prior to visit:  The following assessments were completed by patient for this visit:  PHQ9:       11/3/2022    12:05 PM 12/1/2022     9:51 AM 1/9/2023     3:57 PM 3/10/2023     8:12 AM 5/23/2023     5:42 AM 7/5/2023     2:17 PM 7/5/2023     2:18 PM   PHQ-9 SCORE   PHQ-9 Total Score MyChart 9 (Mild depression) 9 (Mild depression) 6 (Mild depression) 5 (Mild depression) 6 (Mild depression)  8 (Mild depression)   PHQ-9 Total Score 9 9 6 5 6 8      GAD7:       11/15/2021     7:32 AM 12/13/2021     9:02 AM 8/10/2022    10:15 AM 9/22/2022    11:13 AM 11/3/2022    12:06 PM 12/1/2022     9:52 AM 7/5/2023     2:18 PM   ADA-7 SCORE   Total Score 6 (mild anxiety)  6 (mild anxiety) 9 (mild anxiety) 10 (moderate anxiety) 8 (mild anxiety) 6 (mild anxiety)   Total Score 6 4 6 9 10 8 6     PROMIS 10-Global Health (all questions and answers displayed):       3/26/2022     5:15 PM 4/14/2022     6:51 AM 7/19/2022    11:13 AM 11/3/2022    12:07 PM 12/1/2022     9:53 AM 3/10/2023     8:13 AM 6/13/2023     6:37 AM   PROMIS 10   In general, would you say your  health is: Good Good Good Good Good Fair Good   In general, would you say your quality of life is: Good Good Fair Good Good Good Good   In general, how would you rate your physical health? Fair Fair Good Good Fair Fair Fair   In general, how would you rate your mental health, including your mood and your ability to think? Fair Good Fair Fair Fair Fair Fair   In general, how would you rate your satisfaction with your social activities and relationships? Fair Good Good Fair Good Good Good   In general, please rate how well you carry out your usual social activities and roles Good Fair Good Good Good Good Good   To what extent are you able to carry out your everyday physical activities such as walking, climbing stairs, carrying groceries, or moving a chair? Mostly Mostly Mostly Mostly Mostly Mostly Mostly   In the past 7 days, how often have you been bothered by emotional problems such as feeling anxious, depressed, or irritable? Sometimes Often Often Often Often Sometimes Often   In the past 7 days, how would you rate your fatigue on average? Mild Mild Moderate Mild Mild Moderate Mild   In the past 7 days, how would you rate your pain on average, where 0 means no pain, and 10 means worst imaginable pain? 3 3 3 2 3 4 2   In general, would you say your health is: 3 3 3 3 3 2 3   In general, would you say your quality of life is: 3 3 2 3 3 3 3   In general, how would you rate your physical health? 2 2 3 3 2 2 2   In general, how would you rate your mental health, including your mood and your ability to think? 2 3 2 2 2 2 2   In general, how would you rate your satisfaction with your social activities and relationships? 2 3 3 2 3 3 3   In general, please rate how well you carry out your usual social activities and roles. (This includes activities at home, at work and in your community, and responsibilities as a parent, child, spouse, employee, friend, etc.) 3 2 3 3 3 3 3   To what extent are you able to carry out your  everyday physical activities such as walking, climbing stairs, carrying groceries, or moving a chair? 4 4 4 4 4 4 4   In the past 7 days, how often have you been bothered by emotional problems such as feeling anxious, depressed, or irritable? 3 4 4 4 4 3 4   In the past 7 days, how would you rate your fatigue on average? 2 2 3 2 2 3 2   In the past 7 days, how would you rate your pain on average, where 0 means no pain, and 10 means worst imaginable pain? 3 3 3 2 3 4 2   Global Mental Health Score 10 11 9 9 10 11 10   Global Physical Health Score 14 14 14 15 14 12 14   PROMIS TOTAL - SUBSCORES 24 25 23 24 24 23 24         ASSESSMENT: Current Emotional / Mental Status (status of significant symptoms):   Risk status (Self / Other harm or suicidal ideation)   Patient denies current fears or concerns for personal safety.   Patient denies current or recent suicidal ideation or behaviors.   Patient denies current or recent homicidal ideation or behaviors.   Patient denies current or recent self injurious behavior or ideation.   Patient denies other safety concerns.   Patient reports there has been no change in risk factors since their last session.     Patient reports there has been no change in protective factors since their last session.     Recommended that patient call 911 or go to the local ED should there be a change in any of these risk factors.     Appearance:   Appropriate    Eye Contact:   Good    Psychomotor Behavior: Normal    Attitude:   Cooperative    Orientation:   All   Speech    Rate / Production: Normal/ Responsive    Volume:  Normal    Mood:    Anxious  Depressed    Affect:    Appropriate    Thought Content:  Clear    Thought Form:  Coherent  Goal Directed  Logical    Insight:    Good      Medication Review:   No changes to current psychiatric medication(s)     Medication Compliance:   Yes     Changes in Health Issues:   None reported     Chemical Use Review:   Substance Use: Chemical use reviewed, no  active concerns identified      Tobacco Use: No current tobacco use.      Diagnosis:  1. Moderate episode of recurrent major depressive disorder (H)    2. ADA (generalized anxiety disorder)        Collateral Reports Completed:   Not Applicable    PLAN: (Patient Tasks / Therapist Tasks / Other)  Patient will return in three weeks for scheduled session. Patient will continue to work on using skills taught in session for impulse control. Patient would benefit from continuing to work on sleep hygiene and following up with PCP as needed.     There has been demonstrated improvement in functioning while patient has been engaged in psychotherapy/psychological service- if withdrawn the patient would deteriorate and/or relapse.     Mariana Love, [facility]     ______________________________________________________________________    Individual Treatment Plan    Patient's Name: Alina Martell  YOB: 1965    Date of Creation:10/16/2020  Date Treatment Plan Last Reviewed/Revised: 6/13/2023    DSM5 Diagnoses: 296.32 (F33.1) Major Depressive Disorder, Recurrent Episode, Moderate _ or 300.02 (F41.1) Generalized Anxiety Disorder  Psychosocial / Contextual Factors: Health, family and financial   PROMIS (reviewed every 90 days): 25    Referral / Collaboration:  Was/were discussed and patient will pursue.    Anticipated number of session for this episode of care: 20 will reevaluate every 90 days  Anticipation frequency of session: Biweekly  Anticipated Duration of each session: 38-52 minutes  Treatment plan will be reviewed in 90 days or when goals have been changed.       MeasurableTreatment Goal(s) related to diagnosis / functional impairment(s)  Goal 1: Patient will work on reducing overall anxiety.     I will know I've met my goal when reporting minimal anxiety symptoms.       Objective #A (Patient Action)                          Patient will use distraction each time intrusive worry surfaces.  Status: Continued  - Date(s): 6/13/2023     Intervention(s)  Therapist will teach emotional regulation skills. ..     Objective #B  Patient will Decrease frequency and intensity of feeling down, depressed, hopeless.  Status: Continued - Date(s):  6/13/2023     Intervention(s)  Therapist will teach emotional recognition/identification. ..     Objective #C  Patient will Identify negative self-talk and behaviors: challenge core beliefs, myths, and actions.  Status: Continued - Date(s):  6/13/2023     Intervention(s)  Therapist will teach the client how to perform a behavioral chain analysis. ..     Goal 2: Patient will continue to work on reducing depression symptoms    I will know I've met my goal then reporting minimal depression symptoms     Objective #A (Patient Action)                          Patient will Increase interest, engagement, and pleasure in doing things.  Status: Continued - Date(s):   6/13/2023     Intervention(s)  Therapist will assign homework ..     Objective #B  Patient will Decrease frequency and intensity of feeling down, depressed, hopeless.  Status: Continued - Date(s):    6/13/2023  Intervention(s)  Therapist will assign homework at every session.         Patient has reviewed and agreed to the above plan.        Mariana Love, [facility]  6/13/2023

## 2023-08-16 ENCOUNTER — OFFICE VISIT (OUTPATIENT)
Dept: RHEUMATOLOGY | Facility: CLINIC | Age: 58
End: 2023-08-16
Payer: COMMERCIAL

## 2023-08-16 VITALS
HEART RATE: 84 BPM | WEIGHT: 288 LBS | BODY MASS INDEX: 42.53 KG/M2 | SYSTOLIC BLOOD PRESSURE: 130 MMHG | DIASTOLIC BLOOD PRESSURE: 80 MMHG

## 2023-08-16 DIAGNOSIS — M17.0 PRIMARY OSTEOARTHRITIS OF BOTH KNEES: ICD-10-CM

## 2023-08-16 DIAGNOSIS — M05.79 SEROPOSITIVE RHEUMATOID ARTHRITIS OF MULTIPLE SITES (H): Primary | ICD-10-CM

## 2023-08-16 DIAGNOSIS — R76.8 CYCLIC CITRULLINATED PEPTIDE (CCP) ANTIBODY POSITIVE: ICD-10-CM

## 2023-08-16 DIAGNOSIS — Z79.899 HIGH RISK MEDICATION USE: ICD-10-CM

## 2023-08-16 DIAGNOSIS — M25.50 POLYARTHRALGIA: ICD-10-CM

## 2023-08-16 PROCEDURE — 99214 OFFICE O/P EST MOD 30 MIN: CPT | Performed by: INTERNAL MEDICINE

## 2023-08-16 RX ORDER — ADALIMUMAB 40MG/0.4ML
40 KIT SUBCUTANEOUS
Qty: 0.8 ML | Refills: 5 | Status: SHIPPED | OUTPATIENT
Start: 2023-08-16 | End: 2024-02-19

## 2023-08-16 NOTE — PROGRESS NOTES
"      Rheumatology follow-up visit note     Alina is a 58 year old female presents today for follow-up.    Alina was seen today for recheck.    Diagnoses and all orders for this visit:    Seropositive rheumatoid arthritis of multiple sites (H)  -     adalimumab (HUMIRA *CF* PEN) 40 MG/0.4ML pen kit; Inject 0.4 mLs (40 mg) Subcutaneous every 14 days for 30 days    Polyarthralgia    Cyclic citrullinated peptide (CCP) antibody positive    High risk medication use    Primary osteoarthritis of both knees        Her seropositive rheumatoid arthritis is well controlled with Humira.  She has chronically elevated sedimentation rate going back several years.  This is unlikely due to the RA.  Rest of the recent labs are within acceptable range from the toxicity monitoring perspective.    Follow up in 6 months.    HPI    Alina Martell is a 58 year old female is here for follow-up of  rheumatoid arthritis.  This was severe.  Seropositive. This is polyarticular. Family history of psoriasis and brother, mom's history of Crohn's.  She has done great.  She is on Humira that she inject every 2 weeks. No longer on hydroxychloroquine.  She has noted mild discomfort in her knees.  Several years ago x-rays of the knees were done.  She would notice some swelling in the lower extremities [place] with her grandson.  She has longstanding chronic anemia and elevated sedimentation rate going back at least 2015.  She recently had hysterectomy.  In the past she has been on methotrexate that caused hair loss and diarrhea. She considers herself a\" flexaterian\", and is trying to add more vegetarian meals.      DETAILED EXAMINATION  08/16/23  :    Vitals:    08/16/23 0801   BP: 130/80   Pulse: 84   Weight: 130.6 kg (288 lb)     Alert oriented. Head including the face is examined for malar rash, heliotropes, scarring, lupus pernio. Eyes examined for redness such as in episcleritis/scleritis, " periorbital lesions.   Neck/ Face examined for parotid gland swelling, range of motion of neck.  Left upper and lower and right upper and lower extremities examined for tenderness, swelling, warmth of the appendicular joints, range of motion, edema, rash.  Some of the important findings included: she does not have evidence of synovitis in the palpable joints of the upper extremities.  No significant deformities of the digits.  no Heberden nodes.  Range of motion of the shoulders  show full abduction.  No JLT effusion or warmth of the knees.  she does not have dactylitis of the digits.     Patient Active Problem List    Diagnosis Date Noted    Abnormal uterine bleeding 05/05/2023     Priority: Medium    Uterine fibroid 05/04/2023     Priority: Medium    Vitamin D deficiency 03/23/2023     Priority: Medium    Status post catheter ablation of atrial fibrillation 08/23/2022     Priority: Medium     6/30/2022 with Dr. Joseph  1. Mild left atrial dilation without significant scattered fibrosis  2. Atrial fibrillation ablation via PVI        History of colonic polyps 04/05/2022     Priority: Medium     Colonscopy 3/22 - repeat in 3-5 year      Postmenopausal bleeding 02/16/2022     Priority: Medium    Obstructive sleep apnea syndrome 01/20/2022     Priority: Medium    Essential hypertension 01/20/2022     Priority: Medium    Coronary artery disease of native artery with stable angina pectoris (H) 01/20/2022     Priority: Medium    Seropositive rheumatoid arthritis of multiple sites (H) 01/05/2022     Priority: Medium    Recurrent major depressive disorder, in full remission (H) 01/05/2022     Priority: Medium    Paroxysmal atrial fibrillation (H) 01/05/2022     Priority: Medium    Morbid obesity (H) 01/05/2022     Priority: Medium    Chest pain 07/11/2021     Priority: Medium    ADA (generalized anxiety disorder) 03/31/2021     Priority: Medium    Depressed 11/14/2018     Priority: Medium    Cyclic citrullinated peptide  (CCP) antibody positive 10/03/2017     Priority: Medium    Tendonitis of both shoulders 06/09/2017     Priority: Medium    Chronic pain 07/18/2016     Priority: Medium    Olecranon bursitis of right elbow 07/08/2016     Priority: Medium     Formatting of this note might be different from the original.  Replacement Utility updated for latest IMO load      Grief 03/29/2016     Priority: Medium    Sicca syndrome (H) 10/27/2015     Priority: Medium    Anxiety 12/08/2014     Priority: Medium    Panic attack 12/08/2014     Priority: Medium    Sleep disorder 12/05/2014     Priority: Medium    Insomnia related to another mental disorder 09/03/2014     Priority: Medium    Microcytic anemia 09/02/2014     Priority: Medium    Migraine headache 09/02/2014     Priority: Medium    Reflux 09/02/2014     Priority: Medium     Past Surgical History:   Procedure Laterality Date    CHOLECYSTECTOMY      CYSTOSCOPY N/A 5/4/2023    Procedure: AND CYSTOSCOPY;  Surgeon: Irma Cary MD;  Location: [facility] OR    DAVINCI HYSTERECTOMY TOTAL Bilateral 5/4/2023    Procedure: ROBOTIC ASSISTED TOTAL LAPAROSCOPIC HYSTERECTOMY WITH BILATERAL SALPINGECTOMY;  Surgeon: Irma Cary MD;  Location: [facility] OR    DILATION AND CURETTAGE, OPERATIVE HYSTEROSCOPY, COMBINED N/A 3/3/2022    Procedure: HYSTEROSCOPY, WITH DILATION AND CURETTAGE;  Surgeon: Irma Cary MD;  Location: [facility] OR    EP ABLATION FOCAL AFIB N/A 6/30/2022    Procedure: Ablation Atrial Fibrilation;  Surgeon: Jose Guadalupe Joseph MD;  Location:  [facility]    HC REMOVAL GALLBLADDER      Description: Cholecystectomy;  Recorded: 10/15/2013;    LAPAROSCOPIC TUBAL LIGATION      RELEASE CARPAL TUNNEL BILATERAL      TUBAL LIGATION  1995      Past Medical History:   Diagnosis Date    Anemia     Antiplatelet or antithrombotic long-term use     Arrhythmia     Cannabis use without complication 12/8/2014    Carpal tunnel syndrome      Coronary artery disease     Gastroesophageal reflux disease     History of angina     Hypertension     Irregular heart beat     Obesity     Paroxysmal atrial fibrillation (H)     PTSD (post-traumatic stress disorder)     RA (rheumatoid arthritis) (H)     Rheumatoid arthritis (H) 7/8/2016    Sicca syndrome (H)     Sleep apnea      Allergies   Allergen Reactions    Penicillins Shortness Of Breath, Palpitations, Dizziness, Anaphylaxis, Hives, Itching and Rash     Test in 2031, see allergy note on 7/29/21    Shellfish-Derived Products Anaphylaxis    Sulfa Antibiotics Hives and Anaphylaxis     Current Outpatient Medications   Medication Sig Dispense Refill    apixaban ANTICOAGULANT (ELIQUIS ANTICOAGULANT) 5 MG tablet Take 1 tablet (5 mg) by mouth 2 times daily 180 tablet 3    busPIRone (BUSPAR) 5 MG tablet Take 5 mg by mouth 3 times daily as needed      cetirizine (ZYRTEC) 10 MG tablet Take 1 tablet (10 mg) by mouth daily 90 tablet 3    diltiazem ER COATED BEADS (CARDIZEM CD/CARTIA XT) 180 MG 24 hr capsule Take 2 capsules (360 mg) by mouth daily 180 capsule 3    docusate sodium (COLACE) 100 MG capsule Take 1 capsule (100 mg) by mouth 2 times daily as needed for other (While taking pain pills to prevent constipation) 30 capsule 0    EPINEPHrine (ANY BX GENERIC EQUIV) 0.3 MG/0.3ML injection 2-pack Inject 0.3 mg into the muscle as needed for anaphylaxis      ferrous gluconate (FERGON) 324 (38 Fe) MG tablet Take 1 tablet (324 mg) by mouth daily (with breakfast) 30 tablet 0    flecainide (TAMBOCOR) 50 MG tablet Take 1 tablet (50 mg) by mouth daily as needed (atrial fibrillation) (Patient not taking: Reported on 5/24/2023) 20 tablet 4    HUMIRA *CF* PEN 40 MG/0.4ML pen kit INJECT 1 PEN UNDER THE SKIN  EVERY 14 DAYS. 2 each 0    Multiple Vitamins-Minerals (CENTROVITE) TABS Take 1 tablet by mouth daily      omeprazole (PRILOSEC) 40 MG DR capsule Take 1 capsule (40 mg) by mouth daily 90 capsule 3    topiramate (TOPAMAX) 25 MG  tablet 25mg at bedtime for 1 week, then 25mg twice daily for 1 week, then 25mg in the morning and 50mg in the evening for 1 week and finally 50mg twice daily. (Patient not taking: Reported on 5/24/2023) 120 tablet 5    vitamin D3 (CHOLECALCIFEROL) 1.25 MG (81345 UT) capsule Take 1 capsule (50,000 Units) by mouth every 7 days 12 capsule 3     family history includes Alcoholism in her brother, brother, and maternal grandfather; Arrhythmia in her brother and brother; Atrial fibrillation in her mother; Attention Deficit Disorder in her brother; Chronic Kidney Disease in her father; Crohn's Disease in her mother; Depression in her brother; Diabetes in her father; Lupus in her cousin; No Known Problems in her daughter and son; Prostate Cancer in her father; Snoring in her father; Substance Abuse in her brother and brother.  Social Connections: Not on file          WBC   Date Value Ref Range Status   11/14/2018 6.5 4.0 - 11.0 10e9/L Final     WBC Count   Date Value Ref Range Status   08/11/2023 11.4 (H) 4.0 - 11.0 10e3/uL Final     RBC Count   Date Value Ref Range Status   08/11/2023 4.31 3.80 - 5.20 10e6/uL Final   11/14/2018 4.31 3.8 - 5.2 10e12/L Final     Hemoglobin   Date Value Ref Range Status   08/11/2023 10.3 (L) 11.7 - 15.7 g/dL Final   11/14/2018 10.8 (L) 11.7 - 15.7 g/dL Final     Hematocrit   Date Value Ref Range Status   08/11/2023 33.9 (L) 35.0 - 47.0 % Final   11/14/2018 35.6 35.0 - 47.0 % Final     MCV   Date Value Ref Range Status   08/11/2023 79 78 - 100 fL Final   11/14/2018 83 78 - 100 fl Final     MCH   Date Value Ref Range Status   08/11/2023 23.9 (L) 26.5 - 33.0 pg Final   11/14/2018 25.1 (L) 26.5 - 33.0 pg Final     Platelet Count   Date Value Ref Range Status   08/11/2023 355 150 - 450 10e3/uL Final   11/14/2018 286 150 - 450 10e9/L Final     % Lymphocytes   Date Value Ref Range Status   08/11/2023 26 % Final   11/14/2018 24.6 % Final     AST   Date Value Ref Range Status   08/11/2023 19 0 - 45 U/L  Final     Comment:     Reference intervals for this test were updated on 6/12/2023 to more accurately reflect our healthy population. There may be differences in the flagging of prior results with similar values performed with this method. Interpretation of those prior results can be made in the context of the updated reference intervals.   11/14/2018 12 0 - 45 U/L Final     ALT   Date Value Ref Range Status   08/11/2023 19 0 - 50 U/L Final     Comment:     Reference intervals for this test were updated on 6/12/2023 to more accurately reflect our healthy population. There may be differences in the flagging of prior results with similar values performed with this method. Interpretation of those prior results can be made in the context of the updated reference intervals.     11/14/2018 13 0 - 50 U/L Final     Albumin   Date Value Ref Range Status   08/11/2023 3.8 3.5 - 5.2 g/dL Final   03/08/2022 3.5 3.5 - 5.0 g/dL Final   11/14/2018 2.8 (L) 3.4 - 5.0 g/dL Final     Alkaline Phosphatase   Date Value Ref Range Status   08/11/2023 102 35 - 104 U/L Final   11/14/2018 83 40 - 150 U/L Final     Creatinine   Date Value Ref Range Status   08/11/2023 0.68 0.51 - 0.95 mg/dL Final   11/14/2018 0.62 0.52 - 1.04 mg/dL Final     GFR Estimate   Date Value Ref Range Status   08/11/2023 >90 >60 mL/min/1.73m2 Final   01/21/2021 >60 >60 mL/min/1.73m2 Final   11/14/2018 >90 >60 mL/min/1.7m2 Final     Comment:     Non  GFR Calc     GFR Estimate If Black   Date Value Ref Range Status   01/21/2021 >60 >60 mL/min/1.73m2 Final   11/14/2018 >90 >60 mL/min/1.7m2 Final     Comment:      GFR Calc     Erythrocyte Sedimentation Rate   Date Value Ref Range Status   03/22/2023 57 (H) 0 - 20 mm/hr Final     CRP   Date Value Ref Range Status   03/11/2020 3.3 (H) 0.0 - 0.8 mg/dL Final     N terminal Pro BNP Inpatient   Date Value Ref Range Status   03/11/2023 <36 0 - 900 pg/mL Final     Comment:     Reference range shown  and results flagged as abnormal are suggested inpatient cut points for confirming diagnosis if CHF in an acute setting. Establishing a baseline value for each individual patient is useful for follow-up. An inpatient or emergency department NT-proPBNP <300 pg/mL effectively rules out acute CHF, with 99% negative predictive value.    The outpatient non-acute reference range for ruling out CHF is:  0-125 pg/mL (age 18 to less than 75)  0-450 pg/mL (age 75 yrs and older)

## 2023-08-30 ENCOUNTER — OFFICE VISIT (OUTPATIENT)
Dept: FAMILY MEDICINE | Facility: CLINIC | Age: 58
End: 2023-08-30
Payer: COMMERCIAL

## 2023-08-30 VITALS
WEIGHT: 287.4 LBS | BODY MASS INDEX: 42.44 KG/M2 | OXYGEN SATURATION: 97 % | SYSTOLIC BLOOD PRESSURE: 138 MMHG | RESPIRATION RATE: 18 BRPM | HEART RATE: 85 BPM | DIASTOLIC BLOOD PRESSURE: 80 MMHG

## 2023-08-30 DIAGNOSIS — M05.79 SEROPOSITIVE RHEUMATOID ARTHRITIS OF MULTIPLE SITES (H): ICD-10-CM

## 2023-08-30 DIAGNOSIS — R70.0 ELEVATED ERYTHROCYTE SEDIMENTATION RATE: Primary | ICD-10-CM

## 2023-08-30 DIAGNOSIS — R73.9 ELEVATED BLOOD SUGAR: ICD-10-CM

## 2023-08-30 DIAGNOSIS — D64.9 ANEMIA, UNSPECIFIED TYPE: ICD-10-CM

## 2023-08-30 LAB
ALBUMIN SERPL BCG-MCNC: 4 G/DL (ref 3.5–5.2)
ALT SERPL W P-5'-P-CCNC: 17 U/L (ref 0–50)
CREAT SERPL-MCNC: 0.67 MG/DL (ref 0.51–0.95)
CRP SERPL-MCNC: 16.7 MG/L
ERYTHROCYTE [DISTWIDTH] IN BLOOD BY AUTOMATED COUNT: 16 % (ref 10–15)
ERYTHROCYTE [SEDIMENTATION RATE] IN BLOOD BY WESTERGREN METHOD: 81 MM/HR (ref 0–30)
FERRITIN SERPL-MCNC: 29 NG/ML (ref 11–328)
GFR SERPL CREATININE-BSD FRML MDRD: >90 ML/MIN/1.73M2
HBA1C MFR BLD: 6.5 % (ref 0–5.6)
HCT VFR BLD AUTO: 35.3 % (ref 35–47)
HGB BLD-MCNC: 10.9 G/DL (ref 11.7–15.7)
IRON BINDING CAPACITY (ROCHE): 343 UG/DL (ref 240–430)
IRON SATN MFR SERPL: 9 % (ref 15–46)
IRON SERPL-MCNC: 32 UG/DL (ref 37–145)
MCH RBC QN AUTO: 24.6 PG (ref 26.5–33)
MCHC RBC AUTO-ENTMCNC: 30.9 G/DL (ref 31.5–36.5)
MCV RBC AUTO: 80 FL (ref 78–100)
PLATELET # BLD AUTO: 373 10E3/UL (ref 150–450)
RBC # BLD AUTO: 4.43 10E6/UL (ref 3.8–5.2)
WBC # BLD AUTO: 8.8 10E3/UL (ref 4–11)

## 2023-08-30 PROCEDURE — 99214 OFFICE O/P EST MOD 30 MIN: CPT | Mod: 25 | Performed by: FAMILY MEDICINE

## 2023-08-30 PROCEDURE — 83540 ASSAY OF IRON: CPT | Performed by: FAMILY MEDICINE

## 2023-08-30 PROCEDURE — 86038 ANTINUCLEAR ANTIBODIES: CPT | Performed by: FAMILY MEDICINE

## 2023-08-30 PROCEDURE — 90677 PCV20 VACCINE IM: CPT | Performed by: FAMILY MEDICINE

## 2023-08-30 PROCEDURE — 86140 C-REACTIVE PROTEIN: CPT | Performed by: FAMILY MEDICINE

## 2023-08-30 PROCEDURE — 84460 ALANINE AMINO (ALT) (SGPT): CPT | Performed by: FAMILY MEDICINE

## 2023-08-30 PROCEDURE — 90471 IMMUNIZATION ADMIN: CPT | Performed by: FAMILY MEDICINE

## 2023-08-30 PROCEDURE — 90746 HEPB VACCINE 3 DOSE ADULT IM: CPT | Performed by: FAMILY MEDICINE

## 2023-08-30 PROCEDURE — 90472 IMMUNIZATION ADMIN EACH ADD: CPT | Performed by: FAMILY MEDICINE

## 2023-08-30 PROCEDURE — 85027 COMPLETE CBC AUTOMATED: CPT | Performed by: FAMILY MEDICINE

## 2023-08-30 PROCEDURE — 82565 ASSAY OF CREATININE: CPT | Performed by: FAMILY MEDICINE

## 2023-08-30 PROCEDURE — 36415 COLL VENOUS BLD VENIPUNCTURE: CPT | Performed by: FAMILY MEDICINE

## 2023-08-30 PROCEDURE — 85652 RBC SED RATE AUTOMATED: CPT | Performed by: FAMILY MEDICINE

## 2023-08-30 PROCEDURE — 82040 ASSAY OF SERUM ALBUMIN: CPT | Performed by: FAMILY MEDICINE

## 2023-08-30 PROCEDURE — 83036 HEMOGLOBIN GLYCOSYLATED A1C: CPT | Performed by: FAMILY MEDICINE

## 2023-08-30 PROCEDURE — 83550 IRON BINDING TEST: CPT | Performed by: FAMILY MEDICINE

## 2023-08-30 PROCEDURE — 82728 ASSAY OF FERRITIN: CPT | Performed by: FAMILY MEDICINE

## 2023-08-30 PROCEDURE — 86235 NUCLEAR ANTIGEN ANTIBODY: CPT | Performed by: FAMILY MEDICINE

## 2023-08-30 NOTE — PROGRESS NOTES
Doned by: Petra Frost DO                  on Mon Aug 28, 2023 12:47 PM MENTAL HEALTH VISIT NOTE    06/20/2018 Start time: 400  Stop Time: 500 Session # 18    Alina Martell is a 53 y.o. female is being seen today for follow up.    New symptoms or complaints: None    Functional Impairment:   Personal: 3  Family: 4  Work: 2  Social:2    Clinical assessment of mental status: Alina Martell was on time for scheduled session today. She was open and cooperative, and dressed appropriately for this session and weather. Her memory was good for short and long term.  Her speech and language was soft and monotone.  Concentration and focus is poor to fair Psychosis is not noted or reported. She reports her mood as depressed.  Affect is congruent with speech.  Fund of knowledge is adequate. Insight is adequate for therapy.     Suicidal/Homicidal Ideation present: None Reported This Session    Patient's impression of their current status: Patient indicated continuing to feel depressed. Patient reported realizing that she continues to struggle with relationships. Patient indicated being unsure what she wants and knowing she still struggles with the negative relationships she had in her past. Patient reported also still being upset with her mom for not being more involved. Patient indicated she does not want to talk to her mom about how she feels because her mom is getting older. Patient reported feeling as though these are stuck points in her life at this time.     Therapist impression of patients current state: Patient continues to have good insight into her mental health. This therapist challenged patient on ways she continues to avoid tough topics. This therapist processed with patient ways her relationship with her mom is affected due to patient not wanting to bring up tough topics. This therapist processed with patient needing to start confronting these topics in order for her to move forward.     Type of psychotherapeutic technique provided: Insight oriented, Client centered and  CBT    Progress toward short term goals: Progress as expected noticing mental health symptoms and returning to scheduled session. Poor progress as evidenced by patient isolating and avoiding tough topics.     Review of long term goals: Treatment plan updated on 05/03/2018    Diagnosis:   1. PTSD (post-traumatic stress disorder)    2. Major depressive disorder, recurrent episode, moderate    3. Panic disorder without agoraphobia      Plan and Follow up: Patient will return in 1 week for scheduled session.   Patient should work on caring for herself and putting her needs first.  Patient would benefit from using assertive communication. Patient should work on getting out to be around positive people and not isolating. Patient would benefit from starting to identify ways she feels stuck due to not wanting to confront tough topics.     Discharge Criteria/Planning: Patient will continue with follow-up until therapy can be discontinued without return of signs and symptoms.    Encounter performed and documented by SHERRIE Pantoja

## 2023-08-30 NOTE — PROGRESS NOTES
Assessment & Plan     Elevated erythrocyte sedimentation rate  Will do labs to rule out other autoimmune issues.  Discussed acute phase response but unlikely that elevation would persistence in that case.  - Anti Nuclear Beverly IgG by IFA with Reflex  - CRP, inflammation  - ESR: Erythrocyte sedimentation rate  - SSA Ro CYNTHIA Antibody IgG  - Anti Nuclear Beverly IgG by IFA with Reflex  - CRP, inflammation  - ESR: Erythrocyte sedimentation rate  - SSA Ro CYNTHIA Antibody IgG    Anemia, unspecified type  Initially thought due to irregular bleeding and would improve s/p hysterectomy but still stable.  Will do labs  - Iron and iron binding capacity  - Ferritin  - Iron and iron binding capacity  - Ferritin    Elevated blood sugar  - Hemoglobin A1c  - Hemoglobin A1c    Seropositive rheumatoid arthritis of multiple sites (H)  Seen by rheumatology and will continue with current care plan  - CBC with platelets  - ALT  - Creatinine  - Albumin level                   Estelle Bansal MD  [facility]    Markus Salas is a 58 year old, presenting for the following health issues:  Follow Up        8/30/2023    10:07 AM   Additional Questions   Roomed by Ej JEFFREY CMA       History of Present Illness       Reason for visit:  High white blood cell count, high red blood cell count, high sedimentation count  Symptom onset:  More than a month  Symptoms include:  Extreme fatigue  Symptom intensity:  Moderate  Symptom progression:  Staying the same  Had these symptoms before:  No  What makes it worse:  Exercise, or too much activity  What makes it better:  Rest    She eats 2-3 servings of fruits and vegetables daily.She consumes 1 sweetened beverage(s) daily.She exercises with enough effort to increase her heart rate 30 to 60 minutes per day.  She exercises with enough effort to increase her heart rate 4 days per week.   She is taking medications regularly.               Review of Systems   Constitutional, HEENT,  cardiovascular, pulmonary, gi and gu systems are negative, except as otherwise noted.      Objective    /80 (BP Location: Right arm, Patient Position: Sitting, Cuff Size: Adult Large)   Pulse 85   Resp 18   Wt 130.4 kg (287 lb 6.4 oz)   LMP  (LMP Unknown)   SpO2 97%   Breastfeeding No   BMI 42.44 kg/m    Body mass index is 42.44 kg/m .  Physical Exam   GENERAL: healthy, alert and no distress  NECK: no adenopathy, no asymmetry, masses, or scars and thyroid normal to palpation  RESP: lungs clear to auscultation - no rales, rhonchi or wheezes  CV: regular rate and rhythm, normal S1 S2, no S3 or S4, no murmur, click or rub, no peripheral edema and peripheral pulses strong  ABDOMEN: soft, nontender, no hepatosplenomegaly, no masses and bowel sounds normal  MS: no gross musculoskeletal defects noted, no edema

## 2023-08-31 LAB
ANA SER QL IF: NEGATIVE
ENA SS-A AB SER IA-ACNC: 0.5 U/ML
ENA SS-A AB SER IA-ACNC: NEGATIVE

## 2023-09-07 ENCOUNTER — VIRTUAL VISIT (OUTPATIENT)
Dept: PSYCHOLOGY | Facility: CLINIC | Age: 58
End: 2023-09-07
Payer: COMMERCIAL

## 2023-09-07 DIAGNOSIS — F41.1 GAD (GENERALIZED ANXIETY DISORDER): ICD-10-CM

## 2023-09-07 DIAGNOSIS — F33.1 MODERATE EPISODE OF RECURRENT MAJOR DEPRESSIVE DISORDER (H): Primary | ICD-10-CM

## 2023-09-07 PROCEDURE — 90834 PSYTX W PT 45 MINUTES: CPT | Mod: VID | Performed by: COUNSELOR

## 2023-09-08 NOTE — PROGRESS NOTES
[facility]                                     Progress Note    Patient Name: Alina Martell  Date: 9/07/23         Service Type: Individual      Session Start Time: 103 Session End Time: 154     Session Length: 51 minutes    Session #: 59    Attendees: Client    Service Modality:  Video Visit:     Telemedicine Visit: The patient's condition can be safely assessed and treated via synchronous audio and visual telemedicine encounter.       Reason for Telemedicine Visit: Services only offered telehealth     Originating Site (Patient Location): Patient's place of employment     Distant Location (provider location):  Off-site     Consent:  The patient/guardian has verbally consented to: the potential risks and benefits of telemedicine (video visit) versus in person care; bill my insurance or make self-payment for services provided; and responsibility for payment of non-covered services.      Mode of Communication:  Video Conference via Everlater     As the provider I attest to compliance with applicable laws and regulations related to telemedicine.    DATA  Interactive Complexity: No  Crisis: No        Progress Since Last Session (Related to Symptoms / Goals / Homework):   Symptoms: Improving reduction in impulsive behavior's    Homework: Achieved / completed to satisfaction      Episode of Care Goals: Satisfactory progress - ACTION (Actively working towards change); Intervened by reinforcing change plan / affirming steps taken     Current / Ongoing Stressors and Concerns:  Patient reported overall she has been doing okay. Patient indicated she has some concerns with her health she is addressing. Patient reported she needs to try and eat healthy which worries her will turn back into an eating disorder. Patient indicated being honest with her PCP about this concern. Patient reported she has not given into her impulsive behavior's. Patient indicated she is working on keeping herself busy.  This therapist talked with patient about a nutrition to help with changes in diet.     Treatment Objective(s) Addressed in This Session:   use thought-stopping strategy daily to reduce intrusive thoughts  Increase interest, engagement, and pleasure in doing things  Decrease frequency and intensity of feeling down, depressed, hopeless  Improve quantity and quality of night time sleep / decrease daytime naps  Feel less tired and more energy during the day   Improve diet, appetite, mindful eating, and / or meal planning  Identify negative self-talk and behaviors: challenge core beliefs, myths, and actions     Intervention:   Motivational Interviewing    MI Intervention: Permission to raise concern or advise     Change Talk Expressed by the Patient: Activation Taking steps    Provider Response to Change Talk: R - Reflected patient's change talk and S - Summarized patient's change talk statements      Assessments completed prior to visit:  The following assessments were completed by patient for this visit:  PHQ9:       12/1/2022     9:51 AM 1/9/2023     3:57 PM 3/10/2023     8:12 AM 5/23/2023     5:42 AM 7/5/2023     2:17 PM 7/5/2023     2:18 PM 8/15/2023     7:27 AM   PHQ-9 SCORE   PHQ-9 Total Score MyChart 9 (Mild depression) 6 (Mild depression) 5 (Mild depression) 6 (Mild depression)  8 (Mild depression) 8 (Mild depression)   PHQ-9 Total Score 9 6 5 6 8  8     GAD7:       11/15/2021     7:32 AM 12/13/2021     9:02 AM 8/10/2022    10:15 AM 9/22/2022    11:13 AM 11/3/2022    12:06 PM 12/1/2022     9:52 AM 7/5/2023     2:18 PM   ADA-7 SCORE   Total Score 6 (mild anxiety)  6 (mild anxiety) 9 (mild anxiety) 10 (moderate anxiety) 8 (mild anxiety) 6 (mild anxiety)   Total Score 6 4 6 9 10 8 6     PROMIS 10-Global Health (all questions and answers displayed):       3/26/2022     5:15 PM 4/14/2022     6:51 AM 7/19/2022    11:13 AM 11/3/2022    12:07 PM 12/1/2022     9:53 AM 3/10/2023     8:13 AM 6/13/2023     6:37 AM   PROMIS  10   In general, would you say your health is: Good Good Good Good Good Fair Good   In general, would you say your quality of life is: Good Good Fair Good Good Good Good   In general, how would you rate your physical health? Fair Fair Good Good Fair Fair Fair   In general, how would you rate your mental health, including your mood and your ability to think? Fair Good Fair Fair Fair Fair Fair   In general, how would you rate your satisfaction with your social activities and relationships? Fair Good Good Fair Good Good Good   In general, please rate how well you carry out your usual social activities and roles Good Fair Good Good Good Good Good   To what extent are you able to carry out your everyday physical activities such as walking, climbing stairs, carrying groceries, or moving a chair? Mostly Mostly Mostly Mostly Mostly Mostly Mostly   In the past 7 days, how often have you been bothered by emotional problems such as feeling anxious, depressed, or irritable? Sometimes Often Often Often Often Sometimes Often   In the past 7 days, how would you rate your fatigue on average? Mild Mild Moderate Mild Mild Moderate Mild   In the past 7 days, how would you rate your pain on average, where 0 means no pain, and 10 means worst imaginable pain? 3 3 3 2 3 4 2   In general, would you say your health is: 3 3 3 3 3 2 3   In general, would you say your quality of life is: 3 3 2 3 3 3 3   In general, how would you rate your physical health? 2 2 3 3 2 2 2   In general, how would you rate your mental health, including your mood and your ability to think? 2 3 2 2 2 2 2   In general, how would you rate your satisfaction with your social activities and relationships? 2 3 3 2 3 3 3   In general, please rate how well you carry out your usual social activities and roles. (This includes activities at home, at work and in your community, and responsibilities as a parent, child, spouse, employee, friend, etc.) 3 2 3 3 3 3 3   To what  extent are you able to carry out your everyday physical activities such as walking, climbing stairs, carrying groceries, or moving a chair? 4 4 4 4 4 4 4   In the past 7 days, how often have you been bothered by emotional problems such as feeling anxious, depressed, or irritable? 3 4 4 4 4 3 4   In the past 7 days, how would you rate your fatigue on average? 2 2 3 2 2 3 2   In the past 7 days, how would you rate your pain on average, where 0 means no pain, and 10 means worst imaginable pain? 3 3 3 2 3 4 2   Global Mental Health Score 10 11 9 9 10 11 10   Global Physical Health Score 14 14 14 15 14 12 14   PROMIS TOTAL - SUBSCORES 24 25 23 24 24 23 24         ASSESSMENT: Current Emotional / Mental Status (status of significant symptoms):   Risk status (Self / Other harm or suicidal ideation)   Patient denies current fears or concerns for personal safety.   Patient denies current or recent suicidal ideation or behaviors.   Patient denies current or recent homicidal ideation or behaviors.   Patient denies current or recent self injurious behavior or ideation.   Patient denies other safety concerns.   Patient reports there has been no change in risk factors since their last session.     Patient reports there has been no change in protective factors since their last session.     Recommended that patient call 911 or go to the local ED should there be a change in any of these risk factors.     Appearance:   Appropriate    Eye Contact:   Good    Psychomotor Behavior: Normal    Attitude:   Cooperative    Orientation:   All   Speech    Rate / Production: Normal/ Responsive    Volume:  Normal    Mood:    Anxious  Depressed    Affect:    Appropriate    Thought Content:  Clear    Thought Form:  Coherent  Goal Directed  Logical    Insight:    Good      Medication Review:   No changes to current psychiatric medication(s)     Medication Compliance:   Yes     Changes in Health Issues:   Yes: See Epic     Chemical Use  Review:   Substance Use: Chemical use reviewed, no active concerns identified      Tobacco Use: No current tobacco use.      Diagnosis:  1. Moderate episode of recurrent major depressive disorder (H)    2. ADA (generalized anxiety disorder)        Collateral Reports Completed:   Not Applicable    PLAN: (Patient Tasks / Therapist Tasks / Other)  Patient will return in three weeks for scheduled session. Patient will look into nutritionist to help with eating concerns. Patient will continue with PCP for medical concerns. Patient will continue to use distraction skills to help with impulsive behaviors.      There has been demonstrated improvement in functioning while patient has been engaged in psychotherapy/psychological service- if withdrawn the patient would deteriorate and/or relapse.     Mariana Love, [facility]     ______________________________________________________________________    Individual Treatment Plan    Patient's Name: Alina Martell  YOB: 1965    Date of Creation:10/16/2020  Date Treatment Plan Last Reviewed/Revised: 6/13/2023    DSM5 Diagnoses: 296.32 (F33.1) Major Depressive Disorder, Recurrent Episode, Moderate _ or 300.02 (F41.1) Generalized Anxiety Disorder  Psychosocial / Contextual Factors: Health, family and financial   PROMIS (reviewed every 90 days): 25    Referral / Collaboration:  Was/were discussed and patient will pursue.    Anticipated number of session for this episode of care: 20 will reevaluate every 90 days  Anticipation frequency of session: Biweekly  Anticipated Duration of each session: 38-52 minutes  Treatment plan will be reviewed in 90 days or when goals have been changed.       MeasurableTreatment Goal(s) related to diagnosis / functional impairment(s)  Goal 1: Patient will work on reducing overall anxiety.     I will know I've met my goal when reporting minimal anxiety symptoms.       Objective #A (Patient Action)                          Patient will use  distraction each time intrusive worry surfaces.  Status: Continued - Date(s): 6/13/2023     Intervention(s)  Therapist will teach emotional regulation skills. ..     Objective #B  Patient will Decrease frequency and intensity of feeling down, depressed, hopeless.  Status: Continued - Date(s):  6/13/2023     Intervention(s)  Therapist will teach emotional recognition/identification. ..     Objective #C  Patient will Identify negative self-talk and behaviors: challenge core beliefs, myths, and actions.  Status: Continued - Date(s):  6/13/2023     Intervention(s)  Therapist will teach the client how to perform a behavioral chain analysis. ..     Goal 2: Patient will continue to work on reducing depression symptoms    I will know I've met my goal then reporting minimal depression symptoms     Objective #A (Patient Action)                          Patient will Increase interest, engagement, and pleasure in doing things.  Status: Continued - Date(s):   6/13/2023     Intervention(s)  Therapist will assign homework ..     Objective #B  Patient will Decrease frequency and intensity of feeling down, depressed, hopeless.  Status: Continued - Date(s):    6/13/2023  Intervention(s)  Therapist will assign homework at every session.         Patient has reviewed and agreed to the above plan.        Mariana Love, [facility]  6/13/2023

## 2023-09-14 ENCOUNTER — MYC MEDICAL ADVICE (OUTPATIENT)
Dept: FAMILY MEDICINE | Facility: CLINIC | Age: 58
End: 2023-09-14
Payer: COMMERCIAL

## 2023-09-14 ENCOUNTER — TELEPHONE (OUTPATIENT)
Dept: FAMILY MEDICINE | Facility: CLINIC | Age: 58
End: 2023-09-14
Payer: COMMERCIAL

## 2023-09-14 DIAGNOSIS — R70.0 ELEVATED ERYTHROCYTE SEDIMENTATION RATE: Primary | ICD-10-CM

## 2023-09-15 ENCOUNTER — TRANSFERRED RECORDS (OUTPATIENT)
Dept: MULTI SPECIALTY CLINIC | Facility: CLINIC | Age: 58
End: 2023-09-15

## 2023-09-15 LAB — RETINOPATHY: NORMAL

## 2023-09-19 ENCOUNTER — MYC MEDICAL ADVICE (OUTPATIENT)
Dept: PSYCHOLOGY | Facility: CLINIC | Age: 58
End: 2023-09-19
Payer: COMMERCIAL

## 2023-09-26 ENCOUNTER — VIRTUAL VISIT (OUTPATIENT)
Dept: PSYCHOLOGY | Facility: CLINIC | Age: 58
End: 2023-09-26
Payer: COMMERCIAL

## 2023-09-26 DIAGNOSIS — F41.1 GAD (GENERALIZED ANXIETY DISORDER): ICD-10-CM

## 2023-09-26 DIAGNOSIS — F33.1 MODERATE EPISODE OF RECURRENT MAJOR DEPRESSIVE DISORDER (H): Primary | ICD-10-CM

## 2023-09-26 PROCEDURE — 90834 PSYTX W PT 45 MINUTES: CPT | Mod: VID | Performed by: COUNSELOR

## 2023-09-26 ASSESSMENT — PATIENT HEALTH QUESTIONNAIRE - PHQ9
SUM OF ALL RESPONSES TO PHQ QUESTIONS 1-9: 9
SUM OF ALL RESPONSES TO PHQ QUESTIONS 1-9: 9
10. IF YOU CHECKED OFF ANY PROBLEMS, HOW DIFFICULT HAVE THESE PROBLEMS MADE IT FOR YOU TO DO YOUR WORK, TAKE CARE OF THINGS AT HOME, OR GET ALONG WITH OTHER PEOPLE: SOMEWHAT DIFFICULT

## 2023-09-26 ASSESSMENT — ANXIETY QUESTIONNAIRES
1. FEELING NERVOUS, ANXIOUS, OR ON EDGE: NEARLY EVERY DAY
GAD7 TOTAL SCORE: 11
2. NOT BEING ABLE TO STOP OR CONTROL WORRYING: SEVERAL DAYS
7. FEELING AFRAID AS IF SOMETHING AWFUL MIGHT HAPPEN: NOT AT ALL
6. BECOMING EASILY ANNOYED OR IRRITABLE: NOT AT ALL
GAD7 TOTAL SCORE: 11
4. TROUBLE RELAXING: NEARLY EVERY DAY
3. WORRYING TOO MUCH ABOUT DIFFERENT THINGS: SEVERAL DAYS
5. BEING SO RESTLESS THAT IT IS HARD TO SIT STILL: NEARLY EVERY DAY
IF YOU CHECKED OFF ANY PROBLEMS ON THIS QUESTIONNAIRE, HOW DIFFICULT HAVE THESE PROBLEMS MADE IT FOR YOU TO DO YOUR WORK, TAKE CARE OF THINGS AT HOME, OR GET ALONG WITH OTHER PEOPLE: SOMEWHAT DIFFICULT

## 2023-09-28 NOTE — PROGRESS NOTES
[facility]                                     Progress Note    Patient Name: Alina Martell  Date: 9/07/23         Service Type: Individual      Session Start Time: 1205 Session End Time: 1255     Session Length: 50 minutes    Session #: 60    Attendees: Client    Service Modality:  Video Visit:     Telemedicine Visit: The patient's condition can be safely assessed and treated via synchronous audio and visual telemedicine encounter.       Reason for Telemedicine Visit: Services only offered telehealth     Originating Site (Patient Location): Patient's place of employment     Distant Location (provider location):  Off-site     Consent:  The patient/guardian has verbally consented to: the potential risks and benefits of telemedicine (video visit) versus in person care; bill my insurance or make self-payment for services provided; and responsibility for payment of non-covered services.      Mode of Communication:  Video Conference via [employer]     As the provider I attest to compliance with applicable laws and regulations related to telemedicine.    DATA  Interactive Complexity: No  Crisis: No        Progress Since Last Session (Related to Symptoms / Goals / Homework):   Symptoms: Worsening anxiety and irritability    Homework: Achieved / completed to satisfaction      Episode of Care Goals: Satisfactory progress - ACTION (Actively working towards change); Intervened by reinforcing change plan / affirming steps taken     Current / Ongoing Stressors and Concerns:  Patient reported feeling anxious and irritable. Patient indicated different thing have happened at work that are causing her to be irritable. Patient reported also not having enough to do at work which is frustrating. Patient indicated her brother is back in the hospital. Patient reported trying to help with but knowing it is not fully her responsibility. Patient indicated she continues to take on everyone else's problem and  not addressing her own. Patient indicated she has lost weight but is being cautious to not lose too much. This therapist processed with patient ways to work on changing her focus onto herself.      Treatment Objective(s) Addressed in This Session:   identify three distraction and diversion activities and use those activities to decrease level of anxiety    Increase interest, engagement, and pleasure in doing things  Decrease frequency and intensity of feeling down, depressed, hopeless  Improve quantity and quality of night time sleep / decrease daytime naps  Feel less tired and more energy during the day   Improve diet, appetite, mindful eating, and / or meal planning  Identify negative self-talk and behaviors: challenge core beliefs, myths, and actions     Intervention:   Motivational Interviewing    MI Intervention: Expressed Empathy/Understanding     Change Talk Expressed by the Patient: Taking steps    Provider Response to Change Talk: S - Summarized patient's change talk statements      Assessments completed prior to visit:  The following assessments were completed by patient for this visit:  PHQ9:       12/1/2022     9:51 AM 1/9/2023     3:57 PM 3/10/2023     8:12 AM 5/23/2023     5:42 AM 7/5/2023     2:17 PM 7/5/2023     2:18 PM 8/15/2023     7:27 AM   PHQ-9 SCORE   PHQ-9 Total Score MyChart 9 (Mild depression) 6 (Mild depression) 5 (Mild depression) 6 (Mild depression)  8 (Mild depression) 8 (Mild depression)   PHQ-9 Total Score 9 6 5 6 8  8     GAD7:       11/15/2021     7:32 AM 12/13/2021     9:02 AM 8/10/2022    10:15 AM 9/22/2022    11:13 AM 11/3/2022    12:06 PM 12/1/2022     9:52 AM 7/5/2023     2:18 PM   ADA-7 SCORE   Total Score 6 (mild anxiety)  6 (mild anxiety) 9 (mild anxiety) 10 (moderate anxiety) 8 (mild anxiety) 6 (mild anxiety)   Total Score 6 4 6 9 10 8 6     PROMIS 10-Global Health (all questions and answers displayed):       3/26/2022     5:15 PM 4/14/2022     6:51 AM 7/19/2022    11:13 AM  11/3/2022    12:07 PM 12/1/2022     9:53 AM 3/10/2023     8:13 AM 6/13/2023     6:37 AM   PROMIS 10   In general, would you say your health is: Good Good Good Good Good Fair Good   In general, would you say your quality of life is: Good Good Fair Good Good Good Good   In general, how would you rate your physical health? Fair Fair Good Good Fair Fair Fair   In general, how would you rate your mental health, including your mood and your ability to think? Fair Good Fair Fair Fair Fair Fair   In general, how would you rate your satisfaction with your social activities and relationships? Fair Good Good Fair Good Good Good   In general, please rate how well you carry out your usual social activities and roles Good Fair Good Good Good Good Good   To what extent are you able to carry out your everyday physical activities such as walking, climbing stairs, carrying groceries, or moving a chair? Mostly Mostly Mostly Mostly Mostly Mostly Mostly   In the past 7 days, how often have you been bothered by emotional problems such as feeling anxious, depressed, or irritable? Sometimes Often Often Often Often Sometimes Often   In the past 7 days, how would you rate your fatigue on average? Mild Mild Moderate Mild Mild Moderate Mild   In the past 7 days, how would you rate your pain on average, where 0 means no pain, and 10 means worst imaginable pain? 3 3 3 2 3 4 2   In general, would you say your health is: 3 3 3 3 3 2 3   In general, would you say your quality of life is: 3 3 2 3 3 3 3   In general, how would you rate your physical health? 2 2 3 3 2 2 2   In general, how would you rate your mental health, including your mood and your ability to think? 2 3 2 2 2 2 2   In general, how would you rate your satisfaction with your social activities and relationships? 2 3 3 2 3 3 3   In general, please rate how well you carry out your usual social activities and roles. (This includes activities at home, at work and in your community, and  responsibilities as a parent, child, spouse, employee, friend, etc.) 3 2 3 3 3 3 3   To what extent are you able to carry out your everyday physical activities such as walking, climbing stairs, carrying groceries, or moving a chair? 4 4 4 4 4 4 4   In the past 7 days, how often have you been bothered by emotional problems such as feeling anxious, depressed, or irritable? 3 4 4 4 4 3 4   In the past 7 days, how would you rate your fatigue on average? 2 2 3 2 2 3 2   In the past 7 days, how would you rate your pain on average, where 0 means no pain, and 10 means worst imaginable pain? 3 3 3 2 3 4 2   Global Mental Health Score 10 11 9 9 10 11 10   Global Physical Health Score 14 14 14 15 14 12 14   PROMIS TOTAL - SUBSCORES 24 25 23 24 24 23 24         ASSESSMENT: Current Emotional / Mental Status (status of significant symptoms):   Risk status (Self / Other harm or suicidal ideation)   Patient denies current fears or concerns for personal safety.   Patient denies current or recent suicidal ideation or behaviors.   Patient denies current or recent homicidal ideation or behaviors.   Patient denies current or recent self injurious behavior or ideation.   Patient denies other safety concerns.   Patient reports there has been no change in risk factors since their last session.     Patient reports there has been no change in protective factors since their last session.     Recommended that patient call 911 or go to the local ED should there be a change in any of these risk factors.     Appearance:   Appropriate    Eye Contact:   Good    Psychomotor Behavior: Normal    Attitude:   Cooperative    Orientation:   All   Speech    Rate / Production: Normal/ Responsive    Volume:  Normal    Mood:    Anxious  Irritable  Sad    Affect:    Appropriate    Thought Content:  Clear    Thought Form:  Coherent  Goal Directed  Logical    Insight:    Good      Medication Review:   No changes to current psychiatric  medication(s)     Medication Compliance:   Yes     Changes in Health Issues:   None reported     Chemical Use Review:   Substance Use: Chemical use reviewed, no active concerns identified      Tobacco Use: No current tobacco use.      Diagnosis:  1. Moderate episode of recurrent major depressive disorder (H)    2. ADA (generalized anxiety disorder)        Collateral Reports Completed:   Not Applicable    PLAN: (Patient Tasks / Therapist Tasks / Other)  Patient will return in three weeks for scheduled session. Patient will identify ways to focus on her own needs. Patient will continue to look into a nutritionist. Patient will look into taking time off of work.      There has been demonstrated improvement in functioning while patient has been engaged in psychotherapy/psychological service- if withdrawn the patient would deteriorate and/or relapse.     Mariana Love, [facility]     ______________________________________________________________________    Individual Treatment Plan    Patient's Name: Ailna Martell  YOB: 1965    Date of Creation:10/16/2020  Date Treatment Plan Last Reviewed/Revised: 9/26/2023    DSM5 Diagnoses: 296.32 (F33.1) Major Depressive Disorder, Recurrent Episode, Moderate _ or 300.02 (F41.1) Generalized Anxiety Disorder  Psychosocial / Contextual Factors: Health, family  PROMIS (reviewed every 90 days): 25    Referral / Collaboration:  Was/were discussed and patient will pursue.    Anticipated number of session for this episode of care: 20 will reevaluate every 90 days  Anticipation frequency of session: Biweekly  Anticipated Duration of each session: 38-52 minutes  Treatment plan will be reviewed in 90 days or when goals have been changed.       MeasurableTreatment Goal(s) related to diagnosis / functional impairment(s)  Goal 1: Patient will work on reducing overall anxiety.     I will know I've met my goal when reporting minimal anxiety symptoms.       Objective #A (Patient  Action)                          Patient will use distraction each time intrusive worry surfaces.  Status: Continued - Date(s): 9/26/2023     Intervention(s)  Therapist will teach emotional regulation skills. ..     Objective #B  Patient will Decrease frequency and intensity of feeling down, depressed, hopeless.  Status: Continued - Date(s):  9/26/2023     Intervention(s)  Therapist will teach emotional recognition/identification. ..     Objective #C  Patient will Identify negative self-talk and behaviors: challenge core beliefs, myths, and actions.  Status: Continued - Date(s):  9/26/2023     Intervention(s)  Therapist will teach the client how to perform a behavioral chain analysis. ..     Goal 2: Patient will continue to work on reducing depression symptoms    I will know I've met my goal then reporting minimal depression symptoms     Objective #A (Patient Action)                          Patient will Increase interest, engagement, and pleasure in doing things.  Status: Continued - Date(s):   9/26/2023     Intervention(s)  Therapist will assign homework ..     Objective #B  Patient will Decrease frequency and intensity of feeling down, depressed, hopeless.  Status: Continued - Date(s):   9/26/2023  Intervention(s)  Therapist will assign homework at every session.         Patient has reviewed and agreed to the above plan.        Mariana Love, [facility]  9/26/2023

## 2023-10-02 ENCOUNTER — ALLIED HEALTH/NURSE VISIT (OUTPATIENT)
Dept: FAMILY MEDICINE | Facility: CLINIC | Age: 58
End: 2023-10-02
Payer: COMMERCIAL

## 2023-10-02 ENCOUNTER — MYC MEDICAL ADVICE (OUTPATIENT)
Dept: INTERNAL MEDICINE | Facility: CLINIC | Age: 58
End: 2023-10-02

## 2023-10-02 DIAGNOSIS — Z23 ENCOUNTER FOR IMMUNIZATION: Primary | ICD-10-CM

## 2023-10-02 PROCEDURE — 90746 HEPB VACCINE 3 DOSE ADULT IM: CPT

## 2023-10-02 PROCEDURE — 99207 PR NO CHARGE NURSE ONLY: CPT

## 2023-10-02 PROCEDURE — 90471 IMMUNIZATION ADMIN: CPT

## 2023-10-03 DIAGNOSIS — F33.1 MODERATE EPISODE OF RECURRENT MAJOR DEPRESSIVE DISORDER (H): ICD-10-CM

## 2023-10-03 DIAGNOSIS — F41.1 GAD (GENERALIZED ANXIETY DISORDER): Primary | ICD-10-CM

## 2023-10-06 ENCOUNTER — TELEPHONE (OUTPATIENT)
Dept: BEHAVIORAL HEALTH | Facility: CLINIC | Age: 58
End: 2023-10-06
Payer: COMMERCIAL

## 2023-10-06 NOTE — TELEPHONE ENCOUNTER
----- Message from SHERRIE Keyes sent at 10/6/2023 10:31 AM CDT -----  Transition Clinic Referral   [place]         Please Check Type of Referral Requested:       _X___MEDICATION:  Referrals for Medication are ONLY accepted from the following areas (select): Other..... STANDARD PRIORITY                                       Suboxone and Opioid Management Referrals are automatically denied. TC Psychiatry cannot see patient without active medical insurance.      Next level of psychiatry care must be within 12 months.  TC Psychiatry cannot accept patient with next level of care scheduled with PCP.  The transition clinic cannot follow patients who are on a restricted recipient program.    GUARDIAN: If your patient is not their own Guardian, please provide the following:    Guardian Name:  Guardian Contact Information (Phone & Email) :  Guardian Address:     FOSTER CARE PROVIDER: If your patient lives at a Licensed Foster Care, please provide the following:    Foster Provider:  Foster Provider Contact Information (Phone & Email):  Foster Provider Address:       Referring Provider Contact Name: Mariana Love; Phone Number: [phone]    Reason for Transition Clinic Referral: Severe increase in anxiety.     Next Level of Care Patient Will Be Transitioned To: Psychiatry   Provider(s)Surekha Dean APRN [facility]  Date/Time 1/8/2024 8:00 AM    What Would Be Helpful from the Transition Clinic: Bridge care for medication management      Needs: NO    Does Patient Have Access to Technology: Yes    Patient E-mail Address: No e-mail address on record    Current Patient Phone Number: [phone]    Clinician Gender Preference (if applicable): NO    Patient location preference: None    SHERRIE Lagos

## 2023-10-06 NOTE — TELEPHONE ENCOUNTER
First attempt to reach patient regarding Transition Clinic Referral.  Spoke to patient regarding Transition Clinic referral.    Scheduled patient for transition services on 10/9/2023.    Devora May  Transition Clinic Coordinator  10/06/23 11:57 AM         Mental Health &Addiction (MH&A)Transition Clinic (TC):     Provides Patient Support While Waiting to Access Programmatic and Outpatient MH&A Care and Provides Select Crisis Assessment Services     NURSING Referral Review  _________________________________________    This RN has reviewed this Medication Management referral to the Transition Clinic and deemed the referral   [x] Appropriate x(Tier 2)  [] Inappropriate   []Consulting     Based on the following criteria:    Pt has a psychiatric provider (or pending plan) in place for future prescribing: Yes:   Next Level of Care Patient Will Be Transitioned To: Psychiatry   Provider(s)Surekha Dean APRN CNP   [facility]   Date/Time 1/8/2024 8:00 AM        Timeframe until pt's scheduled psychiatry appointment is less than 6 months: Yes: 3 months 2 days     Pt takes psychiatric medications: Yes: Yes     busPIRone (BUSPAR) 5 MG tablet  topiramate (TOPAMAX) 25 MG tablet      Pt's goals seem to align with this temporary service: Yes: Transition Clinic to bridge psychiatric care and psychiatric medication management until next Level of Care.         Any additional pertinent information regarding this referral: patient looking to restart medications to address increase in anxiety.    Initial contact w/ patient/parent: RN called and reached Maritza who wanted to access Transition Clinic for Medication Management bridging. TC Coordinator to contact this patient/patients guardian to schedule a New Person Visit with TC Provider Rebecca Hameed CNP         Additional Scheduling Instructions for Transition Clinic Coordinator:   TC Coordinators:  This is a Medication Management only Referral.         Please call the patient at [phone] (home) [phone] (work) . Please schedule a NewPerson appointment with TC Provider Rebecca Hameed as soon as possible or as indicated by the patien.       TC RN informed patient as to the purpose and benefit of the TC.      The Transition Clinic is a Temporary Service that helps to bridge the time to your next appointment.  It is not intended to be a long-term service and you are expected to attend your scheduled appointment with your next provider.      Alina Martell verbalized understanding : x    If you need support between appointments, please call [phone] and let them know you're seen by Transition Clinic Psychiatry.  You may also send a [employer] message to reach us.          RN Signature Tay Dillard RN on 10/6/2023 at 11:15 AM        Message  Received: Today  Donya Painter Transition Clinic Rn Pool  Hello,    Med only-    Next Level of Care Patient Will Be Transitioned To: Psychiatry  Provider(s)Surekha Dean APRN [facility]  Date/Time 1/8/2024 8:00 AM          Previous Messages       ----- Message -----  From: Mariana Lvoe [facility]  Sent: 10/6/2023  10:36 AM CDT  To: Transition Clinic    Transition Clinic Referral  [place]        Please Check Type of Referral Requested:      _X___MEDICATION:  Referrals for Medication are ONLY accepted from the following areas (select): Other..... STANDARD PRIORITY                                      Suboxone and Opioid Management Referrals are automatically denied. TC Psychiatry cannot see patient without active medical insurance.     Next level of psychiatry care must be within 12 months.  TC Psychiatry cannot accept patient with next level of care scheduled with PCP.  The transition clinic cannot follow patients who are on a restricted recipient program.    GUARDIAN: If your patient is not their own Guardian, please provide the following:     Guardian Name:  Guardian Contact Information (Phone & Email) :  Guardian Address:    FOSTER CARE PROVIDER: If your patient lives at a Licensed Foster Care, please provide the following:    Foster Provider:  Foster Provider Contact Information (Phone & Email):  Foster Provider Address:      Referring Provider Contact Name: Mariana Love; Phone Number: [phone]    Reason for Transition Clinic Referral: Severe increase in anxiety.    Next Level of Care Patient Will Be Transitioned To: Psychiatry  Provider(s)Surekha Dean APRN [facility]  Date/Time 1/8/2024 8:00 AM    What Would Be Helpful from the Transition Clinic: Bridge care for medication management     Needs: NO    Does Patient Have Access to Technology: Yes    Patient E-mail Address: No e-mail address on record    Current Patient Phone Number: [phone]    Clinician Gender Preference (if applicable): NO    Patient location preference: None    Mariana Love, [facility]

## 2023-10-06 NOTE — TELEPHONE ENCOUNTER
Writer has fwd medication management referral only to TC RN pool as next level of care set. Will wait to hear if referral is appropriate or inappropriate.    Donya Painter  10/06/2023  1102
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
## 2023-10-09 ENCOUNTER — VIRTUAL VISIT (OUTPATIENT)
Dept: BEHAVIORAL HEALTH | Facility: CLINIC | Age: 58
End: 2023-10-09
Payer: COMMERCIAL

## 2023-10-09 DIAGNOSIS — F33.0 MILD EPISODE OF RECURRENT MAJOR DEPRESSIVE DISORDER (H): ICD-10-CM

## 2023-10-09 DIAGNOSIS — F43.10 PTSD (POST-TRAUMATIC STRESS DISORDER): ICD-10-CM

## 2023-10-09 DIAGNOSIS — G47.9 SLEEP DISTURBANCES: ICD-10-CM

## 2023-10-09 DIAGNOSIS — F41.1 GAD (GENERALIZED ANXIETY DISORDER): Primary | ICD-10-CM

## 2023-10-09 PROCEDURE — 99205 OFFICE O/P NEW HI 60 MIN: CPT | Mod: VID | Performed by: NURSE PRACTITIONER

## 2023-10-09 RX ORDER — SERTRALINE HYDROCHLORIDE 25 MG/1
25 TABLET, FILM COATED ORAL DAILY
Qty: 30 TABLET | Refills: 1 | Status: SHIPPED | OUTPATIENT
Start: 2023-10-09 | End: 2023-10-30

## 2023-10-09 RX ORDER — LORAZEPAM 0.5 MG/1
TABLET ORAL
Qty: 14 TABLET | Refills: 0 | Status: SHIPPED | OUTPATIENT
Start: 2023-10-09 | End: 2023-10-11

## 2023-10-09 ASSESSMENT — ANXIETY QUESTIONNAIRES
6. BECOMING EASILY ANNOYED OR IRRITABLE: NOT AT ALL
2. NOT BEING ABLE TO STOP OR CONTROL WORRYING: SEVERAL DAYS
1. FEELING NERVOUS, ANXIOUS, OR ON EDGE: SEVERAL DAYS
3. WORRYING TOO MUCH ABOUT DIFFERENT THINGS: SEVERAL DAYS
5. BEING SO RESTLESS THAT IT IS HARD TO SIT STILL: MORE THAN HALF THE DAYS
GAD7 TOTAL SCORE: 8
7. FEELING AFRAID AS IF SOMETHING AWFUL MIGHT HAPPEN: NOT AT ALL
4. TROUBLE RELAXING: NEARLY EVERY DAY
GAD7 TOTAL SCORE: 8

## 2023-10-09 ASSESSMENT — PATIENT HEALTH QUESTIONNAIRE - PHQ9
SUM OF ALL RESPONSES TO PHQ QUESTIONS 1-9: 10
SUM OF ALL RESPONSES TO PHQ QUESTIONS 1-9: 10

## 2023-10-09 NOTE — PROGRESS NOTES
"  Mental Health and Collaborative Care Psychiatry Service Rooming Note      Most pressing mental health concern at this time: Anxiety      Any new physical health conditions or diagnoses affecting you that we should be aware of: RA      Side effects related to medications patient would like to discuss with the provider:  No      Are you taking your medications as prescribed?  Yes, always  If not, why? NA      Do you need refills of any of the medications?  NA  If so, which ones? NA      Are you taking any recreational substances? Not currently      Is there any chance you are pregnant? No  Do you use birth control? Hysterectomy      Provider notified  N/A      Add attendance guidelines .phrase here   Care team has reviewed attendance agreement with patient. Patient advised that two failed appointments within 6 months may lead to termination of current episode of care.       **If appointment is with Transition Clinic-include the following:      \"The Transition Clinic is a temporary psychiatry service that helps to bridge the time to your next appointment. It is not intended to be a long-term service and you are expected to attend your scheduled appointment with your next provider.\"    [ X ] Patient/Parent verbalized understanding     If you need support between appointments, please call [phone] and let them know you're seen by Transition [facility] Psychiatry. You may also send a [employer] message to reach us.     New (awaiting) Mental health provider or next programming: w/Surekha Dean APRN On license of [facility] PSY[facility]   Date of scheduled apt: 1/8/24     Patient would like the video visit invitation sent by:   Kavitha Zambrano LPN  October 9, 2023  8:27 AM          "

## 2023-10-09 NOTE — Clinical Note
Belkis Peña-  I know you've been working with Maritza since 2014 :-)  Just FYI until she transfers to long term outpatient psychiatry in Jan/2024.   Please let me know if there are any questions or concerns related to the progress notes.  Sincerely,  Rebecca Hameed

## 2023-10-09 NOTE — PATIENT INSTRUCTIONS
"Start:  Lorazepam/Ativan .5 to 1 mg (1 to 2 tabs) per day as needed for severe anxiety, panic attacks, or sleep    Sertraline/Zoloft in 7 day increments:  -25 mg  -50 mg  -75 mg (25 + 50 mg) daily thereafter    ------------------    -Belkis Salas I put in an order for a sleep study; please message back if you don't hear back to schedule    ----------------    Patient Education   The Transition Clinic  What to Expect  Here's what to expect in the Transition Clinic.   About the clinic  The Transition Clinic cares for you while you wait for long-term help.   You'll meet with a psychiatric doctor, who can give you medicine to help you feel better. You'll meet with them for about 5 visits or less. You'll see a different psychiatric doctor for your ongoing care. The clinic nurses and coordinators will help you guide your care.  If you have any questions or concerns, we'll be glad to talk with you.  About visits  Be open  At your visits, please talk openly about your problems. It may feel hard, but it's the best way for us to help you.  Cancelling visits  If you can't come to your visit, please call us right away at [phone]. If you don't cancel at least 24 hours (1 full day) before your visit, that's \"late cancellation.\"  Not showing up for your visits  Being very late is the same as not showing up. You will be a \"no show\" if:  You're more than 15 minutes late for a 30-minute (half hour) visit.  You're more than 30 minutes late for a 60-minute (full hour) visit.  If you cancel late or don't show up 2 times within 6 months, we may end your care.   If your ongoing care with a psychiatric doctor is cancelled, we may end your care at the Transition Clinic. If that happens, we'll give you referrals and enough medicine to last 3 months. The Transition Clinic is only used to bridge the gap between your care.  Getting help between visits  If you need help between visits, you can call us Monday to Friday from 8 a.m. to 4:30 p.m. " at [phone].  Emergency care   Call 911 or go to the nearest emergency department if your life or someone else's life is in danger.  Call 988 anytime to reach the national Suicide and Crisis hotline.  Medicine refills  To refill your medicine, call your pharmacy. You can also call the Transition Clinic at [phone], Monday to Friday, 8 a.m. to 4:30 p.m. It can take 1 to 3 business days to get a refill.   Forms, letters, and tests  You may have papers to fill out, like FMLA, short-term disability, and workability. We'll find out if we can do them and let you know. It can take 5 to 7 business days to get them filled out, and we may need you to come in for a visit.  Before we can give you medicine for ADHD, we may refer you to get tested for it or confirm it another way.  We don't do mental health checks ordered by the court.   We don't do mental health testing, but we can refer you to get tested.   Thank you for choosing us for your care.  For informational purposes only. Not to replace the advice of your health care provider. Copyright   2022 [facility]. All rights reserved. [employer] [id number] - 12/22.

## 2023-10-10 ENCOUNTER — OFFICE VISIT (OUTPATIENT)
Dept: PSYCHOLOGY | Facility: CLINIC | Age: 58
End: 2023-10-10
Payer: COMMERCIAL

## 2023-10-10 DIAGNOSIS — F41.1 GAD (GENERALIZED ANXIETY DISORDER): Primary | ICD-10-CM

## 2023-10-10 DIAGNOSIS — F33.1 MODERATE EPISODE OF RECURRENT MAJOR DEPRESSIVE DISORDER (H): ICD-10-CM

## 2023-10-10 PROCEDURE — 90834 PSYTX W PT 45 MINUTES: CPT | Performed by: COUNSELOR

## 2023-10-11 ENCOUNTER — PATIENT OUTREACH (OUTPATIENT)
Dept: GASTROENTEROLOGY | Facility: CLINIC | Age: 58
End: 2023-10-11
Payer: COMMERCIAL

## 2023-10-11 ENCOUNTER — MYC MEDICAL ADVICE (OUTPATIENT)
Dept: BEHAVIORAL HEALTH | Facility: CLINIC | Age: 58
End: 2023-10-11
Payer: COMMERCIAL

## 2023-10-11 DIAGNOSIS — F41.1 GAD (GENERALIZED ANXIETY DISORDER): ICD-10-CM

## 2023-10-11 RX ORDER — LORAZEPAM 0.5 MG/1
TABLET ORAL
Qty: 14 TABLET | Refills: 0 | Status: SHIPPED | OUTPATIENT
Start: 2023-10-11 | End: 2023-10-30

## 2023-10-11 NOTE — PROGRESS NOTES
[facility]                                     Progress Note    Patient Name: Alina Martell  Date: 10/10/23         Service Type: Individual      Session Start Time: 802 Session End Time: 850     Session Length: 48 minutes    Session #: 61    Attendees: Client    Service Modality:  In-Person    DATA  Interactive Complexity: No  Crisis: No        Progress Since Last Session (Related to Symptoms / Goals / Homework):   Symptoms: No change panic attacks    Homework: Achieved / completed to satisfaction      Episode of Care Goals: Satisfactory progress - ACTION (Actively working towards change); Intervened by reinforcing change plan / affirming steps taken     Current / Ongoing Stressors and Concerns:  Patient indicated continuing to struggle with her panic attacks. Patient reported she met with the psychiatrist yesterday for medication management. Patient indicated feeling hopeful that the medications will work. Patient reported she worries once the panic attacks stop then she will deal with severe depression. Patient talked about the significant stress she is experiencing at work. Patient indicated her friend group has a lot of personal stressor's so she has not spent a lot of time with them. This therapist processed with patient ways to work on reducing her stressors.      Treatment Objective(s) Addressed in This Session:   use thought-stopping strategy daily to reduce intrusive thoughts  Increase interest, engagement, and pleasure in doing things  Decrease frequency and intensity of feeling down, depressed, hopeless  Improve quantity and quality of night time sleep / decrease daytime naps  Feel less tired and more energy during the day   Improve diet, appetite, mindful eating, and / or meal planning  Identify negative self-talk and behaviors: challenge core beliefs, myths, and actions  Improve concentration, focus, and mindfulness in daily activities      Intervention:   Motivational  Interviewing    MI Intervention: Reflections: simple and complex     Change Talk Expressed by the Patient: Activation Taking steps    Provider Response to Change Talk: R - Reflected patient's change talk and S - Summarized patient's change talk statements      Assessments completed prior to visit:  The following assessments were completed by patient for this visit:  PHQ9:       3/10/2023     8:12 AM 5/23/2023     5:42 AM 7/5/2023     2:17 PM 7/5/2023     2:18 PM 8/15/2023     7:27 AM 9/26/2023    12:05 PM 10/9/2023     5:07 AM   PHQ-9 SCORE   PHQ-9 Total Score MyChart 5 (Mild depression) 6 (Mild depression)  8 (Mild depression) 8 (Mild depression) 9 (Mild depression) 10 (Moderate depression)   PHQ-9 Total Score 5 6 8  8 9 10     GAD7:       8/10/2022    10:15 AM 9/22/2022    11:13 AM 11/3/2022    12:06 PM 12/1/2022     9:52 AM 7/5/2023     2:18 PM 9/26/2023    12:05 PM 10/9/2023    10:00 AM   ADA-7 SCORE   Total Score 6 (mild anxiety) 9 (mild anxiety) 10 (moderate anxiety) 8 (mild anxiety) 6 (mild anxiety) 11 (moderate anxiety)    Total Score 6 9 10 8 6 11 8     PROMIS 10-Global Health (all questions and answers displayed):       4/14/2022     6:51 AM 7/19/2022    11:13 AM 11/3/2022    12:07 PM 12/1/2022     9:53 AM 3/10/2023     8:13 AM 6/13/2023     6:37 AM 9/26/2023    12:07 PM   PROMIS 10   In general, would you say your health is: Good Good Good Good Fair Good Fair   In general, would you say your quality of life is: Good Fair Good Good Good Good Good   In general, how would you rate your physical health? Fair Good Good Fair Fair Fair Fair   In general, how would you rate your mental health, including your mood and your ability to think? Good Fair Fair Fair Fair Fair Fair   In general, how would you rate your satisfaction with your social activities and relationships? Good Good Fair Good Good Good Good   In general, please rate how well you carry out your usual social activities and roles Fair Good Good Good  Good Good Good   To what extent are you able to carry out your everyday physical activities such as walking, climbing stairs, carrying groceries, or moving a chair? Mostly Mostly Mostly Mostly Mostly Mostly Mostly   In the past 7 days, how often have you been bothered by emotional problems such as feeling anxious, depressed, or irritable? Often Often Often Often Sometimes Often Often   In the past 7 days, how would you rate your fatigue on average? Mild Moderate Mild Mild Moderate Mild Moderate   In the past 7 days, how would you rate your pain on average, where 0 means no pain, and 10 means worst imaginable pain? 3 3 2 3 4 2 3   In general, would you say your health is: 3 3 3 3 2 3 2   In general, would you say your quality of life is: 3 2 3 3 3 3 3   In general, how would you rate your physical health? 2 3 3 2 2 2 2   In general, how would you rate your mental health, including your mood and your ability to think? 3 2 2 2 2 2 2   In general, how would you rate your satisfaction with your social activities and relationships? 3 3 2 3 3 3 3   In general, please rate how well you carry out your usual social activities and roles. (This includes activities at home, at work and in your community, and responsibilities as a parent, child, spouse, employee, friend, etc.) 2 3 3 3 3 3 3   To what extent are you able to carry out your everyday physical activities such as walking, climbing stairs, carrying groceries, or moving a chair? 4 4 4 4 4 4 4   In the past 7 days, how often have you been bothered by emotional problems such as feeling anxious, depressed, or irritable? 4 4 4 4 3 4 4   In the past 7 days, how would you rate your fatigue on average? 2 3 2 2 3 2 3   In the past 7 days, how would you rate your pain on average, where 0 means no pain, and 10 means worst imaginable pain? 3 3 2 3 4 2 3   Global Mental Health Score 11 9 9 10 11 10 10   Global Physical Health Score 14 14 15 14 12 14 13   PROMIS TOTAL - SUBSCORES 25  23 24 24 23 24 23         ASSESSMENT: Current Emotional / Mental Status (status of significant symptoms):   Risk status (Self / Other harm or suicidal ideation)   Patient denies current fears or concerns for personal safety.   Patient denies current or recent suicidal ideation or behaviors.   Patient denies current or recent homicidal ideation or behaviors.   Patient denies current or recent self injurious behavior or ideation.   Patient denies other safety concerns.   Patient reports there has been no change in risk factors since their last session.     Patient reports there has been no change in protective factors since their last session.     Recommended that patient call 911 or go to the local ED should there be a change in any of these risk factors.     Appearance:   Appropriate    Eye Contact:   Good    Psychomotor Behavior: Normal    Attitude:   Cooperative    Orientation:   All   Speech    Rate / Production: Normal/ Responsive    Volume:  Normal    Mood:    Anxious  Depressed    Affect:    Appropriate    Thought Content:  Clear    Thought Form:  Coherent  Goal Directed  Logical    Insight:    Good      Medication Review:   Changes to psychiatric medications, see updated Medication List in EPIC.      Medication Compliance:   Yes     Changes in Health Issues:   None reported     Chemical Use Review:   Substance Use: Chemical use reviewed, no active concerns identified      Tobacco Use: No current tobacco use.      Diagnosis:  1. ADA (generalized anxiety disorder)    2. Moderate episode of recurrent major depressive disorder (H)        Collateral Reports Completed:   Not Applicable    PLAN: (Patient Tasks / Therapist Tasks / Other)  Patient will return in one week due to increase in symptoms. Patient will follow medication management. Patient will use coping skills taught in session to work on reducing panic attack. Patient will identify stressors and ways she has control to reduce stressors.     There has been  demonstrated improvement in functioning while patient has been engaged in psychotherapy/psychological service- if withdrawn the patient would deteriorate and/or relapse.     Mariana Love, [facility]     ______________________________________________________________________    Individual Treatment Plan    Patient's Name: Alina Martell  YOB: 1965    Date of Creation:10/16/2020  Date Treatment Plan Last Reviewed/Revised: 9/26/2023    DSM5 Diagnoses: 296.32 (F33.1) Major Depressive Disorder, Recurrent Episode, Moderate _ or 300.02 (F41.1) Generalized Anxiety Disorder  Psychosocial / Contextual Factors: Health, family  PROMIS (reviewed every 90 days): 25    Referral / Collaboration:  Was/were discussed and patient will pursue.    Anticipated number of session for this episode of care: 20 will reevaluate every 90 days  Anticipation frequency of session: Biweekly  Anticipated Duration of each session: 38-52 minutes  Treatment plan will be reviewed in 90 days or when goals have been changed.       MeasurableTreatment Goal(s) related to diagnosis / functional impairment(s)  Goal 1: Patient will work on reducing overall anxiety.     I will know I've met my goal when reporting minimal anxiety symptoms.       Objective #A (Patient Action)                          Patient will use distraction each time intrusive worry surfaces.  Status: Continued - Date(s): 9/26/2023     Intervention(s)  Therapist will teach emotional regulation skills. ..     Objective #B  Patient will Decrease frequency and intensity of feeling down, depressed, hopeless.  Status: Continued - Date(s):  9/26/2023     Intervention(s)  Therapist will teach emotional recognition/identification. ..     Objective #C  Patient will Identify negative self-talk and behaviors: challenge core beliefs, myths, and actions.  Status: Continued - Date(s):  9/26/2023     Intervention(s)  Therapist will teach the client how to perform a behavioral chain  analysis. ..     Goal 2: Patient will continue to work on reducing depression symptoms    I will know I've met my goal then reporting minimal depression symptoms     Objective #A (Patient Action)                          Patient will Increase interest, engagement, and pleasure in doing things.  Status: Continued - Date(s):   9/26/2023     Intervention(s)  Therapist will assign homework ..     Objective #B  Patient will Decrease frequency and intensity of feeling down, depressed, hopeless.  Status: Continued - Date(s):   9/26/2023  Intervention(s)  Therapist will assign homework at every session.         Patient has reviewed and agreed to the above plan.        Mariana Love, [facility]  9/26/2023

## 2023-10-11 NOTE — TELEPHONE ENCOUNTER
Date of Last Office Visit: 10/9/23  Date of Next Office Visit: 10/30/23  No shows since last visit: 0  Cancellations since last visit: 0    Medication requested: LORazepam (ATIVAN) 0.5 MG tablet Date last ordered: 10/9/23 Qty: 14 Refills: 0     Review of [place] ?: LORazepam (ATIVAN) 0.5 MG tablet  Medication last sold date: 05/05/23 Qty filled: 12  Other [place] on [place] ?: Hydrocodone-Acetaminophen 5-325 Mg, Oxycodone HCl 5 Mg  If yes, is this a new medication?: No  If yes, name of medication: NA and date filled: NA    Lapse in medication adherence greater than 5 days?: No  If yes, call patient and gather details: NA  Medication refill request verified as identical to current order?: Yes  Result of Last DAM, VPA, Li+ Level, CBC, or Carbamazepine Level (at or since last visit): N/A    Last visit treatment plan:       TREATMENT PLAN      Medications:  Start:  Lorazepam/Ativan .5 to 1 mg (1 to 2 tabs) per day as needed for severe anxiety, panic attacks, or sleep; #14    Sertraline/Zoloft in 7 day increments:  -25 mg  -50 mg  -75 mg (25 + 50 mg) daily thereafter        Labs:  -None Obtained        Therapy and or Non-pharmacological modalities:  -Continue individual therapy  -Adult Sleep Eval & Management referral placed        Disposition:  -Has been advised of consultative [facility] model    -Please follow up at the [facility] for medication management in:   3 to 4 weeks          Total time:  71 minutes per:    -Review of EMR   -Appointment time  -Documentation           Rebecca CHA-CNP,  [facility]      []Medication refilled per  Medication Refill in Ambulatory Care  policy.  [x]Medication unable to be refilled by RN due to criteria not met as indicated below:    []Eligibility - not seen in the last year   []Supervision - no future appointment   []Compliance - no shows, cancellations or lapse in therapy   []Verification - order discrepancy   [x]Controlled medication   []Medication not  included in policy   []90-day supply request   [x]Other- change in pharmacy.

## 2023-10-17 ENCOUNTER — OFFICE VISIT (OUTPATIENT)
Dept: PSYCHOLOGY | Facility: CLINIC | Age: 58
End: 2023-10-17
Payer: COMMERCIAL

## 2023-10-17 DIAGNOSIS — F41.1 GAD (GENERALIZED ANXIETY DISORDER): Primary | ICD-10-CM

## 2023-10-17 DIAGNOSIS — F33.1 MODERATE EPISODE OF RECURRENT MAJOR DEPRESSIVE DISORDER (H): ICD-10-CM

## 2023-10-17 PROCEDURE — 90834 PSYTX W PT 45 MINUTES: CPT | Performed by: COUNSELOR

## 2023-10-20 NOTE — PROGRESS NOTES
[facility]                                     Progress Note    Patient Name: Alina Martell  Date: 10/17/23         Service Type: Individual      Session Start Time: 805 Session End Time: 849     Session Length: 44 minutes    Session #: 62    Attendees: Client    Service Modality:  In-Person    DATA  Interactive Complexity: No  Crisis: No        Progress Since Last Session (Related to Symptoms / Goals / Homework):   Symptoms: No change symptoms of anxiety remain severe at this time    Homework: Achieved / completed to satisfaction      Episode of Care Goals: Satisfactory progress - ACTION (Actively working towards change); Intervened by reinforcing change plan / affirming steps taken     Current / Ongoing Stressors and Concerns:  Patient reported feeling her symptoms are remaining about the same. Patient indicated she did take the medication when she felt she was about to have a panic attack. Patient reported she is pacing at work when her anxiety is getting high. Patient indicated she is using the skills taught in session and feeling that they  help. Patient reported noticing a patter of her anxiety being high at work and with her mom's . Patient indicated she is looking for jobs and spending less time at her mom's house. This therapist processed with patient using PTO and doing self-care.      Treatment Objective(s) Addressed in This Session:   use distraction each time intrusive worry surfaces  Increase interest, engagement, and pleasure in doing things  Decrease frequency and intensity of feeling down, depressed, hopeless  Improve quantity and quality of night time sleep / decrease daytime naps  Feel less tired and more energy during the day   Improve diet, appetite, mindful eating, and / or meal planning  Identify negative self-talk and behaviors: challenge core beliefs, myths, and actions     Intervention:   Motivational Interviewing    MI Intervention: Open-ended questions      Change Talk Expressed by the Patient: Taking steps    Provider Response to Change Talk: A - Affirmed patient's thoughts, decisions, or attempts at behavior change      Assessments completed prior to visit:  The following assessments were completed by patient for this visit:  PHQ9:       3/10/2023     8:12 AM 5/23/2023     5:42 AM 7/5/2023     2:17 PM 7/5/2023     2:18 PM 8/15/2023     7:27 AM 9/26/2023    12:05 PM 10/9/2023     5:07 AM   PHQ-9 SCORE   PHQ-9 Total Score MyChart 5 (Mild depression) 6 (Mild depression)  8 (Mild depression) 8 (Mild depression) 9 (Mild depression) 10 (Moderate depression)   PHQ-9 Total Score 5 6 8  8 9 10     GAD7:       8/10/2022    10:15 AM 9/22/2022    11:13 AM 11/3/2022    12:06 PM 12/1/2022     9:52 AM 7/5/2023     2:18 PM 9/26/2023    12:05 PM 10/9/2023    10:00 AM   ADA-7 SCORE   Total Score 6 (mild anxiety) 9 (mild anxiety) 10 (moderate anxiety) 8 (mild anxiety) 6 (mild anxiety) 11 (moderate anxiety)    Total Score 6 9 10 8 6 11 8     PROMIS 10-Global Health (all questions and answers displayed):       4/14/2022     6:51 AM 7/19/2022    11:13 AM 11/3/2022    12:07 PM 12/1/2022     9:53 AM 3/10/2023     8:13 AM 6/13/2023     6:37 AM 9/26/2023    12:07 PM   PROMIS 10   In general, would you say your health is: Good Good Good Good Fair Good Fair   In general, would you say your quality of life is: Good Fair Good Good Good Good Good   In general, how would you rate your physical health? Fair Good Good Fair Fair Fair Fair   In general, how would you rate your mental health, including your mood and your ability to think? Good Fair Fair Fair Fair Fair Fair   In general, how would you rate your satisfaction with your social activities and relationships? Good Good Fair Good Good Good Good   In general, please rate how well you carry out your usual social activities and roles Fair Good Good Good Good Good Good   To what extent are you able to carry out your everyday physical activities  such as walking, climbing stairs, carrying groceries, or moving a chair? Mostly Mostly Mostly Mostly Mostly Mostly Mostly   In the past 7 days, how often have you been bothered by emotional problems such as feeling anxious, depressed, or irritable? Often Often Often Often Sometimes Often Often   In the past 7 days, how would you rate your fatigue on average? Mild Moderate Mild Mild Moderate Mild Moderate   In the past 7 days, how would you rate your pain on average, where 0 means no pain, and 10 means worst imaginable pain? 3 3 2 3 4 2 3   In general, would you say your health is: 3 3 3 3 2 3 2   In general, would you say your quality of life is: 3 2 3 3 3 3 3   In general, how would you rate your physical health? 2 3 3 2 2 2 2   In general, how would you rate your mental health, including your mood and your ability to think? 3 2 2 2 2 2 2   In general, how would you rate your satisfaction with your social activities and relationships? 3 3 2 3 3 3 3   In general, please rate how well you carry out your usual social activities and roles. (This includes activities at home, at work and in your community, and responsibilities as a parent, child, spouse, employee, friend, etc.) 2 3 3 3 3 3 3   To what extent are you able to carry out your everyday physical activities such as walking, climbing stairs, carrying groceries, or moving a chair? 4 4 4 4 4 4 4   In the past 7 days, how often have you been bothered by emotional problems such as feeling anxious, depressed, or irritable? 4 4 4 4 3 4 4   In the past 7 days, how would you rate your fatigue on average? 2 3 2 2 3 2 3   In the past 7 days, how would you rate your pain on average, where 0 means no pain, and 10 means worst imaginable pain? 3 3 2 3 4 2 3   Global Mental Health Score 11 9 9 10 11 10 10   Global Physical Health Score 14 14 15 14 12 14 13   PROMIS TOTAL - SUBSCORES 25 23 24 24 23 24 23         ASSESSMENT: Current Emotional / Mental Status (status of  significant symptoms):   Risk status (Self / Other harm or suicidal ideation)   Patient denies current fears or concerns for personal safety.   Patient denies current or recent suicidal ideation or behaviors.   Patient denies current or recent homicidal ideation or behaviors.   Patient denies current or recent self injurious behavior or ideation.   Patient denies other safety concerns.   Patient reports there has been no change in risk factors since their last session.     Patient reports there has been no change in protective factors since their last session.     Recommended that patient call 911 or go to the local ED should there be a change in any of these risk factors.     Appearance:   Appropriate    Eye Contact:   Good    Psychomotor Behavior: Normal    Attitude:   Cooperative    Orientation:   All   Speech    Rate / Production: Normal/ Responsive    Volume:  Normal    Mood:    Anxious  Depressed    Affect:    Appropriate    Thought Content:  Clear    Thought Form:  Coherent  Goal Directed  Logical    Insight:    Good      Medication Review:   No changes to current psychiatric medication(s)     Medication Compliance:   Yes     Changes in Health Issues:   None reported     Chemical Use Review:   Substance Use: Chemical use reviewed, no active concerns identified      Tobacco Use: No current tobacco use.      Diagnosis:  1. ADA (generalized anxiety disorder)    2. Moderate episode of recurrent major depressive disorder (H)        Collateral Reports Completed:   Not Applicable    PLAN: (Patient Tasks / Therapist Tasks / Other)  Patient will return in three weeks for scheduled session. Patient will look into taking PTO. Patient will continue to look at jobs and identify how her anxiety is playing into her anxiety. Patient will continue to use skills taught in session to help decrease symptoms.     There has been demonstrated improvement in functioning while patient has been engaged in psychotherapy/psychological  service- if withdrawn the patient would deteriorate and/or relapse.     Mariana DONTENubia Love, [facility]     ______________________________________________________________________    Individual Treatment Plan    Patient's Name: Alina Martell  YOB: 1965    Date of Creation:10/16/2020  Date Treatment Plan Last Reviewed/Revised: 9/26/2023    DSM5 Diagnoses: 296.32 (F33.1) Major Depressive Disorder, Recurrent Episode, Moderate _ or 300.02 (F41.1) Generalized Anxiety Disorder  Psychosocial / Contextual Factors: Health, family  PROMIS (reviewed every 90 days): 25    Referral / Collaboration:  Was/were discussed and patient will pursue.    Anticipated number of session for this episode of care: 20 will reevaluate every 90 days  Anticipation frequency of session: Biweekly  Anticipated Duration of each session: 38-52 minutes  Treatment plan will be reviewed in 90 days or when goals have been changed.       MeasurableTreatment Goal(s) related to diagnosis / functional impairment(s)  Goal 1: Patient will work on reducing overall anxiety.     I will know I've met my goal when reporting minimal anxiety symptoms.       Objective #A (Patient Action)                          Patient will use distraction each time intrusive worry surfaces.  Status: Continued - Date(s): 9/26/2023     Intervention(s)  Therapist will teach emotional regulation skills. ..     Objective #B  Patient will Decrease frequency and intensity of feeling down, depressed, hopeless.  Status: Continued - Date(s):  9/26/2023     Intervention(s)  Therapist will teach emotional recognition/identification. ..     Objective #C  Patient will Identify negative self-talk and behaviors: challenge core beliefs, myths, and actions.  Status: Continued - Date(s):  9/26/2023     Intervention(s)  Therapist will teach the client how to perform a behavioral chain analysis. ..     Goal 2: Patient will continue to work on reducing depression symptoms    I will know I've  met my goal then reporting minimal depression symptoms     Objective #A (Patient Action)                          Patient will Increase interest, engagement, and pleasure in doing things.  Status: Continued - Date(s):   9/26/2023     Intervention(s)  Therapist will assign homework ..     Objective #B  Patient will Decrease frequency and intensity of feeling down, depressed, hopeless.  Status: Continued - Date(s):   9/26/2023  Intervention(s)  Therapist will assign homework at every session.         Patient has reviewed and agreed to the above plan.        Mariana Love, [facility]  9/26/2023

## 2023-10-30 ENCOUNTER — VIRTUAL VISIT (OUTPATIENT)
Dept: BEHAVIORAL HEALTH | Facility: CLINIC | Age: 58
End: 2023-10-30
Payer: COMMERCIAL

## 2023-10-30 DIAGNOSIS — F43.10 PTSD (POST-TRAUMATIC STRESS DISORDER): ICD-10-CM

## 2023-10-30 DIAGNOSIS — F33.0 MILD RECURRENT MAJOR DEPRESSION (H): ICD-10-CM

## 2023-10-30 DIAGNOSIS — G47.9 SLEEP DISTURBANCES: ICD-10-CM

## 2023-10-30 DIAGNOSIS — F41.1 GAD (GENERALIZED ANXIETY DISORDER): Primary | ICD-10-CM

## 2023-10-30 PROCEDURE — 99214 OFFICE O/P EST MOD 30 MIN: CPT | Mod: VID | Performed by: NURSE PRACTITIONER

## 2023-10-30 RX ORDER — SERTRALINE HYDROCHLORIDE 25 MG/1
25 TABLET, FILM COATED ORAL DAILY
COMMUNITY
Start: 2023-10-30 | End: 2023-12-13

## 2023-10-30 RX ORDER — LORAZEPAM 0.5 MG/1
TABLET ORAL
Qty: 14 TABLET | Refills: 0 | Status: SHIPPED | OUTPATIENT
Start: 2023-10-30 | End: 2023-11-17

## 2023-10-30 ASSESSMENT — ANXIETY QUESTIONNAIRES
GAD7 TOTAL SCORE: 5
5. BEING SO RESTLESS THAT IT IS HARD TO SIT STILL: SEVERAL DAYS
6. BECOMING EASILY ANNOYED OR IRRITABLE: NOT AT ALL
4. TROUBLE RELAXING: NEARLY EVERY DAY
GAD7 TOTAL SCORE: 5
7. FEELING AFRAID AS IF SOMETHING AWFUL MIGHT HAPPEN: NOT AT ALL
IF YOU CHECKED OFF ANY PROBLEMS ON THIS QUESTIONNAIRE, HOW DIFFICULT HAVE THESE PROBLEMS MADE IT FOR YOU TO DO YOUR WORK, TAKE CARE OF THINGS AT HOME, OR GET ALONG WITH OTHER PEOPLE: SOMEWHAT DIFFICULT
3. WORRYING TOO MUCH ABOUT DIFFERENT THINGS: NOT AT ALL
2. NOT BEING ABLE TO STOP OR CONTROL WORRYING: NOT AT ALL
1. FEELING NERVOUS, ANXIOUS, OR ON EDGE: SEVERAL DAYS
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
## 2023-10-30 NOTE — PATIENT INSTRUCTIONS
Start:  Sertraline/Zoloft 25 mg daily; decreased from 75 mg    Continue:  Lorazepam/Ativan .5 to 1 mg (1 to 2 tabs) per day as needed for severe anxiety, panic attacks, or sleep; #14    ---------------------------

## 2023-10-30 NOTE — PROGRESS NOTES
"  Mental Health and Collaborative Care Psychiatry Service Rooming Note      Most pressing mental health concern at this time: \" Anxiety still there but it's better\"       Any new physical health conditions or diagnoses affecting you that we should be aware of: none reported      Side effects related to medications patient would like to discuss with the provider:  Yes - difficulties sleeping      Are you taking your medications as prescribed?  yes  If not, why? NA      Do you need refills of any of the medications?  Not yet  If so, which ones? Lorazepam if possible      Are you taking any recreational substances? denies      Is there any chance you are pregnant? no  Do you use birth control? hysterectomy      Provider notified  N/A      Add attendance guidelines .phrase here   Care team has reviewed attendance agreement with patient. Patient advised that two failed appointments within 6 months may lead to termination of current episode of care.       **If appointment is with [facility]-include the following:      \"The [facility] is a temporary psychiatry service that helps to bridge the time to your next appointment. It is not intended to be a long-term service and you are expected to attend your scheduled appointment with your next provider.\"    [ X ] Patient/Parent verbalized understanding     If you need support between appointments, please call [phone] and let them know you're seen by [facility] Psychiatry. You may also send a [employer] message to reach us.     New (awaiting) Mental health provider or next programming: christian/Surekha Dean APRN CNP   Date of scheduled apt: 1/8/24     Patient would like the video visit invitation sent by:   Kavitha Zambrano LPN  October 30, 2023  3:11 PM        "

## 2023-11-07 ENCOUNTER — OFFICE VISIT (OUTPATIENT)
Dept: PSYCHOLOGY | Facility: CLINIC | Age: 58
End: 2023-11-07
Payer: COMMERCIAL

## 2023-11-07 DIAGNOSIS — F41.1 GAD (GENERALIZED ANXIETY DISORDER): Primary | ICD-10-CM

## 2023-11-07 DIAGNOSIS — F43.10 POSTTRAUMATIC STRESS DISORDER: ICD-10-CM

## 2023-11-07 DIAGNOSIS — F33.1 MODERATE EPISODE OF RECURRENT MAJOR DEPRESSIVE DISORDER (H): ICD-10-CM

## 2023-11-07 PROCEDURE — 90834 PSYTX W PT 45 MINUTES: CPT | Performed by: COUNSELOR

## 2023-11-08 NOTE — PROGRESS NOTES
[facility]                                     Progress Note    Patient Name: Alina Martell  Date: 11/07/23         Service Type: Individual      Session Start Time: 8:07 Session End Time: 8:57     Session Length: 50 minutes    Session #: 63     Attendees: Client    Service Modality:  In-Person    DATA  Interactive Complexity: No  Crisis: No        Progress Since Last Session (Related to Symptoms / Goals / Homework):   Symptoms: No change nightmares presenting at night    Homework: Achieved / completed to satisfaction      Episode of Care Goals: Satisfactory progress - ACTION (Actively working towards change); Intervened by reinforcing change plan / affirming steps taken     Current / Ongoing Stressors and Concerns:  Patient indicated her symptoms are continuing to stay the same. Patient reported that she is more aware of when a panic attack is about to happen and then takes her medications. Patient indicated she has noticed a lot of struggles with sleep as well. Patient reported her mind going back to the trauma's that have occurred. Patient indicated she will wake up and then has a hard time going back to sleep. Patient reported she wants to avoid the nightmares. Patient indicated she continues to struggle with taking time for herself. This therapist reeducated patient on nightmare retraining.      Treatment Objective(s) Addressed in This Session:   attend and participate in social or recreational activities outside of her grandkids events once a week  Increase interest, engagement, and pleasure in doing things  Decrease frequency and intensity of feeling down, depressed, hopeless  Improve quantity and quality of night time sleep / decrease daytime naps  Feel less tired and more energy during the day   Nightmare retraining      Intervention:   Motivational Interviewing    MI Intervention: Reflections: simple and complex     Change Talk Expressed by the Patient: Activation Taking  steps    Provider Response to Change Talk: S - Summarized patient's change talk statements      Assessments completed prior to visit:  The following assessments were completed by patient for this visit:  PHQ9:       5/23/2023     5:42 AM 7/5/2023     2:17 PM 7/5/2023     2:18 PM 8/15/2023     7:27 AM 9/26/2023    12:05 PM 10/9/2023     5:07 AM 10/30/2023     3:22 PM   PHQ-9 SCORE   PHQ-9 Total Score MyChart 6 (Mild depression)  8 (Mild depression) 8 (Mild depression) 9 (Mild depression) 10 (Moderate depression) 7 (Mild depression)   PHQ-9 Total Score 6 8  8 9 10 7     GAD7:       9/22/2022    11:13 AM 11/3/2022    12:06 PM 12/1/2022     9:52 AM 7/5/2023     2:18 PM 9/26/2023    12:05 PM 10/9/2023    10:00 AM 10/30/2023     3:23 PM   ADA-7 SCORE   Total Score 9 (mild anxiety) 10 (moderate anxiety) 8 (mild anxiety) 6 (mild anxiety) 11 (moderate anxiety)  5 (mild anxiety)   Total Score 9 10 8 6 11 8 5     PROMIS 10-Global Health (all questions and answers displayed):       4/14/2022     6:51 AM 7/19/2022    11:13 AM 11/3/2022    12:07 PM 12/1/2022     9:53 AM 3/10/2023     8:13 AM 6/13/2023     6:37 AM 9/26/2023    12:07 PM   PROMIS 10   In general, would you say your health is: Good Good Good Good Fair Good Fair   In general, would you say your quality of life is: Good Fair Good Good Good Good Good   In general, how would you rate your physical health? Fair Good Good Fair Fair Fair Fair   In general, how would you rate your mental health, including your mood and your ability to think? Good Fair Fair Fair Fair Fair Fair   In general, how would you rate your satisfaction with your social activities and relationships? Good Good Fair Good Good Good Good   In general, please rate how well you carry out your usual social activities and roles Fair Good Good Good Good Good Good   To what extent are you able to carry out your everyday physical activities such as walking, climbing stairs, carrying groceries, or moving a chair?  Mostly Mostly Mostly Mostly Mostly Mostly Mostly   In the past 7 days, how often have you been bothered by emotional problems such as feeling anxious, depressed, or irritable? Often Often Often Often Sometimes Often Often   In the past 7 days, how would you rate your fatigue on average? Mild Moderate Mild Mild Moderate Mild Moderate   In the past 7 days, how would you rate your pain on average, where 0 means no pain, and 10 means worst imaginable pain? 3 3 2 3 4 2 3   In general, would you say your health is: 3 3 3 3 2 3 2   In general, would you say your quality of life is: 3 2 3 3 3 3 3   In general, how would you rate your physical health? 2 3 3 2 2 2 2   In general, how would you rate your mental health, including your mood and your ability to think? 3 2 2 2 2 2 2   In general, how would you rate your satisfaction with your social activities and relationships? 3 3 2 3 3 3 3   In general, please rate how well you carry out your usual social activities and roles. (This includes activities at home, at work and in your community, and responsibilities as a parent, child, spouse, employee, friend, etc.) 2 3 3 3 3 3 3   To what extent are you able to carry out your everyday physical activities such as walking, climbing stairs, carrying groceries, or moving a chair? 4 4 4 4 4 4 4   In the past 7 days, how often have you been bothered by emotional problems such as feeling anxious, depressed, or irritable? 4 4 4 4 3 4 4   In the past 7 days, how would you rate your fatigue on average? 2 3 2 2 3 2 3   In the past 7 days, how would you rate your pain on average, where 0 means no pain, and 10 means worst imaginable pain? 3 3 2 3 4 2 3   Global Mental Health Score 11 9 9 10 11 10 10   Global Physical Health Score 14 14 15 14 12 14 13   PROMIS TOTAL - SUBSCORES 25 23 24 24 23 24 23         ASSESSMENT: Current Emotional / Mental Status (status of significant symptoms):   Risk status (Self / Other harm or suicidal  ideation)   Patient denies current fears or concerns for personal safety.   Patient denies current or recent suicidal ideation or behaviors.   Patient denies current or recent homicidal ideation or behaviors.   Patient denies current or recent self injurious behavior or ideation.   Patient denies other safety concerns.   Patient reports there has been no change in risk factors since their last session.     Patient reports there has been no change in protective factors since their last session.     Recommended that patient call 911 or go to the local ED should there be a change in any of these risk factors.     Appearance:   Appropriate    Eye Contact:   Good    Psychomotor Behavior: Normal    Attitude:   Cooperative    Orientation:   All   Speech    Rate / Production: Normal/ Responsive    Volume:  Normal    Mood:    Anxious  Depressed    Affect:    Appropriate    Thought Content:  Clear    Thought Form:  Coherent  Goal Directed  Logical    Insight:    Good      Medication Review:   No changes to current psychiatric medication(s)     Medication Compliance:   Yes     Changes in Health Issues:   None reported     Chemical Use Review:   Substance Use: Chemical use reviewed, no active concerns identified      Tobacco Use: No current tobacco use.      Diagnosis:  1. ADA (generalized anxiety disorder)    2. Moderate episode of recurrent major depressive disorder (H)    3. Posttraumatic stress disorder        Collateral Reports Completed:   Not Applicable    PLAN: (Patient Tasks / Therapist Tasks / Other)  Patient will return in three weeks for scheduled session. Patient will work on nightmare retraining to rewrite nightmare. Patient will reach out to support network to plan at least one event daily. Patient would benefit from continuing to look into taking time off.     There has been demonstrated improvement in functioning while patient has been engaged in psychotherapy/psychological service- if withdrawn the patient  would deteriorate and/or relapse.     Mariana KENTNubia Maciasdominic, [facility]     ______________________________________________________________________    Individual Treatment Plan    Patient's Name: Alina Martell  YOB: 1965    Date of Creation:10/16/2020  Date Treatment Plan Last Reviewed/Revised: 9/26/2023    DSM5 Diagnoses: 296.32 (F33.1) Major Depressive Disorder, Recurrent Episode, Moderate _, 300.02 (F41.1) Generalized Anxiety Disorder, or 309.81 (F43.10) Posttraumatic Stress Disorder (includes Posttraumatic Stress Disorder for Children 6 Years and Younger)  Without dissociative symptoms  Psychosocial / Contextual Factors: Health, family  PROMIS (reviewed every 90 days): 25    Referral / Collaboration:  Was/were discussed and patient will pursue.    Anticipated number of session for this episode of care: 20 will reevaluate every 90 days  Anticipation frequency of session: Biweekly  Anticipated Duration of each session: 38-52 minutes  Treatment plan will be reviewed in 90 days or when goals have been changed.       MeasurableTreatment Goal(s) related to diagnosis / functional impairment(s)  Goal 1: Patient will work on reducing overall anxiety.     I will know I've met my goal when reporting minimal anxiety symptoms.       Objective #A (Patient Action)                          Patient will use distraction each time intrusive worry surfaces.  Status: Continued - Date(s): 9/26/2023     Intervention(s)  Therapist will teach emotional regulation skills.  Objective #B  Patient will Decrease frequency and intensity of feeling down, depressed, hopeless.  Status: Continued - Date(s):  9/26/2023     Intervention(s)  Therapist will teach emotional recognition/identification. ..     Objective #C  Patient will Identify negative self-talk and behaviors: challenge core beliefs, myths, and actions.  Status: Continued - Date(s):  9/26/2023     Intervention(s)  Therapist will teach the client how to perform a behavioral  chain analysis. ..     Goal 2: Patient will continue to work on reducing depression symptoms    I will know I've met my goal then reporting minimal depression symptoms     Objective #A (Patient Action)                          Patient will Increase interest, engagement, and pleasure in doing things.  Status: Continued - Date(s):   9/26/2023     Intervention(s)  Therapist will assign homework ..     Objective #B  Patient will Decrease frequency and intensity of feeling down, depressed, hopeless.  Status: Continued - Date(s):   9/26/2023  Intervention(s)  Therapist will assign homework at every session.         Patient has reviewed and agreed to the above plan.        Mariana Love, [facility]  9/26/2023

## 2023-11-17 ENCOUNTER — MYC REFILL (OUTPATIENT)
Dept: BEHAVIORAL HEALTH | Facility: CLINIC | Age: 58
End: 2023-11-17
Payer: COMMERCIAL

## 2023-11-17 DIAGNOSIS — F41.1 GAD (GENERALIZED ANXIETY DISORDER): ICD-10-CM

## 2023-11-17 RX ORDER — LORAZEPAM 0.5 MG/1
TABLET ORAL
Qty: 14 TABLET | Refills: 0 | Status: SHIPPED | OUTPATIENT
Start: 2023-11-17 | End: 2023-12-13

## 2023-11-17 NOTE — CONFIDENTIAL NOTE
Date of Last Office Visit: 10/30/23  Date of Next Office Visit: 12/13/23  No shows since last visit: 0  Cancellations since last visit: 0    Medication requested: LORazepam (ATIVAN) 0.5 MG tablet Date last ordered: 10/30/23 Qty: 14 Refills: 0     Review of [place] ?: no; not current delegate.     Lapse in medication adherence greater than 5 days?: N/A    Medication refill request verified as identical to current order?: yes  Result of Last DAM, VPA, Li+ Level, CBC, or Carbamazepine Level (at or since last visit): N/A    Last visit treatment plan:   Medications:  Start:  Sertraline/Zoloft 25 mg daily; decreased from 75 mg     Continue:  Lorazepam/Ativan .5 to 1 mg (1 to 2 tabs) per day as needed for severe anxiety, panic attacks, or sleep; #14     Labs:  -None obtained     Therapy and or Non-pharmacological modalities:  -Continue individual therapy     Disposition:  -Has been advised of consultative [facility] model     -Please follow up at the [facility] for medication management in:  6 weeks      []Medication refilled per  Medication Refill in Ambulatory Care  policy.  [x]Medication unable to be refilled by RN due to criteria not met as indicated below:    []Eligibility - not seen in the last year   []Supervision - no future appointment   []Compliance - no shows, cancellations or lapse in therapy   []Verification - order discrepancy   [x]Controlled medication   []Medication not included in policy   []90-day supply request   []Other

## 2023-11-19 NOTE — TELEPHONE ENCOUNTER
DIAGNOSIS: Effusion of left knee/Knee meniscus pain, left/Ken Hitchcock PA-C/Favian/TONO referral   APPOINTMENT DATE: Oct 31, 2019    NOTES STATUS DETAILS   OFFICE NOTE from referring provider Care Everywhere 10/16/19 Coret[facility] SOLIS   OFFICE NOTE from other specialist N/A    DISCHARGE SUMMARY from hospital N/A    DISCHARGE REPORT from the ER Care Everywhere 10/14/19, [facility]   OPERATIVE REPORT N/A    MEDICATION LIST Care Everywhere    IMPLANT RECORD/STICKER N/A    LABS     CBC/DIFF N/A    CULTURES N/A    INJECTIONS DONE IN RADIOLOGY N/A    MRI N/A    CT SCAN N/A    XRAYS (IMAGES & REPORTS) Received  10/14/19   TUMOR     PATHOLOGY  Slides & report N/A      10/26/19 SE  2:56 PM  Faxed request to [facility] for xray   0 (no pain/absence of nonverbal indicators of pain)

## 2023-11-28 ENCOUNTER — OFFICE VISIT (OUTPATIENT)
Dept: PSYCHOLOGY | Facility: CLINIC | Age: 58
End: 2023-11-28
Payer: COMMERCIAL

## 2023-11-28 DIAGNOSIS — F43.10 POSTTRAUMATIC STRESS DISORDER: ICD-10-CM

## 2023-11-28 DIAGNOSIS — F41.1 GAD (GENERALIZED ANXIETY DISORDER): Primary | ICD-10-CM

## 2023-11-28 DIAGNOSIS — F33.1 MODERATE EPISODE OF RECURRENT MAJOR DEPRESSIVE DISORDER (H): ICD-10-CM

## 2023-11-28 PROCEDURE — 90834 PSYTX W PT 45 MINUTES: CPT | Performed by: COUNSELOR

## 2023-11-30 NOTE — PROGRESS NOTES
[facility]                                     Progress Note    Patient Name: Alina Martell  Date: 11/28/23         Service Type: Individual      Session Start Time: 8:04 Session End Time: 8:55     Session Length: 51 minutes    Session #: 64     Attendees: Client    Service Modality:  In-Person    DATA  Interactive Complexity: No  Crisis: No        Progress Since Last Session (Related to Symptoms / Goals / Homework):   Symptoms: No change panic attacks continuing to occur    Homework: Achieved / completed to satisfaction      Episode of Care Goals: Satisfactory progress - ACTION (Actively working towards change); Intervened by reinforcing change plan / affirming steps taken     Current / Ongoing Stressors and Concerns:  Patient reported that she is continuing to struggle with her panic attacks. Patient indicated notifying that they usually happen at home. Patient reported she has been working on getting out more often and meeting new people. Patient indicated part o her feels alone at her house and another part feels exhausted from socializing. Patient talked about conflict with her mother and the hurt she is experiencing. Patient indicated the holiday's always being hard and this year being no different. This therapist processed with patient boundaries to set with people on her life. This therapist processed with patient the hurt she is experiencing from her family as well.      Treatment Objective(s) Addressed in This Session:   use cognitive strategies identified in therapy to challenge anxious thoughts  Increase interest, engagement, and pleasure in doing things  Decrease frequency and intensity of feeling down, depressed, hopeless  Improve quantity and quality of night time sleep / decrease daytime naps  Feel less tired and more energy during the day   Nightmare retraining      Intervention:   Motivational Interviewing    MI Intervention: Open-ended questions     Change Talk  Expressed by the Patient: Activation Taking steps    Provider Response to Change Talk: R - Reflected patient's change talk and S - Summarized patient's change talk statements      Assessments completed prior to visit:  The following assessments were completed by patient for this visit:  PHQ9:       5/23/2023     5:42 AM 7/5/2023     2:17 PM 7/5/2023     2:18 PM 8/15/2023     7:27 AM 9/26/2023    12:05 PM 10/9/2023     5:07 AM 10/30/2023     3:22 PM   PHQ-9 SCORE   PHQ-9 Total Score MyChart 6 (Mild depression)  8 (Mild depression) 8 (Mild depression) 9 (Mild depression) 10 (Moderate depression) 7 (Mild depression)   PHQ-9 Total Score 6 8  8 9 10 7     GAD7:       9/22/2022    11:13 AM 11/3/2022    12:06 PM 12/1/2022     9:52 AM 7/5/2023     2:18 PM 9/26/2023    12:05 PM 10/9/2023    10:00 AM 10/30/2023     3:23 PM   ADA-7 SCORE   Total Score 9 (mild anxiety) 10 (moderate anxiety) 8 (mild anxiety) 6 (mild anxiety) 11 (moderate anxiety)  5 (mild anxiety)   Total Score 9 10 8 6 11 8 5     PROMIS 10-Global Health (all questions and answers displayed):       4/14/2022     6:51 AM 7/19/2022    11:13 AM 11/3/2022    12:07 PM 12/1/2022     9:53 AM 3/10/2023     8:13 AM 6/13/2023     6:37 AM 9/26/2023    12:07 PM   PROMIS 10   In general, would you say your health is: Good Good Good Good Fair Good Fair   In general, would you say your quality of life is: Good Fair Good Good Good Good Good   In general, how would you rate your physical health? Fair Good Good Fair Fair Fair Fair   In general, how would you rate your mental health, including your mood and your ability to think? Good Fair Fair Fair Fair Fair Fair   In general, how would you rate your satisfaction with your social activities and relationships? Good Good Fair Good Good Good Good   In general, please rate how well you carry out your usual social activities and roles Fair Good Good Good Good Good Good   To what extent are you able to carry out your everyday physical  activities such as walking, climbing stairs, carrying groceries, or moving a chair? Mostly Mostly Mostly Mostly Mostly Mostly Mostly   In the past 7 days, how often have you been bothered by emotional problems such as feeling anxious, depressed, or irritable? Often Often Often Often Sometimes Often Often   In the past 7 days, how would you rate your fatigue on average? Mild Moderate Mild Mild Moderate Mild Moderate   In the past 7 days, how would you rate your pain on average, where 0 means no pain, and 10 means worst imaginable pain? 3 3 2 3 4 2 3   In general, would you say your health is: 3 3 3 3 2 3 2   In general, would you say your quality of life is: 3 2 3 3 3 3 3   In general, how would you rate your physical health? 2 3 3 2 2 2 2   In general, how would you rate your mental health, including your mood and your ability to think? 3 2 2 2 2 2 2   In general, how would you rate your satisfaction with your social activities and relationships? 3 3 2 3 3 3 3   In general, please rate how well you carry out your usual social activities and roles. (This includes activities at home, at work and in your community, and responsibilities as a parent, child, spouse, employee, friend, etc.) 2 3 3 3 3 3 3   To what extent are you able to carry out your everyday physical activities such as walking, climbing stairs, carrying groceries, or moving a chair? 4 4 4 4 4 4 4   In the past 7 days, how often have you been bothered by emotional problems such as feeling anxious, depressed, or irritable? 4 4 4 4 3 4 4   In the past 7 days, how would you rate your fatigue on average? 2 3 2 2 3 2 3   In the past 7 days, how would you rate your pain on average, where 0 means no pain, and 10 means worst imaginable pain? 3 3 2 3 4 2 3   Global Mental Health Score 11 9 9 10 11 10 10   Global Physical Health Score 14 14 15 14 12 14 13   PROMIS TOTAL - SUBSCORES 25 23 24 24 23 24 23         ASSESSMENT: Current Emotional / Mental Status (status  of significant symptoms):   Risk status (Self / Other harm or suicidal ideation)   Patient denies current fears or concerns for personal safety.   Patient denies current or recent suicidal ideation or behaviors.   Patient denies current or recent homicidal ideation or behaviors.   Patient denies current or recent self injurious behavior or ideation.   Patient denies other safety concerns.   Patient reports there has been no change in risk factors since their last session.     Patient reports there has been no change in protective factors since their last session.     Recommended that patient call 911 or go to the local ED should there be a change in any of these risk factors.     Appearance:   Appropriate    Eye Contact:   Good    Psychomotor Behavior: Normal    Attitude:   Cooperative    Orientation:   All   Speech    Rate / Production: Normal/ Responsive    Volume:  Normal    Mood:    Anxious  Depressed    Affect:    Appropriate    Thought Content:  Clear    Thought Form:  Coherent  Goal Directed  Logical    Insight:    Good      Medication Review:   No changes to current psychiatric medication(s)     Medication Compliance:   Yes     Changes in Health Issues:   None reported     Chemical Use Review:   Substance Use: Chemical use reviewed, no active concerns identified      Tobacco Use: No current tobacco use.      Diagnosis:  1. ADA (generalized anxiety disorder)    2. Moderate episode of recurrent major depressive disorder (H)    3. Posttraumatic stress disorder        Collateral Reports Completed:   Not Applicable    PLAN: (Patient Tasks / Therapist Tasks / Other)  Patient will return in two weeks for scheduled session. Patient will continue to work on attending social gatherings. Patient will set boundaries with family remember's. Patient will start to journal again.     There has been demonstrated improvement in functioning while patient has been engaged in psychotherapy/psychological service- if withdrawn the  patient would deteriorate and/or relapse.     Mariana KENTNubia Love, [facility]     ______________________________________________________________________    Individual Treatment Plan    Patient's Name: Alina Martell  YOB: 1965    Date of Creation:10/16/2020  Date Treatment Plan Last Reviewed/Revised: 9/26/2023    DSM5 Diagnoses: 296.32 (F33.1) Major Depressive Disorder, Recurrent Episode, Moderate _, 300.02 (F41.1) Generalized Anxiety Disorder, or 309.81 (F43.10) Posttraumatic Stress Disorder (includes Posttraumatic Stress Disorder for Children 6 Years and Younger)  Without dissociative symptoms  Psychosocial / Contextual Factors: Health, family  PROMIS (reviewed every 90 days): 25    Referral / Collaboration:  Was/were discussed and patient will pursue.    Anticipated number of session for this episode of care: 20 will reevaluate every 90 days  Anticipation frequency of session: Biweekly  Anticipated Duration of each session: 38-52 minutes  Treatment plan will be reviewed in 90 days or when goals have been changed.       MeasurableTreatment Goal(s) related to diagnosis / functional impairment(s)  Goal 1: Patient will work on reducing overall anxiety.     I will know I've met my goal when reporting minimal anxiety symptoms.       Objective #A (Patient Action)                          Patient will use distraction each time intrusive worry surfaces.  Status: Continued - Date(s): 9/26/2023     Intervention(s)  Therapist will teach emotional regulation skills.  Objective #B  Patient will Decrease frequency and intensity of feeling down, depressed, hopeless.  Status: Continued - Date(s):  9/26/2023     Intervention(s)  Therapist will teach emotional recognition/identification. ..     Objective #C  Patient will Identify negative self-talk and behaviors: challenge core beliefs, myths, and actions.  Status: Continued - Date(s):  9/26/2023     Intervention(s)  Therapist will teach the client how to perform a  behavioral chain analysis. ..     Goal 2: Patient will continue to work on reducing depression symptoms    I will know I've met my goal then reporting minimal depression symptoms     Objective #A (Patient Action)                          Patient will Increase interest, engagement, and pleasure in doing things.  Status: Continued - Date(s):   9/26/2023     Intervention(s)  Therapist will assign homework ..     Objective #B  Patient will Decrease frequency and intensity of feeling down, depressed, hopeless.  Status: Continued - Date(s):   9/26/2023  Intervention(s)  Therapist will assign homework at every session.         Patient has reviewed and agreed to the above plan.        Mariana Love, [facility]  9/26/2023

## 2023-12-11 NOTE — LETTER
8/23/2022    Estelle Bansal MD  [address] [facility]  [facility] [address]    RE: Alina Martell       Dear Colleague,     I had the pleasure of seeing Alina Martell in the [facility].    Thank you, Dr. Bansal, for asking the [facility] team to see Ms. Alina Martell to evaluate paroxysmal atrial fibrillation.    Assessment/Recommendations     Assessment/Plan:    Diagnoses and all orders for this visit:  Paroxysmal atrial fibrillation (H) and has had no symptoms of recurrence of atrial fibrillation since PVI.  Directly symptomatic in the past.  She was on pill in the pocket flecainide prior to ablation.  She is on diltiazem 360 mg orally every day without side effects.  No change.  Essential hypertension and blood pressure at goal.  Status post catheter ablation of atrial fibrillation and no complications post ablation.  She was back to normal activities fairly quickly.  Obstructive sleep apnea syndrome and on CPAP.  Discussed importance of continuing to use CPAP post ablation to prevent recurrence of A. fib      [id number] score of 2 and on Eliquis.  To see Dr. Joseph in 6 to 8 weeks and to discuss whether he recommends she stay on anticoagulation or not.  (Maritza has impression that he intends to discontinue it and she is okay with staying on or stopping anticoagulation.)       History of Present Illness/Subjective     Alina Martell is a very pleasant 57 year old female who comes in today for EP follow-up after having catheter ablation of paroxysmal A. fib on 6/30/2022.  Alina Martell has a known history of paroxymal atrial fibrillation, HTN, Sjogren syndrome, rheumatoid arthritis, RANDALL on CPAP, anxiety, and morbid obesity.  Alina was first diagnosed with atrial fibrillation when she went to the emergency room with palpitations in September 2020 and cardioversion was performed for atrial fib.  She has had recurrence of starting in January 2022  with again another cardioversion in the ER.  She has had further episodes of atrial fibrillation which have been successfully treated with as needed flecainide 50 mg.  On 6/30/2022 Xi had PVI with RF to pulmonary veins.  Left atrium mildly dilated and no significant fibrosis.  Xi was back to all normal activities within a week.  She had some  left arm soreness and some joint swelling for a few days after ablation, but this could be her RA.  She has had no premature beats or symptoms of recurrence of atrial fibrillation since her ablation.      Cardiographics (reviewed):  February 2022 coronary CT shows:  Interpretation Summary     1.  Normal left main.  2.  Normal LAD and its branches.  3.  Minimal disease in proximal LCx.  4.  Normal RCA and its branches.  Right dominant system.  October 2020 echo shows:     Narrative & Impression    Normal left ventricular size with mild hypertrophy noted.    Left ventricle ejection fraction is normal. The calculated left ventricular ejection fraction is 67%.    Normal right ventricular size and systolic function.    No hemodynamically significant valvular heart abnormalities.    No previous study for comparison.     Problem List:  Patient Active Problem List   Diagnosis     Depressed     Seropositive rheumatoid arthritis of multiple sites (H)     Recurrent major depressive disorder, in full remission (H)     Paroxysmal atrial fibrillation (H)     Morbid obesity (H)     Microcytic anemia     Anxiety     Chest pain     Chronic pain     Cyclic citrullinated peptide (CCP) antibody positive     Grief     Insomnia related to another mental disorder     Migraine headache     Olecranon bursitis of right elbow     Panic attack     ADA (generalized anxiety disorder)     Reflux     Sicca syndrome (H)     Sleep disorder     Tendonitis of both shoulders     Obstructive sleep apnea syndrome     Essential hypertension     Coronary artery disease of native artery with stable angina  pectoris (H)     Postmenopausal bleeding     History of colonic polyps     Status post catheter ablation of atrial fibrillation     Revi  e  Physical Examination Review of Systems   christian lee  [facility] 12/21/2021   There is no height or weight on file to calculate BMI.  Wt Readings from Last 3 Encounters:   08/17/22 129.2 kg (284 lb 12.8 oz)   06/30/22 128.7 kg (283 lb 11.2 oz)   06/23/22 128.4 kg (283 lb)     General Appearance:   Alert, well-appearing and in no acute distress.   HEENT: Atraumatic, normocephalic.  No scleral icterus, normal conjunctivae; mucous membranes pink and moist.     Chest: Chest symmetric, spine straight.   Lungs:   Respirations unlabored.   Cardiovascular:   Normal first and second heart sounds with no murmurs, rubs, or gallops.  Regular, regular.   Normal JVD, no edema.       Extremities: No cyanosis or clubbing   Musculoskeletal: Moves all extremities   Skin: Warm, dry, intact.    Neurologic: Mood and affect are appropriate, alert and oriented to person, place, time, and situation     ROS: 10 point ROS neg other than the symptoms noted above in the HPI.     Medical History  Surgical History Family History Social History     Past Medical History:   Diagnosis Date     Anemia      Antiplatelet or antithrombotic long-term use      Arrhythmia      Cannabis use without complication 12/8/2014     Carpal tunnel syndrome      Coronary artery disease      Gastroesophageal reflux disease      History of angina      Hypertension      Irregular heart beat      Obesity      Paroxysmal atrial fibrillation (H)      PTSD (post-traumatic stress disorder)      RA (rheumatoid arthritis) (H)      Rheumatoid arthritis (H) 7/8/2016     Sicca syndrome (H)      Sleep apnea     Past Surgical History:   Procedure Laterality Date     CHOLECYSTECTOMY       DILATION AND CURETTAGE, OPERATIVE HYSTEROSCOPY, COMBINED N/A 3/3/2022    Procedure: HYSTEROSCOPY, WITH DILATION AND CURETTAGE;  Surgeon: Irma Cary MD;   Location: [facility]     EP ABLATION FOCAL AFIB N/A 6/30/2022    Procedure: Ablation Atrial Fibrilation;  Surgeon: Jose Guadalupe Joseph MD;  Location:  [facility]     HC REMOVAL GALLBLADDER      Description: Cholecystectomy;  Recorded: 10/15/2013;     LAPAROSCOPIC TUBAL LIGATION       RELEASE CARPAL TUNNEL BILATERAL       TUBAL LIGATION  1995    Family History   Problem Relation Age of Onset     Crohn's Disease Mother         Total colectomy with ileostomy.     Atrial fibrillation Mother      Snoring Father      Diabetes Father      Prostate Cancer Father      Chronic Kidney Disease Father         Chose against dialysis.     Substance Abuse Brother      Depression Brother      Attention Deficit Disorder Brother      Alcoholism Brother      Arrhythmia Brother      Alcoholism Brother      Substance Abuse Brother      Arrhythmia Brother      Alcoholism Maternal Grandfather      No Known Problems Daughter      No Known Problems Son      Lupus Cousin      Breast Cancer No family hx of     History   Smoking Status     Never Smoker   Smokeless Tobacco     Never Used     Comment: smokes marijuana     Social History    Substance and Sexual Activity      Alcohol use: Not Currently        Comment: Alcoholic Drinks/day: Never an issue, she states.  7/8/16       Medications  Allergies     Current Outpatient Medications   Medication Sig Dispense Refill     acetaminophen (TYLENOL) 325 MG tablet Take 3 tablets (975 mg) by mouth every 6 hours as needed for mild pain 50 tablet 0     adalimumab (HUMIRA *CF* PEN) 40 MG/0.4ML pen kit INJECT 1 PEN UNDER THE SKIN EVERY 14 DAYS. HOLD FOR SIGNS OF INFECTION, THEN SEEK MEDICAL ATTENTION. 2 each 5     apixaban ANTICOAGULANT (ELIQUIS ANTICOAGULANT) 5 MG tablet Take 1 tablet (5 mg) by mouth 2 times daily 180 tablet 1     diltiazem ER COATED BEADS (CARDIZEM CD/CARTIA XT) 180 MG 24 hr capsule Take 2 capsules (360 mg) by mouth daily 180 capsule 3     EPINEPHrine (ANY BX  GENERIC EQUIV) 0.3 MG/0.3ML injection 2-pack Inject 0.3 mg into the muscle as needed for anaphylaxis        flecainide (TAMBOCOR) 50 MG tablet Take 1 tablet (50 mg) by mouth 2 times daily as needed (at onset of a fib, may repeat once after 4 hours) 60 tablet 1     hydrOXYzine (ATARAX) 25 MG tablet Take 1 tablet (25 mg) by mouth 3 times daily as needed for anxiety 270 tablet 3     loratadine (CLARITIN) 10 MG tablet Take 10 mg by mouth daily        Multiple Vitamins-Minerals (CENTROVITE) TABS TAKE 1 TABLET BY MOUTH EVERY DAY FOR 30 DAYS       omeprazole (PRILOSEC) 40 MG DR capsule Take 1 capsule (40 mg) by mouth in the morning. 90 capsule 3     ondansetron (ZOFRAN-ODT) 4 MG ODT tab Take 1-2 tablets (4-8 mg) by mouth every 8 hours as needed for nausea 4 tablet 0     polyethylene glycol (MIRALAX) 17 GM/Dose powder Take 17 g (1 capful) by mouth daily 578 g 3     prazosin (MINIPRESS) 1 MG capsule Take 1 capsule by mouth nightly as needed        traZODone (DESYREL) 50 MG tablet Take 0.5 tablets (25 mg) by mouth At Bedtime 90 tablet 0     vitamin C (ASCORBIC ACID) 1000 MG TABS Take 1,000 mg by mouth daily        vitamin D3 (CHOLECALCIFEROL) 250 mcg (99905 units) capsule Take 1 capsule by mouth once a week        Allergies   Allergen Reactions     Penicillins Shortness Of Breath, Palpitations, Dizziness, Anaphylaxis, Hives, Itching and Rash     Test in 2031, see allergy note on 7/29/21     Shellfish-Derived Products Anaphylaxis     Sulfa Drugs Hives and Anaphylaxis      Medical, surgical, family, social history, and medications were all reviewed and updated as necessary.   Lab Results    Chemistry/lipid CBC Cardiac Enzymes/BNP/TSH/INR   Recent Labs   Lab Test 01/21/21  0746   CHOL 169   HDL 46*      TRIG 79     Recent Labs   Lab Test 01/21/21  0746 09/16/19  0739 11/14/18  0810    115 78     Recent Labs   Lab Test 08/17/22  0913 06/30/22  0556   NA  --  139   POTASSIUM  --  3.8   CHLORIDE  --  104   CO2  --  28    GLC  --  125*   BUN  --  14   CR 0.69 0.75   GFRESTIMATED >90 >90   JOSSELIN  --  9.1     Recent Labs   Lab Test 08/17/22  0913 06/30/22  0556 06/23/22  0800   CR 0.69 0.75 0.73     No results for input(s): A1C in the last 14198 hours.       Recent Labs   Lab Test 08/17/22  0913   WBC 9.0   HGB 11.9   HCT 37.8   MCV 83        Recent Labs   Lab Test 08/17/22  0913 08/15/22  1652 06/30/22  0556   HGB 11.9 12.1 12.4    Recent Labs   Lab Test 03/08/22  2025 01/17/22  1406 01/16/22  2301   TROPONINI <0.01 <0.01 <0.01     No results for input(s): BNP, NTBNPI, NTBNP in the last 96661 hours.  Recent Labs   Lab Test 02/16/22  1108   TSH 2.16     No results for input(s): INR in the last 89007 hours.       Total Time- 24 minutes spent on date of encounter doing chart review, history and exam, documentation and further activities as noted above.  This note has been dictated using voice recognition software. Any grammatical, typographical, or context distortions are unintentional and inherent to the software.        Thank you for allowing me to participate in the care of your patient.      Sincerely,     STARLA Castillo CNP     [facility]  cc:   STARLA Castillo CNP  [address] [facility] [address]  [place] [address]         3

## 2023-12-13 ENCOUNTER — VIRTUAL VISIT (OUTPATIENT)
Dept: BEHAVIORAL HEALTH | Facility: CLINIC | Age: 58
End: 2023-12-13
Payer: COMMERCIAL

## 2023-12-13 DIAGNOSIS — F43.10 PTSD (POST-TRAUMATIC STRESS DISORDER): ICD-10-CM

## 2023-12-13 DIAGNOSIS — F33.1 MODERATE EPISODE OF RECURRENT MAJOR DEPRESSIVE DISORDER (H): ICD-10-CM

## 2023-12-13 DIAGNOSIS — F41.1 GAD (GENERALIZED ANXIETY DISORDER): Primary | ICD-10-CM

## 2023-12-13 DIAGNOSIS — G47.9 SLEEP DISTURBANCES: ICD-10-CM

## 2023-12-13 PROBLEM — E11.9 DIABETES MELLITUS, TYPE 2 (H): Status: ACTIVE | Noted: 2023-12-13

## 2023-12-13 PROCEDURE — 99215 OFFICE O/P EST HI 40 MIN: CPT | Mod: VID | Performed by: NURSE PRACTITIONER

## 2023-12-13 RX ORDER — VENLAFAXINE HYDROCHLORIDE 37.5 MG/1
37.5 CAPSULE, EXTENDED RELEASE ORAL DAILY
Qty: 7 CAPSULE | Refills: 0 | Status: SHIPPED | OUTPATIENT
Start: 2023-12-13 | End: 2024-01-08

## 2023-12-13 RX ORDER — LORAZEPAM 0.5 MG/1
TABLET ORAL
Qty: 30 TABLET | Refills: 0 | Status: SHIPPED | OUTPATIENT
Start: 2023-12-13 | End: 2024-01-08

## 2023-12-13 RX ORDER — VENLAFAXINE HYDROCHLORIDE 75 MG/1
75 CAPSULE, EXTENDED RELEASE ORAL DAILY
Qty: 30 CAPSULE | Refills: 1 | Status: SHIPPED | OUTPATIENT
Start: 2023-12-13 | End: 2024-01-08

## 2023-12-13 RX ORDER — LORATADINE 10 MG/1
1 TABLET ORAL DAILY
COMMUNITY
Start: 2023-07-10 | End: 2023-12-14
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
## 2023-12-13 NOTE — PROGRESS NOTES
"  Mental Health and Collaborative Care Psychiatry Service Rooming Note      Most pressing mental health concern at this time: Depression and anxiety as well as adjusting medications.      Any new physical health conditions or diagnoses affecting you that we should be aware of: No      Side effects related to medications patient would like to discuss with the provider:  No      Are you taking your medications as prescribed?  Yes  If not, why? Na      Do you need refills of any of the medications?  Yes  If so, which ones? Will review with provider if same medications will be prescribed.      Are you taking any recreational substances? No      Is there any chance you are pregnant? No  Do you use birth control? Hysterectomy.      Provider notified  No      Add attendance guidelines .phrase here   Care team has reviewed attendance agreement with patient. Patient advised that two failed appointments within 6 months may lead to termination of current episode of care.        **If appointment is with Transition Clinic-include the following:      \"The Transition Clinic is a temporary psychiatry service that helps to bridge the time to your next appointment. It is not intended to be a long-term service and you are expected to attend your scheduled appointment with your next provider.\"    [x] Patient/Parent verbalized understanding     If you need support between appointments, please call [phone] and let them know you're seen by [facility] Psychiatry. You may also send a [employer] message to reach us.     New (awaiting) Mental health provider or next programming: ADULT PSYCHIATRY NEW 60 min [facility] PSYCHIATRY Surekha Dean APRN CNP    Date of scheduled apt: 1/8/2024  8:00 AM      Patient would like the video visit invitation sent by:  Kavitha Dillard RN  December 13, 2023  3:17 PM        "

## 2023-12-13 NOTE — Clinical Note
Belkis Curry-  1.  Maritza said she preferred to try the SNRI versus a TCA since she sometimes has to take antihistamines for RA and already struggles with dry mouth, etc.   2. Given how many antidepressants and other medications she's tried and low likelihood of abuse potential on the Lorazepam prn, we decided to keep using it (maybe a low dosage daily would end up helping depression indirectly if sleep continues to improve) because Hydroxyzine prn dried things out to much as well.  3. Let me know if there's any questions or concerns with the progress notes.    4. Thanks for your help and teamwork.  Rebecca

## 2023-12-13 NOTE — PATIENT INSTRUCTIONS
"Start:  Sertraline/Zoloft 25 mg daily: discontinue    Venlafaxine/Effexor 37.5 mg daily for 7 days then increase 75 mg daily        Continue:  Lorazepam/Ativan .5 to 1 mg (1 to 2 tabs) per day as needed for severe anxiety, panic attacks, or sleep; #30     -----------------    -You do not need to return to the Transition [facility] for medication management    -Please make sure to keep the appointment for longitudinal outpatient psychiatry services  (see below):     Department: [facility]  Provider: STEPHY Perez  Lociation: [address] [facility] [address]  Phone: [phone]  Date/Time/Visit type:   1/8/2024 @ 7:45 am via telehealth/video     --------------------    Patient Education   The [facility]  What to Expect  Here's what to expect in the Transition Clinic.   About the clinic  The Transition Clinic cares for you while you wait for long-term help.   You'll meet with a psychiatric doctor, who can give you medicine to help you feel better. You'll meet with them for about 5 visits or less. You'll see a different psychiatric doctor for your ongoing care. The clinic nurses and coordinators will help you guide your care.  If you have any questions or concerns, we'll be glad to talk with you.  About visits  Be open  At your visits, please talk openly about your problems. It may feel hard, but it's the best way for us to help you.  Cancelling visits  If you can't come to your visit, please call us right away at [phone]. If you don't cancel at least 24 hours (1 full day) before your visit, that's \"late cancellation.\"  Not showing up for your visits  Being very late is the same as not showing up. You will be a \"no show\" if:  You're more than 15 minutes late for a 30-minute (half hour) visit.  You're more than 30 minutes late for a 60-minute (full hour) visit.  If you cancel late or don't show up 2 times within 6 months, we may end your care.   If your ongoing care with a psychiatric " doctor is cancelled, we may end your care at the [facility]. If that happens, we'll give you referrals and enough medicine to last 3 months. The Transition Clinic is only used to bridge the gap between your care.  Getting help between visits  If you need help between visits, you can call us Monday to Friday from 8 a.m. to 4:30 p.m. at [phone].  Emergency care   Call 911 or go to the nearest emergency department if your life or someone else's life is in danger.  Call 988 anytime to reach the [facility] Suicide and Crisis hotline.  Medicine refills  To refill your medicine, call your pharmacy. You can also call the [facility] at [phone], Monday to Friday, 8 a.m. to 4:30 p.m. It can take 1 to 3 business days to get a refill.   Forms, letters, and tests  You may have papers to fill out, like FMLA, short-term disability, and workability. We'll find out if we can do them and let you know. It can take 5 to 7 business days to get them filled out, and we may need you to come in for a visit.  Before we can give you medicine for ADHD, we may refer you to get tested for it or confirm it another way.  We don't do mental health checks ordered by the court.   We don't do mental health testing, but we can refer you to get tested.   Thank you for choosing us for your care.  For informational purposes only. Not to replace the advice of your health care provider. Copyright   2022 [facility]. All rights reserved. [employer] [id number] - 12/22.

## 2023-12-17 ENCOUNTER — HEALTH MAINTENANCE LETTER (OUTPATIENT)
Age: 58
End: 2023-12-17

## 2023-12-18 ASSESSMENT — PATIENT HEALTH QUESTIONNAIRE - PHQ9
10. IF YOU CHECKED OFF ANY PROBLEMS, HOW DIFFICULT HAVE THESE PROBLEMS MADE IT FOR YOU TO DO YOUR WORK, TAKE CARE OF THINGS AT HOME, OR GET ALONG WITH OTHER PEOPLE: SOMEWHAT DIFFICULT
SUM OF ALL RESPONSES TO PHQ QUESTIONS 1-9: 10
SUM OF ALL RESPONSES TO PHQ QUESTIONS 1-9: 10

## 2023-12-19 ENCOUNTER — OFFICE VISIT (OUTPATIENT)
Dept: PSYCHOLOGY | Facility: CLINIC | Age: 58
End: 2023-12-19
Payer: COMMERCIAL

## 2023-12-19 DIAGNOSIS — F43.10 POSTTRAUMATIC STRESS DISORDER: ICD-10-CM

## 2023-12-19 DIAGNOSIS — F41.1 GAD (GENERALIZED ANXIETY DISORDER): ICD-10-CM

## 2023-12-19 DIAGNOSIS — F43.10 PTSD (POST-TRAUMATIC STRESS DISORDER): ICD-10-CM

## 2023-12-19 DIAGNOSIS — F33.1 MODERATE EPISODE OF RECURRENT MAJOR DEPRESSIVE DISORDER (H): ICD-10-CM

## 2023-12-19 DIAGNOSIS — F41.1 GAD (GENERALIZED ANXIETY DISORDER): Primary | ICD-10-CM

## 2023-12-19 PROCEDURE — 90834 PSYTX W PT 45 MINUTES: CPT | Performed by: COUNSELOR

## 2023-12-19 RX ORDER — VENLAFAXINE HYDROCHLORIDE 37.5 MG/1
37.5 CAPSULE, EXTENDED RELEASE ORAL DAILY
Qty: 7 CAPSULE | Refills: 0 | OUTPATIENT
Start: 2023-12-19

## 2023-12-19 NOTE — TELEPHONE ENCOUNTER
Date of Last Office Visit: 12/13/23  Date of Next Office Visit: 1/8/24  No shows since last visit: 0  Cancellations since last visit: 0    Medication requested: venlafaxine (EFFEXOR XR) 37.5 MG 24 hr capsule Date last ordered: 12/13/23 Qty: 7 Refills: 0     Review of [place] ?: NA    Lapse in medication adherence greater than 5 days?: NA  If yes, call patient and gather details: NA  Medication refill request verified as identical to current order?: Na  Result of Last DAM, VPA, Li+ Level, CBC, or Carbamazepine Level (at or since last visit): N/A    Last visit treatment plan: 12/13/23  TREATMENT PLAN      Medications:  Start:  Sertraline/Zoloft 25 mg daily: discontinue    Venlafaxine/Effexor 37.5 mg daily for 7 days then increase 75 mg daily      Continue:  Lorazepam/Ativan .5 to 1 mg (1 to 2 tabs) per day as needed for severe anxiety, panic attacks, or sleep; #30       Labs:  -None ordered        Therapy and or Non-pharmacological modalities:  -Continue individual therapy      Disposition:  -Has been advised of consultative Transition Clinic model    -You do not need to return to the Transition Clinic for medication management    -Please make sure to keep the appointment for longitudinal outpatient psychiatry services  (see below):     Department: [facility]  Provider: STEPHY Perez  Lociation: [address] [place] [address]  Phone: [phone]  Date/Time/Visit type:   1/8/2024 @ 7:45 am via telehealth/video             Total time:  42 minutes per:    -Review of EMR   -Appointment time  -Documentation        Rebecca KEYES, [facility]-BC    []Medication refilled per  Medication Refill in Ambulatory Care  policy.  [x]Medication unable to be refilled by RN due to criteria not met as indicated below:    []Eligibility - not seen in the last year   []Supervision - no future appointment   []Compliance - no shows, cancellations or lapse in therapy   []Verification - order  discrepancy   []Controlled medication   []Medication not included in policy   []90-day supply request   [x]Other - 7 day refill titrated up. This dosage discontinued.

## 2023-12-20 NOTE — PROGRESS NOTES
[facility]                                     Progress Note    Patient Name: Alina Martell  Date: 12/19/23         Service Type: Individual      Session Start Time: 8:10 Session End Time: 9:00     Session Length: 50 minutes    Session #: 65     Attendees: Client    Service Modality:  In-Person    DATA  Interactive Complexity: No  Crisis: No        Progress Since Last Session (Related to Symptoms / Goals / Homework):   Symptoms: No change depression severe due to the holiday's    Homework: Achieved / completed to satisfaction      Episode of Care Goals: Satisfactory progress - ACTION (Actively working towards change); Intervened by reinforcing change plan / affirming steps taken     Current / Ongoing Stressors and Concerns:  Patient indicated her depression being severe. Patient reported this time of year is always the hardest for her. Patient indicated just wanting Behalf to be over. Patient reported this year she did not make all the plans so having no idea what her family is doing. Patient indicated she has a couple ideas in mind of what she will do not nothing specific. Patient reported knowing this is unhealthy for her. This therapist challenged patient on getting out o town for [place] to go see friend's. Patient is going to look into that idea. Patient is also going to look into volunteering.      Treatment Objective(s) Addressed in This Session:   identify three distraction and diversion activities and use those activities to decrease level of anxiety    Increase interest, engagement, and pleasure in doing things  Decrease frequency and intensity of feeling down, depressed, hopeless  Improve quantity and quality of night time sleep / decrease daytime naps  Feel less tired and more energy during the day   Nightmare retraining      Intervention:   Motivational Interviewing    MI Intervention: Permission to raise concern or advise     Change Talk Expressed by the Patient:  Taking steps    Provider Response to Change Talk: S - Summarized patient's change talk statements      Assessments completed prior to visit:  The following assessments were completed by patient for this visit:  PHQ9:       7/5/2023     2:18 PM 8/15/2023     7:27 AM 9/26/2023    12:05 PM 10/9/2023     5:07 AM 10/30/2023     3:22 PM 12/13/2023     3:35 PM 12/18/2023    12:59 PM   PHQ-9 SCORE   PHQ-9 Total Score MyChart 8 (Mild depression) 8 (Mild depression) 9 (Mild depression) 10 (Moderate depression) 7 (Mild depression) 9 (Mild depression) 10 (Moderate depression)   PHQ-9 Total Score  8 9 10 7 9 10     GAD7:       11/3/2022    12:06 PM 12/1/2022     9:52 AM 7/5/2023     2:18 PM 9/26/2023    12:05 PM 10/9/2023    10:00 AM 10/30/2023     3:23 PM 12/6/2023     9:50 AM   ADA-7 SCORE   Total Score 10 (moderate anxiety) 8 (mild anxiety) 6 (mild anxiety) 11 (moderate anxiety)  5 (mild anxiety) 11 (moderate anxiety)   Total Score 10 8 6 11 8 5 11     PROMIS 10-Global Health (all questions and answers displayed):       4/14/2022     6:51 AM 7/19/2022    11:13 AM 11/3/2022    12:07 PM 12/1/2022     9:53 AM 3/10/2023     8:13 AM 6/13/2023     6:37 AM 9/26/2023    12:07 PM   PROMIS 10   In general, would you say your health is: Good Good Good Good Fair Good Fair   In general, would you say your quality of life is: Good Fair Good Good Good Good Good   In general, how would you rate your physical health? Fair Good Good Fair Fair Fair Fair   In general, how would you rate your mental health, including your mood and your ability to think? Good Fair Fair Fair Fair Fair Fair   In general, how would you rate your satisfaction with your social activities and relationships? Good Good Fair Good Good Good Good   In general, please rate how well you carry out your usual social activities and roles Fair Good Good Good Good Good Good   To what extent are you able to carry out your everyday physical activities such as walking, climbing  stairs, carrying groceries, or moving a chair? Mostly Mostly Mostly Mostly Mostly Mostly Mostly   In the past 7 days, how often have you been bothered by emotional problems such as feeling anxious, depressed, or irritable? Often Often Often Often Sometimes Often Often   In the past 7 days, how would you rate your fatigue on average? Mild Moderate Mild Mild Moderate Mild Moderate   In the past 7 days, how would you rate your pain on average, where 0 means no pain, and 10 means worst imaginable pain? 3 3 2 3 4 2 3   In general, would you say your health is: 3 3 3 3 2 3 2   In general, would you say your quality of life is: 3 2 3 3 3 3 3   In general, how would you rate your physical health? 2 3 3 2 2 2 2   In general, how would you rate your mental health, including your mood and your ability to think? 3 2 2 2 2 2 2   In general, how would you rate your satisfaction with your social activities and relationships? 3 3 2 3 3 3 3   In general, please rate how well you carry out your usual social activities and roles. (This includes activities at home, at work and in your community, and responsibilities as a parent, child, spouse, employee, friend, etc.) 2 3 3 3 3 3 3   To what extent are you able to carry out your everyday physical activities such as walking, climbing stairs, carrying groceries, or moving a chair? 4 4 4 4 4 4 4   In the past 7 days, how often have you been bothered by emotional problems such as feeling anxious, depressed, or irritable? 4 4 4 4 3 4 4   In the past 7 days, how would you rate your fatigue on average? 2 3 2 2 3 2 3   In the past 7 days, how would you rate your pain on average, where 0 means no pain, and 10 means worst imaginable pain? 3 3 2 3 4 2 3   Global Mental Health Score 11 9 9 10 11 10 10   Global Physical Health Score 14 14 15 14 12 14 13   PROMIS TOTAL - SUBSCORES 25 23 24 24 23 24 23         ASSESSMENT: Current Emotional / Mental Status (status of significant symptoms):   Risk  status (Self / Other harm or suicidal ideation)   Patient denies current fears or concerns for personal safety.   Patient denies current or recent suicidal ideation or behaviors.   Patient denies current or recent homicidal ideation or behaviors.   Patient denies current or recent self injurious behavior or ideation.   Patient denies other safety concerns.   Patient reports there has been no change in risk factors since their last session.     Patient reports there has been no change in protective factors since their last session.     Recommended that patient call 911 or go to the local ED should there be a change in any of these risk factors.     Appearance:   Appropriate    Eye Contact:   Good    Psychomotor Behavior: Normal    Attitude:   Cooperative    Orientation:   All   Speech    Rate / Production: Normal/ Responsive    Volume:  Normal    Mood:    Anxious  Depressed    Affect:    Appropriate    Thought Content:  Clear    Thought Form:  Coherent  Goal Directed  Logical    Insight:    Good      Medication Review:   No changes to current psychiatric medication(s)     Medication Compliance:   Yes     Changes in Health Issues:   None reported     Chemical Use Review:   Substance Use: Chemical use reviewed, no active concerns identified      Tobacco Use: No current tobacco use.      Diagnosis:  1. ADA (generalized anxiety disorder)    2. Moderate episode of recurrent major depressive disorder (H)    3. Posttraumatic stress disorder        Collateral Reports Completed:   Not Applicable    PLAN: (Patient Tasks / Therapist Tasks / Other)  Patient will return in two weeks for scheduled session. Patient will contact friend's to be with them on [place]. Patient will look into volunteering.     There has been demonstrated improvement in functioning while patient has been engaged in psychotherapy/psychological service- if withdrawn the patient would deteriorate and/or relapse.     Mariana Love, [facility]      ______________________________________________________________________    Individual Treatment Plan    Patient's Name: Alina Martell  YOB: 1965    Date of Creation:10/16/2020  Date Treatment Plan Last Reviewed/Revised: 9/26/2023    DSM5 Diagnoses: 296.32 (F33.1) Major Depressive Disorder, Recurrent Episode, Moderate _, 300.02 (F41.1) Generalized Anxiety Disorder, or 309.81 (F43.10) Posttraumatic Stress Disorder (includes Posttraumatic Stress Disorder for Children 6 Years and Younger)  Without dissociative symptoms  Psychosocial / Contextual Factors: Health, family  PROMIS (reviewed every 90 days): 25    Referral / Collaboration:  Was/were discussed and patient will pursue.    Anticipated number of session for this episode of care: 20 will reevaluate every 90 days  Anticipation frequency of session: Biweekly  Anticipated Duration of each session: 38-52 minutes  Treatment plan will be reviewed in 90 days or when goals have been changed.       MeasurableTreatment Goal(s) related to diagnosis / functional impairment(s)  Goal 1: Patient will work on reducing overall anxiety.     I will know I've met my goal when reporting minimal anxiety symptoms.       Objective #A (Patient Action)                          Patient will use distraction each time intrusive worry surfaces.  Status: Continued - Date(s): 9/26/2023     Intervention(s)  Therapist will teach emotional regulation skills.  Objective #B  Patient will Decrease frequency and intensity of feeling down, depressed, hopeless.  Status: Continued - Date(s):  9/26/2023     Intervention(s)  Therapist will teach emotional recognition/identification. ..     Objective #C  Patient will Identify negative self-talk and behaviors: challenge core beliefs, myths, and actions.  Status: Continued - Date(s):  9/26/2023     Intervention(s)  Therapist will teach the client how to perform a behavioral chain analysis. ..     Goal 2: Patient will continue to work on  reducing depression symptoms    I will know I've met my goal then reporting minimal depression symptoms     Objective #A (Patient Action)                          Patient will Increase interest, engagement, and pleasure in doing things.  Status: Continued - Date(s):   9/26/2023     Intervention(s)  Therapist will assign homework ..     Objective #B  Patient will Decrease frequency and intensity of feeling down, depressed, hopeless.  Status: Continued - Date(s):   9/26/2023  Intervention(s)  Therapist will assign homework at every session.         Patient has reviewed and agreed to the above plan.        Mariana Love, [facility]  9/26/2023

## 2024-01-02 ENCOUNTER — VIRTUAL VISIT (OUTPATIENT)
Dept: PSYCHOLOGY | Facility: CLINIC | Age: 59
End: 2024-01-02
Payer: COMMERCIAL

## 2024-01-02 DIAGNOSIS — F41.1 GAD (GENERALIZED ANXIETY DISORDER): Primary | ICD-10-CM

## 2024-01-02 DIAGNOSIS — F33.1 MODERATE EPISODE OF RECURRENT MAJOR DEPRESSIVE DISORDER (H): ICD-10-CM

## 2024-01-02 DIAGNOSIS — F43.10 POSTTRAUMATIC STRESS DISORDER: ICD-10-CM

## 2024-01-02 PROCEDURE — 90837 PSYTX W PT 60 MINUTES: CPT | Mod: 95 | Performed by: COUNSELOR

## 2024-01-02 NOTE — PROGRESS NOTES
[facility]                                     Progress Note    Patient Name: Alina Martell  Date: 1/02/24         Service Type: Individual      Session Start Time: 12:01 Session End Time: 1:10     Session Length: 59 minutes    Session #: 66     Attendees: Client       Service Modality:  Video Visit:     Telemedicine Visit: The patient's condition can be safely assessed and treated via synchronous audio and visual telemedicine encounter.       Reason for Telemedicine Visit: Services only offered telehealth     Originating Site (Patient Location): Patient's place of employment     Distant Location (provider location):  Off-site     Consent:  The patient/guardian has verbally consented to: the potential risks and benefits of telemedicine (video visit) versus in person care; bill my insurance or make self-payment for services provided; and responsibility for payment of non-covered services.      Mode of Communication:  Video Conference via [employer]     As the provider I attest to compliance with applicable laws and regulations related to telemedicine.       DATA  Interactive Complexity: No  Crisis: No        Progress Since Last Session (Related to Symptoms / Goals / Homework):   Symptoms: No change depression continues to be severe    Homework: Achieved / completed to satisfaction      Episode of Care Goals: Satisfactory progress - ACTION (Actively working towards change); Intervened by reinforcing change plan / affirming steps taken     Current / Ongoing Stressors and Concerns:  Patient reported her depression continues to be severe. Patient indicated she feels like a lot of her depression is loneliness. Patient reported she has a lot of friends but still feels lonely. Patient indicated there are parts of herself that she does not share with her friends or her family and this may play into the loneliness.Patient indicated the holiday's were okay and that she volunteered which helped a  lot. Patient reported now trying to figure out her purpose and needing to reduce her depression symptoms. This therapist challenged patient on being honest with her friends.      Treatment Objective(s) Addressed in This Session:   use distraction each time intrusive worry surfaces  Increase interest, engagement, and pleasure in doing things  Decrease frequency and intensity of feeling down, depressed, hopeless  Improve quantity and quality of night time sleep / decrease daytime naps  Feel less tired and more energy during the day   Identify negative self-talk and behaviors: challenge core beliefs, myths, and actions  Nightmare retraining      Intervention:   Motivational Interviewing    MI Intervention: Reflections: simple and complex     Change Talk Expressed by the Patient: Activation Taking steps    Provider Response to Change Talk: R - Reflected patient's change talk and S - Summarized patient's change talk statements      Assessments completed prior to visit:  The following assessments were completed by patient for this visit:  PHQ9:       7/5/2023     2:18 PM 8/15/2023     7:27 AM 9/26/2023    12:05 PM 10/9/2023     5:07 AM 10/30/2023     3:22 PM 12/13/2023     3:35 PM 12/18/2023    12:59 PM   PHQ-9 SCORE   PHQ-9 Total Score MyChart 8 (Mild depression) 8 (Mild depression) 9 (Mild depression) 10 (Moderate depression) 7 (Mild depression) 9 (Mild depression) 10 (Moderate depression)   PHQ-9 Total Score  8 9 10 7 9 10     GAD7:       11/3/2022    12:06 PM 12/1/2022     9:52 AM 7/5/2023     2:18 PM 9/26/2023    12:05 PM 10/9/2023    10:00 AM 10/30/2023     3:23 PM 12/6/2023     9:50 AM   ADA-7 SCORE   Total Score 10 (moderate anxiety) 8 (mild anxiety) 6 (mild anxiety) 11 (moderate anxiety)  5 (mild anxiety) 11 (moderate anxiety)   Total Score 10 8 6 11 8 5 11     PROMIS 10-Global Health (all questions and answers displayed):       7/19/2022    11:13 AM 11/3/2022    12:07 PM 12/1/2022     9:53 AM 3/10/2023     8:13  AM 6/13/2023     6:37 AM 9/26/2023    12:07 PM 1/1/2024    11:44 PM   PROMIS 10   In general, would you say your health is: Good Good Good Fair Good Fair Good   In general, would you say your quality of life is: Fair Good Good Good Good Good Fair   In general, how would you rate your physical health? Good Good Fair Fair Fair Fair Fair   In general, how would you rate your mental health, including your mood and your ability to think? Fair Fair Fair Fair Fair Fair Fair   In general, how would you rate your satisfaction with your social activities and relationships? Good Fair Good Good Good Good Fair   In general, please rate how well you carry out your usual social activities and roles Good Good Good Good Good Good Good   To what extent are you able to carry out your everyday physical activities such as walking, climbing stairs, carrying groceries, or moving a chair? Mostly Mostly Mostly Mostly Mostly Mostly Mostly   In the past 7 days, how often have you been bothered by emotional problems such as feeling anxious, depressed, or irritable? Often Often Often Sometimes Often Often Often   In the past 7 days, how would you rate your fatigue on average? Moderate Mild Mild Moderate Mild Moderate Mild   In the past 7 days, how would you rate your pain on average, where 0 means no pain, and 10 means worst imaginable pain? 3 2 3 4 2 3 3   In general, would you say your health is: 3 3 3 2 3 2 3   In general, would you say your quality of life is: 2 3 3 3 3 3 2   In general, how would you rate your physical health? 3 3 2 2 2 2 2   In general, how would you rate your mental health, including your mood and your ability to think? 2 2 2 2 2 2 2   In general, how would you rate your satisfaction with your social activities and relationships? 3 2 3 3 3 3 2   In general, please rate how well you carry out your usual social activities and roles. (This includes activities at home, at work and in your community, and responsibilities as a  parent, child, spouse, employee, friend, etc.) 3 3 3 3 3 3 3   To what extent are you able to carry out your everyday physical activities such as walking, climbing stairs, carrying groceries, or moving a chair? 4 4 4 4 4 4 4   In the past 7 days, how often have you been bothered by emotional problems such as feeling anxious, depressed, or irritable? 4 4 4 3 4 4 4   In the past 7 days, how would you rate your fatigue on average? 3 2 2 3 2 3 2   In the past 7 days, how would you rate your pain on average, where 0 means no pain, and 10 means worst imaginable pain? 3 2 3 4 2 3 3   Global Mental Health Score 9 9 10 11 10 10 8    8   Global Physical Health Score 14 15 14 12 14 13 14    14   PROMIS TOTAL - SUBSCORES 23 24 24 23 24 23 22    22         ASSESSMENT: Current Emotional / Mental Status (status of significant symptoms):   Risk status (Self / Other harm or suicidal ideation)   Patient denies current fears or concerns for personal safety.   Patient denies current or recent suicidal ideation or behaviors.   Patient denies current or recent homicidal ideation or behaviors.   Patient denies current or recent self injurious behavior or ideation.   Patient denies other safety concerns.   Patient reports there has been no change in risk factors since their last session.     Patient reports there has been no change in protective factors since their last session.     Recommended that patient call 911 or go to the local ED should there be a change in any of these risk factors.     Appearance:   Appropriate    Eye Contact:   Good    Psychomotor Behavior: Normal    Attitude:   Cooperative    Orientation:   All   Speech    Rate / Production: Normal/ Responsive    Volume:  Normal    Mood:    Depressed    Affect:    Appropriate    Thought Content:  Clear    Thought Form:  Coherent  Goal Directed  Logical    Insight:    Good      Medication Review:   No changes to current psychiatric medication(s)     Medication  Compliance:   Yes     Changes in Health Issues:   None reported     Chemical Use Review:   Substance Use: Chemical use reviewed, no active concerns identified      Tobacco Use: No current tobacco use.      Diagnosis:  1. ADA (generalized anxiety disorder)    2. Moderate episode of recurrent major depressive disorder (H)    3. Posttraumatic stress disorder        Collateral Reports Completed:   Not Applicable    PLAN: (Patient Tasks / Therapist Tasks / Other)  Patient will return in two weeks for her scheduled session. Patient will challenge her negative thoughts using CBT skills. Patient will continue to journal. Patient will be process the idea of being honest to her friends.     There has been demonstrated improvement in functioning while patient has been engaged in psychotherapy/psychological service- if withdrawn the patient would deteriorate and/or relapse.     Mariana Love, [facility]     ______________________________________________________________________    Individual Treatment Plan    Patient's Name: Alina Martell  YOB: 1965    Date of Creation:10/16/2020  Date Treatment Plan Last Reviewed/Revised: 1/2/2024    DSM5 Diagnoses: 296.32 (F33.1) Major Depressive Disorder, Recurrent Episode, Moderate _, 300.02 (F41.1) Generalized Anxiety Disorder, or 309.81 (F43.10) Posttraumatic Stress Disorder (includes Posttraumatic Stress Disorder for Children 6 Years and Younger)  Without dissociative symptoms  Psychosocial / Contextual Factors: Health, family  PROMIS (reviewed every 90 days): 25    Referral / Collaboration:  Was/were discussed and patient will pursue.    Anticipated number of session for this episode of care: 20 will reevaluate every 90 days  Anticipation frequency of session: Biweekly  Anticipated Duration of each session: 38-52 minutes  Treatment plan will be reviewed in 90 days or when goals have been changed.       MeasurableTreatment Goal(s) related to diagnosis / functional  impairment(s)  Goal 1: Patient will work on reducing overall anxiety.     I will know I've met my goal when reporting minimal anxiety symptoms.       Objective #A (Patient Action)                          Patient will use distraction each time intrusive worry surfaces.  Status: Continued - Date(s): 1/2/2024     Intervention(s)  Therapist will teach emotional regulation skills.  Objective #B  Patient will Decrease frequency and intensity of feeling down, depressed, hopeless.  Status: Continued - Date(s):  1/2/2024     Intervention(s)  Therapist will teach emotional recognition/identification. ..     Objective #C  Patient will Identify negative self-talk and behaviors: challenge core beliefs, myths, and actions.  Status: Continued - Date(s):  1/2/2024     Intervention(s)  Therapist will teach the client how to perform a behavioral chain analysis. ..     Goal 2: Patient will continue to work on reducing depression symptoms    I will know I've met my goal then reporting minimal depression symptoms     Objective #A (Patient Action)                          Patient will Increase interest, engagement, and pleasure in doing things.  Status: Continued - Date(s):  1/2/2024     Intervention(s)  Therapist will assign homework ..     Objective #B  Patient will Decrease frequency and intensity of feeling down, depressed, hopeless.  Status: Continued - Date(s):   1/2/2024  Intervention(s)  Therapist will assign homework at every session.     Goal 3: Client will reduce PTSD symptoms by 50% as evidenced by PCLC-5 score     I will know I've met my goal when not struggling with nightmares.      Objective #A (Client Action)    Client will reduce nightmares.  Status: New - Date: 1/2/2024      Intervention(s)  Therapist will teach nightmare retraining .    Objective #B  Client will compile a list of boundaries that they would like to set with others.   .    Status: New - Date: 1/2/2024      Intervention(s)  Therapist will assign homework  boundary setting .            Patient has reviewed and agreed to the above plan.        Mariana Love, [facility]  9/26/2023

## 2024-01-08 ENCOUNTER — VIRTUAL VISIT (OUTPATIENT)
Dept: PSYCHIATRY | Facility: CLINIC | Age: 59
End: 2024-01-08
Payer: COMMERCIAL

## 2024-01-08 DIAGNOSIS — F41.1 GAD (GENERALIZED ANXIETY DISORDER): ICD-10-CM

## 2024-01-08 DIAGNOSIS — F33.1 MODERATE EPISODE OF RECURRENT MAJOR DEPRESSIVE DISORDER (H): ICD-10-CM

## 2024-01-08 DIAGNOSIS — F43.10 PTSD (POST-TRAUMATIC STRESS DISORDER): ICD-10-CM

## 2024-01-08 DIAGNOSIS — G47.00 INSOMNIA, UNSPECIFIED TYPE: Primary | ICD-10-CM

## 2024-01-08 PROCEDURE — 99417 PROLNG OP E/M EACH 15 MIN: CPT | Mod: 95 | Performed by: NURSE PRACTITIONER

## 2024-01-08 PROCEDURE — 99205 OFFICE O/P NEW HI 60 MIN: CPT | Mod: 95 | Performed by: NURSE PRACTITIONER

## 2024-01-08 RX ORDER — LORAZEPAM 0.5 MG/1
0.5 TABLET ORAL DAILY PRN
Qty: 30 TABLET | Refills: 1 | Status: SHIPPED | OUTPATIENT
Start: 2024-01-08 | End: 2024-02-19

## 2024-01-08 RX ORDER — DOXEPIN 3 MG/1
3-6 TABLET, FILM COATED ORAL
Qty: 45 TABLET | Refills: 1 | Status: SHIPPED | OUTPATIENT
Start: 2024-01-08 | End: 2024-02-19

## 2024-01-08 RX ORDER — VENLAFAXINE HYDROCHLORIDE 75 MG/1
75 CAPSULE, EXTENDED RELEASE ORAL DAILY
Qty: 30 CAPSULE | Refills: 1 | Status: SHIPPED | OUTPATIENT
Start: 2024-01-08 | End: 2024-02-19

## 2024-01-08 ASSESSMENT — PATIENT HEALTH QUESTIONNAIRE - PHQ9
SUM OF ALL RESPONSES TO PHQ QUESTIONS 1-9: 8
SUM OF ALL RESPONSES TO PHQ QUESTIONS 1-9: 8
10. IF YOU CHECKED OFF ANY PROBLEMS, HOW DIFFICULT HAVE THESE PROBLEMS MADE IT FOR YOU TO DO YOUR WORK, TAKE CARE OF THINGS AT HOME, OR GET ALONG WITH OTHER PEOPLE: SOMEWHAT DIFFICULT

## 2024-01-08 ASSESSMENT — PAIN SCALES - GENERAL: PAINLEVEL: MILD PAIN (2)

## 2024-01-08 NOTE — NURSING NOTE
Is the patient currently in the state of [place]? YES    Visit mode:VIDEO    If the visit is dropped, the patient can be reconnected by: VIDEO VISIT: Text to cell phone:   Telephone Information:   Mobile [phone]       Will anyone else be joining the visit? NO  (If patient encounters technical issues they should call [phone] :[phone])    How would you like to obtain your AVS? MyChart    Are changes needed to the allergy or medication list? No    Care team has reviewed attendance agreement with patient. Patient advised that two failed appointments within 6 months may lead to termination of current episode of care.       Reason for visit: Consult    Vaishnavi MADRID

## 2024-01-08 NOTE — PROGRESS NOTES
"Virtual Visit Details    Type of service:  Video Visit     Originating Location (pt. Location): {video visit patient location:907262::\"Home\"}  {PROVIDER LOCATION On-site should be selected for visits conducted from your clinic location or adjoining [facility] hospital, academic office, or other nearby [facility] building. Off-site should be selected for all other provider locations, including home:[id number]}  Distant Location (provider location):  {virtual location provider:[id number]}  Platform used for Video Visit: {Virtual Visit Platforms:013423::\"[employer]\"}    "
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
## 2024-01-16 ENCOUNTER — OFFICE VISIT (OUTPATIENT)
Dept: PSYCHOLOGY | Facility: CLINIC | Age: 59
End: 2024-01-16
Payer: COMMERCIAL

## 2024-01-16 DIAGNOSIS — F41.1 GAD (GENERALIZED ANXIETY DISORDER): Primary | ICD-10-CM

## 2024-01-16 DIAGNOSIS — F33.1 MODERATE EPISODE OF RECURRENT MAJOR DEPRESSIVE DISORDER (H): ICD-10-CM

## 2024-01-16 DIAGNOSIS — F43.10 POSTTRAUMATIC STRESS DISORDER: ICD-10-CM

## 2024-01-16 PROCEDURE — 90837 PSYTX W PT 60 MINUTES: CPT | Performed by: COUNSELOR

## 2024-01-16 NOTE — PROGRESS NOTES
[facility]                                     Progress Note    Patient Name: Alina Martell  Date: 1/16/24         Service Type: Individual      Session Start Time: 8:01 Session End Time: 9:01     Session Length: 60 minutes    Session #: 67     Attendees: Client       Service Modality:  In-Person       DATA  Extended Session (53+ minutes):   - Patient's presenting concerns require more intensive intervention than could be completed within the usual service  Interactive Complexity: No  Crisis: No      Progress Since Last Session (Related to Symptoms / Goals / Homework):   Symptoms: No change symptom remain the same    Homework: Achieved / completed to satisfaction      Episode of Care Goals: Satisfactory progress - ACTION (Actively working towards change); Intervened by reinforcing change plan / affirming steps taken     Current / Ongoing Stressors and Concerns:  Patient indicated continuing to struggle with her symptoms. Patient reported she is keeping herself busy which helps at times. Patient indicated her two worlds continue to be a significant stress for her. Patient reported knowing some people will accept her but worry a lot of her family will not. Patient indicated she continues to feel that she needs a job that brings her more dariusz. Patient reported she wants to leave work and feel that she is helping people. This therapist processed with patient steps to start making the changes.      Treatment Objective(s) Addressed in This Session:   identify three distraction and diversion activities and use those activities to decrease level of anxiety    Increase interest, engagement, and pleasure in doing things  Decrease frequency and intensity of feeling down, depressed, hopeless  Improve quantity and quality of night time sleep / decrease daytime naps  Feel less tired and more energy during the day   Improve concentration, focus, and mindfulness in daily activities       Intervention:   Motivational Interviewing    MI Intervention: Permission to raise concern or advise     Change Talk Expressed by the Patient: Activation Taking steps    Provider Response to Change Talk: R - Reflected patient's change talk and S - Summarized patient's change talk statements      Assessments completed prior to visit:  The following assessments were completed by patient for this visit:  PHQ9:       8/15/2023     7:27 AM 9/26/2023    12:05 PM 10/9/2023     5:07 AM 10/30/2023     3:22 PM 12/13/2023     3:35 PM 12/18/2023    12:59 PM 1/8/2024     7:06 AM   PHQ-9 SCORE   PHQ-9 Total Score MyChart 8 (Mild depression) 9 (Mild depression) 10 (Moderate depression) 7 (Mild depression) 9 (Mild depression) 10 (Moderate depression) 8 (Mild depression)   PHQ-9 Total Score 8 9 10 7 9 10 8     GAD7:       11/3/2022    12:06 PM 12/1/2022     9:52 AM 7/5/2023     2:18 PM 9/26/2023    12:05 PM 10/9/2023    10:00 AM 10/30/2023     3:23 PM 12/6/2023     9:50 AM   ADA-7 SCORE   Total Score 10 (moderate anxiety) 8 (mild anxiety) 6 (mild anxiety) 11 (moderate anxiety)  5 (mild anxiety) 11 (moderate anxiety)   Total Score 10 8 6 11 8 5 11     PROMIS 10-Global Health (all questions and answers displayed):       7/19/2022    11:13 AM 11/3/2022    12:07 PM 12/1/2022     9:53 AM 3/10/2023     8:13 AM 6/13/2023     6:37 AM 9/26/2023    12:07 PM 1/1/2024    11:44 PM   PROMIS 10   In general, would you say your health is: Good Good Good Fair Good Fair Good   In general, would you say your quality of life is: Fair Good Good Good Good Good Fair   In general, how would you rate your physical health? Good Good Fair Fair Fair Fair Fair   In general, how would you rate your mental health, including your mood and your ability to think? Fair Fair Fair Fair Fair Fair Fair   In general, how would you rate your satisfaction with your social activities and relationships? Good Fair Good Good Good Good Fair   In general, please rate how well  you carry out your usual social activities and roles Good Good Good Good Good Good Good   To what extent are you able to carry out your everyday physical activities such as walking, climbing stairs, carrying groceries, or moving a chair? Mostly Mostly Mostly Mostly Mostly Mostly Mostly   In the past 7 days, how often have you been bothered by emotional problems such as feeling anxious, depressed, or irritable? Often Often Often Sometimes Often Often Often   In the past 7 days, how would you rate your fatigue on average? Moderate Mild Mild Moderate Mild Moderate Mild   In the past 7 days, how would you rate your pain on average, where 0 means no pain, and 10 means worst imaginable pain? 3 2 3 4 2 3 3   In general, would you say your health is: 3 3 3 2 3 2 3   In general, would you say your quality of life is: 2 3 3 3 3 3 2   In general, how would you rate your physical health? 3 3 2 2 2 2 2   In general, how would you rate your mental health, including your mood and your ability to think? 2 2 2 2 2 2 2   In general, how would you rate your satisfaction with your social activities and relationships? 3 2 3 3 3 3 2   In general, please rate how well you carry out your usual social activities and roles. (This includes activities at home, at work and in your community, and responsibilities as a parent, child, spouse, employee, friend, etc.) 3 3 3 3 3 3 3   To what extent are you able to carry out your everyday physical activities such as walking, climbing stairs, carrying groceries, or moving a chair? 4 4 4 4 4 4 4   In the past 7 days, how often have you been bothered by emotional problems such as feeling anxious, depressed, or irritable? 4 4 4 3 4 4 4   In the past 7 days, how would you rate your fatigue on average? 3 2 2 3 2 3 2   In the past 7 days, how would you rate your pain on average, where 0 means no pain, and 10 means worst imaginable pain? 3 2 3 4 2 3 3   Global Mental Health Score 9 9 10 11 10 10 8    8    Global Physical Health Score 14 15 14 12 14 13 14    14   PROMIS TOTAL - SUBSCORES 23 24 24 23 24 23 22    22         ASSESSMENT: Current Emotional / Mental Status (status of significant symptoms):   Risk status (Self / Other harm or suicidal ideation)   Patient denies current fears or concerns for personal safety.   Patient denies current or recent suicidal ideation or behaviors.   Patient denies current or recent homicidal ideation or behaviors.   Patient denies current or recent self injurious behavior or ideation.   Patient denies other safety concerns.   Patient reports there has been no change in risk factors since their last session.     Patient reports there has been no change in protective factors since their last session.     Recommended that patient call 911 or go to the local ED should there be a change in any of these risk factors.     Appearance:   Appropriate    Eye Contact:   Good    Psychomotor Behavior: Normal    Attitude:   Cooperative    Orientation:   All   Speech    Rate / Production: Normal/ Responsive    Volume:  Normal    Mood:    Anxious  Depressed    Affect:    Appropriate    Thought Content:  Clear    Thought Form:  Coherent  Goal Directed  Logical    Insight:    Good      Medication Review:   No changes to current psychiatric medication(s)     Medication Compliance:   Yes     Changes in Health Issues:   None reported     Chemical Use Review:   Substance Use: Chemical use reviewed, no active concerns identified      Tobacco Use: No current tobacco use.      Diagnosis:  1. ADA (generalized anxiety disorder)    2. Moderate episode of recurrent major depressive disorder (H)    3. Posttraumatic stress disorder        Collateral Reports Completed:   Not Applicable    PLAN: (Patient Tasks / Therapist Tasks / Other)  Patient will continue with therapy every 2 weeks. Patient will look into volunteer options. Patient will look into peer support. Patient will continue to identify her support  network and working on having conversations with her support network about her two worlds.     There has been demonstrated improvement in functioning while patient has been engaged in psychotherapy/psychological service- if withdrawn the patient would deteriorate and/or relapse.     Mariana Love, [facility]     ______________________________________________________________________    Individual Treatment Plan    Patient's Name: Alina Martell  YOB: 1965    Date of Creation:10/16/2020  Date Treatment Plan Last Reviewed/Revised: 1/2/2024    DSM5 Diagnoses: 296.32 (F33.1) Major Depressive Disorder, Recurrent Episode, Moderate _, 300.02 (F41.1) Generalized Anxiety Disorder, or 309.81 (F43.10) Posttraumatic Stress Disorder (includes Posttraumatic Stress Disorder for Children 6 Years and Younger)  Without dissociative symptoms  Psychosocial / Contextual Factors: Health, family  PROMIS (reviewed every 90 days):     PROMIS-10 Scores  Global Mental Health Score: 8  Global Physical Health Score: 14  PROMIS TOTAL - SUBSCORES: 22     Referral / Collaboration:  Was/were discussed and patient will pursue.    Anticipated number of session for this episode of care: 20 will reevaluate every 90 days  Anticipation frequency of session: Biweekly  Anticipated Duration of each session: 38-52 minutes  Treatment plan will be reviewed in 90 days or when goals have been changed.       MeasurableTreatment Goal(s) related to diagnosis / functional impairment(s)  Goal 1: Patient will work on reducing overall anxiety.     I will know I've met my goal when reporting minimal anxiety symptoms.       Objective #A (Patient Action)                          Patient will use distraction each time intrusive worry surfaces.  Status: Continued - Date(s): 1/2/2024     Intervention(s)  Therapist will teach emotional regulation skills.  Objective #B  Patient will Decrease frequency and intensity of feeling down, depressed,  hopeless.  Status: Continued - Date(s):  1/2/2024     Intervention(s)  Therapist will teach emotional recognition/identification. ..     Objective #C  Patient will Identify negative self-talk and behaviors: challenge core beliefs, myths, and actions.  Status: Continued - Date(s):  1/2/2024     Intervention(s)  Therapist will teach the client how to perform a behavioral chain analysis. ..     Goal 2: Patient will continue to work on reducing depression symptoms    I will know I've met my goal then reporting minimal depression symptoms     Objective #A (Patient Action)                          Patient will Increase interest, engagement, and pleasure in doing things.  Status: Continued - Date(s):  1/2/2024     Intervention(s)  Therapist will assign homework ..     Objective #B  Patient will Decrease frequency and intensity of feeling down, depressed, hopeless.  Status: Continued - Date(s):   1/2/2024  Intervention(s)  Therapist will assign homework at every session.     Goal 3: Client will reduce PTSD symptoms by 50% as evidenced by PCLC-5 score     I will know I've met my goal when not struggling with nightmares.      Objective #A (Client Action)    Client will reduce nightmares.  Status: New - Date: 1/2/2024      Intervention(s)  Therapist will teach nightmare retraining .    Objective #B  Client will compile a list of boundaries that they would like to set with others.   .    Status: New - Date: 1/2/2024      Intervention(s)  Therapist will assign homework boundary setting .            Patient has reviewed and agreed to the above plan.        Mariana Love [facility]

## 2024-01-30 ENCOUNTER — OFFICE VISIT (OUTPATIENT)
Dept: PSYCHOLOGY | Facility: CLINIC | Age: 59
End: 2024-01-30
Payer: COMMERCIAL

## 2024-01-30 DIAGNOSIS — F41.1 GAD (GENERALIZED ANXIETY DISORDER): Primary | ICD-10-CM

## 2024-01-30 DIAGNOSIS — F43.10 POSTTRAUMATIC STRESS DISORDER: ICD-10-CM

## 2024-01-30 DIAGNOSIS — F33.1 MODERATE EPISODE OF RECURRENT MAJOR DEPRESSIVE DISORDER (H): ICD-10-CM

## 2024-01-30 PROCEDURE — 90837 PSYTX W PT 60 MINUTES: CPT | Performed by: COUNSELOR

## 2024-01-30 NOTE — PROGRESS NOTES
[facility]                                     Progress Note    Patient Name: Alina Martell  Date: 1/30/24         Service Type: Individual      Session Start Time: 8:03 Session End Time: 9:00     Session Length: 57 minutes    Session #: 68     Attendees: Client       Service Modality:  In-Person       DATA  Extended Session (53+ minutes):   - High distress and under complex circumstances.  See Data section for details  Interactive Complexity: No  Crisis: No      Progress Since Last Session (Related to Symptoms / Goals / Homework):   Symptoms: Improving depression symptoms not as severe    Homework: Achieved / completed to satisfaction      Episode of Care Goals: Satisfactory progress - ACTION (Actively working towards change); Intervened by reinforcing change plan / affirming steps taken     Current / Ongoing Stressors and Concerns:  Patient reported her mood continues to be up and down. Patient indicated her depression symptoms appear to be a little better. Patient reported her anxiety continues to be about the same. Patient indicated she continues to struggle with her family dynamics. Patient talked about the struggles and lack of understanding from her mom. Patient indicated her frustration with work continues to be the same as well. Patient reported she has spent a lot of time with her grandchildren and not much time on her own self-care. This therapist challenged patient on her struggles with self-care and not saying no.      Treatment Objective(s) Addressed in This Session:   use distraction each time intrusive worry surfaces  Increase interest, engagement, and pleasure in doing things  Decrease frequency and intensity of feeling down, depressed, hopeless  Improve quantity and quality of night time sleep / decrease daytime naps  Feel less tired and more energy during the day      Intervention:   Motivational Interviewing    MI Intervention: Open-ended questions     Change Talk  Expressed by the Patient: Activation Taking steps    Provider Response to Change Talk: A - Affirmed patient's thoughts, decisions, or attempts at behavior change, R - Reflected patient's change talk, and S - Summarized patient's change talk statements      Assessments completed prior to visit:  The following assessments were completed by patient for this visit:  PHQ9:       8/15/2023     7:27 AM 9/26/2023    12:05 PM 10/9/2023     5:07 AM 10/30/2023     3:22 PM 12/13/2023     3:35 PM 12/18/2023    12:59 PM 1/8/2024     7:06 AM   PHQ-9 SCORE   PHQ-9 Total Score MyChart 8 (Mild depression) 9 (Mild depression) 10 (Moderate depression) 7 (Mild depression) 9 (Mild depression) 10 (Moderate depression) 8 (Mild depression)   PHQ-9 Total Score 8 9 10 7 9 10 8     GAD7:       11/3/2022    12:06 PM 12/1/2022     9:52 AM 7/5/2023     2:18 PM 9/26/2023    12:05 PM 10/9/2023    10:00 AM 10/30/2023     3:23 PM 12/6/2023     9:50 AM   ADA-7 SCORE   Total Score 10 (moderate anxiety) 8 (mild anxiety) 6 (mild anxiety) 11 (moderate anxiety)  5 (mild anxiety) 11 (moderate anxiety)   Total Score 10 8 6 11 8 5 11     PROMIS 10-Global Health (all questions and answers displayed):       7/19/2022    11:13 AM 11/3/2022    12:07 PM 12/1/2022     9:53 AM 3/10/2023     8:13 AM 6/13/2023     6:37 AM 9/26/2023    12:07 PM 1/1/2024    11:44 PM   PROMIS 10   In general, would you say your health is: Good Good Good Fair Good Fair Good   In general, would you say your quality of life is: Fair Good Good Good Good Good Fair   In general, how would you rate your physical health? Good Good Fair Fair Fair Fair Fair   In general, how would you rate your mental health, including your mood and your ability to think? Fair Fair Fair Fair Fair Fair Fair   In general, how would you rate your satisfaction with your social activities and relationships? Good Fair Good Good Good Good Fair   In general, please rate how well you carry out your usual social activities  and roles Good Good Good Good Good Good Good   To what extent are you able to carry out your everyday physical activities such as walking, climbing stairs, carrying groceries, or moving a chair? Mostly Mostly Mostly Mostly Mostly Mostly Mostly   In the past 7 days, how often have you been bothered by emotional problems such as feeling anxious, depressed, or irritable? Often Often Often Sometimes Often Often Often   In the past 7 days, how would you rate your fatigue on average? Moderate Mild Mild Moderate Mild Moderate Mild   In the past 7 days, how would you rate your pain on average, where 0 means no pain, and 10 means worst imaginable pain? 3 2 3 4 2 3 3   In general, would you say your health is: 3 3 3 2 3 2 3   In general, would you say your quality of life is: 2 3 3 3 3 3 2   In general, how would you rate your physical health? 3 3 2 2 2 2 2   In general, how would you rate your mental health, including your mood and your ability to think? 2 2 2 2 2 2 2   In general, how would you rate your satisfaction with your social activities and relationships? 3 2 3 3 3 3 2   In general, please rate how well you carry out your usual social activities and roles. (This includes activities at home, at work and in your community, and responsibilities as a parent, child, spouse, employee, friend, etc.) 3 3 3 3 3 3 3   To what extent are you able to carry out your everyday physical activities such as walking, climbing stairs, carrying groceries, or moving a chair? 4 4 4 4 4 4 4   In the past 7 days, how often have you been bothered by emotional problems such as feeling anxious, depressed, or irritable? 4 4 4 3 4 4 4   In the past 7 days, how would you rate your fatigue on average? 3 2 2 3 2 3 2   In the past 7 days, how would you rate your pain on average, where 0 means no pain, and 10 means worst imaginable pain? 3 2 3 4 2 3 3   Global Mental Health Score 9 9 10 11 10 10 8    8   Global Physical Health Score 14 15 14 12 14 13  14    14   PROMIS TOTAL - SUBSCORES 23 24 24 23 24 23 22    22         ASSESSMENT: Current Emotional / Mental Status (status of significant symptoms):   Risk status (Self / Other harm or suicidal ideation)   Patient denies current fears or concerns for personal safety.   Patient denies current or recent suicidal ideation or behaviors.   Patient denies current or recent homicidal ideation or behaviors.   Patient denies current or recent self injurious behavior or ideation.   Patient denies other safety concerns.   Patient reports there has been no change in risk factors since their last session.     Patient reports there has been no change in protective factors since their last session.     Recommended that patient call 911 or go to the local ED should there be a change in any of these risk factors.     Appearance:   Appropriate    Eye Contact:   Good    Psychomotor Behavior: Normal    Attitude:   Cooperative    Orientation:   All   Speech    Rate / Production: Normal/ Responsive    Volume:  Normal    Mood:    Anxious  Depressed    Affect:    Appropriate    Thought Content:  Clear    Thought Form:  Coherent  Goal Directed  Logical    Insight:    Good      Medication Review:   No changes to current psychiatric medication(s)     Medication Compliance:   Yes     Changes in Health Issues:   None reported     Chemical Use Review:   Substance Use: Chemical use reviewed, no active concerns identified      Tobacco Use: No current tobacco use.      Diagnosis:  1. ADA (generalized anxiety disorder)    2. Moderate episode of recurrent major depressive disorder (H)    3. Posttraumatic stress disorder        Collateral Reports Completed:   Not Applicable    PLAN: (Patient Tasks / Therapist Tasks / Other)  Patient will return in 3 weeks for scheduled session. Patient will work on saying no to other people. Patient will identify what brings her dariusz and prioritize her own needs. Patient will look into volunteer options.     There  has been demonstrated improvement in functioning while patient has been engaged in psychotherapy/psychological service- if withdrawn the patient would deteriorate and/or relapse.     Mariana Love, [facility]     ______________________________________________________________________    Individual Treatment Plan    Patient's Name: Alina Martell  YOB: 1965    Date of Creation:10/16/2020  Date Treatment Plan Last Reviewed/Revised: 1/2/2024    DSM5 Diagnoses: 296.32 (F33.1) Major Depressive Disorder, Recurrent Episode, Moderate _, 300.02 (F41.1) Generalized Anxiety Disorder, or 309.81 (F43.10) Posttraumatic Stress Disorder (includes Posttraumatic Stress Disorder for Children 6 Years and Younger)  Without dissociative symptoms  Psychosocial / Contextual Factors: Health, family  PROMIS (reviewed every 90 days):     PROMIS-10 Scores  Global Mental Health Score: 8  Global Physical Health Score: 14  PROMIS TOTAL - SUBSCORES: 22     Referral / Collaboration:  Was/were discussed and patient will pursue.    Anticipated number of session for this episode of care: 20 will reevaluate every 90 days  Anticipation frequency of session: Biweekly  Anticipated Duration of each session: 38-52 minutes  Treatment plan will be reviewed in 90 days or when goals have been changed.       MeasurableTreatment Goal(s) related to diagnosis / functional impairment(s)  Goal 1: Patient will work on reducing overall anxiety.     I will know I've met my goal when reporting minimal anxiety symptoms.       Objective #A (Patient Action)                          Patient will use distraction each time intrusive worry surfaces.  Status: Continued - Date(s): 1/2/2024     Intervention(s)  Therapist will teach emotional regulation skills.  Objective #B  Patient will Decrease frequency and intensity of feeling down, depressed, hopeless.  Status: Continued - Date(s):  1/2/2024     Intervention(s)  Therapist will teach emotional  recognition/identification. ..     Objective #C  Patient will Identify negative self-talk and behaviors: challenge core beliefs, myths, and actions.  Status: Continued - Date(s):  1/2/2024     Intervention(s)  Therapist will teach the client how to perform a behavioral chain analysis. ..     Goal 2: Patient will continue to work on reducing depression symptoms    I will know I've met my goal then reporting minimal depression symptoms     Objective #A (Patient Action)                          Patient will Increase interest, engagement, and pleasure in doing things.  Status: Continued - Date(s):  1/2/2024     Intervention(s)  Therapist will assign homework ..     Objective #B  Patient will Decrease frequency and intensity of feeling down, depressed, hopeless.  Status: Continued - Date(s):   1/2/2024  Intervention(s)  Therapist will assign homework at every session.     Goal 3: Client will reduce PTSD symptoms by 50% as evidenced by PCLC-5 score     I will know I've met my goal when not struggling with nightmares.      Objective #A (Client Action)    Client will reduce nightmares.  Status: New - Date: 1/2/2024      Intervention(s)  Therapist will teach nightmare retraining .    Objective #B  Client will compile a list of boundaries that they would like to set with others.   .    Status: New - Date: 1/2/2024      Intervention(s)  Therapist will assign homework boundary setting .            Patient has reviewed and agreed to the above plan.        Mariana Love [facility]    never

## 2024-02-05 ENCOUNTER — TELEPHONE (OUTPATIENT)
Dept: RHEUMATOLOGY | Facility: CLINIC | Age: 59
End: 2024-02-05
Payer: COMMERCIAL

## 2024-02-05 NOTE — TELEPHONE ENCOUNTER
Prior authorization needed for Humira (2 syringe) 40mg/0.4ml syringe kit.    [facility]  Phone: [phone]  Fax: [phone]  Email: [email or web address]    Jessie Logan MA

## 2024-02-06 NOTE — TELEPHONE ENCOUNTER
PA Initiation    Medication: HUMIRA *CF* PEN 40 MG/0.4ML SC PNKT  Insurance Company: [facility] - Phone [phone] Fax [phone]  [place] [facility] [place] [address]  Filling Pharmacy Phone:    Filling Pharmacy Fax:    Start Date: 2/6/2024    [id number])      TOO Olguin, [facility]  Specialty Pharmacy Clinic Liaison     [facility]    [email or web address]     Phone: [phone]  Fax: [phone]

## 2024-02-07 ASSESSMENT — SLEEP AND FATIGUE QUESTIONNAIRES
HOW LIKELY ARE YOU TO NOD OFF OR FALL ASLEEP WHILE WATCHING TV: SLIGHT CHANCE OF DOZING
HOW LIKELY ARE YOU TO NOD OFF OR FALL ASLEEP IN A CAR, WHILE STOPPED FOR A FEW MINUTES IN TRAFFIC: WOULD NEVER DOZE
HOW LIKELY ARE YOU TO NOD OFF OR FALL ASLEEP WHEN YOU ARE A PASSENGER IN A CAR FOR AN HOUR WITHOUT A BREAK: SLIGHT CHANCE OF DOZING
HOW LIKELY ARE YOU TO NOD OFF OR FALL ASLEEP WHILE SITTING QUIETLY AFTER LUNCH WITHOUT ALCOHOL: SLIGHT CHANCE OF DOZING
HOW LIKELY ARE YOU TO NOD OFF OR FALL ASLEEP WHILE SITTING INACTIVE IN A PUBLIC PLACE: SLIGHT CHANCE OF DOZING
HOW LIKELY ARE YOU TO NOD OFF OR FALL ASLEEP WHILE SITTING AND TALKING TO SOMEONE: SLIGHT CHANCE OF DOZING
HOW LIKELY ARE YOU TO NOD OFF OR FALL ASLEEP WHILE LYING DOWN TO REST IN THE AFTERNOON WHEN CIRCUMSTANCES PERMIT: MODERATE CHANCE OF DOZING
HOW LIKELY ARE YOU TO NOD OFF OR FALL ASLEEP WHILE SITTING AND READING: MODERATE CHANCE OF DOZING

## 2024-02-08 NOTE — TELEPHONE ENCOUNTER
Prior Authorization Approval    Medication: HUMIRA *CF* PEN 40 MG/0.4ML SC PNKT  Authorization Effective Date: 2/6/2024  Authorization Expiration Date: 2/5/2025  Approved Dose/Quantity: 2  Reference #: [id number]   Insurance Company: [employer] - Phone [phone] Fax [phone]  Expected CoPay: $    CoPay Card Available: No    Financial Assistance Needed: NA  Which Pharmacy is filling the prescription: [facility] [place], [facility]  Specialty Pharmacy Clinic Liaison     [facility]    [email or web address]     Phone: [phone]  Fax: [phone]

## 2024-02-09 ENCOUNTER — VIRTUAL VISIT (OUTPATIENT)
Dept: SLEEP MEDICINE | Facility: CLINIC | Age: 59
End: 2024-02-09
Attending: NURSE PRACTITIONER
Payer: COMMERCIAL

## 2024-02-09 VITALS — HEIGHT: 69 IN | BODY MASS INDEX: 37.03 KG/M2 | WEIGHT: 250 LBS

## 2024-02-09 DIAGNOSIS — G47.33 OSA (OBSTRUCTIVE SLEEP APNEA): Primary | ICD-10-CM

## 2024-02-09 PROCEDURE — 99203 OFFICE O/P NEW LOW 30 MIN: CPT | Mod: 95 | Performed by: INTERNAL MEDICINE

## 2024-02-09 ASSESSMENT — PAIN SCALES - GENERAL: PAINLEVEL: MODERATE PAIN (4)

## 2024-02-09 NOTE — NURSING NOTE
Is the patient currently in the state of [place]? YES    Visit mode:VIDEO    If the visit is dropped, the patient can be reconnected by: VIDEO VISIT: Text to cell phone:   Telephone Information:   Mobile [phone]       Will anyone else be joining the visit? NO  (If patient encounters technical issues they should call [phone]    How would you like to obtain your AVS? MyChart    Are changes needed to the allergy or medication list? Pt stated no med changes    Reason for visit: Consult    Stephanie MADRID   Has patient had flu shot for current/most recent flu season? If so, when? Yes: 09.26.23

## 2024-02-09 NOTE — PATIENT INSTRUCTIONS
Please physically bring in the machine for us to adjust the settings to 13/9 cwp.    Equipment Instructions    We will process your PAP order and send it to a Durable Medical Equipment (DME) provider.    The medical equipment company should call you within 7 days.  If you have not heard from the company, please contact them to see if they received your order and are planning to call you.    Please call us at [phone] if you are unable to contact the medical equipment company or if they do not have the order.    If you are starting a new PAP machine, please call us after you use it the first night to let us know how it went. This call also helps us know that you received your equipment and that everything is ready. Please use our central phone number [phone]    Contact information for [employer] company:    [email or web address] Tel: [phone]

## 2024-02-09 NOTE — PROGRESS NOTES
Video-Visit Details    Type of service:  Video Visit    Video Visit Start Time:8:32 AM    Video End Time:8:58 AM    Originating Location (pt. Location): Home    Distant Location (provider location):  Off-site     Platform used for Video Visit: AmWell    Additional 15 minutes on the date of service was spent performing the following:    -Preparing to see the patient  -Obtaining and/or reviewing separately obtained history   -Ordering medications, tests, or procedures   -Documenting clinical information in the electronic or other health record     Thank you for the opportunity to participate in the care of Alina Martell.     She is a 58 year old y/o female patient who comes to the sleep medicine clinic for follow up.The patient was diagnosed with RANDALL on 11/05/2020 (TXT=348.4). The patient was suppose to follow up with us 3 years ago to see if she was still retaining CO2 on BPAP. I was going to order a nocturnal oximetry. However, she  never followed up. She also had her machine refurbished with Respironics but never put back on the correct dosage of pressure.She feels the current pressure is a bit off.     Assessment and Plan:  In summary Alina Martell is a 58 year old year old female who is here for follow up.    1. RANDALL (obstructive sleep apnea)/Sleep related hypoxia.  I will need her to schedule an appointment with our DME to bring in her machine so we can manually adjust the machine to 13/9 cwp. She is to follow up to see me in 3 months. I will then re-evalute to see if oximetry of in lab titration is appropriate based upon her symptoms.  - COMPREHENSIVE DME     Compliance Download data for 30 Days:  Compliance:60%  Pressure setting:Auto BPAP 4-25 cwp. PS range 2-8 cwp  95% pressure:10.7/7.9 cwp  Leak:Minimal  Residual AHI:4.8 events per hour  Mask Tolerance:Good  Skin irritation:None  DME:[facility].    Lab reviewed: Discussed with patient.    Sleep-Wake Cycle:    TIME IN BED:    1) Work/School  Days:    Do you work or go to school? Yes   What time do you usually get into bed? 11:00pm   About how long does it take you to fall asleep? 10 min   How often do you have trouble falling asleep? 2 to 3 nights   How often do you wake up during the night? 2 to 3   Do you work days/evenings/nights/rotating shifts? Days   What wakes you up at night? Snorting self awake    Pain    Nightmares   How often do you have trouble falling back to sleep? 3 to 4 nights   About how long does it take to fall back to sleep? 15 min to half an hour   What do you usually do if you have trouble getting back to sleep? read   What time do you usually get out of bed to start your day? 7:00am   Do you use an alarm? No   2) Weekends/Non-work Days/All Other Days    What time do you usually get into bed? varies   About how long does it take you to fall asleep? 15 to 20 mins   What time do you usually get out of bed to start your day? 8 to 9 am   Do you use an alarm? No   SLEEP NEED    On average, about how much sleep do you think you get? 5 to 6 hours   About how much sleep do you think you need? 7 to 8   SLEEP POSITION    Which sleep positions do you prefer? Side   Do you do any of the following activities in bed? Read    Watch TV   How often do you take a nap on purpose? 0   About how long are your naps? 15 min to half and hour   Do you feel better after naps?    How often do you doze off unintentionally? everyday   Have you ever had a driving accident or near-miss due to sleepiness/drowsiness? No       RICKEY:  RICKEY Total Score: 13  Total score - [place]: 9 (2/7/2024  3:17 PM)       Patient Active Problem List   Diagnosis    Depressed    Seropositive rheumatoid arthritis of multiple sites (H)    Recurrent major depressive disorder, in full remission (H24)    Paroxysmal atrial fibrillation (H)    Morbid obesity (H)    Microcytic anemia    Anxiety    Chest pain    Chronic pain    Cyclic citrullinated peptide (CCP) antibody positive    Grief     Insomnia related to another mental disorder    Migraine headache    Olecranon bursitis of right elbow    Panic attack    ADA (generalized anxiety disorder)    Reflux    Sicca syndrome (H24)    Sleep disorder    Tendonitis of both shoulders    Obstructive sleep apnea syndrome    Essential hypertension    Coronary artery disease of native artery with stable angina pectoris (H24)    Postmenopausal bleeding    History of colonic polyps    Status post catheter ablation of atrial fibrillation    Vitamin D deficiency    Uterine fibroid    Abnormal uterine bleeding    Diabetes mellitus, type 2 (H)       Past Medical History:   Diagnosis Date    Anemia     Antiplatelet or antithrombotic long-term use     Arrhythmia     Cannabis use without complication 12/8/2014    Carpal tunnel syndrome     Coronary artery disease     Diabetes mellitus, type 2 (H) 12/13/2023    Gastroesophageal reflux disease     History of angina     Hypertension     Irregular heart beat     Obesity     Paroxysmal atrial fibrillation (H)     PTSD (post-traumatic stress disorder)     RA (rheumatoid arthritis) (H)     Rheumatoid arthritis (H) 7/8/2016    Sicca syndrome (H24)     Sleep apnea        Past Surgical History:   Procedure Laterality Date    CHOLECYSTECTOMY      CYSTOSCOPY N/A 5/4/2023    Procedure: AND CYSTOSCOPY;  Surgeon: Irma Cary MD;  Location: [facility] OR    DAVINCI HYSTERECTOMY TOTAL Bilateral 5/4/2023    Procedure: ROBOTIC ASSISTED TOTAL LAPAROSCOPIC HYSTERECTOMY WITH BILATERAL SALPINGECTOMY;  Surgeon: Irma Cary MD;  Location: [facility] OR    DILATION AND CURETTAGE, OPERATIVE HYSTEROSCOPY, COMBINED N/A 3/3/2022    Procedure: HYSTEROSCOPY, WITH DILATION AND CURETTAGE;  Surgeon: Irma Cary MD;  Location: [facility] OR    EP ABLATION FOCAL AFIB N/A 6/30/2022    Procedure: Ablation Atrial Fibrilation;  Surgeon: Jose Guadalupe Joseph MD;  Location:  [facility]    HC REMOVAL  GALLBLADDER      Description: Cholecystectomy;  Recorded: 10/15/2013;    LAPAROSCOPIC TUBAL LIGATION      RELEASE CARPAL TUNNEL BILATERAL      TUBAL LIGATION  1995       Current Outpatient Medications   Medication Sig Dispense Refill    apixaban ANTICOAGULANT (ELIQUIS ANTICOAGULANT) 5 MG tablet Take 1 tablet (5 mg) by mouth 2 times daily 180 tablet 3    cetirizine (ZYRTEC) 10 MG tablet Take 1 tablet (10 mg) by mouth daily 90 tablet 3    diltiazem ER COATED BEADS (CARDIZEM CD/CARTIA XT) 180 MG 24 hr capsule Take 2 capsules (360 mg) by mouth daily 180 capsule 3    docusate sodium (COLACE) 100 MG capsule Take 1 capsule (100 mg) by mouth 2 times daily as needed for other (While taking pain pills to prevent constipation) 30 capsule 0    doxepin (SILENOR) 3 MG tablet Take 1-2 tablets (3-6 mg) by mouth nightly as needed for sleep 45 tablet 1    EPINEPHrine (ANY BX GENERIC EQUIV) 0.3 MG/0.3ML injection 2-pack Inject 0.3 mg into the muscle as needed for anaphylaxis      ferrous gluconate (FERGON) 324 (38 Fe) MG tablet Take 1 tablet (324 mg) by mouth daily (with breakfast) 30 tablet 0    LORazepam (ATIVAN) 0.5 MG tablet Take 1 tablet (0.5 mg) by mouth daily as needed for anxiety 30 tablet 1    Multiple Vitamins-Minerals (CENTROVITE) TABS Take 1 tablet by mouth daily      omeprazole (PRILOSEC) 40 MG DR capsule Take 1 capsule (40 mg) by mouth daily 90 capsule 3    venlafaxine (EFFEXOR XR) 75 MG 24 hr capsule Take 1 capsule (75 mg) by mouth daily 30 capsule 1    vitamin D3 (CHOLECALCIFEROL) 1.25 MG (94457 UT) capsule Take 1 capsule (50,000 Units) by mouth every 7 days 12 capsule 3    adalimumab (HUMIRA *CF* PEN) 40 MG/0.4ML pen kit Inject 0.4 mLs (40 mg) Subcutaneous every 14 days for 30 days 0.8 mL 5       Allergies   Allergen Reactions    Penicillins Shortness Of Breath, Palpitations, Dizziness, Anaphylaxis, Hives, Itching and Rash     Test in 2031, see allergy note on 7/29/21    Shellfish-Derived Products Anaphylaxis    Sulfa  Antibiotics Hives and Anaphylaxis       Visual  Exam:  GEN: NAD,  Psych: normal mood, normal affect    Labs/Studies:      Lab Results   Component Value Date    PH 7.40 01/01/2022     Lab Results   Component Value Date    TSH 2.16 02/16/2022    TSH 2.38 01/17/2022     Lab Results   Component Value Date     (H) 08/11/2023     (H) 03/11/2023     Lab Results   Component Value Date    HGB 10.9 (L) 08/30/2023    HGB 10.3 (L) 08/11/2023     Lab Results   Component Value Date    BUN 17.3 08/11/2023    BUN 11.8 03/11/2023    CR 0.67 08/30/2023    CR 0.68 08/11/2023     Lab Results   Component Value Date    AST 19 08/11/2023    AST 14 03/11/2023    ALT 17 08/30/2023    ALT 19 08/11/2023    ALKPHOS 102 08/11/2023    ALKPHOS 107 (H) 03/11/2023    BILITOTAL <0.2 08/11/2023    BILITOTAL 0.2 03/11/2023     Lab Results   Component Value Date    UAMP Negative 11/13/2018    UBARB Negative 11/13/2018    BENZODIAZEUR Negative 11/13/2018    UCANN Positive 11/13/2018    UCOC Negative 11/13/2018    OPIT Negative 11/13/2018       Recent Labs   Lab Test 08/30/23  1104 08/11/23  0128 03/22/23  0817 03/11/23  2059   NA  --  140  --  139   POTASSIUM  --  3.5  --  3.5   CHLORIDE  --  102  --  102   CO2  --  28  --  27   ANIONGAP  --  10  --  10   GLC  --  144*  --  109*   BUN  --  17.3  --  11.8   CR 0.67 0.68   < > 0.69   JOSSELIN  --  9.3  --  9.0    < > = values in this interval not displayed.       Ferritin   Date Value Ref Range Status   08/30/2023 29 11 - 328 ng/mL Final       I reviewed the efficacy and compliance report from her device. Data summarized on the HPI and the PAP compliance flow sheet.     Patient verbalized understanding of these issues, agrees with the plan and all questions were answered today. Patient was given an opportuntity to voice any other symptoms or concerns not listed above. Patient did not have any other symptoms or concerns.      Gucci Swann DO  Board Certified in Internal Medicine and Sleep  Medicine    (Note created with Dragon voice recognition and unintended spelling errors and word substitutions may occur)     Audio and visual devices were used for this virtual clinic visit with permission from patient.

## 2024-02-14 ENCOUNTER — TRANSCRIBE ORDERS (OUTPATIENT)
Dept: RHEUMATOLOGY | Facility: CLINIC | Age: 59
End: 2024-02-14

## 2024-02-14 ENCOUNTER — LAB (OUTPATIENT)
Dept: LAB | Facility: CLINIC | Age: 59
End: 2024-02-14
Payer: COMMERCIAL

## 2024-02-14 DIAGNOSIS — M05.79 SEROPOSITIVE RHEUMATOID ARTHRITIS OF MULTIPLE SITES (H): Primary | ICD-10-CM

## 2024-02-14 DIAGNOSIS — M05.79 SEROPOSITIVE RHEUMATOID ARTHRITIS OF MULTIPLE SITES (H): ICD-10-CM

## 2024-02-14 LAB
ERYTHROCYTE [DISTWIDTH] IN BLOOD BY AUTOMATED COUNT: 15.7 % (ref 10–15)
HCT VFR BLD AUTO: 34.4 % (ref 35–47)
HGB BLD-MCNC: 10.7 G/DL (ref 11.7–15.7)
MCH RBC QN AUTO: 24.9 PG (ref 26.5–33)
MCHC RBC AUTO-ENTMCNC: 31.1 G/DL (ref 31.5–36.5)
MCV RBC AUTO: 80 FL (ref 78–100)
PLATELET # BLD AUTO: 348 10E3/UL (ref 150–450)
RBC # BLD AUTO: 4.3 10E6/UL (ref 3.8–5.2)
WBC # BLD AUTO: 11.4 10E3/UL (ref 4–11)

## 2024-02-14 PROCEDURE — 82040 ASSAY OF SERUM ALBUMIN: CPT

## 2024-02-14 PROCEDURE — 85027 COMPLETE CBC AUTOMATED: CPT

## 2024-02-14 PROCEDURE — 36415 COLL VENOUS BLD VENIPUNCTURE: CPT

## 2024-02-14 PROCEDURE — 84460 ALANINE AMINO (ALT) (SGPT): CPT

## 2024-02-14 PROCEDURE — 82565 ASSAY OF CREATININE: CPT

## 2024-02-15 LAB
ALBUMIN SERPL BCG-MCNC: 3.7 G/DL (ref 3.5–5.2)
ALT SERPL W P-5'-P-CCNC: 19 U/L (ref 0–50)
CREAT SERPL-MCNC: 0.77 MG/DL (ref 0.51–0.95)
EGFRCR SERPLBLD CKD-EPI 2021: 90 ML/MIN/1.73M2

## 2024-02-19 ENCOUNTER — VIRTUAL VISIT (OUTPATIENT)
Dept: PSYCHIATRY | Facility: CLINIC | Age: 59
End: 2024-02-19
Payer: COMMERCIAL

## 2024-02-19 ENCOUNTER — OFFICE VISIT (OUTPATIENT)
Dept: RHEUMATOLOGY | Facility: CLINIC | Age: 59
End: 2024-02-19
Payer: COMMERCIAL

## 2024-02-19 VITALS
SYSTOLIC BLOOD PRESSURE: 136 MMHG | BODY MASS INDEX: 42.32 KG/M2 | DIASTOLIC BLOOD PRESSURE: 78 MMHG | OXYGEN SATURATION: 96 % | HEART RATE: 88 BPM | WEIGHT: 286.6 LBS

## 2024-02-19 DIAGNOSIS — M65.311 TRIGGER FINGER OF RIGHT THUMB: ICD-10-CM

## 2024-02-19 DIAGNOSIS — Z79.899 HIGH RISK MEDICATION USE: ICD-10-CM

## 2024-02-19 DIAGNOSIS — F41.1 GAD (GENERALIZED ANXIETY DISORDER): ICD-10-CM

## 2024-02-19 DIAGNOSIS — G47.00 INSOMNIA, UNSPECIFIED TYPE: ICD-10-CM

## 2024-02-19 DIAGNOSIS — M05.79 SEROPOSITIVE RHEUMATOID ARTHRITIS OF MULTIPLE SITES (H): Primary | ICD-10-CM

## 2024-02-19 DIAGNOSIS — R76.8 CYCLIC CITRULLINATED PEPTIDE (CCP) ANTIBODY POSITIVE: ICD-10-CM

## 2024-02-19 DIAGNOSIS — F43.10 PTSD (POST-TRAUMATIC STRESS DISORDER): ICD-10-CM

## 2024-02-19 DIAGNOSIS — F33.1 MODERATE EPISODE OF RECURRENT MAJOR DEPRESSIVE DISORDER (H): ICD-10-CM

## 2024-02-19 DIAGNOSIS — M17.0 PRIMARY OSTEOARTHRITIS OF BOTH KNEES: ICD-10-CM

## 2024-02-19 PROCEDURE — 99214 OFFICE O/P EST MOD 30 MIN: CPT | Mod: 95 | Performed by: NURSE PRACTITIONER

## 2024-02-19 PROCEDURE — 99214 OFFICE O/P EST MOD 30 MIN: CPT | Mod: 25 | Performed by: INTERNAL MEDICINE

## 2024-02-19 PROCEDURE — 20550 NJX 1 TENDON SHEATH/LIGAMENT: CPT | Mod: F5 | Performed by: INTERNAL MEDICINE

## 2024-02-19 RX ORDER — TRIAMCINOLONE ACETONIDE 40 MG/ML
20 INJECTION, SUSPENSION INTRA-ARTICULAR; INTRAMUSCULAR ONCE
Status: COMPLETED | OUTPATIENT
Start: 2024-02-19 | End: 2024-02-19

## 2024-02-19 RX ORDER — LORAZEPAM 0.5 MG/1
0.5 TABLET ORAL DAILY PRN
Qty: 30 TABLET | Refills: 2 | Status: SHIPPED | OUTPATIENT
Start: 2024-02-19 | End: 2024-02-28

## 2024-02-19 RX ORDER — DOXEPIN 3 MG/1
3-6 TABLET, FILM COATED ORAL
Qty: 90 TABLET | Refills: 0 | Status: SHIPPED | OUTPATIENT
Start: 2024-02-19 | End: 2024-05-20

## 2024-02-19 RX ORDER — VENLAFAXINE HYDROCHLORIDE 75 MG/1
75 CAPSULE, EXTENDED RELEASE ORAL DAILY
Qty: 90 CAPSULE | Refills: 0 | Status: SHIPPED | OUTPATIENT
Start: 2024-02-19 | End: 2024-05-20

## 2024-02-19 RX ADMIN — TRIAMCINOLONE ACETONIDE 20 MG: 40 INJECTION, SUSPENSION INTRA-ARTICULAR; INTRAMUSCULAR at 08:38

## 2024-02-19 NOTE — PROGRESS NOTES
Rheumatology follow-up visit ludwin Fuller is a 58 year old female presents today for follow-up.    Maritza was seen today for recheck.    Diagnoses and all orders for this visit:    Seropositive rheumatoid arthritis of multiple sites (H)    Cyclic citrullinated peptide (CCP) antibody positive    High risk medication use    Primary osteoarthritis of both knees    Trigger finger of right thumb  -     triamcinolone (KENALOG-40) injection 20 mg  -     INJECTION SINGLE TENDON SHEATH/LIGAMENT            Continue Humira biosimilar for management of rheumatoid arthritis which is well-controlled, without hydroxychloroquine methotrexate addition for reasons noted below.  After pros and cons were discussed including risk of infection, bleeding, skin changes including thinning, pigmentary alteration and scarring to name a few, the right thumb flexor tendon at A1 level injected as noted in the orders section. This was done with nontouch technique.  The patient tolerated the procedure well and had a brisk Marcaine effect.  The postinjection care was discussed.      Follow up in 6 months.    HPI    Alina Martell is a 58 year old female is here for follow-up of  rheumatoid arthritis.  This was severe.   There is seropositive. This is polyarticular. Family history of psoriasis and brother, mom's history of Crohn's.  She has done great.  She is on Humira that she inject every 2 weeks.  She now has the biosimilar her Humira, she had some delay and was frustrated with break she had fortunately there was no flareup.  She has noted pain in her right this is been going on for about a month, this is locking and clicking.  This pain is noted to be moderately severe.  There is no history of trauma.   Recent labs are reviewed, these are stable, hemoglobin is low, this is longstanding.    No longer on hydroxychloroquine.  She has noted mild discomfort in her knees.  Several years ago x-rays of the knees were done.  She would  "notice some swelling in the lower extremities [place] with her grandson.  She has longstanding chronic anemia and elevated sedimentation rate going back at least 2015.  She recently had hysterectomy.  In the past she has been on methotrexate that caused hair loss and diarrhea. She considers herself a\" flexaterian\", and is trying to add more vegetarian meals.         DETAILED EXAMINATION  02/19/24  :    Vitals:    02/19/24 0758   BP: 136/78   Pulse: 88   SpO2: 96%   Weight: 130 kg (286 lb 9.6 oz)     Alert oriented. Head including the face is examined for malar rash, heliotropes, scarring, lupus pernio. Eyes examined for redness such as in episcleritis/scleritis, periorbital lesions.   Neck/ Face examined for parotid gland swelling, range of motion of neck.  Left upper and lower and right upper and lower extremities examined for tenderness, swelling, warmth of the appendicular joints, range of motion, edema, rash.  Some of the important findings included: she does not have evidence of synovitis in the palpable joints of the upper extremities.  she does however have triggering of the right thumb, A1 nodularity which is quite tender.  No significant deformities of the digits.  no Heberden nodes.  Range of motion of the shoulders  show full abduction.  No JLT effusion or warmth of the knees.  she does not have dactylitis of the digits.     Patient Active Problem List    Diagnosis Date Noted    Diabetes mellitus, type 2 (H) 12/13/2023     Priority: Medium    Abnormal uterine bleeding 05/05/2023     Priority: Medium    Uterine fibroid 05/04/2023     Priority: Medium    Vitamin D deficiency 03/23/2023     Priority: Medium    Status post catheter ablation of atrial fibrillation 08/23/2022     Priority: Medium     6/30/2022 with Dr. Joseph  1. Mild left atrial dilation without significant scattered fibrosis  2. Atrial fibrillation ablation via PVI        History of colonic polyps " 04/05/2022     Priority: Medium     Colonscopy 3/22 - repeat in 3-5 year      Postmenopausal bleeding 02/16/2022     Priority: Medium    Obstructive sleep apnea syndrome 01/20/2022     Priority: Medium    Essential hypertension 01/20/2022     Priority: Medium    Coronary artery disease of native artery with stable angina pectoris (H24) 01/20/2022     Priority: Medium    Seropositive rheumatoid arthritis of multiple sites (H) 01/05/2022     Priority: Medium    Recurrent major depressive disorder, in full remission (H24) 01/05/2022     Priority: Medium    Paroxysmal atrial fibrillation (H) 01/05/2022     Priority: Medium    Morbid obesity (H) 01/05/2022     Priority: Medium    Chest pain 07/11/2021     Priority: Medium    ADA (generalized anxiety disorder) 03/31/2021     Priority: Medium    Depressed 11/14/2018     Priority: Medium    Cyclic citrullinated peptide (CCP) antibody positive 10/03/2017     Priority: Medium    Tendonitis of both shoulders 06/09/2017     Priority: Medium    Chronic pain 07/18/2016     Priority: Medium    Olecranon bursitis of right elbow 07/08/2016     Priority: Medium     Formatting of this note might be different from the original.  Replacement Utility updated for latest IMO load      Grief 03/29/2016     Priority: Medium    Sicca syndrome (H24) 10/27/2015     Priority: Medium    Anxiety 12/08/2014     Priority: Medium    Panic attack 12/08/2014     Priority: Medium    Sleep disorder 12/05/2014     Priority: Medium    Insomnia related to another mental disorder 09/03/2014     Priority: Medium    Microcytic anemia 09/02/2014     Priority: Medium    Migraine headache 09/02/2014     Priority: Medium    Reflux 09/02/2014     Priority: Medium     Past Surgical History:   Procedure Laterality Date    CHOLECYSTECTOMY      CYSTOSCOPY N/A 5/4/2023    Procedure: AND CYSTOSCOPY;  Surgeon: Irma Cary MD;  Location: [facility] OR    DAVINCI HYSTERECTOMY TOTAL Bilateral 5/4/2023     Procedure: ROBOTIC ASSISTED TOTAL LAPAROSCOPIC HYSTERECTOMY WITH BILATERAL SALPINGECTOMY;  Surgeon: Irma Cary MD;  Location: [facility] Main OR    DILATION AND CURETTAGE, OPERATIVE HYSTEROSCOPY, COMBINED N/A 3/3/2022    Procedure: HYSTEROSCOPY, WITH DILATION AND CURETTAGE;  Surgeon: Irma Cary MD;  Location: [facility] Main OR    EP ABLATION FOCAL AFIB N/A 6/30/2022    Procedure: Ablation Atrial Fibrilation;  Surgeon: Jose Guadalupe Joseph MD;  Location:  [facility]    HC REMOVAL GALLBLADDER      Description: Cholecystectomy;  Recorded: 10/15/2013;    LAPAROSCOPIC TUBAL LIGATION      RELEASE CARPAL TUNNEL BILATERAL      TUBAL LIGATION  1995      Past Medical History:   Diagnosis Date    Anemia     Antiplatelet or antithrombotic long-term use     Arrhythmia     Cannabis use without complication 12/8/2014    Carpal tunnel syndrome     Coronary artery disease     Diabetes mellitus, type 2 (H) 12/13/2023    Gastroesophageal reflux disease     History of angina     Hypertension     Irregular heart beat     Obesity     Paroxysmal atrial fibrillation (H)     PTSD (post-traumatic stress disorder)     RA (rheumatoid arthritis) (H)     Rheumatoid arthritis (H) 7/8/2016    Sicca syndrome (H24)     Sleep apnea      Allergies   Allergen Reactions    Penicillins Shortness Of Breath, Palpitations, Dizziness, Anaphylaxis, Hives, Itching and Rash     Test in 2031, see allergy note on 7/29/21    Shellfish-Derived Products Anaphylaxis    Sulfa Antibiotics Hives and Anaphylaxis     Current Outpatient Medications   Medication Sig Dispense Refill    adalimumab (HUMIRA *CF* PEN) 40 MG/0.4ML pen kit Inject 0.4 mLs (40 mg) Subcutaneous every 14 days for 30 days 0.8 mL 5    Adalimumab-adaz 40 MG/0.4ML SOAJ Inject 0.4 mLs Subcutaneous every 14 days 0.8 mL 1    apixaban ANTICOAGULANT (ELIQUIS ANTICOAGULANT) 5 MG tablet Take 1 tablet (5 mg) by mouth 2 times daily 180 tablet 3    cetirizine (ZYRTEC) 10 MG  tablet Take 1 tablet (10 mg) by mouth daily 90 tablet 3    diltiazem ER COATED BEADS (CARDIZEM CD/CARTIA XT) 180 MG 24 hr capsule Take 2 capsules (360 mg) by mouth daily 180 capsule 3    docusate sodium (COLACE) 100 MG capsule Take 1 capsule (100 mg) by mouth 2 times daily as needed for other (While taking pain pills to prevent constipation) 30 capsule 0    doxepin (SILENOR) 3 MG tablet Take 1-2 tablets (3-6 mg) by mouth nightly as needed for sleep 45 tablet 1    EPINEPHrine (ANY BX GENERIC EQUIV) 0.3 MG/0.3ML injection 2-pack Inject 0.3 mg into the muscle as needed for anaphylaxis      ferrous gluconate (FERGON) 324 (38 Fe) MG tablet Take 1 tablet (324 mg) by mouth daily (with breakfast) 30 tablet 0    LORazepam (ATIVAN) 0.5 MG tablet Take 1 tablet (0.5 mg) by mouth daily as needed for anxiety 30 tablet 1    Multiple Vitamins-Minerals (CENTROVITE) TABS Take 1 tablet by mouth daily      omeprazole (PRILOSEC) 40 MG DR capsule Take 1 capsule (40 mg) by mouth daily 90 capsule 3    venlafaxine (EFFEXOR XR) 75 MG 24 hr capsule Take 1 capsule (75 mg) by mouth daily 30 capsule 1    vitamin D3 (CHOLECALCIFEROL) 1.25 MG (70735 UT) capsule Take 1 capsule (50,000 Units) by mouth every 7 days 12 capsule 3     family history includes Alcoholism in her brother, brother, and maternal grandfather; Arrhythmia in her brother and brother; Atrial fibrillation in her mother; Attention Deficit Disorder in her brother; Chronic Kidney Disease in her father; Crohn's Disease in her mother; Depression in her brother; Diabetes in her father; Lupus in her cousin; No Known Problems in her daughter and son; Prostate Cancer in her father; Snoring in her father; Substance Abuse in her brother and brother.  Social Connections: Not on file          WBC   Date Value Ref Range Status   11/14/2018 6.5 4.0 - 11.0 10e9/L Final     WBC Count   Date Value Ref Range Status   02/14/2024 11.4 (H) 4.0 - 11.0 10e3/uL Final     RBC Count   Date Value Ref Range  Status   02/14/2024 4.30 3.80 - 5.20 10e6/uL Final   11/14/2018 4.31 3.8 - 5.2 10e12/L Final     Hemoglobin   Date Value Ref Range Status   02/14/2024 10.7 (L) 11.7 - 15.7 g/dL Final   11/14/2018 10.8 (L) 11.7 - 15.7 g/dL Final     Hematocrit   Date Value Ref Range Status   02/14/2024 34.4 (L) 35.0 - 47.0 % Final   11/14/2018 35.6 35.0 - 47.0 % Final     MCV   Date Value Ref Range Status   02/14/2024 80 78 - 100 fL Final   11/14/2018 83 78 - 100 fl Final     MCH   Date Value Ref Range Status   02/14/2024 24.9 (L) 26.5 - 33.0 pg Final   11/14/2018 25.1 (L) 26.5 - 33.0 pg Final     Platelet Count   Date Value Ref Range Status   02/14/2024 348 150 - 450 10e3/uL Final   11/14/2018 286 150 - 450 10e9/L Final     % Lymphocytes   Date Value Ref Range Status   08/11/2023 26 % Final   11/14/2018 24.6 % Final     AST   Date Value Ref Range Status   08/11/2023 19 0 - 45 U/L Final     Comment:     Reference intervals for this test were updated on 6/12/2023 to more accurately reflect our healthy population. There may be differences in the flagging of prior results with similar values performed with this method. Interpretation of those prior results can be made in the context of the updated reference intervals.   11/14/2018 12 0 - 45 U/L Final     ALT   Date Value Ref Range Status   02/14/2024 19 0 - 50 U/L Final   11/14/2018 13 0 - 50 U/L Final     Albumin   Date Value Ref Range Status   02/14/2024 3.7 3.5 - 5.2 g/dL Final   03/08/2022 3.5 3.5 - 5.0 g/dL Final   11/14/2018 2.8 (L) 3.4 - 5.0 g/dL Final     Alkaline Phosphatase   Date Value Ref Range Status   08/11/2023 102 35 - 104 U/L Final   11/14/2018 83 40 - 150 U/L Final     Creatinine   Date Value Ref Range Status   02/14/2024 0.77 0.51 - 0.95 mg/dL Final   11/14/2018 0.62 0.52 - 1.04 mg/dL Final     GFR Estimate   Date Value Ref Range Status   02/14/2024 90 >60 mL/min/1.73m2 Final   01/21/2021 >60 >60 mL/min/1.73m2 Final   11/14/2018 >90 >60 mL/min/1.7m2 Final     Comment:      Non  GFR Calc     GFR Estimate If Black   Date Value Ref Range Status   01/21/2021 >60 >60 mL/min/1.73m2 Final   11/14/2018 >90 >60 mL/min/1.7m2 Final     Comment:      GFR Calc     Erythrocyte Sedimentation Rate   Date Value Ref Range Status   08/30/2023 81 (H) 0 - 30 mm/hr Final     CRP   Date Value Ref Range Status   03/11/2020 3.3 (H) 0.0 - 0.8 mg/dL Final     N terminal Pro BNP Inpatient   Date Value Ref Range Status   03/11/2023 <36 0 - 900 pg/mL Final     Comment:     Reference range shown and results flagged as abnormal are suggested inpatient cut points for confirming diagnosis if CHF in an acute setting. Establishing a baseline value for each individual patient is useful for follow-up. An inpatient or emergency department NT-proPBNP <300 pg/mL effectively rules out acute CHF, with 99% negative predictive value.    The outpatient non-acute reference range for ruling out CHF is:  0-125 pg/mL (age 18 to less than 75)  0-450 pg/mL (age 75 yrs and older)

## 2024-02-19 NOTE — NURSING NOTE
Is the patient currently in the state of [place]? YES    Visit mode:VIDEO    If the visit is dropped, the patient can be reconnected by: VIDEO VISIT: Text to cell phone:   Telephone Information:   Mobile [phone]       Will anyone else be joining the visit? NO  (If patient encounters technical issues they should call [phone])    How would you like to obtain your AVS? MyChart    Are changes needed to the allergy or medication list? No    Reason for visit: RECHECK    Kyle MADRID

## 2024-02-19 NOTE — PROGRESS NOTES
"Virtual Visit Details    Type of service:  Video Visit     Originating Location (pt. Location): Home    Distant Location (provider location):  Off-site  Platform used for Video Visit: [facility]        OUTPATIENT PSYCHIATRIC FOLLOW-UP      2024    Provider: STARLA Kaiser CNP      Appointment Start Time: 113  Appointment End Time: 1205    Name: Alina Martell   : 1965                    Preferred Name: Maritza      Screening Tools           2024    10:01 PM 2024     7:06 AM 2023    12:59 PM   PHQ   PHQ-9 Total Score 5 8 10   Q9: Thoughts of better off dead/self-harm past 2 weeks Not at all Not at all Not at all         2023     9:50 AM 10/30/2023     3:23 PM 10/9/2023    10:00 AM   ADA-7 SCORE   Total Score 11 (moderate anxiety) 5 (mild anxiety)    Total Score 11 5 8     PROMIS 10-Global Health (only subscores and total score):       2022    11:13 AM 11/3/2022    12:07 PM 2022     9:53 AM 3/10/2023     8:13 AM 2023     6:37 AM 2023    12:07 PM 2024    11:44 PM   PROMIS-10 Scores Only   Global Mental Health Score 9 9 10 11 10 10 8    8   Global Physical Health Score 14 15 14 12 14 13 14    14   PROMIS TOTAL - SUBSCORES 23 24 24 23 24 23 22    22          History of Present Illness      Patient attended the session alone.     Interim History:  I last saw Alina HEENA Jayshree for outpatient psychiatry Consultation on 24. During that appointment, we initiated doxepin  .     Current stressors include:  None      Coping mechanisms and supports include:  Volunteering    Side effects: Denies    Medication adherence: Reports good med adherence.    Psychiatric Review of Systems      Alina NAIK Martell reports mood has been: 'Pretty good\"    Depression has been: Improved when anxiety is more managed.     Anxiety has been: Panic improved. Will use ativan on occasion. Not daily. Takes in larger groups of people.     Sleep has been: CPAP adjusted. Doxepin helped. " Mind not constantly going, reduced by 50%. Sleeping 6 hours. Restful. May need to wake for b/r. More sedated at first when taking.     Taryn sxs: Denies    Psychosis sxs: Denies    ADHD sxs: Denies    PTSD sxs: Denies    SI/SIB: Denies SI/SIB/HI      Medications Prior to Appointment       Current Outpatient Medications   Medication Sig Dispense Refill    Adalimumab-adaz 40 MG/0.4ML SOAJ Inject 0.4 mLs Subcutaneous every 14 days 0.8 mL 1    apixaban ANTICOAGULANT (ELIQUIS ANTICOAGULANT) 5 MG tablet Take 1 tablet (5 mg) by mouth 2 times daily 180 tablet 3    cetirizine (ZYRTEC) 10 MG tablet Take 1 tablet (10 mg) by mouth daily 90 tablet 3    diltiazem ER COATED BEADS (CARDIZEM CD/CARTIA XT) 180 MG 24 hr capsule Take 2 capsules (360 mg) by mouth daily 180 capsule 3    docusate sodium (COLACE) 100 MG capsule Take 1 capsule (100 mg) by mouth 2 times daily as needed for other (While taking pain pills to prevent constipation) 30 capsule 0    doxepin (SILENOR) 3 MG tablet Take 1-2 tablets (3-6 mg) by mouth nightly as needed for sleep 45 tablet 1    EPINEPHrine (ANY BX GENERIC EQUIV) 0.3 MG/0.3ML injection 2-pack Inject 0.3 mg into the muscle as needed for anaphylaxis      ferrous gluconate (FERGON) 324 (38 Fe) MG tablet Take 1 tablet (324 mg) by mouth daily (with breakfast) 30 tablet 0    LORazepam (ATIVAN) 0.5 MG tablet Take 1 tablet (0.5 mg) by mouth daily as needed for anxiety 30 tablet 1    Multiple Vitamins-Minerals (CENTROVITE) TABS Take 1 tablet by mouth daily      omeprazole (PRILOSEC) 40 MG DR capsule Take 1 capsule (40 mg) by mouth daily 90 capsule 3    venlafaxine (EFFEXOR XR) 75 MG 24 hr capsule Take 1 capsule (75 mg) by mouth daily 30 capsule 1    vitamin D3 (CHOLECALCIFEROL) 1.25 MG (10559 UT) capsule Take 1 capsule (50,000 Units) by mouth every 7 days 12 capsule 3          Previous medication trials include but not limited to:  SSRIs:  -Prozac  -Paxil 40 mg  -Zoloft 25 mg     SNRIs:  -Cymbalta 60 mg     Other  anxiolytics:  -Buspar 5 mg TID prn: caused me to be jittery   -Hydroxyzine 25 mg TID prn: dryness      Antipsychotics:  -Abilify     Alpha receptors:  -Prazosin 1 mg: stopped experiencing nightmares     Stimulants/ADHD meds:  -Strattera 40 mg     Benzodiazepines:  -Lorazepam .5 mg prn     Sleep aides:  -Ambien: sleep walking  -Trazodone 25 to 50 mg              Medication Compliance: Yes.                  Pharmacogenomic Testing Completed: No      Medical History      History of head injuries: No  History of seizures: No  History of cardiac events: A Fib; Hx of ablation.   History of Tardive Dyskinesia: No         Past Medical History:   Diagnosis Date    Anemia     Antiplatelet or antithrombotic long-term use     Arrhythmia     Cannabis use without complication 12/8/2014    Carpal tunnel syndrome     Coronary artery disease     Diabetes mellitus, type 2 (H) 12/13/2023    Gastroesophageal reflux disease     History of angina     Hypertension     Irregular heart beat     Obesity     Paroxysmal atrial fibrillation (H)     PTSD (post-traumatic stress disorder)     RA (rheumatoid arthritis) (H)     Rheumatoid arthritis (H) 7/8/2016    Sicca syndrome (H24)     Sleep apnea         Surgery:   Past Surgical History:   Procedure Laterality Date    CHOLECYSTECTOMY      CYSTOSCOPY N/A 5/4/2023    Procedure: AND CYSTOSCOPY;  Surgeon: Irma Cary MD;  Location: [facility] OR    DAVINCI HYSTERECTOMY TOTAL Bilateral 5/4/2023    Procedure: ROBOTIC ASSISTED TOTAL LAPAROSCOPIC HYSTERECTOMY WITH BILATERAL SALPINGECTOMY;  Surgeon: Irma Cary MD;  Location: [facility] OR    DILATION AND CURETTAGE, OPERATIVE HYSTEROSCOPY, COMBINED N/A 3/3/2022    Procedure: HYSTEROSCOPY, WITH DILATION AND CURETTAGE;  Surgeon: Irma Cary MD;  Location: [facility] OR    EP ABLATION FOCAL AFIB N/A 6/30/2022    Procedure: Ablation Atrial Fibrilation;  Surgeon: Jose Guadalupe Joseph MD;  Location: [facility]  CARDIAC CATH LAB    HC REMOVAL GALLBLADDER      Description: Cholecystectomy;  Recorded: 10/15/2013;    LAPAROSCOPIC TUBAL LIGATION      RELEASE CARPAL TUNNEL BILATERAL      TUBAL LIGATION  1995     Primary Care Provider: Estelle Bansal MD        Social History      Current Living situation:  [place], [place] with self.    Current use of drugs or alcohol: Denies     Tobacco use: No    Employment: Yes. Full time Accounting     Relationship Status: in a relationship      Vitals      Not obtained d/t virtual visit.     LMP  (LMP Unknown)     Labs        Most recent laboratory results reviewed and pertinent results include:   Lab on 02/14/2024   Component Date Value Ref Range Status    Albumin 02/14/2024 3.7  3.5 - 5.2 g/dL Final    ALT 02/14/2024 19  0 - 50 U/L Final    WBC Count 02/14/2024 11.4 (H)  4.0 - 11.0 10e3/uL Final    RBC Count 02/14/2024 4.30  3.80 - 5.20 10e6/uL Final    Hemoglobin 02/14/2024 10.7 (L)  11.7 - 15.7 g/dL Final    Hematocrit 02/14/2024 34.4 (L)  35.0 - 47.0 % Final    MCV 02/14/2024 80  78 - 100 fL Final    MCH 02/14/2024 24.9 (L)  26.5 - 33.0 pg Final    MCHC 02/14/2024 31.1 (L)  31.5 - 36.5 g/dL Final    RDW 02/14/2024 15.7 (H)  10.0 - 15.0 % Final    Platelet Count 02/14/2024 348  150 - 450 10e3/uL Final    Creatinine 02/14/2024 0.77  0.51 - 0.95 mg/dL Final    GFR Estimate 02/14/2024 90  >60 mL/min/1.73m2 Final   Office Visit on 08/30/2023   Component Date Value Ref Range Status    YESSICA interpretation 08/30/2023 Negative  Negative Final      Negative:              <1:40  Borderline Positive:   1:40 - 1:80  Positive:              >1:80    CRP Inflammation 08/30/2023 16.70 (H)  <5.00 mg/L Final    Erythrocyte Sedimentation Rate 08/30/2023 81 (H)  0 - 30 mm/hr Final    SSA Beverly IgG Instrument Value 08/30/2023 0.5  <7.0 U/mL Final    SSA (Ro) Antibody IgG 08/30/2023 Negative  Negative Final    Hemoglobin A1C 08/30/2023 6.5 (H)  0.0 - 5.6 % Final    Normal <5.7%   Prediabetes 5.7-6.4%    Diabetes  6.5% or higher     Note: Adopted from ADA consensus guidelines.    Iron 08/30/2023 32 (L)  37 - 145 ug/dL Final    Iron Binding Capacity 08/30/2023 343  240 - 430 ug/dL Final    Iron Sat Index 08/30/2023 9 (L)  15 - 46 % Final    Ferritin 08/30/2023 29  11 - 328 ng/mL Final    WBC Count 08/30/2023 8.8  4.0 - 11.0 10e3/uL Final    RBC Count 08/30/2023 4.43  3.80 - 5.20 10e6/uL Final    Hemoglobin 08/30/2023 10.9 (L)  11.7 - 15.7 g/dL Final    Hematocrit 08/30/2023 35.3  35.0 - 47.0 % Final    MCV 08/30/2023 80  78 - 100 fL Final    MCH 08/30/2023 24.6 (L)  26.5 - 33.0 pg Final    MCHC 08/30/2023 30.9 (L)  31.5 - 36.5 g/dL Final    RDW 08/30/2023 16.0 (H)  10.0 - 15.0 % Final    Platelet Count 08/30/2023 373  150 - 450 10e3/uL Final    ALT 08/30/2023 17  0 - 50 U/L Final    Reference intervals for this test were updated on 6/12/2023 to more accurately reflect our healthy population. There may be differences in the flagging of prior results with similar values performed with this method. Interpretation of those prior results can be made in the context of the updated reference intervals.      Creatinine 08/30/2023 0.67  0.51 - 0.95 mg/dL Final    GFR Estimate 08/30/2023 >90  >60 mL/min/1.73m2 Final    Albumin 08/30/2023 4.0  3.5 - 5.2 g/dL Final   Office Visit on 03/22/2023   Component Date Value Ref Range Status    Albumin 03/22/2023 3.8  3.5 - 5.2 g/dL Final    ALT 03/22/2023 13  10 - 35 U/L Final    WBC Count 03/22/2023 8.8  4.0 - 11.0 10e3/uL Final    RBC Count 03/22/2023 4.18  3.80 - 5.20 10e6/uL Final    Hemoglobin 03/22/2023 10.7 (L)  11.7 - 15.7 g/dL Final    Hematocrit 03/22/2023 34.7 (L)  35.0 - 47.0 % Final    MCV 03/22/2023 83  78 - 100 fL Final    MCH 03/22/2023 25.6 (L)  26.5 - 33.0 pg Final    MCHC 03/22/2023 30.8 (L)  31.5 - 36.5 g/dL Final    RDW 03/22/2023 14.5  10.0 - 15.0 % Final    Platelet Count 03/22/2023 405  150 - 450 10e3/uL Final    Creatinine 03/22/2023 0.69  0.51 - 0.95 mg/dL Final    GFR  Estimate 03/22/2023 >90  >60 mL/min/1.73m2 Final    eGFR calculated using 2021 CKD-EPI equation.    Erythrocyte Sedimentation Rate 03/22/2023 57 (H)  0 - 20 mm/hr Final    CRP Inflammation 03/22/2023 19.20 (H)  <5.00 mg/L Final    Cholesterol 03/22/2023 166  <200 mg/dL Final    Triglycerides 03/22/2023 89  <150 mg/dL Final    Direct Measure HDL 03/22/2023 44 (L)  >=50 mg/dL Final    LDL Cholesterol Calculated 03/22/2023 104 (H)  <=100 mg/dL Final    Non HDL Cholesterol 03/22/2023 122  <130 mg/dL Final    Vitamin D, Total (25-Hydroxy) 03/22/2023 9 (L)  20 - 75 ug/L Final   Lab Requisition on 03/20/2023   Component Date Value Ref Range Status    Interpretation 03/20/2023 Negative for Intraepithelial Lesion or Malignancy (NILM)    Final    Comment 03/20/2023    Final                    Value:This result contains rich text formatting which cannot be displayed here.    Specimen Adequacy 03/20/2023 Satisfactory for evaluation, endocervical/transformation zone component present   Final    Clinical Information 03/20/2023    Final                    Value:This result contains rich text formatting which cannot be displayed here.    LMP/Menopause Date 03/20/2023    Final                    Value:This result contains rich text formatting which cannot be displayed here.    Reflex Testing 03/20/2023 Yes regardless of result   Final    Previous Abnormal? 03/20/2023    Final                    Value:This result contains rich text formatting which cannot be displayed here.    Previous Abnormal Diagnosis 03/20/2023    Final                    Value:This result contains rich text formatting which cannot be displayed here.    Performing Labs 03/20/2023    Final                    Value:This result contains rich text formatting which cannot be displayed here.    Other HR HPV 03/20/2023 Negative  Negative Final    HPV16 DNA 03/20/2023 Negative  Negative Final    HPV18 DNA 03/20/2023 Negative  Negative Final    FINAL DIAGNOSIS 03/20/2023     Final                    Value:This result contains rich text formatting which cannot be displayed here.   Lab Requisition on 03/20/2023   Component Date Value Ref Range Status    Case Report 03/20/2023    Final                    Value:Surgical Pathology Report                         Case: [id number]                                  Authorizing Provider:  Irma Cary MD  Collected:           03/20/2023 11:20 AM          Ordering Location:     [facility]     Received:            03/20/2023 03:47 PM                                 [facility]                                                       Pathologist:           Kirt Shrestha MD                                                                           Specimen:    Endometrium                                                                                Final Diagnosis 03/20/2023    Final                    Value:This result contains rich text formatting which cannot be displayed here.    Clinical Information 03/20/2023    Final                    Value:This result contains rich text formatting which cannot be displayed here.    Gross Description 03/20/2023    Final                    Value:This result contains rich text formatting which cannot be displayed here.    Microscopic Description 03/20/2023    Final                    Value:This result contains rich text formatting which cannot be displayed here.    Performing Labs 03/20/2023    Final                    Value:This result contains rich text formatting which cannot be displayed here.   Lab on 03/06/2023   Component Date Value Ref Range Status    Creatinine 03/06/2023 0.72  0.51 - 0.95 mg/dL Final    GFR Estimate 03/06/2023 >90  >60 mL/min/1.73m2 Final    eGFR calculated using 2021 CKD-EPI equation.    WBC Count 03/06/2023 10.6  4.0 - 11.0 10e3/uL Final    RBC Count 03/06/2023 4.36   "3.80 - 5.20 10e6/uL Final    Hemoglobin 03/06/2023 11.5 (L)  11.7 - 15.7 g/dL Final    Hematocrit 03/06/2023 36.6  35.0 - 47.0 % Final    MCV 03/06/2023 84  78 - 100 fL Final    MCH 03/06/2023 26.4 (L)  26.5 - 33.0 pg Final    MCHC 03/06/2023 31.4 (L)  31.5 - 36.5 g/dL Final    RDW 03/06/2023 14.5  10.0 - 15.0 % Final    Platelet Count 03/06/2023 366  150 - 450 10e3/uL Final    ALT 03/06/2023 14  10 - 35 U/L Final    Albumin 03/06/2023 3.9  3.5 - 5.2 g/dL Final     Most recent EKG from 08/23 reviewed. QTc interval 459.  Normal EKG          Medical Review of Systems      Pertinent positives noted in HPI and below:   Review of Systems   All other systems reviewed and are negative.        No LMP recorded (lmp unknown). Patient has had a hysterectomy.    Pregnant / Breastfeeding: No       Mental Status Exam      Appearance: awake, alert, adequately groomed, appeared stated age and no apparent distress  Attitude:  cooperative   Eye Contact:  good  Gait and Station: normal, no gross abnormalities noted by observation  Psychomotor Behavior:  no evidence of tardive dyskinesia, dystonia, or tics  Oriented to:  person, place, time, and situation  Attention Span and Concentration:  mild impairment   Speech:  clear, coherent, regular rate, rhythm, and volume  Language: intact  Mood:  \"pretty good\"\"  Affect:  appropriate and in normal range  Associations:  no loose associations  Thought Process:  logical, linear and goal oriented  Thought Content:  no evidence of suicidal ideation or homicidal ideation, no evidence of psychotic thought, no auditory hallucinations present and no visual hallucinations present  Recent and Remote Memory:  Intact to interview. Not formally assessed. No amnesia.  Fund of Knowledge: appropriate  Insight:  full  Judgment:  intact, adequate for safety  Impulse Control:  intact         Risk Assessment       Updated: 2/19/2024    Suicide assessment  Acute: Low  Chronic:Low  Imminent: Low     Risk " "factors  History of suicide attempts: No  History of self-injurious behavior: No  Carlos. Axis I psychiatric diagnoses: Yes  Substance use disorder: No  Symptoms: , panic, insomnia  Family history of completed suicide or attempted suicide: No  Accessibility to firearms: No  Interpersonal factors: financial stress, family dynamic challenges, LGBQT+     Protective factors  Ability to cope with the stress: Yes  Family responsibility and supportive:Yes  Positive therapeutic relationships: Yes  Social support:Yes  Jehovah's witness beliefs:  Yes  Connectedness with mental health providers: Yes     Homicidal Risk  Acute: Low  Chronic: Low  Imminent: Low    Assessment     Alina Martell reports overall doing relatively okay. Anxiety improved. Sleep improved. No overt depressive sx. No imminent safety concerns. Feels overall \"balanced\".         Pharmacologic:   01/08/24: + doxepin 3mg    -Continue venlafaxine 75 mg by mouth every day.  -Continue lorazepam 0.5 mg tablet by mouth daily as needed for severe anxiety  -Continue doxepin 3 mg tablet, take 1 tablet by mouth at bedtime as needed        Psychosocial: Would benefit from individual therapy with focus of Cognitive Behavioral Therapy (CBT). This form of therapy will be helpful in addressing cognitive distortions, improving distress tolerance, and developing helpful / healthy coping strategies to address stressors.   - Continue individual therapy, every 3 weeks           Diagnosis       ADA  MDD, recurrent, mild, in partial remission  PTSD    R/o ADHD     Plan         1) Medications:      -Continue venlafaxine 75 mg by mouth every day.  -Continue lorazepam 0.5 mg tablet by mouth daily as needed for severe anxiety  -Continue doxepin 3 mg tablet, take 1 tablet by mouth at bedtime as needed                 MNPMP: I have queried the [place] and/or [place] Prescription Monitoring Program for this patient for the preceding 12 months, or reviewed the report provided by my proxy delegate. I have " not identified any concerns.  2) Risk vs benefits of medications reviewed: Yes  3) Life style modifications: sleep hygiene, exercise, healthy diet  4) Medical concerns:    - No acute concerns  5) Other:   - Continue individual therapy  - Recommend ADHD testing  6) Refrain from drinking alcohol and/or use of drugs.   7) Please secure all prescription and OTC medications, sharps, and caustic substances. Please remove all firearms and ammunition.  8) Review outside records, get [facility]LEY's, coordinate with outside providers  9) In case of emergency call 911 or go to the nearest ER, this includes patient voicing thought of harming self or others as well as additional safety concerns   10) Follow-up: 12weeks, or sooner if needed.      Administrative Billing:   Supportive therapy was provided, focusing on reflective listening and solution focused problem solving.    Total time preparing to see this patient, face-to-face time, documenting in the EHR, and coordinating care time on the same calendar date: 34 minutes         Signed:   STARLA Kaiser CNP on 2/19/2024 at 12:01 PM     Disclaimer: This note consists of symbols derived from keyboarding, dictation and/or voice recognition software. As a result, there may be errors in the script that have gone undetected. Please consider this when interpreting information found in this chart.

## 2024-02-20 ENCOUNTER — OFFICE VISIT (OUTPATIENT)
Dept: PSYCHOLOGY | Facility: CLINIC | Age: 59
End: 2024-02-20
Payer: COMMERCIAL

## 2024-02-20 DIAGNOSIS — F33.1 MODERATE EPISODE OF RECURRENT MAJOR DEPRESSIVE DISORDER (H): ICD-10-CM

## 2024-02-20 DIAGNOSIS — F43.10 POSTTRAUMATIC STRESS DISORDER: ICD-10-CM

## 2024-02-20 DIAGNOSIS — F41.1 GAD (GENERALIZED ANXIETY DISORDER): Primary | ICD-10-CM

## 2024-02-20 PROCEDURE — 90837 PSYTX W PT 60 MINUTES: CPT | Performed by: COUNSELOR

## 2024-02-21 NOTE — PROGRESS NOTES
[facility]                                     Progress Note    Patient Name: Alina Martell  Date: 2/20/24         Service Type: Individual      Session Start Time: 8:05 Session End Time: 8:58     Session Length: 53 minutes    Session #: 69     Attendees: Client       Service Modality:  In-Person       DATA  Extended Session (53+ minutes):   - Patient's presenting concerns require more intensive intervention than could be completed within the usual service  Interactive Complexity: No  Crisis: No      Progress Since Last Session (Related to Symptoms / Goals / Homework):   Symptoms: No change continuing to manage symptoms    Homework: Achieved / completed to satisfaction      Episode of Care Goals: Satisfactory progress - ACTION (Actively working towards change); Intervened by reinforcing change plan / affirming steps taken     Current / Ongoing Stressors and Concerns:  Patient indicated that she has been managing her symptoms. Patient reported feeling that her medication for depression has been working well. Patient talked about symptoms that she is continuing to experience. Patient reflected on different relationships occurring in her life. Patient indicated she continues to feel that her family is not understanding. Patient reported she is doing self-care by going to visit friends in [place]. This therapist processed with patient activities that she can continue to do to help her mood.      Treatment Objective(s) Addressed in This Session:   use thought-stopping strategy daily to reduce intrusive thoughts  Increase interest, engagement, and pleasure in doing things  Decrease frequency and intensity of feeling down, depressed, hopeless  Improve quantity and quality of night time sleep / decrease daytime naps  Feel less tired and more energy during the day   Identify negative self-talk and behaviors: challenge core beliefs, myths, and actions     Intervention:   Motivational  Interviewing    MI Intervention: Reflections: simple and complex     Change Talk Expressed by the Patient: Activation Taking steps    Provider Response to Change Talk: R - Reflected patient's change talk and S - Summarized patient's change talk statements      Assessments completed prior to visit:  The following assessments were completed by patient for this visit:  PHQ9:       9/26/2023    12:05 PM 10/9/2023     5:07 AM 10/30/2023     3:22 PM 12/13/2023     3:35 PM 12/18/2023    12:59 PM 1/8/2024     7:06 AM 2/18/2024    10:01 PM   PHQ-9 SCORE   PHQ-9 Total Score MyChart 9 (Mild depression) 10 (Moderate depression) 7 (Mild depression) 9 (Mild depression) 10 (Moderate depression) 8 (Mild depression) 5 (Mild depression)   PHQ-9 Total Score 9 10 7 9 10 8 5     GAD7:       11/3/2022    12:06 PM 12/1/2022     9:52 AM 7/5/2023     2:18 PM 9/26/2023    12:05 PM 10/9/2023    10:00 AM 10/30/2023     3:23 PM 12/6/2023     9:50 AM   ADA-7 SCORE   Total Score 10 (moderate anxiety) 8 (mild anxiety) 6 (mild anxiety) 11 (moderate anxiety)  5 (mild anxiety) 11 (moderate anxiety)   Total Score 10 8 6 11 8 5 11     PROMIS 10-Global Health (all questions and answers displayed):       7/19/2022    11:13 AM 11/3/2022    12:07 PM 12/1/2022     9:53 AM 3/10/2023     8:13 AM 6/13/2023     6:37 AM 9/26/2023    12:07 PM 1/1/2024    11:44 PM   PROMIS 10   In general, would you say your health is: Good Good Good Fair Good Fair Good   In general, would you say your quality of life is: Fair Good Good Good Good Good Fair   In general, how would you rate your physical health? Good Good Fair Fair Fair Fair Fair   In general, how would you rate your mental health, including your mood and your ability to think? Fair Fair Fair Fair Fair Fair Fair   In general, how would you rate your satisfaction with your social activities and relationships? Good Fair Good Good Good Good Fair   In general, please rate how well you carry out your usual social  activities and roles Good Good Good Good Good Good Good   To what extent are you able to carry out your everyday physical activities such as walking, climbing stairs, carrying groceries, or moving a chair? Mostly Mostly Mostly Mostly Mostly Mostly Mostly   In the past 7 days, how often have you been bothered by emotional problems such as feeling anxious, depressed, or irritable? Often Often Often Sometimes Often Often Often   In the past 7 days, how would you rate your fatigue on average? Moderate Mild Mild Moderate Mild Moderate Mild   In the past 7 days, how would you rate your pain on average, where 0 means no pain, and 10 means worst imaginable pain? 3 2 3 4 2 3 3   In general, would you say your health is: 3 3 3 2 3 2 3   In general, would you say your quality of life is: 2 3 3 3 3 3 2   In general, how would you rate your physical health? 3 3 2 2 2 2 2   In general, how would you rate your mental health, including your mood and your ability to think? 2 2 2 2 2 2 2   In general, how would you rate your satisfaction with your social activities and relationships? 3 2 3 3 3 3 2   In general, please rate how well you carry out your usual social activities and roles. (This includes activities at home, at work and in your community, and responsibilities as a parent, child, spouse, employee, friend, etc.) 3 3 3 3 3 3 3   To what extent are you able to carry out your everyday physical activities such as walking, climbing stairs, carrying groceries, or moving a chair? 4 4 4 4 4 4 4   In the past 7 days, how often have you been bothered by emotional problems such as feeling anxious, depressed, or irritable? 4 4 4 3 4 4 4   In the past 7 days, how would you rate your fatigue on average? 3 2 2 3 2 3 2   In the past 7 days, how would you rate your pain on average, where 0 means no pain, and 10 means worst imaginable pain? 3 2 3 4 2 3 3   Global Mental Health Score 9 9 10 11 10 10 8    8   Global Physical Health Score 14 15  14 12 14 13 14    14   PROMIS TOTAL - SUBSCORES 23 24 24 23 24 23 22    22         ASSESSMENT: Current Emotional / Mental Status (status of significant symptoms):   Risk status (Self / Other harm or suicidal ideation)   Patient denies current fears or concerns for personal safety.   Patient denies current or recent suicidal ideation or behaviors.   Patient denies current or recent homicidal ideation or behaviors.   Patient denies current or recent self injurious behavior or ideation.   Patient denies other safety concerns.   Patient reports there has been no change in risk factors since their last session.     Patient reports there has been no change in protective factors since their last session.     Recommended that patient call 911 or go to the local ED should there be a change in any of these risk factors.     Appearance:   Appropriate    Eye Contact:   Good    Psychomotor Behavior: Normal    Attitude:   Cooperative    Orientation:   All   Speech    Rate / Production: Normal/ Responsive    Volume:  Normal    Mood:    Anxious  Depressed    Affect:    Appropriate    Thought Content:  Clear    Thought Form:  Coherent  Goal Directed  Logical    Insight:    Good      Medication Review:   No changes to current psychiatric medication(s)     Medication Compliance:   Yes     Changes in Health Issues:   None reported     Chemical Use Review:   Substance Use: Chemical use reviewed, no active concerns identified      Tobacco Use: No current tobacco use.      Diagnosis:  1. ADA (generalized anxiety disorder)    2. Moderate episode of recurrent major depressive disorder (H)    3. Posttraumatic stress disorder        Collateral Reports Completed:   Not Applicable    PLAN: (Patient Tasks / Therapist Tasks / Other)  Patient will continue with therapy every 3 weeks. Patient will do self-care by taking a vacation to see friends. Patient would benefit from continuing to talk with her friends about area's of her life she feels she  needs to keep secret. Patient will continue to work on setting boundaries with her family members.    There has been demonstrated improvement in functioning while patient has been engaged in psychotherapy/psychological service- if withdrawn the patient would deteriorate and/or relapse.     Mariana Love, [facility]     ______________________________________________________________________    Individual Treatment Plan    Patient's Name: Alina Martell  YOB: 1965    Date of Creation:10/16/2020  Date Treatment Plan Last Reviewed/Revised: 1/2/2024    DSM5 Diagnoses: 296.32 (F33.1) Major Depressive Disorder, Recurrent Episode, Moderate _, 300.02 (F41.1) Generalized Anxiety Disorder, or 309.81 (F43.10) Posttraumatic Stress Disorder (includes Posttraumatic Stress Disorder for Children 6 Years and Younger)  Without dissociative symptoms  Psychosocial / Contextual Factors: Health, family  PROMIS (reviewed every 90 days):     PROMIS-10 Scores  Global Mental Health Score: 8  Global Physical Health Score: 14  PROMIS TOTAL - SUBSCORES: 22     Referral / Collaboration:  Was/were discussed and patient will pursue.    Anticipated number of session for this episode of care: 20 will reevaluate every 90 days  Anticipation frequency of session: Biweekly  Anticipated Duration of each session: 38-52 minutes  Treatment plan will be reviewed in 90 days or when goals have been changed.       MeasurableTreatment Goal(s) related to diagnosis / functional impairment(s)  Goal 1: Patient will work on reducing overall anxiety.     I will know I've met my goal when reporting minimal anxiety symptoms.       Objective #A (Patient Action)                          Patient will use distraction each time intrusive worry surfaces.  Status: Continued - Date(s): 1/2/2024     Intervention(s)  Therapist will teach emotional regulation skills.  Objective #B  Patient will Decrease frequency and intensity of feeling down, depressed,  hopeless.  Status: Continued - Date(s):  1/2/2024     Intervention(s)  Therapist will teach emotional recognition/identification. ..     Objective #C  Patient will Identify negative self-talk and behaviors: challenge core beliefs, myths, and actions.  Status: Continued - Date(s):  1/2/2024     Intervention(s)  Therapist will teach the client how to perform a behavioral chain analysis. ..     Goal 2: Patient will continue to work on reducing depression symptoms    I will know I've met my goal then reporting minimal depression symptoms     Objective #A (Patient Action)                          Patient will Increase interest, engagement, and pleasure in doing things.  Status: Continued - Date(s):  1/2/2024     Intervention(s)  Therapist will assign homework ..     Objective #B  Patient will Decrease frequency and intensity of feeling down, depressed, hopeless.  Status: Continued - Date(s):   1/2/2024  Intervention(s)  Therapist will assign homework at every session.     Goal 3: Client will reduce PTSD symptoms by 50% as evidenced by PCLC-5 score     I will know I've met my goal when not struggling with nightmares.      Objective #A (Client Action)    Client will reduce nightmares.  Status: New - Date: 1/2/2024      Intervention(s)  Therapist will teach nightmare retraining .    Objective #B  Client will compile a list of boundaries that they would like to set with others.   .    Status: New - Date: 1/2/2024      Intervention(s)  Therapist will assign homework boundary setting .            Patient has reviewed and agreed to the above plan.        Mariana Love [facility]

## 2024-02-27 ENCOUNTER — TELEPHONE (OUTPATIENT)
Dept: PSYCHIATRY | Facility: CLINIC | Age: 59
End: 2024-02-27
Payer: COMMERCIAL

## 2024-02-27 ENCOUNTER — TELEPHONE (OUTPATIENT)
Dept: PSYCHIATRY | Facility: CLINIC | Age: 59
End: 2024-02-27

## 2024-02-27 ENCOUNTER — HOSPITAL ENCOUNTER (EMERGENCY)
Facility: HOSPITAL | Age: 59
Discharge: HOME | End: 2024-02-27
Attending: EMERGENCY MEDICINE | Admitting: EMERGENCY MEDICINE
Payer: COMMERCIAL

## 2024-02-27 VITALS
DIASTOLIC BLOOD PRESSURE: 79 MMHG | OXYGEN SATURATION: 96 % | RESPIRATION RATE: 16 BRPM | HEIGHT: 69 IN | TEMPERATURE: 99 F | BODY MASS INDEX: 39.99 KG/M2 | SYSTOLIC BLOOD PRESSURE: 136 MMHG | HEART RATE: 77 BPM | WEIGHT: 270 LBS

## 2024-02-27 DIAGNOSIS — F41.1 GAD (GENERALIZED ANXIETY DISORDER): ICD-10-CM

## 2024-02-27 DIAGNOSIS — K21.9 GASTROESOPHAGEAL REFLUX DISEASE WITHOUT ESOPHAGITIS: ICD-10-CM

## 2024-02-27 LAB
ALBUMIN SERPL BCG-MCNC: 3.8 G/DL (ref 3.5–5.2)
ALP SERPL-CCNC: 120 U/L (ref 40–150)
ALT SERPL W P-5'-P-CCNC: 17 U/L (ref 0–50)
ANION GAP SERPL CALCULATED.3IONS-SCNC: 10 MMOL/L (ref 7–15)
AST SERPL W P-5'-P-CCNC: 12 U/L (ref 0–45)
BASOPHILS # BLD AUTO: ABNORMAL 10*3/UL
BASOPHILS # BLD MANUAL: 0 10E3/UL (ref 0–0.2)
BASOPHILS NFR BLD AUTO: ABNORMAL %
BASOPHILS NFR BLD MANUAL: 0 %
BILIRUB SERPL-MCNC: 0.2 MG/DL
BUN SERPL-MCNC: 15.5 MG/DL (ref 6–20)
CALCIUM SERPL-MCNC: 9 MG/DL (ref 8.6–10)
CHLORIDE SERPL-SCNC: 100 MMOL/L (ref 98–107)
CREAT SERPL-MCNC: 0.91 MG/DL (ref 0.51–0.95)
DEPRECATED HCO3 PLAS-SCNC: 28 MMOL/L (ref 22–29)
EGFRCR SERPLBLD CKD-EPI 2021: 74 ML/MIN/1.73M2
EOSINOPHIL # BLD AUTO: ABNORMAL 10*3/UL
EOSINOPHIL # BLD MANUAL: 0.2 10E3/UL (ref 0–0.7)
EOSINOPHIL NFR BLD AUTO: ABNORMAL %
EOSINOPHIL NFR BLD MANUAL: 1 %
ERYTHROCYTE [DISTWIDTH] IN BLOOD BY AUTOMATED COUNT: 15.9 % (ref 10–15)
GLUCOSE SERPL-MCNC: 122 MG/DL (ref 70–99)
HCT VFR BLD AUTO: 37.6 % (ref 35–47)
HGB BLD-MCNC: 11.5 G/DL (ref 11.7–15.7)
IMM GRANULOCYTES # BLD: ABNORMAL 10*3/UL
IMM GRANULOCYTES NFR BLD: ABNORMAL %
LIPASE SERPL-CCNC: 18 U/L (ref 13–60)
LYMPHOCYTES # BLD AUTO: ABNORMAL 10*3/UL
LYMPHOCYTES # BLD MANUAL: 4.4 10E3/UL (ref 0.8–5.3)
LYMPHOCYTES NFR BLD AUTO: ABNORMAL %
LYMPHOCYTES NFR BLD MANUAL: 28 %
MAGNESIUM SERPL-MCNC: 2.1 MG/DL (ref 1.7–2.3)
MCH RBC QN AUTO: 24.5 PG (ref 26.5–33)
MCHC RBC AUTO-ENTMCNC: 30.6 G/DL (ref 31.5–36.5)
MCV RBC AUTO: 80 FL (ref 78–100)
MONOCYTES # BLD AUTO: ABNORMAL 10*3/UL
MONOCYTES # BLD MANUAL: 0.8 10E3/UL (ref 0–1.3)
MONOCYTES NFR BLD AUTO: ABNORMAL %
MONOCYTES NFR BLD MANUAL: 5 %
NEUTROPHILS # BLD AUTO: ABNORMAL 10*3/UL
NEUTROPHILS # BLD MANUAL: 10.4 10E3/UL (ref 1.6–8.3)
NEUTROPHILS NFR BLD AUTO: ABNORMAL %
NEUTROPHILS NFR BLD MANUAL: 66 %
NRBC # BLD AUTO: 0 10E3/UL
NRBC BLD AUTO-RTO: 0 /100
PLAT MORPH BLD: ABNORMAL
PLATELET # BLD AUTO: 428 10E3/UL (ref 150–450)
POTASSIUM SERPL-SCNC: 4.3 MMOL/L (ref 3.4–5.3)
PROT SERPL-MCNC: 7.9 G/DL (ref 6.4–8.3)
RBC # BLD AUTO: 4.7 10E6/UL (ref 3.8–5.2)
RBC MORPH BLD: ABNORMAL
SODIUM SERPL-SCNC: 138 MMOL/L (ref 135–145)
TROPONIN T SERPL HS-MCNC: <6 NG/L
WBC # BLD AUTO: 15.7 10E3/UL (ref 4–11)

## 2024-02-27 PROCEDURE — 36415 COLL VENOUS BLD VENIPUNCTURE: CPT | Performed by: EMERGENCY MEDICINE

## 2024-02-27 PROCEDURE — 83735 ASSAY OF MAGNESIUM: CPT | Performed by: EMERGENCY MEDICINE

## 2024-02-27 PROCEDURE — 82565 ASSAY OF CREATININE: CPT | Performed by: EMERGENCY MEDICINE

## 2024-02-27 PROCEDURE — 83690 ASSAY OF LIPASE: CPT | Performed by: EMERGENCY MEDICINE

## 2024-02-27 PROCEDURE — 84484 ASSAY OF TROPONIN QUANT: CPT | Performed by: EMERGENCY MEDICINE

## 2024-02-27 PROCEDURE — 85027 COMPLETE CBC AUTOMATED: CPT | Performed by: EMERGENCY MEDICINE

## 2024-02-27 PROCEDURE — 93005 ELECTROCARDIOGRAM TRACING: CPT | Performed by: STUDENT IN AN ORGANIZED HEALTH CARE EDUCATION/TRAINING PROGRAM

## 2024-02-27 PROCEDURE — 99284 EMERGENCY DEPT VISIT MOD MDM: CPT

## 2024-02-27 PROCEDURE — 85007 BL SMEAR W/DIFF WBC COUNT: CPT | Performed by: EMERGENCY MEDICINE

## 2024-02-27 PROCEDURE — 93005 ELECTROCARDIOGRAM TRACING: CPT | Performed by: EMERGENCY MEDICINE

## 2024-02-27 RX ORDER — METOCLOPRAMIDE 10 MG/1
10 TABLET ORAL
Qty: 20 TABLET | Refills: 0 | Status: SHIPPED | OUTPATIENT
Start: 2024-02-27 | End: 2024-03-27

## 2024-02-27 ASSESSMENT — ACTIVITIES OF DAILY LIVING (ADL)
ADLS_ACUITY_SCORE: 37
ADLS_ACUITY_SCORE: 37

## 2024-02-27 ASSESSMENT — COLUMBIA-SUICIDE SEVERITY RATING SCALE - C-SSRS
2. HAVE YOU ACTUALLY HAD ANY THOUGHTS OF KILLING YOURSELF IN THE PAST MONTH?: NO
6. HAVE YOU EVER DONE ANYTHING, STARTED TO DO ANYTHING, OR PREPARED TO DO ANYTHING TO END YOUR LIFE?: NO
1. IN THE PAST MONTH, HAVE YOU WISHED YOU WERE DEAD OR WISHED YOU COULD GO TO SLEEP AND NOT WAKE UP?: NO

## 2024-02-27 NOTE — TELEPHONE ENCOUNTER
Reason for call:  Other   Patient called regarding (reason for call): prescription  Additional comments: [facility] called wanting to verify the prescription for lorazepam is for them. Call pharmacy at: [phone] option 2. Reference number: [id number]    Phone number to reach patient:  Home number on file [phone] (home)    Best Time:  ASAP    Can we leave a detailed message on this number?  YES    Travel screening: Not Applicable

## 2024-02-27 NOTE — TELEPHONE ENCOUNTER
.Reason for call:  Medication refill     If this is a refill request, has the caller requested the refill from the pharmacy already? No    Will the patient be using a [facility]? No    Name of the pharmacy and phone number for the current request: [facility] [phone]    Name of the medication requested: Lorazepam     Other request: Magdi Shafer ([facility]) reported the prescription was sent to the wrong Pharmacy.     Phone number to reach:  [phone]    Best Time:  Anytime     Can we leave a detailed message on this number?  YES    Travel screening: Not Applicable

## 2024-02-27 NOTE — TELEPHONE ENCOUNTER
RN phoned [facility] and spoke to pharmacy staff. Staff reported that the lorazepam was sent to the specialty pharmacy. Lorazepam is not a specialty medication so it cannot be filled.     2) RN attempted to call the patient to determine which pharmacy she would like the Lorazepam to go to. LVM to return a call to the clinic.     3) Next RN to transfer the Lorazepam to patient's preferred pharmacy and cancel current order at [facility] specialty pharmacy.       PETER TOPETE RN on 2/27/2024 at 1:51 PM

## 2024-02-28 RX ORDER — LORAZEPAM 0.5 MG/1
0.5 TABLET ORAL DAILY PRN
Qty: 30 TABLET | Refills: 2 | Status: SHIPPED | OUTPATIENT
Start: 2024-02-28 | End: 2024-05-20

## 2024-02-28 NOTE — TELEPHONE ENCOUNTER
"1) Phoned the patient to discuss transferring the Lorazepam. She reports that she would like the Lorazepam to be transferred to [facility].     2) RN called and cancelled prescription for LORazepam (ATIVAN) 0.5 MG tablet at [facility] pharmacy.     3) Lorazepam pended to patient's preferred pharmacy     4) Patient reports that she only uses the Lorazepam occasionally and has \"all but two left\" from her original prescription.        PETER TOPETE RN on 2/28/2024 at 10:25 AM    "

## 2024-02-28 NOTE — ED TRIAGE NOTES
Pt have been having lower chest/ upper abd pain for the past several days. Short of breath with activities and when lying flat. Hx of heartburn, on daily omeprazole. Pt then states she feels lightheaded, mostly when she stands up too quickly, and left calf pain: she is on blood thinners.      Triage Assessment (Adult)       Row Name 02/27/24 1939          Triage Assessment    Airway WDL WDL        Respiratory WDL    Respiratory WDL WDL        Cardiac WDL    Cardiac WDL X

## 2024-02-28 NOTE — ED PROVIDER NOTES
EMERGENCY DEPARTMENT ENCOUNTER      NAME: Alina Martell  AGE: 58 year old female  YOB: 1965  MRN: [id number]  EVALUATION DATE & TIME: 2/27/2024  8:12 PM    PCP: Estelle Bansal    ED PROVIDER: Christopher Skaggs M.D.      Chief Complaint   Patient presents with    Chest Pain         FINAL IMPRESSION:  GERD      ED COURSE & MEDICAL DECISION MAKING:    Pertinent Labs & Imaging studies reviewed. (See chart for details)  58 year old female presents to the Emergency Department for evaluation of chest pain.  Patient reports more heartburn type symptomatology ongoing for the last few days.  Seems to worsen with any intake.  Patient did recently change her diet and eating much more fruit and vegetables which could be contributory.  Denies any underlying coronary artery disease.  Exam reveals an obese female mild distress.  Vital signs unremarkable.  Heart and lungs normal.  Abdomen obese but soft and nontender.  Will obtain baseline blood work including liver function test to assess for evidence of hepatitis.  Patient with previous cholecystectomy.  Will also obtain EKG and troponin out of caution.  No indications for advanced imaging.. Patient appears non toxic with stable vitals signs. Overall exam is benign.        8:26 PM I met with the patient for the initial interview and physical examination. Discussed plan for treatment and workup in the ED.    10:12 PM.  Patient with mild leukocytosis white cell count 15.7.  Hemoglobin slightly reduced 11.5.  Troponin normal at less than 6.  Lipase normal at 18.  Comprehensive metabolic panel unremarkable.  At the conclusion of the encounter I discussed the results of all of the tests and the disposition. The questions were answered and return precautions provided. The patient or family acknowledged understanding and was agreeable with the care plan.       PPE: Provider wore paper mask.     MEDICATIONS GIVEN IN THE EMERGENCY:  Medications - No data to display    NEW  PRESCRIPTIONS STARTED AT TODAY'S ER VISIT  Current Discharge Medication List        START taking these medications    Details   metoclopramide (REGLAN) 10 MG tablet Take 1 tablet (10 mg) by mouth 4 times daily (before meals and nightly)  Qty: 20 tablet, Refills: 0                 =================================================================    HPI    Patient information was obtained from: Patient    Use of Intrepreter: N/A       Alina Martell is a 58 year old female with a pertient medical history of HTN, RA, chronic pain, DM II, paroxysmal atrial fibrillation, PTSD, anxiety, and depression who presents to the ED for evaluation of chest pain.     Patient states this past weekend she began having heartburn even when drinking water. Also noted dizziness and nausea since Saturday (02/24/24). Denies vomiting or diarrhea. Mentioned the only changes she's made to her life recently was increasing hr exercise and changing her diet to be healthier by including more fruit, vegetables, and grilled meat. Denies any medications changes. Denies any urinary changes. Admits to taking daily omeprazole. Also mentioned her gallbladder was removed in 1999.     Per chart review, patient was seen at [facility] ED on 12/15/22 for fall and chest pain. Chest XR had no acute cardiopulmonary findings. Head CT w/o contrast found no acute intracranial pathology. Echo showed normal function with no abnormalities. Patient was comfortable with observation admission for cardiac workup and referral to vestibular therapy in cardiac workup is negative.    Patient was seen at [facility] on 03/28/23 for cardiology follow up. Reviewed EKG echo and NMST from 12/15/22 with no abnormal findings. Also reviewed CT coronary angiogram from 02/22/22 with no abnormal findings. Recommended 30 day cardiac monitor. Continue medications as prescribed. Patient was agreeable to plan and left in stable condition.        REVIEW OF SYSTEMS   Constitutional:  Denies fever, chills  Respiratory:  Denies productive cough or increased work of breathing  Cardiovascular:  Denies chest pain, palpitations  GI:  Denies abdominal pain, nausea, vomiting, or change in bowel or bladder habits   Musculoskeletal:  Denies any new muscle/joint swelling  Skin:  Denies rash   Neurologic:  Denies focal weakness  All systems negative except as marked.     PAST MEDICAL HISTORY:  Past Medical History:   Diagnosis Date    Anemia     Antiplatelet or antithrombotic long-term use     Arrhythmia     Cannabis use without complication 12/8/2014    Carpal tunnel syndrome     Coronary artery disease     Diabetes mellitus, type 2 (H) 12/13/2023    Gastroesophageal reflux disease     History of angina     Hypertension     Irregular heart beat     Obesity     Paroxysmal atrial fibrillation (H)     PTSD (post-traumatic stress disorder)     RA (rheumatoid arthritis) (H)     Rheumatoid arthritis (H) 7/8/2016    Sicca syndrome (H24)     Sleep apnea        PAST SURGICAL HISTORY:  Past Surgical History:   Procedure Laterality Date    CHOLECYSTECTOMY      CYSTOSCOPY N/A 5/4/2023    Procedure: AND CYSTOSCOPY;  Surgeon: Imra Cary MD;  Location: [facility] OR    DAVINCI HYSTERECTOMY TOTAL Bilateral 5/4/2023    Procedure: ROBOTIC ASSISTED TOTAL LAPAROSCOPIC HYSTERECTOMY WITH BILATERAL SALPINGECTOMY;  Surgeon: Irma Cary MD;  Location: [facility] OR    DILATION AND CURETTAGE, OPERATIVE HYSTEROSCOPY, COMBINED N/A 3/3/2022    Procedure: HYSTEROSCOPY, WITH DILATION AND CURETTAGE;  Surgeon: Irma Cary MD;  Location: [facility] OR    EP ABLATION FOCAL AFIB N/A 6/30/2022    Procedure: Ablation Atrial Fibrilation;  Surgeon: Jose Guadalupe Joseph MD;  Location: [facility] CARDIAC CATH LAB    HC REMOVAL GALLBLADDER      Description: Cholecystectomy;  Recorded: 10/15/2013;    LAPAROSCOPIC TUBAL LIGATION      RELEASE CARPAL TUNNEL  BILATERAL      TUBAL LIGATION  1995         CURRENT MEDICATIONS:    No current facility-administered medications for this encounter.    Current Outpatient Medications:     Adalimumab-adaz 40 MG/0.4ML SOAJ, Inject 0.4 mLs Subcutaneous every 14 days, Disp: 0.8 mL, Rfl: 1    apixaban ANTICOAGULANT (ELIQUIS ANTICOAGULANT) 5 MG tablet, Take 1 tablet (5 mg) by mouth 2 times daily, Disp: 180 tablet, Rfl: 3    cetirizine (ZYRTEC) 10 MG tablet, Take 1 tablet (10 mg) by mouth daily, Disp: 90 tablet, Rfl: 3    diltiazem ER COATED BEADS (CARDIZEM CD/CARTIA XT) 180 MG 24 hr capsule, Take 2 capsules (360 mg) by mouth daily, Disp: 180 capsule, Rfl: 3    docusate sodium (COLACE) 100 MG capsule, Take 1 capsule (100 mg) by mouth 2 times daily as needed for other (While taking pain pills to prevent constipation), Disp: 30 capsule, Rfl: 0    doxepin (SILENOR) 3 MG tablet, Take 1-2 tablets (3-6 mg) by mouth nightly as needed for sleep, Disp: 90 tablet, Rfl: 0    EPINEPHrine (ANY BX GENERIC EQUIV) 0.3 MG/0.3ML injection 2-pack, Inject 0.3 mg into the muscle as needed for anaphylaxis, Disp: , Rfl:     ferrous gluconate (FERGON) 324 (38 Fe) MG tablet, Take 1 tablet (324 mg) by mouth daily (with breakfast), Disp: 30 tablet, Rfl: 0    LORazepam (ATIVAN) 0.5 MG tablet, Take 1 tablet (0.5 mg) by mouth daily as needed for anxiety, Disp: 30 tablet, Rfl: 2    Multiple Vitamins-Minerals (CENTROVITE) TABS, Take 1 tablet by mouth daily, Disp: , Rfl:     omeprazole (PRILOSEC) 40 MG DR capsule, Take 1 capsule (40 mg) by mouth daily, Disp: 90 capsule, Rfl: 3    venlafaxine (EFFEXOR XR) 75 MG 24 hr capsule, Take 1 capsule (75 mg) by mouth daily, Disp: 90 capsule, Rfl: 0    vitamin D3 (CHOLECALCIFEROL) 1.25 MG (40991 UT) capsule, Take 1 capsule (50,000 Units) by mouth every 7 days, Disp: 12 capsule, Rfl: 3    ALLERGIES:  Allergies   Allergen Reactions    Penicillins Shortness Of Breath, Palpitations, Dizziness, Anaphylaxis, Hives, Itching and Rash      "Test in 2031, see allergy note on 7/29/21    Shellfish-Derived Products Anaphylaxis    Sulfa Antibiotics Hives and Anaphylaxis       FAMILY HISTORY:  Family History   Problem Relation Age of Onset    Crohn's Disease Mother         Total colectomy with ileostomy.    Atrial fibrillation Mother     Snoring Father     Diabetes Father     Prostate Cancer Father     Chronic Kidney Disease Father         Chose against dialysis.    Substance Abuse Brother     Depression Brother     Attention Deficit Disorder Brother     Alcoholism Brother     Arrhythmia Brother     Alcoholism Brother     Substance Abuse Brother     Arrhythmia Brother     Alcoholism Maternal Grandfather     No Known Problems Daughter     No Known Problems Son     Lupus Cousin     Breast Cancer No family hx of        SOCIAL HISTORY:   Social History     Socioeconomic History    Marital status: Single    Number of children: 2    Years of education: 4   Tobacco Use    Smoking status: Never     Passive exposure: Past    Smokeless tobacco: Never   Vaping Use    Vaping Use: Never used   Substance and Sexual Activity    Alcohol use: Not Currently     Comment: Alcoholic Drinks/day: Never an issue, she states.  7/8/16    Drug use: Not Currently     Comment: Drug use: Former marijuana use, not current. 7/8/16    Sexual activity: Never     Partners: Male     Birth control/protection: Other     Comment: tubal ligation       VITALS:  Patient Vitals for the past 24 hrs:   BP Temp Temp src Pulse Resp SpO2 Height Weight   02/27/24 1941 (!) 155/74 99  F (37.2  C) Oral 91 16 96 % -- --   02/27/24 1939 -- -- -- -- -- -- 1.753 m (5' 9\") 122.5 kg (270 lb)        PHYSICAL EXAM    Constitutional:  Awake, alert, in no apparent distress  HENT:  Normocephalic, Atraumatic. Bilateral external ears normal. Oropharynx moist. Nose normal. Neck- Normal range of motion with no guarding, No midline cervical tenderness, Supple, No stridor.   Eyes:  PERRL, EOMI with no signs of entrapment, " Conjunctiva normal, No discharge.   Respiratory:  Normal breath sounds, No respiratory distress, No wheezing.    Cardiovascular:  Normal heart rate, Normal rhythm, No appreciable rubs or gallops.   GI:  Soft, No tenderness, No distension, No palpable masses  Musculoskeletal:  Intact distal pulses, No edema. Good range of motion in all major joints. No tenderness to palpation or major deformities noted.  Integument:  Warm, Dry, No erythema, No rash.   Neurologic:  Alert & oriented, Normal motor function, Normal sensory function, No focal deficits noted.   Psychiatric:  Affect normal, Judgment normal, Mood normal.     LAB:  All pertinent labs reviewed and interpreted.     Results for orders placed or performed during the hospital encounter of 02/27/24   Troponin T, High Sensitivity     Status: Normal   Result Value Ref Range    Troponin T, High Sensitivity <6 <=14 ng/L   [facility] Draw     Status: None ()    Narrative    The following orders were created for panel order [facility] Draw.  Procedure                               Abnormality         Status                     ---------                               -----------         ------                     Extra Blue Top Tube[[id number]]                                                         Extra Red Top Tube[[id number]]                                                            Please view results for these tests on the individual orders.   Comprehensive metabolic panel     Status: Abnormal   Result Value Ref Range    Sodium 138 135 - 145 mmol/L    Potassium 4.3 3.4 - 5.3 mmol/L    Carbon Dioxide (CO2) 28 22 - 29 mmol/L    Anion Gap 10 7 - 15 mmol/L    Urea Nitrogen 15.5 6.0 - 20.0 mg/dL    Creatinine 0.91 0.51 - 0.95 mg/dL    GFR Estimate 74 >60 mL/min/1.73m2    Calcium 9.0 8.6 - 10.0 mg/dL    Chloride 100 98 - 107 mmol/L    Glucose 122 (H) 70 - 99 mg/dL    Alkaline Phosphatase 120 40 - 150 U/L    AST 12 0 - 45 U/L    ALT 17 0 - 50 U/L    Protein Total 7.9 6.4 - 8.3  g/dL    Albumin 3.8 3.5 - 5.2 g/dL    Bilirubin Total 0.2 <=1.2 mg/dL   Lipase     Status: Normal   Result Value Ref Range    Lipase 18 13 - 60 U/L   Magnesium     Status: Normal   Result Value Ref Range    Magnesium 2.1 1.7 - 2.3 mg/dL   CBC with platelets and differential     Status: Abnormal   Result Value Ref Range    WBC Count 15.7 (H) 4.0 - 11.0 10e3/uL    RBC Count 4.70 3.80 - 5.20 10e6/uL    Hemoglobin 11.5 (L) 11.7 - 15.7 g/dL    Hematocrit 37.6 35.0 - 47.0 %    MCV 80 78 - 100 fL    MCH 24.5 (L) 26.5 - 33.0 pg    MCHC 30.6 (L) 31.5 - 36.5 g/dL    RDW 15.9 (H) 10.0 - 15.0 %    Platelet Count 428 150 - 450 10e3/uL    % Neutrophils      % Lymphocytes      % Monocytes      % Eosinophils      % Basophils      % Immature Granulocytes      NRBCs per 100 WBC 0 <1 /100    Absolute Neutrophils      Absolute Lymphocytes      Absolute Monocytes      Absolute Eosinophils      Absolute Basophils      Absolute Immature Granulocytes      Absolute NRBCs 0.0 10e3/uL   Manual Differential     Status: Abnormal   Result Value Ref Range    % Neutrophils 66 %    % Lymphocytes 28 %    % Monocytes 5 %    % Eosinophils 1 %    % Basophils 0 %    Absolute Neutrophils 10.4 (H) 1.6 - 8.3 10e3/uL    Absolute Lymphocytes 4.4 0.8 - 5.3 10e3/uL    Absolute Monocytes 0.8 0.0 - 1.3 10e3/uL    Absolute Eosinophils 0.2 0.0 - 0.7 10e3/uL    Absolute Basophils 0.0 0.0 - 0.2 10e3/uL    RBC Morphology Confirmed RBC Indices     Platelet Assessment (A) Automated Count Confirmed. Platelet morphology is normal.     Automated Count Confirmed. Giant platelets are present.   CBC with Platelets & Differential     Status: Abnormal    Narrative    The following orders were created for panel order CBC with Platelets & Differential.  Procedure                               Abnormality         Status                     ---------                               -----------         ------                     CBC with platelets and d...[[id number]]  Abnormal             Final result               Manual Differential[[id number]]          Abnormal            Final result                 Please view results for these tests on the individual orders.      RADIOLOGY:  Reviewed all pertinent imaging. Please see official radiology report.  No orders to display       EKG:    Normal sinus rhythm.  Rate of 91.  Normal QRS.  Normal ST segments.  Normal EKG.  Unchanged compared to August 11, 2023  I have independently reviewed and interpreted the EKG(s) documented above.        I, Steffany Appiah MA, am serving as a scribe to document services personally performed by Christopher Skaggs MD, based on my observation and the provider's statements to me. I, Christopher Skaggs MD attest that Steffany Appiah MA is acting in a scribe capacity, has observed my performance of the services and has documented them in accordance with my direction.    Christopher Skaggs M.D.  Emergency Medicine  [facility] EMERGENCY DEPARTMENT       Christopher Skaggs MD  02/27/24 1804

## 2024-02-29 NOTE — PROGRESS NOTES
"Mental Health tele Visit Note    Patient: Alina Martell    : 1965 MRN: [id number]    Date: 2020   Start time: 1005   Stop Time:    Session # 10    [employer] john was not working for patient so telephone had to be used for this session    The patient has been notified of the following:   \"We have found that certain health care needs can be provided without the need for a face to face visit.  This service lets us provide the care you need with a phone conversation.  I will have full access to your [place] medical record during this entire phone call.   I will be taking notes for your medical record. Since this is like an office visit, we will bill your insurance company for this service.  There are potential benefits and risks of telephone visits (e.g. limits to patient confidentiality) that differ from in-person visits.?  Confidentiality still applies for telephone services, and nobody will record the visit.  It is important to be in a quiet, private space that is free of distractions (including cell phone or other devices) during the visit.?? If during the course of the call I believe a telephone visit is not appropriate, you will not be charged for this service\"  Consent has been obtained for this service by care team member: Yes, per verbal agreement     Session Type: Patient is participating in a telephone visit    Chief Complaint   Patient presents with     MH Follow Up     Telephone Visit Mental Health       New symptoms or complaints: None    Functional Impairment:   Personal: 3  Family: 3  Work: 3  Social: 2          ASSESSMENT: Current Emotional / Mental Status (status of significant symptoms):               Patient denies personal safety concerns.               Patient denies current or recent suicidal ideation or behaviors.              Patient denies current or recent homicidal ideation or behaviors.              Patient denies current or recent self injurious behavior or ideation.      "         Patient denies other safety concerns.              Patient denies change in risk factors.               Patient denies change in protective factors.              Recommended that patient call 911 or go to the local ED should there be a change in any of these risk factors.                Attitude:                                   Cooperative               Orientation:                             Person, place, time, situation               Speech                          Rate / Production:       Normal                           Volume:                       Normal               Mood:                                      Worried              Thought Content:                    Clear               Thought Form:                        Coherent  Goal Directed  Logical               Insight:                                     Good       Patient's impression of their current status: Patient reported feeling worried. Patient indicated she is still unsure what she wants to do for work. Patient reported due to almost being done with her master's she feels it may be a good idea to take a job that is not stressful. 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 Patient reported she has continued to stay active with friends which has helped to avoid making negative decisions such as going to the casino.     Therapist impression of patients current state: Patient appears to have good insight into her mental health. This therapist processed with patient what is in her control and what is out of her control. This therapist processed with patient ways to work on reducing her stress and being able to recognize when elevations in her stress levels.     Type of psychotherapeutic technique provided: Insight oriented, Client centered and CBT    Progress toward short term goals: Progress as expected with patient continuing therapy, noticing unhealthy behaviors and working on  self-care.     Review of long term goals: Treatment plan updated on 07/17/2020       Diagnosis:  1. Major depressive disorder, recurrent episode, moderate (H)    2. ADA (generalized anxiety disorder)    3. PTSD (post-traumatic stress disorder)      Plan and Follow up: Patient will continue with weekly therapy video next week. Patient should continue to look for jobs that will cause her low stress. Patient would benefit from continuing to reach out to her support network and using self-care.     Discharge Criteria/Planning: Patient will continue with follow-up until therapy can be discontinued without return of signs and symptoms.    I have reviewed the note as documented above.  This accurately captures the substance of my conversation with the patient.  Mariana Love [facility]   Patient expressed no known problems or needs

## 2024-03-01 LAB
ATRIAL RATE - MUSE: 91 BPM
DIASTOLIC BLOOD PRESSURE - MUSE: NORMAL MMHG
INTERPRETATION ECG - MUSE: NORMAL
P AXIS - MUSE: 42 DEGREES
PR INTERVAL - MUSE: 158 MS
QRS DURATION - MUSE: 82 MS
QT - MUSE: 368 MS
QTC - MUSE: 452 MS
R AXIS - MUSE: 21 DEGREES
SYSTOLIC BLOOD PRESSURE - MUSE: NORMAL MMHG
T AXIS - MUSE: 34 DEGREES
VENTRICULAR RATE- MUSE: 91 BPM

## 2024-03-04 DIAGNOSIS — J30.2 SEASONAL ALLERGIC RHINITIS, UNSPECIFIED TRIGGER: ICD-10-CM

## 2024-03-06 RX ORDER — CETIRIZINE HYDROCHLORIDE 10 MG/1
10 TABLET ORAL DAILY
Qty: 90 TABLET | Refills: 1 | Status: SHIPPED | OUTPATIENT
Start: 2024-03-06

## 2024-03-07 ENCOUNTER — HOSPITAL ENCOUNTER (OUTPATIENT)
Dept: GENERAL RADIOLOGY | Facility: HOSPITAL | Age: 59
Discharge: HOME | End: 2024-03-07
Attending: NURSE PRACTITIONER | Admitting: NURSE PRACTITIONER
Payer: COMMERCIAL

## 2024-03-07 ENCOUNTER — HOSPITAL ENCOUNTER (EMERGENCY)
Facility: HOSPITAL | Age: 59
Discharge: HOME | End: 2024-03-08
Attending: EMERGENCY MEDICINE | Admitting: EMERGENCY MEDICINE
Payer: COMMERCIAL

## 2024-03-07 ENCOUNTER — OFFICE VISIT (OUTPATIENT)
Dept: FAMILY MEDICINE | Facility: CLINIC | Age: 59
End: 2024-03-07
Payer: COMMERCIAL

## 2024-03-07 ENCOUNTER — APPOINTMENT (OUTPATIENT)
Dept: CT IMAGING | Facility: HOSPITAL | Age: 59
End: 2024-03-07
Attending: EMERGENCY MEDICINE
Payer: COMMERCIAL

## 2024-03-07 VITALS
RESPIRATION RATE: 20 BRPM | WEIGHT: 284.3 LBS | TEMPERATURE: 98.6 F | BODY MASS INDEX: 42.11 KG/M2 | SYSTOLIC BLOOD PRESSURE: 142 MMHG | HEIGHT: 69 IN | OXYGEN SATURATION: 97 % | HEART RATE: 79 BPM | DIASTOLIC BLOOD PRESSURE: 100 MMHG

## 2024-03-07 VITALS
OXYGEN SATURATION: 98 % | RESPIRATION RATE: 18 BRPM | DIASTOLIC BLOOD PRESSURE: 84 MMHG | SYSTOLIC BLOOD PRESSURE: 136 MMHG | TEMPERATURE: 98.2 F | HEART RATE: 86 BPM

## 2024-03-07 DIAGNOSIS — E04.1 THYROID NODULE: ICD-10-CM

## 2024-03-07 DIAGNOSIS — R10.13 EPIGASTRIC PAIN: Primary | ICD-10-CM

## 2024-03-07 DIAGNOSIS — Z79.01 LONG TERM CURRENT USE OF ANTICOAGULANT THERAPY: ICD-10-CM

## 2024-03-07 DIAGNOSIS — R10.13 EPIGASTRIC PAIN: ICD-10-CM

## 2024-03-07 DIAGNOSIS — M54.9 UPPER BACK PAIN: ICD-10-CM

## 2024-03-07 DIAGNOSIS — R07.9 CHEST PAIN, UNSPECIFIED TYPE: ICD-10-CM

## 2024-03-07 DIAGNOSIS — K29.00 ACUTE SUPERFICIAL GASTRITIS WITHOUT HEMORRHAGE: ICD-10-CM

## 2024-03-07 LAB
ALBUMIN SERPL BCG-MCNC: 3.7 G/DL (ref 3.5–5.2)
ALP SERPL-CCNC: 106 U/L (ref 40–150)
ALT SERPL W P-5'-P-CCNC: 21 U/L (ref 0–50)
ANION GAP SERPL CALCULATED.3IONS-SCNC: 7 MMOL/L (ref 7–15)
AST SERPL W P-5'-P-CCNC: 18 U/L (ref 0–45)
BASOPHILS # BLD AUTO: 0.1 10E3/UL (ref 0–0.2)
BASOPHILS NFR BLD AUTO: 1 %
BILIRUB DIRECT SERPL-MCNC: <0.2 MG/DL (ref 0–0.3)
BILIRUB SERPL-MCNC: <0.2 MG/DL
BUN SERPL-MCNC: 15.4 MG/DL (ref 6–20)
CALCIUM SERPL-MCNC: 9.3 MG/DL (ref 8.6–10)
CHLORIDE SERPL-SCNC: 102 MMOL/L (ref 98–107)
CREAT SERPL-MCNC: 0.99 MG/DL (ref 0.51–0.95)
DEPRECATED HCO3 PLAS-SCNC: 31 MMOL/L (ref 22–29)
EGFRCR SERPLBLD CKD-EPI 2021: 66 ML/MIN/1.73M2
EOSINOPHIL # BLD AUTO: 0.2 10E3/UL (ref 0–0.7)
EOSINOPHIL NFR BLD AUTO: 2 %
ERYTHROCYTE [DISTWIDTH] IN BLOOD BY AUTOMATED COUNT: 15.8 % (ref 10–15)
GLUCOSE SERPL-MCNC: 104 MG/DL (ref 70–99)
HCT VFR BLD AUTO: 37.9 % (ref 35–47)
HGB BLD-MCNC: 11.1 G/DL (ref 11.7–15.7)
HOLD SPECIMEN: NORMAL
HOLD SPECIMEN: NORMAL
IMM GRANULOCYTES # BLD: 0 10E3/UL
IMM GRANULOCYTES NFR BLD: 0 %
LIPASE SERPL-CCNC: 21 U/L (ref 13–60)
LYMPHOCYTES # BLD AUTO: 3.6 10E3/UL (ref 0.8–5.3)
LYMPHOCYTES NFR BLD AUTO: 33 %
MCH RBC QN AUTO: 23.9 PG (ref 26.5–33)
MCHC RBC AUTO-ENTMCNC: 29.3 G/DL (ref 31.5–36.5)
MCV RBC AUTO: 82 FL (ref 78–100)
MONOCYTES # BLD AUTO: 0.6 10E3/UL (ref 0–1.3)
MONOCYTES NFR BLD AUTO: 6 %
NEUTROPHILS # BLD AUTO: 6.5 10E3/UL (ref 1.6–8.3)
NEUTROPHILS NFR BLD AUTO: 58 %
NRBC # BLD AUTO: 0 10E3/UL
NRBC BLD AUTO-RTO: 0 /100
PLATELET # BLD AUTO: 365 10E3/UL (ref 150–450)
POTASSIUM SERPL-SCNC: 4.1 MMOL/L (ref 3.4–5.3)
PROT SERPL-MCNC: 7.4 G/DL (ref 6.4–8.3)
RADIOLOGIST FLAGS: NORMAL
RBC # BLD AUTO: 4.64 10E6/UL (ref 3.8–5.2)
SODIUM SERPL-SCNC: 140 MMOL/L (ref 135–145)
TROPONIN T SERPL HS-MCNC: <6 NG/L
WBC # BLD AUTO: 11 10E3/UL (ref 4–11)

## 2024-03-07 PROCEDURE — 36415 COLL VENOUS BLD VENIPUNCTURE: CPT | Performed by: EMERGENCY MEDICINE

## 2024-03-07 PROCEDURE — 83690 ASSAY OF LIPASE: CPT | Performed by: EMERGENCY MEDICINE

## 2024-03-07 PROCEDURE — 250N000011 HC RX IP 250 OP 636: Performed by: EMERGENCY MEDICINE

## 2024-03-07 PROCEDURE — 71046 X-RAY EXAM CHEST 2 VIEWS: CPT

## 2024-03-07 PROCEDURE — 96374 THER/PROPH/DIAG INJ IV PUSH: CPT | Mod: 59

## 2024-03-07 PROCEDURE — 93010 ELECTROCARDIOGRAM REPORT: CPT | Performed by: GENERAL ACUTE CARE HOSPITAL

## 2024-03-07 PROCEDURE — 74174 CTA ABD&PLVS W/CONTRAST: CPT

## 2024-03-07 PROCEDURE — 93005 ELECTROCARDIOGRAM TRACING: CPT | Performed by: NURSE PRACTITIONER

## 2024-03-07 PROCEDURE — 93005 ELECTROCARDIOGRAM TRACING: CPT | Performed by: EMERGENCY MEDICINE

## 2024-03-07 PROCEDURE — 85041 AUTOMATED RBC COUNT: CPT | Performed by: STUDENT IN AN ORGANIZED HEALTH CARE EDUCATION/TRAINING PROGRAM

## 2024-03-07 PROCEDURE — 93005 ELECTROCARDIOGRAM TRACING: CPT | Performed by: STUDENT IN AN ORGANIZED HEALTH CARE EDUCATION/TRAINING PROGRAM

## 2024-03-07 PROCEDURE — 99285 EMERGENCY DEPT VISIT HI MDM: CPT | Mod: 25

## 2024-03-07 PROCEDURE — 80076 HEPATIC FUNCTION PANEL: CPT | Performed by: EMERGENCY MEDICINE

## 2024-03-07 PROCEDURE — 84484 ASSAY OF TROPONIN QUANT: CPT | Performed by: EMERGENCY MEDICINE

## 2024-03-07 PROCEDURE — 99214 OFFICE O/P EST MOD 30 MIN: CPT | Mod: 25 | Performed by: NURSE PRACTITIONER

## 2024-03-07 PROCEDURE — 36415 COLL VENOUS BLD VENIPUNCTURE: CPT | Performed by: STUDENT IN AN ORGANIZED HEALTH CARE EDUCATION/TRAINING PROGRAM

## 2024-03-07 PROCEDURE — 80048 BASIC METABOLIC PNL TOTAL CA: CPT | Performed by: EMERGENCY MEDICINE

## 2024-03-07 PROCEDURE — 85025 COMPLETE CBC W/AUTO DIFF WBC: CPT | Performed by: EMERGENCY MEDICINE

## 2024-03-07 RX ORDER — ONDANSETRON 2 MG/ML
4 INJECTION INTRAMUSCULAR; INTRAVENOUS EVERY 30 MIN PRN
Status: DISCONTINUED | OUTPATIENT
Start: 2024-03-07 | End: 2024-03-08 | Stop reason: HOSPADM

## 2024-03-07 RX ORDER — IOPAMIDOL 755 MG/ML
90 INJECTION, SOLUTION INTRAVASCULAR ONCE
Status: COMPLETED | OUTPATIENT
Start: 2024-03-07 | End: 2024-03-07

## 2024-03-07 RX ORDER — ALUMINA, MAGNESIA, AND SIMETHICONE 2400; 2400; 240 MG/30ML; MG/30ML; MG/30ML
15 SUSPENSION ORAL ONCE
Status: COMPLETED | OUTPATIENT
Start: 2024-03-07 | End: 2024-03-07

## 2024-03-07 RX ADMIN — FAMOTIDINE 20 MG: 10 INJECTION, SOLUTION INTRAVENOUS at 23:53

## 2024-03-07 RX ADMIN — IOPAMIDOL 90 ML: 755 INJECTION, SOLUTION INTRAVENOUS at 23:23

## 2024-03-07 RX ADMIN — ALUMINA, MAGNESIA, AND SIMETHICONE 15 ML: 2400; 2400; 240 SUSPENSION ORAL at 19:30

## 2024-03-07 ASSESSMENT — ENCOUNTER SYMPTOMS
COUGH: 0
ABDOMINAL PAIN: 1
ARTHRALGIAS: 1
FATIGUE: 1
LIGHT-HEADEDNESS: 1
HEMATOCHEZIA: 0

## 2024-03-07 ASSESSMENT — ACTIVITIES OF DAILY LIVING (ADL): ADLS_ACUITY_SCORE: 37

## 2024-03-08 LAB
ATRIAL RATE - MUSE: 82 BPM
DIASTOLIC BLOOD PRESSURE - MUSE: NORMAL MMHG
INTERPRETATION ECG - MUSE: NORMAL
P AXIS - MUSE: 45 DEGREES
PR INTERVAL - MUSE: 164 MS
QRS DURATION - MUSE: 86 MS
QT - MUSE: 370 MS
QTC - MUSE: 432 MS
R AXIS - MUSE: 11 DEGREES
SYSTOLIC BLOOD PRESSURE - MUSE: NORMAL MMHG
T AXIS - MUSE: 16 DEGREES
VENTRICULAR RATE- MUSE: 82 BPM

## 2024-03-08 RX ORDER — OMEPRAZOLE 40 MG/1
40 CAPSULE, DELAYED RELEASE ORAL DAILY
Qty: 20 CAPSULE | Refills: 0 | Status: SHIPPED | OUTPATIENT
Start: 2024-03-08 | End: 2024-03-27

## 2024-03-08 RX ORDER — FAMOTIDINE 20 MG/1
20 TABLET, FILM COATED ORAL 2 TIMES DAILY
Qty: 14 TABLET | Refills: 0 | Status: SHIPPED | OUTPATIENT
Start: 2024-03-08 | End: 2024-03-15

## 2024-03-08 RX ORDER — PANTOPRAZOLE SODIUM 40 MG/1
40 TABLET, DELAYED RELEASE ORAL ONCE
Status: DISCONTINUED | OUTPATIENT
Start: 2024-03-08 | End: 2024-03-08 | Stop reason: HOSPADM

## 2024-03-08 NOTE — ED TRIAGE NOTES
Patient comes in with epigastric pain that radiates to back. Was in [place] from thurs to tue, came down with norovirus possibly, diarrhea. No blood in stool, was water, now loose. No fevers, cough. Was here on 27 of last month for same epigastric pain that was more intermittent. Now constant, feels like she was punched in the stomach. Also reports intermittent lightheadedness for past week. EKG and CXR across the street, didn't show anything. Vomiting over the weekend, no emesis since Sunday. Decreased appetite.    Also reports fatigue and sob

## 2024-03-08 NOTE — ED PROVIDER NOTES
NAME: Alina Martell  AGE: 59 year old female  YOB: 1965  MRN: [id number]  EVALUATION DATE & TIME: No admission date for patient encounter.    PCP: Estelle Bansal    ED PROVIDER: Param Grant M.D.      Chief Complaint   Patient presents with    Abdominal Pain     FINAL IMPRESSION:  1. Epigastric pain    2. Thyroid nodule    3. Acute superficial gastritis without hemorrhage      MEDICAL DECISION MAKING:    10:35 PM I met with the patient, obtained history, performed an initial exam, and discussed options and plan for diagnostics and treatment here in the ED.   12:06 AM I updated the patient on their results and plan and re-evaluated them.  Patient is feeling improved after pepcid.   12:09 AM We discussed the plan for discharge and the patient is agreeable. Reviewed supportive cares, symptomatic treatment, outpatient follow up, and reasons to return to the Emergency Department. All questions and concerns were addressed. Patient to be discharged by ED RN.    Patient was clinically assessed and consented to treatment. After assessment, medical decision making and workup were discussed with the patient. The patient was agreeable to plan for testing, workup, and treatment.  Pertinent Labs & Imaging studies reviewed. (See chart for details)       Medical Decision Making  Obtained supplemental history:Supplemental history obtained?: No  Reviewed external records: External records reviewed?: Documented in chart and Outpatient Record: [facility] 3/7/24    Care impacted by chronic illness:Anticoagulated State, Chronic Pain, Hypertension, and Other: atrial fibrillation, s/p ablation, RA  Care significantly affected by social determinants of health:Access to Medical Care  Did you consider but not order tests?: Work up considered but not performed and documented in chart, if applicable  Did you interpret images independently?: Independent interpretation of ECG and images noted  in documentation, when applicable.  Consultation discussion with other provider:Did you involve another provider (consultant, MH, pharmacy, etc.)?: No  Discharge. I prescribed additional prescription strength medication(s) as charted. See documentation for any additional details.    Alina Martell is a 59 year old female who presents with abdominal pain.   Differential diagnosis includes but not limited to gastritis, GERD with esophagitis, aortic dissection, AAA, duodenitis, biliary colic.  Patient with epigastric pain  intermittently recently.  Patient was seen by clinic yesterday was concerned due to for withdrawal blood pressure and concern for AAA.  Patient does not have any tachycardia and minimally tender in the epigastrium.  Symptoms likely more consistent with gastritis or duodenitis.  Patient considered for biliary colic however had her gallbladder removed several years ago.  Patient appears comfortable and after review of her labs and EKG from outside clinic patient was ordered for CTA.  Labs obtained that showed unremarkable metabolic panel and normal LFTs.  CTA was obtained that showed no aortic findings.  No other intra-abdominal findings and clinically feels is likely GERD or gastritis.  Patient given Pepcid and symptoms improved in the ER.  Patient I discussed home care and will be plan for discharge home on Pepcid, refill of her Prilosec, and follow-up to primary care and possibly gastroenterology.    0 minutes of critical care time    MEDICATIONS GIVEN IN THE EMERGENCY:  Medications   famotidine (PEPCID) injection 20 mg (20 mg Intravenous $Given 3/7/24 7746)   iopamidol (ISOVUE-370) solution 90 mL (90 mLs Intravenous $Given 3/7/24 2323)       NEW PRESCRIPTIONS STARTED AT TODAY'S ER VISIT:  Discharge Medication List as of 3/8/2024 12:19 AM        START taking these medications    Details   famotidine (PEPCID) 20 MG tablet Take 1 tablet (20 mg) by mouth 2 times daily for 7 days, Disp-14 tablet,  R-0, Local Print      !! omeprazole (PRILOSEC) 40 MG DR capsule Take 1 capsule (40 mg) by mouth daily, Disp-20 capsule, R-0, Local Print       !! - Potential duplicate medications found. Please discuss with provider.             =================================================================    HPI    Patient information was obtained from: patient     Use of : N/A         Alina Martell is a 59 year old female with a past medical history of hypertension, GERD, RA, atrial fibrillation, s/p ablation, on Eliquis, chronic pain, who presents with abdominal pain.     Per chart review;   3/7/24: Patient was seen at [facility] on 3/7/24 for epigastric pain and recent diarrhea/vomiting. CXR was negative for rare pathology such as aortic aneurysm. Pain has not changed with Prilosec or Maalox. Patient recommended to go to ED for further evaluation.   2/27/24: Patient was seen at [facility] ED on 2/27/24 for chest pain. Worsened with PO intake. EKG showed sinus rhythm. WBC was 15.7. Rest of lab work was unremarkable. No imaging was done, patient discharged with reglan prescription.     Patient reports 2 weeks of epigastric pain that has been constant since 3/4. States her back hurts when she presses on the area. Recently traveled to [place] and had the norovirus with vomiting and diarrhea while there. Has been light-headed with the constant pain since she got back. Seen at [facility] today and was sent here. Has had cholecystectomy. Denies pancreas issues, but does have a family history of pancreatitis.       REVIEW OF SYSTEMS   Review of Systems   Gastrointestinal:  Positive for abdominal pain (epigastric).   Neurological:  Positive for light-headedness.        PAST MEDICAL HISTORY:  Past Medical History:   Diagnosis Date    Anemia     Antiplatelet or antithrombotic long-term use     Arrhythmia     Cannabis use without complication 12/8/2014    Carpal  tunnel syndrome     Coronary artery disease     Diabetes mellitus, type 2 (H) 12/13/2023    Gastroesophageal reflux disease     History of angina     Hypertension     Irregular heart beat     Obesity     Paroxysmal atrial fibrillation (H)     PTSD (post-traumatic stress disorder)     RA (rheumatoid arthritis) (H)     Rheumatoid arthritis (H) 7/8/2016    Sicca syndrome (H24)     Sleep apnea        PAST SURGICAL HISTORY:  Past Surgical History:   Procedure Laterality Date    CHOLECYSTECTOMY      CYSTOSCOPY N/A 5/4/2023    Procedure: AND CYSTOSCOPY;  Surgeon: Irma Cary MD;  Location: [facility] Main OR    DAVINCI HYSTERECTOMY TOTAL Bilateral 5/4/2023    Procedure: ROBOTIC ASSISTED TOTAL LAPAROSCOPIC HYSTERECTOMY WITH BILATERAL SALPINGECTOMY;  Surgeon: Irma Cary MD;  Location: [facility] Main OR    DILATION AND CURETTAGE, OPERATIVE HYSTEROSCOPY, COMBINED N/A 3/3/2022    Procedure: HYSTEROSCOPY, WITH DILATION AND CURETTAGE;  Surgeon: Irma Cary MD;  Location: [place] [facility] Main OR    EP ABLATION FOCAL AFIB N/A 6/30/2022    Procedure: Ablation Atrial Fibrilation;  Surgeon: Jose Guadalupe Joseph MD;  Location:  [facility]    HC REMOVAL GALLBLADDER      Description: Cholecystectomy;  Recorded: 10/15/2013;    LAPAROSCOPIC TUBAL LIGATION      RELEASE CARPAL TUNNEL BILATERAL      TUBAL LIGATION  1995       CURRENT MEDICATIONS:    No current facility-administered medications for this encounter.    Current Outpatient Medications:     famotidine (PEPCID) 20 MG tablet, Take 1 tablet (20 mg) by mouth 2 times daily for 7 days, Disp: 14 tablet, Rfl: 0    omeprazole (PRILOSEC) 40 MG DR capsule, Take 1 capsule (40 mg) by mouth daily, Disp: 20 capsule, Rfl: 0    Adalimumab-adaz 40 MG/0.4ML SOAJ, Inject 0.4 mLs Subcutaneous every 14 days, Disp: 0.8 mL, Rfl: 1    apixaban ANTICOAGULANT (ELIQUIS ANTICOAGULANT) 5 MG tablet, Take 1 tablet (5 mg) by mouth 2 times daily, Disp: 180 tablet, Rfl:  3    cetirizine (ZYRTEC) 10 MG tablet, Take 1 tablet (10 mg) by mouth daily, Disp: 90 tablet, Rfl: 1    diltiazem ER COATED BEADS (CARDIZEM CD/CARTIA XT) 180 MG 24 hr capsule, Take 2 capsules (360 mg) by mouth daily, Disp: 180 capsule, Rfl: 3    docusate sodium (COLACE) 100 MG capsule, Take 1 capsule (100 mg) by mouth 2 times daily as needed for other (While taking pain pills to prevent constipation), Disp: 30 capsule, Rfl: 0    doxepin (SILENOR) 3 MG tablet, Take 1-2 tablets (3-6 mg) by mouth nightly as needed for sleep, Disp: 90 tablet, Rfl: 0    EPINEPHrine (ANY BX GENERIC EQUIV) 0.3 MG/0.3ML injection 2-pack, Inject 0.3 mg into the muscle as needed for anaphylaxis, Disp: , Rfl:     ferrous gluconate (FERGON) 324 (38 Fe) MG tablet, Take 1 tablet (324 mg) by mouth daily (with breakfast), Disp: 30 tablet, Rfl: 0    LORazepam (ATIVAN) 0.5 MG tablet, Take 1 tablet (0.5 mg) by mouth daily as needed for anxiety, Disp: 30 tablet, Rfl: 2    metoclopramide (REGLAN) 10 MG tablet, Take 1 tablet (10 mg) by mouth 4 times daily (before meals and nightly), Disp: 20 tablet, Rfl: 0    Multiple Vitamins-Minerals (CENTROVITE) TABS, Take 1 tablet by mouth daily, Disp: , Rfl:     omeprazole (PRILOSEC) 40 MG DR capsule, Take 1 capsule (40 mg) by mouth daily, Disp: 90 capsule, Rfl: 3    venlafaxine (EFFEXOR XR) 75 MG 24 hr capsule, Take 1 capsule (75 mg) by mouth daily, Disp: 90 capsule, Rfl: 0    vitamin D3 (CHOLECALCIFEROL) 1.25 MG (46243 UT) capsule, Take 1 capsule (50,000 Units) by mouth every 7 days, Disp: 12 capsule, Rfl: 3    ALLERGIES:  Allergies   Allergen Reactions    Penicillins Shortness Of Breath, Palpitations, Dizziness, Anaphylaxis, Hives, Itching and Rash     Test in 2031, see allergy note on 7/29/21    Shellfish-Derived Products Anaphylaxis    Sulfa Antibiotics Hives and Anaphylaxis       FAMILY HISTORY:  Family History   Problem Relation Age of Onset    Crohn's Disease Mother         Total colectomy with ileostomy.     "Atrial fibrillation Mother     Snoring Father     Diabetes Father     Prostate Cancer Father     Chronic Kidney Disease Father         Chose against dialysis.    Substance Abuse Brother     Depression Brother     Attention Deficit Disorder Brother     Alcoholism Brother     Arrhythmia Brother     Alcoholism Brother     Substance Abuse Brother     Arrhythmia Brother     Alcoholism Maternal Grandfather     No Known Problems Daughter     No Known Problems Son     Lupus Cousin     Breast Cancer No family hx of        SOCIAL HISTORY:   Social History     Socioeconomic History    Marital status: Single    Number of children: 2    Years of education: 4   Tobacco Use    Smoking status: Never     Passive exposure: Past    Smokeless tobacco: Never   Vaping Use    Vaping Use: Never used   Substance and Sexual Activity    Alcohol use: Not Currently     Comment: Alcoholic Drinks/day: Never an issue, she states.  7/8/16    Drug use: Not Currently     Comment: Drug use: Former marijuana use, not current. 7/8/16    Sexual activity: Never     Partners: Male     Birth control/protection: Other     Comment: tubal ligation       PHYSICAL EXAM:    Vitals: BP (!) 142/100   Pulse 79   Temp 98.6  F (37  C) (Oral)   Resp 20   Ht 1.753 m (5' 9\")   Wt 129 kg (284 lb 4.8 oz)   LMP  (LMP Unknown)   SpO2 97%   BMI 41.98 kg/m     Physical Exam  Vitals and nursing note reviewed.   Constitutional:       General: She is not in acute distress.     Appearance: She is well-developed and normal weight. She is not ill-appearing or toxic-appearing.   HENT:      Head: Normocephalic.   Eyes:      General: No scleral icterus.  Cardiovascular:      Rate and Rhythm: Normal rate and regular rhythm.      Heart sounds: Normal heart sounds.   Pulmonary:      Effort: Pulmonary effort is normal. No respiratory distress.      Breath sounds: Normal breath sounds.   Abdominal:      General: Abdomen is flat.      Palpations: Abdomen is soft.      Tenderness: " There is abdominal tenderness in the epigastric area. There is no right CVA tenderness, left CVA tenderness, guarding or rebound.      Hernia: No hernia is present.   Skin:     General: Skin is warm and dry.   Neurological:      General: No focal deficit present.      Mental Status: She is alert.   Psychiatric:         Behavior: Behavior normal.           LAB:  All pertinent labs reviewed and interpreted.  Labs Ordered and Resulted from Time of ED Arrival to Time of ED Departure   BASIC METABOLIC PANEL - Abnormal       Result Value    Sodium 140      Potassium 4.1      Chloride 102      Carbon Dioxide (CO2) 31 (*)     Anion Gap 7      Urea Nitrogen 15.4      Creatinine 0.99 (*)     GFR Estimate 66      Calcium 9.3      Glucose 104 (*)    CBC WITH PLATELETS AND DIFFERENTIAL - Abnormal    WBC Count 11.0      RBC Count 4.64      Hemoglobin 11.1 (*)     Hematocrit 37.9      MCV 82      MCH 23.9 (*)     MCHC 29.3 (*)     RDW 15.8 (*)     Platelet Count 365      % Neutrophils 58      % Lymphocytes 33      % Monocytes 6      % Eosinophils 2      % Basophils 1      % Immature Granulocytes 0      NRBCs per 100 WBC 0      Absolute Neutrophils 6.5      Absolute Lymphocytes 3.6      Absolute Monocytes 0.6      Absolute Eosinophils 0.2      Absolute Basophils 0.1      Absolute Immature Granulocytes 0.0      Absolute NRBCs 0.0     TROPONIN T, HIGH SENSITIVITY - Normal    Troponin T, High Sensitivity <6     LIPASE - Normal    Lipase 21     HEPATIC FUNCTION PANEL - Normal    Protein Total 7.4      Albumin 3.7      Bilirubin Total <0.2      Alkaline Phosphatase 106      AST 18      ALT 21      Bilirubin Direct <0.20         RADIOLOGY:  CTA Chest Abdomen Pelvis w Contrast   Final Result   IMPRESSION:   1.  No aortic dissection or acute aortic pathology.      2.  No acute findings in the chest, abdomen, or pelvis.      3.  Incidental left thyroid nodules measuring up to 1.9 cm. Consider correlation with nonemergent thyroid ultrasound.          [Consider Follow Up: Left thyroid nodules]      This report will be copied to the [facility] to ensure a provider acknowledges the finding.              EKG:   Performed at: 3/7/24 9:22 PM  Impression: Normal sinus rhythm, no irregular rhythm or signs of ischemia.  Rate: 72 bpm  Rhythm: Normal sinus rhythm   QRS Interval: 90 ms  QTc Interval: 440 ms   Comparison: 3/7/24 7:53 PM no significant changes were found   I have independently reviewed and interpreted the EKG(s) documented above.     PROCEDURES:   Procedures       I, Susy Carrasco, am serving as a scribe to document services personally performed by Dr. Param Grant  based on my observation and the provider's statements to me. I, Param Grant MD attest that Susy Carrasco is acting in a scribe capacity, has observed my performance of the services and has documented them in accordance with my direction.      Param Grant M.D.  Emergency Medicine  [facility] Emergency Department       Param Grant MD  03/09/24 1690

## 2024-03-08 NOTE — PROGRESS NOTES
Assessment & Plan     Chest pain, unspecified type    - EKG 12-lead, tracing only  - alum & mag hydroxide-simethicone (MAALOX  ES) suspension 15 mL    Epigastric pain    - XR Chest 2 Views    Long term current use of anticoagulant therapy      Upper back pain       Patient with a history of GERD with a recent bout of diarrhea and minimal vomiting last week which she thought had largely resolved associated with lightheadedness, mid epigastric pain.  When palpating the mid epigastrium, she feels pain between the bilateral shoulder blades.  Feels similar to pain previously evaluated on February 27 in the emergency room, but is much more constant.    February 27 visit did not include a chest x-ray.  She did have a normal troponin, CMP and lipase.    EKG done here is negative.  History of A-fib with ablation.  Is on thinners.  Denies any atrial fib symptoms or palpitations.  Normal sinus rhythm here.    Had a Humira shot for her RA last Friday.  This was a bio similar which is new for her, but had been on brand-name Humira in the past.  Denies any malaise with these injections previously.    Is taking her Prilosec daily.    As she is quite tender directly over the mid epigastrium, did try a one-time dose of Maalox.  Which did not change her symptoms at all.    Chest x-ray to primarily check for rare pathology such as aortic aneurysm was unchanged.    Do not think she is having a primary cardiac issue, but she is quite lightheaded with standing, is having persistent mid epigastric pain for 1 week that is not changed with Prilosec or Maalox given here.  Due to persistence of symptoms with constant pain for 1 week, do recommend further evaluation emergency room again.  Patient is okay with this.  Did call and speak to Dr. Tracy at [facility] ED.    33 minutes spent by me on the date of the encounter doing chart review, review of test results, patient visit, and documentation         No follow-ups on file.    Alisia MCKNIGHT  ERAN Jarquin [facility] POLO Salas is a 59 year old female who presents to clinic today for the following health issues:  Chief Complaint   Patient presents with    Chest Pain     Back from [place] Tuesday. Chest pain on sternum to Rt back. Off and on dizziness. Nausea. Not eat properly.  Little cough. SOB when strenuous activity.     HPI    Patient here with epigastric pain persistent for 1 week, mid epigastrium, radiating to the area between the shoulder blades.  Worse with movement such as leaning forward or pressing on the area.  Not worse if ambulating.    Risk factors for heart disease include type 2 diabetes, hypertension, obesity with history of atrial fibrillation.    Was seen in ER on February 27 with similar sounding symptoms.  Negative cardiac workup at that time.  Says she actually felt better than she does now at that time.  Symptoms are more intermittent.  Now symptoms are constant not improving.    Had associated with onset of diarrhea starting 6 days ago.  Amount of diarrhea has improved, but still a couple of loose stools per day.  No black or bloody stools.  Vomited at onset only.    Then slowly increased diet.  Still feeling a bit lightheaded.  No cough.  Tired.      History of RA-had biosimilar medicine to Humira which is new for RA.    Normally doesn't feel sick after these.  Also had her RA shot on same day diarrhea started.    Takes omeprazole religiously.  Not tried any additional medicine for heartburn such as Maalox.    Hx cholecystectomy over 20 years ago.        Review of Systems   Constitutional:  Positive for fatigue.   HENT:  Negative for congestion.    Respiratory:  Negative for cough.    Gastrointestinal:  Negative for hematochezia.   Musculoskeletal:  Positive for arthralgias (RA).       See HPI         Objective    /84   Pulse 86   Temp 98.2  F (36.8  C) (Oral)   Resp 18   LMP  (LMP Unknown)   SpO2 98%   Physical Exam  Constitutional:        Appearance: Normal appearance.   Cardiovascular:      Rate and Rhythm: Normal rate and regular rhythm.      Pulses: Normal pulses.      Comments: Does report feeling lightheaded when she stands up and does appear lightheaded temporarily.  Pulmonary:      Effort: Pulmonary effort is normal.   Abdominal:      General: Bowel sounds are normal. There is no distension.      Tenderness: There is abdominal tenderness (Mid epigastrium).   Musculoskeletal:         General: No tenderness (No tenderness between shoulder blades in the area of reported discomfort.).   Skin:     General: Skin is warm and dry.   Neurological:      Mental Status: She is alert.   Psychiatric:         Mood and Affect: Mood normal.                Results for orders placed or performed during the hospital encounter of 03/07/24   XR Chest 2 Views     Status: None    Narrative    EXAM: XR CHEST 2 VIEWS  LOCATION: [facility]  DATE: 3/7/2024    INDICATION: mid abdominal pain radiating to the mid back.  ?Check for aneurysm  COMPARISON: 01/17/2022      Impression    IMPRESSION: Heart size and pulmonary vascularity normal. Elevated right hemidiaphragm, unchanged. The lungs are clear. Clips upper abdomen.   Results for orders placed or performed in visit on 03/07/24   EKG 12-lead, tracing only     Status: None (Preliminary result)   Result Value Ref Range    Systolic Blood Pressure  mmHg    Diastolic Blood Pressure  mmHg    Ventricular Rate 82 BPM    Atrial Rate 82 BPM    TX Interval 164 ms    QRS Duration 86 ms     ms    QTc 432 ms    P Axis 45 degrees    R AXIS 11 degrees    T Axis 16 degrees    Interpretation ECG       Sinus rhythm  Normal ECG  When compared with ECG of 27-FEB-2024 19:36,  No significant change was found

## 2024-03-08 NOTE — ED NOTES
Expected Patient Referral to ER    8:06 PM    Referring clinic/provider: [facility]    Reason for referral: epigastric pain that radiates to back with palpation. Unclear etiology, r/o cardiac or aortic.    generic humera shot last firday. same day started having diarrhea and vomiting. Today epigastric pain is constant, shoots to the back between shoulder blades.     Mode of arrival: Emerson Barajas MD  03/07/24 2007

## 2024-03-12 ENCOUNTER — OFFICE VISIT (OUTPATIENT)
Dept: PSYCHOLOGY | Facility: CLINIC | Age: 59
End: 2024-03-12
Payer: COMMERCIAL

## 2024-03-12 DIAGNOSIS — F41.1 GAD (GENERALIZED ANXIETY DISORDER): Primary | ICD-10-CM

## 2024-03-12 DIAGNOSIS — F43.10 POSTTRAUMATIC STRESS DISORDER: ICD-10-CM

## 2024-03-12 DIAGNOSIS — F33.1 MODERATE EPISODE OF RECURRENT MAJOR DEPRESSIVE DISORDER (H): ICD-10-CM

## 2024-03-12 PROCEDURE — 90834 PSYTX W PT 45 MINUTES: CPT | Performed by: COUNSELOR

## 2024-03-12 NOTE — PROGRESS NOTES
[facility]                                     Progress Note    Patient Name: Alina Martell  Date: 3/12/24         Service Type: Individual      Session Start Time: 8:03 Session End Time: 8:53     Session Length: 50 minutes    Session #: 70     Attendees: Client       Service Modality:  In-Person       DATA  Extended Session (53+ minutes): No  Interactive Complexity: No  Crisis: No      Progress Since Last Session (Related to Symptoms / Goals / Homework):   Symptoms: No change symptoms remain the same    Homework: Achieved / completed to satisfaction      Episode of Care Goals: Satisfactory progress - ACTION (Actively working towards change); Intervened by reinforcing change plan / affirming steps taken     Current / Ongoing Stressors and Concerns:  Patient reported her symptoms remain the same. Patient indicated she went to [place] and it went well. Patient talked about spending time with her friends and the happiness she experienced. Patient indicated her two friends were extra needy while she was gone. Patient reported planning to address this tonight. Patient indicated she continues to be busy with her grandchildren but feels she can say no. This therapist processed with patient boundary setting.      Treatment Objective(s) Addressed in This Session:   use distraction each time intrusive worry surfaces  Increase interest, engagement, and pleasure in doing things  Decrease frequency and intensity of feeling down, depressed, hopeless  Improve quantity and quality of night time sleep / decrease daytime naps  Feel less tired and more energy during the day      Intervention:   Motivational Interviewing    MI Intervention: Permission to raise concern or advise     Change Talk Expressed by the Patient: Activation Taking steps    Provider Response to Change Talk: S - Summarized patient's change talk statements      Assessments completed prior to visit:  The following assessments were  completed by patient for this visit:  PHQ9:       9/26/2023    12:05 PM 10/9/2023     5:07 AM 10/30/2023     3:22 PM 12/13/2023     3:35 PM 12/18/2023    12:59 PM 1/8/2024     7:06 AM 2/18/2024    10:01 PM   PHQ-9 SCORE   PHQ-9 Total Score MyChart 9 (Mild depression) 10 (Moderate depression) 7 (Mild depression) 9 (Mild depression) 10 (Moderate depression) 8 (Mild depression) 5 (Mild depression)   PHQ-9 Total Score 9 10 7 9 10 8 5     GAD7:       11/3/2022    12:06 PM 12/1/2022     9:52 AM 7/5/2023     2:18 PM 9/26/2023    12:05 PM 10/9/2023    10:00 AM 10/30/2023     3:23 PM 12/6/2023     9:50 AM   ADA-7 SCORE   Total Score 10 (moderate anxiety) 8 (mild anxiety) 6 (mild anxiety) 11 (moderate anxiety)  5 (mild anxiety) 11 (moderate anxiety)   Total Score 10 8 6 11 8 5 11     PROMIS 10-Global Health (all questions and answers displayed):       7/19/2022    11:13 AM 11/3/2022    12:07 PM 12/1/2022     9:53 AM 3/10/2023     8:13 AM 6/13/2023     6:37 AM 9/26/2023    12:07 PM 1/1/2024    11:44 PM   PROMIS 10   In general, would you say your health is: Good Good Good Fair Good Fair Good   In general, would you say your quality of life is: Fair Good Good Good Good Good Fair   In general, how would you rate your physical health? Good Good Fair Fair Fair Fair Fair   In general, how would you rate your mental health, including your mood and your ability to think? Fair Fair Fair Fair Fair Fair Fair   In general, how would you rate your satisfaction with your social activities and relationships? Good Fair Good Good Good Good Fair   In general, please rate how well you carry out your usual social activities and roles Good Good Good Good Good Good Good   To what extent are you able to carry out your everyday physical activities such as walking, climbing stairs, carrying groceries, or moving a chair? Mostly Mostly Mostly Mostly Mostly Mostly Mostly   In the past 7 days, how often have you been bothered by emotional problems such  as feeling anxious, depressed, or irritable? Often Often Often Sometimes Often Often Often   In the past 7 days, how would you rate your fatigue on average? Moderate Mild Mild Moderate Mild Moderate Mild   In the past 7 days, how would you rate your pain on average, where 0 means no pain, and 10 means worst imaginable pain? 3 2 3 4 2 3 3   In general, would you say your health is: 3 3 3 2 3 2 3   In general, would you say your quality of life is: 2 3 3 3 3 3 2   In general, how would you rate your physical health? 3 3 2 2 2 2 2   In general, how would you rate your mental health, including your mood and your ability to think? 2 2 2 2 2 2 2   In general, how would you rate your satisfaction with your social activities and relationships? 3 2 3 3 3 3 2   In general, please rate how well you carry out your usual social activities and roles. (This includes activities at home, at work and in your community, and responsibilities as a parent, child, spouse, employee, friend, etc.) 3 3 3 3 3 3 3   To what extent are you able to carry out your everyday physical activities such as walking, climbing stairs, carrying groceries, or moving a chair? 4 4 4 4 4 4 4   In the past 7 days, how often have you been bothered by emotional problems such as feeling anxious, depressed, or irritable? 4 4 4 3 4 4 4   In the past 7 days, how would you rate your fatigue on average? 3 2 2 3 2 3 2   In the past 7 days, how would you rate your pain on average, where 0 means no pain, and 10 means worst imaginable pain? 3 2 3 4 2 3 3   Global Mental Health Score 9 9 10 11 10 10 8    8   Global Physical Health Score 14 15 14 12 14 13 14    14   PROMIS TOTAL - SUBSCORES 23 24 24 23 24 23 22    22         ASSESSMENT: Current Emotional / Mental Status (status of significant symptoms):   Risk status (Self / Other harm or suicidal ideation)   Patient denies current fears or concerns for personal safety.   Patient denies current or recent suicidal ideation or  behaviors.   Patient denies current or recent homicidal ideation or behaviors.   Patient denies current or recent self injurious behavior or ideation.   Patient denies other safety concerns.   Patient reports there has been no change in risk factors since their last session.     Patient reports there has been no change in protective factors since their last session.     Recommended that patient call 911 or go to the local ED should there be a change in any of these risk factors.     Appearance:   Appropriate    Eye Contact:   Good    Psychomotor Behavior: Normal    Attitude:   Cooperative    Orientation:   All   Speech    Rate / Production: Normal/ Responsive    Volume:  Normal    Mood:    Anxious    Affect:    Appropriate    Thought Content:  Clear    Thought Form:  Coherent  Goal Directed  Logical    Insight:    Good      Medication Review:   No changes to current psychiatric medication(s)     Medication Compliance:   Yes     Changes in Health Issues:   None reported     Chemical Use Review:   Substance Use: Chemical use reviewed, no active concerns identified      Tobacco Use: No current tobacco use.      Diagnosis:  1. ADA (generalized anxiety disorder)    2. Moderate episode of recurrent major depressive disorder (H)    3. Posttraumatic stress disorder        Collateral Reports Completed:   Not Applicable    PLAN: (Patient Tasks / Therapist Tasks / Other)  Patient will continue with therapy every 3 weeks. Patient will continue to work on boundary setting in different area's of her life. Patient should continue to work on advocating for her needs. Patient would benefit from spending time with her support network.     There has been demonstrated improvement in functioning while patient has been engaged in psychotherapy/psychological service- if withdrawn the patient would deteriorate and/or relapse.     Mariana Love, [facility]      ______________________________________________________________________    Individual Treatment Plan    Patient's Name: Alina Martell  YOB: 1965    Date of Creation:10/16/2020  Date Treatment Plan Last Reviewed/Revised: 1/2/2024    DSM5 Diagnoses: 296.32 (F33.1) Major Depressive Disorder, Recurrent Episode, Moderate _, 300.02 (F41.1) Generalized Anxiety Disorder, or 309.81 (F43.10) Posttraumatic Stress Disorder (includes Posttraumatic Stress Disorder for Children 6 Years and Younger)  Without dissociative symptoms  Psychosocial / Contextual Factors: Health, family  PROMIS (reviewed every 90 days):     PROMIS-10 Scores  Global Mental Health Score: 8  Global Physical Health Score: 14  PROMIS TOTAL - SUBSCORES: 22     Referral / Collaboration:  Was/were discussed and patient will pursue.    Anticipated number of session for this episode of care: 20 will reevaluate every 90 days  Anticipation frequency of session: Biweekly  Anticipated Duration of each session: 38-52 minutes  Treatment plan will be reviewed in 90 days or when goals have been changed.       MeasurableTreatment Goal(s) related to diagnosis / functional impairment(s)  Goal 1: Patient will work on reducing overall anxiety.     I will know I've met my goal when reporting minimal anxiety symptoms.       Objective #A (Patient Action)                          Patient will use distraction each time intrusive worry surfaces.  Status: Continued - Date(s): 1/2/2024     Intervention(s)  Therapist will teach emotional regulation skills.  Objective #B  Patient will Decrease frequency and intensity of feeling down, depressed, hopeless.  Status: Continued - Date(s):  1/2/2024     Intervention(s)  Therapist will teach emotional recognition/identification. ..     Objective #C  Patient will Identify negative self-talk and behaviors: challenge core beliefs, myths, and actions.  Status: Continued - Date(s):  1/2/2024     Intervention(s)  Therapist  will teach the client how to perform a behavioral chain analysis. ..     Goal 2: Patient will continue to work on reducing depression symptoms    I will know I've met my goal then reporting minimal depression symptoms     Objective #A (Patient Action)                          Patient will Increase interest, engagement, and pleasure in doing things.  Status: Continued - Date(s):  1/2/2024     Intervention(s)  Therapist will assign homework ..     Objective #B  Patient will Decrease frequency and intensity of feeling down, depressed, hopeless.  Status: Continued - Date(s):   1/2/2024  Intervention(s)  Therapist will assign homework at every session.     Goal 3: Client will reduce PTSD symptoms by 50% as evidenced by PCLC-5 score     I will know I've met my goal when not struggling with nightmares.      Objective #A (Client Action)    Client will reduce nightmares.  Status: New - Date: 1/2/2024      Intervention(s)  Therapist will teach nightmare retraining .    Objective #B  Client will compile a list of boundaries that they would like to set with others.   .    Status: New - Date: 1/2/2024      Intervention(s)  Therapist will assign homework boundary setting .            Patient has reviewed and agreed to the above plan.        Mariana Love [facility]

## 2024-03-14 ENCOUNTER — ALLIED HEALTH/NURSE VISIT (OUTPATIENT)
Dept: FAMILY MEDICINE | Facility: CLINIC | Age: 59
End: 2024-03-14
Payer: COMMERCIAL

## 2024-03-14 DIAGNOSIS — Z23 ENCOUNTER FOR IMMUNIZATION: Primary | ICD-10-CM

## 2024-03-14 PROCEDURE — 90746 HEPB VACCINE 3 DOSE ADULT IM: CPT

## 2024-03-14 PROCEDURE — 99207 PR NO CHARGE NURSE ONLY: CPT

## 2024-03-14 PROCEDURE — 90471 IMMUNIZATION ADMIN: CPT

## 2024-03-20 LAB
ATRIAL RATE - MUSE: 72 BPM
DIASTOLIC BLOOD PRESSURE - MUSE: NORMAL MMHG
INTERPRETATION ECG - MUSE: NORMAL
P AXIS - MUSE: 57 DEGREES
PR INTERVAL - MUSE: 174 MS
QRS DURATION - MUSE: 90 MS
QT - MUSE: 402 MS
QTC - MUSE: 440 MS
R AXIS - MUSE: 15 DEGREES
SYSTOLIC BLOOD PRESSURE - MUSE: NORMAL MMHG
T AXIS - MUSE: 29 DEGREES
VENTRICULAR RATE- MUSE: 72 BPM

## 2024-03-21 DIAGNOSIS — M05.79 SEROPOSITIVE RHEUMATOID ARTHRITIS OF MULTIPLE SITES (H): ICD-10-CM

## 2024-03-21 RX ORDER — ADALIMUMAB-ADAZ 40 MG/.4ML
0.4 INJECTION, SOLUTION SUBCUTANEOUS
Qty: 0.8 ML | Refills: 3 | Status: SHIPPED | OUTPATIENT
Start: 2024-03-21 | End: 2024-07-09

## 2024-03-27 ENCOUNTER — OFFICE VISIT (OUTPATIENT)
Dept: FAMILY MEDICINE | Facility: CLINIC | Age: 59
End: 2024-03-27
Payer: COMMERCIAL

## 2024-03-27 VITALS
WEIGHT: 288.7 LBS | TEMPERATURE: 98 F | RESPIRATION RATE: 20 BRPM | HEIGHT: 69 IN | BODY MASS INDEX: 42.76 KG/M2 | DIASTOLIC BLOOD PRESSURE: 80 MMHG | SYSTOLIC BLOOD PRESSURE: 140 MMHG | OXYGEN SATURATION: 99 % | HEART RATE: 80 BPM

## 2024-03-27 DIAGNOSIS — E11.9 TYPE 2 DIABETES MELLITUS WITHOUT COMPLICATION, WITHOUT LONG-TERM CURRENT USE OF INSULIN (H): Primary | ICD-10-CM

## 2024-03-27 DIAGNOSIS — I25.118 CORONARY ARTERY DISEASE OF NATIVE ARTERY OF NATIVE HEART WITH STABLE ANGINA PECTORIS (H): ICD-10-CM

## 2024-03-27 DIAGNOSIS — K29.00 ACUTE GASTRITIS WITHOUT HEMORRHAGE, UNSPECIFIED GASTRITIS TYPE: ICD-10-CM

## 2024-03-27 LAB
ALBUMIN MFR UR ELPH: 20.6 MG/DL
BASOPHILS # BLD AUTO: 0 10E3/UL (ref 0–0.2)
BASOPHILS NFR BLD AUTO: 0 %
CHOLEST SERPL-MCNC: 203 MG/DL
CREAT UR-MCNC: 46.3 MG/DL
CREAT UR-MCNC: 46.3 MG/DL
EOSINOPHIL # BLD AUTO: 0.2 10E3/UL (ref 0–0.7)
EOSINOPHIL NFR BLD AUTO: 2 %
ERYTHROCYTE [DISTWIDTH] IN BLOOD BY AUTOMATED COUNT: 15.3 % (ref 10–15)
FASTING STATUS PATIENT QL REPORTED: YES
HBA1C MFR BLD: 6.3 % (ref 0–5.6)
HCT VFR BLD AUTO: 39.5 % (ref 35–47)
HDLC SERPL-MCNC: 50 MG/DL
HGB BLD-MCNC: 12.1 G/DL (ref 11.7–15.7)
IMM GRANULOCYTES # BLD: 0 10E3/UL
IMM GRANULOCYTES NFR BLD: 0 %
LDLC SERPL CALC-MCNC: 122 MG/DL
LYMPHOCYTES # BLD AUTO: 3.1 10E3/UL (ref 0.8–5.3)
LYMPHOCYTES NFR BLD AUTO: 27 %
MCH RBC QN AUTO: 24.6 PG (ref 26.5–33)
MCHC RBC AUTO-ENTMCNC: 30.6 G/DL (ref 31.5–36.5)
MCV RBC AUTO: 80 FL (ref 78–100)
MICROALBUMIN UR-MCNC: 101 MG/L
MICROALBUMIN/CREAT UR: 218.14 MG/G CR (ref 0–25)
MONOCYTES # BLD AUTO: 0.9 10E3/UL (ref 0–1.3)
MONOCYTES NFR BLD AUTO: 8 %
NEUTROPHILS # BLD AUTO: 7.2 10E3/UL (ref 1.6–8.3)
NEUTROPHILS NFR BLD AUTO: 63 %
NONHDLC SERPL-MCNC: 153 MG/DL
PLATELET # BLD AUTO: 420 10E3/UL (ref 150–450)
PROT/CREAT 24H UR: 0.44 MG/MG CR (ref 0–0.2)
RBC # BLD AUTO: 4.92 10E6/UL (ref 3.8–5.2)
TRIGL SERPL-MCNC: 156 MG/DL
WBC # BLD AUTO: 11.4 10E3/UL (ref 4–11)

## 2024-03-27 PROCEDURE — 80061 LIPID PANEL: CPT | Performed by: FAMILY MEDICINE

## 2024-03-27 PROCEDURE — 82043 UR ALBUMIN QUANTITATIVE: CPT | Performed by: FAMILY MEDICINE

## 2024-03-27 PROCEDURE — 83036 HEMOGLOBIN GLYCOSYLATED A1C: CPT | Performed by: FAMILY MEDICINE

## 2024-03-27 PROCEDURE — 36415 COLL VENOUS BLD VENIPUNCTURE: CPT | Performed by: FAMILY MEDICINE

## 2024-03-27 PROCEDURE — 82570 ASSAY OF URINE CREATININE: CPT | Performed by: FAMILY MEDICINE

## 2024-03-27 PROCEDURE — 84156 ASSAY OF PROTEIN URINE: CPT | Performed by: FAMILY MEDICINE

## 2024-03-27 PROCEDURE — 85025 COMPLETE CBC W/AUTO DIFF WBC: CPT | Performed by: FAMILY MEDICINE

## 2024-03-27 PROCEDURE — 99214 OFFICE O/P EST MOD 30 MIN: CPT | Performed by: FAMILY MEDICINE

## 2024-03-27 PROCEDURE — 86481 TB AG RESPONSE T-CELL SUSP: CPT | Performed by: FAMILY MEDICINE

## 2024-03-27 RX ORDER — METFORMIN HCL 500 MG
500 TABLET, EXTENDED RELEASE 24 HR ORAL
Qty: 90 TABLET | Refills: 2 | Status: SHIPPED | OUTPATIENT
Start: 2024-03-27

## 2024-03-27 NOTE — PROGRESS NOTES
"  Assessment & Plan     Type 2 diabetes mellitus without complication, without long-term current use of insulin (H)  Routine labs.  No feet issues.  Will start metformin due to early stages and last A1c 6.5  - HEMOGLOBIN A1C  - FOOT EXAM  - CBC with Platelets & Differential  - metFORMIN (GLUCOPHAGE XR) 500 MG 24 hr tablet  Dispense: 90 tablet; Refill: 2  - HEMOGLOBIN A1C  - CBC with Platelets & Differential    Coronary artery disease of native artery of native heart with stable angina pectoris (H24)  Seeing cardiology and stable.  Will do lipid testing  - Lipid panel reflex to direct LDL Non-fasting  - Lipid panel reflex to direct LDL Non-fasting    Acute gastritis without hemorrhage, unspecified gastritis type  Stable now but would like to establish with GI if this occurs again due to the length of time she had it.  - Adult GI  Referral - Consult Only              MED REC REQUIRED  Post Medication Reconciliation Status:     BMI  Estimated body mass index is 42.63 kg/m  as calculated from the following:    Height as of this encounter: 1.753 m (5' 9\").    Weight as of this encounter: 131 kg (288 lb 11.2 oz).   Weight management plan: Discussed healthy diet and exercise guidelines          Markus Salas is a 59 year old, presenting for the following health issues:  No chief complaint on file.        3/27/2024     8:59 AM   Additional Questions   Roomed by Adilene MENJIVAR MA     Via the Health Maintenance questionnaire, the patient has reported the following services have been completed -Eye Exam, this information has been sent to the abstraction team.  [facility]       ED/ Followup:    Facility:  [facility] ED  Date of visit: 02/27/24, 03/07/24  Reason for visit: Chest pain, abdominal pain; WBC high and thyroid nodule per pt   Current Status: Getting better with the medications      Recently went to the ER for abdominal pain.  WBC elevated at the ER and then repeated 2 weeks later and normal.  Epigastric pain and " then given maalox and omeprazole and that has helped.  Imaging done and showed thyroid nodule.  Recently changed RA medication to bio similar.  Occasionally metallic taste in the mouth.  Not burping more often.  Feels like the omeprazole and pepcid helped calmed down the pain and since stopped the pepcid.  However still worried at times about the pain.  Drinking about 80oz of water a day.  Tried to watch diet and changed to avoid sugars.        Review of Systems  Constitutional, HEENT, cardiovascular, pulmonary, gi and gu systems are negative, except as otherwise noted.      Objective    LMP  (LMP Unknown)   There is no height or weight on file to calculate BMI.  Physical Exam   GENERAL: alert and no distress  NECK: no adenopathy, no asymmetry, masses, or scars  MS: no gross musculoskeletal defects noted, no edema            Signed Electronically by: Estelle Bansal MD

## 2024-03-27 NOTE — Clinical Note
Please abstract the following data from this visit with this patient into the appropriate field in Epic:  Tests that can be patient reported without a hard copy:  Eye exam with ophthalmology on this date: 10/23 [facility]  Other Tests found in the patient's chart through Chart Review/Care Everywhere:  {Abstract Quality List (Optional):409672}  Note to Abstraction: If this section is blank, no results were found via Chart Review/Care Everywhere.

## 2024-03-28 DIAGNOSIS — E55.9 VITAMIN D DEFICIENCY: ICD-10-CM

## 2024-03-28 LAB
QUANTIFERON MITOGEN: 10 IU/ML
QUANTIFERON NIL TUBE: 0.02 IU/ML
QUANTIFERON TB1 TUBE: 0.05 IU/ML
QUANTIFERON TB2 TUBE: 0.06

## 2024-03-29 LAB
GAMMA INTERFERON BACKGROUND BLD IA-ACNC: 0.02 IU/ML
M TB IFN-G BLD-IMP: NEGATIVE
M TB IFN-G CD4+ BCKGRND COR BLD-ACNC: 9.98 IU/ML
MITOGEN IGNF BCKGRD COR BLD-ACNC: 0.03 IU/ML
MITOGEN IGNF BCKGRD COR BLD-ACNC: 0.04 IU/ML

## 2024-04-03 DIAGNOSIS — R13.10 DYSPHAGIA, UNSPECIFIED TYPE: ICD-10-CM

## 2024-04-03 RX ORDER — OMEPRAZOLE 40 MG/1
40 CAPSULE, DELAYED RELEASE ORAL DAILY
Qty: 90 CAPSULE | Refills: 3 | Status: SHIPPED | OUTPATIENT
Start: 2024-04-03

## 2024-04-04 ENCOUNTER — OFFICE VISIT (OUTPATIENT)
Dept: PSYCHOLOGY | Facility: CLINIC | Age: 59
End: 2024-04-04
Payer: COMMERCIAL

## 2024-04-04 DIAGNOSIS — F33.1 MODERATE EPISODE OF RECURRENT MAJOR DEPRESSIVE DISORDER (H): ICD-10-CM

## 2024-04-04 DIAGNOSIS — F43.10 POSTTRAUMATIC STRESS DISORDER: ICD-10-CM

## 2024-04-04 DIAGNOSIS — F41.1 GAD (GENERALIZED ANXIETY DISORDER): Primary | ICD-10-CM

## 2024-04-04 PROCEDURE — 90834 PSYTX W PT 45 MINUTES: CPT | Performed by: COUNSELOR

## 2024-04-04 NOTE — PROGRESS NOTES
[facility]                                     Progress Note    Patient Name: Alina Martell  Date: 4/04/24         Service Type: Individual      Session Start Time: 8:05 Session End Time: 9:55     Session Length: 45 minutes    Session #: 71     Attendees: Client       Service Modality:  In-Person       DATA  Extended Session (53+ minutes): No  Interactive Complexity: No  Crisis: No      Progress Since Last Session (Related to Symptoms / Goals / Homework):   Symptoms: No change tiredness due to racing thoughts    Homework: Achieved / completed to satisfaction      Episode of Care Goals: Satisfactory progress - ACTION (Actively working towards change); Intervened by reinforcing change plan / affirming steps taken     Current / Ongoing Stressors and Concerns:  Patient indicated she is extremely tired. Patient reported she is struggling sleeping due to racing thoughts. Patient went over her sleep hygiene to try and help improve her sleep. Patient indicated she is working on her relationship with her mom. Patient reported she is working on her self-worth and how to see herself in a better way. This therapist challenged patient on negative thoughts.      Treatment Objective(s) Addressed in This Session:   identify three distraction and diversion activities and use those activities to decrease level of anxiety    Increase interest, engagement, and pleasure in doing things  Decrease frequency and intensity of feeling down, depressed, hopeless  Improve quantity and quality of night time sleep / decrease daytime naps  Feel less tired and more energy during the day   Identify negative self-talk and behaviors: challenge core beliefs, myths, and actions     Intervention:   Motivational Interviewing    MI Intervention: Reflections: simple and complex     Change Talk Expressed by the Patient: Activation Taking steps    Provider Response to Change Talk: R - Reflected patient's change talk and S -  Summarized patient's change talk statements      Assessments completed prior to visit:  The following assessments were completed by patient for this visit:  PHQ9:       10/9/2023     5:07 AM 10/30/2023     3:22 PM 12/13/2023     3:35 PM 12/18/2023    12:59 PM 1/8/2024     7:06 AM 2/18/2024    10:01 PM 4/3/2024     9:50 AM   PHQ-9 SCORE   PHQ-9 Total Score MyChart 10 (Moderate depression) 7 (Mild depression) 9 (Mild depression) 10 (Moderate depression) 8 (Mild depression) 5 (Mild depression) 6 (Mild depression)   PHQ-9 Total Score 10 7 9 10 8 5 6     GAD7:       11/3/2022    12:06 PM 12/1/2022     9:52 AM 7/5/2023     2:18 PM 9/26/2023    12:05 PM 10/9/2023    10:00 AM 10/30/2023     3:23 PM 12/6/2023     9:50 AM   ADA-7 SCORE   Total Score 10 (moderate anxiety) 8 (mild anxiety) 6 (mild anxiety) 11 (moderate anxiety)  5 (mild anxiety) 11 (moderate anxiety)   Total Score 10 8 6 11 8 5 11     PROMIS 10-Global Health (all questions and answers displayed):       11/3/2022    12:07 PM 12/1/2022     9:53 AM 3/10/2023     8:13 AM 6/13/2023     6:37 AM 9/26/2023    12:07 PM 1/1/2024    11:44 PM 4/3/2024     9:52 AM   PROMIS 10   In general, would you say your health is: Good Good Fair Good Fair Good Good   In general, would you say your quality of life is: Good Good Good Good Good Fair Good   In general, how would you rate your physical health? Good Fair Fair Fair Fair Fair Fair   In general, how would you rate your mental health, including your mood and your ability to think? Fair Fair Fair Fair Fair Fair Fair   In general, how would you rate your satisfaction with your social activities and relationships? Fair Good Good Good Good Fair Good   In general, please rate how well you carry out your usual social activities and roles Good Good Good Good Good Good Good   To what extent are you able to carry out your everyday physical activities such as walking, climbing stairs, carrying groceries, or moving a chair? Mostly Mostly  Mostly Mostly Mostly Mostly Completely   In the past 7 days, how often have you been bothered by emotional problems such as feeling anxious, depressed, or irritable? Often Often Sometimes Often Often Often Sometimes   In the past 7 days, how would you rate your fatigue on average? Mild Mild Moderate Mild Moderate Mild Moderate   In the past 7 days, how would you rate your pain on average, where 0 means no pain, and 10 means worst imaginable pain? 2 3 4 2 3 3 3   In general, would you say your health is: 3 3 2 3 2 3 3   In general, would you say your quality of life is: 3 3 3 3 3 2 3   In general, how would you rate your physical health? 3 2 2 2 2 2 2   In general, how would you rate your mental health, including your mood and your ability to think? 2 2 2 2 2 2 2   In general, how would you rate your satisfaction with your social activities and relationships? 2 3 3 3 3 2 3   In general, please rate how well you carry out your usual social activities and roles. (This includes activities at home, at work and in your community, and responsibilities as a parent, child, spouse, employee, friend, etc.) 3 3 3 3 3 3 3   To what extent are you able to carry out your everyday physical activities such as walking, climbing stairs, carrying groceries, or moving a chair? 4 4 4 4 4 4 5   In the past 7 days, how often have you been bothered by emotional problems such as feeling anxious, depressed, or irritable? 4 4 3 4 4 4 3   In the past 7 days, how would you rate your fatigue on average? 2 2 3 2 3 2 3   In the past 7 days, how would you rate your pain on average, where 0 means no pain, and 10 means worst imaginable pain? 2 3 4 2 3 3 3   Global Mental Health Score 9 10 11 10 10 8    8 11   Global Physical Health Score 15 14 12 14 13 14    14 14   PROMIS TOTAL - SUBSCORES 24 24 23 24 23 22    22 25         ASSESSMENT: Current Emotional / Mental Status (status of significant symptoms):   Risk status (Self / Other harm or suicidal  ideation)   Patient denies current fears or concerns for personal safety.   Patient denies current or recent suicidal ideation or behaviors.   Patient denies current or recent homicidal ideation or behaviors.   Patient denies current or recent self injurious behavior or ideation.   Patient denies other safety concerns.   Patient reports there has been no change in risk factors since their last session.     Patient reports there has been no change in protective factors since their last session.     Recommended that patient call 911 or go to the local ED should there be a change in any of these risk factors.     Appearance:   Appropriate    Eye Contact:   Good    Psychomotor Behavior: Normal    Attitude:   Cooperative    Orientation:   All   Speech    Rate / Production: Normal/ Responsive    Volume:  Normal    Mood:    Anxious    Affect:    Appropriate    Thought Content:  Clear    Thought Form:  Coherent  Logical    Insight:    Good      Medication Review:   No changes to current psychiatric medication(s)     Medication Compliance:   Yes     Changes in Health Issues:   None reported     Chemical Use Review:   Substance Use: Chemical use reviewed, no active concerns identified      Tobacco Use: No current tobacco use.      Diagnosis:  1. ADA (generalized anxiety disorder)    2. Moderate episode of recurrent major depressive disorder (H)    3. Posttraumatic stress disorder        Collateral Reports Completed:   Not Applicable    PLAN: (Patient Tasks / Therapist Tasks / Other)  Patient will continue with therapy every 3 weeks. Patient will continue with sleep hygiene. Patient will continue to challenge negative thoughts related to self-worth.     There has been demonstrated improvement in functioning while patient has been engaged in psychotherapy/psychological service- if withdrawn the patient would deteriorate and/or relapse.     Mariana Love, [facility]      ______________________________________________________________________    Individual Treatment Plan    Patient's Name: Alina Martell  YOB: 1965    Date of Creation:10/16/2020  Date Treatment Plan Last Reviewed/Revised: 4/4/2024    DSM5 Diagnoses: 296.32 (F33.1) Major Depressive Disorder, Recurrent Episode, Moderate _, 300.02 (F41.1) Generalized Anxiety Disorder, or 309.81 (F43.10) Posttraumatic Stress Disorder (includes Posttraumatic Stress Disorder for Children 6 Years and Younger)  Without dissociative symptoms  Psychosocial / Contextual Factors: Health, family  PROMIS (reviewed every 90 days):     PROMIS-10 Scores  Global Mental Health Score: 8  Global Physical Health Score: 14  PROMIS TOTAL - SUBSCORES: 22     Referral / Collaboration:  Was/were discussed and patient will pursue.    Anticipated number of session for this episode of care: 20 will reevaluate every 90 days  Anticipation frequency of session: Biweekly  Anticipated Duration of each session: 38-52 minutes  Treatment plan will be reviewed in 90 days or when goals have been changed.       MeasurableTreatment Goal(s) related to diagnosis / functional impairment(s)  Goal 1: Patient will work on reducing overall anxiety.     I will know I've met my goal when reporting minimal anxiety symptoms.       Objective #A (Patient Action)                          Patient will use distraction each time intrusive worry surfaces.  Status: Continued - Date(s): 4/4/2024     Intervention(s)  Therapist will teach emotional regulation skills.  Objective #B  Patient will Decrease frequency and intensity of feeling down, depressed, hopeless.  Status: Continued - Date(s):  4/4/2024     Intervention(s)  Therapist will teach emotional recognition/identification. ..     Objective #C  Patient will Identify negative self-talk and behaviors: challenge core beliefs, myths, and actions.  Status: Continued - Date(s):  4/4/2024     Intervention(s)  Therapist  will teach the client how to perform a behavioral chain analysis. ..     Goal 2: Patient will continue to work on reducing depression symptoms    I will know I've met my goal then reporting minimal depression symptoms     Objective #A (Patient Action)                          Patient will Increase interest, engagement, and pleasure in doing things.  Status: Continued - Date(s):  4/4/2024     Intervention(s)  Therapist will assign homework ..     Objective #B  Patient will Decrease frequency and intensity of feeling down, depressed, hopeless.  Status: Continued - Date(s):   4/4/2024  Intervention(s)  Therapist will assign homework at every session.     Goal 3: Client will reduce PTSD symptoms by 50% as evidenced by PCLC-5 score     I will know I've met my goal when not struggling with nightmares.      Objective #A (Client Action)    Client will reduce nightmares.  Status: Continued - Date(s): 4/4/2024    Intervention(s)  Therapist will teach nightmare retraining .    Objective #B  Client will compile a list of boundaries that they would like to set with others.   .    Status: Continued - Date(s):4/4/2024     Intervention(s)  Therapist will assign homework boundary setting .            Patient has reviewed and agreed to the above plan.        Mariana Love, [facility]

## 2024-04-25 ENCOUNTER — OFFICE VISIT (OUTPATIENT)
Dept: PSYCHOLOGY | Facility: CLINIC | Age: 59
End: 2024-04-25
Payer: COMMERCIAL

## 2024-04-25 DIAGNOSIS — F41.1 GAD (GENERALIZED ANXIETY DISORDER): Primary | ICD-10-CM

## 2024-04-25 DIAGNOSIS — F33.1 MODERATE EPISODE OF RECURRENT MAJOR DEPRESSIVE DISORDER (H): ICD-10-CM

## 2024-04-25 DIAGNOSIS — F43.10 POSTTRAUMATIC STRESS DISORDER: ICD-10-CM

## 2024-04-25 PROCEDURE — 90837 PSYTX W PT 60 MINUTES: CPT | Performed by: COUNSELOR

## 2024-04-26 NOTE — PROGRESS NOTES
"[facility]                                     Progress Note    Patient Name: Alina Martell  Date: 4/25/24         Service Type: Individual      Session Start Time: 8:01 Session End Time: 9:00     Session Length: 59 minutes    Session #: 72     Attendees: Client       Service Modality:  In-Person       DATA  Extended Session (53+ minutes):   - Patient's presenting concerns require more intensive intervention than could be completed within the usual service  Interactive Complexity: No  Crisis: No      Progress Since Last Session (Related to Symptoms / Goals / Homework):   Symptoms: Worsening due to certain behaviors being present    Homework: Achieved / completed to satisfaction      Episode of Care Goals: Satisfactory progress - ACTION (Actively working towards change); Intervened by reinforcing change plan / affirming steps taken     Current / Ongoing Stressors and Concerns:  Patient reported she has had some unhealthy behaviors the past few weeks. Patient indicated she has been going out more and has been gambling. Patient reported knowing she needs to make changes and getting a plan in place. Patient indicated she has been spending more time with the two females. Patient talked about this relationship and some stressors. This therapist challenged patient on steps she will take to start making the needed changes.      Treatment Objective(s) Addressed in This Session:   use \"worry time\" each day for 15 minutes of scheduled worry and then defer obsessive or anxious thinking until the next structured worry time  Increase interest, engagement, and pleasure in doing things  Decrease frequency and intensity of feeling down, depressed, hopeless  Improve quantity and quality of night time sleep / decrease daytime naps  Feel less tired and more energy during the day   Identify negative self-talk and behaviors: challenge core beliefs, myths, and actions     Intervention:   Motivational " Interviewing    MI Intervention: Open-ended questions     Change Talk Expressed by the Patient: Taking steps    Provider Response to Change Talk: E - Evoked more info from patient about behavior change      Assessments completed prior to visit:  The following assessments were completed by patient for this visit:  PHQ9:       10/9/2023     5:07 AM 10/30/2023     3:22 PM 12/13/2023     3:35 PM 12/18/2023    12:59 PM 1/8/2024     7:06 AM 2/18/2024    10:01 PM 4/3/2024     9:50 AM   PHQ-9 SCORE   PHQ-9 Total Score MyChart 10 (Moderate depression) 7 (Mild depression) 9 (Mild depression) 10 (Moderate depression) 8 (Mild depression) 5 (Mild depression) 6 (Mild depression)   PHQ-9 Total Score 10 7 9 10 8 5 6     GAD7:       11/3/2022    12:06 PM 12/1/2022     9:52 AM 7/5/2023     2:18 PM 9/26/2023    12:05 PM 10/9/2023    10:00 AM 10/30/2023     3:23 PM 12/6/2023     9:50 AM   ADA-7 SCORE   Total Score 10 (moderate anxiety) 8 (mild anxiety) 6 (mild anxiety) 11 (moderate anxiety)  5 (mild anxiety) 11 (moderate anxiety)   Total Score 10 8 6 11 8 5 11     PROMIS 10-Global Health (all questions and answers displayed):       11/3/2022    12:07 PM 12/1/2022     9:53 AM 3/10/2023     8:13 AM 6/13/2023     6:37 AM 9/26/2023    12:07 PM 1/1/2024    11:44 PM 4/3/2024     9:52 AM   PROMIS 10   In general, would you say your health is: Good Good Fair Good Fair Good Good   In general, would you say your quality of life is: Good Good Good Good Good Fair Good   In general, how would you rate your physical health? Good Fair Fair Fair Fair Fair Fair   In general, how would you rate your mental health, including your mood and your ability to think? Fair Fair Fair Fair Fair Fair Fair   In general, how would you rate your satisfaction with your social activities and relationships? Fair Good Good Good Good Fair Good   In general, please rate how well you carry out your usual social activities and roles Good Good Good Good Good Good Good   To  what extent are you able to carry out your everyday physical activities such as walking, climbing stairs, carrying groceries, or moving a chair? Mostly Mostly Mostly Mostly Mostly Mostly Completely   In the past 7 days, how often have you been bothered by emotional problems such as feeling anxious, depressed, or irritable? Often Often Sometimes Often Often Often Sometimes   In the past 7 days, how would you rate your fatigue on average? Mild Mild Moderate Mild Moderate Mild Moderate   In the past 7 days, how would you rate your pain on average, where 0 means no pain, and 10 means worst imaginable pain? 2 3 4 2 3 3 3   In general, would you say your health is: 3 3 2 3 2 3 3   In general, would you say your quality of life is: 3 3 3 3 3 2 3   In general, how would you rate your physical health? 3 2 2 2 2 2 2   In general, how would you rate your mental health, including your mood and your ability to think? 2 2 2 2 2 2 2   In general, how would you rate your satisfaction with your social activities and relationships? 2 3 3 3 3 2 3   In general, please rate how well you carry out your usual social activities and roles. (This includes activities at home, at work and in your community, and responsibilities as a parent, child, spouse, employee, friend, etc.) 3 3 3 3 3 3 3   To what extent are you able to carry out your everyday physical activities such as walking, climbing stairs, carrying groceries, or moving a chair? 4 4 4 4 4 4 5   In the past 7 days, how often have you been bothered by emotional problems such as feeling anxious, depressed, or irritable? 4 4 3 4 4 4 3   In the past 7 days, how would you rate your fatigue on average? 2 2 3 2 3 2 3   In the past 7 days, how would you rate your pain on average, where 0 means no pain, and 10 means worst imaginable pain? 2 3 4 2 3 3 3   Global Mental Health Score 9 10 11 10 10 8    8 11   Global Physical Health Score 15 14 12 14 13 14    14 14   PROMIS TOTAL - SUBSCORES 24  24 23 24 23 22    22 25         ASSESSMENT: Current Emotional / Mental Status (status of significant symptoms):   Risk status (Self / Other harm or suicidal ideation)   Patient denies current fears or concerns for personal safety.   Patient denies current or recent suicidal ideation or behaviors.   Patient denies current or recent homicidal ideation or behaviors.   Patient denies current or recent self injurious behavior or ideation.   Patient denies other safety concerns.   Patient reports there has been no change in risk factors since their last session.     Patient reports there has been no change in protective factors since their last session.     Recommended that patient call 911 or go to the local ED should there be a change in any of these risk factors.     Appearance:   Appropriate    Eye Contact:   Good    Psychomotor Behavior: Normal    Attitude:   Cooperative    Orientation:   All   Speech    Rate / Production: Normal/ Responsive    Volume:  Normal    Mood:    Anxious    Affect:    Appropriate    Thought Content:  Clear    Thought Form:  Coherent  Goal Directed  Logical    Insight:    Good      Medication Review:   No changes to current psychiatric medication(s)     Medication Compliance:   Yes     Changes in Health Issues:   None reported     Chemical Use Review:   Substance Use: Chemical use reviewed, no active concerns identified      Tobacco Use: No current tobacco use.      Diagnosis:  1. ADA (generalized anxiety disorder)    2. Moderate episode of recurrent major depressive disorder (H)    3. Posttraumatic stress disorder        Collateral Reports Completed:   Not Applicable    PLAN: (Patient Tasks / Therapist Tasks / Other)  Patient will return in 3 weeks for scheduled session. Patient will work on behavior changes related to gambling. Patient will talk with her two friends about boundaries and expectations. Patient will continue with self-care.     There has been demonstrated improvement in  functioning while patient has been engaged in psychotherapy/psychological service- if withdrawn the patient would deteriorate and/or relapse.     Mariana KENTNubia Love, [facility]     ______________________________________________________________________    Individual Treatment Plan    Patient's Name: Alina Martell  YOB: 1965    Date of Creation:10/16/2020  Date Treatment Plan Last Reviewed/Revised: 4/4/2024    DSM5 Diagnoses: 296.32 (F33.1) Major Depressive Disorder, Recurrent Episode, Moderate _, 300.02 (F41.1) Generalized Anxiety Disorder, or 309.81 (F43.10) Posttraumatic Stress Disorder (includes Posttraumatic Stress Disorder for Children 6 Years and Younger)  Without dissociative symptoms  Psychosocial / Contextual Factors: Health, family  PROMIS (reviewed every 90 days):     PROMIS-10 Scores  Global Mental Health Score: 8  Global Physical Health Score: 14  PROMIS TOTAL - SUBSCORES: 22     Referral / Collaboration:  Was/were discussed and patient will pursue.    Anticipated number of session for this episode of care: 20 will reevaluate every 90 days  Anticipation frequency of session: Biweekly  Anticipated Duration of each session: 38-52 minutes  Treatment plan will be reviewed in 90 days or when goals have been changed.       MeasurableTreatment Goal(s) related to diagnosis / functional impairment(s)  Goal 1: Patient will work on reducing overall anxiety.     I will know I've met my goal when reporting minimal anxiety symptoms.       Objective #A (Patient Action)                          Patient will use distraction each time intrusive worry surfaces.  Status: Continued - Date(s): 4/4/2024     Intervention(s)  Therapist will teach emotional regulation skills.  Objective #B  Patient will Decrease frequency and intensity of feeling down, depressed, hopeless.  Status: Continued - Date(s):  4/4/2024     Intervention(s)  Therapist will teach emotional recognition/identification. ..     Objective  #C  Patient will Identify negative self-talk and behaviors: challenge core beliefs, myths, and actions.  Status: Continued - Date(s):  4/4/2024     Intervention(s)  Therapist will teach the client how to perform a behavioral chain analysis. ..     Goal 2: Patient will continue to work on reducing depression symptoms    I will know I've met my goal then reporting minimal depression symptoms     Objective #A (Patient Action)                          Patient will Increase interest, engagement, and pleasure in doing things.  Status: Continued - Date(s):  4/4/2024     Intervention(s)  Therapist will assign homework ..     Objective #B  Patient will Decrease frequency and intensity of feeling down, depressed, hopeless.  Status: Continued - Date(s):   4/4/2024  Intervention(s)  Therapist will assign homework at every session.     Goal 3: Client will reduce PTSD symptoms by 50% as evidenced by PCLC-5 score     I will know I've met my goal when not struggling with nightmares.      Objective #A (Client Action)    Client will reduce nightmares.  Status: Continued - Date(s): 4/4/2024    Intervention(s)  Therapist will teach nightmare retraining .    Objective #B  Client will compile a list of boundaries that they would like to set with others.   .    Status: Continued - Date(s):4/4/2024     Intervention(s)  Therapist will assign homework boundary setting .            Patient has reviewed and agreed to the above plan.        Mariana Love [facility]    DISPLAY PLAN FREE TEXT

## 2024-04-30 NOTE — PROGRESS NOTES
[facility]  Cardiac Electrophysiology  [address]  [place] [address]   Office: [phone]  Fax: [phone]     HEART CARE ELECTROPHYSIOLOGY FOLLOW UP    Primary Care: Estelle Bansal MD      Assessment/Recommendations     Paroxysmal atrial fibrillation: Symptomatic with palpitations.  Status post RF PVI 6/30/2022 with no symptomatology or documentation of recurrent arrhythmia    [id number] score of 2 for gender, HTN.  We discussed the risks and benefits of ongoing use of oral anticoagulation are indeterminate, and decisions regarding continuation or cessation of oral anticoagulation should take into account risks of bleeding complications, stroke risk reduction and patient preference, with interval re-assessment of risks and benefits.  She would prefer to discontinue oral anticoagulation and monitor for recurrent AF    RANDALL: consistent use of CPAP    Exertional dyspnea, wheezing: Low suspicion for cardiac etiology with unremarkable stress test and echocardiogram 2022, no recurrent atrial arrhythmias     Plan:   -Discontinue Eliquis  -Follow-up with Dr. Richter 3-6 months, further EP follow-up as needed     History of Present Illness/Subjective    Alina Martell is a 59 year old female with past medical history significant for paroxysmal AF status post PVI 6/30/2022, minimal CAD, HTN, rheumatoid arthritis, prediabetes, Sjogren syndrome, RANDALL, obesity.  She was diagnosed with atrial fibrillation in 2020 upon ED evaluation for palpitations, undergoing DCCV.  She was briefly treated with pill in pocket flecainide prior to RF PVI 6/30/2022.  She noted intermittent dizziness, chest discomfort and palpitations with reassuring cardiac evaluation in the ED, with unremarkable MCT 2023. She continues to endorse wheezing and dyspnea on exertion, worse over the past several weeks and in environments with pet hair, relieved with rest.  She has no concurrent chest discomfort,  palpitations, lightheadedness/dizziness, fatigue or changes in activity tolerance.  She has mild chronic pedal edema which is stable.  She denies presyncope or syncope.     Data Review     Arrhythmia hx  Dx/date: 9/2020  Sx: Palpitations  [id number]: 3 for gender, HTN, diabetes; apixaban  Rate control: Diltiazem  AAD: Flecainide  DCCV: 8/12/2020, 1/1/2022  Ablation: 6/30/2022, Dr. Joseph-mild LAE, mild scattered fibrosis    EKG 12/15/2022 sinus rhythm 75 bpm     TTE 2022  Global and regional left ventricular function is normal with an EF of 60-65%.  Global right ventricular function is normal.  No significant valvular abnormalities present.  Pulmonary artery systolic pressure is normal.  The inferior vena cava is normal.  No pericardial effusion is present.     MCOT 3/28-4/26/2023. Essentially normal multi day patient activated monitor  Indication for study: Paroxysmal atrial fibrillation.  No evidence of sustained atrial fibrillation or flutter.  Symptomatic recordings did not correlate with any pathologic rhythm disturbance.  No sustained atrial or ventricular tachyarrhythmia.  No profound bradycardia or significant pauses.    I have reviewed and updated the patient's past medical history, allergy list and medication list.          Physical Examination   Vitals: /78 (BP Location: Left arm, Patient Position: Sitting, Cuff Size: Adult Large)   Pulse 85   Resp 16   Wt 131.1 kg (289 lb)   LMP  (LMP Unknown)   BMI 42.68 kg/m      BMI= Body mass index is 42.68 kg/m .    Wt Readings from Last 3 Encounters:   05/01/24 131.1 kg (289 lb)   03/27/24 131 kg (288 lb 11.2 oz)   03/07/24 129 kg (284 lb 4.8 oz)       General   Appearance:   Alert and oriented, in no acute distress.    HEENT:  Normocephalic and atraumatic. Conjunctiva and sclera are clear. Moist oral mucosa.    Neck: No JVP, carotid bruit or obvious thyromegaly.   Lungs:   Respirations unlabored. Clear bilaterally with no rales, rhonchi, or  wheezes.     Cardiovascular:   Rhythm is regular. S1 and S2 are normal. No significant murmur is present. Lower extremities demonstrate no significant edema. Posterior tibial pulses are intact bilaterally.   Extremities: No cyanosis or clubbing   Skin: Skin is warm, dry, and otherwise intact.   Neurologic: Gait not assessed. Mood and affect appropriate.           Medical History  Surgical History Family History Social History   Past Medical History:   Diagnosis Date    Anemia     Antiplatelet or antithrombotic long-term use     Arrhythmia     Cannabis use without complication 12/8/2014    Carpal tunnel syndrome     Coronary artery disease     Diabetes mellitus, type 2 (H) 12/13/2023    Gastroesophageal reflux disease     History of angina     Hypertension     Irregular heart beat     Obesity     Paroxysmal atrial fibrillation (H)     PTSD (post-traumatic stress disorder)     RA (rheumatoid arthritis) (H)     Rheumatoid arthritis (H) 7/8/2016    Sicca syndrome (H24)     Sleep apnea     Past Surgical History:   Procedure Laterality Date    CHOLECYSTECTOMY      CYSTOSCOPY N/A 5/4/2023    Procedure: AND CYSTOSCOPY;  Surgeon: Irma Cary MD;  Location: [facility] Main OR    DAVINCI HYSTERECTOMY TOTAL Bilateral 5/4/2023    Procedure: ROBOTIC ASSISTED TOTAL LAPAROSCOPIC HYSTERECTOMY WITH BILATERAL SALPINGECTOMY;  Surgeon: Irma Cary MD;  Location: [facility] Main OR    DILATION AND CURETTAGE, OPERATIVE HYSTEROSCOPY, COMBINED N/A 3/3/2022    Procedure: HYSTEROSCOPY, WITH DILATION AND CURETTAGE;  Surgeon: Irma Cary MD;  Location: [facility] Main OR    EP ABLATION FOCAL AFIB N/A 6/30/2022    Procedure: Ablation Atrial Fibrilation;  Surgeon: Jose Guadalupe Joseph MD;  Location:  HEART CARDIAC CATH LAB    HC REMOVAL GALLBLADDER      Description: Cholecystectomy;  Recorded: 10/15/2013;    LAPAROSCOPIC TUBAL LIGATION      RELEASE CARPAL TUNNEL BILATERAL      TUBAL LIGATION  1995    Family  History   Problem Relation Age of Onset    Crohn's Disease Mother         Total colectomy with ileostomy.    Atrial fibrillation Mother     Snoring Father     Diabetes Father     Prostate Cancer Father     Chronic Kidney Disease Father         Chose against dialysis.    Substance Abuse Brother     Depression Brother     Attention Deficit Disorder Brother     Alcoholism Brother     Arrhythmia Brother     Alcoholism Brother     Substance Abuse Brother     Arrhythmia Brother     Alcoholism Maternal Grandfather     No Known Problems Daughter     No Known Problems Son     Lupus Cousin     Breast Cancer No family hx of     Social History     Socioeconomic History    Marital status: Single     Spouse name: Not on file    Number of children: 2    Years of education: 4    Highest education level: Not on file   Occupational History    Not on file   Tobacco Use    Smoking status: Never     Passive exposure: Past    Smokeless tobacco: Never   Vaping Use    Vaping status: Never Used   Substance and Sexual Activity    Alcohol use: Not Currently     Comment: Alcoholic Drinks/day: Never an issue, she states.  7/8/16    Drug use: Not Currently     Comment: Drug use: Former marijuana use, not current. 7/8/16    Sexual activity: Never     Partners: Male     Birth control/protection: Other     Comment: tubal ligation   Other Topics Concern    Parent/sibling w/ CABG, MI or angioplasty before 65F 55M? Not Asked   Social History Narrative    Not on file     Social Determinants of Health     Financial Resource Strain: Not on file   Food Insecurity: Not on file   Transportation Needs: Not on file   Physical Activity: Not on file   Stress: Not on file   Social Connections: Not on file   Interpersonal Safety: Low Risk  (3/27/2024)    Interpersonal Safety     Do you feel physically and emotionally safe where you currently live?: Yes     Within the past 12 months, have you been hit, slapped, kicked or otherwise physically hurt by someone?: No      Within the past 12 months, have you been humiliated or emotionally abused in other ways by your partner or ex-partner?: No   Housing Stability: Not on file          Medications  Allergies   Scheduled Meds:  Current Outpatient Medications   Medication Sig Dispense Refill    Adalimumab-adaz 40 MG/0.4ML SOAJ Inject 0.4 mLs Subcutaneous every 14 days 0.8 mL 3    apixaban ANTICOAGULANT (ELIQUIS ANTICOAGULANT) 5 MG tablet Take 1 tablet (5 mg) by mouth 2 times daily 180 tablet 3    cetirizine (ZYRTEC) 10 MG tablet Take 1 tablet (10 mg) by mouth daily 90 tablet 1    diltiazem ER COATED BEADS (CARDIZEM CD/CARTIA XT) 180 MG 24 hr capsule Take 2 capsules (360 mg) by mouth daily 180 capsule 3    docusate sodium (COLACE) 100 MG capsule Take 1 capsule (100 mg) by mouth 2 times daily as needed for other (While taking pain pills to prevent constipation) 30 capsule 0    doxepin (SILENOR) 3 MG tablet Take 1-2 tablets (3-6 mg) by mouth nightly as needed for sleep 90 tablet 0    EPINEPHrine (ANY BX GENERIC EQUIV) 0.3 MG/0.3ML injection 2-pack Inject 0.3 mg into the muscle as needed for anaphylaxis      ferrous gluconate (FERGON) 324 (38 Fe) MG tablet Take 1 tablet (324 mg) by mouth daily (with breakfast) 30 tablet 0    LORazepam (ATIVAN) 0.5 MG tablet Take 1 tablet (0.5 mg) by mouth daily as needed for anxiety 30 tablet 2    metFORMIN (GLUCOPHAGE XR) 500 MG 24 hr tablet Take 1 tablet (500 mg) by mouth daily (with dinner) 90 tablet 2    Multiple Vitamins-Minerals (CENTROVITE) TABS Take 1 tablet by mouth daily      omeprazole (PRILOSEC) 40 MG DR capsule Take 1 capsule (40 mg) by mouth daily 90 capsule 3    venlafaxine (EFFEXOR XR) 75 MG 24 hr capsule Take 1 capsule (75 mg) by mouth daily 90 capsule 0    vitamin D3 (CHOLECALCIFEROL) 1.25 MG (80870 UT) capsule Take 1 capsule (50,000 Units) by mouth every 7 days 12 capsule 3    Allergies   Allergen Reactions    Penicillins Shortness Of Breath, Palpitations, Dizziness, Anaphylaxis,  Hives, Itching and Rash     Test in , see allergy note on 21    Shellfish-Derived Products Anaphylaxis    Sulfa Antibiotics Hives and Anaphylaxis         Lab Results    Chemistry/lipid CBC Cardiac Enzymes/BNP/TSH/INR   Lab Results   Component Value Date    CHOL 203 (H) 2024    HDL 50 2024    TRIG 156 (H) 2024    BUN 15.4 2024     2024    CO2 31 (H) 2024    Lab Results   Component Value Date    WBC 11.4 (H) 2024    HGB 12.1 2024    HCT 39.5 2024    MCV 80 2024     2024    @RESUFAST(BMP,CBC,BNP,TSH,  INR)@      28 minutes spent reviewing prior records (including documentation, laboratory studies, cardiac testing/imaging), history and physical exam, planning, and subsequent documentation.     The longitudinal plan of care for the diagnosis(es)/condition(s) as documented were addressed during this visit. Due to the added complexity in care, I will continue to support Maritza in the subsequent management and with ongoing continuity of care.      This note has been dictated using voice recognition software. Any grammatical, typographical, or context distortions are unintentional and inherent to the software.    Shira Burns, CNP  Clinical Cardiac Electrophysiology  [facility]  Clinic and [phone]  Fax: [phone]  Electrophysiology Nurses: [phone]

## 2024-05-01 ENCOUNTER — OFFICE VISIT (OUTPATIENT)
Dept: CARDIOLOGY | Facility: CLINIC | Age: 59
End: 2024-05-01
Attending: NURSE PRACTITIONER
Payer: COMMERCIAL

## 2024-05-01 VITALS
WEIGHT: 289 LBS | DIASTOLIC BLOOD PRESSURE: 78 MMHG | BODY MASS INDEX: 42.68 KG/M2 | RESPIRATION RATE: 16 BRPM | HEART RATE: 85 BPM | SYSTOLIC BLOOD PRESSURE: 138 MMHG

## 2024-05-01 DIAGNOSIS — I48.0 PAROXYSMAL ATRIAL FIBRILLATION (H): Primary | ICD-10-CM

## 2024-05-01 DIAGNOSIS — I10 ESSENTIAL HYPERTENSION: ICD-10-CM

## 2024-05-01 PROCEDURE — 99214 OFFICE O/P EST MOD 30 MIN: CPT | Performed by: NURSE PRACTITIONER

## 2024-05-01 NOTE — LETTER
5/1/2024    Estelle Bansal MD  [address]  [place] [address]    RE: Alina NAIK Jayshree       Dear Colleague,     I had the pleasure of seeing Alina Sanchezoway in the [facility].     [facility]  Cardiac Electrophysiology  [address]  [place] [address]   Office: [phone]  Fax: [phone]     HEART CARE ELECTROPHYSIOLOGY FOLLOW UP    Primary Care: Estelle Bansal MD      Assessment/Recommendations     Paroxysmal atrial fibrillation: Symptomatic with palpitations.  Status post RF PVI 6/30/2022 with no symptomatology or documentation of recurrent arrhythmia    [id number] score of 2 for gender, HTN.  We discussed the risks and benefits of ongoing use of oral anticoagulation are indeterminate, and decisions regarding continuation or cessation of oral anticoagulation should take into account risks of bleeding complications, stroke risk reduction and patient preference, with interval re-assessment of risks and benefits.  She would prefer to discontinue oral anticoagulation and monitor for recurrent AF    RANDALL: consistent use of CPAP    Exertional dyspnea, wheezing: Low suspicion for cardiac etiology with unremarkable stress test and echocardiogram 2022, no recurrent atrial arrhythmias     Plan:   -Discontinue Eliquis  -Follow-up with Dr. Richter 3-6 months, further EP follow-up as needed     History of Present Illness/Subjective    Alina Martell is a 59 year old female with past medical history significant for paroxysmal AF status post PVI 6/30/2022, minimal CAD, HTN, rheumatoid arthritis, prediabetes, Sjogren syndrome, RANDALL, obesity.  She was diagnosed with atrial fibrillation in 2020 upon ED evaluation for palpitations, undergoing DCCV.  She was briefly treated with pill in pocket flecainide prior to RF PVI 6/30/2022.  She noted intermittent dizziness, chest discomfort and palpitations with reassuring cardiac evaluation in the ED,  with unremarkable MCT 2023. She continues to endorse wheezing and dyspnea on exertion, worse over the past several weeks and in environments with pet hair, relieved with rest.  She has no concurrent chest discomfort, palpitations, lightheadedness/dizziness, fatigue or changes in activity tolerance.  She has mild chronic pedal edema which is stable.  She denies presyncope or syncope.     Data Review     Arrhythmia hx  Dx/date: 9/2020  Sx: Palpitations  [id number]: 3 for gender, HTN, diabetes; apixaban  Rate control: Diltiazem  AAD: Flecainide  DCCV: 8/12/2020, 1/1/2022  Ablation: 6/30/2022, Dr. Joseph-mild LAE, mild scattered fibrosis    EKG 12/15/2022 sinus rhythm 75 bpm     TTE 2022  Global and regional left ventricular function is normal with an EF of 60-65%.  Global right ventricular function is normal.  No significant valvular abnormalities present.  Pulmonary artery systolic pressure is normal.  The inferior vena cava is normal.  No pericardial effusion is present.     MCOT 3/28-4/26/2023. Essentially normal multi day patient activated monitor  Indication for study: Paroxysmal atrial fibrillation.  No evidence of sustained atrial fibrillation or flutter.  Symptomatic recordings did not correlate with any pathologic rhythm disturbance.  No sustained atrial or ventricular tachyarrhythmia.  No profound bradycardia or significant pauses.    I have reviewed and updated the patient's past medical history, allergy list and medication list.          Physical Examination   Vitals: /78 (BP Location: Left arm, Patient Position: Sitting, Cuff Size: Adult Large)   Pulse 85   Resp 16   Wt 131.1 kg (289 lb)   LMP  (LMP Unknown)   BMI 42.68 kg/m      BMI= Body mass index is 42.68 kg/m .    Wt Readings from Last 3 Encounters:   05/01/24 131.1 kg (289 lb)   03/27/24 131 kg (288 lb 11.2 oz)   03/07/24 129 kg (284 lb 4.8 oz)       General   Appearance:   Alert and oriented, in no acute distress.    HEENT:   Normocephalic and atraumatic. Conjunctiva and sclera are clear. Moist oral mucosa.    Neck: No JVP, carotid bruit or obvious thyromegaly.   Lungs:   Respirations unlabored. Clear bilaterally with no rales, rhonchi, or wheezes.     Cardiovascular:   Rhythm is regular. S1 and S2 are normal. No significant murmur is present. Lower extremities demonstrate no significant edema. Posterior tibial pulses are intact bilaterally.   Extremities: No cyanosis or clubbing   Skin: Skin is warm, dry, and otherwise intact.   Neurologic: Gait not assessed. Mood and affect appropriate.           Medical History  Surgical History Family History Social History   Past Medical History:   Diagnosis Date    Anemia     Antiplatelet or antithrombotic long-term use     Arrhythmia     Cannabis use without complication 12/8/2014    Carpal tunnel syndrome     Coronary artery disease     Diabetes mellitus, type 2 (H) 12/13/2023    Gastroesophageal reflux disease     History of angina     Hypertension     Irregular heart beat     Obesity     Paroxysmal atrial fibrillation (H)     PTSD (post-traumatic stress disorder)     RA (rheumatoid arthritis) (H)     Rheumatoid arthritis (H) 7/8/2016    Sicca syndrome (H24)     Sleep apnea     Past Surgical History:   Procedure Laterality Date    CHOLECYSTECTOMY      CYSTOSCOPY N/A 5/4/2023    Procedure: AND CYSTOSCOPY;  Surgeon: Irma Cary MD;  Location: [facility] OR    DAVINCI HYSTERECTOMY TOTAL Bilateral 5/4/2023    Procedure: ROBOTIC ASSISTED TOTAL LAPAROSCOPIC HYSTERECTOMY WITH BILATERAL SALPINGECTOMY;  Surgeon: Irma Cary MD;  Location: [facility] OR    DILATION AND CURETTAGE, OPERATIVE HYSTEROSCOPY, COMBINED N/A 3/3/2022    Procedure: HYSTEROSCOPY, WITH DILATION AND CURETTAGE;  Surgeon: Irma Cary MD;  Location: [facility] OR    EP ABLATION FOCAL AFIB N/A 6/30/2022    Procedure: Ablation Atrial Fibrilation;  Surgeon: Jose Guadalupe Joseph MD;  Location:   HEART CARDIAC CATH LAB    HC REMOVAL GALLBLADDER      Description: Cholecystectomy;  Recorded: 10/15/2013;    LAPAROSCOPIC TUBAL LIGATION      RELEASE CARPAL TUNNEL BILATERAL      TUBAL LIGATION  1995    Family History   Problem Relation Age of Onset    Crohn's Disease Mother         Total colectomy with ileostomy.    Atrial fibrillation Mother     Snoring Father     Diabetes Father     Prostate Cancer Father     Chronic Kidney Disease Father         Chose against dialysis.    Substance Abuse Brother     Depression Brother     Attention Deficit Disorder Brother     Alcoholism Brother     Arrhythmia Brother     Alcoholism Brother     Substance Abuse Brother     Arrhythmia Brother     Alcoholism Maternal Grandfather     No Known Problems Daughter     No Known Problems Son     Lupus Cousin     Breast Cancer No family hx of     Social History     Socioeconomic History    Marital status: Single     Spouse name: Not on file    Number of children: 2    Years of education: 4    Highest education level: Not on file   Occupational History    Not on file   Tobacco Use    Smoking status: Never     Passive exposure: Past    Smokeless tobacco: Never   Vaping Use    Vaping status: Never Used   Substance and Sexual Activity    Alcohol use: Not Currently     Comment: Alcoholic Drinks/day: Never an issue, she states.  7/8/16    Drug use: Not Currently     Comment: Drug use: Former marijuana use, not current. 7/8/16    Sexual activity: Never     Partners: Male     Birth control/protection: Other     Comment: tubal ligation   Other Topics Concern    Parent/sibling w/ CABG, MI or angioplasty before 65F 55M? Not Asked   Social History Narrative    Not on file     Social Determinants of Health     Financial Resource Strain: Not on file   Food Insecurity: Not on file   Transportation Needs: Not on file   Physical Activity: Not on file   Stress: Not on file   Social Connections: Not on file   Interpersonal Safety: Low Risk  (3/27/2024)     Interpersonal Safety     Do you feel physically and emotionally safe where you currently live?: Yes     Within the past 12 months, have you been hit, slapped, kicked or otherwise physically hurt by someone?: No     Within the past 12 months, have you been humiliated or emotionally abused in other ways by your partner or ex-partner?: No   Housing Stability: Not on file          Medications  Allergies   Scheduled Meds:  Current Outpatient Medications   Medication Sig Dispense Refill    Adalimumab-adaz 40 MG/0.4ML SOAJ Inject 0.4 mLs Subcutaneous every 14 days 0.8 mL 3    apixaban ANTICOAGULANT (ELIQUIS ANTICOAGULANT) 5 MG tablet Take 1 tablet (5 mg) by mouth 2 times daily 180 tablet 3    cetirizine (ZYRTEC) 10 MG tablet Take 1 tablet (10 mg) by mouth daily 90 tablet 1    diltiazem ER COATED BEADS (CARDIZEM CD/CARTIA XT) 180 MG 24 hr capsule Take 2 capsules (360 mg) by mouth daily 180 capsule 3    docusate sodium (COLACE) 100 MG capsule Take 1 capsule (100 mg) by mouth 2 times daily as needed for other (While taking pain pills to prevent constipation) 30 capsule 0    doxepin (SILENOR) 3 MG tablet Take 1-2 tablets (3-6 mg) by mouth nightly as needed for sleep 90 tablet 0    EPINEPHrine (ANY BX GENERIC EQUIV) 0.3 MG/0.3ML injection 2-pack Inject 0.3 mg into the muscle as needed for anaphylaxis      ferrous gluconate (FERGON) 324 (38 Fe) MG tablet Take 1 tablet (324 mg) by mouth daily (with breakfast) 30 tablet 0    LORazepam (ATIVAN) 0.5 MG tablet Take 1 tablet (0.5 mg) by mouth daily as needed for anxiety 30 tablet 2    metFORMIN (GLUCOPHAGE XR) 500 MG 24 hr tablet Take 1 tablet (500 mg) by mouth daily (with dinner) 90 tablet 2    Multiple Vitamins-Minerals (CENTROVITE) TABS Take 1 tablet by mouth daily      omeprazole (PRILOSEC) 40 MG DR capsule Take 1 capsule (40 mg) by mouth daily 90 capsule 3    venlafaxine (EFFEXOR XR) 75 MG 24 hr capsule Take 1 capsule (75 mg) by mouth daily 90 capsule 0    vitamin D3  (CHOLECALCIFEROL) 1.25 MG (55650 UT) capsule Take 1 capsule (50,000 Units) by mouth every 7 days 12 capsule 3    Allergies   Allergen Reactions    Penicillins Shortness Of Breath, Palpitations, Dizziness, Anaphylaxis, Hives, Itching and Rash     Test in , see allergy note on 21    Shellfish-Derived Products Anaphylaxis    Sulfa Antibiotics Hives and Anaphylaxis         Lab Results    Chemistry/lipid CBC Cardiac Enzymes/BNP/TSH/INR   Lab Results   Component Value Date    CHOL 203 (H) 2024    HDL 50 2024    TRIG 156 (H) 2024    BUN 15.4 2024     2024    CO2 31 (H) 2024    Lab Results   Component Value Date    WBC 11.4 (H) 2024    HGB 12.1 2024    HCT 39.5 2024    MCV 80 2024     2024    @RESUFAST(BMP,CBC,BNP,TSH,  INR)@      28 minutes spent reviewing prior records (including documentation, laboratory studies, cardiac testing/imaging), history and physical exam, planning, and subsequent documentation.     The longitudinal plan of care for the diagnosis(es)/condition(s) as documented were addressed during this visit. Due to the added complexity in care, I will continue to support Maritza in the subsequent management and with ongoing continuity of care.      This note has been dictated using voice recognition software. Any grammatical, typographical, or context distortions are unintentional and inherent to the software.    Shira Burns CNP  Clinical Cardiac Electrophysiology  [facility]  Clinic and [phone]  Fax: [phone]  Electrophysiology Nurses: [phone]          Thank you for allowing me to participate in the care of your patient.      Sincerely,     STARLA PADGETT CNP     [facility]  cc:   STARLA Webb CNP  HEART [facility]ASC[facility] [address]  [address] [place] [address]

## 2024-05-05 ENCOUNTER — HEALTH MAINTENANCE LETTER (OUTPATIENT)
Age: 59
End: 2024-05-05

## 2024-05-13 ENCOUNTER — ANCILLARY PROCEDURE (OUTPATIENT)
Dept: MAMMOGRAPHY | Facility: CLINIC | Age: 59
End: 2024-05-13
Attending: FAMILY MEDICINE
Payer: COMMERCIAL

## 2024-05-13 DIAGNOSIS — Z12.31 VISIT FOR SCREENING MAMMOGRAM: ICD-10-CM

## 2024-05-13 PROCEDURE — 77063 BREAST TOMOSYNTHESIS BI: CPT

## 2024-05-16 ENCOUNTER — OFFICE VISIT (OUTPATIENT)
Dept: PSYCHOLOGY | Facility: CLINIC | Age: 59
End: 2024-05-16
Payer: COMMERCIAL

## 2024-05-16 DIAGNOSIS — F33.1 MODERATE EPISODE OF RECURRENT MAJOR DEPRESSIVE DISORDER (H): ICD-10-CM

## 2024-05-16 DIAGNOSIS — F43.10 POSTTRAUMATIC STRESS DISORDER: ICD-10-CM

## 2024-05-16 DIAGNOSIS — F41.1 GAD (GENERALIZED ANXIETY DISORDER): Primary | ICD-10-CM

## 2024-05-16 PROCEDURE — 90834 PSYTX W PT 45 MINUTES: CPT | Performed by: COUNSELOR

## 2024-05-16 NOTE — PROGRESS NOTES
[facility]                                     Progress Note    Patient Name: Alina Martell  Date: 5/16/24         Service Type: Individual      Session Start Time: 8:03 Session End Time: 8:55     Session Length: 52 minutes    Session #: 73     Attendees: Client       Service Modality:  In-Person       DATA  Extended Session (53+ minutes): No  Interactive Complexity: No  Crisis: No      Progress Since Last Session (Related to Symptoms / Goals / Homework):   Symptoms: Improving symptoms not as severe    Homework: Achieved / completed to satisfaction      Episode of Care Goals: Satisfactory progress - ACTION (Actively working towards change); Intervened by reinforcing change plan / affirming steps taken     Current / Ongoing Stressors and Concerns:  Patient indicated that her symptoms are not as sever as previous session. Patient reported she has had a lot of sadness occur. Patient indicated health concerns with her mom that caused her some anxiety as well. Patient reported she has still been gambling. Patient talked about how this fills avoid for her and needs to identify other activities. Patient reported her relationship stays the same and needing to make decisions on what she wants at this time. This therapist processed with patient what is in her control and decisions that needs to be made at this time.      Treatment Objective(s) Addressed in This Session:   use thought-stopping strategy daily to reduce intrusive thoughts  Increase interest, engagement, and pleasure in doing things  Decrease frequency and intensity of feeling down, depressed, hopeless  Improve quantity and quality of night time sleep / decrease daytime naps  Feel less tired and more energy during the day      Intervention:   Motivational Interviewing    MI Intervention: Permission to raise concern or advise     Change Talk Expressed by the Patient: Activation Taking steps    Provider Response to Change Talk: A -  Affirmed patient's thoughts, decisions, or attempts at behavior change and R - Reflected patient's change talk      Assessments completed prior to visit:  The following assessments were completed by patient for this visit:  PHQ9:       10/9/2023     5:07 AM 10/30/2023     3:22 PM 12/13/2023     3:35 PM 12/18/2023    12:59 PM 1/8/2024     7:06 AM 2/18/2024    10:01 PM 4/3/2024     9:50 AM   PHQ-9 SCORE   PHQ-9 Total Score MyChart 10 (Moderate depression) 7 (Mild depression) 9 (Mild depression) 10 (Moderate depression) 8 (Mild depression) 5 (Mild depression) 6 (Mild depression)   PHQ-9 Total Score 10 7 9 10 8 5 6     GAD7:       11/3/2022    12:06 PM 12/1/2022     9:52 AM 7/5/2023     2:18 PM 9/26/2023    12:05 PM 10/9/2023    10:00 AM 10/30/2023     3:23 PM 12/6/2023     9:50 AM   ADA-7 SCORE   Total Score 10 (moderate anxiety) 8 (mild anxiety) 6 (mild anxiety) 11 (moderate anxiety)  5 (mild anxiety) 11 (moderate anxiety)   Total Score 10 8 6 11 8 5 11     PROMIS 10-Global Health (all questions and answers displayed):       11/3/2022    12:07 PM 12/1/2022     9:53 AM 3/10/2023     8:13 AM 6/13/2023     6:37 AM 9/26/2023    12:07 PM 1/1/2024    11:44 PM 4/3/2024     9:52 AM   PROMIS 10   In general, would you say your health is: Good Good Fair Good Fair Good Good   In general, would you say your quality of life is: Good Good Good Good Good Fair Good   In general, how would you rate your physical health? Good Fair Fair Fair Fair Fair Fair   In general, how would you rate your mental health, including your mood and your ability to think? Fair Fair Fair Fair Fair Fair Fair   In general, how would you rate your satisfaction with your social activities and relationships? Fair Good Good Good Good Fair Good   In general, please rate how well you carry out your usual social activities and roles Good Good Good Good Good Good Good   To what extent are you able to carry out your everyday physical activities such as walking,  climbing stairs, carrying groceries, or moving a chair? Mostly Mostly Mostly Mostly Mostly Mostly Completely   In the past 7 days, how often have you been bothered by emotional problems such as feeling anxious, depressed, or irritable? Often Often Sometimes Often Often Often Sometimes   In the past 7 days, how would you rate your fatigue on average? Mild Mild Moderate Mild Moderate Mild Moderate   In the past 7 days, how would you rate your pain on average, where 0 means no pain, and 10 means worst imaginable pain? 2 3 4 2 3 3 3   In general, would you say your health is: 3 3 2 3 2 3 3   In general, would you say your quality of life is: 3 3 3 3 3 2 3   In general, how would you rate your physical health? 3 2 2 2 2 2 2   In general, how would you rate your mental health, including your mood and your ability to think? 2 2 2 2 2 2 2   In general, how would you rate your satisfaction with your social activities and relationships? 2 3 3 3 3 2 3   In general, please rate how well you carry out your usual social activities and roles. (This includes activities at home, at work and in your community, and responsibilities as a parent, child, spouse, employee, friend, etc.) 3 3 3 3 3 3 3   To what extent are you able to carry out your everyday physical activities such as walking, climbing stairs, carrying groceries, or moving a chair? 4 4 4 4 4 4 5   In the past 7 days, how often have you been bothered by emotional problems such as feeling anxious, depressed, or irritable? 4 4 3 4 4 4 3   In the past 7 days, how would you rate your fatigue on average? 2 2 3 2 3 2 3   In the past 7 days, how would you rate your pain on average, where 0 means no pain, and 10 means worst imaginable pain? 2 3 4 2 3 3 3   Global Mental Health Score 9 10 11 10 10 8    8 11   Global Physical Health Score 15 14 12 14 13 14    14 14   PROMIS TOTAL - SUBSCORES 24 24 23 24 23 22    22 25         ASSESSMENT: Current Emotional / Mental Status (status of  significant symptoms):   Risk status (Self / Other harm or suicidal ideation)   Patient denies current fears or concerns for personal safety.   Patient denies current or recent suicidal ideation or behaviors.   Patient denies current or recent homicidal ideation or behaviors.   Patient denies current or recent self injurious behavior or ideation.   Patient denies other safety concerns.   Patient reports there has been no change in risk factors since their last session.     Patient reports there has been no change in protective factors since their last session.     Recommended that patient call 911 or go to the local ED should there be a change in any of these risk factors.     Appearance:   Appropriate    Eye Contact:   Good    Psychomotor Behavior: Normal    Attitude:   Cooperative    Orientation:   All   Speech    Rate / Production: Normal/ Responsive    Volume:  Normal    Mood:    Anxious  Sad    Affect:    Appropriate    Thought Content:  Clear    Thought Form:  Coherent  Goal Directed  Logical    Insight:    Good      Medication Review:   No changes to current psychiatric medication(s)     Medication Compliance:   Yes     Changes in Health Issues:   None reported     Chemical Use Review:   Substance Use: Chemical use reviewed, no active concerns identified      Tobacco Use: No current tobacco use.      Diagnosis:  1. ADA (generalized anxiety disorder)    2. Moderate episode of recurrent major depressive disorder (H)    3. Posttraumatic stress disorder        Collateral Reports Completed:   Not Applicable    PLAN: (Patient Tasks / Therapist Tasks / Other)  Patient will continue with psychotherapy in 3 weeks. Patient will identify activities to do when feeling the urge to go to casino. Patient will continue asking her brother's to help with their mom. Patient will identify her needs in the relationship.     There has been demonstrated improvement in functioning while patient has been engaged in  psychotherapy/psychological service- if withdrawn the patient would deteriorate and/or relapse.     Mariana KENTNubia Love, [facility]     ______________________________________________________________________    Individual Treatment Plan    Patient's Name: Alina Martell  YOB: 1965    Date of Creation:10/16/2020  Date Treatment Plan Last Reviewed/Revised: 4/4/2024    DSM5 Diagnoses: 296.32 (F33.1) Major Depressive Disorder, Recurrent Episode, Moderate _, 300.02 (F41.1) Generalized Anxiety Disorder, or 309.81 (F43.10) Posttraumatic Stress Disorder (includes Posttraumatic Stress Disorder for Children 6 Years and Younger)  Without dissociative symptoms  Psychosocial / Contextual Factors: Health, family  PROMIS (reviewed every 90 days):     PROMIS-10 Scores  Global Mental Health Score: 8  Global Physical Health Score: 14  PROMIS TOTAL - SUBSCORES: 22     Referral / Collaboration:  Was/were discussed and patient will pursue.    Anticipated number of session for this episode of care: 20 will reevaluate every 90 days  Anticipation frequency of session: Biweekly  Anticipated Duration of each session: 38-52 minutes  Treatment plan will be reviewed in 90 days or when goals have been changed.       MeasurableTreatment Goal(s) related to diagnosis / functional impairment(s)  Goal 1: Patient will work on reducing overall anxiety.     I will know I've met my goal when reporting minimal anxiety symptoms.       Objective #A (Patient Action)                          Patient will use distraction each time intrusive worry surfaces.  Status: Continued - Date(s): 4/4/2024     Intervention(s)  Therapist will teach emotional regulation skills.  Objective #B  Patient will Decrease frequency and intensity of feeling down, depressed, hopeless.  Status: Continued - Date(s):  4/4/2024     Intervention(s)  Therapist will teach emotional recognition/identification. ..     Objective #C  Patient will Identify negative self-talk and  behaviors: challenge core beliefs, myths, and actions.  Status: Continued - Date(s):  4/4/2024     Intervention(s)  Therapist will teach the client how to perform a behavioral chain analysis. ..     Goal 2: Patient will continue to work on reducing depression symptoms    I will know I've met my goal then reporting minimal depression symptoms     Objective #A (Patient Action)                          Patient will Increase interest, engagement, and pleasure in doing things.  Status: Continued - Date(s):  4/4/2024     Intervention(s)  Therapist will assign homework ..     Objective #B  Patient will Decrease frequency and intensity of feeling down, depressed, hopeless.  Status: Continued - Date(s):   4/4/2024  Intervention(s)  Therapist will assign homework at every session.     Goal 3: Client will reduce PTSD symptoms by 50% as evidenced by PCLC-5 score     I will know I've met my goal when not struggling with nightmares.      Objective #A (Client Action)    Client will reduce nightmares.  Status: Continued - Date(s): 4/4/2024    Intervention(s)  Therapist will teach nightmare retraining .    Objective #B  Client will compile a list of boundaries that they would like to set with others.   .    Status: Continued - Date(s):4/4/2024     Intervention(s)  Therapist will assign homework boundary setting .            Patient has reviewed and agreed to the above plan.        Mariana Love, [facility]

## 2024-05-20 ENCOUNTER — VIRTUAL VISIT (OUTPATIENT)
Dept: PSYCHIATRY | Facility: CLINIC | Age: 59
End: 2024-05-20
Payer: COMMERCIAL

## 2024-05-20 DIAGNOSIS — F43.10 PTSD (POST-TRAUMATIC STRESS DISORDER): ICD-10-CM

## 2024-05-20 DIAGNOSIS — F41.1 GAD (GENERALIZED ANXIETY DISORDER): Primary | ICD-10-CM

## 2024-05-20 DIAGNOSIS — F33.1 MODERATE EPISODE OF RECURRENT MAJOR DEPRESSIVE DISORDER (H): ICD-10-CM

## 2024-05-20 PROCEDURE — 99214 OFFICE O/P EST MOD 30 MIN: CPT | Mod: 95 | Performed by: NURSE PRACTITIONER

## 2024-05-20 RX ORDER — LORAZEPAM 0.5 MG/1
0.5 TABLET ORAL DAILY PRN
Qty: 30 TABLET | Refills: 2 | Status: SHIPPED | OUTPATIENT
Start: 2024-05-20

## 2024-05-20 RX ORDER — CLONIDINE HYDROCHLORIDE 0.1 MG/1
0.1 TABLET ORAL AT BEDTIME
Qty: 90 TABLET | Refills: 0 | Status: SHIPPED | OUTPATIENT
Start: 2024-05-20 | End: 2024-05-23

## 2024-05-20 RX ORDER — VENLAFAXINE HYDROCHLORIDE 75 MG/1
75 CAPSULE, EXTENDED RELEASE ORAL DAILY
Qty: 90 CAPSULE | Refills: 0 | Status: SHIPPED | OUTPATIENT
Start: 2024-05-20 | End: 2024-07-24

## 2024-05-20 ASSESSMENT — PATIENT HEALTH QUESTIONNAIRE - PHQ9
SUM OF ALL RESPONSES TO PHQ QUESTIONS 1-9: 4
SUM OF ALL RESPONSES TO PHQ QUESTIONS 1-9: 4
10. IF YOU CHECKED OFF ANY PROBLEMS, HOW DIFFICULT HAVE THESE PROBLEMS MADE IT FOR YOU TO DO YOUR WORK, TAKE CARE OF THINGS AT HOME, OR GET ALONG WITH OTHER PEOPLE: SOMEWHAT DIFFICULT

## 2024-05-20 NOTE — NURSING NOTE
Is the patient currently in the state of [place]? YES    Visit mode:VIDEO    If the visit is dropped, the patient can be reconnected by: VIDEO VISIT: Send to e-mail at: [email or web address]    Will anyone else be joining the visit? NO  (If patient encounters technical issues they should call [phone]    How would you like to obtain your AVS? MyChart    Are changes needed to the allergy or medication list? No    Are refills needed on medications prescribed by this physician? YES    Reason for visit: RECHECK    Kyle MADRID

## 2024-05-20 NOTE — PROGRESS NOTES
"Virtual Visit Details    Type of service:  Video Visit     Originating Location (pt. Location): [place], [place]    Distant Location (provider location):  Off-site  Platform used for Video Visit: [facility]        OUTPATIENT PSYCHIATRIC FOLLOW-UP      2024     Provider: STARLA Kaiser CNP      Appointment Start Time: 11:42 AM  Appointment End Time: 12:09 PM    Name: Alina Martell   : 1965                    Preferred Name: Maritza      Screening Tools           2024     9:50 AM 4/3/2024     9:50 AM 2024    10:01 PM   PHQ   PHQ-9 Total Score 4 6 5   Q9: Thoughts of better off dead/self-harm past 2 weeks Not at all Not at all Not at all         2023     9:50 AM 10/30/2023     3:23 PM 10/9/2023    10:00 AM   ADA-7 SCORE   Total Score 11 (moderate anxiety) 5 (mild anxiety)    Total Score 11 5 8     PROMIS 10-Global Health (only subscores and total score):       2022     9:53 AM 3/10/2023     8:13 AM 2023     6:37 AM 2023    12:07 PM 2024    11:44 PM 4/3/2024     9:52 AM 2024     9:52 AM   PROMIS-10 Scores Only   Global Mental Health Score 10 11 10 10 8    8 11 10   Global Physical Health Score 14 12 14 13 14    14 14 15   PROMIS TOTAL - SUBSCORES 24 23 24 23 22    22 25 25          History of Present Illness      Patient attended the session alone.     Interim History:  I last saw Alina HEENA Jayshree for outpatient psychiatry follow-up on 24. During that appointment, we continued plan.     Current stressors include: Death/loss, housing    Coping mechanisms and supports include:  Volunteering , therapy    Side effects: Denies    Medication adherence: Reports good med adherence.    Psychiatric Review of Systems      Alina NAIK Martell reports mood has been: \"good for the most part\"    - Reviewed therapy note from 24, 24: Increased gambling.     Depression has been:   - A couple of deaths recently.  Feels she is managing in a normal range. Some mild " "sx.   - Mom had a TIA while she was with her. Managed this.     Anxiety has been:   - Feels wound up.   - No panic sx.   - Heightened anxiety as place she is renting from wants to sell apartment.     Sleep has been:   - CPAP adjusted.   - Sleep stable.   - Can initiate sleep overall  - On occasion will take ativan on occasion.   - Doxepin     Taryn sxs: Denies    Psychosis sxs: Denies    ADHD sxs:   - Impulsivity with gambling. Feels it is a sense of control. Starting to impact her financial. Starting to worry about this. Feels it is related to PTSD. \"I can start and stop when I want\". Compared similar to previous eating d/o hx in her 20s. When people noticed her weight dropping she would stop restricting behaviors.     PTSD sxs: Denies    SI/SIB: Denies SI/SIB/HI      Medications Prior to Appointment       Current Outpatient Medications   Medication Sig Dispense Refill    Adalimumab-adaz 40 MG/0.4ML SOAJ Inject 0.4 mLs Subcutaneous every 14 days 0.8 mL 3    cetirizine (ZYRTEC) 10 MG tablet Take 1 tablet (10 mg) by mouth daily 90 tablet 1    diltiazem ER COATED BEADS (CARDIZEM CD/CARTIA XT) 180 MG 24 hr capsule Take 2 capsules (360 mg) by mouth daily 180 capsule 3    docusate sodium (COLACE) 100 MG capsule Take 1 capsule (100 mg) by mouth 2 times daily as needed for other (While taking pain pills to prevent constipation) 30 capsule 0    doxepin (SILENOR) 3 MG tablet Take 1-2 tablets (3-6 mg) by mouth nightly as needed for sleep 90 tablet 0    EPINEPHrine (ANY BX GENERIC EQUIV) 0.3 MG/0.3ML injection 2-pack Inject 0.3 mg into the muscle as needed for anaphylaxis      ferrous gluconate (FERGON) 324 (38 Fe) MG tablet Take 1 tablet (324 mg) by mouth daily (with breakfast) 30 tablet 0    LORazepam (ATIVAN) 0.5 MG tablet Take 1 tablet (0.5 mg) by mouth daily as needed for anxiety 30 tablet 2    metFORMIN (GLUCOPHAGE XR) 500 MG 24 hr tablet Take 1 tablet (500 mg) by mouth daily (with dinner) 90 tablet 2    Multiple " Vitamins-Minerals (CENTROVITE) TABS Take 1 tablet by mouth daily      omeprazole (PRILOSEC) 40 MG DR capsule Take 1 capsule (40 mg) by mouth daily 90 capsule 3    venlafaxine (EFFEXOR XR) 75 MG 24 hr capsule Take 1 capsule (75 mg) by mouth daily 90 capsule 0    vitamin D3 (CHOLECALCIFEROL) 1.25 MG (66529 UT) capsule Take 1 capsule (50,000 Units) by mouth every 7 days 12 capsule 3          Previous medication trials include but not limited to:  SSRIs:  -Prozac  -Paxil 40 mg  -Zoloft 25 mg     SNRIs:  -Cymbalta 60 mg     Other anxiolytics:  -Buspar 5 mg TID prn: caused me to be jittery   -Hydroxyzine 25 mg TID prn: dryness      Antipsychotics:  -Abilify     Alpha receptors:  -Prazosin 1 mg: stopped experiencing nightmares     Stimulants/ADHD meds:  -Strattera 40 mg     Benzodiazepines:  -Lorazepam .5 mg prn     Sleep aides:  -Ambien: sleep walking  -Trazodone 25 to 50 mg              Medication Compliance: Yes.                  Pharmacogenomic Testing Completed: No      Medical History      History of head injuries: No  History of seizures: No  History of cardiac events: A Fib; Hx of ablation.   History of Tardive Dyskinesia: No         Past Medical History:   Diagnosis Date    Anemia     Antiplatelet or antithrombotic long-term use     Arrhythmia     Cannabis use without complication 12/8/2014    Carpal tunnel syndrome     Coronary artery disease     Diabetes mellitus, type 2 (H) 12/13/2023    Gastroesophageal reflux disease     History of angina     Hypertension     Irregular heart beat     Obesity     Paroxysmal atrial fibrillation (H)     PTSD (post-traumatic stress disorder)     RA (rheumatoid arthritis) (H)     Rheumatoid arthritis (H) 7/8/2016    Sicca syndrome (H24)     Sleep apnea         Surgery:   Past Surgical History:   Procedure Laterality Date    CHOLECYSTECTOMY      CYSTOSCOPY N/A 5/4/2023    Procedure: AND CYSTOSCOPY;  Surgeon: Irma Cary MD;  Location: [facility]  HYSTERECTOMY TOTAL Bilateral 5/4/2023    Procedure: ROBOTIC ASSISTED TOTAL LAPAROSCOPIC HYSTERECTOMY WITH BILATERAL SALPINGECTOMY;  Surgeon: Irma Cary MD;  Location: [facility] Main OR    DILATION AND CURETTAGE, OPERATIVE HYSTEROSCOPY, COMBINED N/A 3/3/2022    Procedure: HYSTEROSCOPY, WITH DILATION AND CURETTAGE;  Surgeon: Irma Cary MD;  Location: [facility] Main OR    EP ABLATION FOCAL AFIB N/A 6/30/2022    Procedure: Ablation Atrial Fibrilation;  Surgeon: Jose Guadalupe Joseph MD;  Location:  [facility]    HC REMOVAL GALLBLADDER      Description: Cholecystectomy;  Recorded: 10/15/2013;    LAPAROSCOPIC TUBAL LIGATION      RELEASE CARPAL TUNNEL BILATERAL      TUBAL LIGATION  1995     Primary Care Provider: Estelle Bansal MD        Social History      Current Living situation:  [place], [place] with self.    Current use of drugs or alcohol: Denies     Tobacco use: No    Employment: Yes. Full time Accounting     Relationship Status: in a relationship      Vitals      Not obtained d/t virtual visit.     LMP  (LMP Unknown)     Labs        Most recent laboratory results reviewed and pertinent results include:   Lab on 02/14/2024   Component Date Value Ref Range Status    Albumin 02/14/2024 3.7  3.5 - 5.2 g/dL Final    ALT 02/14/2024 19  0 - 50 U/L Final    WBC Count 02/14/2024 11.4 (H)  4.0 - 11.0 10e3/uL Final    RBC Count 02/14/2024 4.30  3.80 - 5.20 10e6/uL Final    Hemoglobin 02/14/2024 10.7 (L)  11.7 - 15.7 g/dL Final    Hematocrit 02/14/2024 34.4 (L)  35.0 - 47.0 % Final    MCV 02/14/2024 80  78 - 100 fL Final    MCH 02/14/2024 24.9 (L)  26.5 - 33.0 pg Final    MCHC 02/14/2024 31.1 (L)  31.5 - 36.5 g/dL Final    RDW 02/14/2024 15.7 (H)  10.0 - 15.0 % Final    Platelet Count 02/14/2024 348  150 - 450 10e3/uL Final    Creatinine 02/14/2024 0.77  0.51 - 0.95 mg/dL Final    GFR Estimate 02/14/2024 90  >60 mL/min/1.73m2 Final   Office Visit on 08/30/2023   Component Date Value Ref  Range Status    YESSICA interpretation 08/30/2023 Negative  Negative Final      Negative:              <1:40  Borderline Positive:   1:40 - 1:80  Positive:              >1:80    CRP Inflammation 08/30/2023 16.70 (H)  <5.00 mg/L Final    Erythrocyte Sedimentation Rate 08/30/2023 81 (H)  0 - 30 mm/hr Final    SSA Beverly IgG Instrument Value 08/30/2023 0.5  <7.0 U/mL Final    SSA (Ro) Antibody IgG 08/30/2023 Negative  Negative Final    Hemoglobin A1C 08/30/2023 6.5 (H)  0.0 - 5.6 % Final    Normal <5.7%   Prediabetes 5.7-6.4%    Diabetes 6.5% or higher     Note: Adopted from ADA consensus guidelines.    Iron 08/30/2023 32 (L)  37 - 145 ug/dL Final    Iron Binding Capacity 08/30/2023 343  240 - 430 ug/dL Final    Iron Sat Index 08/30/2023 9 (L)  15 - 46 % Final    Ferritin 08/30/2023 29  11 - 328 ng/mL Final    WBC Count 08/30/2023 8.8  4.0 - 11.0 10e3/uL Final    RBC Count 08/30/2023 4.43  3.80 - 5.20 10e6/uL Final    Hemoglobin 08/30/2023 10.9 (L)  11.7 - 15.7 g/dL Final    Hematocrit 08/30/2023 35.3  35.0 - 47.0 % Final    MCV 08/30/2023 80  78 - 100 fL Final    MCH 08/30/2023 24.6 (L)  26.5 - 33.0 pg Final    MCHC 08/30/2023 30.9 (L)  31.5 - 36.5 g/dL Final    RDW 08/30/2023 16.0 (H)  10.0 - 15.0 % Final    Platelet Count 08/30/2023 373  150 - 450 10e3/uL Final    ALT 08/30/2023 17  0 - 50 U/L Final    Reference intervals for this test were updated on 6/12/2023 to more accurately reflect our healthy population. There may be differences in the flagging of prior results with similar values performed with this method. Interpretation of those prior results can be made in the context of the updated reference intervals.      Creatinine 08/30/2023 0.67  0.51 - 0.95 mg/dL Final    GFR Estimate 08/30/2023 >90  >60 mL/min/1.73m2 Final    Albumin 08/30/2023 4.0  3.5 - 5.2 g/dL Final   Office Visit on 03/22/2023   Component Date Value Ref Range Status    Albumin 03/22/2023 3.8  3.5 - 5.2 g/dL Final    ALT 03/22/2023 13  10 - 35 U/L  Final    WBC Count 03/22/2023 8.8  4.0 - 11.0 10e3/uL Final    RBC Count 03/22/2023 4.18  3.80 - 5.20 10e6/uL Final    Hemoglobin 03/22/2023 10.7 (L)  11.7 - 15.7 g/dL Final    Hematocrit 03/22/2023 34.7 (L)  35.0 - 47.0 % Final    MCV 03/22/2023 83  78 - 100 fL Final    MCH 03/22/2023 25.6 (L)  26.5 - 33.0 pg Final    MCHC 03/22/2023 30.8 (L)  31.5 - 36.5 g/dL Final    RDW 03/22/2023 14.5  10.0 - 15.0 % Final    Platelet Count 03/22/2023 405  150 - 450 10e3/uL Final    Creatinine 03/22/2023 0.69  0.51 - 0.95 mg/dL Final    GFR Estimate 03/22/2023 >90  >60 mL/min/1.73m2 Final    eGFR calculated using 2021 CKD-EPI equation.    Erythrocyte Sedimentation Rate 03/22/2023 57 (H)  0 - 20 mm/hr Final    CRP Inflammation 03/22/2023 19.20 (H)  <5.00 mg/L Final    Cholesterol 03/22/2023 166  <200 mg/dL Final    Triglycerides 03/22/2023 89  <150 mg/dL Final    Direct Measure HDL 03/22/2023 44 (L)  >=50 mg/dL Final    LDL Cholesterol Calculated 03/22/2023 104 (H)  <=100 mg/dL Final    Non HDL Cholesterol 03/22/2023 122  <130 mg/dL Final    Vitamin D, Total (25-Hydroxy) 03/22/2023 9 (L)  20 - 75 ug/L Final   Lab Requisition on 03/20/2023   Component Date Value Ref Range Status    Interpretation 03/20/2023 Negative for Intraepithelial Lesion or Malignancy (NILM)    Final    Comment 03/20/2023    Final                    Value:This result contains rich text formatting which cannot be displayed here.    Specimen Adequacy 03/20/2023 Satisfactory for evaluation, endocervical/transformation zone component present   Final    Clinical Information 03/20/2023    Final                    Value:This result contains rich text formatting which cannot be displayed here.    LMP/Menopause Date 03/20/2023    Final                    Value:This result contains rich text formatting which cannot be displayed here.    Reflex Testing 03/20/2023 Yes regardless of result   Final    Previous Abnormal? 03/20/2023    Final                    Value:This  result contains rich text formatting which cannot be displayed here.    Previous Abnormal Diagnosis 03/20/2023    Final                    Value:This result contains rich text formatting which cannot be displayed here.    Performing Labs 03/20/2023    Final                    Value:This result contains rich text formatting which cannot be displayed here.    Other HR HPV 03/20/2023 Negative  Negative Final    HPV16 DNA 03/20/2023 Negative  Negative Final    HPV18 DNA 03/20/2023 Negative  Negative Final    FINAL DIAGNOSIS 03/20/2023    Final                    Value:This result contains rich text formatting which cannot be displayed here.   Lab Requisition on 03/20/2023   Component Date Value Ref Range Status    Case Report 03/20/2023    Final                    Value:Surgical Pathology Report                         Case: [id number]                                  Authorizing Provider:  Irma Cary MD  Collected:           03/20/2023 11:20 AM          Ordering Location:     [facility]     Received:            03/20/2023 03:47 PM                                 [facility]                                                       Pathologist:           Kirt Shrestha MD                                                                           Specimen:    Endometrium                                                                                Final Diagnosis 03/20/2023    Final                    Value:This result contains rich text formatting which cannot be displayed here.    Clinical Information 03/20/2023    Final                    Value:This result contains rich text formatting which cannot be displayed here.    Gross Description 03/20/2023    Final                    Value:This result contains rich text formatting which cannot be displayed here.    Microscopic Description 03/20/2023    Final      "               Value:This result contains rich text formatting which cannot be displayed here.    Performing Labs 03/20/2023    Final                    Value:This result contains rich text formatting which cannot be displayed here.   Lab on 03/06/2023   Component Date Value Ref Range Status    Creatinine 03/06/2023 0.72  0.51 - 0.95 mg/dL Final    GFR Estimate 03/06/2023 >90  >60 mL/min/1.73m2 Final    eGFR calculated using 2021 CKD-EPI equation.    WBC Count 03/06/2023 10.6  4.0 - 11.0 10e3/uL Final    RBC Count 03/06/2023 4.36  3.80 - 5.20 10e6/uL Final    Hemoglobin 03/06/2023 11.5 (L)  11.7 - 15.7 g/dL Final    Hematocrit 03/06/2023 36.6  35.0 - 47.0 % Final    MCV 03/06/2023 84  78 - 100 fL Final    MCH 03/06/2023 26.4 (L)  26.5 - 33.0 pg Final    MCHC 03/06/2023 31.4 (L)  31.5 - 36.5 g/dL Final    RDW 03/06/2023 14.5  10.0 - 15.0 % Final    Platelet Count 03/06/2023 366  150 - 450 10e3/uL Final    ALT 03/06/2023 14  10 - 35 U/L Final    Albumin 03/06/2023 3.9  3.5 - 5.2 g/dL Final     Most recent EKG from 08/23 reviewed. QTc interval 459.  Normal EKG          Medical Review of Systems      Pertinent positives noted in HPI and below:   Review of Systems   All other systems reviewed and are negative.        No LMP recorded (lmp unknown). Patient has had a hysterectomy.    Pregnant / Breastfeeding: No       Mental Status Exam      Appearance: awake, alert, adequately groomed, appeared stated age and no apparent distress  Attitude:  cooperative   Eye Contact:  good  Gait and Station: normal, no gross abnormalities noted by observation  Psychomotor Behavior:  no evidence of tardive dyskinesia, dystonia, or tics  Oriented to:  person, place, time, and situation  Attention Span and Concentration:  mild impairment   Speech:  clear, coherent, regular rate, rhythm, and volume  Language: intact  Mood:  \"pretty good\"\"  Affect:  appropriate and in normal range  Associations:  no loose associations  Thought Process:  " logical, linear and goal oriented  Thought Content:  no evidence of suicidal ideation or homicidal ideation, no evidence of psychotic thought, no auditory hallucinations present and no visual hallucinations present  Recent and Remote Memory:  Intact to interview. Not formally assessed. No amnesia.  Fund of Knowledge: appropriate  Insight:  full  Judgment:  intact, adequate for safety  Impulse Control:  intact         Risk Assessment       Updated: 5/20/2024     Suicide assessment  Acute: Low  Chronic:Low  Imminent: Low     Risk factors  History of suicide attempts: No  History of self-injurious behavior: No  Carlos. Axis I psychiatric diagnoses: Yes  Substance use disorder: No  Symptoms: , panic, insomnia  Family history of completed suicide or attempted suicide: No  Accessibility to firearms: No  Interpersonal factors: financial stress, family dynamic challenges, LGBQT+     Protective factors  Ability to cope with the stress: Yes  Family responsibility and supportive:Yes  Positive therapeutic relationships: Yes  Social support:Yes  Orthodox beliefs:  Yes  Connectedness with mental health providers: Yes     Homicidal Risk  Acute: Low  Chronic: Low  Imminent: Low    Assessment     Alina Martell i has a past psychiatry history of ADHD, PTSD, Anorexia/Eating Disorder, ADA, and MDD  who has been referred for medication management from therapist, Mariana Love [facility]. Three previous psychiatric inpatient hospitalizations.  Last hospitalization was in 2018.  No disclosed history of suicide attempts.  Last experienced suicidal ideation 2018.  No disclosed self harming behaviors.  No overt concerns regarding chemical health history.  Trauma history disclosed.     Alina Martell reports overall mood is relatively controlled.  Does notice perhaps increased anxiety over the last couple of months.  Increased anxiety is possibly leading to increased gambling behaviors.  Gambling comes from a sense of control from her  perspective. Is causing mild financial strain  Is addressing in therapy.  Additional stressor of housing changes upcoming.  Does not want to be in previous position of homelessness.  Exploring areas that she can afford.  Barrier of having previous eviction.  We did discuss pharmacological approaches we could use to address anxiety, impulsivity, and sleep.  Discussed clonidine versus guanfacine we will lean towards clonidine at this point to assist with sleep.  Will stop doxepin as it has been not effective.  Utilizing lorazepam on occasion.   checked.  Filling 30-day supply every 6 weeks or so.  Reviewed risk versus benefit and side effects.  No imminent safety concerns identified.        Pharmacologic:   01/08/24: + doxepin 3mg    -Continue venlafaxine 75 mg by mouth every day.  -Continue lorazepam 0.5 mg tablet by mouth daily as needed for severe anxiety  -Start clonidine 0.1 mg by mouth at bedtime  -Stop doxepin 3 mg tablet, take 1 tablet by mouth at bedtime as needed    *Consider guanfacine        Psychosocial: Would benefit from individual therapy with focus of Cognitive Behavioral Therapy (CBT). This form of therapy will be helpful in addressing cognitive distortions, improving distress tolerance, and developing helpful / healthy coping strategies to address stressors.   - Continue individual therapy, every 3 weeks           Diagnosis       ADA  MDD, recurrent, mild, in partial remission  PTSD    ADHD vy hx     Plan         1) Medications:      -Continue venlafaxine 75 mg by mouth every day.  -Continue lorazepam 0.5 mg tablet by mouth daily as needed for severe anxiety  -Start clonidine 0.1 mg by mouth at bedtime  -Stop doxepin 3 mg tablet, take 1 tablet by mouth at bedtime as needed                MNPMP: I have queried the [place] and/or [place] Prescription Monitoring Program for this patient for the preceding 12 months, or reviewed the report provided by my proxy delegate. I have not identified any concerns.  2)  Risk vs benefits of medications reviewed: Yes  3) Life style modifications: sleep hygiene, exercise, healthy diet  4) Medical concerns:    - No acute concerns  5) Other:   - Continue individual therapy  - Consider ADHD testing  6) Refrain from drinking alcohol and/or use of drugs.   7) Please secure all prescription and OTC medications, sharps, and caustic substances. Please remove all firearms and ammunition.  8) Review outside records, get WESLEY's, coordinate with outside providers  9) In case of emergency call 911 or go to the nearest ER, this includes patient voicing thought of harming self or others as well as additional safety concerns   10) Follow-up: 6-8weeks, or sooner if needed.      Administrative Billing:   Supportive therapy was provided, focusing on reflective listening and solution focused problem solving.    Total time preparing to see this patient, face-to-face time, documenting in the EHR, and coordinating care time on the same calendar date:36 minutes         Signed:   STARLA Kaiser CNP on 5/20/2024 at 12:19 PM     Disclaimer: This note consists of symbols derived from keyboarding, dictation and/or voice recognition software. As a result, there may be errors in the script that have gone undetected. Please consider this when interpreting information found in this chart.  Answers submitted by the patient for this visit:  Patient Health Questionnaire (Submitted on 5/20/2024)  If you checked off any problems, how difficult have these problems made it for you to do your work, take care of things at home, or get along with other people?: Somewhat difficult  PHQ9 TOTAL SCORE: 4

## 2024-05-23 ENCOUNTER — MYC MEDICAL ADVICE (OUTPATIENT)
Dept: PSYCHIATRY | Facility: CLINIC | Age: 59
End: 2024-05-23
Payer: COMMERCIAL

## 2024-05-23 DIAGNOSIS — F41.1 GAD (GENERALIZED ANXIETY DISORDER): Primary | ICD-10-CM

## 2024-05-23 RX ORDER — GABAPENTIN 100 MG/1
100 CAPSULE ORAL AT BEDTIME
Qty: 30 CAPSULE | Refills: 2 | Status: SHIPPED | OUTPATIENT
Start: 2024-05-23

## 2024-05-29 ENCOUNTER — OFFICE VISIT (OUTPATIENT)
Dept: PSYCHOLOGY | Facility: CLINIC | Age: 59
End: 2024-05-29
Payer: COMMERCIAL

## 2024-05-29 DIAGNOSIS — F33.1 MODERATE EPISODE OF RECURRENT MAJOR DEPRESSIVE DISORDER (H): ICD-10-CM

## 2024-05-29 DIAGNOSIS — F43.10 POSTTRAUMATIC STRESS DISORDER: ICD-10-CM

## 2024-05-29 DIAGNOSIS — F41.1 GAD (GENERALIZED ANXIETY DISORDER): Primary | ICD-10-CM

## 2024-05-29 PROCEDURE — 90832 PSYTX W PT 30 MINUTES: CPT | Performed by: COUNSELOR

## 2024-05-29 NOTE — PROGRESS NOTES
[facility]                                     Progress Note    Patient Name: Alina Martell  Date: 5/29/24         Service Type: Individual      Session Start Time: 12:05 Session End Time: 12:25     Session Length: 20 minutes    Session #: 74     Attendees: Client       Service Modality:  In-Person       DATA  Extended Session (53+ minutes): No  Interactive Complexity: No  Crisis: No      Progress Since Last Session (Related to Symptoms / Goals / Homework):   Symptoms: Worsening racing thoughts    Homework: Achieved / completed to satisfaction      Episode of Care Goals: Satisfactory progress - ACTION (Actively working towards change); Intervened by reinforcing change plan / affirming steps taken     Current / Ongoing Stressors and Concerns:  Patient reported she is struggling right now. Patient indicated due to being called for jury duty she has to have a short session. Patient reported she found out that where she lives is getting sold and she needs to find new housing. Patient indicated racing thoughts about where she will move and how she will afford it. Patient reported she has then been gambling more and it is a problem. Therapist processed with patient what she can control at this time and her options.      Treatment Objective(s) Addressed in This Session:   use distraction each time intrusive worry surfaces  Increase interest, engagement, and pleasure in doing things  Decrease frequency and intensity of feeling down, depressed, hopeless  Improve quantity and quality of night time sleep / decrease daytime naps  Feel less tired and more energy during the day      Intervention:   Motivational Interviewing    MI Intervention: Open-ended questions     Change Talk Expressed by the Patient: Taking steps    Provider Response to Change Talk: E - Evoked more info from patient about behavior change      Assessments completed prior to visit:  The following assessments were completed by  patient for this visit:  PHQ9:       10/30/2023     3:22 PM 12/13/2023     3:35 PM 12/18/2023    12:59 PM 1/8/2024     7:06 AM 2/18/2024    10:01 PM 4/3/2024     9:50 AM 5/20/2024     9:50 AM   PHQ-9 SCORE   PHQ-9 Total Score MyChart 7 (Mild depression) 9 (Mild depression) 10 (Moderate depression) 8 (Mild depression) 5 (Mild depression) 6 (Mild depression) 4 (Minimal depression)   PHQ-9 Total Score 7 9 10 8 5 6 4     GAD7:       11/3/2022    12:06 PM 12/1/2022     9:52 AM 7/5/2023     2:18 PM 9/26/2023    12:05 PM 10/9/2023    10:00 AM 10/30/2023     3:23 PM 12/6/2023     9:50 AM   ADA-7 SCORE   Total Score 10 (moderate anxiety) 8 (mild anxiety) 6 (mild anxiety) 11 (moderate anxiety)  5 (mild anxiety) 11 (moderate anxiety)   Total Score 10 8 6 11 8 5 11     PROMIS 10-Global Health (all questions and answers displayed):       12/1/2022     9:53 AM 3/10/2023     8:13 AM 6/13/2023     6:37 AM 9/26/2023    12:07 PM 1/1/2024    11:44 PM 4/3/2024     9:52 AM 5/20/2024     9:52 AM   PROMIS 10   In general, would you say your health is: Good Fair Good Fair Good Good Good   In general, would you say your quality of life is: Good Good Good Good Fair Good Fair   In general, how would you rate your physical health? Fair Fair Fair Fair Fair Fair Good   In general, how would you rate your mental health, including your mood and your ability to think? Fair Fair Fair Fair Fair Fair Fair   In general, how would you rate your satisfaction with your social activities and relationships? Good Good Good Good Fair Good Good   In general, please rate how well you carry out your usual social activities and roles Good Good Good Good Good Good Good   To what extent are you able to carry out your everyday physical activities such as walking, climbing stairs, carrying groceries, or moving a chair? Mostly Mostly Mostly Mostly Mostly Completely Completely   In the past 7 days, how often have you been bothered by emotional problems such as feeling  anxious, depressed, or irritable? Often Sometimes Often Often Often Sometimes Sometimes   In the past 7 days, how would you rate your fatigue on average? Mild Moderate Mild Moderate Mild Moderate Moderate   In the past 7 days, how would you rate your pain on average, where 0 means no pain, and 10 means worst imaginable pain? 3 4 2 3 3 3 3   In general, would you say your health is: 3 2 3 2 3 3 3   In general, would you say your quality of life is: 3 3 3 3 2 3 2   In general, how would you rate your physical health? 2 2 2 2 2 2 3   In general, how would you rate your mental health, including your mood and your ability to think? 2 2 2 2 2 2 2   In general, how would you rate your satisfaction with your social activities and relationships? 3 3 3 3 2 3 3   In general, please rate how well you carry out your usual social activities and roles. (This includes activities at home, at work and in your community, and responsibilities as a parent, child, spouse, employee, friend, etc.) 3 3 3 3 3 3 3   To what extent are you able to carry out your everyday physical activities such as walking, climbing stairs, carrying groceries, or moving a chair? 4 4 4 4 4 5 5   In the past 7 days, how often have you been bothered by emotional problems such as feeling anxious, depressed, or irritable? 4 3 4 4 4 3 3   In the past 7 days, how would you rate your fatigue on average? 2 3 2 3 2 3 3   In the past 7 days, how would you rate your pain on average, where 0 means no pain, and 10 means worst imaginable pain? 3 4 2 3 3 3 3   Global Mental Health Score 10 11 10 10 8    8 11 10   Global Physical Health Score 14 12 14 13 14    14 14 15   PROMIS TOTAL - SUBSCORES 24 23 24 23 22    22 25 25         ASSESSMENT: Current Emotional / Mental Status (status of significant symptoms):   Risk status (Self / Other harm or suicidal ideation)   Patient denies current fears or concerns for personal safety.   Patient denies current or recent suicidal ideation  or behaviors.   Patient denies current or recent homicidal ideation or behaviors.   Patient denies current or recent self injurious behavior or ideation.   Patient denies other safety concerns.   Patient reports there has been no change in risk factors since their last session.     Patient reports there has been no change in protective factors since their last session.     Recommended that patient call 911 or go to the local ED should there be a change in any of these risk factors.     Appearance:   Appropriate    Eye Contact:   Good    Psychomotor Behavior: Normal    Attitude:   Cooperative    Orientation:   All   Speech    Rate / Production: Normal/ Responsive    Volume:  Normal    Mood:    Anxious  Depressed    Affect:    Appropriate    Thought Content:  Clear    Thought Form:  Coherent  Goal Directed  Logical    Insight:    Good      Medication Review:   Changes to psychiatric medications, see updated Medication List in EPIC.      Medication Compliance:   Yes     Changes in Health Issues:   None reported     Chemical Use Review:   Substance Use: Chemical use reviewed, no active concerns identified      Tobacco Use: No current tobacco use.      Diagnosis:  1. ADA (generalized anxiety disorder)    2. Moderate episode of recurrent major depressive disorder (H)    3. Posttraumatic stress disorder        Collateral Reports Completed:   Not Applicable    PLAN: (Patient Tasks / Therapist Tasks / Other)  Patient will return in one week for scheduled session. Patient will give credit and debit cards to friend. Patient will think about short term living with her son. Patient will identify what is in her control.     There has been demonstrated improvement in functioning while patient has been engaged in psychotherapy/psychological service- if withdrawn the patient would deteriorate and/or relapse.     Mariana Love, [facility]     ______________________________________________________________________    Individual Treatment  Plan    Patient's Name: Alina Martell  YOB: 1965    Date of Creation:10/16/2020  Date Treatment Plan Last Reviewed/Revised: 4/4/2024    DSM5 Diagnoses: 296.32 (F33.1) Major Depressive Disorder, Recurrent Episode, Moderate _, 300.02 (F41.1) Generalized Anxiety Disorder, or 309.81 (F43.10) Posttraumatic Stress Disorder (includes Posttraumatic Stress Disorder for Children 6 Years and Younger)  Without dissociative symptoms  Psychosocial / Contextual Factors: Health, family  PROMIS (reviewed every 90 days):     PROMIS-10 Scores  Global Mental Health Score: 8  Global Physical Health Score: 14  PROMIS TOTAL - SUBSCORES: 22     Referral / Collaboration:  Was/were discussed and patient will pursue.    Anticipated number of session for this episode of care: 20 will reevaluate every 90 days  Anticipation frequency of session: Biweekly  Anticipated Duration of each session: 38-52 minutes  Treatment plan will be reviewed in 90 days or when goals have been changed.       MeasurableTreatment Goal(s) related to diagnosis / functional impairment(s)  Goal 1: Patient will work on reducing overall anxiety.     I will know I've met my goal when reporting minimal anxiety symptoms.       Objective #A (Patient Action)                          Patient will use distraction each time intrusive worry surfaces.  Status: Continued - Date(s): 4/4/2024     Intervention(s)  Therapist will teach emotional regulation skills.  Objective #B  Patient will Decrease frequency and intensity of feeling down, depressed, hopeless.  Status: Continued - Date(s):  4/4/2024     Intervention(s)  Therapist will teach emotional recognition/identification. ..     Objective #C  Patient will Identify negative self-talk and behaviors: challenge core beliefs, myths, and actions.  Status: Continued - Date(s):  4/4/2024     Intervention(s)  Therapist will teach the client how to perform a behavioral chain analysis. ..     Goal 2: Patient will continue  to work on reducing depression symptoms    I will know I've met my goal then reporting minimal depression symptoms     Objective #A (Patient Action)                          Patient will Increase interest, engagement, and pleasure in doing things.  Status: Continued - Date(s):  4/4/2024     Intervention(s)  Therapist will assign homework ..     Objective #B  Patient will Decrease frequency and intensity of feeling down, depressed, hopeless.  Status: Continued - Date(s):   4/4/2024  Intervention(s)  Therapist will assign homework at every session.     Goal 3: Client will reduce PTSD symptoms by 50% as evidenced by PCLC-5 score     I will know I've met my goal when not struggling with nightmares.      Objective #A (Client Action)    Client will reduce nightmares.  Status: Continued - Date(s): 4/4/2024    Intervention(s)  Therapist will teach nightmare retraining .    Objective #B  Client will compile a list of boundaries that they would like to set with others.   .    Status: Continued - Date(s):4/4/2024     Intervention(s)  Therapist will assign homework boundary setting .            Patient has reviewed and agreed to the above plan.        Mariana Love, [facility]

## 2024-06-06 ENCOUNTER — OFFICE VISIT (OUTPATIENT)
Dept: PSYCHOLOGY | Facility: CLINIC | Age: 59
End: 2024-06-06
Payer: COMMERCIAL

## 2024-06-06 DIAGNOSIS — F33.1 MODERATE EPISODE OF RECURRENT MAJOR DEPRESSIVE DISORDER (H): ICD-10-CM

## 2024-06-06 DIAGNOSIS — F41.1 GAD (GENERALIZED ANXIETY DISORDER): Primary | ICD-10-CM

## 2024-06-06 DIAGNOSIS — F43.10 POSTTRAUMATIC STRESS DISORDER: ICD-10-CM

## 2024-06-06 PROCEDURE — 90834 PSYTX W PT 45 MINUTES: CPT | Performed by: COUNSELOR

## 2024-06-06 NOTE — PROGRESS NOTES
[facility]                                     Progress Note    Patient Name: Alina Martell  Date: 6/06/24         Service Type: Individual      Session Start Time: 8:05 Session End Time: 8:55     Session Length: 50 minutes    Session #: 75     Attendees: Client       Service Modality:  In-Person       DATA  Extended Session (53+ minutes): No  Interactive Complexity: No  Crisis: No      Progress Since Last Session (Related to Symptoms / Goals / Homework):   Symptoms: Improving symptoms not as severe    Homework: Achieved / completed to satisfaction      Episode of Care Goals: Satisfactory progress - ACTION (Actively working towards change); Intervened by reinforcing change plan / affirming steps taken     Current / Ongoing Stressors and Concerns:  Patient indicated that she has been able to manage her symptoms. Patient reported she gave her friend her cards which has helped with the gambling. Patient indicated her friend asked a lot of question's but trusting she will not tell people. Patient reported finding housing continues to be her biggest stressor. Patient indicated she has found a few studio's that may work for her. Patient indicated she has looked at volunteer options but not finding a lot that are available right now. Patient reported sleep continues to be an ongoing concern. This therapist processed with patient challenging her negative thoughts.      Treatment Objective(s) Addressed in This Session:   practice deep breathing at least 3 a day  Increase interest, engagement, and pleasure in doing things  Decrease frequency and intensity of feeling down, depressed, hopeless  Improve quantity and quality of night time sleep / decrease daytime naps  Feel less tired and more energy during the day   Identify negative self-talk and behaviors: challenge core beliefs, myths, and actions     Intervention:   Motivational Interviewing    MI Intervention: Change talk (evoked)     Change  Talk Expressed by the Patient: Activation Taking steps    Provider Response to Change Talk: E - Evoked more info from patient about behavior change and A - Affirmed patient's thoughts, decisions, or attempts at behavior change      Assessments completed prior to visit:  The following assessments were completed by patient for this visit:  PHQ9:       10/30/2023     3:22 PM 12/13/2023     3:35 PM 12/18/2023    12:59 PM 1/8/2024     7:06 AM 2/18/2024    10:01 PM 4/3/2024     9:50 AM 5/20/2024     9:50 AM   PHQ-9 SCORE   PHQ-9 Total Score MyChart 7 (Mild depression) 9 (Mild depression) 10 (Moderate depression) 8 (Mild depression) 5 (Mild depression) 6 (Mild depression) 4 (Minimal depression)   PHQ-9 Total Score 7 9 10 8 5 6 4     GAD7:       11/3/2022    12:06 PM 12/1/2022     9:52 AM 7/5/2023     2:18 PM 9/26/2023    12:05 PM 10/9/2023    10:00 AM 10/30/2023     3:23 PM 12/6/2023     9:50 AM   ADA-7 SCORE   Total Score 10 (moderate anxiety) 8 (mild anxiety) 6 (mild anxiety) 11 (moderate anxiety)  5 (mild anxiety) 11 (moderate anxiety)   Total Score 10 8 6 11 8 5 11     PROMIS 10-Global Health (all questions and answers displayed):       12/1/2022     9:53 AM 3/10/2023     8:13 AM 6/13/2023     6:37 AM 9/26/2023    12:07 PM 1/1/2024    11:44 PM 4/3/2024     9:52 AM 5/20/2024     9:52 AM   PROMIS 10   In general, would you say your health is: Good Fair Good Fair Good Good Good   In general, would you say your quality of life is: Good Good Good Good Fair Good Fair   In general, how would you rate your physical health? Fair Fair Fair Fair Fair Fair Good   In general, how would you rate your mental health, including your mood and your ability to think? Fair Fair Fair Fair Fair Fair Fair   In general, how would you rate your satisfaction with your social activities and relationships? Good Good Good Good Fair Good Good   In general, please rate how well you carry out your usual social activities and roles Good Good Good Good  Good Good Good   To what extent are you able to carry out your everyday physical activities such as walking, climbing stairs, carrying groceries, or moving a chair? Mostly Mostly Mostly Mostly Mostly Completely Completely   In the past 7 days, how often have you been bothered by emotional problems such as feeling anxious, depressed, or irritable? Often Sometimes Often Often Often Sometimes Sometimes   In the past 7 days, how would you rate your fatigue on average? Mild Moderate Mild Moderate Mild Moderate Moderate   In the past 7 days, how would you rate your pain on average, where 0 means no pain, and 10 means worst imaginable pain? 3 4 2 3 3 3 3   In general, would you say your health is: 3 2 3 2 3 3 3   In general, would you say your quality of life is: 3 3 3 3 2 3 2   In general, how would you rate your physical health? 2 2 2 2 2 2 3   In general, how would you rate your mental health, including your mood and your ability to think? 2 2 2 2 2 2 2   In general, how would you rate your satisfaction with your social activities and relationships? 3 3 3 3 2 3 3   In general, please rate how well you carry out your usual social activities and roles. (This includes activities at home, at work and in your community, and responsibilities as a parent, child, spouse, employee, friend, etc.) 3 3 3 3 3 3 3   To what extent are you able to carry out your everyday physical activities such as walking, climbing stairs, carrying groceries, or moving a chair? 4 4 4 4 4 5 5   In the past 7 days, how often have you been bothered by emotional problems such as feeling anxious, depressed, or irritable? 4 3 4 4 4 3 3   In the past 7 days, how would you rate your fatigue on average? 2 3 2 3 2 3 3   In the past 7 days, how would you rate your pain on average, where 0 means no pain, and 10 means worst imaginable pain? 3 4 2 3 3 3 3   Global Mental Health Score 10 11 10 10 8    8 11 10   Global Physical Health Score 14 12 14 13 14    14 14  15   PROMIS TOTAL - SUBSCORES 24 23 24 23 22    22 25 25         ASSESSMENT: Current Emotional / Mental Status (status of significant symptoms):   Risk status (Self / Other harm or suicidal ideation)   Patient denies current fears or concerns for personal safety.   Patient denies current or recent suicidal ideation or behaviors.   Patient denies current or recent homicidal ideation or behaviors.   Patient denies current or recent self injurious behavior or ideation.   Patient denies other safety concerns.   Patient reports there has been no change in risk factors since their last session.     Patient reports there has been no change in protective factors since their last session.     Recommended that patient call 911 or go to the local ED should there be a change in any of these risk factors.     Appearance:   Appropriate    Eye Contact:   Good    Psychomotor Behavior: Normal    Attitude:   Cooperative    Orientation:   All   Speech    Rate / Production: Normal/ Responsive    Volume:  Normal    Mood:    Anxious  Depressed    Affect:    Appropriate    Thought Content:  Clear    Thought Form:  Coherent  Goal Directed  Logical    Insight:    Good      Medication Review:   No changes to current psychiatric medication(s)     Medication Compliance:   Yes     Changes in Health Issues:   None reported     Chemical Use Review:   Substance Use: Chemical use reviewed, no active concerns identified      Tobacco Use: No current tobacco use.      Diagnosis:  1. ADA (generalized anxiety disorder)    2. Moderate episode of recurrent major depressive disorder (H)    3. Posttraumatic stress disorder        Collateral Reports Completed:   Not Applicable    PLAN: (Patient Tasks / Therapist Tasks / Other)  Patient will return in 3 weeks for scheduled session. Patient will continue to have her friend hold onto her credit cards for now. Patient will look over and read skills from the sleep packet she received. Patient will continue to  challenge thoughts related to jumping on conclusions.     There has been demonstrated improvement in functioning while patient has been engaged in psychotherapy/psychological service- if withdrawn the patient would deteriorate and/or relapse.     Mariana Love, [facility]     ______________________________________________________________________    Individual Treatment Plan    Patient's Name: Alina Martell  YOB: 1965    Date of Creation:10/16/2020  Date Treatment Plan Last Reviewed/Revised: 4/4/2024    DSM5 Diagnoses: 296.32 (F33.1) Major Depressive Disorder, Recurrent Episode, Moderate _, 300.02 (F41.1) Generalized Anxiety Disorder, or 309.81 (F43.10) Posttraumatic Stress Disorder (includes Posttraumatic Stress Disorder for Children 6 Years and Younger)  Without dissociative symptoms  Psychosocial / Contextual Factors: Health, family  PROMIS (reviewed every 90 days):     PROMIS-10 Scores  Global Mental Health Score: 8  Global Physical Health Score: 14  PROMIS TOTAL - SUBSCORES: 22     Referral / Collaboration:  Was/were discussed and patient will pursue.    Anticipated number of session for this episode of care: 20 will reevaluate every 90 days  Anticipation frequency of session: Biweekly  Anticipated Duration of each session: 38-52 minutes  Treatment plan will be reviewed in 90 days or when goals have been changed.       MeasurableTreatment Goal(s) related to diagnosis / functional impairment(s)  Goal 1: Patient will work on reducing overall anxiety.     I will know I've met my goal when reporting minimal anxiety symptoms.       Objective #A (Patient Action)                          Patient will use distraction each time intrusive worry surfaces.  Status: Continued - Date(s): 4/4/2024     Intervention(s)  Therapist will teach emotional regulation skills.  Objective #B  Patient will Decrease frequency and intensity of feeling down, depressed, hopeless.  Status: Continued - Date(s):  4/4/2024      Intervention(s)  Therapist will teach emotional recognition/identification. ..     Objective #C  Patient will Identify negative self-talk and behaviors: challenge core beliefs, myths, and actions.  Status: Continued - Date(s):  4/4/2024     Intervention(s)  Therapist will teach the client how to perform a behavioral chain analysis. ..     Goal 2: Patient will continue to work on reducing depression symptoms    I will know I've met my goal then reporting minimal depression symptoms     Objective #A (Patient Action)                          Patient will Increase interest, engagement, and pleasure in doing things.  Status: Continued - Date(s):  4/4/2024     Intervention(s)  Therapist will assign homework ..     Objective #B  Patient will Decrease frequency and intensity of feeling down, depressed, hopeless.  Status: Continued - Date(s):   4/4/2024  Intervention(s)  Therapist will assign homework at every session.     Goal 3: Client will reduce PTSD symptoms by 50% as evidenced by PCLC-5 score     I will know I've met my goal when not struggling with nightmares.      Objective #A (Client Action)    Client will reduce nightmares.  Status: Continued - Date(s): 4/4/2024    Intervention(s)  Therapist will teach nightmare retraining .    Objective #B  Client will compile a list of boundaries that they would like to set with others.   .    Status: Continued - Date(s):4/4/2024     Intervention(s)  Therapist will assign homework boundary setting .            Patient has reviewed and agreed to the above plan.        Mariana Love, [facility]

## 2024-07-02 DIAGNOSIS — J30.2 SEASONAL ALLERGIC RHINITIS, UNSPECIFIED TRIGGER: ICD-10-CM

## 2024-07-02 RX ORDER — CETIRIZINE HYDROCHLORIDE 10 MG/1
10 TABLET ORAL DAILY
Qty: 90 TABLET | Refills: 1 | OUTPATIENT
Start: 2024-07-02

## 2024-07-02 NOTE — TELEPHONE ENCOUNTER
FAX [facility]  Prescription Refill Request    Drug Name:  cetirizine (ZYRTEC) 10 MG tablet     Last Visit with PCP: 03/27/2024  Next Visit with PCP:  09/25/2024

## 2024-07-09 DIAGNOSIS — M05.79 SEROPOSITIVE RHEUMATOID ARTHRITIS OF MULTIPLE SITES (H): ICD-10-CM

## 2024-07-09 RX ORDER — ADALIMUMAB-ADAZ 40 MG/.4ML
INJECTION, SOLUTION SUBCUTANEOUS
Qty: 2 ML | Refills: 0 | Status: SHIPPED | OUTPATIENT
Start: 2024-07-09 | End: 2024-08-19

## 2024-07-14 ENCOUNTER — MYC MEDICAL ADVICE (OUTPATIENT)
Dept: CARDIOLOGY | Facility: CLINIC | Age: 59
End: 2024-07-14
Payer: COMMERCIAL

## 2024-07-14 ENCOUNTER — HEALTH MAINTENANCE LETTER (OUTPATIENT)
Age: 59
End: 2024-07-14

## 2024-07-14 DIAGNOSIS — I48.0 PAROXYSMAL ATRIAL FIBRILLATION (H): Primary | ICD-10-CM

## 2024-07-14 DIAGNOSIS — E66.01 MORBID OBESITY (H): ICD-10-CM

## 2024-07-14 DIAGNOSIS — I10 ESSENTIAL HYPERTENSION: ICD-10-CM

## 2024-07-14 DIAGNOSIS — G47.33 OBSTRUCTIVE SLEEP APNEA SYNDROME: ICD-10-CM

## 2024-07-15 ENCOUNTER — HOSPITAL ENCOUNTER (EMERGENCY)
Facility: HOSPITAL | Age: 59
Discharge: HOME | End: 2024-07-15
Attending: STUDENT IN AN ORGANIZED HEALTH CARE EDUCATION/TRAINING PROGRAM | Admitting: STUDENT IN AN ORGANIZED HEALTH CARE EDUCATION/TRAINING PROGRAM
Payer: COMMERCIAL

## 2024-07-15 ENCOUNTER — HOSPITAL ENCOUNTER (EMERGENCY)
Facility: HOSPITAL | Age: 59
Discharge: HOME | End: 2024-07-15
Admitting: PHYSICIAN ASSISTANT
Payer: COMMERCIAL

## 2024-07-15 ENCOUNTER — APPOINTMENT (OUTPATIENT)
Dept: CT IMAGING | Facility: HOSPITAL | Age: 59
End: 2024-07-15
Attending: PHYSICIAN ASSISTANT
Payer: COMMERCIAL

## 2024-07-15 ENCOUNTER — APPOINTMENT (OUTPATIENT)
Dept: CT IMAGING | Facility: HOSPITAL | Age: 59
End: 2024-07-15
Attending: STUDENT IN AN ORGANIZED HEALTH CARE EDUCATION/TRAINING PROGRAM
Payer: COMMERCIAL

## 2024-07-15 ENCOUNTER — PATIENT OUTREACH (OUTPATIENT)
Dept: FAMILY MEDICINE | Facility: CLINIC | Age: 59
End: 2024-07-15

## 2024-07-15 VITALS
TEMPERATURE: 98.1 F | HEIGHT: 69 IN | HEART RATE: 83 BPM | WEIGHT: 285.2 LBS | BODY MASS INDEX: 42.24 KG/M2 | OXYGEN SATURATION: 90 % | SYSTOLIC BLOOD PRESSURE: 137 MMHG | DIASTOLIC BLOOD PRESSURE: 67 MMHG | RESPIRATION RATE: 16 BRPM

## 2024-07-15 VITALS
RESPIRATION RATE: 18 BRPM | SYSTOLIC BLOOD PRESSURE: 139 MMHG | HEART RATE: 77 BPM | BODY MASS INDEX: 42.21 KG/M2 | WEIGHT: 285 LBS | DIASTOLIC BLOOD PRESSURE: 69 MMHG | TEMPERATURE: 97.8 F | HEIGHT: 69 IN | OXYGEN SATURATION: 95 %

## 2024-07-15 DIAGNOSIS — R10.9 LEFT FLANK PAIN: ICD-10-CM

## 2024-07-15 DIAGNOSIS — R07.81 PLEURITIC CHEST PAIN: ICD-10-CM

## 2024-07-15 DIAGNOSIS — R19.7 NAUSEA VOMITING AND DIARRHEA: ICD-10-CM

## 2024-07-15 DIAGNOSIS — R11.2 NAUSEA VOMITING AND DIARRHEA: ICD-10-CM

## 2024-07-15 DIAGNOSIS — R06.02 SHORTNESS OF BREATH: ICD-10-CM

## 2024-07-15 LAB
ALBUMIN SERPL BCG-MCNC: 4 G/DL (ref 3.5–5.2)
ALBUMIN UR-MCNC: 20 MG/DL
ALP SERPL-CCNC: 110 U/L (ref 40–150)
ALT SERPL W P-5'-P-CCNC: 19 U/L (ref 0–50)
ANION GAP SERPL CALCULATED.3IONS-SCNC: 12 MMOL/L (ref 7–15)
APPEARANCE UR: CLEAR
AST SERPL W P-5'-P-CCNC: 13 U/L (ref 0–45)
BACTERIA #/AREA URNS HPF: ABNORMAL /HPF
BASOPHILS # BLD AUTO: 0.1 10E3/UL (ref 0–0.2)
BASOPHILS NFR BLD AUTO: 0 %
BILIRUB DIRECT SERPL-MCNC: <0.2 MG/DL (ref 0–0.3)
BILIRUB SERPL-MCNC: 0.2 MG/DL
BILIRUB UR QL STRIP: NEGATIVE
BUN SERPL-MCNC: 11.9 MG/DL (ref 6–20)
CALCIUM SERPL-MCNC: 9.3 MG/DL (ref 8.6–10)
CHLORIDE SERPL-SCNC: 100 MMOL/L (ref 98–107)
COLOR UR AUTO: COLORLESS
CREAT SERPL-MCNC: 0.79 MG/DL (ref 0.51–0.95)
D DIMER PPP FEU-MCNC: 0.64 UG/ML FEU (ref 0–0.5)
DEPRECATED HCO3 PLAS-SCNC: 27 MMOL/L (ref 22–29)
EGFRCR SERPLBLD CKD-EPI 2021: 87 ML/MIN/1.73M2
EOSINOPHIL # BLD AUTO: 0.2 10E3/UL (ref 0–0.7)
EOSINOPHIL NFR BLD AUTO: 2 %
ERYTHROCYTE [DISTWIDTH] IN BLOOD BY AUTOMATED COUNT: 15.7 % (ref 10–15)
FLUAV RNA SPEC QL NAA+PROBE: NEGATIVE
FLUBV RNA RESP QL NAA+PROBE: NEGATIVE
GLUCOSE SERPL-MCNC: 174 MG/DL (ref 70–99)
GLUCOSE UR STRIP-MCNC: NEGATIVE MG/DL
HCT VFR BLD AUTO: 37.7 % (ref 35–47)
HGB BLD-MCNC: 11.4 G/DL (ref 11.7–15.7)
HGB UR QL STRIP: ABNORMAL
HOLD SPECIMEN: NORMAL
IMM GRANULOCYTES # BLD: 0 10E3/UL
IMM GRANULOCYTES NFR BLD: 0 %
KETONES UR STRIP-MCNC: NEGATIVE MG/DL
LEUKOCYTE ESTERASE UR QL STRIP: NEGATIVE
LIPASE SERPL-CCNC: 14 U/L (ref 13–60)
LYMPHOCYTES # BLD AUTO: 3 10E3/UL (ref 0.8–5.3)
LYMPHOCYTES NFR BLD AUTO: 25 %
MCH RBC QN AUTO: 24.2 PG (ref 26.5–33)
MCHC RBC AUTO-ENTMCNC: 30.2 G/DL (ref 31.5–36.5)
MCV RBC AUTO: 80 FL (ref 78–100)
MONOCYTES # BLD AUTO: 0.8 10E3/UL (ref 0–1.3)
MONOCYTES NFR BLD AUTO: 7 %
NEUTROPHILS # BLD AUTO: 8 10E3/UL (ref 1.6–8.3)
NEUTROPHILS NFR BLD AUTO: 66 %
NITRATE UR QL: NEGATIVE
NRBC # BLD AUTO: 0 10E3/UL
NRBC BLD AUTO-RTO: 0 /100
PH UR STRIP: 6 [PH] (ref 5–7)
PLATELET # BLD AUTO: 399 10E3/UL (ref 150–450)
POTASSIUM SERPL-SCNC: 4.2 MMOL/L (ref 3.4–5.3)
PROT SERPL-MCNC: 8.1 G/DL (ref 6.4–8.3)
RBC # BLD AUTO: 4.71 10E6/UL (ref 3.8–5.2)
RBC URINE: <1 /HPF
RSV RNA SPEC NAA+PROBE: NEGATIVE
SARS-COV-2 RNA RESP QL NAA+PROBE: NEGATIVE
SODIUM SERPL-SCNC: 139 MMOL/L (ref 135–145)
SP GR UR STRIP: 1.01 (ref 1–1.03)
SQUAMOUS EPITHELIAL: 2 /HPF
TROPONIN T SERPL HS-MCNC: <6 NG/L
UROBILINOGEN UR STRIP-MCNC: <2 MG/DL
WBC # BLD AUTO: 12.2 10E3/UL (ref 4–11)
WBC URINE: <1 /HPF

## 2024-07-15 PROCEDURE — 96361 HYDRATE IV INFUSION ADD-ON: CPT

## 2024-07-15 PROCEDURE — 81001 URINALYSIS AUTO W/SCOPE: CPT | Performed by: STUDENT IN AN ORGANIZED HEALTH CARE EDUCATION/TRAINING PROGRAM

## 2024-07-15 PROCEDURE — 99285 EMERGENCY DEPT VISIT HI MDM: CPT | Mod: 25

## 2024-07-15 PROCEDURE — 250N000011 HC RX IP 250 OP 636: Performed by: PHYSICIAN ASSISTANT

## 2024-07-15 PROCEDURE — 83690 ASSAY OF LIPASE: CPT | Performed by: STUDENT IN AN ORGANIZED HEALTH CARE EDUCATION/TRAINING PROGRAM

## 2024-07-15 PROCEDURE — 85025 COMPLETE CBC W/AUTO DIFF WBC: CPT | Performed by: STUDENT IN AN ORGANIZED HEALTH CARE EDUCATION/TRAINING PROGRAM

## 2024-07-15 PROCEDURE — 87637 SARSCOV2&INF A&B&RSV AMP PRB: CPT | Performed by: STUDENT IN AN ORGANIZED HEALTH CARE EDUCATION/TRAINING PROGRAM

## 2024-07-15 PROCEDURE — 36415 COLL VENOUS BLD VENIPUNCTURE: CPT | Performed by: STUDENT IN AN ORGANIZED HEALTH CARE EDUCATION/TRAINING PROGRAM

## 2024-07-15 PROCEDURE — 250N000011 HC RX IP 250 OP 636: Performed by: STUDENT IN AN ORGANIZED HEALTH CARE EDUCATION/TRAINING PROGRAM

## 2024-07-15 PROCEDURE — 36415 COLL VENOUS BLD VENIPUNCTURE: CPT | Performed by: PHYSICIAN ASSISTANT

## 2024-07-15 PROCEDURE — 85379 FIBRIN DEGRADATION QUANT: CPT | Performed by: PHYSICIAN ASSISTANT

## 2024-07-15 PROCEDURE — 71275 CT ANGIOGRAPHY CHEST: CPT

## 2024-07-15 PROCEDURE — 84484 ASSAY OF TROPONIN QUANT: CPT | Performed by: PHYSICIAN ASSISTANT

## 2024-07-15 PROCEDURE — 84155 ASSAY OF PROTEIN SERUM: CPT | Performed by: STUDENT IN AN ORGANIZED HEALTH CARE EDUCATION/TRAINING PROGRAM

## 2024-07-15 PROCEDURE — 258N000003 HC RX IP 258 OP 636: Performed by: STUDENT IN AN ORGANIZED HEALTH CARE EDUCATION/TRAINING PROGRAM

## 2024-07-15 PROCEDURE — 74177 CT ABD & PELVIS W/CONTRAST: CPT

## 2024-07-15 PROCEDURE — 93005 ELECTROCARDIOGRAM TRACING: CPT | Performed by: PHYSICIAN ASSISTANT

## 2024-07-15 PROCEDURE — 96375 TX/PRO/DX INJ NEW DRUG ADDON: CPT | Mod: 59

## 2024-07-15 PROCEDURE — 96374 THER/PROPH/DIAG INJ IV PUSH: CPT | Mod: 59

## 2024-07-15 PROCEDURE — 80053 COMPREHEN METABOLIC PANEL: CPT | Performed by: STUDENT IN AN ORGANIZED HEALTH CARE EDUCATION/TRAINING PROGRAM

## 2024-07-15 RX ORDER — DILTIAZEM HYDROCHLORIDE 360 MG/1
360 CAPSULE, EXTENDED RELEASE ORAL DAILY
Qty: 30 CAPSULE | Refills: 11 | Status: SHIPPED | OUTPATIENT
Start: 2024-07-15 | End: 2024-08-19

## 2024-07-15 RX ORDER — ONDANSETRON 2 MG/ML
4 INJECTION INTRAMUSCULAR; INTRAVENOUS ONCE
Status: COMPLETED | OUTPATIENT
Start: 2024-07-15 | End: 2024-07-15

## 2024-07-15 RX ORDER — KETOROLAC TROMETHAMINE 15 MG/ML
15 INJECTION, SOLUTION INTRAMUSCULAR; INTRAVENOUS ONCE
Status: COMPLETED | OUTPATIENT
Start: 2024-07-15 | End: 2024-07-15

## 2024-07-15 RX ORDER — IOPAMIDOL 755 MG/ML
90 INJECTION, SOLUTION INTRAVASCULAR ONCE
Status: COMPLETED | OUTPATIENT
Start: 2024-07-15 | End: 2024-07-15

## 2024-07-15 RX ORDER — HYDROMORPHONE HYDROCHLORIDE 1 MG/ML
0.5 INJECTION, SOLUTION INTRAMUSCULAR; INTRAVENOUS; SUBCUTANEOUS ONCE
Status: COMPLETED | OUTPATIENT
Start: 2024-07-15 | End: 2024-07-15

## 2024-07-15 RX ORDER — ONDANSETRON 4 MG/1
4 TABLET, ORALLY DISINTEGRATING ORAL EVERY 8 HOURS PRN
Qty: 10 TABLET | Refills: 0 | Status: SHIPPED | OUTPATIENT
Start: 2024-07-15 | End: 2024-07-18

## 2024-07-15 RX ADMIN — KETOROLAC TROMETHAMINE 15 MG: 15 INJECTION, SOLUTION INTRAMUSCULAR; INTRAVENOUS at 09:37

## 2024-07-15 RX ADMIN — IOPAMIDOL 90 ML: 755 INJECTION, SOLUTION INTRAVENOUS at 04:39

## 2024-07-15 RX ADMIN — ONDANSETRON 4 MG: 2 INJECTION INTRAMUSCULAR; INTRAVENOUS at 04:13

## 2024-07-15 RX ADMIN — HYDROMORPHONE HYDROCHLORIDE 0.5 MG: 1 INJECTION, SOLUTION INTRAMUSCULAR; INTRAVENOUS; SUBCUTANEOUS at 04:14

## 2024-07-15 RX ADMIN — IOPAMIDOL 90 ML: 755 INJECTION, SOLUTION INTRAVENOUS at 10:51

## 2024-07-15 RX ADMIN — SODIUM CHLORIDE 1000 ML: 9 INJECTION, SOLUTION INTRAVENOUS at 04:14

## 2024-07-15 ASSESSMENT — ACTIVITIES OF DAILY LIVING (ADL)
ADLS_ACUITY_SCORE: 37

## 2024-07-15 ASSESSMENT — COLUMBIA-SUICIDE SEVERITY RATING SCALE - C-SSRS
2. HAVE YOU ACTUALLY HAD ANY THOUGHTS OF KILLING YOURSELF IN THE PAST MONTH?: NO
6. HAVE YOU EVER DONE ANYTHING, STARTED TO DO ANYTHING, OR PREPARED TO DO ANYTHING TO END YOUR LIFE?: NO
1. IN THE PAST MONTH, HAVE YOU WISHED YOU WERE DEAD OR WISHED YOU COULD GO TO SLEEP AND NOT WAKE UP?: NO
6. HAVE YOU EVER DONE ANYTHING, STARTED TO DO ANYTHING, OR PREPARED TO DO ANYTHING TO END YOUR LIFE?: NO
2. HAVE YOU ACTUALLY HAD ANY THOUGHTS OF KILLING YOURSELF IN THE PAST MONTH?: NO
1. IN THE PAST MONTH, HAVE YOU WISHED YOU WERE DEAD OR WISHED YOU COULD GO TO SLEEP AND NOT WAKE UP?: NO

## 2024-07-15 NOTE — ED NOTES
Patient Update: Patient transferred to room 23 for central monitoring. Patient was called back to the facility for further evaluation of low oxygen saturations and she states that she had cardiac problems.

## 2024-07-15 NOTE — ED PROVIDER NOTES
NAME: Alina Martell  AGE: 59 year old female  YOB: 1965  MRN: [id number]  EVALUATION DATE & TIME: 7/15/2024  3:19 AM    PCP: Estelle Bansal  ED PROVIDER: Brina Whalen MD.    Chief Complaint   Patient presents with    Flank Pain       FINAL IMPRESSION:  1. Left flank pain    2. Nausea vomiting and diarrhea        MEDICAL DECISION MAKING:    3:37 AM I met with the patient, obtained history, performed an initial exam, and discussed options and plan for diagnostics and treatment here in the ED.   5:10 AM I updated the patient with lab and imaging results and discussed the plan for discharge     58 y/o F with h/o RA, chronic pain, HTN, CAD, T2DM among others who presents with left flank pain, some left sided abdominal pain, vomiting, diarrhea and increased urinary frequency. She is afebrile here with reassuring differential includes but not limited to kidney stone, UTI, muscle strain, diverticulitis, other intraabdominal pathology.  UA shows some blood but no signs of infection. Labs showed mild leukocytosis otherwise reassuring.  CT of the abdomen pelvis without any acute findings to explain her symptoms.  She did feel better on reassessment after medications.  Suspect possible viral infection given diarrhea/nausea/vomiting, etc. She is immunocompromised but has had no fevers at home or here and again workup without obvious source of infection.  She feels comfortable with discharge with zofran and close outpatient follow up.     She reported no chest pain or concerning shortness of breath. Mild cough if any. I have low suspicion for PE, dissection, ACS, pneumonia, etc.  However after discharge I see in the chart that she had some lower O2 values here (90% at time of discharge and I was not notified of this). At 7:30 am I called the patient back and talked to her about this. She does report the pain is worse with deep breaths. We discussed considering workup for PE and she is going to return to the  Emergency Room. I informed the oncoming providers of this and they will be expecting her.       Medical Decision Making  Obtained supplemental history:Supplemental history obtained?: No  Reviewed external records: External records reviewed?: No  Care impacted by chronic illness:Diabetes and Other: RA  Care significantly affected by social determinants of health:N/A  Did you consider but not order tests?: Work up considered but not performed and documented in chart, if applicable  Did you interpret images independently?: Independent interpretation of ECG and images noted in documentation, when applicable.  Consultation discussion with other provider:Did you involve another provider (consultant, , pharmacy, etc.)?: No  Discharge. I prescribed additional prescription strength medication(s) as charted. See documentation for any additional details.    MEDICATIONS GIVEN IN THE EMERGENCY:  Medications   HYDROmorphone (PF) (DILAUDID) injection 0.5 mg (0.5 mg Intravenous $Given 7/15/24 0414)   ondansetron (ZOFRAN) injection 4 mg (4 mg Intravenous $Given 7/15/24 0413)   sodium chloride 0.9% BOLUS 1,000 mL (0 mLs Intravenous Stopped 7/15/24 0449)   iopamidol (ISOVUE-370) solution 90 mL (90 mLs Intravenous $Given 7/15/24 0439)       NEW PRESCRIPTIONS STARTED AT TODAY'S ER VISIT:  New Prescriptions    ONDANSETRON (ZOFRAN ODT) 4 MG ODT TAB    Take 1 tablet (4 mg) by mouth every 8 hours as needed for nausea or vomiting        =================================================================  HPI    Patient information was obtained from: Patient     Use of : N/A       Alina Martell is a 59 year old female with a past medical history of rheumatoid arthritis, CAD, type 2 diabetes, atrial fibrillation s/p ablation, hypertension, s/p hysterectomy, morbid obesity who presents for nausea vomiting and other symptoms    The patient reports nausea and vomiting for the past 3 days as well as left flank pain that radiates  "into the left upper quadrant for 2 days. The patient states she \"can't keep anything down\" and about an hour after eating or drinking, it \"comes back up\". The patient also reports diarrhea and urinary frequency for 2 days.  The patient also reports dizziness whenever she has diarrhea.  Felt hot when vomiting but checked temp at home and no fevers  The patient had a negative COVID test at home.  Patient reports history of cholecystectomy and reports that this pain feels similar as to how she felt prior to her cholecystectomy. No chest pain. Mild cough.    REVIEW OF SYSTEMS   All systems reviewed, please see HPI for pertinent findings    PAST MEDICAL HISTORY:  Past Medical History:   Diagnosis Date    Anemia     Antiplatelet or antithrombotic long-term use     Arrhythmia     Cannabis use without complication 12/8/2014    Carpal tunnel syndrome     Coronary artery disease     Diabetes mellitus, type 2 (H) 12/13/2023    Gastroesophageal reflux disease     History of angina     Hypertension     Irregular heart beat     Obesity     Paroxysmal atrial fibrillation (H)     PTSD (post-traumatic stress disorder)     RA (rheumatoid arthritis) (H)     Rheumatoid arthritis (H) 7/8/2016    Sicca syndrome (H24)     Sleep apnea        PAST SURGICAL HISTORY:  Past Surgical History:   Procedure Laterality Date    CHOLECYSTECTOMY      CYSTOSCOPY N/A 5/4/2023    Procedure: AND CYSTOSCOPY;  Surgeon: Irma Cary MD;  Location: [facility] OR    DAVINCI HYSTERECTOMY TOTAL Bilateral 5/4/2023    Procedure: ROBOTIC ASSISTED TOTAL LAPAROSCOPIC HYSTERECTOMY WITH BILATERAL SALPINGECTOMY;  Surgeon: Irma Cary MD;  Location: [facility] OR    DILATION AND CURETTAGE, OPERATIVE HYSTEROSCOPY, COMBINED N/A 3/3/2022    Procedure: HYSTEROSCOPY, WITH DILATION AND CURETTAGE;  Surgeon: Irma Cary MD;  Location: [facility] OR    EP ABLATION FOCAL AFIB N/A 6/30/2022    Procedure: Ablation Atrial Fibrilation;  " Surgeon: Jose Guadalupe Joseph MD;  Location:  [facility]    HC REMOVAL GALLBLADDER      Description: Cholecystectomy;  Recorded: 10/15/2013;    LAPAROSCOPIC TUBAL LIGATION      RELEASE CARPAL TUNNEL BILATERAL      TUBAL LIGATION  1995       CURRENT MEDICATIONS:    No current facility-administered medications for this encounter.    Current Outpatient Medications:     ondansetron (ZOFRAN ODT) 4 MG ODT tab, Take 1 tablet (4 mg) by mouth every 8 hours as needed for nausea or vomiting, Disp: 10 tablet, Rfl: 0    Adalimumab-adaz 40 MG/0.4ML SOAJ, INJECT 1 PEN UNDER THE SKIN EVERY 14 DAYS, Disp: 2 mL, Rfl: 0    cetirizine (ZYRTEC) 10 MG tablet, Take 1 tablet (10 mg) by mouth daily, Disp: 90 tablet, Rfl: 1    diltiazem ER COATED BEADS (CARDIZEM CD/CARTIA XT) 180 MG 24 hr capsule, Take 2 capsules (360 mg) by mouth daily, Disp: 180 capsule, Rfl: 3    docusate sodium (COLACE) 100 MG capsule, Take 1 capsule (100 mg) by mouth 2 times daily as needed for other (While taking pain pills to prevent constipation), Disp: 30 capsule, Rfl: 0    EPINEPHrine (ANY BX GENERIC EQUIV) 0.3 MG/0.3ML injection 2-pack, Inject 0.3 mg into the muscle as needed for anaphylaxis, Disp: , Rfl:     ferrous gluconate (FERGON) 324 (38 Fe) MG tablet, Take 1 tablet (324 mg) by mouth daily (with breakfast), Disp: 30 tablet, Rfl: 0    gabapentin (NEURONTIN) 100 MG capsule, Take 1 capsule (100 mg) by mouth at bedtime, Disp: 30 capsule, Rfl: 2    LORazepam (ATIVAN) 0.5 MG tablet, Take 1 tablet (0.5 mg) by mouth daily as needed for anxiety, Disp: 30 tablet, Rfl: 2    metFORMIN (GLUCOPHAGE XR) 500 MG 24 hr tablet, Take 1 tablet (500 mg) by mouth daily (with dinner), Disp: 90 tablet, Rfl: 2    Multiple Vitamins-Minerals (CENTROVITE) TABS, Take 1 tablet by mouth daily, Disp: , Rfl:     omeprazole (PRILOSEC) 40 MG DR capsule, Take 1 capsule (40 mg) by mouth daily, Disp: 90 capsule, Rfl: 3    venlafaxine (EFFEXOR XR) 75 MG 24 hr capsule, Take 1  capsule (75 mg) by mouth daily, Disp: 90 capsule, Rfl: 0    vitamin D3 (CHOLECALCIFEROL) 1.25 MG (87819 UT) capsule, Take 1 capsule (50,000 Units) by mouth every 7 days, Disp: 12 capsule, Rfl: 3    ALLERGIES:  Allergies   Allergen Reactions    Penicillins Shortness Of Breath, Palpitations, Dizziness, Anaphylaxis, Hives, Itching and Rash     Test in 2031, see allergy note on 7/29/21    Shellfish-Derived Products Anaphylaxis    Sulfa Antibiotics Hives and Anaphylaxis       FAMILY HISTORY:  Family History   Problem Relation Age of Onset    Crohn's Disease Mother         Total colectomy with ileostomy.    Atrial fibrillation Mother     Snoring Father     Diabetes Father     Prostate Cancer Father     Chronic Kidney Disease Father         Chose against dialysis.    Substance Abuse Brother     Depression Brother     Attention Deficit Disorder Brother     Alcoholism Brother     Arrhythmia Brother     Alcoholism Brother     Substance Abuse Brother     Arrhythmia Brother     Alcoholism Maternal Grandfather     No Known Problems Daughter     No Known Problems Son     Lupus Cousin     Breast Cancer No family hx of        SOCIAL HISTORY:   Social History     Socioeconomic History    Marital status: Single    Number of children: 2    Years of education: 4   Tobacco Use    Smoking status: Never     Passive exposure: Past    Smokeless tobacco: Never   Vaping Use    Vaping status: Never Used   Substance and Sexual Activity    Alcohol use: Not Currently     Comment: Alcoholic Drinks/day: Never an issue, she states.  7/8/16    Drug use: Not Currently     Comment: Drug use: Former marijuana use, not current. 7/8/16    Sexual activity: Never     Partners: Male     Birth control/protection: Other     Comment: tubal ligation     Social Determinants of Health     Interpersonal Safety: Low Risk  (3/27/2024)    Interpersonal Safety     Do you feel physically and emotionally safe where you currently live?: Yes     Within the past 12 months,  "have you been hit, slapped, kicked or otherwise physically hurt by someone?: No     Within the past 12 months, have you been humiliated or emotionally abused in other ways by your partner or ex-partner?: No       PHYSICAL EXAM:    Vitals: /77   Pulse 80   Temp 98.1  F (36.7  C) (Oral)   Resp 16   Ht 1.753 m (5' 9\")   Wt 129.4 kg (285 lb 3.2 oz)   LMP  (LMP Unknown)   SpO2 93%   BMI 42.12 kg/m     Constitutional: Well developed, well nourished. No acute distress.  HENT: Normocephalic, atraumatic. Neck-gross ROM intact.   Eyes: Pupils mid-range, sclera white  Respiratory: CTAB, no respiratory distress, speaks full sentences easily.  Cardiovascular: Normal heart rate, regular rhythm. No lower extremity edema  GI: Soft, not distended, left sided abdominal and left flank tenderness  Musculoskeletal: Moving all 4 extremities intentionally and without pain. No obvious deformity. No midline spinal tenderness or step offs.  Skin: Warm, dry, no rash.  Neurologic: Alert & oriented, speech clear, face symmetric, moving all extremities spontaneously     LAB:  All pertinent labs reviewed and interpreted.  Labs Ordered and Resulted from Time of ED Arrival to Time of ED Departure   ROUTINE UA WITH MICROSCOPIC REFLEX TO CULTURE - Abnormal       Result Value    Color Urine Colorless      Appearance Urine Clear      Glucose Urine Negative      Bilirubin Urine Negative      Ketones Urine Negative      Specific Gravity Urine 1.009      Blood Urine 0.06 mg/dL (*)     pH Urine 6.0      Protein Albumin Urine 20 (*)     Urobilinogen Urine <2.0      Nitrite Urine Negative      Leukocyte Esterase Urine Negative      Bacteria Urine Few (*)     RBC Urine <1      WBC Urine <1      Squamous Epithelials Urine 2 (*)    BASIC METABOLIC PANEL - Abnormal    Sodium 139      Potassium 4.2      Chloride 100      Carbon Dioxide (CO2) 27      Anion Gap 12      Urea Nitrogen 11.9      Creatinine 0.79      GFR Estimate 87      Calcium 9.3      " Glucose 174 (*)    CBC WITH PLATELETS AND DIFFERENTIAL - Abnormal    WBC Count 12.2 (*)     RBC Count 4.71      Hemoglobin 11.4 (*)     Hematocrit 37.7      MCV 80      MCH 24.2 (*)     MCHC 30.2 (*)     RDW 15.7 (*)     Platelet Count 399      % Neutrophils 66      % Lymphocytes 25      % Monocytes 7      % Eosinophils 2      % Basophils 0      % Immature Granulocytes 0      NRBCs per 100 WBC 0      Absolute Neutrophils 8.0      Absolute Lymphocytes 3.0      Absolute Monocytes 0.8      Absolute Eosinophils 0.2      Absolute Basophils 0.1      Absolute Immature Granulocytes 0.0      Absolute NRBCs 0.0     HEPATIC FUNCTION PANEL - Normal    Protein Total 8.1      Albumin 4.0      Bilirubin Total 0.2      Alkaline Phosphatase 110      AST 13      ALT 19      Bilirubin Direct <0.20     LIPASE - Normal    Lipase 14     INFLUENZA A/B, RSV, & SARS-COV2 PCR - Normal    Influenza A PCR Negative      Influenza B PCR Negative      RSV PCR Negative      SARS CoV2 PCR Negative         RADIOLOGY:  CT Abdomen Pelvis w Contrast   Final Result   IMPRESSION:    1.  No evidence of bowel obstruction or inflammatory change.   2.  Diffuse hepatic steatosis.   3.  Colonic diverticulosis without diverticulitis.           I, Dannielle Smallwood, am serving as a scribe to document services personally performed by Dr. Brina Whalen based on my observation and the provider's statements to me. I, Brina Whalen MD attest that Dannielle Smallwood is acting in a scribe capacity, has observed my performance of the services and has documented them in accordance with my direction.    Brina Whalen M.D.  Emergency Medicine  [facility] EMERGENCY DEPARTMENT  [facility] [facility] [place] [id number] [phone]  Dept: [phone]     Brina Whalen MD  07/15/24 0754

## 2024-07-15 NOTE — ED TRIAGE NOTES
Patient arrived ambulatory to triage from home.     Patient reports N/V since Friday night and left-sided flank pain that started Sunday morning.     Reports increased frequency of urination beginning Saturday.

## 2024-07-15 NOTE — DISCHARGE INSTRUCTIONS
Reassuringly, your workup here today was unremarkable.  No findings to explain your shortness of breath and chest pain with deep breaths.  There are no blood clots seen in your lungs. Your EKG and lab work here does not show evidence of a heart problem.   It certainly could be related to the viral process that you were diagnosed with earlier today.    With this being said, I would recommend following up with your primary care provider ASAP for recheck.    Return to the ER for any new or worsening symptoms.

## 2024-07-15 NOTE — ED TRIAGE NOTES
Patient was seen here this morning for left side lower hip pain, was called by a provider to return for chest Xray. Reports she is still have some pain in the left hip, nausea has subsided since discharge.

## 2024-07-15 NOTE — ED PROVIDER NOTES
EMERGENCY DEPARTMENT ENCOUNTER   NAME: Alina Martell ; AGE: 59 year old female ; YOB: 1965 ; MRN: [id number] ; PCP: Estelle Bansal     Evaluation Date & Time: 7/15/2024  9:10 AM    ED Provider: Niki Worrell PA-C    CHIEF COMPLAINT     Back Pain      FINAL ASSESSMENT       ICD-10-CM    1. Pleuritic chest pain  R07.81       2. Shortness of breath  R06.02           ED COURSE, MEDICAL DECISION MAKING, PLAN     ED course     9:18 AM Evaluated patient. Performed brief physical exam. Plan for d-dimer, troponin, EKG, and Toradol.   10:33 AM Dimer elevated to 0.64. CT PE study ordered.   11:28 AM Rechecked and updated patient. Discharge and follow up plans discussed. RN to discharge.   ______________________________________________________________________    Alina Martell is a 59 year old female with pertinent medical history of atrial fibrillation, DM2, CAD, HTN, obesity, RA who is returning for PE rule out at the recommendation of ED MD that saw patient this AM. Pt was noted to be oxygenating at 90% at discharge and when ED MD called patient to discuss she noted shortness of breath and pleuritic chest pain.     Exam: Benign brief exam. Oxygenating at 90% on RA and HR is 91. BP, temp, and respiratory rate normal.      Differentials: PE, ACS, costochondritis, chest wall strain, pneumonia, bronchitis, other non-specific viral URI     Labs: D-dimer elevated to 0.64.  Troponin less than 6.  Reviewed available labs from her visit this morning which were largely unrevealing aside from minimally elevated white blood cell count of 12.2.    EKG: Sinus rhythm with a rate of 78.  No ischemia or STEMI.    Imaging: CT PE study completed.  Radiologist read: 1.  No acute findings to explain symptoms. 2.  No pulmonary embolism. 3.  Nonaneurysmal aorta without dissection. 4.  Hepatic steatosis.    Consults: None    Interventions/recheck: Patient given 15 mg of IV Toradol to see if it can help a little further  with her pain.  Patient did not really notice any significant improvement or pain, but states that it is manageable at this time.  Patient's oxygen saturation at 95% at the end of the visit.  Pulse 77.  Blood pressure and respiration rate has remained within normal limits.    Assessment: Symptoms likely related to nonspecific viral illness and/or chest wall strain.  No PE detected on CT.  No pneumonia.  Lung sounds are clear making bronchitis unlikely.  ACS highly unlikely with a troponin less than 6 and unremarkable EKG.  Costochondritis unlikely without reproducible pain.  Overall, no red flag s/s present to suggest an acutely serious or life threatening condition that would necessitate hospitalization.     Plan: Continue with plan that was discussed with previous provider earlier this morning.  Tylenol and ibuprofen for pain.  May consider using a heating pad.  Indications for re-evaluation in the ER discussed.   Patient/parent/guardian understanding and agreeable with the plan and will discharge to home in good condition.     ______________________________________________________________________    *All pertinent lab & imaging studies independently reviewed. (See chart for details)   *Discussed the results of all the tests and plan with patient and family/guardians.   *The patient and/or family/guardian acknowledged understanding and was agreeable with the care plan.      HISTORY OF PRESENT ILLNESS   Patient information was obtained from: Patient and previous MD who saw patient    Use of Intrepreter: N/A     Alina Martell is a 59 year old female with a pertinent history of atrial fibrillation, DM2, CAD, HTN, obesity, RA who presents to the ED by means of walk-in for evaluation of shortness of breath, pleuritic chest pain, and low O2 sat.    Patient was seen here earlier this morning for left flank and abdominal pain, vomiting, diarrhea, and urinary frequency.  Had laboratory workup and CT of the abdomen  "and pelvis completed which were all overall unremarkable.  Pain was addressed with 0.5 of Dilaudid.  Diagnosed with a viral illness and discharged home.  After patient had been discharged provider noted that patient's oxygen saturation was 90% at her time of discharge.  MD called patient back to discuss further where patient then reported feeling short of breath with some chest pain with deep breaths.  MD recommended she come back for PE rule out.    Per my chart review from this AM ED visit:   \"58 y/o F with h/o RA, chronic pain, HTN, CAD, T2DM among others who presents with left flank pain, some left sided abdominal pain, vomiting, diarrhea and increased urinary frequency. She is afebrile here with reassuring differential includes but not limited to kidney stone, UTI, muscle strain, diverticulitis, other intraabdominal pathology.  UA shows some blood but no signs of infection. Labs showed mild leukocytosis otherwise reassuring.  CT of the abdomen pelvis without any acute findings to explain her symptoms.  She did feel better on reassessment after medications.  Suspect possible viral infection given diarrhea/nausea/vomiting, etc. She is immunocompromised but has had no fevers at home or here and again workup without obvious source of infection.  She feels comfortable with discharge with zofran and close outpatient follow up.      She reported no chest pain or concerning shortness of breath. Mild cough if any. I have low suspicion for PE, dissection, ACS, pneumonia, etc.  However after discharge I see in the chart that she had some lower O2 values here (90% at time of discharge and I was not notified of this). At 7:30 am I called the patient back and talked to her about this. She does report the pain is worse with deep breaths. We discussed considering workup for PE and she is going to return to the Emergency Room. I informed the oncoming providers of this and they will be expecting her.\"    For me, patient reports " noticing some left calf soreness intermittently and points to the proximal calf and popliteal region. She reports feeling a little short of breath and if she takes a deep breath in feels a little pain in her upper chest.     States her left flank/abdominal pain is improved, but feels like it might be slowly coming back.     No other concerns.       MEDICAL HISTORY     Past Medical History:   Diagnosis Date    Anemia     Antiplatelet or antithrombotic long-term use     Arrhythmia     Cannabis use without complication 12/8/2014    Carpal tunnel syndrome     Coronary artery disease     Diabetes mellitus, type 2 (H) 12/13/2023    Gastroesophageal reflux disease     History of angina     Hypertension     Irregular heart beat     Obesity     Paroxysmal atrial fibrillation (H)     PTSD (post-traumatic stress disorder)     RA (rheumatoid arthritis) (H)     Rheumatoid arthritis (H) 7/8/2016    Sicca syndrome (H24)     Sleep apnea        Past Surgical History:   Procedure Laterality Date    CHOLECYSTECTOMY      CYSTOSCOPY N/A 5/4/2023    Procedure: AND CYSTOSCOPY;  Surgeon: Irma Cary MD;  Location: [facility] OR    DAVINCI HYSTERECTOMY TOTAL Bilateral 5/4/2023    Procedure: ROBOTIC ASSISTED TOTAL LAPAROSCOPIC HYSTERECTOMY WITH BILATERAL SALPINGECTOMY;  Surgeon: Irma Cary MD;  Location: [facility] OR    DILATION AND CURETTAGE, OPERATIVE HYSTEROSCOPY, COMBINED N/A 3/3/2022    Procedure: HYSTEROSCOPY, WITH DILATION AND CURETTAGE;  Surgeon: Irma Cary MD;  Location: [facility] OR    EP ABLATION FOCAL AFIB N/A 6/30/2022    Procedure: Ablation Atrial Fibrilation;  Surgeon: Jose Guadalupe Joseph MD;  Location:  [facility]    HC REMOVAL GALLBLADDER      Description: Cholecystectomy;  Recorded: 10/15/2013;    LAPAROSCOPIC TUBAL LIGATION      RELEASE CARPAL TUNNEL BILATERAL      TUBAL LIGATION  1995       Family History   Problem Relation Age of Onset    Crohn's Disease  Mother         Total colectomy with ileostomy.    Atrial fibrillation Mother     Snoring Father     Diabetes Father     Prostate Cancer Father     Chronic Kidney Disease Father         Chose against dialysis.    Substance Abuse Brother     Depression Brother     Attention Deficit Disorder Brother     Alcoholism Brother     Arrhythmia Brother     Alcoholism Brother     Substance Abuse Brother     Arrhythmia Brother     Alcoholism Maternal Grandfather     No Known Problems Daughter     No Known Problems Son     Lupus Cousin     Breast Cancer No family hx of        Social History     Tobacco Use    Smoking status: Never     Passive exposure: Past    Smokeless tobacco: Never   Vaping Use    Vaping status: Never Used   Substance Use Topics    Alcohol use: Not Currently     Comment: Alcoholic Drinks/day: Never an issue, she states.  7/8/16    Drug use: Not Currently     Comment: Drug use: Former marijuana use, not current. 7/8/16       Adalimumab-adaz 40 MG/0.4ML SOAJ  cetirizine (ZYRTEC) 10 MG tablet  diltiazem ER COATED BEADS (CARDIZEM CD/CARTIA XT) 180 MG 24 hr capsule  docusate sodium (COLACE) 100 MG capsule  EPINEPHrine (ANY BX GENERIC EQUIV) 0.3 MG/0.3ML injection 2-pack  ferrous gluconate (FERGON) 324 (38 Fe) MG tablet  gabapentin (NEURONTIN) 100 MG capsule  LORazepam (ATIVAN) 0.5 MG tablet  metFORMIN (GLUCOPHAGE XR) 500 MG 24 hr tablet  Multiple Vitamins-Minerals (CENTROVITE) TABS  omeprazole (PRILOSEC) 40 MG DR capsule  ondansetron (ZOFRAN ODT) 4 MG ODT tab  venlafaxine (EFFEXOR XR) 75 MG 24 hr capsule  vitamin D3 (CHOLECALCIFEROL) 1.25 MG (69401 UT) capsule          PHYSICAL EXAM     First Vitals:  Patient Vitals for the past 24 hrs:   BP Temp Temp src Pulse Resp SpO2 Height Weight   07/15/24 1130 139/69 -- -- 77 18 95 % -- --   07/15/24 1115 128/64 -- -- 86 19 95 % -- --   07/15/24 1112 128/69 -- -- 80 16 96 % -- --   07/15/24 1030 (!) 152/71 -- -- 71 13 96 % -- --   07/15/24 1015 138/75 -- -- 73 10 95 % -- --  "  07/15/24 1000 138/79 -- -- 75 -- 96 % -- --   07/15/24 0945 (!) 140/75 -- -- 81 -- 94 % -- --   07/15/24 0937 138/82 -- -- 81 -- 96 % -- --   07/15/24 0910 -- -- -- -- -- -- 1.753 m (5' 9\") 129.3 kg (285 lb)   07/15/24 0906 131/67 97.8  F (36.6  C) Temporal 91 18 90 % -- --         PHYSICAL EXAM:   Constitutional: No acute distress.  Neuro: Awake and alert. No focal deficits.  Psych: Calm and cooperative.  Eyes: PERRL. EOMI. Conjunctivae clear.   Cardio: Regular rate. Adequate perfusion to extremities. Regular rhythm. No murmurs.  Pulmonary: Oxygenating at 90% on RA. No labored breathing. CTA b/l.    Lower extremities: No palpable pain to the lower extremities. Moves freely. No edema. Distal pulses intact. Sensations intact.   Skin: Natural color, warm, dry, intact.       RESULTS     LAB:  All pertinent labs reviewed and interpreted  Labs Ordered and Resulted from Time of ED Arrival to Time of ED Departure   D DIMER QUANTITATIVE - Abnormal       Result Value    D-Dimer Quantitative 0.64 (*)    TROPONIN T, HIGH SENSITIVITY - Normal    Troponin T, High Sensitivity <6         RADIOLOGY:  CT Chest Pulmonary Embolism w Contrast   Final Result   IMPRESSION:      1.  No acute findings to explain symptoms.      2.  No pulmonary embolism.      3.  Nonaneurysmal aorta without dissection.      4.  Hepatic steatosis.             ECG:  EKG was collected and independently interpreted by myself and also confirmed by MD.    Findings: Sinus rhythm with a rate of 78.  No interval prolongation.  No ischemia or STEMI.    Comparisons: Compared to ECG from March 2024, no significant change.      PROCEDURES     None         MEDICAL DECISION MAKING:  Obtained supplemental history:Supplemental history obtained?: No  Reviewed external records: External records reviewed?: Outpatient Record: See HPI  Care impacted by chronic illness:Diabetes, Heart Disease, and Hypertension  Care significantly affected by social determinants of " health:N/A  Did you consider but not order tests?: Work up considered but not performed and documented in chart, if applicable  Did you interpret images independently?: Independent interpretation of ECG and images noted in documentation, when applicable.  Consultation discussion with other provider:Did you involve another provider (consultant, , pharmacy, etc.)?: No  Discharge. No recommendations on prescription strength medication(s). See documentation for any additional details.      FINAL IMPRESSION:    ICD-10-CM    1. Pleuritic chest pain  R07.81       2. Shortness of breath  R06.02             MEDICATIONS GIVEN IN THE EMERGENCY DEPARTMENT:  Medications   ketorolac (TORADOL) injection 15 mg (15 mg Intravenous $Given 7/15/24 0972)   iopamidol (ISOVUE-370) solution 90 mL (90 mLs Intravenous $Given 7/15/24 1051)         NEW PRESCRIPTIONS STARTED AT TODAY'S ED VISIT:  New Prescriptions    No medications on file              Some or all of this documentation has been completed using dictation software and mild grammatical errors may be present. Please contact me with any concerns regarding this.       Niki Worrell PA-C  Emergency Medicine   [facility] EMERGENCY DEPARTMENT       Niki Worrell PA-C  07/15/24 1140

## 2024-07-15 NOTE — DISCHARGE INSTRUCTIONS
Your workup here was generally reassuring.  Monitor symptoms closely and follow up with your primary care doctor  Can take zofran as needed  Return to the Emergency Room with fever, worsening pain, unable to keep down fluids, chest pain, difficulty breathing or other worsening symptoms or concerns

## 2024-07-16 ASSESSMENT — ANXIETY QUESTIONNAIRES
2. NOT BEING ABLE TO STOP OR CONTROL WORRYING: NOT AT ALL
4. TROUBLE RELAXING: SEVERAL DAYS
GAD7 TOTAL SCORE: 8
8. IF YOU CHECKED OFF ANY PROBLEMS, HOW DIFFICULT HAVE THESE MADE IT FOR YOU TO DO YOUR WORK, TAKE CARE OF THINGS AT HOME, OR GET ALONG WITH OTHER PEOPLE?: SOMEWHAT DIFFICULT
1. FEELING NERVOUS, ANXIOUS, OR ON EDGE: NEARLY EVERY DAY
3. WORRYING TOO MUCH ABOUT DIFFERENT THINGS: SEVERAL DAYS
5. BEING SO RESTLESS THAT IT IS HARD TO SIT STILL: NEARLY EVERY DAY
7. FEELING AFRAID AS IF SOMETHING AWFUL MIGHT HAPPEN: NOT AT ALL
IF YOU CHECKED OFF ANY PROBLEMS ON THIS QUESTIONNAIRE, HOW DIFFICULT HAVE THESE PROBLEMS MADE IT FOR YOU TO DO YOUR WORK, TAKE CARE OF THINGS AT HOME, OR GET ALONG WITH OTHER PEOPLE: SOMEWHAT DIFFICULT
GAD7 TOTAL SCORE: 8
7. FEELING AFRAID AS IF SOMETHING AWFUL MIGHT HAPPEN: NOT AT ALL
6. BECOMING EASILY ANNOYED OR IRRITABLE: NOT AT ALL
GAD7 TOTAL SCORE: 8

## 2024-07-17 ENCOUNTER — VIRTUAL VISIT (OUTPATIENT)
Dept: PSYCHOLOGY | Facility: CLINIC | Age: 59
End: 2024-07-17
Payer: COMMERCIAL

## 2024-07-17 ENCOUNTER — MYC MEDICAL ADVICE (OUTPATIENT)
Dept: FAMILY MEDICINE | Facility: CLINIC | Age: 59
End: 2024-07-17
Payer: COMMERCIAL

## 2024-07-17 DIAGNOSIS — F41.1 GAD (GENERALIZED ANXIETY DISORDER): Primary | ICD-10-CM

## 2024-07-17 DIAGNOSIS — F43.10 POSTTRAUMATIC STRESS DISORDER: ICD-10-CM

## 2024-07-17 DIAGNOSIS — F33.1 MODERATE EPISODE OF RECURRENT MAJOR DEPRESSIVE DISORDER (H): ICD-10-CM

## 2024-07-17 PROCEDURE — 90834 PSYTX W PT 45 MINUTES: CPT | Mod: 95 | Performed by: COUNSELOR

## 2024-07-18 NOTE — PROGRESS NOTES
[facility]                                     Progress Note    Patient Name: Alina Martell  Date: 7/17/24         Service Type: Individual      Session Start Time: 2:05 Session End Time: 2:50     Session Length: 45 minutes    Session #: 76     Attendees: Client       Service Modality:  Video Visit:     Telemedicine Visit: The patient's condition can be safely assessed and treated via synchronous audio and visual telemedicine encounter.       Reason for Telemedicine Visit: Services only offered telehealth     Originating Site (Patient Location): Patient's place of employment     Distant Location (provider location):  Off-site     Consent:  The patient/guardian has verbally consented to: the potential risks and benefits of telemedicine (video visit) versus in person care; bill my insurance or make self-payment for services provided; and responsibility for payment of non-covered services.      Mode of Communication:  Video Conference via [employer]     As the provider I attest to compliance with applicable laws and regulations related to telemedicine.          DATA  Extended Session (53+ minutes): No  Interactive Complexity: No  Crisis: No      Progress Since Last Session (Related to Symptoms / Goals / Homework):   Symptoms: Worsening due to physical health causing panic attacks    Homework: Achieved / completed to satisfaction      Episode of Care Goals: Satisfactory progress - ACTION (Actively working towards change); Intervened by reinforcing change plan / affirming steps taken     Current / Ongoing Stressors and Concerns:  Patient reported that is has been a rough month. Patient indicated that her health has been a huge concern for her. Patient reported that she is still trying to figure out what is happening but is following up with her PCP. Patient indicated she is working hard to challenged her thoughts and identify what is facts. Patient reported that she had a conversation with  the partner she has been seeing and felt it went well. This therapist processed with patient ways to work on managing panic attacks and sleep hygiene.      Treatment Objective(s) Addressed in This Session:   use cognitive strategies identified in therapy to challenge anxious thoughts  Increase interest, engagement, and pleasure in doing things  Decrease frequency and intensity of feeling down, depressed, hopeless  Improve quantity and quality of night time sleep / decrease daytime naps  Feel less tired and more energy during the day   Identify negative self-talk and behaviors: challenge core beliefs, myths, and actions     Intervention:   Motivational Interviewing    MI Intervention: Reflections: simple and complex     Change Talk Expressed by the Patient: Activation Taking steps    Provider Response to Change Talk: S - Summarized patient's change talk statements      Assessments completed prior to visit:  The following assessments were completed by patient for this visit:  PHQ9:       12/13/2023     3:35 PM 12/18/2023    12:59 PM 1/8/2024     7:06 AM 2/18/2024    10:01 PM 4/3/2024     9:50 AM 5/20/2024     9:50 AM 7/16/2024     4:33 PM   PHQ-9 SCORE   PHQ-9 Total Score MyChart 9 (Mild depression) 10 (Moderate depression) 8 (Mild depression) 5 (Mild depression) 6 (Mild depression) 4 (Minimal depression) 6 (Mild depression)   PHQ-9 Total Score 9 10 8 5 6 4 6     GAD7:       12/1/2022     9:52 AM 7/5/2023     2:18 PM 9/26/2023    12:05 PM 10/9/2023    10:00 AM 10/30/2023     3:23 PM 12/6/2023     9:50 AM 7/16/2024     4:33 PM   ADA-7 SCORE   Total Score 8 (mild anxiety) 6 (mild anxiety) 11 (moderate anxiety)  5 (mild anxiety) 11 (moderate anxiety) 8 (mild anxiety)   Total Score 8 6 11 8 5 11 8    8     PROMIS 10-Global Health (all questions and answers displayed):       12/1/2022     9:53 AM 3/10/2023     8:13 AM 6/13/2023     6:37 AM 9/26/2023    12:07 PM 1/1/2024    11:44 PM 4/3/2024     9:52 AM 5/20/2024     9:52  AM   PROMIS 10   In general, would you say your health is: Good Fair Good Fair Good Good Good   In general, would you say your quality of life is: Good Good Good Good Fair Good Fair   In general, how would you rate your physical health? Fair Fair Fair Fair Fair Fair Good   In general, how would you rate your mental health, including your mood and your ability to think? Fair Fair Fair Fair Fair Fair Fair   In general, how would you rate your satisfaction with your social activities and relationships? Good Good Good Good Fair Good Good   In general, please rate how well you carry out your usual social activities and roles Good Good Good Good Good Good Good   To what extent are you able to carry out your everyday physical activities such as walking, climbing stairs, carrying groceries, or moving a chair? Mostly Mostly Mostly Mostly Mostly Completely Completely   In the past 7 days, how often have you been bothered by emotional problems such as feeling anxious, depressed, or irritable? Often Sometimes Often Often Often Sometimes Sometimes   In the past 7 days, how would you rate your fatigue on average? Mild Moderate Mild Moderate Mild Moderate Moderate   In the past 7 days, how would you rate your pain on average, where 0 means no pain, and 10 means worst imaginable pain? 3 4 2 3 3 3 3   In general, would you say your health is: 3 2 3 2 3 3 3   In general, would you say your quality of life is: 3 3 3 3 2 3 2   In general, how would you rate your physical health? 2 2 2 2 2 2 3   In general, how would you rate your mental health, including your mood and your ability to think? 2 2 2 2 2 2 2   In general, how would you rate your satisfaction with your social activities and relationships? 3 3 3 3 2 3 3   In general, please rate how well you carry out your usual social activities and roles. (This includes activities at home, at work and in your community, and responsibilities as a parent, child, spouse, employee, friend,  etc.) 3 3 3 3 3 3 3   To what extent are you able to carry out your everyday physical activities such as walking, climbing stairs, carrying groceries, or moving a chair? 4 4 4 4 4 5 5   In the past 7 days, how often have you been bothered by emotional problems such as feeling anxious, depressed, or irritable? 4 3 4 4 4 3 3   In the past 7 days, how would you rate your fatigue on average? 2 3 2 3 2 3 3   In the past 7 days, how would you rate your pain on average, where 0 means no pain, and 10 means worst imaginable pain? 3 4 2 3 3 3 3   Global Mental Health Score 10 11 10 10 8    8 11 10   Global Physical Health Score 14 12 14 13 14    14 14 15   PROMIS TOTAL - SUBSCORES 24 23 24 23 22    22 25 25         ASSESSMENT: Current Emotional / Mental Status (status of significant symptoms):   Risk status (Self / Other harm or suicidal ideation)   Patient denies current fears or concerns for personal safety.   Patient denies current or recent suicidal ideation or behaviors.   Patient denies current or recent homicidal ideation or behaviors.   Patient denies current or recent self injurious behavior or ideation.   Patient denies other safety concerns.   Patient reports there has been no change in risk factors since their last session.     Patient reports there has been no change in protective factors since their last session.     Recommended that patient call 911 or go to the local ED should there be a change in any of these risk factors.     Appearance:   Appropriate    Eye Contact:   Good    Psychomotor Behavior: Normal    Attitude:   Cooperative    Orientation:   All   Speech    Rate / Production: Normal/ Responsive    Volume:  Normal    Mood:    Anxious  Depressed    Affect:    Appropriate    Thought Content:  Clear    Thought Form:  Coherent  Goal Directed  Logical    Insight:    Good      Medication Review:   No changes to current psychiatric medication(s)     Medication Compliance:   Yes     Changes in Health  Issues:   None reported     Chemical Use Review:   Substance Use: Chemical use reviewed, no active concerns identified      Tobacco Use: No current tobacco use.      Diagnosis:  1. ADA (generalized anxiety disorder)    2. Moderate episode of recurrent major depressive disorder (H)    3. Posttraumatic stress disorder        Collateral Reports Completed:   Not Applicable    PLAN: (Patient Tasks / Therapist Tasks / Other)  Patient will return in 3 weeks for scheduled session. Patient will work on challenging negative thoughts and staying in the moment. Patient will continue to follow up with her PCP. Patient will work on sleep hygiene.     There has been demonstrated improvement in functioning while patient has been engaged in psychotherapy/psychological service- if withdrawn the patient would deteriorate and/or relapse.     Mariana Love, [facility]     ______________________________________________________________________    Individual Treatment Plan    Patient's Name: Alina Martell  YOB: 1965    Date of Creation:10/16/2020  Date Treatment Plan Last Reviewed/Revised: 7/17/2024    DSM5 Diagnoses: 296.32 (F33.1) Major Depressive Disorder, Recurrent Episode, Moderate _, 300.02 (F41.1) Generalized Anxiety Disorder, or 309.81 (F43.10) Posttraumatic Stress Disorder (includes Posttraumatic Stress Disorder for Children 6 Years and Younger)  Without dissociative symptoms  Psychosocial / Contextual Factors: Health, family  PROMIS (reviewed every 90 days):     PROMIS-10 Scores  Global Mental Health Score: 8  Global Physical Health Score: 14  PROMIS TOTAL - SUBSCORES: 22     Referral / Collaboration:  Was/were discussed and patient will pursue.    Anticipated number of session for this episode of care: 20 will reevaluate every 90 days  Anticipation frequency of session: Biweekly  Anticipated Duration of each session: 38-52 minutes  Treatment plan will be reviewed in 90 days or when goals have been changed.        MeasurableTreatment Goal(s) related to diagnosis / functional impairment(s)  Goal 1: Patient will work on reducing overall anxiety.     I will know I've met my goal when reporting minimal anxiety symptoms.       Objective #A (Patient Action)                          Patient will use distraction each time intrusive worry surfaces.  Status: Continued - Date(s): 7/17/2024     Intervention(s)  Therapist will teach emotional regulation skills.  Objective #B  Patient will Decrease frequency and intensity of feeling down, depressed, hopeless.  Status: Continued - Date(s):  7/17/2024     Intervention(s)  Therapist will teach emotional recognition/identification. ..     Objective #C  Patient will Identify negative self-talk and behaviors: challenge core beliefs, myths, and actions.  Status: Continued - Date(s):  7/17/2024     Intervention(s)  Therapist will teach the client how to perform a behavioral chain analysis. ..     Goal 2: Patient will continue to work on reducing depression symptoms    I will know I've met my goal then reporting minimal depression symptoms     Objective #A (Patient Action)                          Patient will Increase interest, engagement, and pleasure in doing things.  Status: Continued - Date(s): 7/17/2024     Intervention(s)  Therapist will assign homework ..     Objective #B  Patient will Decrease frequency and intensity of feeling down, depressed, hopeless.  Status: Continued - Date(s):  7/17/2024  Intervention(s)  Therapist will assign homework at every session.     Goal 3: Client will reduce PTSD symptoms by 50% as evidenced by PCLC-5 score     I will know I've met my goal when not struggling with nightmares.      Objective #A (Client Action)    Client will reduce nightmares.  Status: Continued - Date(s): 7/17/2024    Intervention(s)  Therapist will teach nightmare retraining .    Objective #B  Client will compile a list of boundaries that they would like to set with others.    .    Status: Continued - Date(s):7/17/2024    Intervention(s)  Therapist will assign homework boundary setting .            Patient has reviewed and agreed to the above plan.        Mariana Love, [facility]

## 2024-07-20 ENCOUNTER — OFFICE VISIT (OUTPATIENT)
Dept: OPHTHALMOLOGY | Facility: CLINIC | Age: 59
End: 2024-07-20
Payer: COMMERCIAL

## 2024-07-20 DIAGNOSIS — R73.03 PREDIABETES: ICD-10-CM

## 2024-07-20 DIAGNOSIS — H04.129 DRY EYE: Primary | ICD-10-CM

## 2024-07-20 DIAGNOSIS — H52.13 MYOPIA OF BOTH EYES: ICD-10-CM

## 2024-07-20 DIAGNOSIS — H25.13 AGE-RELATED NUCLEAR CATARACT OF BOTH EYES: ICD-10-CM

## 2024-07-20 PROBLEM — R10.13 EPIGASTRIC PAIN: Status: ACTIVE | Noted: 2022-05-02

## 2024-07-20 PROBLEM — K57.30 DIVERTICULAR DISEASE OF LARGE INTESTINE: Status: ACTIVE | Noted: 2021-02-08

## 2024-07-20 PROBLEM — K63.5 POLYP OF COLON: Status: ACTIVE | Noted: 2021-02-08

## 2024-07-20 PROBLEM — D12.2 BENIGN NEOPLASM OF ASCENDING COLON: Status: ACTIVE | Noted: 2021-02-10

## 2024-07-20 PROCEDURE — 99203 OFFICE O/P NEW LOW 30 MIN: CPT | Performed by: STUDENT IN AN ORGANIZED HEALTH CARE EDUCATION/TRAINING PROGRAM

## 2024-07-20 PROCEDURE — 92015 DETERMINE REFRACTIVE STATE: CPT | Performed by: STUDENT IN AN ORGANIZED HEALTH CARE EDUCATION/TRAINING PROGRAM

## 2024-07-20 RX ORDER — APIXABAN 5 MG/1
TABLET, FILM COATED ORAL
COMMUNITY
Start: 2024-05-14 | End: 2024-09-12

## 2024-07-20 ASSESSMENT — SLIT LAMP EXAM - LIDS
COMMENTS: NORMAL
COMMENTS: NORMAL

## 2024-07-20 ASSESSMENT — REFRACTION_MANIFEST
OS_ADD: +2.25
OD_ADD: +2.25
OD_AXIS: 140
OS_CYLINDER: SPHERE
OS_SPHERE: -7.00
OD_SPHERE: -6.25
OD_CYLINDER: +0.50

## 2024-07-20 ASSESSMENT — TONOMETRY
IOP_METHOD: ICARE
OD_IOP_MMHG: 14
OS_IOP_MMHG: 13

## 2024-07-20 ASSESSMENT — REFRACTION_WEARINGRX
OS_ADD: +2.25
SPECS_TYPE: PAL
OD_AXIS: 120
OD_ADD: +2.25
OD_CYLINDER: +1.50
OD_SPHERE: -5.75
OS_CYLINDER: SPHERE
OS_SPHERE: -6.00

## 2024-07-20 ASSESSMENT — CONF VISUAL FIELD
OD_SUPERIOR_NASAL_RESTRICTION: 0
OD_SUPERIOR_TEMPORAL_RESTRICTION: 0
OD_INFERIOR_NASAL_RESTRICTION: 0
OS_INFERIOR_TEMPORAL_RESTRICTION: 0
OD_NORMAL: 1
OD_INFERIOR_TEMPORAL_RESTRICTION: 0
OS_SUPERIOR_TEMPORAL_RESTRICTION: 0
OS_SUPERIOR_NASAL_RESTRICTION: 0
METHOD: COUNTING FINGERS
OS_INFERIOR_NASAL_RESTRICTION: 0
OS_NORMAL: 1

## 2024-07-20 ASSESSMENT — VISUAL ACUITY
OS_PH_CC+: -2
OD_CC: 20/25
CORRECTION_TYPE: GLASSES
OS_CC: 20/40
OS_PH_CC: 20/30
OD_CC+: -2
METHOD: SNELLEN - LINEAR

## 2024-07-20 ASSESSMENT — EXTERNAL EXAM - LEFT EYE: OS_EXAM: NORMAL

## 2024-07-20 ASSESSMENT — CUP TO DISC RATIO
OD_RATIO: 0.25
OS_RATIO: 0.25

## 2024-07-20 ASSESSMENT — EXTERNAL EXAM - RIGHT EYE: OD_EXAM: NORMAL

## 2024-07-20 NOTE — PROGRESS NOTES
HPI: Alina Martell is a 59 year old female that comes for a complete eye exam. Patient reports that she has noticed some change in her vision since her last exam a couple of years prior. Reports that she has had some dryness and allergies.     Current ocular medications: None    Prior ocular medications: None    Ocular history:   Dry eye     Medical history:  Prediabetes  Sjogrens   RA    Review of systems: Does report some joint pain and soreness, Does note report fatigue, weight loss, fevers, chills, night sweats, frequent headaches, seizure, fainting, numbness/tingling, weakness, oral ulcers, genital ulcers, recurrent nosebleeds, sinusitis, hearing loss, tinnitus, chronic cough, chest pain, shortness of breath, skin rash, tick bite, easy bruising, easy bleeding, back pain, recurrent diarrhea, bloody stools, bloody urine.    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     ATTESTATION     Attending Physician Attestation:      Complete documentation of historical and exam elements from today's encounter can be found in the full encounter summary report (not reduplicated in this progress note).  I personally obtained the chief complaint(s) and history of present illness.  I confirmed and edited as necessary the review of systems, past medical/surgical history, family history, social history, and examination findings as documented by others; and I examined the patient myself.  I personally reviewed the relevant tests, images, and reports as documented above.  I formulated and edited as necessary  the assessment and plan and discussed the findings and management plan with the patient and family    Sathish Horton MD  PGY-6 Vitreo-retina surgery Fellow  Department of Ophthalmology   [facility]

## 2024-07-20 NOTE — NURSING NOTE
Chief Complaints and History of Present Illnesses   Patient presents with    Annual Eye Exam     Pt here for new adult annual eye exam.      Chief Complaint(s) and History of Present Illness(es)       Annual Eye Exam              Laterality: both eyes    Associated symptoms: Negative for nausea    Comments: Pt here for new adult annual eye exam.               Comments    Pt last eye exam was a couple of years ago with [facility]. Pt has mildly changed vision since last exam. Pt is pre-diabetic. Pt has hx of Sjogrens.   Lab Results       Component                Value               Date                       A1C                      6.3                 03/27/2024                 A1C                      6.5                 08/30/2023                NINA Young on 7/20/2024 at 7:54 AM

## 2024-07-24 ENCOUNTER — OFFICE VISIT (OUTPATIENT)
Dept: FAMILY MEDICINE | Facility: CLINIC | Age: 59
End: 2024-07-24
Payer: COMMERCIAL

## 2024-07-24 VITALS
OXYGEN SATURATION: 97 % | HEART RATE: 90 BPM | BODY MASS INDEX: 41.92 KG/M2 | TEMPERATURE: 98.9 F | DIASTOLIC BLOOD PRESSURE: 66 MMHG | WEIGHT: 283 LBS | HEIGHT: 69 IN | SYSTOLIC BLOOD PRESSURE: 124 MMHG | RESPIRATION RATE: 16 BRPM

## 2024-07-24 DIAGNOSIS — R11.0 NAUSEA: ICD-10-CM

## 2024-07-24 DIAGNOSIS — E11.9 TYPE 2 DIABETES MELLITUS WITHOUT COMPLICATION, WITHOUT LONG-TERM CURRENT USE OF INSULIN (H): Primary | ICD-10-CM

## 2024-07-24 LAB
ATRIAL RATE - MUSE: 78 BPM
DIASTOLIC BLOOD PRESSURE - MUSE: 79 MMHG
HBA1C MFR BLD: 6.3 % (ref 0–5.6)
INTERPRETATION ECG - MUSE: NORMAL
P AXIS - MUSE: 59 DEGREES
PR INTERVAL - MUSE: 168 MS
QRS DURATION - MUSE: 88 MS
QT - MUSE: 390 MS
QTC - MUSE: 444 MS
R AXIS - MUSE: 17 DEGREES
SYSTOLIC BLOOD PRESSURE - MUSE: 138 MMHG
T AXIS - MUSE: 31 DEGREES
VENTRICULAR RATE- MUSE: 78 BPM

## 2024-07-24 PROCEDURE — 36415 COLL VENOUS BLD VENIPUNCTURE: CPT | Performed by: FAMILY MEDICINE

## 2024-07-24 PROCEDURE — 83036 HEMOGLOBIN GLYCOSYLATED A1C: CPT | Performed by: FAMILY MEDICINE

## 2024-07-24 PROCEDURE — 99213 OFFICE O/P EST LOW 20 MIN: CPT | Performed by: FAMILY MEDICINE

## 2024-07-24 RX ORDER — EPINEPHRINE 0.3 MG/.3ML
0.3 INJECTION SUBCUTANEOUS PRN
Qty: 2 EACH | Refills: 2 | Status: SHIPPED | OUTPATIENT
Start: 2024-07-24

## 2024-07-24 RX ORDER — ONDANSETRON 4 MG/1
4 TABLET, ORALLY DISINTEGRATING ORAL EVERY 8 HOURS PRN
Qty: 30 TABLET | Refills: 0 | Status: SHIPPED | OUTPATIENT
Start: 2024-07-24

## 2024-07-24 ASSESSMENT — PAIN SCALES - GENERAL: PAINLEVEL: NO PAIN (0)

## 2024-07-24 NOTE — PROGRESS NOTES
"  Assessment & Plan     Type 2 diabetes mellitus without complication, without long-term current use of insulin (H)  Will do A1c and check sugars - concerned about new RA medication causing issues so will get A1c.  Changed diet significantly and trying to monitor more.  - Hemoglobin A1c  - EPINEPHrine (ANY BX GENERIC EQUIV) 0.3 MG/0.3ML injection 2-pack  Dispense: 2 each; Refill: 2  - Hemoglobin A1c    Nausea  Likely viral illness and will do zofran and continue to monitor symptoms.  - ondansetron (ZOFRAN ODT) 4 MG ODT tab  Dispense: 30 tablet; Refill: 0          MED REC REQUIRED  Post Medication Reconciliation Status:  Discharge medications reconciled and changed, see notes/orders        Markus Salas is a 59 year old, presenting for the following health issues:  ED follow up         7/24/2024    10:56 AM   Additional Questions   Roomed by martell COROKS       ED/ Followup:    Facility:  [facility]   Date of visit: 07/15/24   Reason for visit: nausea/vomiting   Current Status: improved        Nausea will come and go.  Some dizziness - mostly if moving around too fast.  Sob and back pain improved.  Bp at home normal.    Review of Systems  Constitutional, HEENT, cardiovascular, pulmonary, gi and gu systems are negative, except as otherwise noted.     Objective    /66 (BP Location: Right arm, Patient Position: Sitting)   Pulse 90   Temp 98.9  F (37.2  C) (Oral)   Resp 16   Ht 1.765 m (5' 9.49\")   Wt 128.4 kg (283 lb)   LMP  (LMP Unknown)   SpO2 97%   BMI 41.21 kg/m    Body mass index is 41.21 kg/m .  Physical Exam   GENERAL: alert and no distress  NECK: no adenopathy, no asymmetry, masses, or scars  RESP: lungs clear to auscultation - no rales, rhonchi or wheezes  CV: regular rate and rhythm, normal S1 S2, no S3 or S4, no murmur, click or rub, no peripheral edema  ABDOMEN: soft, nontender, no hepatosplenomegaly, no masses and bowel sounds normal  MS: no gross musculoskeletal defects noted, no " edema            Signed Electronically by: Estelle Bansal MD

## 2024-07-28 ENCOUNTER — HOSPITAL ENCOUNTER (EMERGENCY)
Facility: CLINIC | Age: 59
Discharge: HOME | End: 2024-07-28
Attending: EMERGENCY MEDICINE | Admitting: EMERGENCY MEDICINE
Payer: COMMERCIAL

## 2024-07-28 VITALS
HEART RATE: 95 BPM | DIASTOLIC BLOOD PRESSURE: 84 MMHG | OXYGEN SATURATION: 96 % | TEMPERATURE: 98.3 F | SYSTOLIC BLOOD PRESSURE: 130 MMHG | RESPIRATION RATE: 18 BRPM

## 2024-07-28 DIAGNOSIS — R04.0 EPISTAXIS: ICD-10-CM

## 2024-07-28 LAB
ABO/RH(D): NORMAL
ANION GAP SERPL CALCULATED.3IONS-SCNC: 12 MMOL/L (ref 7–15)
ANTIBODY SCREEN: NEGATIVE
BASOPHILS # BLD AUTO: 0 10E3/UL (ref 0–0.2)
BASOPHILS NFR BLD AUTO: 0 %
BUN SERPL-MCNC: 16.6 MG/DL (ref 6–20)
CALCIUM SERPL-MCNC: 9 MG/DL (ref 8.8–10.4)
CHLORIDE SERPL-SCNC: 103 MMOL/L (ref 98–107)
CREAT SERPL-MCNC: 0.78 MG/DL (ref 0.51–0.95)
EGFRCR SERPLBLD CKD-EPI 2021: 88 ML/MIN/1.73M2
EOSINOPHIL # BLD AUTO: 0.3 10E3/UL (ref 0–0.7)
EOSINOPHIL NFR BLD AUTO: 2 %
ERYTHROCYTE [DISTWIDTH] IN BLOOD BY AUTOMATED COUNT: 16.2 % (ref 10–15)
GLUCOSE SERPL-MCNC: 110 MG/DL (ref 70–99)
HCO3 SERPL-SCNC: 24 MMOL/L (ref 22–29)
HCT VFR BLD AUTO: 37.8 % (ref 35–47)
HGB BLD-MCNC: 11.5 G/DL (ref 11.7–15.7)
HOLD SPECIMEN: NORMAL
IMM GRANULOCYTES # BLD: 0.1 10E3/UL
IMM GRANULOCYTES NFR BLD: 0 %
LYMPHOCYTES # BLD AUTO: 3.7 10E3/UL (ref 0.8–5.3)
LYMPHOCYTES NFR BLD AUTO: 28 %
MCH RBC QN AUTO: 24.8 PG (ref 26.5–33)
MCHC RBC AUTO-ENTMCNC: 30.4 G/DL (ref 31.5–36.5)
MCV RBC AUTO: 82 FL (ref 78–100)
MONOCYTES # BLD AUTO: 1.1 10E3/UL (ref 0–1.3)
MONOCYTES NFR BLD AUTO: 9 %
NEUTROPHILS # BLD AUTO: 7.9 10E3/UL (ref 1.6–8.3)
NEUTROPHILS NFR BLD AUTO: 61 %
NRBC # BLD AUTO: 0 10E3/UL
NRBC BLD AUTO-RTO: 0 /100
PLATELET # BLD AUTO: 432 10E3/UL (ref 150–450)
POTASSIUM SERPL-SCNC: 4.9 MMOL/L (ref 3.4–5.3)
RBC # BLD AUTO: 4.64 10E6/UL (ref 3.8–5.2)
SODIUM SERPL-SCNC: 139 MMOL/L (ref 135–145)
SPECIMEN EXPIRATION DATE: NORMAL
WBC # BLD AUTO: 13.1 10E3/UL (ref 4–11)

## 2024-07-28 PROCEDURE — 86900 BLOOD TYPING SEROLOGIC ABO: CPT | Performed by: EMERGENCY MEDICINE

## 2024-07-28 PROCEDURE — 99283 EMERGENCY DEPT VISIT LOW MDM: CPT | Performed by: EMERGENCY MEDICINE

## 2024-07-28 PROCEDURE — 80048 BASIC METABOLIC PNL TOTAL CA: CPT | Performed by: EMERGENCY MEDICINE

## 2024-07-28 PROCEDURE — 99284 EMERGENCY DEPT VISIT MOD MDM: CPT | Performed by: EMERGENCY MEDICINE

## 2024-07-28 PROCEDURE — 36415 COLL VENOUS BLD VENIPUNCTURE: CPT | Performed by: EMERGENCY MEDICINE

## 2024-07-28 PROCEDURE — 85025 COMPLETE CBC W/AUTO DIFF WBC: CPT | Performed by: EMERGENCY MEDICINE

## 2024-07-28 PROCEDURE — 250N000009 HC RX 250: Performed by: EMERGENCY MEDICINE

## 2024-07-28 RX ORDER — OXYMETAZOLINE HYDROCHLORIDE 0.05 G/100ML
2 SPRAY NASAL ONCE
Status: COMPLETED | OUTPATIENT
Start: 2024-07-28 | End: 2024-07-28

## 2024-07-28 RX ADMIN — OXYMETAZOLINE HYDROCHLORIDE 2 SPRAY: 0.05 SPRAY NASAL at 16:40

## 2024-07-28 ASSESSMENT — ACTIVITIES OF DAILY LIVING (ADL)
ADLS_ACUITY_SCORE: 37

## 2024-07-28 NOTE — ED TRIAGE NOTES
BIBA from [employer] for c/o nose bleed while on eliquis.  Patient reports being on eliquis for 3 years with no issues prior to today.      Patient endorses lightheadedness, slight shortness of breath and anxiety.    Patient reports history of anemia.    VSS.     116 blood glucose.

## 2024-07-28 NOTE — ED PROVIDER NOTES
[place] EMERGENCY DEPARTMENT ([facility])    7/28/24       ED PROVIDER NOTE       History     Chief Complaint   Patient presents with    Epistaxis     HPI  Alina Martell is a 59 year old female with history of atrial fibrillation, DM2, CAD, HTN who presents to the emergency department seeking evaluation of epistaxis.  Patient presents via EMS after having continual nosebleed starting at 2 PM.  Patient attempted to apply steady pressure without relief.  Notably, patient is anticoagulated on Eliquis and has been for the past 2 years.  This is the first time that she has had something like this.    Past Medical History  Past Medical History:   Diagnosis Date    Anemia     Antiplatelet or antithrombotic long-term use     Arrhythmia     Cannabis use without complication 12/8/2014    Carpal tunnel syndrome     Coronary artery disease     Diabetes mellitus, type 2 (H) 12/13/2023    Gastroesophageal reflux disease     History of angina     Hypertension     Irregular heart beat     Obesity     Paroxysmal atrial fibrillation (H)     PTSD (post-traumatic stress disorder)     RA (rheumatoid arthritis) (H)     Rheumatoid arthritis (H) 7/8/2016    Sicca syndrome (H24)     Sleep apnea      Past Surgical History:   Procedure Laterality Date    CHOLECYSTECTOMY      CYSTOSCOPY N/A 5/4/2023    Procedure: AND CYSTOSCOPY;  Surgeon: Irma Cary MD;  Location: [facility] OR    DAVINCI HYSTERECTOMY TOTAL Bilateral 5/4/2023    Procedure: ROBOTIC ASSISTED TOTAL LAPAROSCOPIC HYSTERECTOMY WITH BILATERAL SALPINGECTOMY;  Surgeon: Irma Cary MD;  Location: [facility] OR    DILATION AND CURETTAGE, OPERATIVE HYSTEROSCOPY, COMBINED N/A 3/3/2022    Procedure: HYSTEROSCOPY, WITH DILATION AND CURETTAGE;  Surgeon: Irma Cary MD;  Location: [facility] OR    EP ABLATION FOCAL AFIB N/A 6/30/2022    Procedure: Ablation Atrial Fibrilation;  Surgeon: Jose Guadalupe Joseph MD;  Location:   [facility]    HC REMOVAL GALLBLADDER      Description: Cholecystectomy;  Recorded: 10/15/2013;    LAPAROSCOPIC TUBAL LIGATION      RELEASE CARPAL TUNNEL BILATERAL      TUBAL LIGATION  1995     Adalimumab-adaz 40 MG/0.4ML SOAJ  cetirizine (ZYRTEC) 10 MG tablet  diltiazem ER (TIAZAC) 360 MG 24 hr ER beaded capsule  ELIQUIS ANTICOAGULANT 5 MG tablet  EPINEPHrine (ANY BX GENERIC EQUIV) 0.3 MG/0.3ML injection 2-pack  gabapentin (NEURONTIN) 100 MG capsule  LORazepam (ATIVAN) 0.5 MG tablet  metFORMIN (GLUCOPHAGE XR) 500 MG 24 hr tablet  Multiple Vitamins-Minerals (CENTROVITE) TABS  omeprazole (PRILOSEC) 40 MG DR capsule  ondansetron (ZOFRAN ODT) 4 MG ODT tab  vitamin D3 (CHOLECALCIFEROL) 1.25 MG (76854 UT) capsule      Allergies   Allergen Reactions    Penicillins Shortness Of Breath, Palpitations, Dizziness, Anaphylaxis, Hives, Itching and Rash     Test in 2031, see allergy note on 7/29/21    Shellfish-Derived Products Anaphylaxis    Sulfa Antibiotics Hives and Anaphylaxis     Family History  Family History   Problem Relation Age of Onset    Crohn's Disease Mother         Total colectomy with ileostomy.    Atrial fibrillation Mother     Snoring Father     Diabetes Father     Prostate Cancer Father     Chronic Kidney Disease Father         Chose against dialysis.    Substance Abuse Brother     Depression Brother     Attention Deficit Disorder Brother     Alcoholism Brother     Arrhythmia Brother     Alcoholism Brother     Substance Abuse Brother     Arrhythmia Brother     Alcoholism Maternal Grandfather     No Known Problems Daughter     No Known Problems Son     Lupus Cousin     Breast Cancer No family hx of      Social History   Social History     Tobacco Use    Smoking status: Never     Passive exposure: Never    Smokeless tobacco: Never   Vaping Use    Vaping status: Never Used   Substance Use Topics    Alcohol use: Not Currently     Comment: Alcoholic Drinks/day: Never an issue, she states.  7/8/16    Drug  use: Not Currently     Comment: Drug use: Former marijuana use, not current. 7/8/16      A complete review of systems was performed with pertinent positives and negatives noted in the HPI, and all other systems negative.    Physical Exam   BP: 130/84  Pulse: 95  Temp: 98.3  F (36.8  C)  Resp: 18  SpO2: 95 %  Physical Exam  Vitals and nursing note reviewed.   Constitutional:       General: She is not in acute distress.     Appearance: Normal appearance. She is well-developed.   HENT:      Head: Normocephalic and atraumatic.   Eyes:      General: No scleral icterus.     Conjunctiva/sclera: Conjunctivae normal.   Cardiovascular:      Rate and Rhythm: Normal rate.   Pulmonary:      Effort: Pulmonary effort is normal. No respiratory distress.   Abdominal:      General: Abdomen is flat.   Musculoskeletal:      Cervical back: Normal range of motion and neck supple.   Skin:     General: Skin is warm and dry.      Findings: No rash.   Neurological:      Mental Status: She is alert and oriented to person, place, and time.             ED Course, Procedures, & Data      Procedures          Results for orders placed or performed during the hospital encounter of 07/28/24   [place] Draw     Status: None ()    Narrative    The following orders were created for panel order [place] Draw.  Procedure                               Abnormality         Status                     ---------                               -----------         ------                     Extra Blue Top Tube[[id number]]                                                         Extra Red Top Tube[[id number]]                                                          Extra Green Top (Lithium...[[id number]]                                                 Extra Purple Top Tube[[id number]]                                                         Please view results for these tests on the individual orders.     Medications - No data to display  Labs Ordered and Resulted from  Time of ED Arrival to Time of ED Departure - No data to display  No orders to display          Critical care was not performed.     Medical Decision Making  The patient's presentation was of high complexity (an acute health issue posing potential threat to life or bodily function).    The patient's evaluation involved:  ordering and/or review of 3+ test(s) in this encounter (see separate area of note for details)    The patient's management necessitated moderate risk (prescription drug management including medications given in the ED).    Assessment & Plan      59 year old female with history of atrial fibrillation on anticoagulation, DM2, CAD, HTN who presents to the emergency department seeking evaluation of epistaxis.  Vital signs notable for borderline pulse elevation to 95 but otherwise stable and afebrile including normal pulse ox at 95% on room air.  IV established, labs sent which revealed stable hemoglobin 11.5 otherwise normal CBC electrolytes.  Type and screen ordered to prepare for possible blood transfusion in this patient with activated bruising upon arrival.  Patient was instructed to apply steady pressure against the nose for a full 30 minutes followed by oxymetazoline spray x 2 to each nostril.  Upon repeat assessment patient's bleeding had completely stopped.  She is observed for another hour without any active rebleed.  Patient safely discharged home plan follow-up with PCP and ENT as needed.    I have reviewed the nursing notes. I have reviewed the findings, diagnosis, plan and need for follow up with the patient.    New Prescriptions    No medications on file       Final diagnoses:   Epistaxis       Brett Jefferson MD  [facility] EMERGENCY DEPARTMENT  7/28/2024     Brett Jefferson MD  07/28/24 1946

## 2024-07-29 ENCOUNTER — TELEPHONE (OUTPATIENT)
Dept: RHEUMATOLOGY | Facility: CLINIC | Age: 59
End: 2024-07-29
Payer: COMMERCIAL

## 2024-07-31 NOTE — TELEPHONE ENCOUNTER
PA Initiation    Medication: HYRIMOZ 40 MG/0.4ML SC SOAJ  Insurance Company:    Pharmacy Filling the Rx:    Filling Pharmacy Phone:    Filling Pharmacy Fax:    Start Date: 7/31/2024  [id number])  TOO Olguin, [facility]  Specialty Pharm[facility]    [email or web address]     Phone: [phone]  Fax: [phone]

## 2024-08-02 NOTE — TELEPHONE ENCOUNTER
Called plan to ask for PA form to be faxed electronic pa was closed     TOO Olguin, [facility]  Specialty Pharmacy Clinic Liaison     [facility] Specialty    [email or web address]     Phone: [phone]  Fax: [phone]

## 2024-08-06 ENCOUNTER — VIRTUAL VISIT (OUTPATIENT)
Dept: PSYCHOLOGY | Facility: CLINIC | Age: 59
End: 2024-08-06
Payer: COMMERCIAL

## 2024-08-06 DIAGNOSIS — F41.1 GAD (GENERALIZED ANXIETY DISORDER): Primary | ICD-10-CM

## 2024-08-06 DIAGNOSIS — F43.10 POSTTRAUMATIC STRESS DISORDER: ICD-10-CM

## 2024-08-06 DIAGNOSIS — F33.1 MODERATE EPISODE OF RECURRENT MAJOR DEPRESSIVE DISORDER (H): ICD-10-CM

## 2024-08-06 PROCEDURE — 90834 PSYTX W PT 45 MINUTES: CPT | Mod: 95 | Performed by: COUNSELOR

## 2024-08-06 NOTE — PROGRESS NOTES
[facility]                                     Progress Note    Patient Name: Alina Martell  Date: 8/06/24         Service Type: Individual      Session Start Time: 9:07 Session End Time: 9:57     Session Length: 50 minutes    Session #: 77     Attendees: Client    Service Modality:  Video Visit:     Telemedicine Visit: The patient's condition can be safely assessed and treated via synchronous audio and visual telemedicine encounter.       Reason for Telemedicine Visit: Services only offered telehealth     Originating Site (Patient Location): Patient's place of employment     Distant Location (provider location):  Off-site     Consent:  The patient/guardian has verbally consented to: the potential risks and benefits of telemedicine (video visit) versus in person care; bill my insurance or make self-payment for services provided; and responsibility for payment of non-covered services.      Mode of Communication:  Video Conference via [employer]     As the provider I attest to compliance with applicable laws and regulations related to telemedicine.          DATA  Extended Session (53+ minutes): No  Interactive Complexity: No  Crisis: No      Progress Since Last Session (Related to Symptoms / Goals / Homework):   Symptoms: No change continued stress with physical health    Homework: Achieved / completed to satisfaction      Episode of Care Goals: Satisfactory progress - ACTION (Actively working towards change); Intervened by reinforcing change plan / affirming steps taken     Current / Ongoing Stressors and Concerns:  Patient indicated she continues to feel stressed over her health. Patient reported she had to go to the ED again. Patient indicated her biggest stressor is housing right now. Patient reported she is scheduled to look at apartments which causes some anxiety. Patient indicated working to take steps forward in her life. Patient reported she is setting boundaries in her life  and working on having positive people in it. This therapist processed with patient ways to continue to work on asking for help.      Treatment Objective(s) Addressed in This Session:   use thought-stopping strategy daily to reduce intrusive thoughts  Increase interest, engagement, and pleasure in doing things  Decrease frequency and intensity of feeling down, depressed, hopeless  Improve quantity and quality of night time sleep / decrease daytime naps  Feel less tired and more energy during the day   Identify negative self-talk and behaviors: challenge core beliefs, myths, and actions     Intervention:   Motivational Interviewing    MI Intervention: Open-ended questions     Change Talk Expressed by the Patient: Activation    Provider Response to Change Talk: R - Reflected patient's change talk and S - Summarized patient's change talk statements      Assessments completed prior to visit:  The following assessments were completed by patient for this visit:  PHQ9:       12/13/2023     3:35 PM 12/18/2023    12:59 PM 1/8/2024     7:06 AM 2/18/2024    10:01 PM 4/3/2024     9:50 AM 5/20/2024     9:50 AM 7/16/2024     4:33 PM   PHQ-9 SCORE   PHQ-9 Total Score MyChart 9 (Mild depression) 10 (Moderate depression) 8 (Mild depression) 5 (Mild depression) 6 (Mild depression) 4 (Minimal depression) 6 (Mild depression)   PHQ-9 Total Score 9 10 8 5 6 4 6     GAD7:       12/1/2022     9:52 AM 7/5/2023     2:18 PM 9/26/2023    12:05 PM 10/9/2023    10:00 AM 10/30/2023     3:23 PM 12/6/2023     9:50 AM 7/16/2024     4:33 PM   ADA-7 SCORE   Total Score 8 (mild anxiety) 6 (mild anxiety) 11 (moderate anxiety)  5 (mild anxiety) 11 (moderate anxiety) 8 (mild anxiety)   Total Score 8 6 11 8 5 11 8    8     PROMIS 10-Global Health (all questions and answers displayed):       12/1/2022     9:53 AM 3/10/2023     8:13 AM 6/13/2023     6:37 AM 9/26/2023    12:07 PM 1/1/2024    11:44 PM 4/3/2024     9:52 AM 5/20/2024     9:52 AM   PROMIS 10   In  general, would you say your health is: Good Fair Good Fair Good Good Good   In general, would you say your quality of life is: Good Good Good Good Fair Good Fair   In general, how would you rate your physical health? Fair Fair Fair Fair Fair Fair Good   In general, how would you rate your mental health, including your mood and your ability to think? Fair Fair Fair Fair Fair Fair Fair   In general, how would you rate your satisfaction with your social activities and relationships? Good Good Good Good Fair Good Good   In general, please rate how well you carry out your usual social activities and roles Good Good Good Good Good Good Good   To what extent are you able to carry out your everyday physical activities such as walking, climbing stairs, carrying groceries, or moving a chair? Mostly Mostly Mostly Mostly Mostly Completely Completely   In the past 7 days, how often have you been bothered by emotional problems such as feeling anxious, depressed, or irritable? Often Sometimes Often Often Often Sometimes Sometimes   In the past 7 days, how would you rate your fatigue on average? Mild Moderate Mild Moderate Mild Moderate Moderate   In the past 7 days, how would you rate your pain on average, where 0 means no pain, and 10 means worst imaginable pain? 3 4 2 3 3 3 3   In general, would you say your health is: 3 2 3 2 3 3 3   In general, would you say your quality of life is: 3 3 3 3 2 3 2   In general, how would you rate your physical health? 2 2 2 2 2 2 3   In general, how would you rate your mental health, including your mood and your ability to think? 2 2 2 2 2 2 2   In general, how would you rate your satisfaction with your social activities and relationships? 3 3 3 3 2 3 3   In general, please rate how well you carry out your usual social activities and roles. (This includes activities at home, at work and in your community, and responsibilities as a parent, child, spouse, employee, friend, etc.) 3 3 3 3 3 3 3    To what extent are you able to carry out your everyday physical activities such as walking, climbing stairs, carrying groceries, or moving a chair? 4 4 4 4 4 5 5   In the past 7 days, how often have you been bothered by emotional problems such as feeling anxious, depressed, or irritable? 4 3 4 4 4 3 3   In the past 7 days, how would you rate your fatigue on average? 2 3 2 3 2 3 3   In the past 7 days, how would you rate your pain on average, where 0 means no pain, and 10 means worst imaginable pain? 3 4 2 3 3 3 3   Global Mental Health Score 10 11 10 10 8    8 11 10   Global Physical Health Score 14 12 14 13 14    14 14 15   PROMIS TOTAL - SUBSCORES 24 23 24 23 22    22 25 25         ASSESSMENT: Current Emotional / Mental Status (status of significant symptoms):   Risk status (Self / Other harm or suicidal ideation)   Patient denies current fears or concerns for personal safety.   Patient denies current or recent suicidal ideation or behaviors.   Patient denies current or recent homicidal ideation or behaviors.   Patient denies current or recent self injurious behavior or ideation.   Patient denies other safety concerns.   Patient reports there has been no change in risk factors since their last session.     Patient reports there has been no change in protective factors since their last session.     Recommended that patient call 911 or go to the local ED should there be a change in any of these risk factors.     Appearance:   Appropriate    Eye Contact:   Good    Psychomotor Behavior: Normal    Attitude:   Cooperative    Orientation:   All   Speech    Rate / Production: Normal/ Responsive    Volume:  Normal    Mood:    Anxious    Affect:    Appropriate    Thought Content:  Clear    Thought Form:  Coherent  Goal Directed  Logical    Insight:    Good      Medication Review:   No changes to current psychiatric medication(s)     Medication Compliance:   Yes     Changes in Health Issues:   Yes: see epic     Chemical  Use Review:   Substance Use: Chemical use reviewed, no active concerns identified      Tobacco Use: No current tobacco use.      Diagnosis:  1. ADA (generalized anxiety disorder)    2. Moderate episode of recurrent major depressive disorder (H)    3. Posttraumatic stress disorder        Collateral Reports Completed:   Not Applicable    PLAN: (Patient Tasks / Therapist Tasks / Other)  Patient will return in 2 weeks for scheduled session. Patient would benefit from continuing to ask for help. Patient will look into apartments and schedule tours. Patient will continue to work with PCP for medical concerns.     There has been demonstrated improvement in functioning while patient has been engaged in psychotherapy/psychological service- if withdrawn the patient would deteriorate and/or relapse.     Mariana Love, [facility]     ______________________________________________________________________    Individual Treatment Plan    Patient's Name: Alina Martell  YOB: 1965    Date of Creation:10/16/2020  Date Treatment Plan Last Reviewed/Revised: 7/17/2024    DSM5 Diagnoses: 296.32 (F33.1) Major Depressive Disorder, Recurrent Episode, Moderate _, 300.02 (F41.1) Generalized Anxiety Disorder, or 309.81 (F43.10) Posttraumatic Stress Disorder (includes Posttraumatic Stress Disorder for Children 6 Years and Younger)  Without dissociative symptoms  Psychosocial / Contextual Factors: Health, family  PROMIS (reviewed every 90 days):     PROMIS-10 Scores  Global Mental Health Score: 8  Global Physical Health Score: 14  PROMIS TOTAL - SUBSCORES: 22     Referral / Collaboration:  Was/were discussed and patient will pursue.    Anticipated number of session for this episode of care: 20 will reevaluate every 90 days  Anticipation frequency of session: Biweekly  Anticipated Duration of each session: 38-52 minutes  Treatment plan will be reviewed in 90 days or when goals have been changed.       MeasurableTreatment Goal(s)  related to diagnosis / functional impairment(s)  Goal 1: Patient will work on reducing overall anxiety.     I will know I've met my goal when reporting minimal anxiety symptoms.       Objective #A (Patient Action)                          Patient will use distraction each time intrusive worry surfaces.  Status: Continued - Date(s): 7/17/2024     Intervention(s)  Therapist will teach emotional regulation skills.  Objective #B  Patient will Decrease frequency and intensity of feeling down, depressed, hopeless.  Status: Continued - Date(s):  7/17/2024     Intervention(s)  Therapist will teach emotional recognition/identification. ..     Objective #C  Patient will Identify negative self-talk and behaviors: challenge core beliefs, myths, and actions.  Status: Continued - Date(s):  7/17/2024     Intervention(s)  Therapist will teach the client how to perform a behavioral chain analysis. ..     Goal 2: Patient will continue to work on reducing depression symptoms    I will know I've met my goal then reporting minimal depression symptoms     Objective #A (Patient Action)                          Patient will Increase interest, engagement, and pleasure in doing things.  Status: Continued - Date(s): 7/17/2024     Intervention(s)  Therapist will assign homework ..     Objective #B  Patient will Decrease frequency and intensity of feeling down, depressed, hopeless.  Status: Continued - Date(s):  7/17/2024  Intervention(s)  Therapist will assign homework at every session.     Goal 3: Client will reduce PTSD symptoms by 50% as evidenced by PCLC-5 score     I will know I've met my goal when not struggling with nightmares.      Objective #A (Client Action)    Client will reduce nightmares.  Status: Continued - Date(s): 7/17/2024    Intervention(s)  Therapist will teach nightmare retraining .    Objective #B  Client will compile a list of boundaries that they would like to set with others.   .    Status: Continued -  Date(s):7/17/2024    Intervention(s)  Therapist will assign homework boundary setting .            Patient has reviewed and agreed to the above plan.        Mariana Love, [facility]

## 2024-08-07 NOTE — TELEPHONE ENCOUNTER
Received call from [employer] they said they have placed an override for the generic bio-sim and it is blanket approved     Questions can call [phone]      No further PA needed      TOO Olguin, [facility]  Specialty Pharmacy Clinic Liaison     [facility] Specialty    [email or web address]     Phone: [phone]  Fax: [phone]

## 2024-08-15 ENCOUNTER — LAB (OUTPATIENT)
Dept: LAB | Facility: CLINIC | Age: 59
End: 2024-08-15
Payer: COMMERCIAL

## 2024-08-15 DIAGNOSIS — M05.79 SEROPOSITIVE RHEUMATOID ARTHRITIS OF MULTIPLE SITES (H): ICD-10-CM

## 2024-08-15 LAB
ALBUMIN SERPL BCG-MCNC: 3.8 G/DL (ref 3.5–5.2)
ALT SERPL W P-5'-P-CCNC: 15 U/L (ref 0–50)
CREAT SERPL-MCNC: 0.73 MG/DL (ref 0.51–0.95)
EGFRCR SERPLBLD CKD-EPI 2021: >90 ML/MIN/1.73M2
ERYTHROCYTE [DISTWIDTH] IN BLOOD BY AUTOMATED COUNT: 15.8 % (ref 10–15)
HCT VFR BLD AUTO: 35.5 % (ref 35–47)
HGB BLD-MCNC: 10.9 G/DL (ref 11.7–15.7)
MCH RBC QN AUTO: 24.7 PG (ref 26.5–33)
MCHC RBC AUTO-ENTMCNC: 30.7 G/DL (ref 31.5–36.5)
MCV RBC AUTO: 80 FL (ref 78–100)
PLATELET # BLD AUTO: 350 10E3/UL (ref 150–450)
RBC # BLD AUTO: 4.42 10E6/UL (ref 3.8–5.2)
WBC # BLD AUTO: 9.5 10E3/UL (ref 4–11)

## 2024-08-15 PROCEDURE — 85027 COMPLETE CBC AUTOMATED: CPT

## 2024-08-15 PROCEDURE — 36415 COLL VENOUS BLD VENIPUNCTURE: CPT

## 2024-08-15 PROCEDURE — 82040 ASSAY OF SERUM ALBUMIN: CPT

## 2024-08-15 PROCEDURE — 82565 ASSAY OF CREATININE: CPT

## 2024-08-15 PROCEDURE — 84460 ALANINE AMINO (ALT) (SGPT): CPT

## 2024-08-19 ENCOUNTER — VIRTUAL VISIT (OUTPATIENT)
Dept: RHEUMATOLOGY | Facility: CLINIC | Age: 59
End: 2024-08-19
Payer: COMMERCIAL

## 2024-08-19 DIAGNOSIS — Z79.899 HIGH RISK MEDICATION USE: ICD-10-CM

## 2024-08-19 DIAGNOSIS — M05.79 SEROPOSITIVE RHEUMATOID ARTHRITIS OF MULTIPLE SITES (H): Primary | ICD-10-CM

## 2024-08-19 DIAGNOSIS — M17.0 PRIMARY OSTEOARTHRITIS OF BOTH KNEES: ICD-10-CM

## 2024-08-19 DIAGNOSIS — R76.8 CYCLIC CITRULLINATED PEPTIDE (CCP) ANTIBODY POSITIVE: ICD-10-CM

## 2024-08-19 PROCEDURE — G2211 COMPLEX E/M VISIT ADD ON: HCPCS | Mod: 95 | Performed by: INTERNAL MEDICINE

## 2024-08-19 PROCEDURE — 99214 OFFICE O/P EST MOD 30 MIN: CPT | Mod: 95 | Performed by: INTERNAL MEDICINE

## 2024-08-19 RX ORDER — ADALIMUMAB-ADAZ 40 MG/.4ML
40 INJECTION, SOLUTION SUBCUTANEOUS
Qty: 0.8 ML | Refills: 5 | Status: SHIPPED | OUTPATIENT
Start: 2024-08-19 | End: 2024-09-18

## 2024-08-19 NOTE — PROGRESS NOTES
Virtual Visit Details    Type of service:  Video Visit     Originating Location (pt. Location): Home    Distant Location (provider location):  Off-site  Platform used for Video Visit: Deena   Joined the call at 8/19/2024, 8:24:00 am.  Left the call at 8/19/2024, 8:32:58 am.  You were on the call for 8 minutes 58 seconds   This document was created using a software with less than 100% fidelity, at times resulting in unintended, even erroneous syntax and grammar.  The reader is advised to keep this under consideration while reviewing, interpreting this note.           ASSESSMENT AND PLAN:    Diagnoses and all orders for this visit:  Seropositive rheumatoid arthritis of multiple sites (H)  -     adalimumab-adaz (HYRIMOZ) 40 MG/0.4ML auto-injector kit; Inject 0.4 mLs (40 mg) subcutaneously every 14 days for 30 days  Cyclic citrullinated peptide (CCP) antibody positive  High risk medication use  Primary osteoarthritis of both knees    The longitudinal plan of care for the diagnosis(es)/condition(s) as documented were addressed during this visit. Due to the added complexity in care, I will continue to support Maritza in the subsequent management and with ongoing continuity of care.   Follow up in 6 months      HISTORY OF PRESENTING ILLNESS:  Alina Martell 59 year old is evaluated here via video/audio link of  rheumatoid arthritis.  This was severe.   There is seropositive. This is polyarticular. Family history of psoriasis and brother, mom's history of Crohn's.  She has done great.  She is on Humira that she inject every 2 weeks.  She now has the biosimilar her Humira, she had some delay and was frustrated with break she had fortunately there was no flareup.  She has noted pain in her right this triggering on the previous visit injected her previous visit and for the past few weeks has troubled her again.     Recent labs are reviewed, these are stable, hemoglobin is low, this is longstanding.    No longer on  "hydroxychloroquine.  She has noted mild discomfort in her knees.  Several years ago x-rays of the knees were done.  She would notice some swelling in the lower extremities [place] with her grandson.  She has longstanding chronic anemia and elevated sedimentation rate going back at least 2015.  She recently had hysterectomy.  In the past she has been on methotrexate that caused hair loss and diarrhea. She considers herself a\" flexaterian\", and is trying to add more vegetarian meals.       ROS enquiry held for fever, ocular symptoms, rash, headache,  GI issues.  ALLERGIES:Penicillins, Shellfish-derived products, and Sulfa antibiotics    PAST MEDICAL/ACTIVE PROBLEMS/MEDICATION/SOCIAL DATA  Past Medical History:   Diagnosis Date    Anemia     Antiplatelet or antithrombotic long-term use     Arrhythmia     Cannabis use without complication 12/8/2014    Carpal tunnel syndrome     Coronary artery disease     Diabetes mellitus, type 2 (H) 12/13/2023    Gastroesophageal reflux disease     History of angina     Hypertension     Irregular heart beat     Obesity     Paroxysmal atrial fibrillation (H)     PTSD (post-traumatic stress disorder)     RA (rheumatoid arthritis) (H)     Rheumatoid arthritis (H) 7/8/2016    Sicca syndrome (H24)     Sleep apnea      History   Smoking Status    Never   Smokeless Tobacco    Never     Patient Active Problem List   Diagnosis    Depressed    Seropositive rheumatoid arthritis of multiple sites (H)    Recurrent major depressive disorder, in full remission (H24)    Paroxysmal atrial fibrillation (H)    Morbid obesity (H)    Microcytic anemia    Anxiety    Chest pain    Chronic pain    Cyclic citrullinated peptide (CCP) antibody positive    Grief    Insomnia related to another mental disorder    Migraine headache    Olecranon bursitis of right elbow    Panic attack    ADA (generalized anxiety disorder)    Reflux    Sicca syndrome (H24)    Sleep disorder    " Tendonitis of both shoulders    Obstructive sleep apnea syndrome    Essential hypertension    Coronary artery disease of native artery with stable angina pectoris (H24)    Postmenopausal bleeding    History of colonic polyps    Status post catheter ablation of atrial fibrillation    Vitamin D deficiency    Uterine fibroid    Abnormal uterine bleeding    Diabetes mellitus, type 2 (H)    Benign neoplasm of ascending colon    Diverticular disease of large intestine    Polyp of colon    Epigastric pain     Current Outpatient Medications   Medication Sig Dispense Refill    adalimumab-adaz (HYRIMOZ) 40 MG/0.4ML auto-injector kit Inject 0.4 mLs (40 mg) subcutaneously every 14 days for 30 days 0.8 mL 5    cetirizine (ZYRTEC) 10 MG tablet Take 1 tablet (10 mg) by mouth daily 90 tablet 1    ELIQUIS ANTICOAGULANT 5 MG tablet  (Patient not taking: Reported on 8/19/2024)      EPINEPHrine (ANY BX GENERIC EQUIV) 0.3 MG/0.3ML injection 2-pack Inject 0.3 mLs (0.3 mg) into the muscle as needed for anaphylaxis 2 each 2    gabapentin (NEURONTIN) 100 MG capsule Take 1 capsule (100 mg) by mouth at bedtime 30 capsule 2    LORazepam (ATIVAN) 0.5 MG tablet Take 1 tablet (0.5 mg) by mouth daily as needed for anxiety 30 tablet 2    metFORMIN (GLUCOPHAGE XR) 500 MG 24 hr tablet Take 1 tablet (500 mg) by mouth daily (with dinner) 90 tablet 2    Multiple Vitamins-Minerals (CENTROVITE) TABS Take 1 tablet by mouth daily      omeprazole (PRILOSEC) 40 MG DR capsule Take 1 capsule (40 mg) by mouth daily 90 capsule 3    ondansetron (ZOFRAN ODT) 4 MG ODT tab Take 1 tablet (4 mg) by mouth every 8 hours as needed for nausea (Patient not taking: Reported on 8/19/2024) 30 tablet 0    vitamin D3 (CHOLECALCIFEROL) 1.25 MG (06930 UT) capsule Take 1 capsule (50,000 Units) by mouth every 7 days 12 capsule 3         EXAMINATION:    Using the audio and video link as best as possible the constitutional, neck, neurologic, psych, skin, both upper extremities  "areas/organ system were evaluated during this assessment.  Some of the important findings: Alert, oriented, speech fluent.    Able to fully flex the digits, into fists bilaterally, wrist and elbow range of motion appear normal, abduction of the shoulder is normal.  It would appear as if she has of her right thumb.      LAB / IMAGING DATA:  ALT   Date Value Ref Range Status   08/15/2024 15 0 - 50 U/L Final   07/15/2024 19 0 - 50 U/L Final   03/07/2024 21 0 - 50 U/L Final     Comment:     Reference intervals for this test were updated on 6/12/2023 to more accurately reflect our healthy population. There may be differences in the flagging of prior results with similar values performed with this method. Interpretation of those prior results can be made in the context of the updated reference intervals.     11/14/2018 13 0 - 50 U/L Final     Albumin   Date Value Ref Range Status   08/15/2024 3.8 3.5 - 5.2 g/dL Final   07/15/2024 4.0 3.5 - 5.2 g/dL Final   03/07/2024 3.7 3.5 - 5.2 g/dL Final   03/08/2022 3.5 3.5 - 5.0 g/dL Final   02/16/2022 3.5 3.5 - 5.0 g/dL Final   01/28/2022 3.4 (L) 3.5 - 5.0 g/dL Final   11/14/2018 2.8 (L) 3.4 - 5.0 g/dL Final       WBC   Date Value Ref Range Status   11/14/2018 6.5 4.0 - 11.0 10e9/L Final     WBC Count   Date Value Ref Range Status   08/15/2024 9.5 4.0 - 11.0 10e3/uL Final   07/28/2024 13.1 (H) 4.0 - 11.0 10e3/uL Final     Hemoglobin   Date Value Ref Range Status   08/15/2024 10.9 (L) 11.7 - 15.7 g/dL Final   07/28/2024 11.5 (L) 11.7 - 15.7 g/dL Final   07/15/2024 11.4 (L) 11.7 - 15.7 g/dL Final   11/14/2018 10.8 (L) 11.7 - 15.7 g/dL Final     Platelet Count   Date Value Ref Range Status   08/15/2024 350 150 - 450 10e3/uL Final   07/28/2024 432 150 - 450 10e3/uL Final   07/15/2024 399 150 - 450 10e3/uL Final   11/14/2018 286 150 - 450 10e9/L Final       No results found for: \"YESSICA\";  "

## 2024-08-23 ENCOUNTER — TELEPHONE (OUTPATIENT)
Dept: PSYCHOLOGY | Facility: CLINIC | Age: 59
End: 2024-08-23
Payer: COMMERCIAL

## 2024-08-23 NOTE — TELEPHONE ENCOUNTER
This therapist contacted pt at 8:15 when she did not show for her appointment. Pt did not answer. VM was left for patient to call back to reschedule. Pt contacted therapist via [employer] and rescheduled.

## 2024-08-31 ASSESSMENT — ANXIETY QUESTIONNAIRES
GAD7 TOTAL SCORE: 9
GAD7 TOTAL SCORE: 9
8. IF YOU CHECKED OFF ANY PROBLEMS, HOW DIFFICULT HAVE THESE MADE IT FOR YOU TO DO YOUR WORK, TAKE CARE OF THINGS AT HOME, OR GET ALONG WITH OTHER PEOPLE?: SOMEWHAT DIFFICULT
7. FEELING AFRAID AS IF SOMETHING AWFUL MIGHT HAPPEN: NOT AT ALL

## 2024-09-05 ENCOUNTER — VIRTUAL VISIT (OUTPATIENT)
Dept: PSYCHOLOGY | Facility: CLINIC | Age: 59
End: 2024-09-05
Payer: COMMERCIAL

## 2024-09-05 DIAGNOSIS — F33.1 MODERATE EPISODE OF RECURRENT MAJOR DEPRESSIVE DISORDER (H): ICD-10-CM

## 2024-09-05 DIAGNOSIS — F41.1 GAD (GENERALIZED ANXIETY DISORDER): Primary | ICD-10-CM

## 2024-09-05 DIAGNOSIS — F43.10 POSTTRAUMATIC STRESS DISORDER: ICD-10-CM

## 2024-09-05 PROCEDURE — 90834 PSYTX W PT 45 MINUTES: CPT | Mod: 95 | Performed by: COUNSELOR

## 2024-09-06 NOTE — PROGRESS NOTES
[facility]                                     Progress Note    Patient Name: Alina Martell  Date: 9/05/24         Service Type: Individual      Session Start Time: 12:01 Session End Time: 12:52     Session Length: 51 minutes    Session #: 78     Attendees: Client    Service Modality:  Video Visit:     Telemedicine Visit: The patient's condition can be safely assessed and treated via synchronous audio and visual telemedicine encounter.       Reason for Telemedicine Visit: Services only offered telehealth     Originating Site (Patient Location): Patient's place of employment     Distant Location (provider location):  Off-site     Consent:  The patient/guardian has verbally consented to: the potential risks and benefits of telemedicine (video visit) versus in person care; bill my insurance or make self-payment for services provided; and responsibility for payment of non-covered services.      Mode of Communication:  Video Conference via [employer]     As the provider I attest to compliance with applicable laws and regulations related to telemedicine.          DATA  Extended Session (53+ minutes): No  Interactive Complexity: No  Crisis: No      Progress Since Last Session (Related to Symptoms / Goals / Homework):   Symptoms: No change significant stress related to finding housing    Homework: Achieved / completed to satisfaction      Episode of Care Goals: Satisfactory progress - ACTION (Actively working towards change); Intervened by reinforcing change plan / affirming steps taken     Current / Ongoing Stressors and Concerns:  Patient indicated that she is struggling with a lot of stress and noticing her depression getting worse. Patient reported her biggest stressor is finding housing. Patient indicated she has a couple apartments in mind and needing to apply. Patient reported being worried about the cost of an apartment. Patient indicated she has gambled a little but not as severe as  in the past. Patient indicated believing she has numbed herself due to fear of her emotions. This therapist processed with patient steps to start working on change.      Treatment Objective(s) Addressed in This Session:   use distraction each time intrusive worry surfaces  Increase interest, engagement, and pleasure in doing things  Decrease frequency and intensity of feeling down, depressed, hopeless  Improve quantity and quality of night time sleep / decrease daytime naps  Feel less tired and more energy during the day   Identify negative self-talk and behaviors: challenge core beliefs, myths, and actions  Improve concentration, focus, and mindfulness in daily activities      Intervention:   Motivational Interviewing    MI Intervention: Reflections: simple and complex     Change Talk Expressed by the Patient: Activation Taking steps    Provider Response to Change Talk: A - Affirmed patient's thoughts, decisions, or attempts at behavior change      Assessments completed prior to visit:  The following assessments were completed by patient for this visit:  PHQ9:       12/18/2023    12:59 PM 1/8/2024     7:06 AM 2/18/2024    10:01 PM 4/3/2024     9:50 AM 5/20/2024     9:50 AM 7/16/2024     4:33 PM 9/5/2024    10:42 AM   PHQ-9 SCORE   PHQ-9 Total Score MyChart 10 (Moderate depression) 8 (Mild depression) 5 (Mild depression) 6 (Mild depression) 4 (Minimal depression) 6 (Mild depression) 8 (Mild depression)   PHQ-9 Total Score 10 8 5 6 4 6 8     GAD7:       7/5/2023     2:18 PM 9/26/2023    12:05 PM 10/9/2023    10:00 AM 10/30/2023     3:23 PM 12/6/2023     9:50 AM 7/16/2024     4:33 PM 8/31/2024     9:27 AM   ADA-7 SCORE   Total Score 6 (mild anxiety) 11 (moderate anxiety)  5 (mild anxiety) 11 (moderate anxiety) 8 (mild anxiety) 9 (mild anxiety)   Total Score 6 11 8 5 11 8    8 9     PROMIS 10-Global Health (all questions and answers displayed):       3/10/2023     8:13 AM 6/13/2023     6:37 AM 9/26/2023    12:07 PM  1/1/2024    11:44 PM 4/3/2024     9:52 AM 5/20/2024     9:52 AM 8/31/2024     9:29 AM   PROMIS 10   In general, would you say your health is: Fair Good Fair Good Good Good Fair   In general, would you say your quality of life is: Good Good Good Fair Good Fair Fair   In general, how would you rate your physical health? Fair Fair Fair Fair Fair Good Fair   In general, how would you rate your mental health, including your mood and your ability to think? Fair Fair Fair Fair Fair Fair Fair   In general, how would you rate your satisfaction with your social activities and relationships? Good Good Good Fair Good Good Good   In general, please rate how well you carry out your usual social activities and roles Good Good Good Good Good Good Good   To what extent are you able to carry out your everyday physical activities such as walking, climbing stairs, carrying groceries, or moving a chair? Mostly Mostly Mostly Mostly Completely Completely Mostly   In the past 7 days, how often have you been bothered by emotional problems such as feeling anxious, depressed, or irritable? Sometimes Often Often Often Sometimes Sometimes Often   In the past 7 days, how would you rate your fatigue on average? Moderate Mild Moderate Mild Moderate Moderate Moderate   In the past 7 days, how would you rate your pain on average, where 0 means no pain, and 10 means worst imaginable pain? 4 2 3 3 3 3 3   In general, would you say your health is: 2 3 2 3 3 3 2   In general, would you say your quality of life is: 3 3 3 2 3 2 2   In general, how would you rate your physical health? 2 2 2 2 2 3 2   In general, how would you rate your mental health, including your mood and your ability to think? 2 2 2 2 2 2 2   In general, how would you rate your satisfaction with your social activities and relationships? 3 3 3 2 3 3 3   In general, please rate how well you carry out your usual social activities and roles. (This includes activities at home, at work and in  your community, and responsibilities as a parent, child, spouse, employee, friend, etc.) 3 3 3 3 3 3 3   To what extent are you able to carry out your everyday physical activities such as walking, climbing stairs, carrying groceries, or moving a chair? 4 4 4 4 5 5 4   In the past 7 days, how often have you been bothered by emotional problems such as feeling anxious, depressed, or irritable? 3 4 4 4 3 3 4   In the past 7 days, how would you rate your fatigue on average? 3 2 3 2 3 3 3   In the past 7 days, how would you rate your pain on average, where 0 means no pain, and 10 means worst imaginable pain? 4 2 3 3 3 3 3   Global Mental Health Score 11 10 10 8    8 11 10 9   Global Physical Health Score 12 14 13 14    14 14 15 13   PROMIS TOTAL - SUBSCORES 23 24 23 22    22 25 25 22         ASSESSMENT: Current Emotional / Mental Status (status of significant symptoms):   Risk status (Self / Other harm or suicidal ideation)   Patient denies current fears or concerns for personal safety.   Patient denies current or recent suicidal ideation or behaviors.   Patient denies current or recent homicidal ideation or behaviors.   Patient denies current or recent self injurious behavior or ideation.   Patient denies other safety concerns.   Patient reports there has been no change in risk factors since their last session.     Patient reports there has been no change in protective factors since their last session.     Recommended that patient call 911 or go to the local ED should there be a change in any of these risk factors.     Appearance:   Appropriate    Eye Contact:   Good    Psychomotor Behavior: Normal    Attitude:   Cooperative    Orientation:   All   Speech    Rate / Production: Normal/ Responsive    Volume:  Normal    Mood:    Anxious  Depressed    Affect:    Appropriate    Thought Content:  Clear    Thought Form:  Coherent  Goal Directed  Logical    Insight:    Good      Medication Review:   No changes to current  psychiatric medication(s)     Medication Compliance:   Yes     Changes in Health Issues:   None reported     Chemical Use Review:   Substance Use: Chemical use reviewed, no active concerns identified      Tobacco Use: No current tobacco use.      Diagnosis:  1. ADA (generalized anxiety disorder)    2. Moderate episode of recurrent major depressive disorder (H)    3. Posttraumatic stress disorder        Collateral Reports Completed:   Not Applicable    PLAN: (Patient Tasks / Therapist Tasks / Other)  Patient will return in 3 weeks for scheduled session. Patient will apply for apartments. Therapist will look into PTSD Groups and GA Groups for patient to attend. Patient will plan activities with her friends.     There has been demonstrated improvement in functioning while patient has been engaged in psychotherapy/psychological service- if withdrawn the patient would deteriorate and/or relapse.     Mariana Love, [facility]     ______________________________________________________________________    Individual Treatment Plan    Patient's Name: Alina Martell  YOB: 1965    Date of Creation:10/16/2020  Date Treatment Plan Last Reviewed/Revised: 7/17/2024    DSM5 Diagnoses: 296.32 (F33.1) Major Depressive Disorder, Recurrent Episode, Moderate _, 300.02 (F41.1) Generalized Anxiety Disorder, or 309.81 (F43.10) Posttraumatic Stress Disorder (includes Posttraumatic Stress Disorder for Children 6 Years and Younger)  Without dissociative symptoms  Psychosocial / Contextual Factors: Health, family  PROMIS (reviewed every 90 days):     PROMIS-10 Scores  Global Mental Health Score: 8  Global Physical Health Score: 14  PROMIS TOTAL - SUBSCORES: 22     Referral / Collaboration:  Was/were discussed and patient will pursue.    Anticipated number of session for this episode of care: 20 will reevaluate every 90 days  Anticipation frequency of session: Biweekly  Anticipated Duration of each session: 38-52  minutes  Treatment plan will be reviewed in 90 days or when goals have been changed.       MeasurableTreatment Goal(s) related to diagnosis / functional impairment(s)  Goal 1: Patient will work on reducing overall anxiety.     I will know I've met my goal when reporting minimal anxiety symptoms.       Objective #A (Patient Action)                          Patient will use distraction each time intrusive worry surfaces.  Status: Continued - Date(s): 7/17/2024     Intervention(s)  Therapist will teach emotional regulation skills.  Objective #B  Patient will Decrease frequency and intensity of feeling down, depressed, hopeless.  Status: Continued - Date(s):  7/17/2024     Intervention(s)  Therapist will teach emotional recognition/identification. ..     Objective #C  Patient will Identify negative self-talk and behaviors: challenge core beliefs, myths, and actions.  Status: Continued - Date(s):  7/17/2024     Intervention(s)  Therapist will teach the client how to perform a behavioral chain analysis. ..     Goal 2: Patient will continue to work on reducing depression symptoms    I will know I've met my goal then reporting minimal depression symptoms     Objective #A (Patient Action)                          Patient will Increase interest, engagement, and pleasure in doing things.  Status: Continued - Date(s): 7/17/2024     Intervention(s)  Therapist will assign homework ..     Objective #B  Patient will Decrease frequency and intensity of feeling down, depressed, hopeless.  Status: Continued - Date(s):  7/17/2024  Intervention(s)  Therapist will assign homework at every session.     Goal 3: Client will reduce PTSD symptoms by 50% as evidenced by PCLC-5 score     I will know I've met my goal when not struggling with nightmares.      Objective #A (Client Action)    Client will reduce nightmares.  Status: Continued - Date(s): 7/17/2024    Intervention(s)  Therapist will teach nightmare retraining .    Objective #B  Client  will compile a list of boundaries that they would like to set with others.   .    Status: Continued - Date(s):7/17/2024    Intervention(s)  Therapist will assign homework boundary setting .            Patient has reviewed and agreed to the above plan.        Mariana Love, [facility]

## 2024-09-12 ENCOUNTER — MYC MEDICAL ADVICE (OUTPATIENT)
Dept: FAMILY MEDICINE | Facility: CLINIC | Age: 59
End: 2024-09-12
Payer: COMMERCIAL

## 2024-09-13 DIAGNOSIS — I48.0 PAROXYSMAL ATRIAL FIBRILLATION (H): Primary | ICD-10-CM

## 2024-09-13 RX ORDER — DILTIAZEM HYDROCHLORIDE 360 MG/1
360 CAPSULE, EXTENDED RELEASE ORAL DAILY
Qty: 90 CAPSULE | Refills: 1 | Status: SHIPPED | OUTPATIENT
Start: 2024-09-13

## 2024-09-18 NOTE — TELEPHONE ENCOUNTER
"FUTURE VISIT INFORMATION      FUTURE VISIT INFORMATION:  Date: 10/31/24  Time: 8 AM  Location: [facility] - ENT  REFERRAL INFORMATION:  Referring provider:  Brett Jefferson MD   Referring providers clinic:  [facility]   Reason for visit/diagnosis:  DX R04.0 (ICD-10-CM) - Epistaxis, Per pt sinus issues as well  REF'D by BRETT JEFFERSON  [facility] verified     RECORDS REQUESTED FROM      Clinic name Comments Records Status Imaging Status   [facility]  7/28/24 ER note/ referral- [facility]  12/20/22 MR patricia  12/15/22 CT head Epic PACS           \"Please notify/message CSS if patient completed outside imaging prior to scheduled appointment and/or any outside records that might have been missed at pre visit -Thanks\"  "

## 2024-09-26 ENCOUNTER — OFFICE VISIT (OUTPATIENT)
Dept: PSYCHOLOGY | Facility: CLINIC | Age: 59
End: 2024-09-26
Payer: COMMERCIAL

## 2024-09-26 DIAGNOSIS — F43.10 POSTTRAUMATIC STRESS DISORDER: ICD-10-CM

## 2024-09-26 DIAGNOSIS — F33.1 MODERATE EPISODE OF RECURRENT MAJOR DEPRESSIVE DISORDER (H): ICD-10-CM

## 2024-09-26 DIAGNOSIS — F41.1 GAD (GENERALIZED ANXIETY DISORDER): Primary | ICD-10-CM

## 2024-09-26 PROCEDURE — 90834 PSYTX W PT 45 MINUTES: CPT | Performed by: COUNSELOR

## 2024-09-26 NOTE — PROGRESS NOTES
[facility]                                     Progress Note    Patient Name: Alina Martell  Date: 9/26/24         Service Type: Individual      Session Start Time: 8:03 Session End Time: 8:53     Session Length: 50 minutes    Session #: 79     Attendees: Client    Service Modality:  In-Person          DATA  Extended Session (53+ minutes): No  Interactive Complexity: No  Crisis: No      Progress Since Last Session (Related to Symptoms / Goals / Homework):   Symptoms: Improving reducing anxiety and stress    Homework: Achieved / completed to satisfaction      Episode of Care Goals: Satisfactory progress - ACTION (Actively working towards change); Intervened by reinforcing change plan / affirming steps taken     Current / Ongoing Stressors and Concerns:  Patient reported she is feeling tired. Patient indicated that her housing continues to be her biggest stressor. Patient reported only allowing herself to focus on it for a few minutes and then letting it go. Patient indicated she was so stressed about housing she was not sleeping so knowing a change needed to be made with her thoughts. Patient indicated politics being something that impacts her but setting boundaries. Patient talked about her relationship with her daughter and struggles they are encountering. This therapist challenged patient on continuing to identify what is in her control and out of her control.      Treatment Objective(s) Addressed in This Session:   use thought-stopping strategy daily to reduce intrusive thoughts  Increase interest, engagement, and pleasure in doing things  Decrease frequency and intensity of feeling down, depressed, hopeless  Improve quantity and quality of night time sleep / decrease daytime naps  Feel less tired and more energy during the day   Improve concentration, focus, and mindfulness in daily activities      Intervention:   Motivational Interviewing    MI Intervention: Open-ended questions      Change Talk Expressed by the Patient: Activation Taking steps    Provider Response to Change Talk: A - Affirmed patient's thoughts, decisions, or attempts at behavior change and R - Reflected patient's change talk      Assessments completed prior to visit:  The following assessments were completed by patient for this visit:  PHQ9:       12/18/2023    12:59 PM 1/8/2024     7:06 AM 2/18/2024    10:01 PM 4/3/2024     9:50 AM 5/20/2024     9:50 AM 7/16/2024     4:33 PM 9/5/2024    10:42 AM   PHQ-9 SCORE   PHQ-9 Total Score MyChart 10 (Moderate depression) 8 (Mild depression) 5 (Mild depression) 6 (Mild depression) 4 (Minimal depression) 6 (Mild depression) 8 (Mild depression)   PHQ-9 Total Score 10 8 5 6 4 6 8     GAD7:       7/5/2023     2:18 PM 9/26/2023    12:05 PM 10/9/2023    10:00 AM 10/30/2023     3:23 PM 12/6/2023     9:50 AM 7/16/2024     4:33 PM 8/31/2024     9:27 AM   ADA-7 SCORE   Total Score 6 (mild anxiety) 11 (moderate anxiety)  5 (mild anxiety) 11 (moderate anxiety) 8 (mild anxiety) 9 (mild anxiety)   Total Score 6 11 8 5 11 8    8 9     PROMIS 10-Global Health (all questions and answers displayed):       3/10/2023     8:13 AM 6/13/2023     6:37 AM 9/26/2023    12:07 PM 1/1/2024    11:44 PM 4/3/2024     9:52 AM 5/20/2024     9:52 AM 8/31/2024     9:29 AM   PROMIS 10   In general, would you say your health is: Fair Good Fair Good Good Good Fair   In general, would you say your quality of life is: Good Good Good Fair Good Fair Fair   In general, how would you rate your physical health? Fair Fair Fair Fair Fair Good Fair   In general, how would you rate your mental health, including your mood and your ability to think? Fair Fair Fair Fair Fair Fair Fair   In general, how would you rate your satisfaction with your social activities and relationships? Good Good Good Fair Good Good Good   In general, please rate how well you carry out your usual social activities and roles Good Good Good Good Good Good  Good   To what extent are you able to carry out your everyday physical activities such as walking, climbing stairs, carrying groceries, or moving a chair? Mostly Mostly Mostly Mostly Completely Completely Mostly   In the past 7 days, how often have you been bothered by emotional problems such as feeling anxious, depressed, or irritable? Sometimes Often Often Often Sometimes Sometimes Often   In the past 7 days, how would you rate your fatigue on average? Moderate Mild Moderate Mild Moderate Moderate Moderate   In the past 7 days, how would you rate your pain on average, where 0 means no pain, and 10 means worst imaginable pain? 4 2 3 3 3 3 3   In general, would you say your health is: 2 3 2 3 3 3 2   In general, would you say your quality of life is: 3 3 3 2 3 2 2   In general, how would you rate your physical health? 2 2 2 2 2 3 2   In general, how would you rate your mental health, including your mood and your ability to think? 2 2 2 2 2 2 2   In general, how would you rate your satisfaction with your social activities and relationships? 3 3 3 2 3 3 3   In general, please rate how well you carry out your usual social activities and roles. (This includes activities at home, at work and in your community, and responsibilities as a parent, child, spouse, employee, friend, etc.) 3 3 3 3 3 3 3   To what extent are you able to carry out your everyday physical activities such as walking, climbing stairs, carrying groceries, or moving a chair? 4 4 4 4 5 5 4   In the past 7 days, how often have you been bothered by emotional problems such as feeling anxious, depressed, or irritable? 3 4 4 4 3 3 4   In the past 7 days, how would you rate your fatigue on average? 3 2 3 2 3 3 3   In the past 7 days, how would you rate your pain on average, where 0 means no pain, and 10 means worst imaginable pain? 4 2 3 3 3 3 3   Global Mental Health Score 11 10 10 8    8 11 10 9   Global Physical Health Score 12 14 13 14    14 14 15 13    PROMIS TOTAL - SUBSCORES 23 24 23 22    22 25 25 22         ASSESSMENT: Current Emotional / Mental Status (status of significant symptoms):   Risk status (Self / Other harm or suicidal ideation)   Patient denies current fears or concerns for personal safety.   Patient denies current or recent suicidal ideation or behaviors.   Patient denies current or recent homicidal ideation or behaviors.   Patient denies current or recent self injurious behavior or ideation.   Patient denies other safety concerns.   Patient reports there has been no change in risk factors since their last session.     Patient reports there has been no change in protective factors since their last session.     Recommended that patient call 911 or go to the local ED should there be a change in any of these risk factors.     Appearance:   Appropriate    Eye Contact:   Good    Psychomotor Behavior: Normal    Attitude:   Cooperative    Orientation:   All   Speech    Rate / Production: Normal/ Responsive    Volume:  Normal    Mood:    Anxious    Affect:    Appropriate    Thought Content:  Clear    Thought Form:  Coherent  Goal Directed  Logical    Insight:    Good      Medication Review:   No changes to current psychiatric medication(s)     Medication Compliance:   Yes     Changes in Health Issues:   None reported     Chemical Use Review:   Substance Use: Chemical use reviewed, no active concerns identified      Tobacco Use: No current tobacco use.      Diagnosis:  1. ADA (generalized anxiety disorder)    2. Moderate episode of recurrent major depressive disorder (H)    3. Posttraumatic stress disorder        Collateral Reports Completed:   Not Applicable    PLAN: (Patient Tasks / Therapist Tasks / Other)  Patient will return in three weeks for scheduled session. Patient will apply for an apartment. Patient would benefit from continuing to work on boundary setting. Patient will continue to identify what  is in her control and what is out of her  control.     There has been demonstrated improvement in functioning while patient has been engaged in psychotherapy/psychological service- if withdrawn the patient would deteriorate and/or relapse.     Mariana Love, [facility]     ______________________________________________________________________    Individual Treatment Plan    Patient's Name: Alina Martell  YOB: 1965    Date of Creation:10/16/2020  Date Treatment Plan Last Reviewed/Revised: 7/17/2024    DSM5 Diagnoses: 296.32 (F33.1) Major Depressive Disorder, Recurrent Episode, Moderate _, 300.02 (F41.1) Generalized Anxiety Disorder, or 309.81 (F43.10) Posttraumatic Stress Disorder (includes Posttraumatic Stress Disorder for Children 6 Years and Younger)  Without dissociative symptoms  Psychosocial / Contextual Factors: Health, family  PROMIS (reviewed every 90 days):     PROMIS-10 Scores  Global Mental Health Score: 8  Global Physical Health Score: 14  PROMIS TOTAL - SUBSCORES: 22     Referral / Collaboration:  Was/were discussed and patient will pursue.    Anticipated number of session for this episode of care: 20 will reevaluate every 90 days  Anticipation frequency of session: Biweekly  Anticipated Duration of each session: 38-52 minutes  Treatment plan will be reviewed in 90 days or when goals have been changed.       MeasurableTreatment Goal(s) related to diagnosis / functional impairment(s)  Goal 1: Patient will work on reducing overall anxiety.     I will know I've met my goal when reporting minimal anxiety symptoms.       Objective #A (Patient Action)                          Patient will use distraction each time intrusive worry surfaces.  Status: Continued - Date(s): 7/17/2024     Intervention(s)  Therapist will teach emotional regulation skills.  Objective #B  Patient will Decrease frequency and intensity of feeling down, depressed, hopeless.  Status: Continued - Date(s):  7/17/2024     Intervention(s)  Therapist will teach  emotional recognition/identification. ..     Objective #C  Patient will Identify negative self-talk and behaviors: challenge core beliefs, myths, and actions.  Status: Continued - Date(s):  7/17/2024     Intervention(s)  Therapist will teach the client how to perform a behavioral chain analysis. ..     Goal 2: Patient will continue to work on reducing depression symptoms    I will know I've met my goal then reporting minimal depression symptoms     Objective #A (Patient Action)                          Patient will Increase interest, engagement, and pleasure in doing things.  Status: Continued - Date(s): 7/17/2024     Intervention(s)  Therapist will assign homework ..     Objective #B  Patient will Decrease frequency and intensity of feeling down, depressed, hopeless.  Status: Continued - Date(s):  7/17/2024  Intervention(s)  Therapist will assign homework at every session.     Goal 3: Client will reduce PTSD symptoms by 50% as evidenced by PCLC-5 score     I will know I've met my goal when not struggling with nightmares.      Objective #A (Client Action)    Client will reduce nightmares.  Status: Continued - Date(s): 7/17/2024    Intervention(s)  Therapist will teach nightmare retraining .    Objective #B  Client will compile a list of boundaries that they would like to set with others.   .    Status: Continued - Date(s):7/17/2024    Intervention(s)  Therapist will assign homework boundary setting .            Patient has reviewed and agreed to the above plan.        Mariana Love, [facility]

## 2024-10-02 ENCOUNTER — OFFICE VISIT (OUTPATIENT)
Dept: CARDIOLOGY | Facility: CLINIC | Age: 59
End: 2024-10-02
Payer: COMMERCIAL

## 2024-10-02 VITALS
DIASTOLIC BLOOD PRESSURE: 82 MMHG | OXYGEN SATURATION: 95 % | WEIGHT: 287 LBS | BODY MASS INDEX: 42.51 KG/M2 | SYSTOLIC BLOOD PRESSURE: 152 MMHG | HEIGHT: 69 IN | HEART RATE: 84 BPM | RESPIRATION RATE: 18 BRPM

## 2024-10-02 DIAGNOSIS — I10 ESSENTIAL HYPERTENSION: ICD-10-CM

## 2024-10-02 DIAGNOSIS — I48.0 PAROXYSMAL ATRIAL FIBRILLATION (H): ICD-10-CM

## 2024-10-02 PROCEDURE — 99214 OFFICE O/P EST MOD 30 MIN: CPT | Performed by: INTERNAL MEDICINE

## 2024-10-02 RX ORDER — ADALIMUMAB-ADAZ 40 MG/.4ML
40 INJECTION, SOLUTION SUBCUTANEOUS
COMMUNITY
Start: 2024-10-01

## 2024-10-02 NOTE — PROGRESS NOTES
Cardiology Clinic Office Note    Assessment / Plan:    1.  Paroxysmal atrial fibrillation, status post pulmonary vein isolation with no documented recurrence since that time.  We discussed risk-benefit of anticoagulation, with her recurrent nosebleeds, she is interested in staying off of it for now but we will resume it if she is not able to comply with periodic monitoring for recurrence.  I suggested an Apple watch or other smart watch to monitor for recurrent atrial fibrillation, she is quite interested in this approach.  [id number] score is 3  2.  Hypertension.  Elevated today but with good control at home.  She was advised to keep a record of blood pressure readings and bring that record in with her to follow-up visits with her primary care provider to see if additional antihypertensive medications are warranted.  3.  Obstructive sleep apnea on CPAP  4.  Morbid obesity.  Advocated continued exercise, targeting 150 minutes each week    Follow-up as needed or for recurrent atrial fibrillation    ______________________________________________________________________    Subjective:    I had the opportunity to see Alina Martell at the [facility]. Alina Martell is a 59 year old female with a history of morbid obesity with obstructive sleep apnea, essential hypertension, rheumatoid arthritis with sicca syndrome.  She also has diabetes mellitus type 2, with an A1c of 6.5 with last year.  She has no history of obstructive coronary artery disease, but mild coronary calcium was noted on most recent CT scan.  Last stress test in 2022 was normal, as has been her ventricular function by echo.  She does have a history of paroxysmal atrial fibrillation and underwent pulmonary vein isolation procedure 6/2022.  She has had no documentation of recurrence but has been continued on Eliquis 5 mg twice daily.  This was stopped earlier this year when she presented with persistent  nosebleed.  Even off of Eliquis she had milder nosebleeds for a week.  She has had no recurrence since then.    She reports her blood pressures are well-controlled at home with systolics in the 1 18-1 33 range.  She is exercising regularly and feels less drowsy with this.  Her weight has not yet decreased, but she notes improved blood sugar control as well.  She has had no exercise-induced chest discomfort.  She feels her shortness of breath with activity may be improving.    She checks her blood pressure once or twice a week.  She is not monitoring for recurrent atrial fibrillation at the present time.    ______________________________________________________________________    Problem List:  Patient Active Problem List   Diagnosis    Depressed    Seropositive rheumatoid arthritis of multiple sites (H)    Recurrent major depressive disorder, in full remission (H)    Paroxysmal atrial fibrillation (H)    Morbid obesity (H)    Microcytic anemia    Anxiety    Chest pain    Chronic pain    Cyclic citrullinated peptide (CCP) antibody positive    Grief    Insomnia related to another mental disorder    Migraine headache    Olecranon bursitis of right elbow    Panic attack    ADA (generalized anxiety disorder)    Reflux    Sicca syndrome (H)    Sleep disorder    Tendonitis of both shoulders    Obstructive sleep apnea syndrome    Essential hypertension    Coronary artery disease of native artery with stable angina pectoris (H)    Postmenopausal bleeding    History of colonic polyps    Status post catheter ablation of atrial fibrillation    Vitamin D deficiency    Uterine fibroid    Abnormal uterine bleeding    Diabetes mellitus, type 2 (H)    Benign neoplasm of ascending colon    Diverticular disease of large intestine    Polyp of colon    Epigastric pain     Medical History:  Past Medical History:   Diagnosis Date    Anemia     Antiplatelet or antithrombotic long-term use     Arrhythmia     Cannabis use without  complication 12/8/2014    Carpal tunnel syndrome     Coronary artery disease     Diabetes mellitus, type 2 (H) 12/13/2023    Gastroesophageal reflux disease     History of angina     Hypertension     Irregular heart beat     Obesity     Paroxysmal atrial fibrillation (H)     PTSD (post-traumatic stress disorder)     RA (rheumatoid arthritis) (H)     Rheumatoid arthritis (H) 7/8/2016    Sicca syndrome (H)     Sleep apnea      Surgical History:  Past Surgical History:   Procedure Laterality Date    CHOLECYSTECTOMY      CYSTOSCOPY N/A 5/4/2023    Procedure: AND CYSTOSCOPY;  Surgeon: Irma Cary MD;  Location: [facility] OR    DAVINCI HYSTERECTOMY TOTAL Bilateral 5/4/2023    Procedure: ROBOTIC ASSISTED TOTAL LAPAROSCOPIC HYSTERECTOMY WITH BILATERAL SALPINGECTOMY;  Surgeon: Irma Cary MD;  Location: [facility] OR    DILATION AND CURETTAGE, OPERATIVE HYSTEROSCOPY, COMBINED N/A 3/3/2022    Procedure: HYSTEROSCOPY, WITH DILATION AND CURETTAGE;  Surgeon: Irma Cary MD;  Location: [facility] OR    EP ABLATION FOCAL AFIB N/A 6/30/2022    Procedure: Ablation Atrial Fibrilation;  Surgeon: Jose Guadalupe Joseph MD;  Location: [facility] CARDIAC CATH LAB    HC REMOVAL GALLBLADDER      Description: Cholecystectomy;  Recorded: 10/15/2013;    LAPAROSCOPIC TUBAL LIGATION      RELEASE CARPAL TUNNEL BILATERAL      TUBAL LIGATION  1995     Social History:  Social History     Socioeconomic History    Marital status: Single     Spouse name: Not on file    Number of children: 2    Years of education: 4    Highest education level: Not on file   Occupational History    Not on file   Tobacco Use    Smoking status: Never     Passive exposure: Past (mom smoked)    Smokeless tobacco: Never   Vaping Use    Vaping status: Never Used   Substance and Sexual Activity    Alcohol use: Not Currently     Comment: Alcoholic Drinks/day: Never an issue, she states.  7/8/16    Drug use: Not Currently     Types:  Marijuana     Comment: Drug use: Former marijuana use, not current. 7/8/16    Sexual activity: Never     Partners: Male     Birth control/protection: Other     Comment: tubal ligation   Other Topics Concern    Parent/sibling w/ CABG, MI or angioplasty before 65F 55M? Not Asked   Social History Narrative    Not on file     Social Determinants of Health     Financial Resource Strain: Not on file   Food Insecurity: Not on file   Transportation Needs: Not on file   Physical Activity: Not on file   Stress: Not on file   Social Connections: Not on file   Interpersonal Safety: Low Risk  (3/27/2024)    Interpersonal Safety     Do you feel physically and emotionally safe where you currently live?: Yes     Within the past 12 months, have you been hit, slapped, kicked or otherwise physically hurt by someone?: No     Within the past 12 months, have you been humiliated or emotionally abused in other ways by your partner or ex-partner?: No   Housing Stability: Not on file     Sleep History:  Restorative on CPAP  Exercise History:  Walks 40 minutes 3 times a week, plus occasionally plays tennis.  This is improved her daytime sleepiness    Review of Systems:      Positive for dizziness, joint pains, shortness of breath, palpitations, nosebleed, night sweats, anxiety.  12 point review of systems otherwise negative     Please refer above for cardiac ROS details.         Family History:  Family History   Problem Relation Age of Onset    Crohn's Disease Mother         Total colectomy with ileostomy.    Atrial fibrillation Mother     Snoring Father     Diabetes Father     Prostate Cancer Father     Chronic Kidney Disease Father         Chose against dialysis.    Substance Abuse Brother     Depression Brother     Attention Deficit Disorder Brother     Alcoholism Brother     Arrhythmia Brother     Alcoholism Brother     Substance Abuse Brother     Arrhythmia Brother     Alcoholism Maternal Grandfather     No Known Problems Daughter     No  "Known Problems Son     Lupus Cousin     Breast Cancer No family hx of          Allergies:  Allergies   Allergen Reactions    Penicillins Shortness Of Breath, Palpitations, Dizziness, Anaphylaxis, Hives, Itching and Rash     Test in 2031, see allergy note on 7/29/21    Shellfish-Derived Products Anaphylaxis    Sulfa Antibiotics Hives and Anaphylaxis     Medications:  Current Outpatient Medications   Medication Sig Dispense Refill    cetirizine (ZYRTEC) 10 MG tablet Take 1 tablet (10 mg) by mouth daily 90 tablet 1    diltiazem ER (TIAZAC) 360 MG 24 hr ER beaded capsule Take 1 capsule (360 mg) by mouth daily. 90 capsule 1    EPINEPHrine (ANY BX GENERIC EQUIV) 0.3 MG/0.3ML injection 2-pack Inject 0.3 mLs (0.3 mg) into the muscle as needed for anaphylaxis 2 each 2    gabapentin (NEURONTIN) 100 MG capsule Take 1 capsule (100 mg) by mouth at bedtime 30 capsule 2    HYRIMOZ 40 MG/0.4ML auto-injector kit Inject 40 mg subcutaneously every 14 days.      LORazepam (ATIVAN) 0.5 MG tablet Take 1 tablet (0.5 mg) by mouth daily as needed for anxiety 30 tablet 2    metFORMIN (GLUCOPHAGE XR) 500 MG 24 hr tablet Take 1 tablet (500 mg) by mouth daily (with dinner) 90 tablet 2    Multiple Vitamins-Minerals (CENTROVITE) TABS Take 1 tablet by mouth daily      omeprazole (PRILOSEC) 40 MG DR capsule Take 1 capsule (40 mg) by mouth daily 90 capsule 3    ondansetron (ZOFRAN ODT) 4 MG ODT tab Take 1 tablet (4 mg) by mouth every 8 hours as needed for nausea 30 tablet 0    vitamin D3 (CHOLECALCIFEROL) 1.25 MG (81434 UT) capsule Take 1 capsule (50,000 Units) by mouth every 7 days 12 capsule 3       Objective:   Wt Readings from Last 3 Encounters:   10/02/24 130.2 kg (287 lb)   07/24/24 128.4 kg (283 lb)   07/15/24 129.3 kg (285 lb)     Vital signs:  BP (!) 152/82 (BP Location: Left arm, Patient Position: Sitting, Cuff Size: Adult Large)   Pulse 84   Resp 18   Ht 1.753 m (5' 9\")   Wt 130.2 kg (287 lb)   LMP  (LMP Unknown)   SpO2 95%   BMI " 42.38 kg/m        Physical Exam:    General Appearance : Awake, Alert, No acute distress.  Talkative  HEENT: No Scleral icterus; the mucous membranes were pink and moist.  Conjunctivae not injected  Neck:  No cervical bruits, jugular venous distention, or thyromegaly appreciated.  Stout.  Chest: The spine was straight. Chest wall symmetric  Lungs: Respirations unlabored; the lungs are clear to auscultation.  No wheezing   Cardiovascular: Nonpalpable point of maximal impulse.  Auscultation reveals regular first and second heart sounds with no murmurs, rubs, or gallops.  Carotid, radial, and dorsalis pedal pulses are intact and symmetric.    Abdomen: Obese.  No organomegaly, masses, bruits, or tenderness. Bowels sounds are present  Extremities:  No clubbing, cyanosis.  No edema  Skin: No rash, bruising  Musculoskeletal: No tenderness.  Neurologic: Alert and oriented ×3. Speech is fluent.     Lab Results    Chemistry/lipid CBC Cardiac Enzymes/BNP/TSH/INR   Recent Labs   Lab Test 03/27/24  1001   CHOL 203*   HDL 50   *   TRIG 156*     Recent Labs   Lab Test 03/27/24  1001 03/22/23  0817 01/21/21  0746   * 104* 107     Recent Labs   Lab Test 08/15/24  0715 07/28/24  1628   NA  --  139   POTASSIUM  --  4.9   CHLORIDE  --  103   CO2  --  24   GLC  --  110*   BUN  --  16.6   CR 0.73 0.78   GFRESTIMATED >90 88   JOSSELIN  --  9.0     Recent Labs   Lab Test 08/15/24  0715 07/28/24  1628 07/15/24  0338   CR 0.73 0.78 0.79     Recent Labs   Lab Test 07/24/24  1152 03/27/24  1001 08/30/23  1105   A1C 6.3* 6.3* 6.5*          Recent Labs   Lab Test 08/15/24  0715   WBC 9.5   HGB 10.9*   HCT 35.5   MCV 80        Recent Labs   Lab Test 08/15/24  0715 07/28/24  1628 07/15/24  0338   HGB 10.9* 11.5* 11.4*    Recent Labs   Lab Test 03/08/22  2025 01/17/22  1406 01/16/22  2301   TROPONINI <0.01 <0.01 <0.01     Recent Labs   Lab Test 03/11/23  2059   NTBNPI <36     Recent Labs   Lab Test 02/16/22  1108   TSH 2.16      Recent Labs   Lab Test 08/11/23  0128 12/15/22  0702 12/08/22  1951   INR 1.04 1.13 1.02        CTA chest abdomen pelvis chest pain evaluation 3/2024:  CORONARY ARTERY CALCIFICATION: Mild.   1.  No aortic dissection or acute aortic pathology.  2.  No acute findings in the chest, abdomen, or pelvis.  3.  Incidental left thyroid nodules measuring up to 1.9 cm. Consider correlation with nonemergent thyroid ultrasound.    Echocardiogram 12/2022:  Global and regional left ventricular function is normal with an EF of 60-65%.  Global right ventricular function is normal.  No significant valvular abnormalities present.  Pulmonary artery systolic pressure is normal.  The inferior vena cava is normal.  No pericardial effusion is present.    Pharmacologic nuclear stress test 12/2022:    The nuclear stress test is negative for inducible myocardial ischemia or infarction.    Left ventricular function is normal.    Exercise JENA 2/2023:  1.  RIGHT LOWER EXTREMITY: Normal JENA at rest with evidence of mild exercise-induced arterial insufficiency.  2.  LEFT LOWER EXTREMITY: Normal JENA at rest with evidence of mild exercised-induced arterial insufficiency, slightly more significant than that seen on the right.        JUJU BLEVINS MD [facility]  [phone]    This note created using Dragon voice recognition software.  Sound alike errors may have escaped editing.

## 2024-10-02 NOTE — LETTER
10/2/2024    Estelle Bansal MD  [address] [address]  [facility] [address]    RE: Alina Martell       Dear Colleague,     I had the pleasure of seeing Alina Martell in the [facility].    Cardiology Clinic Office Note    Assessment / Plan:    1.  Paroxysmal atrial fibrillation, status post pulmonary vein isolation with no documented recurrence since that time.  We discussed risk-benefit of anticoagulation, with her recurrent nosebleeds, she is interested in staying off of it for now but we will resume it if she is not able to comply with periodic monitoring for recurrence.  I suggested an Apple watch or other smart watch to monitor for recurrent atrial fibrillation, she is quite interested in this approach.  [id number] score is 3  2.  Hypertension.  Elevated today but with good control at home.  She was advised to keep a record of blood pressure readings and bring that record in with her to follow-up visits with her primary care provider to see if additional antihypertensive medications are warranted.  3.  Obstructive sleep apnea on CPAP  4.  Morbid obesity.  Advocated continued exercise, targeting 150 minutes each week    Follow-up as needed or for recurrent atrial fibrillation    ______________________________________________________________________    Subjective:    I had the opportunity to see Alina Martell at the [facility]. Alina Martell is a 59 year old female with a history of morbid obesity with obstructive sleep apnea, essential hypertension, rheumatoid arthritis with sicca syndrome.  She also has diabetes mellitus type 2, with an A1c of 6.5 with last year.  She has no history of obstructive coronary artery disease, but mild coronary calcium was noted on most recent CT scan.  Last stress test in 2022 was normal, as has been her ventricular function by echo.  She does have a history of paroxysmal atrial fibrillation and  underwent pulmonary vein isolation procedure 6/2022.  She has had no documentation of recurrence but has been continued on Eliquis 5 mg twice daily.  This was stopped earlier this year when she presented with persistent nosebleed.  Even off of Eliquis she had milder nosebleeds for a week.  She has had no recurrence since then.    She reports her blood pressures are well-controlled at home with systolics in the 1 18-1 33 range.  She is exercising regularly and feels less drowsy with this.  Her weight has not yet decreased, but she notes improved blood sugar control as well.  She has had no exercise-induced chest discomfort.  She feels her shortness of breath with activity may be improving.    She checks her blood pressure once or twice a week.  She is not monitoring for recurrent atrial fibrillation at the present time.    ______________________________________________________________________    Problem List:  Patient Active Problem List   Diagnosis     Depressed     Seropositive rheumatoid arthritis of multiple sites (H)     Recurrent major depressive disorder, in full remission (H)     Paroxysmal atrial fibrillation (H)     Morbid obesity (H)     Microcytic anemia     Anxiety     Chest pain     Chronic pain     Cyclic citrullinated peptide (CCP) antibody positive     Grief     Insomnia related to another mental disorder     Migraine headache     Olecranon bursitis of right elbow     Panic attack     ADA (generalized anxiety disorder)     Reflux     Sicca syndrome (H)     Sleep disorder     Tendonitis of both shoulders     Obstructive sleep apnea syndrome     Essential hypertension     Coronary artery disease of native artery with stable angina pectoris (H)     Postmenopausal bleeding     History of colonic polyps     Status post catheter ablation of atrial fibrillation     Vitamin D deficiency     Uterine fibroid     Abnormal uterine bleeding     Diabetes mellitus, type 2 (H)     Benign neoplasm of ascending colon      Diverticular disease of large intestine     Polyp of colon     Epigastric pain     Medical History:  Past Medical History:   Diagnosis Date     Anemia      Antiplatelet or antithrombotic long-term use      Arrhythmia      Cannabis use without complication 12/8/2014     Carpal tunnel syndrome      Coronary artery disease      Diabetes mellitus, type 2 (H) 12/13/2023     Gastroesophageal reflux disease      History of angina      Hypertension      Irregular heart beat      Obesity      Paroxysmal atrial fibrillation (H)      PTSD (post-traumatic stress disorder)      RA (rheumatoid arthritis) (H)      Rheumatoid arthritis (H) 7/8/2016     Sicca syndrome (H)      Sleep apnea      Surgical History:  Past Surgical History:   Procedure Laterality Date     CHOLECYSTECTOMY       CYSTOSCOPY N/A 5/4/2023    Procedure: AND CYSTOSCOPY;  Surgeon: Irma Cary MD;  Location: [facility] Main OR     DAVINCI HYSTERECTOMY TOTAL Bilateral 5/4/2023    Procedure: ROBOTIC ASSISTED TOTAL LAPAROSCOPIC HYSTERECTOMY WITH BILATERAL SALPINGECTOMY;  Surgeon: Irma Cary MD;  Location: [facility] OR     DILATION AND CURETTAGE, OPERATIVE HYSTEROSCOPY, COMBINED N/A 3/3/2022    Procedure: HYSTEROSCOPY, WITH DILATION AND CURETTAGE;  Surgeon: Irma Cary MD;  Location: [facility] OR     EP ABLATION FOCAL AFIB N/A 6/30/2022    Procedure: Ablation Atrial Fibrilation;  Surgeon: Jose Guadalupe Joseph MD;  Location:  HEART CARDIAC CATH LAB     HC REMOVAL GALLBLADDER      Description: Cholecystectomy;  Recorded: 10/15/2013;     LAPAROSCOPIC TUBAL LIGATION       RELEASE CARPAL TUNNEL BILATERAL       TUBAL LIGATION  1995     Social History:  Social History     Socioeconomic History     Marital status: Single     Spouse name: Not on file     Number of children: 2     Years of education: 4     Highest education level: Not on file   Occupational History     Not on file   Tobacco Use     Smoking status: Never      Passive exposure: Past (mom smoked)     Smokeless tobacco: Never   Vaping Use     Vaping status: Never Used   Substance and Sexual Activity     Alcohol use: Not Currently     Comment: Alcoholic Drinks/day: Never an issue, she states.  7/8/16     Drug use: Not Currently     Types: Marijuana     Comment: Drug use: Former marijuana use, not current. 7/8/16     Sexual activity: Never     Partners: Male     Birth control/protection: Other     Comment: tubal ligation   Other Topics Concern     Parent/sibling w/ CABG, MI or angioplasty before 65F 55M? Not Asked   Social History Narrative     Not on file     Social Determinants of Health     Financial Resource Strain: Not on file   Food Insecurity: Not on file   Transportation Needs: Not on file   Physical Activity: Not on file   Stress: Not on file   Social Connections: Not on file   Interpersonal Safety: Low Risk  (3/27/2024)    Interpersonal Safety      Do you feel physically and emotionally safe where you currently live?: Yes      Within the past 12 months, have you been hit, slapped, kicked or otherwise physically hurt by someone?: No      Within the past 12 months, have you been humiliated or emotionally abused in other ways by your partner or ex-partner?: No   Housing Stability: Not on file     Sleep History:  Restorative on CPAP  Exercise History:  Walks 40 minutes 3 times a week, plus occasionally plays tennis.  This is improved her daytime sleepiness    Review of Systems:      Positive for dizziness, joint pains, shortness of breath, palpitations, nosebleed, night sweats, anxiety.  12 point review of systems otherwise negative     Please refer above for cardiac ROS details.         Family History:  Family History   Problem Relation Age of Onset     Crohn's Disease Mother         Total colectomy with ileostomy.     Atrial fibrillation Mother      Snoring Father      Diabetes Father      Prostate Cancer Father      Chronic Kidney Disease Father         Chose against  dialysis.     Substance Abuse Brother      Depression Brother      Attention Deficit Disorder Brother      Alcoholism Brother      Arrhythmia Brother      Alcoholism Brother      Substance Abuse Brother      Arrhythmia Brother      Alcoholism Maternal Grandfather      No Known Problems Daughter      No Known Problems Son      Lupus Cousin      Breast Cancer No family hx of          Allergies:  Allergies   Allergen Reactions     Penicillins Shortness Of Breath, Palpitations, Dizziness, Anaphylaxis, Hives, Itching and Rash     Test in 2031, see allergy note on 7/29/21     Shellfish-Derived Products Anaphylaxis     Sulfa Antibiotics Hives and Anaphylaxis     Medications:  Current Outpatient Medications   Medication Sig Dispense Refill     cetirizine (ZYRTEC) 10 MG tablet Take 1 tablet (10 mg) by mouth daily 90 tablet 1     diltiazem ER (TIAZAC) 360 MG 24 hr ER beaded capsule Take 1 capsule (360 mg) by mouth daily. 90 capsule 1     EPINEPHrine (ANY BX GENERIC EQUIV) 0.3 MG/0.3ML injection 2-pack Inject 0.3 mLs (0.3 mg) into the muscle as needed for anaphylaxis 2 each 2     gabapentin (NEURONTIN) 100 MG capsule Take 1 capsule (100 mg) by mouth at bedtime 30 capsule 2     HYRIMOZ 40 MG/0.4ML auto-injector kit Inject 40 mg subcutaneously every 14 days.       LORazepam (ATIVAN) 0.5 MG tablet Take 1 tablet (0.5 mg) by mouth daily as needed for anxiety 30 tablet 2     metFORMIN (GLUCOPHAGE XR) 500 MG 24 hr tablet Take 1 tablet (500 mg) by mouth daily (with dinner) 90 tablet 2     Multiple Vitamins-Minerals (CENTROVITE) TABS Take 1 tablet by mouth daily       omeprazole (PRILOSEC) 40 MG DR capsule Take 1 capsule (40 mg) by mouth daily 90 capsule 3     ondansetron (ZOFRAN ODT) 4 MG ODT tab Take 1 tablet (4 mg) by mouth every 8 hours as needed for nausea 30 tablet 0     vitamin D3 (CHOLECALCIFEROL) 1.25 MG (07722 UT) capsule Take 1 capsule (50,000 Units) by mouth every 7 days 12 capsule 3       Objective:   Wt Readings from Last  "3 Encounters:   10/02/24 130.2 kg (287 lb)   07/24/24 128.4 kg (283 lb)   07/15/24 129.3 kg (285 lb)     Vital signs:  BP (!) 152/82 (BP Location: Left arm, Patient Position: Sitting, Cuff Size: Adult Large)   Pulse 84   Resp 18   Ht 1.753 m (5' 9\")   Wt 130.2 kg (287 lb)   LMP  (LMP Unknown)   SpO2 95%   BMI 42.38 kg/m        Physical Exam:    General Appearance : Awake, Alert, No acute distress.  Talkative  HEENT: No Scleral icterus; the mucous membranes were pink and moist.  Conjunctivae not injected  Neck:  No cervical bruits, jugular venous distention, or thyromegaly appreciated.  Stout.  Chest: The spine was straight. Chest wall symmetric  Lungs: Respirations unlabored; the lungs are clear to auscultation.  No wheezing   Cardiovascular: Nonpalpable point of maximal impulse.  Auscultation reveals regular first and second heart sounds with no murmurs, rubs, or gallops.  Carotid, radial, and dorsalis pedal pulses are intact and symmetric.    Abdomen: Obese.  No organomegaly, masses, bruits, or tenderness. Bowels sounds are present  Extremities:  No clubbing, cyanosis.  No edema  Skin: No rash, bruising  Musculoskeletal: No tenderness.  Neurologic: Alert and oriented ×3. Speech is fluent.     Lab Results    Chemistry/lipid CBC Cardiac Enzymes/BNP/TSH/INR   Recent Labs   Lab Test 03/27/24  1001   CHOL 203*   HDL 50   *   TRIG 156*     Recent Labs   Lab Test 03/27/24  1001 03/22/23  0817 01/21/21  0746   * 104* 107     Recent Labs   Lab Test 08/15/24  0715 07/28/24  1628   NA  --  139   POTASSIUM  --  4.9   CHLORIDE  --  103   CO2  --  24   GLC  --  110*   BUN  --  16.6   CR 0.73 0.78   GFRESTIMATED >90 88   JOSSELIN  --  9.0     Recent Labs   Lab Test 08/15/24  0715 07/28/24  1628 07/15/24  0338   CR 0.73 0.78 0.79     Recent Labs   Lab Test 07/24/24  1152 03/27/24  1001 08/30/23  1105   A1C 6.3* 6.3* 6.5*          Recent Labs   Lab Test 08/15/24  0715   WBC 9.5   HGB 10.9*   HCT 35.5   MCV 80   PLT " 350     Recent Labs   Lab Test 08/15/24  0715 07/28/24  1628 07/15/24  0338   HGB 10.9* 11.5* 11.4*    Recent Labs   Lab Test 03/08/22  2025 01/17/22  1406 01/16/22  2301   TROPONINI <0.01 <0.01 <0.01     Recent Labs   Lab Test 03/11/23  2059   NTBNPI <36     Recent Labs   Lab Test 02/16/22  1108   TSH 2.16     Recent Labs   Lab Test 08/11/23  0128 12/15/22  0702 12/08/22  1951   INR 1.04 1.13 1.02        CTA chest abdomen pelvis chest pain evaluation 3/2024:  CORONARY ARTERY CALCIFICATION: Mild.   1.  No aortic dissection or acute aortic pathology.  2.  No acute findings in the chest, abdomen, or pelvis.  3.  Incidental left thyroid nodules measuring up to 1.9 cm. Consider correlation with nonemergent thyroid ultrasound.    Echocardiogram 12/2022:  Global and regional left ventricular function is normal with an EF of 60-65%.  Global right ventricular function is normal.  No significant valvular abnormalities present.  Pulmonary artery systolic pressure is normal.  The inferior vena cava is normal.  No pericardial effusion is present.    Pharmacologic nuclear stress test 12/2022:     The nuclear stress test is negative for inducible myocardial ischemia or infarction.     Left ventricular function is normal.    Exercise JENA 2/2023:  1.  RIGHT LOWER EXTREMITY: Normal JENA at rest with evidence of mild exercise-induced arterial insufficiency.  2.  LEFT LOWER EXTREMITY: Normal JENA at rest with evidence of mild exercised-induced arterial insufficiency, slightly more significant than that seen on the right.        JUJU BLEVINS MD [facility]  [phone]    This note created using Dragon voice recognition software.  Sound alike errors may have escaped editing.             Thank you for allowing me to participate in the care of your patient.      Sincerely,     Juju Blevins MD     [facility]  cc:   Juju Blevins MD  [address]  [place]  [address]

## 2024-10-02 NOTE — PATIENT INSTRUCTIONS
Purchase and wear a smart watch to help monitor for recurrence of atrial fibrillation on a daily basis.  Keep a record of your home blood pressure readings, at various times during the day and with various activities, and bring that record in with you to follow-up visits with your primary care provider.  A target LDL in you with your risk profile should be less than 100.  Work to get at least 150 minutes of moderate aerobic activities in each week.  Follow-up as needed

## 2024-10-15 ENCOUNTER — VIRTUAL VISIT (OUTPATIENT)
Dept: PSYCHOLOGY | Facility: CLINIC | Age: 59
End: 2024-10-15
Payer: COMMERCIAL

## 2024-10-15 DIAGNOSIS — F43.10 POSTTRAUMATIC STRESS DISORDER: ICD-10-CM

## 2024-10-15 DIAGNOSIS — F41.1 GAD (GENERALIZED ANXIETY DISORDER): Primary | ICD-10-CM

## 2024-10-15 DIAGNOSIS — F33.1 MODERATE EPISODE OF RECURRENT MAJOR DEPRESSIVE DISORDER (H): ICD-10-CM

## 2024-10-15 PROCEDURE — 90837 PSYTX W PT 60 MINUTES: CPT | Mod: 95 | Performed by: COUNSELOR

## 2024-10-15 ASSESSMENT — ANXIETY QUESTIONNAIRES
GAD7 TOTAL SCORE: 11
GAD7 TOTAL SCORE: 11
7. FEELING AFRAID AS IF SOMETHING AWFUL MIGHT HAPPEN: NOT AT ALL
6. BECOMING EASILY ANNOYED OR IRRITABLE: NOT AT ALL
7. FEELING AFRAID AS IF SOMETHING AWFUL MIGHT HAPPEN: NOT AT ALL
4. TROUBLE RELAXING: NEARLY EVERY DAY
GAD7 TOTAL SCORE: 11
8. IF YOU CHECKED OFF ANY PROBLEMS, HOW DIFFICULT HAVE THESE MADE IT FOR YOU TO DO YOUR WORK, TAKE CARE OF THINGS AT HOME, OR GET ALONG WITH OTHER PEOPLE?: SOMEWHAT DIFFICULT
1. FEELING NERVOUS, ANXIOUS, OR ON EDGE: NEARLY EVERY DAY
3. WORRYING TOO MUCH ABOUT DIFFERENT THINGS: SEVERAL DAYS
2. NOT BEING ABLE TO STOP OR CONTROL WORRYING: SEVERAL DAYS
5. BEING SO RESTLESS THAT IT IS HARD TO SIT STILL: NEARLY EVERY DAY
IF YOU CHECKED OFF ANY PROBLEMS ON THIS QUESTIONNAIRE, HOW DIFFICULT HAVE THESE PROBLEMS MADE IT FOR YOU TO DO YOUR WORK, TAKE CARE OF THINGS AT HOME, OR GET ALONG WITH OTHER PEOPLE: SOMEWHAT DIFFICULT

## 2024-10-16 NOTE — PROGRESS NOTES
[facility]                                     Progress Note    Patient Name: Alina Martell  Date: 10/15/24         Service Type: Individual      Session Start Time: 2:01 Session End Time: 2:57     Session Length: 56 minutes    Session #: 80     Attendees: Client    Service Modality:  Video Visit:     Telemedicine Visit: The patient's condition can be safely assessed and treated via synchronous audio and visual telemedicine encounter.       Reason for Telemedicine Visit: Services only offered telehealth     Originating Site (Patient Location): Patient's place of employment     Distant Location (provider location):  Off-site     Consent:  The patient/guardian has verbally consented to: the potential risks and benefits of telemedicine (video visit) versus in person care; bill my insurance or make self-payment for services provided; and responsibility for payment of non-covered services.      Mode of Communication:  Video Conference via [employer]     As the provider I attest to compliance with applicable laws and regulations related to telemedicine          DATA  Extended Session (53+ minutes):   - Patient's presenting concerns require more intensive intervention than could be completed within the usual service  Interactive Complexity: No  Crisis: No      Progress Since Last Session (Related to Symptoms / Goals / Homework):   Symptoms: Worsening significant stress    Homework: Achieved / completed to satisfaction      Episode of Care Goals: Satisfactory progress - ACTION (Actively working towards change); Intervened by reinforcing change plan / affirming steps taken     Current / Ongoing Stressors and Concerns:  Patient indicated that she has been experiencing significant stress. Patient talked about stress related to her job. Patient reported she has looked at a lot of different housing options but not found anything. Patient indicated she is continuing to try to avoid thinking about  what she will do for housing. Patient reported she has noticed an increase in her OCD behaviors and wanting to have control. Patient indicated she is staying busy and getting out of her house. This therapist challenged patient on the risks of continuing to avoid and not talk about big stressors.      Treatment Objective(s) Addressed in This Session:   practice deep breathing at least 3 a day  Increase interest, engagement, and pleasure in doing things  Decrease frequency and intensity of feeling down, depressed, hopeless  Improve quantity and quality of night time sleep / decrease daytime naps  Feel less tired and more energy during the day   Identify negative self-talk and behaviors: challenge core beliefs, myths, and actions     Intervention:   Motivational Interviewing    MI Intervention: Reflections: simple and complex     Change Talk Expressed by the Patient: Taking steps    Provider Response to Change Talk: R - Reflected patient's change talk and S - Summarized patient's change talk statements      Assessments completed prior to visit:  The following assessments were completed by patient for this visit:  PHQ9:       1/8/2024     7:06 AM 2/18/2024    10:01 PM 4/3/2024     9:50 AM 5/20/2024     9:50 AM 7/16/2024     4:33 PM 9/5/2024    10:42 AM 10/15/2024     8:47 AM   PHQ-9 SCORE   PHQ-9 Total Score MyChart 8 (Mild depression) 5 (Mild depression) 6 (Mild depression) 4 (Minimal depression) 6 (Mild depression) 8 (Mild depression) 9 (Mild depression)   PHQ-9 Total Score 8 5 6 4 6 8 9     GAD7:       9/26/2023    12:05 PM 10/9/2023    10:00 AM 10/30/2023     3:23 PM 12/6/2023     9:50 AM 7/16/2024     4:33 PM 8/31/2024     9:27 AM 10/15/2024     8:48 AM   ADA-7 SCORE   Total Score 11 (moderate anxiety)  5 (mild anxiety) 11 (moderate anxiety) 8 (mild anxiety) 9 (mild anxiety) 11 (moderate anxiety)   Total Score 11 8 5 11 8    8 9 11     PROMIS 10-Global Health (all questions and answers displayed):       3/10/2023      8:13 AM 6/13/2023     6:37 AM 9/26/2023    12:07 PM 1/1/2024    11:44 PM 4/3/2024     9:52 AM 5/20/2024     9:52 AM 8/31/2024     9:29 AM   PROMIS 10   In general, would you say your health is: Fair Good Fair Good Good Good Fair   In general, would you say your quality of life is: Good Good Good Fair Good Fair Fair   In general, how would you rate your physical health? Fair Fair Fair Fair Fair Good Fair   In general, how would you rate your mental health, including your mood and your ability to think? Fair Fair Fair Fair Fair Fair Fair   In general, how would you rate your satisfaction with your social activities and relationships? Good Good Good Fair Good Good Good   In general, please rate how well you carry out your usual social activities and roles Good Good Good Good Good Good Good   To what extent are you able to carry out your everyday physical activities such as walking, climbing stairs, carrying groceries, or moving a chair? Mostly Mostly Mostly Mostly Completely Completely Mostly   In the past 7 days, how often have you been bothered by emotional problems such as feeling anxious, depressed, or irritable? Sometimes Often Often Often Sometimes Sometimes Often   In the past 7 days, how would you rate your fatigue on average? Moderate Mild Moderate Mild Moderate Moderate Moderate   In the past 7 days, how would you rate your pain on average, where 0 means no pain, and 10 means worst imaginable pain? 4 2 3 3 3 3 3   In general, would you say your health is: 2 3 2 3 3 3 2   In general, would you say your quality of life is: 3 3 3 2 3 2 2   In general, how would you rate your physical health? 2 2 2 2 2 3 2   In general, how would you rate your mental health, including your mood and your ability to think? 2 2 2 2 2 2 2   In general, how would you rate your satisfaction with your social activities and relationships? 3 3 3 2 3 3 3   In general, please rate how well you carry out your usual social activities and  roles. (This includes activities at home, at work and in your community, and responsibilities as a parent, child, spouse, employee, friend, etc.) 3 3 3 3 3 3 3   To what extent are you able to carry out your everyday physical activities such as walking, climbing stairs, carrying groceries, or moving a chair? 4 4 4 4 5 5 4   In the past 7 days, how often have you been bothered by emotional problems such as feeling anxious, depressed, or irritable? 3 4 4 4 3 3 4   In the past 7 days, how would you rate your fatigue on average? 3 2 3 2 3 3 3   In the past 7 days, how would you rate your pain on average, where 0 means no pain, and 10 means worst imaginable pain? 4 2 3 3 3 3 3   Global Mental Health Score 11 10 10 8    8 11 10 9   Global Physical Health Score 12 14 13 14    14 14 15 13   PROMIS TOTAL - SUBSCORES 23 24 23 22    22 25 25 22         ASSESSMENT: Current Emotional / Mental Status (status of significant symptoms):   Risk status (Self / Other harm or suicidal ideation)   Patient denies current fears or concerns for personal safety.   Patient denies current or recent suicidal ideation or behaviors.   Patient denies current or recent homicidal ideation or behaviors.   Patient denies current or recent self injurious behavior or ideation.   Patient denies other safety concerns.   Patient reports there has been no change in risk factors since their last session.     Patient reports there has been no change in protective factors since their last session.     Recommended that patient call 911 or go to the local ED should there be a change in any of these risk factors.     Appearance:   Appropriate    Eye Contact:   Good    Psychomotor Behavior: Normal    Attitude:   Cooperative    Orientation:   All   Speech    Rate / Production: Normal/ Responsive    Volume:  Normal    Mood:    Anxious  Depressed    Affect:    Appropriate    Thought Content:  Clear    Thought Form:  Coherent  Goal Directed  Logical     Insight:    Good      Medication Review:   No changes to current psychiatric medication(s)     Medication Compliance:   Yes     Changes in Health Issues:   None reported     Chemical Use Review:   Substance Use: Chemical use reviewed, no active concerns identified      Tobacco Use: No current tobacco use.      Diagnosis:  1. ADA (generalized anxiety disorder)    2. Moderate episode of recurrent major depressive disorder (H)    3. Posttraumatic stress disorder        Collateral Reports Completed:   Not Applicable    PLAN: (Patient Tasks / Therapist Tasks / Other)  Patient will continue with therapy in three weeks. Patient will reach out to support network to inquire about housing options. Patient will identify pros and cons of her job. Patient will continue to have plans set in place to help with isolation.     There has been demonstrated improvement in functioning while patient has been engaged in psychotherapy/psychological service- if withdrawn the patient would deteriorate and/or relapse.     Mariana Love, [facility]     ______________________________________________________________________    Individual Treatment Plan    Patient's Name: Alina Martell  YOB: 1965    Date of Creation:10/16/2020  Date Treatment Plan Last Reviewed/Revised: 7/17/2024    DSM5 Diagnoses: 296.32 (F33.1) Major Depressive Disorder, Recurrent Episode, Moderate _, 300.02 (F41.1) Generalized Anxiety Disorder, or 309.81 (F43.10) Posttraumatic Stress Disorder (includes Posttraumatic Stress Disorder for Children 6 Years and Younger)  Without dissociative symptoms  Psychosocial / Contextual Factors: Health, family  PROMIS (reviewed every 90 days):     PROMIS-10 Scores  Global Mental Health Score: 8  Global Physical Health Score: 14  PROMIS TOTAL - SUBSCORES: 22     Referral / Collaboration:  Was/were discussed and patient will pursue.    Anticipated number of session for this episode of care: 20 will reevaluate every 90  days  Anticipation frequency of session: Biweekly  Anticipated Duration of each session: 38-52 minutes  Treatment plan will be reviewed in 90 days or when goals have been changed.       MeasurableTreatment Goal(s) related to diagnosis / functional impairment(s)  Goal 1: Patient will work on reducing overall anxiety.     I will know I've met my goal when reporting minimal anxiety symptoms.       Objective #A (Patient Action)                          Patient will use distraction each time intrusive worry surfaces.  Status: Continued - Date(s): 7/17/2024     Intervention(s)  Therapist will teach emotional regulation skills.  Objective #B  Patient will Decrease frequency and intensity of feeling down, depressed, hopeless.  Status: Continued - Date(s):  7/17/2024     Intervention(s)  Therapist will teach emotional recognition/identification. ..     Objective #C  Patient will Identify negative self-talk and behaviors: challenge core beliefs, myths, and actions.  Status: Continued - Date(s):  7/17/2024     Intervention(s)  Therapist will teach the client how to perform a behavioral chain analysis. ..     Goal 2: Patient will continue to work on reducing depression symptoms    I will know I've met my goal then reporting minimal depression symptoms     Objective #A (Patient Action)                          Patient will Increase interest, engagement, and pleasure in doing things.  Status: Continued - Date(s): 7/17/2024     Intervention(s)  Therapist will assign homework ..     Objective #B  Patient will Decrease frequency and intensity of feeling down, depressed, hopeless.  Status: Continued - Date(s):  7/17/2024  Intervention(s)  Therapist will assign homework at every session.     Goal 3: Client will reduce PTSD symptoms by 50% as evidenced by PCLC-5 score     I will know I've met my goal when not struggling with nightmares.      Objective #A (Client Action)    Client will reduce nightmares.  Status: Continued - Date(s):  7/17/2024    Intervention(s)  Therapist will teach nightmare retraining .    Objective #B  Client will compile a list of boundaries that they would like to set with others.   .    Status: Continued - Date(s):7/17/2024    Intervention(s)  Therapist will assign homework boundary setting .            Patient has reviewed and agreed to the above plan.        Mariana Love, [facility]

## 2024-10-23 ENCOUNTER — OFFICE VISIT (OUTPATIENT)
Dept: OTOLARYNGOLOGY | Facility: CLINIC | Age: 59
End: 2024-10-23
Attending: EMERGENCY MEDICINE
Payer: COMMERCIAL

## 2024-10-23 VITALS
OXYGEN SATURATION: 98 % | HEIGHT: 69 IN | WEIGHT: 285 LBS | DIASTOLIC BLOOD PRESSURE: 88 MMHG | BODY MASS INDEX: 42.21 KG/M2 | HEART RATE: 88 BPM | SYSTOLIC BLOOD PRESSURE: 155 MMHG

## 2024-10-23 DIAGNOSIS — J30.2 SEASONAL ALLERGIC RHINITIS, UNSPECIFIED TRIGGER: Primary | ICD-10-CM

## 2024-10-23 DIAGNOSIS — R04.0 EPISTAXIS: ICD-10-CM

## 2024-10-23 PROCEDURE — 99213 OFFICE O/P EST LOW 20 MIN: CPT | Mod: 25 | Performed by: STUDENT IN AN ORGANIZED HEALTH CARE EDUCATION/TRAINING PROGRAM

## 2024-10-23 PROCEDURE — 31231 NASAL ENDOSCOPY DX: CPT | Performed by: STUDENT IN AN ORGANIZED HEALTH CARE EDUCATION/TRAINING PROGRAM

## 2024-10-23 RX ORDER — FLUTICASONE PROPIONATE 50 MCG
2 SPRAY, SUSPENSION (ML) NASAL DAILY
Qty: 16 G | Refills: 11 | Status: SHIPPED | OUTPATIENT
Start: 2024-10-23

## 2024-10-23 RX ORDER — FEXOFENADINE HCL 60 MG/1
60 TABLET, FILM COATED ORAL DAILY
Qty: 30 TABLET | Refills: 4 | Status: SHIPPED | OUTPATIENT
Start: 2024-10-23

## 2024-10-23 ASSESSMENT — PAIN SCALES - GENERAL: PAINLEVEL_OUTOF10: NO PAIN (0)

## 2024-10-23 NOTE — NURSING NOTE
"Chief Complaint   Patient presents with    Consult   Blood pressure (!) 155/88, pulse 88, height 1.753 m (5' 9\"), weight 129.3 kg (285 lb), SpO2 98%, not currently breastfeeding. Mauro Disla, EMT    "

## 2024-10-23 NOTE — PATIENT INSTRUCTIONS
You were seen in the ENT Clinic today by Dr. Lafleur. If you have any questions or concerns after your appointment, please contact us (see below)       2.   The following recommendations have been made based upon your appointment today:   -Start Allegra: One tablet by mouth daily.   -Flonase (fluticasone) nasal spray: Spray 2 sprays in each nostril once daily.  Prescriptions have been sent to the pharmacy for both these medications.   -Sinus rinses 1-2 times daily. Additional instructions are included below.      3.   Plan to follow up with a message in a few months to let us know how your symptoms are and if these treatments are helping.            How to Contact Us:  Send a [employer] message to your provider. Our team will respond to you via [employer]. Occasionally, we will need to call you to get further information.  For urgent matters (Monday-Friday), call the ENT Clinic: [phone] and speak with a call center team member - they will route your call appropriately.   If you'd like to speak directly with a nurse, please find our contact information below. We do our best to check voicemail frequently throughout the day, and will work to call you back within 1-2 days. For urgent matters, please use the general clinic phone numbers listed above.     Dyan KENT RN  Direct: [phone]  Bess BOYCE LPN  Direct: [phone]         [facility]  Department of Otolaryngology         Cleaning of the Nasal or Sinus Cavity    Please follow all three steps.  Nasal irrigation (cleaning) should be done 2 times/day.    Preparation:  1.  Wash your hands  2.  We suggest that you buy the [employer] Sinus Rinse Kit  3.  Use distilled water or tap water that has been boiled and brought to room temperature.  4.  Fill the irrigation bottle with room temperature water (distilled or boiled tap water) and add mixture (pre made packet or homemade recipe).        If you wish to make a homemade recipe:        Mix 1/4 teaspoon (kosher,  non-iodine) salt with 1/4 teaspoon baking soda in each bottle.    Cleansing Irrigation/Sinus Rinse:    1.  Lean forward over a sink and insert the rinse bottle applicator into the right side of your nose.    2.  Tilt head down and to the left side.  With your mouth open (breathing through your mouth), gently direct the water around the inside of your nose until clear fluid starts to drain from the opposite nostril.  This is called flushing or irrigation.    3.  When you have used 1/4 to 1/2 or the solution, switch to the left nostril.    4.  To irrigate the left nostril, tilt your head down and to the right side.  With your mouth open (breathing through your mouth),  gently direct the water around the inside of your nose until clear fluid starts to drain from the opposite nostril.     Cleaning the Equipment:  1.  Throw away any leftover solution  2.  Clean the rinse bottle and cap with clear water. Air dry.      Call the ENT Clinic if you have any questions or concerns at [phone]

## 2024-10-23 NOTE — LETTER
10/23/2024       RE: Alina Martell  [address]  [place] [address]     Dear Colleague,    Thank you for referring your patient, Alina Martell, to the [facility] E[facility] NO[facility] AND [facility] [place] at [facility]. Please see a copy of my visit note below.      [facility]  New Patient Visit        Encounter date:   October 23, 2024    Referring Provider:   Brett Jefferson MD  [facility]  [address] [place] [address]    Reason for Visit: Consult    History of Present Illness:      Alina Martell is a 59 year old female who presents for consultation regarding epistaxis and post nasal drip/nasal congestion.    7/28/24 ER note/ referral- Brett Jefferson MD: 59 year old female with history of atrial fibrillation on anticoagulation, DM2, CAD, HTN who presents to the emergency department seeking evaluation of epistaxis.  Vital signs notable for borderline pulse elevation to 95 but otherwise stable and afebrile including normal pulse ox at 95% on room air.  IV established, labs sent which revealed stable hemoglobin 11.5 otherwise normal CBC electrolytes.  Type and screen ordered to prepare for possible blood transfusion in this patient with activated bruising upon arrival.  Patient was instructed to apply steady pressure against the nose for a full 30 minutes followed by oxymetazoline spray x 2 to each nostril.  Upon repeat assessment patient's bleeding had completely stopped.  She is observed for another hour without any active rebleed.  Patient safely discharged home plan follow-up with PCP and ENT as needed.     10/23/24: Today, she reports that she was recently in the ER for epistaxis that had a lot of blood and caused her to be dizzy. She states that she has had some episodes of epistaxis since, but they were not as significant as the episode that sent her to the ER. She was given Afrin at the ER but has not used it  regularly.Was taken off her blood thinners which fixed the problem.  She reports that her nose is congested but it drains in her throat (post nasal drip). She states that she gets phlegm in the back of her throat often. She reports that she gets headaches from nasal congestion. She states that her left nostril is more congested than her right nostril. She has a hx of migraines in her late 30s-40s that she says resolved and are unrelated to her current headaches. She states that these sinus symptoms have gotten worse in the summer in the past couple of years. She has tried Claritin and Zyrtec with no improvement.    Of note, she uses a CPAP machine daily and cleans it regularly.  She reports that she does not usually get sinus infections, but she had one once when she lived in [place] that cause otorrhea. She was given a Z-pack which resolved the issue.  She denies rhinorrhea, use of other nasal sprays, hx of strep throat/tonsillitis.    Review of Systems:  A comprehensive 14-point review of systems was performed with positives and pertinent negatives listed in the HPI.    Past Medical/Surgical History:  Reviewed today with the patient. No history of major medical comorbidities. Does not take any blood thinners. No prior history of sinonasal surgery or trauma.    Allergies:       Allergies   Allergen Reactions     Penicillins Shortness Of Breath, Palpitations, Dizziness, Anaphylaxis, Hives, Itching and Rash     Test in 2031, see allergy note on 7/29/21     Shellfish-Derived Products Anaphylaxis     Sulfa Antibiotics Hives and Anaphylaxis       Medical History:  Past Medical History:   Diagnosis Date     Anemia      Antiplatelet or antithrombotic long-term use      Arrhythmia      Cannabis use without complication 12/8/2014     Carpal tunnel syndrome      Coronary artery disease      Diabetes mellitus, type 2 (H) 12/13/2023     Gastroesophageal reflux disease      History of angina      Hypertension      Irregular heart  beat      Obesity      Paroxysmal atrial fibrillation (H)      PTSD (post-traumatic stress disorder)      RA (rheumatoid arthritis) (H)      Rheumatoid arthritis (H) 7/8/2016     Sicca syndrome (H)      Sleep apnea         Surgical History:   Past Surgical History:   Procedure Laterality Date     CHOLECYSTECTOMY       CYSTOSCOPY N/A 5/4/2023    Procedure: AND CYSTOSCOPY;  Surgeon: Irma Cary MD;  Location: [facility] Main OR     DAVINCI HYSTERECTOMY TOTAL Bilateral 5/4/2023    Procedure: ROBOTIC ASSISTED TOTAL LAPAROSCOPIC HYSTERECTOMY WITH BILATERAL SALPINGECTOMY;  Surgeon: Irma Cary MD;  Location: [facility] Main OR     DILATION AND CURETTAGE, OPERATIVE HYSTEROSCOPY, COMBINED N/A 3/3/2022    Procedure: HYSTEROSCOPY, WITH DILATION AND CURETTAGE;  Surgeon: Irma Cary MD;  Location: [place] Main OR     EP ABLATION FOCAL AFIB N/A 6/30/2022    Procedure: Ablation Atrial Fibrilation;  Surgeon: Jose Guadalupe Joseph MD;  Location:  HEART CARDIAC CATH LAB     HC REMOVAL GALLBLADDER      Description: Cholecystectomy;  Recorded: 10/15/2013;     LAPAROSCOPIC TUBAL LIGATION       RELEASE CARPAL TUNNEL BILATERAL       TUBAL LIGATION  1995        Family History:  Family History   Problem Relation Age of Onset     Crohn's Disease Mother         Total colectomy with ileostomy.     Atrial fibrillation Mother      Snoring Father      Diabetes Father      Prostate Cancer Father      Chronic Kidney Disease Father         Chose against dialysis.     Substance Abuse Brother      Depression Brother      Attention Deficit Disorder Brother      Alcoholism Brother      Arrhythmia Brother      Alcoholism Brother      Substance Abuse Brother      Arrhythmia Brother      Alcoholism Maternal Grandfather      No Known Problems Daughter      No Known Problems Son      Lupus Cousin      Breast Cancer No family hx of         Social History:   Social History     Socioeconomic History     Marital status:  Single     Number of children: 2     Years of education: 4   Tobacco Use     Smoking status: Never     Passive exposure: Past (mom smoked)     Smokeless tobacco: Never   Vaping Use     Vaping status: Never Used   Substance and Sexual Activity     Alcohol use: Not Currently     Comment: Alcoholic Drinks/day: Never an issue, she states.  7/8/16     Drug use: Not Currently     Types: Marijuana     Comment: Drug use: Former marijuana use, not current. 7/8/16     Sexual activity: Never     Partners: Male     Birth control/protection: Other     Comment: tubal ligation     Social Drivers of Health     Interpersonal Safety: Low Risk  (3/27/2024)    Interpersonal Safety      Do you feel physically and emotionally safe where you currently live?: Yes      Within the past 12 months, have you been hit, slapped, kicked or otherwise physically hurt by someone?: No      Within the past 12 months, have you been humiliated or emotionally abused in other ways by your partner or ex-partner?: No      Family History:  Reviewed with patient. No pertinent positives.    Physical Examination:    Constitutional/Psychiatric: This is a well-appearing, well-dressed patient in no acute distress. female communicates easily with no obvious speech issues. Oriented to self and environment with appropriate conversation. Does not appear depressed, anxious, or agitated.  Head: Normocephalic. Overall inspection reveals no prior scars, lesions, or masses. Facial motion is symmetric. No sinus tenderness.  Eyes: Extra-ocular motions are intact with symmetric gaze. Conjunctiva clear. No eyelid lesions. Pupils round, symmetric, and reactive to light.  Ears: Auricles without masses or lesions bilaterally.   Oral Cavity/Oropharynx: Lips, gingiva and teeth appear healthy. Neck/Lymphatic: Flat, symmetric without detectable lymphadenopathy. No thyroid/salivary gland tenderness or palpable nodules.  Respiratory: No increased work of breathing or respiratory  distress. No audible stridor or stertor.  Peripheral/Cardiovascular: Brisk capillary refill bilaterally.   Neurologic: Cranial nerves II-XII grossly intact as tested.    Nasal examination: No lesions, masses, or scars of the external nose are visualized. I do not appreciate any external deviation of the bony nasal vault. External valves patent without inspiratory alar collapse. Columella is midline.    On anterior rhinoscopy, the anterior septum is deviated to the left .Turbinates are not hypertrophic. No polyp, mass, or purulence is noted on a limited view of the middle meatus.    Given the patient's symptoms and the incomplete visualization of critical sinonasal areas with anterior rhinoscopy, a separately performed diagnostic nasal endoscopy procedure is indicated for a complete rhinologic evaluation per American Rhinologic Society recommendations (https://www.american-rhinologic.org/position_31231).    Procedure Note: Diagnostic Nasal Endoscopy, CPT 60227  Date of Service: October 23, 2024  Provider: Miguel Lafleur MD   Presumptive Diagnosis: No primary diagnosis found.  Anesthesia: Topical lidocaine and oxymetazoline via atomizer    Indication:  Evaluation of nasal/sinus complaints; inadequate visualization with anterior rhinoscopy    Description of procedure:  After obtaining consent for the procedure from the patient, the sinonasal cavity was sprayed with topical anesthetic. A rigid 30-degree nasal endoscope was used to first used to visualize the nasal floor and the nasopharynx on the left. A second pass was then made to visualize the middle meatus and sphenoethmoid recess. Finally, the scope was turned 90-degrees and used to visualize the olfactory cleft and frontal outflow tract. A similar approach was used for the contralateral side. female tolerated the procedure well without difficulty.    Endoscopic Findings:    Marathon-Anant Endoscopic Scoring System  Endoscopic observation Right Left   Polyps in middle  meatus (0 = absent, 1 = restricted to middle meatus, 2 = Beyond middle meatus) 0 0   Discharge (0 = absent, 1 = thin and clear, 2 = thick, purulent) 0 0   Edema (0 = absent, 1 = mild-moderate, 2 = moderate-severe) 1 1   Crusting (0 = absent, 1 = mild-moderate, 2 = moderate-severe) 0 0   Scarring (0= absent, 1 = mild-moderate, 2 = moderate-severe) 0 0   Total 1 1       RT and LT: Bilateral sinonasal passages patent without evidence of infection, polyps, or mass.    No areas of excoriation    Imaging Review:  12/15/22 CT head  IMPRESSION: No acute intracranial pathology.     12/20/22 MR cervical  IMPRESSION:  1.  At C4-C5 there is mild to moderate central stenosis. At C6-C7 there is mild central stenosis.  2.  No high-grade foraminal stenosis.    12/20/22 MR patricia  IMPRESSION:  1.  Normal head MRI.    Final Assessment/Plan  Patient is a  59 year old female  who presents today for evaluation of epistaxis and allergic rhinitis. Luckily the epistaxis has self resolved.     Start Flonase, 2 puffs in each nostril once daily. Discussed that she needs to use this medication every day for at least 3 weeks to be able to see the full impacts of the medication.  2. Start Allegra, one pill once daily.  3. Start Neilmed  4. Consideration for allergy testing in the future  5. Instructed her to message me in 3 months the status of her symptoms for future treatment considerations      Scribe Disclosure:   I, Talita Szymanski, am serving as a scribe; to document services personally performed by Miguel Lafleur MD -based on data collection and the provider's statements to me.     Provider Disclosure:  I agree with above History, Review of Systems, Physical exam and Plan.  I have reviewed the content of the documentation and have edited it as needed. I have personally performed the services documented here and the documentation accurately represents those services and the decisions I have made.      Electronically signed by:    Miguel Lafleur  MD    [facility]  Rhinology  Endoscopic Skull Base Surgery  [facility]  Department of Otolaryngology - Head & Neck Surgery      Again, thank you for allowing me to participate in the care of your patient.      Sincerely,    Miguel Lafleur MD

## 2024-10-23 NOTE — PROGRESS NOTES
[facility]  New Patient Visit        Encounter date:   October 23, 2024    Referring Provider:   Brett Jefferson MD  [id number] [place] [address]    Reason for Visit: Consult    History of Present Illness:      Alina Martell is a 59 year old female who presents for consultation regarding epistaxis and post nasal drip/nasal congestion.    7/28/24 ER note/ referral- Brett Jefferson MD: 59 year old female with history of atrial fibrillation on anticoagulation, DM2, CAD, HTN who presents to the emergency department seeking evaluation of epistaxis.  Vital signs notable for borderline pulse elevation to 95 but otherwise stable and afebrile including normal pulse ox at 95% on room air.  IV established, labs sent which revealed stable hemoglobin 11.5 otherwise normal CBC electrolytes.  Type and screen ordered to prepare for possible blood transfusion in this patient with activated bruising upon arrival.  Patient was instructed to apply steady pressure against the nose for a full 30 minutes followed by oxymetazoline spray x 2 to each nostril.  Upon repeat assessment patient's bleeding had completely stopped.  She is observed for another hour without any active rebleed.  Patient safely discharged home plan follow-up with PCP and ENT as needed.     10/23/24: Today, she reports that she was recently in the ER for epistaxis that had a lot of blood and caused her to be dizzy. She states that she has had some episodes of epistaxis since, but they were not as significant as the episode that sent her to the ER. She was given Afrin at the ER but has not used it regularly.Was taken off her blood thinners which fixed the problem.  She reports that her nose is congested but it drains in her throat (post nasal drip). She states that she gets phlegm in the back of her throat often. She reports that she gets headaches from nasal congestion. She states that her left nostril is more congested  than her right nostril. She has a hx of migraines in her late 30s-40s that she says resolved and are unrelated to her current headaches. She states that these sinus symptoms have gotten worse in the summer in the past couple of years. She has tried Claritin and Zyrtec with no improvement.    Of note, she uses a CPAP machine daily and cleans it regularly.  She reports that she does not usually get sinus infections, but she had one once when she lived in [place] that cause otorrhea. She was given a Z-pack which resolved the issue.  She denies rhinorrhea, use of other nasal sprays, hx of strep throat/tonsillitis.    Review of Systems:  A comprehensive 14-point review of systems was performed with positives and pertinent negatives listed in the HPI.    Past Medical/Surgical History:  Reviewed today with the patient. No history of major medical comorbidities. Does not take any blood thinners. No prior history of sinonasal surgery or trauma.    Allergies:       Allergies   Allergen Reactions    Penicillins Shortness Of Breath, Palpitations, Dizziness, Anaphylaxis, Hives, Itching and Rash     Test in 2031, see allergy note on 7/29/21    Shellfish-Derived Products Anaphylaxis    Sulfa Antibiotics Hives and Anaphylaxis       Medical History:  Past Medical History:   Diagnosis Date    Anemia     Antiplatelet or antithrombotic long-term use     Arrhythmia     Cannabis use without complication 12/8/2014    Carpal tunnel syndrome     Coronary artery disease     Diabetes mellitus, type 2 (H) 12/13/2023    Gastroesophageal reflux disease     History of angina     Hypertension     Irregular heart beat     Obesity     Paroxysmal atrial fibrillation (H)     PTSD (post-traumatic stress disorder)     RA (rheumatoid arthritis) (H)     Rheumatoid arthritis (H) 7/8/2016    Sicca syndrome (H)     Sleep apnea         Surgical History:   Past Surgical History:   Procedure Laterality Date    CHOLECYSTECTOMY      CYSTOSCOPY N/A 5/4/2023     Procedure: AND CYSTOSCOPY;  Surgeon: Irma Cary MD;  Location: [facility] OR    DAVINCI HYSTERECTOMY TOTAL Bilateral 5/4/2023    Procedure: ROBOTIC ASSISTED TOTAL LAPAROSCOPIC HYSTERECTOMY WITH BILATERAL SALPINGECTOMY;  Surgeon: Irma Cary MD;  Location: [facility] OR    DILATION AND CURETTAGE, OPERATIVE HYSTEROSCOPY, COMBINED N/A 3/3/2022    Procedure: HYSTEROSCOPY, WITH DILATION AND CURETTAGE;  Surgeon: Irma Cary MD;  Location: [facility] OR    EP ABLATION FOCAL AFIB N/A 6/30/2022    Procedure: Ablation Atrial Fibrilation;  Surgeon: Jose Guadalupe Joseph MD;  Location:  HEART CARDIAC CATH LAB    HC REMOVAL GALLBLADDER      Description: Cholecystectomy;  Recorded: 10/15/2013;    LAPAROSCOPIC TUBAL LIGATION      RELEASE CARPAL TUNNEL BILATERAL      TUBAL LIGATION  1995        Family History:  Family History   Problem Relation Age of Onset    Crohn's Disease Mother         Total colectomy with ileostomy.    Atrial fibrillation Mother     Snoring Father     Diabetes Father     Prostate Cancer Father     Chronic Kidney Disease Father         Chose against dialysis.    Substance Abuse Brother     Depression Brother     Attention Deficit Disorder Brother     Alcoholism Brother     Arrhythmia Brother     Alcoholism Brother     Substance Abuse Brother     Arrhythmia Brother     Alcoholism Maternal Grandfather     No Known Problems Daughter     No Known Problems Son     Lupus Cousin     Breast Cancer No family hx of         Social History:   Social History     Socioeconomic History    Marital status: Single    Number of children: 2    Years of education: 4   Tobacco Use    Smoking status: Never     Passive exposure: Past (mom smoked)    Smokeless tobacco: Never   Vaping Use    Vaping status: Never Used   Substance and Sexual Activity    Alcohol use: Not Currently     Comment: Alcoholic Drinks/day: Never an issue, she states.  7/8/16    Drug use: Not Currently     Types:  Marijuana     Comment: Drug use: Former marijuana use, not current. 7/8/16    Sexual activity: Never     Partners: Male     Birth control/protection: Other     Comment: tubal ligation     Social Drivers of Health     Interpersonal Safety: Low Risk  (3/27/2024)    Interpersonal Safety     Do you feel physically and emotionally safe where you currently live?: Yes     Within the past 12 months, have you been hit, slapped, kicked or otherwise physically hurt by someone?: No     Within the past 12 months, have you been humiliated or emotionally abused in other ways by your partner or ex-partner?: No      Family History:  Reviewed with patient. No pertinent positives.    Physical Examination:    Constitutional/Psychiatric: This is a well-appearing, well-dressed patient in no acute distress. female communicates easily with no obvious speech issues. Oriented to self and environment with appropriate conversation. Does not appear depressed, anxious, or agitated.  Head: Normocephalic. Overall inspection reveals no prior scars, lesions, or masses. Facial motion is symmetric. No sinus tenderness.  Eyes: Extra-ocular motions are intact with symmetric gaze. Conjunctiva clear. No eyelid lesions. Pupils round, symmetric, and reactive to light.  Ears: Auricles without masses or lesions bilaterally.   Oral Cavity/Oropharynx: Lips, gingiva and teeth appear healthy. Neck/Lymphatic: Flat, symmetric without detectable lymphadenopathy. No thyroid/salivary gland tenderness or palpable nodules.  Respiratory: No increased work of breathing or respiratory distress. No audible stridor or stertor.  Peripheral/Cardiovascular: Brisk capillary refill bilaterally.   Neurologic: Cranial nerves II-XII grossly intact as tested.    Nasal examination: No lesions, masses, or scars of the external nose are visualized. I do not appreciate any external deviation of the bony nasal vault. External valves patent without inspiratory alar collapse. Columella is  midline.    On anterior rhinoscopy, the anterior septum is deviated to the left .Turbinates are not hypertrophic. No polyp, mass, or purulence is noted on a limited view of the middle meatus.    Given the patient's symptoms and the incomplete visualization of critical sinonasal areas with anterior rhinoscopy, a separately performed diagnostic nasal endoscopy procedure is indicated for a complete rhinologic evaluation per American Rhinologic Society recommendations (https://www.american-rhinologic.org/position_31231).    Procedure Note: Diagnostic Nasal Endoscopy, CPT 40895  Date of Service: October 23, 2024  Provider: Miguel Lafleur MD   Presumptive Diagnosis: No primary diagnosis found.  Anesthesia: Topical lidocaine and oxymetazoline via atomizer    Indication:  Evaluation of nasal/sinus complaints; inadequate visualization with anterior rhinoscopy    Description of procedure:  After obtaining consent for the procedure from the patient, the sinonasal cavity was sprayed with topical anesthetic. A rigid 30-degree nasal endoscope was used to first used to visualize the nasal floor and the nasopharynx on the left. A second pass was then made to visualize the middle meatus and sphenoethmoid recess. Finally, the scope was turned 90-degrees and used to visualize the olfactory cleft and frontal outflow tract. A similar approach was used for the contralateral side. female tolerated the procedure well without difficulty.    Endoscopic Findings:    Lizbeth-Anant Endoscopic Scoring System  Endoscopic observation Right Left   Polyps in middle meatus (0 = absent, 1 = restricted to middle meatus, 2 = Beyond middle meatus) 0 0   Discharge (0 = absent, 1 = thin and clear, 2 = thick, purulent) 0 0   Edema (0 = absent, 1 = mild-moderate, 2 = moderate-severe) 1 1   Crusting (0 = absent, 1 = mild-moderate, 2 = moderate-severe) 0 0   Scarring (0= absent, 1 = mild-moderate, 2 = moderate-severe) 0 0   Total 1 1       RT and LT: Bilateral  sinonasal passages patent without evidence of infection, polyps, or mass.    No areas of excoriation    Imaging Review:  12/15/22 CT head  IMPRESSION: No acute intracranial pathology.     12/20/22 MR cervical  IMPRESSION:  1.  At C4-C5 there is mild to moderate central stenosis. At C6-C7 there is mild central stenosis.  2.  No high-grade foraminal stenosis.    12/20/22 MR patricia  IMPRESSION:  1.  Normal head MRI.    Final Assessment/Plan  Patient is a  59 year old female  who presents today for evaluation of epistaxis and allergic rhinitis. Luckily the epistaxis has self resolved.     Start Flonase, 2 puffs in each nostril once daily. Discussed that she needs to use this medication every day for at least 3 weeks to be able to see the full impacts of the medication.  2. Start Allegra, one pill once daily.  3. Start Neilmed  4. Consideration for allergy testing in the future  5. Instructed her to message me in 3 months the status of her symptoms for future treatment considerations      Scribe Disclosure:   I, Talita Szymanski, am serving as a scribe; to document services personally performed by Miguel Lafleur MD -based on data collection and the provider's statements to me.     Provider Disclosure:  I agree with above History, Review of Systems, Physical exam and Plan.  I have reviewed the content of the documentation and have edited it as needed. I have personally performed the services documented here and the documentation accurately represents those services and the decisions I have made.      Electronically signed by:    Miguel Lafleur MD    [facility]  Rhinology  Endoscopic Skull Base Surgery  [facility]  Department of Otolaryngology - Head & Neck Surgery

## 2024-10-31 ENCOUNTER — PRE VISIT (OUTPATIENT)
Dept: OTOLARYNGOLOGY | Facility: CLINIC | Age: 59
End: 2024-10-31

## 2024-11-05 ENCOUNTER — MYC MEDICAL ADVICE (OUTPATIENT)
Dept: CARDIOLOGY | Facility: CLINIC | Age: 59
End: 2024-11-05
Payer: COMMERCIAL

## 2024-11-05 ENCOUNTER — VIRTUAL VISIT (OUTPATIENT)
Dept: PSYCHOLOGY | Facility: CLINIC | Age: 59
End: 2024-11-05
Payer: COMMERCIAL

## 2024-11-05 DIAGNOSIS — F33.1 MODERATE EPISODE OF RECURRENT MAJOR DEPRESSIVE DISORDER (H): ICD-10-CM

## 2024-11-05 DIAGNOSIS — F43.10 POSTTRAUMATIC STRESS DISORDER: ICD-10-CM

## 2024-11-05 DIAGNOSIS — F41.1 GAD (GENERALIZED ANXIETY DISORDER): Primary | ICD-10-CM

## 2024-11-05 PROCEDURE — 90834 PSYTX W PT 45 MINUTES: CPT | Mod: 95 | Performed by: COUNSELOR

## 2024-11-05 NOTE — PROGRESS NOTES
[facility]                                     Progress Note    Patient Name: Alina Martell  Date: 11/05/24         Service Type: Individual      Session Start Time: 12:00 Session End Time: 12:45     Session Length: 45 minutes    Session #: 81     Attendees: Client    Service Modality:  Video Visit:     Telemedicine Visit: The patient's condition can be safely assessed and treated via synchronous audio and visual telemedicine encounter.       Reason for Telemedicine Visit: Services only offered telehealth     Originating Site (Patient Location): Patient's place of employment     Distant Location (provider location):  Off-site     Consent:  The patient/guardian has verbally consented to: the potential risks and benefits of telemedicine (video visit) versus in person care; bill my insurance or make self-payment for services provided; and responsibility for payment of non-covered services.      Mode of Communication:  Video Conference via [employer]     As the provider I attest to compliance with applicable laws and regulations related to telemedicine          DATA  Extended Session (53+ minutes): No  Interactive Complexity: No  Crisis: No      Progress Since Last Session (Related to Symptoms / Goals / Homework):   Symptoms: No change continued stress    Homework: Achieved / completed to satisfaction      Episode of Care Goals: Satisfactory progress - ACTION (Actively working towards change); Intervened by reinforcing change plan / affirming steps taken     Current / Ongoing Stressors and Concerns:  Patient reported she is experiencing a significant amount of stress due to the elections. Patient processed how the elections impact her and the challenges that she has faced. Patient talked about boundaries she has set and friendships she had given up due to people's actions and beliefs. Patient reported grief that her family is currently experiencing. This therapist processed with patient  self-care and boundary setting.      Treatment Objective(s) Addressed in This Session:   use distraction each time intrusive worry surfaces  Increase interest, engagement, and pleasure in doing things  Decrease frequency and intensity of feeling down, depressed, hopeless  Improve quantity and quality of night time sleep / decrease daytime naps  Feel less tired and more energy during the day      Intervention:   Motivational Interviewing    MI Intervention: Open-ended questions     Change Talk Expressed by the Patient: Activation Taking steps    Provider Response to Change Talk: S - Summarized patient's change talk statements      Assessments completed prior to visit:  The following assessments were completed by patient for this visit:  PHQ9:       1/8/2024     7:06 AM 2/18/2024    10:01 PM 4/3/2024     9:50 AM 5/20/2024     9:50 AM 7/16/2024     4:33 PM 9/5/2024    10:42 AM 10/15/2024     8:47 AM   PHQ-9 SCORE   PHQ-9 Total Score MyChart 8 (Mild depression) 5 (Mild depression) 6 (Mild depression) 4 (Minimal depression) 6 (Mild depression) 8 (Mild depression) 9 (Mild depression)   PHQ-9 Total Score 8 5 6 4 6 8 9     GAD7:       9/26/2023    12:05 PM 10/9/2023    10:00 AM 10/30/2023     3:23 PM 12/6/2023     9:50 AM 7/16/2024     4:33 PM 8/31/2024     9:27 AM 10/15/2024     8:48 AM   ADA-7 SCORE   Total Score 11 (moderate anxiety)  5 (mild anxiety) 11 (moderate anxiety) 8 (mild anxiety) 9 (mild anxiety) 11 (moderate anxiety)   Total Score 11 8 5 11 8    8 9 11     PROMIS 10-Global Health (all questions and answers displayed):       3/10/2023     8:13 AM 6/13/2023     6:37 AM 9/26/2023    12:07 PM 1/1/2024    11:44 PM 4/3/2024     9:52 AM 5/20/2024     9:52 AM 8/31/2024     9:29 AM   PROMIS 10   In general, would you say your health is: Fair Good Fair Good Good Good Fair   In general, would you say your quality of life is: Good Good Good Fair Good Fair Fair   In general, how would you rate your physical health? Fair  Fair Fair Fair Fair Good Fair   In general, how would you rate your mental health, including your mood and your ability to think? Fair Fair Fair Fair Fair Fair Fair   In general, how would you rate your satisfaction with your social activities and relationships? Good Good Good Fair Good Good Good   In general, please rate how well you carry out your usual social activities and roles Good Good Good Good Good Good Good   To what extent are you able to carry out your everyday physical activities such as walking, climbing stairs, carrying groceries, or moving a chair? Mostly Mostly Mostly Mostly Completely Completely Mostly   In the past 7 days, how often have you been bothered by emotional problems such as feeling anxious, depressed, or irritable? Sometimes Often Often Often Sometimes Sometimes Often   In the past 7 days, how would you rate your fatigue on average? Moderate Mild Moderate Mild Moderate Moderate Moderate   In the past 7 days, how would you rate your pain on average, where 0 means no pain, and 10 means worst imaginable pain? 4 2 3 3 3 3 3   In general, would you say your health is: 2  3  2  3 3  3  2    In general, would you say your quality of life is: 3  3  3  2 3  2  2    In general, how would you rate your physical health? 2  2  2  2 2  3  2    In general, how would you rate your mental health, including your mood and your ability to think? 2  2  2  2 2  2  2    In general, how would you rate your satisfaction with your social activities and relationships? 3  3  3  2 3  3  3    In general, please rate how well you carry out your usual social activities and roles. (This includes activities at home, at work and in your community, and responsibilities as a parent, child, spouse, employee, friend, etc.) 3  3  3  3 3  3  3    To what extent are you able to carry out your everyday physical activities such as walking, climbing stairs, carrying groceries, or moving a chair? 4  4  4  4 5  5  4    In the past 7  days, how often have you been bothered by emotional problems such as feeling anxious, depressed, or irritable? 3  4  4  4 3  3  4    In the past 7 days, how would you rate your fatigue on average? 3  2  3  2 3  3  3    In the past 7 days, how would you rate your pain on average, where 0 means no pain, and 10 means worst imaginable pain? 4  2  3  3 3  3  3    Global Mental Health Score 11 10 10 8    8 11 10 9   Global Physical Health Score 12 14 13 14    14 14 15 13   PROMIS TOTAL - SUBSCORES 23 24 23 22    22 25 25 22       Patient-reported    Multiple values from one day are sorted in reverse-chronological order         ASSESSMENT: Current Emotional / Mental Status (status of significant symptoms):   Risk status (Self / Other harm or suicidal ideation)   Patient denies current fears or concerns for personal safety.   Patient denies current or recent suicidal ideation or behaviors.   Patient denies current or recent homicidal ideation or behaviors.   Patient denies current or recent self injurious behavior or ideation.   Patient denies other safety concerns.   Patient reports there has been no change in risk factors since their last session.     Patient reports there has been no change in protective factors since their last session.     Recommended that patient call 911 or go to the local ED should there be a change in any of these risk factors     Appearance:   Appropriate    Eye Contact:   Good    Psychomotor Behavior: Normal    Attitude:   Cooperative    Orientation:   All   Speech    Rate / Production: Normal/ Responsive    Volume:  Normal    Mood:    Anxious  Depressed    Affect:    Appropriate    Thought Content:  Clear    Thought Form:  Coherent  Goal Directed  Logical    Insight:    Good      Medication Review:   No changes to current psychiatric medication(s)     Medication Compliance:   Yes     Changes in Health Issues:   None reported     Chemical Use Review:   Substance Use: Chemical use reviewed, no  active concerns identified      Tobacco Use: No current tobacco use.      Diagnosis:  1. ADA (generalized anxiety disorder)    2. Moderate episode of recurrent major depressive disorder (H)    3. Posttraumatic stress disorder        Collateral Reports Completed:   Not Applicable    PLAN: (Patient Tasks / Therapist Tasks / Other)  Patient will return in three weeks for scheduled session. Patient will continue to work on boundary setting. Patient will work on self-care daily. Patient will continue with sleep hygiene.     There has been demonstrated improvement in functioning while patient has been engaged in psychotherapy/psychological service- if withdrawn the patient would deteriorate and/or relapse.     Mariana Love, [facility]     ______________________________________________________________________    Individual Treatment Plan    Patient's Name: Alina Martell  YOB: 1965    Date of Creation:10/16/2020  Date Treatment Plan Last Reviewed/Revised: 11/5/2024    DSM5 Diagnoses: 296.32 (F33.1) Major Depressive Disorder, Recurrent Episode, Moderate _, 300.02 (F41.1) Generalized Anxiety Disorder, or 309.81 (F43.10) Posttraumatic Stress Disorder (includes Posttraumatic Stress Disorder for Children 6 Years and Younger)  Without dissociative symptoms  Psychosocial / Contextual Factors: Health, family  PROMIS (reviewed every 90 days):     PROMIS-10 Scores  Global Mental Health Score: 8  Global Physical Health Score: 14  PROMIS TOTAL - SUBSCORES: 22     Referral / Collaboration:  Was/were discussed and patient will pursue.    Anticipated number of session for this episode of care: 20 will reevaluate every 90 days  Anticipation frequency of session: Biweekly  Anticipated Duration of each session: 38-52 minutes  Treatment plan will be reviewed in 90 days or when goals have been changed.       MeasurableTreatment Goal(s) related to diagnosis / functional impairment(s)  Goal 1: Patient will work on reducing  overall anxiety.     I will know I've met my goal when reporting minimal anxiety symptoms.       Objective #A (Patient Action)                          Patient will use distraction each time intrusive worry surfaces.  Status: Continued - Date(s): 11/5/2024     Intervention(s)  Therapist will teach emotional regulation skills.  Objective #B  Patient will Decrease frequency and intensity of feeling down, depressed, hopeless.  Status: Continued - Date(s):  11/5/2024     Intervention(s)  Therapist will teach emotional recognition/identification. ..     Objective #C  Patient will Identify negative self-talk and behaviors: challenge core beliefs, myths, and actions.  Status: Continued - Date(s):  11/5/2024     Intervention(s)  Therapist will teach the client how to perform a behavioral chain analysis. ..     Goal 2: Patient will continue to work on reducing depression symptoms    I will know I've met my goal then reporting minimal depression symptoms     Objective #A (Patient Action)                          Patient will Increase interest, engagement, and pleasure in doing things.  Status: Continued - Date(s): 11/5/2024  Intervention(s)  Therapist will assign homework ..     Objective #B  Patient will Decrease frequency and intensity of feeling down, depressed, hopeless.  Status: Continued - Date(s): 11/5/2024  Intervention(s)  Therapist will assign homework at every session.     Goal 3: Client will reduce PTSD symptoms by 50% as evidenced by PCLC-5 score     I will know I've met my goal when not struggling with nightmares.      Objective #A (Client Action)    Client will reduce nightmares.  Status: Continued - Date(s):11/5/2024    Intervention(s)  Therapist will teach nightmare retraining .    Objective #B  Client will compile a list of boundaries that they would like to set with others.   .    Status: Continued - Date(s):11/5/2024    Intervention(s)  Therapist will assign homework boundary setting .            Patient  has reviewed and agreed to the above plan.        Mariana Love, [facility]

## 2024-11-05 NOTE — TELEPHONE ENCOUNTER
Patient returned  message with update blood pressure reading.      Last Blood Pressure: 155/88  Last Heart Rate: 88  Date: 10/23/24  Location: [facility]    Today's Blood Pressure: 135/78  Today's Heart Rate: not taken  Location: Home BP    Pt response to [facility]:    It always seems high when i have a dr visit. I have really bad anxiety, and i get nervous very easily. I have a physical with Dr. Bansal later this month and i can talk to her about it. It has been borderline for awhile but never consistantly high.       Patient reported blood pressure updated in Epic. Blood pressure falls within [place] Community Measures guidelines.  Patient will follow up as previously advised. Thanks.     MYLES Smith

## 2024-11-22 SDOH — HEALTH STABILITY: PHYSICAL HEALTH: ON AVERAGE, HOW MANY MINUTES DO YOU ENGAGE IN EXERCISE AT THIS LEVEL?: 10 MIN

## 2024-11-22 SDOH — HEALTH STABILITY: PHYSICAL HEALTH: ON AVERAGE, HOW MANY DAYS PER WEEK DO YOU ENGAGE IN MODERATE TO STRENUOUS EXERCISE (LIKE A BRISK WALK)?: 3 DAYS

## 2024-11-22 ASSESSMENT — SOCIAL DETERMINANTS OF HEALTH (SDOH): HOW OFTEN DO YOU GET TOGETHER WITH FRIENDS OR RELATIVES?: ONCE A WEEK

## 2024-11-27 ENCOUNTER — OFFICE VISIT (OUTPATIENT)
Dept: FAMILY MEDICINE | Facility: CLINIC | Age: 59
End: 2024-11-27
Payer: COMMERCIAL

## 2024-11-27 VITALS
HEIGHT: 69 IN | TEMPERATURE: 98.2 F | HEART RATE: 80 BPM | OXYGEN SATURATION: 97 % | WEIGHT: 288.7 LBS | SYSTOLIC BLOOD PRESSURE: 138 MMHG | DIASTOLIC BLOOD PRESSURE: 80 MMHG | BODY MASS INDEX: 42.76 KG/M2 | RESPIRATION RATE: 16 BRPM

## 2024-11-27 DIAGNOSIS — E11.9 TYPE 2 DIABETES MELLITUS WITHOUT COMPLICATION, WITHOUT LONG-TERM CURRENT USE OF INSULIN (H): ICD-10-CM

## 2024-11-27 DIAGNOSIS — M05.79 SEROPOSITIVE RHEUMATOID ARTHRITIS OF MULTIPLE SITES (H): ICD-10-CM

## 2024-11-27 DIAGNOSIS — R13.10 DYSPHAGIA, UNSPECIFIED TYPE: ICD-10-CM

## 2024-11-27 DIAGNOSIS — Z13.9 ENCOUNTER FOR SCREENING INVOLVING SOCIAL DETERMINANTS OF HEALTH (SDOH): ICD-10-CM

## 2024-11-27 DIAGNOSIS — Z00.00 ROUTINE GENERAL MEDICAL EXAMINATION AT A HEALTH CARE FACILITY: Primary | ICD-10-CM

## 2024-11-27 DIAGNOSIS — I48.0 PAROXYSMAL ATRIAL FIBRILLATION (H): ICD-10-CM

## 2024-11-27 DIAGNOSIS — E11.9 TYPE 2 DIABETES, HBA1C GOAL < 7% (H): ICD-10-CM

## 2024-11-27 DIAGNOSIS — Z12.31 VISIT FOR SCREENING MAMMOGRAM: ICD-10-CM

## 2024-11-27 LAB
ALBUMIN SERPL BCG-MCNC: 4 G/DL (ref 3.5–5.2)
ALP SERPL-CCNC: 111 U/L (ref 40–150)
ALT SERPL W P-5'-P-CCNC: 15 U/L (ref 0–50)
ANION GAP SERPL CALCULATED.3IONS-SCNC: 11 MMOL/L (ref 7–15)
AST SERPL W P-5'-P-CCNC: 19 U/L (ref 0–45)
BASOPHILS # BLD AUTO: 0 10E3/UL (ref 0–0.2)
BASOPHILS NFR BLD AUTO: 0 %
BILIRUB SERPL-MCNC: 0.2 MG/DL
BUN SERPL-MCNC: 15.5 MG/DL (ref 6–20)
CALCIUM SERPL-MCNC: 9.7 MG/DL (ref 8.8–10.4)
CHLORIDE SERPL-SCNC: 102 MMOL/L (ref 98–107)
CHOLEST SERPL-MCNC: 188 MG/DL
CREAT SERPL-MCNC: 0.78 MG/DL (ref 0.51–0.95)
EGFRCR SERPLBLD CKD-EPI 2021: 88 ML/MIN/1.73M2
EOSINOPHIL # BLD AUTO: 0.2 10E3/UL (ref 0–0.7)
EOSINOPHIL NFR BLD AUTO: 2 %
ERYTHROCYTE [DISTWIDTH] IN BLOOD BY AUTOMATED COUNT: 15.8 % (ref 10–15)
EST. AVERAGE GLUCOSE BLD GHB EST-MCNC: 131 MG/DL
FASTING STATUS PATIENT QL REPORTED: ABNORMAL
FASTING STATUS PATIENT QL REPORTED: ABNORMAL
GLUCOSE SERPL-MCNC: 116 MG/DL (ref 70–99)
HBA1C MFR BLD: 6.2 % (ref 0–5.6)
HCO3 SERPL-SCNC: 27 MMOL/L (ref 22–29)
HCT VFR BLD AUTO: 35.5 % (ref 35–47)
HDLC SERPL-MCNC: 46 MG/DL
HGB BLD-MCNC: 10.9 G/DL (ref 11.7–15.7)
IMM GRANULOCYTES # BLD: 0 10E3/UL
IMM GRANULOCYTES NFR BLD: 0 %
LDLC SERPL CALC-MCNC: 121 MG/DL
LYMPHOCYTES # BLD AUTO: 2.5 10E3/UL (ref 0.8–5.3)
LYMPHOCYTES NFR BLD AUTO: 25 %
MCH RBC QN AUTO: 24.5 PG (ref 26.5–33)
MCHC RBC AUTO-ENTMCNC: 30.7 G/DL (ref 31.5–36.5)
MCV RBC AUTO: 80 FL (ref 78–100)
MONOCYTES # BLD AUTO: 0.7 10E3/UL (ref 0–1.3)
MONOCYTES NFR BLD AUTO: 7 %
NEUTROPHILS # BLD AUTO: 6.5 10E3/UL (ref 1.6–8.3)
NEUTROPHILS NFR BLD AUTO: 65 %
NONHDLC SERPL-MCNC: 142 MG/DL
PLATELET # BLD AUTO: 398 10E3/UL (ref 150–450)
POTASSIUM SERPL-SCNC: 4.1 MMOL/L (ref 3.4–5.3)
PROT SERPL-MCNC: 7.9 G/DL (ref 6.4–8.3)
RBC # BLD AUTO: 4.45 10E6/UL (ref 3.8–5.2)
SODIUM SERPL-SCNC: 140 MMOL/L (ref 135–145)
TRIGL SERPL-MCNC: 106 MG/DL
WBC # BLD AUTO: 10 10E3/UL (ref 4–11)

## 2024-11-27 PROCEDURE — 80061 LIPID PANEL: CPT | Performed by: FAMILY MEDICINE

## 2024-11-27 PROCEDURE — 36415 COLL VENOUS BLD VENIPUNCTURE: CPT | Performed by: FAMILY MEDICINE

## 2024-11-27 PROCEDURE — 83036 HEMOGLOBIN GLYCOSYLATED A1C: CPT | Performed by: FAMILY MEDICINE

## 2024-11-27 PROCEDURE — 99214 OFFICE O/P EST MOD 30 MIN: CPT | Mod: 25 | Performed by: FAMILY MEDICINE

## 2024-11-27 PROCEDURE — 80053 COMPREHEN METABOLIC PANEL: CPT | Performed by: FAMILY MEDICINE

## 2024-11-27 PROCEDURE — 85025 COMPLETE CBC W/AUTO DIFF WBC: CPT | Performed by: FAMILY MEDICINE

## 2024-11-27 PROCEDURE — 99396 PREV VISIT EST AGE 40-64: CPT | Performed by: FAMILY MEDICINE

## 2024-11-27 RX ORDER — METFORMIN HYDROCHLORIDE 500 MG/1
500 TABLET, EXTENDED RELEASE ORAL
Qty: 90 TABLET | Refills: 2 | Status: SHIPPED | OUTPATIENT
Start: 2024-11-27

## 2024-11-27 RX ORDER — OMEPRAZOLE 40 MG/1
40 CAPSULE, DELAYED RELEASE ORAL DAILY
Qty: 90 CAPSULE | Refills: 3 | Status: SHIPPED | OUTPATIENT
Start: 2024-11-27

## 2024-11-27 ASSESSMENT — PAIN SCALES - GENERAL: PAINLEVEL_OUTOF10: NO PAIN (0)

## 2024-11-27 ASSESSMENT — PATIENT HEALTH QUESTIONNAIRE - PHQ9: SUM OF ALL RESPONSES TO PHQ QUESTIONS 1-9: 9

## 2024-11-27 NOTE — PROGRESS NOTES
"Preventive Care Visit  [facility]  Estelle Bansal MD, Family Medicine  Nov 27, 2024      Assessment & Plan     Routine general medical examination at a health care facility  Routine screening recommended    Type 2 diabetes mellitus without complication, without long-term current use of insulin (H)  Refill medication and will check labs.  Will continue to monitor sugars  - metFORMIN (GLUCOPHAGE XR) 500 MG 24 hr tablet  Dispense: 90 tablet; Refill: 2  - Comprehensive metabolic panel (BMP + Alb, Alk Phos, ALT, AST, Total. Bili, TP)  - CBC with platelets and differential  - Lipid panel reflex to direct LDL Non-fasting  - Lipid panel reflex to direct LDL Non-fasting  - CBC with platelets and differential  - Comprehensive metabolic panel (BMP + Alb, Alk Phos, ALT, AST, Total. Bili, TP)    Encounter for screening involving social determinants of health (SDoH)  Looking for new housing and could use help  - Primary Care - Care Coordination Referral    Type 2 diabetes, HbA1c goal < 7% (H)  - HEMOGLOBIN A1C  - HEMOGLOBIN A1C    Paroxysmal atrial fibrillation (H)  Stable.  Seeing cardiology and declined anticoagulation    Seropositive rheumatoid arthritis of multiple sites (H)  Stable.  Seeing rheumatology    Visit for screening mammogram  - MA Screen Bilateral w/Hardy    Dysphagia, unspecified type  Stable.  refill  - omeprazole (PRILOSEC) 40 MG DR capsule  Dispense: 90 capsule; Refill: 3      Patient has been advised of split billing requirements and indicates understanding: Yes        BMI  Estimated body mass index is 43.26 kg/m  as calculated from the following:    Height as of this encounter: 1.74 m (5' 8.5\").    Weight as of this encounter: 131 kg (288 lb 11.2 oz).   Weight management plan: Discussed healthy diet and exercise guidelines    Counseling  Appropriate preventive services were addressed with this patient via screening, questionnaire, or discussion as appropriate for fall prevention, " nutrition, physical activity, Tobacco-use cessation, social engagement, weight loss and cognition.  Checklist reviewing preventive services available has been given to the patient.  Reviewed patient's diet, addressing concerns and/or questions.   She is at risk for lack of exercise and has been provided with information to increase physical activity for the benefit of her well-being.   The patient was instructed to see the dentist every 6 months.   She is at risk for psychosocial distress and has been provided with information to reduce risk.   The patient's PHQ-9 score is consistent with mild depression. She was provided with information regarding depression.           Markus Salas is a 59 year old, presenting for the following:  Physical (Physical today. Follow up on blood sugars and blood pressure. Patient states her blood pressure is ranging 130-148 on the top and 78-81 on the bottom. )        11/27/2024     6:52 AM   Additional Questions   Roomed by Francisca PANIAGUA MA   Accompanied by Self         HPI  /80 in the am and then 140-150/80 at night.          Health Care Directive  Patient does not have a Health Care Directive: Discussed advance care planning with patient; however, patient declined at this time.      11/22/2024   General Health   How would you rate your overall physical health? (!) FAIR   Feel stress (tense, anxious, or unable to sleep) To some extent      (!) STRESS CONCERN      11/22/2024   Nutrition   Three or more servings of calcium each day? Yes   Diet: Carbohydrate counting   How many servings of fruit and vegetables per day? (!) 2-3   How many sweetened beverages each day? 0-1            11/22/2024   Exercise   Days per week of moderate/strenous exercise 3 days   Average minutes spent exercising at this level 10 min            11/22/2024   Social Factors   Frequency of gathering with friends or relatives Once a week   Worry food won't last until get money to buy more No   Food not last  or not have enough money for food? No   Do you have housing? (Housing is defined as stable permanent housing and does not include staying ouside in a car, in a tent, in an abandoned building, in an overnight shelter, or couch-surfing.) No   Are you worried about losing your housing? No   Lack of transportation? No   Unable to get utilities (heat,electricity)? No   Want help with housing or utility concern? (!) YES      (!) HOUSING CONCERN PRESENT      11/22/2024   Fall Risk   Fallen 2 or more times in the past year? No    Trouble with walking or balance? Yes        Patient-reported           11/22/2024   Dental   Dentist two times every year? (!) NO               Today's PHQ-9 Score:       11/27/2024     7:27 AM   PHQ-9 SCORE   PHQ-9 Total Score 9           11/22/2024   Substance Use   Alcohol more than 3/day or more than 7/wk Not Applicable   Do you use any other substances recreationally? No        Social History     Tobacco Use    Smoking status: Never     Passive exposure: Past (mom smoked)    Smokeless tobacco: Never   Vaping Use    Vaping status: Never Used   Substance Use Topics    Alcohol use: Not Currently     Comment: Alcoholic Drinks/day: Never an issue, she states.  7/8/16    Drug use: Not Currently     Types: Marijuana     Comment: Drug use: Former marijuana use, not current. 7/8/16 5/13/2024   LAST FHS-7 RESULTS   1st degree relative breast or ovarian cancer No   Any relative bilateral breast cancer No   Any male have breast cancer No   Any ONE woman have BOTH breast AND ovarian cancer No   Any woman with breast cancer before 50yrs No   2 or more relatives with breast AND/OR ovarian cancer No   2 or more relatives with breast AND/OR bowel cancer No                   11/22/2024   STI Screening   New sexual partner(s) since last STI/HIV test? No        History of abnormal Pap smear: No - age 30- 64 PAP with HPV every 5 years recommended        Latest Ref Rng & Units 3/20/2023    11:20 AM  "5/21/2018    10:24 AM   PAP / HPV   PAP  Negative for Intraepithelial Lesion or Malignancy (NILM)  Negative for squamous intraepithelial lesion or malignancy  Electronically signed by Estelle Medina CT (ASCP) on 5/29/2018 at 12:40 PM      HPV 16 DNA Negative Negative  Negative    HPV 18 DNA Negative Negative  Negative    Other HR HPV Negative Negative  Negative      ASCVD Risk   The 10-year ASCVD risk score (Vladimir FARRAR, et al., 2019) is: 7.9%    Values used to calculate the score:      Age: 59 years      Sex: Female      Is Non- : No      Diabetic: Yes      Tobacco smoker: No      Systolic Blood Pressure: 138 mmHg      Is BP treated: Yes      HDL Cholesterol: 50 mg/dL      Total Cholesterol: 203 mg/dL           Reviewed and updated as needed this visit by Provider                          Review of Systems  Constitutional, HEENT, cardiovascular, pulmonary, gi and gu systems are negative, except as otherwise noted.     Objective    Exam  /80 (BP Location: Right arm, Patient Position: Sitting, Cuff Size: Adult Large)   Pulse 80   Temp 98.2  F (36.8  C) (Oral)   Resp 16   Ht 1.74 m (5' 8.5\")   Wt 131 kg (288 lb 11.2 oz)   LMP  (LMP Unknown)   SpO2 97%   BMI 43.26 kg/m     Estimated body mass index is 43.26 kg/m  as calculated from the following:    Height as of this encounter: 1.74 m (5' 8.5\").    Weight as of this encounter: 131 kg (288 lb 11.2 oz).    Physical Exam  GENERAL: alert and no distress  EYES: Eyes grossly normal to inspection, PERRL and conjunctivae and sclerae normal  HENT: ear canals and TM's normal, nose and mouth without ulcers or lesions  NECK: no adenopathy, no asymmetry, masses, or scars  RESP: lungs clear to auscultation - no rales, rhonchi or wheezes  CV: regular rate and rhythm, normal S1 S2, no S3 or S4, no murmur, click or rub, no peripheral edema  ABDOMEN: soft, nontender, no hepatosplenomegaly, no masses and bowel sounds normal  MS: no " gross musculoskeletal defects noted, no edema  SKIN: no suspicious lesions or rashes  NEURO: Normal strength and tone, mentation intact and speech normal  PSYCH: mentation appears normal, affect normal/bright  Breast:  no masses, no tenderness        Signed Electronically by: Estelle Bansal MD

## 2024-11-27 NOTE — PATIENT INSTRUCTIONS
Patient Education   Preventive Care Advice   This is general advice given by our system to help you stay healthy. However, your care team may have specific advice just for you. Please talk to your care team about your preventive care needs.  Nutrition  Eat 5 or more servings of fruits and vegetables each day.  Try wheat bread, brown rice and whole grain pasta (instead of white bread, rice, and pasta).  Get enough calcium and vitamin D. Check the label on foods and aim for 100% of the RDA (recommended daily allowance).  Lifestyle  Exercise at least 150 minutes each week  (30 minutes a day, 5 days a week).  Do muscle strengthening activities 2 days a week. These help control your weight and prevent disease.  No smoking.  Wear sunscreen to prevent skin cancer.  Have a dental exam and cleaning every 6 months.  Yearly exams  See your health care team every year to talk about:  Any changes in your health.  Any medicines your care team has prescribed.  Preventive care, family planning, and ways to prevent chronic diseases.  Shots (vaccines)   HPV shots (up to age 26), if you've never had them before.  Hepatitis B shots (up to age 59), if you've never had them before.  COVID-19 shot: Get this shot when it's due.  Flu shot: Get a flu shot every year.  Tetanus shot: Get a tetanus shot every 10 years.  Pneumococcal, hepatitis A, and RSV shots: Ask your care team if you need these based on your risk.  Shingles shot (for age 50 and up)  General health tests  Diabetes screening:  Starting at age 35, Get screened for diabetes at least every 3 years.  If you are younger than age 35, ask your care team if you should be screened for diabetes.  Cholesterol test: At age 39, start having a cholesterol test every 5 years, or more often if advised.  Bone density scan (DEXA): At age 50, ask your care team if you should have this scan for osteoporosis (brittle bones).  Hepatitis C: Get tested at least once in your life.  STIs (sexually  transmitted infections)  Before age 24: Ask your care team if you should be screened for STIs.  After age 24: Get screened for STIs if you're at risk. You are at risk for STIs (including HIV) if:  You are sexually active with more than one person.  You don't use condoms every time.  You or a partner was diagnosed with a sexually transmitted infection.  If you are at risk for HIV, ask about PrEP medicine to prevent HIV.  Get tested for HIV at least once in your life, whether you are at risk for HIV or not.  Cancer screening tests  Cervical cancer screening: If you have a cervix, begin getting regular cervical cancer screening tests starting at age 21.  Breast cancer scan (mammogram): If you've ever had breasts, begin having regular mammograms starting at age 40. This is a scan to check for breast cancer.  Colon cancer screening: It is important to start screening for colon cancer at age 45.  Have a colonoscopy test every 10 years (or more often if you're at risk) Or, ask your provider about stool tests like a FIT test every year or Cologuard test every 3 years.  To learn more about your testing options, visit:   .  For help making a decision, visit:   https://bit.ly/jb12263.  Prostate cancer screening test: If you have a prostate, ask your care team if a prostate cancer screening test (PSA) at age 55 is right for you.  Lung cancer screening: If you are a current or former smoker ages 50 to 80, ask your care team if ongoing lung cancer screenings are right for you.  For informational purposes only. Not to replace the advice of your health care provider. Copyright   2023 [facility]. All rights reserved. Clinically reviewed by the [facility] Transitions Program. FTRANS 527920 - REV 01/24.  Eating Healthy Foods: Care Instructions  With every meal, you can make healthy food choices. Try to eat a variety of fruits, vegetables, whole grains, lean proteins, and low-fat dairy products. This can help  "you get the right balance of nutrients, including vitamins and minerals. Small changes add up over time. You can start by adding one healthy food to your meals each day.    Try to make half your plate fruits and vegetables, one-fourth whole grains, and one-fourth lean proteins. Try including dairy with your meals.   Eat more fruits and vegetables. Try to have them with most meals and snacks.   Foods for healthy eating        Fruits   These can be fresh, frozen, canned, or dried.  Try to choose whole fruit rather than fruit juice.  Eat a variety of colors.        Vegetables   These can be fresh, frozen, canned, or dried.  Beans, peas, and lentils count too.        Whole grains   Choose whole-grain breads, cereals, and noodles.  Try brown rice.        Lean proteins   These can include lean meat, poultry, fish, and eggs.  You can also have tofu, beans, peas, lentils, nuts, and seeds.        Dairy   Try milk, yogurt, and cheese.  Choose low-fat or fat-free when you can.  If you need to, use lactose-free milk or fortified plant-based milk products, such as soy milk.        Water   Drink water when you're thirsty.  Limit sugar-sweetened drinks, including soda, fruit drinks, and sports drinks.  Where can you learn more?  Go to https://www.[employer].net/patiented  Enter T756 in the search box to learn more about \"Eating Healthy Foods: Care Instructions.\"  Current as of: September 20, 2023  Content Version: 14.2 2024 [facility] [employer].   Care instructions adapted under license by your healthcare professional. If you have questions about a medical condition or this instruction, always ask your healthcare professional. [employer] disclaims any warranty or liability for your use of this information.    Preventing Falls: Care Instructions  Injuries and health problems such as trouble walking or poor eyesight can increase your risk of falling. So can some medicines. But there are things you can do to help " "prevent falls. You can exercise to get stronger. You can also arrange your home to make it safer.    Talk to your doctor about the medicines you take. Ask if any of them increase the risk of falls and whether they can be changed or stopped.   Try to exercise regularly. It can help improve your strength and balance. This can help lower your risk of falling.         Practice fall safety and prevention.   Wear low-heeled shoes that fit well and give your feet good support. Talk to your doctor if you have foot problems that make this hard.  Carry a cellphone or wear a medical alert device that you can use to call for help.  Use stepladders instead of chairs to reach high objects. Don't climb if you're at risk for falls. Ask for help, if needed.  Wear the correct eyeglasses, if you need them.        Make your home safer.   Remove rugs, cords, clutter, and furniture from walkways.  Keep your house well lit. Use night-lights in hallways and bathrooms.  Install and use sturdy handrails on stairways.  Wear nonskid footwear, even inside. Don't walk barefoot or in socks without shoes.        Be safe outside.   Use handrails, curb cuts, and ramps whenever possible.  Keep your hands free by using a shoulder bag or backpack.  Try to walk in well-lit areas. Watch out for uneven ground, changes in pavement, and debris.  Be careful in the winter. Walk on the grass or gravel when sidewalks are slippery. Use de-icer on steps and walkways. Add non-slip devices to shoes.    Put grab bars and nonskid mats in your shower or tub and near the toilet. Try to use a shower chair or bath bench when bathing.   Get into a tub or shower by putting in your weaker leg first. Get out with your strong side first. Have a phone or medical alert device in the bathroom with you.   Where can you learn more?  Go to https://www.ADPwise.net/patiented  Enter G117 in the search box to learn more about \"Preventing Falls: Care Instructions.\"  Current as of: " July 17, 2023  Content Version: 14.2 2024 [employer].   Care instructions adapted under license by your healthcare professional. If you have questions about a medical condition or this instruction, always ask your healthcare professional. Health[employer] Incorporated disclaims any warranty or liability for your use of this information.    Learning About Stress  What is stress?     Stress is your body's response to a hard situation. Your body can have a physical, emotional, or mental response. Stress is a fact of life for most people, and it affects everyone differently. What causes stress for you may not be stressful for someone else.  A lot of things can cause stress. You may feel stress when you go on a job interview, take a test, or run a race. This kind of short-term stress is normal and even useful. It can help you if you need to work hard or react quickly. For example, stress can help you finish an important job on time.  Long-term stress is caused by ongoing stressful situations or events. Examples of long-term stress include long-term health problems, ongoing problems at work, or conflicts in your family. Long-term stress can harm your health.  How does stress affect your health?  When you are stressed, your body responds as though you are in danger. It makes hormones that speed up your heart, make you breathe faster, and give you a burst of energy. This is called the fight-or-flight stress response. If the stress is over quickly, your body goes back to normal and no harm is done.  But if stress happens too often or lasts too long, it can have bad effects. Long-term stress can make you more likely to get sick, and it can make symptoms of some diseases worse. If you tense up when you are stressed, you may develop neck, shoulder, or low back pain. Stress is linked to high blood pressure and heart disease.  Stress also harms your emotional health. It can make you groves, tense, or depressed. Your  relationships may suffer, and you may not do well at work or school.  What can you do to manage stress?  You can try these things to help manage stress:   Do something active. Exercise or activity can help reduce stress. Walking is a great way to get started. Even everyday activities such as housecleaning or yard work can help.  Try yoga or mauricio chi. These techniques combine exercise and meditation. You may need some training at first to learn them.  Do something you enjoy. For example, listen to music or go to a movie. Practice your hobby or do volunteer work.  Meditate. This can help you relax, because you are not worrying about what happened before or what may happen in the future.  Do guided imagery. Imagine yourself in any setting that helps you feel calm. You can use online videos, books, or a teacher to guide you.  Do breathing exercises. For example:  From a standing position, bend forward from the waist with your knees slightly bent. Let your arms dangle close to the floor.  Breathe in slowly and deeply as you return to a standing position. Roll up slowly and lift your head last.  Hold your breath for just a few seconds in the standing position.  Breathe out slowly and bend forward from the waist.  Let your feelings out. Talk, laugh, cry, and express anger when you need to. Talking with supportive friends or family, a counselor, or a harriet leader about your feelings is a healthy way to relieve stress. Avoid discussing your feelings with people who make you feel worse.  Write. It may help to write about things that are bothering you. This helps you find out how much stress you feel and what is causing it. When you know this, you can find better ways to cope.  What can you do to prevent stress?  You might try some of these things to help prevent stress:  Manage your time. This helps you find time to do the things you want and need to do.  Get enough sleep. Your body recovers from the stresses of the day while  "you are sleeping.  Get support. Your family, friends, and community can make a difference in how you experience stress.  Limit your news feed. Avoid or limit time on social media or news that may make you feel stressed.  Do something active. Exercise or activity can help reduce stress. Walking is a great way to get started.  Where can you learn more?  Go to https://www.KUNFOOD.com.net/patiented  Enter N032 in the search box to learn more about \"Learning About Stress.\"  Current as of: October 24, 2023  Content Version: 14.2 2024 [facility] [address] [employer].   Care instructions adapted under license by your healthcare professional. If you have questions about a medical condition or this instruction, always ask your healthcare professional. Healthwise[employer] Incorporated disclaims any warranty or liability for your use of this information.       "

## 2024-11-29 ENCOUNTER — MYC MEDICAL ADVICE (OUTPATIENT)
Dept: FAMILY MEDICINE | Facility: CLINIC | Age: 59
End: 2024-11-29
Payer: COMMERCIAL

## 2024-11-29 DIAGNOSIS — E11.9 TYPE 2 DIABETES MELLITUS WITHOUT COMPLICATION, WITHOUT LONG-TERM CURRENT USE OF INSULIN (H): Primary | ICD-10-CM

## 2024-12-01 ENCOUNTER — HEALTH MAINTENANCE LETTER (OUTPATIENT)
Age: 59
End: 2024-12-01

## 2024-12-02 ENCOUNTER — PATIENT OUTREACH (OUTPATIENT)
Dept: CARE COORDINATION | Facility: CLINIC | Age: 59
End: 2024-12-02
Payer: COMMERCIAL

## 2024-12-02 ASSESSMENT — ANXIETY QUESTIONNAIRES
3. WORRYING TOO MUCH ABOUT DIFFERENT THINGS: SEVERAL DAYS
6. BECOMING EASILY ANNOYED OR IRRITABLE: NOT AT ALL
4. TROUBLE RELAXING: SEVERAL DAYS
1. FEELING NERVOUS, ANXIOUS, OR ON EDGE: NEARLY EVERY DAY
2. NOT BEING ABLE TO STOP OR CONTROL WORRYING: NOT AT ALL
GAD7 TOTAL SCORE: 6
GAD7 TOTAL SCORE: 6
8. IF YOU CHECKED OFF ANY PROBLEMS, HOW DIFFICULT HAVE THESE MADE IT FOR YOU TO DO YOUR WORK, TAKE CARE OF THINGS AT HOME, OR GET ALONG WITH OTHER PEOPLE?: SOMEWHAT DIFFICULT
7. FEELING AFRAID AS IF SOMETHING AWFUL MIGHT HAPPEN: NOT AT ALL
IF YOU CHECKED OFF ANY PROBLEMS ON THIS QUESTIONNAIRE, HOW DIFFICULT HAVE THESE PROBLEMS MADE IT FOR YOU TO DO YOUR WORK, TAKE CARE OF THINGS AT HOME, OR GET ALONG WITH OTHER PEOPLE: SOMEWHAT DIFFICULT
5. BEING SO RESTLESS THAT IT IS HARD TO SIT STILL: SEVERAL DAYS

## 2024-12-02 NOTE — PROGRESS NOTES
Clinic Care Coordination Contact  [facility]/Voicemail    Clinical Data: Care Coordinator Outreach    Outreach Documentation Number of Outreach Attempt   12/2/2024   1:26 PM 1       Left message on patient's voicemail with call back information and requested return call.      Plan: Care Coordinator CHW to discuss recent CC referral with patient and offer assistance  Care Coordinator will try to reach patient again in 1-2 business days.  Order Questions    Question Answer   Reason for Referral: SDOH Concern   Clinical Staff have discussed the Care Coordination Referral with the patient and/or caregiver: Yes     Melissa SINGH  Community Health Worker  [facility] Care Coordination  [email or web address] [email or web address]  Office: [phone]

## 2024-12-03 ENCOUNTER — PATIENT OUTREACH (OUTPATIENT)
Dept: CARE COORDINATION | Facility: CLINIC | Age: 59
End: 2024-12-03
Payer: COMMERCIAL

## 2024-12-03 NOTE — PROGRESS NOTES
Clinic Care Coordination Contact  Community Health Worker Initial Outreach    CHW Initial Information Gathering:  Referral Source: PCP  Preferred Hospital: [facility]  [phone]  Preferred Urgent Care: [facility] [place], [phone]  Current living arrangement:: I live in a private home  Informal Support system:: Family, Friends  No PCP office visit in Past Year: No  Transportation means:: Family, Regular car  CHW Additional Questions  If ED/Hospital discharge, follow-up appointment scheduled as recommended?: N/A  Medication changes made following ED/Hospital discharge?: N/A  MyChart active?: Yes  Patient sent Social Drivers of Health questionnaire?: Yes    Patient accepts CC: Yes. Patient scheduled for assessment with CC LUIS EDUARDO Kiser  on Friday 12/6/2024 at 1:00 pm . Patient noted desire to discuss recent CC/SDOH referral.     Order Questions   Looking for new housing and could use help   Question Answer   Reason for Referral: SDOH Concern   Clinical Staff have discussed the Care Coordination Referral with the patient and/or caregiver: Yes      Melissa SINGH  Community Health Worker  [facility] Care Coordination  [email or web address] [email or web address]  [employer] [email or web address].org  Office: [phone]

## 2024-12-05 ENCOUNTER — OFFICE VISIT (OUTPATIENT)
Dept: PSYCHOLOGY | Facility: CLINIC | Age: 59
End: 2024-12-05
Payer: COMMERCIAL

## 2024-12-05 DIAGNOSIS — F43.10 POSTTRAUMATIC STRESS DISORDER: ICD-10-CM

## 2024-12-05 DIAGNOSIS — F33.1 MODERATE EPISODE OF RECURRENT MAJOR DEPRESSIVE DISORDER (H): ICD-10-CM

## 2024-12-05 DIAGNOSIS — F41.1 GAD (GENERALIZED ANXIETY DISORDER): Primary | ICD-10-CM

## 2024-12-05 RX ORDER — PRAVASTATIN SODIUM 20 MG
20 TABLET ORAL DAILY
Qty: 90 TABLET | Refills: 1 | Status: SHIPPED | OUTPATIENT
Start: 2024-12-05

## 2024-12-05 NOTE — PROGRESS NOTES
[facility]                                     Progress Note    Patient Name: Alina Martell  Date: 12/05/24         Service Type: Individual      Session Start Time: 8:02 Session End Time: 8:58     Session Length: 56 minutes    Session #: 82     Attendees: Client    Service Modality:  In-Person          DATA  Extended Session (53+ minutes):   - Patient's presenting concerns require more intensive intervention than could be completed within the usual service  Interactive Complexity: No  Crisis: No      Progress Since Last Session (Related to Symptoms / Goals / Homework):   Symptoms: No change anxiety    Homework: Partially completed      Episode of Care Goals: Satisfactory progress - ACTION (Actively working towards change); Intervened by reinforcing change plan / affirming steps taken     Current / Ongoing Stressors and Concerns:  Patient indicated that she is struggling with anxiety. Patient reported her housing continues to be the biggest issue. Patient indicated she is meeting with someone tomorrow from [place] to help try and find some housing. Patient processed her emotions around her current housing situation. Patient reported Thanksgiving going well and she volunteered all day. Patient indicated knowing that the holiday's bring up emotions and reaching out to her support. This therapist processed with patient ways to work on handling emotions.      Treatment Objective(s) Addressed in This Session:   use thought-stopping strategy daily to reduce intrusive thoughts  Increase interest, engagement, and pleasure in doing things  Decrease frequency and intensity of feeling down, depressed, hopeless  Improve quantity and quality of night time sleep / decrease daytime naps     Intervention:   Motivational Interviewing    MI Intervention: Permission to raise concern or advise     Change Talk Expressed by the Patient: Activation Taking steps    Provider Response to Change Talk: R -  Reflected patient's change talk and S - Summarized patient's change talk statements      Assessments completed prior to visit:  The following assessments were completed by patient for this visit:  PHQ9:       2/18/2024    10:01 PM 4/3/2024     9:50 AM 5/20/2024     9:50 AM 7/16/2024     4:33 PM 9/5/2024    10:42 AM 10/15/2024     8:47 AM 11/27/2024     7:27 AM   PHQ-9 SCORE   PHQ-9 Total Score MyChart 5 (Mild depression) 6 (Mild depression) 4 (Minimal depression) 6 (Mild depression) 8 (Mild depression) 9 (Mild depression)    PHQ-9 Total Score 5 6 4 6 8 9 9     GAD7:       10/9/2023    10:00 AM 10/30/2023     3:23 PM 12/6/2023     9:50 AM 7/16/2024     4:33 PM 8/31/2024     9:27 AM 10/15/2024     8:48 AM 12/2/2024    11:30 AM   ADA-7 SCORE   Total Score  5 (mild anxiety) 11 (moderate anxiety) 8 (mild anxiety) 9 (mild anxiety) 11 (moderate anxiety) 6 (mild anxiety)   Total Score 8 5 11 8    8 9 11 6        Patient-reported    Multiple values from one day are sorted in reverse-chronological order     PROMIS 10-Global Health (all questions and answers displayed):       6/13/2023     6:37 AM 9/26/2023    12:07 PM 1/1/2024    11:44 PM 4/3/2024     9:52 AM 5/20/2024     9:52 AM 8/31/2024     9:29 AM 12/2/2024    11:31 AM   PROMIS 10   In general, would you say your health is: Good Fair Good Good Good Fair Fair   In general, would you say your quality of life is: Good Good Fair Good Fair Fair Good   In general, how would you rate your physical health? Fair Fair Fair Fair Good Fair Fair   In general, how would you rate your mental health, including your mood and your ability to think? Fair Fair Fair Fair Fair Fair Fair   In general, how would you rate your satisfaction with your social activities and relationships? Good Good Fair Good Good Good Good   In general, please rate how well you carry out your usual social activities and roles Good Good Good Good Good Good Good   To what extent are you able to carry out your  everyday physical activities such as walking, climbing stairs, carrying groceries, or moving a chair? Mostly Mostly Mostly Completely Completely Mostly Completely   In the past 7 days, how often have you been bothered by emotional problems such as feeling anxious, depressed, or irritable? Often Often Often Sometimes Sometimes Often Often   In the past 7 days, how would you rate your fatigue on average? Mild Moderate Mild Moderate Moderate Moderate Moderate   In the past 7 days, how would you rate your pain on average, where 0 means no pain, and 10 means worst imaginable pain? 2 3 3 3 3 3 4   In general, would you say your health is: 3  2  3 3  3  2  2    In general, would you say your quality of life is: 3  3  2 3  2  2  3    In general, how would you rate your physical health? 2  2  2 2  3  2  2    In general, how would you rate your mental health, including your mood and your ability to think? 2  2  2 2  2  2  2    In general, how would you rate your satisfaction with your social activities and relationships? 3  3  2 3  3  3  3    In general, please rate how well you carry out your usual social activities and roles. (This includes activities at home, at work and in your community, and responsibilities as a parent, child, spouse, employee, friend, etc.) 3  3  3 3  3  3  3    To what extent are you able to carry out your everyday physical activities such as walking, climbing stairs, carrying groceries, or moving a chair? 4  4  4 5  5  4  5    In the past 7 days, how often have you been bothered by emotional problems such as feeling anxious, depressed, or irritable? 4  4  4 3  3  4  4    In the past 7 days, how would you rate your fatigue on average? 2  3  2 3  3  3  3    In the past 7 days, how would you rate your pain on average, where 0 means no pain, and 10 means worst imaginable pain? 2  3  3 3  3  3  4    Global Mental Health Score 10 10 8    8 11 10 9 10    Global Physical Health Score 14 13 14    14 14 15 13  13    PROMIS TOTAL - SUBSCORES 24 23 22    22 25 25 22 23        Patient-reported    Multiple values from one day are sorted in reverse-chronological order         ASSESSMENT: Current Emotional / Mental Status (status of significant symptoms):   Risk status (Self / Other harm or suicidal ideation)   Patient denies current fears or concerns for personal safety.   Patient denies current or recent suicidal ideation or behaviors.   Patient denies current or recent homicidal ideation or behaviors.   Patient denies current or recent self injurious behavior or ideation.   Patient denies other safety concerns.   Patient reports there has been no change in risk factors since their last session.     Patient reports there has been no change in protective factors since their last session.     Recommended that patient call 911 or go to the local ED should there be a change in any of these risk factors     Appearance:   Appropriate    Eye Contact:   Good    Psychomotor Behavior: Normal    Attitude:   Cooperative    Orientation:   All   Speech    Rate / Production: Normal/ Responsive    Volume:  Normal    Mood:    Anxious    Affect:    Appropriate    Thought Content:  Clear    Thought Form:  Coherent  Goal Directed  Logical    Insight:    Good      Medication Review:   No changes to current psychiatric medication(s)     Medication Compliance:   Yes     Changes in Health Issues:   None reported     Chemical Use Review:   Substance Use: Chemical use reviewed, no active concerns identified      Tobacco Use: No current tobacco use.      Diagnosis:  1. ADA (generalized anxiety disorder)    2. Moderate episode of recurrent major depressive disorder (H)    3. Posttraumatic stress disorder        Collateral Reports Completed:   Not Applicable    PLAN: (Patient Tasks / Therapist Tasks / Other)  Patient will return in two weeks for scheduled session. Patient will start to identify emotions that appear related to the holiday's. Patient will  meet with [facility] worker on 12/6 to discuss housing options. Patient will continue to spend time with support network.     There has been demonstrated improvement in functioning while patient has been engaged in psychotherapy/psychological service- if withdrawn the patient would deteriorate and/or relapse.     Mariana Love, [facility]     ______________________________________________________________________    Individual Treatment Plan    Patient's Name: Alina Martell  YOB: 1965    Date of Creation:10/16/2020  Date Treatment Plan Last Reviewed/Revised: 11/5/2024    DSM5 Diagnoses: 296.32 (F33.1) Major Depressive Disorder, Recurrent Episode, Moderate _, 300.02 (F41.1) Generalized Anxiety Disorder, or 309.81 (F43.10) Posttraumatic Stress Disorder (includes Posttraumatic Stress Disorder for Children 6 Years and Younger)  Without dissociative symptoms  Psychosocial / Contextual Factors: Health, family  PROMIS (reviewed every 90 days):     PROMIS-10 Scores  Global Mental Health Score: 8  Global Physical Health Score: 14  PROMIS TOTAL - SUBSCORES: 22     Referral / Collaboration:  Was/were discussed and patient will pursue.    Anticipated number of session for this episode of care: 20 will reevaluate every 90 days  Anticipation frequency of session: Biweekly  Anticipated Duration of each session: 38-52 minutes  Treatment plan will be reviewed in 90 days or when goals have been changed.       MeasurableTreatment Goal(s) related to diagnosis / functional impairment(s)  Goal 1: Patient will work on reducing overall anxiety.     I will know I've met my goal when reporting minimal anxiety symptoms.       Objective #A (Patient Action)                          Patient will use distraction each time intrusive worry surfaces.  Status: Continued - Date(s): 11/5/2024     Intervention(s)  Therapist will teach emotional regulation skills.  Objective #B  Patient will Decrease frequency and intensity of feeling  down, depressed, hopeless.  Status: Continued - Date(s):  11/5/2024     Intervention(s)  Therapist will teach emotional recognition/identification. ..     Objective #C  Patient will Identify negative self-talk and behaviors: challenge core beliefs, myths, and actions.  Status: Continued - Date(s):  11/5/2024     Intervention(s)  Therapist will teach the client how to perform a behavioral chain analysis. ..     Goal 2: Patient will continue to work on reducing depression symptoms    I will know I've met my goal then reporting minimal depression symptoms     Objective #A (Patient Action)                          Patient will Increase interest, engagement, and pleasure in doing things.  Status: Continued - Date(s): 11/5/2024  Intervention(s)  Therapist will assign homework ..     Objective #B  Patient will Decrease frequency and intensity of feeling down, depressed, hopeless.  Status: Continued - Date(s): 11/5/2024  Intervention(s)  Therapist will assign homework at every session.     Goal 3: Client will reduce PTSD symptoms by 50% as evidenced by PCLC-5 score     I will know I've met my goal when not struggling with nightmares.      Objective #A (Client Action)    Client will reduce nightmares.  Status: Continued - Date(s):11/5/2024    Intervention(s)  Therapist will teach nightmare retraining .    Objective #B  Client will compile a list of boundaries that they would like to set with others.   .    Status: Continued - Date(s):11/5/2024    Intervention(s)  Therapist will assign homework boundary setting .            Patient has reviewed and agreed to the above plan.        Mariana Love [facility]

## 2024-12-06 ENCOUNTER — PATIENT OUTREACH (OUTPATIENT)
Dept: NURSING | Facility: CLINIC | Age: 59
End: 2024-12-06
Payer: COMMERCIAL

## 2024-12-06 NOTE — PROGRESS NOTES
Clinic Care Coordination Contact    Situation: Patient chart reviewed by care coordinator.    Background: Patient referred to Care Coordination by her PCP regarding housing concerns.     Assessment: [facility] RN spoke with patient today. Provided housing resources that may be able to assist her. Patient denied any other needs or concerns.     Plan/Recommendations: Patient was not enrolled in Care Coordination as she did not have any other needs or concerns. Writer did provide her with his direct phone number if additional need or concerns should arise.     David Myhre, RN   [facility] RN  [phone]

## 2024-12-12 NOTE — ANESTHESIA CARE TRANSFER NOTE
Patient: Alina Martell    Procedure: Procedure(s):  Ablation Atrial Fibrilation       Diagnosis: Paroxysmal atrial fibrillation  Diagnosis Additional Information: No value filed.    Anesthesia Type:   General     Note:    Oropharynx: oropharynx clear of all foreign objects  Level of Consciousness: awake  Oxygen Supplementation: face mask  Level of Supplemental Oxygen (L/min / FiO2): 8  Independent Airway: airway patency satisfactory and stable  Dentition: dentition unchanged  Vital Signs Stable: post-procedure vital signs reviewed and stable  Report to RN Given: handoff report given  Patient transferred to: PACU  Comments: Neuromuscular blockade reversed after TOF 4/4, spontaneous respirations, adequate tidal volumes, followed commands to voice, extubated atraumatically, extubated with suction, airway patent after extubation.  Oxygen via facemask at 8 liters per minute to PACU. Oxygen tubing connected to wall O2 in PACU, SpO2, NiBP, and EKG monitors and alarms on and functioning, report on patient's clinical status given to PACU RN, RN questions answered.     Handoff Report: Identifed the Patient, Identified the Reponsible Provider, Reviewed the pertinent medical history, Discussed the surgical course, Reviewed Intra-OP anesthesia mangement and issues during anesthesia, Set expectations for post-procedure period and Allowed opportunity for questions and acknowledgement of understanding      Vitals:  Vitals Value Taken Time   /94 06/30/22 0913   Temp 98.6    Pulse 87 06/30/22 0916   Resp 21 06/30/22 0916   SpO2 100 % 06/30/22 0916   Vitals shown include unvalidated device data.    Electronically Signed By: Christine Marie Volp Hodgkins, CRNA, APRN CRNA  June 30, 2022  9:17 AM   no...

## 2024-12-17 ENCOUNTER — HOSPITAL ENCOUNTER (EMERGENCY)
Facility: HOSPITAL | Age: 59
Discharge: HOME | End: 2024-12-17
Attending: EMERGENCY MEDICINE
Payer: COMMERCIAL

## 2024-12-17 ENCOUNTER — APPOINTMENT (OUTPATIENT)
Dept: RADIOLOGY | Facility: HOSPITAL | Age: 59
End: 2024-12-17
Attending: EMERGENCY MEDICINE
Payer: COMMERCIAL

## 2024-12-17 ENCOUNTER — VIRTUAL VISIT (OUTPATIENT)
Dept: PSYCHOLOGY | Facility: CLINIC | Age: 59
End: 2024-12-17
Payer: COMMERCIAL

## 2024-12-17 VITALS
WEIGHT: 292.4 LBS | HEIGHT: 69 IN | OXYGEN SATURATION: 96 % | HEART RATE: 74 BPM | TEMPERATURE: 98 F | BODY MASS INDEX: 43.31 KG/M2 | SYSTOLIC BLOOD PRESSURE: 157 MMHG | DIASTOLIC BLOOD PRESSURE: 91 MMHG | RESPIRATION RATE: 24 BRPM

## 2024-12-17 DIAGNOSIS — R00.2 PALPITATIONS: ICD-10-CM

## 2024-12-17 DIAGNOSIS — R07.89 CHEST PAIN, ATYPICAL: ICD-10-CM

## 2024-12-17 DIAGNOSIS — F41.1 GAD (GENERALIZED ANXIETY DISORDER): Primary | ICD-10-CM

## 2024-12-17 DIAGNOSIS — F33.1 MODERATE EPISODE OF RECURRENT MAJOR DEPRESSIVE DISORDER (H): ICD-10-CM

## 2024-12-17 DIAGNOSIS — F43.10 POSTTRAUMATIC STRESS DISORDER: ICD-10-CM

## 2024-12-17 LAB
ALBUMIN SERPL BCG-MCNC: 4 G/DL (ref 3.5–5.2)
ALP SERPL-CCNC: 112 U/L (ref 40–150)
ALT SERPL W P-5'-P-CCNC: 19 U/L (ref 0–50)
ANION GAP SERPL CALCULATED.3IONS-SCNC: 10 MMOL/L (ref 7–15)
AST SERPL W P-5'-P-CCNC: 18 U/L (ref 0–45)
BASOPHILS # BLD AUTO: 0.1 10E3/UL (ref 0–0.2)
BASOPHILS NFR BLD AUTO: 1 %
BILIRUB SERPL-MCNC: <0.2 MG/DL
BUN SERPL-MCNC: 12.1 MG/DL (ref 6–20)
CALCIUM SERPL-MCNC: 9.8 MG/DL (ref 8.8–10.4)
CHLORIDE SERPL-SCNC: 102 MMOL/L (ref 98–107)
CREAT SERPL-MCNC: 0.83 MG/DL (ref 0.51–0.95)
EGFRCR SERPLBLD CKD-EPI 2021: 82 ML/MIN/1.73M2
EOSINOPHIL # BLD AUTO: 0.2 10E3/UL (ref 0–0.7)
EOSINOPHIL NFR BLD AUTO: 2 %
ERYTHROCYTE [DISTWIDTH] IN BLOOD BY AUTOMATED COUNT: 16 % (ref 10–15)
GLUCOSE SERPL-MCNC: 115 MG/DL (ref 70–99)
HCO3 SERPL-SCNC: 28 MMOL/L (ref 22–29)
HCT VFR BLD AUTO: 36.4 % (ref 35–47)
HGB BLD-MCNC: 11.2 G/DL (ref 11.7–15.7)
IMM GRANULOCYTES # BLD: 0 10E3/UL
IMM GRANULOCYTES NFR BLD: 0 %
LIPASE SERPL-CCNC: 21 U/L (ref 13–60)
LYMPHOCYTES # BLD AUTO: 3.2 10E3/UL (ref 0.8–5.3)
LYMPHOCYTES NFR BLD AUTO: 29 %
MAGNESIUM SERPL-MCNC: 2 MG/DL (ref 1.7–2.3)
MCH RBC QN AUTO: 24.8 PG (ref 26.5–33)
MCHC RBC AUTO-ENTMCNC: 30.8 G/DL (ref 31.5–36.5)
MCV RBC AUTO: 81 FL (ref 78–100)
MONOCYTES # BLD AUTO: 0.9 10E3/UL (ref 0–1.3)
MONOCYTES NFR BLD AUTO: 8 %
NEUTROPHILS # BLD AUTO: 6.5 10E3/UL (ref 1.6–8.3)
NEUTROPHILS NFR BLD AUTO: 59 %
NRBC # BLD AUTO: 0 10E3/UL
NRBC BLD AUTO-RTO: 0 /100
NT-PROBNP SERPL-MCNC: <36 PG/ML (ref 0–900)
PLATELET # BLD AUTO: 368 10E3/UL (ref 150–450)
POTASSIUM SERPL-SCNC: 4.3 MMOL/L (ref 3.4–5.3)
PROT SERPL-MCNC: 7.9 G/DL (ref 6.4–8.3)
RBC # BLD AUTO: 4.52 10E6/UL (ref 3.8–5.2)
SODIUM SERPL-SCNC: 140 MMOL/L (ref 135–145)
TROPONIN T SERPL HS-MCNC: <6 NG/L
TSH SERPL DL<=0.005 MIU/L-ACNC: 3.05 UIU/ML (ref 0.3–4.2)
WBC # BLD AUTO: 11 10E3/UL (ref 4–11)

## 2024-12-17 PROCEDURE — 71046 X-RAY EXAM CHEST 2 VIEWS: CPT

## 2024-12-17 PROCEDURE — 83880 ASSAY OF NATRIURETIC PEPTIDE: CPT | Performed by: EMERGENCY MEDICINE

## 2024-12-17 PROCEDURE — 84484 ASSAY OF TROPONIN QUANT: CPT | Performed by: EMERGENCY MEDICINE

## 2024-12-17 PROCEDURE — 80053 COMPREHEN METABOLIC PANEL: CPT | Performed by: EMERGENCY MEDICINE

## 2024-12-17 PROCEDURE — 83690 ASSAY OF LIPASE: CPT | Performed by: EMERGENCY MEDICINE

## 2024-12-17 PROCEDURE — 82040 ASSAY OF SERUM ALBUMIN: CPT | Performed by: EMERGENCY MEDICINE

## 2024-12-17 PROCEDURE — 93005 ELECTROCARDIOGRAM TRACING: CPT | Performed by: STUDENT IN AN ORGANIZED HEALTH CARE EDUCATION/TRAINING PROGRAM

## 2024-12-17 PROCEDURE — 99285 EMERGENCY DEPT VISIT HI MDM: CPT | Mod: 25

## 2024-12-17 PROCEDURE — 83735 ASSAY OF MAGNESIUM: CPT | Performed by: EMERGENCY MEDICINE

## 2024-12-17 PROCEDURE — 93005 ELECTROCARDIOGRAM TRACING: CPT | Performed by: EMERGENCY MEDICINE

## 2024-12-17 PROCEDURE — 82247 BILIRUBIN TOTAL: CPT | Performed by: EMERGENCY MEDICINE

## 2024-12-17 PROCEDURE — 250N000013 HC RX MED GY IP 250 OP 250 PS 637: Performed by: EMERGENCY MEDICINE

## 2024-12-17 PROCEDURE — 36415 COLL VENOUS BLD VENIPUNCTURE: CPT | Performed by: EMERGENCY MEDICINE

## 2024-12-17 PROCEDURE — 90837 PSYTX W PT 60 MINUTES: CPT | Mod: 95 | Performed by: COUNSELOR

## 2024-12-17 PROCEDURE — 250N000009 HC RX 250: Performed by: EMERGENCY MEDICINE

## 2024-12-17 PROCEDURE — 84443 ASSAY THYROID STIM HORMONE: CPT | Performed by: EMERGENCY MEDICINE

## 2024-12-17 PROCEDURE — 85025 COMPLETE CBC W/AUTO DIFF WBC: CPT | Performed by: EMERGENCY MEDICINE

## 2024-12-17 RX ORDER — MAGNESIUM HYDROXIDE/ALUMINUM HYDROXICE/SIMETHICONE 120; 1200; 1200 MG/30ML; MG/30ML; MG/30ML
15 SUSPENSION ORAL ONCE
Status: COMPLETED | OUTPATIENT
Start: 2024-12-17 | End: 2024-12-17

## 2024-12-17 RX ADMIN — PANTOPRAZOLE SODIUM 40 MG: 40 INJECTION, POWDER, FOR SOLUTION INTRAVENOUS at 20:48

## 2024-12-17 RX ADMIN — ALUMINUM HYDROXIDE, MAGNESIUM HYDROXIDE, AND SIMETHICONE 15 ML: 200; 200; 20 SUSPENSION ORAL at 21:32

## 2024-12-18 ENCOUNTER — PATIENT OUTREACH (OUTPATIENT)
Dept: FAMILY MEDICINE | Facility: CLINIC | Age: 59
End: 2024-12-18
Payer: COMMERCIAL

## 2024-12-18 NOTE — PROGRESS NOTES
[facility]                                     Progress Note    Patient Name: Alina Martell  Date: 12/17/24         Service Type: Individual      Session Start Time: 12:02 Session End Time: 12:57     Session Length: 55 minutes    Session #: 83     Attendees: Client    Service Modality:  In-Person          DATA  Extended Session (53+ minutes):   - Longer session due to limited access to mental health appointments and necessity to address patient's distress / complexity  Interactive Complexity: No  Crisis: No      Progress Since Last Session (Related to Symptoms / Goals / Homework):   Symptoms: Worsening due to housing and the holiday's    Homework: Partially completed      Episode of Care Goals: Satisfactory progress - ACTION (Actively working towards change); Intervened by reinforcing change plan / affirming steps taken     Current / Ongoing Stressors and Concerns:  Patient reported her anxiety has been high. Patient indicated the combination of her housing situation and the holiday's have not been good. Patient reported she met with someone from [facility] but they were not able to help find housing. Patient indicated knowing that she is continuing to avoid it which is not helping. Patient reported work continues to have ups and downs. Patient indicated the holiday's being hard for her but feeling okay about them right now. Patient reported if she starts to feel overwhelmed she will volunteer. This therapist challenged patient on what is in her control and taking time to go look for housing.      Treatment Objective(s) Addressed in This Session:   practice deep breathing at least 3 a day  Increase interest, engagement, and pleasure in doing things  Decrease frequency and intensity of feeling down, depressed, hopeless  Improve quantity and quality of night time sleep / decrease daytime naps  Identify negative self-talk and behaviors: challenge core beliefs, myths, and  actions     Intervention:   Motivational Interviewing    MI Intervention: Open-ended questions     Change Talk Expressed by the Patient: Activation Taking steps    Provider Response to Change Talk: R - Reflected patient's change talk      Assessments completed prior to visit:  The following assessments were completed by patient for this visit:  PHQ9:       2/18/2024    10:01 PM 4/3/2024     9:50 AM 5/20/2024     9:50 AM 7/16/2024     4:33 PM 9/5/2024    10:42 AM 10/15/2024     8:47 AM 11/27/2024     7:27 AM   PHQ-9 SCORE   PHQ-9 Total Score MyChart 5 (Mild depression) 6 (Mild depression) 4 (Minimal depression) 6 (Mild depression) 8 (Mild depression) 9 (Mild depression)    PHQ-9 Total Score 5 6 4 6 8 9 9     GAD7:       10/9/2023    10:00 AM 10/30/2023     3:23 PM 12/6/2023     9:50 AM 7/16/2024     4:33 PM 8/31/2024     9:27 AM 10/15/2024     8:48 AM 12/2/2024    11:30 AM   ADA-7 SCORE   Total Score  5 (mild anxiety) 11 (moderate anxiety) 8 (mild anxiety) 9 (mild anxiety) 11 (moderate anxiety) 6 (mild anxiety)   Total Score 8 5 11 8    8 9 11 6        Patient-reported    Multiple values from one day are sorted in reverse-chronological order     PROMIS 10-Global Health (all questions and answers displayed):       6/13/2023     6:37 AM 9/26/2023    12:07 PM 1/1/2024    11:44 PM 4/3/2024     9:52 AM 5/20/2024     9:52 AM 8/31/2024     9:29 AM 12/2/2024    11:31 AM   PROMIS 10   In general, would you say your health is: Good Fair Good Good Good Fair Fair   In general, would you say your quality of life is: Good Good Fair Good Fair Fair Good   In general, how would you rate your physical health? Fair Fair Fair Fair Good Fair Fair   In general, how would you rate your mental health, including your mood and your ability to think? Fair Fair Fair Fair Fair Fair Fair   In general, how would you rate your satisfaction with your social activities and relationships? Good Good Fair Good Good Good Good   In general, please rate  how well you carry out your usual social activities and roles Good Good Good Good Good Good Good   To what extent are you able to carry out your everyday physical activities such as walking, climbing stairs, carrying groceries, or moving a chair? Mostly Mostly Mostly Completely Completely Mostly Completely   In the past 7 days, how often have you been bothered by emotional problems such as feeling anxious, depressed, or irritable? Often Often Often Sometimes Sometimes Often Often   In the past 7 days, how would you rate your fatigue on average? Mild Moderate Mild Moderate Moderate Moderate Moderate   In the past 7 days, how would you rate your pain on average, where 0 means no pain, and 10 means worst imaginable pain? 2 3 3 3 3 3 4   In general, would you say your health is: 3  2  3 3  3  2  2    In general, would you say your quality of life is: 3  3  2 3  2  2  3    In general, how would you rate your physical health? 2  2  2 2  3  2  2    In general, how would you rate your mental health, including your mood and your ability to think? 2  2  2 2  2  2  2    In general, how would you rate your satisfaction with your social activities and relationships? 3  3  2 3  3  3  3    In general, please rate how well you carry out your usual social activities and roles. (This includes activities at home, at work and in your community, and responsibilities as a parent, child, spouse, employee, friend, etc.) 3  3  3 3  3  3  3    To what extent are you able to carry out your everyday physical activities such as walking, climbing stairs, carrying groceries, or moving a chair? 4  4  4 5  5  4  5    In the past 7 days, how often have you been bothered by emotional problems such as feeling anxious, depressed, or irritable? 4  4  4 3  3  4  4    In the past 7 days, how would you rate your fatigue on average? 2  3  2 3  3  3  3    In the past 7 days, how would you rate your pain on average, where 0 means no pain, and 10 means worst  imaginable pain? 2  3  3 3  3  3  4    Global Mental Health Score 10 10 8    8 11 10 9 10    Global Physical Health Score 14 13 14    14 14 15 13 13    PROMIS TOTAL - SUBSCORES 24 23 22    22 25 25 22 23        Patient-reported    Multiple values from one day are sorted in reverse-chronological order         ASSESSMENT: Current Emotional / Mental Status (status of significant symptoms):   Risk status (Self / Other harm or suicidal ideation)   Patient denies current fears or concerns for personal safety.   Patient denies current or recent suicidal ideation or behaviors.   Patient denies current or recent homicidal ideation or behaviors.   Patient denies current or recent self injurious behavior or ideation.   Patient denies other safety concerns.   Patient reports there has been no change in risk factors since their last session.     Patient reports there has been no change in protective factors since their last session.     Recommended that patient call 911 or go to the local ED should there be a change in any of these risk factors     Appearance:   Appropriate    Eye Contact:   Good    Psychomotor Behavior: Normal    Attitude:   Cooperative    Orientation:   All   Speech    Rate / Production: Normal/ Responsive    Volume:  Normal    Mood:    Anxious  Depressed    Affect:    Appropriate    Thought Content:  Clear    Thought Form:  Coherent  Goal Directed  Logical    Insight:    Good      Medication Review:   No changes to current psychiatric medication(s)     Medication Compliance:   Yes     Changes in Health Issues:   None reported     Chemical Use Review:   Substance Use: Chemical use reviewed, no active concerns identified      Tobacco Use: No current tobacco use.      Diagnosis:  1. ADA (generalized anxiety disorder)    2. Moderate episode of recurrent major depressive disorder (H)    3. Posttraumatic stress disorder        Collateral Reports Completed:   Not Applicable    PLAN: (Patient Tasks / Therapist Tasks  / Other)  Patient will return in 4 weeks for scheduled session. Patient will look into volunteer options for the Holiday's. Patient will look at options for apartments. Patient will look into taking time off of work to look at apartment.     There has been demonstrated improvement in functioning while patient has been engaged in psychotherapy/psychological service- if withdrawn the patient would deteriorate and/or relapse.     Mariana Love, [facility]     ______________________________________________________________________    Individual Treatment Plan    Patient's Name: Alina Martell  YOB: 1965    Date of Creation:10/16/2020  Date Treatment Plan Last Reviewed/Revised: 11/5/2024    DSM5 Diagnoses: 296.32 (F33.1) Major Depressive Disorder, Recurrent Episode, Moderate _, 300.02 (F41.1) Generalized Anxiety Disorder, or 309.81 (F43.10) Posttraumatic Stress Disorder (includes Posttraumatic Stress Disorder for Children 6 Years and Younger)  Without dissociative symptoms  Psychosocial / Contextual Factors: Health, family  PROMIS (reviewed every 90 days):     PROMIS-10 Scores  Global Mental Health Score: 8  Global Physical Health Score: 14  PROMIS TOTAL - SUBSCORES: 22     Referral / Collaboration:  Was/were discussed and patient will pursue.    Anticipated number of session for this episode of care: 20 will reevaluate every 90 days  Anticipation frequency of session: Biweekly  Anticipated Duration of each session: 38-52 minutes  Treatment plan will be reviewed in 90 days or when goals have been changed.       MeasurableTreatment Goal(s) related to diagnosis / functional impairment(s)  Goal 1: Patient will work on reducing overall anxiety.     I will know I've met my goal when reporting minimal anxiety symptoms.       Objective #A (Patient Action)                          Patient will use distraction each time intrusive worry surfaces.  Status: Continued - Date(s): 11/5/2024      Intervention(s)  Therapist will teach emotional regulation skills.  Objective #B  Patient will Decrease frequency and intensity of feeling down, depressed, hopeless.  Status: Continued - Date(s):  11/5/2024     Intervention(s)  Therapist will teach emotional recognition/identification. ..     Objective #C  Patient will Identify negative self-talk and behaviors: challenge core beliefs, myths, and actions.  Status: Continued - Date(s):  11/5/2024     Intervention(s)  Therapist will teach the client how to perform a behavioral chain analysis. ..     Goal 2: Patient will continue to work on reducing depression symptoms    I will know I've met my goal then reporting minimal depression symptoms     Objective #A (Patient Action)                          Patient will Increase interest, engagement, and pleasure in doing things.  Status: Continued - Date(s): 11/5/2024  Intervention(s)  Therapist will assign homework ..     Objective #B  Patient will Decrease frequency and intensity of feeling down, depressed, hopeless.  Status: Continued - Date(s): 11/5/2024  Intervention(s)  Therapist will assign homework at every session.     Goal 3: Client will reduce PTSD symptoms by 50% as evidenced by PCLC-5 score     I will know I've met my goal when not struggling with nightmares.      Objective #A (Client Action)    Client will reduce nightmares.  Status: Continued - Date(s):11/5/2024    Intervention(s)  Therapist will teach nightmare retraining .    Objective #B  Client will compile a list of boundaries that they would like to set with others.   .    Status: Continued - Date(s):11/5/2024    Intervention(s)  Therapist will assign homework boundary setting .            Patient has reviewed and agreed to the above plan.        Mariana Love [facility]

## 2024-12-18 NOTE — ED PROVIDER NOTES
"  Emergency Department Encounter     Evaluation Date & Time:   No admission date for patient encounter.    CHIEF COMPLAINT:  Palpitations      Triage Note:Patient arrives ambulatory to triage from home.   Reports ongoing left side intermittent chest pain for past three days. Had thought it was indigestion but meds at home not relieving symptoms. Tonight, pain is worse and patient also endorses heart \"skipping beats\" intermittently.       Hx ablation.               ED COURSE & MEDICAL DECISION MAKING:     Pt here for evaluation of symptoms since Saturday with \"heartburn\" described as a pressure along epistrium and central chest, some pain into back with increased belching.  Pt reports typical for her, but more pronounced and surprised as she's been eating better recently. Denies vomiting, cough, leg pain/swelling.  Pt had some fatigue and dyspnea.  No pleuritic pain and no anticoagulation use or recent bloody/black stools to suggest bleeding ulcer/upper GI bleed.  Hx of Afib s/p ablation and having some palpitations as well with it, so concerned there could be something more significant going on and wanted to make sure she was fine.  No recent travel/surgery, hx of dvt/pe.      ED Course as of 12/17/24 2310 Tue Dec 17, 2024   2100 Due to a shortage of available emergency department rooms, with the patient's permission I met with the patient for the initial interview and physical examination in a triage room. Discussed plan for treatment and workup in the ED.      2136 hsTrop < 6, ACS ruled out. BNP negative. Rest of labs overall all reassuring.     2155 Labs all unremarkable, lipase and LFTs wnl.  No indication for RUQ US or CT abd.   2157 CXR (independent interpretation): no acute cardiopulmonary process    2158 I rechecked the patient and discussed results, discharge, follow up, and reasons to return to the ED. We discussed the plan for discharge and the patient is agreeable. Reviewed supportive cares, " symptomatic treatment, outpatient follow up, and reasons to return to the Emergency Department. Patient to be discharged by ED RN.     Pt encouraged to increase prilosec to BID for next week or so, given [facility] number and encouraged outpatient PCP follow up.         Medical Decision Making    History:  Supplemental history from: N/A  External Record(s) reviewed: Outpatient Record: 10/02/2024 [facility]    Work Up:  Chart documentation includes differential considered and any EKGs or imaging independently interpreted by provider, where specified.  In additional to work up documented, I considered the following work up: Documented in chart, if applicable.    External consultation:  Discussion of management with another provider: Documented in chart, if applicable    Complicating factors:  Care impacted by chronic illness: Chronic Pain, Diabetes, Heart Disease, and Hypertension  Care affected by social determinants of health: N/A    Disposition considerations: Discharge. No recommendations on prescription strength medication(s). See documentation for any additional details.    Not Applicable     At the conclusion of the encounter I discussed the results of all the tests and the disposition. The questions were answered. The patient or family acknowledged understanding and was agreeable with the care plan.      MEDICATIONS GIVEN IN THE EMERGENCY DEPARTMENT:  Medications   pantoprazole (PROTONIX) IV push injection 40 mg (40 mg Intravenous $Given 12/17/24 2048)   alum & mag hydroxide-simethicone (MAALOX) suspension 15 mL (15 mLs Oral $Given 12/17/24 2132)       NEW PRESCRIPTIONS STARTED AT TODAY'S ED VISIT:  Discharge Medication List as of 12/17/2024 10:04 PM          HPI   The history is provided by the patient. No  was used.        Alina Martell is a 59 year old female with a pertinent history of PAF s/p ablation not on anticoagulation, rheumatoid arthritis,  coronary artery disease, chronic pain, type 2 diabetes mellitus, s/p cholecystectomy, and anxiety who presents to this ED by walk in for evaluation of palpitations.    The patient reports chest heaviness, epigastric pain, and nausea since Saturday (~3 days ago) similar to her typical heartburn symptoms but more severe. She states that she has been feeling like she has to burp, but is not able to. Symptoms are worse with laying down and accompanied by back pain between her shoulder blades. She reports a couple of episodes of palpitations since onset. She additionally reports decreased energy and shortness of breath on exertion. The patient states her pain is not similar to that associated with her history of rheumatoid arthritis.    She reports a history of atrial fibrillation, but states she has not had any episodes since her ablation. She is not anticoagulated. The patient takes omeprazole daily as prescribed.    The patient denies vomiting, bloody/black stools, or other concerns. No recent medication changes.    Per chart review, the patient was seen at [facility] on 10/02/2024 (~2.5 months ago). Patient with history of PAF s/p pulmonary vein isolation and no documented recurrence since that time. Hypertension well controlled at home.    REVIEW OF SYSTEMS:  See HPI      Medical History     Past Medical History:   Diagnosis Date    Anemia     Antiplatelet or antithrombotic long-term use     Arrhythmia     Cannabis use without complication 12/08/2014    Carpal tunnel syndrome     Coronary artery disease     Depressive disorder 2014    Diabetes mellitus, type 2 (H) 12/13/2023    Gastroesophageal reflux disease     History of angina     Hypertension     Irregular heart beat     Obesity     Paroxysmal atrial fibrillation (H)     PTSD (post-traumatic stress disorder)     RA (rheumatoid arthritis) (H)     Rheumatoid arthritis (H) 07/08/2016    Sicca syndrome (H)     Sleep apnea        Past  Surgical History:   Procedure Laterality Date    ABDOMEN SURGERY      CHOLECYSTECTOMY      CYSTOSCOPY N/A 05/04/2023    Procedure: AND CYSTOSCOPY;  Surgeon: Irma Cary MD;  Location: [facility] OR    DAVINCI HYSTERECTOMY TOTAL Bilateral 05/04/2023    Procedure: ROBOTIC ASSISTED TOTAL LAPAROSCOPIC HYSTERECTOMY WITH BILATERAL SALPINGECTOMY;  Surgeon: Irma Cary MD;  Location: [facility] OR    DILATION AND CURETTAGE, OPERATIVE HYSTEROSCOPY, COMBINED N/A 03/03/2022    Procedure: HYSTEROSCOPY, WITH DILATION AND CURETTAGE;  Surgeon: Irma Cary MD;  Location: [facility] OR    EP ABLATION FOCAL AFIB N/A 06/30/2022    Procedure: Ablation Atrial Fibrilation;  Surgeon: Jose Guadalupe Joseph MD;  Location:  HE[facility]ARDI[facility] CATH LAB    HC REMOVAL GALLBLADDER      Description: Cholecystectomy;  Recorded: 10/15/2013;    LAPAROSCOPIC TUBAL LIGATION      RELEASE CARPAL TUNNEL BILATERAL      TUBAL LIGATION  01/01/1995       Family History   Problem Relation Age of Onset    Crohn's Disease Mother         Total colectomy with ileostomy.    Atrial fibrillation Mother     Snoring Father     Diabetes Father     Prostate Cancer Father     Chronic Kidney Disease Father         Chose against dialysis.    Substance Abuse Brother     Depression Brother     Attention Deficit Disorder Brother     Alcoholism Brother     Arrhythmia Brother     Alcoholism Brother     Substance Abuse Brother     Arrhythmia Brother     Alcoholism Maternal Grandfather     Substance Abuse Maternal Grandfather     No Known Problems Daughter     No Known Problems Son     Lupus Cousin     Breast Cancer No family hx of        Social History     Tobacco Use    Smoking status: Never     Passive exposure: Past (mom smoked)    Smokeless tobacco: Never   Vaping Use    Vaping status: Never Used   Substance Use Topics    Alcohol use: Not Currently     Comment: Alcoholic Drinks/day: Never an issue, she states.  7/8/16    Drug use:  "Not Currently     Types: Marijuana     Comment: Drug use: Former marijuana use, not current. 7/8/16       diltiazem ER (TIAZAC) 360 MG 24 hr ER beaded capsule  EPINEPHrine (ANY BX GENERIC EQUIV) 0.3 MG/0.3ML injection 2-pack  fexofenadine (ALLEGRA) 60 MG tablet  fluticasone (FLONASE) 50 MCG/ACT nasal spray  gabapentin (NEURONTIN) 100 MG capsule  HYRIMOZ 40 MG/0.4ML auto-injector kit  LORazepam (ATIVAN) 0.5 MG tablet  metFORMIN (GLUCOPHAGE XR) 500 MG 24 hr tablet  Multiple Vitamins-Minerals (CENTROVITE) TABS  omeprazole (PRILOSEC) 40 MG DR capsule  ondansetron (ZOFRAN ODT) 4 MG ODT tab  pravastatin (PRAVACHOL) 20 MG tablet  vitamin D3 (CHOLECALCIFEROL) 1.25 MG (78347 UT) capsule        Physical Exam     Vitals:  BP (!) 157/91   Pulse 74   Temp 98  F (36.7  C) (Oral)   Resp 24   Ht 1.753 m (5' 9\")   Wt 132.6 kg (292 lb 6.4 oz)   LMP  (LMP Unknown)   SpO2 96%   BMI 43.18 kg/m      PHYSICAL EXAM:   Physical Exam  Vitals and nursing note reviewed.   Constitutional:       General: She is not in acute distress.     Appearance: Normal appearance.   HENT:      Head: Normocephalic and atraumatic.      Nose: Nose normal.      Mouth/Throat:      Mouth: Mucous membranes are moist.   Eyes:      Pupils: Pupils are equal, round, and reactive to light.   Neck:      Vascular: No JVD.   Cardiovascular:      Rate and Rhythm: Normal rate and regular rhythm.      Pulses: Normal pulses.           Radial pulses are 2+ on the right side and 2+ on the left side.        Dorsalis pedis pulses are 2+ on the right side and 2+ on the left side.   Pulmonary:      Effort: Pulmonary effort is normal. No respiratory distress.      Breath sounds: Normal breath sounds.   Abdominal:      Palpations: Abdomen is soft.      Tenderness: There is no abdominal tenderness.   Musculoskeletal:      Cervical back: Full passive range of motion without pain and neck supple.      Comments: No calf tenderness or swelling b/l   Skin:     General: Skin is warm. "      Findings: No rash.   Neurological:      General: No focal deficit present.      Mental Status: She is alert. Mental status is at baseline.      Comments: Fluent speech, no acute lateralizing deficits   Psychiatric:         Mood and Affect: Mood normal.         Behavior: Behavior normal.         Results     LAB:  All pertinent labs reviewed and interpreted  Labs Ordered and Resulted from Time of ED Arrival to Time of ED Departure   COMPREHENSIVE METABOLIC PANEL - Abnormal       Result Value    Sodium 140      Potassium 4.3      Carbon Dioxide (CO2) 28      Anion Gap 10      Urea Nitrogen 12.1      Creatinine 0.83      GFR Estimate 82      Calcium 9.8      Chloride 102      Glucose 115 (*)     Alkaline Phosphatase 112      AST 18      ALT 19      Protein Total 7.9      Albumin 4.0      Bilirubin Total <0.2     CBC WITH PLATELETS AND DIFFERENTIAL - Abnormal    WBC Count 11.0      RBC Count 4.52      Hemoglobin 11.2 (*)     Hematocrit 36.4      MCV 81      MCH 24.8 (*)     MCHC 30.8 (*)     RDW 16.0 (*)     Platelet Count 368      % Neutrophils 59      % Lymphocytes 29      % Monocytes 8      % Eosinophils 2      % Basophils 1      % Immature Granulocytes 0      NRBCs per 100 WBC 0      Absolute Neutrophils 6.5      Absolute Lymphocytes 3.2      Absolute Monocytes 0.9      Absolute Eosinophils 0.2      Absolute Basophils 0.1      Absolute Immature Granulocytes 0.0      Absolute NRBCs 0.0     TROPONIN T, HIGH SENSITIVITY - Normal    Troponin T, High Sensitivity <6     NT PROBNP INPATIENT - Normal    N terminal Pro BNP Inpatient <36     TSH WITH FREE T4 REFLEX - Normal    TSH 3.05     MAGNESIUM - Normal    Magnesium 2.0     LIPASE - Normal    Lipase 21     TROPONIN T, HIGH SENSITIVITY       RADIOLOGY:  Chest XR,  PA & LAT   Final Result   IMPRESSION: Stable chest. No acute cardiopulmonary abnormalities. Segmental elevation right hemidiaphragm.                   ECG:  NSR, rate 73, normal intervals, no acute  ischemia    I have independently reviewed and interpreted the EKG(s) documented above     PROCEDURES:  Procedures:  None      FINAL IMPRESSION:    ICD-10-CM    1. Palpitations  R00.2       2. Chest pain, atypical  R07.89           0 minutes of critical care time      I, Jasmina Chaudhari, am serving as a scribe to document services personally performed by Dr. Carlos Michel, based on my observations and the provider's statements to me. I, Carlos Michel, DO attest that Jasmina Chaudhari is acting in a scribe capacity, has observed my performance of the services and has documented them in accordance with my direction.      Carlos Michel DO  Emergency Medicine  [facility] EMERGENCY DEPARTMENT  12/17/2024  8:58 PM          Carlos Michel MD  12/17/24 0547

## 2024-12-18 NOTE — ED TRIAGE NOTES
"Patient arrives ambulatory to triage from home.   Reports ongoing left side intermittent chest pain for past three days. Had thought it was indigestion but meds at home not relieving symptoms. Tonight, pain is worse and patient also endorses heart \"skipping beats\" intermittently.       Hx ablation.         "

## 2024-12-18 NOTE — DISCHARGE INSTRUCTIONS
Follow up with primary clinic. Consider follow up with [facility].  Increase prilosec to twice a day for next week or so.  Return for new/worsening concerns such as uncontrolled vomiting, severe abdominal pain.

## 2024-12-26 LAB
ATRIAL RATE - MUSE: 73 BPM
DIASTOLIC BLOOD PRESSURE - MUSE: NORMAL MMHG
INTERPRETATION ECG - MUSE: NORMAL
P AXIS - MUSE: 57 DEGREES
PR INTERVAL - MUSE: 168 MS
QRS DURATION - MUSE: 78 MS
QT - MUSE: 388 MS
QTC - MUSE: 427 MS
R AXIS - MUSE: 21 DEGREES
SYSTOLIC BLOOD PRESSURE - MUSE: NORMAL MMHG
T AXIS - MUSE: 27 DEGREES
VENTRICULAR RATE- MUSE: 73 BPM

## 2024-12-30 ENCOUNTER — OFFICE VISIT (OUTPATIENT)
Dept: GASTROENTEROLOGY | Facility: CLINIC | Age: 59
End: 2024-12-30
Attending: FAMILY MEDICINE
Payer: COMMERCIAL

## 2024-12-30 VITALS
HEART RATE: 89 BPM | BODY MASS INDEX: 42.63 KG/M2 | DIASTOLIC BLOOD PRESSURE: 89 MMHG | SYSTOLIC BLOOD PRESSURE: 138 MMHG | WEIGHT: 287.8 LBS | HEIGHT: 69 IN | OXYGEN SATURATION: 94 %

## 2024-12-30 DIAGNOSIS — R13.19 ESOPHAGEAL DYSPHAGIA: Primary | ICD-10-CM

## 2024-12-30 PROCEDURE — 99215 OFFICE O/P EST HI 40 MIN: CPT | Performed by: PHYSICIAN ASSISTANT

## 2024-12-30 ASSESSMENT — PAIN SCALES - GENERAL: PAINLEVEL_OUTOF10: NO PAIN (0)

## 2024-12-30 ASSESSMENT — PATIENT HEALTH QUESTIONNAIRE - PHQ9: SUM OF ALL RESPONSES TO PHQ QUESTIONS 1-9: 7

## 2024-12-30 NOTE — NURSING NOTE
"Chief Complaint   Patient presents with    Follow Up    RECHECK       Vitals:    12/30/24 0744   BP: 138/89   Pulse: 89   SpO2: 94%   Weight: 130.5 kg (287 lb 12.8 oz)   Height: 1.753 m (5' 9\")       Body mass index is 42.5 kg/m .    Anju Sadler MA      "

## 2024-12-30 NOTE — PATIENT INSTRUCTIONS
It was a pleasure taking care of you today.  I've included a brief summary of our discussion and care plan from today's visit below.  Please review this information with your primary care provider.  _______________________________________________________________________    My recommendations are summarized as follows:    Xray esophagus  Upper endoscopy ordered - please call to schedule appt   Continue Prilosec   Continue as needed miralax as we discussed  See below toileting techniques     Please call our clinic or send a Savvy[employer] message to us if you have any questions or concerns. [employer] messages are answered by your nurse or doctor typically within 24 hours.  Please allow extra time on weekends and holidays    Return to GI Clinic in 1-2 weeks after upper endoscopy months to review your progress.    _______________________________________________________________________    How do I schedule labs, imaging studies, or procedures that were ordered in clinic today?      Labs: To schedule lab appointment at the [facility], use my chart or call [phone]. If you have a [facility] lab closer to home where you are regularly seen you can give them a call.      Procedures: If a colonoscopy, upper endoscopy, breath test, esophageal manometry, or pH impedence was ordered today, our endoscopy team will call you to schedule this. If you have not heard from our endoscopy team within a week, please call [phone] option 2 to schedule.      Imaging Studies: If you were scheduled for a CT scan, X-ray, MRI, ultrasound, HIDA scan, EKG or other imaging study, please call [phone] to have this scheduled.      Referral: If a referral to another specialty was ordered, expect a phone call or follow instructions above. If you have not heard from anyone regarding your referral in a week, please call our clinic to check the status.      Who do I call with any questions after my visit?  Please be in touch if there are  "any further questions that arise following today's visit.  There are multiple ways to contact your gastroenterology care team.       During business hours, you may reach a Gastroenterology nurse at [phone]     To schedule or reschedule an appointment, please call [phone].      You can always send a secure message through Micello.  Micello messages are answered by your nurse or doctor typically within 24 hours.  Please allow extra time on weekends and holidays.       For urgent/emergent questions after business hours, you may reach the on-call GI Fellow by contacting the [facility]  at [phone].     How will I get the results of any tests ordered?    You will receive all of your results.  If you have signed up for [facility], any tests ordered at your visit will be available to you after your physician reviews them.  Typically this takes 1-2 weeks.  If there are urgent results that require a change in your care plan, your physician or nurse will call you to discuss the next steps.       What is Micello?  Micello is a secure way for you to access all of your healthcare records from the [facility].  It is a web based computer program, so you can sign on to it from any location.  It also allows you to send secure messages to your care team.  I recommend signing up for Micello access if you have not already done so and are comfortable with using a computer.       How to I schedule a follow-up visit?  If you did not schedule a follow-up visit today, please call [phone] to schedule a follow-up office visit.      Sincerely,    Mery Serrano PA-C  Gastroenterology  --  Toileting techniques  -don t ignore the urge to defecate (try not to hold it in). Normal to have a bowel movement after waking in morning, after eating, or whenever \"nature calls\"  -limit time pushing to less than 10 min on the toilet. If you are not able to have a bowel movement, stop, and try again when you have " the urge    Positioning  1. Elevate knees above hips (use a squatty potty)?  2. Lean forward, elbows rested on knees   3. Bulge out abdomen, and straighten your spine    Correct position  -knees higher than hips  -lean forward and put elbows on knees  ?-bulge our your abdomen  -straighten your spine    Position is similar to [employer] champion  [employer] Thinker

## 2024-12-30 NOTE — PROGRESS NOTES
GI CLINIC VISIT    ASSESSMENT/PLAN:  59 year old female with PMH of of rheumatoid arthritis on Hyrimoz, class II obesity, paroxymal atrial fibrillation, who is s/p CCY presenting to GI clinic for dysphagia    # Esophageal dysphagia  Previously with intermittent dysphagia to solids and pills, EGD 2022 including esophageal biopsies was unremarkable. Now reporting daily dysphagia despite protonix 40 mg once daily x 1 mo. Also reporting pyrosis, globus sensation, regurgitation, retrosternal pressure (ER 12/17/24 - troponin < 6 and EKG w/o ischemic changes). Given change in sx, we discussed the utility of further testing including EGD with consideration of empiric dilation, TBE +/- HRM. Agreeable with TBE and EGD, ordered.     Ddx - infectious esophagitis (given use of Hyrimoz), structural esophageal changes, unmanaged functional disease, vs other. Thought less likely dysmotility.     Future consideration  -HRM with pH testing   -inquire about cannabis use     # Epigastric discomfort, h/o  # Nausea  Historically - onset of epigastric discomfort and nausea following initiation of Eliquis and after endometrial ablation, with unremarkable work-up including CT AP, H plyori stool antigen, CBC, CMP. EGD endoscopically unremarkable with mild reactive gastropathy noted on gastric biopsies. Symptoms improved on PPI.     # h/o SSA   #CRC Screening  CScope 5/2023 clear, diverticular disease discussed.   -repeat CScope 5/2028, or sooner PRN     RTC - 1-2 weeks after EGD    Thank you for this consultation. It was a pleasure to participate in the care of this patient; please contact us with any further questions.    Mery Serrano PA-C    Follow up: As planned above. Today, I personally spent 36 minutes in direct face to face time with the patient, of which greater than 50% of the time was spent in patient education and counseling as described above. Approximately 5 minutes were spent on indirect care associated with the patient's  consultation including but not limited to review of: patient medical records to date, clinic visits, hospital records, lab results, imaging studies, procedural documentation, and coordinating care with other providers. The findings from this review are summarized in the above note. All of the above accounted for a cumulative time of 41 minutes and was performed on the date of service.       HPI: 59 year old female with PMH of rheumatoid arthritis on Hyrimoz, class II obesity, paroxymal atrial fibrillation, who is s/p CCY presenting to GI clinic for dysphagia    Patient was last seen with our Gen GI team by Radha Dsouza PA-C in 2022. Today is my first meet with the patient.     4/5/2022 Radha Dsouza note   57 year old female with PMH of rheumatoid arthritis on Humiara, class II obesity, paroxymal atrial fibrillation on chronic anticoagulation with DOAC, who is s/p CCY presenting to GI clinic for abdominal pain and consideration of EGD.     Maritza underwent D+C March 3, 2022 for menorrhagia, and shortly after, developed epigastric pain.  She describes that initially, it felt like food was getting stuck low in the substernal area with most everything she ate, which would then regurgitate as an undigested, somewhat sour bolus. This then progressed to a non-radiating, intermittent epigastric pain that feels like getting punched in the stomach. It relably occurs with fating, and improves if she eats something. It can also occur if she eats a larger volume of food. She denies any significant GERD symptoms, though she was having some occasional episodes at start of symptoms. No odynophagia. She has nausea that has been intermittent and random in occurrence, not always related to the epigastric pain. She is using Zofran for this. She states that until these symptoms began, she had regular bowel movements 2-3 times per day without any associated concerns, but now is having bowel movements every 2-3 days, associated with  hard, dry stools, incomplete evacuation, and straining. No BRBPR. She states that at first note of epigastric pain, there were some dark stools, which have not recurred.     Work-up has included a CT AP, which was unremarkable. H pylori stool antigen negative (on PPI for several days). CBC with mild anemia and new mild leukocytosis. CMP, lipase, TSH, lactic acid unremarkable. She was started on PPI 40 daily with sucralfate. This has resulted in mild improvement in symptoms. She is still experiencing the epigastric discomfort with fasting, which can awaken her from sleep, and continues to have a sense of possible dysphagia to solid textures 2-3 times per week, which feels stuck for 10-15 minutes and then either clears or regurgitates as undigested, sour bolus.      Colonoscopy 2/2021 - repeat 3 years for 1 sessile serrated adenoma     Does not take NSAID     FHx - mom with Crohn's with ileostomy, multiple autoimmune conditions in family     7/26/2022 with Radha Dsouza, PAC  She underwent EGD May 2022, which was endoscopically unremarkable. Gastric biopsies with mild reactive gastropathy with mild chronic inactive gastritis with negative biopsies for H pylori. Duodenal biopsies unremarkable. Esophageal biopsies were unremarkable.     Since last visit, she underwent ablation for A fib. She has had minimal epigastric pain since initial consult and continuation of PPI. She has occasional nausea, which she relates to the Eliquis, as it started in conjunction with initiation of that medication. She used MiraLAX for several weeks with normalization of bowel habits and has been able to successfully stop MiraLAX with ongoing satisfactory bowel habits, no BRBPR or melena. She has had no A fib episodes. Dysphagia also significantly improved, now maybe once per week to pills and clears more rapidly than previous. With resolution of A fib episodes, she has been able to be more active with exercise, and has also made some  dietary changes.    12/30/24 with me  She returned to Gen GI for sensation of indigestion and food sticking x 1 mo. Denies changes to daily routine in past month to include changes in diet, lifestyle, meds, etc.She had an EGD 5/2022 for dysphagia - esophageal biopsies were negative   -does have a lump in back of throat with swallows but no trouble with initiation of a swallow  -daily heartburn sx, retrosternal pressure (ER evaluation - 12/17/24 -- troponin negative )  -having reports episodes of regurgitation of foods and acid /metallic taste. No bloody outpt. Does have trouble with nausea that worsened in past month. Occasional vomiting - does have zofran but not needed it in a while.   -no trouble with swallowing  liquids or pills dysphagia   -taking daily Prilosec 40 mg, she takes this med on empty stomach   -no abd pain, particularly to epigastric region  does have PND and had seen ENT previously. Given nasal spray for treatment which she feels it helped   On Hyrimoz no NSAIDs. Does take APAP for pain relief PRN.   No Etoh. Never smoker.   Appetite is variable. Weight is overall stable for her. She is planning to schedule an appt with dietary to review diet/lifestyle. She is not interested in medications at this time. She is working with [facility] on this.   Stooling - 1 BM in AM/PM.  Formed and soft. No bloody or black stools.   Not on blood thinners    S/P CCY  Mom with Crohn's.     PAST MEDICAL HISTORY:  Past Medical History:   Diagnosis Date    Anemia     Antiplatelet or antithrombotic long-term use     Arrhythmia     Cannabis use without complication 12/08/2014    Carpal tunnel syndrome     Coronary artery disease     Depressive disorder 2014    Diabetes mellitus, type 2 (H) 12/13/2023    Gastroesophageal reflux disease     History of angina     Hypertension     Irregular heart beat     Obesity     Paroxysmal atrial fibrillation (H)     PTSD (post-traumatic stress disorder)     RA (rheumatoid arthritis) (H)      Rheumatoid arthritis (H) 07/08/2016    Sicca syndrome (H)     Sleep apnea        PREVIOUS ABDOMINAL/GYNECOLOGIC SURGERIES:  Past Surgical History:   Procedure Laterality Date    ABDOMEN SURGERY      CHOLECYSTECTOMY      CYSTOSCOPY N/A 05/04/2023    Procedure: AND CYSTOSCOPY;  Surgeon: Irma Cary MD;  Location: [facility] OR    DAVINCI HYSTERECTOMY TOTAL Bilateral 05/04/2023    Procedure: ROBOTIC ASSISTED TOTAL LAPAROSCOPIC HYSTERECTOMY WITH BILATERAL SALPINGECTOMY;  Surgeon: Irma Cary MD;  Location: [facility] OR    DILATION AND CURETTAGE, OPERATIVE HYSTEROSCOPY, COMBINED N/A 03/03/2022    Procedure: HYSTEROSCOPY, WITH DILATION AND CURETTAGE;  Surgeon: Irma Cary MD;  Location: [facility] OR    EP ABLATION FOCAL AFIB N/A 06/30/2022    Procedure: Ablation Atrial Fibrilation;  Surgeon: Jose Guadalupe Joseph MD;  Location:  [facility]    HC REMOVAL GALLBLADDER      Description: Cholecystectomy;  Recorded: 10/15/2013;    LAPAROSCOPIC TUBAL LIGATION      RELEASE CARPAL TUNNEL BILATERAL      TUBAL LIGATION  01/01/1995         PERTINENT MEDICATIONS:  Current Outpatient Medications   Medication Sig Dispense Refill    diltiazem ER (TIAZAC) 360 MG 24 hr ER beaded capsule Take 1 capsule (360 mg) by mouth daily. 90 capsule 1    EPINEPHrine (ANY BX GENERIC EQUIV) 0.3 MG/0.3ML injection 2-pack Inject 0.3 mLs (0.3 mg) into the muscle as needed for anaphylaxis 2 each 2    fexofenadine (ALLEGRA) 60 MG tablet Take 1 tablet (60 mg) by mouth daily. 30 tablet 4    fluticasone (FLONASE) 50 MCG/ACT nasal spray Spray 2 sprays into both nostrils daily. 16 g 11    gabapentin (NEURONTIN) 100 MG capsule Take 1 capsule (100 mg) by mouth at bedtime 30 capsule 2    HYRIMOZ 40 MG/0.4ML auto-injector kit Inject 40 mg subcutaneously every 14 days.      LORazepam (ATIVAN) 0.5 MG tablet TAKE 1 TABLET(0.5 MG) BY MOUTH DAILY AS NEEDED FOR ANXIETY 30 tablet 0    metFORMIN (GLUCOPHAGE XR)  500 MG 24 hr tablet Take 1 tablet (500 mg) by mouth daily (with dinner). 90 tablet 2    Multiple Vitamins-Minerals (CENTROVITE) TABS Take 1 tablet by mouth daily      omeprazole (PRILOSEC) 40 MG DR capsule Take 1 capsule (40 mg) by mouth daily. 90 capsule 3    ondansetron (ZOFRAN ODT) 4 MG ODT tab Take 1 tablet (4 mg) by mouth every 8 hours as needed for nausea 30 tablet 0    pravastatin (PRAVACHOL) 20 MG tablet Take 1 tablet (20 mg) by mouth daily. 90 tablet 1    vitamin D3 (CHOLECALCIFEROL) 1.25 MG (10615 UT) capsule Take 1 capsule (50,000 Units) by mouth every 7 days 12 capsule 3         SOCIAL HISTORY:  Social History     Socioeconomic History    Marital status: Single     Spouse name: Not on file    Number of children: 2    Years of education: 4    Highest education level: Not on file   Occupational History    Not on file   Tobacco Use    Smoking status: Never     Passive exposure: Past (mom smoked)    Smokeless tobacco: Never   Vaping Use    Vaping status: Never Used   Substance and Sexual Activity    Alcohol use: Not Currently     Comment: Alcoholic Drinks/day: Never an issue, she states.  7/8/16    Drug use: Not Currently     Types: Marijuana     Comment: Drug use: Former marijuana use, not current. 7/8/16    Sexual activity: Not Currently     Partners: Female, Male     Birth control/protection: None     Comment: tubal ligation   Other Topics Concern    Parent/sibling w/ CABG, MI or angioplasty before 65F 55M? No   Social History Narrative    Not on file     Social Drivers of Health     Financial Resource Strain: Low Risk  (12/3/2024)    Financial Resource Strain     Within the past 12 months, have you or your family members you live with been unable to get utilities (heat, electricity) when it was really needed?: No   Food Insecurity: High Risk (12/3/2024)    Food Insecurity     Within the past 12 months, did you worry that your food would run out before you got money to buy more?: Yes     Within the past  12 months, did the food you bought just not last and you didn t have money to get more?: No   Transportation Needs: Low Risk  (12/3/2024)    Transportation Needs     Within the past 12 months, has lack of transportation kept you from medical appointments, getting your medicines, non-medical meetings or appointments, work, or from getting things that you need?: No   Physical Activity: Insufficiently Active (12/3/2024)    Exercise Vital Sign     Days of Exercise per Week: 3 days     Minutes of Exercise per Session: 20 min   Stress: Stress Concern Present (12/3/2024)    [employer] [place] [employer] - Occupational Stress Questionnaire     Feeling of Stress : To some extent   Social Connections: Moderately Integrated (12/3/2024)    Social Connection and Isolation Panel [NHANES]     Frequency of Communication with Friends and Family: Three times a week     Frequency of Social Gatherings with Friends and Family: Once a week     Attends Temple Services: More than 4 times per year     Active Member of Clubs or Organizations: Yes     Attends Club or Organization Meetings: More than 4 times per year     Marital Status: Never    Interpersonal Safety: Low Risk  (11/27/2024)    Interpersonal Safety     Do you feel physically and emotionally safe where you currently live?: Yes     Within the past 12 months, have you been hit, slapped, kicked or otherwise physically hurt by someone?: No     Within the past 12 months, have you been humiliated or emotionally abused in other ways by your partner or ex-partner?: No   Housing Stability: High Risk (12/3/2024)    Housing Stability     Do you have housing? : No     Are you worried about losing your housing?: No       FAMILY HISTORY:  Family History   Problem Relation Age of Onset    Crohn's Disease Mother         Total colectomy with ileostomy.    Atrial fibrillation Mother     Snoring Father     Diabetes Father     Prostate Cancer Father     Chronic Kidney Disease  Father         Chose against dialysis.    Substance Abuse Brother     Depression Brother     Attention Deficit Disorder Brother     Alcoholism Brother     Arrhythmia Brother     Alcoholism Brother     Substance Abuse Brother     Arrhythmia Brother     Alcoholism Maternal Grandfather     Substance Abuse Maternal Grandfather     No Known Problems Daughter     No Known Problems Son     Lupus Cousin     Breast Cancer No family hx of        PHYSICAL EXAMINATION:  Vitals reviewed: LMP  (LMP Unknown)   Wt:   Wt Readings from Last 2 Encounters:   12/17/24 132.6 kg (292 lb 6.4 oz)   11/27/24 131 kg (288 lb 11.2 oz)      Constitutional: aaox3, cooperative, pleasant, not dyspneic/diaphoretic, no acute distress  Eyes: Sclera anicteric/injected  Ears/nose/mouth/throat: hearing intact  Respiratory: Unlabored breathing  Abd:  Nondistended,  nontender, no peritoneal signs  Skin: warm, perfused, no jaundice  Psych: Normal affect  MSK: Normal gait     PERTINENT STUDIES - Reviewed in EMR     Lab Results   Component Value Date    WBC 11.0 12/17/2024    WBC 10.0 11/27/2024    WBC 9.5 08/15/2024    HGB 11.2 (L) 12/17/2024    HGB 10.9 (L) 11/27/2024    HGB 10.9 (L) 08/15/2024     12/17/2024     11/27/2024     08/15/2024    CHOL 188 11/27/2024    CHOL 203 (H) 03/27/2024    CHOL 166 03/22/2023    TRIG 106 11/27/2024    TRIG 156 (H) 03/27/2024    TRIG 89 03/22/2023    HDL 46 (L) 11/27/2024    HDL 50 03/27/2024    HDL 44 (L) 03/22/2023    ALT 19 12/17/2024    ALT 15 11/27/2024    ALT 15 08/15/2024    AST 18 12/17/2024    AST 19 11/27/2024    AST 13 07/15/2024     12/17/2024     11/27/2024     07/28/2024    BUN 12.1 12/17/2024    BUN 15.5 11/27/2024    BUN 16.6 07/28/2024    CO2 28 12/17/2024    CO2 27 11/27/2024    CO2 24 07/28/2024    TSH 3.05 12/17/2024    TSH 2.16 02/16/2022    TSH 2.38 01/17/2022    INR 1.04 08/11/2023    INR 1.13 12/15/2022    INR 1.02 12/08/2022        Liver Function Studies -    Recent Labs   Lab Test 12/17/24 2043   PROTTOTAL 7.9   ALBUMIN 4.0   BILITOTAL <0.2   ALKPHOS 112   AST 18   ALT 19        PREVIOUS ENDOSCOPY    No results found for this or any previous visit.

## 2024-12-30 NOTE — LETTER
12/30/2024      Alina Martell  [address]  [place] [address]      Dear Colleague,    Thank you for referring your patient, Alina Martell, to the [facility] [place]. Please see a copy of my visit note below.      GI CLINIC VISIT    ASSESSMENT/PLAN:  59 year old female with PMH of of rheumatoid arthritis on Hyrimoz, class II obesity, paroxymal atrial fibrillation, who is s/p CCY presenting to GI clinic for dysphagia    # Esophageal dysphagia  Previously with intermittent dysphagia to solids and pills, EGD 2022 including esophageal biopsies was unremarkable. Now reporting daily dysphagia despite protonix 40 mg once daily x 1 mo. Also reporting pyrosis, globus sensation, regurgitation, retrosternal pressure (ER 12/17/24 - troponin < 6 and EKG w/o ischemic changes). Given change in sx, we discussed the utility of further testing including EGD with consideration of empiric dilation, TBE +/- HRM. Agreeable with TBE and EGD, ordered.     Ddx - infectious esophagitis (given use of Hyrimoz), structural esophageal changes, unmanaged functional disease, vs other. Thought less likely dysmotility.     Future consideration  -HRM with pH testing   -inquire about cannabis use     # Epigastric discomfort, h/o  # Nausea  Historically - onset of epigastric discomfort and nausea following initiation of Eliquis and after endometrial ablation, with unremarkable work-up including CT AP, H plyori stool antigen, CBC, CMP. EGD endoscopically unremarkable with mild reactive gastropathy noted on gastric biopsies. Symptoms improved on PPI.     # h/o SSA   #CRC Screening  CScope 5/2023 clear, diverticular disease discussed.   -repeat CScope 5/2028, or sooner PRN     RTC - 1-2 weeks after EGD    Thank you for this consultation. It was a pleasure to participate in the care of this patient; please contact us with any further questions.    Mery Serrano PA-C    Follow up: As planned above. Today, I  personally spent 36 minutes in direct face to face time with the patient, of which greater than 50% of the time was spent in patient education and counseling as described above. Approximately 5 minutes were spent on indirect care associated with the patient's consultation including but not limited to review of: patient medical records to date, clinic visits, hospital records, lab results, imaging studies, procedural documentation, and coordinating care with other providers. The findings from this review are summarized in the above note. All of the above accounted for a cumulative time of 41 minutes and was performed on the date of service.       HPI: 59 year old female with PMH of rheumatoid arthritis on Hyrimoz, class II obesity, paroxymal atrial fibrillation, who is s/p CCY presenting to GI clinic for dysphagia    Patient was last seen with our Gen GI team by Radha Dsouza PA-C in 2022. Today is my first meet with the patient.     4/5/2022 Radha Dsouza note   57 year old female with PMH of rheumatoid arthritis on Humiara, class II obesity, paroxymal atrial fibrillation on chronic anticoagulation with DOAC, who is s/p CCY presenting to GI clinic for abdominal pain and consideration of EGD.     Maritza underwent D+C March 3, 2022 for menorrhagia, and shortly after, developed epigastric pain.  She describes that initially, it felt like food was getting stuck low in the substernal area with most everything she ate, which would then regurgitate as an undigested, somewhat sour bolus. This then progressed to a non-radiating, intermittent epigastric pain that feels like getting punched in the stomach. It relably occurs with fating, and improves if she eats something. It can also occur if she eats a larger volume of food. She denies any significant GERD symptoms, though she was having some occasional episodes at start of symptoms. No odynophagia. She has nausea that has been intermittent and random in occurrence, not  always related to the epigastric pain. She is using Zofran for this. She states that until these symptoms began, she had regular bowel movements 2-3 times per day without any associated concerns, but now is having bowel movements every 2-3 days, associated with hard, dry stools, incomplete evacuation, and straining. No BRBPR. She states that at first note of epigastric pain, there were some dark stools, which have not recurred.     Work-up has included a CT AP, which was unremarkable. H pylori stool antigen negative (on PPI for several days). CBC with mild anemia and new mild leukocytosis. CMP, lipase, TSH, lactic acid unremarkable. She was started on PPI 40 daily with sucralfate. This has resulted in mild improvement in symptoms. She is still experiencing the epigastric discomfort with fasting, which can awaken her from sleep, and continues to have a sense of possible dysphagia to solid textures 2-3 times per week, which feels stuck for 10-15 minutes and then either clears or regurgitates as undigested, sour bolus.      Colonoscopy 2/2021 - repeat 3 years for 1 sessile serrated adenoma     Does not take NSAID     FHx - mom with Crohn's with ileostomy, multiple autoimmune conditions in family     7/26/2022 with Radha Dsouza, PAC  She underwent EGD May 2022, which was endoscopically unremarkable. Gastric biopsies with mild reactive gastropathy with mild chronic inactive gastritis with negative biopsies for H pylori. Duodenal biopsies unremarkable. Esophageal biopsies were unremarkable.     Since last visit, she underwent ablation for A fib. She has had minimal epigastric pain since initial consult and continuation of PPI. She has occasional nausea, which she relates to the Eliquis, as it started in conjunction with initiation of that medication. She used MiraLAX for several weeks with normalization of bowel habits and has been able to successfully stop MiraLAX with ongoing satisfactory bowel habits, no BRBPR or  melena. She has had no A fib episodes. Dysphagia also significantly improved, now maybe once per week to pills and clears more rapidly than previous. With resolution of A fib episodes, she has been able to be more active with exercise, and has also made some dietary changes.    12/30/24 with me  She returned to Gen GI for sensation of indigestion and food sticking x 1 mo. Denies changes to daily routine in past month to include changes in diet, lifestyle, meds, etc.She had an EGD 5/2022 for dysphagia - esophageal biopsies were negative   -does have a lump in back of throat with swallows but no trouble with initiation of a swallow  -daily heartburn sx, retrosternal pressure (ER evaluation - 12/17/24 -- troponin negative )  -having reports episodes of regurgitation of foods and acid /metallic taste. No bloody outpt. Does have trouble with nausea that worsened in past month. Occasional vomiting - does have zofran but not needed it in a while.   -no trouble with swallowing  liquids or pills dysphagia   -taking daily Prilosec 40 mg, she takes this med on empty stomach   -no abd pain, particularly to epigastric region  does have PND and had seen ENT previously. Given nasal spray for treatment which she feels it helped   On Hyrimoz no NSAIDs. Does take APAP for pain relief PRN.   No Etoh. Never smoker.   Appetite is variable. Weight is overall stable for her. She is planning to schedule an appt with dietary to review diet/lifestyle. She is not interested in medications at this time. She is working with [employer] on this.   Stooling - 1 BM in AM/PM.  Formed and soft. No bloody or black stools.   Not on blood thinners    S/P CCY  Mom with Crohn's.     PAST MEDICAL HISTORY:  Past Medical History:   Diagnosis Date     Anemia      Antiplatelet or antithrombotic long-term use      Arrhythmia      Cannabis use without complication 12/08/2014     Carpal tunnel syndrome      Coronary artery disease      Depressive disorder 2014      Diabetes mellitus, type 2 (H) 12/13/2023     Gastroesophageal reflux disease      History of angina      Hypertension      Irregular heart beat      Obesity      Paroxysmal atrial fibrillation (H)      PTSD (post-traumatic stress disorder)      RA (rheumatoid arthritis) (H)      Rheumatoid arthritis (H) 07/08/2016     Sicca syndrome (H)      Sleep apnea        PREVIOUS ABDOMINAL/GYNECOLOGIC SURGERIES:  Past Surgical History:   Procedure Laterality Date     ABDOMEN SURGERY       CHOLECYSTECTOMY       CYSTOSCOPY N/A 05/04/2023    Procedure: AND CYSTOSCOPY;  Surgeon: Irma Cary MD;  Location: [facility] Main OR     DAVINCI HYSTERECTOMY TOTAL Bilateral 05/04/2023    Procedure: ROBOTIC ASSISTED TOTAL LAPAROSCOPIC HYSTERECTOMY WITH BILATERAL SALPINGECTOMY;  Surgeon: Irma Cary MD;  Location: [facility] Main OR     DILATION AND CURETTAGE, OPERATIVE HYSTEROSCOPY, COMBINED N/A 03/03/2022    Procedure: HYSTEROSCOPY, WITH DILATION AND CURETTAGE;  Surgeon: Irma Cary MD;  Location: [facility] Main OR     EP ABLATION FOCAL AFIB N/A 06/30/2022    Procedure: Ablation Atrial Fibrilation;  Surgeon: Jose Guadalupe Joseph MD;  Location:  [facility]     HC REMOVAL GALLBLADDER      Description: Cholecystectomy;  Recorded: 10/15/2013;     LAPAROSCOPIC TUBAL LIGATION       RELEASE CARPAL TUNNEL BILATERAL       TUBAL LIGATION  01/01/1995         PERTINENT MEDICATIONS:  Current Outpatient Medications   Medication Sig Dispense Refill     diltiazem ER (TIAZAC) 360 MG 24 hr ER beaded capsule Take 1 capsule (360 mg) by mouth daily. 90 capsule 1     EPINEPHrine (ANY BX GENERIC EQUIV) 0.3 MG/0.3ML injection 2-pack Inject 0.3 mLs (0.3 mg) into the muscle as needed for anaphylaxis 2 each 2     fexofenadine (ALLEGRA) 60 MG tablet Take 1 tablet (60 mg) by mouth daily. 30 tablet 4     fluticasone (FLONASE) 50 MCG/ACT nasal spray Spray 2 sprays into both nostrils daily. 16 g 11     gabapentin (NEURONTIN)  100 MG capsule Take 1 capsule (100 mg) by mouth at bedtime 30 capsule 2     HYRIMOZ 40 MG/0.4ML auto-injector kit Inject 40 mg subcutaneously every 14 days.       LORazepam (ATIVAN) 0.5 MG tablet TAKE 1 TABLET(0.5 MG) BY MOUTH DAILY AS NEEDED FOR ANXIETY 30 tablet 0     metFORMIN (GLUCOPHAGE XR) 500 MG 24 hr tablet Take 1 tablet (500 mg) by mouth daily (with dinner). 90 tablet 2     Multiple Vitamins-Minerals (CENTROVITE) TABS Take 1 tablet by mouth daily       omeprazole (PRILOSEC) 40 MG DR capsule Take 1 capsule (40 mg) by mouth daily. 90 capsule 3     ondansetron (ZOFRAN ODT) 4 MG ODT tab Take 1 tablet (4 mg) by mouth every 8 hours as needed for nausea 30 tablet 0     pravastatin (PRAVACHOL) 20 MG tablet Take 1 tablet (20 mg) by mouth daily. 90 tablet 1     vitamin D3 (CHOLECALCIFEROL) 1.25 MG (28483 UT) capsule Take 1 capsule (50,000 Units) by mouth every 7 days 12 capsule 3         SOCIAL HISTORY:  Social History     Socioeconomic History     Marital status: Single     Spouse name: Not on file     Number of children: 2     Years of education: 4     Highest education level: Not on file   Occupational History     Not on file   Tobacco Use     Smoking status: Never     Passive exposure: Past (mom smoked)     Smokeless tobacco: Never   Vaping Use     Vaping status: Never Used   Substance and Sexual Activity     Alcohol use: Not Currently     Comment: Alcoholic Drinks/day: Never an issue, she states.  7/8/16     Drug use: Not Currently     Types: Marijuana     Comment: Drug use: Former marijuana use, not current. 7/8/16     Sexual activity: Not Currently     Partners: Female, Male     Birth control/protection: None     Comment: tubal ligation   Other Topics Concern     Parent/sibling w/ CABG, MI or angioplasty before 65F 55M? No   Social History Narrative     Not on file     Social Drivers of Health     Financial Resource Strain: Low Risk  (12/3/2024)    Financial Resource Strain      Within the past 12 months, have  you or your family members you live with been unable to get utilities (heat, electricity) when it was really needed?: No   Food Insecurity: High Risk (12/3/2024)    Food Insecurity      Within the past 12 months, did you worry that your food would run out before you got money to buy more?: Yes      Within the past 12 months, did the food you bought just not last and you didn t have money to get more?: No   Transportation Needs: Low Risk  (12/3/2024)    Transportation Needs      Within the past 12 months, has lack of transportation kept you from medical appointments, getting your medicines, non-medical meetings or appointments, work, or from getting things that you need?: No   Physical Activity: Insufficiently Active (12/3/2024)    Exercise Vital Sign      Days of Exercise per Week: 3 days      Minutes of Exercise per Session: 20 min   Stress: Stress Concern Present (12/3/2024)    [employer] - Occupational Stress Questionnaire      Feeling of Stress : To some extent   Social Connections: Moderately Integrated (12/3/2024)    Social Connection and Isolation Panel [NHANES]      Frequency of Communication with Friends and Family: Three times a week      Frequency of Social Gatherings with Friends and Family: Once a week      Attends Gnosticist Services: More than 4 times per year      Active Member of Clubs or Organizations: Yes      Attends Club or Organization Meetings: More than 4 times per year      Marital Status: Never    Interpersonal Safety: Low Risk  (11/27/2024)    Interpersonal Safety      Do you feel physically and emotionally safe where you currently live?: Yes      Within the past 12 months, have you been hit, slapped, kicked or otherwise physically hurt by someone?: No      Within the past 12 months, have you been humiliated or emotionally abused in other ways by your partner or ex-partner?: No   Housing Stability: High Risk (12/3/2024)    Housing Stability      Do you  have housing? : No      Are you worried about losing your housing?: No       FAMILY HISTORY:  Family History   Problem Relation Age of Onset     Crohn's Disease Mother         Total colectomy with ileostomy.     Atrial fibrillation Mother      Snoring Father      Diabetes Father      Prostate Cancer Father      Chronic Kidney Disease Father         Chose against dialysis.     Substance Abuse Brother      Depression Brother      Attention Deficit Disorder Brother      Alcoholism Brother      Arrhythmia Brother      Alcoholism Brother      Substance Abuse Brother      Arrhythmia Brother      Alcoholism Maternal Grandfather      Substance Abuse Maternal Grandfather      No Known Problems Daughter      No Known Problems Son      Lupus Cousin      Breast Cancer No family hx of        PHYSICAL EXAMINATION:  Vitals reviewed: LMP  (LMP Unknown)   Wt:   Wt Readings from Last 2 Encounters:   12/17/24 132.6 kg (292 lb 6.4 oz)   11/27/24 131 kg (288 lb 11.2 oz)      Constitutional: aaox3, cooperative, pleasant, not dyspneic/diaphoretic, no acute distress  Eyes: Sclera anicteric/injected  Ears/nose/mouth/throat: hearing intact  Respiratory: Unlabored breathing  Abd:  Nondistended,  nontender, no peritoneal signs  Skin: warm, perfused, no jaundice  Psych: Normal affect  MSK: Normal gait     PERTINENT STUDIES - Reviewed in EMR     Lab Results   Component Value Date    WBC 11.0 12/17/2024    WBC 10.0 11/27/2024    WBC 9.5 08/15/2024    HGB 11.2 (L) 12/17/2024    HGB 10.9 (L) 11/27/2024    HGB 10.9 (L) 08/15/2024     12/17/2024     11/27/2024     08/15/2024    CHOL 188 11/27/2024    CHOL 203 (H) 03/27/2024    CHOL 166 03/22/2023    TRIG 106 11/27/2024    TRIG 156 (H) 03/27/2024    TRIG 89 03/22/2023    HDL 46 (L) 11/27/2024    HDL 50 03/27/2024    HDL 44 (L) 03/22/2023    ALT 19 12/17/2024    ALT 15 11/27/2024    ALT 15 08/15/2024    AST 18 12/17/2024    AST 19 11/27/2024    AST 13 07/15/2024      12/17/2024     11/27/2024     07/28/2024    BUN 12.1 12/17/2024    BUN 15.5 11/27/2024    BUN 16.6 07/28/2024    CO2 28 12/17/2024    CO2 27 11/27/2024    CO2 24 07/28/2024    TSH 3.05 12/17/2024    TSH 2.16 02/16/2022    TSH 2.38 01/17/2022    INR 1.04 08/11/2023    INR 1.13 12/15/2022    INR 1.02 12/08/2022        Liver Function Studies -   Recent Labs   Lab Test 12/17/24 2043   PROTTOTAL 7.9   ALBUMIN 4.0   BILITOTAL <0.2   ALKPHOS 112   AST 18   ALT 19        PREVIOUS ENDOSCOPY    No results found for this or any previous visit.      Again, thank you for allowing me to participate in the care of your patient.        Sincerely,        Mery Serrano PA-C    Electronically signed

## 2024-12-31 ENCOUNTER — HOSPITAL ENCOUNTER (OUTPATIENT)
Facility: AMBULATORY SURGERY CENTER | Age: 59
End: 2024-12-31
Attending: INTERNAL MEDICINE
Payer: COMMERCIAL

## 2024-12-31 ENCOUNTER — TELEPHONE (OUTPATIENT)
Dept: GASTROENTEROLOGY | Facility: CLINIC | Age: 59
End: 2024-12-31

## 2024-12-31 ENCOUNTER — ANCILLARY PROCEDURE (OUTPATIENT)
Dept: GENERAL RADIOLOGY | Facility: CLINIC | Age: 59
End: 2024-12-31
Attending: PHYSICIAN ASSISTANT
Payer: COMMERCIAL

## 2024-12-31 DIAGNOSIS — R13.19 ESOPHAGEAL DYSPHAGIA: ICD-10-CM

## 2024-12-31 PROCEDURE — 74220 X-RAY XM ESOPHAGUS 1CNTRST: CPT | Mod: GC | Performed by: RADIOLOGY

## 2024-12-31 NOTE — TELEPHONE ENCOUNTER
"Endoscopy Scheduling Screen    Have you had any respiratory illness or flu-like symptoms in the last 10 days?  No    What is your communication preference for Instructions and/or Bowel Prep?   MyChart    What insurance is in the chart?  Other:  [employer]    Ordering/Referring Provider: Mery Serrano   (If ordering provider performs procedure, schedule with ordering provider unless otherwise instructed. )    BMI: Estimated body mass index is 42.5 kg/m  as calculated from the following:    Height as of 12/30/24: 1.753 m (5' 9\").    Weight as of 12/30/24: 130.5 kg (287 lb 12.8 oz).     Sedation Ordered  MAC/deep sedation.   BMI<= 45 45 < BMI <= 48 48 < BMI < = 50  BMI > 50   No Restrictions No [facility]  No [facility]  [facility] with exceptions Hospital Only OR Only       Do you have a history of malignant hyperthermia?  No    (Females) Are you currently pregnant?   No     Have you been diagnosed or told you have pulmonary hypertension?   No    Do you have an LVAD?  No    Have you been told you have moderate to severe sleep apnea?  No.    Have you been told you have COPD, asthma, or any other lung disease?  No    Do you have any heart conditions?  Yes     In the past year, have you had any hospitalizations for heart related issues including cardiomyopathy, heart attack, or stent placement?  No    Do you have any implantable devices in your body (pacemaker, ICD)?  No    Do you take nitroglycerine?  No    Have you ever had or are you waiting for an organ transplant?  No. Continue scheduling, no site restrictions.    Have you had a stroke or transient ischemic attack (TIA aka \"mini stroke\" in the last 6 months?   No    Have you been diagnosed with or been told you have cirrhosis of the liver?   No.    Are you currently on dialysis?   No    Do you need assistance transferring?   No    BMI: Estimated body mass index is 42.5 kg/m  as calculated from the following:    Height as of 12/30/24: 1.753 m (5' 9\").    Weight as of 12/30/24: 130.5 " kg (287 lb 12.8 oz).     Is patients BMI > 40 and scheduling location [facility]?  No    Do you take an injectable or oral medication for weight loss or diabetes (excluding insulin)?  No    Do you take the medication Naltrexone?  No    Do you take blood thinners?  No       Prep   Are you currently on dialysis or do you have chronic kidney disease?  No    Do you have a diagnosis of diabetes?  No    Do you have a diagnosis of cystic fibrosis (CF)?  No    On a regular basis do you go 3 -5 days between bowel movements?  No    BMI > 40?  No    Preferred Pharmacy:    [facility] - [place], [place] - [address]  [address]  [address]  [facility] [address]  Phone: [phone] Fax: [phone]    [id number] - [place], [place] - [address]  [address]  [facility] [address]  Phone: [phone] Fax: [phone]      Final Scheduling Details     Procedure scheduled  Upper endoscopy (EGD)    Surgeon:  Fitz     Date of procedure:  2/5/25     Pre-OP / PAC:   No - Not required for this site.    Location  [facility] - ASC - Patient preference.    Sedation   MAC/Deep Sedation - Per order.      Patient Reminders:   You will receive a call from a Nurse to review instructions and health history.  This assessment must be completed prior to your procedure.  Failure to complete the Nurse assessment may result in the procedure being cancelled.      On the day of your procedure, please designate an adult(s) who can drive you home stay with you for the next 24 hours. The medicines used in the exam will make you sleepy. You will not be able to drive.      You cannot take public transportation, ride share services, or non-medical taxi service without a responsible caregiver.  Medical transport services are allowed with the requirement that a responsible caregiver will receive you at your destination.  We require that drivers and caregivers are confirmed prior  to your procedure.

## 2025-01-03 ENCOUNTER — APPOINTMENT (OUTPATIENT)
Dept: RADIOLOGY | Facility: HOSPITAL | Age: 60
End: 2025-01-03
Attending: EMERGENCY MEDICINE
Payer: COMMERCIAL

## 2025-01-03 PROCEDURE — 71046 X-RAY EXAM CHEST 2 VIEWS: CPT

## 2025-01-06 ENCOUNTER — OFFICE VISIT (OUTPATIENT)
Dept: CARDIOLOGY | Facility: CLINIC | Age: 60
End: 2025-01-06
Attending: EMERGENCY MEDICINE
Payer: COMMERCIAL

## 2025-01-06 VITALS
HEIGHT: 69 IN | DIASTOLIC BLOOD PRESSURE: 90 MMHG | BODY MASS INDEX: 42.51 KG/M2 | WEIGHT: 287 LBS | HEART RATE: 84 BPM | RESPIRATION RATE: 16 BRPM | SYSTOLIC BLOOD PRESSURE: 160 MMHG

## 2025-01-06 DIAGNOSIS — R00.2 PALPITATIONS: Primary | ICD-10-CM

## 2025-01-06 DIAGNOSIS — I10 PRIMARY HYPERTENSION: ICD-10-CM

## 2025-01-06 DIAGNOSIS — I48.0 PAROXYSMAL ATRIAL FIBRILLATION (H): ICD-10-CM

## 2025-01-06 DIAGNOSIS — E78.5 HYPERLIPIDEMIA LDL GOAL <70: ICD-10-CM

## 2025-01-06 PROCEDURE — G2211 COMPLEX E/M VISIT ADD ON: HCPCS | Performed by: INTERNAL MEDICINE

## 2025-01-06 PROCEDURE — 99204 OFFICE O/P NEW MOD 45 MIN: CPT | Performed by: INTERNAL MEDICINE

## 2025-01-06 RX ORDER — LISINOPRIL 5 MG/1
5 TABLET ORAL DAILY
Qty: 90 TABLET | Refills: 3 | Status: SHIPPED | OUTPATIENT
Start: 2025-01-06

## 2025-01-06 NOTE — LETTER
1/6/2025    Estelle Bansal MD  [address]  [facility] [address]    RE: Alina NAIK Jayshree       Dear Colleague,     I had the pleasure of seeing Alina Martell in the [facility].    HEART CARE FOLLOW UP NOTE      [facility]  [phone]      Assessment/Recommendations   Assessment: 59 year old female with palpitations, PACs, PVCs    Plan:  Palpitations, PACs, PVCs  Suspect the ectopy is the source of her symptoms, not recurrent atrial fibrillation.  Reviewed diagnosis, treatment and evaluation options      -14 day Zio      -Continue Diltiazem 360mg      -If Zio shows atrial fibrillation can refer back to EP (Dr Joseph), if PACs and PVCs can increase Diltiazem or add beta blockers     Atrial fibrillation, paroxysmal   S/p PVI 6/2022 with no evidence of recurrence, though having palpitations.  [id number] = 3      -Off anticoagulation due to nose bleeds and borderline stroke risk.  Would resume of recurrent atrial fibrillation       -Continue Diltiazem 360mg      -Zio as above      HTN  High      -Continue Diltiazem 360mg       -Add Lisinopril 5mg     Hyperlipidemia   LDL = 121 on no medications, PCP advised Pravachol but she has not started yet        -Consider change Pravachol 20mg to Lipitor 20mg for moderate intensity statin with diabetes     The longitudinal plan of care for the diagnosis(es)/condition(s) as documented were addressed during this visit. Due to the added complexity in care, I will continue to support Maritza in the subsequent management and with ongoing continuity of care.          History of Present Illness/Subjective    Indication for visit:  Alina Martell returns for urgent follow up of palpitations, PACs, PVCs and was last seen on 10/2/2024 by Dr Richter for atrial fibrillation.      HPI: Alina Martell is a 59 year old female with a history of RA, diabetes, HTN, hyperlipidemia, atrial fibrillation s/p PVI, obesity, RANDALL who  "returns for urgent evaluation of palpitations, PACs and PVCs on ECG.  She was seen in the ER 1/3/2025 and advised urgent cardiology evaluation.    She reports a couple weeks of significant fluctuations in BP and HR, feels fatigued, lightheaded, can last hours to all day.  Not exactly the same as her symptoms before when in atrial fibrillation.    I reviewed notes from ER, PCP prior to this visit.         Physical Examination  Past Cardiac History   Vitals: BP (!) 160/90 (BP Location: Right arm, Patient Position: Sitting, Cuff Size: Adult Large)   Pulse 84   Resp 16   Ht 1.753 m (5' 9\")   Wt 130.2 kg (287 lb)   LMP  (LMP Unknown)   BMI 42.38 kg/m    BMI= Body mass index is 42.38 kg/m .  Wt Readings from Last 3 Encounters:   01/06/25 130.2 kg (287 lb)   01/03/25 129.3 kg (285 lb)   12/30/24 130.5 kg (287 lb 12.8 oz)       General Appearance:   no distress, normal body habitus   ENT/Mouth: membranes moist, no oral lesions or bleeding gums.      EYES:  no scleral icterus, normal conjunctivae   Neck: no carotid bruits or thyromegaly   Chest/Lungs:   lungs are clear to auscultation, no rales or wheezing,  sternal scar, equal chest wall expansion    Cardiovascular:   Regular. Normal first and second heart sounds with no murmurs, rubs, or gallops; the carotid, radial and posterior tibial pulses are intact, Jugular venous pressure normal, no edema bilaterally    Abdomen:  no organomegaly, masses, bruits, or tenderness; bowel sounds are present   Extremities: no cyanosis or clubbing   Skin: no xanthelasma, warm.    Neurologic: normal  bilateral, no tremors           Atrial fibrillation, paroxysmal: s/p CDV x 2 and PVI 6/30/2022   PACs, PVCs    Most Recent Echocardiogram: 12/15/2022  Global and regional left ventricular function is normal with an EF of 60-65%.  Global right ventricular function is normal.  No significant valvular abnormalities present.  Pulmonary artery systolic pressure is normal.  The inferior vena " cava is normal.  No pericardial effusion is present.    Most Recent Stress Test: 12/15/2022     The nuclear stress test is negative for inducible myocardial ischemia or infarction.     Left ventricular function is normal.     There is no prior study for comparison.     Partially visualized neck uptake. Ultrasound is recommended.    CTA: 2/22/2022  1.  Normal left main.  2.  Normal LAD and its branches.  3.  Minimal disease in proximal LCx.  4.  Normal RCA and its branches.  Right dominant system.     Most Recent Angiogram: None    ECG (reviewed by myself): NSR 75 bpm, PVCs, PACs           Medical History  Family History Social History   Past Medical History:   Diagnosis Date     Anemia      Antiplatelet or antithrombotic long-term use      Arrhythmia      Cannabis use without complication 12/08/2014     Carpal tunnel syndrome      Coronary artery disease      Depressive disorder 2014     Diabetes mellitus, type 2 (H) 12/13/2023     Gastroesophageal reflux disease      History of angina      Hypertension      Irregular heart beat      Obesity      Paroxysmal atrial fibrillation (H)      PTSD (post-traumatic stress disorder)      RA (rheumatoid arthritis) (H)      Rheumatoid arthritis (H) 07/08/2016     Sicca syndrome (H)      Sleep apnea      Family History   Problem Relation Age of Onset     Crohn's Disease Mother         Total colectomy with ileostomy.     Atrial fibrillation Mother      Snoring Father      Diabetes Father      Prostate Cancer Father      Chronic Kidney Disease Father         Chose against dialysis.     Substance Abuse Brother      Depression Brother      Attention Deficit Disorder Brother      Alcoholism Brother      Arrhythmia Brother      Alcoholism Brother      Substance Abuse Brother      Arrhythmia Brother      Alcoholism Maternal Grandfather      Substance Abuse Maternal Grandfather      No Known Problems Daughter      No Known Problems Son      Lupus Cousin      Breast Cancer No family hx  of         Social History     Socioeconomic History     Marital status: Single     Spouse name: Not on file     Number of children: 2     Years of education: 4     Highest education level: Not on file   Occupational History     Not on file   Tobacco Use     Smoking status: Never     Passive exposure: Past (mom smoked)     Smokeless tobacco: Never   Vaping Use     Vaping status: Never Used   Substance and Sexual Activity     Alcohol use: Not Currently     Comment: Alcoholic Drinks/day: Never an issue, she states.  7/8/16     Drug use: Not Currently     Types: Marijuana     Comment: Drug use: Former marijuana use, not current. 7/8/16     Sexual activity: Not Currently     Partners: Female, Male     Birth control/protection: None     Comment: tubal ligation   Other Topics Concern     Parent/sibling w/ CABG, MI or angioplasty before 65F 55M? No   Social History Narrative     Not on file     Social Drivers of Health     Financial Resource Strain: Low Risk  (12/3/2024)    Financial Resource Strain      Within the past 12 months, have you or your family members you live with been unable to get utilities (heat, electricity) when it was really needed?: No   Food Insecurity: High Risk (12/3/2024)    Food Insecurity      Within the past 12 months, did you worry that your food would run out before you got money to buy more?: Yes      Within the past 12 months, did the food you bought just not last and you didn t have money to get more?: No   Transportation Needs: Low Risk  (12/3/2024)    Transportation Needs      Within the past 12 months, has lack of transportation kept you from medical appointments, getting your medicines, non-medical meetings or appointments, work, or from getting things that you need?: No   Physical Activity: Insufficiently Active (12/3/2024)    Exercise Vital Sign      Days of Exercise per Week: 3 days      Minutes of Exercise per Session: 20 min   Stress: Stress Concern Present (12/3/2024)    [place]  [employer] - Occupational Stress Questionnaire      Feeling of Stress : To some extent   Social Connections: Moderately Integrated (12/3/2024)    Social Connection and Isolation Panel [NHANES]      Frequency of Communication with Friends and Family: Three times a week      Frequency of Social Gatherings with Friends and Family: Once a week      Attends Congregational Services: More than 4 times per year      Active Member of Clubs or Organizations: Yes      Attends Club or Organization Meetings: More than 4 times per year      Marital Status: Never    Interpersonal Safety: Low Risk  (11/27/2024)    Interpersonal Safety      Do you feel physically and emotionally safe where you currently live?: Yes      Within the past 12 months, have you been hit, slapped, kicked or otherwise physically hurt by someone?: No      Within the past 12 months, have you been humiliated or emotionally abused in other ways by your partner or ex-partner?: No   Housing Stability: High Risk (12/3/2024)    Housing Stability      Do you have housing? : No      Are you worried about losing your housing?: No           Medications  Allergies   Current Outpatient Medications   Medication Sig Dispense Refill     diltiazem ER (TIAZAC) 360 MG 24 hr ER beaded capsule Take 1 capsule (360 mg) by mouth daily. 90 capsule 1     EPINEPHrine (ANY BX GENERIC EQUIV) 0.3 MG/0.3ML injection 2-pack Inject 0.3 mLs (0.3 mg) into the muscle as needed for anaphylaxis 2 each 2     fexofenadine (ALLEGRA) 60 MG tablet Take 1 tablet (60 mg) by mouth daily. 30 tablet 4     fluticasone (FLONASE) 50 MCG/ACT nasal spray Spray 2 sprays into both nostrils daily. 16 g 11     gabapentin (NEURONTIN) 100 MG capsule Take 1 capsule (100 mg) by mouth at bedtime 30 capsule 2     HYRIMOZ 40 MG/0.4ML auto-injector kit Inject 40 mg subcutaneously every 14 days.       LORazepam (ATIVAN) 0.5 MG tablet TAKE 1 TABLET(0.5 MG) BY MOUTH DAILY AS NEEDED FOR ANXIETY 30  tablet 0     metFORMIN (GLUCOPHAGE XR) 500 MG 24 hr tablet Take 1 tablet (500 mg) by mouth daily (with dinner). 90 tablet 2     Multiple Vitamins-Minerals (CENTROVITE) TABS Take 1 tablet by mouth daily       omeprazole (PRILOSEC) 40 MG DR capsule Take 1 capsule (40 mg) by mouth daily. 90 capsule 3     ondansetron (ZOFRAN ODT) 4 MG ODT tab Take 1 tablet (4 mg) by mouth every 8 hours as needed for nausea 30 tablet 0     pravastatin (PRAVACHOL) 20 MG tablet Take 1 tablet (20 mg) by mouth daily. 90 tablet 1     vitamin D3 (CHOLECALCIFEROL) 1.25 MG (56428 UT) capsule Take 1 capsule (50,000 Units) by mouth every 7 days 12 capsule 3       Allergies   Allergen Reactions     Penicillins Shortness Of Breath, Palpitations, Dizziness, Anaphylaxis, Hives, Itching and Rash     Test in 2031, see allergy note on 7/29/21     Shellfish-Derived Products Anaphylaxis     Sulfa Antibiotics Hives and Anaphylaxis          Lab Results    Chemistry/lipid CBC Cardiac Enzymes/BNP/TSH/INR   Recent Labs   Lab Test 11/27/24  0730   CHOL 188   HDL 46*   *   TRIG 106     Recent Labs   Lab Test 11/27/24  0730 03/27/24  1001 03/22/23  0817   * 122* 104*     Recent Labs   Lab Test 01/03/25  1755      POTASSIUM 4.1   CHLORIDE 100   CO2 26   *   BUN 16.2   CR 0.86   GFRESTIMATED 78   JOSSELIN 9.6     Recent Labs   Lab Test 01/03/25  1755 12/17/24  2043 11/27/24  0730   CR 0.86 0.83 0.78     Recent Labs   Lab Test 11/27/24  0730 07/24/24  1152 03/27/24  1001   A1C 6.2* 6.3* 6.3*          Recent Labs   Lab Test 01/03/25  1755   WBC 13.9*   HGB 11.7   HCT 38.5   MCV 80        Recent Labs   Lab Test 01/03/25  1755 12/17/24  2043 11/27/24  0730   HGB 11.7 11.2* 10.9*    Recent Labs   Lab Test 03/08/22  2025 01/17/22  1406 01/16/22  2301   TROPONINI <0.01 <0.01 <0.01     Recent Labs   Lab Test 12/17/24 2043 03/11/23  2059   NTBNPI <36 <36     Recent Labs   Lab Test 01/03/25  1755   TSH 2.37     Recent Labs   Lab Test  08/11/23  0128 12/15/22  0702 12/08/22  1951   INR 1.04 1.13 1.02        Meghana Tafoya MD  Noninvasive Cardiologist   [facility]                                        Thank you for allowing me to participate in the care of your patient.      Sincerely,     Meghana Tafoya MD     [facility]  cc:   Iman Ponce MD  [address] [place] [address]       - - -

## 2025-01-06 NOTE — PROGRESS NOTES
HEART CARE FOLLOW UP NOTE      [facility]  [phone]      Assessment/Recommendations   Assessment: 59 year old female with palpitations, PACs, PVCs    Plan:  Palpitations, PACs, PVCs  Suspect the ectopy is the source of her symptoms, not recurrent atrial fibrillation.  Reviewed diagnosis, treatment and evaluation options      -14 day Zio      -Continue Diltiazem 360mg      -If Zio shows atrial fibrillation can refer back to EP (Dr Joseph), if PACs and PVCs can increase Diltiazem or add beta blockers     Atrial fibrillation, paroxysmal   S/p PVI 6/2022 with no evidence of recurrence, though having palpitations.  [id number] = 3      -Off anticoagulation due to nose bleeds and borderline stroke risk.  Would resume of recurrent atrial fibrillation       -Continue Diltiazem 360mg      -Zio as above      HTN  High      -Continue Diltiazem 360mg       -Add Lisinopril 5mg     Hyperlipidemia   LDL = 121 on no medications, PCP advised Pravachol but she has not started yet        -Consider change Pravachol 20mg to Lipitor 20mg for moderate intensity statin with diabetes     The longitudinal plan of care for the diagnosis(es)/condition(s) as documented were addressed during this visit. Due to the added complexity in care, I will continue to support Maritza in the subsequent management and with ongoing continuity of care.          History of Present Illness/Subjective    Indication for visit:  Alina Martell returns for urgent follow up of palpitations, PACs, PVCs and was last seen on 10/2/2024 by Dr Richter for atrial fibrillation.      HPI: Alina Martell is a 59 year old female with a history of RA, diabetes, HTN, hyperlipidemia, atrial fibrillation s/p PVI, obesity, RANDALL who returns for urgent evaluation of palpitations, PACs and PVCs on ECG.  She was seen in the ER 1/3/2025 and advised urgent cardiology evaluation.    She reports a couple weeks of significant fluctuations in BP and HR,  "feels fatigued, lightheaded, can last hours to all day.  Not exactly the same as her symptoms before when in atrial fibrillation.    I reviewed notes from ER, PCP prior to this visit.         Physical Examination  Past Cardiac History   Vitals: BP (!) 160/90 (BP Location: Right arm, Patient Position: Sitting, Cuff Size: Adult Large)   Pulse 84   Resp 16   Ht 1.753 m (5' 9\")   Wt 130.2 kg (287 lb)   LMP  (LMP Unknown)   BMI 42.38 kg/m    BMI= Body mass index is 42.38 kg/m .  Wt Readings from Last 3 Encounters:   01/06/25 130.2 kg (287 lb)   01/03/25 129.3 kg (285 lb)   12/30/24 130.5 kg (287 lb 12.8 oz)       General Appearance:   no distress, normal body habitus   ENT/Mouth: membranes moist, no oral lesions or bleeding gums.      EYES:  no scleral icterus, normal conjunctivae   Neck: no carotid bruits or thyromegaly   Chest/Lungs:   lungs are clear to auscultation, no rales or wheezing,  sternal scar, equal chest wall expansion    Cardiovascular:   Regular. Normal first and second heart sounds with no murmurs, rubs, or gallops; the carotid, radial and posterior tibial pulses are intact, Jugular venous pressure normal, no edema bilaterally    Abdomen:  no organomegaly, masses, bruits, or tenderness; bowel sounds are present   Extremities: no cyanosis or clubbing   Skin: no xanthelasma, warm.    Neurologic: normal  bilateral, no tremors           Atrial fibrillation, paroxysmal: s/p CDV x 2 and PVI 6/30/2022   PACs, PVCs    Most Recent Echocardiogram: 12/15/2022  Global and regional left ventricular function is normal with an EF of 60-65%.  Global right ventricular function is normal.  No significant valvular abnormalities present.  Pulmonary artery systolic pressure is normal.  The inferior vena cava is normal.  No pericardial effusion is present.    Most Recent Stress Test: 12/15/2022    The nuclear stress test is negative for inducible myocardial ischemia or infarction.    Left ventricular function is " normal.    There is no prior study for comparison.    Partially visualized neck uptake. Ultrasound is recommended.    CTA: 2/22/2022  1.  Normal left main.  2.  Normal LAD and its branches.  3.  Minimal disease in proximal LCx.  4.  Normal RCA and its branches.  Right dominant system.     Most Recent Angiogram: None    ECG (reviewed by myself): NSR 75 bpm, PVCs, PACs           Medical History  Family History Social History   Past Medical History:   Diagnosis Date    Anemia     Antiplatelet or antithrombotic long-term use     Arrhythmia     Cannabis use without complication 12/08/2014    Carpal tunnel syndrome     Coronary artery disease     Depressive disorder 2014    Diabetes mellitus, type 2 (H) 12/13/2023    Gastroesophageal reflux disease     History of angina     Hypertension     Irregular heart beat     Obesity     Paroxysmal atrial fibrillation (H)     PTSD (post-traumatic stress disorder)     RA (rheumatoid arthritis) (H)     Rheumatoid arthritis (H) 07/08/2016    Sicca syndrome (H)     Sleep apnea      Family History   Problem Relation Age of Onset    Crohn's Disease Mother         Total colectomy with ileostomy.    Atrial fibrillation Mother     Snoring Father     Diabetes Father     Prostate Cancer Father     Chronic Kidney Disease Father         Chose against dialysis.    Substance Abuse Brother     Depression Brother     Attention Deficit Disorder Brother     Alcoholism Brother     Arrhythmia Brother     Alcoholism Brother     Substance Abuse Brother     Arrhythmia Brother     Alcoholism Maternal Grandfather     Substance Abuse Maternal Grandfather     No Known Problems Daughter     No Known Problems Son     Lupus Cousin     Breast Cancer No family hx of         Social History     Socioeconomic History    Marital status: Single     Spouse name: Not on file    Number of children: 2    Years of education: 4    Highest education level: Not on file   Occupational History    Not on file   Tobacco Use     Smoking status: Never     Passive exposure: Past (mom smoked)    Smokeless tobacco: Never   Vaping Use    Vaping status: Never Used   Substance and Sexual Activity    Alcohol use: Not Currently     Comment: Alcoholic Drinks/day: Never an issue, she states.  7/8/16    Drug use: Not Currently     Types: Marijuana     Comment: Drug use: Former marijuana use, not current. 7/8/16    Sexual activity: Not Currently     Partners: Female, Male     Birth control/protection: None     Comment: tubal ligation   Other Topics Concern    Parent/sibling w/ CABG, MI or angioplasty before 65F 55M? No   Social History Narrative    Not on file     Social Drivers of Health     Financial Resource Strain: Low Risk  (12/3/2024)    Financial Resource Strain     Within the past 12 months, have you or your family members you live with been unable to get utilities (heat, electricity) when it was really needed?: No   Food Insecurity: High Risk (12/3/2024)    Food Insecurity     Within the past 12 months, did you worry that your food would run out before you got money to buy more?: Yes     Within the past 12 months, did the food you bought just not last and you didn t have money to get more?: No   Transportation Needs: Low Risk  (12/3/2024)    Transportation Needs     Within the past 12 months, has lack of transportation kept you from medical appointments, getting your medicines, non-medical meetings or appointments, work, or from getting things that you need?: No   Physical Activity: Insufficiently Active (12/3/2024)    Exercise Vital Sign     Days of Exercise per Week: 3 days     Minutes of Exercise per Session: 20 min   Stress: Stress Concern Present (12/3/2024)    [employer] [place] [employer] - Occupational Stress Questionnaire     Feeling of Stress : To some extent   Social Connections: Moderately Integrated (12/3/2024)    Social Connection and Isolation Panel [NHANES]     Frequency of Communication with Friends and Family: Three  times a week     Frequency of Social Gatherings with Friends and Family: Once a week     Attends [employer] Services: More than 4 times per year     Active Member of Clubs or Organizations: Yes     Attends Club or Organization Meetings: More than 4 times per year     Marital Status: Never    Interpersonal Safety: Low Risk  (11/27/2024)    Interpersonal Safety     Do you feel physically and emotionally safe where you currently live?: Yes     Within the past 12 months, have you been hit, slapped, kicked or otherwise physically hurt by someone?: No     Within the past 12 months, have you been humiliated or emotionally abused in other ways by your partner or ex-partner?: No   Housing Stability: High Risk (12/3/2024)    Housing Stability     Do you have housing? : No     Are you worried about losing your housing?: No           Medications  Allergies   Current Outpatient Medications   Medication Sig Dispense Refill    diltiazem ER (TIAZAC) 360 MG 24 hr ER beaded capsule Take 1 capsule (360 mg) by mouth daily. 90 capsule 1    EPINEPHrine (ANY BX GENERIC EQUIV) 0.3 MG/0.3ML injection 2-pack Inject 0.3 mLs (0.3 mg) into the muscle as needed for anaphylaxis 2 each 2    fexofenadine (ALLEGRA) 60 MG tablet Take 1 tablet (60 mg) by mouth daily. 30 tablet 4    fluticasone (FLONASE) 50 MCG/ACT nasal spray Spray 2 sprays into both nostrils daily. 16 g 11    gabapentin (NEURONTIN) 100 MG capsule Take 1 capsule (100 mg) by mouth at bedtime 30 capsule 2    HYRIMOZ 40 MG/0.4ML auto-injector kit Inject 40 mg subcutaneously every 14 days.      LORazepam (ATIVAN) 0.5 MG tablet TAKE 1 TABLET(0.5 MG) BY MOUTH DAILY AS NEEDED FOR ANXIETY 30 tablet 0    metFORMIN (GLUCOPHAGE XR) 500 MG 24 hr tablet Take 1 tablet (500 mg) by mouth daily (with dinner). 90 tablet 2    Multiple Vitamins-Minerals (CENTROVITE) TABS Take 1 tablet by mouth daily      omeprazole (PRILOSEC) 40 MG DR capsule Take 1 capsule (40 mg) by mouth daily. 90 capsule 3     ondansetron (ZOFRAN ODT) 4 MG ODT tab Take 1 tablet (4 mg) by mouth every 8 hours as needed for nausea 30 tablet 0    pravastatin (PRAVACHOL) 20 MG tablet Take 1 tablet (20 mg) by mouth daily. 90 tablet 1    vitamin D3 (CHOLECALCIFEROL) 1.25 MG (87580 UT) capsule Take 1 capsule (50,000 Units) by mouth every 7 days 12 capsule 3       Allergies   Allergen Reactions    Penicillins Shortness Of Breath, Palpitations, Dizziness, Anaphylaxis, Hives, Itching and Rash     Test in 2031, see allergy note on 7/29/21    Shellfish-Derived Products Anaphylaxis    Sulfa Antibiotics Hives and Anaphylaxis          Lab Results    Chemistry/lipid CBC Cardiac Enzymes/BNP/TSH/INR   Recent Labs   Lab Test 11/27/24  0730   CHOL 188   HDL 46*   *   TRIG 106     Recent Labs   Lab Test 11/27/24  0730 03/27/24  1001 03/22/23  0817   * 122* 104*     Recent Labs   Lab Test 01/03/25  1755      POTASSIUM 4.1   CHLORIDE 100   CO2 26   *   BUN 16.2   CR 0.86   GFRESTIMATED 78   JOSSELIN 9.6     Recent Labs   Lab Test 01/03/25  1755 12/17/24  2043 11/27/24  0730   CR 0.86 0.83 0.78     Recent Labs   Lab Test 11/27/24  0730 07/24/24  1152 03/27/24  1001   A1C 6.2* 6.3* 6.3*          Recent Labs   Lab Test 01/03/25  1755   WBC 13.9*   HGB 11.7   HCT 38.5   MCV 80        Recent Labs   Lab Test 01/03/25  1755 12/17/24  2043 11/27/24  0730   HGB 11.7 11.2* 10.9*    Recent Labs   Lab Test 03/08/22  2025 01/17/22  1406 01/16/22  2301   TROPONINI <0.01 <0.01 <0.01     Recent Labs   Lab Test 12/17/24 2043 03/11/23  2059   NTBNPI <36 <36     Recent Labs   Lab Test 01/03/25  1755   TSH 2.37     Recent Labs   Lab Test 08/11/23  0128 12/15/22  0702 12/08/22  1951   INR 1.04 1.13 1.02        Meghana Tafoya MD  Noninvasive Cardiologist   [facility]

## 2025-01-06 NOTE — PATIENT INSTRUCTIONS
I am not sure at this point if your symptoms are from recurrent atrial fibrillation.  At this point I do see some extra beats called PACs and PVCs.  Will get a heart monitor and if no atrial fibrillation can either increase the Diltiazem or add another medication to treat the PACs and PVCs.  If it is atrial fibrillation will have you see the arrhythmia specialists again.      Your blood pressure has been running high.  Would add Lisinopril 5mg in the evening. This will not only help with high blood pressure but also will protect the kidneys with high blood sugar.

## 2025-01-16 ENCOUNTER — VIRTUAL VISIT (OUTPATIENT)
Dept: PSYCHOLOGY | Facility: CLINIC | Age: 60
End: 2025-01-16
Payer: COMMERCIAL

## 2025-01-16 DIAGNOSIS — F41.1 GAD (GENERALIZED ANXIETY DISORDER): Primary | ICD-10-CM

## 2025-01-16 DIAGNOSIS — F43.10 POSTTRAUMATIC STRESS DISORDER: ICD-10-CM

## 2025-01-16 DIAGNOSIS — F33.1 MODERATE EPISODE OF RECURRENT MAJOR DEPRESSIVE DISORDER (H): ICD-10-CM

## 2025-01-16 ASSESSMENT — ANXIETY QUESTIONNAIRES
IF YOU CHECKED OFF ANY PROBLEMS ON THIS QUESTIONNAIRE, HOW DIFFICULT HAVE THESE PROBLEMS MADE IT FOR YOU TO DO YOUR WORK, TAKE CARE OF THINGS AT HOME, OR GET ALONG WITH OTHER PEOPLE: SOMEWHAT DIFFICULT
1. FEELING NERVOUS, ANXIOUS, OR ON EDGE: NEARLY EVERY DAY
2. NOT BEING ABLE TO STOP OR CONTROL WORRYING: NOT AT ALL
8. IF YOU CHECKED OFF ANY PROBLEMS, HOW DIFFICULT HAVE THESE MADE IT FOR YOU TO DO YOUR WORK, TAKE CARE OF THINGS AT HOME, OR GET ALONG WITH OTHER PEOPLE?: SOMEWHAT DIFFICULT
3. WORRYING TOO MUCH ABOUT DIFFERENT THINGS: SEVERAL DAYS
GAD7 TOTAL SCORE: 8
GAD7 TOTAL SCORE: 8
4. TROUBLE RELAXING: NEARLY EVERY DAY
6. BECOMING EASILY ANNOYED OR IRRITABLE: NOT AT ALL
5. BEING SO RESTLESS THAT IT IS HARD TO SIT STILL: SEVERAL DAYS
7. FEELING AFRAID AS IF SOMETHING AWFUL MIGHT HAPPEN: NOT AT ALL

## 2025-01-16 NOTE — PROGRESS NOTES
[facility]                                     Progress Note    Patient Name: Alina Martell  Date: 1/16/25         Service Type: Individual      Session Start Time: 11:10 Session End Time: 11:58     Session Length: 47 minutes    Session #: 84     Attendees: Client  Service Modality:  Video Visit:     Telemedicine Visit: The patient's condition can be safely assessed and treated via synchronous audio and visual telemedicine encounter.       Reason for Telemedicine Visit: Services only offered telehealth     Originating Site (Patient Location): Patient's place of employment     Distant Location (provider location):  Off-site     Consent:  The patient/guardian has verbally consented to: the potential risks and benefits of telemedicine (video visit) versus in person care; bill my insurance or make self-payment for services provided; and responsibility for payment of non-covered services.      Mode of Communication:  Video Conference via [employer]     As the provider I attest to compliance with applicable laws and regulations related to telemedicine.           DATA          DATA  Extended Session (53+ minutes): No  Interactive Complexity: No  Crisis: No      Progress Since Last Session (Related to Symptoms / Goals / Homework):   Symptoms: Improving setting boundaries and making changes in life    Homework: Achieved / completed to satisfaction      Episode of Care Goals: Satisfactory progress - ACTION (Actively working towards change); Intervened by reinforcing change plan / affirming steps taken     Current / Ongoing Stressors and Concerns:  Patient indicated that she is making positive changes in her life. Patient reported she is working to set boundaries. Patient indicated working on being more honest to people as well. Patient reported she did not want to tell people no so she would come up with an excuse. Patient indicated knowing she needs to focus on her needs. Patient reported  "getting closer to a co-worker and seeing them as a friend now. This therapist challenged patient on barriers to make these changes.      Treatment Objective(s) Addressed in This Session:   use \"worry time\" each day for 15 minutes of scheduled worry and then defer obsessive or anxious thinking until the next structured worry time  Increase interest, engagement, and pleasure in doing things  Decrease frequency and intensity of feeling down, depressed, hopeless  Improve quantity and quality of night time sleep / decrease daytime naps  Identify negative self-talk and behaviors: challenge core beliefs, myths, and actions     Intervention:   Motivational Interviewing    MI Intervention: Reflections: simple and complex     Change Talk Expressed by the Patient: Activation    Provider Response to Change Talk: R - Reflected patient's change talk and S - Summarized patient's change talk statements      Assessments completed prior to visit:  The following assessments were completed by patient for this visit:  PHQ9:       4/3/2024     9:50 AM 5/20/2024     9:50 AM 7/16/2024     4:33 PM 9/5/2024    10:42 AM 10/15/2024     8:47 AM 11/27/2024     7:27 AM 12/30/2024     7:45 AM   PHQ-9 SCORE   PHQ-9 Total Score MyChart 6 (Mild depression) 4 (Minimal depression) 6 (Mild depression) 8 (Mild depression) 9 (Mild depression)     PHQ-9 Total Score 6 4 6 8 9 9 7     GAD7:       10/30/2023     3:23 PM 12/6/2023     9:50 AM 7/16/2024     4:33 PM 8/31/2024     9:27 AM 10/15/2024     8:48 AM 12/2/2024    11:30 AM 1/16/2025     6:27 AM   ADA-7 SCORE   Total Score 5 (mild anxiety) 11 (moderate anxiety) 8 (mild anxiety) 9 (mild anxiety) 11 (moderate anxiety) 6 (mild anxiety) 8 (mild anxiety)   Total Score 5 11 8    8 9 11 6  8        Patient-reported     PROMIS 10-Global Health (all questions and answers displayed):       6/13/2023     6:37 AM 9/26/2023    12:07 PM 1/1/2024    11:44 PM 4/3/2024     9:52 AM 5/20/2024     9:52 AM 8/31/2024     9:29 " AM 12/2/2024    11:31 AM   PROMIS 10   In general, would you say your health is: Good Fair Good Good Good Fair Fair   In general, would you say your quality of life is: Good Good Fair Good Fair Fair Good   In general, how would you rate your physical health? Fair Fair Fair Fair Good Fair Fair   In general, how would you rate your mental health, including your mood and your ability to think? Fair Fair Fair Fair Fair Fair Fair   In general, how would you rate your satisfaction with your social activities and relationships? Good Good Fair Good Good Good Good   In general, please rate how well you carry out your usual social activities and roles Good Good Good Good Good Good Good   To what extent are you able to carry out your everyday physical activities such as walking, climbing stairs, carrying groceries, or moving a chair? Mostly Mostly Mostly Completely Completely Mostly Completely   In the past 7 days, how often have you been bothered by emotional problems such as feeling anxious, depressed, or irritable? Often Often Often Sometimes Sometimes Often Often   In the past 7 days, how would you rate your fatigue on average? Mild Moderate Mild Moderate Moderate Moderate Moderate   In the past 7 days, how would you rate your pain on average, where 0 means no pain, and 10 means worst imaginable pain? 2 3 3 3 3 3 4   In general, would you say your health is: 3 2 3 3 3 2 2   In general, would you say your quality of life is: 3 3 2 3 2 2 3   In general, how would you rate your physical health? 2 2 2 2 3 2 2   In general, how would you rate your mental health, including your mood and your ability to think? 2 2 2 2 2 2 2   In general, how would you rate your satisfaction with your social activities and relationships? 3 3 2 3 3 3 3   In general, please rate how well you carry out your usual social activities and roles. (This includes activities at home, at work and in your community, and responsibilities as a parent, child,  spouse, employee, friend, etc.) 3 3 3 3 3 3 3   To what extent are you able to carry out your everyday physical activities such as walking, climbing stairs, carrying groceries, or moving a chair? 4 4 4 5 5 4 5   In the past 7 days, how often have you been bothered by emotional problems such as feeling anxious, depressed, or irritable? 4 4 4 3 3 4 4   In the past 7 days, how would you rate your fatigue on average? 2 3 2 3 3 3 3   In the past 7 days, how would you rate your pain on average, where 0 means no pain, and 10 means worst imaginable pain? 2 3 3 3 3 3 4   Global Mental Health Score 10 10 8    8 11 10 9 10    Global Physical Health Score 14 13 14    14 14 15 13 13    PROMIS TOTAL - SUBSCORES 24 23 22    22 25 25 22 23        Patient-reported         ASSESSMENT: Current Emotional / Mental Status (status of significant symptoms):   Risk status (Self / Other harm or suicidal ideation)   Patient denies current fears or concerns for personal safety.   Patient denies current or recent suicidal ideation or behaviors.   Patient denies current or recent homicidal ideation or behaviors.   Patient denies current or recent self injurious behavior or ideation.   Patient denies other safety concerns.   Patient reports there has been no change in risk factors since their last session.     Patient reports there has been no change in protective factors since their last session.     Recommended that patient call 911 or go to the local ED should there be a change in any of these risk factors     Appearance:   Appropriate    Eye Contact:   Good    Psychomotor Behavior: Normal    Attitude:   Cooperative    Orientation:   All   Speech    Rate / Production: Normal/ Responsive    Volume:  Normal    Mood:    Anxious    Affect:    Appropriate    Thought Content:  Clear    Thought Form:  Goal Directed  Logical    Insight:    Good      Medication Review:   No changes to current psychiatric medication(s)     Medication  Compliance:   Yes     Changes in Health Issues:   None reported     Chemical Use Review:   Substance Use: Chemical use reviewed, no active concerns identified      Tobacco Use: No current tobacco use.      Diagnosis:  1. ADA (generalized anxiety disorder)    2. Moderate episode of recurrent major depressive disorder (H)    3. Posttraumatic stress disorder        Collateral Reports Completed:   Not Applicable    PLAN: (Patient Tasks / Therapist Tasks / Other)  Patient will return in 3 weeks for scheduled session. Patient will return continue to work on setting boundaries in her life. Patient will work on saying no when feeling overwhelmed. Patient will continue to work on self-care.     There has been demonstrated improvement in functioning while patient has been engaged in psychotherapy/psychological service- if withdrawn the patient would deteriorate and/or relapse.     Mariana Love, [facility]     ______________________________________________________________________    Individual Treatment Plan    Patient's Name: Alina Martell  YOB: 1965    Date of Creation:10/16/2020  Date Treatment Plan Last Reviewed/Revised: 11/5/2024    DSM5 Diagnoses: 296.32 (F33.1) Major Depressive Disorder, Recurrent Episode, Moderate _, 300.02 (F41.1) Generalized Anxiety Disorder, or 309.81 (F43.10) Posttraumatic Stress Disorder (includes Posttraumatic Stress Disorder for Children 6 Years and Younger)  Without dissociative symptoms  Psychosocial / Contextual Factors: Health, family  PROMIS (reviewed every 90 days):     PROMIS-10 Scores  Global Mental Health Score: 8  Global Physical Health Score: 14  PROMIS TOTAL - SUBSCORES: 22     Referral / Collaboration:  Was/were discussed and patient will pursue.    Anticipated number of session for this episode of care: 20 will reevaluate every 90 days  Anticipation frequency of session: Biweekly  Anticipated Duration of each session: 38-52 minutes  Treatment plan will be  reviewed in 90 days or when goals have been changed.       MeasurableTreatment Goal(s) related to diagnosis / functional impairment(s)  Goal 1: Patient will work on reducing overall anxiety.     I will know I've met my goal when reporting minimal anxiety symptoms.       Objective #A (Patient Action)                          Patient will use distraction each time intrusive worry surfaces.  Status: Continued - Date(s): 11/5/2024     Intervention(s)  Therapist will teach emotional regulation skills.  Objective #B  Patient will Decrease frequency and intensity of feeling down, depressed, hopeless.  Status: Continued - Date(s):  11/5/2024     Intervention(s)  Therapist will teach emotional recognition/identification. ..     Objective #C  Patient will Identify negative self-talk and behaviors: challenge core beliefs, myths, and actions.  Status: Continued - Date(s):  11/5/2024     Intervention(s)  Therapist will teach the client how to perform a behavioral chain analysis. ..     Goal 2: Patient will continue to work on reducing depression symptoms    I will know I've met my goal then reporting minimal depression symptoms     Objective #A (Patient Action)                          Patient will Increase interest, engagement, and pleasure in doing things.  Status: Continued - Date(s): 11/5/2024  Intervention(s)  Therapist will assign homework ..     Objective #B  Patient will Decrease frequency and intensity of feeling down, depressed, hopeless.  Status: Continued - Date(s): 11/5/2024  Intervention(s)  Therapist will assign homework at every session.     Goal 3: Client will reduce PTSD symptoms by 50% as evidenced by PCLC-5 score     I will know I've met my goal when not struggling with nightmares.      Objective #A (Client Action)    Client will reduce nightmares.  Status: Continued - Date(s):11/5/2024    Intervention(s)  Therapist will teach nightmare retraining .    Objective #B  Client will compile a list of boundaries  that they would like to set with others.   .    Status: Continued - Date(s):11/5/2024    Intervention(s)  Therapist will assign homework boundary setting .            Patient has reviewed and agreed to the above plan.        Mariana Love, [facility]

## 2025-01-19 DIAGNOSIS — F41.1 GAD (GENERALIZED ANXIETY DISORDER): ICD-10-CM

## 2025-01-20 RX ORDER — GABAPENTIN 100 MG/1
100 CAPSULE ORAL AT BEDTIME
Qty: 30 CAPSULE | Refills: 2 | Status: CANCELLED | OUTPATIENT
Start: 2025-01-20

## 2025-01-20 RX ORDER — GABAPENTIN 100 MG/1
100 CAPSULE ORAL AT BEDTIME
Qty: 30 CAPSULE | Refills: 2 | OUTPATIENT
Start: 2025-01-20

## 2025-01-20 NOTE — TELEPHONE ENCOUNTER
Patient needs to be seen for further refills per provider on 12/26/24. Patient last seen on 5/20/24. Patient had a no-show on 7/18/24.     Gabapentin has been inconsistent with filling. Last ordered on 5/23/24.     Merary Chapin RN on 1/20/2025 at 10:56 AM

## 2025-01-20 NOTE — TELEPHONE ENCOUNTER
Patient requesting refill of Gabapentin 100 mg. Patient did set up appt now for 2/11/25.     Do you want to refill?     Merary Chapin RN on 1/20/2025 at 1:45 PM

## 2025-01-21 ENCOUNTER — MYC MEDICAL ADVICE (OUTPATIENT)
Dept: PSYCHIATRY | Facility: CLINIC | Age: 60
End: 2025-01-21
Payer: COMMERCIAL

## 2025-01-21 DIAGNOSIS — F41.1 GAD (GENERALIZED ANXIETY DISORDER): ICD-10-CM

## 2025-01-21 NOTE — TELEPHONE ENCOUNTER
[employer] message sent to patient to determine how she is taking gabapentin.   STARLA Kaiser CNP on 1/21/2025 at 11:10 AM

## 2025-01-21 NOTE — TELEPHONE ENCOUNTER
Patient responding via MyC. Routing update re dose and compliance to provider for gabapentin refill request.     Alma Koo RN on 1/21/2025 at 12:48 PM

## 2025-01-22 ENCOUNTER — TELEPHONE (OUTPATIENT)
Dept: GASTROENTEROLOGY | Facility: CLINIC | Age: 60
End: 2025-01-22
Payer: COMMERCIAL

## 2025-01-22 RX ORDER — GABAPENTIN 100 MG/1
100 CAPSULE ORAL AT BEDTIME
Qty: 30 CAPSULE | Refills: 2 | Status: SHIPPED | OUTPATIENT
Start: 2025-01-22

## 2025-01-22 NOTE — TELEPHONE ENCOUNTER
Pt seen in ER on 1/3/25 for SOB and palpitations. Cardiac Zio-Patch ordered. Staff message sent to determine if procedure should be postponed until after the Zip Patch is completed and reviewed by Cardiology.     Nieves Melendez, RN   Endoscopy Procedure Pre Assessment RN

## 2025-01-23 ENCOUNTER — TELEPHONE (OUTPATIENT)
Dept: GASTROENTEROLOGY | Facility: CLINIC | Age: 60
End: 2025-01-23
Payer: COMMERCIAL

## 2025-01-23 NOTE — TELEPHONE ENCOUNTER
Rescheduled: Yes,   Procedure: Upper Endoscopy [EGD]    Date: 3/3/25   [facility] [address] [place] [address], 5[address] Floor, [place] [address]   Surgeon: Fitz   Sedation Level Scheduled  MAC ,  Reason for Sedation Level Order   Instructions updated and sent: Kavitha     Does patient need PAC or Pre -Op Rescheduled? : No

## 2025-01-23 NOTE — TELEPHONE ENCOUNTER
Caller: Writer to Patient    Reason for Reschedule/Cancellation (please be detailed, any staff messages or encounters to note?):   Patient needs cardiac testing completed - per RN Assessment    Did you cancel or rescheduled an EUS procedure? No.    Is screening questionnaire older than 3 months from the reschedule date.   If Yes, please complete screening questionnaire. No-12/31/24    Prior to reschedule please review:  Ordering Provider: Mery Serrano  Sedation Determined: MAC  Does patient have any ASC Exclusions, please identify?: No    Notes on Cancelled Procedure:  Procedure: Upper Endoscopy [EGD]   Date: 2/5/25  [facility]; [address] [place] [address], 5[address] Floor, [place] [address]  Surgeon: Fitz    Rescheduled: No, LVM & MYC msg sent to patient

## 2025-01-23 NOTE — TELEPHONE ENCOUNTER
Per Rafi Montes,     Pt needs to have Zio-Patch completed and reviewed by Cardiology before having procedure done. Message sent to scheduling to reach out to the Pt.     Nieves Melendez RN   Endoscopy Procedure Pre Assessment RN

## 2025-01-27 NOTE — PROGRESS NOTES
"Pt was pleasant, social, and attended groups. Pt has continued to show improvement in her thought process and actions towards her situation. Pt is constantly seeking positive resources to help her get better. Pt  shared the following when she asked about SI and SIB, \"I am doing so much better today. My wish to be dead has decreased tremendously and I hope it continues to be that way. I really want to feel better and safe with myself. Like my depression is low today. It's like a 2 but my anxiety is still a bit high like 6. And that's because I am worried and scared of the unknown. I don't know if I can trust myself with myself once I leave here. But I am going to keep pushing to get to that point.\". Pt made mentioned of the most recent traumatic incident that happened to her but stated that she is not comfortable sharing just yet.        11/19/18 2702   Behavioral Health   Hallucinations denies / not responding to hallucinations   Thinking intact   Orientation person: oriented;place: oriented;date: oriented;time: oriented   Memory baseline memory   Insight poor   Judgement intact   Eye Contact at examiner   Affect full range affect   Mood mood is calm   Physical Appearance/Attire neat   Hygiene well groomed   1. Wish to be Dead No   2. Non-Specific Active Suicidal Thoughts  No   4. Active Suicidal Ideation with Some Intent to Act, Without Specific Plan  No   5. Active Suicidal Ideation with Specific Plan and Intent  No   Speech clear;coherent   Psychomotor / Gait balanced;steady   Activities of Daily Living   Hygiene/Grooming independent   Oral Hygiene independent   Dress independent   Laundry with supervision   Room Organization independent         " PA for NuvaRing 0.12-0.015 MG/24HR APPEALED via     []CMM  []SS  [x]Letter sent to insurance via fax [phone]  []Other site or means     All necessary records sent. Will await response from insurance company    Turnaround time for a decision to be made on an appeal could take up to 30 business days

## 2025-02-04 ENCOUNTER — VIRTUAL VISIT (OUTPATIENT)
Dept: PSYCHOLOGY | Facility: CLINIC | Age: 60
End: 2025-02-04
Payer: COMMERCIAL

## 2025-02-04 DIAGNOSIS — F33.1 MODERATE EPISODE OF RECURRENT MAJOR DEPRESSIVE DISORDER (H): ICD-10-CM

## 2025-02-04 DIAGNOSIS — R00.2 PALPITATIONS: Primary | ICD-10-CM

## 2025-02-04 DIAGNOSIS — I48.0 PAROXYSMAL ATRIAL FIBRILLATION (H): ICD-10-CM

## 2025-02-04 DIAGNOSIS — F43.10 POSTTRAUMATIC STRESS DISORDER: ICD-10-CM

## 2025-02-04 DIAGNOSIS — I10 PRIMARY HYPERTENSION: ICD-10-CM

## 2025-02-04 DIAGNOSIS — F41.1 GAD (GENERALIZED ANXIETY DISORDER): Primary | ICD-10-CM

## 2025-02-04 DIAGNOSIS — E78.5 HYPERLIPIDEMIA LDL GOAL <70: ICD-10-CM

## 2025-02-04 PROCEDURE — 90834 PSYTX W PT 45 MINUTES: CPT | Mod: 95 | Performed by: COUNSELOR

## 2025-02-06 ENCOUNTER — MYC MEDICAL ADVICE (OUTPATIENT)
Dept: CARDIOLOGY | Facility: CLINIC | Age: 60
End: 2025-02-06
Payer: COMMERCIAL

## 2025-02-06 DIAGNOSIS — M05.79 SEROPOSITIVE RHEUMATOID ARTHRITIS OF MULTIPLE SITES (H): Primary | ICD-10-CM

## 2025-02-06 RX ORDER — ADALIMUMAB-ADAZ 40 MG/.4ML
INJECTION, SOLUTION SUBCUTANEOUS
Qty: 0.8 ML | Refills: 0 | Status: SHIPPED | OUTPATIENT
Start: 2025-02-06

## 2025-02-06 NOTE — PROGRESS NOTES
[facility]                                     Progress Note    Patient Name: Alina Martell  Date: 2/04/25         Service Type: Individual      Session Start Time: 10:02 Session End Time: 10:50     Session Length: 48 minutes    Session #: 85     Attendees: Client  Service Modality:  Video Visit:     Telemedicine Visit: The patient's condition can be safely assessed and treated via synchronous audio and visual telemedicine encounter.       Reason for Telemedicine Visit: Services only offered telehealth     Originating Site (Patient Location): Patient's place of employment     Distant Location (provider location):  Off-site     Consent:  The patient/guardian has verbally consented to: the potential risks and benefits of telemedicine (video visit) versus in person care; bill my insurance or make self-payment for services provided; and responsibility for payment of non-covered services.      Mode of Communication:  Video Conference via [employer]     As the provider I attest to compliance with applicable laws and regulations related to telemedicine.            DATA  Extended Session (53+ minutes): No  Interactive Complexity: No  Crisis: No      Progress Since Last Session (Related to Symptoms / Goals / Homework):   Symptoms: No change ongoing stress    Homework: Achieved / completed to satisfaction      Episode of Care Goals: Satisfactory progress - ACTION (Actively working towards change); Intervened by reinforcing change plan / affirming steps taken     Current / Ongoing Stressors and Concerns:  Patient reported she continues to have ongoing stress. Patient indicated she has continues to struggle with finding housing. Patient reported there are no options but continuing to look and apply. Patient processed dynamics at work and how she has handled them. Patient indicated she is working to find her balance with her family. Patient reported avoiding relationships. This therapist processed  with patient continuing to look for housing and asking people for help.      Treatment Objective(s) Addressed in This Session:   identify three distraction and diversion activities and use those activities to decrease level of anxiety    Increase interest, engagement, and pleasure in doing things  Decrease frequency and intensity of feeling down, depressed, hopeless  Improve quantity and quality of night time sleep / decrease daytime naps  Feel less tired and more energy during the day   Identify negative self-talk and behaviors: challenge core beliefs, myths, and actions     Intervention:   Motivational Interviewing    MI Intervention: Open-ended questions     Change Talk Expressed by the Patient: Activation Taking steps    Provider Response to Change Talk: S - Summarized patient's change talk statements      Assessments completed prior to visit:  The following assessments were completed by patient for this visit:  PHQ9:       5/20/2024     9:50 AM 7/16/2024     4:33 PM 9/5/2024    10:42 AM 10/15/2024     8:47 AM 11/27/2024     7:27 AM 12/30/2024     7:45 AM 2/4/2025     9:47 AM   PHQ-9 SCORE   PHQ-9 Total Score MyChart 4 (Minimal depression) 6 (Mild depression) 8 (Mild depression) 9 (Mild depression)   8 (Mild depression)   PHQ-9 Total Score 4 6 8 9 9 7 8        Patient-reported     GAD7:       10/30/2023     3:23 PM 12/6/2023     9:50 AM 7/16/2024     4:33 PM 8/31/2024     9:27 AM 10/15/2024     8:48 AM 12/2/2024    11:30 AM 1/16/2025     6:27 AM   ADA-7 SCORE   Total Score 5 (mild anxiety) 11 (moderate anxiety) 8 (mild anxiety) 9 (mild anxiety) 11 (moderate anxiety) 6 (mild anxiety) 8 (mild anxiety)   Total Score 5 11 8    8 9 11 6  8        Patient-reported     PROMIS 10-Global Health (all questions and answers displayed):       6/13/2023     6:37 AM 9/26/2023    12:07 PM 1/1/2024    11:44 PM 4/3/2024     9:52 AM 5/20/2024     9:52 AM 8/31/2024     9:29 AM 12/2/2024    11:31 AM   PROMIS 10   In general, would  you say your health is: Good Fair Good Good Good Fair Fair   In general, would you say your quality of life is: Good Good Fair Good Fair Fair Good   In general, how would you rate your physical health? Fair Fair Fair Fair Good Fair Fair   In general, how would you rate your mental health, including your mood and your ability to think? Fair Fair Fair Fair Fair Fair Fair   In general, how would you rate your satisfaction with your social activities and relationships? Good Good Fair Good Good Good Good   In general, please rate how well you carry out your usual social activities and roles Good Good Good Good Good Good Good   To what extent are you able to carry out your everyday physical activities such as walking, climbing stairs, carrying groceries, or moving a chair? Mostly Mostly Mostly Completely Completely Mostly Completely   In the past 7 days, how often have you been bothered by emotional problems such as feeling anxious, depressed, or irritable? Often Often Often Sometimes Sometimes Often Often   In the past 7 days, how would you rate your fatigue on average? Mild Moderate Mild Moderate Moderate Moderate Moderate   In the past 7 days, how would you rate your pain on average, where 0 means no pain, and 10 means worst imaginable pain? 2 3 3 3 3 3 4   In general, would you say your health is: 3 2 3 3 3 2 2   In general, would you say your quality of life is: 3 3 2 3 2 2 3   In general, how would you rate your physical health? 2 2 2 2 3 2 2   In general, how would you rate your mental health, including your mood and your ability to think? 2 2 2 2 2 2 2   In general, how would you rate your satisfaction with your social activities and relationships? 3 3 2 3 3 3 3   In general, please rate how well you carry out your usual social activities and roles. (This includes activities at home, at work and in your community, and responsibilities as a parent, child, spouse, employee, friend, etc.) 3 3 3 3 3 3 3   To what  extent are you able to carry out your everyday physical activities such as walking, climbing stairs, carrying groceries, or moving a chair? 4 4 4 5 5 4 5   In the past 7 days, how often have you been bothered by emotional problems such as feeling anxious, depressed, or irritable? 4 4 4 3 3 4 4   In the past 7 days, how would you rate your fatigue on average? 2 3 2 3 3 3 3   In the past 7 days, how would you rate your pain on average, where 0 means no pain, and 10 means worst imaginable pain? 2 3 3 3 3 3 4   Global Mental Health Score 10 10 8    8 11 10 9 10    Global Physical Health Score 14 13 14    14 14 15 13 13    PROMIS TOTAL - SUBSCORES 24 23 22    22 25 25 22 23        Patient-reported         ASSESSMENT: Current Emotional / Mental Status (status of significant symptoms):   Risk status (Self / Other harm or suicidal ideation)   Patient denies current fears or concerns for personal safety.   Patient denies current or recent suicidal ideation or behaviors.   Patient denies current or recent homicidal ideation or behaviors.   Patient denies current or recent self injurious behavior or ideation.   Patient denies other safety concerns.   Patient reports there has been no change in risk factors since their last session.     Patient reports there has been no change in protective factors since their last session.     Recommended that patient call 911 or go to the local ED should there be a change in any of these risk factors     Appearance:   Appropriate    Eye Contact:   Good    Psychomotor Behavior: Normal    Attitude:   Cooperative    Orientation:   All   Speech    Rate / Production: Normal/ Responsive    Volume:  Normal    Mood:    Anxious  Sad    Affect:    Appropriate    Thought Content:  Clear    Thought Form:  Coherent  Goal Directed  Logical    Insight:    Good      Medication Review:   No changes to current psychiatric medication(s)     Medication Compliance:   Yes     Changes in Health Issues:   None  reported     Chemical Use Review:   Substance Use: Chemical use reviewed, no active concerns identified      Tobacco Use: No current tobacco use.      Diagnosis:  1. ADA (generalized anxiety disorder)    2. Moderate episode of recurrent major depressive disorder (H)    3. Posttraumatic stress disorder        Collateral Reports Completed:   Not Applicable    PLAN: (Patient Tasks / Therapist Tasks / Other)  Patient will continue with therapy in 3 weeks. Patient will continue to look for housing. Patient will continue to identifying what brings her dariusz. Patient will continue to challenge negative thoughts.     There has been demonstrated improvement in functioning while patient has been engaged in psychotherapy/psychological service- if withdrawn the patient would deteriorate and/or relapse.     Mariana Love, [facility]     ______________________________________________________________________    Individual Treatment Plan    Patient's Name: Alina Martell  YOB: 1965    Date of Creation:10/16/2020  Date Treatment Plan Last Reviewed/Revised: 2/4/2025    DSM5 Diagnoses: 296.32 (F33.1) Major Depressive Disorder, Recurrent Episode, Moderate _, 300.02 (F41.1) Generalized Anxiety Disorder, or 309.81 (F43.10) Posttraumatic Stress Disorder (includes Posttraumatic Stress Disorder for Children 6 Years and Younger)  Without dissociative symptoms  Psychosocial / Contextual Factors: Health, housing and family  PROMIS (reviewed every 90 days):     PROMIS-10 Scores  Global Mental Health Score: 8  Global Physical Health Score: 14  PROMIS TOTAL - SUBSCORES: 22     Referral / Collaboration:  Was/were discussed and patient will pursue.    Anticipated number of session for this episode of care: 20 will reevaluate every 90 days  Anticipation frequency of session: Biweekly  Anticipated Duration of each session: 38-52 minutes  Treatment plan will be reviewed in 90 days or when goals have been changed.        MeasurableTreatment Goal(s) related to diagnosis / functional impairment(s)  Goal 1: Patient will work on reducing overall anxiety.     I will know I've met my goal when reporting minimal anxiety symptoms.       Objective #A (Patient Action)                          Patient will use distraction each time intrusive worry surfaces.  Status: Continued - Date(s): 2/4/2025     Intervention(s)  Therapist will teach emotional regulation skills.  Objective #B  Patient will Decrease frequency and intensity of feeling down, depressed, hopeless.  Status: Continued - Date(s):  2/4/2025     Intervention(s)  Therapist will teach emotional recognition/identification. ..     Objective #C  Patient will Identify negative self-talk and behaviors: challenge core beliefs, myths, and actions.  Status: Continued - Date(s):  2/4/2025     Intervention(s)  Therapist will teach the client how to perform a behavioral chain analysis. ..     Goal 2: Patient will continue to work on reducing depression symptoms    I will know I've met my goal then reporting minimal depression symptoms     Objective #A (Patient Action)                          Patient will Increase interest, engagement, and pleasure in doing things.  Status: Continued - Date(s): 2/4/2025  Intervention(s)  Therapist will assign homework ..     Objective #B  Patient will Decrease frequency and intensity of feeling down, depressed, hopeless.  Status: Continued - Date(s):2/4/2025  Intervention(s)  Therapist will assign homework at every session.     Goal 3: Client will reduce PTSD symptoms by 50% as evidenced by PCLC-5 score     I will know I've met my goal when not struggling with nightmares.      Objective #A (Client Action)    Client will reduce nightmares.  Status: Continued - Date(s):2/4/2025    Intervention(s)  Therapist will teach nightmare retraining .    Objective #B  Client will compile a list of boundaries that they would like to set with others.   .    Status:  Continued - Date(s):2/4/2025    Intervention(s)  Therapist will assign homework boundary setting .            Patient has reviewed and agreed to the above plan.        Mariana Love, [facility]

## 2025-02-11 ENCOUNTER — MYC MEDICAL ADVICE (OUTPATIENT)
Dept: PSYCHIATRY | Facility: CLINIC | Age: 60
End: 2025-02-11
Payer: COMMERCIAL

## 2025-02-11 ENCOUNTER — VIRTUAL VISIT (OUTPATIENT)
Dept: PSYCHIATRY | Facility: CLINIC | Age: 60
End: 2025-02-11
Payer: COMMERCIAL

## 2025-02-11 DIAGNOSIS — F43.10 PTSD (POST-TRAUMATIC STRESS DISORDER): ICD-10-CM

## 2025-02-11 DIAGNOSIS — F33.1 MODERATE EPISODE OF RECURRENT MAJOR DEPRESSIVE DISORDER (H): Primary | ICD-10-CM

## 2025-02-11 DIAGNOSIS — F41.1 GAD (GENERALIZED ANXIETY DISORDER): ICD-10-CM

## 2025-02-11 RX ORDER — GABAPENTIN 100 MG/1
200 CAPSULE ORAL AT BEDTIME
Qty: 60 CAPSULE | Refills: 2 | Status: SHIPPED | OUTPATIENT
Start: 2025-02-11

## 2025-02-11 ASSESSMENT — PAIN SCALES - GENERAL: PAINLEVEL_OUTOF10: NO PAIN (0)

## 2025-02-11 NOTE — NURSING NOTE
Current patient location:  work    Is the patient currently in the state of [place]? YES    Visit mode: VIDEO    If the visit is dropped, the patient can be reconnected by:VIDEO VISIT: Text to cell phone:   Telephone Information:   Mobile [phone]    and VIDEO VISIT: Send to e-mail at: [email or web address]    Will anyone else be joining the visit? NO  (If patient encounters technical issues they should call [phone]    Are changes needed to the allergy or medication list? Pt stated no changes to allergies and Pt stated no med changes    Are refills needed on medications prescribed by this physician? Discuss with provider    Rooming Documentation:  Questionnaire(s) completed    Reason for visit: RECHJOSE MARIA MADRID

## 2025-02-11 NOTE — PROGRESS NOTES
"Virtual Visit Details    Type of service:  Video Visit     Originating Location (pt. Location): [place], [place]    Distant Location (provider location):  Off-site  Platform used for Video Visit: [facility]        OUTPATIENT PSYCHIATRIC FOLLOW-UP      2025      Provider: STARLA Kaiser CNP      Appointment Start Time: 230  Appointment End Time: 247    Name: Alina Martell   : 1965                    Preferred Name: Maritza      Screening Tools           2025     2:22 PM 2025     9:47 AM 2024     7:45 AM   PHQ   PHQ-9 Total Score 7  8  7   Q9: Thoughts of better off dead/self-harm past 2 weeks Not at all  Not at all Not at all       Patient-reported    Proxy-reported         2025     6:27 AM 2024    11:30 AM 10/15/2024     8:48 AM   ADA-7 SCORE   Total Score 8 (mild anxiety) 6 (mild anxiety) 11 (moderate anxiety)   Total Score 8  6  11       Patient-reported     PROMIS 10-Global Health (only subscores and total score):       2023     6:37 AM 2023    12:07 PM 2024    11:44 PM 4/3/2024     9:52 AM 2024     9:52 AM 2024     9:29 AM 2024    11:31 AM   PROMIS-10 Scores Only   Global Mental Health Score 10 10 8    8 11 10 9 10    Global Physical Health Score 14 13 14    14 14 15 13 13    PROMIS TOTAL - SUBSCORES 24 23 22    22 25 25 22 23        Patient-reported          History of Present Illness      Patient attended the session alone.     Interim History:  I last saw Alina Martell for outpatient psychiatry follow-up on 24. During that appointment, we initiated gabapentin    Current stressors include: Family activities /responsibility    Coping mechanisms and supports include:  Volunteering , therapy    Side effects: Denies    Medication adherence: Reports good med adherence. Re started gabapentin    Psychiatric Review of Systems      Alina Martell reports mood has been: \"good .. busy\"    - Reviewed therapy note from 24, " 04/25/24: Increased gambling.     Depression has been:   - Breakthrough depressive sx.  - Holidays were stressful.   - Improved overall.     Anxiety has been:   - Anxiety continues  - Sense jumping out of skin  - Hard time concentrating.    Sleep has been:   - CPAP , compliant.   - Sleep poor currently  - Does well for 5-6 months and then shifts.   - Currently sleeping 4 hours.   - Hard time falling asleep.   -Gabapentin initially helpful    Taryn sxs: Denies    Psychosis sxs: Denies    ADHD sxs:   - Will have something in the background (audio book) to help focus on other tasks.   - Not acting on impulses of gambling, improving. Trying to plan something else instead.    PTSD sxs: Denies    SI/SIB: Denies SI/SIB/HI      Medications Prior to Appointment       Current Outpatient Medications   Medication Sig Dispense Refill    diltiazem ER (TIAZAC) 360 MG 24 hr ER beaded capsule Take 1 capsule (360 mg) by mouth daily. 90 capsule 1    EPINEPHrine (ANY BX GENERIC EQUIV) 0.3 MG/0.3ML injection 2-pack Inject 0.3 mLs (0.3 mg) into the muscle as needed for anaphylaxis 2 each 2    fexofenadine (ALLEGRA) 60 MG tablet Take 1 tablet (60 mg) by mouth daily. 30 tablet 4    fluticasone (FLONASE) 50 MCG/ACT nasal spray Spray 2 sprays into both nostrils daily. 16 g 11    gabapentin (NEURONTIN) 100 MG capsule Take 1 capsule (100 mg) by mouth at bedtime. 30 capsule 2    HYRIMOZ 40 MG/0.4ML auto-injector kit INJECT 1 PEN UNDER THE SKIN EVERY 14 DAYS 0.8 mL 0    lisinopril (ZESTRIL) 5 MG tablet Take 1 tablet (5 mg) by mouth daily. 90 tablet 3    LORazepam (ATIVAN) 0.5 MG tablet TAKE 1 TABLET(0.5 MG) BY MOUTH DAILY AS NEEDED FOR ANXIETY 30 tablet 0    metFORMIN (GLUCOPHAGE XR) 500 MG 24 hr tablet Take 1 tablet (500 mg) by mouth daily (with dinner). 90 tablet 2    Multiple Vitamins-Minerals (CENTROVITE) TABS Take 1 tablet by mouth daily      omeprazole (PRILOSEC) 40 MG DR capsule Take 1 capsule (40 mg) by mouth daily. 90 capsule 3     ondansetron (ZOFRAN ODT) 4 MG ODT tab Take 1 tablet (4 mg) by mouth every 8 hours as needed for nausea 30 tablet 0    pravastatin (PRAVACHOL) 20 MG tablet Take 1 tablet (20 mg) by mouth daily. 90 tablet 1    vitamin D3 (CHOLECALCIFEROL) 1.25 MG (48242 UT) capsule Take 1 capsule (50,000 Units) by mouth every 7 days 12 capsule 3          Previous medication trials include but not limited to:  SSRIs:  -Prozac  -Paxil 40 mg  -Zoloft 25 mg     SNRIs:  -Cymbalta 60 mg     Other anxiolytics:  -Buspar 5 mg TID prn: caused me to be jittery   -Hydroxyzine 25 mg TID prn: dryness      Antipsychotics:  -Abilify     Alpha receptors:  -Prazosin 1 mg: stopped experiencing nightmares     Stimulants/ADHD meds:  -Strattera 40 mg     Benzodiazepines:  -Lorazepam .5 mg prn     Sleep aides:  -Ambien: sleep walking  -Trazodone 25 to 50 mg              Medication Compliance: Yes.                  Pharmacogenomic Testing Completed: No      Medical History      History of head injuries: No  History of seizures: No  History of cardiac events: A Fib; Hx of ablation.   History of Tardive Dyskinesia: No         Past Medical History:   Diagnosis Date    Anemia     Antiplatelet or antithrombotic long-term use     Arrhythmia     Cannabis use without complication 12/08/2014    Carpal tunnel syndrome     Coronary artery disease     Depressive disorder 2014    Diabetes mellitus, type 2 (H) 12/13/2023    Gastroesophageal reflux disease     History of angina     Hypertension     Irregular heart beat     Obesity     Paroxysmal atrial fibrillation (H)     PTSD (post-traumatic stress disorder)     RA (rheumatoid arthritis) (H)     Rheumatoid arthritis (H) 07/08/2016    Sicca syndrome     Sleep apnea         Surgery:   Past Surgical History:   Procedure Laterality Date    ABDOMEN SURGERY      CHOLECYSTECTOMY      CYSTOSCOPY N/A 05/04/2023    Procedure: AND CYSTOSCOPY;  Surgeon: Irma Cary MD;  Location: [facility]  TOTAL Bilateral 05/04/2023    Procedure: ROBOTIC ASSISTED TOTAL LAPAROSCOPIC HYSTERECTOMY WITH BILATERAL SALPINGECTOMY;  Surgeon: Irma Cary MD;  Location: [facility] OR    DILATION AND CURETTAGE, OPERATIVE HYSTEROSCOPY, COMBINED N/A 03/03/2022    Procedure: HYSTEROSCOPY, WITH DILATION AND CURETTAGE;  Surgeon: Irma Cary MD;  Location: [facility] OR    EP ABLATION FOCAL AFIB N/A 06/30/2022    Procedure: Ablation Atrial Fibrilation;  Surgeon: Jose Guadalupe Joseph MD;  Location:  [facility]    HC REMOVAL GALLBLADDER      Description: Cholecystectomy;  Recorded: 10/15/2013;    LAPAROSCOPIC TUBAL LIGATION      RELEASE CARPAL TUNNEL BILATERAL      TUBAL LIGATION  01/01/1995     Primary Care Provider: Estelle Bansal MD        Social History      Current Living situation:  [place], [place] with self.    Current use of drugs or alcohol: Denies     Tobacco use: No    Employment: Yes. Full time Accounting     Relationship Status: Single.    Vitals      Not obtained d/t virtual visit.     LMP  (LMP Unknown)     Labs        Most recent laboratory results reviewed and pertinent results include:   Lab on 02/14/2024   Component Date Value Ref Range Status    Albumin 02/14/2024 3.7  3.5 - 5.2 g/dL Final    ALT 02/14/2024 19  0 - 50 U/L Final    WBC Count 02/14/2024 11.4 (H)  4.0 - 11.0 10e3/uL Final    RBC Count 02/14/2024 4.30  3.80 - 5.20 10e6/uL Final    Hemoglobin 02/14/2024 10.7 (L)  11.7 - 15.7 g/dL Final    Hematocrit 02/14/2024 34.4 (L)  35.0 - 47.0 % Final    MCV 02/14/2024 80  78 - 100 fL Final    MCH 02/14/2024 24.9 (L)  26.5 - 33.0 pg Final    MCHC 02/14/2024 31.1 (L)  31.5 - 36.5 g/dL Final    RDW 02/14/2024 15.7 (H)  10.0 - 15.0 % Final    Platelet Count 02/14/2024 348  150 - 450 10e3/uL Final    Creatinine 02/14/2024 0.77  0.51 - 0.95 mg/dL Final    GFR Estimate 02/14/2024 90  >60 mL/min/1.73m2 Final   Office Visit on 08/30/2023   Component Date Value Ref Range Status     YESSICA interpretation 08/30/2023 Negative  Negative Final      Negative:              <1:40  Borderline Positive:   1:40 - 1:80  Positive:              >1:80    CRP Inflammation 08/30/2023 16.70 (H)  <5.00 mg/L Final    Erythrocyte Sedimentation Rate 08/30/2023 81 (H)  0 - 30 mm/hr Final    SSA Beverly IgG Instrument Value 08/30/2023 0.5  <7.0 U/mL Final    SSA (Ro) Antibody IgG 08/30/2023 Negative  Negative Final    Hemoglobin A1C 08/30/2023 6.5 (H)  0.0 - 5.6 % Final    Normal <5.7%   Prediabetes 5.7-6.4%    Diabetes 6.5% or higher     Note: Adopted from ADA consensus guidelines.    Iron 08/30/2023 32 (L)  37 - 145 ug/dL Final    Iron Binding Capacity 08/30/2023 343  240 - 430 ug/dL Final    Iron Sat Index 08/30/2023 9 (L)  15 - 46 % Final    Ferritin 08/30/2023 29  11 - 328 ng/mL Final    WBC Count 08/30/2023 8.8  4.0 - 11.0 10e3/uL Final    RBC Count 08/30/2023 4.43  3.80 - 5.20 10e6/uL Final    Hemoglobin 08/30/2023 10.9 (L)  11.7 - 15.7 g/dL Final    Hematocrit 08/30/2023 35.3  35.0 - 47.0 % Final    MCV 08/30/2023 80  78 - 100 fL Final    MCH 08/30/2023 24.6 (L)  26.5 - 33.0 pg Final    MCHC 08/30/2023 30.9 (L)  31.5 - 36.5 g/dL Final    RDW 08/30/2023 16.0 (H)  10.0 - 15.0 % Final    Platelet Count 08/30/2023 373  150 - 450 10e3/uL Final    ALT 08/30/2023 17  0 - 50 U/L Final    Reference intervals for this test were updated on 6/12/2023 to more accurately reflect our healthy population. There may be differences in the flagging of prior results with similar values performed with this method. Interpretation of those prior results can be made in the context of the updated reference intervals.      Creatinine 08/30/2023 0.67  0.51 - 0.95 mg/dL Final    GFR Estimate 08/30/2023 >90  >60 mL/min/1.73m2 Final    Albumin 08/30/2023 4.0  3.5 - 5.2 g/dL Final   Office Visit on 03/22/2023   Component Date Value Ref Range Status    Albumin 03/22/2023 3.8  3.5 - 5.2 g/dL Final    ALT 03/22/2023 13  10 - 35 U/L Final    WBC Count  03/22/2023 8.8  4.0 - 11.0 10e3/uL Final    RBC Count 03/22/2023 4.18  3.80 - 5.20 10e6/uL Final    Hemoglobin 03/22/2023 10.7 (L)  11.7 - 15.7 g/dL Final    Hematocrit 03/22/2023 34.7 (L)  35.0 - 47.0 % Final    MCV 03/22/2023 83  78 - 100 fL Final    MCH 03/22/2023 25.6 (L)  26.5 - 33.0 pg Final    MCHC 03/22/2023 30.8 (L)  31.5 - 36.5 g/dL Final    RDW 03/22/2023 14.5  10.0 - 15.0 % Final    Platelet Count 03/22/2023 405  150 - 450 10e3/uL Final    Creatinine 03/22/2023 0.69  0.51 - 0.95 mg/dL Final    GFR Estimate 03/22/2023 >90  >60 mL/min/1.73m2 Final    eGFR calculated using 2021 CKD-EPI equation.    Erythrocyte Sedimentation Rate 03/22/2023 57 (H)  0 - 20 mm/hr Final    CRP Inflammation 03/22/2023 19.20 (H)  <5.00 mg/L Final    Cholesterol 03/22/2023 166  <200 mg/dL Final    Triglycerides 03/22/2023 89  <150 mg/dL Final    Direct Measure HDL 03/22/2023 44 (L)  >=50 mg/dL Final    LDL Cholesterol Calculated 03/22/2023 104 (H)  <=100 mg/dL Final    Non HDL Cholesterol 03/22/2023 122  <130 mg/dL Final    Vitamin D, Total (25-Hydroxy) 03/22/2023 9 (L)  20 - 75 ug/L Final   Lab Requisition on 03/20/2023   Component Date Value Ref Range Status    Interpretation 03/20/2023 Negative for Intraepithelial Lesion or Malignancy (NILM)    Final    Comment 03/20/2023    Final                    Value:This result contains rich text formatting which cannot be displayed here.    Specimen Adequacy 03/20/2023 Satisfactory for evaluation, endocervical/transformation zone component present   Final    Clinical Information 03/20/2023    Final                    Value:This result contains rich text formatting which cannot be displayed here.    LMP/Menopause Date 03/20/2023    Final                    Value:This result contains rich text formatting which cannot be displayed here.    Reflex Testing 03/20/2023 Yes regardless of result   Final    Previous Abnormal? 03/20/2023    Final                    Value:This result contains rich  text formatting which cannot be displayed here.    Previous Abnormal Diagnosis 03/20/2023    Final                    Value:This result contains rich text formatting which cannot be displayed here.    Performing Labs 03/20/2023    Final                    Value:This result contains rich text formatting which cannot be displayed here.    Other HR HPV 03/20/2023 Negative  Negative Final    HPV16 DNA 03/20/2023 Negative  Negative Final    HPV18 DNA 03/20/2023 Negative  Negative Final    FINAL DIAGNOSIS 03/20/2023    Final                    Value:This result contains rich text formatting which cannot be displayed here.   Lab Requisition on 03/20/2023   Component Date Value Ref Range Status    Case Report 03/20/2023    Final                    Value:Surgical Pathology Report                         Case: [id number]                                  Authorizing Provider:  Irma Cary MD  Collected:           03/20/2023 11:20 AM          Ordering Location:     [facility]     Received:            03/20/2023 03:47 PM                                 [facility]                                                       Pathologist:           Kirt Shrestha MD                                                                           Specimen:    Endometrium                                                                                Final Diagnosis 03/20/2023    Final                    Value:This result contains rich text formatting which cannot be displayed here.    Clinical Information 03/20/2023    Final                    Value:This result contains rich text formatting which cannot be displayed here.    Gross Description 03/20/2023    Final                    Value:This result contains rich text formatting which cannot be displayed here.    Microscopic Description 03/20/2023    Final                     "Value:This result contains rich text formatting which cannot be displayed here.    Performing Labs 03/20/2023    Final                    Value:This result contains rich text formatting which cannot be displayed here.   Lab on 03/06/2023   Component Date Value Ref Range Status    Creatinine 03/06/2023 0.72  0.51 - 0.95 mg/dL Final    GFR Estimate 03/06/2023 >90  >60 mL/min/1.73m2 Final    eGFR calculated using 2021 CKD-EPI equation.    WBC Count 03/06/2023 10.6  4.0 - 11.0 10e3/uL Final    RBC Count 03/06/2023 4.36  3.80 - 5.20 10e6/uL Final    Hemoglobin 03/06/2023 11.5 (L)  11.7 - 15.7 g/dL Final    Hematocrit 03/06/2023 36.6  35.0 - 47.0 % Final    MCV 03/06/2023 84  78 - 100 fL Final    MCH 03/06/2023 26.4 (L)  26.5 - 33.0 pg Final    MCHC 03/06/2023 31.4 (L)  31.5 - 36.5 g/dL Final    RDW 03/06/2023 14.5  10.0 - 15.0 % Final    Platelet Count 03/06/2023 366  150 - 450 10e3/uL Final    ALT 03/06/2023 14  10 - 35 U/L Final    Albumin 03/06/2023 3.9  3.5 - 5.2 g/dL Final     Most recent EKG from 08/23 reviewed. QTc interval 459.  Normal EKG          Medical Review of Systems      Pertinent positives noted in HPI and below:   Review of Systems   All other systems reviewed and are negative.        No LMP recorded (lmp unknown). Patient has had a hysterectomy.    Pregnant / Breastfeeding: No       Mental Status Exam      Appearance: awake, alert, adequately groomed, appeared stated age and no apparent distress  Attitude:  cooperative   Eye Contact:  good  Gait and Station: normal, no gross abnormalities noted by observation  Psychomotor Behavior:  no evidence of tardive dyskinesia, dystonia, or tics  Oriented to:  person, place, time, and situation  Attention Span and Concentration:  mild impairment   Speech:  clear, coherent, regular rate, rhythm, and volume  Language: intact  Mood:  \"pretty good\"\"  Affect:  appropriate and in normal range  Associations:  no loose associations  Thought Process:  logical, linear and " goal oriented  Thought Content:  no evidence of suicidal ideation or homicidal ideation, no evidence of psychotic thought, no auditory hallucinations present and no visual hallucinations present  Recent and Remote Memory:  Intact to interview. Not formally assessed. No amnesia.  Fund of Knowledge: appropriate  Insight:  full  Judgment:  intact, adequate for safety  Impulse Control:  intact         Risk Assessment       Suicide assessment  Acute: Low  Chronic:Low  Imminent: Low     Risk factors  History of suicide attempts: No  History of self-injurious behavior: No  Carlos. Axis I psychiatric diagnoses: Yes  Substance use disorder: No  Symptoms: , panic, insomnia  Family history of completed suicide or attempted suicide: No  Accessibility to firearms: No  Interpersonal factors: financial stress, family dynamic challenges, LGBQT+     Protective factors  Ability to cope with the stress: Yes  Family responsibility and supportive:Yes  Positive therapeutic relationships: Yes  Social support:Yes  Confucianist beliefs:  Yes  Connectedness with mental health providers: Yes     Homicidal Risk  Acute: Low  Chronic: Low  Imminent: Low    Assessment     Alina Martell i has a past psychiatry history of ADHD, PTSD, Anorexia/Eating Disorder, ADA, and MDD  who has been referred for medication management from therapist, Mariana Love [facility]. Three previous psychiatric inpatient hospitalizations.  Last hospitalization was in 2018.  No disclosed history of suicide attempts.  Last experienced suicidal ideation 2018.  No disclosed self harming behaviors.  No overt concerns regarding chemical health history.  Trauma history disclosed.     Alina Martell reports mood and anxiety fluctuate but no significant concerns.  Sleep is main concern today.  Initially found gabapentin helpful.  Has difficulty with compliance at times which has been chronic concern but this is the most stable she has been in terms of compliance he.  Will advance  gabapentin to 200 mg can advance further if needed.  Patient to send my chart message.  No imminent safety concerns identified.        Pharmacologic:   01/08/24: + doxepin 3mg  05/20/24: + clondine, stop doxepin    05/23/24: + gabapentin 100mg, stop clonidine (interaction drug-drug)    -Continue venlafaxine 75 mg by mouth every day.  -Continue lorazepam 0.5 mg tablet by mouth daily as needed for severe anxiety  -Advance gabapentin 100mg capsule, take 2 capsules by mouth every bedtime       Psychosocial: Would benefit from individual therapy with focus of Cognitive Behavioral Therapy (CBT). This form of therapy will be helpful in addressing cognitive distortions, improving distress tolerance, and developing helpful / healthy coping strategies to address stressors.   - Continue individual therapy, every 3 weeks, [facility]            Diagnosis       ADA  MDD, recurrent, mild, in partial remission  PTSD    ADHD vy hx     Plan         1) Medications:      -Continue venlafaxine 75 mg by mouth every day.  -Continue lorazepam 0.5 mg tablet by mouth daily as needed for severe anxiety  -Advance gabapentin 100mg capsule, take 2 capsules by mouth every bedtime                MNPMP: I have queried the [place] and/or [place] Prescription Monitoring Program for this patient for the preceding 12 months, or reviewed the report provided by my proxy delegate. I have not identified any concerns.  2) Risk vs benefits of medications reviewed: Yes  3) Life style modifications: sleep hygiene, exercise, healthy diet  4) Medical concerns:    - No acute concerns  5) Other:   - Continue individual therapy  - Consider ADHD testing  6) Refrain from drinking alcohol and/or use of drugs.   7) Please secure all prescription and OTC medications, sharps, and caustic substances. Please remove all firearms and ammunition.  8) Review outside records, get WESLEY's, coordinate with outside providers  9) In case of emergency call 911 or go to the nearest ER,  this includes patient voicing thought of harming self or others as well as additional safety concerns   10) Follow-up: 8weeks, or sooner if needed.      Administrative Billing:   Supportive therapy was provided, focusing on reflective listening and solution focused problem solving.    Total time preparing to see this patient, face-to-face time, documenting in the EHR, and coordinating care time on the same calendar date:27 minutes         Signed:   STARLA Kaiser CNP on 2/11/2025 at 3:28 PM     Disclaimer: This note consists of symbols derived from keyboarding, dictation and/or voice recognition software. As a result, there may be errors in the script that have gone undetected. Please consider this when interpreting information found in this chart.      Answers submitted by the patient for this visit:  Patient Health Questionnaire (Submitted on 2/11/2025)  If you checked off any problems, how difficult have these problems made it for you to do your work, take care of things at home, or get along with other people?: Somewhat difficult  PHQ9 TOTAL SCORE: 7

## 2025-02-20 ENCOUNTER — OFFICE VISIT (OUTPATIENT)
Dept: GASTROENTEROLOGY | Facility: CLINIC | Age: 60
End: 2025-02-20
Payer: COMMERCIAL

## 2025-02-20 VITALS
HEIGHT: 69 IN | HEART RATE: 80 BPM | OXYGEN SATURATION: 98 % | BODY MASS INDEX: 40.73 KG/M2 | DIASTOLIC BLOOD PRESSURE: 92 MMHG | WEIGHT: 275 LBS | SYSTOLIC BLOOD PRESSURE: 152 MMHG

## 2025-02-20 DIAGNOSIS — R10.13 EPIGASTRIC PAIN: ICD-10-CM

## 2025-02-20 DIAGNOSIS — R13.19 ESOPHAGEAL DYSPHAGIA: Primary | ICD-10-CM

## 2025-02-20 ASSESSMENT — PAIN SCALES - GENERAL: PAINLEVEL_OUTOF10: NO PAIN (0)

## 2025-02-20 NOTE — PROGRESS NOTES
GI CLINIC VISIT    ASSESSMENT/PLAN:  59 year old female with PMH of of rheumatoid arthritis on Hyrimoz, class II obesity, paroxymal atrial fibrillation, who is s/p CCY presenting to GI clinic for dysphagia    # Esophageal dysphagia  Previously with intermittent dysphagia to solids and pills, EGD 2022 including esophageal biopsies was unremarkable. Now reporting more frequent dysphagia episodes despite Prilosec 40 mg once daily (ongoing use for 3 years). Also reporting pyrosis, globus sensation, regurgitation (bland and acidic), retrosternal pressure (ER 12/17/24 - troponin < 6 and EKG w/o ischemic changes). Recent TBE was unremarkable. Given change in sx, agreed on further testing including EGD with consideration of empiric dilation.     Plan  -Await scheduled EGD 3/3 with Dr Byrnes  -Continue current Prilosec 40 mg daily   --consideration addition of H2RA after EGD, currently reporting 2-3 days of breakthrough symptoms     Ddx - infectious esophagitis (given use of Hyrimoz), structural esophageal changes, unmanaged functional disease, vs other. Thought less likely dysmotility.     Future consideration  -HRM with pH testing   -inquire about cannabis use   -never been on alternate PPI     # Epigastric discomfort, h/o  # Nausea  Historically - onset of epigastric discomfort and nausea following initiation of Eliquis and after endometrial ablation, with unremarkable work-up including CT AP, H plyori stool antigen, CBC, CMP. EGD endoscopically unremarkable with mild reactive gastropathy noted on gastric biopsies. Symptoms improved on PPI. No longer on Eliquis, sx are overall very minimal now    #HTN  -Now on low dose lisinopril, home BP in 130-140s/80s  -to see weight management as well     # h/o SSA   #CRC Screening  CScope 5/2023 clear, diverticular disease discussed.   -repeat CScope 5/2028, or sooner PRN     RTC - 4 mo or sooner PRN     Thank you for this consultation. It was a pleasure to participate in the care  of this patient; please contact us with any further questions.    Mery Serrano PA-C  Follow up: As planned above. Today, I personally spent 25  minutes spent on the date of the encounter doing chart review, history and exam, documentation and further activities per the note.      HPI: 59 year old female with PMH of rheumatoid arthritis on Hyrimoz, class II obesity, paroxymal atrial fibrillation, who is s/p CCY presenting to GI clinic for dysphagia    Patient was previously seen by Radha Dsouza PA-C in 2022 then by myself     4/5/2022 Radha Dsouza note   57 year old female with PMH of rheumatoid arthritis on Humiara, class II obesity, paroxymal atrial fibrillation on chronic anticoagulation with DOAC, who is s/p CCY presenting to GI clinic for abdominal pain and consideration of EGD.     Maritza underwent D+C March 3, 2022 for menorrhagia, and shortly after, developed epigastric pain.  She describes that initially, it felt like food was getting stuck low in the substernal area with most everything she ate, which would then regurgitate as an undigested, somewhat sour bolus. This then progressed to a non-radiating, intermittent epigastric pain that feels like getting punched in the stomach. It relably occurs with fating, and improves if she eats something. It can also occur if she eats a larger volume of food. She denies any significant GERD symptoms, though she was having some occasional episodes at start of symptoms. No odynophagia. She has nausea that has been intermittent and random in occurrence, not always related to the epigastric pain. She is using Zofran for this. She states that until these symptoms began, she had regular bowel movements 2-3 times per day without any associated concerns, but now is having bowel movements every 2-3 days, associated with hard, dry stools, incomplete evacuation, and straining. No BRBPR. She states that at first note of epigastric pain, there were some dark stools, which have  not recurred.     Work-up has included a CT AP, which was unremarkable. H pylori stool antigen negative (on PPI for several days). CBC with mild anemia and new mild leukocytosis. CMP, lipase, TSH, lactic acid unremarkable. She was started on PPI 40 daily with sucralfate. This has resulted in mild improvement in symptoms. She is still experiencing the epigastric discomfort with fasting, which can awaken her from sleep, and continues to have a sense of possible dysphagia to solid textures 2-3 times per week, which feels stuck for 10-15 minutes and then either clears or regurgitates as undigested, sour bolus.      Colonoscopy 2/2021 - repeat 3 years for 1 sessile serrated adenoma     Does not take NSAID     FHx - mom with Crohn's with ileostomy, multiple autoimmune conditions in family     7/26/2022 with Radha Dsouza, PAC  She underwent EGD May 2022, which was endoscopically unremarkable. Gastric biopsies with mild reactive gastropathy with mild chronic inactive gastritis with negative biopsies for H pylori. Duodenal biopsies unremarkable. Esophageal biopsies were unremarkable.     Since last visit, she underwent ablation for A fib. She has had minimal epigastric pain since initial consult and continuation of PPI. She has occasional nausea, which she relates to the Eliquis, as it started in conjunction with initiation of that medication. She used MiraLAX for several weeks with normalization of bowel habits and has been able to successfully stop MiraLAX with ongoing satisfactory bowel habits, no BRBPR or melena. She has had no A fib episodes. Dysphagia also significantly improved, now maybe once per week to pills and clears more rapidly than previous. With resolution of A fib episodes, she has been able to be more active with exercise, and has also made some dietary changes.    12/30/24 with me  She returned to [facility] for sensation of indigestion and food sticking x 1 mo. Denies changes to daily routine in past month  to include changes in diet, lifestyle, meds, etc.She had an EGD 5/2022 for dysphagia - esophageal biopsies were negative   -does have a lump in back of throat with swallows but no trouble with initiation of a swallow  -daily heartburn sx, retrosternal pressure (ER evaluation - 12/17/24 -- troponin negative )  -having reports episodes of regurgitation of foods and acid /metallic taste. No bloody outpt. Does have trouble with nausea that worsened in past month. Occasional vomiting - does have zofran but not needed it in a while.   -no trouble with swallowing  liquids or pills dysphagia   -taking daily Prilosec 40 mg, she takes this med on empty stomach   -no abd pain, particularly to epigastric region  does have PND and had seen ENT previously. Given nasal spray for treatment which she feels it helped   On Hyrimoz no NSAIDs. Does take APAP for pain relief PRN.   No Etoh. Never smoker.   Appetite is variable. Weight is overall stable for her. She is planning to schedule an appt with dietary to review diet/lifestyle. She is not interested in medications at this time. She is working with [employer] on this.   Stooling - 1 BM in AM/PM.  Formed and soft. No bloody or black stools.   Not on blood thinners    S/P CCY  Mom with Crohn's.     Today 2/20/2025  Seen recently in ED for palpitations - was on 14d monitor and shares SOB now resolved. NM Lexiscan stress 2022 - unremarkable, w/o ischemia, normal LV function  Her EGD for further eval of dysphagia was rescheduled to 3/3 with Dr Byrnes   -with swallowing, she feels food and pills are intermittently getting stuck; this is worse than her baseline    -Occasional epigastric pain, intermittent . 7/15/2024 w contrast - no obstruction or inflammatory changes   -intermittent nausea that clears with Prn use of zofran, no emesis  -does have a lump in back of throat with swallows but no trouble with initiation of a swallow  -occasional heartburn sx, retrosternal pressure (ER evaluation  - 12/17/24 -- troponin negative )  -having reports episodes of regurgitation of foods and acid /metallic taste. No bloody outpt.   -taking daily Prilosec 40 mg, she takes this med on empty stomach   Stooling - 1 BM in AM/PM.  Formed and soft. No bloody or black stools.   Appetite and weight are stable.  She will be meeting with weight management team but shares she does not want to get on newer medications  No other questions or concerns      PAST MEDICAL HISTORY:  Past Medical History:   Diagnosis Date    Anemia     Antiplatelet or antithrombotic long-term use     Arrhythmia     Cannabis use without complication 12/08/2014    Carpal tunnel syndrome     Coronary artery disease     Depressive disorder 2014    Diabetes mellitus, type 2 (H) 12/13/2023    Gastroesophageal reflux disease     History of angina     Hypertension     Irregular heart beat     Obesity     Paroxysmal atrial fibrillation (H)     PTSD (post-traumatic stress disorder)     RA (rheumatoid arthritis) (H)     Rheumatoid arthritis (H) 07/08/2016    Sicca syndrome     Sleep apnea        PREVIOUS ABDOMINAL/GYNECOLOGIC SURGERIES:  Past Surgical History:   Procedure Laterality Date    ABDOMEN SURGERY      CHOLECYSTECTOMY      CYSTOSCOPY N/A 05/04/2023    Procedure: AND CYSTOSCOPY;  Surgeon: Irma Cary MD;  Location: [facility] OR    DAVINCI HYSTERECTOMY TOTAL Bilateral 05/04/2023    Procedure: ROBOTIC ASSISTED TOTAL LAPAROSCOPIC HYSTERECTOMY WITH BILATERAL SALPINGECTOMY;  Surgeon: Irma Cary MD;  Location: [facility] OR    DILATION AND CURETTAGE, OPERATIVE HYSTEROSCOPY, COMBINED N/A 03/03/2022    Procedure: HYSTEROSCOPY, WITH DILATION AND CURETTAGE;  Surgeon: Irma Cary MD;  Location: [facility] OR    EP ABLATION FOCAL AFIB N/A 06/30/2022    Procedure: Ablation Atrial Fibrilation;  Surgeon: Jose Guadalupe Joseph MD;  Location:  [facility]    HC REMOVAL GALLBLADDER      Description:  Cholecystectomy;  Recorded: 10/15/2013;    LAPAROSCOPIC TUBAL LIGATION      RELEASE CARPAL TUNNEL BILATERAL      TUBAL LIGATION  01/01/1995         PERTINENT MEDICATIONS:  Current Outpatient Medications   Medication Sig Dispense Refill    diltiazem ER (TIAZAC) 360 MG 24 hr ER beaded capsule Take 1 capsule (360 mg) by mouth daily. 90 capsule 1    EPINEPHrine (ANY BX GENERIC EQUIV) 0.3 MG/0.3ML injection 2-pack Inject 0.3 mLs (0.3 mg) into the muscle as needed for anaphylaxis 2 each 2    fexofenadine (ALLEGRA) 60 MG tablet Take 1 tablet (60 mg) by mouth daily. 30 tablet 4    fluticasone (FLONASE) 50 MCG/ACT nasal spray Spray 2 sprays into both nostrils daily. 16 g 11    gabapentin (NEURONTIN) 100 MG capsule Take 2 capsules (200 mg) by mouth at bedtime. 60 capsule 2    HYRIMOZ 40 MG/0.4ML auto-injector kit INJECT 1 PEN UNDER THE SKIN EVERY 14 DAYS 0.8 mL 0    lisinopril (ZESTRIL) 5 MG tablet Take 1 tablet (5 mg) by mouth daily. 90 tablet 3    LORazepam (ATIVAN) 0.5 MG tablet TAKE 1 TABLET(0.5 MG) BY MOUTH DAILY AS NEEDED FOR ANXIETY 30 tablet 0    metFORMIN (GLUCOPHAGE XR) 500 MG 24 hr tablet Take 1 tablet (500 mg) by mouth daily (with dinner). 90 tablet 2    Multiple Vitamins-Minerals (CENTROVITE) TABS Take 1 tablet by mouth daily      omeprazole (PRILOSEC) 40 MG DR capsule Take 1 capsule (40 mg) by mouth daily. 90 capsule 3    ondansetron (ZOFRAN ODT) 4 MG ODT tab Take 1 tablet (4 mg) by mouth every 8 hours as needed for nausea 30 tablet 0    pravastatin (PRAVACHOL) 20 MG tablet Take 1 tablet (20 mg) by mouth daily. 90 tablet 1    vitamin D3 (CHOLECALCIFEROL) 1.25 MG (60866 UT) capsule Take 1 capsule (50,000 Units) by mouth every 7 days 12 capsule 3         SOCIAL HISTORY:  Social History     Socioeconomic History    Marital status: Single     Spouse name: Not on file    Number of children: 2    Years of education: 4    Highest education level: Not on file   Occupational History    Not on file   Tobacco Use     Smoking status: Never     Passive exposure: Past (mom smoked)    Smokeless tobacco: Never   Vaping Use    Vaping status: Never Used   Substance and Sexual Activity    Alcohol use: Not Currently     Comment: Alcoholic Drinks/day: Never an issue, she states.  7/8/16    Drug use: Not Currently     Types: Marijuana     Comment: Drug use: Former marijuana use, not current. 7/8/16    Sexual activity: Not Currently     Partners: Female, Male     Birth control/protection: None     Comment: tubal ligation   Other Topics Concern    Parent/sibling w/ CABG, MI or angioplasty before 65F 55M? No   Social History Narrative    Not on file     Social Drivers of Health     Financial Resource Strain: Low Risk  (12/3/2024)    Financial Resource Strain     Within the past 12 months, have you or your family members you live with been unable to get utilities (heat, electricity) when it was really needed?: No   Food Insecurity: High Risk (12/3/2024)    Food Insecurity     Within the past 12 months, did you worry that your food would run out before you got money to buy more?: Yes     Within the past 12 months, did the food you bought just not last and you didn t have money to get more?: No   Transportation Needs: Low Risk  (12/3/2024)    Transportation Needs     Within the past 12 months, has lack of transportation kept you from medical appointments, getting your medicines, non-medical meetings or appointments, work, or from getting things that you need?: No   Physical Activity: Insufficiently Active (12/3/2024)    Exercise Vital Sign     Days of Exercise per Week: 3 days     Minutes of Exercise per Session: 20 min   Stress: Stress Concern Present (12/3/2024)    [employer] [place] [employer] - Occupational Stress Questionnaire     Feeling of Stress : To some extent   Social Connections: Moderately Integrated (12/3/2024)    Social Connection and Isolation Panel [NHANES]     Frequency of Communication with Friends and Family: Three  times a week     Frequency of Social Gatherings with Friends and Family: Once a week     Attends [employer] Services: More than 4 times per year     Active Member of Clubs or Organizations: Yes     Attends Club or Organization Meetings: More than 4 times per year     Marital Status: Never    Interpersonal Safety: Low Risk  (11/27/2024)    Interpersonal Safety     Do you feel physically and emotionally safe where you currently live?: Yes     Within the past 12 months, have you been hit, slapped, kicked or otherwise physically hurt by someone?: No     Within the past 12 months, have you been humiliated or emotionally abused in other ways by your partner or ex-partner?: No   Housing Stability: High Risk (12/3/2024)    Housing Stability     Do you have housing? : No     Are you worried about losing your housing?: No       FAMILY HISTORY:  Family History   Problem Relation Age of Onset    Crohn's Disease Mother         Total colectomy with ileostomy.    Atrial fibrillation Mother     Snoring Father     Diabetes Father     Prostate Cancer Father     Chronic Kidney Disease Father         Chose against dialysis.    Substance Abuse Brother     Depression Brother     Attention Deficit Disorder Brother     Alcoholism Brother     Arrhythmia Brother     Alcoholism Brother     Substance Abuse Brother     Arrhythmia Brother     Alcoholism Maternal Grandfather     Substance Abuse Maternal Grandfather     No Known Problems Daughter     No Known Problems Son     Lupus Cousin     Breast Cancer No family hx of        PHYSICAL EXAMINATION:  Vitals reviewed: LMP  (LMP Unknown)   Wt:   Wt Readings from Last 2 Encounters:   01/06/25 130.2 kg (287 lb)   01/03/25 129.3 kg (285 lb)      Constitutional: aaox3, cooperative, pleasant, not dyspneic/diaphoretic, no acute distress  Eyes: Sclera anicteric/injected  Ears/nose/mouth/throat: hearing intact  Respiratory: Unlabored breathing  Abd:  Nondistended,  nontender, no peritoneal  signs  Skin: warm, perfused, no jaundice  Psych: Normal affect  MSK: Normal gait     PERTINENT STUDIES - Reviewed in EMR     Lab Results   Component Value Date    WBC 13.9 (H) 01/03/2025    WBC 11.0 12/17/2024    WBC 10.0 11/27/2024    HGB 11.7 01/03/2025    HGB 11.2 (L) 12/17/2024    HGB 10.9 (L) 11/27/2024     01/03/2025     12/17/2024     11/27/2024    CHOL 188 11/27/2024    CHOL 203 (H) 03/27/2024    CHOL 166 03/22/2023    TRIG 106 11/27/2024    TRIG 156 (H) 03/27/2024    TRIG 89 03/22/2023    HDL 46 (L) 11/27/2024    HDL 50 03/27/2024    HDL 44 (L) 03/22/2023    ALT 19 12/17/2024    ALT 15 11/27/2024    ALT 15 08/15/2024    AST 18 12/17/2024    AST 19 11/27/2024    AST 13 07/15/2024     01/03/2025     12/17/2024     11/27/2024    BUN 16.2 01/03/2025    BUN 12.1 12/17/2024    BUN 15.5 11/27/2024    CO2 26 01/03/2025    CO2 28 12/17/2024    CO2 27 11/27/2024    TSH 2.37 01/03/2025    TSH 3.05 12/17/2024    TSH 2.16 02/16/2022    INR 1.04 08/11/2023    INR 1.13 12/15/2022    INR 1.02 12/08/2022        Liver Function Studies -   Recent Labs   Lab Test 12/17/24 2043   PROTTOTAL 7.9   ALBUMIN 4.0   BILITOTAL <0.2   ALKPHOS 112   AST 18   ALT 19        PREVIOUS ENDOSCOPY    No results found for this or any previous visit.

## 2025-02-20 NOTE — NURSING NOTE
"Do you have a history of colon cancer in your immediate family? NO    If yes who: negative     And what age  were they diagnosed: n/a      Chief Complaint   Patient presents with    Follow Up       Vitals:    02/20/25 0737   BP: (!) 152/92   Pulse: 80   SpO2: 98%   Weight: 124.7 kg (275 lb)   Height: 1.753 m (5' 9\")       Body mass index is 40.61 kg/m .    Anju Sadler MA      "

## 2025-02-20 NOTE — PATIENT INSTRUCTIONS
It was a pleasure taking care of you today.  I've included a brief summary of our discussion and care plan from today's visit below.  Please review this information with your primary care provider.  _______________________________________________________________________    My recommendations are summarized as follows:    Will be in touch after EGD - particularly regarding medications.     Please call our clinic or send a [employer] message to us if you have any questions or concerns. MyChart messages are answered by your nurse or doctor typically within 24 hours.  Please allow extra time on weekends and holidays    Return to GI Clinic in 4 months to review your progress.    _______________________________________________________________________    How do I schedule labs, imaging studies, or procedures that were ordered in clinic today?      Labs: To schedule lab appointment at the [facility], use my chart or call [phone]. If you have a [facility] lab closer to home where you are regularly seen you can give them a call.      Procedures: If a colonoscopy, upper endoscopy, breath test, esophageal manometry, or pH impedence was ordered today, our endoscopy team will call you to schedule this. If you have not heard from our endoscopy team within a week, please call [phone] option 2 to schedule.      Imaging Studies: If you were scheduled for a CT scan, X-ray, MRI, ultrasound, HIDA scan, EKG or other imaging study, please call [phone] to have this scheduled.      Referral: If a referral to another specialty was ordered, expect a phone call or follow instructions above. If you have not heard from anyone regarding your referral in a week, please call our clinic to check the status.      Who do I call with any questions after my visit?  Please be in touch if there are any further questions that arise following today's visit.  There are multiple ways to contact your gastroenterology care team.        During business hours, you may reach a Gastroenterology nurse at [phone]     To schedule or reschedule an appointment, please call [phone].      You can always send a secure message through [employer].  [employer] messages are answered by your nurse or doctor typically within 24 hours.  Please allow extra time on weekends and holidays.       For urgent/emergent questions after business hours, you may reach the on-call GI Fellow by contacting the [facility]  at [phone].     How will I get the results of any tests ordered?    You will receive all of your results.  If you have signed up for [employer], any tests ordered at your visit will be available to you after your physician reviews them.  Typically this takes 1-2 weeks.  If there are urgent results that require a change in your care plan, your physician or nurse will call you to discuss the next steps.       What is [employer]?  [employer] is a secure way for you to access all of your healthcare records from the [facility].  It is a web based computer program, so you can sign on to it from any location.  It also allows you to send secure messages to your care team.  I recommend signing up for [employer] access if you have not already done so and are comfortable with using a computer.       How to I schedule a follow-up visit?  If you did not schedule a follow-up visit today, please call [phone] to schedule a follow-up office visit.      Sincerely,    Mery Serrano PA-C  Gastroenterology

## 2025-02-20 NOTE — LETTER
2/20/2025      Alina Martell  [address]  [place] [address]      Dear Colleague,    Thank you for referring your patient, Alina Martell, to the [facility] [place]. Please see a copy of my visit note below.      GI CLINIC VISIT    ASSESSMENT/PLAN:  59 year old female with PMH of of rheumatoid arthritis on Hyrimoz, class II obesity, paroxymal atrial fibrillation, who is s/p CCY presenting to GI clinic for dysphagia    # Esophageal dysphagia  Previously with intermittent dysphagia to solids and pills, EGD 2022 including esophageal biopsies was unremarkable. Now reporting more frequent dysphagia episodes despite Prilosec 40 mg once daily (ongoing use for 3 years). Also reporting pyrosis, globus sensation, regurgitation (bland and acidic), retrosternal pressure (ER 12/17/24 - troponin < 6 and EKG w/o ischemic changes). Recent TBE was unremarkable. Given change in sx, agreed on further testing including EGD with consideration of empiric dilation.     Plan  -Await scheduled EGD 3/3 with Dr Byrnes  -Continue current Prilosec 40 mg daily   --consideration addition of H2RA after EGD, currently reporting 2-3 days of breakthrough symptoms     Ddx - infectious esophagitis (given use of Hyrimoz), structural esophageal changes, unmanaged functional disease, vs other. Thought less likely dysmotility.     Future consideration  -HRM with pH testing   -inquire about cannabis use   -never been on alternate PPI     # Epigastric discomfort, h/o  # Nausea  Historically - onset of epigastric discomfort and nausea following initiation of Eliquis and after endometrial ablation, with unremarkable work-up including CT AP, H plyori stool antigen, CBC, CMP. EGD endoscopically unremarkable with mild reactive gastropathy noted on gastric biopsies. Symptoms improved on PPI. No longer on Eliquis, sx are overall very minimal now    #HTN  -Now on low dose lisinopril, home BP in 130-140s/80s  -to see  weight management as well     # h/o SSA   #CRC Screening  CScope 5/2023 clear, diverticular disease discussed.   -repeat CScope 5/2028, or sooner PRN     RTC - 4 mo or sooner PRN     Thank you for this consultation. It was a pleasure to participate in the care of this patient; please contact us with any further questions.    Mery Serrano PA-C  Follow up: As planned above. Today, I personally spent 25  minutes spent on the date of the encounter doing chart review, history and exam, documentation and further activities per the note.      HPI: 59 year old female with PMH of rheumatoid arthritis on Hyrimoz, class II obesity, paroxymal atrial fibrillation, who is s/p CCY presenting to GI clinic for dysphagia    Patient was previously seen by Radha Dsouza PA-C in 2022 then by myself     4/5/2022 Radha Dsouza note   57 year old female with PMH of rheumatoid arthritis on Humiara, class II obesity, paroxymal atrial fibrillation on chronic anticoagulation with DOAC, who is s/p CCY presenting to GI clinic for abdominal pain and consideration of EGD.     Maritza underwent D+C March 3, 2022 for menorrhagia, and shortly after, developed epigastric pain.  She describes that initially, it felt like food was getting stuck low in the substernal area with most everything she ate, which would then regurgitate as an undigested, somewhat sour bolus. This then progressed to a non-radiating, intermittent epigastric pain that feels like getting punched in the stomach. It relably occurs with fating, and improves if she eats something. It can also occur if she eats a larger volume of food. She denies any significant GERD symptoms, though she was having some occasional episodes at start of symptoms. No odynophagia. She has nausea that has been intermittent and random in occurrence, not always related to the epigastric pain. She is using Zofran for this. She states that until these symptoms began, she had regular bowel movements 2-3 times per  day without any associated concerns, but now is having bowel movements every 2-3 days, associated with hard, dry stools, incomplete evacuation, and straining. No BRBPR. She states that at first note of epigastric pain, there were some dark stools, which have not recurred.     Work-up has included a CT AP, which was unremarkable. H pylori stool antigen negative (on PPI for several days). CBC with mild anemia and new mild leukocytosis. CMP, lipase, TSH, lactic acid unremarkable. She was started on PPI 40 daily with sucralfate. This has resulted in mild improvement in symptoms. She is still experiencing the epigastric discomfort with fasting, which can awaken her from sleep, and continues to have a sense of possible dysphagia to solid textures 2-3 times per week, which feels stuck for 10-15 minutes and then either clears or regurgitates as undigested, sour bolus.      Colonoscopy 2/2021 - repeat 3 years for 1 sessile serrated adenoma     Does not take NSAID     FHx - mom with Crohn's with ileostomy, multiple autoimmune conditions in family     7/26/2022 with Radha Dsouza, PAC  She underwent EGD May 2022, which was endoscopically unremarkable. Gastric biopsies with mild reactive gastropathy with mild chronic inactive gastritis with negative biopsies for H pylori. Duodenal biopsies unremarkable. Esophageal biopsies were unremarkable.     Since last visit, she underwent ablation for A fib. She has had minimal epigastric pain since initial consult and continuation of PPI. She has occasional nausea, which she relates to the Eliquis, as it started in conjunction with initiation of that medication. She used MiraLAX for several weeks with normalization of bowel habits and has been able to successfully stop MiraLAX with ongoing satisfactory bowel habits, no BRBPR or melena. She has had no A fib episodes. Dysphagia also significantly improved, now maybe once per week to pills and clears more rapidly than previous. With  resolution of A fib episodes, she has been able to be more active with exercise, and has also made some dietary changes.    12/30/24 with me  She returned to [facility] for sensation of indigestion and food sticking x 1 mo. Denies changes to daily routine in past month to include changes in diet, lifestyle, meds, etc.She had an EGD 5/2022 for dysphagia - esophageal biopsies were negative   -does have a lump in back of throat with swallows but no trouble with initiation of a swallow  -daily heartburn sx, retrosternal pressure (ER evaluation - 12/17/24 -- troponin negative )  -having reports episodes of regurgitation of foods and acid /metallic taste. No bloody outpt. Does have trouble with nausea that worsened in past month. Occasional vomiting - does have zofran but not needed it in a while.   -no trouble with swallowing  liquids or pills dysphagia   -taking daily Prilosec 40 mg, she takes this med on empty stomach   -no abd pain, particularly to epigastric region  does have PND and had seen ENT previously. Given nasal spray for treatment which she feels it helped   On Hyrimoz no NSAIDs. Does take APAP for pain relief PRN.   No Etoh. Never smoker.   Appetite is variable. Weight is overall stable for her. She is planning to schedule an appt with dietary to review diet/lifestyle. She is not interested in medications at this time. She is working with [employer] on this.   Stooling - 1 BM in AM/PM.  Formed and soft. No bloody or black stools.   Not on blood thinners    S/P CCY  Mom with Crohn's.     Today 2/20/2025  Seen recently in ED for palpitations - was on 14d monitor and shares SOB now resolved. NM Lexiscan stress 2022 - unremarkable, w/o ischemia, normal LV function  Her EGD for further eval of dysphagia was rescheduled to 3/3 with Dr Byrnes   -with swallowing, she feels food and pills are intermittently getting stuck; this is worse than her baseline    -Occasional epigastric pain, intermittent . 7/15/2024 w contrast -  no obstruction or inflammatory changes   -intermittent nausea that clears with Prn use of zofran, no emesis  -does have a lump in back of throat with swallows but no trouble with initiation of a swallow  -occasional heartburn sx, retrosternal pressure (ER evaluation - 12/17/24 -- troponin negative )  -having reports episodes of regurgitation of foods and acid /metallic taste. No bloody outpt.   -taking daily Prilosec 40 mg, she takes this med on empty stomach   Stooling - 1 BM in AM/PM.  Formed and soft. No bloody or black stools.   Appetite and weight are stable.  She will be meeting with weight management team but shares she does not want to get on newer medications  No other questions or concerns      PAST MEDICAL HISTORY:  Past Medical History:   Diagnosis Date     Anemia      Antiplatelet or antithrombotic long-term use      Arrhythmia      Cannabis use without complication 12/08/2014     Carpal tunnel syndrome      Coronary artery disease      Depressive disorder 2014     Diabetes mellitus, type 2 (H) 12/13/2023     Gastroesophageal reflux disease      History of angina      Hypertension      Irregular heart beat      Obesity      Paroxysmal atrial fibrillation (H)      PTSD (post-traumatic stress disorder)      RA (rheumatoid arthritis) (H)      Rheumatoid arthritis (H) 07/08/2016     Sicca syndrome      Sleep apnea        PREVIOUS ABDOMINAL/GYNECOLOGIC SURGERIES:  Past Surgical History:   Procedure Laterality Date     ABDOMEN SURGERY       CHOLECYSTECTOMY       CYSTOSCOPY N/A 05/04/2023    Procedure: AND CYSTOSCOPY;  Surgeon: Irma Cary MD;  Location: [facility] OR     DAVINCI HYSTERECTOMY TOTAL Bilateral 05/04/2023    Procedure: ROBOTIC ASSISTED TOTAL LAPAROSCOPIC HYSTERECTOMY WITH BILATERAL SALPINGECTOMY;  Surgeon: Irma Cary MD;  Location: [facility]     DILATION AND CURETTAGE, OPERATIVE HYSTEROSCOPY, COMBINED N/A 03/03/2022    Procedure: HYSTEROSCOPY, WITH DILATION AND  CURETTAGE;  Surgeon: Irma Cary MD;  Location: [facility] OR     EP ABLATION FOCAL AFIB N/A 06/30/2022    Procedure: Ablation Atrial Fibrilation;  Surgeon: Jose Guadalupe Joseph MD;  Location:  [facility]     HC REMOVAL GALLBLADDER      Description: Cholecystectomy;  Recorded: 10/15/2013;     LAPAROSCOPIC TUBAL LIGATION       RELEASE CARPAL TUNNEL BILATERAL       TUBAL LIGATION  01/01/1995         PERTINENT MEDICATIONS:  Current Outpatient Medications   Medication Sig Dispense Refill     diltiazem ER (TIAZAC) 360 MG 24 hr ER beaded capsule Take 1 capsule (360 mg) by mouth daily. 90 capsule 1     EPINEPHrine (ANY BX GENERIC EQUIV) 0.3 MG/0.3ML injection 2-pack Inject 0.3 mLs (0.3 mg) into the muscle as needed for anaphylaxis 2 each 2     fexofenadine (ALLEGRA) 60 MG tablet Take 1 tablet (60 mg) by mouth daily. 30 tablet 4     fluticasone (FLONASE) 50 MCG/ACT nasal spray Spray 2 sprays into both nostrils daily. 16 g 11     gabapentin (NEURONTIN) 100 MG capsule Take 2 capsules (200 mg) by mouth at bedtime. 60 capsule 2     HYRIMOZ 40 MG/0.4ML auto-injector kit INJECT 1 PEN UNDER THE SKIN EVERY 14 DAYS 0.8 mL 0     lisinopril (ZESTRIL) 5 MG tablet Take 1 tablet (5 mg) by mouth daily. 90 tablet 3     LORazepam (ATIVAN) 0.5 MG tablet TAKE 1 TABLET(0.5 MG) BY MOUTH DAILY AS NEEDED FOR ANXIETY 30 tablet 0     metFORMIN (GLUCOPHAGE XR) 500 MG 24 hr tablet Take 1 tablet (500 mg) by mouth daily (with dinner). 90 tablet 2     Multiple Vitamins-Minerals (CENTROVITE) TABS Take 1 tablet by mouth daily       omeprazole (PRILOSEC) 40 MG DR capsule Take 1 capsule (40 mg) by mouth daily. 90 capsule 3     ondansetron (ZOFRAN ODT) 4 MG ODT tab Take 1 tablet (4 mg) by mouth every 8 hours as needed for nausea 30 tablet 0     pravastatin (PRAVACHOL) 20 MG tablet Take 1 tablet (20 mg) by mouth daily. 90 tablet 1     vitamin D3 (CHOLECALCIFEROL) 1.25 MG (27943 UT) capsule Take 1 capsule (50,000 Units) by mouth  every 7 days 12 capsule 3         SOCIAL HISTORY:  Social History     Socioeconomic History     Marital status: Single     Spouse name: Not on file     Number of children: 2     Years of education: 4     Highest education level: Not on file   Occupational History     Not on file   Tobacco Use     Smoking status: Never     Passive exposure: Past (mom smoked)     Smokeless tobacco: Never   Vaping Use     Vaping status: Never Used   Substance and Sexual Activity     Alcohol use: Not Currently     Comment: Alcoholic Drinks/day: Never an issue, she states.  7/8/16     Drug use: Not Currently     Types: Marijuana     Comment: Drug use: Former marijuana use, not current. 7/8/16     Sexual activity: Not Currently     Partners: Female, Male     Birth control/protection: None     Comment: tubal ligation   Other Topics Concern     Parent/sibling w/ CABG, MI or angioplasty before 65F 55M? No   Social History Narrative     Not on file     Social Drivers of Health     Financial Resource Strain: Low Risk  (12/3/2024)    Financial Resource Strain      Within the past 12 months, have you or your family members you live with been unable to get utilities (heat, electricity) when it was really needed?: No   Food Insecurity: High Risk (12/3/2024)    Food Insecurity      Within the past 12 months, did you worry that your food would run out before you got money to buy more?: Yes      Within the past 12 months, did the food you bought just not last and you didn t have money to get more?: No   Transportation Needs: Low Risk  (12/3/2024)    Transportation Needs      Within the past 12 months, has lack of transportation kept you from medical appointments, getting your medicines, non-medical meetings or appointments, work, or from getting things that you need?: No   Physical Activity: Insufficiently Active (12/3/2024)    Exercise Vital Sign      Days of Exercise per Week: 3 days      Minutes of Exercise per Session: 20 min   Stress: Stress  Concern Present (12/3/2024)    [employer] - Occupational Stress Questionnaire      Feeling of Stress : To some extent   Social Connections: Moderately Integrated (12/3/2024)    Social Connection and Isolation Panel [NHANES]      Frequency of Communication with Friends and Family: Three times a week      Frequency of Social Gatherings with Friends and Family: Once a week      Attends Sabianist Services: More than 4 times per year      Active Member of Clubs or Organizations: Yes      Attends Club or Organization Meetings: More than 4 times per year      Marital Status: Never    Interpersonal Safety: Low Risk  (11/27/2024)    Interpersonal Safety      Do you feel physically and emotionally safe where you currently live?: Yes      Within the past 12 months, have you been hit, slapped, kicked or otherwise physically hurt by someone?: No      Within the past 12 months, have you been humiliated or emotionally abused in other ways by your partner or ex-partner?: No   Housing Stability: High Risk (12/3/2024)    Housing Stability      Do you have housing? : No      Are you worried about losing your housing?: No       FAMILY HISTORY:  Family History   Problem Relation Age of Onset     Crohn's Disease Mother         Total colectomy with ileostomy.     Atrial fibrillation Mother      Snoring Father      Diabetes Father      Prostate Cancer Father      Chronic Kidney Disease Father         Chose against dialysis.     Substance Abuse Brother      Depression Brother      Attention Deficit Disorder Brother      Alcoholism Brother      Arrhythmia Brother      Alcoholism Brother      Substance Abuse Brother      Arrhythmia Brother      Alcoholism Maternal Grandfather      Substance Abuse Maternal Grandfather      No Known Problems Daughter      No Known Problems Son      Lupus Cousin      Breast Cancer No family hx of        PHYSICAL EXAMINATION:  Vitals reviewed: LMP  (LMP Unknown)   Wt:   Wt  Readings from Last 2 Encounters:   01/06/25 130.2 kg (287 lb)   01/03/25 129.3 kg (285 lb)      Constitutional: aaox3, cooperative, pleasant, not dyspneic/diaphoretic, no acute distress  Eyes: Sclera anicteric/injected  Ears/nose/mouth/throat: hearing intact  Respiratory: Unlabored breathing  Abd:  Nondistended,  nontender, no peritoneal signs  Skin: warm, perfused, no jaundice  Psych: Normal affect  MSK: Normal gait     PERTINENT STUDIES - Reviewed in EMR     Lab Results   Component Value Date    WBC 13.9 (H) 01/03/2025    WBC 11.0 12/17/2024    WBC 10.0 11/27/2024    HGB 11.7 01/03/2025    HGB 11.2 (L) 12/17/2024    HGB 10.9 (L) 11/27/2024     01/03/2025     12/17/2024     11/27/2024    CHOL 188 11/27/2024    CHOL 203 (H) 03/27/2024    CHOL 166 03/22/2023    TRIG 106 11/27/2024    TRIG 156 (H) 03/27/2024    TRIG 89 03/22/2023    HDL 46 (L) 11/27/2024    HDL 50 03/27/2024    HDL 44 (L) 03/22/2023    ALT 19 12/17/2024    ALT 15 11/27/2024    ALT 15 08/15/2024    AST 18 12/17/2024    AST 19 11/27/2024    AST 13 07/15/2024     01/03/2025     12/17/2024     11/27/2024    BUN 16.2 01/03/2025    BUN 12.1 12/17/2024    BUN 15.5 11/27/2024    CO2 26 01/03/2025    CO2 28 12/17/2024    CO2 27 11/27/2024    TSH 2.37 01/03/2025    TSH 3.05 12/17/2024    TSH 2.16 02/16/2022    INR 1.04 08/11/2023    INR 1.13 12/15/2022    INR 1.02 12/08/2022        Liver Function Studies -   Recent Labs   Lab Test 12/17/24 2043   PROTTOTAL 7.9   ALBUMIN 4.0   BILITOTAL <0.2   ALKPHOS 112   AST 18   ALT 19        PREVIOUS ENDOSCOPY    No results found for this or any previous visit.      Again, thank you for allowing me to participate in the care of your patient.        Sincerely,        Mery Serrano PA-C    Electronically signed

## 2025-02-23 ENCOUNTER — MYC MEDICAL ADVICE (OUTPATIENT)
Dept: SLEEP MEDICINE | Facility: CLINIC | Age: 60
End: 2025-02-23
Payer: COMMERCIAL

## 2025-02-24 DIAGNOSIS — F41.1 GAD (GENERALIZED ANXIETY DISORDER): ICD-10-CM

## 2025-02-25 ENCOUNTER — VIRTUAL VISIT (OUTPATIENT)
Dept: PSYCHOLOGY | Facility: CLINIC | Age: 60
End: 2025-02-25
Payer: COMMERCIAL

## 2025-02-25 DIAGNOSIS — F33.1 MODERATE EPISODE OF RECURRENT MAJOR DEPRESSIVE DISORDER (H): ICD-10-CM

## 2025-02-25 DIAGNOSIS — F41.1 GAD (GENERALIZED ANXIETY DISORDER): Primary | ICD-10-CM

## 2025-02-25 DIAGNOSIS — F43.10 POSTTRAUMATIC STRESS DISORDER: ICD-10-CM

## 2025-02-25 PROCEDURE — 90837 PSYTX W PT 60 MINUTES: CPT | Mod: 95 | Performed by: COUNSELOR

## 2025-02-25 RX ORDER — LORAZEPAM 0.5 MG/1
0.5 TABLET ORAL DAILY PRN
Qty: 30 TABLET | Refills: 1 | Status: SHIPPED | OUTPATIENT
Start: 2025-02-25

## 2025-02-25 NOTE — PROGRESS NOTES
[facility]                                     Progress Note    Patient Name: Alina Martell  Date: 2/25/25         Service Type: Individual      Session Start Time: 12:01 Session End Time: 1:01     Session Length: 60 minutes    Session #: 86     Attendees: Client      Service Modality:  Video Visit:     Telemedicine Visit: The patient's condition can be safely assessed and treated via synchronous audio and visual telemedicine encounter.       Reason for Telemedicine Visit: Patient convenience (e.g. access to timely appointments / distance to available provider)     Originating Site (Patient Location): Patient's place of employment     Distant Location (provider location):  Off-site     Consent:  The patient/guardian has verbally consented to: the potential risks and benefits of telemedicine (video visit) versus in person care; bill my insurance or make self-payment for services provided; and responsibility for payment of non-covered services.      Mode of Communication:  Video Conference via [employer]     As the provider I attest to compliance with applicable laws and regulations related to telemedicine.            DATA  Extended Session (53+ minutes):   - Patient's presenting concerns require more intensive intervention than could be completed within the usual service  Interactive Complexity: No  Crisis: No      Progress Since Last Session (Related to Symptoms / Goals / Homework):   Symptoms: No change stress with housing    Homework: Achieved / completed to satisfaction      Episode of Care Goals: Satisfactory progress - ACTION (Actively working towards change); Intervened by reinforcing change plan / affirming steps taken     Current / Ongoing Stressors and Concerns:  Patient reported her stress level has been high. Patient indicated that she has moved out of the house and living with her son. Patient reported she has been looking ather options and not feeling there is anything.  Patient processed the different struggles with housing at this time. Patient indicated knowing that she is a empath. Patient reported working to find her balance to not take on too much of other peoples emotions and problems. Due to stress level longer session was needed. This therapist challenged patient on what is in her control and being able to identify her emotions.      Treatment Objective(s) Addressed in This Session:   increase length and frequency of contact with others    Increase interest, engagement, and pleasure in doing things  Decrease frequency and intensity of feeling down, depressed, hopeless  Improve quantity and quality of night time sleep / decrease daytime naps  Feel less tired and more energy during the day   Identify negative self-talk and behaviors: challenge core beliefs, myths, and actions  Improve concentration, focus, and mindfulness in daily activities      Intervention:   Motivational Interviewing    MI Intervention: Reflections: simple and complex     Change Talk Expressed by the Patient: Activation Taking steps    Provider Response to Change Talk: R - Reflected patient's change talk and S - Summarized patient's change talk statements      Assessments completed prior to visit:  The following assessments were completed by patient for this visit:  PHQ9:       7/16/2024     4:33 PM 9/5/2024    10:42 AM 10/15/2024     8:47 AM 11/27/2024     7:27 AM 12/30/2024     7:45 AM 2/4/2025     9:47 AM 2/11/2025     2:22 PM   PHQ-9 SCORE   PHQ-9 Total Score MyChart 6 (Mild depression) 8 (Mild depression) 9 (Mild depression)   8 (Mild depression) 7 (Mild depression)   PHQ-9 Total Score 6 8 9 9 7 8  7        Patient-reported    Proxy-reported     GAD7:       10/30/2023     3:23 PM 12/6/2023     9:50 AM 7/16/2024     4:33 PM 8/31/2024     9:27 AM 10/15/2024     8:48 AM 12/2/2024    11:30 AM 1/16/2025     6:27 AM   ADA-7 SCORE   Total Score 5 (mild anxiety) 11 (moderate anxiety) 8 (mild anxiety) 9 (mild  anxiety) 11 (moderate anxiety) 6 (mild anxiety) 8 (mild anxiety)   Total Score 5 11 8    8 9 11 6  8        Patient-reported     PROMIS 10-Global Health (all questions and answers displayed):       6/13/2023     6:37 AM 9/26/2023    12:07 PM 1/1/2024    11:44 PM 4/3/2024     9:52 AM 5/20/2024     9:52 AM 8/31/2024     9:29 AM 12/2/2024    11:31 AM   PROMIS 10   In general, would you say your health is: Good Fair Good Good Good Fair Fair   In general, would you say your quality of life is: Good Good Fair Good Fair Fair Good   In general, how would you rate your physical health? Fair Fair Fair Fair Good Fair Fair   In general, how would you rate your mental health, including your mood and your ability to think? Fair Fair Fair Fair Fair Fair Fair   In general, how would you rate your satisfaction with your social activities and relationships? Good Good Fair Good Good Good Good   In general, please rate how well you carry out your usual social activities and roles Good Good Good Good Good Good Good   To what extent are you able to carry out your everyday physical activities such as walking, climbing stairs, carrying groceries, or moving a chair? Mostly Mostly Mostly Completely Completely Mostly Completely   In the past 7 days, how often have you been bothered by emotional problems such as feeling anxious, depressed, or irritable? Often Often Often Sometimes Sometimes Often Often   In the past 7 days, how would you rate your fatigue on average? Mild Moderate Mild Moderate Moderate Moderate Moderate   In the past 7 days, how would you rate your pain on average, where 0 means no pain, and 10 means worst imaginable pain? 2 3 3 3 3 3 4   In general, would you say your health is: 3 2 3 3 3 2 2   In general, would you say your quality of life is: 3 3 2 3 2 2 3   In general, how would you rate your physical health? 2 2 2 2 3 2 2   In general, how would you rate your mental health, including your mood and your ability to think?  2 2 2 2 2 2 2   In general, how would you rate your satisfaction with your social activities and relationships? 3 3 2 3 3 3 3   In general, please rate how well you carry out your usual social activities and roles. (This includes activities at home, at work and in your community, and responsibilities as a parent, child, spouse, employee, friend, etc.) 3 3 3 3 3 3 3   To what extent are you able to carry out your everyday physical activities such as walking, climbing stairs, carrying groceries, or moving a chair? 4 4 4 5 5 4 5   In the past 7 days, how often have you been bothered by emotional problems such as feeling anxious, depressed, or irritable? 4 4 4 3 3 4 4   In the past 7 days, how would you rate your fatigue on average? 2 3 2 3 3 3 3   In the past 7 days, how would you rate your pain on average, where 0 means no pain, and 10 means worst imaginable pain? 2 3 3 3 3 3 4   Global Mental Health Score 10 10 8    8 11 10 9 10    Global Physical Health Score 14 13 14    14 14 15 13 13    PROMIS TOTAL - SUBSCORES 24 23 22    22 25 25 22 23        Patient-reported         ASSESSMENT: Current Emotional / Mental Status (status of significant symptoms):   Risk status (Self / Other harm or suicidal ideation)   Patient denies current fears or concerns for personal safety.   Patient denies current or recent suicidal ideation or behaviors.   Patient denies current or recent homicidal ideation or behaviors.   Patient denies current or recent self injurious behavior or ideation.   Patient denies other safety concerns.   Patient reports there has been no change in risk factors since their last session.     Patient reports there has been no change in protective factors since their last session.     Recommended that patient call 911 or go to the local ED should there be a change in any of these risk factors     Appearance:   Appropriate    Eye Contact:   Good    Psychomotor Behavior: Normal    Attitude:   Cooperative     Orientation:   All   Speech    Rate / Production: Normal/ Responsive    Volume:  Normal    Mood:    Anxious    Affect:    Appropriate    Thought Content:  Clear    Thought Form:  Coherent  Goal Directed  Logical    Insight:    Good      Medication Review:   No changes to current psychiatric medication(s)     Medication Compliance:   Yes     Changes in Health Issues:   None reported     Chemical Use Review:   Substance Use: Chemical use reviewed, no active concerns identified      Tobacco Use: No current tobacco use.      Diagnosis:  1. ADA (generalized anxiety disorder)    2. Moderate episode of recurrent major depressive disorder (H)    3. Posttraumatic stress disorder        Collateral Reports Completed:   Not Applicable    PLAN: (Patient Tasks / Therapist Tasks / Other)  Patient will return in a month for scheduled session. Patient will continue to look at housing. Patient will plan events to get her out of the house and spend time with her support network. Patient will continue to work on balance between being a support and taking on too much.     There has been demonstrated improvement in functioning while patient has been engaged in psychotherapy/psychological service- if withdrawn the patient would deteriorate and/or relapse.     [facility]Ad[facility]     ______________________________________________________________________    Individual Treatment Plan    Patient's Name: Alina Martell  YOB: 1965    Date of Creation:10/16/2020  Date Treatment Plan Last Reviewed/Revised: 2/4/2025    DSM5 Diagnoses: 296.32 (F33.1) Major Depressive Disorder, Recurrent Episode, Moderate _, 300.02 (F41.1) Generalized Anxiety Disorder, or 309.81 (F43.10) Posttraumatic Stress Disorder (includes Posttraumatic Stress Disorder for Children 6 Years and Younger)  Without dissociative symptoms  Psychosocial / Contextual Factors: Health, housing and family  PROMIS (reviewed every 90 days):     PROMIS-10  Scores  Global Mental Health Score: 8  Global Physical Health Score: 14  PROMIS TOTAL - SUBSCORES: 22     Referral / Collaboration:  Was/were discussed and patient will pursue.    Anticipated number of session for this episode of care: 20 will reevaluate every 90 days  Anticipation frequency of session: Biweekly  Anticipated Duration of each session: 38-52 minutes  Treatment plan will be reviewed in 90 days or when goals have been changed.       MeasurableTreatment Goal(s) related to diagnosis / functional impairment(s)  Goal 1: Patient will work on reducing overall anxiety.     I will know I've met my goal when reporting minimal anxiety symptoms.       Objective #A (Patient Action)                          Patient will use distraction each time intrusive worry surfaces.  Status: Continued - Date(s): 2/4/2025     Intervention(s)  Therapist will teach emotional regulation skills.  Objective #B  Patient will Decrease frequency and intensity of feeling down, depressed, hopeless.  Status: Continued - Date(s):  2/4/2025     Intervention(s)  Therapist will teach emotional recognition/identification. ..     Objective #C  Patient will Identify negative self-talk and behaviors: challenge core beliefs, myths, and actions.  Status: Continued - Date(s):  2/4/2025     Intervention(s)  Therapist will teach the client how to perform a behavioral chain analysis. ..     Goal 2: Patient will continue to work on reducing depression symptoms    I will know I've met my goal then reporting minimal depression symptoms     Objective #A (Patient Action)                          Patient will Increase interest, engagement, and pleasure in doing things.  Status: Continued - Date(s): 2/4/2025  Intervention(s)  Therapist will assign homework ..     Objective #B  Patient will Decrease frequency and intensity of feeling down, depressed, hopeless.  Status: Continued - Date(s):2/4/2025  Intervention(s)  Therapist will assign homework at every  session.     Goal 3: Client will reduce PTSD symptoms by 50% as evidenced by PCLC-5 score     I will know I've met my goal when not struggling with nightmares.      Objective #A (Client Action)    Client will reduce nightmares.  Status: Continued - Date(s):2/4/2025    Intervention(s)  Therapist will teach nightmare retraining .    Objective #B  Client will compile a list of boundaries that they would like to set with others.   .    Status: Continued - Date(s):2/4/2025    Intervention(s)  Therapist will assign homework boundary setting .            Patient has reviewed and agreed to the above plan.        Mariana Love, [facility]

## 2025-02-25 NOTE — TELEPHONE ENCOUNTER
Date of Last Office Visit: 2/11/25  Date of Next Office Visit:  5/6/25  No shows since last visit: No  More than one patient-initiated cancellation (with reschedule) since last seen in clinic? No    []Medication refilled per  Medication Refill in Ambulatory Care  policy.  [x]Medication unable to be refilled by RN due to criteria not met as indicated below:    []Eligibility: has not had a provider visit within last 6 months   []Supervision: no future appointment; < 7 days before next appointment   []Compliance: no shows; cancellations; lapse in therapy   []Verification: order discrepancy; may need modification...   [] > 30-day supply request   []Advanced refill request: > 7 days before refill date   [x]Controlled medication   []Medication not included in policy   []Review: new med; med adjusted <= 30 days; safety alert; requires lab monitoring...   []Scope of Practice: refill request processed by LPN/MA   []Other:      Medication(s) requested:           Any Controlled Substance(s)? Yes   [place]  checked? N/A      Requested medication(s) verified as identical to current order? Yes    Any lapse in adherence to medication(s) greater than 5 days? N/A     Additional action taken? routed encounter to provider for review.      Last visit treatment plan:       Plan         1) Medications:      -Continue lorazepam 0.5 mg tablet by mouth daily as needed for severe anxiety  -Advance gabapentin 100mg capsule, take 2 capsules by mouth every bedtime                 [place]MP: I have queried the [place] and/or [place] Prescription Monitoring Program for this patient for the preceding 12 months, or reviewed the report provided by my proxy delegate. I have not identified any concerns.  2) Risk vs benefits of medications reviewed: Yes  3) Life style modifications: sleep hygiene, exercise, healthy diet  4) Medical concerns:    - No acute concerns  5) Other:   - Continue individual therapy  - Consider ADHD testing  6) Refrain from drinking alcohol  and/or use of drugs.   7) Please secure all prescription and OTC medications, sharps, and caustic substances. Please remove all firearms and ammunition.  8) Review outside records, get [facility]'s, coordinate with outside providers  9) In case of emergency call 911 or go to the nearest ER, this includes patient voicing thought of harming self or others as well as additional safety concerns   10) Follow-up: 8weeks, or sooner if needed.       Any medication(s) require lab monitoring? No    PETER TOPETE RN on 2/25/2025 at 9:53 AM

## 2025-02-27 ENCOUNTER — OFFICE VISIT (OUTPATIENT)
Dept: RHEUMATOLOGY | Facility: CLINIC | Age: 60
End: 2025-02-27
Attending: INTERNAL MEDICINE
Payer: COMMERCIAL

## 2025-02-27 ENCOUNTER — TELEPHONE (OUTPATIENT)
Dept: RHEUMATOLOGY | Facility: CLINIC | Age: 60
End: 2025-02-27

## 2025-02-27 VITALS
DIASTOLIC BLOOD PRESSURE: 86 MMHG | WEIGHT: 286.5 LBS | SYSTOLIC BLOOD PRESSURE: 141 MMHG | HEART RATE: 86 BPM | OXYGEN SATURATION: 97 % | BODY MASS INDEX: 42.31 KG/M2

## 2025-02-27 DIAGNOSIS — M65.311 TRIGGER FINGER OF RIGHT THUMB: ICD-10-CM

## 2025-02-27 DIAGNOSIS — M17.0 PRIMARY OSTEOARTHRITIS OF BOTH KNEES: ICD-10-CM

## 2025-02-27 DIAGNOSIS — Z79.899 HIGH RISK MEDICATION USE: ICD-10-CM

## 2025-02-27 DIAGNOSIS — R76.8 CYCLIC CITRULLINATED PEPTIDE (CCP) ANTIBODY POSITIVE: ICD-10-CM

## 2025-02-27 DIAGNOSIS — M05.79 SEROPOSITIVE RHEUMATOID ARTHRITIS OF MULTIPLE SITES (H): Primary | ICD-10-CM

## 2025-02-27 RX ORDER — ADALIMUMAB-ADAZ 40 MG/.4ML
40 INJECTION, SOLUTION SUBCUTANEOUS
Qty: 0.8 ML | Refills: 5 | Status: SHIPPED | OUTPATIENT
Start: 2025-02-27 | End: 2025-03-29

## 2025-02-27 NOTE — PROGRESS NOTES
Rheumatology follow-up visit ludwin Fuller is a 59 year old female presents today for follow-up.    Maritza was seen today for recheck.    Diagnoses and all orders for this visit:    Seropositive rheumatoid arthritis of multiple sites (H)  -     adalimumab-adaz (HYRIMOZ) 40 MG/0.4ML auto-injector kit; Inject 0.4 mLs (40 mg) subcutaneously every 14 days.  -     Med Therapy Management Referral    Cyclic citrullinated peptide (CCP) antibody positive    High risk medication use  -     Med Therapy Management Referral    Primary osteoarthritis of both knees    Trigger finger of right thumb        The longitudinal plan of care for the diagnosis(es)/condition(s) as documented were addressed during this visit. Due to the added complexity in care, I will continue to support Maritza in the subsequent management and with ongoing continuity of care.      Follow up in 6 months.    HPI    Alina Martell is a 59 year old female, an , is here for follow-up of   Severe seropositive rheumatoid arthritis rheumatoid arthritis.  This was severe.   There is seropositive. This is polyarticular. Family history of psoriasis and brother, mom's history of Crohn's.  She has done great.  She is on Humira that she inject every 2 weeks.  She now has the biosimilar her Humira, she had some delay and was frustrated with break she had fortunately there was no flareup.  Once again this year she is having trouble getting the Humira biosimilar via her [facility] speciality pharmacy.     Recent labs are reviewed, these are stable, hemoglobin is low, this is longstanding.    No longer on hydroxychloroquine.  She has noted mild discomfort in her knees.  Several years ago x-rays of the knees were done.  She would notice some swelling in the lower extremities [place] with her grandson.  She has longstanding chronic anemia and elevated sedimentation rate going back at least 2015.  She recently had  "hysterectomy.  In the past she has been on methotrexate that caused hair loss and diarrhea. She considers herself a\" flexaterian\", and is trying to add more vegetarian meals.            DETAILED EXAMINATION  02/27/25  :    Vitals:    02/27/25 0859   BP: (!) 141/86   BP Location: Right arm   Pulse: 86   SpO2: 97%   Weight: 130 kg (286 lb 8 oz)     Alert oriented. Head including the face is examined for malar rash, heliotropes, scarring, lupus pernio. Eyes examined for redness such as in episcleritis/scleritis, periorbital lesions.   Neck/ Face examined for parotid gland swelling, range of motion of neck.  Left upper and lower and right upper and lower extremities examined for tenderness, swelling, warmth of the appendicular joints, range of motion, edema, rash.  Some of the important findings included: she does not have evidence of synovitis in the palpable joints of the upper extremities.  No significant deformities of the digits.  no Heberden nodes.  Range of motion of the shoulders  show full abduction.  No JLT effusion or warmth of the knees.  she does not have dactylitis of the digits.     Patient Active Problem List    Diagnosis Date Noted    Diabetes mellitus, type 2 (H) 12/13/2023     Priority: Medium    Abnormal uterine bleeding 05/05/2023     Priority: Medium    Uterine fibroid 05/04/2023     Priority: Medium    Vitamin D deficiency 03/23/2023     Priority: Medium    Status post catheter ablation of atrial fibrillation 08/23/2022     Priority: Medium     6/30/2022 with Dr. Joseph  1. Mild left atrial dilation without significant scattered fibrosis  2. Atrial fibrillation ablation via PVI        Epigastric pain 05/02/2022     Priority: Medium    History of colonic polyps 04/05/2022     Priority: Medium     Colonscopy 3/22 - repeat in 3-5 year      Postmenopausal bleeding 02/16/2022     Priority: Medium    Obstructive sleep apnea syndrome 01/20/2022     Priority: Medium    Essential hypertension 01/20/2022 "     Priority: Medium    Coronary artery disease of native artery with stable angina pectoris 01/20/2022     Priority: Medium    Seropositive rheumatoid arthritis of multiple sites (H) 01/05/2022     Priority: Medium    Recurrent major depressive disorder, in full remission 01/05/2022     Priority: Medium    Paroxysmal atrial fibrillation (H) 01/05/2022     Priority: Medium    Morbid obesity (H) 01/05/2022     Priority: Medium    Chest pain 07/11/2021     Priority: Medium    ADA (generalized anxiety disorder) 03/31/2021     Priority: Medium    Benign neoplasm of ascending colon 02/10/2021     Priority: Medium    Diverticular disease of large intestine 02/08/2021     Priority: Medium    Polyp of colon 02/08/2021     Priority: Medium    Depressed 11/14/2018     Priority: Medium    Cyclic citrullinated peptide (CCP) antibody positive 10/03/2017     Priority: Medium    Tendonitis of both shoulders 06/09/2017     Priority: Medium    Chronic pain 07/18/2016     Priority: Medium    Olecranon bursitis of right elbow 07/08/2016     Priority: Medium     Formatting of this note might be different from the original.  Replacement Utility updated for latest IMO load      Grief 03/29/2016     Priority: Medium    Sicca syndrome 10/27/2015     Priority: Medium    Anxiety 12/08/2014     Priority: Medium    Panic attack 12/08/2014     Priority: Medium    Sleep disorder 12/05/2014     Priority: Medium    Insomnia related to another mental disorder 09/03/2014     Priority: Medium    Microcytic anemia 09/02/2014     Priority: Medium    Migraine headache 09/02/2014     Priority: Medium    Reflux 09/02/2014     Priority: Medium     Past Surgical History:   Procedure Laterality Date    ABDOMEN SURGERY      CHOLECYSTECTOMY      CYSTOSCOPY N/A 05/04/2023    Procedure: AND CYSTOSCOPY;  Surgeon: Irma Cary MD;  Location: [facility] OR    DAVINCI HYSTERECTOMY TOTAL Bilateral 05/04/2023    Procedure: ROBOTIC ASSISTED TOTAL  LAPAROSCOPIC HYSTERECTOMY WITH BILATERAL SALPINGECTOMY;  Surgeon: Irma Cary MD;  Location: [facility] OR    DILATION AND CURETTAGE, OPERATIVE HYSTEROSCOPY, COMBINED N/A 03/03/2022    Procedure: HYSTEROSCOPY, WITH DILATION AND CURETTAGE;  Surgeon: Irma Cary MD;  Location: [facility] OR    EP ABLATION FOCAL AFIB N/A 06/30/2022    Procedure: Ablation Atrial Fibrilation;  Surgeon: Jose Guadalupe Joseph MD;  Location:  [facility]    HC REMOVAL GALLBLADDER      Description: Cholecystectomy;  Recorded: 10/15/2013;    LAPAROSCOPIC TUBAL LIGATION      RELEASE CARPAL TUNNEL BILATERAL      TUBAL LIGATION  01/01/1995      Past Medical History:   Diagnosis Date    Anemia     Antiplatelet or antithrombotic long-term use     Arrhythmia     Cannabis use without complication 12/08/2014    Carpal tunnel syndrome     Coronary artery disease     Depressive disorder 2014    Diabetes mellitus, type 2 (H) 12/13/2023    Gastroesophageal reflux disease     History of angina     Hypertension     Irregular heart beat     Obesity     Paroxysmal atrial fibrillation (H)     PTSD (post-traumatic stress disorder)     RA (rheumatoid arthritis) (H)     Rheumatoid arthritis (H) 07/08/2016    Sicca syndrome     Sleep apnea      Allergies   Allergen Reactions    Penicillins Shortness Of Breath, Palpitations, Dizziness, Anaphylaxis, Hives, Itching and Rash     Test in 2031, see allergy note on 7/29/21    Shellfish-Derived Products Anaphylaxis    Sulfa Antibiotics Hives and Anaphylaxis     Current Outpatient Medications   Medication Sig Dispense Refill    diltiazem ER (TIAZAC) 360 MG 24 hr ER beaded capsule Take 1 capsule (360 mg) by mouth daily. 90 capsule 1    EPINEPHrine (ANY BX GENERIC EQUIV) 0.3 MG/0.3ML injection 2-pack Inject 0.3 mLs (0.3 mg) into the muscle as needed for anaphylaxis 2 each 2    fexofenadine (ALLEGRA) 60 MG tablet Take 1 tablet (60 mg) by mouth daily. 30 tablet 4    fluticasone  (FLONASE) 50 MCG/ACT nasal spray Spray 2 sprays into both nostrils daily. 16 g 11    gabapentin (NEURONTIN) 100 MG capsule Take 2 capsules (200 mg) by mouth at bedtime. 60 capsule 2    HYRIMOZ 40 MG/0.4ML auto-injector kit INJECT 1 PEN UNDER THE SKIN EVERY 14 DAYS 0.8 mL 0    lisinopril (ZESTRIL) 5 MG tablet Take 1 tablet (5 mg) by mouth daily. 90 tablet 3    LORazepam (ATIVAN) 0.5 MG tablet TAKE 1 TABLET(0.5 MG) BY MOUTH DAILY AS NEEDED FOR ANXIETY 30 tablet 1    metFORMIN (GLUCOPHAGE XR) 500 MG 24 hr tablet Take 1 tablet (500 mg) by mouth daily (with dinner). 90 tablet 2    Multiple Vitamins-Minerals (CENTROVITE) TABS Take 1 tablet by mouth daily      omeprazole (PRILOSEC) 40 MG DR capsule Take 1 capsule (40 mg) by mouth daily. 90 capsule 3    ondansetron (ZOFRAN ODT) 4 MG ODT tab Take 1 tablet (4 mg) by mouth every 8 hours as needed for nausea 30 tablet 0    pravastatin (PRAVACHOL) 20 MG tablet Take 1 tablet (20 mg) by mouth daily. 90 tablet 1    vitamin D3 (CHOLECALCIFEROL) 1.25 MG (34246 UT) capsule Take 1 capsule (50,000 Units) by mouth every 7 days 12 capsule 3     family history includes Alcoholism in her brother, brother, and maternal grandfather; Arrhythmia in her brother and brother; Atrial fibrillation in her mother; Attention Deficit Disorder in her brother; Chronic Kidney Disease in her father; Crohn's Disease in her mother; Depression in her brother; Diabetes in her father; Lupus in her cousin; No Known Problems in her daughter and son; Prostate Cancer in her father; Snoring in her father; Substance Abuse in her brother, brother, and maternal grandfather.  Social Connections: Moderately Integrated (12/3/2024)    Social Connection and Isolation Panel [NHANES]     Frequency of Communication with Friends and Family: Three times a week     Frequency of Social Gatherings with Friends and Family: Once a week     Attends Spiritism Services: More than 4 times per year     Active Member of Clubs or  Organizations: Yes     Attends Club or Organization Meetings: More than 4 times per year     Marital Status: Never           WBC   Date Value Ref Range Status   11/14/2018 6.5 4.0 - 11.0 10e9/L Final     WBC Count   Date Value Ref Range Status   01/03/2025 13.9 (H) 4.0 - 11.0 10e3/uL Final     RBC Count   Date Value Ref Range Status   01/03/2025 4.79 3.80 - 5.20 10e6/uL Final   11/14/2018 4.31 3.8 - 5.2 10e12/L Final     Hemoglobin   Date Value Ref Range Status   01/03/2025 11.7 11.7 - 15.7 g/dL Final   11/14/2018 10.8 (L) 11.7 - 15.7 g/dL Final     Hematocrit   Date Value Ref Range Status   01/03/2025 38.5 35.0 - 47.0 % Final   11/14/2018 35.6 35.0 - 47.0 % Final     MCV   Date Value Ref Range Status   01/03/2025 80 78 - 100 fL Final   11/14/2018 83 78 - 100 fl Final     MCH   Date Value Ref Range Status   01/03/2025 24.4 (L) 26.5 - 33.0 pg Final   11/14/2018 25.1 (L) 26.5 - 33.0 pg Final     Platelet Count   Date Value Ref Range Status   01/03/2025 431 150 - 450 10e3/uL Final   11/14/2018 286 150 - 450 10e9/L Final     % Lymphocytes   Date Value Ref Range Status   01/03/2025 26 % Final   11/14/2018 24.6 % Final     AST   Date Value Ref Range Status   12/17/2024 18 0 - 45 U/L Final   11/14/2018 12 0 - 45 U/L Final     ALT   Date Value Ref Range Status   12/17/2024 19 0 - 50 U/L Final   11/14/2018 13 0 - 50 U/L Final     Albumin   Date Value Ref Range Status   12/17/2024 4.0 3.5 - 5.2 g/dL Final   03/08/2022 3.5 3.5 - 5.0 g/dL Final   11/14/2018 2.8 (L) 3.4 - 5.0 g/dL Final     Alkaline Phosphatase   Date Value Ref Range Status   12/17/2024 112 40 - 150 U/L Final   11/14/2018 83 40 - 150 U/L Final     Creatinine   Date Value Ref Range Status   01/03/2025 0.86 0.51 - 0.95 mg/dL Final   11/14/2018 0.62 0.52 - 1.04 mg/dL Final     GFR Estimate   Date Value Ref Range Status   01/03/2025 78 >60 mL/min/1.73m2 Final     Comment:     eGFR calculated using 2021 CKD-EPI equation.   01/21/2021 >60 >60 mL/min/1.73m2  Final   11/14/2018 >90 >60 mL/min/1.7m2 Final     Comment:     Non  GFR Calc     GFR Estimate If Black   Date Value Ref Range Status   01/21/2021 >60 >60 mL/min/1.73m2 Final   11/14/2018 >90 >60 mL/min/1.7m2 Final     Comment:      GFR Calc     Creatinine Urine mg/dL   Date Value Ref Range Status   03/27/2024 46.3 mg/dL Final     Comment:     The reference ranges have not been established in urine creatinine. The results should be integrated into the clinical context for interpretation.  The reference ranges have not been established in urine creatinine. The results should be integrated into the clinical context for interpretation.   03/27/2024 46.3 mg/dL Final     Comment:     The reference ranges have not been established in urine creatinine. The results should be integrated into the clinical context for interpretation.     Erythrocyte Sedimentation Rate   Date Value Ref Range Status   08/30/2023 81 (H) 0 - 30 mm/hr Final     CRP   Date Value Ref Range Status   03/11/2020 3.3 (H) 0.0 - 0.8 mg/dL Final     N terminal Pro BNP Inpatient   Date Value Ref Range Status   12/17/2024 <36 0 - 900 pg/mL Final     Comment:     Reference range shown and results flagged as abnormal are suggested inpatient cut points for confirming diagnosis if CHF in an acute setting. Establishing a baseline value for each individual patient is useful for follow-up. An inpatient or emergency department NT-proPBNP <300 pg/mL effectively rules out acute CHF, with 99% negative predictive value.    The outpatient non-acute reference range for ruling out CHF is:  0-125 pg/mL (age 18 to less than 75)  0-450 pg/mL (age 75 yrs and older)

## 2025-02-27 NOTE — TELEPHONE ENCOUNTER
PA Initiation    Medication: HYRIMOZ 40 MG/0.4ML SC SOAJ  Insurance Company: Other (see comments)  Pharmacy Filling the Rx:    Filling Pharmacy Phone:    Filling Pharmacy Fax:    Start Date: 2/27/2025

## 2025-03-02 ENCOUNTER — ANESTHESIA EVENT (OUTPATIENT)
Dept: SURGERY | Facility: AMBULATORY SURGERY CENTER | Age: 60
End: 2025-03-02
Payer: COMMERCIAL

## 2025-03-02 NOTE — ANESTHESIA PREPROCEDURE EVALUATION
Anesthesia Pre-Procedure Evaluation    Patient: Alina Martell   MRN: [id number] : 1965        Procedure : Procedure(s):  Esophagoscopy, gastroscopy, duodenoscopy (EGD), combined          Past Medical History:   Diagnosis Date    Anemia     Antiplatelet or antithrombotic long-term use     Arrhythmia     Cannabis use without complication 2014    Carpal tunnel syndrome     Coronary artery disease     Depressive disorder     Diabetes mellitus, type 2 (H) 2023    Gastroesophageal reflux disease     History of angina     Hypertension     Irregular heart beat     Obesity     Paroxysmal atrial fibrillation (H)     PTSD (post-traumatic stress disorder)     RA (rheumatoid arthritis) (H)     Rheumatoid arthritis (H) 2016    Sicca syndrome     Sleep apnea       Past Surgical History:   Procedure Laterality Date    ABDOMEN SURGERY      CHOLECYSTECTOMY      CYSTOSCOPY N/A 2023    Procedure: AND CYSTOSCOPY;  Surgeon: Irma Cary MD;  Location: [facility] OR    DAVINCI HYSTERECTOMY TOTAL Bilateral 2023    Procedure: ROBOTIC ASSISTED TOTAL LAPAROSCOPIC HYSTERECTOMY WITH BILATERAL SALPINGECTOMY;  Surgeon: Irma Cary MD;  Location: [facility] OR    DILATION AND CURETTAGE, OPERATIVE HYSTEROSCOPY, COMBINED N/A 2022    Procedure: HYSTEROSCOPY, WITH DILATION AND CURETTAGE;  Surgeon: Irma Cary MD;  Location: [facility] OR    EP ABLATION FOCAL AFIB N/A 2022    Procedure: Ablation Atrial Fibrilation;  Surgeon: Jose Guadalupe Joseph MD;  Location:  [facility]    HC REMOVAL GALLBLADDER      Description: Cholecystectomy;  Recorded: 10/15/2013;    LAPAROSCOPIC TUBAL LIGATION      RELEASE CARPAL TUNNEL BILATERAL      TUBAL LIGATION  1995      Allergies   Allergen Reactions    Penicillins Shortness Of Breath, Palpitations, Dizziness, Anaphylaxis, Hives, Itching and Rash     Test in , see allergy note on 21     Shellfish-Derived Products Anaphylaxis    Sulfa Antibiotics Hives and Anaphylaxis      Social History     Tobacco Use    Smoking status: Never     Passive exposure: Past (mom smoked)    Smokeless tobacco: Never   Substance Use Topics    Alcohol use: Not Currently     Comment: Alcoholic Drinks/day: Never an issue, she states.  7/8/16      Wt Readings from Last 1 Encounters:   02/27/25 130 kg (286 lb 8 oz)           Physical Exam    Airway  airway exam normal      Mallampati: II   TM distance: > 3 FB   Neck ROM: full   Mouth opening: > 3 cm    Respiratory Devices and Support         Dental       (+) Completely normal teeth      Cardiovascular   cardiovascular exam normal          Pulmonary   pulmonary exam normal                OUTSIDE LABS:  CBC:   Lab Results   Component Value Date    WBC 13.9 (H) 01/03/2025    WBC 11.0 12/17/2024    HGB 11.7 01/03/2025    HGB 11.2 (L) 12/17/2024    HCT 38.5 01/03/2025    HCT 36.4 12/17/2024     01/03/2025     12/17/2024     BMP:   Lab Results   Component Value Date     01/03/2025     12/17/2024    POTASSIUM 4.1 01/03/2025    POTASSIUM 4.3 12/17/2024    CHLORIDE 100 01/03/2025    CHLORIDE 102 12/17/2024    CO2 26 01/03/2025    CO2 28 12/17/2024    BUN 16.2 01/03/2025    BUN 12.1 12/17/2024    CR 0.86 01/03/2025    CR 0.83 12/17/2024     (H) 01/03/2025     (H) 12/17/2024     COAGS:   Lab Results   Component Value Date    PTT 31 12/15/2022    INR 1.04 08/11/2023     POC:   Lab Results   Component Value Date    HCG Negative 05/04/2023     HEPATIC:   Lab Results   Component Value Date    ALBUMIN 4.0 12/17/2024    PROTTOTAL 7.9 12/17/2024    ALT 19 12/17/2024    AST 18 12/17/2024    ALKPHOS 112 12/17/2024    BILITOTAL <0.2 12/17/2024     OTHER:   Lab Results   Component Value Date    PH 7.40 01/01/2022    LACT 1.0 03/08/2022    A1C 6.2 (H) 11/27/2024    JOSSELIN 9.6 01/03/2025    MAG 1.9 01/03/2025    LIPASE 21 12/17/2024    TSH 2.37 01/03/2025    T4  "0.94 01/01/2022    CRP 3.3 (H) 03/11/2020    SED 81 (H) 08/30/2023       Anesthesia Plan    ASA Status:  3    NPO Status:  NPO Appropriate    Anesthesia Type: MAC.     - Reason for MAC: straight local not clinically adequate   Induction: Intravenous, Propofol.   Maintenance: TIVA.        Consents    Anesthesia Plan(s) and associated risks, benefits, and realistic alternatives discussed. Questions answered and patient/representative(s) expressed understanding.     - Discussed: Risks, Benefits and Alternatives for BOTH SEDATION and the PROCEDURE were discussed     - Discussed with:  Patient      - Extended Intubation/Ventilatory Support Discussed: No.      - Patient is DNR/DNI Status: No     Use of blood products discussed: No .     Postoperative Care       PONV prophylaxis: Ondansetron (or other 5HT-3), Background Propofol Infusion     Comments:               Pancho Smith MD    I have reviewed the pertinent notes and labs in the chart from the past 30 days and (re)examined the patient.  Any updates or changes from those notes are reflected in this note.    Clinically Significant Risk Factors Present on Admission                   # Hypertension: Noted on problem list           # Severe Obesity: Estimated body mass index is 42.31 kg/m  as calculated from the following:    Height as of 2/20/25: 1.753 m (5' 9\").    Weight as of 2/27/25: 130 kg (286 lb 8 oz).                "

## 2025-03-03 ENCOUNTER — HOSPITAL ENCOUNTER (OUTPATIENT)
Facility: AMBULATORY SURGERY CENTER | Age: 60
Discharge: HOME | End: 2025-03-03
Attending: INTERNAL MEDICINE
Payer: COMMERCIAL

## 2025-03-03 ENCOUNTER — ANESTHESIA (OUTPATIENT)
Dept: SURGERY | Facility: AMBULATORY SURGERY CENTER | Age: 60
End: 2025-03-03
Payer: COMMERCIAL

## 2025-03-03 VITALS — HEART RATE: 109 BPM

## 2025-03-03 VITALS
RESPIRATION RATE: 16 BRPM | HEART RATE: 100 BPM | DIASTOLIC BLOOD PRESSURE: 75 MMHG | SYSTOLIC BLOOD PRESSURE: 113 MMHG | HEIGHT: 69 IN | WEIGHT: 280 LBS | TEMPERATURE: 96.8 F | BODY MASS INDEX: 41.47 KG/M2 | OXYGEN SATURATION: 97 %

## 2025-03-03 LAB
GLUCOSE BLDC GLUCOMTR-MCNC: 99 MG/DL (ref 70–99)
UPPER GI ENDOSCOPY: NORMAL

## 2025-03-03 PROCEDURE — 88305 TISSUE EXAM BY PATHOLOGIST: CPT | Mod: TC | Performed by: INTERNAL MEDICINE

## 2025-03-03 PROCEDURE — 82962 GLUCOSE BLOOD TEST: CPT | Performed by: PATHOLOGY

## 2025-03-03 PROCEDURE — 88305 TISSUE EXAM BY PATHOLOGIST: CPT | Mod: 26 | Performed by: PATHOLOGY

## 2025-03-03 RX ORDER — PROPOFOL 10 MG/ML
INJECTION, EMULSION INTRAVENOUS PRN
Status: DISCONTINUED | OUTPATIENT
Start: 2025-03-03 | End: 2025-03-03

## 2025-03-03 RX ORDER — SODIUM CHLORIDE, SODIUM LACTATE, POTASSIUM CHLORIDE, CALCIUM CHLORIDE 600; 310; 30; 20 MG/100ML; MG/100ML; MG/100ML; MG/100ML
500 INJECTION, SOLUTION INTRAVENOUS CONTINUOUS
Status: DISCONTINUED | OUTPATIENT
Start: 2025-03-03 | End: 2025-03-03 | Stop reason: HOSPADM

## 2025-03-03 RX ORDER — KETAMINE HYDROCHLORIDE 10 MG/ML
INJECTION INTRAMUSCULAR; INTRAVENOUS PRN
Status: DISCONTINUED | OUTPATIENT
Start: 2025-03-03 | End: 2025-03-03

## 2025-03-03 RX ORDER — PROPOFOL 10 MG/ML
INJECTION, EMULSION INTRAVENOUS CONTINUOUS PRN
Status: DISCONTINUED | OUTPATIENT
Start: 2025-03-03 | End: 2025-03-03

## 2025-03-03 RX ORDER — NALOXONE HYDROCHLORIDE 0.4 MG/ML
0.4 INJECTION, SOLUTION INTRAMUSCULAR; INTRAVENOUS; SUBCUTANEOUS
Status: DISCONTINUED | OUTPATIENT
Start: 2025-03-03 | End: 2025-03-04 | Stop reason: HOSPADM

## 2025-03-03 RX ORDER — SODIUM CHLORIDE, SODIUM LACTATE, POTASSIUM CHLORIDE, CALCIUM CHLORIDE 600; 310; 30; 20 MG/100ML; MG/100ML; MG/100ML; MG/100ML
INJECTION, SOLUTION INTRAVENOUS CONTINUOUS
Status: DISCONTINUED | OUTPATIENT
Start: 2025-03-03 | End: 2025-03-03 | Stop reason: HOSPADM

## 2025-03-03 RX ORDER — FLUMAZENIL 0.1 MG/ML
0.2 INJECTION, SOLUTION INTRAVENOUS
Status: ACTIVE | OUTPATIENT
Start: 2025-03-03 | End: 2025-03-03

## 2025-03-03 RX ORDER — ONDANSETRON 2 MG/ML
4 INJECTION INTRAMUSCULAR; INTRAVENOUS
Status: DISCONTINUED | OUTPATIENT
Start: 2025-03-03 | End: 2025-03-03 | Stop reason: HOSPADM

## 2025-03-03 RX ORDER — LIDOCAINE 40 MG/G
CREAM TOPICAL
Status: DISCONTINUED | OUTPATIENT
Start: 2025-03-03 | End: 2025-03-03 | Stop reason: HOSPADM

## 2025-03-03 RX ORDER — GLYCOPYRROLATE 0.2 MG/ML
INJECTION, SOLUTION INTRAMUSCULAR; INTRAVENOUS PRN
Status: DISCONTINUED | OUTPATIENT
Start: 2025-03-03 | End: 2025-03-03

## 2025-03-03 RX ORDER — NALOXONE HYDROCHLORIDE 0.4 MG/ML
0.2 INJECTION, SOLUTION INTRAMUSCULAR; INTRAVENOUS; SUBCUTANEOUS
Status: DISCONTINUED | OUTPATIENT
Start: 2025-03-03 | End: 2025-03-04 | Stop reason: HOSPADM

## 2025-03-03 RX ORDER — ONDANSETRON 2 MG/ML
4 INJECTION INTRAMUSCULAR; INTRAVENOUS EVERY 6 HOURS PRN
Status: DISCONTINUED | OUTPATIENT
Start: 2025-03-03 | End: 2025-03-04 | Stop reason: HOSPADM

## 2025-03-03 RX ORDER — LIDOCAINE HYDROCHLORIDE 20 MG/ML
INJECTION, SOLUTION INFILTRATION; PERINEURAL PRN
Status: DISCONTINUED | OUTPATIENT
Start: 2025-03-03 | End: 2025-03-03

## 2025-03-03 RX ORDER — PROCHLORPERAZINE MALEATE 10 MG
10 TABLET ORAL EVERY 6 HOURS PRN
Status: DISCONTINUED | OUTPATIENT
Start: 2025-03-03 | End: 2025-03-04 | Stop reason: HOSPADM

## 2025-03-03 RX ORDER — ONDANSETRON 4 MG/1
4 TABLET, ORALLY DISINTEGRATING ORAL EVERY 6 HOURS PRN
Status: DISCONTINUED | OUTPATIENT
Start: 2025-03-03 | End: 2025-03-04 | Stop reason: HOSPADM

## 2025-03-03 RX ADMIN — SODIUM CHLORIDE, SODIUM LACTATE, POTASSIUM CHLORIDE, CALCIUM CHLORIDE 500 ML: 600; 310; 30; 20 INJECTION, SOLUTION INTRAVENOUS at 08:18

## 2025-03-03 RX ADMIN — GLYCOPYRROLATE 0.2 MG: 0.2 INJECTION, SOLUTION INTRAMUSCULAR; INTRAVENOUS at 08:48

## 2025-03-03 RX ADMIN — LIDOCAINE HYDROCHLORIDE 100 MG: 20 INJECTION, SOLUTION INFILTRATION; PERINEURAL at 08:51

## 2025-03-03 RX ADMIN — PROPOFOL 200 MCG/KG/MIN: 10 INJECTION, EMULSION INTRAVENOUS at 08:51

## 2025-03-03 RX ADMIN — PROPOFOL 70 MG: 10 INJECTION, EMULSION INTRAVENOUS at 08:51

## 2025-03-03 RX ADMIN — KETAMINE HYDROCHLORIDE 20 MG: 10 INJECTION INTRAMUSCULAR; INTRAVENOUS at 08:51

## 2025-03-03 NOTE — ANESTHESIA CARE TRANSFER NOTE
Patient: Alina Martell    Procedure: Procedure(s):  Esophagoscopy, gastroscopy, duodenoscopy (EGD), combined, with biopsy, polypectomy, clip and balloon dilation       Diagnosis: Esophageal dysphagia [R13.19]  Diagnosis Additional Information: No value filed.    Anesthesia Type:   MAC     Note:    Oropharynx: oropharynx clear of all foreign objects and spontaneously breathing  Level of Consciousness: awake  Oxygen Supplementation: nasal cannula  Level of Supplemental Oxygen (L/min / FiO2): 2  Independent Airway: airway patency satisfactory and stable  Dentition: dentition unchanged  Vital Signs Stable: post-procedure vital signs reviewed and stable  Report to RN Given: handoff report given  Patient transferred to: Phase II  Comments: VSS and WNL, comfortable, no PONV, report to Anca MARCELINO  Handoff Report: Identifed the Patient, Identified the Reponsible Provider, Reviewed the pertinent medical history, Discussed the surgical course, Reviewed Intra-OP anesthesia mangement and issues during anesthesia, Set expectations for post-procedure period and Allowed opportunity for questions and acknowledgement of understanding      Vitals:  Vitals Value Taken Time   BP     Temp     Pulse     Resp     SpO2 98 % 03/03/25 0913   Vitals shown include unfiled device data.    Electronically Signed By: STARLA Pickering CRNA  March 3, 2025  9:14 AM

## 2025-03-03 NOTE — H&P
Alina Martell  [id number]  female  60 year old      Reason for procedure/surgery: egd,  dysphagia    Patient Active Problem List   Diagnosis    Depressed    Seropositive rheumatoid arthritis of multiple sites (H)    Recurrent major depressive disorder, in full remission    Paroxysmal atrial fibrillation (H)    Morbid obesity (H)    Microcytic anemia    Anxiety    Chest pain    Chronic pain    Cyclic citrullinated peptide (CCP) antibody positive    Grief    Insomnia related to another mental disorder    Migraine headache    Olecranon bursitis of right elbow    Panic attack    ADA (generalized anxiety disorder)    Reflux    Sicca syndrome    Sleep disorder    Tendonitis of both shoulders    Obstructive sleep apnea syndrome    Essential hypertension    Coronary artery disease of native artery with stable angina pectoris    Postmenopausal bleeding    History of colonic polyps    Status post catheter ablation of atrial fibrillation    Vitamin D deficiency    Uterine fibroid    Abnormal uterine bleeding    Diabetes mellitus, type 2 (H)    Benign neoplasm of ascending colon    Diverticular disease of large intestine    Polyp of colon    Epigastric pain       Past Surgical History:    Past Surgical History:   Procedure Laterality Date    ABDOMEN SURGERY      CHOLECYSTECTOMY      CYSTOSCOPY N/A 05/04/2023    Procedure: AND CYSTOSCOPY;  Surgeon: Irma Cary MD;  Location: [facility] Main OR    DAVINCI HYSTERECTOMY TOTAL Bilateral 05/04/2023    Procedure: ROBOTIC ASSISTED TOTAL LAPAROSCOPIC HYSTERECTOMY WITH BILATERAL SALPINGECTOMY;  Surgeon: Irma Cary MD;  Location: [facility] Main OR    DILATION AND CURETTAGE, OPERATIVE HYSTEROSCOPY, COMBINED N/A 03/03/2022    Procedure: HYSTEROSCOPY, WITH DILATION AND CURETTAGE;  Surgeon: Irma Cary MD;  Location: [facility] Main OR    EP ABLATION FOCAL AFIB N/A 06/30/2022    Procedure: Ablation Atrial Fibrilation;  Surgeon: Jose Guadalupe Joseph MD;   Location:  [facility]    HC REMOVAL GALLBLADDER      Description: Cholecystectomy;  Recorded: 10/15/2013;    LAPAROSCOPIC TUBAL LIGATION      RELEASE CARPAL TUNNEL BILATERAL      TUBAL LIGATION  01/01/1995       Past Medical History:   Past Medical History:   Diagnosis Date    Anemia     Antiplatelet or antithrombotic long-term use     Arrhythmia     Cannabis use without complication 12/08/2014    Carpal tunnel syndrome     Coronary artery disease     Depressive disorder 2014    Diabetes mellitus, type 2 (H) 12/13/2023    Gastroesophageal reflux disease     History of angina     Hypertension     Irregular heart beat     Obesity     Paroxysmal atrial fibrillation (H)     PTSD (post-traumatic stress disorder)     RA (rheumatoid arthritis) (H)     Rheumatoid arthritis (H) 07/08/2016    Sicca syndrome     Sleep apnea        Social History:   Social History     Tobacco Use    Smoking status: Never     Passive exposure: Past (mom smoked)    Smokeless tobacco: Never   Substance Use Topics    Alcohol use: Not Currently     Comment: Alcoholic Drinks/day: Never an issue, she states.  7/8/16       Family History:   Family History   Problem Relation Age of Onset    Crohn's Disease Mother         Total colectomy with ileostomy.    Atrial fibrillation Mother     Snoring Father     Diabetes Father     Prostate Cancer Father     Chronic Kidney Disease Father         Chose against dialysis.    Substance Abuse Brother     Depression Brother     Attention Deficit Disorder Brother     Alcoholism Brother     Arrhythmia Brother     Alcoholism Brother     Substance Abuse Brother     Arrhythmia Brother     Alcoholism Maternal Grandfather     Substance Abuse Maternal Grandfather     No Known Problems Daughter     No Known Problems Son     Lupus Cousin     Breast Cancer No family hx of        Allergies:   Allergies   Allergen Reactions    Penicillins Shortness Of Breath, Palpitations, Dizziness, Anaphylaxis, Hives, Itching and  Rash     Test in 2031, see allergy note on 7/29/21    Shellfish-Derived Products Anaphylaxis    Sulfa Antibiotics Hives and Anaphylaxis       Active Medications:   Current Outpatient Medications   Medication Sig Dispense Refill    adalimumab-adaz (HYRIMOZ) 40 MG/0.4ML auto-injector kit Inject 0.4 mLs (40 mg) subcutaneously every 14 days. 0.8 mL 5    diltiazem ER (TIAZAC) 360 MG 24 hr ER beaded capsule Take 1 capsule (360 mg) by mouth daily. 90 capsule 1    fexofenadine (ALLEGRA) 60 MG tablet Take 1 tablet (60 mg) by mouth daily. 30 tablet 4    gabapentin (NEURONTIN) 100 MG capsule Take 2 capsules (200 mg) by mouth at bedtime. 60 capsule 2    lisinopril (ZESTRIL) 10 MG tablet Take 1 tablet (10 mg) by mouth daily. 90 tablet 2    LORazepam (ATIVAN) 0.5 MG tablet TAKE 1 TABLET(0.5 MG) BY MOUTH DAILY AS NEEDED FOR ANXIETY 30 tablet 1    metFORMIN (GLUCOPHAGE XR) 500 MG 24 hr tablet Take 1 tablet (500 mg) by mouth daily (with dinner). 90 tablet 2    Multiple Vitamins-Minerals (CENTROVITE) TABS Take 1 tablet by mouth daily      omeprazole (PRILOSEC) 40 MG DR capsule Take 1 capsule (40 mg) by mouth daily. 90 capsule 3    pravastatin (PRAVACHOL) 20 MG tablet Take 1 tablet (20 mg) by mouth daily. 90 tablet 1    vitamin D3 (CHOLECALCIFEROL) 1.25 MG (91315 UT) capsule Take 1 capsule (50,000 Units) by mouth every 7 days 12 capsule 3    EPINEPHrine (ANY BX GENERIC EQUIV) 0.3 MG/0.3ML injection 2-pack Inject 0.3 mLs (0.3 mg) into the muscle as needed for anaphylaxis 2 each 2    fluticasone (FLONASE) 50 MCG/ACT nasal spray Spray 2 sprays into both nostrils daily. 16 g 11    ondansetron (ZOFRAN ODT) 4 MG ODT tab Take 1 tablet (4 mg) by mouth every 8 hours as needed for nausea 30 tablet 0       Systemic Review:   CONSTITUTIONAL: NEGATIVE for fever, chills, change in weight  ENT/MOUTH: NEGATIVE for ear, mouth and throat problems  RESP: NEGATIVE for significant cough or SOB  CV: NEGATIVE for chest pain, palpitations or peripheral  "edema    Physical Examination:   Vital Signs: BP (!) 141/99 (BP Location: Right arm)   Pulse 88   Temp 97.2  F (36.2  C) (Temporal)   Resp 18   Ht 1.753 m (5' 9\")   Wt 127 kg (280 lb)   LMP  (LMP Unknown)   SpO2 96%   BMI 41.35 kg/m    GENERAL: healthy, alert and no distress  NECK: no adenopathy, no asymmetry, masses, or scars  RESP: lungs clear to auscultation - no rales, rhonchi or wheezes  CV: regular rate and rhythm, normal S1 S2, no S3 or S4, no murmur, click or rub, no peripheral edema and peripheral pulses strong  ABDOMEN: soft, nontender, no hepatosplenomegaly, no masses and bowel sounds normal  MS: no gross musculoskeletal defects noted, no edema    I examined patient s dentition and informed the patient that dental injuries are a risk of any anesthetic management. No concerns were noted with this patient's dentition. . The patient has consented to proceed with the procedure.    Plan: Appropriate to proceed as scheduled.      Nahun Byrnes DO  3/3/2025    PCP:  Estelle Bansal    "

## 2025-03-03 NOTE — ANESTHESIA POSTPROCEDURE EVALUATION
Patient: Alina Martell    Procedure: Procedure(s):  Esophagoscopy, gastroscopy, duodenoscopy (EGD), combined, with biopsy, polypectomy, clip and balloon dilation       Anesthesia Type:  MAC    Note:  Disposition: Outpatient   Postop Pain Control: Uneventful            Sign Out: Well controlled pain   PONV: No   Neuro/Psych: Uneventful            Sign Out: Acceptable/Baseline neuro status   Airway/Respiratory: Uneventful            Sign Out: Acceptable/Baseline resp. status   CV/Hemodynamics: Uneventful            Sign Out: Acceptable CV status; No obvious hypovolemia; No obvious fluid overload   Other NRE:    DID A NON-ROUTINE EVENT OCCUR?            Last vitals:  Vitals Value Taken Time   /75 03/03/25 0915   Temp 36  C (96.8  F) 03/03/25 0915   Pulse 100 03/03/25 0915   Resp 16 03/03/25 0915   SpO2 96 % 03/03/25 0927   Vitals shown include unfiled device data.    Electronically Signed By: Pancho Smith MD  March 3, 2025  1:39 PM

## 2025-03-12 ENCOUNTER — TELEPHONE (OUTPATIENT)
Dept: GASTROENTEROLOGY | Facility: CLINIC | Age: 60
End: 2025-03-12
Payer: COMMERCIAL

## 2025-03-12 DIAGNOSIS — R13.19 ESOPHAGEAL DYSPHAGIA: Primary | ICD-10-CM

## 2025-03-12 DIAGNOSIS — R10.13 EPIGASTRIC PAIN: ICD-10-CM

## 2025-03-12 RX ORDER — FAMOTIDINE 40 MG/1
40 TABLET, FILM COATED ORAL 2 TIMES DAILY
Qty: 60 TABLET | Refills: 2 | Status: SHIPPED | OUTPATIENT
Start: 2025-03-12

## 2025-03-12 NOTE — TELEPHONE ENCOUNTER
Order for famotidine 40 mg twice daily sent per verbal authorization from Mery Serrano PA-C.    Ken Welsh, PharmD  Medication Therapy Management Pharmacist  [facility]  Phone: [phone]

## 2025-03-15 ENCOUNTER — HEALTH MAINTENANCE LETTER (OUTPATIENT)
Age: 60
End: 2025-03-15

## 2025-04-05 ENCOUNTER — HEALTH MAINTENANCE LETTER (OUTPATIENT)
Age: 60
End: 2025-04-05

## 2025-04-17 ENCOUNTER — OFFICE VISIT (OUTPATIENT)
Dept: PSYCHOLOGY | Facility: CLINIC | Age: 60
End: 2025-04-17
Payer: COMMERCIAL

## 2025-04-17 DIAGNOSIS — F33.1 MODERATE EPISODE OF RECURRENT MAJOR DEPRESSIVE DISORDER (H): ICD-10-CM

## 2025-04-17 DIAGNOSIS — F41.1 GAD (GENERALIZED ANXIETY DISORDER): Primary | ICD-10-CM

## 2025-04-17 DIAGNOSIS — F43.10 POSTTRAUMATIC STRESS DISORDER: ICD-10-CM

## 2025-04-17 NOTE — PROGRESS NOTES
[facility]                                     Progress Note    Patient Name: Alina Martell  Date: 25         Service Type: Individual      Session Start Time: 1:05 Session End Time: 1:55     Session Length: 50 minutes    Session #: 88     Attendees: Client      Service Modality:  Video Visit:     Telemedicine Visit: The patient's condition can be safely assessed and treated via synchronous audio and visual telemedicine encounter.       Reason for Telemedicine Visit: Patient convenience (e.g. access to timely appointments / distance to available provider)     Originating Site (Patient Location): Patient's place of employment     Distant Location (provider location):  Off-site     Consent:  The patient/guardian has verbally consented to: the potential risks and benefits of telemedicine (video visit) versus in person care; bill my insurance or make self-payment for services provided; and responsibility for payment of non-covered services.      Mode of Communication:  Video Conference via Text[employer]     As the provider I attest to compliance with applicable laws and regulations related to telemedicine.            DATA  Extended Session (53+ minutes): No  Interactive Complexity: No  Crisis: No      Progress Since Last Session (Related to Symptoms / Goals / Homework):   Symptoms: Improving less stress    Homework: Achieved / completed to satisfaction      Episode of Care Goals: Satisfactory progress - ACTION (Actively working towards change); Intervened by reinforcing change plan / affirming steps taken     Current / Ongoing Stressors and Concerns:  Patient reported the last three weeks have been better since previous session. Patient indicated the drama at work has  down. Patient reported she is still struggling with living at her son's house. Patient indicated even talking about it causes her anxiety to rise. Patient reported having control over her room and that being what she  can focus on. Patient indicated being aware of healthy behaviors and not allowing them to take over. This therapist processed with patient activities she can do if unhealthy behavior thoughts start to occur.      Treatment Objective(s) Addressed in This Session:   identify at least 3 triggers for anxiety, Increase interest, engagement, and pleasure in doing things  Decrease frequency and intensity of feeling down, depressed, hopeless  Improve quantity and quality of night time sleep / decrease daytime naps  Feel less tired and more energy during the day      Intervention:   Motivational Interviewing    MI Intervention: Change talk (evoked)     Change Talk Expressed by the Patient: Taking steps    Provider Response to Change Talk: R - Reflected patient's change talk      Assessments completed prior to visit:  The following assessments were completed by patient for this visit:  PHQ9:       9/5/2024    10:42 AM 10/15/2024     8:47 AM 11/27/2024     7:27 AM 12/30/2024     7:45 AM 2/4/2025     9:47 AM 2/11/2025     2:22 PM 3/27/2025     9:23 AM   PHQ-9 SCORE   PHQ-9 Total Score MyChart 8 (Mild depression) 9 (Mild depression)   8 (Mild depression) 7 (Mild depression) 10 (Moderate depression)   PHQ-9 Total Score 8 9 9 7 8  7  10        Patient-reported    Proxy-reported     GAD7:       12/6/2023     9:50 AM 7/16/2024     4:33 PM 8/31/2024     9:27 AM 10/15/2024     8:48 AM 12/2/2024    11:30 AM 1/16/2025     6:27 AM 3/27/2025     9:25 AM   ADA-7 SCORE   Total Score 11 (moderate anxiety) 8 (mild anxiety) 9 (mild anxiety) 11 (moderate anxiety) 6 (mild anxiety) 8 (mild anxiety) 9 (mild anxiety)   Total Score 11 8    8 9 11 6  8  9        Patient-reported     PROMIS 10-Global Health (all questions and answers displayed):       9/26/2023    12:07 PM 1/1/2024    11:44 PM 4/3/2024     9:52 AM 5/20/2024     9:52 AM 8/31/2024     9:29 AM 12/2/2024    11:31 AM 3/27/2025     9:26 AM   PROMIS 10   In general, would you say your health  is: Fair Good Good Good Fair Fair Fair   In general, would you say your quality of life is: Good Fair Good Fair Fair Good Good   In general, how would you rate your physical health? Fair Fair Fair Good Fair Fair Fair   In general, how would you rate your mental health, including your mood and your ability to think? Fair Fair Fair Fair Fair Fair Fair   In general, how would you rate your satisfaction with your social activities and relationships? Good Fair Good Good Good Good Good   In general, please rate how well you carry out your usual social activities and roles Good Good Good Good Good Good Good   To what extent are you able to carry out your everyday physical activities such as walking, climbing stairs, carrying groceries, or moving a chair? Mostly Mostly Completely Completely Mostly Completely Completely   In the past 7 days, how often have you been bothered by emotional problems such as feeling anxious, depressed, or irritable? Often Often Sometimes Sometimes Often Often Often   In the past 7 days, how would you rate your fatigue on average? Moderate Mild Moderate Moderate Moderate Moderate Moderate   In the past 7 days, how would you rate your pain on average, where 0 means no pain, and 10 means worst imaginable pain? 3 3 3 3 3 4 3   In general, would you say your health is: 2 3 3 3 2 2 2   In general, would you say your quality of life is: 3 2 3 2 2 3 3   In general, how would you rate your physical health? 2 2 2 3 2 2 2   In general, how would you rate your mental health, including your mood and your ability to think? 2 2 2 2 2 2 2   In general, how would you rate your satisfaction with your social activities and relationships? 3 2 3 3 3 3 3   In general, please rate how well you carry out your usual social activities and roles. (This includes activities at home, at work and in your community, and responsibilities as a parent, child, spouse, employee, friend, etc.) 3 3 3 3 3 3 3   To what extent are you  able to carry out your everyday physical activities such as walking, climbing stairs, carrying groceries, or moving a chair? 4 4 5 5 4 5 5   In the past 7 days, how often have you been bothered by emotional problems such as feeling anxious, depressed, or irritable? 4 4 3 3 4 4 4   In the past 7 days, how would you rate your fatigue on average? 3 2 3 3 3 3 3   In the past 7 days, how would you rate your pain on average, where 0 means no pain, and 10 means worst imaginable pain? 3 3 3 3 3 4 3   Global Mental Health Score 10 8    8 11 10 9 10  10    Global Physical Health Score 13 14    14 14 15 13 13  14    PROMIS TOTAL - SUBSCORES 23 22    22 25 25 22 23  24        Patient-reported         ASSESSMENT: Current Emotional / Mental Status (status of significant symptoms):   Risk status (Self / Other harm or suicidal ideation)   Patient denies current fears or concerns for personal safety.   Patient denies current or recent suicidal ideation or behaviors.   Patient denies current or recent homicidal ideation or behaviors.   Patient denies current or recent self injurious behavior or ideation.   Patient denies other safety concerns.   Patient reports there has been no change in risk factors since their last session.     Patient reports there has been no change in protective factors since their last session.     Recommended that patient call 911 or go to the local ED should there be a change in any of these risk factors     Appearance:   Appropriate    Eye Contact:   Good    Psychomotor Behavior: Normal    Attitude:   Cooperative    Orientation:   All   Speech    Rate / Production: Normal/ Responsive    Volume:  Normal    Mood:    Anxious    Affect:    Appropriate    Thought Content:  Clear    Thought Form:  Coherent  Goal Directed  Logical    Insight:    Good      Medication Review:   No changes to current psychiatric medication(s)     Medication Compliance:   Yes     Changes in Health Issues:   None reported     Chemical  Use Review:   Substance Use: Chemical use reviewed, no active concerns identified      Tobacco Use: No current tobacco use.      Diagnosis:  1. ADA (generalized anxiety disorder)    2. Moderate episode of recurrent major depressive disorder (H)    3. Posttraumatic stress disorder        Collateral Reports Completed:   Not Applicable    PLAN: (Patient Tasks / Therapist Tasks / Other)  Patient with continue with ongoing therapy in three week. Patient will identify unhealthy behavioral thoughts and then go to a lake, read a book or reach out to friend. Patient will continue to identify what is in her control in her sons house and leave when feeling overwhelmed.     There has been demonstrated improvement in functioning while patient has been engaged in psychotherapy/psychological service- if withdrawn the patient would deteriorate and/or relapse.     Mariana Love, [facility]     ______________________________________________________________________    Individual Treatment Plan    Patient's Name: Alina Martell  YOB: 1965    Date of Creation:10/16/2020  Date Treatment Plan Last Reviewed/Revised: 2/4/2025    DSM5 Diagnoses: 296.32 (F33.1) Major Depressive Disorder, Recurrent Episode, Moderate _, 300.02 (F41.1) Generalized Anxiety Disorder, or 309.81 (F43.10) Posttraumatic Stress Disorder (includes Posttraumatic Stress Disorder for Children 6 Years and Younger)  Without dissociative symptoms  Psychosocial / Contextual Factors: Health, housing and family  PROMIS (reviewed every 90 days):     PROMIS-10 Scores  Global Mental Health Score: 8  Global Physical Health Score: 14  PROMIS TOTAL - SUBSCORES: 22     Referral / Collaboration:  Was/were discussed and patient will pursue.    Anticipated number of session for this episode of care: 20 will reevaluate every 90 days  Anticipation frequency of session: Biweekly  Anticipated Duration of each session: 38-52 minutes  Treatment plan will be reviewed in 90 days  or when goals have been changed.       MeasurableTreatment Goal(s) related to diagnosis / functional impairment(s)  Goal 1: Patient will work on reducing overall anxiety.     I will know I've met my goal when reporting minimal anxiety symptoms.       Objective #A (Patient Action)                          Patient will use distraction each time intrusive worry surfaces.  Status: Continued - Date(s): 2/4/2025     Intervention(s)  Therapist will teach emotional regulation skills.  Objective #B  Patient will Decrease frequency and intensity of feeling down, depressed, hopeless.  Status: Continued - Date(s):  2/4/2025     Intervention(s)  Therapist will teach emotional recognition/identification. ..     Objective #C  Patient will Identify negative self-talk and behaviors: challenge core beliefs, myths, and actions.  Status: Continued - Date(s):  2/4/2025     Intervention(s)  Therapist will teach the client how to perform a behavioral chain analysis. ..     Goal 2: Patient will continue to work on reducing depression symptoms    I will know I've met my goal then reporting minimal depression symptoms     Objective #A (Patient Action)                          Patient will Increase interest, engagement, and pleasure in doing things.  Status: Continued - Date(s): 2/4/2025  Intervention(s)  Therapist will assign homework ..     Objective #B  Patient will Decrease frequency and intensity of feeling down, depressed, hopeless.  Status: Continued - Date(s):2/4/2025  Intervention(s)  Therapist will assign homework at every session.     Goal 3: Client will reduce PTSD symptoms by 50% as evidenced by PCLC-5 score     I will know I've met my goal when not struggling with nightmares.      Objective #A (Client Action)    Client will reduce nightmares.  Status: Continued - Date(s):2/4/2025    Intervention(s)  Therapist will teach nightmare retraining .    Objective #B  Client will compile a list of boundaries that they would like to set  with others.   .    Status: Continued - Date(s):2/4/2025    Intervention(s)  Therapist will assign homework boundary setting .            Patient has reviewed and agreed to the above plan.        [facility] Love [facility]

## 2025-04-19 ENCOUNTER — OFFICE VISIT (OUTPATIENT)
Dept: URGENT CARE | Facility: URGENT CARE | Age: 60
End: 2025-04-19
Payer: COMMERCIAL

## 2025-04-19 VITALS
SYSTOLIC BLOOD PRESSURE: 143 MMHG | DIASTOLIC BLOOD PRESSURE: 84 MMHG | OXYGEN SATURATION: 95 % | RESPIRATION RATE: 20 BRPM | HEART RATE: 79 BPM | TEMPERATURE: 98.3 F

## 2025-04-19 DIAGNOSIS — M62.830 BACK MUSCLE SPASM: Primary | ICD-10-CM

## 2025-04-19 PROCEDURE — 3079F DIAST BP 80-89 MM HG: CPT | Performed by: PEDIATRICS

## 2025-04-19 PROCEDURE — 3077F SYST BP >= 140 MM HG: CPT | Performed by: PEDIATRICS

## 2025-04-19 PROCEDURE — 99213 OFFICE O/P EST LOW 20 MIN: CPT | Performed by: PEDIATRICS

## 2025-04-19 RX ORDER — CYCLOBENZAPRINE HCL 10 MG
10 TABLET ORAL 3 TIMES DAILY PRN
Qty: 42 TABLET | Refills: 0 | Status: SHIPPED | OUTPATIENT
Start: 2025-04-19 | End: 2025-05-03

## 2025-04-19 ASSESSMENT — ENCOUNTER SYMPTOMS
JOINT SWELLING: 0
FEVER: 0
MYALGIAS: 1
ARTHRALGIAS: 0
NECK STIFFNESS: 1
BACK PAIN: 1
CHILLS: 0
NECK PAIN: 0
ACTIVITY CHANGE: 1

## 2025-04-19 NOTE — PROGRESS NOTES
Patient presents with:  Musculoskeletal Problem: Upper center back x Thursday progressively worse. Pain radiates lower back. C/o pain when looking down, neck stffness.       Clinical Decision Making:      ICD-10-CM    1. Back muscle spasm  M62.830 cyclobenzaprine (FLEXERIL) 10 MG tablet      - Prescribed flexeril for muscle spasms that prevent her from sleeping.  - ice or heat for symptom control.  - RTC in 1 week if pain is not subsiding.    There are no Patient Instructions on file for this visit.    HPI:  Alina Martell is a 60 year old female who presents today complaining of upper back pain.    Started Thursday (2 days ago). Very bad by the evening. Bends her neck and arms and feels shooting pain down her back down her around her mid-back.     Has stiffness from RA. Has been on adalimumab. Denies any falls. No new physical activities.    History obtained from the patient.    Problem List:  2023-12: Diabetes mellitus, type 2 (H)  2023-05: Abnormal uterine bleeding  2023-05: Uterine fibroid  2023-03: Vitamin D deficiency  2022-08: Status post catheter ablation of atrial fibrillation  2022-05: Epigastric pain  2022-04: History of colonic polyps  2022-02: Postmenopausal bleeding  2022-01: Obstructive sleep apnea syndrome  2022-01: Essential hypertension  2022-01: Coronary artery disease of native artery with stable angina   pectoris  2022-01: Seropositive rheumatoid arthritis of multiple sites (H)  2022-01: Recurrent major depressive disorder, in full remission  2022-01: Paroxysmal atrial fibrillation (H)  2022-01: Morbid obesity (H)  2021-07: Chest pain  2021-03: ADA (generalized anxiety disorder)  2021-02: Benign neoplasm of ascending colon  2021-02: Diverticular disease of large intestine  2021-02: Polyp of colon  2018-11: Depressed  2017-10: Cyclic citrullinated peptide (CCP) antibody positive  2017-06: Tendonitis of both shoulders  2016-07: Chronic pain  2016-07: Rheumatoid arthritis (H)  2016-07:  Olecranon bursitis of right elbow  2016-03: Grief  2015-10: Sicca syndrome  2014-12: Major depressive disorder, recurrent episode, unspecified  2014-12: Anxiety  2014-12: Panic attack  2014-12: Major depressive disorder, recurrent episode, severe,   without mention of psychotic behavior  2014-12: Sleep disorder  2014-09: Insomnia related to another mental disorder  2014-09: Microcytic anemia  2014-09: Migraine headache  2014-09: Reflux      Past Medical History:   Diagnosis Date    Anemia     Antiplatelet or antithrombotic long-term use     Arrhythmia     Cannabis use without complication 12/08/2014    Carpal tunnel syndrome     Coronary artery disease     Depressive disorder 2014    Diabetes mellitus, type 2 (H) 12/13/2023    Gastroesophageal reflux disease     History of angina     Hypertension     Irregular heart beat     Obesity     Paroxysmal atrial fibrillation (H)     PTSD (post-traumatic stress disorder)     RA (rheumatoid arthritis) (H)     Rheumatoid arthritis (H) 07/08/2016    Sicca syndrome     Sleep apnea        Social History     Tobacco Use    Smoking status: Never     Passive exposure: Past (mom smoked)    Smokeless tobacco: Never   Substance Use Topics    Alcohol use: Not Currently     Comment: Alcoholic Drinks/day: Never an issue, she states.  7/8/16       Review of Systems   Constitutional:  Positive for activity change. Negative for chills and fever.   Musculoskeletal:  Positive for back pain, myalgias and neck stiffness. Negative for arthralgias, gait problem, joint swelling and neck pain.       Vitals:    04/19/25 1256   BP: (!) 143/84   Pulse: 79   Resp: 20   Temp: 98.3  F (36.8  C)   TempSrc: Tympanic   SpO2: 95%       Physical Exam  Constitutional:       General: She is not in acute distress.     Appearance: She is not toxic-appearing.   Musculoskeletal:         General: Tenderness present. No swelling or deformity.      Comments: Tenderness to moderate pressure along her superior traps,  rhomboids, paraspinal muscles between her neck and around T11   Neurological:      Mental Status: She is alert.         Results:  No results found for any visits on 04/19/25.      At the end of the encounter, I discussed results, diagnosis, medications. Discussed indications for follow up if no improvement. Patient understood and agreed to plan. Patient was stable for discharge.    Alex Mcfadden MD, MPH

## 2025-04-19 NOTE — PROGRESS NOTES
Urgent Care Clinic Visit    Chief Complaint   Patient presents with    Musculoskeletal Problem     Upper center back x Thursday progressively worse. Pain radiates lower back. C/o pain when looking down, neck stffness.                4/19/2025    12:55 PM   Additional Questions   Roomed by Francisca Sue MA   Accompanied by Self         4/19/2025   Declines Weight   Did patient decline having their weight taken? Yes         Francisca Sue MA on 04/19/2025 at 12:57 PM

## 2025-04-23 ENCOUNTER — VIRTUAL VISIT (OUTPATIENT)
Dept: FAMILY MEDICINE | Facility: CLINIC | Age: 60
End: 2025-04-23
Payer: COMMERCIAL

## 2025-04-23 DIAGNOSIS — M05.79 SEROPOSITIVE RHEUMATOID ARTHRITIS OF MULTIPLE SITES (H): ICD-10-CM

## 2025-04-23 DIAGNOSIS — E66.813 OBESITY, CLASS III, BMI 40-49.9 (MORBID OBESITY) (H): ICD-10-CM

## 2025-04-23 DIAGNOSIS — E11.9 TYPE 2 DIABETES MELLITUS WITHOUT COMPLICATION, WITHOUT LONG-TERM CURRENT USE OF INSULIN (H): Primary | ICD-10-CM

## 2025-04-23 PROCEDURE — 98006 SYNCH AUDIO-VIDEO EST MOD 30: CPT | Performed by: FAMILY MEDICINE

## 2025-04-23 NOTE — PROGRESS NOTES
Maritza is a 60 year old who is being evaluated via a billable video visit.    How would you like to obtain your AVS? MyChart  If the video visit is dropped, the invitation should be resent by: Text to cell phone: [phone]  Will anyone else be joining your video visit? No      Assessment & Plan     Type 2 diabetes mellitus without complication, without long-term current use of insulin (H)  Due for A1c, reviewed recent labs.  No signs of hypoglycemia.  Will order glucometer and switch medication to ozempic and stop the metformin  - Hemoglobin A1c  - semaglutide (OZEMPIC) 2 MG/3ML pen  Dispense: 3 mL; Refill: 0  - semaglutide (OZEMPIC) 2 MG/3ML pen  Dispense: 3 mL; Refill: 0  - blood glucose monitoring (NO BRAND SPECIFIED) meter device kit  Dispense: 1 kit; Refill: 0  - blood glucose (NO BRAND SPECIFIED) lancets standard  Dispense: 100 Lancet; Refill: 1  - blood glucose (NO BRAND SPECIFIED) test strip  Dispense: 100 strip; Refill: 1    Seropositive rheumatoid arthritis of multiple sites (H)  On humira and stable.  Seen by rheumatology    Obesity, Class III, BMI 40-49.9 (morbid obesity)  Recommend diet and exercise.  Comorbid with type 2 dM.  - Hemoglobin A1c  - semaglutide (OZEMPIC) 2 MG/3ML pen  Dispense: 3 mL; Refill: 0  - semaglutide (OZEMPIC) 2 MG/3ML pen  Dispense: 3 mL; Refill: 0                  Subjective   Maritza is a 60 year old, presenting for the following health issues:  Weight Loss  (Discuss medication along with A1c )      4/23/2025    10:25 AM   Additional Questions   Roomed by ANDREW Peoples     History of Present Illness       Reason for visit:  Discuss A1c & weight loss medication    She eats 2-3 servings of fruits and vegetables daily.She consumes 1 sweetened beverage(s) daily.She exercises with enough effort to increase her heart rate 30 to 60 minutes per day.  She exercises with enough effort to increase her heart rate 5 days per week.   She is taking medications regularly.      Not checking  sugars at home.  Not feeling any different                Objective           Vitals:  No vitals were obtained today due to virtual visit.    Physical Exam   GENERAL: alert and no distress  EYES: Eyes grossly normal to inspection.  No discharge or erythema, or obvious scleral/conjunctival abnormalities.  RESP: No audible wheeze, cough, or visible cyanosis.    SKIN: Visible skin clear. No significant rash, abnormal pigmentation or lesions.  NEURO: Cranial nerves grossly intact.  Mentation and speech appropriate for age.  PSYCH: Appropriate affect, tone, and pace of words          Video-Visit Details    Type of service:  Video Visit   Originating Location (pt. Location): Home    Distant Location (provider location):  On-site  Platform used for Video Visit: Deena  Signed Electronically by: Estelle Bansal MD

## 2025-04-28 ENCOUNTER — OFFICE VISIT (OUTPATIENT)
Dept: URGENT CARE | Facility: URGENT CARE | Age: 60
End: 2025-04-28
Payer: COMMERCIAL

## 2025-04-28 VITALS
DIASTOLIC BLOOD PRESSURE: 69 MMHG | TEMPERATURE: 98.2 F | HEART RATE: 87 BPM | SYSTOLIC BLOOD PRESSURE: 127 MMHG | OXYGEN SATURATION: 98 % | RESPIRATION RATE: 16 BRPM

## 2025-04-28 DIAGNOSIS — R19.7 DIARRHEA, UNSPECIFIED TYPE: Primary | ICD-10-CM

## 2025-04-28 LAB
ANION GAP SERPL CALCULATED.3IONS-SCNC: 10 MMOL/L (ref 7–15)
BUN SERPL-MCNC: 11.2 MG/DL (ref 6–20)
CALCIUM SERPL-MCNC: 9.5 MG/DL (ref 8.8–10.4)
CHLORIDE SERPL-SCNC: 101 MMOL/L (ref 98–107)
CREAT SERPL-MCNC: 0.75 MG/DL (ref 0.51–0.95)
EGFRCR SERPLBLD CKD-EPI 2021: >90 ML/MIN/1.73M2
GLUCOSE SERPL-MCNC: 120 MG/DL (ref 70–99)
HCO3 SERPL-SCNC: 29 MMOL/L (ref 22–29)
POTASSIUM SERPL-SCNC: 4 MMOL/L (ref 3.4–5.3)
SODIUM SERPL-SCNC: 140 MMOL/L (ref 135–145)

## 2025-04-28 PROCEDURE — 36415 COLL VENOUS BLD VENIPUNCTURE: CPT

## 2025-04-28 PROCEDURE — 3078F DIAST BP <80 MM HG: CPT

## 2025-04-28 PROCEDURE — 80048 BASIC METABOLIC PNL TOTAL CA: CPT

## 2025-04-28 PROCEDURE — 99213 OFFICE O/P EST LOW 20 MIN: CPT

## 2025-04-28 PROCEDURE — 3074F SYST BP LT 130 MM HG: CPT

## 2025-04-28 NOTE — PROGRESS NOTES
URGENT CARE  Assessment & Plan   Assessment:   Alina Martell is a 60 year old female who's clinical presentation today is consistent with:   1. Diarrhea, unspecified type    - Basic metabolic panel  (Ca, Cl, CO2, Creat, Gluc, K, Na, BUN); Future  Plan:  Patient is well appearing, afebrile, has reassuring vital signs, a benign physical exam and there are no suspicious signs of a bacterial cause of the patient's diarrhea (no bloody stools, no current fevers, no mucus in the stool); will treat patient's diarrhea today symptomatically and supportively as a viral gastroenteritis. This will include: fluid repletion w/ a mix of water, sugar and salt (such as Gatorade, electrolyte mixture) and dietary modifications w/ bland foods.   Patient concerned about dehydration, will get a bmp to assess electrolytes, notified her it will not come back until tomorrow    Additionally given patient's symptoms are >72 hours in length (and there is no concern for a bacterial pathology) , educated the patient that it is reasonable to try an anti-motility agent, such as loperamide, cautiously, additionally informed the patient that antisecretory agents such as bismuth subsalicylate, as it helps to decrease fluid to the gastrointestinal tract and has antimicrobial properties specific to gastrointestinal pathogens; side effects of medications reviewed.   Reassurance was provided to the patient that diarrhea usually resolves on its own without treatment and no further workup is necessary, however if the diarrhea becomes persistent, lasting >7 days, they should follow up, as it would be reasonable to return to search for an etiology by trying to isolate a treatable cause or identify a pathogen w/ a stool culture (patient has [employer] insurance)  Additionally we discussed if symptoms do not improve after starting today's treatment to follow up in 7 days, sooner if symptoms worsen, return precautions given    No alarm signs or symptoms present  "  Differential Diagnoses for this patient's chief complaint that I considered include:  Viral vs bacterial cause of diarrhea, parasitic or iatrogentic cause, irritable bowel, inflammatory bowel, ischemic pathology, bowel obstruction, c.diff      Shira Aguilar, ST[facility] [place]      ______________________________________________________________________      Subjective     HPI: Alina Martell \"Maritza\" is a 60 year old  female who presents today for evaluation the following concerns:   Patient presents today for evaluation of nausea, vomiting and diarrhea for 4 days,   Patient denies any current abdominal pain  Patient states originally she had nausea and vomiting those have resolved now she just has lingering diarrhea, patient states originally she also had abdominal cramping which is also resolved  Patient describes her diarrhea as liquid watery, patient states she does not have any associated dysuria, frequency, hematuria,   additionally patient denies any melena or hematochezia, furthermore patient denies any fevers  Patient did endorse the beginning of the illness on day 1 and 2 she did have a low-grade fever however that is resolved  Patient states she is tolerating bland foods and fluids but is worried she is becoming dehydrated    Review of Systems:  Pertinent review of systems as reflected in HPI, otherwise negative.     Objective    Physical Exam:  Vitals:    04/28/25 1838   BP: 127/69   Pulse: 87   Resp: 16   Temp: 98.2  F (36.8  C)   TempSrc: Oral   SpO2: 98%      General:   alert and oriented, no acute distress, non ill-appearing   Vital signs reviewed: afebrile and normotensive    ABD: Bowel sounds active in all  quadrants   soft,  non-distended   nonTender to palpation    No rebound tenderness appreciated   No tympany, no organomegaly, no masses palpable,   Non-tense, no guarding present, no rigidity present,   No hernias noted, no enlarged inguinal notes, no ascites " present     ______________________________________________________________________    I explained my diagnostic considerations and recommendations to the patient  All questions were answered.   Please see AVS for any patient instructions & handouts given.

## 2025-04-28 NOTE — PATIENT INSTRUCTIONS
Diagnosis: Most likely a viral gastroenteritis   Today:   Will get lab work today, will come back tomorrow     Plan:   Get plenty of fluids! fluid replacement with a mix of water, salt, sugar = electrolyte mix    oral rehydration solutions like Gatorade or Pedialyte have the right amount of water, sugar, and salt   Adjust diet: Eat bland and starchy foods such as cereal, crackers, or rice  Remove certain foods that irritate stomach: dairy, high fat or sugar, spicy, cofee   antimotility if >72hr reasonable And if not concerned about bacterial cause (no fevers, mucus, blood) = imodium - The dose of loperamide is two tablets (4 mg) initially, then 2 mg after each unformed stool for <=2 days, with a maximum of 16 mg/day    Also like: antisecretory agents such as Pepto-Bismol, as they help w/ decreasing fluid to gut and also have anti-microbial properties specific to GI pathogens  Pepto-Bismol, 30 mL or two tablets every 30 minutes for eight doses  Monitor for:   diarrhea that gets worse or lasts >72hrs   Develop a new fever  has blood or mucus in the poop, or poop that is black and looks like tar  can't drink fluids, vomiting, for >24hrs,   has severe or worsening belly pain  appears dehydrated; signs include dizziness, drowsiness, dry or sticky mouth, sunken eyes, crying with few or no tears, or peeing less often     Diarrhea:  Diarrhea is poop that's loose, watery, or happens a lot.   Patients who have diarrhea lose a lot of fluid from the body through their poop.   They might also vomit (throw up) or have a fever.   If too much fluid is lost, they can get dehydrated (not have enough water in the body).   Most of the time, diarrhea goes away on its own in a few days.  Diarrhea may be caused by:  Bacterial, viral, or parasitic infections (such as Salmonella , rotavirus, or Giardia)  Food intolerances (such as dairy products)  Medicines (such as antibiotics)  Intestinal illness (such as Crohn's disease)

## 2025-05-06 ENCOUNTER — VIRTUAL VISIT (OUTPATIENT)
Dept: PSYCHIATRY | Facility: CLINIC | Age: 60
End: 2025-05-06
Payer: COMMERCIAL

## 2025-05-06 ENCOUNTER — VIRTUAL VISIT (OUTPATIENT)
Dept: PSYCHOLOGY | Facility: CLINIC | Age: 60
End: 2025-05-06
Payer: COMMERCIAL

## 2025-05-06 DIAGNOSIS — F33.1 MODERATE EPISODE OF RECURRENT MAJOR DEPRESSIVE DISORDER (H): ICD-10-CM

## 2025-05-06 DIAGNOSIS — F41.1 GAD (GENERALIZED ANXIETY DISORDER): ICD-10-CM

## 2025-05-06 DIAGNOSIS — F41.1 GAD (GENERALIZED ANXIETY DISORDER): Primary | ICD-10-CM

## 2025-05-06 DIAGNOSIS — F43.10 POSTTRAUMATIC STRESS DISORDER: ICD-10-CM

## 2025-05-06 PROCEDURE — 90837 PSYTX W PT 60 MINUTES: CPT | Mod: 95 | Performed by: COUNSELOR

## 2025-05-06 RX ORDER — GABAPENTIN 100 MG/1
CAPSULE ORAL
Qty: 120 CAPSULE | Refills: 2 | Status: SHIPPED | OUTPATIENT
Start: 2025-05-06

## 2025-05-06 ASSESSMENT — PATIENT HEALTH QUESTIONNAIRE - PHQ9
SUM OF ALL RESPONSES TO PHQ QUESTIONS 1-9: 8
10. IF YOU CHECKED OFF ANY PROBLEMS, HOW DIFFICULT HAVE THESE PROBLEMS MADE IT FOR YOU TO DO YOUR WORK, TAKE CARE OF THINGS AT HOME, OR GET ALONG WITH OTHER PEOPLE: SOMEWHAT DIFFICULT
SUM OF ALL RESPONSES TO PHQ QUESTIONS 1-9: 8
10. IF YOU CHECKED OFF ANY PROBLEMS, HOW DIFFICULT HAVE THESE PROBLEMS MADE IT FOR YOU TO DO YOUR WORK, TAKE CARE OF THINGS AT HOME, OR GET ALONG WITH OTHER PEOPLE: SOMEWHAT DIFFICULT
SUM OF ALL RESPONSES TO PHQ QUESTIONS 1-9: 8
SUM OF ALL RESPONSES TO PHQ QUESTIONS 1-9: 8

## 2025-05-06 ASSESSMENT — ANXIETY QUESTIONNAIRES
2. NOT BEING ABLE TO STOP OR CONTROL WORRYING: NOT AT ALL
7. FEELING AFRAID AS IF SOMETHING AWFUL MIGHT HAPPEN: NOT AT ALL
GAD7 TOTAL SCORE: 6
GAD7 TOTAL SCORE: 6
7. FEELING AFRAID AS IF SOMETHING AWFUL MIGHT HAPPEN: NOT AT ALL
2. NOT BEING ABLE TO STOP OR CONTROL WORRYING: NOT AT ALL
8. IF YOU CHECKED OFF ANY PROBLEMS, HOW DIFFICULT HAVE THESE MADE IT FOR YOU TO DO YOUR WORK, TAKE CARE OF THINGS AT HOME, OR GET ALONG WITH OTHER PEOPLE?: SOMEWHAT DIFFICULT
4. TROUBLE RELAXING: NEARLY EVERY DAY
3. WORRYING TOO MUCH ABOUT DIFFERENT THINGS: SEVERAL DAYS
6. BECOMING EASILY ANNOYED OR IRRITABLE: NOT AT ALL
GAD7 TOTAL SCORE: 6
4. TROUBLE RELAXING: NEARLY EVERY DAY
6. BECOMING EASILY ANNOYED OR IRRITABLE: NOT AT ALL
1. FEELING NERVOUS, ANXIOUS, OR ON EDGE: SEVERAL DAYS
IF YOU CHECKED OFF ANY PROBLEMS ON THIS QUESTIONNAIRE, HOW DIFFICULT HAVE THESE PROBLEMS MADE IT FOR YOU TO DO YOUR WORK, TAKE CARE OF THINGS AT HOME, OR GET ALONG WITH OTHER PEOPLE: SOMEWHAT DIFFICULT
1. FEELING NERVOUS, ANXIOUS, OR ON EDGE: SEVERAL DAYS
8. IF YOU CHECKED OFF ANY PROBLEMS, HOW DIFFICULT HAVE THESE MADE IT FOR YOU TO DO YOUR WORK, TAKE CARE OF THINGS AT HOME, OR GET ALONG WITH OTHER PEOPLE?: SOMEWHAT DIFFICULT
7. FEELING AFRAID AS IF SOMETHING AWFUL MIGHT HAPPEN: NOT AT ALL
IF YOU CHECKED OFF ANY PROBLEMS ON THIS QUESTIONNAIRE, HOW DIFFICULT HAVE THESE PROBLEMS MADE IT FOR YOU TO DO YOUR WORK, TAKE CARE OF THINGS AT HOME, OR GET ALONG WITH OTHER PEOPLE: SOMEWHAT DIFFICULT
GAD7 TOTAL SCORE: 6
5. BEING SO RESTLESS THAT IT IS HARD TO SIT STILL: SEVERAL DAYS
GAD7 TOTAL SCORE: 6
7. FEELING AFRAID AS IF SOMETHING AWFUL MIGHT HAPPEN: NOT AT ALL
5. BEING SO RESTLESS THAT IT IS HARD TO SIT STILL: SEVERAL DAYS
3. WORRYING TOO MUCH ABOUT DIFFERENT THINGS: SEVERAL DAYS
GAD7 TOTAL SCORE: 6

## 2025-05-06 ASSESSMENT — PAIN SCALES - GENERAL: PAINLEVEL_OUTOF10: NO PAIN (0)

## 2025-05-06 NOTE — PROGRESS NOTES
"Virtual Visit Details    Type of service:  Video Visit     Originating Location (pt. Location): [place], [place]    Distant Location (provider location):  Off-site  Platform used for Video Visit: [facility]        OUTPATIENT PSYCHIATRIC FOLLOW-UP      2025     Provider: STARLA Kaiser CNP      Appointment Start Time: 229  Appointment End Time: 252    Name: Alina Martell   : 1965                    Preferred Name: Maritza      Screening Tools           2025     9:01 AM 3/27/2025     9:23 AM 2025     2:22 PM   PHQ   PHQ-9 Total Score 8  10  7    Q9: Thoughts of better off dead/self-harm past 2 weeks Not at all Not at all Not at all        Patient-reported    Proxy-reported         2025     9:02 AM 3/27/2025     9:25 AM 2025     6:27 AM   ADA-7 SCORE   Total Score 6 (mild anxiety) 9 (mild anxiety) 8 (mild anxiety)   Total Score 6  9  8        Patient-reported     PROMIS 10-Global Health (only subscores and total score):       2023    12:07 PM 2024    11:44 PM 4/3/2024     9:52 AM 2024     9:52 AM 2024     9:29 AM 2024    11:31 AM 3/27/2025     9:26 AM   PROMIS-10 Scores Only   Global Mental Health Score 10 8    8 11 10 9 10  10    Global Physical Health Score 13 14    14 14 15 13 13  14    PROMIS TOTAL - SUBSCORES 23 22    22 25 25 22 23  24        Patient-reported          History of Present Illness      Patient attended the session alone.     Interim History:  I last saw Alina HEENA Martell for outpatient psychiatry follow-up on 25. During that appointment, we advanced gabapentin 200mg    Current stressors include: Family activities /responsibility    Coping mechanisms and supports include:  Volunteering , therapy    Side effects: Denies    Medication adherence: Reports good med adherence.   Psychiatric Review of Systems      Alina NAIK Martell reports mood has been: \"good\"    Depression has been:   - Mood can fluctuate, overall manageable.     Anxiety " has been:   - Anxiety continues, chronic.   - Less jumpy at times  - Disposed of ativan. Finding more tolerant.     Sleep has been:   - Fatigue but attributes to most food poisoning.   - Averaging 5 hours.   - CPAP , compliant.   - Initially gabapentin 100mg helpful. Less so at 200mg, less noteable.     Taryn sxs: Denies    Psychosis sxs: Denies    ADHD sxs:   - No changes in sx.   - Working on managing sx  - Finding strategies to help.     PTSD sxs: Denies    SI/SIB: Denies SI/SIB/HI      Medications Prior to Appointment       Current Outpatient Medications   Medication Sig Dispense Refill    adalimumab-adaz (HYRIMOZ) 40 MG/0.4ML auto-injector kit Inject 0.4 mLs (40 mg) subcutaneously every 14 days. 0.8 mL 5    blood glucose (NO BRAND SPECIFIED) lancets standard Use to test blood sugar 1 times daily or as directed. 100 Lancet 1    blood glucose (NO BRAND SPECIFIED) test strip Use to test blood sugar 1 times daily or as directed. 100 strip 1    blood glucose monitoring (NO BRAND SPECIFIED) meter device kit Use to test blood sugar 1 times daily or as directed. 1 kit 0    diltiazem ER (TIAZAC) 360 MG 24 hr ER beaded capsule Take 1 capsule (360 mg) by mouth daily. 90 capsule 1    EPINEPHrine (ANY BX GENERIC EQUIV) 0.3 MG/0.3ML injection 2-pack Inject 0.3 mLs (0.3 mg) into the muscle as needed for anaphylaxis 2 each 2    famotidine (PEPCID) 40 MG tablet Take 1 tablet (40 mg) by mouth 2 times daily. 60 tablet 2    fexofenadine (ALLEGRA) 60 MG tablet Take 1 tablet (60 mg) by mouth daily. 30 tablet 4    fluticasone (FLONASE) 50 MCG/ACT nasal spray Spray 2 sprays into both nostrils daily. 16 g 11    gabapentin (NEURONTIN) 100 MG capsule Take 2 capsules (200 mg) by mouth at bedtime. 60 capsule 2    lisinopril (ZESTRIL) 10 MG tablet Take 1 tablet (10 mg) by mouth daily. 90 tablet 2    LORazepam (ATIVAN) 0.5 MG tablet TAKE 1 TABLET(0.5 MG) BY MOUTH DAILY AS NEEDED FOR ANXIETY 30 tablet 1    metFORMIN (GLUCOPHAGE XR) 500 MG 24  hr tablet Take 1 tablet (500 mg) by mouth daily (with dinner). 90 tablet 2    Multiple Vitamins-Minerals (CENTROVITE) TABS Take 1 tablet by mouth daily      omeprazole (PRILOSEC) 40 MG DR capsule Take 1 capsule (40 mg) by mouth daily. 90 capsule 3    ondansetron (ZOFRAN ODT) 4 MG ODT tab Take 1 tablet (4 mg) by mouth every 8 hours as needed for nausea 30 tablet 0    pravastatin (PRAVACHOL) 20 MG tablet Take 1 tablet (20 mg) by mouth daily. 90 tablet 1    semaglutide (OZEMPIC) 2 MG/3ML pen Inject 0.25 mg subcutaneously every 7 days. 3 mL 0    semaglutide (OZEMPIC) 2 MG/3ML pen Inject 0.5 mg subcutaneously every 7 days. 3 mL 0    vitamin D3 (CHOLECALCIFEROL) 1.25 MG (45911 UT) capsule Take 1 capsule (50,000 Units) by mouth every 7 days 12 capsule 3          Previous medication trials include but not limited to:  SSRIs:  -Prozac  -Paxil 40 mg  -Zoloft 25 mg     SNRIs:  -Cymbalta 60 mg     Other anxiolytics:  -Buspar 5 mg TID prn: caused me to be jittery   -Hydroxyzine 25 mg TID prn: dryness      Antipsychotics:  -Abilify     Alpha receptors:  -Prazosin 1 mg: stopped experiencing nightmares     Stimulants/ADHD meds:  -Strattera 40 mg     Benzodiazepines:  -Lorazepam .5 mg prn     Sleep aides:  -Ambien: sleep walking  -Trazodone 25 to 50 mg              Medication Compliance: Yes.                  Pharmacogenomic Testing Completed: No      Medical History      History of head injuries: No  History of seizures: No  History of cardiac events: A Fib; Hx of ablation.   History of Tardive Dyskinesia: No         Past Medical History:   Diagnosis Date    Anemia     Antiplatelet or antithrombotic long-term use     Arrhythmia     Cannabis use without complication 12/08/2014    Carpal tunnel syndrome     Coronary artery disease     Depressive disorder 2014    Diabetes mellitus, type 2 (H) 12/13/2023    Gastroesophageal reflux disease     History of angina     Hypertension     Irregular heart beat     Obesity     Paroxysmal atrial  fibrillation (H)     PTSD (post-traumatic stress disorder)     RA (rheumatoid arthritis) (H)     Rheumatoid arthritis (H) 07/08/2016    Sicca syndrome     Sleep apnea         Surgery:   Past Surgical History:   Procedure Laterality Date    ABDOMEN SURGERY      CHOLECYSTECTOMY      CYSTOSCOPY N/A 05/04/2023    Procedure: AND CYSTOSCOPY;  Surgeon: Irma Cary MD;  Location: [facility] OR    DAVINCI HYSTERECTOMY TOTAL Bilateral 05/04/2023    Procedure: ROBOTIC ASSISTED TOTAL LAPAROSCOPIC HYSTERECTOMY WITH BILATERAL SALPINGECTOMY;  Surgeon: Irma Cary MD;  Location: [facility] OR    DILATION AND CURETTAGE, OPERATIVE HYSTEROSCOPY, COMBINED N/A 03/03/2022    Procedure: HYSTEROSCOPY, WITH DILATION AND CURETTAGE;  Surgeon: Irma Cary MD;  Location: [facility] OR    EP ABLATION FOCAL AFIB N/A 06/30/2022    Procedure: Ablation Atrial Fibrilation;  Surgeon: Jose Guadalupe Joseph MD;  Location: [facility] CARDIAC CATH LAB    ESOPHAGOSCOPY, GASTROSCOPY, DUODENOSCOPY (EGD), COMBINED N/A 3/3/2025    Procedure: Esophagoscopy, gastroscopy, duodenoscopy (EGD), combined, with biopsy, polypectomy, clip and balloon dilation;  Surgeon: Nahun Byrnes DO;  Location: [facility] OR    HC REMOVAL GALLBLADDER      Description: Cholecystectomy;  Recorded: 10/15/2013;    LAPAROSCOPIC TUBAL LIGATION      RELEASE CARPAL TUNNEL BILATERAL      TUBAL LIGATION  01/01/1995     Primary Care Provider: Estelle Bansal MD        Social History      Current Living situation:  [place], [place] with self.    Current use of drugs or alcohol: Denies     Tobacco use: No    Employment: Yes. Full time Accounting     Relationship Status: Single.    Vitals      Not obtained d/t virtual visit.     LMP  (LMP Unknown)     Labs        Most recent laboratory results reviewed and pertinent results include:   Lab on 02/14/2024   Component Date Value Ref Range Status    Albumin 02/14/2024 3.7  3.5 - 5.2 g/dL Final    ALT 02/14/2024 19   0 - 50 U/L Final    WBC Count 02/14/2024 11.4 (H)  4.0 - 11.0 10e3/uL Final    RBC Count 02/14/2024 4.30  3.80 - 5.20 10e6/uL Final    Hemoglobin 02/14/2024 10.7 (L)  11.7 - 15.7 g/dL Final    Hematocrit 02/14/2024 34.4 (L)  35.0 - 47.0 % Final    MCV 02/14/2024 80  78 - 100 fL Final    MCH 02/14/2024 24.9 (L)  26.5 - 33.0 pg Final    MCHC 02/14/2024 31.1 (L)  31.5 - 36.5 g/dL Final    RDW 02/14/2024 15.7 (H)  10.0 - 15.0 % Final    Platelet Count 02/14/2024 348  150 - 450 10e3/uL Final    Creatinine 02/14/2024 0.77  0.51 - 0.95 mg/dL Final    GFR Estimate 02/14/2024 90  >60 mL/min/1.73m2 Final   Office Visit on 08/30/2023   Component Date Value Ref Range Status    YESSICA interpretation 08/30/2023 Negative  Negative Final      Negative:              <1:40  Borderline Positive:   1:40 - 1:80  Positive:              >1:80    CRP Inflammation 08/30/2023 16.70 (H)  <5.00 mg/L Final    Erythrocyte Sedimentation Rate 08/30/2023 81 (H)  0 - 30 mm/hr Final    SSA Beverly IgG Instrument Value 08/30/2023 0.5  <7.0 U/mL Final    SSA (Ro) Antibody IgG 08/30/2023 Negative  Negative Final    Hemoglobin A1C 08/30/2023 6.5 (H)  0.0 - 5.6 % Final    Normal <5.7%   Prediabetes 5.7-6.4%    Diabetes 6.5% or higher     Note: Adopted from ADA consensus guidelines.    Iron 08/30/2023 32 (L)  37 - 145 ug/dL Final    Iron Binding Capacity 08/30/2023 343  240 - 430 ug/dL Final    Iron Sat Index 08/30/2023 9 (L)  15 - 46 % Final    Ferritin 08/30/2023 29  11 - 328 ng/mL Final    WBC Count 08/30/2023 8.8  4.0 - 11.0 10e3/uL Final    RBC Count 08/30/2023 4.43  3.80 - 5.20 10e6/uL Final    Hemoglobin 08/30/2023 10.9 (L)  11.7 - 15.7 g/dL Final    Hematocrit 08/30/2023 35.3  35.0 - 47.0 % Final    MCV 08/30/2023 80  78 - 100 fL Final    MCH 08/30/2023 24.6 (L)  26.5 - 33.0 pg Final    MCHC 08/30/2023 30.9 (L)  31.5 - 36.5 g/dL Final    RDW 08/30/2023 16.0 (H)  10.0 - 15.0 % Final    Platelet Count 08/30/2023 373  150 - 450 10e3/uL Final    ALT  08/30/2023 17  0 - 50 U/L Final    Reference intervals for this test were updated on 6/12/2023 to more accurately reflect our healthy population. There may be differences in the flagging of prior results with similar values performed with this method. Interpretation of those prior results can be made in the context of the updated reference intervals.      Creatinine 08/30/2023 0.67  0.51 - 0.95 mg/dL Final    GFR Estimate 08/30/2023 >90  >60 mL/min/1.73m2 Final    Albumin 08/30/2023 4.0  3.5 - 5.2 g/dL Final   Office Visit on 03/22/2023   Component Date Value Ref Range Status    Albumin 03/22/2023 3.8  3.5 - 5.2 g/dL Final    ALT 03/22/2023 13  10 - 35 U/L Final    WBC Count 03/22/2023 8.8  4.0 - 11.0 10e3/uL Final    RBC Count 03/22/2023 4.18  3.80 - 5.20 10e6/uL Final    Hemoglobin 03/22/2023 10.7 (L)  11.7 - 15.7 g/dL Final    Hematocrit 03/22/2023 34.7 (L)  35.0 - 47.0 % Final    MCV 03/22/2023 83  78 - 100 fL Final    MCH 03/22/2023 25.6 (L)  26.5 - 33.0 pg Final    MCHC 03/22/2023 30.8 (L)  31.5 - 36.5 g/dL Final    RDW 03/22/2023 14.5  10.0 - 15.0 % Final    Platelet Count 03/22/2023 405  150 - 450 10e3/uL Final    Creatinine 03/22/2023 0.69  0.51 - 0.95 mg/dL Final    GFR Estimate 03/22/2023 >90  >60 mL/min/1.73m2 Final    eGFR calculated using 2021 CKD-EPI equation.    Erythrocyte Sedimentation Rate 03/22/2023 57 (H)  0 - 20 mm/hr Final    CRP Inflammation 03/22/2023 19.20 (H)  <5.00 mg/L Final    Cholesterol 03/22/2023 166  <200 mg/dL Final    Triglycerides 03/22/2023 89  <150 mg/dL Final    Direct Measure HDL 03/22/2023 44 (L)  >=50 mg/dL Final    LDL Cholesterol Calculated 03/22/2023 104 (H)  <=100 mg/dL Final    Non HDL Cholesterol 03/22/2023 122  <130 mg/dL Final    Vitamin D, Total (25-Hydroxy) 03/22/2023 9 (L)  20 - 75 ug/L Final   Lab Requisition on 03/20/2023   Component Date Value Ref Range Status    Interpretation 03/20/2023 Negative for Intraepithelial Lesion or Malignancy (NILM)    Final     Comment 03/20/2023    Final                    Value:This result contains rich text formatting which cannot be displayed here.    Specimen Adequacy 03/20/2023 Satisfactory for evaluation, endocervical/transformation zone component present   Final    Clinical Information 03/20/2023    Final                    Value:This result contains rich text formatting which cannot be displayed here.    LMP/Menopause Date 03/20/2023    Final                    Value:This result contains rich text formatting which cannot be displayed here.    Reflex Testing 03/20/2023 Yes regardless of result   Final    Previous Abnormal? 03/20/2023    Final                    Value:This result contains rich text formatting which cannot be displayed here.    Previous Abnormal Diagnosis 03/20/2023    Final                    Value:This result contains rich text formatting which cannot be displayed here.    Performing Labs 03/20/2023    Final                    Value:This result contains rich text formatting which cannot be displayed here.    Other HR HPV 03/20/2023 Negative  Negative Final    HPV16 DNA 03/20/2023 Negative  Negative Final    HPV18 DNA 03/20/2023 Negative  Negative Final    FINAL DIAGNOSIS 03/20/2023    Final                    Value:This result contains rich text formatting which cannot be displayed here.   Lab Requisition on 03/20/2023   Component Date Value Ref Range Status    Case Report 03/20/2023    Final                    Value:Surgical Pathology Report                         Case: [id number]                                  Authorizing Provider:  Irma Cary MD  Collected:           03/20/2023 11:20 AM          Ordering Location:     [facility]     Received:            03/20/2023 03:47 PM                                 [facility]                                                       Pathologist:           Kirt Shrestha,                                                                           MD                                                                           Specimen:    Endometrium                                                                                Final Diagnosis 03/20/2023    Final                    Value:This result contains rich text formatting which cannot be displayed here.    Clinical Information 03/20/2023    Final                    Value:This result contains rich text formatting which cannot be displayed here.    Gross Description 03/20/2023    Final                    Value:This result contains rich text formatting which cannot be displayed here.    Microscopic Description 03/20/2023    Final                    Value:This result contains rich text formatting which cannot be displayed here.    Performing Labs 03/20/2023    Final                    Value:This result contains rich text formatting which cannot be displayed here.   Lab on 03/06/2023   Component Date Value Ref Range Status    Creatinine 03/06/2023 0.72  0.51 - 0.95 mg/dL Final    GFR Estimate 03/06/2023 >90  >60 mL/min/1.73m2 Final    eGFR calculated using 2021 CKD-EPI equation.    WBC Count 03/06/2023 10.6  4.0 - 11.0 10e3/uL Final    RBC Count 03/06/2023 4.36  3.80 - 5.20 10e6/uL Final    Hemoglobin 03/06/2023 11.5 (L)  11.7 - 15.7 g/dL Final    Hematocrit 03/06/2023 36.6  35.0 - 47.0 % Final    MCV 03/06/2023 84  78 - 100 fL Final    MCH 03/06/2023 26.4 (L)  26.5 - 33.0 pg Final    MCHC 03/06/2023 31.4 (L)  31.5 - 36.5 g/dL Final    RDW 03/06/2023 14.5  10.0 - 15.0 % Final    Platelet Count 03/06/2023 366  150 - 450 10e3/uL Final    ALT 03/06/2023 14  10 - 35 U/L Final    Albumin 03/06/2023 3.9  3.5 - 5.2 g/dL Final     Most recent EKG from 08/23 reviewed. QTc interval 459.  Normal EKG          Medical Review of Systems      Pertinent positives noted in HPI and below:   Review of Systems   All other systems reviewed and are negative.        No LMP recorded (lmp unknown). Patient has had a  "hysterectomy.    Pregnant / Breastfeeding: No       Mental Status Exam      Appearance: awake, alert, adequately groomed, appeared stated age and no apparent distress  Attitude:  cooperative   Eye Contact:  good  Gait and Station: normal, no gross abnormalities noted by observation  Psychomotor Behavior:  no evidence of tardive dyskinesia, dystonia, or tics  Oriented to:  person, place, time, and situation  Attention Span and Concentration:  mild impairment   Speech:  clear, coherent, regular rate, rhythm, and volume  Language: intact  Mood:  \"okay\"\"  Affect:  appropriate and in normal range  Associations:  no loose associations  Thought Process:  logical, linear and goal oriented  Thought Content:  no evidence of suicidal ideation or homicidal ideation, no evidence of psychotic thought, no auditory hallucinations present and no visual hallucinations present  Recent and Remote Memory:  Intact to interview. Not formally assessed. No amnesia.  Fund of Knowledge: appropriate  Insight:  full  Judgment:  intact, adequate for safety  Impulse Control:  intact         Risk Assessment       Suicide assessment  Acute: Low  Chronic:Low  Imminent: Low     Risk factors  History of suicide attempts: No  History of self-injurious behavior: No  Carlos. Axis I psychiatric diagnoses: Yes  Substance use disorder: No  Symptoms: , panic, insomnia  Family history of completed suicide or attempted suicide: No  Accessibility to firearms: No  Interpersonal factors: financial stress, family dynamic challenges, LGBQT+     Protective factors  Ability to cope with the stress: Yes  Family responsibility and supportive:Yes  Positive therapeutic relationships: Yes  Social support:Yes  Orthodoxy beliefs:  Yes  Connectedness with mental health providers: Yes     Homicidal Risk  Acute: Low  Chronic: Low  Imminent: Low    Assessment     Alina Martell i has a past psychiatry history of ADHD, PTSD, Anorexia/Eating Disorder, ADA, and MDD  who has been " referred for medication management from therapist, Mariana [facility]. Three previous psychiatric inpatient hospitalizations.  Last hospitalization was in 2018.  No disclosed history of suicide attempts.  Last experienced suicidal ideation 2018.  No disclosed self harming behaviors.  No overt concerns regarding chemical health history.  Trauma history disclosed.     Alina NAIK Jayshree reports anxiety continues to be more prevalent than depression.  Gabapentin was initially helpful when she began the medication at 100 mg.  Less benefit noticed at 200 mg.  Discussed advancing bedtime dose and adding daytime dose for better control of anxiety.  Patient is in agreement.  Daytime dose could further be optimized as well.  Found she was becoming tolerating the lorazepam which made her uncomfortable.  She would like medications discontinued and disposed of medication already at primary care provider office.  No imminent safety concerns identified today.        Pharmacologic:   01/08/24: + doxepin 3mg  05/20/24: + clondine, stop doxepin    05/23/24: + gabapentin 100mg, stop clonidine (interaction drug-drug)   Pt dc'd venlafaxine ER 75 mg since last visit    -Discontinue lorazepam 0.5 mg tablet by mouth daily as needed for severe anxiety  -Advance gabapentin 100mg capsule, take 1 capsules by mouth every morning and 3 capsules by mouth at bedtime       Psychosocial: Would benefit from individual therapy with focus of Cognitive Behavioral Therapy (CBT). This form of therapy will be helpful in addressing cognitive distortions, improving distress tolerance, and developing helpful / healthy coping strategies to address stressors.   - [facility]            Diagnosis       ADA  MDD, recurrent, mild, in partial remission  PTSD    ADHD by hx     Plan         1) Medications:        -Discontinue lorazepam 0.5 mg tablet by mouth daily as needed for severe anxiety  -Advance  gabapentin 100mg capsule, take 1 capsules by mouth every morning and 3 capsules by mouth at bedtime                MNPMP: I have queried the [place] and/or [place] Prescription Monitoring Program for this patient for the preceding 12 months, or reviewed the report provided by my proxy delegate. I have not identified any concerns.  2) Risk vs benefits of medications reviewed: Yes  3) Life style modifications: sleep hygiene, exercise, healthy diet  4) Medical concerns:    - No acute concerns  5) Other:   - Continue individual therapy  - Consider ADHD support group > [facility] [place]  6) Refrain from drinking alcohol and/or use of drugs.   7) Please secure all prescription and OTC medications, sharps, and caustic substances. Please remove all firearms and ammunition.  8) Review outside records, get WESLEY's, coordinate with outside providers  9) In case of emergency call 911 or go to the nearest ER, this includes patient voicing thought of harming self or others as well as additional safety concerns   10) Follow-up: 8weeks, or sooner if needed.      Administrative Billing:   Supportive therapy was provided, focusing on reflective listening and solution focused problem solving.    Total time preparing to see this patient, face-to-face time, documenting in the EHR, and coordinating care time on the same calendar date:25 minutes         Signed:   STARLA Kaiser CNP on 5/6/2025 at 2:55 PM     Disclaimer: This note consists of symbols derived from keyboarding, dictation and/or voice recognition software. As a result, there may be errors in the script that have gone undetected. Please consider this when interpreting information found in this chart.

## 2025-05-06 NOTE — PROGRESS NOTES
[facility]                                     Progress Note    Patient Name: Alina Martell  Date: 5/06/25         Service Type: Individual      Session Start Time: 12:04 Session End Time: 12:58     Session Length: 54 minutes    Session #: 89     Attendees: Client      Service Modality:  Video Visit:     Telemedicine Visit: The patient's condition can be safely assessed and treated via synchronous audio and visual telemedicine encounter.       Reason for Telemedicine Visit: Patient convenience (e.g. access to timely appointments / distance to available provider)     Originating Site (Patient Location): Patient's place of employment     Distant Location (provider location):  Off-site     Consent:  The patient/guardian has verbally consented to: the potential risks and benefits of telemedicine (video visit) versus in person care; bill my insurance or make self-payment for services provided; and responsibility for payment of non-covered services.      Mode of Communication:  Video Conference via [employer]     As the provider I attest to compliance with applicable laws and regulations related to telemedicine.            DATA  Extended Session (53+ minutes):   - Patient's presenting concerns require more intensive intervention than could be completed within the usual service  Interactive Complexity: No  Crisis: No      Progress Since Last Session (Related to Symptoms / Goals / Homework):   Symptoms: No change significant external stress    Homework: Achieved / completed to satisfaction      Episode of Care Goals: Satisfactory progress - ACTION (Actively working towards change); Intervened by reinforcing change plan / affirming steps taken     Current / Ongoing Stressors and Concerns:  Patient indicated she has continued to have a significant amount of stress. Patient reported two of her close friends having health concerns. Patient indicated being there to support them but it being hard.  "Patient reported being extremely busy which has not allowed time to herself. Patient indicated she is starting a new medication for weight loss and is very conscious of her struggles with her eating in the past. Patient reported continuing to work on balance her life and not overdoing it. This therapist processed with patient her biggest challenges at  this time.      Treatment Objective(s) Addressed in This Session:   use \"worry time\" each day for 15 minutes of scheduled worry and then defer obsessive or anxious thinking until the next structured worry time, Increase interest, engagement, and pleasure in doing things  Decrease frequency and intensity of feeling down, depressed, hopeless  Improve quantity and quality of night time sleep / decrease daytime naps  Feel less tired and more energy during the day   Identify negative self-talk and behaviors: challenge core beliefs, myths, and actions     Intervention:   Motivational Interviewing    MI Intervention: Reframe     Change Talk Expressed by the Patient: Activation Taking steps    Provider Response to Change Talk: E - Evoked more info from patient about behavior change      Assessments completed prior to visit:  The following assessments were completed by patient for this visit:  PHQ9:       10/15/2024     8:47 AM 11/27/2024     7:27 AM 12/30/2024     7:45 AM 2/4/2025     9:47 AM 2/11/2025     2:22 PM 3/27/2025     9:23 AM 5/6/2025     9:01 AM   PHQ-9 SCORE   PHQ-9 Total Score MyChart 9 (Mild depression)   8 (Mild depression) 7 (Mild depression) 10 (Moderate depression) 8 (Mild depression)   PHQ-9 Total Score 9 9 7 8  7  10  8        Patient-reported    Proxy-reported     GAD7:       7/16/2024     4:33 PM 8/31/2024     9:27 AM 10/15/2024     8:48 AM 12/2/2024    11:30 AM 1/16/2025     6:27 AM 3/27/2025     9:25 AM 5/6/2025     9:02 AM   ADA-7 SCORE   Total Score 8 (mild anxiety) 9 (mild anxiety) 11 (moderate anxiety) 6 (mild anxiety) 8 (mild anxiety) 9 (mild " anxiety) 6 (mild anxiety)   Total Score 8    8 9 11 6  8  9  6        Patient-reported     PROMIS 10-Global Health (all questions and answers displayed):       9/26/2023    12:07 PM 1/1/2024    11:44 PM 4/3/2024     9:52 AM 5/20/2024     9:52 AM 8/31/2024     9:29 AM 12/2/2024    11:31 AM 3/27/2025     9:26 AM   PROMIS 10   In general, would you say your health is: Fair Good Good Good Fair Fair Fair   In general, would you say your quality of life is: Good Fair Good Fair Fair Good Good   In general, how would you rate your physical health? Fair Fair Fair Good Fair Fair Fair   In general, how would you rate your mental health, including your mood and your ability to think? Fair Fair Fair Fair Fair Fair Fair   In general, how would you rate your satisfaction with your social activities and relationships? Good Fair Good Good Good Good Good   In general, please rate how well you carry out your usual social activities and roles Good Good Good Good Good Good Good   To what extent are you able to carry out your everyday physical activities such as walking, climbing stairs, carrying groceries, or moving a chair? Mostly Mostly Completely Completely Mostly Completely Completely   In the past 7 days, how often have you been bothered by emotional problems such as feeling anxious, depressed, or irritable? Often Often Sometimes Sometimes Often Often Often   In the past 7 days, how would you rate your fatigue on average? Moderate Mild Moderate Moderate Moderate Moderate Moderate   In the past 7 days, how would you rate your pain on average, where 0 means no pain, and 10 means worst imaginable pain? 3 3 3 3 3 4 3   In general, would you say your health is: 2 3 3 3 2 2 2   In general, would you say your quality of life is: 3 2 3 2 2 3 3   In general, how would you rate your physical health? 2 2 2 3 2 2 2   In general, how would you rate your mental health, including your mood and your ability to think? 2 2 2 2 2 2 2   In general,  how would you rate your satisfaction with your social activities and relationships? 3 2 3 3 3 3 3   In general, please rate how well you carry out your usual social activities and roles. (This includes activities at home, at work and in your community, and responsibilities as a parent, child, spouse, employee, friend, etc.) 3 3 3 3 3 3 3   To what extent are you able to carry out your everyday physical activities such as walking, climbing stairs, carrying groceries, or moving a chair? 4 4 5 5 4 5 5   In the past 7 days, how often have you been bothered by emotional problems such as feeling anxious, depressed, or irritable? 4 4 3 3 4 4 4   In the past 7 days, how would you rate your fatigue on average? 3 2 3 3 3 3 3   In the past 7 days, how would you rate your pain on average, where 0 means no pain, and 10 means worst imaginable pain? 3 3 3 3 3 4 3   Global Mental Health Score 10 8    8 11 10 9 10  10    Global Physical Health Score 13 14    14 14 15 13 13  14    PROMIS TOTAL - SUBSCORES 23 22    22 25 25 22 23  24        Patient-reported         ASSESSMENT: Current Emotional / Mental Status (status of significant symptoms):   Risk status (Self / Other harm or suicidal ideation)   Patient denies current fears or concerns for personal safety.   Patient denies current or recent suicidal ideation or behaviors.   Patient denies current or recent homicidal ideation or behaviors.   Patient denies current or recent self injurious behavior or ideation.   Patient denies other safety concerns.   Patient reports there has been no change in risk factors since their last session.     Patient reports there has been no change in protective factors since their last session.     Recommended that patient call 911 or go to the local ED should there be a change in any of these risk factors     Appearance:   Appropriate    Eye Contact:   Good    Psychomotor Behavior: Normal    Attitude:   Cooperative  "   Orientation:   All   Speech    Rate / Production: Normal/ Responsive    Volume:  Normal    Mood:    Anxious    Affect:    Appropriate    Thought Content:  Clear    Thought Form:  Coherent  Goal Directed  Logical    Insight:    Good      Medication Review:   No changes to current psychiatric medication(s)     Medication Compliance:   Yes     Changes in Health Issues:   None reported     Chemical Use Review:   Substance Use: Chemical use reviewed, no active concerns identified      Tobacco Use: No current tobacco use.      Diagnosis:  1. ADA (generalized anxiety disorder)    2. Moderate episode of recurrent major depressive disorder (H)    3. Posttraumatic stress disorder        Collateral Reports Completed:   Not Applicable    PLAN: (Patient Tasks / Therapist Tasks / Other)  Patient will return in three weeks for scheduled session. Patient will continue to check-in with herself. Patient will identify time helping others and time to herself. Patient will schedule a \"me\" trip. Patient will continue to be mindful of eating patterns and any significant changes.     There has been demonstrated improvement in functioning while patient has been engaged in psychotherapy/psychological service- if withdrawn the patient would deteriorate and/or relapse.     Mariana Love, [facility]     ______________________________________________________________________    Individual Treatment Plan    Patient's Name: Alina Martell  YOB: 1965    Date of Creation:10/16/2020  Date Treatment Plan Last Reviewed/Revised: 5/6/2025    DSM5 Diagnoses: 296.32 (F33.1) Major Depressive Disorder, Recurrent Episode, Moderate _, 300.02 (F41.1) Generalized Anxiety Disorder, or 309.81 (F43.10) Posttraumatic Stress Disorder (includes Posttraumatic Stress Disorder for Children 6 Years and Younger)  Without dissociative symptoms  Psychosocial / Contextual Factors: Health, housing and family  PROMIS (reviewed every 90 days):     PROMIS-10 " Scores  Global Mental Health Score: 8  Global Physical Health Score: 14  PROMIS TOTAL - SUBSCORES: 22     Referral / Collaboration:  Was/were discussed and patient will pursue.    Anticipated number of session for this episode of care: 20 will reevaluate every 90 days  Anticipation frequency of session: Biweekly  Anticipated Duration of each session: 38-52 minutes  Treatment plan will be reviewed in 90 days or when goals have been changed.       MeasurableTreatment Goal(s) related to diagnosis / functional impairment(s)  Goal 1: Patient will work on reducing overall anxiety.     I will know I've met my goal when reporting minimal anxiety symptoms.       Objective #A (Patient Action)                          Patient will use distraction each time intrusive worry surfaces.  Status: Continued - Date(s):  5/6/2025     Intervention(s)  Therapist will teach emotional regulation skills.  Objective #B  Patient will Decrease frequency and intensity of feeling down, depressed, hopeless.  Status: Continued - Date(s):   5/6/2025     Intervention(s)  Therapist will teach emotional recognition/identification. ..     Objective #C  Patient will Identify negative self-talk and behaviors: challenge core beliefs, myths, and actions.  Status: Continued - Date(s):   5/6/2025  Intervention(s)  Therapist will teach the client how to perform a behavioral chain analysis. ..     Goal 2: Patient will continue to work on reducing depression symptoms    I will know I've met my goal then reporting minimal depression symptoms     Objective #A (Patient Action)                          Patient will Increase interest, engagement, and pleasure in doing things.  Status: Continued - Date(s):  5/6/2025  Intervention(s)  Therapist will assign homework ..     Objective #B  Patient will Decrease frequency and intensity of feeling down, depressed, hopeless.  Status: Continued - Date(s)5/6/2025  Intervention(s)  Therapist will assign homework at every  session.     Goal 3: Client will reduce PTSD symptoms by 50% as evidenced by PCLC-5 score     I will know I've met my goal when not struggling with nightmares.      Objective #A (Client Action)    Client will reduce nightmares.  Status: Continued - Date(s):5/6/2025    Intervention(s)  Therapist will teach nightmare retraining .    Objective #B  Client will compile a list of boundaries that they would like to set with others.   .    Status: Continued - Date(s):5/6/2025    Intervention(s)  Therapist will assign homework boundary setting .            Patient has reviewed and agreed to the above plan.        Mariana Love, [facility]

## 2025-05-06 NOTE — NURSING NOTE
Current patient location:  work    Is the patient currently in the state of [place]? YES    Visit mode: VIDEO    If the visit is dropped, the patient can be reconnected by:VIDEO VISIT: Text to cell phone:   Telephone Information:   Mobile [phone]       Will anyone else be joining the visit? NO  (If patient encounters technical issues they should call [phone] :[phone])    Are changes needed to the allergy or medication list? Pt stated no changes to allergies and Pt stated no med changes    Are refills needed on medications prescribed by this physician? Discuss with provider    Rooming Documentation:  Questionnaire(s) completed    Reason for visit: RECHJOSE MARIA MADRID      
UTI

## 2025-05-15 ENCOUNTER — ANCILLARY PROCEDURE (OUTPATIENT)
Dept: MAMMOGRAPHY | Facility: CLINIC | Age: 60
End: 2025-05-15
Attending: FAMILY MEDICINE
Payer: COMMERCIAL

## 2025-05-15 DIAGNOSIS — Z12.31 VISIT FOR SCREENING MAMMOGRAM: ICD-10-CM

## 2025-05-15 PROCEDURE — 77063 BREAST TOMOSYNTHESIS BI: CPT

## 2025-05-21 ENCOUNTER — OFFICE VISIT (OUTPATIENT)
Dept: GASTROENTEROLOGY | Facility: CLINIC | Age: 60
End: 2025-05-21
Attending: PHYSICIAN ASSISTANT
Payer: COMMERCIAL

## 2025-05-21 VITALS
DIASTOLIC BLOOD PRESSURE: 85 MMHG | OXYGEN SATURATION: 92 % | HEART RATE: 80 BPM | BODY MASS INDEX: 41.16 KG/M2 | HEIGHT: 69 IN | WEIGHT: 277.9 LBS | SYSTOLIC BLOOD PRESSURE: 133 MMHG

## 2025-05-21 DIAGNOSIS — K59.00 CONSTIPATION, UNSPECIFIED CONSTIPATION TYPE: ICD-10-CM

## 2025-05-21 DIAGNOSIS — R13.19 ESOPHAGEAL DYSPHAGIA: Primary | ICD-10-CM

## 2025-05-21 DIAGNOSIS — R10.13 EPIGASTRIC PAIN: ICD-10-CM

## 2025-05-21 ASSESSMENT — PAIN SCALES - GENERAL: PAINLEVEL_OUTOF10: NO PAIN (0)

## 2025-05-21 NOTE — NURSING NOTE
"Do you have a history of colon cancer in your immediate family? NO    If yes who: negative     And what age  were they diagnosed:       Chief Complaint   Patient presents with    Follow Up       Vitals:    05/21/25 0741   BP: 133/85   Pulse: 80   SpO2: 92%   Weight: 126.1 kg (277 lb 14.4 oz)   Height: 1.753 m (5' 9\")       Body mass index is 41.04 kg/m .    Lorena Miller MA    "

## 2025-05-21 NOTE — PROGRESS NOTES
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 Now reporting more frequent dysphagia episodes despite Prilosec 40 mg once daily (ongoing use for 3 years). Also reporting pyrosis, globus sensation, regurgitation (bland and acidic), retrosternal pressure (ER 12/17/24 - troponin < 6 and EKG w/o ischemic changes). Recent TBE was unremarkable.     Repeat EGD 3/2025 that was macroscopically unremarkable, distal and proximal esophageal biopsies with features of reflux but no EoE or erosive GERD. No hiatal hernia. Dilation with a 72-40-56-20-21 mm balloon dilator was performed to 17 mm. Plan was to dilate to 21mm however the patient desaturated and the decision was made not to pursue additional dilation. The dilation site was examined and showed no change. Multiple gastric polyps found (Bx - FGP with acute on chronic inflammation but no h.pylori, GIM, dysplasia).     Today - shares no further episodes of dysphagia or epigastric abdominal pain after EGD. She has been on famotidine 40 mg BID after EGD as she wanted to get off the PPIs. We discussed benign finding of FGP, likely related to PPI use which she has actually stopped now. No surveillance. EGD for these polyps (no hx of FAP, no large polyp noted on EGD). She is now on ozempic and lost 15# which she feels helps. She is eating small freq meal and avoiding dietary triggers     Her dysphagia could have been due to small esophageal structural change that was not appreciated on TBE or EGD visual evaluation vs functional disease. Thought less likely dysmotility given objective evaluation.     Plan  -continue famotidine 40 mg  BID, can trial drop to 20 mg BID in ensuing months as she continues to lose weight. For recurrance of sx, recommend to go back to lowest effective dose of meds   -OK for PRN omeprazole for bad heartburn that occurs despite famotidine 40 mg BID   -cont to avoid gastric irritants      Future consideration  -HRM with pH testing for further eval of ?NERD vs functional disease   -inquire about cannabis use   -never been on alternate PPI (omeprazole)     # Epigastric discomfort, h/o  # Nausea  Historically - onset of epigastric discomfort and nausea following initiation of Eliquis and after endometrial ablation, with unremarkable work-up including CT AP, H plyori stool antigen, CBC, CMP. EGD endoscopically unremarkable with mild reactive gastropathy noted on gastric biopsies. Symptoms improved on PPI. No longer on Eliquis, sx are overall very minimal now    Today - after EGD with dilation to GEJ, she has not had this pain further. Possible small structural change vs functional change. Continue famotidine 40 mg BID for now     # h/o SSA   #CRC Screening  #stooling   CScope 5/2023 clear, diverticular disease discussed. Recall CScope 5/2028, or sooner PRN   -Stooling: recommended start of daily miralax given new use of ozempic     RTC - 6 mo or sooner PRN     Thank you for this consultation. It was a pleasure to participate in the care of this patient; please contact us with any further questions.    Mery Serrano PA-C  Follow up: As planned above. Today, I personally spent 30  minutes spent on the date of the encounter doing chart review, history and exam, documentation and further activities per the note.      HPI: 60 year old female with PMH of rheumatoid arthritis on Hyrimoz, class II obesity, paroxymal atrial fibrillation, who is s/p CCY presenting to GI clinic for dysphagia    Patient was previously seen by Radha Dsouza PA-C in 2022 then by myself     4/5/2022 Radha Dsouza note   57 year old female with PMH of  rheumatoid arthritis on Humiara, class II obesity, paroxymal atrial fibrillation on chronic anticoagulation with DOAC, who is s/p CCY presenting to GI clinic for abdominal pain and consideration of EGD.     Maritza underwent D+C March 3, 2022 for menorrhagia, and shortly after, developed epigastric pain.  She describes that initially, it felt like food was getting stuck low in the substernal area with most everything she ate, which would then regurgitate as an undigested, somewhat sour bolus. This then progressed to a non-radiating, intermittent epigastric pain that feels like getting punched in the stomach. It relably occurs with fating, and improves if she eats something. It can also occur if she eats a larger volume of food. She denies any significant GERD symptoms, though she was having some occasional episodes at start of symptoms. No odynophagia. She has nausea that has been intermittent and random in occurrence, not always related to the epigastric pain. She is using Zofran for this. She states that until these symptoms began, she had regular bowel movements 2-3 times per day without any associated concerns, but now is having bowel movements every 2-3 days, associated with hard, dry stools, incomplete evacuation, and straining. No BRBPR. She states that at first note of epigastric pain, there were some dark stools, which have not recurred.     Work-up has included a CT AP, which was unremarkable. H pylori stool antigen negative (on PPI for several days). CBC with mild anemia and new mild leukocytosis. CMP, lipase, TSH, lactic acid unremarkable. She was started on PPI 40 daily with sucralfate. This has resulted in mild improvement in symptoms. She is still experiencing the epigastric discomfort with fasting, which can awaken her from sleep, and continues to have a sense of possible dysphagia to solid textures 2-3 times per week, which feels stuck for 10-15 minutes and then either clears or regurgitates as  undigested, sour bolus.      Colonoscopy 2/2021 - repeat 3 years for 1 sessile serrated adenoma     Does not take NSAID     FHx - mom with Crohn's with ileostomy, multiple autoimmune conditions in family    Today May 21, 2025  She has been on famotidine 40 mg BID not as effective as omeprazole; 1-2x of sx/wk typically dietary triggered heartburn   She wants to be off PPI   A week ago had a bad bout of reflux    Eating more fruit , trying to avoid triggers and having small frequent meals throughout the day   Dysphagia - After the EGD dilation, she has felt a lot better, she's getting more hiccups but it's not intractable  Not had dysphagia or epigastric pain since the dilation . Doesn't feel like things are getting stuck after swallowing   She is now on ozempic x 2.5 weeks   They're planning for about 50-60# weight loss   She notices more energy and BP going down as she has lost 15#   Notices some mid abd pain with start of ozempic and with some irregular stooling now   Abd pain is associated with eating and manageable   Used to be very regular with her Bms (1 in AM/PM) but now with 1 smaller volume stool daily that is either firm/hard or loose . No bloody or black stools   She doesn't think about food as much now on ozempic   She wonders about miralax?   She is exercising more now too   No other questions or concerns     S/P CCY  FHx - mom with Crohn's with ileostomy, multiple autoimmune conditions in family  No Etoh. Never smoker. No NSAIDs  PAST MEDICAL HISTORY:  Past Medical History:   Diagnosis Date    Anemia     Antiplatelet or antithrombotic long-term use     Arrhythmia     Cannabis use without complication 12/08/2014    Carpal tunnel syndrome     Coronary artery disease     Depressive disorder 2014    Diabetes mellitus, type 2 (H) 12/13/2023    Gastroesophageal reflux disease     History of angina     Hypertension     Irregular heart beat     Obesity     Paroxysmal atrial fibrillation (H)     PTSD  (post-traumatic stress disorder)     RA (rheumatoid arthritis) (H)     Rheumatoid arthritis (H) 07/08/2016    Sicca syndrome     Sleep apnea        PREVIOUS ABDOMINAL/GYNECOLOGIC SURGERIES:  Past Surgical History:   Procedure Laterality Date    ABDOMEN SURGERY      CHOLECYSTECTOMY      CYSTOSCOPY N/A 05/04/2023    Procedure: AND CYSTOSCOPY;  Surgeon: Irma Cary MD;  Location: [facility] Main OR    DAVINCI HYSTERECTOMY TOTAL Bilateral 05/04/2023    Procedure: ROBOTIC ASSISTED TOTAL LAPAROSCOPIC HYSTERECTOMY WITH BILATERAL SALPINGECTOMY;  Surgeon: Irma Cary MD;  Location: [facility] Main OR    DILATION AND CURETTAGE, OPERATIVE HYSTEROSCOPY, COMBINED N/A 03/03/2022    Procedure: HYSTEROSCOPY, WITH DILATION AND CURETTAGE;  Surgeon: Irma Cary MD;  Location: [facility] Main OR    EP ABLATION FOCAL AFIB N/A 06/30/2022    Procedure: Ablation Atrial Fibrilation;  Surgeon: Jose Guadalupe Joseph MD;  Location: [facility] CARDIAC CATH LAB    ESOPHAGOSCOPY, GASTROSCOPY, DUODENOSCOPY (EGD), COMBINED N/A 3/3/2025    Procedure: Esophagoscopy, gastroscopy, duodenoscopy (EGD), combined, with biopsy, polypectomy, clip and balloon dilation;  Surgeon: Nahun Byrnes DO;  Location: [facility] OR    HC REMOVAL GALLBLADDER      Description: Cholecystectomy;  Recorded: 10/15/2013;    LAPAROSCOPIC TUBAL LIGATION      RELEASE CARPAL TUNNEL BILATERAL      TUBAL LIGATION  01/01/1995         PERTINENT MEDICATIONS:  Current Outpatient Medications   Medication Sig Dispense Refill    adalimumab-adaz (HYRIMOZ) 40 MG/0.4ML auto-injector kit Inject 0.4 mLs (40 mg) subcutaneously every 14 days. 0.8 mL 5    blood glucose (NO BRAND SPECIFIED) lancets standard Use to test blood sugar 1 times daily or as directed. 100 Lancet 1    blood glucose (NO BRAND SPECIFIED) test strip Use to test blood sugar 1 times daily or as directed. 100 strip 1    blood glucose monitoring (NO BRAND SPECIFIED) meter device kit Use to test blood sugar  1 times daily or as directed. 1 kit 0    diltiazem ER (TIAZAC) 360 MG 24 hr ER beaded capsule Take 1 capsule (360 mg) by mouth daily. 90 capsule 1    EPINEPHrine (ANY BX GENERIC EQUIV) 0.3 MG/0.3ML injection 2-pack Inject 0.3 mLs (0.3 mg) into the muscle as needed for anaphylaxis 2 each 2    famotidine (PEPCID) 40 MG tablet Take 1 tablet (40 mg) by mouth 2 times daily. 60 tablet 2    fexofenadine (ALLEGRA) 60 MG tablet Take 1 tablet (60 mg) by mouth daily. 30 tablet 4    fluticasone (FLONASE) 50 MCG/ACT nasal spray Spray 2 sprays into both nostrils daily. 16 g 11    gabapentin (NEURONTIN) 100 MG capsule 1 capsule by mouth every morning and 3 capsules by mouth every bedtime 120 capsule 2    lisinopril (ZESTRIL) 10 MG tablet Take 1 tablet (10 mg) by mouth daily. 90 tablet 2    metFORMIN (GLUCOPHAGE XR) 500 MG 24 hr tablet Take 1 tablet (500 mg) by mouth daily (with dinner). 90 tablet 2    Multiple Vitamins-Minerals (CENTROVITE) TABS Take 1 tablet by mouth daily      omeprazole (PRILOSEC) 40 MG DR capsule Take 1 capsule (40 mg) by mouth daily. 90 capsule 3    ondansetron (ZOFRAN ODT) 4 MG ODT tab Take 1 tablet (4 mg) by mouth every 8 hours as needed for nausea 30 tablet 0    pravastatin (PRAVACHOL) 20 MG tablet Take 1 tablet (20 mg) by mouth daily. 90 tablet 1    semaglutide (OZEMPIC) 2 MG/3ML pen Inject 0.25 mg subcutaneously every 7 days. 3 mL 0    semaglutide (OZEMPIC) 2 MG/3ML pen Inject 0.5 mg subcutaneously every 7 days. 3 mL 0    vitamin D3 (CHOLECALCIFEROL) 1.25 MG (28498 UT) capsule Take 1 capsule (50,000 Units) by mouth every 7 days 12 capsule 3         SOCIAL HISTORY:  Social History     Socioeconomic History    Marital status: Single     Spouse name: Not on file    Number of children: 2    Years of education: 4    Highest education level: Not on file   Occupational History    Not on file   Tobacco Use    Smoking status: Never     Passive exposure: Past (mom smoked)    Smokeless tobacco: Never   Vaping Use     Vaping status: Never Used   Substance and Sexual Activity    Alcohol use: Not Currently     Comment: Alcoholic Drinks/day: Never an issue, she states.  7/8/16    Drug use: Not Currently     Types: Marijuana     Comment: Drug use: Former marijuana use, not current. 7/8/16    Sexual activity: Not Currently     Partners: Female, Male     Birth control/protection: None     Comment: tubal ligation   Other Topics Concern    Parent/sibling w/ CABG, MI or angioplasty before 65F 55M? No   Social History Narrative    Not on file     Social Drivers of Health     Financial Resource Strain: Low Risk  (12/3/2024)    Financial Resource Strain     Within the past 12 months, have you or your family members you live with been unable to get utilities (heat, electricity) when it was really needed?: No   Food Insecurity: High Risk (12/3/2024)    Food Insecurity     Within the past 12 months, did you worry that your food would run out before you got money to buy more?: Yes     Within the past 12 months, did the food you bought just not last and you didn t have money to get more?: No   Transportation Needs: Low Risk  (12/3/2024)    Transportation Needs     Within the past 12 months, has lack of transportation kept you from medical appointments, getting your medicines, non-medical meetings or appointments, work, or from getting things that you need?: No   Physical Activity: Insufficiently Active (12/3/2024)    Exercise Vital Sign     Days of Exercise per Week: 3 days     Minutes of Exercise per Session: 20 min   Stress: Stress Concern Present (12/3/2024)    [place] [place] [employer] - Occupational Stress Questionnaire     Feeling of Stress : To some extent   Social Connections: Moderately Integrated (12/3/2024)    Social Connection and Isolation Panel [NHANES]     Frequency of Communication with Friends and Family: Three times a week     Frequency of Social Gatherings with Friends and Family: Once a week     Attends  "Hindu Services: More than 4 times per year     Active Member of Clubs or Organizations: Yes     Attends Club or Organization Meetings: More than 4 times per year     Marital Status: Never    Interpersonal Safety: Low Risk  (11/27/2024)    Interpersonal Safety     Do you feel physically and emotionally safe where you currently live?: Yes     Within the past 12 months, have you been hit, slapped, kicked or otherwise physically hurt by someone?: No     Within the past 12 months, have you been humiliated or emotionally abused in other ways by your partner or ex-partner?: No   Housing Stability: High Risk (12/3/2024)    Housing Stability     Do you have housing? : No     Are you worried about losing your housing?: No       FAMILY HISTORY:  Family History   Problem Relation Age of Onset    Crohn's Disease Mother         Total colectomy with ileostomy.    Atrial fibrillation Mother     Snoring Father     Diabetes Father     Prostate Cancer Father     Chronic Kidney Disease Father         Chose against dialysis.    Substance Abuse Brother     Depression Brother     Attention Deficit Disorder Brother     Alcoholism Brother     Arrhythmia Brother     Alcoholism Brother     Substance Abuse Brother     Arrhythmia Brother     Alcoholism Maternal Grandfather     Substance Abuse Maternal Grandfather     No Known Problems Daughter     No Known Problems Son     Lupus Cousin     Breast Cancer No family hx of        PHYSICAL EXAMINATION:  Vitals reviewed: /85   Pulse 80   Ht 1.753 m (5' 9\")   Wt 126.1 kg (277 lb 14.4 oz)   LMP  (LMP Unknown)   SpO2 92%   BMI 41.04 kg/m      Constitutional: aaox3, cooperative, pleasant, not dyspneic/diaphoretic, no acute distress  Eyes: Sclera anicteric/injected  Ears/nose/mouth/throat: hearing intact  Respiratory: Unlabored breathing  Abd:  Nondistended,  nontender, no peritoneal signs  Skin: warm, perfused, no jaundice  Psych: Normal affect  MSK: Normal gait     PERTINENT " "STUDIES - Reviewed in EMR  Lab Results   Component Value Date    WBC 13.9 01/03/2025    WBC 6.5 11/14/2018     Lab Results   Component Value Date    RBC 4.79 01/03/2025    RBC 4.31 11/14/2018     Lab Results   Component Value Date    HGB 11.7 01/03/2025    HGB 10.8 11/14/2018     Lab Results   Component Value Date    HCT 38.5 01/03/2025    HCT 35.6 11/14/2018     No components found for: \"MCT\"  Lab Results   Component Value Date    MCV 80 01/03/2025    MCV 83 11/14/2018     Lab Results   Component Value Date    MCH 24.4 01/03/2025    MCH 25.1 11/14/2018     Lab Results   Component Value Date    MCHC 30.4 01/03/2025    MCHC 30.3 11/14/2018     Lab Results   Component Value Date    RDW 15.9 01/03/2025    RDW 14.4 11/14/2018     Lab Results   Component Value Date     01/03/2025     11/14/2018       Liver Function Studies -   Recent Labs   Lab Test 12/17/24 2043   PROTTOTAL 7.9   ALBUMIN 4.0   BILITOTAL <0.2   ALKPHOS 112   AST 18   ALT 19        PREVIOUS ENDOSCOPY    EGD 3/2025   Impression:   - Z-line regular, 43 cm from the incisors.                          - No endoscopic esophageal abnormality to explain                          patient's dysphagia. Esophagus dilated. Dilated.                          - Normal esophagus. Biopsied.                          - Multiple gastric polyps. Resected and retrieved.                          Clips were placed.                          - Normal examined duodenum.     Final Diagnosis   A. GASTRIC POLYP, BIOPSY:  -Gastric fundic polyp(s) with acute and chronic inflammation, reactive changes  -Negative for intestinal metaplasia and dysplasia  -No H. pylori-like organisms identified on routine stain     B. DISTAL ESOPHAGUS, BIOPSY:  -Squamous mucosa with features of reflux including basal cell hyperplasia, elongation of lamina propria papillae and mild lymphocytic exocytosis  -Negative for intestinal metaplasia and dysplasia    -No increased intraepithelial " eosinophils or lamina propria fibrosis appreciated     C. PROXIMAL ESOPHAGUS, BIOPSY:  -Esophageal squamous mucosa with mild reactive features including scattered basally oriented lymphocytes with surrounding spongiosis  -Negative for intestinal metaplasia and dysplasia  -No increased intraepithelial eosinophils or lamina propria fibrosis appreciated

## 2025-05-21 NOTE — LETTER
5/21/2025      Alina Martell  [address]  [place] [address]      Dear Colleague,    Thank you for referring your patient, Alina Martell, to the [facility] [place]. Please see a copy of my visit note below.      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 Now reporting more frequent dysphagia episodes despite Prilosec 40 mg once daily (ongoing use for 3 years). Also reporting pyrosis, globus sensation, regurgitation (bland and acidic), retrosternal pressure (ER 12/17/24 - troponin < 6 and EKG w/o ischemic changes). Recent TBE was unremarkable.     Repeat EGD 3/2025 that was macroscopically unremarkable, distal and proximal esophageal biopsies with features of reflux but no EoE or erosive GERD. No hiatal hernia. Dilation with a 06-01-31-20-21 mm balloon dilator was performed to 17 mm. Plan was to dilate to 21mm however the patient desaturated and the decision was made not to pursue additional dilation. The dilation site was examined and showed no change. Multiple gastric polyps found (Bx - FGP with acute on chronic inflammation but no h.pylori, GIM, dysplasia).     Today - shares no further episodes of dysphagia or epigastric abdominal pain after EGD. She has been on famotidine 40 mg BID after EGD as she wanted to get off the PPIs. We discussed benign finding of FGP, likely related to PPI use which she has actually stopped now. No surveillance. EGD for these polyps (no hx of FAP, no large polyp noted on EGD). She is now on ozempic and lost 15# which she feels helps. She is eating small freq meal and avoiding dietary  triggers     Her dysphagia could have been due to small esophageal structural change that was not appreciated on TBE or EGD visual evaluation vs functional disease. Thought less likely dysmotility given objective evaluation.     Plan  -continue famotidine 40 mg BID, can trial drop to 20 mg BID in ensuing months as she continues to lose weight. For recurrance of sx, recommend to go back to lowest effective dose of meds   -OK for PRN omeprazole for bad heartburn that occurs despite famotidine 40 mg BID   -cont to avoid gastric irritants      Future consideration  -HRM with pH testing for further eval of ?NERD vs functional disease   -inquire about cannabis use   -never been on alternate PPI (omeprazole)     # Epigastric discomfort, h/o  # Nausea  Historically - onset of epigastric discomfort and nausea following initiation of Eliquis and after endometrial ablation, with unremarkable work-up including CT AP, H plyori stool antigen, CBC, CMP. EGD endoscopically unremarkable with mild reactive gastropathy noted on gastric biopsies. Symptoms improved on PPI. No longer on Eliquis, sx are overall very minimal now    Today - after EGD with dilation to GEJ, she has not had this pain further. Possible small structural change vs functional change. Continue famotidine 40 mg BID for now     # h/o SSA   #CRC Screening  #stooling   CScope 5/2023 clear, diverticular disease discussed. Recall CScope 5/2028, or sooner PRN   -Stooling: recommended start of daily miralax given new use of ozempic     RTC - 6 mo or sooner PRN     Thank you for this consultation. It was a pleasure to participate in the care of this patient; please contact us with any further questions.    Mery Serrano PA-C  Follow up: As planned above. Today, I personally spent 30  minutes spent on the date of the encounter doing chart review, history and exam, documentation and further activities per the note.      HPI: 60 year old female with PMH of rheumatoid arthritis  on Hyrimoz, class II obesity, paroxymal atrial fibrillation, who is s/p CCY presenting to GI clinic for dysphagia    Patient was previously seen by Radha Dsouza PA-C in 2022 then by myself     4/5/2022 Radha Dsouza note   57 year old female with PMH of rheumatoid arthritis on Humiara, class II obesity, paroxymal atrial fibrillation on chronic anticoagulation with DOAC, who is s/p CCY presenting to GI clinic for abdominal pain and consideration of EGD.     Maritza underwent D+C March 3, 2022 for menorrhagia, and shortly after, developed epigastric pain.  She describes that initially, it felt like food was getting stuck low in the substernal area with most everything she ate, which would then regurgitate as an undigested, somewhat sour bolus. This then progressed to a non-radiating, intermittent epigastric pain that feels like getting punched in the stomach. It relably occurs with fating, and improves if she eats something. It can also occur if she eats a larger volume of food. She denies any significant GERD symptoms, though she was having some occasional episodes at start of symptoms. No odynophagia. She has nausea that has been intermittent and random in occurrence, not always related to the epigastric pain. She is using Zofran for this. She states that until these symptoms began, she had regular bowel movements 2-3 times per day without any associated concerns, but now is having bowel movements every 2-3 days, associated with hard, dry stools, incomplete evacuation, and straining. No BRBPR. She states that at first note of epigastric pain, there were some dark stools, which have not recurred.     Work-up has included a CT AP, which was unremarkable. H pylori stool antigen negative (on PPI for several days). CBC with mild anemia and new mild leukocytosis. CMP, lipase, TSH, lactic acid unremarkable. She was started on PPI 40 daily with sucralfate. This has resulted in mild improvement in symptoms. She is still  experiencing the epigastric discomfort with fasting, which can awaken her from sleep, and continues to have a sense of possible dysphagia to solid textures 2-3 times per week, which feels stuck for 10-15 minutes and then either clears or regurgitates as undigested, sour bolus.      Colonoscopy 2/2021 - repeat 3 years for 1 sessile serrated adenoma     Does not take NSAID     FHx - mom with Crohn's with ileostomy, multiple autoimmune conditions in family    Today May 21, 2025  She has been on famotidine 40 mg BID not as effective as omeprazole; 1-2x of sx/wk typically dietary triggered heartburn   She wants to be off PPI   A week ago had a bad bout of reflux    Eating more fruit , trying to avoid triggers and having small frequent meals throughout the day   Dysphagia - After the EGD dilation, she has felt a lot better, she's getting more hiccups but it's not intractable  Not had dysphagia or epigastric pain since the dilation . Doesn't feel like things are getting stuck after swallowing   She is now on ozempic x 2.5 weeks   They're planning for about 50-60# weight loss   She notices more energy and BP going down as she has lost 15#   Notices some mid abd pain with start of ozempic and with some irregular stooling now   Abd pain is associated with eating and manageable   Used to be very regular with her Bms (1 in AM/PM) but now with 1 smaller volume stool daily that is either firm/hard or loose . No bloody or black stools   She doesn't think about food as much now on ozempic   She wonders about miralax?   She is exercising more now too   No other questions or concerns     S/P CCY  FHx - mom with Crohn's with ileostomy, multiple autoimmune conditions in family  No Etoh. Never smoker. No NSAIDs  PAST MEDICAL HISTORY:  Past Medical History:   Diagnosis Date     Anemia      Antiplatelet or antithrombotic long-term use      Arrhythmia      Cannabis use without complication 12/08/2014     Carpal tunnel syndrome       Coronary artery disease      Depressive disorder 2014     Diabetes mellitus, type 2 (H) 12/13/2023     Gastroesophageal reflux disease      History of angina      Hypertension      Irregular heart beat      Obesity      Paroxysmal atrial fibrillation (H)      PTSD (post-traumatic stress disorder)      RA (rheumatoid arthritis) (H)      Rheumatoid arthritis (H) 07/08/2016     Sicca syndrome      Sleep apnea        PREVIOUS ABDOMINAL/GYNECOLOGIC SURGERIES:  Past Surgical History:   Procedure Laterality Date     ABDOMEN SURGERY       CHOLECYSTECTOMY       CYSTOSCOPY N/A 05/04/2023    Procedure: AND CYSTOSCOPY;  Surgeon: Irma Cary MD;  Location: [facility] Main OR     DAVINCI HYSTERECTOMY TOTAL Bilateral 05/04/2023    Procedure: ROBOTIC ASSISTED TOTAL LAPAROSCOPIC HYSTERECTOMY WITH BILATERAL SALPINGECTOMY;  Surgeon: Irma Cary MD;  Location: [facility] Main OR     DILATION AND CURETTAGE, OPERATIVE HYSTEROSCOPY, COMBINED N/A 03/03/2022    Procedure: HYSTEROSCOPY, WITH DILATION AND CURETTAGE;  Surgeon: Irma Cary MD;  Location: [facility] Main OR     EP ABLATION FOCAL AFIB N/A 06/30/2022    Procedure: Ablation Atrial Fibrilation;  Surgeon: Jose Guadalupe Joseph MD;  Location: [facility] CARDIAC CATH LAB     ESOPHAGOSCOPY, GASTROSCOPY, DUODENOSCOPY (EGD), COMBINED N/A 3/3/2025    Procedure: Esophagoscopy, gastroscopy, duodenoscopy (EGD), combined, with biopsy, polypectomy, clip and balloon dilation;  Surgeon: Nahun Byrnes DO;  Location: [facility] OR     HC REMOVAL GALLBLADDER      Description: Cholecystectomy;  Recorded: 10/15/2013;     LAPAROSCOPIC TUBAL LIGATION       RELEASE CARPAL TUNNEL BILATERAL       TUBAL LIGATION  01/01/1995         PERTINENT MEDICATIONS:  Current Outpatient Medications   Medication Sig Dispense Refill     adalimumab-adaz (HYRIMOZ) 40 MG/0.4ML auto-injector kit Inject 0.4 mLs (40 mg) subcutaneously every 14 days. 0.8 mL 5     blood glucose (NO BRAND SPECIFIED)  lancets standard Use to test blood sugar 1 times daily or as directed. 100 Lancet 1     blood glucose (NO BRAND SPECIFIED) test strip Use to test blood sugar 1 times daily or as directed. 100 strip 1     blood glucose monitoring (NO BRAND SPECIFIED) meter device kit Use to test blood sugar 1 times daily or as directed. 1 kit 0     diltiazem ER (TIAZAC) 360 MG 24 hr ER beaded capsule Take 1 capsule (360 mg) by mouth daily. 90 capsule 1     EPINEPHrine (ANY BX GENERIC EQUIV) 0.3 MG/0.3ML injection 2-pack Inject 0.3 mLs (0.3 mg) into the muscle as needed for anaphylaxis 2 each 2     famotidine (PEPCID) 40 MG tablet Take 1 tablet (40 mg) by mouth 2 times daily. 60 tablet 2     fexofenadine (ALLEGRA) 60 MG tablet Take 1 tablet (60 mg) by mouth daily. 30 tablet 4     fluticasone (FLONASE) 50 MCG/ACT nasal spray Spray 2 sprays into both nostrils daily. 16 g 11     gabapentin (NEURONTIN) 100 MG capsule 1 capsule by mouth every morning and 3 capsules by mouth every bedtime 120 capsule 2     lisinopril (ZESTRIL) 10 MG tablet Take 1 tablet (10 mg) by mouth daily. 90 tablet 2     metFORMIN (GLUCOPHAGE XR) 500 MG 24 hr tablet Take 1 tablet (500 mg) by mouth daily (with dinner). 90 tablet 2     Multiple Vitamins-Minerals (CENTROVITE) TABS Take 1 tablet by mouth daily       omeprazole (PRILOSEC) 40 MG DR capsule Take 1 capsule (40 mg) by mouth daily. 90 capsule 3     ondansetron (ZOFRAN ODT) 4 MG ODT tab Take 1 tablet (4 mg) by mouth every 8 hours as needed for nausea 30 tablet 0     pravastatin (PRAVACHOL) 20 MG tablet Take 1 tablet (20 mg) by mouth daily. 90 tablet 1     semaglutide (OZEMPIC) 2 MG/3ML pen Inject 0.25 mg subcutaneously every 7 days. 3 mL 0     semaglutide (OZEMPIC) 2 MG/3ML pen Inject 0.5 mg subcutaneously every 7 days. 3 mL 0     vitamin D3 (CHOLECALCIFEROL) 1.25 MG (47540 UT) capsule Take 1 capsule (50,000 Units) by mouth every 7 days 12 capsule 3         SOCIAL HISTORY:  Social History     Socioeconomic  History     Marital status: Single     Spouse name: Not on file     Number of children: 2     Years of education: 4     Highest education level: Not on file   Occupational History     Not on file   Tobacco Use     Smoking status: Never     Passive exposure: Past (mom smoked)     Smokeless tobacco: Never   Vaping Use     Vaping status: Never Used   Substance and Sexual Activity     Alcohol use: Not Currently     Comment: Alcoholic Drinks/day: Never an issue, she states.  7/8/16     Drug use: Not Currently     Types: Marijuana     Comment: Drug use: Former marijuana use, not current. 7/8/16     Sexual activity: Not Currently     Partners: Female, Male     Birth control/protection: None     Comment: tubal ligation   Other Topics Concern     Parent/sibling w/ CABG, MI or angioplasty before 65F 55M? No   Social History Narrative     Not on file     Social Drivers of Health     Financial Resource Strain: Low Risk  (12/3/2024)    Financial Resource Strain      Within the past 12 months, have you or your family members you live with been unable to get utilities (heat, electricity) when it was really needed?: No   Food Insecurity: High Risk (12/3/2024)    Food Insecurity      Within the past 12 months, did you worry that your food would run out before you got money to buy more?: Yes      Within the past 12 months, did the food you bought just not last and you didn t have money to get more?: No   Transportation Needs: Low Risk  (12/3/2024)    Transportation Needs      Within the past 12 months, has lack of transportation kept you from medical appointments, getting your medicines, non-medical meetings or appointments, work, or from getting things that you need?: No   Physical Activity: Insufficiently Active (12/3/2024)    Exercise Vital Sign      Days of Exercise per Week: 3 days      Minutes of Exercise per Session: 20 min   Stress: Stress Concern Present (12/3/2024)    [employer] [place] [employer] - Occupational  "Stress Questionnaire      Feeling of Stress : To some extent   Social Connections: Moderately Integrated (12/3/2024)    Social Connection and Isolation Panel [NHANES]      Frequency of Communication with Friends and Family: Three times a week      Frequency of Social Gatherings with Friends and Family: Once a week      Attends [employer] Services: More than 4 times per year      Active Member of Clubs or Organizations: Yes      Attends Club or Organization Meetings: More than 4 times per year      Marital Status: Never    Interpersonal Safety: Low Risk  (11/27/2024)    Interpersonal Safety      Do you feel physically and emotionally safe where you currently live?: Yes      Within the past 12 months, have you been hit, slapped, kicked or otherwise physically hurt by someone?: No      Within the past 12 months, have you been humiliated or emotionally abused in other ways by your partner or ex-partner?: No   Housing Stability: High Risk (12/3/2024)    Housing Stability      Do you have housing? : No      Are you worried about losing your housing?: No       FAMILY HISTORY:  Family History   Problem Relation Age of Onset     Crohn's Disease Mother         Total colectomy with ileostomy.     Atrial fibrillation Mother      Snoring Father      Diabetes Father      Prostate Cancer Father      Chronic Kidney Disease Father         Chose against dialysis.     Substance Abuse Brother      Depression Brother      Attention Deficit Disorder Brother      Alcoholism Brother      Arrhythmia Brother      Alcoholism Brother      Substance Abuse Brother      Arrhythmia Brother      Alcoholism Maternal Grandfather      Substance Abuse Maternal Grandfather      No Known Problems Daughter      No Known Problems Son      Lupus Cousin      Breast Cancer No family hx of        PHYSICAL EXAMINATION:  Vitals reviewed: /85   Pulse 80   Ht 1.753 m (5' 9\")   Wt 126.1 kg (277 lb 14.4 oz)   LMP  (LMP Unknown)   SpO2 92%   BMI " "41.04 kg/m      Constitutional: aaox3, cooperative, pleasant, not dyspneic/diaphoretic, no acute distress  Eyes: Sclera anicteric/injected  Ears/nose/mouth/throat: hearing intact  Respiratory: Unlabored breathing  Abd:  Nondistended,  nontender, no peritoneal signs  Skin: warm, perfused, no jaundice  Psych: Normal affect  MSK: Normal gait     PERTINENT STUDIES - Reviewed in EMR  Lab Results   Component Value Date    WBC 13.9 01/03/2025    WBC 6.5 11/14/2018     Lab Results   Component Value Date    RBC 4.79 01/03/2025    RBC 4.31 11/14/2018     Lab Results   Component Value Date    HGB 11.7 01/03/2025    HGB 10.8 11/14/2018     Lab Results   Component Value Date    HCT 38.5 01/03/2025    HCT 35.6 11/14/2018     No components found for: \"MCT\"  Lab Results   Component Value Date    MCV 80 01/03/2025    MCV 83 11/14/2018     Lab Results   Component Value Date    MCH 24.4 01/03/2025    MCH 25.1 11/14/2018     Lab Results   Component Value Date    MCHC 30.4 01/03/2025    MCHC 30.3 11/14/2018     Lab Results   Component Value Date    RDW 15.9 01/03/2025    RDW 14.4 11/14/2018     Lab Results   Component Value Date     01/03/2025     11/14/2018       Liver Function Studies -   Recent Labs   Lab Test 12/17/24  2043   PROTTOTAL 7.9   ALBUMIN 4.0   BILITOTAL <0.2   ALKPHOS 112   AST 18   ALT 19        PREVIOUS ENDOSCOPY    EGD 3/2025   Impression:   - Z-line regular, 43 cm from the incisors.                          - No endoscopic esophageal abnormality to explain                          patient's dysphagia. Esophagus dilated. Dilated.                          - Normal esophagus. Biopsied.                          - Multiple gastric polyps. Resected and retrieved.                          Clips were placed.                          - Normal examined duodenum.     Final Diagnosis   A. GASTRIC POLYP, BIOPSY:  -Gastric fundic polyp(s) with acute and chronic inflammation, reactive changes  -Negative for intestinal " metaplasia and dysplasia  -No H. pylori-like organisms identified on routine stain     B. DISTAL ESOPHAGUS, BIOPSY:  -Squamous mucosa with features of reflux including basal cell hyperplasia, elongation of lamina propria papillae and mild lymphocytic exocytosis  -Negative for intestinal metaplasia and dysplasia    -No increased intraepithelial eosinophils or lamina propria fibrosis appreciated     C. PROXIMAL ESOPHAGUS, BIOPSY:  -Esophageal squamous mucosa with mild reactive features including scattered basally oriented lymphocytes with surrounding spongiosis  -Negative for intestinal metaplasia and dysplasia  -No increased intraepithelial eosinophils or lamina propria fibrosis appreciated                                                                      Again, thank you for allowing me to participate in the care of your patient.        Sincerely,        Mery Serrano PA-C    Electronically signed

## 2025-05-21 NOTE — PATIENT INSTRUCTIONS
It was a pleasure taking care of you today.  I've included a brief summary of our discussion and care plan from today's visit below.  Please review this information with your primary care provider.  _______________________________________________________________________    My recommendations are summarized as follows:    I agree with daily miralax - 0.5 cap a day for next week. After that, you can increase or decrease dose depending on your bowel patterns   Goal is better evacuation, we are looking for soft regular bowel movements   Continue famotidine, as the weight comes down, it would be OK to drop down dose to 20 mg twice a day to see how you do too   If you have bad heartburn, it is OK to take omeprazole for a few days in addition to famotidine twice a day     Return to GI Clinic in 6 months to review your progress.     Please see below for any additional questions and scheduling guidelines.    Sign up for [employer]: [employer] patient portal serves as a secure platform for accessing your medical records from the [facility]. Additionally, [employer] facilitates easy, timely, and secure messaging with your care team. If you have not signed up, you may do so by using the provided code or calling [phone].    Coordinating your care after your visit:  There are multiple options for scheduling your follow-up care based on your provider's recommendation.    How do I schedule a follow-up clinic appointment:   After your appointment, you may receive scheduling assistance with the Clinic Coordinators by having a seat in the waiting room and a Clinic Coordinator will call you up to schedule.  Virtual visits or after you leave the clinic:  Your provider has placed a follow-up order in the [employer] portal for scheduling your return appointment. A member of the scheduling team will contact you to schedule.  [employer] Scheduling: Timely scheduling through [employer] is advised to ensure appointment availability.   Call to  schedule: You may schedule your follow-up appointment(s) by calling [phone], option 1.    How do I schedule my endoscopy or colonoscopy procedure:  If a procedure, such as a colonoscopy or upper endoscopy was ordered by your provider, the scheduling team will contact you to schedule this procedure. Or you may choose to call to schedule at   [phone], option 2.  Please allow 20-30 minutes when scheduling a procedure.    How do I get my blood work done? To get your blood work done, you need to schedule a lab appointment at an [facility]. There are multiple ways to schedule:   At the clinic: The Clinic Coordinator you meet after your visit can help you schedule a lab appointment.   [employer] scheduling: [employer] offers online lab scheduling at all [facility] laboratory locations.   Call to schedule: You can call [phone] to schedule your lab appointment.    How do I schedule my imaging study: To schedule imaging studies, such as CT scans, ultrasounds, MRIs, or X-rays, contact Imaging Services at [phone].    How do I schedule a referral to another doctor: If your provider recommended a referral to another specialist(s), the referral order was placed by your provider. You will receive a phone call to schedule this referral, or you may choose to call the number attached to the referral to self-schedule.    For Post-Visit Question(s):  For any inquiries following today's visit:  Please utilize [employer] messaging and allow 48 hours for reply or contact the Call Center during normal business hours at [phone], option 3.  For Emergent After-hours questions, contact the On-Call GI Fellow through the [facility] at [phone].  In addition, you may contact your Nurse directly using the provided contact information.    Test Results:  Test results will be accessible via [employer] in compliance with the 21st Century Cures Act. This means that your results will be  available to you at the same time as your provider. Often you may see your results before your provider does. Results are reviewed by staff within two weeks with communication follow-up. Results may be released in the patient portal prior to your care team review.    Prescription Refill(s):  Medication prescribed by your provider will be addressed during your visit. For future refills, please coordinate with your pharmacy. If you have not had a recent clinic visit or routine labs, for your safety, your provider may not be able to refill your prescription.     Sincerely,    Mery Serrano PA-C  Gastroenterology

## 2025-06-01 ENCOUNTER — OFFICE VISIT (OUTPATIENT)
Dept: URGENT CARE | Facility: URGENT CARE | Age: 60
End: 2025-06-01
Payer: COMMERCIAL

## 2025-06-01 ENCOUNTER — HOSPITAL ENCOUNTER (OUTPATIENT)
Dept: GENERAL RADIOLOGY | Facility: HOSPITAL | Age: 60
Discharge: HOME | End: 2025-06-01
Attending: STUDENT IN AN ORGANIZED HEALTH CARE EDUCATION/TRAINING PROGRAM | Admitting: STUDENT IN AN ORGANIZED HEALTH CARE EDUCATION/TRAINING PROGRAM
Payer: COMMERCIAL

## 2025-06-01 VITALS
BODY MASS INDEX: 40.91 KG/M2 | TEMPERATURE: 98.5 F | RESPIRATION RATE: 16 BRPM | SYSTOLIC BLOOD PRESSURE: 150 MMHG | HEART RATE: 83 BPM | DIASTOLIC BLOOD PRESSURE: 84 MMHG | OXYGEN SATURATION: 98 % | WEIGHT: 277 LBS

## 2025-06-01 DIAGNOSIS — R06.02 SHORTNESS OF BREATH: ICD-10-CM

## 2025-06-01 DIAGNOSIS — R68.83 CHILLS: ICD-10-CM

## 2025-06-01 DIAGNOSIS — J02.9 SORETHROAT: ICD-10-CM

## 2025-06-01 DIAGNOSIS — R05.2 SUBACUTE COUGH: ICD-10-CM

## 2025-06-01 DIAGNOSIS — R05.2 SUBACUTE COUGH: Primary | ICD-10-CM

## 2025-06-01 LAB
DEPRECATED S PYO AG THROAT QL EIA: NEGATIVE
S PYO DNA THROAT QL NAA+PROBE: NOT DETECTED

## 2025-06-01 PROCEDURE — 3079F DIAST BP 80-89 MM HG: CPT | Performed by: STUDENT IN AN ORGANIZED HEALTH CARE EDUCATION/TRAINING PROGRAM

## 2025-06-01 PROCEDURE — 99213 OFFICE O/P EST LOW 20 MIN: CPT | Performed by: STUDENT IN AN ORGANIZED HEALTH CARE EDUCATION/TRAINING PROGRAM

## 2025-06-01 PROCEDURE — 3077F SYST BP >= 140 MM HG: CPT | Performed by: STUDENT IN AN ORGANIZED HEALTH CARE EDUCATION/TRAINING PROGRAM

## 2025-06-01 PROCEDURE — 71046 X-RAY EXAM CHEST 2 VIEWS: CPT

## 2025-06-01 PROCEDURE — 87651 STREP A DNA AMP PROBE: CPT | Performed by: STUDENT IN AN ORGANIZED HEALTH CARE EDUCATION/TRAINING PROGRAM

## 2025-06-01 RX ORDER — BENZOCAINE AND MENTHOL, UNSPECIFIED FORM 15; 2.3 MG/1; MG/1
1 LOZENGE ORAL
Qty: 16 LOZENGE | Refills: 0 | Status: SHIPPED | OUTPATIENT
Start: 2025-06-01

## 2025-06-01 RX ORDER — CODEINE PHOSPHATE AND GUAIFENESIN 10; 100 MG/5ML; MG/5ML
1-2 SOLUTION ORAL EVERY 6 HOURS PRN
Qty: 118 ML | Refills: 0 | Status: SHIPPED | OUTPATIENT
Start: 2025-06-01

## 2025-06-02 NOTE — PROGRESS NOTES
Urgent Care Clinic Visit    Chief Complaint   Patient presents with    Urgent Care     - Started Tues  - Sore Throat  - Hoarse   - Cough                6/1/2025     7:00 PM   Additional Questions   Roomed by Akin FENG   Accompanied by Cindy

## 2025-06-02 NOTE — PATIENT INSTRUCTIONS
-Conservative measures such as warm water or tea with honey  -Using a steamer, okay to use vicks menthol in the steamer  -As needed ibuprofen or Tylenol for fever  -Push fluids and rest  - results will be delivered through MyChart, discussed isolation precautions if positive.

## 2025-06-02 NOTE — PROGRESS NOTES
chief complaint: Cough    HPI:  Alina Martell is a 60 year old female who presents today complaining of hoarseness in  her voice, chest congestion, fatigue, and some chest pain after coughing. Coughs a lot when laying down, not sleeping well. Has been ongoing for a week now.     History obtained from the patient.    Problem List:  2023-12: Diabetes mellitus, type 2 (H)  2023-05: Abnormal uterine bleeding  2023-05: Uterine fibroid  2023-03: Vitamin D deficiency  2022-08: Status post catheter ablation of atrial fibrillation  2022-05: Epigastric pain  2022-04: History of colonic polyps  2022-02: Postmenopausal bleeding  2022-01: Obstructive sleep apnea syndrome  2022-01: Essential hypertension  2022-01: Coronary artery disease of native artery with stable angina   pectoris  2022-01: Seropositive rheumatoid arthritis of multiple sites (H)  2022-01: Recurrent major depressive disorder, in full remission  2022-01: Paroxysmal atrial fibrillation (H)  2022-01: Morbid obesity (H)  2021-07: Chest pain  2021-03: ADA (generalized anxiety disorder)  2021-02: Benign neoplasm of ascending colon  2021-02: Diverticular disease of large intestine  2021-02: Polyp of colon  2018-11: Depressed  2017-10: Cyclic citrullinated peptide (CCP) antibody positive  2017-06: Tendonitis of both shoulders  2016-07: Chronic pain  2016-07: Rheumatoid arthritis (H)  2016-07: Olecranon bursitis of right elbow  2016-03: Grief  2015-10: Sicca syndrome  2014-12: Major depressive disorder, recurrent episode, unspecified  2014-12: Anxiety  2014-12: Panic attack  2014-12: Major depressive disorder, recurrent episode, severe,   without mention of psychotic behavior  2014-12: Sleep disorder  2014-09: Insomnia related to another mental disorder  2014-09: Microcytic anemia  2014-09: Migraine headache  2014-09: Reflux      Past Medical History:   Diagnosis Date    Anemia     Antiplatelet or antithrombotic long-term use     Arrhythmia     Cannabis use  without complication 12/08/2014    Carpal tunnel syndrome     Coronary artery disease     Depressive disorder 2014    Diabetes mellitus, type 2 (H) 12/13/2023    Gastroesophageal reflux disease     History of angina     Hypertension     Irregular heart beat     Obesity     Paroxysmal atrial fibrillation (H)     PTSD (post-traumatic stress disorder)     RA (rheumatoid arthritis) (H)     Rheumatoid arthritis (H) 07/08/2016    Sicca syndrome     Sleep apnea        Social History     Tobacco Use    Smoking status: Never     Passive exposure: Past (mom smoked)    Smokeless tobacco: Never   Substance Use Topics    Alcohol use: Not Currently     Comment: Alcoholic Drinks/day: Never an issue, she states.  7/8/16       Review of systems  ROS negative except for pertinent positives listed in HPI      Vitals:    06/01/25 1859   BP: (!) 150/84   Pulse: 83   Resp: 16   Temp: 98.5  F (36.9  C)   TempSrc: Tympanic   SpO2: 98%   Weight: 125.6 kg (277 lb)       Physical Exam  Constitutional: healthy, alert, and no distress  Head: Normocephalic.   Neck: Neck supple. No adenopathy. T  ENT: ENT exam normal, no neck nodes or sinus tenderness  Cardiovascular:  RRR. No murmurs, clicks gallops or rub  Respiratory:unlabored respiratory effort  Gastrointestinal: Abdomen soft, non-tender. BS normal. No masses, organomegaly  Musculoskeletal: extremities normal- no gross deformities noted, gait normal  Psychiatric: mentation appears normal and affect normal/bright      Assessment & Plan       Maritza was seen today for urgent care.    Diagnoses and all orders for this visit:    Sorethroat  Chills  Subacute cough  Differential diagnosis for patients symptoms includes but not limited to postnasal drip, viral respiratory infection, streptococcal pharyngitis/tonsillitis, parapharyngeal abscess, tonsil stones, e.t.c  Of note most, likely cause is viral respiratory of infection.  Less likely strep throat given negative strep antigen test, cultures are  still pending but discussed that if it is positive patient will need treatment if negative no need for further actions at this point or else patient notices worsening symptoms.  Discussed safety precautions return to the ER.  -Conservative measures such as warm water or tea with honey  -Using a steamer, okay to use vicks menthol in the steamer  -As needed ibuprofen or Tylenol for fever  -Push fluids and rest  -Patient aware that results will be delivered through MyChart, discussed isolation precautions if positive.  -     guaiFENesin-codeine (ROBITUSSIN AC) 100-10 MG/5ML solution; Take 5-10 mLs by mouth every 6 hours as needed for cough  -     XR Chest 2 View- pending r  -     Streptococcus A Rapid Screen w/Reflex to PCR - Clinic Collect  -     Group A Streptococcus PCR Throat Swab  -     Benzocaine-Menthol (CEPACOL) 15-2.3 MG LOZG; Take 1 lozenge by mouth every 2 hours as needed (throat pain).      At the end of the encounter, I discussed  diagnosis, medications. Discussed red flags for immediate return to clinic/ER, as well as indications for follow up if no improvement. Patient understood and agreed to plan. Patient was stable for discharge.

## 2025-06-03 ENCOUNTER — NURSE TRIAGE (OUTPATIENT)
Dept: FAMILY MEDICINE | Facility: CLINIC | Age: 60
End: 2025-06-03
Payer: COMMERCIAL

## 2025-06-03 NOTE — TELEPHONE ENCOUNTER
Can offer an appointment with Dr. Estelle Bansal tomorrow in clinic for this.  Be seen in the meantime if issues.    Dean Osorio MD  General Internal Medicine  [facility]  6/3/2025, 3:33 PM

## 2025-06-03 NOTE — TELEPHONE ENCOUNTER
"Nurse Triage SBAR    Is this a 2nd Level Triage? YES, LICENSED PRACTITIONER REVIEW IS REQUIRED    Situation:  Cold symptoms     Background: Urgent Care visit 6/1/25 for hoarse voice, chest congestion, fatigue, cough, and chest pain after coughing. Chest X-ray and strep negative, Conservative measures recommended, Cepacol lozenges and Robitussin AC prescribed.     Assessment: Patient reports worsening symptoms. Chest is more congested, chest pain with coughing and taking a deep breath, increased shortness of breath with activity. Patient states she \"gets winded easily\". Patient reports swollen tonsils and history of tonsillitis. Bilateral itchy ears, denies pain. Denies throat pain at this time, Cepacol working well. Taking Robitussin, trying not to take often, but reports it does help with cough. Temperature 98.6 degrees.     Protocol Recommended Disposition:   Go To Office Now    Recommendation: Will route to PCP for advice on worsening symptoms.     Routed to provider    Does the patient meet one of the following criteria for ADS visit consideration? 16+ years old, with an [facility] PCP     Reason for Disposition   MILD difficulty breathing (e.g., minimal/no SOB at rest, SOB with walking, pulse < 100) of new-onset or worse than normal    Additional Information   Negative: SEVERE difficulty breathing (e.g., struggling for each breath, speaks in single words, pulse > 120)   Negative: Breathing stopped and hasn't returned   Negative: Choking on something   Negative: Bluish (or gray) lips or face   Negative: Difficult to awaken or acting confused (e.g., disoriented, slurred speech)   Negative: Passed out (e.g., fainted, lost consciousness, blacked out and was not responding)   Negative: Wheezing started suddenly after medicine, an allergic food, or bee sting   Negative: Stridor (harsh sound while breathing in)   Negative: Slow, shallow and weak breathing   Negative: Sounds like a life-threatening emergency to the triager   " "Negative: Chest pain   Negative: Wheezing (high pitched whistling sound) and previous asthma attacks or use of asthma medicines   Negative: Breathing difficulty and within 14 days of COVID-19 EXPOSURE (close contact) with someone diagnosed with COVID-19 (e.g., COVID test positive)   Negative: Breathing difficulty and COVID-19 is widespread in the community   Negative: Breathing diffculty and only present when coughing   Negative: Breathing difficulty and only from stuffy nose   Negative: Breathing diffculty and only from stuffy nose or runny nose from common cold   Negative: MODERATE difficulty breathing (e.g., speaks in phrases, SOB even at rest, pulse 100-120) of new-onset or worse than normal   Negative: Oxygen level (e.g., pulse oximetry) 90% or lower   Negative: Wheezing can be heard across the room   Negative: Drooling or spitting out saliva (because can't swallow)   Negative: Any history of prior \"blood clot\" in leg or lungs   Negative: Illness requiring prolonged bedrest in past month (e.g., immobilization, long hospital stay)   Negative: Hip or leg fracture (broken bone) in past month (or had cast on leg or ankle in past month)   Negative: Major surgery in the past month   Negative: Long-distance travel in past month (e.g., car, bus, train, plane; with trip lasting 6 or more hours)   Negative: Cancer treatment in past six months (or has cancer now)   Negative: Extra heartbeats, irregular heart beating, or heart is beating very fast (i.e., 'palpitations')   Negative: Fever > 103 F (39.4 C)   Negative: Fever > 101 F (38.3 C) and over 60 years of age   Negative: Fever > 100 F (37.8 C) and bedridden (e.g., nursing home patient, stroke, chronic illness, recovering from surgery)   Negative: Fever > 100 F (37.8 C) and diabetes mellitus or weak immune system (e.g., HIV positive, cancer chemo, splenectomy, organ transplant, chronic steroids)   Negative: Periods where breathing stops and then resumes normally and " bedridden (e.g., nursing home patient, CVA)   Negative: Pregnant or postpartum (from 0 to 6 weeks after delivery)   Negative: Patient sounds very sick or weak to the triager    Protocols used: Breathing Difficulty-A-OH  Coughing with lying on back.

## 2025-06-04 ENCOUNTER — OFFICE VISIT (OUTPATIENT)
Dept: FAMILY MEDICINE | Facility: CLINIC | Age: 60
End: 2025-06-04
Payer: COMMERCIAL

## 2025-06-04 ENCOUNTER — RESULTS FOLLOW-UP (OUTPATIENT)
Dept: FAMILY MEDICINE | Facility: CLINIC | Age: 60
End: 2025-06-04

## 2025-06-04 VITALS
TEMPERATURE: 98 F | DIASTOLIC BLOOD PRESSURE: 80 MMHG | WEIGHT: 273.1 LBS | SYSTOLIC BLOOD PRESSURE: 120 MMHG | RESPIRATION RATE: 12 BRPM | HEART RATE: 74 BPM | OXYGEN SATURATION: 96 % | BODY MASS INDEX: 40.33 KG/M2

## 2025-06-04 DIAGNOSIS — M05.79 SEROPOSITIVE RHEUMATOID ARTHRITIS OF MULTIPLE SITES (H): ICD-10-CM

## 2025-06-04 DIAGNOSIS — J20.9 ACUTE BRONCHITIS, UNSPECIFIED ORGANISM: Primary | ICD-10-CM

## 2025-06-04 DIAGNOSIS — E11.9 TYPE 2 DIABETES MELLITUS WITHOUT COMPLICATION, WITHOUT LONG-TERM CURRENT USE OF INSULIN (H): ICD-10-CM

## 2025-06-04 LAB
ALBUMIN SERPL BCG-MCNC: 4 G/DL (ref 3.5–5.2)
ALP SERPL-CCNC: 113 U/L (ref 40–150)
ALT SERPL W P-5'-P-CCNC: 6 U/L (ref 0–50)
ANION GAP SERPL CALCULATED.3IONS-SCNC: 10 MMOL/L (ref 7–15)
AST SERPL W P-5'-P-CCNC: 16 U/L (ref 0–45)
BILIRUB SERPL-MCNC: 0.2 MG/DL
BUN SERPL-MCNC: 11.2 MG/DL (ref 6–20)
CALCIUM SERPL-MCNC: 9.6 MG/DL (ref 8.8–10.4)
CHLORIDE SERPL-SCNC: 103 MMOL/L (ref 98–107)
CREAT SERPL-MCNC: 0.77 MG/DL (ref 0.51–0.95)
CREAT UR-MCNC: 143 MG/DL
EGFRCR SERPLBLD CKD-EPI 2021: 89 ML/MIN/1.73M2
EST. AVERAGE GLUCOSE BLD GHB EST-MCNC: 128 MG/DL
GLUCOSE SERPL-MCNC: 106 MG/DL (ref 70–99)
HBA1C MFR BLD: 6.1 % (ref 0–5.6)
HCO3 SERPL-SCNC: 28 MMOL/L (ref 22–29)
MICROALBUMIN UR-MCNC: 145 MG/L
MICROALBUMIN/CREAT UR: 101.4 MG/G CR (ref 0–25)
POTASSIUM SERPL-SCNC: 4.2 MMOL/L (ref 3.4–5.3)
PROT SERPL-MCNC: 7.8 G/DL (ref 6.4–8.3)
SODIUM SERPL-SCNC: 141 MMOL/L (ref 135–145)

## 2025-06-04 PROCEDURE — 82043 UR ALBUMIN QUANTITATIVE: CPT | Performed by: FAMILY MEDICINE

## 2025-06-04 PROCEDURE — 80053 COMPREHEN METABOLIC PANEL: CPT | Performed by: FAMILY MEDICINE

## 2025-06-04 PROCEDURE — 36415 COLL VENOUS BLD VENIPUNCTURE: CPT | Performed by: FAMILY MEDICINE

## 2025-06-04 PROCEDURE — 99214 OFFICE O/P EST MOD 30 MIN: CPT | Performed by: FAMILY MEDICINE

## 2025-06-04 PROCEDURE — 3074F SYST BP LT 130 MM HG: CPT | Performed by: FAMILY MEDICINE

## 2025-06-04 PROCEDURE — 82570 ASSAY OF URINE CREATININE: CPT | Performed by: FAMILY MEDICINE

## 2025-06-04 PROCEDURE — 83036 HEMOGLOBIN GLYCOSYLATED A1C: CPT | Performed by: FAMILY MEDICINE

## 2025-06-04 PROCEDURE — 1126F AMNT PAIN NOTED NONE PRSNT: CPT | Performed by: FAMILY MEDICINE

## 2025-06-04 PROCEDURE — 3079F DIAST BP 80-89 MM HG: CPT | Performed by: FAMILY MEDICINE

## 2025-06-04 PROCEDURE — 3044F HG A1C LEVEL LT 7.0%: CPT | Performed by: FAMILY MEDICINE

## 2025-06-04 RX ORDER — METHYLPREDNISOLONE 4 MG/1
TABLET ORAL
Qty: 21 TABLET | Refills: 0 | Status: SHIPPED | OUTPATIENT
Start: 2025-06-04

## 2025-06-04 RX ORDER — AZITHROMYCIN 250 MG/1
TABLET, FILM COATED ORAL
Qty: 6 TABLET | Refills: 0 | Status: SHIPPED | OUTPATIENT
Start: 2025-06-04 | End: 2025-06-09

## 2025-06-04 ASSESSMENT — PAIN SCALES - GENERAL: PAINLEVEL_OUTOF10: NO PAIN (0)

## 2025-06-04 ASSESSMENT — ENCOUNTER SYMPTOMS: COUGH: 1

## 2025-06-04 NOTE — PROGRESS NOTES
"  Assessment & Plan     Acute bronchitis, unspecified organism  With ongoing symptoms will do z pack and medrol dose pack.  If worsening symptoms, go immediately to the ER  - azithromycin (ZITHROMAX) 250 MG tablet  Dispense: 6 tablet; Refill: 0  - methylPREDNISolone (MEDROL DOSEPAK) 4 MG tablet therapy pack  Dispense: 21 tablet; Refill: 0    Type 2 diabetes mellitus without complication, without long-term current use of insulin (H)  Stable.  Doing well on medication and will check labs  - Hemoglobin A1c  - Comprehensive metabolic panel (BMP + Alb, Alk Phos, ALT, AST, Total. Bili, TP)  - Albumin Random Urine Quantitative with Creat Ratio  - Hemoglobin A1c  - Comprehensive metabolic panel (BMP + Alb, Alk Phos, ALT, AST, Total. Bili, TP)  - Albumin Random Urine Quantitative with Creat Ratio    Seropositive rheumatoid arthritis of multiple sites (H)  Stable.  Seen by rheumatology            BMI  Estimated body mass index is 40.33 kg/m  as calculated from the following:    Height as of 5/21/25: 5' 9\" (1.753 m).    Weight as of this encounter: 273 lb 1.6 oz (123.9 kg).   Weight management plan: Discussed healthy diet and exercise guidelines      Follow-up       Markus Salas is a 60 year old, presenting for the following health issues:  Cough (X1 week getting worse. Sore throat, fatigue, low grade fever, chest congestion no runny nose, a little bit of wheezing )        6/4/2025     7:43 AM   Additional Questions   Roomed by Estelle Pemberton    History of Present Illness       Reason for visit:  Sore throat,cough  Symptom onset:  3-7 days ago  Symptoms include:  Sore throat,cough,fatigue  Symptom intensity:  Moderate  Symptom progression:  Worsening  Had these symptoms before:  No  What makes it worse:  Laying down  What makes it better:  Rest   She is taking medications regularly.          Acute Illness  Acute illness concerns: Cold sx  Onset/Duration: x1 week  Symptoms:  Fever: YES  Chills/Sweats: YES  Headache " (location?): No  Sinus Pressure: No  Conjunctivitis:  No  Ear Pain: YES: bilateral  Rhinorrhea: No  Congestion: YES- Chest Congestion   Sore Throat: YES  Cough: YES  Wheeze: YES  Decreased Appetite: YES  Nausea: YES  Vomiting: YES  Diarrhea: No  Dysuria/Freq.: No  Dysuria or Hematuria: No  Fatigue/Achiness: YES  Sick/Strep Exposure: No  Therapies tried and outcome: OTC cough medicine. Patient was seen in Urgent care on Sunday and was given cough medicine with codeine for night time to help with sleep.   Temp running 98.  Coughing - worse with laying on back.   cough medicine helping her sleep at night.         Review of Systems  Constitutional, HEENT, cardiovascular, pulmonary, gi and gu systems are negative, except as otherwise noted.      Objective    /80   Pulse 74   Temp 98  F (36.7  C) (Oral)   Resp 12   Wt 273 lb 1.6 oz (123.9 kg)   LMP  (LMP Unknown)   SpO2 96%   BMI 40.33 kg/m    Body mass index is 40.33 kg/m .  Physical Exam   GENERAL: alert and no distress  NECK: no adenopathy, no asymmetry, masses, or scars  RESP: lungs clear to auscultation - no rales, rhonchi or wheezes  CV: regular rate and rhythm, normal S1 S2, no S3 or S4, no murmur, click or rub, no peripheral edema  ABDOMEN: soft, nontender, no hepatosplenomegaly, no masses and bowel sounds normal  MS: no gross musculoskeletal defects noted, no edema            Signed Electronically by: Estelle Bansal MD

## 2025-06-10 ENCOUNTER — TELEPHONE (OUTPATIENT)
Dept: RHEUMATOLOGY | Facility: CLINIC | Age: 60
End: 2025-06-10
Payer: COMMERCIAL

## 2025-06-10 NOTE — TELEPHONE ENCOUNTER
MTM appointment no showed, we made one more attempt to reschedule.     Routing back to referring provider and [facility] Pharmacist Team    [facility]

## 2025-06-16 ASSESSMENT — ANXIETY QUESTIONNAIRES
6. BECOMING EASILY ANNOYED OR IRRITABLE: SEVERAL DAYS
IF YOU CHECKED OFF ANY PROBLEMS ON THIS QUESTIONNAIRE, HOW DIFFICULT HAVE THESE PROBLEMS MADE IT FOR YOU TO DO YOUR WORK, TAKE CARE OF THINGS AT HOME, OR GET ALONG WITH OTHER PEOPLE: SOMEWHAT DIFFICULT
5. BEING SO RESTLESS THAT IT IS HARD TO SIT STILL: NEARLY EVERY DAY
1. FEELING NERVOUS, ANXIOUS, OR ON EDGE: NEARLY EVERY DAY
8. IF YOU CHECKED OFF ANY PROBLEMS, HOW DIFFICULT HAVE THESE MADE IT FOR YOU TO DO YOUR WORK, TAKE CARE OF THINGS AT HOME, OR GET ALONG WITH OTHER PEOPLE?: SOMEWHAT DIFFICULT
GAD7 TOTAL SCORE: 11
3. WORRYING TOO MUCH ABOUT DIFFERENT THINGS: SEVERAL DAYS
7. FEELING AFRAID AS IF SOMETHING AWFUL MIGHT HAPPEN: NOT AT ALL
4. TROUBLE RELAXING: NEARLY EVERY DAY
2. NOT BEING ABLE TO STOP OR CONTROL WORRYING: NOT AT ALL

## 2025-06-20 ENCOUNTER — VIRTUAL VISIT (OUTPATIENT)
Dept: PSYCHOLOGY | Facility: CLINIC | Age: 60
End: 2025-06-20
Payer: COMMERCIAL

## 2025-06-20 DIAGNOSIS — F41.1 GAD (GENERALIZED ANXIETY DISORDER): Primary | ICD-10-CM

## 2025-06-20 DIAGNOSIS — F43.10 POSTTRAUMATIC STRESS DISORDER: ICD-10-CM

## 2025-06-20 DIAGNOSIS — F33.1 MODERATE EPISODE OF RECURRENT MAJOR DEPRESSIVE DISORDER (H): ICD-10-CM

## 2025-06-20 PROCEDURE — 90834 PSYTX W PT 45 MINUTES: CPT | Mod: 93 | Performed by: COUNSELOR

## 2025-06-20 ASSESSMENT — PATIENT HEALTH QUESTIONNAIRE - PHQ9
10. IF YOU CHECKED OFF ANY PROBLEMS, HOW DIFFICULT HAVE THESE PROBLEMS MADE IT FOR YOU TO DO YOUR WORK, TAKE CARE OF THINGS AT HOME, OR GET ALONG WITH OTHER PEOPLE: SOMEWHAT DIFFICULT
SUM OF ALL RESPONSES TO PHQ QUESTIONS 1-9: 9
SUM OF ALL RESPONSES TO PHQ QUESTIONS 1-9: 9

## 2025-06-20 ASSESSMENT — ANXIETY QUESTIONNAIRES: GAD7 TOTAL SCORE: 11

## 2025-06-23 NOTE — PROGRESS NOTES
"      [facility]                                     Progress Note    Patient Name: Alina Martell  Date: 6/20/25         Service Type: Individual      Session Start Time: 11:03 Session End Time: 11:53     Session Length: 50 minutes    Session #: 91     Attendees:     Client        Service Modality:  Phone Visit:      Phone Visit:      Provider verified identity through the following two step process.  Patient provided:  Patient is known previously to provider     Telephone Visit: The patient's condition can be safely assessed and treated via synchronous audio telemedicine encounter.       Reason for Audio Telemedicine Visit: Patient has requested telehealth visit     Originating Site (Patient Location): Patient's work     Distant Site (Provider Location): Provider Remote Setting- Home Office     Consent:  The patient/guardian has verbally consented to:      1. The potential risks and benefits of telemedicine (telephone visit) versus in person care;  The patient has been notified of the following:      \"We have found that certain health care needs can be provided without the need for a face to face visit.  This service lets us provide the care you need with a phone conversation.       I will have full access to your [facility] medical record during this entire phone call.   I will be taking notes for your medical record.      Since this is like an office visit, we will bill your insurance company for this service.       There are potential benefits and risks of telephone visits (e.g. limits to patient confidentiality) that differ from in-person visits.?Confidentiality still applies for telephone services, and nobody will record the visit.  It is important to be in a quiet, private space that is free of distractions (including cell phone or other devices) during the visit.??      If during the course of the call I believe a telephone visit is not appropriate, you will not be charged for " "this service\"     Consent has been obtained for this service by care team member: Yes       DATA  Extended Session (53+ minutes): No  Interactive Complexity: No  Crisis: No      Progress Since Last Session (Related to Symptoms / Goals / Homework):   Symptoms: No change stress with family    Homework: Achieved / completed to satisfaction      Episode of Care Goals: Satisfactory progress - ACTION (Actively working towards change); Intervened by reinforcing change plan / affirming steps taken     Current / Ongoing Stressors and Concerns:  Patient indicated that she has had a lot happen in the last few weeks. Patient reported she was sick and recovering form being sick. Patient indicated she continues to work on her relationship with her family. Patient reported being able to realize that she can only control herself. Patient indicated she has a lot of important people in her life and focusing on those relationship. This therapist challenged patient on what is important in her relationships.      Treatment Objective(s) Addressed in This Session:   identify three distraction and diversion activities and use those activities to decrease level of anxiety  , Increase interest, engagement, and pleasure in doing things  Decrease frequency and intensity of feeling down, depressed, hopeless  Improve quantity and quality of night time sleep / decrease daytime naps  Feel less tired and more energy during the day      Intervention:   Motivational Interviewing    MI Intervention: Reframe     Change Talk Expressed by the Patient: Taking steps    Provider Response to Change Talk: R - Reflected patient's change talk      Assessments completed prior to visit:  The following assessments were completed by patient for this visit:  PHQ9:       11/27/2024     7:27 AM 12/30/2024     7:45 AM 2/4/2025     9:47 AM 2/11/2025     2:22 PM 3/27/2025     9:23 AM 5/6/2025     9:01 AM 6/20/2025    11:00 AM   PHQ-9 SCORE   PHQ-9 Total Score MyChart   8 " (Mild depression) 7 (Mild depression) 10 (Moderate depression) 8 (Mild depression) 9 (Mild depression)   PHQ-9 Total Score 9 7 8  7  10  8  9        Patient-reported    Proxy-reported     GAD7:       8/31/2024     9:27 AM 10/15/2024     8:48 AM 12/2/2024    11:30 AM 1/16/2025     6:27 AM 3/27/2025     9:25 AM 5/6/2025     9:02 AM 6/16/2025    10:10 AM   ADA-7 SCORE   Total Score 9 (mild anxiety) 11 (moderate anxiety) 6 (mild anxiety) 8 (mild anxiety) 9 (mild anxiety) 6 (mild anxiety) 11 (moderate anxiety)   Total Score 9 11 6  8  9  6  11        Patient-reported     PROMIS 10-Global Health (all questions and answers displayed):       9/26/2023    12:07 PM 1/1/2024    11:44 PM 4/3/2024     9:52 AM 5/20/2024     9:52 AM 8/31/2024     9:29 AM 12/2/2024    11:31 AM 3/27/2025     9:26 AM   PROMIS 10   In general, would you say your health is: Fair Good Good Good Fair Fair Fair   In general, would you say your quality of life is: Good Fair Good Fair Fair Good Good   In general, how would you rate your physical health? Fair Fair Fair Good Fair Fair Fair   In general, how would you rate your mental health, including your mood and your ability to think? Fair Fair Fair Fair Fair Fair Fair   In general, how would you rate your satisfaction with your social activities and relationships? Good Fair Good Good Good Good Good   In general, please rate how well you carry out your usual social activities and roles Good Good Good Good Good Good Good   To what extent are you able to carry out your everyday physical activities such as walking, climbing stairs, carrying groceries, or moving a chair? Mostly Mostly Completely Completely Mostly Completely Completely   In the past 7 days, how often have you been bothered by emotional problems such as feeling anxious, depressed, or irritable? Often Often Sometimes Sometimes Often Often Often   In the past 7 days, how would you rate your fatigue on average? Moderate Mild Moderate Moderate  Moderate Moderate Moderate   In the past 7 days, how would you rate your pain on average, where 0 means no pain, and 10 means worst imaginable pain? 3 3 3 3 3 4 3   In general, would you say your health is: 2 3 3 3 2 2 2   In general, would you say your quality of life is: 3 2 3 2 2 3 3   In general, how would you rate your physical health? 2 2 2 3 2 2 2   In general, how would you rate your mental health, including your mood and your ability to think? 2 2 2 2 2 2 2   In general, how would you rate your satisfaction with your social activities and relationships? 3 2 3 3 3 3 3   In general, please rate how well you carry out your usual social activities and roles. (This includes activities at home, at work and in your community, and responsibilities as a parent, child, spouse, employee, friend, etc.) 3 3 3 3 3 3 3   To what extent are you able to carry out your everyday physical activities such as walking, climbing stairs, carrying groceries, or moving a chair? 4 4 5 5 4 5 5   In the past 7 days, how often have you been bothered by emotional problems such as feeling anxious, depressed, or irritable? 4 4 3 3 4 4 4   In the past 7 days, how would you rate your fatigue on average? 3 2 3 3 3 3 3   In the past 7 days, how would you rate your pain on average, where 0 means no pain, and 10 means worst imaginable pain? 3 3 3 3 3 4 3   Global Mental Health Score 10 8    8 11 10 9 10  10    Global Physical Health Score 13 14    14 14 15 13 13  14    PROMIS TOTAL - SUBSCORES 23 22    22 25 25 22 23  24        Patient-reported         ASSESSMENT: Current Emotional / Mental Status (status of significant symptoms):   Risk status (Self / Other harm or suicidal ideation)   Patient denies current fears or concerns for personal safety.   Patient denies current or recent suicidal ideation or behaviors.   Patient denies current or recent homicidal ideation or behaviors.   Patient denies current or recent self injurious behavior or  ideation.   Patient denies other safety concerns.   Patient reports there has been no change in risk factors since their last session.     Patient reports there has been no change in protective factors since their last session.     Recommended that patient call 911 or go to the local ED should there be a change in any of these risk factors     Appearance:   Appropriate    Eye Contact:   Good    Psychomotor Behavior: Normal    Attitude:   Cooperative    Orientation:   All   Speech    Rate / Production: Normal/ Responsive    Volume:  Normal    Mood:    Anxious    Affect:    Appropriate    Thought Content:  Clear    Thought Form:  Coherent  Goal Directed  Logical    Insight:    Good      Medication Review:   No changes to current psychiatric medication(s)     Medication Compliance:   Yes     Changes in Health Issues:   None reported     Chemical Use Review:   Substance Use: Chemical use reviewed, no active concerns identified      Tobacco Use: No current tobacco use.      Diagnosis:  1. ADA (generalized anxiety disorder)    2. Moderate episode of recurrent major depressive disorder (H)    3. Posttraumatic stress disorder        Collateral Reports Completed:   Not Applicable    PLAN: (Patient Tasks / Therapist Tasks / Other)  Patient will return in three weeks for scheduled session. Patient will continue to identifying values that are important in relationships. Patient will continue to monitor eating habits and ensure she is eating meals.     There has been demonstrated improvement in functioning while patient has been engaged in psychotherapy/psychological service- if withdrawn the patient would deteriorate and/or relapse.     Mariana Love, [facility]     ______________________________________________________________________    Individual Treatment Plan    Patient's Name: Alina Martell  YOB: 1965    Date of Creation:10/16/2020  Date Treatment Plan Last Reviewed/Revised: 5/6/2025    DSM5 Diagnoses:  296.32 (F33.1) Major Depressive Disorder, Recurrent Episode, Moderate _, 300.02 (F41.1) Generalized Anxiety Disorder, or 309.81 (F43.10) Posttraumatic Stress Disorder (includes Posttraumatic Stress Disorder for Children 6 Years and Younger)  Without dissociative symptoms  Psychosocial / Contextual Factors: Health, housing and family  PROMIS (reviewed every 90 days):     PROMIS-10 Scores  Global Mental Health Score: 8  Global Physical Health Score: 14  PROMIS TOTAL - SUBSCORES: 22     Referral / Collaboration:  Was/were discussed and patient will pursue.    Anticipated number of session for this episode of care: 20 will reevaluate every 90 days  Anticipation frequency of session: Biweekly  Anticipated Duration of each session: 38-52 minutes  Treatment plan will be reviewed in 90 days or when goals have been changed.       MeasurableTreatment Goal(s) related to diagnosis / functional impairment(s)  Goal 1: Patient will work on reducing overall anxiety.     I will know I've met my goal when reporting minimal anxiety symptoms.       Objective #A (Patient Action)                          Patient will use distraction each time intrusive worry surfaces.  Status: Continued - Date(s):  5/6/2025     Intervention(s)  Therapist will teach emotional regulation skills.  Objective #B  Patient will Decrease frequency and intensity of feeling down, depressed, hopeless.  Status: Continued - Date(s):   5/6/2025     Intervention(s)  Therapist will teach emotional recognition/identification. ..     Objective #C  Patient will Identify negative self-talk and behaviors: challenge core beliefs, myths, and actions.  Status: Continued - Date(s):   5/6/2025  Intervention(s)  Therapist will teach the client how to perform a behavioral chain analysis. ..     Goal 2: Patient will continue to work on reducing depression symptoms    I will know I've met my goal then reporting minimal depression symptoms     Objective #A (Patient Action)                           Patient will Increase interest, engagement, and pleasure in doing things.  Status: Continued - Date(s):  5/6/2025  Intervention(s)  Therapist will assign homework ..     Objective #B  Patient will Decrease frequency and intensity of feeling down, depressed, hopeless.  Status: Continued - Date(s)5/6/2025  Intervention(s)  Therapist will assign homework at every session.     Goal 3: Client will reduce PTSD symptoms by 50% as evidenced by PCLC-5 score     I will know I've met my goal when not struggling with nightmares.      Objective #A (Client Action)    Client will reduce nightmares.  Status: Continued - Date(s):5/6/2025    Intervention(s)  Therapist will teach nightmare retraining.    Objective #B  Client will compile a list of boundaries that they would like to set with others.  .    Status: Continued - Date(s):5/6/2025    Intervention(s)  Therapist will assign homework boundary setting.            Patient has reviewed and agreed to the above plan.        Mariana Love [facility]

## 2025-06-25 ENCOUNTER — VIRTUAL VISIT (OUTPATIENT)
Dept: FAMILY MEDICINE | Facility: CLINIC | Age: 60
End: 2025-06-25
Attending: FAMILY MEDICINE
Payer: COMMERCIAL

## 2025-06-25 DIAGNOSIS — E66.813 OBESITY, CLASS III, BMI 40-49.9 (MORBID OBESITY) (H): ICD-10-CM

## 2025-06-25 DIAGNOSIS — E11.9 TYPE 2 DIABETES MELLITUS WITHOUT COMPLICATION, WITHOUT LONG-TERM CURRENT USE OF INSULIN (H): Primary | ICD-10-CM

## 2025-06-25 PROCEDURE — 98006 SYNCH AUDIO-VIDEO EST MOD 30: CPT | Performed by: FAMILY MEDICINE

## 2025-06-25 NOTE — PROGRESS NOTES
Maritza is a 60 year old who is being evaluated via a billable video visit.    How would you like to obtain your AVS? MyChart  If the video visit is dropped, the invitation should be resent by: Text to cell phone: [phone]  Will anyone else be joining your video visit? No      Assessment & Plan       Type 2 diabetes mellitus without complication, without long-term current use of insulin (H)  Improved with ozempic - will increase to 1mg now and if want and tolerate it well then plan to go to 2mg next month.  Reviewed recent labs - doing well  - Semaglutide, 1 MG/DOSE, (OZEMPIC) 4 MG/3ML pen  Dispense: 3 mL; Refill: 2  - Semaglutide, 2 MG/DOSE, (OZEMPIC) 8 MG/3ML pen  Dispense: 9 mL; Refill: 3    Obesity, Class III, BMI 40-49.9 (morbid obesity) (H)  Doing well and down another 10lbs.  Will increase dose  - Semaglutide, 1 MG/DOSE, (OZEMPIC) 4 MG/3ML pen  Dispense: 3 mL; Refill: 2  - Semaglutide, 2 MG/DOSE, (OZEMPIC) 8 MG/3ML pen  Dispense: 9 mL; Refill: 3        The longitudinal plan of care for the diagnosis(es)/condition(s) as documented were addressed during this visit. Due to the added complexity in care, I will continue to support Maritza in the subsequent management and with ongoing continuity of care.      Follow-up       Subjective   Maritza is a 60 year old, presenting for the following health issues:  Follow Up (Follow up on Ozempic and A1C. )        6/25/2025     6:41 AM   Additional Questions   Roomed by Francisca PANIAGUA MA   Accompanied by Self     History of Present Illness       Reason for visit:  Ozempic follow up    She eats 2-3 servings of fruits and vegetables daily.She consumes 1 sweetened beverage(s) daily.She exercises with enough effort to increase her heart rate 20 to 29 minutes per day.  She exercises with enough effort to increase her heart rate 4 days per week.   She is taking medications regularly.      Taking ozempic at night and then will make her nauseated depending on food. Not checked weight but  "thinks around 265.              Review of Systems  Constitutional, HEENT, cardiovascular, pulmonary, gi and gu systems are negative, except as otherwise noted.      Objective    Vitals - Patient Reported  Weight (Patient Reported): 120.2 kg (265 lb)  Height (Patient Reported): 175.3 cm (5' 9\")  BMI (Based on Pt Reported Ht/Wt): 39.13      Vitals:  No vitals were obtained today due to virtual visit.    Physical Exam   GENERAL: alert and no distress  EYES: Eyes grossly normal to inspection.  No discharge or erythema, or obvious scleral/conjunctival abnormalities.  RESP: No audible wheeze, cough, or visible cyanosis.    SKIN: Visible skin clear. No significant rash, abnormal pigmentation or lesions.  NEURO: Cranial nerves grossly intact.  Mentation and speech appropriate for age.  PSYCH: Appropriate affect, tone, and pace of words          Video-Visit Details    Type of service:  Video Visit   Originating Location (pt. Location): Home    Distant Location (provider location):  On-site  Platform used for Video Visit: Deena  Signed Electronically by: Estelle Bansal MD    "

## 2025-07-07 DIAGNOSIS — E11.9 TYPE 2 DIABETES MELLITUS WITHOUT COMPLICATION, WITHOUT LONG-TERM CURRENT USE OF INSULIN (H): ICD-10-CM

## 2025-07-08 RX ORDER — PRAVASTATIN SODIUM 20 MG
20 TABLET ORAL DAILY
Qty: 90 TABLET | Refills: 2 | Status: SHIPPED | OUTPATIENT
Start: 2025-07-08

## 2025-07-22 ASSESSMENT — ANXIETY QUESTIONNAIRES
7. FEELING AFRAID AS IF SOMETHING AWFUL MIGHT HAPPEN: NOT AT ALL
IF YOU CHECKED OFF ANY PROBLEMS ON THIS QUESTIONNAIRE, HOW DIFFICULT HAVE THESE PROBLEMS MADE IT FOR YOU TO DO YOUR WORK, TAKE CARE OF THINGS AT HOME, OR GET ALONG WITH OTHER PEOPLE: SOMEWHAT DIFFICULT
2. NOT BEING ABLE TO STOP OR CONTROL WORRYING: SEVERAL DAYS
6. BECOMING EASILY ANNOYED OR IRRITABLE: NOT AT ALL
GAD7 TOTAL SCORE: 11
1. FEELING NERVOUS, ANXIOUS, OR ON EDGE: NEARLY EVERY DAY
8. IF YOU CHECKED OFF ANY PROBLEMS, HOW DIFFICULT HAVE THESE MADE IT FOR YOU TO DO YOUR WORK, TAKE CARE OF THINGS AT HOME, OR GET ALONG WITH OTHER PEOPLE?: SOMEWHAT DIFFICULT
4. TROUBLE RELAXING: NEARLY EVERY DAY
3. WORRYING TOO MUCH ABOUT DIFFERENT THINGS: SEVERAL DAYS
5. BEING SO RESTLESS THAT IT IS HARD TO SIT STILL: NEARLY EVERY DAY

## 2025-07-24 ENCOUNTER — VIRTUAL VISIT (OUTPATIENT)
Dept: PSYCHOLOGY | Facility: CLINIC | Age: 60
End: 2025-07-24
Payer: COMMERCIAL

## 2025-07-24 DIAGNOSIS — F43.10 POSTTRAUMATIC STRESS DISORDER: ICD-10-CM

## 2025-07-24 DIAGNOSIS — F41.1 GAD (GENERALIZED ANXIETY DISORDER): Primary | ICD-10-CM

## 2025-07-24 DIAGNOSIS — F33.1 MODERATE EPISODE OF RECURRENT MAJOR DEPRESSIVE DISORDER (H): ICD-10-CM

## 2025-07-24 ASSESSMENT — ANXIETY QUESTIONNAIRES: GAD7 TOTAL SCORE: 11

## 2025-07-24 NOTE — PROGRESS NOTES
[facility]                                     Progress Note    Patient Name: Alina Martell  Date: 7/24/25         Service Type: Individual      Session Start Time: 2:03 Session End Time: 2:53     Session Length: 50 minutes    Session #: 93     Attendees:     Client        Service Modality: Service Modality:  Video Visit:     Telemedicine Visit: The patient's condition can be safely assessed and treated via synchronous audio and visual telemedicine encounter.       Reason for Telemedicine Visit: Patient convenience (e.g. access to timely appointments / distance to available provider)     Originating Site (Patient Location): Patient's place of employment     Distant Location (provider location):  Off-site     Consent:  The patient/guardian has verbally consented to: the potential risks and benefits of telemedicine (video visit) versus in person care; bill my insurance or make self-payment for services provided; and responsibility for payment of non-covered services.      Mode of Communication:  Video Conference via [employer]     As the provider I attest to compliance with applicable laws and regulations related to telemedicine.            DATA  Extended Session (53+ minutes): No  Interactive Complexity: No  Crisis: No      Progress Since Last Session (Related to Symptoms / Goals / Homework):   Symptoms: No change concerns with impulse     Homework: Achieved / completed to satisfaction      Episode of Care Goals: Satisfactory progress - ACTION (Actively working towards change); Intervened by reinforcing change plan / affirming steps taken     Current / Ongoing Stressors and Concerns:  Patient indicated she has been having ups and downs. Patient reported she found a company that works with a lot of apartments. Patient indicated feeling optimistic that she will find one and plans to tour more. Patient reported being concerned about her impulses at this time. Patient indicated being aware  of them and takign steps to not let them take over. Patient reported she is eating during the week but the weekends are ahrder. Patient indicated believing she has her own place this will be better. This therapist processed with patient continued warning signs of her impulses.      Treatment Objective(s) Addressed in This Session:   identify three distraction and diversion activities and use those activities to decrease level of anxiety  , Increase interest, engagement, and pleasure in doing things  Decrease frequency and intensity of feeling down, depressed, hopeless  Improve quantity and quality of night time sleep / decrease daytime naps  Feel less tired and more energy during the day      Intervention:   Motivational Interviewing    MI Intervention: Open-ended questions     Change Talk Expressed by the Patient: Activation    Provider Response to Change Talk: R - Reflected patient's change talk and S - Summarized patient's change talk statements      Assessments completed prior to visit:  The following assessments were completed by patient for this visit:  PHQ9:       12/30/2024     7:45 AM 2/4/2025     9:47 AM 2/11/2025     2:22 PM 3/27/2025     9:23 AM 5/6/2025     9:01 AM 6/20/2025    11:00 AM 6/25/2025    12:37 AM   PHQ-9 SCORE   PHQ-9 Total Score MyChart  8 (Mild depression) 7 (Mild depression) 10 (Moderate depression) 8 (Mild depression) 9 (Mild depression) 6 (Mild depression)   PHQ-9 Total Score 7 8  7  10  8  9  6        Patient-reported    Proxy-reported     GAD7:       10/15/2024     8:48 AM 12/2/2024    11:30 AM 1/16/2025     6:27 AM 3/27/2025     9:25 AM 5/6/2025     9:02 AM 6/16/2025    10:10 AM 7/22/2025     4:27 PM   ADA-7 SCORE   Total Score 11 (moderate anxiety) 6 (mild anxiety) 8 (mild anxiety) 9 (mild anxiety) 6 (mild anxiety) 11 (moderate anxiety) 11 (moderate anxiety)   Total Score 11 6  8  9  6  11  11        Patient-reported     PROMIS 10-Global Health (all questions and answers displayed):        1/1/2024    11:44 PM 4/3/2024     9:52 AM 5/20/2024     9:52 AM 8/31/2024     9:29 AM 12/2/2024    11:31 AM 3/27/2025     9:26 AM 7/22/2025     4:27 PM   PROMIS 10   In general, would you say your health is: Good Good Good Fair Fair Fair Good   In general, would you say your quality of life is: Fair Good Fair Fair Good Good Fair   In general, how would you rate your physical health? Fair Fair Good Fair Fair Fair Good   In general, how would you rate your mental health, including your mood and your ability to think? Fair Fair Fair Fair Fair Fair Fair   In general, how would you rate your satisfaction with your social activities and relationships? Fair Good Good Good Good Good Good   In general, please rate how well you carry out your usual social activities and roles Good Good Good Good Good Good Good   To what extent are you able to carry out your everyday physical activities such as walking, climbing stairs, carrying groceries, or moving a chair? Mostly Completely Completely Mostly Completely Completely Completely   In the past 7 days, how often have you been bothered by emotional problems such as feeling anxious, depressed, or irritable? Often Sometimes Sometimes Often Often Often Often   In the past 7 days, how would you rate your fatigue on average? Mild Moderate Moderate Moderate Moderate Moderate Mild   In the past 7 days, how would you rate your pain on average, where 0 means no pain, and 10 means worst imaginable pain? 3 3 3 3 4 3 3   In general, would you say your health is: 3 3 3 2 2 2 3   In general, would you say your quality of life is: 2 3 2 2 3 3 2   In general, how would you rate your physical health? 2 2 3 2 2 2 3   In general, how would you rate your mental health, including your mood and your ability to think? 2 2 2 2 2 2 2   In general, how would you rate your satisfaction with your social activities and relationships? 2 3 3 3 3 3 3   In general, please rate how well you carry out your  usual social activities and roles. (This includes activities at home, at work and in your community, and responsibilities as a parent, child, spouse, employee, friend, etc.) 3 3 3 3 3 3 3   To what extent are you able to carry out your everyday physical activities such as walking, climbing stairs, carrying groceries, or moving a chair? 4 5 5 4 5 5 5   In the past 7 days, how often have you been bothered by emotional problems such as feeling anxious, depressed, or irritable? 4 3 3 4 4 4 4   In the past 7 days, how would you rate your fatigue on average? 2 3 3 3 3 3 2   In the past 7 days, how would you rate your pain on average, where 0 means no pain, and 10 means worst imaginable pain? 3 3 3 3 4 3 3   Global Mental Health Score 8    8 11 10 9 10  10  9    Global Physical Health Score 14    14 14 15 13 13  14  16    PROMIS TOTAL - SUBSCORES 22    22 25 25 22 23  24  25        Patient-reported         ASSESSMENT: Current Emotional / Mental Status (status of significant symptoms):   Risk status (Self / Other harm or suicidal ideation)   Patient denies current fears or concerns for personal safety.   Patient denies current or recent suicidal ideation or behaviors.   Patient denies current or recent homicidal ideation or behaviors.   Patient denies current or recent self injurious behavior or ideation.   Patient denies other safety concerns.   Patient reports there has been no change in risk factors since their last session.     Patient reports there has been no change in protective factors since their last session.     Recommended that patient call 911 or go to the local ED should there be a change in any of these risk factors     Appearance:   Appropriate    Eye Contact:   Good    Psychomotor Behavior: Normal    Attitude:   Cooperative    Orientation:   All   Speech    Rate / Production: Normal/ Responsive    Volume:  Normal    Mood:    Anxious    Affect:    Appropriate    Thought Content:  Clear    Thought  Form:  Coherent  Goal Directed  Logical    Insight:    Good      Medication Review:   No changes to current psychiatric medication(s)     Medication Compliance:   Yes     Changes in Health Issues:   None reported     Chemical Use Review:   Substance Use: Chemical use reviewed, no active concerns identified      Tobacco Use: No current tobacco use.      Diagnosis:  1. ADA (generalized anxiety disorder)    2. Moderate episode of recurrent major depressive disorder (H)    3. Posttraumatic stress disorder        Collateral Reports Completed:   Not Applicable    PLAN: (Patient Tasks / Therapist Tasks / Other)  Patient will return in three weeks for scheduled session. Patient will ensure she is eating multiple snacks throughout the day and on weekends. Patient will schedule lunches with her friends for the weekends. Patient will tour apartments.     There has been demonstrated improvement in functioning while patient has been engaged in psychotherapy/psychological service- if withdrawn the patient would deteriorate and/or relapse.     Mariana Love, [facility]     ______________________________________________________________________    Individual Treatment Plan    Patient's Name: Alina Martell  YOB: 1965    Date of Creation:10/16/2020  Date Treatment Plan Last Reviewed/Revised: 5/6/2025    DSM5 Diagnoses: 296.32 (F33.1) Major Depressive Disorder, Recurrent Episode, Moderate _, 300.02 (F41.1) Generalized Anxiety Disorder, or 309.81 (F43.10) Posttraumatic Stress Disorder (includes Posttraumatic Stress Disorder for Children 6 Years and Younger)  Without dissociative symptoms  Psychosocial / Contextual Factors: Health, housing and family  PROMIS (reviewed every 90 days):     PROMIS-10 Scores  Global Mental Health Score: 8  Global Physical Health Score: 14  PROMIS TOTAL - SUBSCORES: 22     Referral / Collaboration:  Was/were discussed and patient will pursue.    Anticipated number of session for this  episode of care: 20 will reevaluate every 90 days  Anticipation frequency of session: Biweekly  Anticipated Duration of each session: 38-52 minutes  Treatment plan will be reviewed in 90 days or when goals have been changed.       MeasurableTreatment Goal(s) related to diagnosis / functional impairment(s)  Goal 1: Patient will work on reducing overall anxiety.     I will know I've met my goal when reporting minimal anxiety symptoms.       Objective #A (Patient Action)                          Patient will use distraction each time intrusive worry surfaces.  Status: Continued - Date(s):  5/6/2025     Intervention(s)  Therapist will teach emotional regulation skills.  Objective #B  Patient will Decrease frequency and intensity of feeling down, depressed, hopeless.  Status: Continued - Date(s):   5/6/2025     Intervention(s)  Therapist will teach emotional recognition/identification. ..     Objective #C  Patient will Identify negative self-talk and behaviors: challenge core beliefs, myths, and actions.  Status: Continued - Date(s):   5/6/2025  Intervention(s)  Therapist will teach the client how to perform a behavioral chain analysis. ..     Goal 2: Patient will continue to work on reducing depression symptoms    I will know I've met my goal then reporting minimal depression symptoms     Objective #A (Patient Action)                          Patient will Increase interest, engagement, and pleasure in doing things.  Status: Continued - Date(s):  5/6/2025  Intervention(s)  Therapist will assign homework ..     Objective #B  Patient will Decrease frequency and intensity of feeling down, depressed, hopeless.  Status: Continued - Date(s)5/6/2025  Intervention(s)  Therapist will assign homework at every session.     Goal 3: Client will reduce PTSD symptoms by 50% as evidenced by PCLC-5 score     I will know I've met my goal when not struggling with nightmares.      Objective #A (Client Action)    Client will reduce  nightmares.  Status: Continued - Date(s):5/6/2025    Intervention(s)  Therapist will teach nightmare retraining.    Objective #B  Client will compile a list of boundaries that they would like to set with others.  .    Status: Continued - Date(s):5/6/2025    Intervention(s)  Therapist will assign homework boundary setting.            Patient has reviewed and agreed to the above plan.        Mariana Love, [facility]

## 2025-07-27 NOTE — PROGRESS NOTES
Medication Therapy Management (MTM) Encounter    ASSESSMENT:                            Medication Adherence/Access: No issues identified.    Rheumatoid arthritis  Recommend patient continue Hyrimoz at current dose as she is having well controlled symptoms and tolerating therapy without concern. Recommend discussing dry patches with rheumatologist to evaluate if potentially psoriasis induced from her TNF inhibitor therapy. She is scheduled for labs on 8/25/25 which is appropriate timing from last labs.    GERD/Dysphagia  Recommend patient continue plan set by GI provider and requested refill of famotidine.    Vaccines  Per ACIP guidelines, patient is eligible for TDAP on or after 10/20/2025, and RSV. Recommend receiving RSV late Summer/early Fall.    Anxiety  Recommend patient increase dose of gabapentin, per directions ordered by her psychiatrist, to 100 mg in the morning and 300 mg in the evening.    Prediabetes  Recommend continue Ozempic as managed by her PCP.    PLAN:                            Increase gabapentin to 100 mg (1 capsule) in the morning and 300 mg (3 capsules) in the evening.  Consider the following vaccines:   Tetanus (TDaP). Due for this on or after 10/20/2025  Respiratory Syncytial Virus (RSV). Recommended receiving in August/September  Continue other medications as currently taking  Ken sent request to refill famotidine to Mery Serrano.    Follow-up: Return in about 6 months (around 1/28/2026) for Follow up, with me, using a phone visit.    SUBJECTIVE/OBJECTIVE:                          Maritza Martell is a 60 year old female seen for a follow-up visit from 3/7/12998. She was referred to me from Medhat Lester MBBS.       Reason for visit: Rheumatoid arthritis follow up.    Allergies/ADRs: Reviewed in chart  Past Medical History: Reviewed in chart  Tobacco: She reports that she has never smoked. She has been exposed to tobacco smoke. She has never used smokeless tobacco.  Alcohol: Less than 1  beverage / month    Medication Adherence/Access: no issues reported.    Rheumatoid arthritis  Hyrimoz 40 mg subcutaneously every 14 days (started approximately 3/2020, last dose 7/21/2025)    Patient reports she has patches of dry skin on both of her legs, plans to discuss with Dr. Lester at upcoming visit. Reports she discussed with her PCP who was not concerned about malignancy. Reports no eye pain, slight redness which she attributes to her Sjogren's. Reports no joint swelling, no joint pain. She is able to walk 3-4 times per week. Can have increased symptoms when there are changes in barometric pressure. Overall she feels like therapy is working well. Reports no side effects to therapy.    Previous therapies: methotrexate (diarrhea, hair loss), hydroxychloroquine, leflunomide     Last lab monitoring completed: CMP 6/4/2025, CBC 1/3/2025    GERD /Dysphagia  Famotidine 40 mg once daily  Ondansetron ODT 4 mg as needed     Reports she continues to have symptoms and is currently buying famotidine OTC and was previously getting it as prescription, which she prefers as it is a lower price.    Excerpt from visit with Mery Serrano PA-C on 5/21/25:   Plan  -continue famotidine 40 mg BID, can trial drop to 20 mg BID in ensuing months as she continues to lose weight. For recurrence of sx, recommend to go back to lowest effective dose of meds   -OK for PRN omeprazole for bad heartburn that occurs despite famotidine 40 mg BID   -cont to avoid gastric irritants     Vaccinations  Immunization History   Covid-19 vaccine ([id number] version)  Up-to-date   Influenza (annual) Up-to-date   Pneumococcal  Pneumovax-23: 3/15/2022   Prevnar-20: 8/30/2023 Up-to-date   Tetanus/Tdap  Up-to-date   Shingrix Up-to-date   All patients on biologics should avoid live vaccines (varicella/VZV, intranasal influenza, MMR, or yellow fever vaccine (if traveling))        Mental Health   Anxiety  Gabapentin 300 mg at bedtime     Patient reports no current  medication side effects. Patient reports she is sleeping well, but thinks she may need increased gabapentin to treat her anxiety. Per chart review gabapentin is ordered with directions of 1 capsule in the AM and 3 capsules in the evening.     Diabetes   - Prediabetes  Ozempic 1 mg once weekly    Patient is not experiencing side effects. Current diabetes symptoms: none. Has lost 30 lbs since starting Ozempic.     Today's Vitals: LMP  (LMP Unknown)   ----------------    I spent 26 minutes with this patient today. All changes were made via collaborative practice agreement with Medhat Lester.     A summary of these recommendations was sent via [employer].    Ken Welsh, PharmD  Medication Therapy Management Pharmacist  [facility]  Phone: [phone]    Telemedicine Visit Details  The patient's medications can be safely assessed via a telemedicine encounter.  Type of service:  Telephone visit  Originating Location (pt. Location): Home    Distant Location (provider location):  On-site  Start Time: 9:30 AM  End Time: 9:56 AM     Medication Therapy Recommendations  Anxiety   1 Current Medication: gabapentin (NEURONTIN) 100 MG capsule   Current Medication Si capsule by mouth every morning and 3 capsules by mouth every bedtime   Rationale: Dose too low - Dosage too low - Effectiveness   Recommendation: Increase Dose   Status: Accepted - no CPA Needed   Identified Date: 2025 Completed Date: 2025

## 2025-07-28 ENCOUNTER — VIRTUAL VISIT (OUTPATIENT)
Dept: PHARMACY | Facility: OTHER | Age: 60
End: 2025-07-28
Attending: INTERNAL MEDICINE
Payer: COMMERCIAL

## 2025-07-28 DIAGNOSIS — M05.79 SEROPOSITIVE RHEUMATOID ARTHRITIS OF MULTIPLE SITES (H): Primary | ICD-10-CM

## 2025-07-28 DIAGNOSIS — R10.13 EPIGASTRIC PAIN: Primary | ICD-10-CM

## 2025-07-28 DIAGNOSIS — R13.19 ESOPHAGEAL DYSPHAGIA: ICD-10-CM

## 2025-07-28 DIAGNOSIS — R10.13 EPIGASTRIC PAIN: ICD-10-CM

## 2025-07-28 DIAGNOSIS — F41.9 ANXIETY: ICD-10-CM

## 2025-07-28 DIAGNOSIS — Z71.85 VACCINE COUNSELING: ICD-10-CM

## 2025-07-28 DIAGNOSIS — R73.03 PREDIABETES: ICD-10-CM

## 2025-07-28 RX ORDER — ADALIMUMAB-ADAZ 40 MG/.4ML
40 INJECTION, SOLUTION SUBCUTANEOUS
Qty: 0.8 ML | Refills: 5 | Status: SHIPPED | OUTPATIENT
Start: 2025-07-28

## 2025-07-28 RX ORDER — FAMOTIDINE 40 MG/1
40 TABLET, FILM COATED ORAL 2 TIMES DAILY
Qty: 180 TABLET | Refills: 0 | Status: SHIPPED | OUTPATIENT
Start: 2025-07-28

## 2025-07-28 NOTE — PATIENT INSTRUCTIONS
"Recommendations from today's MTM visit:                                                       Increase gabapentin to 100 mg (1 capsule) in the morning and 300 mg (3 capsules) in the evening.  Consider the following vaccines:   Tetanus (TDaP). Due for this on or after 10/20/2025  Respiratory Syncytial Virus (RSV). Recommended receiving in August/September  Continue other medications as currently taking  Ken sent request to refill famotidine to Mery Serrano.    Follow-up: Return in about 6 months (around 1/28/2026) for Follow up, with me, using a phone visit.    It was great speaking with you today.  I value your experience and would be very thankful for your time in providing feedback in our clinic survey. In the next few days, you may receive an email or text message from [employer] with a link to a survey related to your  clinical pharmacist.\"     To schedule another MTM appointment, please call the clinic directly or you may call the MTM scheduling line at [phone] or toll-free at [phone].     My Clinical Pharmacist's contact information:                                                      Please feel free to contact me with any questions or concerns you have.      Ken Welsh, PharmD  Medication Therapy Management Pharmacist  [facility]  Phone: [phone]   "

## 2025-07-28 NOTE — TELEPHONE ENCOUNTER
Patient requesting refill of famotidine 40 mg once to twice daily.    Ken Welsh, PharmD  Medication Therapy Management Pharmacist  [facility]  Phone: [phone]

## 2025-08-13 ASSESSMENT — PATIENT HEALTH QUESTIONNAIRE - PHQ9
SUM OF ALL RESPONSES TO PHQ QUESTIONS 1-9: 11
10. IF YOU CHECKED OFF ANY PROBLEMS, HOW DIFFICULT HAVE THESE PROBLEMS MADE IT FOR YOU TO DO YOUR WORK, TAKE CARE OF THINGS AT HOME, OR GET ALONG WITH OTHER PEOPLE: SOMEWHAT DIFFICULT
SUM OF ALL RESPONSES TO PHQ QUESTIONS 1-9: 11

## 2025-08-14 ENCOUNTER — OFFICE VISIT (OUTPATIENT)
Dept: PSYCHOLOGY | Facility: CLINIC | Age: 60
End: 2025-08-14
Payer: COMMERCIAL

## 2025-08-14 DIAGNOSIS — F41.1 GAD (GENERALIZED ANXIETY DISORDER): Primary | ICD-10-CM

## 2025-08-14 DIAGNOSIS — F43.10 POSTTRAUMATIC STRESS DISORDER: ICD-10-CM

## 2025-08-14 DIAGNOSIS — F33.1 MODERATE EPISODE OF RECURRENT MAJOR DEPRESSIVE DISORDER (H): ICD-10-CM

## 2025-08-25 ENCOUNTER — LAB (OUTPATIENT)
Dept: LAB | Facility: CLINIC | Age: 60
End: 2025-08-25
Payer: COMMERCIAL

## 2025-08-25 DIAGNOSIS — E11.9 DIABETES MELLITUS, TYPE 2 (H): Primary | ICD-10-CM

## 2025-08-25 DIAGNOSIS — M05.79 SEROPOSITIVE RHEUMATOID ARTHRITIS OF MULTIPLE SITES (H): Primary | ICD-10-CM

## 2025-08-25 DIAGNOSIS — M05.79 SEROPOSITIVE RHEUMATOID ARTHRITIS OF MULTIPLE SITES (H): ICD-10-CM

## 2025-08-25 LAB
ALBUMIN SERPL BCG-MCNC: 3.9 G/DL (ref 3.5–5.2)
ALT SERPL W P-5'-P-CCNC: 7 U/L (ref 0–50)
CREAT SERPL-MCNC: 0.82 MG/DL (ref 0.51–0.95)
EGFRCR SERPLBLD CKD-EPI 2021: 82 ML/MIN/1.73M2
ERYTHROCYTE [DISTWIDTH] IN BLOOD BY AUTOMATED COUNT: 14.5 % (ref 10–15)
EST. AVERAGE GLUCOSE BLD GHB EST-MCNC: 117 MG/DL
HBA1C MFR BLD: 5.7 % (ref 0–5.6)
HCT VFR BLD AUTO: 38.6 % (ref 35–47)
HGB BLD-MCNC: 12.2 G/DL (ref 11.7–15.7)
MCH RBC QN AUTO: 26.4 PG (ref 26.5–33)
MCHC RBC AUTO-ENTMCNC: 31.6 G/DL (ref 31.5–36.5)
MCV RBC AUTO: 83.5 FL (ref 78–100)
PLATELET # BLD AUTO: 373 10E3/UL (ref 150–450)
RBC # BLD AUTO: 4.62 10E6/UL (ref 3.8–5.2)
WBC # BLD AUTO: 12.33 10E3/UL (ref 4–11)

## 2025-08-25 PROCEDURE — 85027 COMPLETE CBC AUTOMATED: CPT

## 2025-08-25 PROCEDURE — 36415 COLL VENOUS BLD VENIPUNCTURE: CPT

## 2025-08-25 PROCEDURE — 82565 ASSAY OF CREATININE: CPT

## 2025-08-25 PROCEDURE — 84460 ALANINE AMINO (ALT) (SGPT): CPT

## 2025-08-25 PROCEDURE — 83036 HEMOGLOBIN GLYCOSYLATED A1C: CPT

## 2025-08-25 PROCEDURE — 82040 ASSAY OF SERUM ALBUMIN: CPT

## 2025-08-28 ENCOUNTER — OFFICE VISIT (OUTPATIENT)
Dept: RHEUMATOLOGY | Facility: CLINIC | Age: 60
End: 2025-08-28
Payer: COMMERCIAL

## 2025-08-28 VITALS
DIASTOLIC BLOOD PRESSURE: 82 MMHG | WEIGHT: 260 LBS | HEART RATE: 80 BPM | SYSTOLIC BLOOD PRESSURE: 144 MMHG | BODY MASS INDEX: 38.4 KG/M2

## 2025-08-28 DIAGNOSIS — Z79.899 HIGH RISK MEDICATION USE: ICD-10-CM

## 2025-08-28 DIAGNOSIS — R21 RASH: ICD-10-CM

## 2025-08-28 DIAGNOSIS — R76.8 CYCLIC CITRULLINATED PEPTIDE (CCP) ANTIBODY POSITIVE: ICD-10-CM

## 2025-08-28 DIAGNOSIS — M05.79 SEROPOSITIVE RHEUMATOID ARTHRITIS OF MULTIPLE SITES (H): Primary | ICD-10-CM

## 2025-08-28 DIAGNOSIS — M17.0 PRIMARY OSTEOARTHRITIS OF BOTH KNEES: ICD-10-CM

## 2025-09-04 ENCOUNTER — VIRTUAL VISIT (OUTPATIENT)
Dept: PSYCHOLOGY | Facility: CLINIC | Age: 60
End: 2025-09-04
Payer: COMMERCIAL

## 2025-09-04 DIAGNOSIS — F41.1 GAD (GENERALIZED ANXIETY DISORDER): Primary | ICD-10-CM

## 2025-09-04 DIAGNOSIS — F33.1 MODERATE EPISODE OF RECURRENT MAJOR DEPRESSIVE DISORDER (H): ICD-10-CM

## 2025-09-04 DIAGNOSIS — F43.10 POSTTRAUMATIC STRESS DISORDER: ICD-10-CM

## (undated) DEVICE — SUTURE VICRYL+ 2-0 27IN SH UND VCP417H

## (undated) DEVICE — GLOVE EXAM NITRILE LG PF LATEX FREE 5064

## (undated) DEVICE — CATH EP CS BI-DIRECT 115CM 2-8-2 FJ-CURVE BD710FJ282RTS

## (undated) DEVICE — PATCH CARTO 3 EXTERNAL REFERENCE 3D MAPPING CREFP6

## (undated) DEVICE — SOL WATER IRRIG 500ML BOTTLE 2F7113

## (undated) DEVICE — ENDO SHEARS RENEW LAP ENDOCUT SCISSOR TIP 16.5MM 3142

## (undated) DEVICE — SU WND CLOSURE VLOC 180 ABS 0 9" GS-21 VLOCL0346

## (undated) DEVICE — SOL RINGERS LACTATED 1000ML BAG 2B2324X

## (undated) DEVICE — PREP POVIDONE-IODINE 10% SOLUTION 4OZ BOTTLE MDS093944

## (undated) DEVICE — GLOVE SURG PI ULTRA TOUCH M SZ 7 LF 42670

## (undated) DEVICE — Device

## (undated) DEVICE — PAD POS XL 1X20X40IN PINK PIGAZZI

## (undated) DEVICE — SYR 50ML SLIP TIP W/O NDL 309654

## (undated) DEVICE — GLOVE BIOGEL PI ULTRATOUCH G SZ 6.0 42160

## (undated) DEVICE — CATH FOLEY 16FR 5ML LUBRICATH LATEX 0165L16

## (undated) DEVICE — CUSTOM PACK DA VINCI GYN SMA5BDVHEA

## (undated) DEVICE — GLOVE UNDER INDICATOR PI SZ 6 LF 41660

## (undated) DEVICE — SUCTION MANIFOLD NEPTUNE 2 SYS 1 PORT 702-025-000

## (undated) DEVICE — PACK MINOR SINGLE BASIN SSK3001

## (undated) DEVICE — CATH SOUNDSTAR 8FRX90CM 10439011

## (undated) DEVICE — TUBING IRRIG TUR Y TYPE 96" LF 6543-01

## (undated) DEVICE — DRAPE U SPLIT 74X120" 29440

## (undated) DEVICE — TUBING SUCTION MEDI-VAC 1/4"X20' N620A

## (undated) DEVICE — NEEDLE 71CM NRG TRANSSEPTAL C0  8F FIX CURVE NRG-E-HF-71-C0

## (undated) DEVICE — SUTURE ENDO SURGITIE 21 POLYSORB EL23LN

## (undated) DEVICE — DRSG STERI STRIP 1/2X4" R1547

## (undated) DEVICE — PACK EP SRG PROC LF DISP SAN32EPFSR

## (undated) DEVICE — NDL INSUFFLATION 13GA 120MM C2201

## (undated) DEVICE — VAMP PLUS SYSTEM RESERVOIR WITH 60IN PATIENT TUBING

## (undated) DEVICE — BRIEF STRETCH XL MPS40

## (undated) DEVICE — CATH EP CATH EP REPRO DAIG RSPN FX CRV DX EP C

## (undated) DEVICE — KIT ENDO TURNOVER/PROCEDURE CARRY-ON 101822

## (undated) DEVICE — LUBRICANT INST ELECTROLUBE EL101

## (undated) DEVICE — DAVINCI XI HANDPIECE ESU VESSEL SEALER 8MM EXT 480422

## (undated) DEVICE — CATH NAV PENTARAY F CURVE

## (undated) DEVICE — SET FLUID DELIVERY 108IN H965913000091

## (undated) DEVICE — CATH THERMOCOOL SMARTTOUCH SF FJ CURVE

## (undated) DEVICE — TUBING LAP SUCT/IRRIG STRYKER 250070500

## (undated) DEVICE — SYSTEM PANNUS RETENTION 4 PAD 2 STRAP CZ-PRS-04

## (undated) DEVICE — GUIDEWIRE PROTRACK PGTL .025IN DIA 23

## (undated) DEVICE — SYR 50ML LL W/O NDL 309653

## (undated) DEVICE — DAVINCI XI SEAL UNIVERSAL 5-8MM 470361

## (undated) DEVICE — DAVINCI XI DRAPE ARM 470015

## (undated) DEVICE — DRESSING COVERLET STRIP 3/4 X 3 LF 230

## (undated) DEVICE — PROTECTOR ARM STANDARD ONE STEP

## (undated) DEVICE — INTRO SHEATH 9FRX10CM PINNACLE RSS902

## (undated) DEVICE — ENDO OBTURATOR ACCESS PORT BLADELESS 8X100MM IAS8-100LP

## (undated) DEVICE — DAVINCI XI DRAPE COLUMN 470341

## (undated) DEVICE — GLOVE SURG PI ULTRA TOUCH M SZ 6-1/2 LF

## (undated) DEVICE — SU MONOCRYL+ 4-0 18IN PS2 UND MCP496G

## (undated) DEVICE — SOL WATER IRRIG 1000ML BOTTLE 2F7114

## (undated) DEVICE — TUBING CONMED AIRSEAL SMOKE EVAC INSUFFLATION ASM-EVAC

## (undated) DEVICE — DAVINCI XI OBTURATOR BLADELESS 8MM 470359

## (undated) DEVICE — UTERINE MANIPULATOR RUMI 6.7MMX8CM UMB678

## (undated) DEVICE — DAVINCI HOT SHEARS TIP COVER  400180

## (undated) DEVICE — CLIP HEMOSTATIC ASSURANCE W11 MM 00711882

## (undated) DEVICE — SOLUTION IV 2B0304X STRL WATER 1000ML

## (undated) DEVICE — TUBE SET SMARKABLATE IRRIGATION

## (undated) DEVICE — INTRO SHEATH 8FRX10CM PINNACLE RSS802

## (undated) DEVICE — PREP CHLORAPREP 26ML TINTED HI-LITE ORANGE 930815

## (undated) DEVICE — ENDO BITE BLOCK ADULT OMNI-BLOC

## (undated) DEVICE — PREP POVIDONE-IODINE 7.5% SCRUB 4OZ BOTTLE MDS093945

## (undated) DEVICE — GOWN IMPERVIOUS 2XL BLUE

## (undated) DEVICE — PACK TRENGUARD 450 PROCEDURAL 2065406

## (undated) DEVICE — BLADE KNIFE SURG 11 371111

## (undated) DEVICE — PLATE GROUNDING ADULT W/CORD 9165L

## (undated) DEVICE — SHEATH INTRODUCER VIZIGO 17 MM SMALL CURVE D138501

## (undated) DEVICE — RX SURGIFLO HEMOSTATIC MATRIX 8ML 2991

## (undated) DEVICE — DEVICE RUMI II KOH-EFFICIENT 3.5CM KC-RUMI-35

## (undated) RX ORDER — FENTANYL CITRATE-0.9 % NACL/PF 10 MCG/ML
PLASTIC BAG, INJECTION (ML) INTRAVENOUS
Status: DISPENSED
Start: 2023-05-04

## (undated) RX ORDER — HEPARIN SODIUM 1000 [USP'U]/ML
INJECTION, SOLUTION INTRAVENOUS; SUBCUTANEOUS
Status: DISPENSED
Start: 2022-06-30

## (undated) RX ORDER — FENTANYL CITRATE 50 UG/ML
INJECTION, SOLUTION INTRAMUSCULAR; INTRAVENOUS
Status: DISPENSED
Start: 2023-05-04

## (undated) RX ORDER — LIDOCAINE HYDROCHLORIDE 10 MG/ML
INJECTION, SOLUTION EPIDURAL; INFILTRATION; INTRACAUDAL; PERINEURAL
Status: DISPENSED
Start: 2022-06-30

## (undated) RX ORDER — ADENOSINE 3 MG/ML
INJECTION, SOLUTION INTRAVENOUS
Status: DISPENSED
Start: 2022-06-30

## (undated) RX ORDER — FENTANYL CITRATE 50 UG/ML
INJECTION, SOLUTION INTRAMUSCULAR; INTRAVENOUS
Status: DISPENSED
Start: 2022-06-30

## (undated) RX ORDER — REGADENOSON 0.08 MG/ML
INJECTION, SOLUTION INTRAVENOUS
Status: DISPENSED
Start: 2022-12-15

## (undated) RX ORDER — ONDANSETRON 2 MG/ML
INJECTION INTRAMUSCULAR; INTRAVENOUS
Status: DISPENSED
Start: 2023-05-04

## (undated) RX ORDER — HEPARIN SODIUM 200 [USP'U]/100ML
INJECTION, SOLUTION INTRAVENOUS
Status: DISPENSED
Start: 2022-06-30

## (undated) RX ORDER — HEPARIN SODIUM 10000 [USP'U]/100ML
INJECTION, SOLUTION INTRAVENOUS
Status: DISPENSED
Start: 2022-06-30

## (undated) RX ORDER — LIDOCAINE HYDROCHLORIDE 10 MG/ML
INJECTION, SOLUTION EPIDURAL; INFILTRATION; INTRACAUDAL; PERINEURAL
Status: DISPENSED
Start: 2023-05-04

## (undated) RX ORDER — PROPOFOL 10 MG/ML
INJECTION, EMULSION INTRAVENOUS
Status: DISPENSED
Start: 2023-05-04

## (undated) RX ORDER — DEXAMETHASONE SODIUM PHOSPHATE 10 MG/ML
INJECTION, EMULSION INTRAMUSCULAR; INTRAVENOUS
Status: DISPENSED
Start: 2023-05-04